# Patient Record
Sex: FEMALE | Race: BLACK OR AFRICAN AMERICAN | NOT HISPANIC OR LATINO | Employment: OTHER | URBAN - METROPOLITAN AREA
[De-identification: names, ages, dates, MRNs, and addresses within clinical notes are randomized per-mention and may not be internally consistent; named-entity substitution may affect disease eponyms.]

---

## 2024-02-06 NOTE — PROVIDER TRANSFER
(Physician in Lead of Transfers)  Outside Transfer Acceptance Note / Regional Referral Center      Upon patient arrival, please contact Hospital Medicine on call.    Referring facility: [facility]   Referring provider: MEL CARD  [place] [facility] [place]  Accepting provider: LASHAE KHAN  Admitting provider: CORBIN BOYCE  Reason for transfer: Higher Level of Care   Transfer diagnosis:GI bleed  Transfer specialty requested: Gastroenterology  Transfer specialty notified: Yes  Transfer level: NUMBER 1-5: 2  Bed type requested: med-tele  Isolation status: No active isolations   Admission class or status: IP- Inpatient      Narrative     73-year-old female with a history of anemia, epilepsy, ovarian cancer (treated in 2023), hyperlipidemia, hyperglycemia, and venous thromboembolism (on Xarelto) presented to [facility] Emergency Department on February 6 after an episode of hematemesis on the morning of presentation.  She noted epigastric tenderness, but she had no further vomiting in the emergency department (has been there approximately 5 hours).  She initially had sinus tachycardia in the 100s, but most recent heart rate was in the 80s.  She had no lightheadedness or dizziness, no chest pain or dyspnea, and no diarrhea noted.  She reported no prior EGDs, but she does have a diagnosis of gastritis.  She had a colonoscopy in 2023 that showed polyps.  She is on oral anticoagulation (chart listed DVT, ED mentioned PE). In the emergency department she received IV fluid and 80 mg IV Protonix.  ED provider spoke with Gastroenterology at [facility].  Referring team is requesting transfer to Hospital Medicine at [facility] for GI evaluation of episode of hematemesis.  She has 2 IVs in place.      Sodium 142, potassium 5.3, chloride 111, CO2 22, BUN 34, creatinine 0.8, glucose 127, calcium 8.8, albumin 3.5, AST 23, ALT 22,  total bilirubin 0.3, white blood cells 8.43, hemoglobin 11, hematocrit 33.7, platelets 291    VS:  Temperature 97.5°, pulse 84, blood pressure 122/77, O2 sats 99%    Instructions    Admit to Hospital Medicine  Consult Gastroenterology      KELLY Shi MD  Hospital Medicine Staff  Cell: [phone]

## 2024-02-07 ENCOUNTER — HOSPITAL ENCOUNTER (INPATIENT)
Facility: HOSPITAL | Age: 74
LOS: 2 days | Discharge: HOME | DRG: 375 | End: 2024-02-09
Attending: INTERNAL MEDICINE | Admitting: INTERNAL MEDICINE
Payer: MEDICARE

## 2024-02-07 DIAGNOSIS — K92.0 HEMATEMESIS WITH NAUSEA: Primary | ICD-10-CM

## 2024-02-07 DIAGNOSIS — K31.89 GASTRIC MASS: ICD-10-CM

## 2024-02-07 DIAGNOSIS — K92.2 GI BLEED: ICD-10-CM

## 2024-02-07 PROBLEM — I82.409 DVT (DEEP VENOUS THROMBOSIS): Chronic | Status: ACTIVE | Noted: 2024-02-07

## 2024-02-07 PROBLEM — G40.909 EPILEPTIC SEIZURES: Chronic | Status: ACTIVE | Noted: 2024-02-07

## 2024-02-07 PROBLEM — D53.9 MACROCYTIC ANEMIA: Status: ACTIVE | Noted: 2024-02-07

## 2024-02-07 LAB
ABO + RH BLD: NORMAL
ALBUMIN SERPL BCP-MCNC: 3 G/DL (ref 3.5–5.2)
ALP SERPL-CCNC: 76 U/L (ref 55–135)
ALT SERPL W/O P-5'-P-CCNC: 14 U/L (ref 10–44)
ANION GAP SERPL CALC-SCNC: 8 MMOL/L (ref 8–16)
APTT PPP: 25.6 SEC (ref 21–32)
AST SERPL-CCNC: 16 U/L (ref 10–40)
BASOPHILS # BLD AUTO: 0.04 K/UL (ref 0–0.2)
BASOPHILS # BLD AUTO: 0.04 K/UL (ref 0–0.2)
BASOPHILS NFR BLD: 0.5 % (ref 0–1.9)
BASOPHILS NFR BLD: 0.7 % (ref 0–1.9)
BILIRUB SERPL-MCNC: 0.3 MG/DL (ref 0.1–1)
BLD GP AB SCN CELLS X3 SERPL QL: NORMAL
BUN SERPL-MCNC: 12 MG/DL (ref 8–23)
CALCIUM SERPL-MCNC: 8.8 MG/DL (ref 8.7–10.5)
CHLORIDE SERPL-SCNC: 112 MMOL/L (ref 95–110)
CO2 SERPL-SCNC: 24 MMOL/L (ref 23–29)
CREAT SERPL-MCNC: 0.7 MG/DL (ref 0.5–1.4)
DIFFERENTIAL METHOD BLD: ABNORMAL
DIFFERENTIAL METHOD BLD: ABNORMAL
EOSINOPHIL # BLD AUTO: 0.1 K/UL (ref 0–0.5)
EOSINOPHIL # BLD AUTO: 0.2 K/UL (ref 0–0.5)
EOSINOPHIL NFR BLD: 1.9 % (ref 0–8)
EOSINOPHIL NFR BLD: 2.2 % (ref 0–8)
ERYTHROCYTE [DISTWIDTH] IN BLOOD BY AUTOMATED COUNT: 14 % (ref 11.5–14.5)
ERYTHROCYTE [DISTWIDTH] IN BLOOD BY AUTOMATED COUNT: 14.1 % (ref 11.5–14.5)
EST. GFR  (NO RACE VARIABLE): >60 ML/MIN/1.73 M^2
GLUCOSE SERPL-MCNC: 106 MG/DL (ref 70–110)
HCT VFR BLD AUTO: 26.1 % (ref 37–48.5)
HCT VFR BLD AUTO: 29.6 % (ref 37–48.5)
HGB BLD-MCNC: 8.5 G/DL (ref 12–16)
HGB BLD-MCNC: 9.3 G/DL (ref 12–16)
IMM GRANULOCYTES # BLD AUTO: 0.04 K/UL (ref 0–0.04)
IMM GRANULOCYTES # BLD AUTO: 0.07 K/UL (ref 0–0.04)
IMM GRANULOCYTES NFR BLD AUTO: 0.7 % (ref 0–0.5)
IMM GRANULOCYTES NFR BLD AUTO: 0.9 % (ref 0–0.5)
INR PPP: 1 (ref 0.8–1.2)
LYMPHOCYTES # BLD AUTO: 1.6 K/UL (ref 1–4.8)
LYMPHOCYTES # BLD AUTO: 1.6 K/UL (ref 1–4.8)
LYMPHOCYTES NFR BLD: 20.5 % (ref 18–48)
LYMPHOCYTES NFR BLD: 26.4 % (ref 18–48)
MCH RBC QN AUTO: 32.3 PG (ref 27–31)
MCH RBC QN AUTO: 33.2 PG (ref 27–31)
MCHC RBC AUTO-ENTMCNC: 31.4 G/DL (ref 32–36)
MCHC RBC AUTO-ENTMCNC: 32.6 G/DL (ref 32–36)
MCV RBC AUTO: 102 FL (ref 82–98)
MCV RBC AUTO: 103 FL (ref 82–98)
MONOCYTES # BLD AUTO: 0.5 K/UL (ref 0.3–1)
MONOCYTES # BLD AUTO: 0.6 K/UL (ref 0.3–1)
MONOCYTES NFR BLD: 7.3 % (ref 4–15)
MONOCYTES NFR BLD: 9.1 % (ref 4–15)
NEUTROPHILS # BLD AUTO: 3.6 K/UL (ref 1.8–7.7)
NEUTROPHILS # BLD AUTO: 5.3 K/UL (ref 1.8–7.7)
NEUTROPHILS NFR BLD: 60.9 % (ref 38–73)
NEUTROPHILS NFR BLD: 68.9 % (ref 38–73)
NRBC BLD-RTO: 0 /100 WBC
NRBC BLD-RTO: 0 /100 WBC
PLATELET # BLD AUTO: 212 K/UL (ref 150–450)
PLATELET # BLD AUTO: 257 K/UL (ref 150–450)
PMV BLD AUTO: 8.6 FL (ref 9.2–12.9)
PMV BLD AUTO: 8.7 FL (ref 9.2–12.9)
POCT GLUCOSE: 83 MG/DL (ref 70–110)
POTASSIUM SERPL-SCNC: 3.8 MMOL/L (ref 3.5–5.1)
PROCALCITONIN SERPL IA-MCNC: 0.13 NG/ML
PROT SERPL-MCNC: 6 G/DL (ref 6–8.4)
PROTHROMBIN TIME: 11.2 SEC (ref 9–12.5)
RBC # BLD AUTO: 2.56 M/UL (ref 4–5.4)
RBC # BLD AUTO: 2.88 M/UL (ref 4–5.4)
SODIUM SERPL-SCNC: 144 MMOL/L (ref 136–145)
SPECIMEN OUTDATE: NORMAL
WBC # BLD AUTO: 5.92 K/UL (ref 3.9–12.7)
WBC # BLD AUTO: 7.72 K/UL (ref 3.9–12.7)

## 2024-02-07 PROCEDURE — 84145 PROCALCITONIN (PCT): CPT | Performed by: STUDENT IN AN ORGANIZED HEALTH CARE EDUCATION/TRAINING PROGRAM

## 2024-02-07 PROCEDURE — 83540 ASSAY OF IRON: CPT | Performed by: STUDENT IN AN ORGANIZED HEALTH CARE EDUCATION/TRAINING PROGRAM

## 2024-02-07 PROCEDURE — 63600175 PHARM REV CODE 636 W HCPCS: Performed by: STUDENT IN AN ORGANIZED HEALTH CARE EDUCATION/TRAINING PROGRAM

## 2024-02-07 PROCEDURE — 82607 VITAMIN B-12: CPT | Performed by: STUDENT IN AN ORGANIZED HEALTH CARE EDUCATION/TRAINING PROGRAM

## 2024-02-07 PROCEDURE — 86850 RBC ANTIBODY SCREEN: CPT | Performed by: STUDENT IN AN ORGANIZED HEALTH CARE EDUCATION/TRAINING PROGRAM

## 2024-02-07 PROCEDURE — 21400001 HC TELEMETRY ROOM

## 2024-02-07 PROCEDURE — 80053 COMPREHEN METABOLIC PANEL: CPT | Performed by: STUDENT IN AN ORGANIZED HEALTH CARE EDUCATION/TRAINING PROGRAM

## 2024-02-07 PROCEDURE — C9113 INJ PANTOPRAZOLE SODIUM, VIA: HCPCS | Performed by: STUDENT IN AN ORGANIZED HEALTH CARE EDUCATION/TRAINING PROGRAM

## 2024-02-07 PROCEDURE — 25000003 PHARM REV CODE 250: Performed by: STUDENT IN AN ORGANIZED HEALTH CARE EDUCATION/TRAINING PROGRAM

## 2024-02-07 PROCEDURE — 36415 COLL VENOUS BLD VENIPUNCTURE: CPT | Mod: XB | Performed by: STUDENT IN AN ORGANIZED HEALTH CARE EDUCATION/TRAINING PROGRAM

## 2024-02-07 PROCEDURE — 85730 THROMBOPLASTIN TIME PARTIAL: CPT | Performed by: STUDENT IN AN ORGANIZED HEALTH CARE EDUCATION/TRAINING PROGRAM

## 2024-02-07 PROCEDURE — 85610 PROTHROMBIN TIME: CPT | Performed by: STUDENT IN AN ORGANIZED HEALTH CARE EDUCATION/TRAINING PROGRAM

## 2024-02-07 PROCEDURE — 85025 COMPLETE CBC W/AUTO DIFF WBC: CPT | Mod: 91 | Performed by: STUDENT IN AN ORGANIZED HEALTH CARE EDUCATION/TRAINING PROGRAM

## 2024-02-07 PROCEDURE — 82746 ASSAY OF FOLIC ACID SERUM: CPT | Performed by: STUDENT IN AN ORGANIZED HEALTH CARE EDUCATION/TRAINING PROGRAM

## 2024-02-07 RX ORDER — ONDANSETRON HYDROCHLORIDE 2 MG/ML
8 INJECTION, SOLUTION INTRAVENOUS EVERY 6 HOURS PRN
Status: DISCONTINUED | OUTPATIENT
Start: 2024-02-07 | End: 2024-02-09 | Stop reason: HOSPADM

## 2024-02-07 RX ORDER — PANTOPRAZOLE SODIUM 40 MG/10ML
40 INJECTION, POWDER, LYOPHILIZED, FOR SOLUTION INTRAVENOUS 2 TIMES DAILY
Status: DISCONTINUED | OUTPATIENT
Start: 2024-02-07 | End: 2024-02-09 | Stop reason: HOSPADM

## 2024-02-07 RX ORDER — ZONISAMIDE 100 MG/1
100 CAPSULE ORAL 2 TIMES DAILY
Status: DISCONTINUED | OUTPATIENT
Start: 2024-02-07 | End: 2024-02-09 | Stop reason: HOSPADM

## 2024-02-07 RX ORDER — SODIUM CHLORIDE, SODIUM LACTATE, POTASSIUM CHLORIDE, CALCIUM CHLORIDE 600; 310; 30; 20 MG/100ML; MG/100ML; MG/100ML; MG/100ML
INJECTION, SOLUTION INTRAVENOUS CONTINUOUS
Status: DISCONTINUED | OUTPATIENT
Start: 2024-02-07 | End: 2024-02-09 | Stop reason: HOSPADM

## 2024-02-07 RX ORDER — CARBAMAZEPINE 200 MG/1
200 TABLET ORAL 3 TIMES DAILY
Status: DISCONTINUED | OUTPATIENT
Start: 2024-02-07 | End: 2024-02-09 | Stop reason: HOSPADM

## 2024-02-07 RX ORDER — LEVETIRACETAM 500 MG/1
1 TABLET ORAL 2 TIMES DAILY
COMMUNITY
Start: 2023-08-17

## 2024-02-07 RX ORDER — PANTOPRAZOLE SODIUM 40 MG/10ML
80 INJECTION, POWDER, LYOPHILIZED, FOR SOLUTION INTRAVENOUS ONCE
Status: DISCONTINUED | OUTPATIENT
Start: 2024-02-07 | End: 2024-02-07

## 2024-02-07 RX ORDER — ALENDRONATE SODIUM 70 MG/1
70 TABLET ORAL
COMMUNITY
Start: 2023-11-03

## 2024-02-07 RX ORDER — LEVETIRACETAM 5 MG/ML
500 INJECTION INTRAVASCULAR EVERY 12 HOURS
Status: DISCONTINUED | OUTPATIENT
Start: 2024-02-07 | End: 2024-02-09 | Stop reason: HOSPADM

## 2024-02-07 RX ORDER — HYDRALAZINE HYDROCHLORIDE 20 MG/ML
10 INJECTION INTRAMUSCULAR; INTRAVENOUS EVERY 8 HOURS PRN
Status: DISCONTINUED | OUTPATIENT
Start: 2024-02-07 | End: 2024-02-09 | Stop reason: HOSPADM

## 2024-02-07 RX ORDER — GLUCAGON 1 MG
1 KIT INJECTION
Status: DISCONTINUED | OUTPATIENT
Start: 2024-02-07 | End: 2024-02-09 | Stop reason: HOSPADM

## 2024-02-07 RX ORDER — CARBAMAZEPINE 200 MG/1
TABLET ORAL
COMMUNITY
Start: 2023-08-17

## 2024-02-07 RX ORDER — ZONISAMIDE 100 MG/1
200 CAPSULE ORAL 2 TIMES DAILY
COMMUNITY
Start: 2023-12-29

## 2024-02-07 RX ORDER — MUPIROCIN 20 MG/G
OINTMENT TOPICAL 2 TIMES DAILY
Status: DISCONTINUED | OUTPATIENT
Start: 2024-02-07 | End: 2024-02-09 | Stop reason: HOSPADM

## 2024-02-07 RX ORDER — HYDROXYZINE HYDROCHLORIDE 25 MG/1
25 TABLET, FILM COATED ORAL 3 TIMES DAILY PRN
Status: DISCONTINUED | OUTPATIENT
Start: 2024-02-07 | End: 2024-02-09 | Stop reason: HOSPADM

## 2024-02-07 RX ADMIN — CARBAMAZEPINE 200 MG: 200 TABLET ORAL at 09:02

## 2024-02-07 RX ADMIN — ZONISAMIDE 100 MG: 100 CAPSULE ORAL at 04:02

## 2024-02-07 RX ADMIN — SODIUM CHLORIDE, POTASSIUM CHLORIDE, SODIUM LACTATE AND CALCIUM CHLORIDE: 600; 310; 30; 20 INJECTION, SOLUTION INTRAVENOUS at 02:02

## 2024-02-07 RX ADMIN — LEVETIRACETAM INJECTION 500 MG: 5 INJECTION INTRAVENOUS at 04:02

## 2024-02-07 RX ADMIN — CARBAMAZEPINE 200 MG: 200 TABLET ORAL at 04:02

## 2024-02-07 RX ADMIN — PANTOPRAZOLE SODIUM 40 MG: 40 INJECTION, POWDER, FOR SOLUTION INTRAVENOUS at 09:02

## 2024-02-07 RX ADMIN — MUPIROCIN: 20 OINTMENT TOPICAL at 09:02

## 2024-02-07 NOTE — SUBJECTIVE & OBJECTIVE
PAST MEDICAL HISTORY  -epilepsy   -ovarian cancer (treated, s/p chemo 7/2023)  -hyperlipidemia  -venous thromboembolism (on Xarelto)     No past surgical history on file.    Review of patient's allergies indicates:  No Known Allergies    No current facility-administered medications on file prior to encounter.     Current Outpatient Medications on File Prior to Encounter   Medication Sig    alendronate (FOSAMAX) 70 MG tablet Take 70 mg by mouth.    carBAMazepine (TEGRETOL) 200 mg tablet Take by mouth.    levETIRAcetam (KEPPRA) 500 MG Tab Take 1 tablet by mouth.    rivaroxaban (XARELTO) 20 mg Tab Take 20 mg by mouth.    zonisamide (ZONEGRAN) 100 MG Cap Take 100 mg by mouth.    calcium phosphate trib/vit D3 (CALCIUM PHOSPHATE-VITAMIN D3 ORAL) Take 2 tablets by mouth.     Family History    None       Tobacco Use    Smoking status: Not on file    Smokeless tobacco: Not on file   Substance and Sexual Activity    Alcohol use: Not on file    Drug use: Not on file    Sexual activity: Not on file     Review of Systems   Constitutional:  Negative for chills, fatigue and fever.   HENT:  Negative for congestion, postnasal drip, rhinorrhea and sore throat.    Eyes:  Negative for pain and visual disturbance.   Respiratory:  Negative for cough, chest tightness, shortness of breath and wheezing.    Cardiovascular:  Negative for chest pain, palpitations and leg swelling.   Gastrointestinal:  Negative for abdominal distention, abdominal pain, constipation, diarrhea, nausea and vomiting.   Genitourinary:  Negative for difficulty urinating, flank pain and hematuria.   Musculoskeletal:  Negative for arthralgias, back pain, gait problem and joint swelling.   Skin:  Negative for pallor and rash.   Neurological:  Negative for dizziness, syncope and weakness.   Psychiatric/Behavioral:  Negative for confusion, decreased concentration and suicidal ideas.      Objective:     Vital Signs (Most Recent):  Temp: 98.1 °F (36.7 °C) (02/07/24  1503)  Pulse: 94 (02/07/24 1503)  Resp: 18 (02/07/24 1503)  BP: 121/68 (02/07/24 1503)  SpO2: 100 % (02/07/24 1503) Vital Signs (24h Range):  Temp:  [98.1 °F (36.7 °C)] 98.1 °F (36.7 °C)  Pulse:  [94] 94  Resp:  [18] 18  SpO2:  [100 %] 100 %  BP: (121)/(68) 121/68     Weight: 58.9 kg (129 lb 13.6 oz)  Body mass index is 21.61 kg/m².     Physical Exam  Vitals reviewed.   Constitutional:       General: She is not in acute distress.     Appearance: Normal appearance. She is normal weight. She is not ill-appearing or toxic-appearing.   HENT:      Head: Normocephalic and atraumatic.      Nose: Nose normal. No congestion or rhinorrhea.   Eyes:      Extraocular Movements: Extraocular movements intact.      Conjunctiva/sclera: Conjunctivae normal.      Pupils: Pupils are equal, round, and reactive to light.   Cardiovascular:      Rate and Rhythm: Normal rate and regular rhythm.      Pulses: Normal pulses.      Heart sounds: No murmur heard.  Pulmonary:      Effort: Pulmonary effort is normal. No respiratory distress.      Breath sounds: Normal breath sounds. No wheezing.   Abdominal:      General: Abdomen is flat. Bowel sounds are normal. There is no distension.      Palpations: Abdomen is soft.      Tenderness: There is no abdominal tenderness. There is no guarding or rebound.   Musculoskeletal:         General: No swelling, tenderness or deformity. Normal range of motion.      Cervical back: Normal range of motion and neck supple. No rigidity.   Skin:     General: Skin is warm and dry.      Capillary Refill: Capillary refill takes less than 2 seconds.      Coloration: Skin is not pale.   Neurological:      General: No focal deficit present.      Mental Status: She is alert and oriented to person, place, and time. Mental status is at baseline.      Motor: No weakness.      Gait: Gait normal.   Psychiatric:         Mood and Affect: Mood is anxious.         Behavior: Behavior normal.         Thought Content: Thought content  normal.              CRANIAL NERVES     CN III, IV, VI   Pupils are equal, round, and reactive to light.       Significant Labs: All pertinent labs within the past 24 hours have been reviewed.    Recent Labs   Lab 02/07/24  1457   WBC 7.72   HGB 9.3*   HCT 29.6*        TSH:   Recent Labs   Lab 10/30/23  1429   TSH 1.00       Significant Imaging: I have reviewed all pertinent imaging results/findings within the past 24 hours.

## 2024-02-07 NOTE — H&P
[facility] Medicine  History & Physical    Patient Name: Cheryl Garcia  MRN: [id number]  Patient Class: IP- Inpatient  Admission Date: 2/7/2024  Attending Physician: Jayce Gray MD   Primary Care Provider: Gagandeep Iglesias MD         Patient information was obtained from patient, past medical records, and ER records.     Subjective:     Principal Problem:Hematemesis with nausea    Chief Complaint: No chief complaint on file.       HPI: A 73-year-old female with a medical history significant for epilepsy, treated ovarian cancer (s/p chemotherapy in July 2023), hyperlipidemia, hyperglycemia, and venous thromboembolism (currently on Xarelto), presented to the [facility] ER on February 6 following an episode of hematemesis. Upon presentation, she experienced epigastric tenderness without further instances of vomiting. The patient reported no symptoms of lightheadedness, dizziness, chest pain, dyspnea, or diarrhea. Despite no previous EGDs, she has a diagnosis of gastritis and underwent a colonoscopy in 2023, revealing polyps. Her treatment in the ED included IV fluids and 80 mg of IV Protonix, after which the ED provider coordinated with GI here at [facility] for further evaluation. Chart review indicated a decrease in hemoglobin from 11 g/dL on February 6 to 8.9 g/dL on February 7, measured 10 hours apart, while other laboratory values and vital signs remained within normal limits. Currently, the patient denies experiencing abdominal pain, nausea, vomiting, or diarrhea. But does note that her last bowel movement was on Monday and was black-colored.     PAST MEDICAL HISTORY  -epilepsy   -ovarian cancer (treated, s/p chemo 7/2023)  -hyperlipidemia  -venous thromboembolism (on Xarelto)     No past surgical history on file.    Review of patient's allergies indicates:  No Known Allergies    No current facility-administered medications on file prior  to encounter.     Current Outpatient Medications on File Prior to Encounter   Medication Sig    alendronate (FOSAMAX) 70 MG tablet Take 70 mg by mouth.    carBAMazepine (TEGRETOL) 200 mg tablet Take by mouth.    levETIRAcetam (KEPPRA) 500 MG Tab Take 1 tablet by mouth.    rivaroxaban (XARELTO) 20 mg Tab Take 20 mg by mouth.    zonisamide (ZONEGRAN) 100 MG Cap Take 100 mg by mouth.    calcium phosphate trib/vit D3 (CALCIUM PHOSPHATE-VITAMIN D3 ORAL) Take 2 tablets by mouth.     Family History    None       Tobacco Use    Smoking status: Not on file    Smokeless tobacco: Not on file   Substance and Sexual Activity    Alcohol use: Not on file    Drug use: Not on file    Sexual activity: Not on file     Review of Systems   Constitutional:  Negative for chills, fatigue and fever.   HENT:  Negative for congestion, postnasal drip, rhinorrhea and sore throat.    Eyes:  Negative for pain and visual disturbance.   Respiratory:  Negative for cough, chest tightness, shortness of breath and wheezing.    Cardiovascular:  Negative for chest pain, palpitations and leg swelling.   Gastrointestinal:  Negative for abdominal distention, abdominal pain, constipation, diarrhea, nausea and vomiting.   Genitourinary:  Negative for difficulty urinating, flank pain and hematuria.   Musculoskeletal:  Negative for arthralgias, back pain, gait problem and joint swelling.   Skin:  Negative for pallor and rash.   Neurological:  Negative for dizziness, syncope and weakness.   Psychiatric/Behavioral:  Negative for confusion, decreased concentration and suicidal ideas.      Objective:     Vital Signs (Most Recent):  Temp: 98.1 °F (36.7 °C) (02/07/24 1503)  Pulse: 94 (02/07/24 1503)  Resp: 18 (02/07/24 1503)  BP: 121/68 (02/07/24 1503)  SpO2: 100 % (02/07/24 1503) Vital Signs (24h Range):  Temp:  [98.1 °F (36.7 °C)] 98.1 °F (36.7 °C)  Pulse:  [94] 94  Resp:  [18] 18  SpO2:  [100 %] 100 %  BP: (121)/(68) 121/68     Weight: 58.9 kg (129 lb 13.6  oz)  Body mass index is 21.61 kg/m².     Physical Exam  Vitals reviewed.   Constitutional:       General: She is not in acute distress.     Appearance: Normal appearance. She is normal weight. She is not ill-appearing or toxic-appearing.   HENT:      Head: Normocephalic and atraumatic.      Nose: Nose normal. No congestion or rhinorrhea.   Eyes:      Extraocular Movements: Extraocular movements intact.      Conjunctiva/sclera: Conjunctivae normal.      Pupils: Pupils are equal, round, and reactive to light.   Cardiovascular:      Rate and Rhythm: Normal rate and regular rhythm.      Pulses: Normal pulses.      Heart sounds: No murmur heard.  Pulmonary:      Effort: Pulmonary effort is normal. No respiratory distress.      Breath sounds: Normal breath sounds. No wheezing.   Abdominal:      General: Abdomen is flat. Bowel sounds are normal. There is no distension.      Palpations: Abdomen is soft.      Tenderness: There is no abdominal tenderness. There is no guarding or rebound.   Musculoskeletal:         General: No swelling, tenderness or deformity. Normal range of motion.      Cervical back: Normal range of motion and neck supple. No rigidity.   Skin:     General: Skin is warm and dry.      Capillary Refill: Capillary refill takes less than 2 seconds.      Coloration: Skin is not pale.   Neurological:      General: No focal deficit present.      Mental Status: She is alert and oriented to person, place, and time. Mental status is at baseline.      Motor: No weakness.      Gait: Gait normal.   Psychiatric:         Mood and Affect: Mood is anxious.         Behavior: Behavior normal.         Thought Content: Thought content normal.              CRANIAL NERVES     CN III, IV, VI   Pupils are equal, round, and reactive to light.       Significant Labs: All pertinent labs within the past 24 hours have been reviewed.    Recent Labs   Lab 02/07/24  1457   WBC 7.72   HGB 9.3*   HCT 29.6*        TSH:   Recent Labs    Lab 10/30/23  1429   TSH 1.00       Significant Imaging: I have reviewed all pertinent imaging results/findings within the past 24 hours.  Assessment/Plan:     * Hematemesis with nausea  Upper GI bleed  --witnessed bloody emesis 2 days PTA  --H&H 11 > 8.9 > 9.3   --GI consulted, EGD in the AM- NPO at [facility]  --IV Pantoprazole 80 mg x 1 dose, BID 40 mg thereafter  --serial CBC to trend h&h Q8H  --iron studies, B12, folic acid ordered for completion  --low threshold to transfuse for hgb < 7    Hx of DVT  --on xarelto- held  --last dose on Monday  --monitor on telemetry for now  --may have to consider transitioning over to Eliquis which has shown to have a decrease risk of GI hemorrhage    Hx of Epileptic seizures  --has not taken medication since Monday (2 days prior to admission)  --on carbamazepine, zonisamide, and Keppra- restarted promptly upon admission  --no seizures since 2009 per patient  --seizure precautions, neuro checks Q8H    Macrocytic anemia  --etiology: UGIB- plan discussed above      VTE Risk Mitigation (From admission, onward)           Ordered     Place sequential compression device  Until discontinued         02/07/24 1440     IP VTE LOW RISK PATIENT  Once         02/07/24 1440                                    Jyace Gray MD  Department of Hospital Medicine  [facility] - Telemetry ([facility])

## 2024-02-07 NOTE — ASSESSMENT & PLAN NOTE
--witnessed bloody emesis 2 days PTA  --H&H 11 > 8.9 > 9.3   --GI consulted, EGD in the AM- NPO at [facility]  --IV Pantoprazole 80 mg x 1 dose, BID 40 mg thereafter  --serial CBC to trend h&h Q8H  --iron studies, B12, folic acid ordered for completion  --low threshold to transfuse for hgb < 7

## 2024-02-07 NOTE — PLAN OF CARE
Contacted by  about arrival of transfer patient from [facility] for evaluation of coffee ground emesis/hematemesis. Patient is hemodynamically stable. Presented 2 days prior to OSH and had no more episodes of hematemesis since arrival. Hb initially trended down from 11 to 8.9 but is stable at 9 on repeat here. Hemodynamically stable. Last dose of Xarelto Monday. Recommend to trend H&H and transfuse PRN to keep Hb > 7. NPO past MN for EGD in the morning. Continue to hold anticoagulation. Notify for any recurrent bleeding or hemodynamic instability. Full consult to follow in the am.         Jayla Arteaga MD  Gastroenterology

## 2024-02-07 NOTE — ASSESSMENT & PLAN NOTE
--on xarelto- held  --last dose on Monday  --monitor on telemetry for now  --may have to consider transitioning over to Eliquis which has shown to have a decrease risk of GI hemorrhage

## 2024-02-07 NOTE — ASSESSMENT & PLAN NOTE
--has not taken medication since Monday (2 days prior to admission)  --on carbamazepine, zonisamide, and Keppra- restarted promptly upon admission  --no seizures since 2009 per patient  --seizure precautions, neuro checks Q8H

## 2024-02-07 NOTE — HPI
A 73-year-old female with a medical history significant for epilepsy, treated ovarian cancer (s/p chemotherapy in July 2023), hyperlipidemia, hyperglycemia, and venous thromboembolism (currently on Xarelto), presented to the [facility] ER on February 6 following an episode of hematemesis. Upon presentation, she experienced epigastric tenderness without further instances of vomiting. The patient reported no symptoms of lightheadedness, dizziness, chest pain, dyspnea, or diarrhea. Despite no previous EGDs, she has a diagnosis of gastritis and underwent a colonoscopy in 2023, revealing polyps. Her treatment in the ED included IV fluids and 80 mg of IV Protonix, after which the ED provider coordinated with GI here at [facility] for further evaluation. Chart review indicated a decrease in hemoglobin from 11 g/dL on February 6 to 8.9 g/dL on February 7, measured 10 hours apart, while other laboratory values and vital signs remained within normal limits. Currently, the patient denies experiencing abdominal pain, nausea, vomiting, or diarrhea. But does note that her last bowel movement was on Monday and was black-colored.

## 2024-02-08 ENCOUNTER — ANESTHESIA (OUTPATIENT)
Dept: ENDOSCOPY | Facility: HOSPITAL | Age: 74
DRG: 375 | End: 2024-02-08
Payer: MEDICARE

## 2024-02-08 ENCOUNTER — ANESTHESIA EVENT (OUTPATIENT)
Dept: ENDOSCOPY | Facility: HOSPITAL | Age: 74
DRG: 375 | End: 2024-02-08
Payer: MEDICARE

## 2024-02-08 PROBLEM — D62 ACUTE BLOOD LOSS ANEMIA: Status: ACTIVE | Noted: 2024-02-07

## 2024-02-08 LAB
ALBUMIN SERPL BCP-MCNC: 2.6 G/DL (ref 3.5–5.2)
ALP SERPL-CCNC: 73 U/L (ref 55–135)
ALT SERPL W/O P-5'-P-CCNC: 25 U/L (ref 10–44)
ANION GAP SERPL CALC-SCNC: 7 MMOL/L (ref 8–16)
AST SERPL-CCNC: 22 U/L (ref 10–40)
BASOPHILS # BLD AUTO: 0.03 K/UL (ref 0–0.2)
BASOPHILS # BLD AUTO: 0.03 K/UL (ref 0–0.2)
BASOPHILS NFR BLD: 0.5 % (ref 0–1.9)
BASOPHILS NFR BLD: 0.6 % (ref 0–1.9)
BILIRUB SERPL-MCNC: 0.2 MG/DL (ref 0.1–1)
BUN SERPL-MCNC: 7 MG/DL (ref 8–23)
CALCIUM SERPL-MCNC: 8.1 MG/DL (ref 8.7–10.5)
CHLORIDE SERPL-SCNC: 114 MMOL/L (ref 95–110)
CO2 SERPL-SCNC: 22 MMOL/L (ref 23–29)
CREAT SERPL-MCNC: 0.7 MG/DL (ref 0.5–1.4)
DIFFERENTIAL METHOD BLD: ABNORMAL
DIFFERENTIAL METHOD BLD: ABNORMAL
EOSINOPHIL # BLD AUTO: 0.1 K/UL (ref 0–0.5)
EOSINOPHIL # BLD AUTO: 0.1 K/UL (ref 0–0.5)
EOSINOPHIL NFR BLD: 1.7 % (ref 0–8)
EOSINOPHIL NFR BLD: 2.4 % (ref 0–8)
ERYTHROCYTE [DISTWIDTH] IN BLOOD BY AUTOMATED COUNT: 13.8 % (ref 11.5–14.5)
ERYTHROCYTE [DISTWIDTH] IN BLOOD BY AUTOMATED COUNT: 13.8 % (ref 11.5–14.5)
EST. GFR  (NO RACE VARIABLE): >60 ML/MIN/1.73 M^2
FOLATE SERPL-MCNC: 35.7 NG/ML (ref 4–24)
GLUCOSE SERPL-MCNC: 104 MG/DL (ref 70–110)
HCT VFR BLD AUTO: 25.1 % (ref 37–48.5)
HCT VFR BLD AUTO: 26.8 % (ref 37–48.5)
HGB BLD-MCNC: 8.1 G/DL (ref 12–16)
HGB BLD-MCNC: 8.5 G/DL (ref 12–16)
IMM GRANULOCYTES # BLD AUTO: 0.04 K/UL (ref 0–0.04)
IMM GRANULOCYTES # BLD AUTO: 0.06 K/UL (ref 0–0.04)
IMM GRANULOCYTES NFR BLD AUTO: 0.8 % (ref 0–0.5)
IMM GRANULOCYTES NFR BLD AUTO: 0.9 % (ref 0–0.5)
IRON SERPL-MCNC: 34 UG/DL (ref 30–160)
LYMPHOCYTES # BLD AUTO: 1.2 K/UL (ref 1–4.8)
LYMPHOCYTES # BLD AUTO: 1.2 K/UL (ref 1–4.8)
LYMPHOCYTES NFR BLD: 18.2 % (ref 18–48)
LYMPHOCYTES NFR BLD: 24.8 % (ref 18–48)
MCH RBC QN AUTO: 32.3 PG (ref 27–31)
MCH RBC QN AUTO: 33.1 PG (ref 27–31)
MCHC RBC AUTO-ENTMCNC: 31.7 G/DL (ref 32–36)
MCHC RBC AUTO-ENTMCNC: 32.3 G/DL (ref 32–36)
MCV RBC AUTO: 100 FL (ref 82–98)
MCV RBC AUTO: 104 FL (ref 82–98)
MONOCYTES # BLD AUTO: 0.5 K/UL (ref 0.3–1)
MONOCYTES # BLD AUTO: 0.5 K/UL (ref 0.3–1)
MONOCYTES NFR BLD: 7.7 % (ref 4–15)
MONOCYTES NFR BLD: 9 % (ref 4–15)
NEUTROPHILS # BLD AUTO: 3.1 K/UL (ref 1.8–7.7)
NEUTROPHILS # BLD AUTO: 4.6 K/UL (ref 1.8–7.7)
NEUTROPHILS NFR BLD: 62.4 % (ref 38–73)
NEUTROPHILS NFR BLD: 71 % (ref 38–73)
NRBC BLD-RTO: 0 /100 WBC
NRBC BLD-RTO: 0 /100 WBC
PLATELET # BLD AUTO: 229 K/UL (ref 150–450)
PLATELET # BLD AUTO: 236 K/UL (ref 150–450)
PMV BLD AUTO: 8.6 FL (ref 9.2–12.9)
PMV BLD AUTO: 8.8 FL (ref 9.2–12.9)
POCT GLUCOSE: 103 MG/DL (ref 70–110)
POCT GLUCOSE: 103 MG/DL (ref 70–110)
POCT GLUCOSE: 108 MG/DL (ref 70–110)
POCT GLUCOSE: 109 MG/DL (ref 70–110)
POCT GLUCOSE: 87 MG/DL (ref 70–110)
POTASSIUM SERPL-SCNC: 3.6 MMOL/L (ref 3.5–5.1)
PROT SERPL-MCNC: 5 G/DL (ref 6–8.4)
RBC # BLD AUTO: 2.51 M/UL (ref 4–5.4)
RBC # BLD AUTO: 2.57 M/UL (ref 4–5.4)
SATURATED IRON: 13 % (ref 20–50)
SODIUM SERPL-SCNC: 143 MMOL/L (ref 136–145)
TOTAL IRON BINDING CAPACITY: 263 UG/DL (ref 250–450)
TRANSFERRIN SERPL-MCNC: 178 MG/DL (ref 200–375)
VIT B12 SERPL-MCNC: 1112 PG/ML (ref 210–950)
WBC # BLD AUTO: 5.01 K/UL (ref 3.9–12.7)
WBC # BLD AUTO: 6.47 K/UL (ref 3.9–12.7)

## 2024-02-08 PROCEDURE — 99223 1ST HOSP IP/OBS HIGH 75: CPT | Mod: 25,,, | Performed by: PHYSICIAN ASSISTANT

## 2024-02-08 PROCEDURE — 27201012 HC FORCEPS, HOT/COLD, DISP: Performed by: INTERNAL MEDICINE

## 2024-02-08 PROCEDURE — 85025 COMPLETE CBC W/AUTO DIFF WBC: CPT | Mod: 91 | Performed by: STUDENT IN AN ORGANIZED HEALTH CARE EDUCATION/TRAINING PROGRAM

## 2024-02-08 PROCEDURE — 25000003 PHARM REV CODE 250: Performed by: NURSE ANESTHETIST, CERTIFIED REGISTERED

## 2024-02-08 PROCEDURE — 36415 COLL VENOUS BLD VENIPUNCTURE: CPT | Performed by: STUDENT IN AN ORGANIZED HEALTH CARE EDUCATION/TRAINING PROGRAM

## 2024-02-08 PROCEDURE — A9698 NON-RAD CONTRAST MATERIALNOC: HCPCS | Performed by: HOSPITALIST

## 2024-02-08 PROCEDURE — 43239 EGD BIOPSY SINGLE/MULTIPLE: CPT | Performed by: INTERNAL MEDICINE

## 2024-02-08 PROCEDURE — 21400001 HC TELEMETRY ROOM

## 2024-02-08 PROCEDURE — 37000008 HC ANESTHESIA 1ST 15 MINUTES: Performed by: INTERNAL MEDICINE

## 2024-02-08 PROCEDURE — 43239 EGD BIOPSY SINGLE/MULTIPLE: CPT | Mod: ,,, | Performed by: INTERNAL MEDICINE

## 2024-02-08 PROCEDURE — 0DB68ZX EXCISION OF STOMACH, VIA NATURAL OR ARTIFICIAL OPENING ENDOSCOPIC, DIAGNOSTIC: ICD-10-PCS | Performed by: INTERNAL MEDICINE

## 2024-02-08 PROCEDURE — 25000003 PHARM REV CODE 250: Performed by: STUDENT IN AN ORGANIZED HEALTH CARE EDUCATION/TRAINING PROGRAM

## 2024-02-08 PROCEDURE — 63600175 PHARM REV CODE 636 W HCPCS: Performed by: STUDENT IN AN ORGANIZED HEALTH CARE EDUCATION/TRAINING PROGRAM

## 2024-02-08 PROCEDURE — 80053 COMPREHEN METABOLIC PANEL: CPT | Performed by: STUDENT IN AN ORGANIZED HEALTH CARE EDUCATION/TRAINING PROGRAM

## 2024-02-08 PROCEDURE — 63600175 PHARM REV CODE 636 W HCPCS: Performed by: NURSE ANESTHETIST, CERTIFIED REGISTERED

## 2024-02-08 PROCEDURE — 37000009 HC ANESTHESIA EA ADD 15 MINS: Performed by: INTERNAL MEDICINE

## 2024-02-08 PROCEDURE — C9113 INJ PANTOPRAZOLE SODIUM, VIA: HCPCS | Performed by: STUDENT IN AN ORGANIZED HEALTH CARE EDUCATION/TRAINING PROGRAM

## 2024-02-08 PROCEDURE — 25500020 PHARM REV CODE 255: Performed by: HOSPITALIST

## 2024-02-08 RX ORDER — LIDOCAINE HYDROCHLORIDE 20 MG/ML
INJECTION INTRAVENOUS
Status: DISCONTINUED | OUTPATIENT
Start: 2024-02-08 | End: 2024-02-08

## 2024-02-08 RX ORDER — PROPOFOL 10 MG/ML
VIAL (ML) INTRAVENOUS
Status: DISCONTINUED | OUTPATIENT
Start: 2024-02-08 | End: 2024-02-08

## 2024-02-08 RX ORDER — SODIUM CHLORIDE 9 MG/ML
INJECTION, SOLUTION INTRAVENOUS CONTINUOUS
Status: CANCELLED | OUTPATIENT
Start: 2024-02-08

## 2024-02-08 RX ADMIN — LEVETIRACETAM INJECTION 500 MG: 5 INJECTION INTRAVENOUS at 09:02

## 2024-02-08 RX ADMIN — CARBAMAZEPINE 200 MG: 200 TABLET ORAL at 08:02

## 2024-02-08 RX ADMIN — IOHEXOL 100 ML: 350 INJECTION, SOLUTION INTRAVENOUS at 12:02

## 2024-02-08 RX ADMIN — PANTOPRAZOLE SODIUM 40 MG: 40 INJECTION, POWDER, FOR SOLUTION INTRAVENOUS at 08:02

## 2024-02-08 RX ADMIN — ZONISAMIDE 100 MG: 100 CAPSULE ORAL at 11:02

## 2024-02-08 RX ADMIN — PROPOFOL 110 MG: 10 INJECTION, EMULSION INTRAVENOUS at 10:02

## 2024-02-08 RX ADMIN — ZONISAMIDE 100 MG: 100 CAPSULE ORAL at 08:02

## 2024-02-08 RX ADMIN — SODIUM CHLORIDE, POTASSIUM CHLORIDE, SODIUM LACTATE AND CALCIUM CHLORIDE: 600; 310; 30; 20 INJECTION, SOLUTION INTRAVENOUS at 06:02

## 2024-02-08 RX ADMIN — MUPIROCIN: 20 OINTMENT TOPICAL at 09:02

## 2024-02-08 RX ADMIN — IOHEXOL 1000 ML: 9 SOLUTION ORAL at 11:02

## 2024-02-08 RX ADMIN — LIDOCAINE HYDROCHLORIDE 100 MG: 20 INJECTION INTRAVENOUS at 10:02

## 2024-02-08 RX ADMIN — SODIUM CHLORIDE, SODIUM LACTATE, POTASSIUM CHLORIDE, AND CALCIUM CHLORIDE: .6; .31; .03; .02 INJECTION, SOLUTION INTRAVENOUS at 10:02

## 2024-02-08 RX ADMIN — MUPIROCIN: 20 OINTMENT TOPICAL at 08:02

## 2024-02-08 RX ADMIN — PROPOFOL 30 MG: 10 INJECTION, EMULSION INTRAVENOUS at 10:02

## 2024-02-08 RX ADMIN — PANTOPRAZOLE SODIUM 40 MG: 40 INJECTION, POWDER, FOR SOLUTION INTRAVENOUS at 09:02

## 2024-02-08 RX ADMIN — LEVETIRACETAM INJECTION 500 MG: 5 INJECTION INTRAVENOUS at 08:02

## 2024-02-08 RX ADMIN — CARBAMAZEPINE 200 MG: 200 TABLET ORAL at 03:02

## 2024-02-08 NOTE — PLAN OF CARE
Awake/alert. VSS. No distress noted. Tolerating apple juice. Report called to Rosa. Awaiting MD rounds.

## 2024-02-08 NOTE — [employer] PATIENT INSTRUCTIONS
Discharge Summary/Instructions after an Endoscopic Procedure  Patient Name: Cheryl Ramon  Patient MRN: [id number]  Patient   YOB: 1950  Thursday, February 8, 2024 Jayla Arteaga MD  Dear patient,  As a result of recent federal legislation (The Federal Cures Act), you may   receive lab or pathology results from your procedure in your [facility]   account before your physician is able to contact you. Your physician or   their representative will relay the results to you with their   recommendations at their soonest availability.  Thank you,  RESTRICTIONS:  During your procedure today, you received medications for sedation.  These   medications may affect your judgment, balance and coordination.  Therefore,   for 24 hours, you have the following restrictions:   - DO NOT drive a car, operate machinery, make legal/financial decisions,   sign important papers or drink alcohol.    ACTIVITY:  Today: no heavy lifting, straining or running due to procedural   sedation/anesthesia.  The following day: return to full activity including work.  DIET:  Eat and drink normally unless instructed otherwise.     TREATMENT FOR COMMON SIDE EFFECTS:  - Mild abdominal pain, nausea, belching, bloating or excessive gas:  rest,   eat lightly and use a heating pad.  - Sore Throat: treat with throat lozenges and/or gargle with warm salt   water.  - Because air was used during the procedure, expelling large amounts of air   from your rectum or belching is normal.  - If a bowel prep was taken, you may not have a bowel movement for 1-3 days.    This is normal.  SYMPTOMS TO WATCH FOR AND REPORT TO YOUR PHYSICIAN:  1. Abdominal pain or bloating, other than gas cramps.  2. Chest pain.  3. Back pain.  4. Signs of infection such as: chills or fever occurring within 24 hours   after the procedure.  5. Rectal bleeding, which would show as bright red, maroon, or black stools.   (A tablespoon of blood from the rectum is not serious,  especially if   hemorrhoids are present.)  6. Vomiting.  7. Weakness or dizziness.  GO DIRECTLY TO THE NEAREST EMERGENCY ROOM IF YOU HAVE ANY OF THE FOLLOWING:      Difficulty breathing              Chills and/or fever over 101 F   Persistent vomiting and/or vomiting blood   Severe abdominal pain   Severe chest pain   Black, tarry stools   Bleeding- more than one tablespoon   Any other symptom or condition that you feel may need urgent attention  Your doctor recommends these additional instructions:  If any biopsies were taken, your doctors clinic will contact you in 1 to 2   weeks with any results.  - [facility] patient to [facility] for [facility].   - Resume previous diet.   - Continue present medications.   - Await pathology results.   - Pending path results, surgery and oncology consults  - CT A/P  For questions, problems or results please call your physician Jayla Arteaga MD at Work:  [phone]  If you have any questions about the above instructions, call the GI   department at [phone] or call the endoscopy unit at [phone]   from 7am until 3 pm.  [facility], EMERGENCY ROOM PHONE NUMBER:   [phone]  IF A COMPLICATION OR EMERGENCY SITUATION ARISES AND YOU ARE UNABLE TO REACH   YOUR PHYSICIAN - GO DIRECTLY TO THE EMERGENCY ROOM.  I have read or have had read to me these discharge instructions for my   procedure and have received a written copy.  I understand these   instructions and will follow-up with my physician if I have any questions.     __________________________________       _____________________________________  Nurse Signature                                          Patient/Designated   Responsible Party Signature  MD Jayla Paiz MD  2/8/2024 10:42:40 AM  [employer]

## 2024-02-08 NOTE — HPI
The patient presented to [facility] on February 6 following an episode of hematemesis. At the time, she reported epigastric pain, and had had a black stool. Her initial Hgb was 11 and decreased to 8.9 overnight. BUN 34 and creatinine 0.8. She was transferred her for GI evaluation. She has never had an EGD. She had a colonoscopy in 2023 with polyps noted. She takes Xarelto for h/o venous thromboembolism. Her last dose was 02/05/24. In the ER, she was given 80 mg of Protonix and has been continued on 40 mg IV bid. She hasn't had anymore vomiting or melena. She denies abdominal pain. She is hemodynamically stable.

## 2024-02-08 NOTE — ANESTHESIA RELEASE NOTE
"Anesthesia Release from PACU Note    Patient: Cheryl Garcia    Procedure(s) Performed: Procedure(s) (LRB):  EGD (ESOPHAGOGASTRODUODENOSCOPY) (N/A)    Anesthesia type: MAC    Post pain: Adequate analgesia    Post assessment: no apparent anesthetic complications    Last Vitals: Visit Vitals  /66 (BP Location: Left arm, Patient Position: Lying)   Pulse 75   Temp 36.6 °C (97.9 °F) (Tympanic)   Resp 18   Ht 5' 5" (1.651 m)   Wt 59 kg (130 lb)   SpO2 99%   Breastfeeding No   BMI 21.63 kg/m²       Post vital signs: stable    Level of consciousness: awake    Nausea/Vomiting: no nausea/no vomiting    Complications: none    Airway Patency: patent    Respiratory: unassisted    Cardiovascular: stable and blood pressure at baseline    Hydration: euvolemic  "

## 2024-02-08 NOTE — TRANSFER OF CARE
"Anesthesia Transfer of Care Note    Patient: Cheryl Garcia    Procedure(s) Performed: Procedure(s) (LRB):  EGD (ESOPHAGOGASTRODUODENOSCOPY) (N/A)    Patient location: PACU    Anesthesia Type: MAC    Transport from OR: Transported from OR on room air with adequate spontaneous ventilation    Post pain: adequate analgesia    Post assessment: no apparent anesthetic complications    Post vital signs: stable    Level of consciousness: awake    Nausea/Vomiting: no nausea/vomiting    Complications: none    Transfer of care protocol was followed      Last vitals: Visit Vitals  /66 (BP Location: Left arm, Patient Position: Lying)   Pulse 75   Temp 36.6 °C (97.9 °F) (Tympanic)   Resp 18   Ht 5' 5" (1.651 m)   Wt 59 kg (130 lb)   SpO2 99%   Breastfeeding No   BMI 21.63 kg/m²     "

## 2024-02-08 NOTE — PLAN OF CARE
EGD completed with large ulcerated gastric mass concerning for malignancy. Several biopsies taken. CT A/P showing similar findings.     Recommendations:    - Will f/u pathology  - Recommend to discuss anticoagulation needs with patient and her prescribing physician with close follow-up if anticoagulation is continued  - If Hb remains stable, patient should be ok to discharge and will contact her with pathology results. Oncology and surgery referrals pending results  - No further GI recs at this time. Will sign off. Please call with any further questions/concerns

## 2024-02-08 NOTE — PROGRESS NOTES
"[facility] Medicine  Progress Note    Patient Name: Cheryl Garcia  MRN: [id number]  Patient Class: IP- Inpatient   Admission Date: 2/7/2024  Length of Stay: 1 days  Attending Physician: Zachery Boyd MD  Primary Care Provider: Gagandeep Iglesias MD        Subjective:     Principal Problem:Hematemesis with nausea        HPI:  A 73-year-old female with a medical history significant for epilepsy, treated ovarian cancer (s/p chemotherapy in July 2023), hyperlipidemia, hyperglycemia, and venous thromboembolism (currently on Xarelto), presented to the [facility] ER on February 6 following an episode of hematemesis. Upon presentation, she experienced epigastric tenderness without further instances of vomiting. The patient reported no symptoms of lightheadedness, dizziness, chest pain, dyspnea, or diarrhea. Despite no previous EGDs, she has a diagnosis of gastritis and underwent a colonoscopy in 2023, revealing polyps. Her treatment in the ED included IV fluids and 80 mg of IV Protonix, after which the ED provider coordinated with GI here at [facility] for further evaluation. Chart review indicated a decrease in hemoglobin from 11 g/dL on February 6 to 8.9 g/dL on February 7, measured 10 hours apart, while other laboratory values and vital signs remained within normal limits. Currently, the patient denies experiencing abdominal pain, nausea, vomiting, or diarrhea. But does note that her last bowel movement was on Monday and was black-colored.     Overview/Hospital Course:  2/8/24  Admitted due to hematemesis, no further episodes since admission  S/p EGD this AM, found to have large ulcerated gastric mass, biopsies taken.  CT A/P "eccentric irregular wall thickening of the mid gastric body concerning for malignancy."  Started on diet this afternoon, monitor overnight, AM labs      Review of Systems   All other systems reviewed and are negative.    Objective: "     Vital Signs (Most Recent):  Temp: 98.1 °F (36.7 °C) (02/08/24 1520)  Pulse: 78 (02/08/24 1526)  Resp: 18 (02/08/24 1520)  BP: (!) 135/59 (02/08/24 1520)  SpO2: 96 % (02/08/24 1520) Vital Signs (24h Range):  Temp:  [97.2 °F (36.2 °C)-98.6 °F (37 °C)] 98.1 °F (36.7 °C)  Pulse:  [67-81] 78  Resp:  [16-20] 18  SpO2:  [96 %-100 %] 96 %  BP: ()/(51-67) 135/59     Weight: 59 kg (130 lb)  Body mass index is 21.63 kg/m².    Intake/Output Summary (Last 24 hours) at 2/8/2024 1719  Last data filed at 2/8/2024 1554  Gross per 24 hour   Intake 118 ml   Output 9 ml   Net 109 ml         Physical Exam  Vitals and nursing note reviewed.   Constitutional:       General: She is not in acute distress.     Appearance: Normal appearance. She is normal weight.   Cardiovascular:      Rate and Rhythm: Normal rate and regular rhythm.      Heart sounds: No murmur heard.  Pulmonary:      Effort: Pulmonary effort is normal. No respiratory distress.      Breath sounds: No wheezing.   Neurological:      General: No focal deficit present.      Mental Status: She is alert and oriented to person, place, and time.   Psychiatric:         Mood and Affect: Mood normal.         Behavior: Behavior normal.             Significant Labs: All pertinent labs within the past 24 hours have been reviewed.  Recent Lab Results  (Last 5 results in the past 24 hours)        02/08/24  1621   02/08/24  1349   02/08/24  1142   02/08/24  0542   02/08/24  0538        Albumin         2.6       ALP         73       ALT         25       Anion Gap         7       AST         22       Baso #   0.03       0.03       Basophil %   0.5       0.6       BILIRUBIN TOTAL         0.2  Comment: For infants and newborns, interpretation of results should be based  on gestational age, weight and in agreement with clinical  observations.    Premature Infant recommended reference ranges:  Up to 24 hours.............<8.0 mg/dL  Up to 48 hours............<12.0 mg/dL  3-5  days..................<15.0 mg/dL  6-29 days.................<15.0 mg/dL         BUN         7       Calcium         8.1       Chloride         114       CO2         22       Creatinine         0.7       Differential Method   Automated       Automated       eGFR         >60       Eos #   0.1       0.1       Eos %   1.7       2.4       Glucose         104       Gran # (ANC)   4.6       3.1       Gran %   71.0       62.4       Hematocrit   26.8       25.1       Hemoglobin   8.5       8.1       Immature Grans (Abs)   0.06  Comment: Mild elevation in immature granulocytes is non specific and   can be seen in a variety of conditions including stress response,   acute inflammation, trauma and pregnancy. Correlation with other   laboratory and clinical findings is essential.         0.04  Comment: Mild elevation in immature granulocytes is non specific and   can be seen in a variety of conditions including stress response,   acute inflammation, trauma and pregnancy. Correlation with other   laboratory and clinical findings is essential.         Immature Granulocytes   0.9       0.8       Lymph #   1.2       1.2       Lymph %   18.2       24.8       MCH   33.1       32.3       MCHC   31.7       32.3       MCV   104       100       Mono #   0.5       0.5       Mono %   7.7       9.0       MPV   8.6       8.8       nRBC   0       0       Platelet Count   236       229       POCT Glucose 87     108   103         Potassium         3.6       PROTEIN TOTAL         5.0       RBC   2.57       2.51       RDW   13.8       13.8       Sodium         143       WBC   6.47       5.01                              Significant Imaging: I have reviewed all pertinent imaging results/findings within the past 24 hours.    CT Abdomen Pelvis With IV Contrast Routine Oral Contrast   Final Result      Eccentric irregular wall thickening of the mid gastric body concerning for malignancy.  There appears to be associated enlarged 1.6 cm gastrohepatic  lymph node.  Clinical correlation advised.  Correlation with endoscopy is recommended if not previously performed.      All CT scans at this facility use dose modulation, iterative reconstructions, and/or weight base dosing when appropriate to reduce radiation dose to as low as reasonably achievable         Electronically signed by: Je Peña MD   Date:    02/08/2024   Time:    13:04            Assessment/Plan:      * Hematemesis with nausea  --witnessed bloody emesis 2 days PTA  --H&H 11 > 8.9 > 9.3   --GI consulted, EGD in the AM- NPO at [facility]  --IV Pantoprazole 80 mg x 1 dose, BID 40 mg thereafter  --serial CBC to trend h&h Q8H  --iron studies, B12, folic acid ordered for completion  --low threshold to transfuse for hgb < 7    2/8/24  --s/p EGD this AM, found to have large ulcerated gastric mass  --Biopsies pending  --Monitor H/H  --Diet started    Acute blood loss anemia  --etiology: UGIB- plan discussed above    GI bleed  As above       DVT (deep venous thrombosis)  --on xarelto- held  --last dose on Monday  --monitor on telemetry for now  --may have to consider transitioning over to Eliquis which has shown to have a decrease risk of GI hemorrhage    Hx of Epileptic seizures  --has not taken medication since Monday (2 days prior to admission)  --on carbamazepine, zonisamide, and Keppra- restarted promptly upon admission  --no seizures since 2009 per patient  --seizure precautions, neuro checks Q8H      VTE Risk Mitigation (From admission, onward)           Ordered     Place sequential compression device  Until discontinued         02/07/24 1440     IP VTE LOW RISK PATIENT  Once         02/07/24 1440                    Discharge Planning   SOFIYA:      Code Status: Full Code   Is the patient medically ready for discharge?:     Reason for patient still in hospital (select all that apply): Patient trending condition, Laboratory test, Treatment, and Consult recommendations  Discharge Plan A: Home, Home with family                   Zachery Boyd MD  Department of Hospital Medicine   '[facility] - Telemetry ([facility])

## 2024-02-08 NOTE — SUBJECTIVE & OBJECTIVE
Review of Systems   All other systems reviewed and are negative.    Objective:     Vital Signs (Most Recent):  Temp: 98.1 °F (36.7 °C) (02/08/24 1520)  Pulse: 78 (02/08/24 1526)  Resp: 18 (02/08/24 1520)  BP: (!) 135/59 (02/08/24 1520)  SpO2: 96 % (02/08/24 1520) Vital Signs (24h Range):  Temp:  [97.2 °F (36.2 °C)-98.6 °F (37 °C)] 98.1 °F (36.7 °C)  Pulse:  [67-81] 78  Resp:  [16-20] 18  SpO2:  [96 %-100 %] 96 %  BP: ()/(51-67) 135/59     Weight: 59 kg (130 lb)  Body mass index is 21.63 kg/m².    Intake/Output Summary (Last 24 hours) at 2/8/2024 1719  Last data filed at 2/8/2024 1554  Gross per 24 hour   Intake 118 ml   Output 9 ml   Net 109 ml         Physical Exam  Vitals and nursing note reviewed.   Constitutional:       General: She is not in acute distress.     Appearance: Normal appearance. She is normal weight.   Cardiovascular:      Rate and Rhythm: Normal rate and regular rhythm.      Heart sounds: No murmur heard.  Pulmonary:      Effort: Pulmonary effort is normal. No respiratory distress.      Breath sounds: No wheezing.   Neurological:      General: No focal deficit present.      Mental Status: She is alert and oriented to person, place, and time.   Psychiatric:         Mood and Affect: Mood normal.         Behavior: Behavior normal.             Significant Labs: All pertinent labs within the past 24 hours have been reviewed.  Recent Lab Results  (Last 5 results in the past 24 hours)        02/08/24  1621   02/08/24  1349   02/08/24  1142   02/08/24  0542   02/08/24  0538        Albumin         2.6       ALP         73       ALT         25       Anion Gap         7       AST         22       Baso #   0.03       0.03       Basophil %   0.5       0.6       BILIRUBIN TOTAL         0.2  Comment: For infants and newborns, interpretation of results should be based  on gestational age, weight and in agreement with clinical  observations.    Premature Infant recommended reference ranges:  Up to 24  hours.............<8.0 mg/dL  Up to 48 hours............<12.0 mg/dL  3-5 days..................<15.0 mg/dL  6-29 days.................<15.0 mg/dL         BUN         7       Calcium         8.1       Chloride         114       CO2         22       Creatinine         0.7       Differential Method   Automated       Automated       eGFR         >60       Eos #   0.1       0.1       Eos %   1.7       2.4       Glucose         104       Gran # (ANC)   4.6       3.1       Gran %   71.0       62.4       Hematocrit   26.8       25.1       Hemoglobin   8.5       8.1       Immature Grans (Abs)   0.06  Comment: Mild elevation in immature granulocytes is non specific and   can be seen in a variety of conditions including stress response,   acute inflammation, trauma and pregnancy. Correlation with other   laboratory and clinical findings is essential.         0.04  Comment: Mild elevation in immature granulocytes is non specific and   can be seen in a variety of conditions including stress response,   acute inflammation, trauma and pregnancy. Correlation with other   laboratory and clinical findings is essential.         Immature Granulocytes   0.9       0.8       Lymph #   1.2       1.2       Lymph %   18.2       24.8       MCH   33.1       32.3       MCHC   31.7       32.3       MCV   104       100       Mono #   0.5       0.5       Mono %   7.7       9.0       MPV   8.6       8.8       nRBC   0       0       Platelet Count   236       229       POCT Glucose 87     108   103         Potassium         3.6       PROTEIN TOTAL         5.0       RBC   2.57       2.51       RDW   13.8       13.8       Sodium         143       WBC   6.47       5.01                              Significant Imaging: I have reviewed all pertinent imaging results/findings within the past 24 hours.    CT Abdomen Pelvis With IV Contrast Routine Oral Contrast   Final Result      Eccentric irregular wall thickening of the mid gastric body concerning for  malignancy.  There appears to be associated enlarged 1.6 cm gastrohepatic lymph node.  Clinical correlation advised.  Correlation with endoscopy is recommended if not previously performed.      All CT scans at this facility use dose modulation, iterative reconstructions, and/or weight base dosing when appropriate to reduce radiation dose to as low as reasonably achievable         Electronically signed by: Je Peña MD   Date:    02/08/2024   Time:    13:04

## 2024-02-08 NOTE — NURSING
Received in bed awake and alert, resp even and unlabored, assessment per flowsheet, denies any pain or discomfort at this time. Plan of care discussed. Pt verbalized understanding. Will continue to monitor.

## 2024-02-08 NOTE — PLAN OF CARE
Transferred to room via wheelchair. Tolerated well. Spouse at bedside. Both agree to call for assistance or any complaints. Bed in lowest position and locked with call light within reach. Bedside update given to Rosa.

## 2024-02-08 NOTE — ANESTHESIA PREPROCEDURE EVALUATION
02/08/2024  Cheryl Garcia is a 73 y.o., female.      Pre-op Assessment    I have reviewed the Patient Summary Reports.     I have reviewed the Nursing Notes. I have reviewed the NPO Status.   I have reviewed the Medications.     Review of Systems  Anesthesia Hx:  No problems with previous Anesthesia                Social:  Non-Smoker       Hematology/Oncology:  Hematology Normal                Ovarian            Current/Recent Cancer.                EENT/Dental:  EENT/Dental Normal           Cardiovascular:  Cardiovascular Normal                           Deep Venous Thrombosis (DVT), Hx of DVT                  Renal/:  Renal/ Normal                 Musculoskeletal:  Musculoskeletal Normal                Neurological:       Seizures, well controlled                                Endocrine:  Endocrine Normal            Dermatological:  Skin Normal    Psych:  Psychiatric Normal                    Physical Exam  General: Well nourished    Airway:  Mallampati: II   Mouth Opening: Normal  TM Distance: Normal  Neck ROM: Normal ROM    Dental:  Intact    Chest/Lungs:  Clear to auscultation    Heart:  Rate: Normal        Anesthesia Plan  Type of Anesthesia, risks & benefits discussed:    Anesthesia Type: MAC  Intra-op Monitoring Plan: Standard ASA Monitors  Induction:  IV  Informed Consent: Informed consent signed with the Patient and all parties understand the risks and agree with anesthesia plan.  All questions answered. Patient consented to blood products? Yes  ASA Score: 2    Ready For Surgery From Anesthesia Perspective.     .

## 2024-02-08 NOTE — H&P
Short Stay Endoscopy History and Physical    PCP - Gagandeep Iglesias MD    Procedure - EGD  ASA - per anesthesia  Mallampati - per anesthesia  History of Anesthesia problems - no  Family history Anesthesia problems -  no     HPI:  This is a 73 y.o. female here for evaluation of :   Active Hospital Problems    Diagnosis  POA    *Hematemesis with nausea [K92.0]  Yes    GI bleed [K92.2]  Yes    Acute blood loss anemia [D62]  Yes    Hx of Epileptic seizures [G40.909]  Yes     Chronic    DVT (deep venous thrombosis) [I82.409]  Yes     Chronic      Resolved Hospital Problems   No resolved problems to display.         ROS:  CONSTITUTIONAL: Denies weight change,  fatigue, fevers, chills, night sweats.  CARDIOVASCULAR: Denies chest pain, shortness of breath, orthopnea and edema.  RESPIRATORY: Denies cough, hemoptysis, dyspnea, and wheezing.  GI: See HPI.    Medical History:   Past Medical History:   Diagnosis Date    DVT (deep venous thrombosis)     Epilepsy     Ovarian cancer        Surgical History:   History reviewed. No pertinent surgical history.    Family History:  History reviewed. No pertinent family history.    Social History:   Social History     Tobacco Use    Smoking status: Former     Types: Cigarettes    Smokeless tobacco: Never       Allergy  Review of patient's allergies indicates:  No Known Allergies    Medications:   No current facility-administered medications on file prior to encounter.     Current Outpatient Medications on File Prior to Encounter   Medication Sig Dispense Refill    alendronate (FOSAMAX) 70 MG tablet Take 70 mg by mouth every 7 days. Pt stated she takes this every Sunday      calcium phosphate trib/vit D3 (CALCIUM PHOSPHATE-VITAMIN D3 ORAL) Take 2 tablets by mouth once daily.      carBAMazepine (TEGRETOL) 200 mg tablet Take by mouth. Pt states she take TID  1 tablet with breakfast  1 tablet with lunch   1.5 tablet at bedtime      levETIRAcetam (KEPPRA) 500 MG Tab Take 1 tablet by mouth  2 (two) times daily. Pt states she takes one pill at dinner and one at bedtime      rivaroxaban (XARELTO) 20 mg Tab Take 20 mg by mouth once daily.      zonisamide (ZONEGRAN) 100 MG Cap Take 100 mg by mouth 2 (two) times daily.         Physical Exam:  Vital Signs:   Vitals:    02/08/24 0958   BP: 129/66   Pulse: 75   Resp: 18   Temp: 97.9 °F (36.6 °C)     General Appearance: Well appearing in no acute distress  ENT: OP clear  Chest: CTA B  CV: RRR, no m/r/g  Abd: s/nt/nd/nabs  Ext: no edema    Labs:  Reviewed    IMP:  Active Hospital Problems    Diagnosis  POA    *Hematemesis with nausea [K92.0]  Yes    GI bleed [K92.2]  Yes    Acute blood loss anemia [D62]  Yes    Hx of Epileptic seizures [G40.909]  Yes     Chronic    DVT (deep venous thrombosis) [I82.409]  Yes     Chronic      Resolved Hospital Problems   No resolved problems to display.         Plan:  I have explained the risks and benefits of endoscopy procedures to the patient including but not limited to bleeding, perforation, infection, and death. The patient wishes to proceed.

## 2024-02-08 NOTE — ASSESSMENT & PLAN NOTE
--witnessed bloody emesis 2 days PTA  --H&H 11 > 8.9 > 9.3   --GI consulted, EGD in the AM- NPO at [facility]  --IV Pantoprazole 80 mg x 1 dose, BID 40 mg thereafter  --serial CBC to trend h&h Q8H  --iron studies, B12, folic acid ordered for completion  --low threshold to transfuse for hgb < 7    2/8/24  --s/p EGD this AM, found to have large ulcerated gastric mass  --Biopsies pending  --Monitor H/H  --Diet started

## 2024-02-08 NOTE — SUBJECTIVE & OBJECTIVE
No past medical history on file.    No past surgical history on file.    Review of patient's allergies indicates:  No Known Allergies  Family History    None       Tobacco Use    Smoking status: Not on file    Smokeless tobacco: Not on file   Substance and Sexual Activity    Alcohol use: Not on file    Drug use: Not on file    Sexual activity: Not on file     Review of Systems   Constitutional:  Negative for fever.   HENT:  Negative for hearing loss.    Eyes:  Negative for visual disturbance.   Respiratory:  Negative for cough and shortness of breath.    Cardiovascular:  Negative for chest pain.   Gastrointestinal:         As per HPI.   Genitourinary:  Negative for dysuria, frequency and hematuria.   Musculoskeletal:  Negative for arthralgias and back pain.   Skin:  Negative for rash.   Neurological:  Positive for weakness. Negative for seizures, syncope, numbness and headaches.   Hematological:  Does not bruise/bleed easily.   Psychiatric/Behavioral:  The patient is not nervous/anxious.      Objective:     Vital Signs (Most Recent):  Temp: 97.2 °F (36.2 °C) (02/08/24 0719)  Pulse: 72 (02/08/24 0719)  Resp: 16 (02/08/24 0719)  BP: 115/64 (02/08/24 0719)  SpO2: 98 % (02/08/24 0719) Vital Signs (24h Range):  Temp:  [97.2 °F (36.2 °C)-98.6 °F (37 °C)] 97.2 °F (36.2 °C)  Pulse:  [72-94] 72  Resp:  [16-20] 16  SpO2:  [96 %-100 %] 98 %  BP: (110-137)/(56-68) 115/64     Weight: 58.9 kg (129 lb 13.6 oz) (02/07/24 1503)  Body mass index is 21.61 kg/m².      Intake/Output Summary (Last 24 hours) at 2/8/2024 0811  Last data filed at 2/7/2024 1612  Gross per 24 hour   Intake 120 ml   Output --   Net 120 ml       Lines/Drains/Airways       Peripheral Intravenous Line  Duration                  Peripheral IV - Single Lumen 02/06/24 20 G Anterior;Proximal;Right Forearm 2 days                     Physical Exam  Constitutional:       General: She is not in acute distress.     Appearance: Normal appearance. She is well-developed.    HENT:      Head: Normocephalic and atraumatic.   Eyes:      Extraocular Movements: Extraocular movements intact.   Cardiovascular:      Rate and Rhythm: Normal rate and regular rhythm.      Heart sounds: No murmur heard.  Pulmonary:      Effort: Pulmonary effort is normal. No respiratory distress.      Breath sounds: Normal breath sounds. No wheezing.   Abdominal:      General: Bowel sounds are normal. There is no distension.      Palpations: Abdomen is soft. There is no mass.      Tenderness: There is no abdominal tenderness.   Musculoskeletal:      Cervical back: Normal range of motion and neck supple.      Right lower leg: No edema.      Left lower leg: No edema.   Skin:     General: Skin is warm and dry.      Findings: No rash.   Neurological:      Mental Status: She is alert and oriented to person, place, and time.      Cranial Nerves: No cranial nerve deficit.   Psychiatric:         Behavior: Behavior normal.          Significant Labs:  CBC:   Recent Labs   Lab 02/07/24  1457 02/07/24  2156 02/08/24  0538   WBC 7.72 5.92 5.01   HGB 9.3* 8.5* 8.1*   HCT 29.6* 26.1* 25.1*    212 229     CMP:   Recent Labs   Lab 02/08/24  0538      CALCIUM 8.1*   ALBUMIN 2.6*   PROT 5.0*      K 3.6   CO2 22*   *   BUN 7*   CREATININE 0.7   ALKPHOS 73   ALT 25   AST 22   BILITOT 0.2     Coagulation:   Recent Labs   Lab 02/07/24  1457   INR 1.0   APTT 25.6       Significant Imaging:  Imaging results within the past 24 hours have been reviewed.

## 2024-02-08 NOTE — ASSESSMENT & PLAN NOTE
Plan for EGD today.   Keep NPO.   Continue Protonix as ordered.   Further recommendations after EGD.

## 2024-02-08 NOTE — ANESTHESIA POSTPROCEDURE EVALUATION
Anesthesia Post Evaluation    Patient: Cheryl Garcia    Procedure(s) Performed: Procedure(s) (LRB):  EGD (ESOPHAGOGASTRODUODENOSCOPY) (N/A)    Final Anesthesia Type: MAC      Patient location during evaluation: PACU  Patient participation: Yes- Able to Participate  Level of consciousness: awake and alert  Post-procedure vital signs: reviewed and stable  Pain management: adequate  Airway patency: patent    PONV status at discharge: No PONV  Anesthetic complications: no      Cardiovascular status: blood pressure returned to baseline  Respiratory status: unassisted  Hydration status: euvolemic  Follow-up not needed.              Vitals Value Taken Time   /66 02/08/24 1045   Temp 98 02/08/24 1045   Pulse 66 02/08/24 1045   Resp 12 02/08/24 1045   SpO2 98 02/08/24 1045         No case tracking events are documented in the log.      Pain/Marli Score: No data recorded

## 2024-02-08 NOTE — CONSULTS
[facility] - Telemetry ([facility])  Gastroenterology  Consult Note    Patient Name: Cheryl Garcia  MRN: [id number]  Admission Date: 2/7/2024  Hospital Length of Stay: 1 days  Code Status: Full Code   Attending Provider: Zachery Boyd MD   Consulting Provider: Jean Stevenson PA-C  Primary Care Physician: Gagandeep Iglesias MD  Principal Problem:Hematemesis with nausea    Inpatient consult to Gastroenterology  Consult performed by: Jean Stevenson PA-C  Consult ordered by: Jayce Gray MD  Reason for consult: Hematemesis        Subjective:     HPI:  The patient presented to [facility] on February 6 following an episode of hematemesis. At the time, she reported epigastric pain, and had had a black stool. Her initial Hgb was 11 and decreased to 8.9 overnight. BUN 34 and creatinine 0.8. She was transferred her for GI evaluation. She has never had an EGD. She had a colonoscopy in 2023 with polyps noted. She takes Xarelto for h/o venous thromboembolism. Her last dose was 02/05/24. In the ER, she was given 80 mg of Protonix and has been continued on 40 mg IV bid. She hasn't had anymore vomiting or melena. She denies abdominal pain. She is hemodynamically stable. She denies NSAID use.    No past medical history on file.    No past surgical history on file.    Review of patient's allergies indicates:  No Known Allergies  Family History    None       Tobacco Use    Smoking status: Not on file    Smokeless tobacco: Not on file   Substance and Sexual Activity    Alcohol use: Not on file    Drug use: Not on file    Sexual activity: Not on file     Review of Systems   Constitutional:  Negative for fever.   HENT:  Negative for hearing loss.    Eyes:  Negative for visual disturbance.   Respiratory:  Negative for cough and shortness of breath.    Cardiovascular:  Negative for chest pain.   Gastrointestinal:         As per HPI.   Genitourinary:  Negative for dysuria, frequency and hematuria.   Musculoskeletal:   Negative for arthralgias and back pain.   Skin:  Negative for rash.   Neurological:  Positive for weakness. Negative for seizures, syncope, numbness and headaches.   Hematological:  Does not bruise/bleed easily.   Psychiatric/Behavioral:  The patient is not nervous/anxious.      Objective:     Vital Signs (Most Recent):  Temp: 97.2 °F (36.2 °C) (02/08/24 0719)  Pulse: 72 (02/08/24 0719)  Resp: 16 (02/08/24 0719)  BP: 115/64 (02/08/24 0719)  SpO2: 98 % (02/08/24 0719) Vital Signs (24h Range):  Temp:  [97.2 °F (36.2 °C)-98.6 °F (37 °C)] 97.2 °F (36.2 °C)  Pulse:  [72-94] 72  Resp:  [16-20] 16  SpO2:  [96 %-100 %] 98 %  BP: (110-137)/(56-68) 115/64     Weight: 58.9 kg (129 lb 13.6 oz) (02/07/24 1503)  Body mass index is 21.61 kg/m².      Intake/Output Summary (Last 24 hours) at 2/8/2024 0811  Last data filed at 2/7/2024 1612  Gross per 24 hour   Intake 120 ml   Output --   Net 120 ml       Lines/Drains/Airways       Peripheral Intravenous Line  Duration                  Peripheral IV - Single Lumen 02/06/24 20 G Anterior;Proximal;Right Forearm 2 days                     Physical Exam  Constitutional:       General: She is not in acute distress.     Appearance: Normal appearance. She is well-developed.   HENT:      Head: Normocephalic and atraumatic.   Eyes:      Extraocular Movements: Extraocular movements intact.   Cardiovascular:      Rate and Rhythm: Normal rate and regular rhythm.      Heart sounds: No murmur heard.  Pulmonary:      Effort: Pulmonary effort is normal. No respiratory distress.      Breath sounds: Normal breath sounds. No wheezing.   Abdominal:      General: Bowel sounds are normal. There is no distension.      Palpations: Abdomen is soft. There is no mass.      Tenderness: There is no abdominal tenderness.   Musculoskeletal:      Cervical back: Normal range of motion and neck supple.      Right lower leg: No edema.      Left lower leg: No edema.   Skin:     General: Skin is warm and dry.       Findings: No rash.   Neurological:      Mental Status: She is alert and oriented to person, place, and time.      Cranial Nerves: No cranial nerve deficit.   Psychiatric:         Behavior: Behavior normal.          Significant Labs:  CBC:   Recent Labs   Lab 02/07/24  1457 02/07/24  2156 02/08/24  0538   WBC 7.72 5.92 5.01   HGB 9.3* 8.5* 8.1*   HCT 29.6* 26.1* 25.1*    212 229     CMP:   Recent Labs   Lab 02/08/24  0538      CALCIUM 8.1*   ALBUMIN 2.6*   PROT 5.0*      K 3.6   CO2 22*   *   BUN 7*   CREATININE 0.7   ALKPHOS 73   ALT 25   AST 22   BILITOT 0.2     Coagulation:   Recent Labs   Lab 02/07/24  1457   INR 1.0   APTT 25.6       Significant Imaging:  Imaging results within the past 24 hours have been reviewed.  Assessment/Plan:     Hematology  DVT (deep venous thrombosis)  Xarelto on hold. Last dose 02/05/24.    Oncology  Acute blood loss anemia  Hgb stable after initial drop. Continue to monitor and transfuse as indicated.     GI  * Hematemesis with nausea  Plan for EGD today.   Keep NPO.   Continue Protonix as ordered.   Further recommendations after EGD.     Thank you for your consult. I will follow-up with patient. Please contact us if you have any additional questions.    Jean Stevenson PA-C  Gastroenterology  [facility] - Telemetry ([facility])

## 2024-02-08 NOTE — PLAN OF CARE
[facility] - Telemetry (Hospital)  Initial Discharge Assessment       Primary Care Provider: Gagandeep Iglesias MD    Admission Diagnosis: GI bleed [K92.2]    Admission Date: 2/7/2024  Expected Discharge Date:     Transition of Care Barriers: None    Payor: HU[employer] MANAGED MEDICARE / Plan: HU[employer] MEDICARE HMO / Product Type: Capitation /     Extended Emergency Contact Information  Primary Emergency Contact: Ra Kirkland  Mobile Phone: [phone]  Relation: Spouse    Discharge Plan A: Home, Home with family  Discharge Plan B: Home    No Pharmacies Listed    Initial Assessment (most recent)       Adult Discharge Assessment - 02/08/24 0907          Discharge Assessment    Assessment Type Discharge Planning Assessment     Confirmed/corrected address, phone number and insurance Yes     Confirmed Demographics Correct on Facesheet     Source of Information patient     Communicated SOFIYA with patient/caregiver Date not available/Unable to determine     Reason For Admission Hematemesis with nausea     People in Home spouse     Facility Arrived From: Home     Do you expect to return to your current living situation? Yes     Do you have help at home or someone to help you manage your care at home? Yes     Who are your caregiver(s) and their phone number(s)? Ra Lucas     Prior to hospitilization cognitive status: Alert/Oriented     Current cognitive status: Alert/Oriented     Walking or Climbing Stairs Difficulty no     Dressing/Bathing Difficulty no     Equipment Currently Used at Home none     Readmission within 30 days? No     Patient currently being followed by outpatient case management? No     Do you currently have service(s) that help you manage your care at home? No     Do you take prescription medications? Yes     Do you have prescription coverage? Yes     Do you have any problems affording any of your prescribed medications? No     Is the patient taking medications as prescribed? yes     Who is going to help you get  home at discharge? Pts spouse     How do you get to doctors appointments? car, drives self;family or friend will provide     Are you on dialysis? No     Do you take coumadin? No     Discharge Plan A Home;Home with family     Discharge Plan B Home     DME Needed Upon Discharge  none     Discharge Plan discussed with: Patient     Transition of Care Barriers None                   SW met with patient at bedside to complete discharge assessment. Pt reports living at home with spouse. Pt reports independence with ADLs; help at home and discharge transportation will be provided by spouse.   No CM needs for discharge.   SW to follow as needed.

## 2024-02-09 VITALS
HEIGHT: 65 IN | DIASTOLIC BLOOD PRESSURE: 65 MMHG | SYSTOLIC BLOOD PRESSURE: 128 MMHG | RESPIRATION RATE: 20 BRPM | HEART RATE: 73 BPM | BODY MASS INDEX: 21.66 KG/M2 | WEIGHT: 130 LBS | OXYGEN SATURATION: 99 % | TEMPERATURE: 99 F

## 2024-02-09 LAB
ALBUMIN SERPL BCP-MCNC: 2.6 G/DL (ref 3.5–5.2)
ALP SERPL-CCNC: 69 U/L (ref 55–135)
ALT SERPL W/O P-5'-P-CCNC: 21 U/L (ref 10–44)
ANION GAP SERPL CALC-SCNC: 8 MMOL/L (ref 8–16)
AST SERPL-CCNC: 16 U/L (ref 10–40)
BASOPHILS # BLD AUTO: 0.04 K/UL (ref 0–0.2)
BASOPHILS NFR BLD: 0.9 % (ref 0–1.9)
BILIRUB SERPL-MCNC: 0.2 MG/DL (ref 0.1–1)
BUN SERPL-MCNC: 4 MG/DL (ref 8–23)
CALCIUM SERPL-MCNC: 8.1 MG/DL (ref 8.7–10.5)
CHLORIDE SERPL-SCNC: 114 MMOL/L (ref 95–110)
CO2 SERPL-SCNC: 23 MMOL/L (ref 23–29)
CREAT SERPL-MCNC: 0.7 MG/DL (ref 0.5–1.4)
DIFFERENTIAL METHOD BLD: ABNORMAL
EOSINOPHIL # BLD AUTO: 0.1 K/UL (ref 0–0.5)
EOSINOPHIL NFR BLD: 2.6 % (ref 0–8)
ERYTHROCYTE [DISTWIDTH] IN BLOOD BY AUTOMATED COUNT: 13.5 % (ref 11.5–14.5)
EST. GFR  (NO RACE VARIABLE): >60 ML/MIN/1.73 M^2
GLUCOSE SERPL-MCNC: 105 MG/DL (ref 70–110)
HCT VFR BLD AUTO: 25.1 % (ref 37–48.5)
HGB BLD-MCNC: 8.3 G/DL (ref 12–16)
IMM GRANULOCYTES # BLD AUTO: 0.03 K/UL (ref 0–0.04)
IMM GRANULOCYTES NFR BLD AUTO: 0.7 % (ref 0–0.5)
LYMPHOCYTES # BLD AUTO: 1.1 K/UL (ref 1–4.8)
LYMPHOCYTES NFR BLD: 23.2 % (ref 18–48)
MCH RBC QN AUTO: 33.7 PG (ref 27–31)
MCHC RBC AUTO-ENTMCNC: 33.1 G/DL (ref 32–36)
MCV RBC AUTO: 102 FL (ref 82–98)
MONOCYTES # BLD AUTO: 0.4 K/UL (ref 0.3–1)
MONOCYTES NFR BLD: 9.3 % (ref 4–15)
NEUTROPHILS # BLD AUTO: 2.9 K/UL (ref 1.8–7.7)
NEUTROPHILS NFR BLD: 63.3 % (ref 38–73)
NRBC BLD-RTO: 0 /100 WBC
PLATELET # BLD AUTO: 217 K/UL (ref 150–450)
PMV BLD AUTO: 8.7 FL (ref 9.2–12.9)
POCT GLUCOSE: 112 MG/DL (ref 70–110)
POCT GLUCOSE: 95 MG/DL (ref 70–110)
POTASSIUM SERPL-SCNC: 3.6 MMOL/L (ref 3.5–5.1)
PROT SERPL-MCNC: 5.2 G/DL (ref 6–8.4)
RBC # BLD AUTO: 2.46 M/UL (ref 4–5.4)
SODIUM SERPL-SCNC: 145 MMOL/L (ref 136–145)
WBC # BLD AUTO: 4.53 K/UL (ref 3.9–12.7)

## 2024-02-09 PROCEDURE — 85025 COMPLETE CBC W/AUTO DIFF WBC: CPT | Performed by: STUDENT IN AN ORGANIZED HEALTH CARE EDUCATION/TRAINING PROGRAM

## 2024-02-09 PROCEDURE — C9113 INJ PANTOPRAZOLE SODIUM, VIA: HCPCS | Performed by: STUDENT IN AN ORGANIZED HEALTH CARE EDUCATION/TRAINING PROGRAM

## 2024-02-09 PROCEDURE — 36415 COLL VENOUS BLD VENIPUNCTURE: CPT | Performed by: STUDENT IN AN ORGANIZED HEALTH CARE EDUCATION/TRAINING PROGRAM

## 2024-02-09 PROCEDURE — 63600175 PHARM REV CODE 636 W HCPCS: Performed by: STUDENT IN AN ORGANIZED HEALTH CARE EDUCATION/TRAINING PROGRAM

## 2024-02-09 PROCEDURE — 80053 COMPREHEN METABOLIC PANEL: CPT | Performed by: STUDENT IN AN ORGANIZED HEALTH CARE EDUCATION/TRAINING PROGRAM

## 2024-02-09 PROCEDURE — 25000003 PHARM REV CODE 250: Performed by: STUDENT IN AN ORGANIZED HEALTH CARE EDUCATION/TRAINING PROGRAM

## 2024-02-09 RX ADMIN — CARBAMAZEPINE 200 MG: 200 TABLET ORAL at 09:02

## 2024-02-09 RX ADMIN — LEVETIRACETAM INJECTION 500 MG: 5 INJECTION INTRAVENOUS at 09:02

## 2024-02-09 RX ADMIN — ZONISAMIDE 100 MG: 100 CAPSULE ORAL at 09:02

## 2024-02-09 RX ADMIN — MUPIROCIN: 20 OINTMENT TOPICAL at 09:02

## 2024-02-09 RX ADMIN — PANTOPRAZOLE SODIUM 40 MG: 40 INJECTION, POWDER, FOR SOLUTION INTRAVENOUS at 09:02

## 2024-02-09 NOTE — PLAN OF CARE
A247/A247 GERARDO Garcia is a 73 y.o.female admitted on 2/7/2024 for Hematemesis with nausea   Code Status: Full Code MRN: [id number]   Review of patient's allergies indicates:  No Known Allergies  Past Medical History:   Diagnosis Date    DVT (deep venous thrombosis)     Epilepsy     Ovarian cancer       PRN meds    dextrose 10%, 12.5 g, PRN  dextrose 10%, 25 g, PRN  glucagon (human recombinant), 1 mg, PRN  hydrALAZINE, 10 mg, Q8H PRN  hydrOXYzine HCL, 25 mg, TID PRN  ondansetron, 8 mg, Q6H PRN      Chart check completed. Will continue plan of care.      Orientation: oriented x 4        Lead Monitored: Lead II Rhythm: normal sinus rhythm Frequency/Ectopy: PACs  Cardiac/Telemetry Box Number: [id number]    Last Bowel Movement: 02/08/24  Diet Adult Regular (IDDSI Level 7) Standard Tray  Voiding Characteristics: voids spontaneously without difficulty  Chetan Score: 23  Fall Risk Score: 5  Accucheck []   Freq?      Lines/Drains/Airways       Peripheral Intravenous Line  Duration                  Peripheral IV - Single Lumen 02/06/24 20 G Anterior;Proximal;Right Forearm 3 days                       Problem: Adjustment to Illness (Gastrointestinal Bleeding)  Goal: Optimal Coping with Acute Illness  Outcome: Met     Problem: Bleeding (Gastrointestinal Bleeding)  Goal: Hemostasis  Outcome: Met     Problem: Adult Inpatient Plan of Care  Goal: Plan of Care Review  Outcome: Met  Goal: Patient-Specific Goal (Individualized)  Outcome: Met  Goal: Absence of Hospital-Acquired Illness or Injury  Outcome: Met  Goal: Optimal Comfort and Wellbeing  Outcome: Met  Goal: Readiness for Transition of Care  Outcome: Met

## 2024-02-09 NOTE — PLAN OF CARE
[facility] - Telemetry (Hospital)  Discharge Final Note    Primary Care Provider: Gagandeep Iglesias MD    Expected Discharge Date: 2/9/2024    Final Discharge Note (most recent)       Final Note - 02/09/24 1133          Final Note    Assessment Type Final Discharge Note     Anticipated Discharge Disposition Home or Self Care        Post-Acute Status    Discharge Delays None known at this time                   Pt to discharge home today; pt will need to arrange non-[facility] PCP follow up.   Oncology referral placed by attending.     No CM needs for discharge.     Important Message from Medicare             Contact Info       Gagandeep Iglesias MD   Specialty: Internal Medicine   Relationship: PCP - General    [facility]  [address] [address]   Phone: [phone]       Next Steps: Schedule an appointment as soon as possible for a visit in 1 week(s)    Instructions: Hospital discharge follow up

## 2024-02-09 NOTE — ASSESSMENT & PLAN NOTE
2/9/24  --Advised to stop Xarelto concern for re-bleeding and anemia  --Hx of LLE DVT - likely provoked secondary to malignancy in Oct 2022

## 2024-02-09 NOTE — ASSESSMENT & PLAN NOTE
--has not taken medication since Monday (2 days prior to admission)  --on carbamazepine, zonisamide, and Keppra- restarted promptly upon admission  --no seizures since 2009 per patient  --seizure precautions, neuro checks Q8H    2/9/24  --Resume home AED regimen on discharge

## 2024-02-09 NOTE — DISCHARGE SUMMARY
"[facility] Medicine  Discharge Summary      Patient Name: Cheryl Garcia  MRN: [id number]  [facility]: [id number]  Patient Class: IP- Inpatient  Admission Date: 2/7/2024  Hospital Length of Stay: 2 days  Discharge Date and Time:  02/09/2024 11:31 AM  Attending Physician: Zachery Boyd MD   Discharging Provider: Zacehry Boyd MD  Primary Care Provider: Gagandeep Iglesias MD    Primary Care Team: Networked reference to record PCT     HPI:   A 73-year-old female with a medical history significant for epilepsy, treated ovarian cancer (s/p chemotherapy in July 2023), hyperlipidemia, hyperglycemia, and venous thromboembolism (currently on Xarelto), presented to the [facility] ER on February 6 following an episode of hematemesis. Upon presentation, she experienced epigastric tenderness without further instances of vomiting. The patient reported no symptoms of lightheadedness, dizziness, chest pain, dyspnea, or diarrhea. Despite no previous EGDs, she has a diagnosis of gastritis and underwent a colonoscopy in 2023, revealing polyps. Her treatment in the ED included IV fluids and 80 mg of IV Protonix, after which the ED provider coordinated with GI here at [facility] for further evaluation. Chart review indicated a decrease in hemoglobin from 11 g/dL on February 6 to 8.9 g/dL on February 7, measured 10 hours apart, while other laboratory values and vital signs remained within normal limits. Currently, the patient denies experiencing abdominal pain, nausea, vomiting, or diarrhea. But does note that her last bowel movement was on Monday and was black-colored.     Procedure(s) (LRB):  EGD (ESOPHAGOGASTRODUODENOSCOPY) (N/A)      Hospital Course:   2/8/24  Admitted due to hematemesis, no further episodes since admission  S/p EGD this AM, found to have large ulcerated gastric mass, biopsies taken.  CT A/P "eccentric irregular wall thickening of the mid gastric body " "concerning for malignancy."  Started on diet this afternoon, monitor overnight, AM labs    2/9/24  NAEON, H/H remains stable, tolerating diet w/o issues  Last BM yesterday, reports dark/tarry  Pathology results pending  Advised to stop Xarelto concern for re-bleeding and anemia  Hx of LLE DVT - likely provoked secondary to malignancy in Oct 2022  Ambulatory referral to Oncology  F/U with PCP in 1-2 weeks  Stable for discharge home       Goals of Care Treatment Preferences:  Code Status: Full Code      Consults:   Consults (From admission, onward)          Status Ordering Provider     Inpatient consult to Gastroenterology  Once        Provider:  Jayla Arteaga MD    Completed JODI GONZALES            Neuro  Hx of Epileptic seizures  --has not taken medication since Monday (2 days prior to admission)  --on carbamazepine, zonisamide, and Keppra- restarted promptly upon admission  --no seizures since 2009 per patient  --seizure precautions, neuro checks Q8H    2/9/24  --Resume home AED regimen on discharge    Oncology  Acute blood loss anemia  --etiology: UGIB- plan discussed above    GI  * Hematemesis with nausea  --witnessed bloody emesis 2 days PTA  --H&H 11 > 8.9 > 9.3   --GI consulted, EGD in the AM- NPO at MN  --IV Pantoprazole 80 mg x 1 dose, BID 40 mg thereafter  --serial CBC to trend h&h Q8H  --iron studies, B12, folic acid ordered for completion  --low threshold to transfuse for hgb < 7    2/8/24  --s/p EGD this AM, found to have large ulcerated gastric mass  --Biopsies pending  --Monitor H/H  --Diet started    GI bleed  As above       Other  DVT (deep venous thrombosis)    2/9/24  --Advised to stop Xarelto concern for re-bleeding and anemia  --Hx of LLE DVT - likely provoked secondary to malignancy in Oct 2022      Final Active Diagnoses:    Diagnosis Date Noted POA    PRINCIPAL PROBLEM:  Hematemesis with nausea [K92.0] 02/07/2024 Yes    GI bleed [K92.2] 02/07/2024 Yes    Acute blood loss anemia " [D62] 02/07/2024 Yes    DVT (deep venous thrombosis) [I82.409] 02/07/2024 Yes     Chronic    Hx of Epileptic seizures [G40.909] 02/07/2024 Yes     Chronic      Problems Resolved During this Admission:       Discharged Condition: stable    Disposition: Home or Self Care    Follow Up:   Follow-up Information       Gagandeep Iglesias MD. Schedule an appointment as soon as possible for a visit in 1 week(s).    Specialty: Internal Medicine  Why: Hospital discharge follow up  Contact information:  [address] [place] [address] [phone]                           Patient Instructions:      Ambulatory referral/consult to Oncology   Standing Status: Future   Referral Priority: Routine Referral Type: Consultation   Referral Reason: Specialty Services Required   Requested Specialty: Oncology   Number of Visits Requested: 1     Activity as tolerated       Significant Diagnostic Studies: Labs: All labs within the past 24 hours have been reviewed    Pending Diagnostic Studies:       Procedure Component Value Units Date/Time    Specimen to Pathology, Surgery Gastrointestinal tract [[id number]] Collected: 02/08/24 1040    Order Status: Sent Lab Status: In process Updated: 02/08/24 1059    Specimen: Tissue            Medications:  Reconciled Home Medications:      Medication List        CONTINUE taking these medications      alendronate 70 MG tablet  Commonly known as: FOSAMAX  Take 70 mg by mouth every 7 days. Pt stated she takes this every Sunday     CALCIUM PHOSPHATE-VITAMIN D3 ORAL  Take 2 tablets by mouth once daily.     carBAMazepine 200 mg tablet  Commonly known as: TEGRETOL  Take by mouth. Pt states she take TID  1 tablet with breakfast  1 tablet with lunch   1.5 tablet at bedtime     levETIRAcetam 500 MG Tab  Commonly known as: KEPPRA  Take 1 tablet by mouth 2 (two) times daily. Pt states she takes one pill at dinner and one at bedtime     zonisamide 100 MG Cap  Commonly known as: ZONEGRAN  Take 100 mg by  mouth 2 (two) times daily.            STOP taking these medications      rivaroxaban 20 mg Tab  Commonly known as: XARELTO              Indwelling Lines/Drains at time of discharge:   Lines/Drains/Airways       None                   Time spent on the discharge of patient: 45 minutes         Zachery Boyd MD  Department of Hospital Medicine  '[facility] - Telemetry ([facility])

## 2024-02-09 NOTE — PROGRESS NOTES
Discharge education and instructions reviewed with patient. Questions answered as of now, LDA's and Telemetry monitor removed per provider order. Patient remained free from falls during shift. Transport requested, patient discharged with personal belongings via wheel chair.

## 2024-02-16 LAB
FINAL PATHOLOGIC DIAGNOSIS: ABNORMAL
GROSS: ABNORMAL
Lab: ABNORMAL
MICROSCOPIC EXAM: ABNORMAL

## 2024-02-19 DIAGNOSIS — C16.9 ADENOCARCINOMA OF STOMACH: Primary | ICD-10-CM

## 2024-02-20 ENCOUNTER — PATIENT MESSAGE (OUTPATIENT)
Dept: GASTROENTEROLOGY | Facility: CLINIC | Age: 74
End: 2024-02-20
Payer: MEDICARE

## 2024-02-20 DIAGNOSIS — C16.2 MALIGNANT NEOPLASM OF BODY OF STOMACH: Primary | ICD-10-CM

## 2024-02-21 ENCOUNTER — PATIENT MESSAGE (OUTPATIENT)
Dept: GASTROENTEROLOGY | Facility: CLINIC | Age: 74
End: 2024-02-21
Payer: MEDICARE

## 2024-02-23 ENCOUNTER — OFFICE VISIT (OUTPATIENT)
Dept: SURGICAL ONCOLOGY | Facility: CLINIC | Age: 74
End: 2024-02-23
Payer: MEDICARE

## 2024-02-23 VITALS
BODY MASS INDEX: 21.52 KG/M2 | HEIGHT: 65 IN | WEIGHT: 129.19 LBS | TEMPERATURE: 98 F | DIASTOLIC BLOOD PRESSURE: 78 MMHG | SYSTOLIC BLOOD PRESSURE: 131 MMHG | HEART RATE: 61 BPM

## 2024-02-23 DIAGNOSIS — Z80.0 FAMILY HISTORY OF PANCREATIC CANCER: ICD-10-CM

## 2024-02-23 DIAGNOSIS — C56.2: ICD-10-CM

## 2024-02-23 DIAGNOSIS — C16.9 GASTRIC ADENOCARCINOMA: Primary | ICD-10-CM

## 2024-02-23 PROBLEM — C56.1: Status: ACTIVE | Noted: 2023-01-09

## 2024-02-23 PROCEDURE — 3078F DIAST BP <80 MM HG: CPT | Mod: CPTII,S$GLB,, | Performed by: SURGERY

## 2024-02-23 PROCEDURE — 1126F AMNT PAIN NOTED NONE PRSNT: CPT | Mod: CPTII,S$GLB,, | Performed by: SURGERY

## 2024-02-23 PROCEDURE — 1111F DSCHRG MED/CURRENT MED MERGE: CPT | Mod: CPTII,S$GLB,, | Performed by: SURGERY

## 2024-02-23 PROCEDURE — 99417 PROLNG OP E/M EACH 15 MIN: CPT | Mod: S$GLB,,, | Performed by: SURGERY

## 2024-02-23 PROCEDURE — 3075F SYST BP GE 130 - 139MM HG: CPT | Mod: CPTII,S$GLB,, | Performed by: SURGERY

## 2024-02-23 PROCEDURE — 99205 OFFICE O/P NEW HI 60 MIN: CPT | Mod: S$GLB,,, | Performed by: SURGERY

## 2024-02-23 PROCEDURE — 3008F BODY MASS INDEX DOCD: CPT | Mod: CPTII,S$GLB,, | Performed by: SURGERY

## 2024-02-23 PROCEDURE — 1160F RVW MEDS BY RX/DR IN RCRD: CPT | Mod: CPTII,S$GLB,, | Performed by: SURGERY

## 2024-02-23 PROCEDURE — 99999 PR PBB SHADOW E&M-EST. PATIENT-LVL V: CPT | Mod: PBBFAC,,, | Performed by: SURGERY

## 2024-02-23 PROCEDURE — 1159F MED LIST DOCD IN RCRD: CPT | Mod: CPTII,S$GLB,, | Performed by: SURGERY

## 2024-02-23 RX ORDER — PANTOPRAZOLE SODIUM 40 MG/1
40 TABLET, DELAYED RELEASE ORAL DAILY
COMMUNITY

## 2024-02-23 NOTE — LETTER
February 28, 2024        Gagandeep Iglesias MD  [address] [address]             [facility]  [address] [address]  Phone: [phone]  Fax: [phone]   Patient: Cheryl Garcia   MR Number: [id number]   YOB: 1950   Date of Visit: 2/23/2024       Dear Dr. Iglesias:    I had the privilege of seeing your patient, Cheryl Garcia in my clinic recently. Attached you will find relevant portions of my assessment and plan of care.    If you have questions, please do not hesitate to call me. I look forward to following Cheryl Garcia along with you.    Sincerely,    Chen Jerome MD MS  Surgical Oncology              CC  No Recipients    Enclosure

## 2024-02-23 NOTE — PROGRESS NOTES
Surgical Oncology Clinic Note      Referring Provider: Dr. Jayla Arteaga   PCP: Gagandeep Iglesias MD    Reason For Visit: Gastroesophageal: gastric cancer (distal)    Oncology History   Mixed epithelial carcinoma of right ovary   1/2/2023 Initial Diagnosis    Mixed epithelial carcinoma of left ovary     1/9/2023 Surgery    Laparotomy for radical resection of gynecologic cancer. Removal of pelvic mass (right salpingo-oophorectomy), left salpingo-oophorectomy, extensive enterolysis, extensive adhesiolysis, left pelvic lymph node biopsy, omental biopsy, small bowel resection with primary functional end-to-end anastomosis, placement of pelvic drain. Path: Right ovary, tumor site, 11 cm, mixed epithelial carcinoma (50% mucinous, 50% endometrioid), G2 moderately differentiated, ovarian surface involvement-indeterminate-specimen disrupted. 1 left pelvic lymph node negative for neoplasia. FIGO stage IC2       1/9/2023 Cancer Staged    Staging form: Ovary, Fallopian Tube, and Primary Peritoneal Carcinoma, AJCC 8th Edition  - Clinical stage from 1/9/2023: FIGO Stage IC2, calculated as Stage IC (cT1c2, cN0, cM0)     4/5/2023 - 7/19/2023 Chemotherapy    4/5/2023: Cycle 1- paclitaxel 135 mg/m2 and carboplatin AUC 5 as patient is frail and elderly.  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 female with history of epilepsy and treated ovarian cancer (s/p surgery and chemotherapy) who presents today for evaluation and management of newly diagnosed gastric adenocarcinoma.   She originally presented to [facility] on 02/06/2024 following an episode of hematemesis.  A upon presentation she was having epigastric tenderness but had not had any additional episodes of vomiting.  She was otherwise doing well.  She was given IV fluids and IV Protonix and transferred to [facility] for further evaluation.  She was found to have an acute decrease in her hemoglobin from 11-8.9 over 10 hours however her vitals all remained within normal limits.  She underwent an EGD during that admission which showed a large, ulcerated gastric mass.  Pathology returned consistent with poorly differentiated adenocarcinoma.   A CT abdomen and pelvis was done that day which showed an eccentric irregular wall thickening of the mid gastric body concerning for malignancy.  She was discharged the following day in stable condition with follow-up.  Her Xarelto was held at that time due to her bleeding.  She had been on Xarelto since October of 2022 due to a history of lower extremity DVT at the around the time she was diagnosed with ovarian cancer.  This was believed to be due to the malignancy and direct compression of the vessels in that region.  Since her discharge she has overall been doing well and has no new complaints at this time.    She states she did not start having any reflux until after she completed her chemotherapy this summer.  She was seen by her internist a few times as well as seen by GI over the [facility] with Dr. Tinajero for its Giovany.  She had been very diligent about reflux precautions and had reduced high acid foods.  She had been started on Protonix which completely resolved her symptoms.    As stated she does have a  history of ovarian cancer diagnosed back in January of 2023.  The workup for that was initiated back in October of 2022 when she presented with a lower extremity swelling in the DVT.  During that workup they did a CT scan which showed a large lesion on her ovary concerning for malignancy.  She subsequently underwent midline laparotomy for radical resection with right salpingo-oophorectomy, left salpingo-oophorectomy, extensive enterolysis, extensive adhesiolysis, left pelvic lymph node biopsy, omental biopsy, small bowel resection with primary functional end-to-end anastomosis, placement of pelvic drain. Path: Right ovary, tumor site, 11 cm, mixed epithelial carcinoma (50% mucinous, 50% endometrioid), G2 moderately differentiated, ovarian surface involvement-indeterminate-specimen disrupted. 1 left pelvic lymph node negative for neoplasia. FIGO stage IC2.  All this was done at [facility].  She was subsequently treated with 6 cycles of paclitaxel and carboplatin, her last cycle was in July of 2023.    Of note her last colonoscopy was in 2023 and reportedly revealed polyps.  According to the records from [facility] she does have a history of an EGD on 06/20/2012 done by Dr. Boyle, which reportedly showed mild gastritis and otherwise was normal.    Her history is otherwise significant for epilepsy and she remains on antiepileptic medications however she has not had a seizure since 2009.      Past Medical History:   Diagnosis Date    Chronic deep vein thrombosis (DVT) of left lower extremity 10/21/2022    Drug-induced polyneuropathy 02/28/2024    Noted by ROCK KAY NP  last documented on [id number]    DVT (deep venous thrombosis)     Epilepsy     Gastric adenocarcinoma 02/27/2024    Hyperlipidemia 01/10/2013    Formatting of this note might be different from the original.   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    OP (osteoporosis) 02/28/2024    Ovarian cancer      Primary hypertension 12/01/2022     Past Surgical History:   Procedure Laterality Date    APPENDECTOMY  2015    COLONOSCOPY  06/20/2012    Dr Boyle- Tubular adenoma polyps. Repeat in 3 years.    COLONOSCOPY  06/17/2015    -Nodular mucosa at appendiceal orfice, sigmoid and desc diverticulosis otherwise normal.    COLONOSCOPY  2020    >3 TAs    COLONOSCOPY  12/2023    EGD - EXTERNAL RESULT  06/20/2012    Dr Boyle- Mild gastritis otherwise normal.    ESOPHAGOGASTRODUODENOSCOPY N/A 02/08/2024    Procedure: EGD (ESOPHAGOGASTRODUODENOSCOPY);  Surgeon: Jayla Arteaga MD;  Location: [facility];  Service: Endoscopy;  Laterality: N/A;    TOTAL ABDOMINAL HYSTERECTOMY W/ BILATERAL SALPINGOOPHORECTOMY  01/09/2023    radical resection with right salpingo-oophorectomy, left salpingo-oophorectomy, extensive enterolysis, extensive adhesiolysis, left pelvic lymph node biopsy, omental biopsy, small bowel resection with primary functional end-to-end anastomosis, placement of pelvic drain     Family History   Problem Relation Age of Onset    Pancreatic cancer Brother 76    Breast cancer Half-sister 50 - 59    Colon cancer Half-sister     Lung cancer Half-sister     Breast cancer Niece        Social History     Socioeconomic History    Marital status:    Tobacco Use    Smoking status: Former     Types: Cigarettes    Smokeless tobacco: Never     Social Determinants of Health     Financial Resource Strain: Low Risk  (2/23/2024)    Overall Financial Resource Strain (CARDIA)     Difficulty of Paying Living Expenses: Not hard at all   Food Insecurity: No Food Insecurity (2/23/2024)    Hunger Vital Sign     Worried About Running Out of Food in the Last Year: Never true     Ran Out of Food in the Last Year: Never true   Transportation Needs: No Transportation Needs (2/23/2024)    PRAPARE - Transportation     Lack of Transportation (Medical): No     Lack of Transportation (Non-Medical): No   Physical  Activity: Inactive (2/23/2024)    Exercise Vital Sign     Days of Exercise per Week: 0 days     Minutes of Exercise per Session: 10 min   Stress: No Stress Concern Present (2/23/2024)    [place] of Occupational [employer] - Occupational Stress Questionnaire     Feeling of Stress : Not at all   Social Connections: Unknown (2/23/2024)    Social Connection and Isolation Panel [NHANES]     Frequency of Communication with Friends and Family: More than three times a week     Frequency of Social Gatherings with Friends and Family: Three times a week     Active Member of Clubs or Organizations: Yes     Attends Club or Organization Meetings: More than 4 times per year     Marital Status:    Housing Stability: Low Risk  (2/23/2024)    Housing Stability Vital Sign     Unable to Pay for Housing in the Last Year: No     Number of Places Lived in the Last Year: 1     Unstable Housing in the Last Year: No          Medication List            Accurate as of February 23, 2024 11:59 PM. If you have any questions, ask your nurse or doctor.                CONTINUE taking these medications      alendronate 70 MG tablet  Commonly known as: FOSAMAX     CALCIUM PHOSPHATE-VITAMIN D3 ORAL     carBAMazepine 200 mg tablet  Commonly known as: TEGRETOL     levETIRAcetam 500 MG Tab  Commonly known as: KEPPRA     pantoprazole 40 MG tablet  Commonly known as: PROTONIX     zonisamide 100 MG Cap  Commonly known as: ZONEGRAN              Review of patient's allergies indicates:  No Known Allergies    Review of Systems   Constitutional:  Negative for chills, fever, malaise/fatigue and weight loss.   HENT: Negative.     Respiratory:  Negative for cough, shortness of breath and wheezing.    Cardiovascular:  Negative for chest pain, palpitations and orthopnea.   Gastrointestinal:  Negative for abdominal pain, blood in stool, constipation, diarrhea, heartburn, nausea and vomiting.   Musculoskeletal:  Negative for back pain, falls and joint  pain.   Skin: Negative.  Negative for rash.   Neurological:  Positive for seizures (last 2009). Negative for dizziness, sensory change, speech change, focal weakness, loss of consciousness and weakness.   Psychiatric/Behavioral:  Negative for depression and hallucinations. The patient is not nervous/anxious.          Vitals:    02/23/24 1015   BP: 131/78   Pulse: 61   Temp: 98.4 °F (36.9 °C)     Body mass index is 21.5 kg/m².  ECOG SCORE             PHYSICAL EXAM:  Physical Exam  Vitals reviewed.   Constitutional:       General: She is not in acute distress.     Appearance: Normal appearance.   HENT:      Head: Normocephalic and atraumatic.      Mouth/Throat:      Mouth: Mucous membranes are moist.   Eyes:      General: No scleral icterus.     Extraocular Movements: Extraocular movements intact.      Conjunctiva/sclera: Conjunctivae normal.   Pulmonary:      Effort: Pulmonary effort is normal.   Abdominal:      General: There is no distension.      Palpations: Abdomen is soft. There is no mass.      Tenderness: There is no abdominal tenderness.      Comments: Well healed midline incision    Musculoskeletal:         General: No swelling. Normal range of motion.   Skin:     General: Skin is warm and dry.   Neurological:      General: No focal deficit present.      Mental Status: She is alert.   Psychiatric:         Mood and Affect: Mood normal.         Behavior: Behavior normal.         Thought Content: Thought content normal.         Judgment: Judgment normal.          DATA REVIEW:  I personally reviewed the following records for this visit: lab work from prior visit, notes from other physicians, surgical pathology results, endoscopy results, radiographic study evaluation, and laboratory results done by primary care physician    Labs:    Lab Results   Component Value Date    WBC 4.53 02/09/2024    HGB 8.3 (L) 02/09/2024    HCT 25.1 (L) 02/09/2024     02/09/2024     Lab Results   Component Value Date      "02/09/2024    CALCIUM 8.1 (L) 02/09/2024    ALBUMIN 2.6 (L) 02/09/2024    PROT 5.2 (L) 02/09/2024     02/09/2024    K 3.6 02/09/2024    CO2 23 02/09/2024     (H) 02/09/2024    BUN 4 (L) 02/09/2024    CREATININE 0.7 02/09/2024    ALKPHOS 69 02/09/2024    ALT 21 02/09/2024    AST 16 02/09/2024    BILITOT 0.2 02/09/2024       Nutritional:  No results found for: "PREALBUMIN"    Tumor Markers:  No results found for: "CEA", "AMYLASE", "ASPIRATE", "AKD001", "", "[id number]", "AFP", "AFPTM"  No results found for: ""    Pathology:  Final Pathologic Diagnosis  Abnormal   STOMACH, 'GASTRIC MASS', BIOPSY:  - 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 Scattered p53 positivity (wild-type pattern of expression) and p16 positivity are also noted. Additional studies, including CK7, PAX8, ER, HER2, TTF-1, GATA3, WT-1,  synaptophysin, and chromogranin, are either negative or negative in the cells of interest. This patient's prior history of ovarian carcinoma resected at [facility] in [place], [place] is noted and the outside pathology report (O-) is reviewed.   While both tumors do have similar immunohistochemical staining patterns, they are not identical, with notable reported differences being CK7 and PAX8 positivity in the ovarian tumor. The presence of a large, fungating, and ulcerated mass in the stomach  is suspicious for a gastric primary; however, it is unclear if the masses represent two separate primaries or one p rimary and one metastatic focus. Abnormal mismatch repair studies do suggest increased risk for development of multiple tumor types (see  below). Requesting outside slides for review to compare morphology may be helpful.    DNA mismatch repair IHC studies are ABNORMAL and demonstrate LOSS OF MLH1 and PMS2 expression within tumor cells. MSH2 and MSH6 exhibit " retained expression. 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    Tissue will be sent for molecular profiling by next-generation sequencing ([employer]). Results of this study will be issued directly to the electronic medical record.       Radiographic Findings:  02/23/2024  CT Abdomen/ Pelvis  Impression:     Eccentric irregular wall thickening of the mid gastric body concerning for malignancy.  There appears to be associated enlarged 1.6 cm gastrohepatic lymph node.  Clinical correlation advised.  Correlation with endoscopy is recommended if not previously performed.         ASSESSMENT & PLAN:  1. Gastric adenocarcinoma    2. Mixed epithelial carcinoma of left ovary    3. Family history of pancreatic cancer       Cheryl Garcia is a 73 y.o. female with newly diagnosed gastric adenocarcinoma as well as a history of ovarian cancer.    I discussed with Ms. Kirkland and her  the natural history, staging, and treatment of gastric cancer.  Although she does have a history of ovarian cancer would be extremely abnormal for this to be metastatic from that disease.  Especially with the significant intraluminal tumor that was seen.  The pathology does show loss of MLH1 and PMS2 consistent with possible MMR deficiency with microsatellite instability.  We reviewed the fact that most patients with gastric cancer the [place] show up when they are symptomatic and then unfortunately approximately 50% have disease that is stents beyond the local regional confines at the time of presentation.  We also discussed that only about half of patients who appeared to have local regional disease ultimately undergo potentially curative resection.  We reviewed the role for cross-sectional imaging as well as diagnostic laparoscopy with washings for appropriate staging of gastric cancers, and the role for endoscopic ultrasound.  I discussed with them the role of multimodal  therapy including chemotherapy plus or minus surgery depending on stage, in the treatment of gastric cancer.  We discussed that for those patients who have locoregional disease, the standard of care is for diagnostic laparoscopy with washings followed by perioperative chemotherapy and surgery.  For those patients who have evidence of metastatic or unresectable disease the standard of care involves systemic therapy alone.   We did discuss that the standard of care for treatment of MMR deficient gastric tumors is different than those that are MMR intact.  I discussed with her and her  the appropriate staging for gastric cancer which include CT chest abdomen and pelvis with some role for a PET-CT scan.  Her tumor is significant an intraluminal with enlarged gastrohepatic lymph nodes therefore I do not think she would really benefit from a endoscopic ultrasound at least from a staging standpoint however I will discuss the possibility of that at our multidisciplinary tumor board.  I have discussed that the recommendations are based upon the current guidelines espoused by the National Comprehensive Cancer Network (NCCN), and have referred them to the NCCN website with the patient resources.      Due to her significant family history of malignancy as well as her own personal history of both ovarian cancer as well as what appears to be new gastric adenocarcinoma with microsatellite instability, she would benefit from a referral to Genetics.     Plan:  -- complete staging with CT Chest and PET CT scan  -- review at multidisciplinary tumor board  -- genetics referral   -- likely will need diagnostic laparoscopy with washings followed by RILEY with IO and then surgery assuming no evidence of metastatic disease on her workup  -- Scheduled to see Dr. Amin with medical oncology on 3/6/24      MsGina Garcia expressed understanding in regards to our discussion today. Many good questions were asked on today's visit,  all of which were answered to their satisfaction.    Follow-up: No follow-ups on file.                  Chen Jerome MD MS              Surgical Oncology              [facility], [place]              Office: [phone]     Communications:  105  minutes were spent on today's visit in face-to-face and non face-to-face time with the patient. This patient was recently diagnosed with gastric adenocarcinoma and the time was required to provide counseling and guidance regarding their new diagnosis. 60 minutes of time was spent in face-to-face discussion with the patient and her , of that greater than 50% of that time was spent on counseling.  An additional 45 minutes of time was spent reviewing all outside records and information pertaining to their work-up and formulating a treatment plan in line with standardized guidelines. Additional time was spent communicating with referring physicians and facilities to facilitate the efficient exchange of previous healthcare records and radiographic imaging pertinent to the diagnosis and disease management.       Orders Placed This Encounter   Procedures    NM PET CT FDG Skull Base to Mid Thigh     Standing Status:   Future     Number of Occurrences:   1     Standing Expiration Date:   2/23/2025     Order Specific Question:   May the Radiologist modify the order per protocol to meet the clinical needs of the patient?     Answer:   Yes    CT Chest With Contrast     Standing Status:   Future     Number of Occurrences:   1     Standing Expiration Date:   2/23/2025     Order Specific Question:   Does this patient have impaired renal function?     Answer:   No     Order Specific Question:   May the Radiologist modify the order per protocol to meet the clinical needs of the patient?     Answer:   Yes    Ambulatory referral/consult to Genetics     Standing Status:   Future     Standing Expiration Date:   3/23/2025     Referral Priority:    Routine     Referral Type:   Consultation     Referral Reason:   Specialty Services Required     Referred to Provider:   Quita Elena CGC     Number of Visits Requested:   1

## 2024-02-23 NOTE — H&P (VIEW-ONLY)
Surgical Oncology Clinic Note      Referring Provider: Dr. Jayla Arteaga   PCP: Gagandeep Iglesias MD    Reason For Visit: Gastroesophageal: gastric cancer (distal)    Oncology History   Mixed epithelial carcinoma of right ovary   1/2/2023 Initial Diagnosis    Mixed epithelial carcinoma of left ovary     1/9/2023 Surgery    Laparotomy for radical resection of gynecologic cancer. Removal of pelvic mass (right salpingo-oophorectomy), left salpingo-oophorectomy, extensive enterolysis, extensive adhesiolysis, left pelvic lymph node biopsy, omental biopsy, small bowel resection with primary functional end-to-end anastomosis, placement of pelvic drain. Path: Right ovary, tumor site, 11 cm, mixed epithelial carcinoma (50% mucinous, 50% endometrioid), G2 moderately differentiated, ovarian surface involvement-indeterminate-specimen disrupted. 1 left pelvic lymph node negative for neoplasia. FIGO stage IC2       1/9/2023 Cancer Staged    Staging form: Ovary, Fallopian Tube, and Primary Peritoneal Carcinoma, AJCC 8th Edition  - Clinical stage from 1/9/2023: FIGO Stage IC2, calculated as Stage IC (cT1c2, cN0, cM0)     4/5/2023 - 7/19/2023 Chemotherapy    4/5/2023: Cycle 1- paclitaxel 135 mg/m2 and carboplatin AUC 5 as patient is frail and elderly.  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 female with history of epilepsy and treated ovarian cancer (s/p surgery and chemotherapy) who presents today for evaluation and management of newly diagnosed gastric adenocarcinoma.   She originally presented to [facility] on 02/06/2024 following an episode of hematemesis.  A upon presentation she was having epigastric tenderness but had not had any additional episodes of vomiting.  She was otherwise doing well.  She was given IV fluids and IV Protonix and transferred to [facility] for further evaluation.  She was found to have an acute decrease in her hemoglobin from 11-8.9 over 10 hours however her vitals all remained within normal limits.  She underwent an EGD during that admission which showed a large, ulcerated gastric mass.  Pathology returned consistent with poorly differentiated adenocarcinoma.   A CT abdomen and pelvis was done that day which showed an eccentric irregular wall thickening of the mid gastric body concerning for malignancy.  She was discharged the following day in stable condition with follow-up.  Her Xarelto was held at that time due to her bleeding.  She had been on Xarelto since October of 2022 due to a history of lower extremity DVT at the around the time she was diagnosed with ovarian cancer.  This was believed to be due to the malignancy and direct compression of the vessels in that region.  Since her discharge she has overall been doing well and has no new complaints at this time.    She states she did not start having any reflux until after she completed her chemotherapy this summer.  She was seen by her internist a few times as well as seen by GI over the [facility] with Dr. Tinajero for its Giovany.  She had been very diligent about reflux precautions and had reduced high acid foods.  She had been started on Protonix which completely resolved her symptoms.    As stated she does have a  history of ovarian cancer diagnosed back in January of 2023.  The workup for that was initiated back in October of 2022 when she presented with a lower extremity swelling in the DVT.  During that workup they did a CT scan which showed a large lesion on her ovary concerning for malignancy.  She subsequently underwent midline laparotomy for radical resection with right salpingo-oophorectomy, left salpingo-oophorectomy, extensive enterolysis, extensive adhesiolysis, left pelvic lymph node biopsy, omental biopsy, small bowel resection with primary functional end-to-end anastomosis, placement of pelvic drain. Path: Right ovary, tumor site, 11 cm, mixed epithelial carcinoma (50% mucinous, 50% endometrioid), G2 moderately differentiated, ovarian surface involvement-indeterminate-specimen disrupted. 1 left pelvic lymph node negative for neoplasia. FIGO stage IC2.  All this was done at [facility].  She was subsequently treated with 6 cycles of paclitaxel and carboplatin, her last cycle was in July of 2023.    Of note her last colonoscopy was in 2023 and reportedly revealed polyps.  According to the records from [facility] she does have a history of an EGD on 06/20/2012 done by Dr. Boyle, which reportedly showed mild gastritis and otherwise was normal.    Her history is otherwise significant for epilepsy and she remains on antiepileptic medications however she has not had a seizure since 2009.      Past Medical History:   Diagnosis Date    Chronic deep vein thrombosis (DVT) of left lower extremity 10/21/2022    Drug-induced polyneuropathy 02/28/2024    Noted by ROCK KAY NP  last documented on [id number]    DVT (deep venous thrombosis)     Epilepsy     Gastric adenocarcinoma 02/27/2024    Hyperlipidemia 01/10/2013    Formatting of this note might be different from the original.   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    OP (osteoporosis) 02/28/2024    Ovarian cancer      Primary hypertension 12/01/2022     Past Surgical History:   Procedure Laterality Date    APPENDECTOMY  2015    COLONOSCOPY  06/20/2012    Dr Boyle- Tubular adenoma polyps. Repeat in 3 years.    COLONOSCOPY  06/17/2015    -Nodular mucosa at appendiceal orfice, sigmoid and desc diverticulosis otherwise normal.    COLONOSCOPY  2020    >3 TAs    COLONOSCOPY  12/2023    EGD - EXTERNAL RESULT  06/20/2012    Dr Boyle- Mild gastritis otherwise normal.    ESOPHAGOGASTRODUODENOSCOPY N/A 02/08/2024    Procedure: EGD (ESOPHAGOGASTRODUODENOSCOPY);  Surgeon: Jayla Arteaga MD;  Location: [facility];  Service: Endoscopy;  Laterality: N/A;    TOTAL ABDOMINAL HYSTERECTOMY W/ BILATERAL SALPINGOOPHORECTOMY  01/09/2023    radical resection with right salpingo-oophorectomy, left salpingo-oophorectomy, extensive enterolysis, extensive adhesiolysis, left pelvic lymph node biopsy, omental biopsy, small bowel resection with primary functional end-to-end anastomosis, placement of pelvic drain     Family History   Problem Relation Age of Onset    Pancreatic cancer Brother 76    Breast cancer Half-sister 50 - 59    Colon cancer Half-sister     Lung cancer Half-sister     Breast cancer Niece        Social History     Socioeconomic History    Marital status:    Tobacco Use    Smoking status: Former     Types: Cigarettes    Smokeless tobacco: Never     Social Determinants of Health     Financial Resource Strain: Low Risk  (2/23/2024)    Overall Financial Resource Strain (CARDIA)     Difficulty of Paying Living Expenses: Not hard at all   Food Insecurity: No Food Insecurity (2/23/2024)    Hunger Vital Sign     Worried About Running Out of Food in the Last Year: Never true     Ran Out of Food in the Last Year: Never true   Transportation Needs: No Transportation Needs (2/23/2024)    PRAPARE - Transportation     Lack of Transportation (Medical): No     Lack of Transportation (Non-Medical): No   Physical  Activity: Inactive (2/23/2024)    Exercise Vital Sign     Days of Exercise per Week: 0 days     Minutes of Exercise per Session: 10 min   Stress: No Stress Concern Present (2/23/2024)    [employer] - Occupational Stress Questionnaire     Feeling of Stress : Not at all   Social Connections: Unknown (2/23/2024)    Social Connection and Isolation Panel [NHANES]     Frequency of Communication with Friends and Family: More than three times a week     Frequency of Social Gatherings with Friends and Family: Three times a week     Active Member of Clubs or Organizations: Yes     Attends Club or Organization Meetings: More than 4 times per year     Marital Status:    Housing Stability: Low Risk  (2/23/2024)    Housing Stability Vital Sign     Unable to Pay for Housing in the Last Year: No     Number of Places Lived in the Last Year: 1     Unstable Housing in the Last Year: No          Medication List            Accurate as of February 23, 2024 11:59 PM. If you have any questions, ask your nurse or doctor.                CONTINUE taking these medications      alendronate 70 MG tablet  Commonly known as: FOSAMAX     CALCIUM PHOSPHATE-VITAMIN D3 ORAL     carBAMazepine 200 mg tablet  Commonly known as: TEGRETOL     levETIRAcetam 500 MG Tab  Commonly known as: KEPPRA     pantoprazole 40 MG tablet  Commonly known as: PROTONIX     zonisamide 100 MG Cap  Commonly known as: ZONEGRAN              Review of patient's allergies indicates:  No Known Allergies    Review of Systems   Constitutional:  Negative for chills, fever, malaise/fatigue and weight loss.   HENT: Negative.     Respiratory:  Negative for cough, shortness of breath and wheezing.    Cardiovascular:  Negative for chest pain, palpitations and orthopnea.   Gastrointestinal:  Negative for abdominal pain, blood in stool, constipation, diarrhea, heartburn, nausea and vomiting.   Musculoskeletal:  Negative for back pain, falls and joint  pain.   Skin: Negative.  Negative for rash.   Neurological:  Positive for seizures (last 2009). Negative for dizziness, sensory change, speech change, focal weakness, loss of consciousness and weakness.   Psychiatric/Behavioral:  Negative for depression and hallucinations. The patient is not nervous/anxious.          Vitals:    02/23/24 1015   BP: 131/78   Pulse: 61   Temp: 98.4 °F (36.9 °C)     Body mass index is 21.5 kg/m².  ECOG SCORE             PHYSICAL EXAM:  Physical Exam  Vitals reviewed.   Constitutional:       General: She is not in acute distress.     Appearance: Normal appearance.   HENT:      Head: Normocephalic and atraumatic.      Mouth/Throat:      Mouth: Mucous membranes are moist.   Eyes:      General: No scleral icterus.     Extraocular Movements: Extraocular movements intact.      Conjunctiva/sclera: Conjunctivae normal.   Pulmonary:      Effort: Pulmonary effort is normal.   Abdominal:      General: There is no distension.      Palpations: Abdomen is soft. There is no mass.      Tenderness: There is no abdominal tenderness.      Comments: Well healed midline incision    Musculoskeletal:         General: No swelling. Normal range of motion.   Skin:     General: Skin is warm and dry.   Neurological:      General: No focal deficit present.      Mental Status: She is alert.   Psychiatric:         Mood and Affect: Mood normal.         Behavior: Behavior normal.         Thought Content: Thought content normal.         Judgment: Judgment normal.          DATA REVIEW:  I personally reviewed the following records for this visit: lab work from prior visit, notes from other physicians, surgical pathology results, endoscopy results, radiographic study evaluation, and laboratory results done by primary care physician    Labs:    Lab Results   Component Value Date    WBC 4.53 02/09/2024    HGB 8.3 (L) 02/09/2024    HCT 25.1 (L) 02/09/2024     02/09/2024     Lab Results   Component Value Date      "02/09/2024    CALCIUM 8.1 (L) 02/09/2024    ALBUMIN 2.6 (L) 02/09/2024    PROT 5.2 (L) 02/09/2024     02/09/2024    K 3.6 02/09/2024    CO2 23 02/09/2024     (H) 02/09/2024    BUN 4 (L) 02/09/2024    CREATININE 0.7 02/09/2024    ALKPHOS 69 02/09/2024    ALT 21 02/09/2024    AST 16 02/09/2024    BILITOT 0.2 02/09/2024       Nutritional:  No results found for: "PREALBUMIN"    Tumor Markers:  No results found for: "CEA", "AMYLASE", "ASPIRATE", "XKO977", "", "CL3721", "AFP", "AFPTM"  No results found for: ""    Pathology:  Final Pathologic Diagnosis  Abnormal   STOMACH, 'GASTRIC MASS', BIOPSY:  - 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 Scattered p53 positivity (wild-type pattern of expression) and p16 positivity are also noted. Additional studies, including CK7, PAX8, ER, HER2, TTF-1, GATA3, WT-1,  synaptophysin, and chromogranin, are either negative or negative in the cells of interest. This patient's prior history of ovarian carcinoma resected at [facility] in [place], [place] is noted and the outside pathology report (G-) is reviewed.   While both tumors do have similar immunohistochemical staining patterns, they are not identical, with notable reported differences being CK7 and PAX8 positivity in the ovarian tumor. The presence of a large, fungating, and ulcerated mass in the stomach  is suspicious for a gastric primary; however, it is unclear if the masses represent two separate primaries or one p rimary and one metastatic focus. Abnormal mismatch repair studies do suggest increased risk for development of multiple tumor types (see  below). Requesting outside slides for review to compare morphology may be helpful.    DNA mismatch repair IHC studies are ABNORMAL and demonstrate LOSS OF MLH1 and PMS2 expression within tumor cells. MSH2 and MSH6 exhibit " retained expression. 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    Tissue will be sent for molecular profiling by next-generation sequencing ([employer]). Results of this study will be issued directly to the electronic medical record.       Radiographic Findings:  02/23/2024  CT Abdomen/ Pelvis  Impression:     Eccentric irregular wall thickening of the mid gastric body concerning for malignancy.  There appears to be associated enlarged 1.6 cm gastrohepatic lymph node.  Clinical correlation advised.  Correlation with endoscopy is recommended if not previously performed.         ASSESSMENT & PLAN:  1. Gastric adenocarcinoma    2. Mixed epithelial carcinoma of left ovary    3. Family history of pancreatic cancer       Cheryl Garcia is a 73 y.o. female with newly diagnosed gastric adenocarcinoma as well as a history of ovarian cancer.    I discussed with Ms. Kirkland and her  the natural history, staging, and treatment of gastric cancer.  Although she does have a history of ovarian cancer would be extremely abnormal for this to be metastatic from that disease.  Especially with the significant intraluminal tumor that was seen.  The pathology does show loss of MLH1 and PMS2 consistent with possible MMR deficiency with microsatellite instability.  We reviewed the fact that most patients with gastric cancer the [place] show up when they are symptomatic and then unfortunately approximately 50% have disease that is stents beyond the local regional confines at the time of presentation.  We also discussed that only about half of patients who appeared to have local regional disease ultimately undergo potentially curative resection.  We reviewed the role for cross-sectional imaging as well as diagnostic laparoscopy with washings for appropriate staging of gastric cancers, and the role for endoscopic ultrasound.  I discussed with them the role of multimodal  therapy including chemotherapy plus or minus surgery depending on stage, in the treatment of gastric cancer.  We discussed that for those patients who have locoregional disease, the standard of care is for diagnostic laparoscopy with washings followed by perioperative chemotherapy and surgery.  For those patients who have evidence of metastatic or unresectable disease the standard of care involves systemic therapy alone.   We did discuss that the standard of care for treatment of MMR deficient gastric tumors is different than those that are MMR intact.  I discussed with her and her  the appropriate staging for gastric cancer which include CT chest abdomen and pelvis with some role for a PET-CT scan.  Her tumor is significant an intraluminal with enlarged gastrohepatic lymph nodes therefore I do not think she would really benefit from a endoscopic ultrasound at least from a staging standpoint however I will discuss the possibility of that at our multidisciplinary tumor board.  I have discussed that the recommendations are based upon the current guidelines espoused by the National Comprehensive Cancer Network (NCCN), and have referred them to the NCCN website with the patient resources.      Due to her significant family history of malignancy as well as her own personal history of both ovarian cancer as well as what appears to be new gastric adenocarcinoma with microsatellite instability, she would benefit from a referral to Genetics.     Plan:  -- complete staging with CT Chest and PET CT scan  -- review at multidisciplinary tumor board  -- genetics referral   -- likely will need diagnostic laparoscopy with washings followed by RILEY with IO and then surgery assuming no evidence of metastatic disease on her workup  -- Scheduled to see Dr. Amin with medical oncology on 3/6/24      MsGina Garcia expressed understanding in regards to our discussion today. Many good questions were asked on today's visit,  all of which were answered to their satisfaction.    Follow-up: No follow-ups on file.                  Chen Jerome MD MS              Surgical Oncology              [facility] [place], [place]              Office: [phone]     Communications:  105  minutes were spent on today's visit in face-to-face and non face-to-face time with the patient. This patient was recently diagnosed with gastric adenocarcinoma and the time was required to provide counseling and guidance regarding their new diagnosis. 60 minutes of time was spent in face-to-face discussion with the patient and her , of that greater than 50% of that time was spent on counseling.  An additional 45 minutes of time was spent reviewing all outside records and information pertaining to their work-up and formulating a treatment plan in line with standardized guidelines. Additional time was spent communicating with referring physicians and facilities to facilitate the efficient exchange of previous healthcare records and radiographic imaging pertinent to the diagnosis and disease management.       Orders Placed This Encounter   Procedures    NM PET CT FDG Skull Base to Mid Thigh     Standing Status:   Future     Number of Occurrences:   1     Standing Expiration Date:   2/23/2025     Order Specific Question:   May the Radiologist modify the order per protocol to meet the clinical needs of the patient?     Answer:   Yes    CT Chest With Contrast     Standing Status:   Future     Number of Occurrences:   1     Standing Expiration Date:   2/23/2025     Order Specific Question:   Does this patient have impaired renal function?     Answer:   No     Order Specific Question:   May the Radiologist modify the order per protocol to meet the clinical needs of the patient?     Answer:   Yes    Ambulatory referral/consult to Genetics     Standing Status:   Future     Standing Expiration Date:   3/23/2025     Referral Priority:    Routine     Referral Type:   Consultation     Referral Reason:   Specialty Services Required     Referred to Provider:   Quita Elena CGC     Number of Visits Requested:   1

## 2024-02-27 ENCOUNTER — HOSPITAL ENCOUNTER (OUTPATIENT)
Dept: RADIOLOGY | Facility: HOSPITAL | Age: 74
Discharge: HOME | End: 2024-02-27
Attending: SURGERY
Payer: MEDICARE

## 2024-02-27 ENCOUNTER — TELEPHONE (OUTPATIENT)
Dept: HEMATOLOGY/ONCOLOGY | Facility: CLINIC | Age: 74
End: 2024-02-27
Payer: MEDICARE

## 2024-02-27 DIAGNOSIS — C16.9 GASTRIC ADENOCARCINOMA: ICD-10-CM

## 2024-02-27 PROCEDURE — 25500020 PHARM REV CODE 255: Mod: PN | Performed by: SURGERY

## 2024-02-27 PROCEDURE — 71260 CT THORAX DX C+: CPT | Mod: 26,,, | Performed by: RADIOLOGY

## 2024-02-27 PROCEDURE — 71260 CT THORAX DX C+: CPT | Mod: TC,PN

## 2024-02-27 RX ADMIN — IOHEXOL 75 ML: 350 INJECTION, SOLUTION INTRAVENOUS at 10:02

## 2024-02-28 ENCOUNTER — HOSPITAL ENCOUNTER (OUTPATIENT)
Dept: RADIOLOGY | Facility: HOSPITAL | Age: 74
Discharge: HOME | End: 2024-02-28
Attending: SURGERY
Payer: MEDICARE

## 2024-02-28 ENCOUNTER — TELEPHONE (OUTPATIENT)
Dept: HEMATOLOGY/ONCOLOGY | Facility: CLINIC | Age: 74
End: 2024-02-28
Payer: MEDICARE

## 2024-02-28 DIAGNOSIS — C16.9 GASTRIC ADENOCARCINOMA: ICD-10-CM

## 2024-02-28 PROBLEM — I50.9 HEART FAILURE, UNSPECIFIED: Status: ACTIVE | Noted: 2024-02-28

## 2024-02-28 PROBLEM — I82.502 CHRONIC DEEP VEIN THROMBOSIS (DVT) OF LEFT LOWER EXTREMITY: Status: ACTIVE | Noted: 2022-10-21

## 2024-02-28 PROBLEM — N60.12 FIBROCYSTIC BREAST CHANGES, BILATERAL: Status: ACTIVE | Noted: 2018-03-27

## 2024-02-28 PROBLEM — N60.11 FIBROCYSTIC BREAST CHANGES, BILATERAL: Status: ACTIVE | Noted: 2018-03-27

## 2024-02-28 PROBLEM — M81.0 OP (OSTEOPOROSIS): Status: ACTIVE | Noted: 2024-02-28

## 2024-02-28 PROBLEM — G62.0 DRUG-INDUCED POLYNEUROPATHY: Status: ACTIVE | Noted: 2024-02-28

## 2024-02-28 PROBLEM — I10 PRIMARY HYPERTENSION: Status: ACTIVE | Noted: 2022-12-01

## 2024-02-28 PROCEDURE — A9552 F18 FDG: HCPCS | Performed by: SURGERY

## 2024-02-28 PROCEDURE — 78815 PET IMAGE W/CT SKULL-THIGH: CPT | Mod: TC

## 2024-02-28 PROCEDURE — 78815 PET IMAGE W/CT SKULL-THIGH: CPT | Mod: 26,PI,, | Performed by: RADIOLOGY

## 2024-02-28 RX ORDER — FLUDEOXYGLUCOSE F18 500 MCI/ML
11.45 INJECTION INTRAVENOUS
Status: COMPLETED | OUTPATIENT
Start: 2024-02-28 | End: 2024-02-28

## 2024-02-28 RX ADMIN — FLUDEOXYGLUCOSE F-18 11.45 MILLICURIE: 500 INJECTION INTRAVENOUS at 08:02

## 2024-02-28 NOTE — NURSING
Called and spoke with pt to offer consult with Dr. Ambrose 3/6 @ 2pm , [facility] location per Dr. Ambrose's approval. Pt agreeable with this appt, thanked me for calling her back. She has no other questions but has my call back number if she needs to reach me.   Oncology Navigation   Intake  Date of Diagnosis: 02/08/24  Cancer Type: GI  Type of Referral: Internal  Date of Referral: 02/23/24  Initial Nurse Navigator Contact: 02/27/24  Referral to Initial Contact Timeline (days): 4  Date Worked: 02/28/24  Reason if booked > 7 days after scheduling: Specific provider / access; Additional tests/procedures     Treatment                              Acuity      Follow Up  Follow up in 1 week (on 3/6/2024) for med onc consult.

## 2024-03-01 ENCOUNTER — TELEPHONE (OUTPATIENT)
Dept: HEMATOLOGY/ONCOLOGY | Facility: CLINIC | Age: 74
End: 2024-03-01
Payer: MEDICARE

## 2024-03-01 NOTE — TELEPHONE ENCOUNTER
"----- Message from Mere Floyd NP sent at 3/1/2024 10:39 AM CST -----  Yes ma'am  ----- Message -----  From: Jose Juan Elena RN  Sent: 3/1/2024  10:31 AM CST  To: Mere Floyd NP    Most likely for tempus correct?  ----- Message -----  From: Yue Chen  Sent: 3/1/2024   8:45 AM CST  To: Hedy Baltazar Staff    Consult/Advisory:      Name Of Caller: Nichelle     Contact Preference:  [phone] Ext [phone]      What is the nature of the call?:  Nelly called on behalf of the pt, provider offering a peer to peer regarding Genetic testing for the the pt that was rec'd on 2/29 . Please call       Dead Line - 3/5     G- [phone]       Additional Notes:    "Thank you for all that you do for our patients"              "

## 2024-03-04 ENCOUNTER — TUMOR BOARD CONFERENCE (OUTPATIENT)
Dept: SURGERY | Facility: CLINIC | Age: 74
End: 2024-03-04
Payer: MEDICARE

## 2024-03-04 RX ORDER — CEFAZOLIN SODIUM 2 G/50ML
2 SOLUTION INTRAVENOUS
Status: CANCELLED | OUTPATIENT
Start: 2024-03-04

## 2024-03-04 RX ORDER — SODIUM CHLORIDE 9 MG/ML
INJECTION, SOLUTION INTRAVENOUS CONTINUOUS
Status: CANCELLED | OUTPATIENT
Start: 2024-03-04

## 2024-03-04 NOTE — PROGRESS NOTES
[facility] UGI MULTIDISCIPLINARY TUMOR BOARD  PATIENT REVIEW FORM   ____________________________________________________________    CLINIC #: [id number]  DATE: 3/4/2024    DIAGNOSIS: gastric GARRY    PRESENTER: Perone    PATIENT SUMMARY:   This 74 y/o female was dx with ovarian CA 1/2023, underwent surgery and completed 6 cycles of adj chemotherapy 7/2023. She presented last month with hematemesis. EGD revealed large ulcerated gastric mass, which was biopsied. EGD pathology reviewed - poorly differentiated adenocarcinoma, with intestinal expression, MSI high, HER 2 negative. Staging imaging found irregular wall thickening in mid gastric body, enlarged gastrohepatic lymph node, no evidence of distant metastatic disease.   Reviewed PET - uptake in gastric wall and gastrohepatic lymph node with hypermetabolic activity.   Scheduled to see Dr. Amin in [place] later this week.   + family hx of cancer, pending genetics referral    BOARD RECOMMENDATIONS:   Proceed with systemic chemotherapy, consider neoadj immunotherapy  Proceed with diag lap  Await genetics testing, pathology will send to [facility]    CONSULT NEEDED:     [] Surgery    [] Hem/Onc    [] Rad/Onc    [] Dietary                 [] Social Service    [x] Genetics       [] AES  [] Radiology     Clinical Stage: Tumor   Node(s)  1  Metastasis      GROUP STAGE:  [] O    [] 1A    [] IB    [] IIA    [] IIB     [] IIIA     [] IIIB     [] IIIC    []IV  [] Local recurrence     [] Regional recurrence     [] Distant recurrence   Metastatic site(s): none         [x] Edan'l Treatment Guidelines reviewed and care planned is consistent with guidelines.         (i.e., NCCN, NCI, PD, ACO, AUA, etc.)    PRESENTATION AT CANCER CONFERENCE:         [x] Prospective    [] Retrospective     [] Follow-Up

## 2024-03-05 ENCOUNTER — ANESTHESIA EVENT (OUTPATIENT)
Dept: SURGERY | Facility: HOSPITAL | Age: 74
End: 2024-03-05
Payer: MEDICARE

## 2024-03-05 DIAGNOSIS — Z01.818 PRE-OP EXAM: Primary | ICD-10-CM

## 2024-03-06 ENCOUNTER — OFFICE VISIT (OUTPATIENT)
Dept: HEMATOLOGY/ONCOLOGY | Facility: CLINIC | Age: 74
End: 2024-03-06
Payer: MEDICARE

## 2024-03-06 VITALS
TEMPERATURE: 98 F | RESPIRATION RATE: 20 BRPM | SYSTOLIC BLOOD PRESSURE: 134 MMHG | WEIGHT: 129.63 LBS | BODY MASS INDEX: 21.6 KG/M2 | HEIGHT: 65 IN | OXYGEN SATURATION: 20 % | DIASTOLIC BLOOD PRESSURE: 74 MMHG | HEART RATE: 71 BPM

## 2024-03-06 DIAGNOSIS — K31.89 GASTRIC MASS: ICD-10-CM

## 2024-03-06 DIAGNOSIS — C16.9 GASTRIC ADENOCARCINOMA: ICD-10-CM

## 2024-03-06 DIAGNOSIS — C56.1: Primary | ICD-10-CM

## 2024-03-06 PROCEDURE — 99999 PR PBB SHADOW E&M-EST. PATIENT-LVL III: CPT | Mod: PBBFAC,,, | Performed by: INTERNAL MEDICINE

## 2024-03-06 PROCEDURE — 3008F BODY MASS INDEX DOCD: CPT | Mod: CPTII,S$GLB,, | Performed by: INTERNAL MEDICINE

## 2024-03-06 PROCEDURE — 1101F PT FALLS ASSESS-DOCD LE1/YR: CPT | Mod: CPTII,S$GLB,, | Performed by: INTERNAL MEDICINE

## 2024-03-06 PROCEDURE — 3288F FALL RISK ASSESSMENT DOCD: CPT | Mod: CPTII,S$GLB,, | Performed by: INTERNAL MEDICINE

## 2024-03-06 PROCEDURE — 3075F SYST BP GE 130 - 139MM HG: CPT | Mod: CPTII,S$GLB,, | Performed by: INTERNAL MEDICINE

## 2024-03-06 PROCEDURE — 99205 OFFICE O/P NEW HI 60 MIN: CPT | Mod: S$GLB,,, | Performed by: INTERNAL MEDICINE

## 2024-03-06 PROCEDURE — 3078F DIAST BP <80 MM HG: CPT | Mod: CPTII,S$GLB,, | Performed by: INTERNAL MEDICINE

## 2024-03-06 PROCEDURE — 1111F DSCHRG MED/CURRENT MED MERGE: CPT | Mod: CPTII,S$GLB,, | Performed by: INTERNAL MEDICINE

## 2024-03-06 PROCEDURE — 1125F AMNT PAIN NOTED PAIN PRSNT: CPT | Mod: CPTII,S$GLB,, | Performed by: INTERNAL MEDICINE

## 2024-03-06 NOTE — PROGRESS NOTES
Patient ID: Cheryl Garcia   Chief Complaint: Establish Care  MRN:  [id number]     Oncologic Diagnosis:  Gastric Adenocarcinoma  Previous Treatment:  N/A  Current Treatment:  Pending  Subjective   Cheryl Garcia is a 73 y.o. female with history of DVT not currently on AC, previously on Xarelto, FIGO Stage IC (cT1c2, cN0, cM0) Mixed epithelial carcinoma of right ovary s/p surgery 01/09/023 and 6C Carbo/Paclitaxel completed 07/19/2023, Osteoporosis on Fosamax, HTN and Hx of Epilepsy on Keppra, Tegretol, Zonegran (last seizure in 2009) who presents to clinic to establish care on referral for gastric cancer.    Patient reports that she Initially presented to [facility] 02/06/24 with report of hematemeiss; she was transferred to [facility] for higher level of care.  On admission to [facility], EGD  was performed and showed a large ulcerated gastric mass.  Pathology results it as poorly differentiated adenocarcinoma.  She states that her weight is currently stable.  The last time she lost any weight was 2 her ovarian cancer diagnosis and treatment at which time she lost 5 lb but gained it back.  On hospitalization here at [facility], during GI workup she lost those 5 lbs a cane because she was NPO.    The patient has already established care with surgical oncology, Dr. Jerome.  She was presented in the upper GI tumor board with consensus for neoadjuvant immunotherapy given MSI high status of her tumor.  She is here for medical oncology recommendations.    I reviewed the recommendations of the tumor board consensus for neoadjuvant immunotherapy and I reviewed her pathology and biomarkers in detail.  She is expressed understanding in his in agreement with the plan.  She also understands that she still needs to undergo diagnostic laparoscopy for completed staging.    Otherwise, she is a former light smoker; quit 30-40 years ago. No alcohol use in 30-40 years. No illicit drug use.  She  has an extensive family history of malignancy in his already been referred to our genetic counselor.    Review of Systems:  Review of Systems   Constitutional:  Negative for activity change, appetite change, chills, diaphoresis, fatigue, fever and unexpected weight change.   HENT:  Negative for nosebleeds.    Respiratory:  Negative for shortness of breath.    Cardiovascular:  Negative for chest pain.   Gastrointestinal:  Positive for abdominal pain (intermittent). Negative for abdominal distention, anal bleeding, blood in stool, constipation, diarrhea, nausea and vomiting.   Genitourinary:  Negative for difficulty urinating and hematuria.   Musculoskeletal:  Negative for arthralgias, back pain and myalgias.   Skin:  Negative for rash.   Neurological:  Negative for dizziness, weakness, light-headedness and headaches.   Hematological:  Does not bruise/bleed easily.   Psychiatric/Behavioral:  The patient is not nervous/anxious.      History     Oncology History   Mixed epithelial carcinoma of right ovary   1/2/2023 Initial Diagnosis    Mixed epithelial carcinoma of left ovary     1/9/2023 Surgery    Laparotomy for radical resection of gynecologic cancer. Removal of pelvic mass (right salpingo-oophorectomy), left salpingo-oophorectomy, extensive enterolysis, extensive adhesiolysis, left pelvic lymph node biopsy, omental biopsy, small bowel resection with primary functional end-to-end anastomosis, placement of pelvic drain. Path: Right ovary, tumor site, 11 cm, mixed epithelial carcinoma (50% mucinous, 50% endometrioid), G2 moderately differentiated, ovarian surface involvement-indeterminate-specimen disrupted. 1 left pelvic lymph node negative for neoplasia. FIGO stage IC2       1/9/2023 Cancer Staged    Staging form: Ovary, Fallopian Tube, and Primary Peritoneal Carcinoma, AJCC 8th Edition  - Clinical stage from 1/9/2023: FIGO Stage IC2, calculated as Stage IC (cT1c2, cN0, cM0)     4/5/2023 - 7/19/2023 Chemotherapy     4/5/2023: Cycle 1- paclitaxel 135 mg/m2 and carboplatin AUC 5 as patient is frail and elderly.  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    OP (osteoporosis) 02/28/2024    Ovarian cancer     Primary hypertension 12/01/2022       Past Surgical History:   Procedure Laterality Date    APPENDECTOMY  2015    COLONOSCOPY  06/20/2012    Dr Boyle- Tubular adenoma polyps. Repeat in 3 years.    COLONOSCOPY  06/17/2015    -Nodular mucosa at appendiceal orfice, sigmoid and desc diverticulosis otherwise normal.    COLONOSCOPY  2020    >3 TAs    COLONOSCOPY  12/2023    EGD - EXTERNAL RESULT  06/20/2012    Dr Boyle- Mild gastritis otherwise normal.    ESOPHAGOGASTRODUODENOSCOPY N/A 02/08/2024    Procedure: EGD (ESOPHAGOGASTRODUODENOSCOPY);  Surgeon: Jayla Arteaga MD;  Location: [facility];  Service: Endoscopy;  Laterality: N/A;    TOTAL ABDOMINAL HYSTERECTOMY W/ BILATERAL SALPINGOOPHORECTOMY  01/09/2023    radical resection with right salpingo-oophorectomy, left salpingo-oophorectomy, extensive enterolysis, extensive adhesiolysis, left pelvic lymph node biopsy, omental  "biopsy, small bowel resection with primary functional end-to-end anastomosis, placement of pelvic drain       Family History   Problem Relation Age of Onset    Pancreatic cancer Brother 76    Breast cancer Half-sister 50 - 59    Colon cancer Half-sister     Lung cancer Half-sister     Breast cancer Niece        Review of patient's allergies indicates:  No Known Allergies    Social History     Tobacco Use    Smoking status: Former     Types: Cigarettes    Smokeless tobacco: Never       Physical Exam   ECOG:   ECOG SCORE    0 - Fully active-able to carry on all pre-disease performance without restriction          Vitals:  /74   Pulse 71   Temp 98.1 °F (36.7 °C) (Temporal)   Resp 20   Ht 5' 5" (1.651 m)   Wt 58.8 kg (129 lb 10.1 oz)   SpO2 (!) 20%   BMI 21.57 kg/m²     Physical Exam:  Physical Exam  Constitutional:       General: She is not in acute distress.     Appearance: Normal appearance. She is not ill-appearing.   HENT:      Head: Normocephalic and atraumatic.   Eyes:      Extraocular Movements: Extraocular movements intact.      Conjunctiva/sclera: Conjunctivae normal.   Cardiovascular:      Rate and Rhythm: Normal rate.   Pulmonary:      Effort: Pulmonary effort is normal. No respiratory distress.   Abdominal:      Palpations: There is no hepatomegaly or splenomegaly.   Musculoskeletal:         General: Normal range of motion.      Right lower leg: No edema.      Left lower leg: No edema.   Skin:     Findings: No rash.   Neurological:      General: No focal deficit present.      Mental Status: She is alert and oriented to person, place, and time.   Psychiatric:         Mood and Affect: Mood normal.         Behavior: Behavior normal.         Thought Content: Thought content normal.        Labs   Labs:  No visits with results within 2 Day(s) from this visit.   Latest known visit with results is:   Orders Only on 02/19/2024   Component Date Value Ref Range Status    Reason for Study 02/29/2024 To " identify somatic and germline mutations relevant to patient's cancer.   Final    Genetic Diseases Assessed 02/29/2024 Cancer   Final    Description of Ranges of DNA Seque* 02/29/2024 648 gene panel   Final    Overall Interpretation 02/29/2024 positive   Final    MSI 02/29/2024 MSI-H   Final    TMB 02/29/2024 42.6  m/MB Final    [facility] Portal 02/29/2024 [email or web address]   Final    [facility] Portal link    Fusion Addendum Issue Type 02/29/2024    Final                    Value:NEGATIVE  Negative - This addendum is being issued to report that no gene fusions or altered splicing variants from RNA sequencing analysis were found.       Low Coverage Regions 02/29/2024 KDM5D, PMS2   Final    Therapy Count 02/29/2024 1   Final    [facility]: Potential Therapy 1 02/29/2024    Final                    Value:Gene: 795^CHRIS^HGNC  Variant: p.S214fs  Agent: Olaparib  Tissue: Prostate Cancer  Association: response  Evidence Status: Consensus  Evidence ID: NCCN  Evidence URL:   FDA Approved?: Yes  on label?: No      Trial Count 02/29/2024 3   Final    [facility]: Clinical Trial Match 1 02/29/2024    Final                    Value:Clinical Trial NCT ID: [id number]  Clinical Trial Title: A Study of RSF7787 for the Treatment of Advanced or Metastatic Solid Tumors  Clinical Trial URL: https://clinicaltrials.gov/ct2/show/SHA14983367  Clinical Phase: Phase 1/Phase 2  Matched criteria: CHRIS p.S214fs mutation      [facility]: Clinical Trial Match 2 02/29/2024    Final                    Value:Clinical Trial NCT ID: [id number]  Clinical Trial Title: TAPUR: Testing the Use of Food and Drug Administration (FDA) Approved Drugs That Target a Specific Abnormality in a Tumor Gene in People With Advanced Stage Cancer  Clinical Trial URL: https://clinicaltrials.gov/ct2/show/TSJ26767615  Clinical Phase: Phase 2  Matched criteria: CHRIS p.S214fs mutation,  ATR p.I774fs mutation, MRE11 p.F399fs mutation      [facility]: Clinical Trial Match 3 02/29/2024    Final                    Value:Clinical Trial NCT ID: [id number]  Clinical Trial Title: FOG-001 in Locally Advanced or Metastatic Solid Tumors  Clinical Trial URL: https://clinicaltrials.gov/ct2/show/DMX69199258  Clinical Phase: Phase 1/Phase 2  Matched criteria: RNF43 p.G659fs mutation          Pathology   Contains abnormal data Specimen to Pathology, Surgery Gastrointestinal tract - 02/28/2024      Component 3 wk ago   Final Pathologic Diagnosis  Abnormal   STOMACH, 'GASTRIC MASS', BIOPSY:  - 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 Scattered p53 positivity (wild-type pattern of expression) and p16 positivity are also noted. Additional studies, including CK7, PAX8, ER, HER2, TTF-1, GATA3, WT-1,  synaptophysin, and chromogranin, are either negative or negative in the cells of interest. This patient's prior history of ovarian carcinoma resected at [facility] in [place], [place] is noted and the outside pathology report (S-) is reviewed.   While both tumors do have similar immunohistochemical staining patterns, they are not identical, with notable reported differences being CK7 and PAX8 positivity in the ovarian tumor. The presence of a large, fungating, and ulcerated mass in the stomach  is suspicious for a gastric primary; however, it is unclear if the masses represent two separate primaries or one p rimary and one metastatic focus. Abnormal mismatch repair studies do suggest increased risk for development of multiple tumor types (see  below). Requesting outside slides for review to compare morphology may be helpful.    DNA mismatch repair IHC studies are ABNORMAL and demonstrate LOSS OF MLH1 and PMS2 expression within tumor cells. MSH2 and MSH6 exhibit retained expression. These  results are consistent with an MMR-deficient tumor and indicate microsatellite instability.    Tissue will be sent for molecular profiling by next-generation sequencing ([facility]). Results of this study will be issued directly to the electronic medical record.                        Tumor NGS Tissue - 02/29/2024  MSI: MSI-H         TMB: 42.6 m/MB         Low Coverage Regions: KDM5D, PMS2         Fusion Addendum Issue Type: NEGATIVE  Negative - This addendum is being issued to report that no gene fusions or altered splicing variants from RNA sequencing analysis were found.           Imaging   NM PET CT FDG SKULL BASE TO MID THIGH - 02/28/2024  CLINICAL HISTORY:  Gastric cancer.  Malignant neoplasm of stomach, unspecified.  Initial staging.  History of left ovarian epithelial carcinoma.     TECHNIQUE:  11.45 mCi of F18-FDG was administered intravenously in the left forearm.  After an approximately 60 min distribution time, PET/CT images were acquired from the skull base to the mid thigh. Transmission images were acquired to correct for attenuation using a whole body low-dose CT scan without contrast with the arms positioned above the head. Glycemia at the time of injection was 113 mg/dL.     COMPARISON:  CT abdomen pelvis, 02/08/2024 and chest CT, 02/27/2024     FINDINGS:  Quality of the study: Adequate.     SUV max of the liver parenchyma is 2.8     Neck: No abnormal FDG avidity.  No lymphadenopathy or mass.     Chest: No abnormal FDG avidity.  No pleural effusion or lymphadenopathy or pulmonary nodule.     Abdomen/pelvis: Marked thickening of the wall of the stomach involving the fundus and proximal body with marked FDG avidity with SUV max 18.  The gastrohepatic ligament lymph node measuring 19 x 13 mm shows moderate FDG avidity with SUV max 4.6.     No ascites.  No other focus of abnormal FDG avidity in the abdomen or pelvis.     Skeletal structures: No abnormal FDG avidity.  No focal lytic or sclerotic lesion.      Physiologic uptake of the tracer is present within the brain, salivary glands, myocardium, GI and  tracts.     Incidental CT findings: N/A     Impression:  1. The biopsy proving gastric cancer is markedly FDG avid with SUV max 18.  2. Moderately FDG avid gastrohepatic ligament lymph node consistent with local metastatic disease.  3. No evidence for distant metastatic disease.  All CT scans at this facility are performed  using dose modulation techniques as appropriate to performed exam including the following:  automated exposure control; adjustment of mA and/or kV according to the patients size (this includes techniques or standardized protocols for targeted exams where dose is matched to indication/reason for exam: i.e. extremities or head);  iterative reconstruction technique.    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 Jerome  Per NCCN guidelines, NA immunotherapy is useful in MSI-H/dMMR tumors) with options being Nivolumab and ipilimumab followed by nivolumab or Pembrolizumab.  Given patient's comorbid conditions and potentially increased toxicity with CTLA4 inhibitor and PD-1 inhibitor, weill pre-certify for Pembrolizumab        Chronic Medical Conditions  Hx DVT previously on Xareltao  Hx of FIGO Stage IC (cT1c2, cN0, cM0) Mixed epithelial carcinoma of right ovary s/p surgery 01/09/023 and 6C Carbo/Paclitaxel completed 07/19/2023  Osteoporosis on  Fosamax  HTN  Hx of Epilepsy on Keppra, Tegretol, Zonegran (last seizure in 2009)        Med Onc Chart Routing      Follow up with physician 3 weeks.   Follow up with JENNIE    Infusion scheduling note   C1 Immunotherapy   Injection scheduling note    Labs CBC, CMP, TSH, magnesium and phosphorus   Scheduling:  Preferred lab:  Lab interval:     Imaging    Pharmacy appointment    Other referrals                      The patient was seen, interviewed and examined. Pertinent lab and radiologic studies were reviewed. Pt instructed to call should they develop concerning signs/symptoms or have further questions.        Portions of the record may have been created with voice recognition software. Occasional wrong-word or sound-a-like substitutions may have occurred due to the inherent limitations of voice recognition software. Read the chart carefully and recognize, using context, where substitutions have occurred.      Claudia Amin MD    Hematology/Oncology

## 2024-03-07 ENCOUNTER — OFFICE VISIT (OUTPATIENT)
Dept: INTERNAL MEDICINE | Facility: CLINIC | Age: 74
End: 2024-03-07
Payer: MEDICARE

## 2024-03-07 ENCOUNTER — LAB VISIT (OUTPATIENT)
Dept: LAB | Facility: HOSPITAL | Age: 74
End: 2024-03-07
Attending: SURGERY
Payer: MEDICARE

## 2024-03-07 ENCOUNTER — PATIENT MESSAGE (OUTPATIENT)
Dept: PREADMISSION TESTING | Facility: HOSPITAL | Age: 74
End: 2024-03-07
Payer: MEDICARE

## 2024-03-07 VITALS
HEART RATE: 70 BPM | DIASTOLIC BLOOD PRESSURE: 73 MMHG | RESPIRATION RATE: 17 BRPM | OXYGEN SATURATION: 97 % | SYSTOLIC BLOOD PRESSURE: 123 MMHG | TEMPERATURE: 98 F

## 2024-03-07 DIAGNOSIS — I50.9 HEART FAILURE, UNSPECIFIED HF CHRONICITY, UNSPECIFIED HEART FAILURE TYPE: ICD-10-CM

## 2024-03-07 DIAGNOSIS — C56.1: ICD-10-CM

## 2024-03-07 DIAGNOSIS — K92.0 HEMATEMESIS WITH NAUSEA: ICD-10-CM

## 2024-03-07 DIAGNOSIS — E04.1 THYROID NODULE: ICD-10-CM

## 2024-03-07 DIAGNOSIS — C16.9 GASTRIC ADENOCARCINOMA: ICD-10-CM

## 2024-03-07 DIAGNOSIS — Z01.818 PRE-OP EXAM: Primary | ICD-10-CM

## 2024-03-07 DIAGNOSIS — I10 PRIMARY HYPERTENSION: ICD-10-CM

## 2024-03-07 DIAGNOSIS — E78.5 HYPERLIPIDEMIA, UNSPECIFIED HYPERLIPIDEMIA TYPE: ICD-10-CM

## 2024-03-07 DIAGNOSIS — D64.9 ANEMIA, UNSPECIFIED TYPE: ICD-10-CM

## 2024-03-07 DIAGNOSIS — D10.1 FIBROMA OF TONGUE: ICD-10-CM

## 2024-03-07 LAB
ALBUMIN SERPL BCP-MCNC: 3.3 G/DL (ref 3.5–5.2)
ALP SERPL-CCNC: 91 U/L (ref 55–135)
ALT SERPL W/O P-5'-P-CCNC: 11 U/L (ref 10–44)
ANION GAP SERPL CALC-SCNC: 8 MMOL/L (ref 8–16)
AST SERPL-CCNC: 13 U/L (ref 10–40)
BASOPHILS # BLD AUTO: 0.04 K/UL (ref 0–0.2)
BASOPHILS NFR BLD: 0.6 % (ref 0–1.9)
BILIRUB SERPL-MCNC: 0.1 MG/DL (ref 0.1–1)
BUN SERPL-MCNC: 11 MG/DL (ref 8–23)
CALCIUM SERPL-MCNC: 9.7 MG/DL (ref 8.7–10.5)
CHLORIDE SERPL-SCNC: 110 MMOL/L (ref 95–110)
CO2 SERPL-SCNC: 25 MMOL/L (ref 23–29)
CREAT SERPL-MCNC: 0.9 MG/DL (ref 0.5–1.4)
DIFFERENTIAL METHOD BLD: ABNORMAL
EOSINOPHIL # BLD AUTO: 0.2 K/UL (ref 0–0.5)
EOSINOPHIL NFR BLD: 2.3 % (ref 0–8)
ERYTHROCYTE [DISTWIDTH] IN BLOOD BY AUTOMATED COUNT: 13.7 % (ref 11.5–14.5)
EST. GFR  (NO RACE VARIABLE): >60 ML/MIN/1.73 M^2
GLUCOSE SERPL-MCNC: 109 MG/DL (ref 70–110)
HCT VFR BLD AUTO: 40.5 % (ref 37–48.5)
HGB BLD-MCNC: 12 G/DL (ref 12–16)
IMM GRANULOCYTES # BLD AUTO: 0.04 K/UL (ref 0–0.04)
IMM GRANULOCYTES NFR BLD AUTO: 0.6 % (ref 0–0.5)
LYMPHOCYTES # BLD AUTO: 0.9 K/UL (ref 1–4.8)
LYMPHOCYTES NFR BLD: 12.8 % (ref 18–48)
MCH RBC QN AUTO: 32.2 PG (ref 27–31)
MCHC RBC AUTO-ENTMCNC: 29.6 G/DL (ref 32–36)
MCV RBC AUTO: 109 FL (ref 82–98)
MONOCYTES # BLD AUTO: 0.5 K/UL (ref 0.3–1)
MONOCYTES NFR BLD: 7.1 % (ref 4–15)
NEUTROPHILS # BLD AUTO: 5.4 K/UL (ref 1.8–7.7)
NEUTROPHILS NFR BLD: 76.6 % (ref 38–73)
NRBC BLD-RTO: 0 /100 WBC
OHS QRS DURATION: 72 MS
OHS QTC CALCULATION: 405 MS
PLATELET # BLD AUTO: 355 K/UL (ref 150–450)
PMV BLD AUTO: 9.2 FL (ref 9.2–12.9)
POTASSIUM SERPL-SCNC: 4.9 MMOL/L (ref 3.5–5.1)
PROT SERPL-MCNC: 6.5 G/DL (ref 6–8.4)
RBC # BLD AUTO: 3.73 M/UL (ref 4–5.4)
SODIUM SERPL-SCNC: 143 MMOL/L (ref 136–145)
WBC # BLD AUTO: 7.01 K/UL (ref 3.9–12.7)

## 2024-03-07 PROCEDURE — 99999 PR PBB SHADOW E&M-EST. PATIENT-LVL III: CPT | Mod: PBBFAC,,,

## 2024-03-07 PROCEDURE — 80053 COMPREHEN METABOLIC PANEL: CPT | Performed by: SURGERY

## 2024-03-07 PROCEDURE — 85025 COMPLETE CBC W/AUTO DIFF WBC: CPT | Performed by: SURGERY

## 2024-03-07 PROCEDURE — 93010 ELECTROCARDIOGRAM REPORT: CPT | Mod: S$GLB,,, | Performed by: INTERNAL MEDICINE

## 2024-03-07 PROCEDURE — 36415 COLL VENOUS BLD VENIPUNCTURE: CPT | Performed by: SURGERY

## 2024-03-07 PROCEDURE — 93005 ELECTROCARDIOGRAM TRACING: CPT

## 2024-03-07 PROCEDURE — 99204 OFFICE O/P NEW MOD 45 MIN: CPT | Mod: S$GLB,,, | Performed by: ANESTHESIOLOGY

## 2024-03-07 NOTE — ASSESSMENT & PLAN NOTE
- continue home keppra,carbamazepine  , and zonisamide  take carbamazepine and zonisamide AM of surgery   - keep follow up with neuro   Last seizure 2009

## 2024-03-07 NOTE — ASSESSMENT & PLAN NOTE
No statin currently   Pt had low risk stress in 2022 with Cards at outside facility ( per cardiology note , unable to view result in care everywhere )

## 2024-03-07 NOTE — ASSESSMENT & PLAN NOTE
Imaging as above  No shortness of breath when supine no difficulty swallowing  Keep appropriate follow-ups  No prior airway history for review  Per 9/2023 neck CT ::  A 2 cm exophytic right thyroid nodule projects posteriorly and produces    mild mass effect upon the esophagus at the thoracic inlet.

## 2024-03-07 NOTE — ASSESSMENT & PLAN NOTE
Elevated while undergoing chemotherapy  Patient is normotensive today and not on any antihypertensive medications

## 2024-03-07 NOTE — ASSESSMENT & PLAN NOTE
H/o LLE 10/2022 likely secondary to malignancy   Xarelto on hold 2/2 recent GI bleed with hematemesis in February.

## 2024-03-07 NOTE — ASSESSMENT & PLAN NOTE
Patient presents at the request of Dr. Jerome who plans on performing a diagnostic lap on 3/14/24  Known risk factors for perioperative complications: Anemia  Htn, HLD, malignancy, hypoalbuminemia      Difficulty with intubation is anticipated.  Patient with thyroid nodules    Cardiac Risk Estimation:  Per Revised Cardiac Risk Index patient is a Class I  risk with a 3.9% risk of a major cardiac event.      1.) Preoperative workup as follows: ECG, hemoglobin, hematocrit, electrolytes, creatinine, glucose, liver function studies.  2.) Change in medication regimen before surgery:  Hold NSAIDs 7 days preop .  3.) Prophylaxis for cardiac events with perioperative beta-blockers: not indicated.  4.) Invasive hemodynamic monitoring perioperatively: not indicated.  5.) Deep vein thrombosis prophylaxis postoperatively: intermittent pneumatic compression boots and regimen to be chosen by surgical team.  6.) Surveillance for postoperative MI with ECG immediately postoperatively and on postoperati ve days 1 and 2 AND troponin levels 24 hours postoperatively and on day 4 or hospital discharge (whichever comes first): not indicated.  7.) Current medications which may produce withdrawal symptoms if withheld perioperatively:  Continue Tegretol and Keppra  8.) Other measures: repeat labs pending

## 2024-03-07 NOTE — ASSESSMENT & PLAN NOTE
Patient on oral iron  Patient no longer with hematemesis  Pt with dark stools since initiating oral iron therapy

## 2024-03-07 NOTE — ASSESSMENT & PLAN NOTE
No lower extremity edema no shortness of breath and supine  This was diagnosed prior to patient's diagnosis of ovarian cancer  No current issues, patient with good EF per 2022 echocardiogram

## 2024-03-07 NOTE — ASSESSMENT & PLAN NOTE
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

## 2024-03-07 NOTE — ASSESSMENT & PLAN NOTE
Dx 12/2023 pt treated with topical steroid per ENT  - lesion still present , advised pt to follow up with ENT post scheduled procedure.

## 2024-03-07 NOTE — PROGRESS NOTES
Preoperative History and Physical  [facility]                                                                   Chief Complaint: Preoperative evaluation     History of Present Illness:      Cheryl Garcia is a 73 y.o. female with Htn, HLD, h/o DVT, seizures, anemia, ovarian CA s/p radical resection and chemo 2023, gastric mass with poorly differentiated  adenocarcinoma per prior bx with presents to the office today for a preoperative consultation at the request of Dr. Jerome who plans on performing diagnostic lap  on March 14.     Functional Status:      The patient is able to climb a flight of stairs. The patient is able to ambulate  without difficulty. The patient's functional status is not affected by the surgical problem. The patient's functional status is not affected by shortness of breath, chest pain, dyspnea on exertion and fatigue.  Stairs daily at home , treadmill at home few x weekly 15-30 min no CP no Sob with activity .   MET score greater than 4    Patient Anesthesia History:      History of Malignant Hyperthermia: no  History of Pseudocholinesterase Deficiency: no  History PONV: no  History of difficult intubation: no  History of delayed emergence: no    Family Anesthesia History:      History of Malignant Hyperthermia: no  History of Pseudocholinesterase Deficiency: no     Past Medical History:      Past Medical History:   Diagnosis Date    Anemia     Chronic deep vein thrombosis (DVT) of left lower extremity 10/21/2022    Deep vein thrombosis     Drug-induced polyneuropathy 02/28/2024    Noted by ROCK KAY NP  last documented on [id number]    DVT (deep venous thrombosis)     Epilepsy     Gastric adenocarcinoma 02/27/2024    GERD (gastroesophageal reflux disease)     Hyperlipidemia 01/10/2013    Formatting of this note might be different from the original.   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     OP (osteoporosis) 02/28/2024    Ovarian cancer     Primary hypertension 12/01/2022        Past Surgical History:      Past Surgical History:   Procedure Laterality Date    APPENDECTOMY  2015    COLONOSCOPY  06/20/2012    Dr Boyle- Tubular adenoma polyps. Repeat in 3 years.    COLONOSCOPY  06/17/2015    -Nodular mucosa at appendiceal orfice, sigmoid and desc diverticulosis otherwise normal.    COLONOSCOPY  2020    >3 TAs    COLONOSCOPY  12/2023    EGD - EXTERNAL RESULT  06/20/2012    Dr Boyle- Mild gastritis otherwise normal.    ESOPHAGOGASTRODUODENOSCOPY N/A 02/08/2024    Procedure: EGD (ESOPHAGOGASTRODUODENOSCOPY);  Surgeon: Jayla Arteaga MD;  Location: [facility];  Service: Endoscopy;  Laterality: N/A;    HYSTERECTOMY      TOTAL ABDOMINAL HYSTERECTOMY W/ BILATERAL SALPINGOOPHORECTOMY  01/09/2023    radical resection with right salpingo-oophorectomy, left salpingo-oophorectomy, extensive enterolysis, extensive adhesiolysis, left pelvic lymph node biopsy, omental biopsy, small bowel resection with primary functional end-to-end anastomosis, placement of pelvic drain        Social History:      Social History     Socioeconomic History    Marital status:    Tobacco Use    Smoking status: Former     Types: Cigarettes    Smokeless tobacco: Never    Tobacco comments:     Quit 1989 smoked 10 years smoked 0.25 ppd    Substance and Sexual Activity    Alcohol use: Not Currently    Drug use: Never     Social Determinants of Health     Financial Resource Strain: Low Risk  (2/23/2024)    Overall Financial Resource Strain (CARDIA)     Difficulty of Paying Living Expenses: Not hard at all   Food Insecurity: No Food Insecurity (2/23/2024)    Hunger Vital Sign     Worried About Running Out of Food in the Last Year: Never true     Ran Out of Food in the Last Year: Never true   Transportation Needs: No Transportation Needs (2/23/2024)    PRAPARE - Transportation     Lack of Transportation  (Medical): No     Lack of Transportation (Non-Medical): No   Physical Activity: Inactive (2/23/2024)    Exercise Vital Sign     Days of Exercise per Week: 0 days     Minutes of Exercise per Session: 10 min   Stress: No Stress Concern Present (2/23/2024)    [employer] [place] [employer] - Occupational Stress Questionnaire     Feeling of Stress : Not at all   Social Connections: Unknown (2/23/2024)    Social Connection and Isolation Panel [NHANES]     Frequency of Communication with Friends and Family: More than three times a week     Frequency of Social Gatherings with Friends and Family: Three times a week     Active Member of Clubs or Organizations: Yes     Attends Club or Organization Meetings: More than 4 times per year     Marital Status:    Housing Stability: Low Risk  (2/23/2024)    Housing Stability Vital Sign     Unable to Pay for Housing in the Last Year: No     Number of Places Lived in the Last Year: 1     Unstable Housing in the Last Year: No        Family History:      Family History   Problem Relation Age of Onset    Pancreatic cancer Brother 76    Prostate cancer Brother     Pancreatic cancer Brother     Breast cancer Niece     Breast cancer Half-sister 50 - 59    Colon cancer Half-sister     Lung cancer Half-sister     Colon cancer Sister     Lung cancer Sister     Breast cancer Sister        Allergies:      Review of patient's allergies indicates:  No Known Allergies    Medications:      Current Outpatient Medications   Medication Sig    alendronate (FOSAMAX) 70 MG tablet Take 70 mg by mouth every 7 days. Pt stated she takes this every Sunday    calcium phosphate trib/vit D3 (CALCIUM PHOSPHATE-VITAMIN D3 ORAL) Take 1 tablet by mouth 2 (two) times daily.    carBAMazepine (TEGRETOL) 200 mg tablet Take by mouth. Pt states she take TID  1 tablet with breakfast  1 tablet with lunch   1.5 tablet at bedtime    levETIRAcetam (KEPPRA) 500 MG Tab Take 1 tablet by mouth 2 (two) times daily.  Pt states she takes one pill at dinner and one at bedtime    pantoprazole (PROTONIX) 40 MG tablet Take 40 mg by mouth once daily.    zonisamide (ZONEGRAN) 100 MG Cap Take 200 mg by mouth 2 (two) times daily.     No current facility-administered medications for this visit.       Vitals:      Vitals:    03/07/24 0912   BP: 123/73   Pulse: 70   Resp: 17   Temp: 97.5 °F (36.4 °C)       Review of Systems:        Constitutional: Negative for fever, chills,  malaise/fatigue and diaphoresis. Weight loss with chemo 2023   HENT: Negative for hearing loss, ear pain, nosebleeds, congestion, sore throat, neck pain, tinnitus and ear discharge.    Eyes: Negative for blurred vision, double vision, photophobia, pain, discharge and redness.   Respiratory: Negative for cough, hemoptysis, sputum production, shortness of breath, wheezing and stridor.    Cardiovascular: Negative for chest pain, palpitations, orthopnea, claudication, leg swelling and PND.   Gastrointestinal: Negative for , nausea, vomiting, abdominal pain, diarrhea, constipation, blood in stool . Heartburn- well controlled on meds   Genitourinary: Negative for dysuria, urgency, frequency, hematuria and flank pain.   Musculoskeletal: Negative for myalgias, back pain, joint pain and falls.   Skin: Negative for itching and rash.   Neurological: Negative for dizziness, tingling, tremors, sensory change, speech change, focal weakness, seizures, loss of consciousness, weakness and headaches.   Endo/Heme/Allergies: Negative for environmental allergies .   Psychiatric/Behavioral: Negative for depression,  Physical Exam:      Constitutional: Appears well-developed, well-nourished and in no acute distress.  Patient is oriented to person, place, and time.   Head: Normocephalic and atraumatic. Mucous membranes moist. Flesh colored macule left anterior  tongue   Neck: Neck supple no mass.   Cardiovascular: Normal rate and regular rhythm. Extra systole appreciated  S1 S2 appreciated by  ascultation.  Pulmonary/Chest: Effort normal and clear to auscultation bilaterally. No respiratory distress.   Abdomen: Soft. Non-tender and non-distended. Bowel sounds are normal.   Neurological: Patient is alert and oriented to person, place and time. Moves all extremities.  Skin: Warm and dry. No lesions.  Extremities: No clubbing, cyanosis or edema.    Laboratory data:      Repeat labs pending   Lab Results   Component Value Date    INR 1.0 02/07/2024       Lab Results   Component Value Date    WBC 4.53 02/09/2024    HGB 8.3 (L) 02/09/2024    HCT 25.1 (L) 02/09/2024     (H) 02/09/2024     02/09/2024           Predictors of intubation difficulty:       Morbid obesity? no   Anatomically abnormal facies? no   Prominent incisors? no   Receding mandible? no   Short, thick neck? no   Neck range of motion: normal   Dentition:  intact  Based on the Modified Mallampati, patient is a mallampati score: II (hard and soft palate, upper portion of tonsils anduvula visible)    Cardiographics:      ECG:  3/7/24  NSR with SA non specific T wave abn, pt with prior reading per 2022 EKG in care everywhere   Echocardiogram:  10/2022  ----------------------    CONCLUSIONS:   1. Normal left ventricular cavity size. Normal left ventricular systolic function. LVEF   55 - 65%. Mild asymmetric septal hypertrophy.   2. No significant functional valvular abnormalities.   Imaging:      Chest x-ray:  none    Chest CT   FINDINGS:  There is a 2 cm exophytic right thyroid nodule which was previously described on previous exam.  This appears stable.     No suspicious mediastinal or hilar or axillary lymphadenopathy.  No supraclavicular lymphadenopathy.     The heart is normal in size.  Mild coronary artery calcifications.  No pericardial effusion.     No suspicious lung nodules.  No infiltrate or consolidation.  No pleural effusions.     No significant osseous abnormality.     Impression:     No evidence of intrathoracic metastatic  disease.  Thyroid u/s 9/ 2023  IMPRESSION:   Heterogeneous thyroid gland with multiple bilateral nodules.     Dominant solid 3.1 cm TI-RADS 5 nodule arising from the right lower pole and    extending beyond the thyroid capsule warrants FNA to exclude malignancy    according to ACR TI-RADS criteria.     Dominant solid 1.5 cm TI-RADS 4 nodule within the left mid pole region also    warrants FNA to exclude malignancy according to ACR TI-RADS criteria.     Multiple additional bilateral and isthmic nodules can be followed with    ultrasound in1 year.   Assessment and Plan:      Pre-op exam  Patient presents at the request of Dr. Jerome who plans on performing a diagnostic lap on 3/14/24  Known risk factors for perioperative complications: Anemia  Htn, HLD, malignancy, hypoalbuminemia      Difficulty with intubation is anticipated.  Patient with thyroid nodules    Cardiac Risk Estimation:  Per Revised Cardiac Risk Index patient is a Class I  risk with a 3.9% risk of a major cardiac event.      1.) Preoperative workup as follows: ECG, hemoglobin, hematocrit, electrolytes, creatinine, glucose, liver function studies.  2.) Change in medication regimen before surgery:  Hold NSAIDs 7 days preop .  3.) Prophylaxis for cardiac events with perioperative beta-blockers: not indicated.  4.) Invasive hemodynamic monitoring perioperatively: not indicated.  5.) Deep vein thrombosis prophylaxis postoperatively: intermittent pneumatic compression boots and regimen to be chosen by surgical team.  6.) Surveillance for postoperative MI with ECG immediately postoperatively and on postoperati ve days 1 and 2 AND troponin levels 24 hours postoperatively and on day 4 or hospital discharge (whichever comes first): not indicated.  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 - [id number], MMR deficient- loss MLH1 and PMS2   - has planned diagnostic lap for further tissue sampling    Primary hypertension  Elevated while undergoing chemotherapy  Patient is normotensive today and not on any antihypertensive medications    HLD (hyperlipidemia)  No statin currently   Pt had low risk stress in 2022 with Cards at outside facility ( per cardiology note , unable to view result in care everywhere )     Hx of Epileptic seizures  - continue home keppra,carbamazepine  , and zonisamide  take carbamazepine and zonisamide AM of surgery   - keep follow up with neuro   Last seizure 2009    Hematemesis with nausea  Admitted early February , found to have gastric mass     DVT (deep venous thrombosis)  H/o LLE 10/2022 likely secondary to malignancy   Xarelto on hold 2/2 recent GI bleed with hematemesis in February.    Anemia  Patient on oral iron  Patient no longer with hematemesis  Pt with dark stools since initiating oral iron therapy     OP (osteoporosis)  Continue home Fosamax and vitamin D3  Keep follow up with primary care    Fibroma of tongue  Dx 12/2023 pt treated with topical steroid per ENT  - lesion still present , advised pt to follow up with ENT post scheduled procedure.     Heart failure, unspecified  No lower extremity edema no shortness of breath and supine  This was diagnosed prior to patient's diagnosis of ovarian cancer  No current issues, patient with good EF per 2022 echocardiogram    Thyroid nodule  Imaging as above  No shortness of breath when supine no  difficulty swallowing  Keep appropriate follow-ups  No prior airway history for review  Per 9/2023 neck CT ::  A 2 cm exophytic right thyroid nodule projects posteriorly and produces    mild mass effect upon the esophagus at the thoracic inlet.

## 2024-03-07 NOTE — ASSESSMENT & PLAN NOTE
- pt s/p Removal of pelvic mass (right salpingo-oophorectomy), left salpingo-oophorectomy, extensive enterolysis, extensive adhesiolysis, left pelvic lymph node biopsy, omental biopsy, small bowel resection with primary functional end-to-end anastomosis, placement of pelvic drain January 2023  - s/p chemo April 2023- July 2023

## 2024-03-07 NOTE — DISCHARGE INSTRUCTIONS
To confirm, your doctor has instructed you: Surgery is scheduled for 03/14/24.   Pre admit office will call the afternoon prior to surgery between 1PM and 3PM with arrival time.    Surgery will be at [facility],  The address is [address]. [place] [address].      IMPORTANT INSTRUCTIONS!    Do not eat or drink after 12 midnight, including water. Do not smoke or use chewing tobacco after 12 midnight!  OK to brush teeth, but no gum, candy, or mints!      *Take only these medicines with a small swallow of water-morning of surgery*     Pantoprazole, carbamazepine, zonisamide    ____ Stop taking all vitamins, herbal supplements, Aspirin, & NSAIDS (Ibuprofen, Advil, Aleve) 7 days prior to surgery, as these can thin your blood.    ____ Weight loss medication, such as Adipex and Phentermine, must be stopped 14 days prior to surgery, no exceptions!    *Diabetic Patients: If you take diabetic or weight loss medication, do NOT take morning of surgery unless instructed by Doctor. Metformin to be stopped 24 hrs prior to surgery. DO NOT take short-acting insulin the day of surgery. Only take HALF of your regular dose of long-acting insulin the night before surgery, unless instructed otherwise. Blood sugars will be checked in pre-op.   ~Ozempic/Mounjaro/Wegovy/Trulicity/Semaglutide injections must be stopped 7 days prior to surgery.     Please notify MD office if you develop an active infection, are prescribed antibiotics by someone other than the surgeon doing your surgery, or visit urgent care/ER.      Bathing Instructions:   The night before surgery and the morning prior to coming to the hospital:    - Shower & rinse your body as usual with anti-bacterial Soap (Dial or Lever 2000)   -Hibiclens (if indicated) use AFTER anti-bacterial soap; 1 packet PM/1 packet in AM on surgical site only   -Do not use hibiclens on your head, face, or genitals.    -Do not wash with anti-bacterial soap after you use the  hibiclens.    -Do not shave surgical site 5-7 days prior to surgery.    -Pubic hair 7 days prior to surgery (OBGYN/Urology only).       Additional Instructions:   __ No powder, lotions, creams, or body spray to skin   __ No deodorant if you are having: breast procedure, PORT, or upper shoulder surgery!   __ No nail polish or artificial nails       **SURGERY WILL BE CANCELLED IF ARTIFICIAL/NAIL POLISH IS PRESENT!!!**  __ Please remove all piercings and leave all jewelry at home.    **SURGERY WILL BE CANCELLED IF PIERCINGS ARE PRESENT!!!**    __ Dentures, Hearing Aids and Contact Lens need to be removed prior to the start of surgery.    __ Avoid Alcoholic beverages 3 days prior to surgery, as it can thin the blood, unless told otherwise by pre-admit department.  __ Females: may need to give a urine sample the morning of surgery;   **Please ask  for a specimen cup if you need to use the restroom prior to being called into pre-op.**  __ Males: Stop ED medications (Viagra, Cialis) 24 hrs prior to surgery.  __ Wear clean, loose-fitting clothing. Allow for dressings/bandages/surgical equipment   __ You must have transportation, and they MUST stay the entire time.   __  Bring photo ID and insurance information to \[facility]""[facility]ner Visitor/Ride Policy:   Only 1 adult allowed (over the age of 18) to accompany you and MUST STAY through the entire length of admission.     -Must have a ride home from a responsible adult that you know and trust.    -Medical Transport, Uber or Lyft can only be used if patient has a responsible adult to accompany them during ride home.    ~Your ride MUST STAY the entire time until you are discharged~        Post-Op Instructions: You will receive surgery post-op instructions by your Discharge Nurse prior to going home.   Surgical Site Infection:   Prevention of surgical site infections:   -Keep incisions clean and dry.   -Do not soak/submerge incisions in water until completely healed.    -Do not apply lotions, powders, creams, or deodorants to site.   -Always make sure hands are cleaned with antibacterial soap/ alcohol-based  prior to touching the surgical site.       Signs and symptoms:               -Redness and pain around the area where you had surgery               -Drainage of cloudy fluid from your surgical wound               -Fever over 100.4 or chills     >>>Call Surgeon office/on-call Surgeon if you experience any of these signs & symptoms post-surgery @ [phone].    *If you are running late or have questions the morning of surgery before 7AM, please call the Pre-OP Department @ [phone].    *Please Call [facility] Pre-Admit Department for surgery instruction questions:  [phone]    *Payment questions:  [phone]    *Billing questions:  [phone]

## 2024-03-12 NOTE — ANESTHESIA PREPROCEDURE EVALUATION
03/12/2024  Cheryl Garcia is a 73 y.o., female.    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 Repeat in 3 years.    COLONOSCOPY  06/17/2015    -Nodular mucosa at appendiceal orfice, sigmoid and desc diverticulosis otherwise normal.    COLONOSCOPY  2020    >3 TAs    COLONOSCOPY  12/2023    EGD - EXTERNAL RESULT  06/20/2012    Dr Boyle- Mild gastritis otherwise normal.    ESOPHAGOGASTRODUODENOSCOPY N/A 02/08/2024    Procedure: EGD (ESOPHAGOGASTRODUODENOSCOPY);  Surgeon: Jayla Arteaga MD;  Location: [facility];  Service: Endoscopy;  Laterality: N/A;    HYSTERECTOMY      TOTAL ABDOMINAL HYSTERECTOMY W/ BILATERAL SALPINGOOPHORECTOMY  01/09/2023    radical resection with right salpingo-oophorectomy, left salpingo-oophorectomy, extensive enterolysis, extensive adhesiolysis, left pelvic lymph node biopsy, omental biopsy, small bowel resection with primary functional end-to-end anastomosis, placement of pelvic drain       Pre-op Assessment    I have reviewed the Patient Summary Reports.    I have reviewed the NPO Status.   I have reviewed the Medications.      Review of Systems  Anesthesia Hx:   History of prior surgery of interest to airway management or planning:             Hematology/Oncology:       -- Anemia:               Hematology Comments: H/o DVT    Current/Recent Cancer.                Cardiovascular:     Hypertension           hyperlipidemia                       Hypertension         Pulmonary:  Pulmonary Normal                       Hepatic/GI:     GERD      Gerd          Neurological:       Seizures          Peripheral Neuropathy     Seizure Disorder                        Neuromuscular Disease   Endocrine:  Endocrine Normal                Physical Exam  General: Cooperative    Airway:  Mallampati: II   Mouth Opening: Normal  TM Distance: Normal  Tongue: Normal  Neck ROM: Normal ROM    Dental:  Intact        Anesthesia Plan  Type of Anesthesia, risks & benefits discussed:    Anesthesia Type: Gen ETT  Intra-op Monitoring Plan: Standard ASA Monitors  Post Op Pain Control Plan: multimodal analgesia and IV/PO Opioids PRN  Induction:  IV  Informed Consent: Informed consent signed with the Patient and all parties understand the risks and agree with anesthesia plan.  All questions answered. Patient consented to blood products? No  ASA Score: 3  Day of Surgery Review of History & Physical: H&P Update referred to the surgeon/provider.    Ready For Surgery From Anesthesia Perspective.     .

## 2024-03-13 ENCOUNTER — TELEPHONE (OUTPATIENT)
Dept: PREADMISSION TESTING | Facility: HOSPITAL | Age: 74
End: 2024-03-13
Payer: MEDICARE

## 2024-03-13 ENCOUNTER — DOCUMENTATION ONLY (OUTPATIENT)
Dept: HEMATOLOGY/ONCOLOGY | Facility: CLINIC | Age: 74
End: 2024-03-13
Payer: MEDICARE

## 2024-03-13 NOTE — PROGRESS NOTES
Called and spoke with pt about scheduling tx start per recs of Dr. Ambrose. Reviewed my role as NN, made sure she has my number. Pt has some questions, able to answer for her. Prefers all appts at [facility]. Scheduled chemo ed 3/21 @ 10am, labs 3/25 prvc; JENNIE and C1D1 3/26 starting @ 1030 at [facility]. Gave approximate lengths of chemo ed and infusions. Told her appts for subsequent tx will be scheduled by schedulers but asked if she has questions or concerns about any appts to please call me. All questions answered, she voiced understanding information and states she will call if she needs me.   Oncology Navigation   Intake  Date of Diagnosis: 02/08/24  Cancer Type: GI  Type of Referral: Internal  Date of Referral: 02/23/24  Initial Nurse Navigator Contact: 02/27/24  Referral to Initial Contact Timeline (days): 4  Date Worked: 03/13/24  Reason if booked > 7 days after scheduling: Specific provider / access; Additional tests/procedures     Treatment  Current Status: Active  Date Presented to Tumor Board: 03/04/24  Presentation Notes: proceed with systemic therapy    Surgical Oncologist: Dr. Chen Jerome    Medical Oncologist: Dr. Claudia Ambrose  Immunotherapy: Planned  Immunotherapy Name: Pembrolizumab  Start Date: 03/25/24       Procedures: PET scan; CT  CT Schedule Date: 02/27/24 (2/8/24)  PET Scan Schedule Date: 02/23/24    General Referrals: Chemo Education; Dietitian; Social Work  Dietitian Referral Date: 03/13/24  Social Work Referral Date: 03/13/24          Support Systems: Family members     Acuity      Follow Up  Follow up in 8 days (on 3/21/2024) for chemo education.

## 2024-03-14 ENCOUNTER — ANESTHESIA (OUTPATIENT)
Dept: SURGERY | Facility: HOSPITAL | Age: 74
End: 2024-03-14
Payer: MEDICARE

## 2024-03-14 ENCOUNTER — HOSPITAL ENCOUNTER (OUTPATIENT)
Facility: HOSPITAL | Age: 74
Discharge: HOME | End: 2024-03-14
Attending: SURGERY | Admitting: SURGERY
Payer: MEDICARE

## 2024-03-14 VITALS
TEMPERATURE: 98 F | DIASTOLIC BLOOD PRESSURE: 65 MMHG | HEART RATE: 73 BPM | BODY MASS INDEX: 21.23 KG/M2 | RESPIRATION RATE: 18 BRPM | OXYGEN SATURATION: 97 % | SYSTOLIC BLOOD PRESSURE: 122 MMHG | WEIGHT: 127.44 LBS | HEIGHT: 65 IN

## 2024-03-14 DIAGNOSIS — C16.9 GASTRIC ADENOCARCINOMA: Primary | ICD-10-CM

## 2024-03-14 PROCEDURE — 88342 IMHCHEM/IMCYTCHM 1ST ANTB: CPT | Mod: 26,,, | Performed by: STUDENT IN AN ORGANIZED HEALTH CARE EDUCATION/TRAINING PROGRAM

## 2024-03-14 PROCEDURE — 37000009 HC ANESTHESIA EA ADD 15 MINS: Performed by: SURGERY

## 2024-03-14 PROCEDURE — 88305 TISSUE EXAM BY PATHOLOGIST: CPT | Mod: 26,,, | Performed by: PATHOLOGY

## 2024-03-14 PROCEDURE — 88305 TISSUE EXAM BY PATHOLOGIST: CPT | Performed by: PATHOLOGY

## 2024-03-14 PROCEDURE — 88341 IMHCHEM/IMCYTCHM EA ADD ANTB: CPT | Mod: 59 | Performed by: STUDENT IN AN ORGANIZED HEALTH CARE EDUCATION/TRAINING PROGRAM

## 2024-03-14 PROCEDURE — 88341 IMHCHEM/IMCYTCHM EA ADD ANTB: CPT | Mod: 26,,, | Performed by: STUDENT IN AN ORGANIZED HEALTH CARE EDUCATION/TRAINING PROGRAM

## 2024-03-14 PROCEDURE — 88342 IMHCHEM/IMCYTCHM 1ST ANTB: CPT | Mod: 26,,, | Performed by: PATHOLOGY

## 2024-03-14 PROCEDURE — 27201423 OPTIME MED/SURG SUP & DEVICES STERILE SUPPLY: Performed by: SURGERY

## 2024-03-14 PROCEDURE — 88341 IMHCHEM/IMCYTCHM EA ADD ANTB: CPT | Mod: 26,,, | Performed by: PATHOLOGY

## 2024-03-14 PROCEDURE — 88112 CYTOPATH CELL ENHANCE TECH: CPT | Mod: 26,,, | Performed by: STUDENT IN AN ORGANIZED HEALTH CARE EDUCATION/TRAINING PROGRAM

## 2024-03-14 PROCEDURE — 88341 IMHCHEM/IMCYTCHM EA ADD ANTB: CPT | Mod: 59 | Performed by: PATHOLOGY

## 2024-03-14 PROCEDURE — 88305 TISSUE EXAM BY PATHOLOGIST: CPT | Mod: 26,,, | Performed by: STUDENT IN AN ORGANIZED HEALTH CARE EDUCATION/TRAINING PROGRAM

## 2024-03-14 PROCEDURE — 36000709 HC OR TIME LEV III EA ADD 15 MIN: Performed by: SURGERY

## 2024-03-14 PROCEDURE — 25000003 PHARM REV CODE 250: Performed by: NURSE ANESTHETIST, CERTIFIED REGISTERED

## 2024-03-14 PROCEDURE — 71000033 HC RECOVERY, INTIAL HOUR: Performed by: SURGERY

## 2024-03-14 PROCEDURE — 37000008 HC ANESTHESIA 1ST 15 MINUTES: Performed by: SURGERY

## 2024-03-14 PROCEDURE — D9220A PRA ANESTHESIA: Mod: ,,, | Performed by: NURSE ANESTHETIST, CERTIFIED REGISTERED

## 2024-03-14 PROCEDURE — 88112 CYTOPATH CELL ENHANCE TECH: CPT | Performed by: STUDENT IN AN ORGANIZED HEALTH CARE EDUCATION/TRAINING PROGRAM

## 2024-03-14 PROCEDURE — 63600175 PHARM REV CODE 636 W HCPCS: Performed by: NURSE ANESTHETIST, CERTIFIED REGISTERED

## 2024-03-14 PROCEDURE — 71000039 HC RECOVERY, EACH ADD'L HOUR: Performed by: SURGERY

## 2024-03-14 PROCEDURE — 36000708 HC OR TIME LEV III 1ST 15 MIN: Performed by: SURGERY

## 2024-03-14 PROCEDURE — 49321 LAPAROSCOPY BIOPSY: CPT | Mod: ,,, | Performed by: SURGERY

## 2024-03-14 PROCEDURE — 63600175 PHARM REV CODE 636 W HCPCS: Mod: JZ,JG | Performed by: SURGERY

## 2024-03-14 PROCEDURE — 88342 IMHCHEM/IMCYTCHM 1ST ANTB: CPT | Mod: 59 | Performed by: STUDENT IN AN ORGANIZED HEALTH CARE EDUCATION/TRAINING PROGRAM

## 2024-03-14 PROCEDURE — 88342 IMHCHEM/IMCYTCHM 1ST ANTB: CPT | Performed by: PATHOLOGY

## 2024-03-14 PROCEDURE — 71000016 HC POSTOP RECOV ADDL HR: Performed by: SURGERY

## 2024-03-14 PROCEDURE — 71000015 HC POSTOP RECOV 1ST HR: Performed by: SURGERY

## 2024-03-14 PROCEDURE — 88305 TISSUE EXAM BY PATHOLOGIST: CPT | Mod: 59 | Performed by: STUDENT IN AN ORGANIZED HEALTH CARE EDUCATION/TRAINING PROGRAM

## 2024-03-14 RX ORDER — ACETAMINOPHEN 500 MG
1000 TABLET ORAL EVERY 8 HOURS
Refills: 0 | COMMUNITY
Start: 2024-03-14

## 2024-03-14 RX ORDER — LIDOCAINE HYDROCHLORIDE 20 MG/ML
INJECTION, SOLUTION EPIDURAL; INFILTRATION; INTRACAUDAL; PERINEURAL
Status: DISCONTINUED | OUTPATIENT
Start: 2024-03-14 | End: 2024-03-14

## 2024-03-14 RX ORDER — FENTANYL CITRATE 50 UG/ML
INJECTION, SOLUTION INTRAMUSCULAR; INTRAVENOUS
Status: DISCONTINUED | OUTPATIENT
Start: 2024-03-14 | End: 2024-03-14

## 2024-03-14 RX ORDER — BUPIVACAINE HYDROCHLORIDE 2.5 MG/ML
INJECTION, SOLUTION EPIDURAL; INFILTRATION; INTRACAUDAL
Status: DISCONTINUED
Start: 2024-03-14 | End: 2024-03-14 | Stop reason: HOSPADM

## 2024-03-14 RX ORDER — IBUPROFEN 800 MG/1
800 TABLET ORAL EVERY 8 HOURS
COMMUNITY
Start: 2024-03-14

## 2024-03-14 RX ORDER — ONDANSETRON HYDROCHLORIDE 2 MG/ML
4 INJECTION, SOLUTION INTRAVENOUS DAILY PRN
Status: DISCONTINUED | OUTPATIENT
Start: 2024-03-14 | End: 2024-03-14 | Stop reason: HOSPADM

## 2024-03-14 RX ORDER — HYDROMORPHONE HYDROCHLORIDE 2 MG/ML
INJECTION, SOLUTION INTRAMUSCULAR; INTRAVENOUS; SUBCUTANEOUS
Status: DISCONTINUED | OUTPATIENT
Start: 2024-03-14 | End: 2024-03-14

## 2024-03-14 RX ORDER — OXYCODONE HYDROCHLORIDE 5 MG/1
5 TABLET ORAL EVERY 4 HOURS PRN
Qty: 15 TABLET | Refills: 0 | Status: SHIPPED | OUTPATIENT
Start: 2024-03-14

## 2024-03-14 RX ORDER — SODIUM CHLORIDE 9 MG/ML
INJECTION, SOLUTION INTRAVENOUS CONTINUOUS
Status: DISCONTINUED | OUTPATIENT
Start: 2024-03-14 | End: 2024-03-14 | Stop reason: HOSPADM

## 2024-03-14 RX ORDER — NEOSTIGMINE METHYLSULFATE 1 MG/ML
INJECTION, SOLUTION INTRAVENOUS
Status: DISCONTINUED | OUTPATIENT
Start: 2024-03-14 | End: 2024-03-14

## 2024-03-14 RX ORDER — DEXAMETHASONE SODIUM PHOSPHATE 4 MG/ML
INJECTION, SOLUTION INTRA-ARTICULAR; INTRALESIONAL; INTRAMUSCULAR; INTRAVENOUS; SOFT TISSUE
Status: DISCONTINUED | OUTPATIENT
Start: 2024-03-14 | End: 2024-03-14

## 2024-03-14 RX ORDER — MEPERIDINE HYDROCHLORIDE 25 MG/ML
12.5 INJECTION INTRAMUSCULAR; INTRAVENOUS; SUBCUTANEOUS ONCE AS NEEDED
Status: DISCONTINUED | OUTPATIENT
Start: 2024-03-14 | End: 2024-03-14 | Stop reason: HOSPADM

## 2024-03-14 RX ORDER — OXYCODONE AND ACETAMINOPHEN 5; 325 MG/1; MG/1
1 TABLET ORAL
Status: DISCONTINUED | OUTPATIENT
Start: 2024-03-14 | End: 2024-03-14 | Stop reason: HOSPADM

## 2024-03-14 RX ORDER — BUPIVACAINE HYDROCHLORIDE 2.5 MG/ML
INJECTION, SOLUTION EPIDURAL; INFILTRATION; INTRACAUDAL
Status: DISCONTINUED | OUTPATIENT
Start: 2024-03-14 | End: 2024-03-14 | Stop reason: HOSPADM

## 2024-03-14 RX ORDER — ONDANSETRON HYDROCHLORIDE 2 MG/ML
INJECTION, SOLUTION INTRAMUSCULAR; INTRAVENOUS
Status: DISCONTINUED | OUTPATIENT
Start: 2024-03-14 | End: 2024-03-14

## 2024-03-14 RX ORDER — PROPOFOL 10 MG/ML
VIAL (ML) INTRAVENOUS
Status: DISCONTINUED | OUTPATIENT
Start: 2024-03-14 | End: 2024-03-14

## 2024-03-14 RX ORDER — ROCURONIUM BROMIDE 10 MG/ML
INJECTION, SOLUTION INTRAVENOUS
Status: DISCONTINUED | OUTPATIENT
Start: 2024-03-14 | End: 2024-03-14

## 2024-03-14 RX ORDER — FENTANYL CITRATE 50 UG/ML
25 INJECTION, SOLUTION INTRAMUSCULAR; INTRAVENOUS EVERY 5 MIN PRN
Status: DISCONTINUED | OUTPATIENT
Start: 2024-03-14 | End: 2024-03-14 | Stop reason: HOSPADM

## 2024-03-14 RX ADMIN — HYDROMORPHONE HYDROCHLORIDE 0.4 MG: 2 INJECTION INTRAMUSCULAR; INTRAVENOUS; SUBCUTANEOUS at 11:03

## 2024-03-14 RX ADMIN — ONDANSETRON HYDROCHLORIDE 4 MG: 2 INJECTION, SOLUTION INTRAMUSCULAR; INTRAVENOUS at 09:03

## 2024-03-14 RX ADMIN — HYDROMORPHONE HYDROCHLORIDE 0.2 MG: 2 INJECTION INTRAMUSCULAR; INTRAVENOUS; SUBCUTANEOUS at 10:03

## 2024-03-14 RX ADMIN — PROPOFOL 30 MG: 10 INJECTION, EMULSION INTRAVENOUS at 09:03

## 2024-03-14 RX ADMIN — FENTANYL CITRATE 25 MCG: 0.05 INJECTION, SOLUTION INTRAMUSCULAR; INTRAVENOUS at 09:03

## 2024-03-14 RX ADMIN — FENTANYL CITRATE 50 MCG: 0.05 INJECTION, SOLUTION INTRAMUSCULAR; INTRAVENOUS at 09:03

## 2024-03-14 RX ADMIN — ROCURONIUM BROMIDE 30 MG: 10 SOLUTION INTRAVENOUS at 08:03

## 2024-03-14 RX ADMIN — GLYCOPYRROLATE 0.4 MG: 0.2 INJECTION, SOLUTION INTRAMUSCULAR; INTRAVITREAL at 11:03

## 2024-03-14 RX ADMIN — HYDROMORPHONE HYDROCHLORIDE 0.2 MG: 2 INJECTION INTRAMUSCULAR; INTRAVENOUS; SUBCUTANEOUS at 11:03

## 2024-03-14 RX ADMIN — PROPOFOL 120 MG: 10 INJECTION, EMULSION INTRAVENOUS at 08:03

## 2024-03-14 RX ADMIN — DEXTROSE 2 G: 50 INJECTION, SOLUTION INTRAVENOUS at 08:03

## 2024-03-14 RX ADMIN — ROCURONIUM BROMIDE 10 MG: 10 SOLUTION INTRAVENOUS at 10:03

## 2024-03-14 RX ADMIN — FENTANYL CITRATE 25 MCG: 0.05 INJECTION, SOLUTION INTRAMUSCULAR; INTRAVENOUS at 10:03

## 2024-03-14 RX ADMIN — LIDOCAINE HYDROCHLORIDE 70 MG: 20 INJECTION, SOLUTION EPIDURAL; INFILTRATION; INTRACAUDAL; PERINEURAL at 08:03

## 2024-03-14 RX ADMIN — NEOSTIGMINE METHYLSULFATE 3 MG: 1 INJECTION INTRAVENOUS at 11:03

## 2024-03-14 RX ADMIN — DEXAMETHASONE SODIUM PHOSPHATE 4 MG: 4 INJECTION, SOLUTION INTRA-ARTICULAR; INTRALESIONAL; INTRAMUSCULAR; INTRAVENOUS; SOFT TISSUE at 09:03

## 2024-03-14 RX ADMIN — FENTANYL CITRATE 50 MCG: 0.05 INJECTION, SOLUTION INTRAMUSCULAR; INTRAVENOUS at 08:03

## 2024-03-14 RX ADMIN — HYDROMORPHONE HYDROCHLORIDE 0.6 MG: 2 INJECTION INTRAMUSCULAR; INTRAVENOUS; SUBCUTANEOUS at 11:03

## 2024-03-14 NOTE — DISCHARGE SUMMARY
[facility] Services  Discharge Note  Short Stay    Procedure(s) (LRB):  LAPAROSCOPY, DIAGNOSTIC (N/A)  LYSIS, ADHESIONS, LAPAROSCOPIC (N/A)  LAVAGE, PERITONEAL, THERAPEUTIC (N/A)  BIOPSY, PERITONEUM (N/A)      OUTCOME: Patient tolerated treatment/procedure well without complication and is now ready for discharge.    DISPOSITION: Home or Self Care    FINAL DIAGNOSIS:  Gastric adenocarcinoma    FOLLOWUP: In clinic    DISCHARGE INSTRUCTIONS:    Discharge Procedure Orders   Diet general     Call MD for:  temperature >100.4     Call MD for:  persistent nausea and vomiting     Call MD for:  severe uncontrolled pain     Call MD for:  difficulty breathing, headache or visual disturbances     Call MD for:  redness, tenderness, or signs of infection (pain, swelling, redness, odor or green/yellow discharge around incision site)     No dressing needed     Wound care routine (specify)   Order Comments: Wound care routine: ok to shower in 48 hours     Activity as tolerated        TIME SPENT ON DISCHARGE: 15 minutes

## 2024-03-14 NOTE — OP NOTE
[facility]  Surgical Oncology  Operative Note           Date of Procedure: 3/14/2024   Time: 11:24 AM    Procedure: Procedure(s) (LRB):  LAPAROSCOPY, DIAGNOSTIC (N/A)  LYSIS, ADHESIONS, LAPAROSCOPIC (N/A)  LAVAGE, PERITONEAL, THERAPEUTIC (N/A)     Surgeon(s) and Role:     * Chen Jerome MD - Primary  Assisting Surgeon: None    Pre-Operative Diagnosis:   Gastric adenocarcinoma [C16.9]    Post-Operative Diagnosis:   Same    Pre-Operative Variables:  Stage:  III (T4a N1 M0)   Adjacent organ involvement: None  Chemotherapy within 90 Days: No  Radiation Therapy within 90 Days: No    Anesthesia: General    Procedure:  Diagnostic laparoscopy   Extensive lysis of adhesions  Peritoneal biopsy   Peritoneal washings for cytology    Operative Findings:   Small lesions near esophageal hiatus concerning for peritoneal disease (biopsied)  No other peritoneal disease visualized  Lesion on anterior liver consistent with fatty infiltration (not biopsied)  Significant small bowel adhesive disease, precluding visualization of the pelvis or peritoneal washings  Large, bulky distal gastric tumor          Indications:  Cheryl Garcia is a 73 y.o. year old female with a history of ovarian cancer status post TAHBSO and omentectomy who presents with MSI high gastric adenocarcinoma, here today for diagnostic laparoscopy..     Risks and benefits were reviewed including bleeding, infection, pain, scar, damage to surrounding structures, cardiovascular and pulmonary complications, damage to surrounding structures including small bowel, need for additional procedures, death, and imponderables.  She expressed understanding and gave informed consent to proceed.    Procedure in Detail:    Following written and informed consent the patient was taken to the operating room positioned in the supine position.  General endotracheal anesthesia was induced and she was positioned with her arms tucked, taking care to pad all bony  prominences.  Preoperative antibiotics were given and her abdomen was prepped and draped in the usual sterile fashion.  A time-out was performed verifying the correct patient, procedure, and other pertinent information.    Veress needle was inserted at [place] point and entrance into the abdomen was confirmed with saline meniscus test.  Pneumoperitoneum was achieved to 15mmHg.  A 5 mm incision was made along the left mid clavicular line, far away from her previous midline laparotomy site.  A 5 mm port was placed with laparoscopic visualization.  The laparoscopic was placed and the abdomen was examined.  There was extensive intra-abdominal adhesive disease with bowel loops all around the initial port site.  There was no evidence of damage or injury from the initial Veress placement of the Veress was removed.  An additional port was placed in the left upper quadrant to allow for better visualization of the original 5 mm port site.  Upon visualization there port was protruding from around loops of bowel but there did not appear to be any bowel injury or succus.  An additional port was placed in the right upper quadrant around the mid clavicular line.  Laparoscopic scissors were used to gently free up all of the small bowel loops were around the original 5 mm port.  There was no evidence of any injury to small bowel.  We then evaluated the abdomen and it was noted that the entire small bowel was draped across the anterior abdomen without any visualization of the pelvis.  We then set about trying to lyse adhesions to appropriately visualize the pelvis.  After approximately an hour and a half of laparoscopic lysis of adhesions there was still significant small-bowel adhesive disease to the anterior abdominal wall was thick dense adhesions raising concern for potential for bowel injury.  At this time we examined the rest of the abdomen, she was noted to have a very large, bulky distal gastric tumor visualized out to the  serosa.  Her liver was extremely large with the left lobe of the liver extending all the way over to spleen.  and there was 1 area of fatty lesion in the left lobe of the liver, just in the left of the gallbladder which did appear to be more fatty infiltrative in nature.  I reviewed her previous imaging including a PET-CT scan which did not show any disease in the liver and did not elect biopsy this lesion.  We did elevate the liver gently and is near the hiatus she also had several small firm white nodules just mm in size.  These were sharply dissected with the scissors and sent off for permanent.  There was no other visualized peritoneal disease.  Next, peritoneal washings were obtained by instilling 500 cc normal saline into the peritoneal cavity. After a brief lavage, the saline was removed with suction from the abdominal cavity and sent for cytology. At this time the case was complete. Hemostasis was confirmed, trocars removed and the gas was desufflated from the abdomen. Skin incisions were closed with 4-0 Monocryl and Dermabond.     All lap, needle, and sponge counts were correct x2. The patient was extubated and taken to the recovery room in stable condition.    Portions of the record were created with [employer] voice recognition software. This may lead to occasional typographical errors due to the inherent limitations of the software. Read the chart carefully and recognize, using context, where substitutions have occurred. Please do not hesitate to contact me directly if clarification is needed.    Implants: * No implants in log *    Drains: None    Estimated Blood Loss (EBL):   20 mL    Specimens:   Specimen (24h ago, onward)       Start     Ordered    03/14/24 1123  Cytology, Fluid/Wash/Brush  Once        Question Answer Comment   Source: Peritoneal/abdominal/pelvic wash    Clinical Information: history of ovarian cancer, gastric cancer    Specific Site: Left Upper quadrant peritoneal  washings    Other Requests: n/a    Release to patient Immediate        03/14/24 1125    03/14/24 1121  Cytology, Fluid/Wash/Brush  Once        Question Answer Comment   Source: Peritoneal/abdominal/pelvic wash    Clinical Information: history of ovarian cancer, gastric cancer    Specific Site: Right upper quadrant peritoneal washings    Other Requests: n/a        03/14/24 1125    03/14/24 1120  Specimen to Pathology, Surgery General Surgery (oncology)  Once        Comments: Pre-op Diagnosis: Gastric adenocarcinoma [C16.9]Procedure(s):LAPAROSCOPY, DIAGNOSTICLYSIS, ADHESIONS, LAPAROSCOPICLAVAGE, PERITONEAL, THERAPEUTIC Number of specimens: 1Name of specimens: 1.  Perigastric Nodule - PERM     References:    Click here for ordering Quick Tip   Question Answer Comment   Procedure Type: General Surgery oncology   Specimen Class: Known or suspected malignancy    Which provider would you like to cc? CHEN BROUSSARD    Release to patient Immediate        03/14/24 1120    03/14/24 1104  Specimen to Pathology, Surgery General Surgery (oncology)  Once        Comments: Pre-op Diagnosis: Gastric adenocarcinoma [C16.9]Procedure(s):LAPAROSCOPY, DIAGNOSTIC Number of specimens: 3Name of specimens: 1. Perigastric Nodule - PERM2. Right upper quadrant peritoneal washings - PERM3. Left upper quadrant peritoneal washings - PERM     References:    Click here for ordering Quick Tip   Question Answer Comment   Procedure Type: General Surgery oncology   Specimen Class: Known or suspected malignancy    Which provider would you like to cc? CHEN BROUSSARD    Release to patient Immediate        03/14/24 1104                                 Condition: Good    Disposition: PACU - hemodynamically stable.               Chen Broussard MD      Surgical Oncology      3/14/2024, 11:23 AM

## 2024-03-14 NOTE — DISCHARGE INSTRUCTIONS
-- No heavy lifting (nothing >10-15 lbs) for 4-6 weeks.   -- No driving while taking pain medications   -- Ok to shower with soap and water, no scrubbing incision.  No lotions or ointments to incisions.  Skin glue will begin to peel off in about a week, ok to remove it at that time.    -- No tub baths, swimming or submerging underwater for 4 weeks or until wound completely healed  -- Ok for Tylenol 1gram (2 extra strength Tylenol) every 8 hours and Ibuprofen 800mg every 8 hours alternating for pain.  Oxycodone for pain unrelieved by over the counter medications.   -- f/u in 2 weeks

## 2024-03-14 NOTE — NURSING TRANSFER
Assumed care of patient from CINDY Kate. Patient vitals stable, resting comfortably in bed with no complaints and call light within reach. Plan of care and discharge criteria reviewed with patient/family. Will continue to monitor

## 2024-03-14 NOTE — ANESTHESIA PROCEDURE NOTES
Intubation    Date/Time: 3/14/2024 8:56 AM    Performed by: Arelis Lee CRNA  Authorized by: Akiko Souza MD    Intubation:     Induction:  Intravenous    Intubated:  Postinduction    Mask Ventilation:  Easy with oral airway    Attempts:  1    Attempted By:  CRNA    Method of Intubation:  Video laryngoscopy    Blade:  Berger 3    Laryngeal View Grade: Grade IIA - cords partially seen      Difficult Airway Encountered?: No      Complications:  None    Airway Device:  Oral endotracheal tube    Airway Device Size:  7.0    Style/Cuff Inflation:  Cuffed    Inflation Amount (mL):  5    Tube secured:  21    Secured at:  The lips    Placement Verified By:  Capnometry    Complicating Factors:  Anterior larynx    Findings Post-Intubation:  BS equal bilateral

## 2024-03-14 NOTE — INTERVAL H&P NOTE
Cheryl Garcia is a 73 y.o. female who presents with gastric cancer, here for diagnostic laparoscopy.    The patient has been examined and the H&P has been reviewed:    I concur with the findings and no changes have occurred since H&P was written.    Surgery risks, benefits and alternative options discussed and understood by patient/family.      -- to OR for diagnostic laparoscopy with peritoneal washings  -- Laterality marked?  N/a  -- Abx ppx:  Ancef   -- DVT ppx:  SCDs   -- Consent signed and in the chart.  All questions have been answered        Chen Jerome MD      Surgical Oncology            There are no hospital problems to display for this patient.

## 2024-03-14 NOTE — PLAN OF CARE
Reviewed and completed all PACU orders. I encouraged questions, answered them thoroughly, and evaluated my instructions via teach-back method. I have disconnected patient from monitoring equipment and prepared them for the next phase of care. Patient has met all PACU discharge criteria at this point. Patient and family agree with the plan of care. Report given to NURSE Michelle

## 2024-03-14 NOTE — ANESTHESIA POSTPROCEDURE EVALUATION
Anesthesia Post Evaluation    Patient: Cheryl Garcia    Procedure(s) Performed: Procedure(s) (LRB):  LAPAROSCOPY, DIAGNOSTIC (N/A)  LYSIS, ADHESIONS, LAPAROSCOPIC (N/A)  LAVAGE, PERITONEAL, THERAPEUTIC (N/A)  BIOPSY, PERITONEUM (N/A)    Final Anesthesia Type: general      Patient location during evaluation: PACU  Patient participation: Yes- Able to Participate  Level of consciousness: awake and alert and oriented  Post-procedure vital signs: reviewed and stable  Pain management: adequate  Airway patency: patent    PONV status at discharge: No PONV  Anesthetic complications: no      Cardiovascular status: blood pressure returned to baseline, stable and hemodynamically stable  Respiratory status: unassisted  Hydration status: euvolemic  Follow-up not needed.              Vitals Value Taken Time   /69 03/14/24 1234   Temp 36.7 °C (98.1 °F) 03/14/24 1133   Pulse 75 03/14/24 1235   Resp 23 03/14/24 1235   SpO2 98 % 03/14/24 1235   Vitals shown include unvalidated device data.      No case tracking events are documented in the log.      Pain/Marli Score: Marli Score: 8 (3/14/2024 12:30 PM)

## 2024-03-14 NOTE — TRANSFER OF CARE
"Anesthesia Transfer of Care Note    Patient: Cheryl Garcia    Procedure(s) Performed: Procedure(s) (LRB):  LAPAROSCOPY, DIAGNOSTIC (N/A)  LYSIS, ADHESIONS, LAPAROSCOPIC (N/A)  LAVAGE, PERITONEAL, THERAPEUTIC (N/A)  BIOPSY, PERITONEUM (N/A)    Patient location: PACU    Anesthesia Type: general    Transport from OR: Transported from OR on room air with adequate spontaneous ventilation    Post pain: adequate analgesia    Post assessment: no apparent anesthetic complications    Post vital signs: stable    Level of consciousness: sedated and responds to stimulation    Nausea/Vomiting: no nausea/vomiting    Complications: none    Transfer of care protocol was followed      Last vitals: Visit Vitals  /73 (BP Location: Right arm, Patient Position: Sitting)   Pulse 69   Temp 36.4 °C (97.6 °F) (Temporal)   Ht 5' 5" (1.651 m)   Wt 57.8 kg (127 lb 6.8 oz)   SpO2 98%   Breastfeeding No   BMI 21.20 kg/m²     "

## 2024-03-15 DIAGNOSIS — C16.9 ADENOCARCINOMA OF STOMACH: ICD-10-CM

## 2024-03-15 DIAGNOSIS — C16.9 GASTRIC ADENOCARCINOMA: ICD-10-CM

## 2024-03-15 DIAGNOSIS — K31.89 GASTRIC MASS: ICD-10-CM

## 2024-03-15 DIAGNOSIS — C56.1: Primary | ICD-10-CM

## 2024-03-15 LAB
DNA RANGE(S) EXAMINED NAR: NORMAL
GENE DIS ANL INTERP-IMP: POSITIVE
GENE DIS ASSESSED: NORMAL
GENE MUT TESTED BLD/T: 42.6 M/MB
MSI CA SPEC-IMP: NORMAL
REASON FOR STUDY: NORMAL
TEMPUS AMENDMENTNOTE1: NORMAL
TEMPUS FUSIONADDENDUM: NORMAL
TEMPUS LCA: NORMAL
TEMPUS PORTAL: NORMAL
TEMPUS THERAPY1: NORMAL
TEMPUS THERAPYCOUNT: 1
TEMPUS TRIAL1: NORMAL
TEMPUS TRIAL2: NORMAL
TEMPUS TRIAL3: NORMAL
TEMPUS TRIALCOUNT: 3

## 2024-03-20 LAB
FINAL PATHOLOGIC DIAGNOSIS: NORMAL
GROSS: NORMAL
Lab: NORMAL
MICROSCOPIC EXAM: NORMAL

## 2024-03-21 ENCOUNTER — CLINICAL SUPPORT (OUTPATIENT)
Dept: HEMATOLOGY/ONCOLOGY | Facility: CLINIC | Age: 74
End: 2024-03-21
Payer: MEDICARE

## 2024-03-21 DIAGNOSIS — C16.9 GASTRIC ADENOCARCINOMA: Primary | ICD-10-CM

## 2024-03-21 PROCEDURE — 99999 PR PBB SHADOW E&M-EST. PATIENT-LVL II: CPT | Mod: PBBFAC,,,

## 2024-03-21 NOTE — PROGRESS NOTES
Met with patient and her   in person_ to discuss upcoming systemic therapy initiation. Discussed patient diagnosis including staging information. Also discussed that therapy regimen prescribed involves the following drugs: Keytruda ___, and timeline of therapy administration. Went over what to expect on first day of treatment, including what to bring with you, visitor policy, and infusion suite guidelines. Also discussed supportive and shared services available to patient, including social work, financial counseling, oncology nutrition, and oncology psychology.      Pt expressed interest in chemo care companion, will notify provider for orders     Covered with patient potential side effects and symptom management. Reviewed supportive medications that will be given before, during, and after treatment. Also stressed that other side effects are possible outside of those listed. Spent additional time on signs of infection, infection prevention, and proper nutrition/hydration.    Education provided to patient via (__chemotherapy education binder___). Also provided contact information for clinic and information related to [facility] communication. Discussed communication process for after-hours needs and some common scenarios in which patient should call provider for guidance vs. immediately report to the emergency room.     Finally, patient was given  contact information and role of oncology navigator was discussed. Encouraged patient to call with any questions, concerns, or needs. Patient verbalized understanding of all above information.

## 2024-03-22 ENCOUNTER — PATIENT MESSAGE (OUTPATIENT)
Dept: ADMINISTRATIVE | Facility: OTHER | Age: 74
End: 2024-03-22
Payer: MEDICARE

## 2024-03-22 LAB
COMMENT: ABNORMAL
FINAL PATHOLOGIC DIAGNOSIS: ABNORMAL
Lab: ABNORMAL

## 2024-03-23 ENCOUNTER — PATIENT MESSAGE (OUTPATIENT)
Dept: ADMINISTRATIVE | Facility: OTHER | Age: 74
End: 2024-03-23
Payer: MEDICARE

## 2024-03-25 ENCOUNTER — LAB VISIT (OUTPATIENT)
Dept: LAB | Facility: HOSPITAL | Age: 74
End: 2024-03-25
Payer: MEDICARE

## 2024-03-25 DIAGNOSIS — C56.1: ICD-10-CM

## 2024-03-25 DIAGNOSIS — C16.9 GASTRIC ADENOCARCINOMA: ICD-10-CM

## 2024-03-25 DIAGNOSIS — K31.89 GASTRIC MASS: ICD-10-CM

## 2024-03-25 DIAGNOSIS — C16.9 ADENOCARCINOMA OF STOMACH: ICD-10-CM

## 2024-03-25 LAB
ALBUMIN SERPL BCP-MCNC: 3.3 G/DL (ref 3.5–5.2)
ALP SERPL-CCNC: 95 U/L (ref 55–135)
ALT SERPL W/O P-5'-P-CCNC: 9 U/L (ref 10–44)
ANION GAP SERPL CALC-SCNC: 7 MMOL/L (ref 8–16)
AST SERPL-CCNC: 14 U/L (ref 10–40)
BASOPHILS # BLD AUTO: 0.04 K/UL (ref 0–0.2)
BASOPHILS NFR BLD: 0.6 % (ref 0–1.9)
BILIRUB SERPL-MCNC: 0.2 MG/DL (ref 0.1–1)
BUN SERPL-MCNC: 10 MG/DL (ref 8–23)
CALCIUM SERPL-MCNC: 9.6 MG/DL (ref 8.7–10.5)
CHLORIDE SERPL-SCNC: 109 MMOL/L (ref 95–110)
CO2 SERPL-SCNC: 25 MMOL/L (ref 23–29)
CREAT SERPL-MCNC: 0.8 MG/DL (ref 0.5–1.4)
DIFFERENTIAL METHOD BLD: ABNORMAL
EOSINOPHIL # BLD AUTO: 0.2 K/UL (ref 0–0.5)
EOSINOPHIL NFR BLD: 3.4 % (ref 0–8)
ERYTHROCYTE [DISTWIDTH] IN BLOOD BY AUTOMATED COUNT: 13.5 % (ref 11.5–14.5)
EST. GFR  (NO RACE VARIABLE): >60 ML/MIN/1.73 M^2
GLUCOSE SERPL-MCNC: 100 MG/DL (ref 70–110)
HCT VFR BLD AUTO: 39 % (ref 37–48.5)
HGB BLD-MCNC: 12 G/DL (ref 12–16)
IMM GRANULOCYTES # BLD AUTO: 0.02 K/UL (ref 0–0.04)
IMM GRANULOCYTES NFR BLD AUTO: 0.3 % (ref 0–0.5)
LYMPHOCYTES # BLD AUTO: 1.1 K/UL (ref 1–4.8)
LYMPHOCYTES NFR BLD: 18.1 % (ref 18–48)
MAGNESIUM SERPL-MCNC: 1.9 MG/DL (ref 1.6–2.6)
MCH RBC QN AUTO: 32.4 PG (ref 27–31)
MCHC RBC AUTO-ENTMCNC: 30.8 G/DL (ref 32–36)
MCV RBC AUTO: 105 FL (ref 82–98)
MONOCYTES # BLD AUTO: 0.5 K/UL (ref 0.3–1)
MONOCYTES NFR BLD: 7.6 % (ref 4–15)
NEUTROPHILS # BLD AUTO: 4.3 K/UL (ref 1.8–7.7)
NEUTROPHILS NFR BLD: 70 % (ref 38–73)
NRBC BLD-RTO: 0 /100 WBC
PHOSPHATE SERPL-MCNC: 2.9 MG/DL (ref 2.7–4.5)
PLATELET # BLD AUTO: 364 K/UL (ref 150–450)
PMV BLD AUTO: 8.9 FL (ref 9.2–12.9)
POTASSIUM SERPL-SCNC: 4.3 MMOL/L (ref 3.5–5.1)
PROT SERPL-MCNC: 6.4 G/DL (ref 6–8.4)
RBC # BLD AUTO: 3.7 M/UL (ref 4–5.4)
SODIUM SERPL-SCNC: 141 MMOL/L (ref 136–145)
TSH SERPL DL<=0.005 MIU/L-ACNC: 0.72 UIU/ML (ref 0.4–4)
WBC # BLD AUTO: 6.18 K/UL (ref 3.9–12.7)

## 2024-03-25 PROCEDURE — 83735 ASSAY OF MAGNESIUM: CPT

## 2024-03-25 PROCEDURE — 84443 ASSAY THYROID STIM HORMONE: CPT

## 2024-03-25 PROCEDURE — 84100 ASSAY OF PHOSPHORUS: CPT

## 2024-03-25 PROCEDURE — 85025 COMPLETE CBC W/AUTO DIFF WBC: CPT

## 2024-03-25 PROCEDURE — 36415 COLL VENOUS BLD VENIPUNCTURE: CPT | Mod: PO

## 2024-03-25 PROCEDURE — 80053 COMPREHEN METABOLIC PANEL: CPT

## 2024-03-26 ENCOUNTER — OFFICE VISIT (OUTPATIENT)
Dept: GENETICS | Facility: CLINIC | Age: 74
End: 2024-03-26
Payer: MEDICARE

## 2024-03-26 ENCOUNTER — DOCUMENTATION ONLY (OUTPATIENT)
Dept: HEMATOLOGY/ONCOLOGY | Facility: CLINIC | Age: 74
End: 2024-03-26
Payer: MEDICARE

## 2024-03-26 ENCOUNTER — INFUSION (OUTPATIENT)
Dept: INFUSION THERAPY | Facility: HOSPITAL | Age: 74
End: 2024-03-26
Attending: INTERNAL MEDICINE
Payer: MEDICARE

## 2024-03-26 ENCOUNTER — OFFICE VISIT (OUTPATIENT)
Dept: HEMATOLOGY/ONCOLOGY | Facility: CLINIC | Age: 74
End: 2024-03-26
Payer: MEDICARE

## 2024-03-26 VITALS
BODY MASS INDEX: 21.49 KG/M2 | SYSTOLIC BLOOD PRESSURE: 125 MMHG | OXYGEN SATURATION: 99 % | HEIGHT: 65 IN | TEMPERATURE: 98 F | DIASTOLIC BLOOD PRESSURE: 76 MMHG | WEIGHT: 129 LBS | HEART RATE: 65 BPM

## 2024-03-26 VITALS
OXYGEN SATURATION: 96 % | WEIGHT: 129 LBS | TEMPERATURE: 97 F | HEIGHT: 65 IN | BODY MASS INDEX: 21.49 KG/M2 | DIASTOLIC BLOOD PRESSURE: 77 MMHG | SYSTOLIC BLOOD PRESSURE: 135 MMHG | RESPIRATION RATE: 16 BRPM | HEART RATE: 61 BPM

## 2024-03-26 DIAGNOSIS — Z80.42 FAMILY HISTORY OF PROSTATE CANCER: ICD-10-CM

## 2024-03-26 DIAGNOSIS — Z80.41 FAMILY HISTORY OF OVARIAN CANCER: ICD-10-CM

## 2024-03-26 DIAGNOSIS — Z80.0 FAMILY HISTORY OF PANCREATIC CANCER: Primary | ICD-10-CM

## 2024-03-26 DIAGNOSIS — Z80.3 FAMILY HISTORY OF BREAST CANCER: ICD-10-CM

## 2024-03-26 DIAGNOSIS — C16.9 GASTRIC ADENOCARCINOMA: Primary | ICD-10-CM

## 2024-03-26 DIAGNOSIS — C16.9 GASTRIC ADENOCARCINOMA: ICD-10-CM

## 2024-03-26 DIAGNOSIS — C56.2: ICD-10-CM

## 2024-03-26 DIAGNOSIS — E53.8 DEFICIENCY OF OTHER SPECIFIED B GROUP VITAMINS: ICD-10-CM

## 2024-03-26 DIAGNOSIS — E06.4 DRUG-INDUCED THYROIDITIS: ICD-10-CM

## 2024-03-26 PROCEDURE — 3074F SYST BP LT 130 MM HG: CPT | Mod: CPTII,S$GLB,,

## 2024-03-26 PROCEDURE — 3078F DIAST BP <80 MM HG: CPT | Mod: CPTII,S$GLB,,

## 2024-03-26 PROCEDURE — 25000003 PHARM REV CODE 250: Performed by: INTERNAL MEDICINE

## 2024-03-26 PROCEDURE — 1101F PT FALLS ASSESS-DOCD LE1/YR: CPT | Mod: CPTII,S$GLB,,

## 2024-03-26 PROCEDURE — 99999 PR PBB SHADOW E&M-EST. PATIENT-LVL II: CPT | Mod: PBBFAC,,,

## 2024-03-26 PROCEDURE — 99999 PR PBB SHADOW E&M-EST. PATIENT-LVL III: CPT | Mod: PBBFAC,,,

## 2024-03-26 PROCEDURE — 1126F AMNT PAIN NOTED NONE PRSNT: CPT | Mod: CPTII,S$GLB,,

## 2024-03-26 PROCEDURE — 96040 PR GENETIC COUNSELING, EACH 30 MIN: CPT | Mod: S$GLB,,,

## 2024-03-26 PROCEDURE — 63600175 PHARM REV CODE 636 W HCPCS: Mod: JZ,JG | Performed by: INTERNAL MEDICINE

## 2024-03-26 PROCEDURE — 99215 OFFICE O/P EST HI 40 MIN: CPT | Mod: S$GLB,,,

## 2024-03-26 PROCEDURE — 3008F BODY MASS INDEX DOCD: CPT | Mod: CPTII,S$GLB,,

## 2024-03-26 PROCEDURE — 96413 CHEMO IV INFUSION 1 HR: CPT

## 2024-03-26 PROCEDURE — 3288F FALL RISK ASSESSMENT DOCD: CPT | Mod: CPTII,S$GLB,,

## 2024-03-26 RX ADMIN — SODIUM CHLORIDE 200 MG: 9 INJECTION, SOLUTION INTRAVENOUS at 11:03

## 2024-03-26 NOTE — PROGRESS NOTES
"  Cancer Genetics  Hereditary/High Risk Clinic  Department of Hematology and Oncology  [facility]        Date of Service:  2024  Provider:  Quita Elena MS, [facility]    Patient Information  Name:  Cheryl Garcia  :  1950  MRN:  [id number]        Referring Provider: Chen Jerome MD     The chief complaint leading to consultation is: as below.  Visit type: in-person.  Face-to-face time with patient: approximately 41 minutes.  Approximately 70 minutes in total were spent on this encounter, which includes face-to-face time and non-face-to-face time preparing to see the patient (e.g., review of tests), obtaining and/or reviewing separately obtained history, documenting clinical information in the electronic or other health record, independently interpreting results (not   reported) and communicating results to the patient/family/caregiver, or care coordination (not separately reported).    SUBJECTIVE:      Chief Complaint: Genetic Counseling     History of Present Illness (HPI):  Cheryl Garcia ("Cheryl"), 73 y.o., assigned female sex at birth is established with the [facility] of H[facility]ology [facility] Oncology but new to me.  She was referred by Surgical Oncology for genetic cancer risk assessment given her personal history of ovarian and gastric cancer cancer. 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 At age 73, she was diagnosed with gastric adenocarcinoma (loss of MLH1 and PMS2), began keytruda today.    Focused Medical History:   Germline cancer-genetic testing:  No  Cancer:  Yes  Ovarian cancer, right (2023)  Gastric adenocarcinoma (2024)  Colonoscopy: Yes  Most recent colonoscopy: 2023, reportedly told to repeat in 5 years  Colon polyp:  Yes - 5-10 polyps  EGD - 2024  Mammogram: Yes  Most recent mammo: " 11/30/2023, recommended to repeat in 6 months due to calcifications  Breast MRI:  No  Other benign tumor:  Yes  Bilateral thyroid nodules (10/2022)  Pancreatitis:  No  Pancreatic cyst:  No     Focused Surgical History  MORELIA BERNARDO (01/09/2023)    Ancestry:  Ashkenazi [place] ancestry:  No  Paternal:     Maternal:     Focused Family History:  Consanguinity in ancestors:  No  Germline cancer-genetic testing in blood relatives:  No  Cancer in family:  Yes; there are no known blood relatives affected with cancer other than those mentioned in the pedigree below    Family Cancer Pedigree:             Review of Systems:  See HPI.      SUBJECTIVE:   Records Reviewed  Medical History  Problem List  Any pertinent, available Procedures and Pathology reports in both [facility] and [facility]    COUNSELING   Causes of cancer  Germline cancer genetic testing is the testing of genes associated with cancer, known as cancer susceptibility genes.  Just as these genes are inherited from parents, mutations in these genes can be inherited, as well.  A mutation in a cancer susceptibility gene adversely affects the gene's ability to prevent cancer; therefore, carriers of cancer susceptibility gene mutations may be at increased risk for certain cancers.     Only 5-10% cancers are caused by a cancer susceptibility gene mutation, meaning the cancer is genetic/hereditary; rather, most cancers are sporadic.  Causes of sporadic cancers may include environmental risk factors, lifestyle risk factors, and non-modifiable risk factors.  It is important to note that members of a family often share not only their genetics but also risk factors including environmental and lifestyle risk factors, so cancers can be familial.    Mutations in BRCA1 and BRCA2 are associated with an increased risk for breast and ovarian cancer, in addition to male breast cancer, pancreatic, melanoma, and prostate cancer. Mutations  in other genes may increase the risk of breast and prostate cancer, in addition to other cancers.     Potential results of genetic testing, and their implications  Potential results of genetic testing include positive, negative, and variant of unknown significance (VUS).    A positive result indicates the presence of at least one clinically significant mutation, and the patient's associated cancer risks vary depending upon the cancer susceptibility gene(s) in which there is/are a mutation(s).  With a positive result, in some cases, depending upon the specific result and the patient's clinical history, modified risk management may be recommended, including measures for risk reduction and/or surveillance; however, modified management is not always an option.    A negative result indicates that no clinically significant mutations were identified in the gene(s) tested.    A VUS indicates that there is not presently enough data for the laboratory to make a determination as to whether the variant is clinically significant.  VUSs are not typically acted upon clinically.       Modified management may also be recommended, even with a result of no or unknown significance, based upon risk assessment that incorporates the family history.  Sometimes, depending upon the genetic testing result and the cancer diagnosis, additional/modified treatments may be an option, though this is not guaranteed.    Tyer Cuzik Score  Breast Cancer Risk Stratification  Current estimated lifetime risk of breast cancer, as calculated utilizing the Tyrer-Cuzick risk-assessment model v8.0b:  3.24%.  This places the patient in the average-risk range according to current clinical guidelines. As such, the NCCN guidelines recommend that she continue mammogram screening, which is in 6 months as recommended.    Genetic mutation inheritance  If  Cheryl tests positive for a mutation, her first-degree relatives would each have a 50% chance of having the same  mutation, and other, more distantly related blood-relatives would also be at risk of having the same mutation.       Genetic discrimination  The Genetic Information Nondiscrimination Act (JOE) is U[place]S. federal legislation that provides some protections against use of an individual's genetic information by their health insurer and by their employer.  Title I of JOE prohibits most health insurers from utilizing an individual's genetic information to make decisions regarding insurance eligibility or premium charges.  Title II of JOE prohibits covered entities, including employers, from requesting the genetic information of employees and applicants.  JOE does not protect individuals from genetic discrimination toward health insurance obtained through a job with the  or through the Federal Employees Health Benefits Plan; from genetic discrimination by employers with fewer than 15 employees or if employed by the ; or from genetic discrimination by any other type of policies/entities, including but not limited to life insurance, disability insurance, long-term care insurance,  benefits, and  Health Services benefits.     Genetic testing logistics  An outside laboratory would perform the testing after a blood sample is collected at [facility] or a saliva sample is collected by the patient at home.  With genetic testing, there is a potential for the patient to incur out-of-pocket costs.  Results can take 2-3 weeks.  Post-test genetic counseling can be conducted once the genetic testing results are available.     Assessment/Plan  Based on the information provided by  Cheryl, she meets current criteria for genetic testing of hereditary breast and ovarian cancer syndrome according to current clinical guidelines. Cheryl's clinical history, including personal history and/or family history, is strongly suggestive of a hereditary cancer syndrome in the family given the personal history of ovarian  cancer, as well as her strong family history of breast, ovarian and pancreatic cancer.     Offered germline cancer-genetic testing at this time versus deferring testing at this time or declining testing altogether.  Various test panel options were discussed. Cheryl decided to proceed with genetic testing through the [facility] BRCA1 and BRCA2 gene sequence and deletion/duplication and 77-gene CancerNext Expanded Cancer Panel.  Cheryl has provided her informed consent to proceed. A blood draw was collected today.     Questions were encouraged and answered to the patient's satisfaction, and she verbalized understanding of information and agreement with the plan.         Signed,    Quita Elena MS, [facility] [place]  Certified Genetic Counselor, Hereditary and High-Risk Clinic  Hematology/Oncology, [facility]    03/26/2024

## 2024-03-26 NOTE — PLAN OF CARE
Pt reports feeling well today, but states she is very fearful starting Keytruda.  Support given.    Problem: Adult Inpatient Plan of Care  Goal: Plan of Care Review  Outcome: Ongoing, Progressing  Flowsheets (Taken 3/26/2024 1125)  Plan of Care Reviewed With:   patient   spouse  Goal: Patient-Specific Goal (Individualized)  Outcome: Ongoing, Progressing  Flowsheets (Taken 3/26/2024 1125)  Anxieties, Fears or Concerns: fear starting new treatment today and IV  Individualized Care Needs: warm blanket, pillow, refreshment  Goal: Absence of Hospital-Acquired Illness or Injury  Outcome: Ongoing, Progressing  Intervention: Prevent Infection  Flowsheets (Taken 3/26/2024 1125)  Infection Prevention:   equipment surfaces disinfected   hand hygiene promoted   personal protective equipment utilized  Goal: Optimal Comfort and Wellbeing  Outcome: Ongoing, Progressing  Intervention: Provide Person-Centered Care  Flowsheets (Taken 3/26/2024 1125)  Trust Relationship/Rapport:   care explained   thoughts/feelings acknowledged   choices provided   emotional support provided   empathic listening provided   questions answered   questions encouraged   reassurance provided     Problem: Neutropenia (Chemotherapy Effects)  Goal: Absence of Infection  Outcome: Ongoing, Progressing     Problem: Fall Injury Risk  Goal: Absence of Fall and Fall-Related Injury  Outcome: Ongoing, Progressing  Intervention: Promote Injury-Free Environment  Flowsheets (Taken 3/26/2024 1125)  Safety Promotion/Fall Prevention:   assistive device/personal item within reach   in recliner, wheels locked   medications reviewed   Fall Risk reviewed with patient/family   room near unit station   high risk medications identified   nonskid shoes/socks when out of bed

## 2024-03-26 NOTE — PROGRESS NOTES
Met with pt and  during infusion. Reviewed my role as NN, having spoken with pt over the phone. Denies NN needs at this time but states she has my card and will call if she needs anything. She did ask if genetics appt is approved b/c she got a letter from [employer] informing her of a denial. ONN spoke with GC, states she will look into it and talk with pt at visit.   Oncology Navigation   Intake  Date of Diagnosis: 02/08/24  Cancer Type: GI  Type of Referral: Internal  Date of Referral: 02/23/24  Initial Nurse Navigator Contact: 02/27/24  Referral to Initial Contact Timeline (days): 4  Date Worked: 03/26/24  Reason if booked > 7 days after scheduling: Specific provider / access; Additional tests/procedures     Treatment  Current Status: Active  Date Presented to Tumor Board: 03/04/24  Presentation Notes: proceed with systemic therapy    Surgical Oncologist: Dr. Chen Jerome    Medical Oncologist: Dr. Claudia Ambrose  Immunotherapy: Planned  Immunotherapy Name: Pembrolizumab  Start Date: 03/25/24       Procedures: PET scan; CT  CT Schedule Date: 02/27/24 (2/8/24)  PET Scan Schedule Date: 02/23/24    General Referrals: Chemo Education; Dietitian; Social Work  Dietitian Referral Date: 03/13/24  Social Work Referral Date: 03/13/24          Support Systems: Family members     Acuity      Follow Up  No follow-ups on file.

## 2024-03-26 NOTE — ASSESSMENT & PLAN NOTE
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 Jerome  Per NCCN guidelines, NA immunotherapy is useful in MSI-H/dMMR tumors) with options being Nivolumab and ipilimumab followed by nivolumab or Pembrolizumab.  Given patient's comorbid conditions and potentially increased toxicity with CTLA4 inhibitor and PD-1 inhibitor, weill pre-certify for Pembrolizumab    Labs reviewed, no concerning findings  Consent signed   Proceed with C1D1 Keytruda today      Follow-up in 3 weeks with labs prior for Keytruda

## 2024-03-26 NOTE — PROGRESS NOTES
Subjective:     Patient ID:?Cheryl Garcia is a 73 y.o. female.?? MR#: [id number]   ?   PRIMARY ONCOLOGIST: Dr. Ambrose  ?   CHIEF COMPLAINT: lab review/assessment/immunotherapy    ?   ONCOLOGIC DIAGNOSIS:  Cancer Staging   Mixed epithelial carcinoma of right ovary  Staging form: Ovary, Fallopian Tube, and Primary Peritoneal Carcinoma, AJCC 8th Edition  - Clinical stage from 1/9/2023: FIGO Stage IC2, calculated as Stage IC (cT1c2, cN0, cM0) - Signed by Chen Jerome MD on 2/27/2024     ?   CURRENT TREATMENT: OP pembrolizumab 200mg Q3W       HPI  Ms. Cheryl Garcia is a 73 y.o. female with history of DVT not currently on AC, previously on Xarelto, FIGO Stage IC (cT1c2, cN0, cM0) Mixed epithelial carcinoma of right ovary s/p surgery 01/09/023 and 6C Carbo/Paclitaxel completed 07/19/2023, Osteoporosis on Fosamax, HTN and Hx of Epilepsy on Keppra, Tegretol, Zonegran (last seizure in 2009) who presents today for lab review and assessment prior to initiation on pembrolizumab.    Per review of the medical record, she initially presented to [facility] 02/06/24 with report of hematemeiss; she was transferred to [facility] for higher level of care.  On admission to [facility], EGD  was performed and showed a large ulcerated gastric mass.  Pathology results it as poorly differentiated adenocarcinoma.  She states that her weight is currently stable.  The last time she lost any weight was 2 her ovarian cancer diagnosis and treatment at which time she lost 5 lb but gained it back.  On hospitalization here at [facility], during GI workup she lost those 5 lbs a cane because she was NPO.     The patient has already established care with surgical oncology, Dr. Jerome.  She was presented in the upper GI tumor board with consensus for neoadjuvant immunotherapy given MSI high status of her tumor.      Interval History: Pt presents today with  for initiation on keytruda. Pt states physically  she is feeling well and voices no c/o, however, she notes emotionally-she is very fearful. She states she did not feel like this with her prior chemo txs--reassurance provided.       Oncology History   Mixed epithelial carcinoma of right ovary   1/2/2023 Initial Diagnosis    Mixed epithelial carcinoma of left ovary     1/9/2023 Surgery    Laparotomy for radical resection of gynecologic cancer. Removal of pelvic mass (right salpingo-oophorectomy), left salpingo-oophorectomy, extensive enterolysis, extensive adhesiolysis, left pelvic lymph node biopsy, omental biopsy, small bowel resection with primary functional end-to-end anastomosis, placement of pelvic drain. Path: Right ovary, tumor site, 11 cm, mixed epithelial carcinoma (50% mucinous, 50% endometrioid), G2 moderately differentiated, ovarian surface involvement-indeterminate-specimen disrupted. 1 left pelvic lymph node negative for neoplasia. FIGO stage IC2       1/9/2023 Cancer Staged    Staging form: Ovary, Fallopian Tube, and Primary Peritoneal Carcinoma, AJCC 8th Edition  - Clinical stage from 1/9/2023: FIGO Stage IC2, calculated as Stage IC (cT1c2, cN0, cM0)     4/5/2023 - 7/19/2023 Chemotherapy    4/5/2023: Cycle 1- paclitaxel 135 mg/m2 and carboplatin AUC 5 as patient is frail and elderly.  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 She presented last month with hematemesis. EGD revealed large ulcerated gastric mass, which was biopsied. EGD pathology reviewed - poorly differentiated adenocarcinoma, with intestinal expression, MSI high, HER 2 negative. Staging imaging found irregular wall thickening in mid gastric body, enlarged gastrohepatic lymph node, no evidence of distant metastatic disease.   Reviewed PET - uptake in gastric wall and gastrohepatic lymph node with hypermetabolic activity.   Scheduled to see Dr. Amin in [place] later this week.   + family hx of cancer, pending genetics referral    BOARD RECOMMENDATIONS:   Proceed with systemic chemotherapy, consider neoadj immunotherapy  Proceed with diag lap  Await genetics testing, pathology will send to [facility]    CONSULT NEEDED:     [] Surgery    [] Hem/Onc    [] Rad/Onc    [] Dietary                 [] Social Service    [x] Genetics       [] AES  [] Radiology     Clinical Stage: Tumor   Node(s)  1  Metastasis      GROUP STAGE:  [] O    [] 1A    [] IB    [] IIA    [] IIB     [] IIIA     [] IIIB     [] IIIC    []IV  [] Local recurrence     [] Regional recurrence     [] Distant recurrence   Metastatic site(s): none         [x] Edna'l Treatment Guidelines reviewed and care planned is consistent with guidelines.         (i.e., NCCN, NCI, PD, ACO, AUA, etc.)    PRESENTATION AT CANCER CONFERENCE:         [x] Prospective    [] Retrospective     [] Follow-Up         3/14/2024 Surgery    Procedure:  LAPAROSCOPY, DIAGNOSTIC (N/A)  LYSIS, ADHESIONS,  LAPAROSCOPIC (N/A)  LAVAGE, PERITONEAL, THERAPEUTIC (N/A)      Surgeon(s) and Role: Chen Jerome MD - Primary    Pre-Operative Diagnosis: Gastric adenocarcinoma [C16.9]     Post-Operative Diagnosis: Same     Pre-Operative Variables:  Stage:  III (T4a N1 M0)   Adjacent organ involvement: None  Chemotherapy within 90 Days: No  Radiation Therapy within 90 Days: No      Procedure:  Diagnostic laparoscopy   Extensive lysis of adhesions  Peritoneal biopsy   Peritoneal washings for cytology     3/26/2024 -  Chemotherapy    Treatment Summary   Plan Name: OP pembrolizumab 200mg Q3W  Treatment Goal: Curative  Status: Active  Start Date: 3/26/2024  End Date: 3/10/2026 (Planned)  Provider: Claudia Ambrose MD  Chemotherapy: [No matching medication found in this treatment plan]      Chemotherapy    Treatment Summary   Plan Name: OP pembrolizumab 200mg Q3W  Treatment Goal: Curative  Status: Active  Start Date: 3/15/2024 (Planned)  End Date: 2/27/2026 (Planned)  Provider: Claudia Ambrose MD  Chemotherapy: [No matching medication found in this treatment plan]        Social History     Socioeconomic History    Marital status:    Tobacco Use    Smoking status: Former     Types: Cigarettes    Smokeless tobacco: Never    Tobacco comments:     Quit 1989 smoked 10 years smoked 0.25 ppd    Substance and Sexual Activity    Alcohol use: Not Currently    Drug use: Never     Social Determinants of Health     Financial Resource Strain: Low Risk  (2/23/2024)    Overall Financial Resource Strain (CARDIA)     Difficulty of Paying Living Expenses: Not hard at all   Food Insecurity: No Food Insecurity (2/23/2024)    Hunger Vital Sign     Worried About Running Out of Food in the Last Year: Never true     Ran Out of Food in the Last Year: Never true   Transportation Needs: No Transportation Needs (2/23/2024)    PRAPARE - Transportation     Lack of Transportation (Medical): No     Lack of Transportation (Non-Medical): No   Physical  Activity: Inactive (2/23/2024)    Exercise Vital Sign     Days of Exercise per Week: 0 days     Minutes of Exercise per Session: 10 min   Stress: No Stress Concern Present (2/23/2024)    [employer] [place] [employer] - Occupational Stress Questionnaire     Feeling of Stress : Not at all   Social Connections: Unknown (2/23/2024)    Social Connection and Isolation Panel [NHANES]     Frequency of Communication with Friends and Family: More than three times a week     Frequency of Social Gatherings with Friends and Family: Three times a week     Active Member of Clubs or Organizations: Yes     Attends Club or Organization Meetings: More than 4 times per year     Marital Status:    Housing Stability: Low Risk  (2/23/2024)    Housing Stability Vital Sign     Unable to Pay for Housing in the Last Year: No     Number of Places Lived in the Last Year: 1     Unstable Housing in the Last Year: No      Family History   Problem Relation Age of Onset    Pancreatic cancer Brother 76    Prostate cancer Brother 67    Pancreatic cancer Half-brother 74    Colon cancer Half-sister 83    Lung cancer Half-sister         +smoking hx    Breast cancer Half-sister 58    Lymphoma Other         Corinne    Prostate cancer Other     Prostate cancer Other     Ovarian cancer Other     Breast cancer Other     Colon cancer Other       Past Surgical History:   Procedure Laterality Date    ABDOMINAL WASHOUT N/A 3/14/2024    Procedure: LAVAGE, PERITONEAL, THERAPEUTIC;  Surgeon: Chen Jerome MD;  Location: [facility] OR;  Service: General;  Laterality: N/A;    APPENDECTOMY  2015    BIOPSY OF PERITONEUM N/A 3/14/2024    Procedure: BIOPSY, PERITONEUM;  Surgeon: Chen Jerome MD;  Location: [facility] OR;  Service: General;  Laterality: N/A;    COLONOSCOPY  06/20/2012    Dr Boyle- Tubular adenoma polyps. Repeat in 3 years.    COLONOSCOPY  06/17/2015    -Nodular mucosa at appendiceal orfice, sigmoid and desc diverticulosis  otherwise normal.    COLONOSCOPY  2020    >3 TAs    COLONOSCOPY  12/2023    DIAGNOSTIC LAPAROSCOPY N/A 3/14/2024    Procedure: LAPAROSCOPY, DIAGNOSTIC;  Surgeon: Chen Jerome MD;  Location: [facility] OR;  Service: General;  Laterality: N/A;  1. Diagnostic laparoscopy   2. Extensive lysis of adhesions  3. Peritoneal biopsy   4. Peritoneal washings for cytology    EGD - EXTERNAL RESULT  06/20/2012    Dr Boyle- Mild gastritis otherwise normal.    ESOPHAGOGASTRODUODENOSCOPY N/A 02/08/2024    Procedure: EGD (ESOPHAGOGASTRODUODENOSCOPY);  Surgeon: Jayla Arteaga MD;  Location: [facility];  Service: Endoscopy;  Laterality: N/A;    HYSTERECTOMY      LAPAROSCOPIC LYSIS OF ADHESIONS N/A 3/14/2024    Procedure: LYSIS, ADHESIONS, LAPAROSCOPIC;  Surgeon: Chen Jerome MD;  Location: [facility];  Service: General;  Laterality: N/A;    TOTAL ABDOMINAL HYSTERECTOMY W/ BILATERAL SALPINGOOPHORECTOMY  01/09/2023    radical resection with right salpingo-oophorectomy, left salpingo-oophorectomy, extensive enterolysis, extensive adhesiolysis, left pelvic lymph node biopsy, omental biopsy, small bowel resection with primary functional end-to-end anastomosis, placement of pelvic drain        Review of Systems   Constitutional:  Negative for activity change, appetite change, chills, diaphoresis, fatigue, fever and unexpected weight change.   HENT:  Negative for nosebleeds.    Respiratory:  Negative for shortness of breath.    Cardiovascular:  Negative for chest pain.   Gastrointestinal:  Positive for abdominal pain (intermittent). Negative for abdominal distention, anal bleeding, blood in stool, constipation, diarrhea, nausea and vomiting.   Genitourinary:  Negative for difficulty urinating and hematuria.   Musculoskeletal:  Negative for arthralgias, back pain and myalgias.   Skin:  Negative for rash.   Neurological:  Negative for dizziness, weakness, light-headedness and headaches.   Hematological:  Does not bruise/bleed easily.    Psychiatric/Behavioral:  The patient is not nervous/anxious.        ?   A comprehensive 14-point review of systems was reviewed with patient and was negative other than as specified above.   ?     Objective:      Physical Exam  Constitutional:       General: She is not in acute distress.     Appearance: Normal appearance. She is not ill-appearing.   HENT:      Head: Normocephalic and atraumatic.   Eyes:      Extraocular Movements: Extraocular movements intact.      Conjunctiva/sclera: Conjunctivae normal.   Cardiovascular:      Rate and Rhythm: Normal rate.   Pulmonary:      Effort: Pulmonary effort is normal. No respiratory distress.   Abdominal:      Palpations: There is no hepatomegaly or splenomegaly.   Musculoskeletal:         General: Normal range of motion.      Right lower leg: No edema.      Left lower leg: No edema.   Skin:     Findings: No rash.   Neurological:      General: No focal deficit present.      Mental Status: She is alert and oriented to person, place, and time.   Psychiatric:         Mood and Affect: Mood normal.         Behavior: Behavior normal.         Thought Content: Thought content normal.           ?   Vitals:    03/26/24 1030   BP: 125/76   Pulse: 65   Temp: 98.1 °F (36.7 °C)      ?       ?   Laboratory:  ?   No visits with results within 1 Day(s) from this visit.   Latest known visit with results is:   Lab Visit on 03/25/2024   Component Date Value Ref Range Status    WBC 03/25/2024 6.18  3.90 - 12.70 K/uL Final    RBC 03/25/2024 3.70 (L)  4.00 - 5.40 M/uL Final    Hemoglobin 03/25/2024 12.0  12.0 - 16.0 g/dL Final    Hematocrit 03/25/2024 39.0  37.0 - 48.5 % Final    MCV 03/25/2024 105 (H)  82 - 98 fL Final    MCH 03/25/2024 32.4 (H)  27.0 - 31.0 pg Final    MCHC 03/25/2024 30.8 (L)  32.0 - 36.0 g/dL Final    RDW 03/25/2024 13.5  11.5 - 14.5 % Final    Platelets 03/25/2024 364  150 - 450 K/uL Final    MPV 03/25/2024 8.9 (L)  9.2 - 12.9 fL Final    Immature Granulocytes 03/25/2024  0.3  0.0 - 0.5 % Final    Gran # (ANC) 03/25/2024 4.3  1.8 - 7.7 K/uL Final    Immature Grans (Abs) 03/25/2024 0.02  0.00 - 0.04 K/uL Final    Lymph # 03/25/2024 1.1  1.0 - 4.8 K/uL Final    Mono # 03/25/2024 0.5  0.3 - 1.0 K/uL Final    Eos # 03/25/2024 0.2  0.0 - 0.5 K/uL Final    Baso # 03/25/2024 0.04  0.00 - 0.20 K/uL Final    nRBC 03/25/2024 0  0 /100 WBC Final    Gran % 03/25/2024 70.0  38.0 - 73.0 % Final    Lymph % 03/25/2024 18.1  18.0 - 48.0 % Final    Mono % 03/25/2024 7.6  4.0 - 15.0 % Final    Eosinophil % 03/25/2024 3.4  0.0 - 8.0 % Final    Basophil % 03/25/2024 0.6  0.0 - 1.9 % Final    Differential Method 03/25/2024 Automated   Final    Sodium 03/25/2024 141  136 - 145 mmol/L Final    Potassium 03/25/2024 4.3  3.5 - 5.1 mmol/L Final    Chloride 03/25/2024 109  95 - 110 mmol/L Final    CO2 03/25/2024 25  23 - 29 mmol/L Final    Glucose 03/25/2024 100  70 - 110 mg/dL Final    BUN 03/25/2024 10  8 - 23 mg/dL Final    Creatinine 03/25/2024 0.8  0.5 - 1.4 mg/dL Final    Calcium 03/25/2024 9.6  8.7 - 10.5 mg/dL Final    Total Protein 03/25/2024 6.4  6.0 - 8.4 g/dL Final    Albumin 03/25/2024 3.3 (L)  3.5 - 5.2 g/dL Final    Total Bilirubin 03/25/2024 0.2  0.1 - 1.0 mg/dL Final    Alkaline Phosphatase 03/25/2024 95  55 - 135 U/L Final    AST 03/25/2024 14  10 - 40 U/L Final    ALT 03/25/2024 9 (L)  10 - 44 U/L Final    eGFR 03/25/2024 >60.0  >60 mL/min/1.73 m^2 Final    Anion Gap 03/25/2024 7 (L)  8 - 16 mmol/L Final    Magnesium 03/25/2024 1.9  1.6 - 2.6 mg/dL Final    Phosphorus 03/25/2024 2.9  2.7 - 4.5 mg/dL Final    TSH 03/25/2024 0.719  0.400 - 4.000 uIU/mL Final      ?   Imaging:    Results for orders placed or performed during the hospital encounter of 02/28/24 (from the past 2160 hour(s))   NM PET CT FDG Skull Base to Mid Thigh    Impression    1. The biopsy proving gastric cancer is markedly FDG avid with SUV max 18.  2. Moderately FDG avid gastrohepatic ligament lymph node consistent with local  metastatic disease.  3. No evidence for distant metastatic disease.  All CT scans at this facility are performed  using dose modulation techniques as appropriate to performed exam including the following:  automated exposure control; adjustment of mA and/or kV according to the patients size (this includes techniques or standardized protocols for targeted exams where dose is matched to indication/reason for exam: i.e. extremities or head);  iterative reconstruction technique.      Electronically signed by: Sim Villanueva MD  Date:    02/28/2024  Time:    16:53        Results for orders placed or performed during the hospital encounter of 02/27/24 (from the past 2160 hour(s))   CT Chest With Contrast    Narrative    EXAMINATION:  CT CHEST WITH CONTRAST    CLINICAL HISTORY:  Malignant neoplasm of stomach, unspecifiedgastric cancer;    TECHNIQUE:  Standard CT chest with 75cc's Omnipaque 350.    COMPARISON:  Report from a previous CT neck chest abdomen pelvis of 09/19/2023    FINDINGS:  There is a 2 cm exophytic right thyroid nodule which was previously described on previous exam.  This appears stable.    No suspicious mediastinal or hilar or axillary lymphadenopathy.  No supraclavicular lymphadenopathy.    The heart is normal in size.  Mild coronary artery calcifications.  No pericardial effusion.    No suspicious lung nodules.  No infiltrate or consolidation.  No pleural effusions.    No significant osseous abnormality.      Impression    No evidence of intrathoracic metastatic disease.    All CT scans at this facility are performed  using dose modulation techniques as appropriate to performed exam including the following:  automated exposure control; adjustment of mA and/or kV according to the patients size (this includes techniques or standardized protocols for targeted exams where dose is matched to indication/reason for exam: i.e. extremities or head);  iterative reconstruction technique.      Electronically signed  by: Je Peña MD  Date:    02/27/2024  Time:    10:12   Results for orders placed or performed during the hospital encounter of 02/07/24 (from the past 2160 hour(s))   CT Abdomen Pelvis With IV Contrast Routine Oral Contrast    Narrative    EXAMINATION:  CT ABDOMEN PELVIS WITH IV CONTRAST    CLINICAL HISTORY:  Metastatic disease evaluation;    TECHNIQUE:  Postcontrast images were obtained    COMPARISON:  None    FINDINGS:  Abdomen pelvis:    The lung bases are clear.    The liver, the spleen, and the pancreas appear normal.    The gallbladder is unremarkable.  There is no bile duct dilatation.    The kidneys are normal.    The aorta and inferior vena cava are unremarkable.    There is eccentric wall thickening seen involving the mid body of the stomach measuring up to 1.8 cm along the lesser curvature.  This extends for a length of approximately 8 cm.  The there is a enlarged gastrohepatic ligament lymph node measuring 1.6 cm.  No evidence of bowel obstruction.  No evidence of appendicitis.  No evidence of diverticulitis.    Bladder is normal.Previous hysterectomy.  No pelvic masses abnormal pelvic fluid collections.  The    Skeletal structures are intact.  No acute skeletal findings.      Impression    Eccentric irregular wall thickening of the mid gastric body concerning for malignancy.  There appears to be associated enlarged 1.6 cm gastrohepatic lymph node.  Clinical correlation advised.  Correlation with endoscopy is recommended if not previously performed.    All CT scans at this facility use dose modulation, iterative reconstructions, and/or weight base dosing when appropriate to reduce radiation dose to as low as reasonably achievable      Electronically signed by: Je Peña MD  Date:    02/08/2024  Time:    13:04        ?   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 Jerome  Per NCCN guidelines, NA immunotherapy is useful in MSI-H/dMMR tumors) with options being Nivolumab and ipilimumab followed by nivolumab or Pembrolizumab.  Given patient's comorbid conditions and potentially increased toxicity with CTLA4 inhibitor and PD-1 inhibitor, weill pre-certify for Pembrolizumab    Labs reviewed, no concerning findings  Consent signed   Proceed with C1D1 Keytruda today      Follow-up in 3 weeks with labs prior for Keytruda         Relevant Orders    CBC Auto Differential    Comprehensive Metabolic Panel    TSH    Ferritin    Iron and TIBC    Folate    Vitamin B12     Other Visit Diagnoses       Drug-induced thyroiditis        Relevant Orders    TSH    Deficiency of other specified B group vitamins        Relevant Orders    Folate    Vitamin B12                 Med Onc Chart Routing      Follow up with physician 3 weeks. with Dr. Ambrose and  labs prior for Keytruda   Follow up with JENNIE    Infusion scheduling note   Keytruda   Injection scheduling note    Labs CBC, CMP, ferritin, vitamin B12, folate, iron and TIBC and TSH   Scheduling:  Preferred lab:  Lab interval:     Imaging    Pharmacy appointment    Other referrals                   SEAN Luis  FNP-C  Hematology/Oncology

## 2024-03-26 NOTE — NURSING
Pt tolerated Keytruda infusion well. No adverse reaction noted. Pt education reinforced on possible side effects, what to expect, and when to call . Pt verbalized understanding. I reviewed pt calendar w/ pt and understanding verbalized. IV flushed w/ NS and D/C per protocol.

## 2024-03-27 ENCOUNTER — PATIENT MESSAGE (OUTPATIENT)
Dept: ADMINISTRATIVE | Facility: OTHER | Age: 74
End: 2024-03-27
Payer: MEDICARE

## 2024-03-28 ENCOUNTER — PATIENT MESSAGE (OUTPATIENT)
Dept: ADMINISTRATIVE | Facility: OTHER | Age: 74
End: 2024-03-28
Payer: MEDICARE

## 2024-04-01 ENCOUNTER — TUMOR BOARD CONFERENCE (OUTPATIENT)
Dept: SURGERY | Facility: CLINIC | Age: 74
End: 2024-04-01
Payer: MEDICARE

## 2024-04-01 NOTE — PROGRESS NOTES
[facility] UGI MULTIDISCIPLINARY TUMOR BOARD  PATIENT REVIEW FORM   ____________________________________________________________    CLINIC #: [id number]  DATE: 4/8/2024    DIAGNOSIS: Gastric GARRY    PRESENTER: Perone    PATIENT SUMMARY:   This 74 y/o female was dx with ovarian CA 1/2023, underwent surgery and completed 6 cycles of adj chemotherapy 7/2023. She developed hematemesis in Feb 2024, then underwent EGD with bx of a large ulcerated gastric mass. Pathology revealed poorly differentiated adenocarcinoma, with intestinal expression, MSI high, HER 2 negative. She was presented to this [facility] last month. Since then, she underwent diag lap and today's presentation is for pathology review. Negative for malignancy, reactive atypia     BOARD RECOMMENDATIONS:   Proceed with immunotherapy, then restage with imaging  Potential candidate for subtotal distal gastrectomy    CONSULT NEEDED:     [] Surgery    [] Hem/Onc    [] Rad/Onc    [] Dietary                 [] Social Service    [] Psychology       [] AES  [] Radiology     Clinical Stage: Tumor   Node(s) 1 Metastasis 0      GROUP STAGE:  [] O    [] 1A    [] IB    [] IIA    [] IIB     [] IIIA     [] IIIB     [] IIIC    []IV  [] Local recurrence     [] Regional recurrence     [] Distant recurrence   Metastatic site(s): none         [x] Edna'l Treatment Guidelines reviewed and care planned is consistent with guidelines.         (i.e., NCCN, NCI, PD, ACO, AUA, etc.)    PRESENTATION AT CANCER CONFERENCE:         [x] Prospective    [] Retrospective     [] Follow-Up

## 2024-04-03 ENCOUNTER — PATIENT MESSAGE (OUTPATIENT)
Dept: ADMINISTRATIVE | Facility: OTHER | Age: 74
End: 2024-04-03
Payer: MEDICARE

## 2024-04-04 ENCOUNTER — PATIENT MESSAGE (OUTPATIENT)
Dept: ADMINISTRATIVE | Facility: OTHER | Age: 74
End: 2024-04-04
Payer: MEDICARE

## 2024-04-05 ENCOUNTER — PATIENT MESSAGE (OUTPATIENT)
Dept: ADMINISTRATIVE | Facility: OTHER | Age: 74
End: 2024-04-05
Payer: MEDICARE

## 2024-04-06 ENCOUNTER — PATIENT MESSAGE (OUTPATIENT)
Dept: ADMINISTRATIVE | Facility: OTHER | Age: 74
End: 2024-04-06
Payer: MEDICARE

## 2024-04-07 ENCOUNTER — PATIENT MESSAGE (OUTPATIENT)
Dept: ADMINISTRATIVE | Facility: OTHER | Age: 74
End: 2024-04-07
Payer: MEDICARE

## 2024-04-08 ENCOUNTER — PATIENT MESSAGE (OUTPATIENT)
Dept: ADMINISTRATIVE | Facility: OTHER | Age: 74
End: 2024-04-08
Payer: MEDICARE

## 2024-04-09 ENCOUNTER — PATIENT MESSAGE (OUTPATIENT)
Dept: ADMINISTRATIVE | Facility: OTHER | Age: 74
End: 2024-04-09
Payer: MEDICARE

## 2024-04-10 ENCOUNTER — PATIENT MESSAGE (OUTPATIENT)
Dept: ADMINISTRATIVE | Facility: OTHER | Age: 74
End: 2024-04-10
Payer: MEDICARE

## 2024-04-11 ENCOUNTER — PATIENT MESSAGE (OUTPATIENT)
Dept: ADMINISTRATIVE | Facility: OTHER | Age: 74
End: 2024-04-11
Payer: MEDICARE

## 2024-04-12 ENCOUNTER — PATIENT MESSAGE (OUTPATIENT)
Dept: ADMINISTRATIVE | Facility: OTHER | Age: 74
End: 2024-04-12
Payer: MEDICARE

## 2024-04-13 ENCOUNTER — PATIENT MESSAGE (OUTPATIENT)
Dept: ADMINISTRATIVE | Facility: OTHER | Age: 74
End: 2024-04-13
Payer: MEDICARE

## 2024-04-14 ENCOUNTER — PATIENT MESSAGE (OUTPATIENT)
Dept: ADMINISTRATIVE | Facility: OTHER | Age: 74
End: 2024-04-14
Payer: MEDICARE

## 2024-04-15 ENCOUNTER — LAB VISIT (OUTPATIENT)
Dept: LAB | Facility: HOSPITAL | Age: 74
End: 2024-04-15
Payer: MEDICARE

## 2024-04-15 ENCOUNTER — PATIENT MESSAGE (OUTPATIENT)
Dept: ADMINISTRATIVE | Facility: OTHER | Age: 74
End: 2024-04-15
Payer: MEDICARE

## 2024-04-15 DIAGNOSIS — C16.9 GASTRIC ADENOCARCINOMA: ICD-10-CM

## 2024-04-15 DIAGNOSIS — E53.8 DEFICIENCY OF OTHER SPECIFIED B GROUP VITAMINS: ICD-10-CM

## 2024-04-15 DIAGNOSIS — E06.4 DRUG-INDUCED THYROIDITIS: ICD-10-CM

## 2024-04-15 LAB
ALBUMIN SERPL BCP-MCNC: 3.3 G/DL (ref 3.5–5.2)
ALP SERPL-CCNC: 90 U/L (ref 55–135)
ALT SERPL W/O P-5'-P-CCNC: 16 U/L (ref 10–44)
ANION GAP SERPL CALC-SCNC: 10 MMOL/L (ref 8–16)
AST SERPL-CCNC: 17 U/L (ref 10–40)
BASOPHILS # BLD AUTO: 0.04 K/UL (ref 0–0.2)
BASOPHILS NFR BLD: 0.9 % (ref 0–1.9)
BILIRUB SERPL-MCNC: 0.2 MG/DL (ref 0.1–1)
BUN SERPL-MCNC: 14 MG/DL (ref 8–23)
CALCIUM SERPL-MCNC: 9.5 MG/DL (ref 8.7–10.5)
CHLORIDE SERPL-SCNC: 108 MMOL/L (ref 95–110)
CO2 SERPL-SCNC: 25 MMOL/L (ref 23–29)
CREAT SERPL-MCNC: 0.9 MG/DL (ref 0.5–1.4)
DIFFERENTIAL METHOD BLD: ABNORMAL
EOSINOPHIL # BLD AUTO: 0.1 K/UL (ref 0–0.5)
EOSINOPHIL NFR BLD: 3.2 % (ref 0–8)
ERYTHROCYTE [DISTWIDTH] IN BLOOD BY AUTOMATED COUNT: 13.9 % (ref 11.5–14.5)
EST. GFR  (NO RACE VARIABLE): >60 ML/MIN/1.73 M^2
FERRITIN SERPL-MCNC: 27 NG/ML (ref 20–300)
FOLATE SERPL-MCNC: 12.8 NG/ML (ref 4–24)
GLUCOSE SERPL-MCNC: 112 MG/DL (ref 70–110)
HCT VFR BLD AUTO: 39.7 % (ref 37–48.5)
HGB BLD-MCNC: 12.3 G/DL (ref 12–16)
IMM GRANULOCYTES # BLD AUTO: 0.03 K/UL (ref 0–0.04)
IMM GRANULOCYTES NFR BLD AUTO: 0.7 % (ref 0–0.5)
IRON SERPL-MCNC: 54 UG/DL (ref 30–160)
LYMPHOCYTES # BLD AUTO: 1.3 K/UL (ref 1–4.8)
LYMPHOCYTES NFR BLD: 30 % (ref 18–48)
MCH RBC QN AUTO: 31.6 PG (ref 27–31)
MCHC RBC AUTO-ENTMCNC: 31 G/DL (ref 32–36)
MCV RBC AUTO: 102 FL (ref 82–98)
MONOCYTES # BLD AUTO: 0.5 K/UL (ref 0.3–1)
MONOCYTES NFR BLD: 10.4 % (ref 4–15)
NEUTROPHILS # BLD AUTO: 2.4 K/UL (ref 1.8–7.7)
NEUTROPHILS NFR BLD: 54.8 % (ref 38–73)
NRBC BLD-RTO: 0 /100 WBC
PLATELET # BLD AUTO: 320 K/UL (ref 150–450)
PMV BLD AUTO: 9.1 FL (ref 9.2–12.9)
POTASSIUM SERPL-SCNC: 4.8 MMOL/L (ref 3.5–5.1)
PROT SERPL-MCNC: 6.9 G/DL (ref 6–8.4)
RBC # BLD AUTO: 3.89 M/UL (ref 4–5.4)
SATURATED IRON: 17 % (ref 20–50)
SODIUM SERPL-SCNC: 143 MMOL/L (ref 136–145)
TOTAL IRON BINDING CAPACITY: 312 UG/DL (ref 250–450)
TRANSFERRIN SERPL-MCNC: 211 MG/DL (ref 200–375)
TSH SERPL DL<=0.005 MIU/L-ACNC: 2.07 UIU/ML (ref 0.4–4)
VIT B12 SERPL-MCNC: 1074 PG/ML (ref 210–950)
WBC # BLD AUTO: 4.43 K/UL (ref 3.9–12.7)

## 2024-04-15 PROCEDURE — 82607 VITAMIN B-12: CPT

## 2024-04-15 PROCEDURE — 85025 COMPLETE CBC W/AUTO DIFF WBC: CPT

## 2024-04-15 PROCEDURE — 82746 ASSAY OF FOLIC ACID SERUM: CPT

## 2024-04-15 PROCEDURE — 80053 COMPREHEN METABOLIC PANEL: CPT

## 2024-04-15 PROCEDURE — 82728 ASSAY OF FERRITIN: CPT

## 2024-04-15 PROCEDURE — 84443 ASSAY THYROID STIM HORMONE: CPT

## 2024-04-15 PROCEDURE — 36415 COLL VENOUS BLD VENIPUNCTURE: CPT | Mod: PO

## 2024-04-15 PROCEDURE — 83540 ASSAY OF IRON: CPT

## 2024-04-15 NOTE — PROGRESS NOTES
Subjective:       Patient ID: Cheryl Garcia is a 73 y.o. female.    Chief Complaint: Gastric Cancer and Chemotherapy    Primary Oncologist/Hematologist: Dr. Amin    HPI: Ms. 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 She is here for cycle 2 day 1 of pembrolizumab (keytruda) q 3 weeks.   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 Presented at GI tumor board for hematemesis with large ulcerated gastric mass seen on EGD and recommendations for neoadjuvant immunotherapy. Diagnostic Laparoscopy completed  03/14/2024 with Dr. Jerome.   Pmhx:history of DVT not currently on AC, previously on Xarelto, osteoporosis on fosamax    Today: She states she has been well. She has some stomach pain, taking tylenol when she needs to. She does have oxycodone but states she hasn't needed it thus far. She denies any n/v/d/c, fevers, illnesses, bleeding. She states her appetite is mediocre, stable and she has been trying to stay hydrated. She continues tot take oral iron 2x/day without issues.     Social History     Socioeconomic History    Marital status:    Tobacco Use    Smoking status: Former     Types: Cigarettes    Smokeless tobacco: Never    Tobacco comments:     Quit 1989 smoked 10 years smoked 0.25 ppd    Substance and Sexual Activity    Alcohol use: Not Currently    Drug use: Never     Social Determinants of Health     Financial Resource Strain: Low Risk  (2/23/2024)    Overall Financial Resource Strain (CARDIA)     Difficulty of Paying Living Expenses: Not hard at all   Food Insecurity: No Food Insecurity (2/23/2024)    Hunger Vital Sign     Worried About Running Out of Food in the Last Year: Never true     Ran Out of Food in the Last Year: Never true   Transportation Needs: No Transportation Needs (2/23/2024)    PRAPARE - Transportation     Lack of Transportation  (Medical): No     Lack of Transportation (Non-Medical): No   Physical Activity: Inactive (2/23/2024)    Exercise Vital Sign     Days of Exercise per Week: 0 days     Minutes of Exercise per Session: 10 min   Stress: No Stress Concern Present (2/23/2024)    [employer] [place] [employer] - Occupational Stress Questionnaire     Feeling of Stress : Not at all   Social Connections: Unknown (2/23/2024)    Social Connection and Isolation Panel [NHANES]     Frequency of Communication with Friends and Family: More than three times a week     Frequency of Social Gatherings with Friends and Family: Three times a week     Active Member of Clubs or Organizations: Yes     Attends Club or Organization Meetings: More than 4 times per year     Marital Status:    Housing Stability: Low Risk  (2/23/2024)    Housing Stability Vital Sign     Unable to Pay for Housing in the Last Year: No     Number of Places Lived in the Last Year: 1     Unstable Housing in the Last Year: No       Past Medical History:   Diagnosis Date    Anemia     Chronic deep vein thrombosis (DVT) of left lower extremity 10/21/2022    Deep vein thrombosis     Drug-induced polyneuropathy 02/28/2024    Noted by ORCK KAY NP  last documented on [id number]    DVT (deep venous thrombosis)     Epilepsy     Gastric adenocarcinoma 02/27/2024    GERD (gastroesophageal reflux disease)     Hyperlipidemia 01/10/2013    Formatting of this note might be different from the original.   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    OP (osteoporosis) 02/28/2024    Ovarian cancer     Primary hypertension 12/01/2022       Family History   Problem Relation Name Age of Onset    Pancreatic cancer Brother Misael Mccray 76    Prostate cancer Brother Zachary 67    Pancreatic cancer Half-brother Gasper 74    Colon cancer Half-sister Elton 83    Lung cancer Half-sister Elton         +smoking hx    Breast cancer Half-sister Vidhi 58     Lymphoma Other Cara Sun    Prostate cancer Other Fernando     Prostate cancer Other Gasper Calderon     Ovarian cancer Other Adilene     Breast cancer Other Aleida     Colon cancer Other Aleida        Past Surgical History:   Procedure Laterality Date    ABDOMINAL WASHOUT N/A 3/14/2024    Procedure: LAVAGE, PERITONEAL, THERAPEUTIC;  Surgeon: Chen Jerome MD;  Location: [facility];  Service: General;  Laterality: N/A;    APPENDECTOMY  2015    BIOPSY OF PERITONEUM N/A 3/14/2024    Procedure: BIOPSY, PERITONEUM;  Surgeon: Chen Jerome MD;  Location: [facility] OR;  Service: General;  Laterality: N/A;    COLONOSCOPY  06/20/2012    Dr Boyle- Tubular adenoma polyps. Repeat in 3 years.    COLONOSCOPY  06/17/2015    -Nodular mucosa at appendiceal orfice, sigmoid and desc diverticulosis otherwise normal.    COLONOSCOPY  2020    >3 TAs    COLONOSCOPY  12/2023    DIAGNOSTIC LAPAROSCOPY N/A 3/14/2024    Procedure: LAPAROSCOPY, DIAGNOSTIC;  Surgeon: Chen Jerome MD;  Location: [facility];  Service: General;  Laterality: N/A;  1. Diagnostic laparoscopy   2. Extensive lysis of adhesions  3. Peritoneal biopsy   4. Peritoneal washings for cytology    EGD - EXTERNAL RESULT  06/20/2012    Dr Boyle- Mild gastritis otherwise normal.    ESOPHAGOGASTRODUODENOSCOPY N/A 02/08/2024    Procedure: EGD (ESOPHAGOGASTRODUODENOSCOPY);  Surgeon: Jayla Arteaga MD;  Location: [facility];  Service: Endoscopy;  Laterality: N/A;    HYSTERECTOMY      LAPAROSCOPIC LYSIS OF ADHESIONS N/A 3/14/2024    Procedure: LYSIS, ADHESIONS, LAPAROSCOPIC;  Surgeon: Chen Jerome MD;  Location: [facility];  Service: General;  Laterality: N/A;    TOTAL ABDOMINAL HYSTERECTOMY W/ BILATERAL SALPINGOOPHORECTOMY  01/09/2023    radical resection with right salpingo-oophorectomy, left salpingo-oophorectomy, extensive enterolysis, extensive adhesiolysis, left pelvic lymph node biopsy, omental biopsy, small bowel resection with primary  functional end-to-end anastomosis, placement of pelvic drain       Review of Systems   Constitutional:  Negative for activity change, appetite change, chills, diaphoresis, fatigue, fever and unexpected weight change.   HENT:  Negative for congestion, nosebleeds and rhinorrhea.    Respiratory:  Negative for cough and shortness of breath.    Cardiovascular:  Negative for chest pain and leg swelling.   Gastrointestinal:  Positive for abdominal pain. Negative for anal bleeding, blood in stool, constipation, diarrhea, nausea and vomiting.   Genitourinary:  Negative for hematuria.   Musculoskeletal:  Negative for arthralgias.   Skin:  Negative for color change and pallor.   Neurological:  Negative for dizziness, light-headedness and headaches.         Medication List with Changes/Refills   Current Medications    ACETAMINOPHEN (TYLENOL) 500 MG TABLET    Take 2 tablets (1,000 mg total) by mouth every 8 (eight) hours.    ALENDRONATE (FOSAMAX) 70 MG TABLET    Take 70 mg by mouth every 7 days. Pt stated she takes this every Sunday    CALCIUM PHOSPHATE TRIB/VIT D3 (CALCIUM PHOSPHATE-VITAMIN D3 ORAL)    Take 1 tablet by mouth 2 (two) times daily.    CARBAMAZEPINE (TEGRETOL) 200 MG TABLET    Take by mouth. Pt states she take TID  1 tablet with breakfast  1 tablet with lunch   1.5 tablet at bedtime    IBUPROFEN (ADVIL,MOTRIN) 800 MG TABLET    Take 1 tablet (800 mg total) by mouth every 8 (eight) hours.    LEVETIRACETAM (KEPPRA) 500 MG TAB    Take 1 tablet by mouth 2 (two) times daily. Pt states she takes one pill at dinner and one at bedtime    OXYCODONE (ROXICODONE) 5 MG IMMEDIATE RELEASE TABLET    Take 1 tablet (5 mg total) by mouth every 4 (four) hours as needed for Pain.    PANTOPRAZOLE (PROTONIX) 40 MG TABLET    Take 40 mg by mouth once daily.    ZONISAMIDE (ZONEGRAN) 100 MG CAP    Take 200 mg by mouth 2 (two) times daily.     Objective:     Vitals:    04/16/24 0817   BP: 137/79   Pulse: 71   Resp: 18   Temp: 98.2 °F (36.8  °C)       Physical Exam  Constitutional:       General: She is not in acute distress.     Appearance: She is not ill-appearing, toxic-appearing or diaphoretic.   Neurological:      Mental Status: She is alert.   Psychiatric:         Mood and Affect: Mood normal.         Thought Content: Thought content normal.          Physical exam limited due to video visit    Labs/Results:  Lab Results   Component Value Date    WBC 4.43 04/15/2024    RBC 3.89 (L) 04/15/2024    HGB 12.3 04/15/2024    HCT 39.7 04/15/2024     (H) 04/15/2024    MCH 31.6 (H) 04/15/2024    MCHC 31.0 (L) 04/15/2024    RDW 13.9 04/15/2024     04/15/2024    MPV 9.1 (L) 04/15/2024    GRAN 2.4 04/15/2024    GRAN 54.8 04/15/2024    LYMPH 1.3 04/15/2024    LYMPH 30.0 04/15/2024    MONO 0.5 04/15/2024    MONO 10.4 04/15/2024    EOS 0.1 04/15/2024    BASO 0.04 04/15/2024    EOSINOPHIL 3.2 04/15/2024    BASOPHIL 0.9 04/15/2024      Latest Reference Range & Units 04/15/24 09:10   Iron 30 - 160 ug/dL 54   TIBC 250 - 450 ug/dL 312   Saturated Iron 20 - 50 % 17 (L)   Transferrin 200 - 375 mg/dL 211   Ferritin 20.0 - 300.0 ng/mL 27   Folate 4.0 - 24.0 ng/mL 12.8   Vitamin B12 210 - 950 pg/mL 1074 (H)     CMP  Sodium   Date Value Ref Range Status   04/15/2024 143 136 - 145 mmol/L Final     Potassium   Date Value Ref Range Status   04/15/2024 4.8 3.5 - 5.1 mmol/L Final     Chloride   Date Value Ref Range Status   04/15/2024 108 95 - 110 mmol/L Final     CO2   Date Value Ref Range Status   04/15/2024 25 23 - 29 mmol/L Final     Glucose   Date Value Ref Range Status   04/15/2024 112 (H) 70 - 110 mg/dL Final     BUN   Date Value Ref Range Status   04/15/2024 14 8 - 23 mg/dL Final     Creatinine   Date Value Ref Range Status   04/15/2024 0.9 0.5 - 1.4 mg/dL Final     Calcium   Date Value Ref Range Status   04/15/2024 9.5 8.7 - 10.5 mg/dL Final     Total Protein   Date Value Ref Range Status   04/15/2024 6.9 6.0 - 8.4 g/dL Final     Albumin   Date Value Ref  Range Status   04/15/2024 3.3 (L) 3.5 - 5.2 g/dL Final     Total Bilirubin   Date Value Ref Range Status   04/15/2024 0.2 0.1 - 1.0 mg/dL Final     Comment:     For infants and newborns, interpretation of results should be based  on gestational age, weight and in agreement with clinical  observations.    Premature Infant recommended reference ranges:  Up to 24 hours.............<8.0 mg/dL  Up to 48 hours............<12.0 mg/dL  3-5 days..................<15.0 mg/dL  6-29 days.................<15.0 mg/dL       Alkaline Phosphatase   Date Value Ref Range Status   04/15/2024 90 55 - 135 U/L Final     AST   Date Value Ref Range Status   04/15/2024 17 10 - 40 U/L Final     ALT   Date Value Ref Range Status   04/15/2024 16 10 - 44 U/L Final     Anion Gap   Date Value Ref Range Status   04/15/2024 10 8 - 16 mmol/L Final     eGFR   Date Value Ref Range Status   04/15/2024 >60 >60 mL/min/1.73 m^2 Final      Latest Reference Range & Units 04/15/24 09:10   TSH 0.400 - 4.000 uIU/mL 2.074     Assessment:     Problem List Items Addressed This Visit          Oncology    Mixed epithelial carcinoma of right ovary - Primary    Gastric adenocarcinoma     Plan:     Gastric adenocarcinoma  --PET-CT 02/28/24 showed no evidence of distant metastatic disease.  --Diagnostic Laparoscopy completed  03/14/2024 with Dr. Jerome  --recommendations to start with neoadjuvamt immunotherapy  --continue with cycle 2 day 1 of pembrolizumab (keytruda) q 3 weeks  --crcl:50.1 ml/min, tbili:0.2 AST:17, ALT:16, glucose:112  --continue with oral iron 2x/day    Follow-Up: 3 weeks with cbc cmp tsh mag phos prior for next cycle with primary oncologist-standing orders in  NO VIRTUAL VISITS    Graciela Fry PA-C  Hematology Oncology    [place]  The chied complaint leading to consultation is: gastric adenocarcinoma  Visit type:  audio video   Face to Face time with patient: 15 minutes of total time spent on the encounter, which  includes face to face time and non-face to face time preparing to see the patient (eg, review of tests), Obtaining and/or reviewing separately obtained history, Documenting clinical information in the electronic or other health record, Independently interpreting results (not separately reported) and communicating results to the patient/family/caregiver, or Care coordination (not separately reported).   Each patient to whom he or she provides medical services by telemedicine is:  (1) informed of the relationship between the provider and patient and the respective role of any other health care provider with respect to management of the patient; and (2) notified that he or she may decline to receive medical services     Route Chart for Scheduling    Med Onc Chart Routing      Follow up with physician . 3 weeks with cbc cmp tsh mag phos prior   Follow up with JENNIE    Infusion scheduling note   3 weeks for next cycle   Injection scheduling note    Labs CMP, CBC, TSH, phosphorus and magnesium   Scheduling:  Preferred lab:  Lab interval:  standing orders in   Imaging    Pharmacy appointment    Other referrals                  Treatment Plan Information   OP pembrolizumab 200mg Q3W   Claudia Ambrose MD   Upcoming Treatment Dates - OP pembrolizumab 200mg Q3W    4/16/2024       Chemotherapy       pembrolizumab (KEYTRUDA) 200 mg in sodium chloride 0.9% SolP 108 mL infusion  5/7/2024       Chemotherapy       pembrolizumab (KEYTRUDA) 200 mg in sodium chloride 0.9% SolP 108 mL infusion  5/28/2024       Chemotherapy       pembrolizumab (KEYTRUDA) 200 mg in sodium chloride 0.9% SolP 108 mL infusion  6/18/2024       Chemotherapy       pembrolizumab (KEYTRUDA) 200 mg in sodium chloride 0.9% SolP 108 mL infusion

## 2024-04-16 ENCOUNTER — INFUSION (OUTPATIENT)
Dept: INFUSION THERAPY | Facility: HOSPITAL | Age: 74
End: 2024-04-16
Attending: INTERNAL MEDICINE
Payer: MEDICARE

## 2024-04-16 ENCOUNTER — PATIENT MESSAGE (OUTPATIENT)
Dept: ADMINISTRATIVE | Facility: OTHER | Age: 74
End: 2024-04-16
Payer: MEDICARE

## 2024-04-16 ENCOUNTER — OFFICE VISIT (OUTPATIENT)
Dept: HEMATOLOGY/ONCOLOGY | Facility: CLINIC | Age: 74
End: 2024-04-16
Payer: MEDICARE

## 2024-04-16 VITALS
TEMPERATURE: 98 F | WEIGHT: 127.44 LBS | DIASTOLIC BLOOD PRESSURE: 79 MMHG | BODY MASS INDEX: 21.2 KG/M2 | SYSTOLIC BLOOD PRESSURE: 137 MMHG | HEART RATE: 71 BPM | RESPIRATION RATE: 18 BRPM

## 2024-04-16 VITALS
DIASTOLIC BLOOD PRESSURE: 76 MMHG | OXYGEN SATURATION: 97 % | RESPIRATION RATE: 16 BRPM | BODY MASS INDEX: 21.23 KG/M2 | WEIGHT: 127.44 LBS | HEIGHT: 65 IN | SYSTOLIC BLOOD PRESSURE: 121 MMHG | HEART RATE: 78 BPM | TEMPERATURE: 97 F

## 2024-04-16 DIAGNOSIS — C16.9 GASTRIC ADENOCARCINOMA: ICD-10-CM

## 2024-04-16 DIAGNOSIS — C16.9 GASTRIC ADENOCARCINOMA: Primary | ICD-10-CM

## 2024-04-16 DIAGNOSIS — C56.1: Primary | ICD-10-CM

## 2024-04-16 PROCEDURE — 25000003 PHARM REV CODE 250

## 2024-04-16 PROCEDURE — 1101F PT FALLS ASSESS-DOCD LE1/YR: CPT | Mod: CPTII,95,,

## 2024-04-16 PROCEDURE — 3075F SYST BP GE 130 - 139MM HG: CPT | Mod: CPTII,95,,

## 2024-04-16 PROCEDURE — 3078F DIAST BP <80 MM HG: CPT | Mod: CPTII,95,,

## 2024-04-16 PROCEDURE — 3288F FALL RISK ASSESSMENT DOCD: CPT | Mod: CPTII,95,,

## 2024-04-16 PROCEDURE — 1126F AMNT PAIN NOTED NONE PRSNT: CPT | Mod: CPTII,95,,

## 2024-04-16 PROCEDURE — 96413 CHEMO IV INFUSION 1 HR: CPT

## 2024-04-16 PROCEDURE — 99215 OFFICE O/P EST HI 40 MIN: CPT | Mod: 95,,,

## 2024-04-16 PROCEDURE — 3008F BODY MASS INDEX DOCD: CPT | Mod: CPTII,95,,

## 2024-04-16 PROCEDURE — 63600175 PHARM REV CODE 636 W HCPCS: Mod: JZ,JG

## 2024-04-16 RX ORDER — SODIUM CHLORIDE 0.9 % (FLUSH) 0.9 %
10 SYRINGE (ML) INJECTION
Status: CANCELLED | OUTPATIENT
Start: 2024-04-16

## 2024-04-16 RX ORDER — EPINEPHRINE 0.3 MG/.3ML
0.3 INJECTION SUBCUTANEOUS ONCE AS NEEDED
Status: CANCELLED | OUTPATIENT
Start: 2024-04-16

## 2024-04-16 RX ORDER — PROCHLORPERAZINE EDISYLATE 5 MG/ML
5 INJECTION INTRAMUSCULAR; INTRAVENOUS ONCE AS NEEDED
Status: CANCELLED | OUTPATIENT
Start: 2024-04-16

## 2024-04-16 RX ORDER — HEPARIN 100 UNIT/ML
500 SYRINGE INTRAVENOUS
Status: CANCELLED | OUTPATIENT
Start: 2024-04-16

## 2024-04-16 RX ORDER — DIPHENHYDRAMINE HYDROCHLORIDE 50 MG/ML
50 INJECTION INTRAMUSCULAR; INTRAVENOUS ONCE AS NEEDED
Status: CANCELLED | OUTPATIENT
Start: 2024-04-16

## 2024-04-16 RX ADMIN — SODIUM CHLORIDE 200 MG: 9 INJECTION, SOLUTION INTRAVENOUS at 09:04

## 2024-04-16 NOTE — PLAN OF CARE
Patient tolerated Keytruda well today; no adverse reaction noted.  POC reviewed with pt.  NAD noted upon discharge.   Has f/u appt(s) scheduled per MD request.    Problem: Adult Inpatient Plan of Care  Goal: Plan of Care Review  4/16/2024 0944 by Aleksandra Lee RN  Outcome: Ongoing, Progressing  4/16/2024 0944 by Aleksandra Lee RN  Outcome: Ongoing, Progressing  Goal: Patient-Specific Goal (Individualized)  4/16/2024 0944 by Aleksandra Lee RN  Outcome: Ongoing, Progressing  4/16/2024 0944 by Aleksandra Lee RN  Outcome: Ongoing, Progressing  Goal: Absence of Hospital-Acquired Illness or Injury  4/16/2024 0944 by Aleksandra Lee RN  Outcome: Ongoing, Progressing  4/16/2024 0944 by Aleksandra Lee RN  Outcome: Ongoing, Progressing  Intervention: Identify and Manage Fall Risk  Flowsheets (Taken 4/16/2024 0944)  Safety Promotion/Fall Prevention: in recliner, wheels locked  Goal: Optimal Comfort and Wellbeing  4/16/2024 0944 by Aleksandra Lee RN  Outcome: Ongoing, Progressing  4/16/2024 0944 by Aleksandra Lee RN  Outcome: Ongoing, Progressing  Intervention: Provide Person-Centered Care  Flowsheets (Taken 4/16/2024 0944)  Trust Relationship/Rapport:   care explained   reassurance provided   choices provided   thoughts/feelings acknowledged   emotional support provided   empathic listening provided   questions answered   questions encouraged

## 2024-04-17 ENCOUNTER — PATIENT MESSAGE (OUTPATIENT)
Dept: ADMINISTRATIVE | Facility: OTHER | Age: 74
End: 2024-04-17
Payer: MEDICARE

## 2024-04-18 ENCOUNTER — PATIENT MESSAGE (OUTPATIENT)
Dept: ADMINISTRATIVE | Facility: OTHER | Age: 74
End: 2024-04-18
Payer: MEDICARE

## 2024-04-19 ENCOUNTER — PATIENT MESSAGE (OUTPATIENT)
Dept: HEMATOLOGY/ONCOLOGY | Facility: CLINIC | Age: 74
End: 2024-04-19
Payer: MEDICARE

## 2024-04-19 ENCOUNTER — PATIENT MESSAGE (OUTPATIENT)
Dept: ADMINISTRATIVE | Facility: OTHER | Age: 74
End: 2024-04-19
Payer: MEDICARE

## 2024-04-20 ENCOUNTER — PATIENT MESSAGE (OUTPATIENT)
Dept: ADMINISTRATIVE | Facility: OTHER | Age: 74
End: 2024-04-20
Payer: MEDICARE

## 2024-04-21 ENCOUNTER — PATIENT MESSAGE (OUTPATIENT)
Dept: ADMINISTRATIVE | Facility: OTHER | Age: 74
End: 2024-04-21
Payer: MEDICARE

## 2024-04-22 ENCOUNTER — PATIENT MESSAGE (OUTPATIENT)
Dept: ADMINISTRATIVE | Facility: OTHER | Age: 74
End: 2024-04-22
Payer: MEDICARE

## 2024-04-23 ENCOUNTER — PATIENT MESSAGE (OUTPATIENT)
Dept: ADMINISTRATIVE | Facility: OTHER | Age: 74
End: 2024-04-23
Payer: MEDICARE

## 2024-04-24 ENCOUNTER — PATIENT MESSAGE (OUTPATIENT)
Dept: ADMINISTRATIVE | Facility: OTHER | Age: 74
End: 2024-04-24
Payer: MEDICARE

## 2024-04-25 ENCOUNTER — PATIENT MESSAGE (OUTPATIENT)
Dept: ADMINISTRATIVE | Facility: OTHER | Age: 74
End: 2024-04-25
Payer: MEDICARE

## 2024-04-26 ENCOUNTER — PATIENT MESSAGE (OUTPATIENT)
Dept: ADMINISTRATIVE | Facility: OTHER | Age: 74
End: 2024-04-26
Payer: MEDICARE

## 2024-04-27 ENCOUNTER — PATIENT MESSAGE (OUTPATIENT)
Dept: ADMINISTRATIVE | Facility: OTHER | Age: 74
End: 2024-04-27
Payer: MEDICARE

## 2024-04-29 ENCOUNTER — PATIENT MESSAGE (OUTPATIENT)
Dept: ADMINISTRATIVE | Facility: OTHER | Age: 74
End: 2024-04-29
Payer: MEDICARE

## 2024-04-30 ENCOUNTER — PATIENT MESSAGE (OUTPATIENT)
Dept: ADMINISTRATIVE | Facility: OTHER | Age: 74
End: 2024-04-30
Payer: MEDICARE

## 2024-05-01 ENCOUNTER — PATIENT MESSAGE (OUTPATIENT)
Dept: ADMINISTRATIVE | Facility: OTHER | Age: 74
End: 2024-05-01
Payer: MEDICARE

## 2024-05-02 ENCOUNTER — PATIENT MESSAGE (OUTPATIENT)
Dept: ADMINISTRATIVE | Facility: OTHER | Age: 74
End: 2024-05-02
Payer: MEDICARE

## 2024-05-03 ENCOUNTER — LAB VISIT (OUTPATIENT)
Dept: LAB | Facility: HOSPITAL | Age: 74
End: 2024-05-03
Attending: INTERNAL MEDICINE
Payer: MEDICARE

## 2024-05-03 ENCOUNTER — PATIENT MESSAGE (OUTPATIENT)
Dept: ADMINISTRATIVE | Facility: OTHER | Age: 74
End: 2024-05-03
Payer: MEDICARE

## 2024-05-03 DIAGNOSIS — C56.1: ICD-10-CM

## 2024-05-03 DIAGNOSIS — K31.89 GASTRIC MASS: ICD-10-CM

## 2024-05-03 DIAGNOSIS — C16.9 ADENOCARCINOMA OF STOMACH: ICD-10-CM

## 2024-05-03 DIAGNOSIS — C16.9 GASTRIC ADENOCARCINOMA: ICD-10-CM

## 2024-05-03 LAB
ALBUMIN SERPL BCP-MCNC: 3.5 G/DL (ref 3.5–5.2)
ALP SERPL-CCNC: 84 U/L (ref 55–135)
ALT SERPL W/O P-5'-P-CCNC: 11 U/L (ref 10–44)
ANION GAP SERPL CALC-SCNC: 8 MMOL/L (ref 8–16)
AST SERPL-CCNC: 16 U/L (ref 10–40)
BASOPHILS # BLD AUTO: 0.04 K/UL (ref 0–0.2)
BASOPHILS NFR BLD: 0.8 % (ref 0–1.9)
BILIRUB SERPL-MCNC: 0.2 MG/DL (ref 0.1–1)
BUN SERPL-MCNC: 12 MG/DL (ref 8–23)
CALCIUM SERPL-MCNC: 9.6 MG/DL (ref 8.7–10.5)
CHLORIDE SERPL-SCNC: 109 MMOL/L (ref 95–110)
CO2 SERPL-SCNC: 23 MMOL/L (ref 23–29)
CREAT SERPL-MCNC: 0.9 MG/DL (ref 0.5–1.4)
DIFFERENTIAL METHOD BLD: ABNORMAL
EOSINOPHIL # BLD AUTO: 0.1 K/UL (ref 0–0.5)
EOSINOPHIL NFR BLD: 2.5 % (ref 0–8)
ERYTHROCYTE [DISTWIDTH] IN BLOOD BY AUTOMATED COUNT: 14.7 % (ref 11.5–14.5)
EST. GFR  (NO RACE VARIABLE): >60 ML/MIN/1.73 M^2
GLUCOSE SERPL-MCNC: 107 MG/DL (ref 70–110)
HCT VFR BLD AUTO: 41.1 % (ref 37–48.5)
HGB BLD-MCNC: 12.7 G/DL (ref 12–16)
IMM GRANULOCYTES # BLD AUTO: 0.01 K/UL (ref 0–0.04)
IMM GRANULOCYTES NFR BLD AUTO: 0.2 % (ref 0–0.5)
LYMPHOCYTES # BLD AUTO: 1.2 K/UL (ref 1–4.8)
LYMPHOCYTES NFR BLD: 23.8 % (ref 18–48)
MAGNESIUM SERPL-MCNC: 1.9 MG/DL (ref 1.6–2.6)
MCH RBC QN AUTO: 31.8 PG (ref 27–31)
MCHC RBC AUTO-ENTMCNC: 30.9 G/DL (ref 32–36)
MCV RBC AUTO: 103 FL (ref 82–98)
MONOCYTES # BLD AUTO: 0.5 K/UL (ref 0.3–1)
MONOCYTES NFR BLD: 9.8 % (ref 4–15)
NEUTROPHILS # BLD AUTO: 3.2 K/UL (ref 1.8–7.7)
NEUTROPHILS NFR BLD: 62.9 % (ref 38–73)
NRBC BLD-RTO: 0 /100 WBC
PHOSPHATE SERPL-MCNC: 2.9 MG/DL (ref 2.7–4.5)
PLATELET # BLD AUTO: 296 K/UL (ref 150–450)
PMV BLD AUTO: 9 FL (ref 9.2–12.9)
POTASSIUM SERPL-SCNC: 4.6 MMOL/L (ref 3.5–5.1)
PROT SERPL-MCNC: 6.7 G/DL (ref 6–8.4)
RBC # BLD AUTO: 4 M/UL (ref 4–5.4)
SODIUM SERPL-SCNC: 140 MMOL/L (ref 136–145)
TSH SERPL DL<=0.005 MIU/L-ACNC: 1.31 UIU/ML (ref 0.4–4)
WBC # BLD AUTO: 5.12 K/UL (ref 3.9–12.7)

## 2024-05-03 PROCEDURE — 84100 ASSAY OF PHOSPHORUS: CPT | Performed by: INTERNAL MEDICINE

## 2024-05-03 PROCEDURE — 83735 ASSAY OF MAGNESIUM: CPT | Performed by: INTERNAL MEDICINE

## 2024-05-03 PROCEDURE — 80053 COMPREHEN METABOLIC PANEL: CPT | Performed by: INTERNAL MEDICINE

## 2024-05-03 PROCEDURE — 85025 COMPLETE CBC W/AUTO DIFF WBC: CPT | Performed by: INTERNAL MEDICINE

## 2024-05-03 PROCEDURE — 36415 COLL VENOUS BLD VENIPUNCTURE: CPT | Mod: PO

## 2024-05-03 PROCEDURE — 84443 ASSAY THYROID STIM HORMONE: CPT

## 2024-05-04 ENCOUNTER — PATIENT MESSAGE (OUTPATIENT)
Dept: ADMINISTRATIVE | Facility: OTHER | Age: 74
End: 2024-05-04
Payer: MEDICARE

## 2024-05-05 ENCOUNTER — PATIENT MESSAGE (OUTPATIENT)
Dept: ADMINISTRATIVE | Facility: OTHER | Age: 74
End: 2024-05-05
Payer: MEDICARE

## 2024-05-06 ENCOUNTER — OFFICE VISIT (OUTPATIENT)
Dept: HEMATOLOGY/ONCOLOGY | Facility: CLINIC | Age: 74
End: 2024-05-06
Payer: MEDICARE

## 2024-05-06 DIAGNOSIS — C56.1: ICD-10-CM

## 2024-05-06 DIAGNOSIS — C16.9 GASTRIC ADENOCARCINOMA: Primary | ICD-10-CM

## 2024-05-06 PROCEDURE — 1160F RVW MEDS BY RX/DR IN RCRD: CPT | Mod: CPTII,95,, | Performed by: NURSE PRACTITIONER

## 2024-05-06 PROCEDURE — 99214 OFFICE O/P EST MOD 30 MIN: CPT | Mod: 25,95,, | Performed by: NURSE PRACTITIONER

## 2024-05-06 PROCEDURE — 1159F MED LIST DOCD IN RCRD: CPT | Mod: CPTII,95,, | Performed by: NURSE PRACTITIONER

## 2024-05-06 RX ORDER — PROCHLORPERAZINE EDISYLATE 5 MG/ML
5 INJECTION INTRAMUSCULAR; INTRAVENOUS ONCE AS NEEDED
Status: CANCELLED | OUTPATIENT
Start: 2024-05-07

## 2024-05-06 RX ORDER — DIPHENHYDRAMINE HYDROCHLORIDE 50 MG/ML
50 INJECTION INTRAMUSCULAR; INTRAVENOUS ONCE AS NEEDED
Status: CANCELLED | OUTPATIENT
Start: 2024-05-07

## 2024-05-06 RX ORDER — EPINEPHRINE 0.3 MG/.3ML
0.3 INJECTION SUBCUTANEOUS ONCE AS NEEDED
Status: CANCELLED | OUTPATIENT
Start: 2024-05-07

## 2024-05-06 RX ORDER — HEPARIN 100 UNIT/ML
500 SYRINGE INTRAVENOUS
Status: CANCELLED | OUTPATIENT
Start: 2024-05-07

## 2024-05-06 RX ORDER — SODIUM CHLORIDE 0.9 % (FLUSH) 0.9 %
10 SYRINGE (ML) INJECTION
Status: CANCELLED | OUTPATIENT
Start: 2024-05-07

## 2024-05-06 NOTE — ASSESSMENT & PLAN NOTE
Cancer Staging   Mixed epithelial carcinoma of right ovary  Staging form: Ovary, Fallopian Tube, and Primary Peritoneal Carcinoma, AJCC 8th Edition  - Clinical stage from 1/9/2023: FIGO Stage IC2, calculated as Stage IC (cT1c2, cN0, cM0) - Signed by Chen Jerome MD on 2/27/2024    Labs reviewed unremarkable. Okay to proceed with C1D3 Keytruda. F/u 3 weeks with cbc cmp tsh mag phos. C1D4 Keytruda

## 2024-05-06 NOTE — PROGRESS NOTES
Subjective:       Patient ID: Cheryl Garcia is a 73 y.o. female.    Chief Complaint: Review labs. Consideration of Keytruda    The patient location is: home  The chief complaint leading to consultation is: review labs. Keytruda     Visit type: audiovisual    Face to Face time with patient: 10 minutes  35 minutes of total time spent on the encounter, which includes face to face time and non-face to face time preparing to see the patient (eg, review of tests), Obtaining and/or reviewing separately obtained history, Documenting clinical information in the electronic or other health record, Independently interpreting results (not separately reported) and communicating results to the patient/family/caregiver, or Care coordination (not separately reported).         Each patient to whom he or she provides medical services by telemedicine is:  (1) informed of the relationship between the physician and patient and the respective role of any other health care provider with respect to management of the patient; and (2) notified that he or she may decline to receive medical services by telemedicine and may withdraw from such care at any time.    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     Per review of the medical record, she initially presented to [facility]/06/24 with report of hematemeiss; she was transferred to [facility] for higher level of care.  On admission to [facility], EGD  was performed and showed a large ulcerated gastric mass.  Pathology results it as poorly differentiated  adenocarcinoma.     She was presented in the upper GI tumor board with consensus for neoadjuvant immunotherapy given MSI high status of her tumor.       Today's visit:  Patient presenting today for lab review and consideration of Keytruda. She notes tolerating treatments well without any significant side effects. She denies any clinical concerns.      Social History     Socioeconomic History    Marital status:    Tobacco Use    Smoking status: Former     Types: Cigarettes    Smokeless tobacco: Never    Tobacco comments:     Quit 1989 smoked 10 years smoked 0.25 ppd    Substance and Sexual Activity    Alcohol use: Not Currently    Drug use: Never     Social Determinants of Health     Financial Resource Strain: Low Risk  (2/23/2024)    Overall Financial Resource Strain (CARDIA)     Difficulty of Paying Living Expenses: Not hard at all   Food Insecurity: No Food Insecurity (2/23/2024)    Hunger Vital Sign     Worried About Running Out of Food in the Last Year: Never true     Ran Out of Food in the Last Year: Never true   Transportation Needs: No Transportation Needs (2/23/2024)    PRAPARE - Transportation     Lack of Transportation (Medical): No     Lack of Transportation (Non-Medical): No   Physical Activity: Inactive (2/23/2024)    Exercise Vital Sign     Days of Exercise per Week: 0 days     Minutes of Exercise per Session: 10 min   Stress: No Stress Concern Present (2/23/2024)    [employer] [place] [employer] - Occupational Stress Questionnaire     Feeling of Stress : Not at all   Housing Stability: Low Risk  (2/23/2024)    Housing Stability Vital Sign     Unable to Pay for Housing in the Last Year: No     Number of Places Lived in the Last Year: 1     Unstable Housing in the Last Year: No       Past Medical History:   Diagnosis Date    Anemia     Chronic deep vein thrombosis (DVT) of left lower extremity 10/21/2022    Deep vein thrombosis     Drug-induced polyneuropathy  02/28/2024    Noted by ROCK KAY NP  last documented on [id number]    DVT (deep venous thrombosis)     Epilepsy     Gastric adenocarcinoma 02/27/2024    GERD (gastroesophageal reflux disease)     Hyperlipidemia 01/10/2013    Formatting of this note might be different from the original.   ICD-10 Transition    Mixed epithelial carcinoma of right ovary 01/09/2023    OP (osteoporosis) 02/28/2024    Ovarian cancer     Primary hypertension 12/01/2022       Family History   Problem Relation Name Age of Onset    Pancreatic cancer Brother Misael Mccray 76    Prostate cancer Brother Zachary 67    Pancreatic cancer Half-brother Gasper 74    Colon cancer Half-sister Elton 83    Lung cancer Half-sister Elton         +smoking hx    Breast cancer Half-sister Vidhi 58    Lymphoma Other Cara Sun    Prostate cancer Other Fernando     Prostate cancer Other Gasper Calderon     Ovarian cancer Other [place]     Breast cancer Other Aleida     Colon cancer Other Aleida        Past Surgical History:   Procedure Laterality Date    ABDOMINAL WASHOUT N/A 3/14/2024    Procedure: LAVAGE, PERITONEAL, THERAPEUTIC;  Surgeon: Chen Jerome MD;  Location: [facility] OR;  Service: General;  Laterality: N/A;    APPENDECTOMY  2015    BIOPSY OF PERITONEUM N/A 3/14/2024    Procedure: BIOPSY, PERITONEUM;  Surgeon: Chen Jerome MD;  Location: [facility] OR;  Service: General;  Laterality: N/A;    COLONOSCOPY  06/20/2012    Dr Boyle- Tubular adenoma polyps. Repeat in 3 years.    COLONOSCOPY  06/17/2015    -Nodular mucosa at appendiceal orfice, sigmoid and desc diverticulosis otherwise normal.    COLONOSCOPY  2020    >3 TAs    COLONOSCOPY  12/2023    DIAGNOSTIC LAPAROSCOPY N/A 3/14/2024    Procedure: LAPAROSCOPY, DIAGNOSTIC;  Surgeon: Chen Jerome MD;  Location: [facility] OR;  Service: General;  Laterality: N/A;  1. Diagnostic laparoscopy   2. Extensive lysis of adhesions  3. Peritoneal biopsy    4. Peritoneal washings for cytology    EGD - EXTERNAL RESULT  06/20/2012    Dr Boyle- Mild gastritis otherwise normal.    ESOPHAGOGASTRODUODENOSCOPY N/A 02/08/2024    Procedure: EGD (ESOPHAGOGASTRODUODENOSCOPY);  Surgeon: Jayla Arteaga MD;  Location: [facility];  Service: Endoscopy;  Laterality: N/A;    HYSTERECTOMY      LAPAROSCOPIC LYSIS OF ADHESIONS N/A 3/14/2024    Procedure: LYSIS, ADHESIONS, LAPAROSCOPIC;  Surgeon: Chen Jerome MD;  Location: [facility] OR;  Service: General;  Laterality: N/A;    TOTAL ABDOMINAL HYSTERECTOMY W/ BILATERAL SALPINGOOPHORECTOMY  01/09/2023    radical resection with right salpingo-oophorectomy, left salpingo-oophorectomy, extensive enterolysis, extensive adhesiolysis, left pelvic lymph node biopsy, omental biopsy, small bowel resection with primary functional end-to-end anastomosis, placement of pelvic drain       Review of Systems   Constitutional:  Negative for appetite change, chills, fatigue, fever and unexpected weight change.   HENT:  Negative for congestion, mouth sores, nosebleeds, sore throat, trouble swallowing and voice change.    Respiratory:  Negative for cough, chest tightness, shortness of breath and wheezing.    Cardiovascular:  Negative for chest pain and leg swelling.   Gastrointestinal:  Negative for abdominal distention, abdominal pain, blood in stool, constipation, diarrhea, nausea and vomiting.   Genitourinary:  Negative for difficulty urinating, dysuria and hematuria.   Musculoskeletal:  Negative for arthralgias, back pain and myalgias.   Skin:  Negative for pallor, rash and wound.   Neurological:  Negative for dizziness, syncope, weakness and headaches.   Hematological:  Negative for adenopathy. Does not bruise/bleed easily.   Psychiatric/Behavioral:  The patient is nervous/anxious.          Medication List with Changes/Refills   Current Medications    ACETAMINOPHEN (TYLENOL) 500 MG TABLET    Take 2 tablets (1,000 mg total) by mouth every 8  (eight) hours.    ALENDRONATE (FOSAMAX) 70 MG TABLET    Take 70 mg by mouth every 7 days. Pt stated she takes this every Sunday    CALCIUM PHOSPHATE TRIB/VIT D3 (CALCIUM PHOSPHATE-VITAMIN D3 ORAL)    Take 1 tablet by mouth 2 (two) times daily.    CARBAMAZEPINE (TEGRETOL) 200 MG TABLET    Take by mouth. Pt states she take TID  1 tablet with breakfast  1 tablet with lunch   1.5 tablet at bedtime    IBUPROFEN (ADVIL,MOTRIN) 800 MG TABLET    Take 1 tablet (800 mg total) by mouth every 8 (eight) hours.    LEVETIRACETAM (KEPPRA) 500 MG TAB    Take 1 tablet by mouth 2 (two) times daily. Pt states she takes one pill at dinner and one at bedtime    OXYCODONE (ROXICODONE) 5 MG IMMEDIATE RELEASE TABLET    Take 1 tablet (5 mg total) by mouth every 4 (four) hours as needed for Pain.    PANTOPRAZOLE (PROTONIX) 40 MG TABLET    Take 40 mg by mouth once daily.    ZONISAMIDE (ZONEGRAN) 100 MG CAP    Take 200 mg by mouth 2 (two) times daily.     Objective:   There were no vitals filed for this visit.  Lab Results   Component Value Date    WBC 5.12 05/03/2024    HGB 12.7 05/03/2024    HCT 41.1 05/03/2024     (H) 05/03/2024     05/03/2024       BMP  Lab Results   Component Value Date     05/03/2024    K 4.6 05/03/2024     05/03/2024    CO2 23 05/03/2024    BUN 12 05/03/2024    CREATININE 0.9 05/03/2024    CALCIUM 9.6 05/03/2024    ANIONGAP 8 05/03/2024    EGFRNORACEVR >60 05/03/2024     Lab Results   Component Value Date    ALT 11 05/03/2024    AST 16 05/03/2024    ALKPHOS 84 05/03/2024    BILITOT 0.2 05/03/2024     Lab Results   Component Value Date    TSH 1.313 05/03/2024       Physical Exam  Pulmonary:      Effort: Pulmonary effort is normal. No respiratory distress.   Neurological:      Mental Status: She is alert and oriented to person, place, and time.        Assessment:     Problem List Items Addressed This Visit          Oncology    Mixed epithelial carcinoma of right ovary      Cancer Staging   Mixed  epithelial carcinoma of right ovary  Staging form: Ovary, Fallopian Tube, and Primary Peritoneal Carcinoma, AJCC 8th Edition  - Clinical stage from 1/9/2023: FIGO Stage IC2, calculated as Stage IC (cT1c2, cN0, cM0) - Signed by Chen Jerome MD on 2/27/2024       S/p removal of pelvic mass (right salpingo-oophorectomy), left salpingo-oophorectomy, extensive enterolysis, extensive adhesiolysis, left pelvic lymph node biopsy, omental biopsy, small bowel resection with primary functional end-to-end anastomosis 1/9/2023. Completed 6 cycles Carboplatin/Taxol chemotherapy 7/19/2023         Gastric adenocarcinoma - Primary      Cancer Staging   Mixed epithelial carcinoma of right ovary  Staging form: Ovary, Fallopian Tube, and Primary Peritoneal Carcinoma, AJCC 8th Edition  - Clinical stage from 1/9/2023: FIGO Stage IC2, calculated as Stage IC (cT1c2, cN0, cM0) - Signed by Chen Jerome MD on 2/27/2024    Labs reviewed unremarkable. Okay to proceed with C1D3 Keytruda. F/u 3 weeks with cbc cmp tsh mag phos. C1D4 Keytruda              Plan:     Gastric adenocarcinoma    Mixed epithelial carcinoma of right ovary    Other orders  -     pembrolizumab (KEYTRUDA) 200 mg in sodium chloride 0.9% SolP 108 mL infusion  -     prochlorperazine injection Soln 5 mg  -     EPINEPHrine (EPIPEN) 0.3 mg/0.3 mL pen injection 0.3 mg  -     diphenhydrAMINE injection 50 mg  -     hydrocortisone sodium succinate injection 100 mg  -     sodium chloride 0.9% 250 mL flush bag  -     sodium chloride 0.9% flush 10 mL  -     heparin, porcine (PF) 100 unit/mL injection flush 500 Units  -     alteplase injection 2 mg            Med Onc Chart Routing      Follow up with physician 3 weeks. Dr. Ambrose (if not available on Monday can do labs only on Monday and visit Tuesday)   Follow up with JENNIE    Infusion scheduling note   keytruda   Injection scheduling note    Labs CBC, CMP, TSH, magnesium and phosphorus   Scheduling:  Preferred lab:  Lab  interval:     Imaging None      Pharmacy appointment No pharmacy appointment needed      Other referrals       No additional referrals needed            Ailyn Larsen, NISHANTP-C

## 2024-05-06 NOTE — ASSESSMENT & PLAN NOTE
Cancer Staging   Mixed epithelial carcinoma of right ovary  Staging form: Ovary, Fallopian Tube, and Primary Peritoneal Carcinoma, AJCC 8th Edition  - Clinical stage from 1/9/2023: FIGO Stage IC2, calculated as Stage IC (cT1c2, cN0, cM0) - Signed by Chen Jerome MD on 2/27/2024       S/p removal of pelvic mass (right salpingo-oophorectomy), left salpingo-oophorectomy, extensive enterolysis, extensive adhesiolysis, left pelvic lymph node biopsy, omental biopsy, small bowel resection with primary functional end-to-end anastomosis 1/9/2023. Completed 6 cycles Carboplatin/Taxol chemotherapy 7/19/2023

## 2024-05-07 ENCOUNTER — PATIENT MESSAGE (OUTPATIENT)
Dept: ADMINISTRATIVE | Facility: OTHER | Age: 74
End: 2024-05-07
Payer: MEDICARE

## 2024-05-07 ENCOUNTER — INFUSION (OUTPATIENT)
Dept: INFUSION THERAPY | Facility: HOSPITAL | Age: 74
End: 2024-05-07
Attending: INTERNAL MEDICINE
Payer: MEDICARE

## 2024-05-07 VITALS
SYSTOLIC BLOOD PRESSURE: 127 MMHG | OXYGEN SATURATION: 97 % | BODY MASS INDEX: 21.38 KG/M2 | DIASTOLIC BLOOD PRESSURE: 66 MMHG | WEIGHT: 128.31 LBS | HEIGHT: 65 IN | HEART RATE: 54 BPM | RESPIRATION RATE: 18 BRPM | TEMPERATURE: 97 F

## 2024-05-07 DIAGNOSIS — C16.9 GASTRIC ADENOCARCINOMA: Primary | ICD-10-CM

## 2024-05-07 PROCEDURE — 63600175 PHARM REV CODE 636 W HCPCS: Mod: JZ,JG | Performed by: NURSE PRACTITIONER

## 2024-05-07 PROCEDURE — 25000003 PHARM REV CODE 250: Performed by: NURSE PRACTITIONER

## 2024-05-07 PROCEDURE — 96413 CHEMO IV INFUSION 1 HR: CPT

## 2024-05-07 RX ORDER — HEPARIN 100 UNIT/ML
500 SYRINGE INTRAVENOUS
Status: DISCONTINUED | OUTPATIENT
Start: 2024-05-07 | End: 2024-05-07 | Stop reason: HOSPADM

## 2024-05-07 RX ORDER — SODIUM CHLORIDE 0.9 % (FLUSH) 0.9 %
10 SYRINGE (ML) INJECTION
Status: DISCONTINUED | OUTPATIENT
Start: 2024-05-07 | End: 2024-05-07 | Stop reason: HOSPADM

## 2024-05-07 RX ORDER — PROCHLORPERAZINE EDISYLATE 5 MG/ML
5 INJECTION INTRAMUSCULAR; INTRAVENOUS ONCE AS NEEDED
Status: DISCONTINUED | OUTPATIENT
Start: 2024-05-07 | End: 2024-05-07 | Stop reason: HOSPADM

## 2024-05-07 RX ADMIN — SODIUM CHLORIDE 200 MG: 9 INJECTION, SOLUTION INTRAVENOUS at 09:05

## 2024-05-07 NOTE — NURSING
Administered Keytruda per order.  Patient tolerated medication without complication.  Discharged with future appointments.

## 2024-05-08 ENCOUNTER — PATIENT MESSAGE (OUTPATIENT)
Dept: ADMINISTRATIVE | Facility: OTHER | Age: 74
End: 2024-05-08
Payer: MEDICARE

## 2024-05-09 ENCOUNTER — PATIENT MESSAGE (OUTPATIENT)
Dept: ADMINISTRATIVE | Facility: OTHER | Age: 74
End: 2024-05-09
Payer: MEDICARE

## 2024-05-10 ENCOUNTER — PATIENT MESSAGE (OUTPATIENT)
Dept: ADMINISTRATIVE | Facility: OTHER | Age: 74
End: 2024-05-10
Payer: MEDICARE

## 2024-05-11 ENCOUNTER — PATIENT MESSAGE (OUTPATIENT)
Dept: ADMINISTRATIVE | Facility: OTHER | Age: 74
End: 2024-05-11
Payer: MEDICARE

## 2024-05-12 ENCOUNTER — PATIENT MESSAGE (OUTPATIENT)
Dept: ADMINISTRATIVE | Facility: OTHER | Age: 74
End: 2024-05-12
Payer: MEDICARE

## 2024-05-13 ENCOUNTER — PATIENT MESSAGE (OUTPATIENT)
Dept: ADMINISTRATIVE | Facility: OTHER | Age: 74
End: 2024-05-13
Payer: MEDICARE

## 2024-05-13 PROBLEM — K92.2 GI BLEED: Status: RESOLVED | Noted: 2024-02-07 | Resolved: 2024-05-13

## 2024-05-13 PROBLEM — K92.0 HEMATEMESIS WITH NAUSEA: Status: RESOLVED | Noted: 2024-02-07 | Resolved: 2024-05-13

## 2024-05-14 ENCOUNTER — PATIENT MESSAGE (OUTPATIENT)
Dept: ADMINISTRATIVE | Facility: OTHER | Age: 74
End: 2024-05-14
Payer: MEDICARE

## 2024-05-15 ENCOUNTER — PATIENT MESSAGE (OUTPATIENT)
Dept: ADMINISTRATIVE | Facility: OTHER | Age: 74
End: 2024-05-15
Payer: MEDICARE

## 2024-05-16 ENCOUNTER — PATIENT MESSAGE (OUTPATIENT)
Dept: ADMINISTRATIVE | Facility: OTHER | Age: 74
End: 2024-05-16
Payer: MEDICARE

## 2024-05-17 ENCOUNTER — PATIENT MESSAGE (OUTPATIENT)
Dept: ADMINISTRATIVE | Facility: OTHER | Age: 74
End: 2024-05-17
Payer: MEDICARE

## 2024-05-17 ENCOUNTER — DOCUMENTATION ONLY (OUTPATIENT)
Dept: INFUSION THERAPY | Facility: HOSPITAL | Age: 74
End: 2024-05-17
Payer: MEDICARE

## 2024-05-18 ENCOUNTER — PATIENT MESSAGE (OUTPATIENT)
Dept: ADMINISTRATIVE | Facility: OTHER | Age: 74
End: 2024-05-18
Payer: MEDICARE

## 2024-05-19 ENCOUNTER — PATIENT MESSAGE (OUTPATIENT)
Dept: ADMINISTRATIVE | Facility: OTHER | Age: 74
End: 2024-05-19
Payer: MEDICARE

## 2024-05-20 ENCOUNTER — PATIENT MESSAGE (OUTPATIENT)
Dept: GENETICS | Facility: CLINIC | Age: 74
End: 2024-05-20
Payer: MEDICARE

## 2024-05-20 ENCOUNTER — PATIENT MESSAGE (OUTPATIENT)
Dept: ADMINISTRATIVE | Facility: OTHER | Age: 74
End: 2024-05-20
Payer: MEDICARE

## 2024-05-21 ENCOUNTER — PATIENT MESSAGE (OUTPATIENT)
Dept: ADMINISTRATIVE | Facility: OTHER | Age: 74
End: 2024-05-21
Payer: MEDICARE

## 2024-05-22 ENCOUNTER — PATIENT MESSAGE (OUTPATIENT)
Dept: ADMINISTRATIVE | Facility: OTHER | Age: 74
End: 2024-05-22
Payer: MEDICARE

## 2024-05-23 ENCOUNTER — PATIENT MESSAGE (OUTPATIENT)
Dept: ADMINISTRATIVE | Facility: OTHER | Age: 74
End: 2024-05-23
Payer: MEDICARE

## 2024-05-24 ENCOUNTER — PATIENT MESSAGE (OUTPATIENT)
Dept: ADMINISTRATIVE | Facility: OTHER | Age: 74
End: 2024-05-24
Payer: MEDICARE

## 2024-05-25 ENCOUNTER — PATIENT MESSAGE (OUTPATIENT)
Dept: ADMINISTRATIVE | Facility: OTHER | Age: 74
End: 2024-05-25
Payer: MEDICARE

## 2024-05-26 ENCOUNTER — PATIENT MESSAGE (OUTPATIENT)
Dept: ADMINISTRATIVE | Facility: OTHER | Age: 74
End: 2024-05-26
Payer: MEDICARE

## 2024-05-27 ENCOUNTER — LAB VISIT (OUTPATIENT)
Dept: LAB | Facility: HOSPITAL | Age: 74
End: 2024-05-27
Attending: INTERNAL MEDICINE
Payer: MEDICARE

## 2024-05-27 ENCOUNTER — PATIENT MESSAGE (OUTPATIENT)
Dept: ADMINISTRATIVE | Facility: OTHER | Age: 74
End: 2024-05-27
Payer: MEDICARE

## 2024-05-27 DIAGNOSIS — C56.1: ICD-10-CM

## 2024-05-27 LAB
ALBUMIN SERPL BCP-MCNC: 3.6 G/DL (ref 3.5–5.2)
ALP SERPL-CCNC: 86 U/L (ref 55–135)
ALT SERPL W/O P-5'-P-CCNC: 12 U/L (ref 10–44)
ANION GAP SERPL CALC-SCNC: 9 MMOL/L (ref 8–16)
AST SERPL-CCNC: 14 U/L (ref 10–40)
BASOPHILS # BLD AUTO: 0.03 K/UL (ref 0–0.2)
BASOPHILS NFR BLD: 0.5 % (ref 0–1.9)
BILIRUB SERPL-MCNC: 0.2 MG/DL (ref 0.1–1)
BUN SERPL-MCNC: 15 MG/DL (ref 8–23)
CALCIUM SERPL-MCNC: 9.3 MG/DL (ref 8.7–10.5)
CHLORIDE SERPL-SCNC: 109 MMOL/L (ref 95–110)
CO2 SERPL-SCNC: 23 MMOL/L (ref 23–29)
CREAT SERPL-MCNC: 0.9 MG/DL (ref 0.5–1.4)
DIFFERENTIAL METHOD BLD: ABNORMAL
EOSINOPHIL # BLD AUTO: 0.1 K/UL (ref 0–0.5)
EOSINOPHIL NFR BLD: 2 % (ref 0–8)
ERYTHROCYTE [DISTWIDTH] IN BLOOD BY AUTOMATED COUNT: 15.6 % (ref 11.5–14.5)
EST. GFR  (NO RACE VARIABLE): >60 ML/MIN/1.73 M^2
GLUCOSE SERPL-MCNC: 100 MG/DL (ref 70–110)
HCT VFR BLD AUTO: 42.9 % (ref 37–48.5)
HGB BLD-MCNC: 13.5 G/DL (ref 12–16)
IMM GRANULOCYTES # BLD AUTO: 0.02 K/UL (ref 0–0.04)
IMM GRANULOCYTES NFR BLD AUTO: 0.3 % (ref 0–0.5)
LYMPHOCYTES # BLD AUTO: 1.2 K/UL (ref 1–4.8)
LYMPHOCYTES NFR BLD: 20.1 % (ref 18–48)
MAGNESIUM SERPL-MCNC: 1.8 MG/DL (ref 1.6–2.6)
MCH RBC QN AUTO: 31.8 PG (ref 27–31)
MCHC RBC AUTO-ENTMCNC: 31.5 G/DL (ref 32–36)
MCV RBC AUTO: 101 FL (ref 82–98)
MONOCYTES # BLD AUTO: 0.5 K/UL (ref 0.3–1)
MONOCYTES NFR BLD: 8.4 % (ref 4–15)
NEUTROPHILS # BLD AUTO: 4 K/UL (ref 1.8–7.7)
NEUTROPHILS NFR BLD: 68.7 % (ref 38–73)
NRBC BLD-RTO: 0 /100 WBC
PHOSPHATE SERPL-MCNC: 3.3 MG/DL (ref 2.7–4.5)
PLATELET # BLD AUTO: 285 K/UL (ref 150–450)
PMV BLD AUTO: 8.8 FL (ref 9.2–12.9)
POTASSIUM SERPL-SCNC: 4.4 MMOL/L (ref 3.5–5.1)
PROT SERPL-MCNC: 6.9 G/DL (ref 6–8.4)
RBC # BLD AUTO: 4.25 M/UL (ref 4–5.4)
SODIUM SERPL-SCNC: 141 MMOL/L (ref 136–145)
WBC # BLD AUTO: 5.86 K/UL (ref 3.9–12.7)

## 2024-05-27 PROCEDURE — 84100 ASSAY OF PHOSPHORUS: CPT | Performed by: INTERNAL MEDICINE

## 2024-05-27 PROCEDURE — 83735 ASSAY OF MAGNESIUM: CPT | Performed by: INTERNAL MEDICINE

## 2024-05-27 PROCEDURE — 36415 COLL VENOUS BLD VENIPUNCTURE: CPT | Mod: PO | Performed by: INTERNAL MEDICINE

## 2024-05-27 PROCEDURE — 85025 COMPLETE CBC W/AUTO DIFF WBC: CPT | Performed by: INTERNAL MEDICINE

## 2024-05-27 PROCEDURE — 80053 COMPREHEN METABOLIC PANEL: CPT | Performed by: INTERNAL MEDICINE

## 2024-05-28 ENCOUNTER — INFUSION (OUTPATIENT)
Dept: INFUSION THERAPY | Facility: HOSPITAL | Age: 74
End: 2024-05-28
Attending: INTERNAL MEDICINE
Payer: MEDICARE

## 2024-05-28 ENCOUNTER — OFFICE VISIT (OUTPATIENT)
Dept: HEMATOLOGY/ONCOLOGY | Facility: CLINIC | Age: 74
End: 2024-05-28
Payer: MEDICARE

## 2024-05-28 ENCOUNTER — TELEPHONE (OUTPATIENT)
Dept: SURGICAL ONCOLOGY | Facility: CLINIC | Age: 74
End: 2024-05-28
Payer: MEDICARE

## 2024-05-28 ENCOUNTER — PATIENT MESSAGE (OUTPATIENT)
Dept: ADMINISTRATIVE | Facility: OTHER | Age: 74
End: 2024-05-28
Payer: MEDICARE

## 2024-05-28 VITALS
SYSTOLIC BLOOD PRESSURE: 138 MMHG | HEIGHT: 65 IN | OXYGEN SATURATION: 99 % | HEART RATE: 59 BPM | RESPIRATION RATE: 18 BRPM | WEIGHT: 131.63 LBS | DIASTOLIC BLOOD PRESSURE: 70 MMHG | BODY MASS INDEX: 21.93 KG/M2 | TEMPERATURE: 97 F

## 2024-05-28 VITALS
DIASTOLIC BLOOD PRESSURE: 71 MMHG | OXYGEN SATURATION: 100 % | HEART RATE: 57 BPM | HEIGHT: 65 IN | BODY MASS INDEX: 21.93 KG/M2 | TEMPERATURE: 98 F | RESPIRATION RATE: 18 BRPM | WEIGHT: 131.63 LBS | SYSTOLIC BLOOD PRESSURE: 138 MMHG

## 2024-05-28 DIAGNOSIS — C16.9 GASTRIC ADENOCARCINOMA: Primary | ICD-10-CM

## 2024-05-28 DIAGNOSIS — L27.0 DRUG-INDUCED SKIN RASH: Primary | ICD-10-CM

## 2024-05-28 DIAGNOSIS — L29.9 PRURITUS: ICD-10-CM

## 2024-05-28 DIAGNOSIS — C16.9 GASTRIC ADENOCARCINOMA: ICD-10-CM

## 2024-05-28 PROCEDURE — 63600175 PHARM REV CODE 636 W HCPCS: Mod: JZ,JG | Performed by: INTERNAL MEDICINE

## 2024-05-28 PROCEDURE — 3288F FALL RISK ASSESSMENT DOCD: CPT | Mod: CPTII,S$GLB,, | Performed by: INTERNAL MEDICINE

## 2024-05-28 PROCEDURE — 99215 OFFICE O/P EST HI 40 MIN: CPT | Mod: S$GLB,,, | Performed by: INTERNAL MEDICINE

## 2024-05-28 PROCEDURE — 96413 CHEMO IV INFUSION 1 HR: CPT

## 2024-05-28 PROCEDURE — 1126F AMNT PAIN NOTED NONE PRSNT: CPT | Mod: CPTII,S$GLB,, | Performed by: INTERNAL MEDICINE

## 2024-05-28 PROCEDURE — 3075F SYST BP GE 130 - 139MM HG: CPT | Mod: CPTII,S$GLB,, | Performed by: INTERNAL MEDICINE

## 2024-05-28 PROCEDURE — 25000003 PHARM REV CODE 250: Performed by: INTERNAL MEDICINE

## 2024-05-28 PROCEDURE — 3008F BODY MASS INDEX DOCD: CPT | Mod: CPTII,S$GLB,, | Performed by: INTERNAL MEDICINE

## 2024-05-28 PROCEDURE — 3078F DIAST BP <80 MM HG: CPT | Mod: CPTII,S$GLB,, | Performed by: INTERNAL MEDICINE

## 2024-05-28 PROCEDURE — 1101F PT FALLS ASSESS-DOCD LE1/YR: CPT | Mod: CPTII,S$GLB,, | Performed by: INTERNAL MEDICINE

## 2024-05-28 PROCEDURE — 99999 PR PBB SHADOW E&M-EST. PATIENT-LVL IV: CPT | Mod: PBBFAC,,, | Performed by: INTERNAL MEDICINE

## 2024-05-28 RX ORDER — METRONIDAZOLE 500 MG/100ML
500 INJECTION, SOLUTION INTRAVENOUS
OUTPATIENT
Start: 2024-05-28

## 2024-05-28 RX ORDER — DIPHENHYDRAMINE HYDROCHLORIDE 50 MG/ML
50 INJECTION INTRAMUSCULAR; INTRAVENOUS ONCE AS NEEDED
Status: CANCELLED | OUTPATIENT
Start: 2024-05-28

## 2024-05-28 RX ORDER — HYDROXYZINE HYDROCHLORIDE 25 MG/1
25 TABLET, FILM COATED ORAL 3 TIMES DAILY PRN
Qty: 90 TABLET | Refills: 1 | Status: SHIPPED | OUTPATIENT
Start: 2024-05-28 | End: 2024-05-28

## 2024-05-28 RX ORDER — PROCHLORPERAZINE EDISYLATE 5 MG/ML
5 INJECTION INTRAMUSCULAR; INTRAVENOUS ONCE AS NEEDED
Status: CANCELLED | OUTPATIENT
Start: 2024-05-28

## 2024-05-28 RX ORDER — HYDROCORTISONE 25 MG/G
CREAM TOPICAL 2 TIMES DAILY
Qty: 28 G | Refills: 1 | Status: SHIPPED | OUTPATIENT
Start: 2024-05-28 | End: 2024-05-28

## 2024-05-28 RX ORDER — SODIUM CHLORIDE 9 MG/ML
INJECTION, SOLUTION INTRAVENOUS CONTINUOUS
OUTPATIENT
Start: 2024-05-28

## 2024-05-28 RX ORDER — SODIUM CHLORIDE 0.9 % (FLUSH) 0.9 %
10 SYRINGE (ML) INJECTION
Status: CANCELLED | OUTPATIENT
Start: 2024-05-28

## 2024-05-28 RX ORDER — HYDROXYZINE HYDROCHLORIDE 25 MG/1
25 TABLET, FILM COATED ORAL 3 TIMES DAILY PRN
Qty: 90 TABLET | Refills: 1 | Status: SHIPPED | OUTPATIENT
Start: 2024-05-28

## 2024-05-28 RX ORDER — HEPARIN 100 UNIT/ML
500 SYRINGE INTRAVENOUS
Status: CANCELLED | OUTPATIENT
Start: 2024-05-28

## 2024-05-28 RX ORDER — EPINEPHRINE 0.3 MG/.3ML
0.3 INJECTION SUBCUTANEOUS ONCE AS NEEDED
Status: CANCELLED | OUTPATIENT
Start: 2024-05-28

## 2024-05-28 RX ORDER — HYDROCORTISONE 25 MG/G
CREAM TOPICAL 2 TIMES DAILY
Qty: 28 G | Refills: 1 | Status: SHIPPED | OUTPATIENT
Start: 2024-05-28

## 2024-05-28 RX ADMIN — SODIUM CHLORIDE 200 MG: 9 INJECTION, SOLUTION INTRAVENOUS at 09:05

## 2024-05-28 NOTE — PROGRESS NOTES
Patient ID: Cheryl Garcia   Chief Complaint: No chief complaint on file.  MRN:  [id number]     Oncologic Diagnosis:  Gastric Adenocarcinoma  Previous Treatment:  N/A  Current Treatment:  Pembrolizumab 3/26/2024  Subjective   The patient presents for follow up and is accompanied by her .  The only thing she has noted since beginning immunotherapy is itching on her back; her  reports sometimes there is a rash.  No rash on today's exam but a little erythematous.  Counseled to use hydrocortisone cream and Atarax prescribed. Otherwise, she is tolerating treatment very well.  She also had some discomfort in her right foot but this has resolved.    I reviewed her entire treatment plan including perioperative immunotherapy and re-evaluation for surgery/repeat imaging to assess treatment response.      Review of Systems   Constitutional:  Negative for activity change, appetite change, chills, diaphoresis, fatigue, fever and unexpected weight change.   HENT:  Negative for nosebleeds.    Respiratory:  Negative for shortness of breath.    Cardiovascular:  Negative for chest pain.   Gastrointestinal:  Negative for abdominal distention, abdominal pain, anal bleeding, blood in stool, constipation, diarrhea, nausea and vomiting.   Genitourinary:  Negative for difficulty urinating and hematuria.   Musculoskeletal:  Negative for arthralgias, back pain and myalgias.   Skin:  Negative for rash.   Neurological:  Negative for dizziness, weakness, light-headedness and headaches.   Hematological:  Does not bruise/bleed easily.   Psychiatric/Behavioral:  The patient is not nervous/anxious.      History     Oncology History   Mixed epithelial carcinoma of right ovary   1/2/2023 Initial Diagnosis    Mixed epithelial carcinoma of left ovary     1/9/2023 Surgery    Laparotomy for radical resection of gynecologic cancer. Removal of pelvic mass (right salpingo-oophorectomy), left salpingo-oophorectomy, extensive  enterolysis, extensive adhesiolysis, left pelvic lymph node biopsy, omental biopsy, small bowel resection with primary functional end-to-end anastomosis, placement of pelvic drain. Path: Right ovary, tumor site, 11 cm, mixed epithelial carcinoma (50% mucinous, 50% endometrioid), G2 moderately differentiated, ovarian surface involvement-indeterminate-specimen disrupted. 1 left pelvic lymph node negative for neoplasia. FIGO stage IC2       1/9/2023 Cancer Staged    Staging form: Ovary, Fallopian Tube, and Primary Peritoneal Carcinoma, AJCC 8th Edition  - Clinical stage from 1/9/2023: FIGO Stage IC2, calculated as Stage IC (cT1c2, cN0, cM0)     4/5/2023 - 7/19/2023 Chemotherapy    4/5/2023: Cycle 1- paclitaxel 135 mg/m2 and carboplatin AUC 5 as patient is frail and elderly.  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 She presented last month with  hematemesis. EGD revealed large ulcerated gastric mass, which was biopsied. EGD pathology reviewed - poorly differentiated adenocarcinoma, with intestinal expression, MSI high, HER 2 negative. Staging imaging found irregular wall thickening in mid gastric body, enlarged gastrohepatic lymph node, no evidence of distant metastatic disease.   Reviewed PET - uptake in gastric wall and gastrohepatic lymph node with hypermetabolic activity.   Scheduled to see Dr. Amin in  later this week.   + family hx of cancer, pending genetics referral    BOARD RECOMMENDATIONS:   Proceed with systemic chemotherapy, consider neoadj immunotherapy  Proceed with diag lap  Await genetics testing, pathology will send to [facility]    CONSULT NEEDED:     [] Surgery    [] Hem/Onc    [] Rad/Onc    [] Dietary                 [] Social Service    [x] Genetics       [] AES  [] Radiology     Clinical Stage: Tumor   Node(s)  1  Metastasis      GROUP STAGE:  [] O    [] 1A    [] IB    [] IIA    [] IIB     [] IIIA     [] IIIB     [] IIIC    []IV  [] Local recurrence     [] Regional recurrence     [] Distant recurrence   Metastatic site(s): none         [x] Edna'l Treatment Guidelines reviewed and care planned is consistent with guidelines.         (i.e., NCCN, NCI, PD, ACO, AUA, etc.)    PRESENTATION AT CANCER CONFERENCE:         [x] Prospective    [] Retrospective     [] Follow-Up         3/14/2024 Surgery    Procedure:  LAPAROSCOPY, DIAGNOSTIC (N/A)  LYSIS, ADHESIONS, LAPAROSCOPIC (N/A)  LAVAGE, PERITONEAL, THERAPEUTIC (N/A)      Surgeon(s) and Role: Chen Jerome MD - Primary    Pre-Operative Diagnosis: Gastric adenocarcinoma [C16.9]     Post-Operative Diagnosis: Same     Pre-Operative Variables:  Stage:  III (T4a N1 M0)   Adjacent organ involvement: None  Chemotherapy within 90 Days: No  Radiation Therapy within 90 Days: No      Procedure:  Diagnostic laparoscopy   Extensive lysis of adhesions  Peritoneal biopsy   Peritoneal washings for  cytology     3/26/2024 -  Chemotherapy    Treatment Summary   Plan Name: OP pembrolizumab 200mg Q3W  Treatment Goal: Curative  Status: Active  Start Date: 3/26/2024  End Date: 3/10/2026 (Planned)  Provider: Claudia Ambrose MD  Chemotherapy: [No matching medication found in this treatment plan]      Chemotherapy    Treatment Summary   Plan Name: OP pembrolizumab 200mg Q3W  Treatment Goal: Curative  Status: Active  Start Date: 3/15/2024 (Planned)  End Date: 2/27/2026 (Planned)  Provider: Claudia Ambrose MD  Chemotherapy: [No matching medication found in this treatment plan]     4/8/2024 Tumor Conference       [facility] UGI MULTIDISCIPLINARY TUMOR BOARD  PATIENT REVIEW FORM   ____________________________________________________________    CLINIC #: [id number]  DATE: 4/8/2024    DIAGNOSIS: Gastric GARRY    PRESENTER: Justus    PATIENT SUMMARY:   This 74 y/o female was dx with ovarian CA 1/2023, underwent surgery and completed 6 cycles of adj chemotherapy 7/2023. She developed hematemesis in Feb 2024, then underwent EGD with bx of a large ulcerated gastric mass. Pathology revealed poorly differentiated adenocarcinoma, with intestinal expression, MSI high, HER 2 negative. She was presented to this [facility] last month. Since then, she underwent diag lap and today's presentation is for pathology review. Negative for malignancy, reactive atypia     BOARD RECOMMENDATIONS:   Proceed with immunotherapy, then restage with imaging  Potential candidate for subtotal distal gastrectomy    CONSULT NEEDED:     [] Surgery    [] Hem/Onc    [] Rad/Onc    [] Dietary                 [] Social Service    [] Psychology       [] AES  [] Radiology     Clinical Stage: Tumor   Node(s) 1 Metastasis 0      GROUP STAGE:  [] O    [] 1A    [] IB    [] IIA    [] IIB     [] IIIA     [] IIIB     [] IIIC    []IV  [] Local recurrence     [] Regional recurrence     [] Distant recurrence   Metastatic site(s): none         [x] Edna'l Treatment  Guidelines reviewed and care planned is consistent with guidelines.         (i.e., NCCN, NCI, PD, ACO, AUA, etc.)    PRESENTATION AT CANCER CONFERENCE:         [x] Prospective    [] Retrospective     [] Follow-Up                 Previous HPI  Cheryl Garcia is a 73 y.o. female with history of DVT not currently on AC, previously on Xarelto, FIGO Stage IC (cT1c2, cN0, cM0) Mixed epithelial carcinoma of right ovary s/p surgery 01/09/023 and 6C Carbo/Paclitaxel completed 07/19/2023, Osteoporosis on Fosamax, HTN and Hx of Epilepsy on Keppra, Tegretol, Zonegran (last seizure in 2009) who presents to clinic to establish care on referral for gastric cancer.    Patient reports that she Initially presented to [facility] 02/06/24 with report of hematemeiss; she was transferred to [facility] for higher level of care.  On admission to [facility], EGD  was performed and showed a large ulcerated gastric mass.  Pathology results it as poorly differentiated adenocarcinoma.  She states that her weight is currently stable.  The last time she lost any weight was 2 her ovarian cancer diagnosis and treatment at which time she lost 5 lb but gained it back.  On hospitalization here at [facility], during GI workup she lost those 5 lbs a cane because she was NPO.    The patient has already established care with surgical oncology, Dr. Jerome.  She was presented in the upper GI tumor board with consensus for neoadjuvant immunotherapy given MSI high status of her tumor.  She is here for medical oncology recommendations.    I reviewed the recommendations of the tumor board consensus for neoadjuvant immunotherapy and I reviewed her pathology and biomarkers in detail.  She is expressed understanding in his in agreement with the plan.  She also understands that she still needs to undergo diagnostic laparoscopy for completed staging.    Otherwise, she is a former light smoker; quit 30-40 years ago. No alcohol use in 30-40  years. No illicit drug use.  She has an extensive family history of malignancy in his already been referred to our genetic counselor.      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    OP (osteoporosis) 02/28/2024    Ovarian cancer     Primary hypertension 12/01/2022       Past Surgical History:   Procedure Laterality Date    ABDOMINAL WASHOUT N/A 3/14/2024    Procedure: LAVAGE, PERITONEAL, THERAPEUTIC;  Surgeon: Chen Jerome MD;  Location: [facility] OR;  Service: General;  Laterality: N/A;    APPENDECTOMY  2015    BIOPSY OF PERITONEUM N/A 3/14/2024    Procedure: BIOPSY, PERITONEUM;  Surgeon: Chen Jerome MD;  Location: [facility] OR;  Service: General;  Laterality: N/A;    COLONOSCOPY  06/20/2012    Dr Boyle- Tubular adenoma polyps. Repeat in 3 years.    COLONOSCOPY  06/17/2015    -Nodular mucosa at appendiceal orfice, sigmoid and desc diverticulosis otherwise normal.    COLONOSCOPY  2020    >3 TAs    COLONOSCOPY  12/2023    DIAGNOSTIC LAPAROSCOPY N/A 3/14/2024    Procedure: LAPAROSCOPY, DIAGNOSTIC;  Surgeon: Chen Jerome MD;  Location: [facility] OR;  Service: General;  Laterality: N/A;  1. Diagnostic laparoscopy   2. Extensive lysis of adhesions  3. Peritoneal biopsy   4. Peritoneal washings for cytology    EGD - EXTERNAL RESULT  06/20/2012    Dr Boyle- Mild gastritis otherwise normal.    ESOPHAGOGASTRODUODENOSCOPY N/A 02/08/2024    Procedure: EGD (ESOPHAGOGASTRODUODENOSCOPY);  Surgeon: Chandler  "Jayla ALY MD;  Location: [facility] ENDO;  Service: Endoscopy;  Laterality: N/A;    HYSTERECTOMY      LAPAROSCOPIC LYSIS OF ADHESIONS N/A 3/14/2024    Procedure: LYSIS, ADHESIONS, LAPAROSCOPIC;  Surgeon: Chen Jerome MD;  Location: [facility] OR;  Service: General;  Laterality: N/A;    TOTAL ABDOMINAL HYSTERECTOMY W/ BILATERAL SALPINGOOPHORECTOMY  01/09/2023    radical resection with right salpingo-oophorectomy, left salpingo-oophorectomy, extensive enterolysis, extensive adhesiolysis, left pelvic lymph node biopsy, omental biopsy, small bowel resection with primary functional end-to-end anastomosis, placement of pelvic drain       Family History   Problem Relation Name Age of Onset    Pancreatic cancer Brother Misael Mccray 76    Prostate cancer Brother Zachary 67    Pancreatic cancer Half-brother Gasper 74    Colon cancer Half-sister Elton 83    Lung cancer Half-sister Elton         +smoking hx    Breast cancer Half-sister Vidhi 58    Lymphoma Other Cara Sun    Prostate cancer Other Fernando     Prostate cancer Other Gasper Calderon     Ovarian cancer Other Inerris     Breast cancer Other Aleida     Colon cancer Other Aleida        Review of patient's allergies indicates:  No Known Allergies    Social History     Tobacco Use    Smoking status: Former     Types: Cigarettes    Smokeless tobacco: Never    Tobacco comments:     Quit 1989 smoked 10 years smoked 0.25 ppd    Substance Use Topics    Alcohol use: Not Currently    Drug use: Never       Physical Exam   ECOG:   ECOG SCORE    0 - Fully active-able to carry on all pre-disease performance without restriction          Vitals:  /71   Pulse (!) 57   Temp 97.5 °F (36.4 °C)   Resp 18   Ht 5' 5" (1.651 m)   Wt 59.7 kg (131 lb 9.8 oz)   SpO2 100%   BMI 21.90 kg/m²     Physical Exam:  Physical Exam  Constitutional:       General: She is not in acute distress.     Appearance: Normal appearance. She is not ill-appearing.   HENT:      Head: Normocephalic " and atraumatic.   Eyes:      Extraocular Movements: Extraocular movements intact.      Conjunctiva/sclera: Conjunctivae normal.   Cardiovascular:      Rate and Rhythm: Normal rate and regular rhythm.      Heart sounds: No murmur heard.  Pulmonary:      Effort: Pulmonary effort is normal. No respiratory distress.      Breath sounds: No wheezing or rales.   Abdominal:      Palpations: There is no hepatomegaly or splenomegaly.   Musculoskeletal:         General: Normal range of motion.      Right lower leg: No edema.      Left lower leg: No edema.   Skin:     Findings: Erythema (mild on upper posterior thorax) present. No rash.   Neurological:      General: No focal deficit present.      Mental Status: She is alert and oriented to person, place, and time.   Psychiatric:         Mood and Affect: Mood normal.         Behavior: Behavior normal.         Thought Content: Thought content normal.        Labs   Labs:  Lab Visit on 05/27/2024   Component Date Value Ref Range Status    Phosphorus 05/27/2024 3.3  2.7 - 4.5 mg/dL Final    Magnesium 05/27/2024 1.8  1.6 - 2.6 mg/dL Final    Sodium 05/27/2024 141  136 - 145 mmol/L Final    Potassium 05/27/2024 4.4  3.5 - 5.1 mmol/L Final    Chloride 05/27/2024 109  95 - 110 mmol/L Final    CO2 05/27/2024 23  23 - 29 mmol/L Final    Glucose 05/27/2024 100  70 - 110 mg/dL Final    BUN 05/27/2024 15  8 - 23 mg/dL Final    Creatinine 05/27/2024 0.9  0.5 - 1.4 mg/dL Final    Calcium 05/27/2024 9.3  8.7 - 10.5 mg/dL Final    Total Protein 05/27/2024 6.9  6.0 - 8.4 g/dL Final    Albumin 05/27/2024 3.6  3.5 - 5.2 g/dL Final    Total Bilirubin 05/27/2024 0.2  0.1 - 1.0 mg/dL Final    Comment: For infants and newborns, interpretation of results should be based  on gestational age, weight and in agreement with clinical  observations.    Premature Infant recommended reference ranges:  Up to 24 hours.............<8.0 mg/dL  Up to 48 hours............<12.0 mg/dL  3-5 days..................<15.0  mg/dL  6-29 days.................<15.0 mg/dL      Alkaline Phosphatase 05/27/2024 86  55 - 135 U/L Final    AST 05/27/2024 14  10 - 40 U/L Final    ALT 05/27/2024 12  10 - 44 U/L Final    eGFR 05/27/2024 >60  >60 mL/min/1.73 m^2 Final    Anion Gap 05/27/2024 9  8 - 16 mmol/L Final    WBC 05/27/2024 5.86  3.90 - 12.70 K/uL Final    RBC 05/27/2024 4.25  4.00 - 5.40 M/uL Final    Hemoglobin 05/27/2024 13.5  12.0 - 16.0 g/dL Final    Hematocrit 05/27/2024 42.9  37.0 - 48.5 % Final    MCV 05/27/2024 101 (H)  82 - 98 fL Final    MCH 05/27/2024 31.8 (H)  27.0 - 31.0 pg Final    MCHC 05/27/2024 31.5 (L)  32.0 - 36.0 g/dL Final    RDW 05/27/2024 15.6 (H)  11.5 - 14.5 % Final    Platelets 05/27/2024 285  150 - 450 K/uL Final    MPV 05/27/2024 8.8 (L)  9.2 - 12.9 fL Final    Immature Granulocytes 05/27/2024 0.3  0.0 - 0.5 % Final    Gran # (ANC) 05/27/2024 4.0  1.8 - 7.7 K/uL Final    Immature Grans (Abs) 05/27/2024 0.02  0.00 - 0.04 K/uL Final    Comment: Mild elevation in immature granulocytes is non specific and   can be seen in a variety of conditions including stress response,   acute inflammation, trauma and pregnancy. Correlation with other   laboratory and clinical findings is essential.      Lymph # 05/27/2024 1.2  1.0 - 4.8 K/uL Final    Mono # 05/27/2024 0.5  0.3 - 1.0 K/uL Final    Eos # 05/27/2024 0.1  0.0 - 0.5 K/uL Final    Baso # 05/27/2024 0.03  0.00 - 0.20 K/uL Final    nRBC 05/27/2024 0  0 /100 WBC Final    Gran % 05/27/2024 68.7  38.0 - 73.0 % Final    Lymph % 05/27/2024 20.1  18.0 - 48.0 % Final    Mono % 05/27/2024 8.4  4.0 - 15.0 % Final    Eosinophil % 05/27/2024 2.0  0.0 - 8.0 % Final    Basophil % 05/27/2024 0.5  0.0 - 1.9 % Final    Differential Method 05/27/2024 Automated   Final        Pathology   Contains abnormal data Specimen to Pathology, Surgery Gastrointestinal tract - 02/28/2024      Component 3 wk ago   Final Pathologic Diagnosis  Abnormal   STOMACH, 'GASTRIC MASS', BIOPSY:  -  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 Scattered p53 positivity (wild-type pattern of expression) and p16 positivity are also noted. Additional studies, including CK7, PAX8, ER, HER2, TTF-1, GATA3, WT-1,  synaptophysin, and chromogranin, are either negative or negative in the cells of interest. This patient's prior history of ovarian carcinoma resected at [facility] in [place], [place] is noted and the outside pathology report (S-) is reviewed.   While both tumors do have similar immunohistochemical staining patterns, they are not identical, with notable reported differences being CK7 and PAX8 positivity in the ovarian tumor. The presence of a large, fungating, and ulcerated mass in the stomach  is suspicious for a gastric primary; however, it is unclear if the masses represent two separate primaries or one p rimary and one metastatic focus. Abnormal mismatch repair studies do suggest increased risk for development of multiple tumor types (see  below). Requesting outside slides for review to compare morphology may be helpful.    DNA mismatch repair IHC studies are ABNORMAL and demonstrate LOSS OF MLH1 and PMS2 expression within tumor cells. MSH2 and MSH6 exhibit retained expression. 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    Tissue will be sent for molecular profiling by next-generation sequencing ([facility]). Results of this study will be issued directly to the electronic medical record.                        Tumor NGS Tissue - 02/29/2024  MSI: MSI-H         TMB: 42.6 m/MB         Low Coverage Regions: KDM5D, PMS2         Fusion Addendum Issue Type: NEGATIVE  Negative - This addendum is being issued to report that no gene fusions or altered splicing variants from RNA sequencing analysis were found.           Imaging   NM  PET CT FDG SKULL BASE TO MID THIGH - 02/28/2024  CLINICAL HISTORY:  Gastric cancer.  Malignant neoplasm of stomach, unspecified.  Initial staging.  History of left ovarian epithelial carcinoma.     TECHNIQUE:  11.45 mCi of F18-FDG was administered intravenously in the left forearm.  After an approximately 60 min distribution time, PET/CT images were acquired from the skull base to the mid thigh. Transmission images were acquired to correct for attenuation using a whole body low-dose CT scan without contrast with the arms positioned above the head. Glycemia at the time of injection was 113 mg/dL.     COMPARISON:  CT abdomen pelvis, 02/08/2024 and chest CT, 02/27/2024     FINDINGS:  Quality of the study: Adequate.     SUV max of the liver parenchyma is 2.8     Neck: No abnormal FDG avidity.  No lymphadenopathy or mass.     Chest: No abnormal FDG avidity.  No pleural effusion or lymphadenopathy or pulmonary nodule.     Abdomen/pelvis: Marked thickening of the wall of the stomach involving the fundus and proximal body with marked FDG avidity with SUV max 18.  The gastrohepatic ligament lymph node measuring 19 x 13 mm shows moderate FDG avidity with SUV max 4.6.     No ascites.  No other focus of abnormal FDG avidity in the abdomen or pelvis.     Skeletal structures: No abnormal FDG avidity.  No focal lytic or sclerotic lesion.     Physiologic uptake of the tracer is present within the brain, salivary glands, myocardium, GI and  tracts.     Incidental CT findings: N/A     Impression:  1. The biopsy proving gastric cancer is markedly FDG avid with SUV max 18.  2. Moderately FDG avid gastrohepatic ligament lymph node consistent with local metastatic disease.  3. No evidence for distant metastatic disease.  All CT scans at this facility are performed  using dose modulation techniques as appropriate to performed exam including the following:  automated exposure control; adjustment of mA and/or kV according to the patients  size (this includes techniques or standardized protocols for targeted exams where dose is matched to indication/reason for exam: i.e. extremities or head);  iterative reconstruction technique.    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 Jerome negative for malignant cells  Per NCCN guidelines, NA immunotherapy is useful in MSI-H/dMMR tumors) with options being Nivolumab and ipilimumab followed by nivolumab or Pembrolizumab.  Given patient's comorbid conditions and potentially increased toxicity with CTLA4 inhibitor and PD-1 inhibitor, decision made to proceed with Pembrolizumab. Started 03/14/24.  Patient to complete 12 weeks of NA immunotherapy 06/18/2024 after which time will obtain repeat PET-CT      Immunotherapy induced pruritus  Immunotherapy induced rash < Grade 1  Topical hydrocortisone and Atarax prescribed        Chronic Medical Conditions  Hx DVT previously on Xareltao  Hx of FIGO Stage IC (cT1c2, cN0, cM0) Mixed epithelial carcinoma of right ovary s/p surgery 01/09/023 and 6C Carbo/Paclitaxel completed 07/19/2023  Osteoporosis on Fosamax  HTN  Hx of Epilepsy on Keppra, Tegretol, Zonegran (last seizure in 2009)        Med Onc Chart Routing      Follow up with physician 6 weeks.   Follow up with JENNIE 3 weeks. Luis   Infusion scheduling note   Keytruda today and in 3 weeks   Injection scheduling note    Labs CBC, CMP, phosphorus and magnesium   Scheduling:  Preferred lab:  Lab interval:     Imaging Other   imaging  to be scheduled week of MD appointment   Pharmacy appointment    Other referrals                      The patient was seen, interviewed and examined. Pertinent lab and radiologic studies were reviewed. Pt instructed to call should they develop concerning signs/symptoms or have further questions.        Portions of the record may have been created with voice recognition software. Occasional wrong-word or sound-a-like substitutions may have occurred due to the inherent limitations of voice recognition software. Read the chart carefully and recognize, using context, where substitutions have occurred.      Claudia Amin MD    Hematology/Oncology

## 2024-05-28 NOTE — TELEPHONE ENCOUNTER
Spoke with patient regarding post chemo imaging and scheduling appointment with Dr. Jerome to discuss surgery. CT scheduled for 6/24 and appointment for Friday with 6/28. Discussed with patient to fast 4 hours prior and to arrive an hour early. Patient verbalized understanding at this time.

## 2024-05-28 NOTE — NURSING
Infusion # Keytruda - 200 mg q 3 wks  Last dose- 5/7/24    Recent labs? 5/27/24  Last Hematology provider visit- Seen by Halie on 5/28/24     Premeds-N/A     Keytruda 200 mg administered IV at a 30 minute rate per orders; see MAR and vitals for more details. Tolerated well without adverse events, discharged and ambulatory out of clinic.

## 2024-05-29 ENCOUNTER — PATIENT MESSAGE (OUTPATIENT)
Dept: ADMINISTRATIVE | Facility: OTHER | Age: 74
End: 2024-05-29
Payer: MEDICARE

## 2024-05-30 ENCOUNTER — PATIENT MESSAGE (OUTPATIENT)
Dept: ADMINISTRATIVE | Facility: OTHER | Age: 74
End: 2024-05-30
Payer: MEDICARE

## 2024-05-31 ENCOUNTER — PATIENT MESSAGE (OUTPATIENT)
Dept: ADMINISTRATIVE | Facility: OTHER | Age: 74
End: 2024-05-31
Payer: MEDICARE

## 2024-06-01 ENCOUNTER — PATIENT MESSAGE (OUTPATIENT)
Dept: ADMINISTRATIVE | Facility: OTHER | Age: 74
End: 2024-06-01
Payer: MEDICARE

## 2024-06-02 ENCOUNTER — PATIENT MESSAGE (OUTPATIENT)
Dept: ADMINISTRATIVE | Facility: OTHER | Age: 74
End: 2024-06-02
Payer: MEDICARE

## 2024-06-03 ENCOUNTER — PATIENT MESSAGE (OUTPATIENT)
Dept: ADMINISTRATIVE | Facility: OTHER | Age: 74
End: 2024-06-03
Payer: MEDICARE

## 2024-06-04 ENCOUNTER — PATIENT MESSAGE (OUTPATIENT)
Dept: ADMINISTRATIVE | Facility: OTHER | Age: 74
End: 2024-06-04
Payer: MEDICARE

## 2024-06-05 ENCOUNTER — PATIENT MESSAGE (OUTPATIENT)
Dept: ADMINISTRATIVE | Facility: OTHER | Age: 74
End: 2024-06-05
Payer: MEDICARE

## 2024-06-06 ENCOUNTER — PATIENT MESSAGE (OUTPATIENT)
Dept: ADMINISTRATIVE | Facility: OTHER | Age: 74
End: 2024-06-06
Payer: MEDICARE

## 2024-06-07 ENCOUNTER — PATIENT MESSAGE (OUTPATIENT)
Dept: ADMINISTRATIVE | Facility: OTHER | Age: 74
End: 2024-06-07
Payer: MEDICARE

## 2024-06-08 ENCOUNTER — PATIENT MESSAGE (OUTPATIENT)
Dept: ADMINISTRATIVE | Facility: OTHER | Age: 74
End: 2024-06-08
Payer: MEDICARE

## 2024-06-10 ENCOUNTER — PATIENT MESSAGE (OUTPATIENT)
Dept: ADMINISTRATIVE | Facility: OTHER | Age: 74
End: 2024-06-10
Payer: MEDICARE

## 2024-06-11 ENCOUNTER — PATIENT MESSAGE (OUTPATIENT)
Dept: ADMINISTRATIVE | Facility: OTHER | Age: 74
End: 2024-06-11
Payer: MEDICARE

## 2024-06-12 ENCOUNTER — PATIENT MESSAGE (OUTPATIENT)
Dept: ADMINISTRATIVE | Facility: OTHER | Age: 74
End: 2024-06-12
Payer: MEDICARE

## 2024-06-13 ENCOUNTER — PATIENT MESSAGE (OUTPATIENT)
Dept: ADMINISTRATIVE | Facility: OTHER | Age: 74
End: 2024-06-13
Payer: MEDICARE

## 2024-06-14 ENCOUNTER — PATIENT MESSAGE (OUTPATIENT)
Dept: ADMINISTRATIVE | Facility: OTHER | Age: 74
End: 2024-06-14
Payer: MEDICARE

## 2024-06-17 ENCOUNTER — LAB VISIT (OUTPATIENT)
Dept: LAB | Facility: HOSPITAL | Age: 74
End: 2024-06-17
Attending: INTERNAL MEDICINE
Payer: MEDICARE

## 2024-06-17 DIAGNOSIS — C56.1: ICD-10-CM

## 2024-06-17 LAB
ALBUMIN SERPL BCP-MCNC: 3.5 G/DL (ref 3.5–5.2)
ALP SERPL-CCNC: 91 U/L (ref 55–135)
ALT SERPL W/O P-5'-P-CCNC: 14 U/L (ref 10–44)
ANION GAP SERPL CALC-SCNC: 9 MMOL/L (ref 8–16)
AST SERPL-CCNC: 16 U/L (ref 10–40)
BASOPHILS # BLD AUTO: 0.03 K/UL (ref 0–0.2)
BASOPHILS NFR BLD: 0.6 % (ref 0–1.9)
BILIRUB SERPL-MCNC: 0.2 MG/DL (ref 0.1–1)
BUN SERPL-MCNC: 13 MG/DL (ref 8–23)
CALCIUM SERPL-MCNC: 9.7 MG/DL (ref 8.7–10.5)
CHLORIDE SERPL-SCNC: 106 MMOL/L (ref 95–110)
CO2 SERPL-SCNC: 23 MMOL/L (ref 23–29)
CREAT SERPL-MCNC: 0.9 MG/DL (ref 0.5–1.4)
DIFFERENTIAL METHOD BLD: ABNORMAL
EOSINOPHIL # BLD AUTO: 0.1 K/UL (ref 0–0.5)
EOSINOPHIL NFR BLD: 1.7 % (ref 0–8)
ERYTHROCYTE [DISTWIDTH] IN BLOOD BY AUTOMATED COUNT: 15.2 % (ref 11.5–14.5)
EST. GFR  (NO RACE VARIABLE): >60 ML/MIN/1.73 M^2
GLUCOSE SERPL-MCNC: 98 MG/DL (ref 70–110)
HCT VFR BLD AUTO: 43.1 % (ref 37–48.5)
HGB BLD-MCNC: 13.7 G/DL (ref 12–16)
IMM GRANULOCYTES # BLD AUTO: 0.03 K/UL (ref 0–0.04)
IMM GRANULOCYTES NFR BLD AUTO: 0.6 % (ref 0–0.5)
LYMPHOCYTES # BLD AUTO: 1.6 K/UL (ref 1–4.8)
LYMPHOCYTES NFR BLD: 32.4 % (ref 18–48)
MAGNESIUM SERPL-MCNC: 1.8 MG/DL (ref 1.6–2.6)
MCH RBC QN AUTO: 32.3 PG (ref 27–31)
MCHC RBC AUTO-ENTMCNC: 31.8 G/DL (ref 32–36)
MCV RBC AUTO: 102 FL (ref 82–98)
MONOCYTES # BLD AUTO: 0.5 K/UL (ref 0.3–1)
MONOCYTES NFR BLD: 11.1 % (ref 4–15)
NEUTROPHILS # BLD AUTO: 2.6 K/UL (ref 1.8–7.7)
NEUTROPHILS NFR BLD: 53.6 % (ref 38–73)
NRBC BLD-RTO: 0 /100 WBC
PHOSPHATE SERPL-MCNC: 3 MG/DL (ref 2.7–4.5)
PLATELET # BLD AUTO: 312 K/UL (ref 150–450)
PMV BLD AUTO: 8.7 FL (ref 9.2–12.9)
POTASSIUM SERPL-SCNC: 4.9 MMOL/L (ref 3.5–5.1)
PROT SERPL-MCNC: 7.1 G/DL (ref 6–8.4)
RBC # BLD AUTO: 4.24 M/UL (ref 4–5.4)
SODIUM SERPL-SCNC: 138 MMOL/L (ref 136–145)
WBC # BLD AUTO: 4.79 K/UL (ref 3.9–12.7)

## 2024-06-17 PROCEDURE — 84100 ASSAY OF PHOSPHORUS: CPT | Performed by: INTERNAL MEDICINE

## 2024-06-17 PROCEDURE — 85025 COMPLETE CBC W/AUTO DIFF WBC: CPT | Performed by: INTERNAL MEDICINE

## 2024-06-17 PROCEDURE — 80053 COMPREHEN METABOLIC PANEL: CPT | Performed by: INTERNAL MEDICINE

## 2024-06-17 PROCEDURE — 36415 COLL VENOUS BLD VENIPUNCTURE: CPT | Mod: PO | Performed by: INTERNAL MEDICINE

## 2024-06-17 PROCEDURE — 83735 ASSAY OF MAGNESIUM: CPT | Performed by: INTERNAL MEDICINE

## 2024-06-18 ENCOUNTER — OFFICE VISIT (OUTPATIENT)
Dept: HEMATOLOGY/ONCOLOGY | Facility: CLINIC | Age: 74
End: 2024-06-18
Payer: MEDICARE

## 2024-06-18 ENCOUNTER — INFUSION (OUTPATIENT)
Dept: INFUSION THERAPY | Facility: HOSPITAL | Age: 74
End: 2024-06-18
Attending: INTERNAL MEDICINE
Payer: MEDICARE

## 2024-06-18 VITALS
WEIGHT: 129.88 LBS | TEMPERATURE: 98 F | OXYGEN SATURATION: 98 % | HEART RATE: 53 BPM | BODY MASS INDEX: 21.64 KG/M2 | DIASTOLIC BLOOD PRESSURE: 63 MMHG | RESPIRATION RATE: 16 BRPM | HEIGHT: 65 IN | SYSTOLIC BLOOD PRESSURE: 119 MMHG

## 2024-06-18 DIAGNOSIS — C16.9 ADENOCARCINOMA OF STOMACH: Primary | ICD-10-CM

## 2024-06-18 DIAGNOSIS — C16.9 GASTRIC ADENOCARCINOMA: Primary | ICD-10-CM

## 2024-06-18 DIAGNOSIS — C16.9 GASTRIC ADENOCARCINOMA: ICD-10-CM

## 2024-06-18 DIAGNOSIS — R68.89 OTHER GENERAL SYMPTOMS AND SIGNS: ICD-10-CM

## 2024-06-18 PROCEDURE — 25000003 PHARM REV CODE 250: Performed by: INTERNAL MEDICINE

## 2024-06-18 PROCEDURE — 96413 CHEMO IV INFUSION 1 HR: CPT

## 2024-06-18 PROCEDURE — 63600175 PHARM REV CODE 636 W HCPCS: Mod: JZ,JG | Performed by: INTERNAL MEDICINE

## 2024-06-18 PROCEDURE — 99215 OFFICE O/P EST HI 40 MIN: CPT | Mod: 95,,, | Performed by: INTERNAL MEDICINE

## 2024-06-18 RX ORDER — HEPARIN 100 UNIT/ML
500 SYRINGE INTRAVENOUS
Status: CANCELLED | OUTPATIENT
Start: 2024-06-18

## 2024-06-18 RX ORDER — SODIUM CHLORIDE 0.9 % (FLUSH) 0.9 %
10 SYRINGE (ML) INJECTION
Status: CANCELLED | OUTPATIENT
Start: 2024-06-18

## 2024-06-18 RX ORDER — EPINEPHRINE 0.3 MG/.3ML
0.3 INJECTION SUBCUTANEOUS ONCE AS NEEDED
Status: CANCELLED | OUTPATIENT
Start: 2024-06-18

## 2024-06-18 RX ORDER — DIPHENHYDRAMINE HYDROCHLORIDE 50 MG/ML
50 INJECTION INTRAMUSCULAR; INTRAVENOUS ONCE AS NEEDED
Status: CANCELLED | OUTPATIENT
Start: 2024-06-18

## 2024-06-18 RX ORDER — PROCHLORPERAZINE EDISYLATE 5 MG/ML
5 INJECTION INTRAMUSCULAR; INTRAVENOUS ONCE AS NEEDED
Status: CANCELLED | OUTPATIENT
Start: 2024-06-18

## 2024-06-18 RX ADMIN — SODIUM CHLORIDE: 9 INJECTION, SOLUTION INTRAVENOUS at 09:06

## 2024-06-18 RX ADMIN — SODIUM CHLORIDE 200 MG: 9 INJECTION, SOLUTION INTRAVENOUS at 09:06

## 2024-06-18 NOTE — DISCHARGE INSTRUCTIONS
Thank you for letting me take care of YOU!!    [facility]Y [facility]  [address] [facility]  [id number] [place] [address] Drive  [phone] phone     [phone] fax  Hours of Operation: Monday- Friday 8:00am- 5:00pm  After hours phone  [phone]  Hematology / Oncology Physicians on call:    Dr. Sheng Griffin        Nurse Practitioners:    IESHA Obrien, CESAR Ying NP Khelsea Conley, IESHA Francis, NP      Please don't hesitate to call if you have any concerns.

## 2024-06-18 NOTE — PLAN OF CARE
Patient tolerated Keytruda well today; no adverse reaction noted.  No significant complaints voiced.  IV discontinued with catheter intact and pressure dressing applied to the site.  Has f/u appt(s) scheduled per MD request.  No questions or concerns voiced.  NAD noted upon discharge.

## 2024-06-18 NOTE — PROGRESS NOTES
Patient ID: Cheryl Garcia   Chief Complaint: Follow-up  MRN:  [id number]     Oncologic Diagnosis:  Gastric Adenocarcinoma  Previous Treatment:  N/A  Current Treatment:  Pembrolizumab 3/26/2024    The patient location is: [place]  The chief complaint leading to consultation is: follow up and treatment    Visit type: audiovisual    Face to Face time with patient: 15  45 minutes of total time spent on the encounter, which includes face to face time and non-face to face time preparing to see the patient (eg, review of tests), Obtaining and/or reviewing separately obtained history, Documenting clinical information in the electronic or other health record, Independently interpreting results (not separately reported) and communicating results to the patient/family/caregiver, or Care coordination (not separately reported).       Each patient to whom he or she provides medical services by telemedicine is:  (1) informed of the relationship between the physician and patient and the respective role of any other health care provider with respect to management of the patient; and (2) notified that he or she may decline to receive medical services by telemedicine and may withdraw from such care at any time.    Subjective   The patient presents for follow up and is accompanied by her .    She reports that the itching has resolved with the current treatment; her appetite is improved and overall she feels well. Her restaging imaging has already been scheduled.  She has no acute complaints.  She inquires if it is safe for her to get the COVID vaccine however it may be less effective while on therapy.  She expressed understanding.    Review of Systems   Constitutional:  Negative for activity change, appetite change, chills, diaphoresis, fatigue, fever and unexpected weight change.   HENT:  Negative for nosebleeds.    Respiratory:  Negative for shortness of breath.    Cardiovascular:  Negative for chest pain.    Gastrointestinal:  Negative for abdominal distention, abdominal pain, anal bleeding, blood in stool, constipation, diarrhea, nausea and vomiting.   Genitourinary:  Negative for difficulty urinating and hematuria.   Musculoskeletal:  Negative for arthralgias, back pain and myalgias.   Skin:  Negative for rash.   Neurological:  Negative for dizziness, weakness, light-headedness and headaches.   Hematological:  Does not bruise/bleed easily.   Psychiatric/Behavioral:  The patient is not nervous/anxious.      History     Oncology History   Mixed epithelial carcinoma of right ovary   1/2/2023 Initial Diagnosis    Mixed epithelial carcinoma of left ovary     1/9/2023 Surgery    Laparotomy for radical resection of gynecologic cancer. Removal of pelvic mass (right salpingo-oophorectomy), left salpingo-oophorectomy, extensive enterolysis, extensive adhesiolysis, left pelvic lymph node biopsy, omental biopsy, small bowel resection with primary functional end-to-end anastomosis, placement of pelvic drain. Path: Right ovary, tumor site, 11 cm, mixed epithelial carcinoma (50% mucinous, 50% endometrioid), G2 moderately differentiated, ovarian surface involvement-indeterminate-specimen disrupted. 1 left pelvic lymph node negative for neoplasia. FIGO stage IC2       1/9/2023 Cancer Staged    Staging form: Ovary, Fallopian Tube, and Primary Peritoneal Carcinoma, AJCC 8th Edition  - Clinical stage from 1/9/2023: FIGO Stage IC2, calculated as Stage IC (cT1c2, cN0, cM0)     4/5/2023 - 7/19/2023 Chemotherapy    4/5/2023: Cycle 1- paclitaxel 135 mg/m2 and carboplatin AUC 5 as patient is frail and elderly.  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 She presented last month with hematemesis. EGD revealed large ulcerated gastric mass, which was biopsied. EGD pathology reviewed - poorly differentiated adenocarcinoma, with intestinal expression, MSI high, HER 2 negative. Staging imaging found irregular wall thickening in mid gastric body, enlarged gastrohepatic lymph node, no evidence of distant metastatic disease.   Reviewed PET - uptake in gastric wall and gastrohepatic lymph node with hypermetabolic activity.   Scheduled to see Dr. Ambrose in [place] later this week.   + family hx of cancer, pending genetics referral    BOARD RECOMMENDATIONS:   Proceed with systemic chemotherapy, consider neoadj immunotherapy  Proceed with diag lap  Await genetics testing, pathology will [facility]    CONSULT NEEDED:     [] Surgery    [] Hem/Onc    [] Rad/Onc    [] Dietary                 [] Social Service    [x] Genetics       [] AES  [] Radiology     Clinical Stage: Tumor   Node(s)  1  Metastasis      GROUP STAGE:  [] O    [] 1A    [] IB    [] IIA    [] IIB     [] IIIA     [] IIIB     [] IIIC    []IV  [] Local recurrence     [] Regional recurrence     [] Distant recurrence   Metastatic  site(s): none         [x] Edna'l Treatment Guidelines reviewed and care planned is consistent with guidelines.         (i.e., NCCN, NCI, PD, ACO, AUA, etc.)    PRESENTATION AT CANCER CONFERENCE:         [x] Prospective    [] Retrospective     [] Follow-Up         3/14/2024 Surgery    Procedure:  LAPAROSCOPY, DIAGNOSTIC (N/A)  LYSIS, ADHESIONS, LAPAROSCOPIC (N/A)  LAVAGE, PERITONEAL, THERAPEUTIC (N/A)      Surgeon(s) and Role: Chen Jerome MD - Primary    Pre-Operative Diagnosis: Gastric adenocarcinoma [C16.9]     Post-Operative Diagnosis: Same     Pre-Operative Variables:  Stage:  III (T4a N1 M0)   Adjacent organ involvement: None  Chemotherapy within 90 Days: No  Radiation Therapy within 90 Days: No      Procedure:  Diagnostic laparoscopy   Extensive lysis of adhesions  Peritoneal biopsy   Peritoneal washings for cytology     3/26/2024 -  Chemotherapy    Treatment Summary   Plan Name: OP pembrolizumab 200mg Q3W  Treatment Goal: Curative  Status: Active  Start Date: 3/26/2024  End Date: 3/10/2026 (Planned)  Provider: Claudia Ambrose MD  Chemotherapy: [No matching medication found in this treatment plan]      Chemotherapy    Treatment Summary   Plan Name: OP pembrolizumab 200mg Q3W  Treatment Goal: Curative  Status: Active  Start Date: 3/15/2024 (Planned)  End Date: 2/27/2026 (Planned)  Provider: Claudia Ambrose MD  Chemotherapy: [No matching medication found in this treatment plan]     4/8/2024 Tumor Conference       [facility] UGI MULTIDISCIPLINARY TUMOR BOARD  PATIENT REVIEW FORM   ____________________________________________________________    CLINIC #: [id number]  DATE: 4/8/2024    DIAGNOSIS: Gastric GARRY    PRESENTER: Justus    PATIENT SUMMARY:   This 74 y/o female was dx with ovarian CA 1/2023, underwent surgery and completed 6 cycles of adj chemotherapy 7/2023. She developed hematemesis in Feb 2024, then underwent EGD with bx of a large ulcerated gastric mass. Pathology revealed poorly  differentiated adenocarcinoma, with intestinal expression, MSI high, HER 2 negative. She was presented to this UGI [facility] last month. Since then, she underwent diag lap and today's presentation is for pathology review. Negative for malignancy, reactive atypia     BOARD RECOMMENDATIONS:   Proceed with immunotherapy, then restage with imaging  Potential candidate for subtotal distal gastrectomy    CONSULT NEEDED:     [] Surgery    [] Hem/Onc    [] Rad/Onc    [] Dietary                 [] Social Service    [] Psychology       [] AES  [] Radiology     Clinical Stage: Tumor   Node(s) 1 Metastasis 0      GROUP STAGE:  [] O    [] 1A    [] IB    [] IIA    [] IIB     [] IIIA     [] IIIB     [] IIIC    []IV  [] Local recurrence     [] Regional recurrence     [] Distant recurrence   Metastatic site(s): none         [x] Edna'l Treatment Guidelines reviewed and care planned is consistent with guidelines.         (i.e., NCCN, NCI, PD, ACO, AUA, etc.)    PRESENTATION AT CANCER CONFERENCE:         [x] Prospective    [] Retrospective     [] Follow-Up                 Previous HPI  Cheryl Garcia is a 73 y.o. female with history of DVT not currently on AC, previously on Xarelto, FIGO Stage IC (cT1c2, cN0, cM0) Mixed epithelial carcinoma of right ovary s/p surgery 01/09/023 and 6C Carbo/Paclitaxel completed 07/19/2023, Osteoporosis on Fosamax, HTN and Hx of Epilepsy on Keppra, Tegretol, Zonegran (last seizure in 2009) who presents to clinic to establish care on referral for gastric cancer.    Patient reports that she Initially presented to [facility] 02/06/24 with report of hematemeiss; she was transferred to [facility] for higher level of care.  On admission to [facility], EGD  was performed and showed a large ulcerated gastric mass.  Pathology results it as poorly differentiated adenocarcinoma.  She states that her weight is currently stable.  The last time she lost any weight was 2 her ovarian cancer  diagnosis and treatment at which time she lost 5 lb but gained it back.  On hospitalization here at [facility], during GI workup she lost those 5 lbs a cane because she was NPO.    The patient has already established care with surgical oncology, Dr. Jerome.  She was presented in the upper GI tumor board with consensus for neoadjuvant immunotherapy given MSI high status of her tumor.  She is here for medical oncology recommendations.    I reviewed the recommendations of the tumor board consensus for neoadjuvant immunotherapy and I reviewed her pathology and biomarkers in detail.  She is expressed understanding in his in agreement with the plan.  She also understands that she still needs to undergo diagnostic laparoscopy for completed staging.    Otherwise, she is a former light smoker; quit 30-40 years ago. No alcohol use in 30-40 years. No illicit drug use.  She has an extensive family history of malignancy in his already been referred to our genetic counselor.      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    OP (osteoporosis) 02/28/2024    Ovarian cancer     Primary hypertension 12/01/2022       Past Surgical History:   Procedure Laterality Date    ABDOMINAL WASHOUT N/A 3/14/2024    Procedure: LAVAGE, PERITONEAL, THERAPEUTIC;  Surgeon: Chen Jerome MD;  Location: [facility];  Service: General;  Laterality: N/A;    APPENDECTOMY  2015    BIOPSY OF PERITONEUM N/A 3/14/2024    Procedure: BIOPSY, PERITONEUM;  Surgeon:  Chen Jerome MD;  Location: [facility] OR;  Service: General;  Laterality: N/A;    COLONOSCOPY  06/20/2012    Dr Boyle- Tubular adenoma polyps. Repeat in 3 years.    COLONOSCOPY  06/17/2015    -Nodular mucosa at appendiceal orfice, sigmoid and desc diverticulosis otherwise normal.    COLONOSCOPY  2020    >3 TAs    COLONOSCOPY  12/2023    DIAGNOSTIC LAPAROSCOPY N/A 3/14/2024    Procedure: LAPAROSCOPY, DIAGNOSTIC;  Surgeon: Chen Jerome MD;  Location: [facility] OR;  Service: General;  Laterality: N/A;  1. Diagnostic laparoscopy   2. Extensive lysis of adhesions  3. Peritoneal biopsy   4. Peritoneal washings for cytology    EGD - EXTERNAL RESULT  06/20/2012    Dr Boyle- Mild gastritis otherwise normal.    ESOPHAGOGASTRODUODENOSCOPY N/A 02/08/2024    Procedure: EGD (ESOPHAGOGASTRODUODENOSCOPY);  Surgeon: Jayla Arteaga MD;  Location: [facility];  Service: Endoscopy;  Laterality: N/A;    HYSTERECTOMY      LAPAROSCOPIC LYSIS OF ADHESIONS N/A 3/14/2024    Procedure: LYSIS, ADHESIONS, LAPAROSCOPIC;  Surgeon: Chen Jerome MD;  Location: [facility] OR;  Service: General;  Laterality: N/A;    TOTAL ABDOMINAL HYSTERECTOMY W/ BILATERAL SALPINGOOPHORECTOMY  01/09/2023    radical resection with right salpingo-oophorectomy, left salpingo-oophorectomy, extensive enterolysis, extensive adhesiolysis, left pelvic lymph node biopsy, omental biopsy, small bowel resection with primary functional end-to-end anastomosis, placement of pelvic drain       Family History   Problem Relation Name Age of Onset    Pancreatic cancer Brother Misael Calderon. 76    Prostate cancer Brother Zachary 67    Pancreatic cancer Half-brother Gasper 74    Colon cancer Half-sister Elton 83    Lung cancer Half-sister Elton         +smoking hx    Breast cancer Half-sister Vidhi 58    Lymphoma Other Cara Sun    Prostate cancer Other Fernando     Prostate cancer Other Gasper Jr     Ovarian cancer Other Inerris     Breast cancer  Other Aleida     Colon cancer Other Aleida        Review of patient's allergies indicates:  No Known Allergies    Social History     Tobacco Use    Smoking status: Former     Types: Cigarettes    Smokeless tobacco: Never    Tobacco comments:     Quit 1989 smoked 10 years smoked 0.25 ppd    Substance Use Topics    Alcohol use: Not Currently    Drug use: Never      Labs   Labs:  Lab Visit on 06/17/2024   Component Date Value Ref Range Status    Phosphorus 06/17/2024 3.0  2.7 - 4.5 mg/dL Final    Magnesium 06/17/2024 1.8  1.6 - 2.6 mg/dL Final    Sodium 06/17/2024 138  136 - 145 mmol/L Final    Potassium 06/17/2024 4.9  3.5 - 5.1 mmol/L Final    Chloride 06/17/2024 106  95 - 110 mmol/L Final    CO2 06/17/2024 23  23 - 29 mmol/L Final    Glucose 06/17/2024 98  70 - 110 mg/dL Final    BUN 06/17/2024 13  8 - 23 mg/dL Final    Creatinine 06/17/2024 0.9  0.5 - 1.4 mg/dL Final    Calcium 06/17/2024 9.7  8.7 - 10.5 mg/dL Final    Total Protein 06/17/2024 7.1  6.0 - 8.4 g/dL Final    Albumin 06/17/2024 3.5  3.5 - 5.2 g/dL Final    Total Bilirubin 06/17/2024 0.2  0.1 - 1.0 mg/dL Final    Comment: For infants and newborns, interpretation of results should be based  on gestational age, weight and in agreement with clinical  observations.    Premature Infant recommended reference ranges:  Up to 24 hours.............<8.0 mg/dL  Up to 48 hours............<12.0 mg/dL  3-5 days..................<15.0 mg/dL  6-29 days.................<15.0 mg/dL      Alkaline Phosphatase 06/17/2024 91  55 - 135 U/L Final    AST 06/17/2024 16  10 - 40 U/L Final    ALT 06/17/2024 14  10 - 44 U/L Final    eGFR 06/17/2024 >60  >60 mL/min/1.73 m^2 Final    Anion Gap 06/17/2024 9  8 - 16 mmol/L Final    WBC 06/17/2024 4.79  3.90 - 12.70 K/uL Final    RBC 06/17/2024 4.24  4.00 - 5.40 M/uL Final    Hemoglobin 06/17/2024 13.7  12.0 - 16.0 g/dL Final    Hematocrit 06/17/2024 43.1  37.0 - 48.5 % Final    MCV 06/17/2024 102 (H)  82 - 98 fL Final    MCH  06/17/2024 32.3 (H)  27.0 - 31.0 pg Final    MCHC 06/17/2024 31.8 (L)  32.0 - 36.0 g/dL Final    RDW 06/17/2024 15.2 (H)  11.5 - 14.5 % Final    Platelets 06/17/2024 312  150 - 450 K/uL Final    MPV 06/17/2024 8.7 (L)  9.2 - 12.9 fL Final    Immature Granulocytes 06/17/2024 0.6 (H)  0.0 - 0.5 % Final    Gran # (ANC) 06/17/2024 2.6  1.8 - 7.7 K/uL Final    Immature Grans (Abs) 06/17/2024 0.03  0.00 - 0.04 K/uL Final    Comment: Mild elevation in immature granulocytes is non specific and   can be seen in a variety of conditions including stress response,   acute inflammation, trauma and pregnancy. Correlation with other   laboratory and clinical findings is essential.      Lymph # 06/17/2024 1.6  1.0 - 4.8 K/uL Final    Mono # 06/17/2024 0.5  0.3 - 1.0 K/uL Final    Eos # 06/17/2024 0.1  0.0 - 0.5 K/uL Final    Baso # 06/17/2024 0.03  0.00 - 0.20 K/uL Final    nRBC 06/17/2024 0  0 /100 WBC Final    Gran % 06/17/2024 53.6  38.0 - 73.0 % Final    Lymph % 06/17/2024 32.4  18.0 - 48.0 % Final    Mono % 06/17/2024 11.1  4.0 - 15.0 % Final    Eosinophil % 06/17/2024 1.7  0.0 - 8.0 % Final    Basophil % 06/17/2024 0.6  0.0 - 1.9 % Final    Differential Method 06/17/2024 Automated   Final        Pathology   Contains abnormal data Specimen to Pathology, Surgery Gastrointestinal tract - 02/28/2024      Component 3 wk ago   Final Pathologic Diagnosis  Abnormal   STOMACH, 'GASTRIC MASS', BIOPSY:  - 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 Scattered p53 positivity (wild-type pattern of expression) and p16 positivity are also noted. Additional studies, including CK7, PAX8, ER, HER2, TTF-1, GATA3, WT-1,  synaptophysin, and chromogranin, are either negative or negative in the cells of interest. This patient's prior history of ovarian carcinoma resected at [facility] [place] [place] is noted and the  outside pathology report (S-) is reviewed.   While both tumors do have similar immunohistochemical staining patterns, they are not identical, with notable reported differences being CK7 and PAX8 positivity in the ovarian tumor. The presence of a large, fungating, and ulcerated mass in the stomach  is suspicious for a gastric primary; however, it is unclear if the masses represent two separate primaries or one p rimary and one metastatic focus. Abnormal mismatch repair studies do suggest increased risk for development of multiple tumor types (see  below). Requesting outside slides for review to compare morphology may be helpful.    DNA mismatch repair IHC studies are ABNORMAL and demonstrate LOSS OF MLH1 and PMS2 expression within tumor cells. MSH2 and MSH6 exhibit retained expression. 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    Tissue will be sent for molecular profiling by next-generation sequencing ([facility]). Results of this study will be issued directly to the electronic medical record.                        Tumor NGS Tissue - 02/29/2024  MSI: MSI-H         TMB: 42.6 m/MB         Low Coverage Regions: KDM5D, PMS2         Fusion Addendum Issue Type: NEGATIVE  Negative - This addendum is being issued to report that no gene fusions or altered splicing variants from RNA sequencing analysis were found.           Imaging   NM PET CT FDG SKULL BASE TO MID THIGH - 02/28/2024  CLINICAL HISTORY:  Gastric cancer.  Malignant neoplasm of stomach, unspecified.  Initial staging.  History of left ovarian epithelial carcinoma.     TECHNIQUE:  11.45 mCi of F18-FDG was administered intravenously in the left forearm.  After an approximately 60 min distribution time, PET/CT images were acquired from the skull base to the mid thigh. Transmission images were acquired to correct for attenuation using a whole body low-dose CT scan without contrast with the arms positioned  above the head. Glycemia at the time of injection was 113 mg/dL.     COMPARISON:  CT abdomen pelvis, 02/08/2024 and chest CT, 02/27/2024     FINDINGS:  Quality of the study: Adequate.     SUV max of the liver parenchyma is 2.8     Neck: No abnormal FDG avidity.  No lymphadenopathy or mass.     Chest: No abnormal FDG avidity.  No pleural effusion or lymphadenopathy or pulmonary nodule.     Abdomen/pelvis: Marked thickening of the wall of the stomach involving the fundus and proximal body with marked FDG avidity with SUV max 18.  The gastrohepatic ligament lymph node measuring 19 x 13 mm shows moderate FDG avidity with SUV max 4.6.     No ascites.  No other focus of abnormal FDG avidity in the abdomen or pelvis.     Skeletal structures: No abnormal FDG avidity.  No focal lytic or sclerotic lesion.     Physiologic uptake of the tracer is present within the brain, salivary glands, myocardium, GI and  tracts.     Incidental CT findings: N/A     Impression:  1. The biopsy proving gastric cancer is markedly FDG avid with SUV max 18.  2. Moderately FDG avid gastrohepatic ligament lymph node consistent with local metastatic disease.  3. No evidence for distant metastatic disease.  All CT scans at this facility are performed  using dose modulation techniques as appropriate to performed exam including the following:  automated exposure control; adjustment of mA and/or kV according to the patients size (this includes techniques or standardized protocols for targeted exams where dose is matched to indication/reason for exam: i.e. extremities or head);  iterative reconstruction technique.    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 Jerome negative for malignant cells  Per NCCN guidelines, NA immunotherapy is useful in MSI-H/dMMR tumors) with options being Nivolumab and ipilimumab followed by nivolumab or Pembrolizumab.  Given patient's comorbid conditions and potentially increased toxicity with CTLA4 inhibitor and PD-1 inhibitor, decision made to proceed with Pembrolizumab. Started 03/14/24.  Patient to complete 12 weeks of NA immunotherapy 06/18/2024 after which time will obtain repeat imaging  CT CAP scheduled for 06/24/25      Immunotherapy induced pruritus  Immunotherapy induced rash < Grade 1  Topical hydrocortisone and Atarax prescribed with relief        Chronic Medical Conditions  Hx DVT previously on Xareltao  Hx of FIGO Stage IC (cT1c2, cN0, cM0) Mixed epithelial carcinoma of right ovary s/p surgery 01/09/023 and 6C Carbo/Paclitaxel completed 07/19/2023  Osteoporosis on Fosamax  HTN  Hx of Epilepsy on Keppra, Tegretol, Zonegran (last seizure in 2009)        Med Onc Chart Routing      Follow up with physician . July 9 - already scheduled   Follow up with JENNIE    Infusion scheduling note   Pembrolizumab today and in 3 weeks   Injection scheduling note    Labs CBC, CMP, magnesium, phosphorus and TSH   Scheduling:  Preferred lab:  Lab interval:     Imaging    Pharmacy appointment    Other referrals                      The patient was seen, interviewed and examined. Pertinent lab and radiologic studies were reviewed. Pt instructed to call should they develop concerning signs/symptoms or have further questions.        Portions of the record may have been created with voice recognition software. Occasional wrong-word or sound-a-like substitutions may have occurred due to the inherent limitations of voice recognition software. Read the  chart carefully and recognize, using context, where substitutions have occurred.      Claudia Amin MD    Hematology/Oncology

## 2024-06-19 ENCOUNTER — PATIENT MESSAGE (OUTPATIENT)
Dept: ADMINISTRATIVE | Facility: OTHER | Age: 74
End: 2024-06-19
Payer: MEDICARE

## 2024-06-20 ENCOUNTER — PATIENT MESSAGE (OUTPATIENT)
Dept: ADMINISTRATIVE | Facility: OTHER | Age: 74
End: 2024-06-20
Payer: MEDICARE

## 2024-06-21 ENCOUNTER — PATIENT MESSAGE (OUTPATIENT)
Dept: ADMINISTRATIVE | Facility: OTHER | Age: 74
End: 2024-06-21
Payer: MEDICARE

## 2024-06-22 ENCOUNTER — PATIENT MESSAGE (OUTPATIENT)
Dept: ADMINISTRATIVE | Facility: OTHER | Age: 74
End: 2024-06-22
Payer: MEDICARE

## 2024-06-23 ENCOUNTER — PATIENT MESSAGE (OUTPATIENT)
Dept: ADMINISTRATIVE | Facility: OTHER | Age: 74
End: 2024-06-23
Payer: MEDICARE

## 2024-06-24 ENCOUNTER — PATIENT MESSAGE (OUTPATIENT)
Dept: ADMINISTRATIVE | Facility: OTHER | Age: 74
End: 2024-06-24
Payer: MEDICARE

## 2024-06-24 ENCOUNTER — HOSPITAL ENCOUNTER (OUTPATIENT)
Dept: RADIOLOGY | Facility: HOSPITAL | Age: 74
Discharge: HOME | End: 2024-06-24
Attending: SURGERY
Payer: MEDICARE

## 2024-06-24 DIAGNOSIS — C16.9 GASTRIC ADENOCARCINOMA: ICD-10-CM

## 2024-06-24 PROCEDURE — 71260 CT THORAX DX C+: CPT | Mod: TC

## 2024-06-24 PROCEDURE — 71260 CT THORAX DX C+: CPT | Mod: 26,,, | Performed by: RADIOLOGY

## 2024-06-24 PROCEDURE — 74177 CT ABD & PELVIS W/CONTRAST: CPT | Mod: 26,,, | Performed by: RADIOLOGY

## 2024-06-24 PROCEDURE — A9698 NON-RAD CONTRAST MATERIALNOC: HCPCS | Performed by: SURGERY

## 2024-06-24 PROCEDURE — 25500020 PHARM REV CODE 255: Performed by: SURGERY

## 2024-06-24 PROCEDURE — 74177 CT ABD & PELVIS W/CONTRAST: CPT | Mod: TC

## 2024-06-24 RX ADMIN — IOHEXOL 1000 ML: 12 SOLUTION ORAL at 07:06

## 2024-06-24 RX ADMIN — IOHEXOL 75 ML: 350 INJECTION, SOLUTION INTRAVENOUS at 09:06

## 2024-06-25 ENCOUNTER — PATIENT MESSAGE (OUTPATIENT)
Dept: ADMINISTRATIVE | Facility: OTHER | Age: 74
End: 2024-06-25
Payer: MEDICARE

## 2024-06-25 ENCOUNTER — PATIENT MESSAGE (OUTPATIENT)
Dept: HEMATOLOGY/ONCOLOGY | Facility: CLINIC | Age: 74
End: 2024-06-25
Payer: MEDICARE

## 2024-06-26 ENCOUNTER — PATIENT MESSAGE (OUTPATIENT)
Dept: ADMINISTRATIVE | Facility: OTHER | Age: 74
End: 2024-06-26
Payer: MEDICARE

## 2024-06-27 ENCOUNTER — PATIENT MESSAGE (OUTPATIENT)
Dept: ADMINISTRATIVE | Facility: OTHER | Age: 74
End: 2024-06-27
Payer: MEDICARE

## 2024-06-28 ENCOUNTER — PATIENT MESSAGE (OUTPATIENT)
Dept: ADMINISTRATIVE | Facility: OTHER | Age: 74
End: 2024-06-28
Payer: MEDICARE

## 2024-06-28 ENCOUNTER — OFFICE VISIT (OUTPATIENT)
Dept: SURGICAL ONCOLOGY | Facility: CLINIC | Age: 74
End: 2024-06-28
Payer: MEDICARE

## 2024-06-28 VITALS
SYSTOLIC BLOOD PRESSURE: 126 MMHG | HEART RATE: 63 BPM | WEIGHT: 129.88 LBS | BODY MASS INDEX: 21.64 KG/M2 | TEMPERATURE: 98 F | HEIGHT: 65 IN | DIASTOLIC BLOOD PRESSURE: 67 MMHG

## 2024-06-28 DIAGNOSIS — C16.9 GASTRIC ADENOCARCINOMA: Primary | ICD-10-CM

## 2024-06-28 PROCEDURE — 99999 PR PBB SHADOW E&M-EST. PATIENT-LVL IV: CPT | Mod: PBBFAC,,, | Performed by: SURGERY

## 2024-06-28 PROCEDURE — 1159F MED LIST DOCD IN RCRD: CPT | Mod: CPTII,S$GLB,, | Performed by: SURGERY

## 2024-06-28 PROCEDURE — 3074F SYST BP LT 130 MM HG: CPT | Mod: CPTII,S$GLB,, | Performed by: SURGERY

## 2024-06-28 PROCEDURE — 1126F AMNT PAIN NOTED NONE PRSNT: CPT | Mod: CPTII,S$GLB,, | Performed by: SURGERY

## 2024-06-28 PROCEDURE — 3008F BODY MASS INDEX DOCD: CPT | Mod: CPTII,S$GLB,, | Performed by: SURGERY

## 2024-06-28 PROCEDURE — 99215 OFFICE O/P EST HI 40 MIN: CPT | Mod: S$GLB,,, | Performed by: SURGERY

## 2024-06-28 PROCEDURE — 3078F DIAST BP <80 MM HG: CPT | Mod: CPTII,S$GLB,, | Performed by: SURGERY

## 2024-06-28 NOTE — PROGRESS NOTES
Surgical Oncology Clinic Note      Referring Provider: Dr. Claudia Ambrose   PCP: Gagandeep Iglesias MD    Reason For Visit: Gastroesophageal: gastric cancer (proximal)      Oncology History   Mixed epithelial carcinoma of right ovary   1/2/2023 Initial Diagnosis    Mixed epithelial carcinoma of left ovary     1/9/2023 Surgery    Laparotomy for radical resection of gynecologic cancer. Removal of pelvic mass (right salpingo-oophorectomy), left salpingo-oophorectomy, extensive enterolysis, extensive adhesiolysis, left pelvic lymph node biopsy, omental biopsy, small bowel resection with primary functional end-to-end anastomosis, placement of pelvic drain. Path: Right ovary, tumor site, 11 cm, mixed epithelial carcinoma (50% mucinous, 50% endometrioid), G2 moderately differentiated, ovarian surface involvement-indeterminate-specimen disrupted. 1 left pelvic lymph node negative for neoplasia. FIGO stage IC2       1/9/2023 Cancer Staged    Staging form: Ovary, Fallopian Tube, and Primary Peritoneal Carcinoma, AJCC 8th Edition  - Clinical stage from 1/9/2023: FIGO Stage IC2, calculated as Stage IC (cT1c2, cN0, cM0)     4/5/2023 - 7/19/2023 Chemotherapy    4/5/2023: Cycle 1- paclitaxel 135 mg/m2 and carboplatin AUC 5 as patient is frail and elderly.  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 - [id number], MMR deficient- loss  MLH1 and PMS2     2/8/2024 Cancer Staged    Staging form: Stomach, AJCC 8th Edition  - Clinical stage from 2/8/2024: Stage IIA (cT2, cN1, cM0)     3/4/2024 Tumor Conference    Date Presented to Tumor Board: 03/04/24  [facility] UGI MULTIDISCIPLINARY TUMOR BOARD  PATIENT REVIEW FORM   ____________________________________________________________    CLINIC #: [id number]  DATE: 3/4/2024    DIAGNOSIS: gastric GARRY    PRESENTER: Perone    PATIENT SUMMARY:   This 72 y/o female was dx with ovarian CA 1/2023, underwent surgery and completed 6 cycles of adj chemotherapy 7/2023. She presented last month with hematemesis. EGD revealed large ulcerated gastric mass, which was biopsied. EGD pathology reviewed - poorly differentiated adenocarcinoma, with intestinal expression, MSI high, HER 2 negative. Staging imaging found irregular wall thickening in mid gastric body, enlarged gastrohepatic lymph node, no evidence of distant metastatic disease.   Reviewed PET - uptake in gastric wall and gastrohepatic lymph node with hypermetabolic activity.   Scheduled to see Dr. Amin in [place] later this week.   + family hx of cancer, pending genetics referral    BOARD RECOMMENDATIONS:   Proceed with systemic chemotherapy, consider neoadj immunotherapy  Proceed with diag lap  Await genetics testing, pathology will send to [facility]    CONSULT NEEDED:     [] Surgery    [] Hem/Onc    [] Rad/Onc    [] Dietary                 [] Social Service    [x] Genetics       [] AES  [] Radiology     Clinical Stage: Tumor   Node(s)  1  Metastasis      GROUP STAGE:  [] O    [] 1A    [] IB    [] IIA    [] IIB     [] IIIA     [] IIIB     [] IIIC    []IV  [] Local recurrence     [] Regional recurrence     [] Distant recurrence   Metastatic site(s): none         [x] Edna'l Treatment Guidelines reviewed and care planned is consistent with guidelines.         (i.e., NCCN, NCI, PD, ACO, AUA, etc.)    PRESENTATION AT CANCER CONFERENCE:         [x] Prospective     [] Retrospective     [] Follow-Up         3/14/2024 Surgery    Procedure:  LAPAROSCOPY, DIAGNOSTIC (N/A)  LYSIS, ADHESIONS, LAPAROSCOPIC (N/A)  LAVAGE, PERITONEAL, THERAPEUTIC (N/A)      Surgeon(s) and Role: Chen Jerome MD - Primary    Pre-Operative Diagnosis: Gastric adenocarcinoma [C16.9]     Post-Operative Diagnosis: Same     Pre-Operative Variables:  Stage:  III (T4a N1 M0)   Adjacent organ involvement: None  Chemotherapy within 90 Days: No  Radiation Therapy within 90 Days: No      Procedure:  Diagnostic laparoscopy   Extensive lysis of adhesions  Peritoneal biopsy   Peritoneal washings for cytology     3/26/2024 -  Chemotherapy    Treatment Summary   Plan Name: OP pembrolizumab 200mg Q3W  Treatment Goal: Curative  Status: Active  Start Date: 3/26/2024  End Date: 3/10/2026 (Planned)  Provider: Claudia Ambrose MD  Chemotherapy: [No matching medication found in this treatment plan]      Chemotherapy    Treatment Summary   Plan Name: OP pembrolizumab 200mg Q3W  Treatment Goal: Curative  Status: Active  Start Date: 3/15/2024 (Planned)  End Date: 2/27/2026 (Planned)  Provider: Claudia Ambrose MD  Chemotherapy: [No matching medication found in this treatment plan]     4/8/2024 Tumor Conference       [facility] UGI MULTIDISCIPLINARY TUMOR BOARD  PATIENT REVIEW FORM   ____________________________________________________________    CLINIC #: [id number]  DATE: 4/8/2024    DIAGNOSIS: Gastric GARRY    PRESENTER: Justus    PATIENT SUMMARY:   This 74 y/o female was dx with ovarian CA 1/2023, underwent surgery and completed 6 cycles of adj chemotherapy 7/2023. She developed hematemesis in Feb 2024, then underwent EGD with bx of a large ulcerated gastric mass. Pathology revealed poorly differentiated adenocarcinoma, with intestinal expression, MSI high, HER 2 negative. [place] [facility] [place] Since then, she underwent diag lap and today's presentation is for pathology review. Negative  for malignancy, reactive atypia     BOARD RECOMMENDATIONS:   Proceed with immunotherapy, then restage with imaging  Potential candidate for subtotal distal gastrectomy    CONSULT NEEDED:     [] Surgery    [] Hem/Onc    [] Rad/Onc    [] Dietary                 [] Social Service    [] Psychology       [] AES  [] Radiology     Clinical Stage: Tumor   Node(s) 1 Metastasis 0      GROUP STAGE:  [] O    [] 1A    [] IB    [] IIA    [] IIB     [] IIIA     [] IIIB     [] IIIC    []IV  [] Local recurrence     [] Regional recurrence     [] Distant recurrence   Metastatic site(s): none         [x] Edna'l Treatment Guidelines reviewed and care planned is consistent with guidelines.         (i.e., NCCN, NCI, PD, ACO, AUA, etc.)    PRESENTATION AT CANCER CONFERENCE:         [x] Prospective    [] Retrospective     [] Follow-Up               Staging:  Cancer Staging   Gastric adenocarcinoma  Staging form: Stomach, AJCC 8th Edition  - Clinical stage from 2/8/2024: Stage IIA (cT2, cN1, cM0) - Signed by Chen Jerome MD on 7/2/2024    Mixed epithelial carcinoma of right ovary  Staging form: Ovary, Fallopian Tube, and Primary Peritoneal Carcinoma, AJCC 8th Edition  - Clinical stage from 1/9/2023: FIGO Stage IC2, calculated as Stage IC (cT1c2, cN0, cM0) - Signed by Chen Jerome MD on 2/27/2024       HISTORY OF PRESENT ILLNESS   Cheryl Garcia is a 73 y.o. female with history of epilepsy and treated ovarian cancer (s/p surgery and chemotherapy) who presents today for evaluation and management of newly diagnosed gastric adenocarcinoma.   She [facility] following an episode of hematemesis.  A upon presentation she was having epigastric tenderness but had not had any additional episodes of vomiting.  She was otherwise doing well.  She was given IV fluids and IV Protonix and transferred to [facility] for further evaluation.  She was found to have an acute decrease in her  hemoglobin from 11-8.9 over 10 hours however her vitals all remained within normal limits.  She underwent an EGD during that admission which showed a large, ulcerated gastric mass.  Pathology returned consistent with poorly differentiated adenocarcinoma.   A CT abdomen and pelvis was done that day which showed an eccentric irregular wall thickening of the mid gastric body concerning for malignancy.  She was discharged the following day in stable condition with follow-up.  Her Xarelto was held at that time due to her bleeding.  She had been on Xarelto since October of 2022 due to a history of lower extremity DVT at the around the time she was diagnosed with ovarian cancer.  This was believed to be due to the malignancy and direct compression of the vessels in that region.  Since her discharge she has overall been doing well and has no new complaints at this time.     She states she did not start having any reflux until after she completed her chemotherapy this summer.  She was seen by her internist a few times as well as seen by GI over the [facility] with Dr. Tinajero for its Giovany.  She had been very diligent about reflux precautions and had reduced high acid foods.  She had been started on Protonix which completely resolved her symptoms.     As stated she does have a history of ovarian cancer diagnosed back in January of 2023.  The workup for that was initiated back in October of 2022 when she presented with a lower extremity swelling in the DVT.  During that workup they did a CT scan which showed a large lesion on her ovary concerning for malignancy.  She subsequently underwent midline laparotomy for radical resection with right salpingo-oophorectomy, left salpingo-oophorectomy, extensive enterolysis, extensive adhesiolysis, left pelvic lymph node biopsy, omental biopsy, small bowel resection with primary functional end-to-end anastomosis, placement of pelvic drain. Path: Right ovary, tumor site, 11 cm,  mixed epithelial carcinoma (50% mucinous, 50% endometrioid), G2 moderately differentiated, ovarian surface involvement-indeterminate-specimen disrupted. 1 left pelvic lymph node negative for neoplasia. FIGO stage IC2.  All this was done at [facility].  She was subsequently treated with 6 cycles of paclitaxel and carboplatin, her last cycle was in July of 2023.     Of note her last colonoscopy was in 2023 and reportedly revealed polyps.  According to the records from [facility] she does have a history of an EGD on 06/20/2012 done by Dr. Boyle, which reportedly showed mild gastritis and otherwise was normal.     Her history is otherwise significant for epilepsy and she remains on antiepileptic medications however she has not had a seizure since 2009.    INTERVAL HISTORY     06/38/2024:  She was presented in tumor board on 04/08/2024 for the atypical cells seen on her washings.  Group consensus was these were likely reactive atypia and to proceed with therapy and restaging as originally planned.  She presents for a post systemic therapy appointment.  She is completed 5 cycles of Keytruda, the last was Tuesday 06/18/2024.  She is overall tolerated it very well and has had no side effects in no hospitalizations.  She states she is feels well and that she is walking daily and has no complaints aside from very mild cough that she developed today    Past Medical History:   Diagnosis Date    Anemia     Chronic deep vein thrombosis (DVT) of left lower extremity 10/21/2022    Deep vein thrombosis     Drug-induced polyneuropathy 02/28/2024    Noted by ROCK KAY NP  last documented on [id number]    DVT (deep venous thrombosis)     Epilepsy     Gastric adenocarcinoma 02/27/2024    GERD (gastroesophageal reflux disease)     Hyperlipidemia 01/10/2013    Formatting of this note might be different from the original.   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    OP  (osteoporosis) 02/28/2024    Ovarian cancer     Primary hypertension 12/01/2022       Past Surgical History:   Procedure Laterality Date    ABDOMINAL WASHOUT N/A 3/14/2024    Procedure: LAVAGE, PERITONEAL, THERAPEUTIC;  Surgeon: Chen Jerome MD;  Location: [facility];  Service: General;  Laterality: N/A;    APPENDECTOMY  2015    BIOPSY OF PERITONEUM N/A 3/14/2024    Procedure: BIOPSY, PERITONEUM;  Surgeon: Cehn Jerome MD;  Location: [facility];  Service: General;  Laterality: N/A;    COLONOSCOPY  06/20/2012    Dr Boyle- Tubular adenoma polyps. Repeat in 3 years.    COLONOSCOPY  06/17/2015    -Nodular mucosa at appendiceal orfice, sigmoid and desc diverticulosis otherwise normal.    COLONOSCOPY  2020    >3 TAs    COLONOSCOPY  12/2023    DIAGNOSTIC LAPAROSCOPY N/A 3/14/2024    Procedure: LAPAROSCOPY, DIAGNOSTIC;  Surgeon: Chen Jerome MD;  Location: [facility];  Service: General;  Laterality: N/A;  1. Diagnostic laparoscopy   2. Extensive lysis of adhesions  3. Peritoneal biopsy   4. Peritoneal washings for cytology    EGD - EXTERNAL RESULT  06/20/2012    Dr Boyle- Mild gastritis otherwise normal.    ESOPHAGOGASTRODUODENOSCOPY N/A 02/08/2024    Procedure: EGD (ESOPHAGOGASTRODUODENOSCOPY);  Surgeon: Jayla Arteaga MD;  Location: [facility];  Service: Endoscopy;  Laterality: N/A;    HYSTERECTOMY      LAPAROSCOPIC LYSIS OF ADHESIONS N/A 3/14/2024    Procedure: LYSIS, ADHESIONS, LAPAROSCOPIC;  Surgeon: Chen Jerome MD;  Location: [facility];  Service: General;  Laterality: N/A;    TOTAL ABDOMINAL HYSTERECTOMY W/ BILATERAL SALPINGOOPHORECTOMY  01/09/2023    radical resection with right salpingo-oophorectomy, left salpingo-oophorectomy, extensive enterolysis, extensive adhesiolysis, left pelvic lymph node biopsy, omental biopsy, small bowel resection with primary functional end-to-end anastomosis, placement of pelvic drain       Family History   Problem Relation Name Age of Onset     Pancreatic cancer Brother Misael Mccray 76    Prostate cancer Brother Zachary 67    Pancreatic cancer Half-brother Gasper 74    Colon cancer Half-sister Elton 83    Lung cancer Half-sister Elton         +smoking hx    Breast cancer Half-sister Vidhi 58    Lymphoma Other Cara Sun    Prostate cancer Other Fernando     Prostate cancer Other Gasper Jr     Ovarian cancer Other Inerris     Breast cancer Other Aleida     Colon cancer Other Aleida        Social History     Socioeconomic History    Marital status:    Tobacco Use    Smoking status: Former     Types: Cigarettes    Smokeless tobacco: Never    Tobacco comments:     Quit 1989 smoked 10 years smoked 0.25 ppd    Substance and Sexual Activity    Alcohol use: Not Currently    Drug use: Never     Social Determinants of Health     Financial Resource Strain: Low Risk  (2/23/2024)    Overall Financial Resource Strain (CARDIA)     Difficulty of Paying Living Expenses: Not hard at all   Food Insecurity: No Food Insecurity (2/23/2024)    Hunger Vital Sign     Worried About Running Out of Food in the Last Year: Never true     Ran Out of Food in the Last Year: Never true   Transportation Needs: No Transportation Needs (2/23/2024)    PRAPARE - Transportation     Lack of Transportation (Medical): No     Lack of Transportation (Non-Medical): No   Physical Activity: Inactive (2/23/2024)    Exercise Vital Sign     Days of Exercise per Week: 0 days     Minutes of Exercise per Session: 10 min   Stress: No Stress Concern Present (2/23/2024)    [employer] - Occupational Stress Questionnaire     Feeling of Stress : Not at all   Housing Stability: Low Risk  (2/23/2024)    Housing Stability Vital Sign     Unable to Pay for Housing in the Last Year: No     Number of Places Lived in the Last Year: 1     Unstable Housing in the Last Year: No          Medication List            Accurate as of June 28, 2024 11:59 PM. If you have any  questions, ask your nurse or doctor.                CONTINUE taking these medications      acetaminophen 500 MG tablet  Commonly known as: TYLENOL  Take 2 tablets (1,000 mg total) by mouth every 8 (eight) hours.     alendronate 70 MG tablet  Commonly known as: FOSAMAX     CALCIUM PHOSPHATE-VITAMIN D3 ORAL     carBAMazepine 200 mg tablet  Commonly known as: TEGRETOL     hydrocortisone 2.5 % cream  Apply topically 2 (two) times daily.     hydrOXYzine HCL 25 MG tablet  Commonly known as: ATARAX  Take 1 tablet (25 mg total) by mouth 3 (three) times daily as needed for Itching.     ibuprofen 800 MG tablet  Commonly known as: ADVIL,MOTRIN  Take 1 tablet (800 mg total) by mouth every 8 (eight) hours.     levETIRAcetam 500 MG Tab  Commonly known as: KEPPRA     oxyCODONE 5 MG immediate release tablet  Commonly known as: ROXICODONE  Take 1 tablet (5 mg total) by mouth every 4 (four) hours as needed for Pain.     pantoprazole 40 MG tablet  Commonly known as: PROTONIX     zonisamide 100 MG Cap  Commonly known as: ZONEGRAN              Review of patient's allergies indicates:  No Known Allergies    Review of Systems   Constitutional:  Negative for chills, fever, malaise/fatigue and weight loss.   Respiratory:  Positive for cough. Negative for shortness of breath and wheezing.    Cardiovascular:  Negative for chest pain and palpitations.   Gastrointestinal:  Negative for abdominal pain, constipation, diarrhea, nausea and vomiting.   Musculoskeletal:  Negative for back pain, falls and joint pain.   Neurological:  Negative for dizziness, sensory change, speech change, focal weakness, seizures, loss of consciousness and weakness.   Psychiatric/Behavioral:  Negative for depression and hallucinations. The patient is not nervous/anxious.          Vitals:    06/28/24 0912   BP: 126/67   Pulse: 63   Temp: 97.8 °F (36.6 °C)     Body mass index is 21.61 kg/m².  ECOG SCORE    1 - Restricted in strenuous activity-ambulatory and able to carry  "out work of a light nature         PHYSICAL EXAM       Physical Exam  Vitals reviewed.   Constitutional:       General: She is not in acute distress.     Appearance: Normal appearance.   HENT:      Head: Normocephalic and atraumatic.      Mouth/Throat:      Mouth: Mucous membranes are moist.   Eyes:      General: No scleral icterus.     Extraocular Movements: Extraocular movements intact.      Conjunctiva/sclera: Conjunctivae normal.   Pulmonary:      Effort: Pulmonary effort is normal.   Abdominal:      General: There is no distension.      Palpations: Abdomen is soft. There is no mass.      Tenderness: There is no abdominal tenderness.      Comments: Well healed midline incision    Musculoskeletal:         General: No swelling. Normal range of motion.   Skin:     General: Skin is warm and dry.   Neurological:      General: No focal deficit present.      Mental Status: She is alert.   Psychiatric:         Mood and Affect: Mood normal.         Behavior: Behavior normal.         Thought Content: Thought content normal.         Judgment: Judgment normal.          DATA REVIEW:  I personally reviewed the following records for this visit: lab work from prior visit, notes from other physicians, computed tomography/CT imaging, surgical pathology results, endoscopy results, radiographic study evaluation, and laboratory results done by primary care physician    LABS       Lab Results   Component Value Date    WBC 4.79 06/17/2024    HGB 13.7 06/17/2024    HCT 43.1 06/17/2024     06/17/2024     Lab Results   Component Value Date    GLU 98 06/17/2024    CALCIUM 9.7 06/17/2024    ALBUMIN 3.5 06/17/2024    PROT 7.1 06/17/2024     06/17/2024    K 4.9 06/17/2024    CO2 23 06/17/2024     06/17/2024    BUN 13 06/17/2024    CREATININE 0.9 06/17/2024    ALKPHOS 91 06/17/2024    ALT 14 06/17/2024    AST 16 06/17/2024    BILITOT 0.2 06/17/2024       Nutritional:  No results found for: "PREALBUMIN"    Tumor Markers:  No " "results found for: "CEA", "AMYLASE", "ASPIRATE", "LWH559", "", "ZG5370", "AFP", "AFPTM"  No results found for: ""    PATHOLOGY     Final Pathologic Diagnosis  Abnormal   STOMACH, 'GASTRIC MASS', BIOPSY:  - 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 Scattered p53 positivity (wild-type pattern of expression) and p16 positivity are also noted. Additional studies, including CK7, PAX8, ER, HER2, TTF-1, GATA3, WT-1,  synaptophysin, and chromogranin, are either negative or negative in the cells of interest. This patient's prior history of ovarian carcinoma resected at [facility] in [place], [place] is noted and the outside pathology report (S-) is reviewed.   While both tumors do have similar immunohistochemical staining patterns, they are not identical, with notable reported differences being CK7 and PAX8 positivity in the ovarian tumor. The presence of a large, fungating, and ulcerated mass in the stomach  is suspicious for a gastric primary; however, it is unclear if the masses represent two separate primaries or one p rimary and one metastatic focus. Abnormal mismatch repair studies do suggest increased risk for development of multiple tumor types (see  below). Requesting outside slides for review to compare morphology may be helpful.    DNA mismatch repair IHC studies are ABNORMAL and demonstrate LOSS OF MLH1 and PMS2 expression within tumor cells. MSH2 and MSH6 exhibit retained expression. 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    Tissue will be sent for molecular profiling by next-generation sequencing ([facility]). Results of this study will be issued directly to the electronic medical record.     3/14/2024:  Final Pathologic Diagnosis 1. Perigastric nodule, biopsy:Fibroadipose tissue, lined by reactive mesothelial " cells.  Negative for carcinoma.         Component 3 mo ago   Final Pathologic Diagnosis  Abnormal   1. Atypical cells present.      2. Negative for malignant cells.    Comment: Interp By Jami Goode M.D., Signed on 03/22/2024 at 14:39   Comment  Abnormal   For part 1, rare atypical appearing cell groups with abundant benign and reactive mesothelial cell groups.  Atypical groups cannot be classified further as are not present in cell block material.    For part 2, there is predominantly blood. Negative for malignancy.    Broderick-EP4 and PAX8 were performed on both part 1 and part 2 cell blocks and are both negative with appropriate controls.    This case was seen in consultation by cytopathologist Dr. VALERIE Da Silva who concurs with the above diagnoses and assessment.          IMAGING     02/08/2024:  CT Abdomen/ Pelvis  Impression:   Eccentric irregular wall thickening of the mid gastric body concerning for malignancy.  There appears to be associated enlarged 1.6 cm gastrohepatic lymph node.  Clinical correlation advised.  Correlation with endoscopy is recommended if not previously performed.    02/27/2024:  CT Chest  Impression:   No evidence of intrathoracic metastatic disease.    02/28/2024: PET CT Scan  Impression:   1. The biopsy proving gastric cancer is markedly FDG avid with SUV max 18.  2. Moderately FDG avid gastrohepatic ligament lymph node consistent with local metastatic disease.  3. No evidence for distant metastatic disease.    06/24/2024:  CT Chest, Abdomen, and Pelvis with IV contrast   Stable left adrenal nodule.  The right adrenal gland, pancreas, gallbladder, and spleen are within normal limits.  Demonstration of the annular ulcerated gastric body mass with mucosal thickening and irregularity that has decreased since the prior examination.  However, there has been enlargement of several perigastric lymph nodes in the lesser sac measuring up to 3.2 x 2.9 x 4 cm on axial image 33 of series 3 and 1.8 x 1.7  x 1.8 cm on axial image 41.   Impression:   Progression of upper abdominal metastatic lymphadenopathy in the lesser sac.  No distant sites of metastatic disease identified in the chest, abdomen, or pelvis.    ASSESSMENT & PLAN     1. Gastric adenocarcinoma       Cheryl Garcia is a 73 y.o. 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    Overall she is tolerated her therapy really well.  She is maintained her weight, is having no upper GI symptoms.  She has had no adverse immune related events.  I reviewed with her and her  her imaging results today.  Unfortunately she does seem to have enlarging lymphadenopathy in the area of her stomach.  This is concerning for progression of disease versus pseudo progression with immunotherapy.  She has a little far out on her immunotherapy to have pseudo progression therefore my suspicion of true progression of disease is pretty high.  Although this is all within resectable field it certainly does concern me for the biology of her disease and whether or not an open total gastrectomy with an esophagojejunostomy in a feeding J-tube would really ultimately be in her best interest.  Based on the location of her tumor which is very proximal I discussed with her and her  that she would need a total gastrectomy and as such would also need at least a temporary feeding jejunostomy.  This would also need to be an open operation based on the significant adhesive disease and small bowel adhesions that she already has with in her abdomen from her previous TOVA BSO with omentectomy.  All that being said this will significantly impact her quality of life for some time postoperatively and I discussed with them that I want us to be very intentional and thoughtful about how we move forward from here with either surgery or  more systemic therapy.    I also talked with her about the importance of increasing her walking and her cardiovascular activity.  I encouraged her to walk for at least 30 minutes/ day at least 5 days a week.  I do not think she needs prehab at this time because her nutrition status appears to be good, she has a good diet, and overall seems to be tolerating all this well.  She is also able to ambulate without any issue.      Plan:  -- I will plan to review her case at the upper GI tumor board on Monday, 07/01/2024.  -- Tentative plan for surgery on Thursday, July 25th - plan for diagnostic laparoscopy to r/o metastatic disease, followed by open total gastrectomy, EGD, and jejunostomy tube placement    Ms. Garcia expressed understanding in regards to our discussion today. Many good questions were asked on today's visit, all of which were answered to their satisfaction.    Follow-up: Follow up in about 2 weeks (around 7/12/2024).     ADDENDUM:  Upper GI tumor board recommendations to obtain repeat PET-CT scan, if PET avid we will likely need biopsy to confirm metastatic adenocarcinoma and then potentially change of systemic therapy.  We will review in tumor board again once PET-CT scan is done.             Chen Jerome MD MS              Surgical Oncology              [facility] [place], [place]              Office: [phone]     Communications:  50  minutes were spent on today's visit in face-to-face and non face-to-face time with the patient. This patient has locally advanced gastric adenocarcinoma and the time was required to provide counseling and guidance regarding their new diagnosis. Time was spent reviewing all outside records and information pertaining to their work-up and formulating a treatment plan in line with standardized guidelines. Additional time was spent communicating with referring physicians and facilities to facilitate the efficient exchange of  previous healthcare records and radiographic imaging pertinent to the diagnosis and disease management.       No orders of the defined types were placed in this encounter.

## 2024-06-29 ENCOUNTER — PATIENT MESSAGE (OUTPATIENT)
Dept: ADMINISTRATIVE | Facility: OTHER | Age: 74
End: 2024-06-29
Payer: MEDICARE

## 2024-06-30 ENCOUNTER — PATIENT MESSAGE (OUTPATIENT)
Dept: ADMINISTRATIVE | Facility: OTHER | Age: 74
End: 2024-06-30
Payer: MEDICARE

## 2024-07-01 ENCOUNTER — PATIENT MESSAGE (OUTPATIENT)
Dept: ADMINISTRATIVE | Facility: OTHER | Age: 74
End: 2024-07-01
Payer: MEDICARE

## 2024-07-01 ENCOUNTER — TUMOR BOARD CONFERENCE (OUTPATIENT)
Dept: SURGERY | Facility: CLINIC | Age: 74
End: 2024-07-01
Payer: MEDICARE

## 2024-07-01 DIAGNOSIS — C16.9 ADENOCARCINOMA OF STOMACH: Primary | ICD-10-CM

## 2024-07-01 NOTE — PROGRESS NOTES
[facility] UGI MULTIDISCIPLINARY TUMOR BOARD  PATIENT REVIEW FORM   ____________________________________________________________    CLINIC #: [id number]   DATE: 7/1/2024    DIAGNOSIS: gastric GARRY    PRESENTER: Perone    PATIENT SUMMARY:   This 74 y/o female was dx with ovarian CA 1/2023, underwent surgery and completed 6 cycles of adj chemotherapy in 7/2023. She developed hematemesis in Feb 2024 and underwent EGD with bx of a large ulcerated gastric mass. Pathology revealed poorly differentiated adenocarcinoma, with intestinal expression, MSI high, HER 2 negative. She underwent diag lap and pathology was negative for malignancy, reactive atypia. She was presented to this [facility] in 3/2024 and again in 4/2024. Since then, she received immunotherapy Keytruda 200 mg every 3 weeks, total of 4 cycles thus far.   Reviewed re-staging CT scan - perigastric lymph nodes larger in size  She remains asymptomatic.     BOARD RECOMMENDATIONS:   Obtain repeat PET  Consider ct DNA    CONSULT NEEDED:     [] Surgery    [] Hem/Onc    [] Rad/Onc    [] Dietary                 [] Genetics    [] Psychology       [] AES  [] Interventional Radiology     Clinical Stage: Tumor 2 Node(s) 1 Metastasis 0      GROUP STAGE:  [] O    [] 1A    [] IB    [x] IIA    [] IIB     [] IIIA     [] IIIB     [] IIIC    []IV  [] Local recurrence     [] Regional recurrence     [] Distant recurrence   Metastatic site(s): none         [x] Edna'l Treatment Guidelines reviewed and care planned is consistent with guidelines.         (i.e., NCCN, NCI, PD, ACO, AUA, etc.)    PRESENTATION AT CANCER CONFERENCE:         [x] Prospective    [] Retrospective     [] Follow-Up

## 2024-07-02 ENCOUNTER — PATIENT MESSAGE (OUTPATIENT)
Dept: ADMINISTRATIVE | Facility: OTHER | Age: 74
End: 2024-07-02
Payer: MEDICARE

## 2024-07-03 ENCOUNTER — PATIENT MESSAGE (OUTPATIENT)
Dept: ADMINISTRATIVE | Facility: OTHER | Age: 74
End: 2024-07-03
Payer: MEDICARE

## 2024-07-04 ENCOUNTER — PATIENT MESSAGE (OUTPATIENT)
Dept: ADMINISTRATIVE | Facility: OTHER | Age: 74
End: 2024-07-04
Payer: MEDICARE

## 2024-07-05 ENCOUNTER — PATIENT MESSAGE (OUTPATIENT)
Dept: ADMINISTRATIVE | Facility: OTHER | Age: 74
End: 2024-07-05
Payer: MEDICARE

## 2024-07-06 ENCOUNTER — PATIENT MESSAGE (OUTPATIENT)
Dept: ADMINISTRATIVE | Facility: OTHER | Age: 74
End: 2024-07-06
Payer: MEDICARE

## 2024-07-07 ENCOUNTER — PATIENT MESSAGE (OUTPATIENT)
Dept: ADMINISTRATIVE | Facility: OTHER | Age: 74
End: 2024-07-07
Payer: MEDICARE

## 2024-07-08 ENCOUNTER — LAB VISIT (OUTPATIENT)
Dept: LAB | Facility: HOSPITAL | Age: 74
End: 2024-07-08
Attending: INTERNAL MEDICINE
Payer: MEDICARE

## 2024-07-08 ENCOUNTER — PATIENT MESSAGE (OUTPATIENT)
Dept: ADMINISTRATIVE | Facility: OTHER | Age: 74
End: 2024-07-08
Payer: MEDICARE

## 2024-07-08 DIAGNOSIS — C56.1: ICD-10-CM

## 2024-07-08 DIAGNOSIS — R68.89 OTHER GENERAL SYMPTOMS AND SIGNS: ICD-10-CM

## 2024-07-08 DIAGNOSIS — C16.9 ADENOCARCINOMA OF STOMACH: ICD-10-CM

## 2024-07-08 LAB
ALBUMIN SERPL BCP-MCNC: 3.7 G/DL (ref 3.5–5.2)
ALP SERPL-CCNC: 89 U/L (ref 55–135)
ALT SERPL W/O P-5'-P-CCNC: 14 U/L (ref 10–44)
ANION GAP SERPL CALC-SCNC: 10 MMOL/L (ref 8–16)
AST SERPL-CCNC: 17 U/L (ref 10–40)
BASOPHILS # BLD AUTO: 0.04 K/UL (ref 0–0.2)
BASOPHILS NFR BLD: 0.8 % (ref 0–1.9)
BILIRUB SERPL-MCNC: 0.2 MG/DL (ref 0.1–1)
BUN SERPL-MCNC: 9 MG/DL (ref 8–23)
CALCIUM SERPL-MCNC: 9.1 MG/DL (ref 8.7–10.5)
CHLORIDE SERPL-SCNC: 103 MMOL/L (ref 95–110)
CO2 SERPL-SCNC: 23 MMOL/L (ref 23–29)
CREAT SERPL-MCNC: 0.8 MG/DL (ref 0.5–1.4)
DIFFERENTIAL METHOD BLD: ABNORMAL
EOSINOPHIL # BLD AUTO: 0.1 K/UL (ref 0–0.5)
EOSINOPHIL NFR BLD: 2.2 % (ref 0–8)
ERYTHROCYTE [DISTWIDTH] IN BLOOD BY AUTOMATED COUNT: 14.7 % (ref 11.5–14.5)
EST. GFR  (NO RACE VARIABLE): >60 ML/MIN/1.73 M^2
GLUCOSE SERPL-MCNC: 96 MG/DL (ref 70–110)
HCT VFR BLD AUTO: 43.3 % (ref 37–48.5)
HGB BLD-MCNC: 13.9 G/DL (ref 12–16)
IMM GRANULOCYTES # BLD AUTO: 0.02 K/UL (ref 0–0.04)
IMM GRANULOCYTES NFR BLD AUTO: 0.4 % (ref 0–0.5)
LYMPHOCYTES # BLD AUTO: 1.2 K/UL (ref 1–4.8)
LYMPHOCYTES NFR BLD: 24.5 % (ref 18–48)
MAGNESIUM SERPL-MCNC: 1.9 MG/DL (ref 1.6–2.6)
MCH RBC QN AUTO: 32.7 PG (ref 27–31)
MCHC RBC AUTO-ENTMCNC: 32.1 G/DL (ref 32–36)
MCV RBC AUTO: 102 FL (ref 82–98)
MONOCYTES # BLD AUTO: 0.5 K/UL (ref 0.3–1)
MONOCYTES NFR BLD: 10.1 % (ref 4–15)
NEUTROPHILS # BLD AUTO: 3.2 K/UL (ref 1.8–7.7)
NEUTROPHILS NFR BLD: 62 % (ref 38–73)
NRBC BLD-RTO: 0 /100 WBC
PLATELET # BLD AUTO: 257 K/UL (ref 150–450)
PMV BLD AUTO: 9 FL (ref 9.2–12.9)
POTASSIUM SERPL-SCNC: 4.1 MMOL/L (ref 3.5–5.1)
PROT SERPL-MCNC: 7 G/DL (ref 6–8.4)
RBC # BLD AUTO: 4.25 M/UL (ref 4–5.4)
SODIUM SERPL-SCNC: 136 MMOL/L (ref 136–145)
TSH SERPL DL<=0.005 MIU/L-ACNC: 0.89 UIU/ML (ref 0.4–4)
WBC # BLD AUTO: 5.07 K/UL (ref 3.9–12.7)

## 2024-07-08 PROCEDURE — 36415 COLL VENOUS BLD VENIPUNCTURE: CPT | Mod: PO | Performed by: INTERNAL MEDICINE

## 2024-07-08 PROCEDURE — 80053 COMPREHEN METABOLIC PANEL: CPT | Performed by: INTERNAL MEDICINE

## 2024-07-08 PROCEDURE — 84443 ASSAY THYROID STIM HORMONE: CPT | Performed by: INTERNAL MEDICINE

## 2024-07-08 PROCEDURE — 83735 ASSAY OF MAGNESIUM: CPT | Performed by: INTERNAL MEDICINE

## 2024-07-08 PROCEDURE — 85025 COMPLETE CBC W/AUTO DIFF WBC: CPT | Performed by: INTERNAL MEDICINE

## 2024-07-08 PROCEDURE — 84100 ASSAY OF PHOSPHORUS: CPT | Performed by: INTERNAL MEDICINE

## 2024-07-09 ENCOUNTER — OFFICE VISIT (OUTPATIENT)
Dept: HEMATOLOGY/ONCOLOGY | Facility: CLINIC | Age: 74
End: 2024-07-09
Payer: MEDICARE

## 2024-07-09 ENCOUNTER — DOCUMENTATION ONLY (OUTPATIENT)
Dept: HEMATOLOGY/ONCOLOGY | Facility: CLINIC | Age: 74
End: 2024-07-09
Payer: MEDICARE

## 2024-07-09 ENCOUNTER — PATIENT MESSAGE (OUTPATIENT)
Dept: ADMINISTRATIVE | Facility: OTHER | Age: 74
End: 2024-07-09
Payer: MEDICARE

## 2024-07-09 ENCOUNTER — PATIENT MESSAGE (OUTPATIENT)
Dept: HEMATOLOGY/ONCOLOGY | Facility: CLINIC | Age: 74
End: 2024-07-09
Payer: MEDICARE

## 2024-07-09 VITALS
HEIGHT: 65 IN | TEMPERATURE: 98 F | BODY MASS INDEX: 21.64 KG/M2 | DIASTOLIC BLOOD PRESSURE: 77 MMHG | OXYGEN SATURATION: 97 % | HEART RATE: 52 BPM | WEIGHT: 129.88 LBS | SYSTOLIC BLOOD PRESSURE: 134 MMHG

## 2024-07-09 DIAGNOSIS — C16.9 GASTRIC ADENOCARCINOMA: Primary | ICD-10-CM

## 2024-07-09 DIAGNOSIS — I10 PRIMARY HYPERTENSION: ICD-10-CM

## 2024-07-09 DIAGNOSIS — M81.0 OSTEOPOROSIS, UNSPECIFIED OSTEOPOROSIS TYPE, UNSPECIFIED PATHOLOGICAL FRACTURE PRESENCE: ICD-10-CM

## 2024-07-09 LAB — PHOSPHATE SERPL-MCNC: 2.8 MG/DL (ref 2.7–4.5)

## 2024-07-09 PROCEDURE — 99214 OFFICE O/P EST MOD 30 MIN: CPT | Mod: S$GLB,,, | Performed by: INTERNAL MEDICINE

## 2024-07-09 PROCEDURE — 99999 PR PBB SHADOW E&M-EST. PATIENT-LVL III: CPT | Mod: PBBFAC,,, | Performed by: INTERNAL MEDICINE

## 2024-07-09 PROCEDURE — 3288F FALL RISK ASSESSMENT DOCD: CPT | Mod: CPTII,S$GLB,, | Performed by: INTERNAL MEDICINE

## 2024-07-09 PROCEDURE — 3075F SYST BP GE 130 - 139MM HG: CPT | Mod: CPTII,S$GLB,, | Performed by: INTERNAL MEDICINE

## 2024-07-09 PROCEDURE — 3078F DIAST BP <80 MM HG: CPT | Mod: CPTII,S$GLB,, | Performed by: INTERNAL MEDICINE

## 2024-07-09 PROCEDURE — 1126F AMNT PAIN NOTED NONE PRSNT: CPT | Mod: CPTII,S$GLB,, | Performed by: INTERNAL MEDICINE

## 2024-07-09 PROCEDURE — 3008F BODY MASS INDEX DOCD: CPT | Mod: CPTII,S$GLB,, | Performed by: INTERNAL MEDICINE

## 2024-07-09 PROCEDURE — 1101F PT FALLS ASSESS-DOCD LE1/YR: CPT | Mod: CPTII,S$GLB,, | Performed by: INTERNAL MEDICINE

## 2024-07-09 PROCEDURE — 1159F MED LIST DOCD IN RCRD: CPT | Mod: CPTII,S$GLB,, | Performed by: INTERNAL MEDICINE

## 2024-07-09 RX ORDER — FERROUS SULFATE 325(65) MG
65 TABLET, DELAYED RELEASE (ENTERIC COATED) ORAL 2 TIMES DAILY WITH MEALS
COMMUNITY

## 2024-07-09 NOTE — PROGRESS NOTES
Patient ID: Cheryl Garcia   Chief Complaint: Follow-up  MRN:  [id number]     Oncologic Diagnosis:  Gastric Adenocarcinoma  Previous Treatment:  N/A  Current Treatment:  Pembrolizumab 3/26/2024    Subjective   The patient presents for follow up and is accompanied by her .    She has tolerated NA immunotherapy without significant adverse effects.  She feels well.  She has met with Dr. Jerome and surgery is scheduled for 07/25/24.  I discussed obtaining ctDNA and she is in agreement.  We will plan to do this with preoperative labs.    Review of Systems   Constitutional:  Negative for activity change, appetite change, chills, diaphoresis, fatigue, fever and unexpected weight change.   HENT:  Negative for nosebleeds.    Respiratory:  Negative for shortness of breath.    Cardiovascular:  Negative for chest pain.   Gastrointestinal:  Negative for abdominal distention, abdominal pain, anal bleeding, blood in stool, constipation, diarrhea, nausea and vomiting.   Genitourinary:  Negative for difficulty urinating and hematuria.   Musculoskeletal:  Negative for arthralgias, back pain and myalgias.   Skin:  Negative for rash.   Neurological:  Negative for dizziness, weakness, light-headedness and headaches.   Hematological:  Does not bruise/bleed easily.   Psychiatric/Behavioral:  The patient is not nervous/anxious.      History     Oncology History   Mixed epithelial carcinoma of right ovary   1/2/2023 Initial Diagnosis    Mixed epithelial carcinoma of left ovary     1/9/2023 Surgery    Laparotomy for radical resection of gynecologic cancer. Removal of pelvic mass (right salpingo-oophorectomy), left salpingo-oophorectomy, extensive enterolysis, extensive adhesiolysis, left pelvic lymph node biopsy, omental biopsy, small bowel resection with primary functional end-to-end anastomosis, placement of pelvic drain. Path: Right ovary, tumor site, 11 cm, mixed epithelial carcinoma (50% mucinous, 50% endometrioid),  G2 moderately differentiated, ovarian surface involvement-indeterminate-specimen disrupted. 1 left pelvic lymph node negative for neoplasia. FIGO stage IC2       1/9/2023 Cancer Staged    Staging form: Ovary, Fallopian Tube, and Primary Peritoneal Carcinoma, AJCC 8th Edition  - Clinical stage from 1/9/2023: FIGO Stage IC2, calculated as Stage IC (cT1c2, cN0, cM0)     4/5/2023 - 7/19/2023 Chemotherapy    4/5/2023: Cycle 1- paclitaxel 135 mg/m2 and carboplatin AUC 5 as patient is frail and elderly.  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 - [id number], MMR deficient- loss MLH1 and PMS2     2/8/2024 Cancer Staged    Staging form: Stomach, AJCC 8th Edition  - Clinical stage from 2/8/2024: Stage IIA (cT2, cN1, cM0)     3/4/2024 Tumor Conference    Date Presented to Tumor Board: 03/04/24  [facility] UGI MULTIDISCIPLINARY TUMOR BOARD  PATIENT REVIEW FORM   ____________________________________________________________    CLINIC #: [id number]  DATE: 3/4/2024    DIAGNOSIS: gastric GARRY    PRESENTER: Justus    PATIENT SUMMARY:   This 72 y/o female was dx with ovarian CA 1/2023, underwent surgery and completed 6 cycles of adj chemotherapy 7/2023. She presented last month with hematemesis. EGD revealed large ulcerated gastric mass, which was biopsied. EGD pathology reviewed - poorly differentiated adenocarcinoma, with  intestinal expression, MSI high, HER 2 negative. Staging imaging found irregular wall thickening in mid gastric body, enlarged gastrohepatic lymph node, no evidence of distant metastatic disease.   Reviewed PET - uptake in gastric wall and gastrohepatic lymph node with hypermetabolic activity.   Scheduled to see Dr. Amin in [place] later this week.   + family hx of cancer, pending genetics referral    BOARD RECOMMENDATIONS:   Proceed with systemic chemotherapy, consider neoadj immunotherapy  Proceed with diag lap  Await genetics testing, pathology will send [facility]    CONSULT NEEDED:     [] Surgery    [] Hem/Onc    [] Rad/Onc    [] Dietary                 [] Social Service    [x] Genetics       [] AES  [] Radiology     Clinical Stage: Tumor   Node(s)  1  Metastasis      GROUP STAGE:  [] O    [] 1A    [] IB    [] IIA    [] IIB     [] IIIA     [] IIIB     [] IIIC    []IV  [] Local recurrence     [] Regional recurrence     [] Distant recurrence   Metastatic site(s): none         [x] Edna'l Treatment Guidelines reviewed and care planned is consistent with guidelines.         (i.e., NCCN, NCI, PD, ACO, AUA, etc.)    PRESENTATION AT CANCER CONFERENCE:         [x] Prospective    [] Retrospective     [] Follow-Up         3/14/2024 Surgery    Procedure:  LAPAROSCOPY, DIAGNOSTIC (N/A)  LYSIS, ADHESIONS, LAPAROSCOPIC (N/A)  LAVAGE, PERITONEAL, THERAPEUTIC (N/A)      Surgeon(s) and Role: Chen Jerome MD - Primary    Pre-Operative Diagnosis: Gastric adenocarcinoma [C16.9]     Post-Operative Diagnosis: Same     Pre-Operative Variables:  Stage:  III (T4a N1 M0)   Adjacent organ involvement: None  Chemotherapy within 90 Days: No  Radiation Therapy within 90 Days: No      Procedure:  Diagnostic laparoscopy   Extensive lysis of adhesions  Peritoneal biopsy   Peritoneal washings for cytology     3/26/2024 -  Chemotherapy    Treatment Summary   Plan Name: OP pembrolizumab 200mg Q3W  Treatment Goal: Curative  Status: Active  Start  Date: 3/26/2024  End Date: 3/10/2026 (Planned)  Provider: Claudia Ambrose MD  Chemotherapy: [No matching medication found in this treatment plan]      Chemotherapy    Treatment Summary   Plan Name: OP pembrolizumab 200mg Q3W  Treatment Goal: Curative  Status: Active  Start Date: 3/15/2024 (Planned)  End Date: 2/27/2026 (Planned)  Provider: Claudia Ambrose MD  Chemotherapy: [No matching medication found in this treatment plan]     4/8/2024 Tumor Conference       [facility] UGI MULTIDISCIPLINARY TUMOR BOARD  PATIENT REVIEW FORM   ____________________________________________________________    CLINIC #: [id number]  DATE: 4/8/2024    DIAGNOSIS: Gastric GARRY    PRESENTER: Perone    PATIENT SUMMARY:   This 72 y/o female was dx with ovarian CA 1/2023, underwent surgery and completed 6 cycles of adj chemotherapy 7/2023. She developed hematemesis in Feb 2024, then underwent EGD with bx of a large ulcerated gastric mass. Pathology revealed poorly differentiated adenocarcinoma, with intestinal expression, MSI high, HER 2 negative. She was presented to this [facility] last month. Since then, she underwent diag lap and today's presentation is for pathology review. Negative for malignancy, reactive atypia     BOARD RECOMMENDATIONS:   Proceed with immunotherapy, then restage with imaging  Potential candidate for subtotal distal gastrectomy    CONSULT NEEDED:     [] Surgery    [] Hem/Onc    [] Rad/Onc    [] Dietary                 [] Social Service    [] Psychology       [] AES  [] Radiology     Clinical Stage: Tumor   Node(s) 1 Metastasis 0      GROUP STAGE:  [] O    [] 1A    [] IB    [] IIA    [] IIB     [] IIIA     [] IIIB     [] IIIC    []IV  [] Local recurrence     [] Regional recurrence     [] Distant recurrence   Metastatic site(s): none         [x] Edna'l Treatment Guidelines reviewed and care planned is consistent with guidelines.         (i.e., NCCN, NCI, PD, ACO, AUA, etc.)    PRESENTATION AT CANCER CONFERENCE:          [x] Prospective    [] Retrospective     [] Follow-Up                 Previous HPI  Cheryl Garcia is a 73 y.o. female with history of DVT not currently on AC, previously on Xarelto, FIGO Stage IC (cT1c2, cN0, cM0) Mixed epithelial carcinoma of right ovary s/p surgery 01/09/023 and 6C Carbo/Paclitaxel completed 07/19/2023, Osteoporosis on Fosamax, HTN and Hx of Epilepsy on Keppra, Tegretol, Zonegran (last seizure in 2009) who presents to clinic to establish care on referral for gastric cancer.    Patient reports that she Initially presented to [facility] 02/06/24 with report of hematemeiss; she was transferred to [facility] for higher level of care.  On admission to [facility], EGD  was performed and showed a large ulcerated gastric mass.  Pathology results it as poorly differentiated adenocarcinoma.  She states that her weight is currently stable.  The last time she lost any weight was 2 her ovarian cancer diagnosis and treatment at which time she lost 5 lb but gained it back.  On hospitalization here at [facility], during GI workup she lost those 5 lbs a cane because she was NPO.    The patient has already established care with surgical oncology, Dr. Jerome.  She was presented in the upper GI tumor board with consensus for neoadjuvant immunotherapy given MSI high status of her tumor.  She is here for medical oncology recommendations.    I reviewed the recommendations of the tumor board consensus for neoadjuvant immunotherapy and I reviewed her pathology and biomarkers in detail.  She is expressed understanding in his in agreement with the plan.  She also understands that she still needs to undergo diagnostic laparoscopy for completed staging.    Otherwise, she is a former light smoker; quit 30-40 years ago. No alcohol use in 30-40 years. No illicit drug use.  She has an extensive family history of malignancy in his already been referred to our genetic counselor.      Past Medical  History:   Diagnosis Date    Anemia     Chronic deep vein thrombosis (DVT) of left lower extremity 10/21/2022    Deep vein thrombosis     Drug-induced polyneuropathy 02/28/2024    Noted by ROCK KAY NP  last documented on [id number]    DVT (deep venous thrombosis)     Epilepsy     Gastric adenocarcinoma 02/27/2024    GERD (gastroesophageal reflux disease)     Hyperlipidemia 01/10/2013    Formatting of this note might be different from the original.   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    OP (osteoporosis) 02/28/2024    Ovarian cancer     Primary hypertension 12/01/2022       Past Surgical History:   Procedure Laterality Date    ABDOMINAL WASHOUT N/A 3/14/2024    Procedure: LAVAGE, PERITONEAL, THERAPEUTIC;  Surgeon: Chen Jerome MD;  Location: [facility];  Service: General;  Laterality: N/A;    APPENDECTOMY  2015    BIOPSY OF PERITONEUM N/A 3/14/2024    Procedure: BIOPSY, PERITONEUM;  Surgeon: Chen Jerome MD;  Location: [facility] OR;  Service: General;  Laterality: N/A;    COLONOSCOPY  06/20/2012    Dr Boyle- Tubular adenoma polyps. Repeat in 3 years.    COLONOSCOPY  06/17/2015    -Nodular mucosa at appendiceal orfice, sigmoid and desc diverticulosis otherwise normal.    COLONOSCOPY  2020    >3 TAs    COLONOSCOPY  12/2023    DIAGNOSTIC LAPAROSCOPY N/A 3/14/2024    Procedure: LAPAROSCOPY, DIAGNOSTIC;  Surgeon: Chen Jerome MD;  Location: [facility] OR;  Service: General;  Laterality: N/A;  1. Diagnostic laparoscopy   2. Extensive lysis of adhesions  3. Peritoneal biopsy   4. Peritoneal washings for cytology    EGD - EXTERNAL RESULT  06/20/2012    Dr Boyle- Mild gastritis otherwise normal.    ESOPHAGOGASTRODUODENOSCOPY N/A 02/08/2024    Procedure: EGD (ESOPHAGOGASTRODUODENOSCOPY);  Surgeon: Jayla Arteaga MD;  Location: [facility];  Service: Endoscopy;  Laterality: N/A;    HYSTERECTOMY      LAPAROSCOPIC LYSIS OF ADHESIONS N/A 3/14/2024     Procedure: LYSIS, ADHESIONS, LAPAROSCOPIC;  Surgeon: Chen Jerome MD;  Location: [facility];  Service: General;  Laterality: N/A;    TOTAL ABDOMINAL HYSTERECTOMY W/ BILATERAL SALPINGOOPHORECTOMY  01/09/2023    radical resection with right salpingo-oophorectomy, left salpingo-oophorectomy, extensive enterolysis, extensive adhesiolysis, left pelvic lymph node biopsy, omental biopsy, small bowel resection with primary functional end-to-end anastomosis, placement of pelvic drain       Family History   Problem Relation Name Age of Onset    Pancreatic cancer Brother Misael Mccray 76    Prostate cancer Brother Zachary 67    Pancreatic cancer Half-brother Gasper 74    Colon cancer Half-sister Elton 83    Lung cancer Half-sister Elton         +smoking hx    Breast cancer Half-sister Vidhi 58    Lymphoma Other Cara Sun    Prostate cancer Other Fernando     Prostate cancer Other Gasper Calderon     Ovarian cancer Other Inerris     Breast cancer Other Aleida     Colon cancer Other Aleida        Review of patient's allergies indicates:  No Known Allergies    Social History     Tobacco Use    Smoking status: Former     Types: Cigarettes    Smokeless tobacco: Never    Tobacco comments:     Quit 1989 smoked 10 years smoked 0.25 ppd    Substance Use Topics    Alcohol use: Not Currently    Drug use: Never      Physical Exam     ECOG SCORE    0 - Fully active-able to carry on all pre-disease performance without restriction       Vitals:    07/09/24 0841   BP: 134/77   Pulse: (!) 52   Temp: 97.7 °F (36.5 °C)       Physical Exam  Constitutional:       General: She is not in acute distress.     Appearance: Normal appearance. She is normal weight. She is not ill-appearing or toxic-appearing.   HENT:      Head: Normocephalic and atraumatic.   Eyes:      Extraocular Movements: Extraocular movements intact.      Conjunctiva/sclera: Conjunctivae normal.   Cardiovascular:      Rate and Rhythm: Normal rate.   Pulmonary:       Effort: Pulmonary effort is normal.   Skin:     General: Skin is warm.   Neurological:      General: No focal deficit present.      Mental Status: She is alert and oriented to person, place, and time. Mental status is at baseline.   Psychiatric:         Mood and Affect: Mood normal.         Behavior: Behavior normal.         Thought Content: Thought content normal.       Labs   Labs:  Lab Visit on 07/08/2024   Component Date Value Ref Range Status    Phosphorus 07/08/2024 2.8  2.7 - 4.5 mg/dL Final    Magnesium 07/08/2024 1.9  1.6 - 2.6 mg/dL Final    Sodium 07/08/2024 136  136 - 145 mmol/L Final    Potassium 07/08/2024 4.1  3.5 - 5.1 mmol/L Final    Chloride 07/08/2024 103  95 - 110 mmol/L Final    CO2 07/08/2024 23  23 - 29 mmol/L Final    Glucose 07/08/2024 96  70 - 110 mg/dL Final    BUN 07/08/2024 9  8 - 23 mg/dL Final    Creatinine 07/08/2024 0.8  0.5 - 1.4 mg/dL Final    Calcium 07/08/2024 9.1  8.7 - 10.5 mg/dL Final    Total Protein 07/08/2024 7.0  6.0 - 8.4 g/dL Final    Albumin 07/08/2024 3.7  3.5 - 5.2 g/dL Final    Total Bilirubin 07/08/2024 0.2  0.1 - 1.0 mg/dL Final    Comment: For infants and newborns, interpretation of results should be based  on gestational age, weight and in agreement with clinical  observations.    Premature Infant recommended reference ranges:  Up to 24 hours.............<8.0 mg/dL  Up to 48 hours............<12.0 mg/dL  3-5 days..................<15.0 mg/dL  6-29 days.................<15.0 mg/dL      Alkaline Phosphatase 07/08/2024 89  55 - 135 U/L Final    AST 07/08/2024 17  10 - 40 U/L Final    ALT 07/08/2024 14  10 - 44 U/L Final    eGFR 07/08/2024 >60  >60 mL/min/1.73 m^2 Final    Anion Gap 07/08/2024 10  8 - 16 mmol/L Final    WBC 07/08/2024 5.07  3.90 - 12.70 K/uL Final    RBC 07/08/2024 4.25  4.00 - 5.40 M/uL Final    Hemoglobin 07/08/2024 13.9  12.0 - 16.0 g/dL Final    Hematocrit 07/08/2024 43.3  37.0 - 48.5 % Final    MCV 07/08/2024 102 (H)  82 - 98 fL Final    MCH  07/08/2024 32.7 (H)  27.0 - 31.0 pg Final    MCHC 07/08/2024 32.1  32.0 - 36.0 g/dL Final    RDW 07/08/2024 14.7 (H)  11.5 - 14.5 % Final    Platelets 07/08/2024 257  150 - 450 K/uL Final    MPV 07/08/2024 9.0 (L)  9.2 - 12.9 fL Final    Immature Granulocytes 07/08/2024 0.4  0.0 - 0.5 % Final    Gran # (ANC) 07/08/2024 3.2  1.8 - 7.7 K/uL Final    Immature Grans (Abs) 07/08/2024 0.02  0.00 - 0.04 K/uL Final    Comment: Mild elevation in immature granulocytes is non specific and   can be seen in a variety of conditions including stress response,   acute inflammation, trauma and pregnancy. Correlation with other   laboratory and clinical findings is essential.      Lymph # 07/08/2024 1.2  1.0 - 4.8 K/uL Final    Mono # 07/08/2024 0.5  0.3 - 1.0 K/uL Final    Eos # 07/08/2024 0.1  0.0 - 0.5 K/uL Final    Baso # 07/08/2024 0.04  0.00 - 0.20 K/uL Final    nRBC 07/08/2024 0  0 /100 WBC Final    Gran % 07/08/2024 62.0  38.0 - 73.0 % Final    Lymph % 07/08/2024 24.5  18.0 - 48.0 % Final    Mono % 07/08/2024 10.1  4.0 - 15.0 % Final    Eosinophil % 07/08/2024 2.2  0.0 - 8.0 % Final    Basophil % 07/08/2024 0.8  0.0 - 1.9 % Final    Differential Method 07/08/2024 Automated   Final    TSH 07/08/2024 0.891  0.400 - 4.000 uIU/mL Final        Pathology   Contains abnormal data Specimen to Pathology, Surgery Gastrointestinal tract - 02/28/2024      Component 3 wk ago   Final Pathologic Diagnosis  Abnormal   STOMACH, 'GASTRIC MASS', BIOPSY:  - 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 Scattered p53 positivity (wild-type pattern of expression) and p16 positivity are also noted. Additional studies, including CK7, PAX8, ER, HER2, TTF-1, GATA3, WT-1,  synaptophysin, and chromogranin, are either negative or negative in the cells of interest. This patient's prior history of ovarian carcinoma resected [facility] in [place], [place] is noted and the outside pathology report (S-) is reviewed.   While both tumors do have similar immunohistochemical staining patterns, they are not identical, with notable reported differences being CK7 and PAX8 positivity in the ovarian tumor. The presence of a large, fungating, and ulcerated mass in the stomach  is suspicious for a gastric primary; however, it is unclear if the masses represent two separate primaries or one p rimary and one metastatic focus. Abnormal mismatch repair studies do suggest increased risk for development of multiple tumor types (see  below). Requesting outside slides for review to compare morphology may be helpful.    DNA mismatch repair IHC studies are ABNORMAL and demonstrate LOSS OF MLH1 and PMS2 expression within tumor cells. MSH2 and MSH6 exhibit retained expression. 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    Tissue will be sent for molecular profiling by next-generation sequencing ([facility]). Results of this study will be issued directly to the electronic medical record.                        Tumor NGS Tissue - 02/29/2024  MSI: MSI-H         TMB: 42.6 m/MB         Low Coverage Regions: KDM5D, PMS2         Fusion Addendum Issue Type: NEGATIVE  Negative - This addendum is being issued to report that no gene fusions or altered splicing variants from RNA sequencing analysis were found.           Imaging   NM PET CT FDG SKULL BASE TO MID THIGH - 02/28/2024  CLINICAL HISTORY:  Gastric cancer.  Malignant neoplasm of stomach, unspecified.  Initial staging.  History of left ovarian epithelial carcinoma.     TECHNIQUE:  11.45 mCi of F18-FDG was administered intravenously in the left forearm.  After an approximately 60 min distribution time, PET/CT images were acquired from the skull base to the mid thigh. Transmission images were acquired to correct for attenuation using a whole body low-dose CT scan  without contrast with the arms positioned above the head. Glycemia at the time of injection was 113 mg/dL.     COMPARISON:  CT abdomen pelvis, 02/08/2024 and chest CT, 02/27/2024     FINDINGS:  Quality of the study: Adequate.     SUV max of the liver parenchyma is 2.8     Neck: No abnormal FDG avidity.  No lymphadenopathy or mass.     Chest: No abnormal FDG avidity.  No pleural effusion or lymphadenopathy or pulmonary nodule.     Abdomen/pelvis: Marked thickening of the wall of the stomach involving the fundus and proximal body with marked FDG avidity with SUV max 18.  The gastrohepatic ligament lymph node measuring 19 x 13 mm shows moderate FDG avidity with SUV max 4.6.     No ascites.  No other focus of abnormal FDG avidity in the abdomen or pelvis.     Skeletal structures: No abnormal FDG avidity.  No focal lytic or sclerotic lesion.     Physiologic uptake of the tracer is present within the brain, salivary glands, myocardium, GI and  tracts.     Incidental CT findings: N/A     Impression:  1. The biopsy proving gastric cancer is markedly FDG avid with SUV max 18.  2. Moderately FDG avid gastrohepatic ligament lymph node consistent with local metastatic disease.  3. No evidence for distant metastatic disease.  All CT scans at this facility are performed  using dose modulation techniques as appropriate to performed exam including the following:  automated exposure control; adjustment of mA and/or kV according to the patients size (this includes techniques or standardized protocols for targeted exams where dose is matched to indication/reason for exam: i.e. extremities or head);  iterative reconstruction technique.    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 Jerome negative for malignant cells  Per NCCN guidelines, NA immunotherapy is useful in MSI-H/dMMR tumors) with options being Nivolumab and ipilimumab followed by nivolumab or Pembrolizumab.  Given patient's comorbid conditions and potentially increased toxicity with CTLA4 inhibitor and PD-1 inhibitor, decision made to proceed with Pembrolizumab. Started 03/14/24.  Patient to completed 12 weeks of NA immunotherapy 06/18/2024  CT CAP 06/24/25 showed progression of LAD but no other signs of disease  PET-CT Pending  Surgery planned for 07/25/24 with Dr. Jerome  Will obtain ctDNA per TB recommendations         Immunotherapy induced pruritus  Immunotherapy induced rash < Grade 1  Topical hydrocortisone and Atarax prescribed with relief        Chronic Medical Conditions  Hx DVT previously on Xareltao  Hx of FIGO Stage IC (cT1c2, cN0, cM0) Mixed epithelial carcinoma of right ovary s/p surgery 01/09/023 and 6C Carbo/Paclitaxel completed 07/19/2023  Osteoporosis - continue on Fosamax  HTN - continue diet controlled  Hx of Epilepsy on Keppra, Tegretol, Zonegran (last seizure in 2009)        Med Onc Chart Routing      Follow up with physician 2 months.   Follow up with JENNIE    Infusion scheduling note    Injection scheduling note    Labs CBC, CMP, phosphorus, magnesium, other and TSH   Scheduling:  Preferred lab:  Lab interval:  Other: ctDNA to be linked to perioperative labs   Imaging    Pharmacy appointment    Other referrals                  The patient was seen, interviewed and examined. Pertinent lab and radiologic studies were reviewed. Pt instructed to call should they develop concerning signs/symptoms or have further questions.        Portions of the record may have been  created with voice recognition software. Occasional wrong-word or sound-a-like substitutions may have occurred due to the inherent limitations of voice recognition software. Read the chart carefully and recognize, using context, where substitutions have occurred.      Claudia Amin MD    Hematology/Oncology

## 2024-07-10 ENCOUNTER — PATIENT MESSAGE (OUTPATIENT)
Dept: ADMINISTRATIVE | Facility: OTHER | Age: 74
End: 2024-07-10
Payer: MEDICARE

## 2024-07-10 ENCOUNTER — HOSPITAL ENCOUNTER (OUTPATIENT)
Dept: RADIOLOGY | Facility: HOSPITAL | Age: 74
Discharge: HOME | End: 2024-07-10
Attending: INTERNAL MEDICINE
Payer: MEDICARE

## 2024-07-10 DIAGNOSIS — C16.9 ADENOCARCINOMA OF STOMACH: ICD-10-CM

## 2024-07-10 PROCEDURE — 78815 PET IMAGE W/CT SKULL-THIGH: CPT | Mod: 26,PS,, | Performed by: STUDENT IN AN ORGANIZED HEALTH CARE EDUCATION/TRAINING PROGRAM

## 2024-07-10 PROCEDURE — 78815 PET IMAGE W/CT SKULL-THIGH: CPT | Mod: TC

## 2024-07-10 PROCEDURE — A9552 F18 FDG: HCPCS | Performed by: INTERNAL MEDICINE

## 2024-07-10 RX ORDER — FLUDEOXYGLUCOSE F18 500 MCI/ML
10.34 INJECTION INTRAVENOUS
Status: COMPLETED | OUTPATIENT
Start: 2024-07-10 | End: 2024-07-10

## 2024-07-10 RX ADMIN — FLUDEOXYGLUCOSE F-18 10.34 MILLICURIE: 500 INJECTION INTRAVENOUS at 08:07

## 2024-07-11 ENCOUNTER — PATIENT MESSAGE (OUTPATIENT)
Dept: ADMINISTRATIVE | Facility: OTHER | Age: 74
End: 2024-07-11
Payer: MEDICARE

## 2024-07-12 ENCOUNTER — TELEPHONE (OUTPATIENT)
Dept: SURGICAL ONCOLOGY | Facility: CLINIC | Age: 74
End: 2024-07-12
Payer: MEDICARE

## 2024-07-12 ENCOUNTER — LAB VISIT (OUTPATIENT)
Dept: LAB | Facility: HOSPITAL | Age: 74
End: 2024-07-12
Attending: INTERNAL MEDICINE
Payer: MEDICARE

## 2024-07-12 ENCOUNTER — PATIENT MESSAGE (OUTPATIENT)
Dept: ADMINISTRATIVE | Facility: OTHER | Age: 74
End: 2024-07-12
Payer: MEDICARE

## 2024-07-12 DIAGNOSIS — C16.9 GASTRIC ADENOCARCINOMA: ICD-10-CM

## 2024-07-12 LAB — TEMPUS BLOOD ADD-ON: NORMAL

## 2024-07-12 PROCEDURE — 36415 COLL VENOUS BLD VENIPUNCTURE: CPT | Performed by: INTERNAL MEDICINE

## 2024-07-12 NOTE — TELEPHONE ENCOUNTER
Spoke with patient regarding appointment for today. Appointment re scheduled for 7/19 at 9 am @ [facility]. Patient verbalized understanding.

## 2024-07-13 ENCOUNTER — PATIENT MESSAGE (OUTPATIENT)
Dept: ADMINISTRATIVE | Facility: OTHER | Age: 74
End: 2024-07-13
Payer: MEDICARE

## 2024-07-14 ENCOUNTER — PATIENT MESSAGE (OUTPATIENT)
Dept: ADMINISTRATIVE | Facility: OTHER | Age: 74
End: 2024-07-14
Payer: MEDICARE

## 2024-07-15 ENCOUNTER — PATIENT MESSAGE (OUTPATIENT)
Dept: ADMINISTRATIVE | Facility: OTHER | Age: 74
End: 2024-07-15
Payer: MEDICARE

## 2024-07-15 NOTE — PROGRESS NOTES
Set pt up for ctDNA testing through [facility] per Dr Ambrose. Contacted pt to come in for blood draw and she agreed to come get labs drawn on the 7/12.  No questions or concerns at this time.       Oncology Navigation   Intake  Date of Diagnosis: 02/08/24  Cancer Type: GI  Type of Referral: Internal  Date of Referral: 02/23/24  Initial Nurse Navigator Contact: 02/27/24  Referral to Initial Contact Timeline (days): 4  Date Worked: 03/26/24  Reason if booked > 7 days after scheduling: Specific provider / access; Additional tests/procedures  Start of Treatment: 04/15/24     Treatment  Current Status: Active  Date Presented to Tumor Board: 03/04/24  Presentation Notes: proceed with systemic therapy    Surgical Oncologist: Dr. Chen Jerome    Medical Oncologist: Dr. Claudia Ambrose  Immunotherapy: Planned  Immunotherapy Name: Pembrolizumab  Start Date: 03/25/24       Procedures: PET scan; CT  CT Schedule Date: 02/27/24 (2/8/24)  PET Scan Schedule Date: 02/23/24    General Referrals: Chemo Education; Dietitian; Social Work  Dietitian Referral Date: 03/13/24  Social Work Referral Date: 03/13/24          Support Systems: Family members     Acuity      Follow Up  No follow-ups on file.

## 2024-07-16 ENCOUNTER — PATIENT MESSAGE (OUTPATIENT)
Dept: ADMINISTRATIVE | Facility: OTHER | Age: 74
End: 2024-07-16
Payer: MEDICARE

## 2024-07-17 ENCOUNTER — PATIENT MESSAGE (OUTPATIENT)
Dept: ADMINISTRATIVE | Facility: OTHER | Age: 74
End: 2024-07-17
Payer: MEDICARE

## 2024-07-18 ENCOUNTER — PATIENT MESSAGE (OUTPATIENT)
Dept: ADMINISTRATIVE | Facility: OTHER | Age: 74
End: 2024-07-18
Payer: MEDICARE

## 2024-07-19 ENCOUNTER — TELEPHONE (OUTPATIENT)
Dept: INFUSION THERAPY | Facility: HOSPITAL | Age: 74
End: 2024-07-19
Payer: MEDICARE

## 2024-07-19 ENCOUNTER — OFFICE VISIT (OUTPATIENT)
Dept: SURGICAL ONCOLOGY | Facility: CLINIC | Age: 74
End: 2024-07-19
Payer: MEDICARE

## 2024-07-19 ENCOUNTER — PATIENT MESSAGE (OUTPATIENT)
Dept: ADMINISTRATIVE | Facility: OTHER | Age: 74
End: 2024-07-19
Payer: MEDICARE

## 2024-07-19 VITALS
WEIGHT: 131.19 LBS | HEART RATE: 58 BPM | DIASTOLIC BLOOD PRESSURE: 76 MMHG | BODY MASS INDEX: 21.86 KG/M2 | SYSTOLIC BLOOD PRESSURE: 125 MMHG | HEIGHT: 65 IN | TEMPERATURE: 98 F

## 2024-07-19 DIAGNOSIS — C16.9 GASTRIC ADENOCARCINOMA: Primary | ICD-10-CM

## 2024-07-19 PROCEDURE — 3074F SYST BP LT 130 MM HG: CPT | Mod: CPTII,S$GLB,, | Performed by: SURGERY

## 2024-07-19 PROCEDURE — 3008F BODY MASS INDEX DOCD: CPT | Mod: CPTII,S$GLB,, | Performed by: SURGERY

## 2024-07-19 PROCEDURE — 1126F AMNT PAIN NOTED NONE PRSNT: CPT | Mod: CPTII,S$GLB,, | Performed by: SURGERY

## 2024-07-19 PROCEDURE — 99999 PR PBB SHADOW E&M-EST. PATIENT-LVL III: CPT | Mod: PBBFAC,,, | Performed by: SURGERY

## 2024-07-19 PROCEDURE — 99215 OFFICE O/P EST HI 40 MIN: CPT | Mod: S$GLB,,, | Performed by: SURGERY

## 2024-07-19 PROCEDURE — 3078F DIAST BP <80 MM HG: CPT | Mod: CPTII,S$GLB,, | Performed by: SURGERY

## 2024-07-19 PROCEDURE — 1159F MED LIST DOCD IN RCRD: CPT | Mod: CPTII,S$GLB,, | Performed by: SURGERY

## 2024-07-19 NOTE — PROGRESS NOTES
Surgical Oncology Clinic Note      Referring Provider: Dr. Claudia Ambrose   PCP: Gagandeep Iglesias MD    Reason For Visit: Gastroesophageal: gastric cancer (proximal)      Oncology History   Mixed epithelial carcinoma of right ovary   1/2/2023 Initial Diagnosis    Mixed epithelial carcinoma of left ovary     1/9/2023 Surgery    Laparotomy for radical resection of gynecologic cancer. Removal of pelvic mass (right salpingo-oophorectomy), left salpingo-oophorectomy, extensive enterolysis, extensive adhesiolysis, left pelvic lymph node biopsy, omental biopsy, small bowel resection with primary functional end-to-end anastomosis, placement of pelvic drain. Path: Right ovary, tumor site, 11 cm, mixed epithelial carcinoma (50% mucinous, 50% endometrioid), G2 moderately differentiated, ovarian surface involvement-indeterminate-specimen disrupted. 1 left pelvic lymph node negative for neoplasia. FIGO stage IC2       1/9/2023 Cancer Staged    Staging form: Ovary, Fallopian Tube, and Primary Peritoneal Carcinoma, AJCC 8th Edition  - Clinical stage from 1/9/2023: FIGO Stage IC2, calculated as Stage IC (cT1c2, cN0, cM0)     4/5/2023 - 7/19/2023 Chemotherapy    4/5/2023: Cycle 1- paclitaxel 135 mg/m2 and carboplatin AUC 5 as patient is frail and elderly.  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 - [id number], MMR deficient- loss  MLH1 and PMS2     2/8/2024 Cancer Staged    Staging form: Stomach, AJCC 8th Edition  - Clinical stage from 2/8/2024: Stage IIA (cT2, cN1, cM0)     3/4/2024 Tumor Conference    Date Presented to Tumor Board: 03/04/24  [facility] UGI MULTIDISCIPLINARY TUMOR BOARD  PATIENT REVIEW FORM   ____________________________________________________________    CLINIC #: [id number]  DATE: 3/4/2024    DIAGNOSIS: gastric GARRY    PRESENTER: Perone    PATIENT SUMMARY:   This 74 y/o female was dx with ovarian CA 1/2023, underwent surgery and completed 6 cycles of adj chemotherapy 7/2023. She presented last month with hematemesis. EGD revealed large ulcerated gastric mass, which was biopsied. EGD pathology reviewed - poorly differentiated adenocarcinoma, with intestinal expression, MSI high, HER 2 negative. Staging imaging found irregular wall thickening in mid gastric body, enlarged gastrohepatic lymph node, no evidence of distant metastatic disease.   Reviewed PET - uptake in gastric wall and gastrohepatic lymph node with hypermetabolic activity.   Scheduled to see Dr. Amin in [place] later this week.   + family hx of cancer, pending genetics referral    BOARD RECOMMENDATIONS:   Proceed with systemic chemotherapy, consider neoadj immunotherapy  Proceed with diag lap  Await genetics testing, pathology will send to [facility] [place]    CONSULT NEEDED:     [] Surgery    [] Hem/Onc    [] Rad/Onc    [] Dietary                 [] Social Service    [x] Genetics       [] AES  [] Radiology     Clinical Stage: Tumor   Node(s)  1  Metastasis      GROUP STAGE:  [] O    [] 1A    [] IB    [] IIA    [] IIB     [] IIIA     [] IIIB     [] IIIC    []IV  [] Local recurrence     [] Regional recurrence     [] Distant recurrence   Metastatic site(s): none         [x] Edna'l Treatment Guidelines reviewed and care planned is consistent with guidelines.         (i.e., NCCN, NCI, PD, ACO, AUA, etc.)    PRESENTATION AT CANCER CONFERENCE:         [x] Prospective     [] Retrospective     [] Follow-Up         3/14/2024 Surgery    Procedure:  LAPAROSCOPY, DIAGNOSTIC (N/A)  LYSIS, ADHESIONS, LAPAROSCOPIC (N/A)  LAVAGE, PERITONEAL, THERAPEUTIC (N/A)      Surgeon(s) and Role: Chen Jerome MD - Primary    Pre-Operative Diagnosis: Gastric adenocarcinoma [C16.9]     Post-Operative Diagnosis: Same     Pre-Operative Variables:  Stage:  III (T4a N1 M0)   Adjacent organ involvement: None  Chemotherapy within 90 Days: No  Radiation Therapy within 90 Days: No      Procedure:  Diagnostic laparoscopy   Extensive lysis of adhesions  Peritoneal biopsy   Peritoneal washings for cytology     3/26/2024 -  Chemotherapy    Treatment Summary   Plan Name: OP pembrolizumab 200mg Q3W  Treatment Goal: Curative  Status: Active  Start Date: 3/26/2024  End Date: 3/10/2026 (Planned)  Provider: Claudia Ambrose MD  Chemotherapy: [No matching medication found in this treatment plan]      Chemotherapy    Treatment Summary   Plan Name: OP pembrolizumab 200mg Q3W  Treatment Goal: Curative  Status: Active  Start Date: 3/15/2024 (Planned)  End Date: 2/27/2026 (Planned)  Provider: Claudia Ambrose MD  Chemotherapy: [No matching medication found in this treatment plan]     4/8/2024 Tumor Conference       [facility] UGI MULTIDISCIPLINARY TUMOR BOARD  PATIENT REVIEW FORM   ____________________________________________________________    CLINIC #: [id number]  DATE: 4/8/2024    DIAGNOSIS: Gastric GARRY    PRESENTER: Justus    PATIENT SUMMARY:   This 74 y/o female was dx with ovarian CA 1/2023, underwent surgery and completed 6 cycles of adj chemotherapy 7/2023. She developed hematemesis in Feb 2024, then underwent EGD with bx of a large ulcerated gastric mass. Pathology revealed poorly differentiated adenocarcinoma, with intestinal expression, MSI high, HER 2 negative. She was presented to this [facility] last month. Since then, she underwent diag lap and today's presentation is for pathology review. Negative  for malignancy, reactive atypia     BOARD RECOMMENDATIONS:   Proceed with immunotherapy, then restage with imaging  Potential candidate for subtotal distal gastrectomy    CONSULT NEEDED:     [] Surgery    [] Hem/Onc    [] Rad/Onc    [] Dietary                 [] Social Service    [] Psychology       [] AES  [] Radiology     Clinical Stage: Tumor   Node(s) 1 Metastasis 0      GROUP STAGE:  [] O    [] 1A    [] IB    [] IIA    [] IIB     [] IIIA     [] IIIB     [] IIIC    []IV  [] Local recurrence     [] Regional recurrence     [] Distant recurrence   Metastatic site(s): none         [x] Edna'l Treatment Guidelines reviewed and care planned is consistent with guidelines.         (i.e., NCCN, NCI, PD, ACO, AUA, etc.)    PRESENTATION AT CANCER CONFERENCE:         [x] Prospective    [] Retrospective     [] Follow-Up               Staging:  Cancer Staging   Gastric adenocarcinoma  Staging form: Stomach, AJCC 8th Edition  - Clinical stage from 2/8/2024: Stage IIA (cT2, cN1, cM0) - Signed by Chen Jerome MD on 7/2/2024    Mixed epithelial carcinoma of right ovary  Staging form: Ovary, Fallopian Tube, and Primary Peritoneal Carcinoma, AJCC 8th Edition  - Clinical stage from 1/9/2023: FIGO Stage IC2, calculated as Stage IC (cT1c2, cN0, cM0) - Signed by Chen Jerome MD on 2/27/2024       HISTORY OF PRESENT ILLNESS   Cheryl Garcia is a 73 y.o. female with history of epilepsy and treated ovarian cancer (s/p surgery and chemotherapy) who presents today for evaluation and management of newly diagnosed gastric adenocarcinoma.   She originally presented to [facility] on 02/06/2024 following an episode of hematemesis.  A upon presentation she was having epigastric tenderness but had not had any additional episodes of vomiting.  She was otherwise doing well.  She was given IV fluids and IV Protonix and transferred to [facility] for further evaluation.  She was found to have an acute decrease in her  hemoglobin from 11-8.9 over 10 hours however her vitals all remained within normal limits.  She underwent an EGD during that admission which showed a large, ulcerated gastric mass.  Pathology returned consistent with poorly differentiated adenocarcinoma.   A CT abdomen and pelvis was done that day which showed an eccentric irregular wall thickening of the mid gastric body concerning for malignancy.  She was discharged the following day in stable condition with follow-up.  Her Xarelto was held at that time due to her bleeding.  She had been on Xarelto since October of 2022 due to a history of lower extremity DVT at the around the time she was diagnosed with ovarian cancer.  This was believed to be due to the malignancy and direct compression of the vessels in that region.  Since her discharge she has overall been doing well and has no new complaints at this time.     She states she did not start having any reflux until after she completed her chemotherapy this summer.  She was seen by her internist a few times as well as seen by GI over [facility] with Dr. Tinajero for its Giovany.  She had been very diligent about reflux precautions and had reduced high acid foods.  She had been started on Protonix which completely resolved her symptoms.     As stated she does have a history of ovarian cancer diagnosed back in January of 2023.  The workup for that was initiated back in October of 2022 when she presented with a lower extremity swelling in the DVT.  During that workup they did a CT scan which showed a large lesion on her ovary concerning for malignancy.  She subsequently underwent midline laparotomy for radical resection with right salpingo-oophorectomy, left salpingo-oophorectomy, extensive enterolysis, extensive adhesiolysis, left pelvic lymph node biopsy, omental biopsy, small bowel resection with primary functional end-to-end anastomosis, placement of pelvic drain. Path: Right ovary, tumor site, 11 cm,  mixed epithelial carcinoma (50% mucinous, 50% endometrioid), G2 moderately differentiated, ovarian surface involvement-indeterminate-specimen disrupted. 1 left pelvic lymph node negative for neoplasia. FIGO stage IC2.  [facility]  She was subsequently treated with 6 cycles of paclitaxel and carboplatin, her last cycle was in July of 2023.     Of note her last colonoscopy was in 2023 and reportedly revealed polyps.  [facility] she does have a history of an EGD on 06/20/2012 done by Dr. Boyle, which reportedly showed mild gastritis and otherwise was normal.     Her history is otherwise significant for epilepsy and she remains on antiepileptic medications however she has not had a seizure since 2009.    INTERVAL HISTORY     06/38/2024:  She was presented in tumor board on 04/08/2024 for the atypical cells seen on her washings.  Group consensus was these were likely reactive atypia and to proceed with therapy and restaging as originally planned.  She presents for a post systemic therapy appointment.  She is completed 5 cycles of Keytruda, the last was Tuesday 06/18/2024.  She is overall tolerated it very well and has had no side effects in no hospitalizations.  She states she is feels well and that she is walking daily and has no complaints aside from very mild cough that she developed today    07/19/2024:  Discussed in [facility] last week and all in agreement that the LN are within the field of resection, however since she will likely require a total gastrectomy (which is a morbid operation), would be best to sample the LN to ensure they do not represent a different malignancy, and to continue on immunotherapy for now.      Past Medical History:   Diagnosis Date    Anemia     Chronic deep vein thrombosis (DVT) of left lower extremity 10/21/2022    Deep vein thrombosis     Drug-induced polyneuropathy 02/28/2024    Noted by ROCK KAY NP  last  documented on [id number]    DVT (deep venous thrombosis)     Epilepsy     Gastric adenocarcinoma 02/27/2024    GERD (gastroesophageal reflux disease)     Hyperlipidemia 01/10/2013    Formatting of this note might be different from the original.   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    OP (osteoporosis) 02/28/2024    Ovarian cancer     Primary hypertension 12/01/2022       Past Surgical History:   Procedure Laterality Date    ABDOMINAL WASHOUT N/A 3/14/2024    Procedure: LAVAGE, PERITONEAL, THERAPEUTIC;  Surgeon: Chen Jerome MD;  Location: [facility] OR;  Service: General;  Laterality: N/A;    APPENDECTOMY  2015    BIOPSY OF PERITONEUM N/A 3/14/2024    Procedure: BIOPSY, PERITONEUM;  Surgeon: Chen Jerome MD;  Location: [facility];  Service: General;  Laterality: N/A;    COLONOSCOPY  06/20/2012    Dr Boyle- Tubular adenoma polyps. Repeat in 3 years.    COLONOSCOPY  06/17/2015    -Nodular mucosa at appendiceal orfice, sigmoid and desc diverticulosis otherwise normal.    COLONOSCOPY  2020    >3 TAs    COLONOSCOPY  12/2023    DIAGNOSTIC LAPAROSCOPY N/A 3/14/2024    Procedure: LAPAROSCOPY, DIAGNOSTIC;  Surgeon: Chen Jeorme MD;  Location: [facility];  Service: General;  Laterality: N/A;  1. Diagnostic laparoscopy   2. Extensive lysis of adhesions  3. Peritoneal biopsy   4. Peritoneal washings for cytology    EGD - EXTERNAL RESULT  06/20/2012    Dr Boyle- Mild gastritis otherwise normal.    ESOPHAGOGASTRODUODENOSCOPY N/A 02/08/2024    Procedure: EGD (ESOPHAGOGASTRODUODENOSCOPY);  Surgeon: Jayla Arteaga MD;  Location: [facility];  Service: Endoscopy;  Laterality: N/A;    HYSTERECTOMY      LAPAROSCOPIC LYSIS OF ADHESIONS N/A 3/14/2024    Procedure: LYSIS, ADHESIONS, LAPAROSCOPIC;  Surgeon: Chen Jerome MD;  Location: [facility];  Service: General;  Laterality: N/A;    TOTAL ABDOMINAL HYSTERECTOMY W/ BILATERAL SALPINGOOPHORECTOMY  01/09/2023    radical  resection with right salpingo-oophorectomy, left salpingo-oophorectomy, extensive enterolysis, extensive adhesiolysis, left pelvic lymph node biopsy, omental biopsy, small bowel resection with primary functional end-to-end anastomosis, placement of pelvic drain       Family History   Problem Relation Name Age of Onset    Pancreatic cancer Brother Misael Mccray 76    Prostate cancer Brother Zachary 67    Pancreatic cancer Half-brother Gasper 74    Colon cancer Half-sister Elton 83    Lung cancer Half-sister Elton         +smoking hx    Breast cancer Half-sister Vidhi 58    Lymphoma Other Cara Sun    Prostate cancer Other Fernando     Prostate cancer Other Gasper Calderon     Ovarian cancer Other Inerris     Breast cancer Other Aleida     Colon cancer Other Aleida        Social History     Socioeconomic History    Marital status:    Tobacco Use    Smoking status: Former     Types: Cigarettes    Smokeless tobacco: Never    Tobacco comments:     Quit 1989 smoked 10 years smoked 0.25 ppd    Substance and Sexual Activity    Alcohol use: Not Currently    Drug use: Never     Social Determinants of Health     Financial Resource Strain: Low Risk  (2/23/2024)    Overall Financial Resource Strain (CARDIA)     Difficulty of Paying Living Expenses: Not hard at all   Food Insecurity: No Food Insecurity (2/23/2024)    Hunger Vital Sign     Worried About Running Out of Food in the Last Year: Never true     Ran Out of Food in the Last Year: Never true   Transportation Needs: No Transportation Needs (2/23/2024)    PRAPARE - Transportation     Lack of Transportation (Medical): No     Lack of Transportation (Non-Medical): No   Physical Activity: Inactive (2/23/2024)    Exercise Vital Sign     Days of Exercise per Week: 0 days     Minutes of Exercise per Session: 10 min   Stress: No Stress Concern Present (2/23/2024)    [employer] - Occupational Stress Questionnaire     Feeling of Stress :  Not at all   Housing Stability: Low Risk  (2/23/2024)    Housing Stability Vital Sign     Unable to Pay for Housing in the Last Year: No     Number of Places Lived in the Last Year: 1     Unstable Housing in the Last Year: No          Medication List            Accurate as of July 19, 2024 11:59 PM. If you have any questions, ask your nurse or doctor.                CHANGE how you take these medications      acetaminophen 500 MG tablet  Commonly known as: TYLENOL  Take 2 tablets (1,000 mg total) by mouth every 8 (eight) hours.  What changed:   when to take this  reasons to take this     hydrocortisone 2.5 % cream  Apply topically 2 (two) times daily.  What changed:   when to take this  reasons to take this     ibuprofen 800 MG tablet  Commonly known as: ADVIL,MOTRIN  Take 1 tablet (800 mg total) by mouth every 8 (eight) hours.  What changed:   when to take this  reasons to take this            CONTINUE taking these medications      alendronate 70 MG tablet  Commonly known as: FOSAMAX     CALCIUM PHOSPHATE-VITAMIN D3 ORAL     carBAMazepine 200 mg tablet  Commonly known as: TEGRETOL     ferrous sulfate 325 (65 FE) MG EC tablet     hydrOXYzine HCL 25 MG tablet  Commonly known as: ATARAX  Take 1 tablet (25 mg total) by mouth 3 (three) times daily as needed for Itching.     levETIRAcetam 500 MG Tab  Commonly known as: KEPPRA     oxyCODONE 5 MG immediate release tablet  Commonly known as: ROXICODONE  Take 1 tablet (5 mg total) by mouth every 4 (four) hours as needed for Pain.     zonisamide 100 MG Cap  Commonly known as: ZONEGRAN              Review of patient's allergies indicates:  No Known Allergies    Review of Systems   Constitutional:  Negative for chills, fever, malaise/fatigue and weight loss.   Respiratory:  Negative for cough, shortness of breath and wheezing.    Cardiovascular:  Negative for chest pain and palpitations.   Gastrointestinal:  Negative for abdominal pain, constipation, diarrhea, nausea and  vomiting.   Musculoskeletal:  Negative for back pain, falls and joint pain.   Neurological:  Negative for dizziness, sensory change, speech change, focal weakness, seizures, loss of consciousness and weakness.   Psychiatric/Behavioral:  Negative for depression and hallucinations. The patient is not nervous/anxious.          Vitals:    07/19/24 0858   BP: 125/76   Pulse: (!) 58   Temp: 97.6 °F (36.4 °C)     Body mass index is 21.83 kg/m².  ECOG SCORE    1 - Restricted in strenuous activity-ambulatory and able to carry out work of a light nature         PHYSICAL EXAM       Physical Exam  Vitals reviewed.   Constitutional:       General: She is not in acute distress.     Appearance: Normal appearance.   HENT:      Head: Normocephalic and atraumatic.      Mouth/Throat:      Mouth: Mucous membranes are moist.   Eyes:      General: No scleral icterus.     Extraocular Movements: Extraocular movements intact.      Conjunctiva/sclera: Conjunctivae normal.   Pulmonary:      Effort: Pulmonary effort is normal.   Abdominal:      General: There is no distension.      Palpations: Abdomen is soft. There is no mass.      Tenderness: There is no abdominal tenderness.      Comments: Well healed midline incision    Musculoskeletal:         General: No swelling. Normal range of motion.   Skin:     General: Skin is warm and dry.   Neurological:      General: No focal deficit present.      Mental Status: She is alert.   Psychiatric:         Mood and Affect: Mood normal.         Behavior: Behavior normal.         Thought Content: Thought content normal.         Judgment: Judgment normal.          DATA REVIEW:  I personally reviewed the following records for this visit: lab work from prior visit, notes from other physicians, computed tomography/CT imaging, surgical pathology results, endoscopy results, radiographic study evaluation, and laboratory results done by primary care physician    LABS       Lab Results   Component Value Date     "WBC 5.07 07/08/2024    HGB 13.9 07/08/2024    HCT 43.3 07/08/2024     07/08/2024     Lab Results   Component Value Date    GLU 96 07/08/2024    CALCIUM 9.1 07/08/2024    ALBUMIN 3.7 07/08/2024    PROT 7.0 07/08/2024     07/08/2024    K 4.1 07/08/2024    CO2 23 07/08/2024     07/08/2024    BUN 9 07/08/2024    CREATININE 0.8 07/08/2024    ALKPHOS 89 07/08/2024    ALT 14 07/08/2024    AST 17 07/08/2024    BILITOT 0.2 07/08/2024       Nutritional:  No results found for: "PREALBUMIN"    Tumor Markers:  No results found for: "CEA", "AMYLASE", "ASPIRATE", "KIF392", "", "EN5661", "AFP", "AFPTM"  No results found for: ""    PATHOLOGY     Final Pathologic Diagnosis  Abnormal   STOMACH, 'GASTRIC MASS', BIOPSY:  - 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 Scattered p53 positivity (wild-type pattern of expression) and p16 positivity are also noted. Additional studies, including CK7, PAX8, ER, HER2, TTF-1, GATA3, WT-1,  synaptophysin, and chromogranin, are either negative or negative in the cells of interest. This patient's prior history of ovarian carcinoma resected at [facility] [place], [place] is noted and the outside pathology report (S-) is reviewed.   While both tumors do have similar immunohistochemical staining patterns, they are not identical, with notable reported differences being CK7 and PAX8 positivity in the ovarian tumor. The presence of a large, fungating, and ulcerated mass in the stomach  is suspicious for a gastric primary; however, it is unclear if the masses represent two separate primaries or one p rimary and one metastatic focus. Abnormal mismatch repair studies do suggest increased risk for development of multiple tumor types (see  below). Requesting outside slides for review to compare morphology may be helpful.    DNA mismatch repair " IHC studies are ABNORMAL and demonstrate LOSS OF MLH1 and PMS2 expression within tumor cells. MSH2 and MSH6 exhibit retained expression. 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    Tissue will be sent for molecular profiling by next-generation sequencing ([facility]). Results of this study will be issued directly to the electronic medical record.     3/14/2024:  Final Pathologic Diagnosis 1. Perigastric nodule, biopsy:Fibroadipose tissue, lined by reactive mesothelial cells.  Negative for carcinoma.         Component 3 mo ago   Final Pathologic Diagnosis  Abnormal   1. Atypical cells present.      2. Negative for malignant cells.    Comment: Interp By Jami Goode M.D., Signed on 03/22/2024 at 14:39   Comment  Abnormal   For part 1, rare atypical appearing cell groups with abundant benign and reactive mesothelial cell groups.  Atypical groups cannot be classified further as are not present in cell block material.    For part 2, there is predominantly blood. Negative for malignancy.    Broderick-EP4 and PAX8 were performed on both part 1 and part 2 cell blocks and are both negative with appropriate controls.    This case was seen in consultation by cytopathologist Dr. VALERIE Da Silva who concurs with the above diagnoses and assessment.          IMAGING     02/08/2024:  CT Abdomen/ Pelvis  Impression:   Eccentric irregular wall thickening of the mid gastric body concerning for malignancy.  There appears to be associated enlarged 1.6 cm gastrohepatic lymph node.  Clinical correlation advised.  Correlation with endoscopy is recommended if not previously performed.    02/27/2024:  CT Chest  Impression:   No evidence of intrathoracic metastatic disease.    02/28/2024: PET CT Scan  Impression:   1. The biopsy proving gastric cancer is markedly FDG avid with SUV max 18.  2. Moderately FDG avid gastrohepatic ligament lymph node consistent with local metastatic disease.  3. No evidence  for distant metastatic disease.    06/24/2024:  CT Chest, Abdomen, and Pelvis with IV contrast   Stable left adrenal nodule.  The right adrenal gland, pancreas, gallbladder, and spleen are within normal limits.  Demonstration of the annular ulcerated gastric body mass with mucosal thickening and irregularity that has decreased since the prior examination.  However, there has been enlargement of several perigastric lymph nodes in the lesser sac measuring up to 3.2 x 2.9 x 4 cm on axial image 33 of series 3 and 1.8 x 1.7 x 1.8 cm on axial image 41.   Impression:   Progression of upper abdominal metastatic lymphadenopathy in the lesser sac.  No distant sites of metastatic disease identified in the chest, abdomen, or pelvis.    ASSESSMENT & PLAN     1. Gastric adenocarcinoma         Cheryl Kirkland is a 74 y.o. 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    I reviewed with her and her  her PET CT scan from February and the most recent one, showing a mixed response (with a response in the primary tumor and yet some increasing LN and avidity).  We discussed that based on her original PET CT scan she requires a total gastrectomy which will be a morbid operation- and will keep her from getting any additional therapy for 4-6 weeks, as such the recommendation of the tumor board was to have EUS with LN sampling to r/o metastatic ovarian cancer.  We discussed that there are 3 possibilities for these LN- gastric cancer that isn't responding, pseudoprogression, and ovarian cancer.  I did tell her that my suspicion is for pseudoprogression based on her imaging but that it is important to confirm this and she is in agreement.        Plan:  -- Referral to advanced endoscopy for EUS with biopsy of LN  -- continue on immune therapy in the interm  -- Plan for  diagnostic laparoscopy to r/o metastatic disease, followed by open total gastrectomy, EGD, and jejunostomy tube placement on Tuesday, August 20th     Ms. Garcia expressed understanding in regards to our discussion today. Many good questions were asked on today's visit, all of which were answered to their satisfaction.    Follow-up: Follow up in about 4 weeks (around 8/16/2024).                Chen Jerome MD MS              Surgical Oncology              [facility] [place], [place]              Office: [phone]     Communications: 60 minutes were spent on today's visit in face-to-face and non face-to-face time with the patient. This patient has locally advanced gastric adenocarcinoma and the time was required to provide counseling and guidance regarding their new diagnosis. Time was spent reviewing all outside records and information pertaining to their work-up and formulating a treatment plan in line with standardized guidelines. Additional time was spent communicating with referring physicians and facilities to facilitate the efficient exchange of previous healthcare records and radiographic imaging pertinent to the diagnosis and disease management.       No orders of the defined types were placed in this encounter.

## 2024-07-20 ENCOUNTER — PATIENT MESSAGE (OUTPATIENT)
Dept: ADMINISTRATIVE | Facility: OTHER | Age: 74
End: 2024-07-20
Payer: MEDICARE

## 2024-07-21 ENCOUNTER — PATIENT MESSAGE (OUTPATIENT)
Dept: ADMINISTRATIVE | Facility: OTHER | Age: 74
End: 2024-07-21
Payer: MEDICARE

## 2024-07-22 ENCOUNTER — TELEPHONE (OUTPATIENT)
Dept: ENDOSCOPY | Facility: HOSPITAL | Age: 74
End: 2024-07-22
Payer: MEDICARE

## 2024-07-22 ENCOUNTER — PATIENT MESSAGE (OUTPATIENT)
Dept: ADMINISTRATIVE | Facility: OTHER | Age: 74
End: 2024-07-22
Payer: MEDICARE

## 2024-07-22 ENCOUNTER — PATIENT MESSAGE (OUTPATIENT)
Dept: ENDOSCOPY | Facility: HOSPITAL | Age: 74
End: 2024-07-22
Payer: MEDICARE

## 2024-07-22 ENCOUNTER — LAB VISIT (OUTPATIENT)
Dept: LAB | Facility: HOSPITAL | Age: 74
End: 2024-07-22
Attending: INTERNAL MEDICINE
Payer: MEDICARE

## 2024-07-22 DIAGNOSIS — C16.9 GASTRIC ADENOCARCINOMA: Primary | ICD-10-CM

## 2024-07-22 DIAGNOSIS — C56.1: ICD-10-CM

## 2024-07-22 LAB
ALBUMIN SERPL BCP-MCNC: 3.9 G/DL (ref 3.5–5.2)
ALP SERPL-CCNC: 83 U/L (ref 55–135)
ALT SERPL W/O P-5'-P-CCNC: 17 U/L (ref 10–44)
ANION GAP SERPL CALC-SCNC: 11 MMOL/L (ref 8–16)
AST SERPL-CCNC: 19 U/L (ref 10–40)
BASOPHILS # BLD AUTO: 0.03 K/UL (ref 0–0.2)
BASOPHILS NFR BLD: 0.7 % (ref 0–1.9)
BILIRUB SERPL-MCNC: 0.2 MG/DL (ref 0.1–1)
BUN SERPL-MCNC: 10 MG/DL (ref 8–23)
CALCIUM SERPL-MCNC: 9.4 MG/DL (ref 8.7–10.5)
CHLORIDE SERPL-SCNC: 104 MMOL/L (ref 95–110)
CO2 SERPL-SCNC: 20 MMOL/L (ref 23–29)
CREAT SERPL-MCNC: 0.8 MG/DL (ref 0.5–1.4)
DIFFERENTIAL METHOD BLD: ABNORMAL
EOSINOPHIL # BLD AUTO: 0.1 K/UL (ref 0–0.5)
EOSINOPHIL NFR BLD: 2.2 % (ref 0–8)
ERYTHROCYTE [DISTWIDTH] IN BLOOD BY AUTOMATED COUNT: 14.4 % (ref 11.5–14.5)
EST. GFR  (NO RACE VARIABLE): >60 ML/MIN/1.73 M^2
GLUCOSE SERPL-MCNC: 91 MG/DL (ref 70–110)
HCT VFR BLD AUTO: 44 % (ref 37–48.5)
HGB BLD-MCNC: 14 G/DL (ref 12–16)
IMM GRANULOCYTES # BLD AUTO: 0.02 K/UL (ref 0–0.04)
IMM GRANULOCYTES NFR BLD AUTO: 0.5 % (ref 0–0.5)
LYMPHOCYTES # BLD AUTO: 1.3 K/UL (ref 1–4.8)
LYMPHOCYTES NFR BLD: 32.5 % (ref 18–48)
MAGNESIUM SERPL-MCNC: 1.8 MG/DL (ref 1.6–2.6)
MCH RBC QN AUTO: 32.9 PG (ref 27–31)
MCHC RBC AUTO-ENTMCNC: 31.8 G/DL (ref 32–36)
MCV RBC AUTO: 104 FL (ref 82–98)
MONOCYTES # BLD AUTO: 0.4 K/UL (ref 0.3–1)
MONOCYTES NFR BLD: 9.5 % (ref 4–15)
NEUTROPHILS # BLD AUTO: 2.3 K/UL (ref 1.8–7.7)
NEUTROPHILS NFR BLD: 54.6 % (ref 38–73)
NRBC BLD-RTO: 0 /100 WBC
PHOSPHATE SERPL-MCNC: 3.1 MG/DL (ref 2.7–4.5)
PLATELET # BLD AUTO: 274 K/UL (ref 150–450)
PMV BLD AUTO: 8.8 FL (ref 9.2–12.9)
POTASSIUM SERPL-SCNC: 3.9 MMOL/L (ref 3.5–5.1)
PROT SERPL-MCNC: 7.2 G/DL (ref 6–8.4)
RBC # BLD AUTO: 4.25 M/UL (ref 4–5.4)
SODIUM SERPL-SCNC: 135 MMOL/L (ref 136–145)
WBC # BLD AUTO: 4.12 K/UL (ref 3.9–12.7)

## 2024-07-22 PROCEDURE — 84100 ASSAY OF PHOSPHORUS: CPT | Performed by: INTERNAL MEDICINE

## 2024-07-22 PROCEDURE — 83735 ASSAY OF MAGNESIUM: CPT | Performed by: INTERNAL MEDICINE

## 2024-07-22 PROCEDURE — 80053 COMPREHEN METABOLIC PANEL: CPT | Performed by: INTERNAL MEDICINE

## 2024-07-22 PROCEDURE — 36415 COLL VENOUS BLD VENIPUNCTURE: CPT | Mod: PO | Performed by: INTERNAL MEDICINE

## 2024-07-22 PROCEDURE — 85025 COMPLETE CBC W/AUTO DIFF WBC: CPT | Performed by: INTERNAL MEDICINE

## 2024-07-22 NOTE — TELEPHONE ENCOUNTER
Valdo Aguilera MD Perone, Jennifer, MD; Bhavna Coyne, RN; Foreign Hoskins MD; Keshawn Viveros MD; HUANG Olivares [facility] Schedulers  Sure,    Team, EUS for needle sampling of gastrohepatic lymphadenopathy on recent PET scan in setting of known gastric cancer; within next 10 days or so; either site should be ok.          Previous Messages       ----- Message -----  From: Chen Jerome MD  Sent: 7/19/2024   9:37 AM CDT  To: Valdo Aguilera MD; Bhavna Coyne, RN; *    Horacio arce,    This yolanda lady has a history of ovarian cancer last year and now has gastric cancer (MSI-H) she's had progressive WHITNEY on immune therapy.  Tumor board recs were to get a EUS biopsy of the nodes to check them out before proceeding with total gastrectomy.  Think y'all can get her in soon?  Please and thank you    APOLINAR    Spoke to pt to schedule procedure(s) Upper Endoscopy Ultrasound (EUS)       Physician to perform procedure(s) Dr. VERO Aguilera  Date of Procedure (s) 7/26/24  Arrival Time 1:00 PM  Time of Procedure(s) 2:00 PM   Location of Procedure(s) [address]  Type of Rx Prep sent to patient: N/A  Instructions provided to patient via [facility]    Patient was informed on the following information and verbalized understanding. Screening questionnaire reviewed with patient and complete. If procedure requires anesthesia, a responsible adult needs to be present to accompany the patient home, patient cannot drive after receiving anesthesia. Appointment details are tentative, especially check-in time. Patient will receive a prep-op call 7 days prior to confirm check-in time for procedure. If applicable the patient should contact their pharmacy to verify Rx for procedure prep is ready for pick-up. Patient was advised to call the scheduling department at [phone] if pharmacy states no Rx is available. Patient was advised to call the endoscopy scheduling department if any questions or concerns arise.       Endoscopy Scheduling Department

## 2024-07-23 ENCOUNTER — OFFICE VISIT (OUTPATIENT)
Dept: HEMATOLOGY/ONCOLOGY | Facility: CLINIC | Age: 74
End: 2024-07-23
Payer: MEDICARE

## 2024-07-23 ENCOUNTER — INFUSION (OUTPATIENT)
Dept: INFUSION THERAPY | Facility: HOSPITAL | Age: 74
End: 2024-07-23
Attending: INTERNAL MEDICINE
Payer: MEDICARE

## 2024-07-23 VITALS
WEIGHT: 133.38 LBS | BODY MASS INDEX: 22.2 KG/M2 | OXYGEN SATURATION: 98 % | TEMPERATURE: 98 F | DIASTOLIC BLOOD PRESSURE: 70 MMHG | SYSTOLIC BLOOD PRESSURE: 138 MMHG | HEART RATE: 52 BPM | RESPIRATION RATE: 18 BRPM

## 2024-07-23 DIAGNOSIS — C16.9 GASTRIC ADENOCARCINOMA: Primary | ICD-10-CM

## 2024-07-23 DIAGNOSIS — E06.4 DRUG-INDUCED THYROIDITIS: ICD-10-CM

## 2024-07-23 PROCEDURE — 25000003 PHARM REV CODE 250

## 2024-07-23 PROCEDURE — 63600175 PHARM REV CODE 636 W HCPCS: Mod: JZ,JG

## 2024-07-23 PROCEDURE — 96413 CHEMO IV INFUSION 1 HR: CPT

## 2024-07-23 RX ORDER — HEPARIN 100 UNIT/ML
500 SYRINGE INTRAVENOUS
Status: CANCELLED | OUTPATIENT
Start: 2024-07-23

## 2024-07-23 RX ORDER — DIPHENHYDRAMINE HYDROCHLORIDE 50 MG/ML
50 INJECTION INTRAMUSCULAR; INTRAVENOUS ONCE AS NEEDED
Status: CANCELLED | OUTPATIENT
Start: 2024-07-23

## 2024-07-23 RX ORDER — SODIUM CHLORIDE 0.9 % (FLUSH) 0.9 %
10 SYRINGE (ML) INJECTION
Status: CANCELLED | OUTPATIENT
Start: 2024-07-23

## 2024-07-23 RX ORDER — PROCHLORPERAZINE EDISYLATE 5 MG/ML
5 INJECTION INTRAMUSCULAR; INTRAVENOUS ONCE AS NEEDED
Status: CANCELLED | OUTPATIENT
Start: 2024-07-23

## 2024-07-23 RX ORDER — EPINEPHRINE 0.3 MG/.3ML
0.3 INJECTION SUBCUTANEOUS ONCE AS NEEDED
Status: CANCELLED | OUTPATIENT
Start: 2024-07-23

## 2024-07-23 RX ADMIN — SODIUM CHLORIDE 200 MG: 9 INJECTION, SOLUTION INTRAVENOUS at 02:07

## 2024-07-23 NOTE — PLAN OF CARE
Discussed plan of care with pt. Addressed any and ongoing concerns. Pt denies    Problem: Adult Inpatient Plan of Care  Goal: Plan of Care Review  Outcome: Progressing  Flowsheets (Taken 7/23/2024 1401)  Plan of Care Reviewed With: patient  Goal: Absence of Hospital-Acquired Illness or Injury  Outcome: Progressing  Intervention: Identify and Manage Fall Risk  Flowsheets (Taken 7/23/2024 1401)  Safety Promotion/Fall Prevention:   in recliner, wheels locked   room near unit station   nonskid shoes/socks when out of bed  Intervention: Prevent Infection  Flowsheets (Taken 7/23/2024 1401)  Infection Prevention:   hand hygiene promoted   equipment surfaces disinfected  Goal: Optimal Comfort and Wellbeing  Outcome: Progressing  Intervention: Monitor Pain and Promote Comfort  Flowsheets (Taken 7/23/2024 1401)  Pain Management Interventions:   quiet environment facilitated   warm blanket provided   relaxation techniques promoted  Intervention: Provide Person-Centered Care  Flowsheets (Taken 7/23/2024 1401)  Trust Relationship/Rapport:   care explained   questions encouraged   emotional support provided   choices provided   reassurance provided   thoughts/feelings acknowledged   empathic listening provided   questions answered

## 2024-07-23 NOTE — PROGRESS NOTES
Subjective:     Patient ID:?Cheryl Kirkland is a 74 y.o. female.?? MR#: [id number]   ?   PRIMARY ONCOLOGIST: Dr. Ambrose  ?   CHIEF COMPLAINT: lab review/assessment/immunotherapy    ?   ONCOLOGIC DIAGNOSIS:  Cancer Staging   Gastric adenocarcinoma  Staging form: Stomach, AJCC 8th Edition  - Clinical stage from 2/8/2024: Stage IIA (cT2, cN1, cM0) - Signed by Chen Jerome MD on 7/2/2024    Mixed epithelial carcinoma of right ovary  Staging form: Ovary, Fallopian Tube, and Primary Peritoneal Carcinoma, AJCC 8th Edition  - Clinical stage from 1/9/2023: FIGO Stage IC2, calculated as Stage IC (cT1c2, cN0, cM0) - Signed by Chen Jerome MD on 2/27/2024     ?   CURRENT TREATMENT: OP pembrolizumab 200mg Q3W       HPI  Ms. Cheryl Garcia is a 73 y.o. female with history of DVT not currently on AC, previously on Xarelto, FIGO Stage IC (cT1c2, cN0, cM0) Mixed epithelial carcinoma of right ovary s/p surgery 01/09/023 and 6C Carbo/Paclitaxel completed 07/19/2023, Osteoporosis on Fosamax, HTN and Hx of Epilepsy on Keppra, Tegretol, Zonegran (last seizure in 2009) who presents today for lab review and assessment prior to initiation on pembrolizumab.    Per review of the medical record, she initially presented to [facility] 02/06/24 with report of hematemeiss; she was transferred to [facility] for higher level of care.  On admission to [facility], EGD  was performed and showed a large ulcerated gastric mass.  Pathology results it as poorly differentiated adenocarcinoma.  She states that her weight is currently stable.  The last time she lost any weight was 2 her ovarian cancer diagnosis and treatment at which time she lost 5 lb but gained it back.  On hospitalization here at [facility], during GI workup she lost those 5 lbs a cane because she was NPO.     The patient has already established care with surgical oncology, Dr. Jerome.  She was presented in the upper GI tumor board with  consensus for neoadjuvant immunotherapy given MSI high status of her tumor.      Interval History: Pt presents today with  for initiation on keytruda. Pt states physically she is feeling well and voices no c/o.     Oncology History   Mixed epithelial carcinoma of right ovary   1/2/2023 Initial Diagnosis    Mixed epithelial carcinoma of left ovary     1/9/2023 Surgery    Laparotomy for radical resection of gynecologic cancer. Removal of pelvic mass (right salpingo-oophorectomy), left salpingo-oophorectomy, extensive enterolysis, extensive adhesiolysis, left pelvic lymph node biopsy, omental biopsy, small bowel resection with primary functional end-to-end anastomosis, placement of pelvic drain. Path: Right ovary, tumor site, 11 cm, mixed epithelial carcinoma (50% mucinous, 50% endometrioid), G2 moderately differentiated, ovarian surface involvement-indeterminate-specimen disrupted. 1 left pelvic lymph node negative for neoplasia. FIGO stage IC2       1/9/2023 Cancer Staged    Staging form: Ovary, Fallopian Tube, and Primary Peritoneal Carcinoma, AJCC 8th Edition  - Clinical stage from 1/9/2023: FIGO Stage IC2, calculated as Stage IC (cT1c2, cN0, cM0)     4/5/2023 - 7/19/2023 Chemotherapy    4/5/2023: Cycle 1- paclitaxel 135 mg/m2 and carboplatin AUC 5 as patient is frail and elderly.  4/26/2023: Cycle 2 paclitaxel 140 mg per metered squared and carboplatin AUC 5  5/17/2023: Cycle 3 increased paclitaxel to 175 mg per metered squared as been tolerating well  6/7/2023: Cycle 4  6/28/2023: Cycle 5  7/19/2023: Cycle 6       Chemotherapy    Treatment Summary   Plan Name: OP pembrolizumab 200mg Q3W  Treatment Goal: Curative  Status: Active  Start Date: 3/15/2024 (Planned)  End Date: 2/27/2026 (Planned)  Provider: Claudia Ambrose MD  Chemotherapy: [No matching medication found in this treatment plan]     Gastric adenocarcinoma   2/8/2024 Initial Diagnosis    Poorly-differentiated adenocarcinoma with intestinal  phenotype - [id number], MMR deficient- loss MLH1 and PMS2     2/8/2024 Cancer Staged    Staging form: Stomach, AJCC 8th Edition  - Clinical stage from 2/8/2024: Stage IIA (cT2, cN1, cM0)     3/4/2024 Tumor Conference    Date Presented to Tumor Board: 03/04/24  [facility] UGI MULTIDISCIPLINARY TUMOR BOARD  PATIENT REVIEW FORM   ____________________________________________________________    CLINIC #: [id number]  DATE: 3/4/2024    DIAGNOSIS: gastric GARRY    PRESENTER: Justus    PATIENT SUMMARY:   This 72 y/o female was dx with ovarian CA 1/2023, underwent surgery and completed 6 cycles of adj chemotherapy 7/2023. She presented last month with hematemesis. EGD revealed large ulcerated gastric mass, which was biopsied. EGD pathology reviewed - poorly differentiated adenocarcinoma, with intestinal expression, MSI high, HER 2 negative. Staging imaging found irregular wall thickening in mid gastric body, enlarged gastrohepatic lymph node, no evidence of distant metastatic disease.   Reviewed PET - uptake in gastric wall and gastrohepatic lymph node with hypermetabolic activity.   Scheduled to see Dr. Amin in [place] later this week.   + family hx of cancer, pending genetics referral    BOARD RECOMMENDATIONS:   Proceed with systemic chemotherapy, consider neoadj immunotherapy  Proceed with diag lap  Await genetics testing, pathology will send to [facility]    CONSULT NEEDED:     [] Surgery    [] Hem/Onc    [] Rad/Onc    [] Dietary                 [] Social Service    [x] Genetics       [] AES  [] Radiology     Clinical Stage: Tumor   Node(s)  1  Metastasis      GROUP STAGE:  [] O    [] 1A    [] IB    [] IIA    [] IIB     [] IIIA     [] IIIB     [] IIIC    []IV  [] Local recurrence     [] Regional recurrence     [] Distant recurrence   Metastatic site(s): none         [x] Edna'l Treatment Guidelines reviewed and care planned is consistent with guidelines.         (i.e., NCCN, NCI, PD, ACO, AUA, etc.)    PRESENTATION AT  CANCER CONFERENCE:         [x] Prospective    [] Retrospective     [] Follow-Up         3/14/2024 Surgery    Procedure:  LAPAROSCOPY, DIAGNOSTIC (N/A)  LYSIS, ADHESIONS, LAPAROSCOPIC (N/A)  LAVAGE, PERITONEAL, THERAPEUTIC (N/A)      Surgeon(s) and Role: Chen Jerome MD - Primary    Pre-Operative Diagnosis: Gastric adenocarcinoma [C16.9]     Post-Operative Diagnosis: Same     Pre-Operative Variables:  Stage:  III (T4a N1 M0)   Adjacent organ involvement: None  Chemotherapy within 90 Days: No  Radiation Therapy within 90 Days: No      Procedure:  Diagnostic laparoscopy   Extensive lysis of adhesions  Peritoneal biopsy   Peritoneal washings for cytology     3/26/2024 -  Chemotherapy    Treatment Summary   Plan Name: OP pembrolizumab 200mg Q3W  Treatment Goal: Curative  Status: Active  Start Date: 3/26/2024  End Date: 3/24/2026 (Planned)  Provider: Claudia Ambrose MD  Chemotherapy: [No matching medication found in this treatment plan]      Chemotherapy    Treatment Summary   Plan Name: OP pembrolizumab 200mg Q3W  Treatment Goal: Curative  Status: Active  Start Date: 3/15/2024 (Planned)  End Date: 2/27/2026 (Planned)  Provider: Claudia Ambrose MD  Chemotherapy: [No matching medication found in this treatment plan]     4/8/2024 Tumor Conference       [facility] UGI MULTIDISCIPLINARY TUMOR BOARD  PATIENT REVIEW FORM   ____________________________________________________________    CLINIC #: [id number]  DATE: 4/8/2024    DIAGNOSIS: Gastric GARRY    PRESENTER: Justus    PATIENT SUMMARY:   This 72 y/o female was dx with ovarian CA 1/2023, underwent surgery and completed 6 cycles of adj chemotherapy 7/2023. She developed hematemesis in Feb 2024, then underwent EGD with bx of a large ulcerated gastric mass. Pathology revealed poorly differentiated adenocarcinoma, with intestinal expression, MSI high, HER 2 negative. She was presented to this [facility] [place] last month. Since then, she underwent diag lap and today's  presentation is for pathology review. Negative for malignancy, reactive atypia     BOARD RECOMMENDATIONS:   Proceed with immunotherapy, then restage with imaging  Potential candidate for subtotal distal gastrectomy    CONSULT NEEDED:     [] Surgery    [] Hem/Onc    [] Rad/Onc    [] Dietary                 [] Social Service    [] Psychology       [] AES  [] Radiology     Clinical Stage: Tumor   Node(s) 1 Metastasis 0      GROUP STAGE:  [] O    [] 1A    [] IB    [] IIA    [] IIB     [] IIIA     [] IIIB     [] IIIC    []IV  [] Local recurrence     [] Regional recurrence     [] Distant recurrence   Metastatic site(s): none         [x] Edna'l Treatment Guidelines reviewed and care planned is consistent with guidelines.         (i.e., NCCN, NCI, PD, ACO, AUA, etc.)    PRESENTATION AT CANCER CONFERENCE:         [x] Prospective    [] Retrospective     [] Follow-Up           7/1/2024 Tumor Conference     [facility] UGI MULTIDISCIPLINARY TUMOR BOARD  PATIENT REVIEW FORM   ____________________________________________________________    CLINIC #: [id number]   DATE: 7/1/2024    DIAGNOSIS: gastric GARRY    PRESENTER: [facility]    PATIENT SUMMARY:   This 72 y/o female was dx with ovarian CA 1/2023, underwent surgery and completed 6 cycles of adj chemotherapy in 7/2023. She developed hematemesis in Feb 2024 and underwent EGD with bx of a large ulcerated gastric mass. Pathology revealed poorly differentiated adenocarcinoma, with intestinal expression, MSI high, HER 2 negative. She underwent diag lap and pathology was negative for malignancy, reactive atypia. She was presented to this [facility] in 3/2024 and again in 4/2024. Since then, she received immunotherapy Keytruda 200 mg every 3 weeks, total of 4 cycles thus far.   Reviewed re-staging CT scan - perigastric lymph nodes larger in size  She remains asymptomatic.     BOARD RECOMMENDATIONS:   Obtain repeat PET  Consider ct DNA    CONSULT NEEDED:     [] Surgery    [] Hem/Onc     [] Rad/Onc    [] Dietary                 [] Genetics    [] Psychology       [] AES  [] Interventional Radiology     Clinical Stage: Tumor 2 Node(s) 1 Metastasis 0      GROUP STAGE:  [] O    [] 1A    [] IB    [x] IIA    [] IIB     [] IIIA     [] IIIB     [] IIIC    []IV  [] Local recurrence     [] Regional recurrence     [] Distant recurrence   Metastatic site(s): none         [x] Edna'l Treatment Guidelines reviewed and care planned is consistent with guidelines.         (i.e., NCCN, NCI, PD, ACO, AUA, etc.)    PRESENTATION AT CANCER CONFERENCE:         [x] Prospective    [] Retrospective     [] Follow-Up            Social History     Socioeconomic History    Marital status:    Tobacco Use    Smoking status: Former     Types: Cigarettes    Smokeless tobacco: Never    Tobacco comments:     Quit 1989 smoked 10 years smoked 0.25 ppd    Substance and Sexual Activity    Alcohol use: Not Currently    Drug use: Never     Social Determinants of Health     Financial Resource Strain: Low Risk  (2/23/2024)    Overall Financial Resource Strain (CARDIA)     Difficulty of Paying Living Expenses: Not hard at all   Food Insecurity: No Food Insecurity (2/23/2024)    Hunger Vital Sign     Worried About Running Out of Food in the Last Year: Never true     Ran Out of Food in the Last Year: Never true   Transportation Needs: No Transportation Needs (2/23/2024)    PRAPARE - Transportation     Lack of Transportation (Medical): No     Lack of Transportation (Non-Medical): No   Physical Activity: Inactive (2/23/2024)    Exercise Vital Sign     Days of Exercise per Week: 0 days     Minutes of Exercise per Session: 10 min   Stress: No Stress Concern Present (2/23/2024)    [employer] [place] [employer] - Occupational Stress Questionnaire     Feeling of Stress : Not at all   Housing Stability: Low Risk  (2/23/2024)    Housing Stability Vital Sign     Unable to Pay for Housing in the Last Year: No     Number of Places Lived in  the Last Year: 1     Unstable Housing in the Last Year: No      Family History   Problem Relation Name Age of Onset    Pancreatic cancer Brother Misael Mccray 76    Prostate cancer Brother Zachary 67    Pancreatic cancer Half-brother Gasper 74    Colon cancer Half-sister Elton 83    Lung cancer Half-sister Elton         +smoking hx    Breast cancer Half-sister Vidhi 58    Lymphoma Other Cara Sun    Prostate cancer Other Fernando     Prostate cancer Other Gasper Calderon     Ovarian cancer Other Inerris     Breast cancer Other Aleida     Colon cancer Other Aleida       Past Surgical History:   Procedure Laterality Date    ABDOMINAL WASHOUT N/A 3/14/2024    Procedure: LAVAGE, PERITONEAL, THERAPEUTIC;  Surgeon: Chen Jerome MD;  Location: [facility] OR;  Service: General;  Laterality: N/A;    APPENDECTOMY  2015    BIOPSY OF PERITONEUM N/A 3/14/2024    Procedure: BIOPSY, PERITONEUM;  Surgeon: Chen Jerome MD;  Location: [facility] OR;  Service: General;  Laterality: N/A;    COLONOSCOPY  06/20/2012    Dr Boyle- Tubular adenoma polyps. Repeat in 3 years.    COLONOSCOPY  06/17/2015    -Nodular mucosa at appendiceal orfice, sigmoid and desc diverticulosis otherwise normal.    COLONOSCOPY  2020    >3 TAs    COLONOSCOPY  12/2023    DIAGNOSTIC LAPAROSCOPY N/A 3/14/2024    Procedure: LAPAROSCOPY, DIAGNOSTIC;  Surgeon: Chen Jerome MD;  Location: [facility] OR;  Service: General;  Laterality: N/A;  1. Diagnostic laparoscopy   2. Extensive lysis of adhesions  3. Peritoneal biopsy   4. Peritoneal washings for cytology    EGD - EXTERNAL RESULT  06/20/2012    Dr Boyle- Mild gastritis otherwise normal.    ENDOSCOPIC ULTRASOUND OF UPPER GASTROINTESTINAL TRACT N/A 7/26/2024    Procedure: ULTRASOUND, UPPER GI TRACT, ENDOSCOPIC;  Surgeon: Valdo Aguilera MD;  Location: [facility] ENDO;  Service: Endoscopy;  Laterality: N/A;  7/22/24: instructions sent via portal-. Pt no longer takling eliquis-GD     ESOPHAGOGASTRODUODENOSCOPY N/A 02/08/2024    Procedure: EGD (ESOPHAGOGASTRODUODENOSCOPY);  Surgeon: Jayla Arteaga MD;  Location: [facility];  Service: Endoscopy;  Laterality: N/A;    HYSTERECTOMY      LAPAROSCOPIC LYSIS OF ADHESIONS N/A 3/14/2024    Procedure: LYSIS, ADHESIONS, LAPAROSCOPIC;  Surgeon: Chen Jerome MD;  Location: [facility] OR;  Service: General;  Laterality: N/A;    TOTAL ABDOMINAL HYSTERECTOMY W/ BILATERAL SALPINGOOPHORECTOMY  01/09/2023    radical resection with right salpingo-oophorectomy, left salpingo-oophorectomy, extensive enterolysis, extensive adhesiolysis, left pelvic lymph node biopsy, omental biopsy, small bowel resection with primary functional end-to-end anastomosis, placement of pelvic drain        Review of Systems   Constitutional:  Negative for activity change, appetite change, chills, diaphoresis, fatigue, fever and unexpected weight change.   HENT:  Negative for nosebleeds.    Respiratory:  Negative for shortness of breath.    Cardiovascular:  Negative for chest pain.   Gastrointestinal:  Positive for abdominal pain (intermittent). Negative for abdominal distention, anal bleeding, blood in stool, constipation, diarrhea, nausea and vomiting.   Genitourinary:  Negative for difficulty urinating and hematuria.   Musculoskeletal:  Negative for arthralgias, back pain and myalgias.   Skin:  Negative for rash.   Neurological:  Negative for dizziness, weakness, light-headedness and headaches.   Hematological:  Does not bruise/bleed easily.   Psychiatric/Behavioral:  The patient is not nervous/anxious.        ?   A comprehensive 14-point review of systems was reviewed with patient and was negative other than as specified above.   ?     Objective:      Physical Exam  Constitutional:       General: She is not in acute distress.     Appearance: Normal appearance. She is not ill-appearing.   HENT:      Head: Normocephalic and atraumatic.   Eyes:      Extraocular Movements: Extraocular  movements intact.      Conjunctiva/sclera: Conjunctivae normal.   Cardiovascular:      Rate and Rhythm: Normal rate.   Pulmonary:      Effort: Pulmonary effort is normal. No respiratory distress.   Abdominal:      Palpations: There is no hepatomegaly or splenomegaly.   Musculoskeletal:         General: Normal range of motion.      Right lower leg: No edema.      Left lower leg: No edema.   Skin:     Findings: No rash.   Neurological:      General: No focal deficit present.      Mental Status: She is alert and oriented to person, place, and time.   Psychiatric:         Mood and Affect: Mood normal.         Behavior: Behavior normal.         Thought Content: Thought content normal.           ?   There were no vitals filed for this visit.     ?       ?   Laboratory:  ?   No visits with results within 1 Day(s) from this visit.   Latest known visit with results is:   Lab Visit on 07/22/2024   Component Date Value Ref Range Status    Phosphorus 07/22/2024 3.1  2.7 - 4.5 mg/dL Final    Magnesium 07/22/2024 1.8  1.6 - 2.6 mg/dL Final    Sodium 07/22/2024 135 (L)  136 - 145 mmol/L Final    Potassium 07/22/2024 3.9  3.5 - 5.1 mmol/L Final    Chloride 07/22/2024 104  95 - 110 mmol/L Final    CO2 07/22/2024 20 (L)  23 - 29 mmol/L Final    Glucose 07/22/2024 91  70 - 110 mg/dL Final    BUN 07/22/2024 10  8 - 23 mg/dL Final    Creatinine 07/22/2024 0.8  0.5 - 1.4 mg/dL Final    Calcium 07/22/2024 9.4  8.7 - 10.5 mg/dL Final    Total Protein 07/22/2024 7.2  6.0 - 8.4 g/dL Final    Albumin 07/22/2024 3.9  3.5 - 5.2 g/dL Final    Total Bilirubin 07/22/2024 0.2  0.1 - 1.0 mg/dL Final    Alkaline Phosphatase 07/22/2024 83  55 - 135 U/L Final    AST 07/22/2024 19  10 - 40 U/L Final    ALT 07/22/2024 17  10 - 44 U/L Final    eGFR 07/22/2024 >60  >60 mL/min/1.73 m^2 Final    Anion Gap 07/22/2024 11  8 - 16 mmol/L Final    WBC 07/22/2024 4.12  3.90 - 12.70 K/uL Final    RBC 07/22/2024 4.25  4.00 - 5.40 M/uL Final    Hemoglobin 07/22/2024  14.0  12.0 - 16.0 g/dL Final    Hematocrit 07/22/2024 44.0  37.0 - 48.5 % Final    MCV 07/22/2024 104 (H)  82 - 98 fL Final    MCH 07/22/2024 32.9 (H)  27.0 - 31.0 pg Final    MCHC 07/22/2024 31.8 (L)  32.0 - 36.0 g/dL Final    RDW 07/22/2024 14.4  11.5 - 14.5 % Final    Platelets 07/22/2024 274  150 - 450 K/uL Final    MPV 07/22/2024 8.8 (L)  9.2 - 12.9 fL Final    Immature Granulocytes 07/22/2024 0.5  0.0 - 0.5 % Final    Gran # (ANC) 07/22/2024 2.3  1.8 - 7.7 K/uL Final    Immature Grans (Abs) 07/22/2024 0.02  0.00 - 0.04 K/uL Final    Lymph # 07/22/2024 1.3  1.0 - 4.8 K/uL Final    Mono # 07/22/2024 0.4  0.3 - 1.0 K/uL Final    Eos # 07/22/2024 0.1  0.0 - 0.5 K/uL Final    Baso # 07/22/2024 0.03  0.00 - 0.20 K/uL Final    nRBC 07/22/2024 0  0 /100 WBC Final    Gran % 07/22/2024 54.6  38.0 - 73.0 % Final    Lymph % 07/22/2024 32.5  18.0 - 48.0 % Final    Mono % 07/22/2024 9.5  4.0 - 15.0 % Final    Eosinophil % 07/22/2024 2.2  0.0 - 8.0 % Final    Basophil % 07/22/2024 0.7  0.0 - 1.9 % Final    Differential Method 07/22/2024 Automated   Final      ?   Imaging:    Results for orders placed or performed during the hospital encounter of 07/10/24 (from the past 2160 hour(s))   NM PET CT FDG Skull Base to Mid Thigh    Impression    Mixed interval changes.  Decreased hypermetabolic wall thickening of the stomach and stable hypermetabolic index gastrohepatic lymph node.  Additional new enlarged hypermetabolic gastrohepatic lymph node concerning for progression of local alis disease.    Increased uptake associated with several loops of small bowel in the pelvis and upper abdomen which is favored to be physiologic.  No CT correlate.  Attention on follow-up.      Electronically signed by: Morgan Mallory  Date:    07/10/2024  Time:    10:54        Results for orders placed or performed during the hospital encounter of 06/24/24 (from the past 2160 hour(s))   CT Chest Abdomen Pelvis With IV Contrast (XPD) Routine Oral  Contrast    Narrative    EXAM: CT CHEST ABDOMEN PELVIS WITH IV CONTRAST (XPD)    CLINICAL HISTORY:  Gastric cancer.  Evaluate treatment response for subsequent treatment planning.    COMPARISON: PET/CT on 02/20/2024.  CT chest abdomen and pelvis on 02/08/2024    TECHNIQUE: Axial CT imaging through the chest, abdomen and pelvis performed with intravenous contrast are submitted for interpretation. Multiplanar reformats were performed and interpreted.    FINDINGS:    Chest:    The lungs are clear.  No infiltrates or effusions.  The heart and great vessels are within normal size limits.  No mediastinal, hilar, or axillary lymphadenopathy.    Stable substernal thyroid nodule with mass effect upon the right lateral wall of the esophagus.    Abdomen:    The liver is normal in size and contour without focal mass.  Fatty liver.  No hydronephrosis.  Stable bilateral nonobstructive renal calculi.  No solid renal lesions or perinephric stranding.  Stable left adrenal nodule.  The right adrenal gland, pancreas, gallbladder, and spleen are within normal limits.  Demonstration of the annular ulcerated gastric body mass with mucosal thickening and irregularity that has decreased since the prior examination.  However, there has been enlargement of several perigastric lymph nodes in the lesser sac measuring up to 3.2 x 2.9 x 4 cm on axial image 33 of series 3 and 1.8 x 1.7 x 1.8 cm on axial image 41.    No free intraperitoneal fluid or air.  The small bowel is within normal limits.  Colonic diverticulosis.  No abdominal lymphadenopathy.      Aorta caliber is normal.    Pelvis:  Uterus has been removed.  No free pelvic fluid or adenopathy. The urinary bladder is normal.    No significant osseous abnormality identified.          Impression    Progression of upper abdominal metastatic lymphadenopathy in the lesser sac.  No distant sites of metastatic disease identified in the chest, abdomen, or pelvis.      All CT scans at [this  location] are performed using dose modulation techniques as appropriate to a performed exam including the following: automated exposure control; adjustment of the mA and/or kV according to patient size (this includes techniques or standardized protocols for targeted exams where dose is matched to indication / reason for exam; i.e. extremities or head); use of iterative reconstruction technique.    RADIOLOGIST:  ELDER Guevara M.D.    Finalized on: 6/24/2024 2:35 PM By:  ELDER Guevara MD, MD  [facility]# [id number]      2024-06-24 14:37:[id number]    [facility]        ?   Assessment/Plan:     Problem List Items Addressed This Visit          Oncology    Gastric adenocarcinoma - Primary      Cancer Staging   Gastric adenocarcinoma  Staging form: Stomach, AJCC 8th Edition  - Clinical stage from 2/8/2024: Stage IIA (cT2, cN1, cM0) - Signed by Chen Jerome MD on 7/2/2024    Mixed epithelial carcinoma of right ovary  Staging form: Ovary, Fallopian Tube, and Primary Peritoneal Carcinoma, AJCC 8th Edition  - Clinical stage from 1/9/2023: FIGO Stage IC2, calculated as Stage IC (cT1c2, cN0, cM0) - Signed by Chen Jerome MD on 2/27/2024     EGD 02/08/2024: Gastric Mass  Path: Poorly-differentiated adenocarcinoma with intestinal phenotype -  HER2-, MMR - Deficient (MLH1 and PMS2 loss)  [facility] NGS:   MSI-H, TMB 42.6  Potentially actionable mutation in CHRIS  Multiple clinical trials available  PET-CT 02/28/24 showed no evidence of distant metastatic disease  Presented in [place] 03/04/24 with consensus for Proceed with systemic chemotherapy, consider neoadj immunotherapy, Proceed with diag lap  Diagnostic Laparoscopy completed  03/14/2024 with Dr. Jerome  Per NCCN guidelines, NA immunotherapy is useful in MSI-H/dMMR tumors) with options being Nivolumab and ipilimumab followed by nivolumab or Pembrolizumab.  Given patient's comorbid conditions and potentially increased toxicity with CTLA4 inhibitor and PD-1 inhibitor, weill pre-certify for  Pembrolizumab    Plan for diagnostic laparoscopy to r/o metastatic disease 07/26/24  Followed by open total gastrectomy, EGD, and jejunostomy tube placement on Tuesday, August 20th     Labs reviewed, no concerning findings  Proceed with Keytruda today     Follow-up in 3 weeks with labs prior for Keytruda         Relevant Orders    CBC Auto Differential    Comprehensive Metabolic Panel    TSH     Other Visit Diagnoses       Drug-induced thyroiditis        Relevant Orders    TSH                 Med Onc Chart Routing      Follow up with physician 3 weeks. with labs prior for Keytruda   Follow up with JENNIE    Infusion scheduling note   Keytruda   Injection scheduling note    Labs CBC, CMP and TSH   Scheduling:  Preferred lab:  Lab interval:     Imaging    Pharmacy appointment    Other referrals                   [facility]  Hematology/Oncology

## 2024-07-24 ENCOUNTER — PATIENT MESSAGE (OUTPATIENT)
Dept: ADMINISTRATIVE | Facility: OTHER | Age: 74
End: 2024-07-24
Payer: MEDICARE

## 2024-07-25 ENCOUNTER — PATIENT MESSAGE (OUTPATIENT)
Dept: ADMINISTRATIVE | Facility: OTHER | Age: 74
End: 2024-07-25
Payer: MEDICARE

## 2024-07-26 ENCOUNTER — ANESTHESIA (OUTPATIENT)
Dept: ENDOSCOPY | Facility: HOSPITAL | Age: 74
End: 2024-07-26
Payer: MEDICARE

## 2024-07-26 ENCOUNTER — ANESTHESIA EVENT (OUTPATIENT)
Dept: ENDOSCOPY | Facility: HOSPITAL | Age: 74
End: 2024-07-26
Payer: MEDICARE

## 2024-07-26 ENCOUNTER — HOSPITAL ENCOUNTER (OUTPATIENT)
Facility: HOSPITAL | Age: 74
Discharge: HOME | End: 2024-07-26
Attending: INTERNAL MEDICINE | Admitting: INTERNAL MEDICINE
Payer: MEDICARE

## 2024-07-26 VITALS
HEART RATE: 58 BPM | HEIGHT: 65 IN | BODY MASS INDEX: 21.66 KG/M2 | RESPIRATION RATE: 13 BRPM | DIASTOLIC BLOOD PRESSURE: 76 MMHG | SYSTOLIC BLOOD PRESSURE: 159 MMHG | OXYGEN SATURATION: 100 % | WEIGHT: 130 LBS | TEMPERATURE: 98 F

## 2024-07-26 DIAGNOSIS — Z85.028 HISTORY OF GASTRIC CANCER: ICD-10-CM

## 2024-07-26 PROCEDURE — 37000008 HC ANESTHESIA 1ST 15 MINUTES: Performed by: INTERNAL MEDICINE

## 2024-07-26 PROCEDURE — 37000009 HC ANESTHESIA EA ADD 15 MINS: Performed by: INTERNAL MEDICINE

## 2024-07-26 PROCEDURE — 25000003 PHARM REV CODE 250: Performed by: INTERNAL MEDICINE

## 2024-07-26 PROCEDURE — 27202131 HC NEEDLE, FNB SINGLE (ANY): Performed by: INTERNAL MEDICINE

## 2024-07-26 PROCEDURE — 25000003 PHARM REV CODE 250: Performed by: NURSE ANESTHETIST, CERTIFIED REGISTERED

## 2024-07-26 PROCEDURE — 88342 IMHCHEM/IMCYTCHM 1ST ANTB: CPT | Performed by: PATHOLOGY

## 2024-07-26 PROCEDURE — 88305 TISSUE EXAM BY PATHOLOGIST: CPT | Performed by: PATHOLOGY

## 2024-07-26 PROCEDURE — 43242 EGD US FINE NEEDLE BX/ASPIR: CPT | Performed by: INTERNAL MEDICINE

## 2024-07-26 PROCEDURE — 88360 TUMOR IMMUNOHISTOCHEM/MANUAL: CPT | Performed by: PATHOLOGY

## 2024-07-26 PROCEDURE — 88341 IMHCHEM/IMCYTCHM EA ADD ANTB: CPT | Mod: 59 | Performed by: PATHOLOGY

## 2024-07-26 RX ORDER — PROPOFOL 10 MG/ML
VIAL (ML) INTRAVENOUS
Status: DISCONTINUED | OUTPATIENT
Start: 2024-07-26 | End: 2024-07-26

## 2024-07-26 RX ORDER — SODIUM CHLORIDE 9 MG/ML
INJECTION, SOLUTION INTRAVENOUS CONTINUOUS
Status: DISCONTINUED | OUTPATIENT
Start: 2024-07-26 | End: 2024-07-26 | Stop reason: HOSPADM

## 2024-07-26 RX ORDER — SODIUM CHLORIDE 0.9 % (FLUSH) 0.9 %
10 SYRINGE (ML) INJECTION
Status: DISCONTINUED | OUTPATIENT
Start: 2024-07-26 | End: 2024-07-26 | Stop reason: HOSPADM

## 2024-07-26 RX ORDER — LIDOCAINE HYDROCHLORIDE 20 MG/ML
INJECTION, SOLUTION EPIDURAL; INFILTRATION; INTRACAUDAL; PERINEURAL
Status: DISCONTINUED | OUTPATIENT
Start: 2024-07-26 | End: 2024-07-26

## 2024-07-26 RX ADMIN — LIDOCAINE HYDROCHLORIDE 60 MG: 20 INJECTION, SOLUTION EPIDURAL; INFILTRATION; INTRACAUDAL; PERINEURAL at 11:07

## 2024-07-26 RX ADMIN — Medication 10 MG: at 11:07

## 2024-07-26 RX ADMIN — Medication 60 MG: at 11:07

## 2024-07-26 RX ADMIN — SODIUM CHLORIDE: 9 INJECTION, SOLUTION INTRAVENOUS at 11:07

## 2024-07-26 NOTE — TRANSFER OF CARE
"Anesthesia Transfer of Care Note    Patient: Cheryl Kirkland    Procedure(s) Performed: Procedure(s) (LRB):  ULTRASOUND, UPPER GI TRACT, ENDOSCOPIC (N/A)    Patient location: GI    Anesthesia Type: general    Transport from OR: Transported from OR on room air with adequate spontaneous ventilation    Post pain: adequate analgesia    Post assessment: no apparent anesthetic complications    Post vital signs: stable    Level of consciousness: awake    Nausea/Vomiting: no nausea/vomiting    Complications: none    Transfer of care protocol was followed      Last vitals: Visit Vitals  /63 (BP Location: Left arm, Patient Position: Lying)   Pulse (!) 53   Temp 36.9 °C (98.4 °F) (Temporal)   Resp 16   Ht 5' 5" (1.651 m)   Wt 59 kg (130 lb)   SpO2 98%   Breastfeeding No   BMI 21.63 kg/m²     "

## 2024-07-26 NOTE — ANESTHESIA PREPROCEDURE EVALUATION
07/26/2024  [facility]  Anesthesia Pre-Operative Evaluation         Patient Name: Cheryl Kirkland  YOB: 1950  MRN: [id number]    SUBJECTIVE:     Pre-operative evaluation for Procedure(s) (LRB):  ULTRASOUND, UPPER GI TRACT, ENDOSCOPIC (N/A)     07/26/2024    Cheryl Kirkland is a 74 y.o. female w/ a significant PMHx of epilepsy DVT, HF, HTN.  Patient now presents for the above procedure(s).    TTE:   1. Normal left ventricular cavity size. Normal left ventricular systolic function. LVEF   55 - 65%. Mild asymmetric septal hypertrophy.   2. No significant functional valvular abnormalities.     Patient Active Problem List   Diagnosis    Acute blood loss anemia    Hx of Epileptic seizures    DVT (deep venous thrombosis)    Mixed epithelial carcinoma of right ovary    Gastric adenocarcinoma    Chronic deep vein thrombosis (DVT) of left lower extremity    Drug-induced polyneuropathy    Family history of breast cancer    Fibrocystic breast changes, bilateral    Heart failure, unspecified    HLD (hyperlipidemia)    OP (osteoporosis)    Primary hypertension    Pre-op exam    Anemia    Fibroma of tongue    Thyroid nodule    Drug-induced skin rash    Pruritus       Review of patient's allergies indicates:  No Known Allergies    Current Inpatient Medications:      No current facility-administered medications on file prior to encounter.     Current Outpatient Medications on File Prior to Encounter   Medication Sig Dispense Refill    acetaminophen (TYLENOL) 500 MG tablet Take 2 tablets (1,000 mg total) by mouth every 8 (eight) hours. (Patient taking differently: Take 1,000 mg by mouth every 8 (eight) hours as needed for Pain.)  0    alendronate (FOSAMAX) 70 MG tablet Take 70 mg by mouth every 7 days. Pt stated she takes this every Sunday      calcium phosphate trib/vit D3  (CALCIUM PHOSPHATE-VITAMIN D3 ORAL) Take 1 tablet by mouth 2 (two) times daily.      carBAMazepine (TEGRETOL) 200 mg tablet Take by mouth. Pt states she take TID  1 tablet with breakfast  1 tablet with lunch   1.5 tablet at bedtime      ferrous sulfate 325 (65 FE) MG EC tablet Take 65 mg by mouth 2 (two) times daily with meals.      hydrocortisone 2.5 % cream Apply topically 2 (two) times daily. (Patient taking differently: Apply topically 2 (two) times daily as needed.) 28 g 1    hydrOXYzine HCL (ATARAX) 25 MG tablet Take 1 tablet (25 mg total) by mouth 3 (three) times daily as needed for Itching. 90 tablet 1    ibuprofen (ADVIL,MOTRIN) 800 MG tablet Take 1 tablet (800 mg total) by mouth every 8 (eight) hours. (Patient taking differently: Take 800 mg by mouth every 8 (eight) hours as needed.)      levETIRAcetam (KEPPRA) 500 MG Tab Take 1 tablet by mouth 2 (two) times daily. Pt states she takes one pill at dinner and one at bedtime      oxyCODONE (ROXICODONE) 5 MG immediate release tablet Take 1 tablet (5 mg total) by mouth every 4 (four) hours as needed for Pain. 15 tablet 0    zonisamide (ZONEGRAN) 100 MG Cap Take 200 mg by mouth 2 (two) times daily. 2cap w/ brkfst 2cap w/dinner         Past Surgical History:   Procedure Laterality Date    ABDOMINAL WASHOUT N/A 3/14/2024    Procedure: LAVAGE, PERITONEAL, THERAPEUTIC;  Surgeon: Chen Jerome MD;  Location: [facility] OR;  Service: General;  Laterality: N/A;    APPENDECTOMY  2015    BIOPSY OF PERITONEUM N/A 3/14/2024    Procedure: BIOPSY, PERITONEUM;  Surgeon: Chen Jerome MD;  Location: [facility] OR;  Service: General;  Laterality: N/A;    COLONOSCOPY  06/20/2012    Dr Boyle- Tubular adenoma polyps. Repeat in 3 years.    COLONOSCOPY  06/17/2015    -Nodular mucosa at appendiceal orfice, sigmoid and desc diverticulosis otherwise normal.    COLONOSCOPY  2020    >3 TAs    COLONOSCOPY  12/2023    DIAGNOSTIC LAPAROSCOPY N/A 3/14/2024    Procedure:  LAPAROSCOPY, DIAGNOSTIC;  Surgeon: Chen Jerome MD;  Location: [facility];  Service: General;  Laterality: N/A;  1. Diagnostic laparoscopy   2. Extensive lysis of adhesions  3. Peritoneal biopsy   4. Peritoneal washings for cytology    EGD - EXTERNAL RESULT  06/20/2012    Dr Boyle- Mild gastritis otherwise normal.    ESOPHAGOGASTRODUODENOSCOPY N/A 02/08/2024    Procedure: EGD (ESOPHAGOGASTRODUODENOSCOPY);  Surgeon: Jayla Arteaga MD;  Location: [facility];  Service: Endoscopy;  Laterality: N/A;    HYSTERECTOMY      LAPAROSCOPIC LYSIS OF ADHESIONS N/A 3/14/2024    Procedure: LYSIS, ADHESIONS, LAPAROSCOPIC;  Surgeon: Chen Jerome MD;  Location: [facility] OR;  Service: General;  Laterality: N/A;    TOTAL ABDOMINAL HYSTERECTOMY W/ BILATERAL SALPINGOOPHORECTOMY  01/09/2023    radical resection with right salpingo-oophorectomy, left salpingo-oophorectomy, extensive enterolysis, extensive adhesiolysis, left pelvic lymph node biopsy, omental biopsy, small bowel resection with primary functional end-to-end anastomosis, placement of pelvic drain       OBJECTIVE:     Vital Signs Range (Last 24H):  BP: ()/()   Arterial Line BP: ()/()       Significant Labs:  Lab Results   Component Value Date    WBC 4.12 07/22/2024    HGB 14.0 07/22/2024    HCT 44.0 07/22/2024     07/22/2024    ALT 17 07/22/2024    AST 19 07/22/2024     (L) 07/22/2024    K 3.9 07/22/2024     07/22/2024    CREATININE 0.8 07/22/2024    BUN 10 07/22/2024    CO2 20 (L) 07/22/2024    TSH 0.891 07/08/2024    INR 1.0 02/07/2024       Diagnostic Studies: No relevant studies.    EKG:   Results for orders placed or performed in visit on 03/07/24   IN OFFICE EKG 12-LEAD (to Pace)    Collection Time: 03/07/24  9:46 AM   Result Value Ref Range    QRS Duration 72 ms    [facility] QTC Calculation 405 ms    Narrative    Test Reason : Z01.818,    Vent. Rate : 063 BPM     Atrial Rate : 063 BPM     P-R Int : 132 ms          QRS Dur : 072 ms      QT Int :  396 ms       P-R-T Axes : 077 075 076 degrees     QTc Int : 405 ms    Normal sinus rhythm with sinus arrhythmia  Nonspecific T wave abnormality  Abnormal ECG  No previous ECGs available  Confirmed by TITI SOTO, CATHI ([id number]) on 3/7/2024 11:19:58 PM    Referred By: KINGSLEY BROUSSARD           Confirmed By:CATHI WALLS MD       ASSESSMENT/PLAN:           Pre-op Assessment    I have reviewed the Patient Summary Reports.     I have reviewed the Nursing Notes. I have reviewed the NPO Status.   I have reviewed the Medications.     Review of Systems  Anesthesia Hx:   History of prior surgery of interest to airway management or planning:            Denies Personal Hx of Anesthesia complications.                    Hematology/Oncology:       -- Anemia:               Hematology Comments: H/o DVT    Current/Recent Cancer.                Cardiovascular:  Exercise tolerance: good   Hypertension           hyperlipidemia                       Hypertension         Pulmonary:  Pulmonary Normal                       Hepatic/GI:     GERD             Neurological:       Seizures          Peripheral Neuropathy     Seizure Disorder                        Neuromuscular Disease   Endocrine:  Endocrine Normal                Physical Exam  General: Cooperative    Airway:  Mallampati: II   Mouth Opening: Normal  TM Distance: Normal  Tongue: Normal  Neck ROM: Normal ROM    Dental:  Intact        Anesthesia Plan  Type of Anesthesia, risks & benefits discussed:    Anesthesia Type: Gen Natural Airway  Intra-op Monitoring Plan: Standard ASA Monitors  Post Op Pain Control Plan: multimodal analgesia  Induction:  IV  Informed Consent: Informed consent signed with the Patient and all parties understand the risks and agree with anesthesia plan.  All questions answered.   ASA Score: 2  Day of Surgery Review of History & Physical: H&P Update referred to the surgeon/provider.    Ready For Surgery From Anesthesia Perspective.     .

## 2024-07-26 NOTE — ANESTHESIA POSTPROCEDURE EVALUATION
Anesthesia Post Evaluation    Patient: Cheryl Kirkland    Procedure(s) Performed: Procedure(s) (LRB):  ULTRASOUND, UPPER GI TRACT, ENDOSCOPIC (N/A)    Final Anesthesia Type: general      Patient location during evaluation: GI PACU  Patient participation: Yes- Able to Participate  Level of consciousness: awake and alert, oriented and awake  Post-procedure vital signs: reviewed and stable  Pain management: adequate  Airway patency: patent  DWIGHT mitigation strategies: Multimodal analgesia  PONV status at discharge: No PONV  Anesthetic complications: no      Cardiovascular status: blood pressure returned to baseline and hemodynamically stable  Respiratory status: unassisted and spontaneous ventilation  Hydration status: euvolemic  Follow-up not needed.              Vitals Value Taken Time   /76 07/26/24 1250   Temp  07/26/24 1403   Pulse 58 07/26/24 1250   Resp 13 07/26/24 1250   SpO2 100 % 07/26/24 1250         Event Time   Out of Recovery 12:50:00         Pain/Marli Score: Marli Score: 10 (7/26/2024 12:50 PM)

## 2024-07-27 ENCOUNTER — PATIENT MESSAGE (OUTPATIENT)
Dept: ADMINISTRATIVE | Facility: OTHER | Age: 74
End: 2024-07-27
Payer: MEDICARE

## 2024-07-29 ENCOUNTER — PATIENT MESSAGE (OUTPATIENT)
Dept: ADMINISTRATIVE | Facility: OTHER | Age: 74
End: 2024-07-29
Payer: MEDICARE

## 2024-07-30 ENCOUNTER — PATIENT MESSAGE (OUTPATIENT)
Dept: ADMINISTRATIVE | Facility: OTHER | Age: 74
End: 2024-07-30
Payer: MEDICARE

## 2024-07-30 ENCOUNTER — PATIENT MESSAGE (OUTPATIENT)
Dept: HEMATOLOGY/ONCOLOGY | Facility: CLINIC | Age: 74
End: 2024-07-30
Payer: MEDICARE

## 2024-07-31 ENCOUNTER — PATIENT MESSAGE (OUTPATIENT)
Dept: ADMINISTRATIVE | Facility: OTHER | Age: 74
End: 2024-07-31
Payer: MEDICARE

## 2024-08-01 ENCOUNTER — PATIENT MESSAGE (OUTPATIENT)
Dept: ADMINISTRATIVE | Facility: OTHER | Age: 74
End: 2024-08-01
Payer: MEDICARE

## 2024-08-02 ENCOUNTER — PATIENT MESSAGE (OUTPATIENT)
Dept: SURGICAL ONCOLOGY | Facility: CLINIC | Age: 74
End: 2024-08-02
Payer: MEDICARE

## 2024-08-02 ENCOUNTER — PATIENT MESSAGE (OUTPATIENT)
Dept: ADMINISTRATIVE | Facility: OTHER | Age: 74
End: 2024-08-02
Payer: MEDICARE

## 2024-08-03 ENCOUNTER — PATIENT MESSAGE (OUTPATIENT)
Dept: ADMINISTRATIVE | Facility: OTHER | Age: 74
End: 2024-08-03
Payer: MEDICARE

## 2024-08-04 ENCOUNTER — PATIENT MESSAGE (OUTPATIENT)
Dept: ADMINISTRATIVE | Facility: OTHER | Age: 74
End: 2024-08-04
Payer: MEDICARE

## 2024-08-05 ENCOUNTER — PATIENT MESSAGE (OUTPATIENT)
Dept: ADMINISTRATIVE | Facility: OTHER | Age: 74
End: 2024-08-05
Payer: MEDICARE

## 2024-08-06 ENCOUNTER — PATIENT MESSAGE (OUTPATIENT)
Dept: ADMINISTRATIVE | Facility: OTHER | Age: 74
End: 2024-08-06
Payer: MEDICARE

## 2024-08-06 LAB
FINAL PATHOLOGIC DIAGNOSIS: ABNORMAL
Lab: ABNORMAL
SUPPLEMENTAL DIAGNOSIS: ABNORMAL

## 2024-08-07 ENCOUNTER — PATIENT MESSAGE (OUTPATIENT)
Dept: ADMINISTRATIVE | Facility: OTHER | Age: 74
End: 2024-08-07
Payer: MEDICARE

## 2024-08-08 ENCOUNTER — PATIENT MESSAGE (OUTPATIENT)
Dept: ADMINISTRATIVE | Facility: OTHER | Age: 74
End: 2024-08-08
Payer: MEDICARE

## 2024-08-10 ENCOUNTER — PATIENT MESSAGE (OUTPATIENT)
Dept: ADMINISTRATIVE | Facility: OTHER | Age: 74
End: 2024-08-10
Payer: MEDICARE

## 2024-08-11 ENCOUNTER — PATIENT MESSAGE (OUTPATIENT)
Dept: ADMINISTRATIVE | Facility: OTHER | Age: 74
End: 2024-08-11
Payer: MEDICARE

## 2024-08-12 ENCOUNTER — LAB VISIT (OUTPATIENT)
Dept: LAB | Facility: HOSPITAL | Age: 74
End: 2024-08-12
Payer: MEDICARE

## 2024-08-12 DIAGNOSIS — C16.9 GASTRIC ADENOCARCINOMA: ICD-10-CM

## 2024-08-12 DIAGNOSIS — E06.4 DRUG-INDUCED THYROIDITIS: ICD-10-CM

## 2024-08-12 LAB
ALBUMIN SERPL BCP-MCNC: 3.6 G/DL (ref 3.5–5.2)
ALP SERPL-CCNC: 74 U/L (ref 55–135)
ALT SERPL W/O P-5'-P-CCNC: 13 U/L (ref 10–44)
ANION GAP SERPL CALC-SCNC: 7 MMOL/L (ref 8–16)
AST SERPL-CCNC: 19 U/L (ref 10–40)
BASOPHILS # BLD AUTO: 0.03 K/UL (ref 0–0.2)
BASOPHILS NFR BLD: 0.7 % (ref 0–1.9)
BILIRUB SERPL-MCNC: 0.2 MG/DL (ref 0.1–1)
BUN SERPL-MCNC: 13 MG/DL (ref 8–23)
CALCIUM SERPL-MCNC: 9.2 MG/DL (ref 8.7–10.5)
CHLORIDE SERPL-SCNC: 103 MMOL/L (ref 95–110)
CO2 SERPL-SCNC: 23 MMOL/L (ref 23–29)
CREAT SERPL-MCNC: 0.8 MG/DL (ref 0.5–1.4)
DIFFERENTIAL METHOD BLD: ABNORMAL
EOSINOPHIL # BLD AUTO: 0.1 K/UL (ref 0–0.5)
EOSINOPHIL NFR BLD: 2.2 % (ref 0–8)
ERYTHROCYTE [DISTWIDTH] IN BLOOD BY AUTOMATED COUNT: 14.1 % (ref 11.5–14.5)
EST. GFR  (NO RACE VARIABLE): >60 ML/MIN/1.73 M^2
GLUCOSE SERPL-MCNC: 85 MG/DL (ref 70–110)
HCT VFR BLD AUTO: 40.1 % (ref 37–48.5)
HGB BLD-MCNC: 13 G/DL (ref 12–16)
IMM GRANULOCYTES # BLD AUTO: 0.02 K/UL (ref 0–0.04)
IMM GRANULOCYTES NFR BLD AUTO: 0.5 % (ref 0–0.5)
LYMPHOCYTES # BLD AUTO: 1.4 K/UL (ref 1–4.8)
LYMPHOCYTES NFR BLD: 35.2 % (ref 18–48)
MCH RBC QN AUTO: 33.5 PG (ref 27–31)
MCHC RBC AUTO-ENTMCNC: 32.4 G/DL (ref 32–36)
MCV RBC AUTO: 103 FL (ref 82–98)
MONOCYTES # BLD AUTO: 0.5 K/UL (ref 0.3–1)
MONOCYTES NFR BLD: 12 % (ref 4–15)
NEUTROPHILS # BLD AUTO: 2 K/UL (ref 1.8–7.7)
NEUTROPHILS NFR BLD: 49.4 % (ref 38–73)
NRBC BLD-RTO: 0 /100 WBC
PLATELET # BLD AUTO: 252 K/UL (ref 150–450)
PMV BLD AUTO: 8.5 FL (ref 9.2–12.9)
POTASSIUM SERPL-SCNC: 5.1 MMOL/L (ref 3.5–5.1)
PROT SERPL-MCNC: 6.5 G/DL (ref 6–8.4)
RBC # BLD AUTO: 3.88 M/UL (ref 4–5.4)
SODIUM SERPL-SCNC: 133 MMOL/L (ref 136–145)
TSH SERPL DL<=0.005 MIU/L-ACNC: 1.04 UIU/ML (ref 0.4–4)
WBC # BLD AUTO: 4.01 K/UL (ref 3.9–12.7)

## 2024-08-12 PROCEDURE — 85025 COMPLETE CBC W/AUTO DIFF WBC: CPT

## 2024-08-12 PROCEDURE — 80053 COMPREHEN METABOLIC PANEL: CPT

## 2024-08-12 PROCEDURE — 36415 COLL VENOUS BLD VENIPUNCTURE: CPT | Mod: PO

## 2024-08-12 PROCEDURE — 84443 ASSAY THYROID STIM HORMONE: CPT

## 2024-08-12 NOTE — ASSESSMENT & PLAN NOTE
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 Jerome  Per NCCN guidelines, NA immunotherapy is useful in MSI-H/dMMR tumors) with options being Nivolumab and ipilimumab followed by nivolumab or Pembrolizumab.  Given patient's comorbid conditions and potentially increased toxicity with CTLA4 inhibitor and PD-1 inhibitor, weill pre-certify for Pembrolizumab    Plan for diagnostic laparoscopy to r/o metastatic disease 07/26/24  Followed by open total gastrectomy, EGD, and jejunostomy tube placement on Tuesday, August 20th     Labs reviewed, no concerning findings  Proceed with Keytruda today     Follow-up in 3 weeks with labs prior for Keytruda

## 2024-08-13 ENCOUNTER — OFFICE VISIT (OUTPATIENT)
Dept: HEMATOLOGY/ONCOLOGY | Facility: CLINIC | Age: 74
End: 2024-08-13
Payer: MEDICARE

## 2024-08-13 ENCOUNTER — PATIENT MESSAGE (OUTPATIENT)
Dept: ADMINISTRATIVE | Facility: OTHER | Age: 74
End: 2024-08-13
Payer: MEDICARE

## 2024-08-13 DIAGNOSIS — C16.9 GASTRIC ADENOCARCINOMA: Primary | ICD-10-CM

## 2024-08-13 DIAGNOSIS — C56.1: ICD-10-CM

## 2024-08-13 PROCEDURE — 99213 OFFICE O/P EST LOW 20 MIN: CPT | Mod: 95,,, | Performed by: INTERNAL MEDICINE

## 2024-08-13 RX ORDER — SODIUM CHLORIDE 0.9 % (FLUSH) 0.9 %
10 SYRINGE (ML) INJECTION
OUTPATIENT
Start: 2024-08-13

## 2024-08-13 RX ORDER — EPINEPHRINE 0.3 MG/.3ML
0.3 INJECTION SUBCUTANEOUS ONCE AS NEEDED
OUTPATIENT
Start: 2024-08-13

## 2024-08-13 RX ORDER — PROCHLORPERAZINE EDISYLATE 5 MG/ML
5 INJECTION INTRAMUSCULAR; INTRAVENOUS ONCE AS NEEDED
OUTPATIENT
Start: 2024-08-13

## 2024-08-13 RX ORDER — DIPHENHYDRAMINE HYDROCHLORIDE 50 MG/ML
50 INJECTION INTRAMUSCULAR; INTRAVENOUS ONCE AS NEEDED
OUTPATIENT
Start: 2024-08-13

## 2024-08-13 RX ORDER — HEPARIN 100 UNIT/ML
500 SYRINGE INTRAVENOUS
OUTPATIENT
Start: 2024-08-13

## 2024-08-13 NOTE — PROGRESS NOTES
Patient ID: Cheryl Kirkland   Chief Complaint: Follow-up  MRN:  [id number]     Oncologic Diagnosis:  Gastric Adenocarcinoma  Previous Treatment:  N/A  Current Treatment:  Pembrolizumab 3/26/2024    The patient location is: [place], [place]  The chief complaint leading to consultation is: follow up    Visit type: audiovisual    Face to Face time with patient: 10  20 minutes of total time spent on the encounter, which includes face to face time and non-face to face time preparing to see the patient (eg, review of tests), Obtaining and/or reviewing separately obtained history, Documenting clinical information in the electronic or other health record, Independently interpreting results (not separately reported) and communicating results to the patient/family/caregiver, or Care coordination (not separately reported).     Each patient to whom he or she provides medical services by telemedicine is:  (1) informed of the relationship between the physician and patient and the respective role of any other health care provider with respect to management of the patient; and (2) notified that he or she may decline to receive medical services by telemedicine and may withdraw from such care at any time.    Subjective   The patient presents for follow up and is accompanied by her .    She has tolerated NA immunotherapy without significant adverse effects.  She feels well.  She is to have gastrectomy 08/20/24.  ctDNA pending.    Review of Systems   Constitutional:  Negative for activity change, appetite change, chills, diaphoresis, fatigue, fever and unexpected weight change.   HENT:  Negative for nosebleeds.    Respiratory:  Negative for shortness of breath.    Cardiovascular:  Negative for chest pain.   Gastrointestinal:  Negative for abdominal distention, abdominal pain, anal bleeding, blood in stool, constipation, diarrhea, nausea and vomiting.   Genitourinary:  Negative for difficulty urinating and hematuria.    Musculoskeletal:  Negative for arthralgias, back pain and myalgias.   Skin:  Negative for rash.   Neurological:  Negative for dizziness, weakness, light-headedness and headaches.   Hematological:  Does not bruise/bleed easily.   Psychiatric/Behavioral:  The patient is not nervous/anxious.      History     Oncology History   Mixed epithelial carcinoma of right ovary   1/2/2023 Initial Diagnosis    Mixed epithelial carcinoma of left ovary     1/9/2023 Surgery    Laparotomy for radical resection of gynecologic cancer. Removal of pelvic mass (right salpingo-oophorectomy), left salpingo-oophorectomy, extensive enterolysis, extensive adhesiolysis, left pelvic lymph node biopsy, omental biopsy, small bowel resection with primary functional end-to-end anastomosis, placement of pelvic drain. Path: Right ovary, tumor site, 11 cm, mixed epithelial carcinoma (50% mucinous, 50% endometrioid), G2 moderately differentiated, ovarian surface involvement-indeterminate-specimen disrupted. 1 left pelvic lymph node negative for neoplasia. FIGO stage IC2       1/9/2023 Cancer Staged    Staging form: Ovary, Fallopian Tube, and Primary Peritoneal Carcinoma, AJCC 8th Edition  - Clinical stage from 1/9/2023: FIGO Stage IC2, calculated as Stage IC (cT1c2, cN0, cM0)     4/5/2023 - 7/19/2023 Chemotherapy    4/5/2023: Cycle 1- paclitaxel 135 mg/m2 and carboplatin AUC 5 as patient is frail and elderly.  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 - [id number], MMR deficient- loss MLH1 and PMS2     2/8/2024 Cancer Staged    Staging form: Stomach, AJCC 8th Edition  - Clinical stage from 2/8/2024: Stage IIA (cT2, cN1, cM0)     3/4/2024 Tumor Conference    Date Presented to Tumor Board: 03/04/24  [facility] UGI MULTIDISCIPLINARY TUMOR BOARD  PATIENT REVIEW FORM   ____________________________________________________________    CLINIC #: [id number]  DATE: 3/4/2024    DIAGNOSIS: gastric GARRY    PRESENTER: Justus    PATIENT SUMMARY:   This 74 y/o female was dx with ovarian CA 1/2023, underwent surgery and completed 6 cycles of adj chemotherapy 7/2023. She presented last month with hematemesis. EGD revealed large ulcerated gastric mass, which was biopsied. EGD pathology reviewed - poorly differentiated adenocarcinoma, with intestinal expression, MSI high, HER 2 negative. Staging imaging found irregular wall thickening in mid gastric body, enlarged gastrohepatic lymph node, no evidence of distant metastatic disease.   Reviewed PET - uptake in gastric wall and gastrohepatic lymph node with hypermetabolic activity.   Scheduled to see Dr. Amin in [place] later this week.   + family hx of cancer, pending genetics referral    BOARD RECOMMENDATIONS:   Proceed with systemic chemotherapy, consider neoadj immunotherapy  Proceed with diag lap  Await genetics testing, pathology will send to [facility]    CONSULT NEEDED:     [] Surgery    [] Hem/Onc    [] Rad/Onc    [] Dietary                 [] Social Service    [x] Genetics       [] AES  [] Radiology     Clinical Stage: Tumor   Node(s)  1  Metastasis      GROUP STAGE:  [] O    [] 1A    [] IB    [] IIA    [] IIB     [] IIIA     [] IIIB     [] IIIC    []IV  [] Local recurrence     [] Regional recurrence     [] Distant recurrence   Metastatic site(s): none         [x] Edna'l Treatment Guidelines reviewed and care planned is  consistent with guidelines.         (i.e., NCCN, NCI, PD, ACO, AUA, etc.)    PRESENTATION AT CANCER CONFERENCE:         [x] Prospective    [] Retrospective     [] Follow-Up         3/14/2024 Surgery    Procedure:  LAPAROSCOPY, DIAGNOSTIC (N/A)  LYSIS, ADHESIONS, LAPAROSCOPIC (N/A)  LAVAGE, PERITONEAL, THERAPEUTIC (N/A)      Surgeon(s) and Role: Chen Jerome MD - Primary    Pre-Operative Diagnosis: Gastric adenocarcinoma [C16.9]     Post-Operative Diagnosis: Same     Pre-Operative Variables:  Stage:  III (T4a N1 M0)   Adjacent organ involvement: None  Chemotherapy within 90 Days: No  Radiation Therapy within 90 Days: No      Procedure:  Diagnostic laparoscopy   Extensive lysis of adhesions  Peritoneal biopsy   Peritoneal washings for cytology     3/26/2024 -  Chemotherapy    Treatment Summary   Plan Name: OP pembrolizumab 200mg Q3W  Treatment Goal: Curative  Status: Active  Start Date: 3/26/2024  End Date: 3/24/2026 (Planned)  Provider: Claudia Ambrose MD  Chemotherapy: [No matching medication found in this treatment plan]      Chemotherapy    Treatment Summary   Plan Name: OP pembrolizumab 200mg Q3W  Treatment Goal: Curative  Status: Active  Start Date: 3/15/2024 (Planned)  End Date: 2/27/2026 (Planned)  Provider: Claudia Ambrose MD  Chemotherapy: [No matching medication found in this treatment plan]     4/8/2024 Tumor Conference       [facility] UGI MULTIDISCIPLINARY TUMOR BOARD  PATIENT REVIEW FORM   ____________________________________________________________    CLINIC #: [id number]  DATE: 4/8/2024    DIAGNOSIS: Gastric GARRY    PRESENTER: Justus    PATIENT SUMMARY:   This 72 y/o female was dx with ovarian CA 1/2023, underwent surgery and completed 6 cycles of adj chemotherapy 7/2023. She developed hematemesis in Feb 2024, then underwent EGD with bx of a large ulcerated gastric mass. Pathology revealed poorly differentiated adenocarcinoma, with intestinal expression, MSI high, HER 2 negative.  She was presented to this [facility] last month. Since then, she underwent diag lap and today's presentation is for pathology review. Negative for malignancy, reactive atypia     BOARD RECOMMENDATIONS:   Proceed with immunotherapy, then restage with imaging  Potential candidate for subtotal distal gastrectomy    CONSULT NEEDED:     [] Surgery    [] Hem/Onc    [] Rad/Onc    [] Dietary                 [] Social Service    [] Psychology       [] AES  [] Radiology     Clinical Stage: Tumor   Node(s) 1 Metastasis 0      GROUP STAGE:  [] O    [] 1A    [] IB    [] IIA    [] IIB     [] IIIA     [] IIIB     [] IIIC    []IV  [] Local recurrence     [] Regional recurrence     [] Distant recurrence   Metastatic site(s): none         [x] Edna'l Treatment Guidelines reviewed and care planned is consistent with guidelines.         (i.e., NCCN, NCI, PD, ACO, AUA, etc.)    PRESENTATION AT CANCER CONFERENCE:         [x] Prospective    [] Retrospective     [] Follow-Up           7/1/2024 Tumor Conference     [facility] UGI MULTIDISCIPLINARY TUMOR BOARD  PATIENT REVIEW FORM   ____________________________________________________________    CLINIC #: [id number]   DATE: 7/1/2024    DIAGNOSIS: gastric GARRY    PRESENTER: Justus    PATIENT SUMMARY:   This 74 y/o female was dx with ovarian CA 1/2023, underwent surgery and completed 6 cycles of adj chemotherapy in 7/2023. She developed hematemesis in Feb 2024 and underwent EGD with bx of a large ulcerated gastric mass. Pathology revealed poorly differentiated adenocarcinoma, with intestinal expression, MSI high, HER 2 negative. She underwent diag lap and pathology was negative for malignancy, reactive atypia. She was presented to this I [facility] in 3/2024 and again in 4/2024. Since then, she received immunotherapy Keytruda 200 mg every 3 weeks, total of 4 cycles thus far.   Reviewed re-staging CT scan - perigastric lymph nodes larger in size  She remains asymptomatic.     BOARD  RECOMMENDATIONS:   Obtain repeat PET  Consider ct DNA    CONSULT NEEDED:     [] Surgery    [] Hem/Onc    [] Rad/Onc    [] Dietary                 [] Genetics    [] Psychology       [] AES  [] Interventional Radiology     Clinical Stage: Tumor 2 Node(s) 1 Metastasis 0      GROUP STAGE:  [] O    [] 1A    [] IB    [x] IIA    [] IIB     [] IIIA     [] IIIB     [] IIIC    []IV  [] Local recurrence     [] Regional recurrence     [] Distant recurrence   Metastatic site(s): none         [x] Edna'l Treatment Guidelines reviewed and care planned is consistent with guidelines.         (i.e., NCCN, NCI, PD, ACO, AUA, etc.)    PRESENTATION AT CANCER CONFERENCE:         [x] Prospective    [] Retrospective     [] Follow-Up               Previous HPI  Cheryl Garcia is a 73 y.o. female with history of DVT not currently on AC, previously on Xarelto, FIGO Stage IC (cT1c2, cN0, cM0) Mixed epithelial carcinoma of right ovary s/p surgery 01/09/023 and 6C Carbo/Paclitaxel completed 07/19/2023, Osteoporosis on Fosamax, HTN and Hx of Epilepsy on Keppra, Tegretol, Zonegran (last seizure in 2009) who presents to clinic to establish care on referral for gastric cancer.    Patient reports that she Initially presented to [facility] 02/06/24 with report of hematemeiss; she was transferred to [facility] for higher level of care.  On admission to [facility], EGD  was performed and showed a large ulcerated gastric mass.  Pathology results it as poorly differentiated adenocarcinoma.  She states that her weight is currently stable.  The last time she lost any weight was 2 her ovarian cancer diagnosis and treatment at which time she lost 5 lb but gained it back.  On hospitalization here at [facility], during GI workup she lost those 5 lbs a cane because she was NPO.    The patient has already established care with surgical oncology, Dr. Jerome.  She was presented in the upper GI tumor board with consensus for neoadjuvant  immunotherapy given MSI high status of her tumor.  She is here for medical oncology recommendations.    I reviewed the recommendations of the tumor board consensus for neoadjuvant immunotherapy and I reviewed her pathology and biomarkers in detail.  She is expressed understanding in his in agreement with the plan.  She also understands that she still needs to undergo diagnostic laparoscopy for completed staging.    Otherwise, she is a former light smoker; quit 30-40 years ago. No alcohol use in 30-40 years. No illicit drug use.  She has an extensive family history of malignancy in his already been referred to our genetic counselor.      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    OP (osteoporosis) 02/28/2024    Ovarian cancer     Primary hypertension 12/01/2022       Past Surgical History:   Procedure Laterality Date    ABDOMINAL WASHOUT N/A 3/14/2024    Procedure: LAVAGE, PERITONEAL, THERAPEUTIC;  Surgeon: Chen Jerome MD;  Location: [facility] OR;  Service: General;  Laterality: N/A;    APPENDECTOMY  2015    BIOPSY OF PERITONEUM N/A 3/14/2024    Procedure: BIOPSY, PERITONEUM;  Surgeon: Chen Jerome MD;  Location: [facility] OR;  Service: General;  Laterality: N/A;    COLONOSCOPY  06/20/2012    Dr Boyle- Tubular adenoma polyps. Repeat in 3 years.    COLONOSCOPY  06/17/2015    -Nodular mucosa at appendiceal orfice, sigmoid and desc diverticulosis otherwise normal.    COLONOSCOPY  2020    >3 TAs     COLONOSCOPY  12/2023    DIAGNOSTIC LAPAROSCOPY N/A 3/14/2024    Procedure: LAPAROSCOPY, DIAGNOSTIC;  Surgeon: Chen Jerome MD;  Location: [facility] OR;  Service: General;  Laterality: N/A;  1. Diagnostic laparoscopy   2. Extensive lysis of adhesions  3. Peritoneal biopsy   4. Peritoneal washings for cytology    EGD - EXTERNAL RESULT  06/20/2012    Dr Boyle- Mild gastritis otherwise normal.    ENDOSCOPIC ULTRASOUND OF UPPER GASTROINTESTINAL TRACT N/A 7/26/2024    Procedure: ULTRASOUND, UPPER GI TRACT, ENDOSCOPIC;  Surgeon: Valdo Aguilera MD;  Location: [facility] ENDO;  Service: Endoscopy;  Laterality: N/A;  7/22/24: instructions sent via portal-. Pt no longer takling eliquis-GD    ESOPHAGOGASTRODUODENOSCOPY N/A 02/08/2024    Procedure: EGD (ESOPHAGOGASTRODUODENOSCOPY);  Surgeon: Jayla Arteaga MD;  Location: [facility] ENDO;  Service: Endoscopy;  Laterality: N/A;    HYSTERECTOMY      LAPAROSCOPIC LYSIS OF ADHESIONS N/A 3/14/2024    Procedure: LYSIS, ADHESIONS, LAPAROSCOPIC;  Surgeon: Chen Jerome MD;  Location: [facility] OR;  Service: General;  Laterality: N/A;    TOTAL ABDOMINAL HYSTERECTOMY W/ BILATERAL SALPINGOOPHORECTOMY  01/09/2023    radical resection with right salpingo-oophorectomy, left salpingo-oophorectomy, extensive enterolysis, extensive adhesiolysis, left pelvic lymph node biopsy, omental biopsy, small bowel resection with primary functional end-to-end anastomosis, placement of pelvic drain       Family History   Problem Relation Name Age of Onset    Pancreatic cancer Brother Misael Mccray 76    Prostate cancer Brother Zachary 67    Pancreatic cancer Half-brother Gasper 74    Colon cancer Half-sister Elton 83    Lung cancer Half-sister Elton         +smoking hx    Breast cancer Half-sister Vidhi 58    Lymphoma Other Cara Sun    Prostate cancer Other Fernando     Prostate cancer Other Gasper Jr     Ovarian cancer Other Inerris     Breast cancer Other Aleida     Colon cancer Other Aleida         Review of patient's allergies indicates:  No Known Allergies    Social History     Tobacco Use    Smoking status: Former     Types: Cigarettes    Smokeless tobacco: Never    Tobacco comments:     Quit 1989 smoked 10 years smoked 0.25 ppd    Substance Use Topics    Alcohol use: Not Currently    Drug use: Never      Labs   Labs:  Lab Visit on 08/12/2024   Component Date Value Ref Range Status    WBC 08/12/2024 4.01  3.90 - 12.70 K/uL Final    RBC 08/12/2024 3.88 (L)  4.00 - 5.40 M/uL Final    Hemoglobin 08/12/2024 13.0  12.0 - 16.0 g/dL Final    Hematocrit 08/12/2024 40.1  37.0 - 48.5 % Final    MCV 08/12/2024 103 (H)  82 - 98 fL Final    MCH 08/12/2024 33.5 (H)  27.0 - 31.0 pg Final    MCHC 08/12/2024 32.4  32.0 - 36.0 g/dL Final    RDW 08/12/2024 14.1  11.5 - 14.5 % Final    Platelets 08/12/2024 252  150 - 450 K/uL Final    MPV 08/12/2024 8.5 (L)  9.2 - 12.9 fL Final    Immature Granulocytes 08/12/2024 0.5  0.0 - 0.5 % Final    Gran # (ANC) 08/12/2024 2.0  1.8 - 7.7 K/uL Final    Immature Grans (Abs) 08/12/2024 0.02  0.00 - 0.04 K/uL Final    Comment: Mild elevation in immature granulocytes is non specific and   can be seen in a variety of conditions including stress response,   acute inflammation, trauma and pregnancy. Correlation with other   laboratory and clinical findings is essential.      Lymph # 08/12/2024 1.4  1.0 - 4.8 K/uL Final    Mono # 08/12/2024 0.5  0.3 - 1.0 K/uL Final    Eos # 08/12/2024 0.1  0.0 - 0.5 K/uL Final    Baso # 08/12/2024 0.03  0.00 - 0.20 K/uL Final    nRBC 08/12/2024 0  0 /100 WBC Final    Gran % 08/12/2024 49.4  38.0 - 73.0 % Final    Lymph % 08/12/2024 35.2  18.0 - 48.0 % Final    Mono % 08/12/2024 12.0  4.0 - 15.0 % Final    Eosinophil % 08/12/2024 2.2  0.0 - 8.0 % Final    Basophil % 08/12/2024 0.7  0.0 - 1.9 % Final    Differential Method 08/12/2024 Automated   Final    Sodium 08/12/2024 133 (L)  136 - 145 mmol/L Final    Potassium 08/12/2024 5.1  3.5 - 5.1 mmol/L Final     Chloride 08/12/2024 103  95 - 110 mmol/L Final    CO2 08/12/2024 23  23 - 29 mmol/L Final    Glucose 08/12/2024 85  70 - 110 mg/dL Final    BUN 08/12/2024 13  8 - 23 mg/dL Final    Creatinine 08/12/2024 0.8  0.5 - 1.4 mg/dL Final    Calcium 08/12/2024 9.2  8.7 - 10.5 mg/dL Final    Total Protein 08/12/2024 6.5  6.0 - 8.4 g/dL Final    Albumin 08/12/2024 3.6  3.5 - 5.2 g/dL Final    Total Bilirubin 08/12/2024 0.2  0.1 - 1.0 mg/dL Final    Comment: For infants and newborns, interpretation of results should be based  on gestational age, weight and in agreement with clinical  observations.    Premature Infant recommended reference ranges:  Up to 24 hours.............<8.0 mg/dL  Up to 48 hours............<12.0 mg/dL  3-5 days..................<15.0 mg/dL  6-29 days.................<15.0 mg/dL      Alkaline Phosphatase 08/12/2024 74  55 - 135 U/L Final    AST 08/12/2024 19  10 - 40 U/L Final    ALT 08/12/2024 13  10 - 44 U/L Final    eGFR 08/12/2024 >60  >60 mL/min/1.73 m^2 Final    Anion Gap 08/12/2024 7 (L)  8 - 16 mmol/L Final    TSH 08/12/2024 1.044  0.400 - 4.000 uIU/mL Final        Pathology   Contains abnormal data Specimen to Pathology, Surgery Gastrointestinal tract - 02/28/2024      Component 3 wk ago   Final Pathologic Diagnosis  Abnormal   STOMACH, 'GASTRIC MASS', BIOPSY:  - 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 Scattered p53 positivity (wild-type pattern of expression) and p16 positivity are also noted. Additional studies, including CK7, PAX8, ER, HER2, TTF-1, GATA3, WT-1,  synaptophysin, and chromogranin, are either negative or negative in the cells of interest. This patient's prior history of ovarian carcinoma resected at [facility] in [place], [place] is noted and the outside pathology report (S-) is reviewed.   While both tumors do have similar immunohistochemical  staining patterns, they are not identical, with notable reported differences being CK7 and PAX8 positivity in the ovarian tumor. The presence of a large, fungating, and ulcerated mass in the stomach  is suspicious for a gastric primary; however, it is unclear if the masses represent two separate primaries or one p rimary and one metastatic focus. Abnormal mismatch repair studies do suggest increased risk for development of multiple tumor types (see  below). Requesting outside slides for review to compare morphology may be helpful.    DNA mismatch repair IHC studies are ABNORMAL and demonstrate LOSS OF MLH1 and PMS2 expression within tumor cells. MSH2 and MSH6 exhibit retained expression. 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    Tissue will be sent for molecular profiling by next-generation sequencing ([facility]). Results of this study will be issued directly to the electronic medical record.                        Tumor NGS Tissue - 02/29/2024  MSI: MSI-H         TMB: 42.6 m/MB         Low Coverage Regions: KDM5D, PMS2         Fusion Addendum Issue Type: NEGATIVE  Negative - This addendum is being issued to report that no gene fusions or altered splicing variants from RNA sequencing analysis were found.           Imaging   NM PET CT FDG SKULL BASE TO MID THIGH - 02/28/2024  CLINICAL HISTORY:  Gastric cancer.  Malignant neoplasm of stomach, unspecified.  Initial staging.  History of left ovarian epithelial carcinoma.     TECHNIQUE:  11.45 mCi of F18-FDG was administered intravenously in the left forearm.  After an approximately 60 min distribution time, PET/CT images were acquired from the skull base to the mid thigh. Transmission images were acquired to correct for attenuation using a whole body low-dose CT scan without contrast with the arms positioned above the head. Glycemia at the time of injection was 113 mg/dL.     COMPARISON:  CT abdomen pelvis,  02/08/2024 and chest CT, 02/27/2024     FINDINGS:  Quality of the study: Adequate.     SUV max of the liver parenchyma is 2.8     Neck: No abnormal FDG avidity.  No lymphadenopathy or mass.     Chest: No abnormal FDG avidity.  No pleural effusion or lymphadenopathy or pulmonary nodule.     Abdomen/pelvis: Marked thickening of the wall of the stomach involving the fundus and proximal body with marked FDG avidity with SUV max 18.  The gastrohepatic ligament lymph node measuring 19 x 13 mm shows moderate FDG avidity with SUV max 4.6.     No ascites.  No other focus of abnormal FDG avidity in the abdomen or pelvis.     Skeletal structures: No abnormal FDG avidity.  No focal lytic or sclerotic lesion.     Physiologic uptake of the tracer is present within the brain, salivary glands, myocardium, GI and  tracts.     Incidental CT findings: N/A     Impression:  1. The biopsy proving gastric cancer is markedly FDG avid with SUV max 18.  2. Moderately FDG avid gastrohepatic ligament lymph node consistent with local metastatic disease.  3. No evidence for distant metastatic disease.  All CT scans at this facility are performed  using dose modulation techniques as appropriate to performed exam including the following:  automated exposure control; adjustment of mA and/or kV according to the patients size (this includes techniques or standardized protocols for targeted exams where dose is matched to indication/reason for exam: i.e. extremities or head);  iterative reconstruction technique.    Assessment and Plan   Gastric Adenocarcinoma  EGD 02/08/2024: Gastric Mass  Path: Poorly-differentiated adenocarcinoma with intestinal phenotype -  HER2-, MMR - Deficient (MLH1 and PMS2 loss)  [facility] NGS:   MSI-H, TMB 42.6  Potentially actionable mutation in CHRIS  Multiple clinical trials available  PET-CT 02/28/24 showed no evidence of distant metastatic disease  Presented in [facility] 03/04/24 with consensus for Proceed with systemic  chemotherapy, consider neoadj immunotherapy, Proceed with diag lap  Diagnostic Laparoscopy 03/14/2024 with Dr. Jerome negative for malignant cells  Per NCCN guidelines, NA immunotherapy is useful in MSI-H/dMMR tumors) with options being Nivolumab and ipilimumab followed by nivolumab or Pembrolizumab.  Given patient's comorbid conditions and potentially increased toxicity with CTLA4 inhibitor and PD-1 inhibitor, decision made to proceed with Pembrolizumab. Started 03/14/24.  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   Follow up with JENNIE    Infusion scheduling note   No treatment today; Treatment in 6 weeks   Injection scheduling note    Labs CBC, CMP, magnesium, phosphorus and TSH   Scheduling:  Preferred lab:  Lab interval:     Imaging    Pharmacy appointment    Other referrals                  The patient was seen, interviewed and examined. Pertinent lab and radiologic studies were reviewed. Pt instructed to call should they develop concerning signs/symptoms or have further questions.        Portions of the record may have been created with voice recognition software. Occasional wrong-word or sound-a-like substitutions may have occurred due to the inherent limitations of voice recognition software. Read the chart carefully and recognize, using context, where  substitutions have occurred.      Claudia Amin MD    Hematology/Oncology

## 2024-08-14 ENCOUNTER — OFFICE VISIT (OUTPATIENT)
Dept: SURGICAL ONCOLOGY | Facility: CLINIC | Age: 74
End: 2024-08-14
Payer: MEDICARE

## 2024-08-14 ENCOUNTER — PATIENT MESSAGE (OUTPATIENT)
Dept: ADMINISTRATIVE | Facility: OTHER | Age: 74
End: 2024-08-14
Payer: MEDICARE

## 2024-08-14 VITALS
DIASTOLIC BLOOD PRESSURE: 77 MMHG | SYSTOLIC BLOOD PRESSURE: 148 MMHG | WEIGHT: 129.31 LBS | TEMPERATURE: 99 F | HEART RATE: 56 BPM | BODY MASS INDEX: 21.54 KG/M2 | HEIGHT: 65 IN | OXYGEN SATURATION: 96 %

## 2024-08-14 DIAGNOSIS — C16.9 GASTRIC ADENOCARCINOMA: Primary | ICD-10-CM

## 2024-08-14 DIAGNOSIS — C56.1: ICD-10-CM

## 2024-08-14 DIAGNOSIS — G40.909 NONINTRACTABLE EPILEPSY WITHOUT STATUS EPILEPTICUS, UNSPECIFIED EPILEPSY TYPE: Chronic | ICD-10-CM

## 2024-08-14 DIAGNOSIS — I10 PRIMARY HYPERTENSION: ICD-10-CM

## 2024-08-14 PROCEDURE — 3078F DIAST BP <80 MM HG: CPT | Mod: CPTII,S$GLB,, | Performed by: SURGERY

## 2024-08-14 PROCEDURE — 99215 OFFICE O/P EST HI 40 MIN: CPT | Mod: S$GLB,,, | Performed by: SURGERY

## 2024-08-14 PROCEDURE — 3008F BODY MASS INDEX DOCD: CPT | Mod: CPTII,S$GLB,, | Performed by: SURGERY

## 2024-08-14 PROCEDURE — 99999 PR PBB SHADOW E&M-EST. PATIENT-LVL IV: CPT | Mod: PBBFAC,,, | Performed by: SURGERY

## 2024-08-14 PROCEDURE — 3077F SYST BP >= 140 MM HG: CPT | Mod: CPTII,S$GLB,, | Performed by: SURGERY

## 2024-08-14 PROCEDURE — 1126F AMNT PAIN NOTED NONE PRSNT: CPT | Mod: CPTII,S$GLB,, | Performed by: SURGERY

## 2024-08-14 NOTE — PROGRESS NOTES
Surgical Oncology Clinic Note      Referring Provider: Dr. Claudia Ambrose   PCP: Gagandeep Iglesias MD    Reason For Visit: Gastroesophageal: gastric cancer (proximal)      Oncology History   Mixed epithelial carcinoma of right ovary   1/2/2023 Initial Diagnosis    Mixed epithelial carcinoma of left ovary     1/9/2023 Surgery    Laparotomy for radical resection of gynecologic cancer. Removal of pelvic mass (right salpingo-oophorectomy), left salpingo-oophorectomy, extensive enterolysis, extensive adhesiolysis, left pelvic lymph node biopsy, omental biopsy, small bowel resection with primary functional end-to-end anastomosis, placement of pelvic drain. Path: Right ovary, tumor site, 11 cm, mixed epithelial carcinoma (50% mucinous, 50% endometrioid), G2 moderately differentiated, ovarian surface involvement-indeterminate-specimen disrupted. 1 left pelvic lymph node negative for neoplasia. FIGO stage IC2       1/9/2023 Cancer Staged    Staging form: Ovary, Fallopian Tube, and Primary Peritoneal Carcinoma, AJCC 8th Edition  - Clinical stage from 1/9/2023: FIGO Stage IC2, calculated as Stage IC (cT1c2, cN0, cM0)     4/5/2023 - 7/19/2023 Chemotherapy    4/5/2023: Cycle 1- paclitaxel 135 mg/m2 and carboplatin AUC 5 as patient is frail and elderly.  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 - Qpw1Hfugcmcs, MMR deficient- loss  MLH1 and PMS2     2/8/2024 Cancer Staged    Staging form: Stomach, AJCC 8th Edition  - Clinical stage from 2/8/2024: Stage IIA (cT2, cN1, cM0)     3/4/2024 Tumor Conference    Date Presented to Tumor Board: 03/04/24  [facility] UGI MULTIDISCIPLINARY TUMOR BOARD  PATIENT REVIEW FORM   ____________________________________________________________    CLINIC #: [id number]  DATE: 3/4/2024    DIAGNOSIS: gastric GARRY    PRESENTER: Perone    PATIENT SUMMARY:   This 74 y/o female was dx with ovarian CA 1/2023, underwent surgery and completed 6 cycles of adj chemotherapy 7/2023. She presented last month with hematemesis. EGD revealed large ulcerated gastric mass, which was biopsied. EGD pathology reviewed - poorly differentiated adenocarcinoma, with intestinal expression, MSI high, HER 2 negative. Staging imaging found irregular wall thickening in mid gastric body, enlarged gastrohepatic lymph node, no evidence of distant metastatic disease.   Reviewed PET - uptake in gastric wall and gastrohepatic lymph node with hypermetabolic activity.   Scheduled to see Dr. Amin in [place] later this week.   + family hx of cancer, pending genetics referral    BOARD RECOMMENDATIONS:   Proceed with systemic chemotherapy, consider neoadj immunotherapy  Proceed with diag lap  Await genetics testing, pathology will send to [facility]    CONSULT NEEDED:     [] Surgery    [] Hem/Onc    [] Rad/Onc    [] Dietary                 [] Social Service    [x] Genetics       [] AES  [] Radiology     Clinical Stage: Tumor   Node(s)  1  Metastasis      GROUP STAGE:  [] O    [] 1A    [] IB    [] IIA    [] IIB     [] IIIA     [] IIIB     [] IIIC    []IV  [] Local recurrence     [] Regional recurrence     [] Distant recurrence   Metastatic site(s): none         [x] Edna'l Treatment Guidelines reviewed and care planned is consistent with guidelines.         (i.e., NCCN, NCI, PD, ACO, AUA, etc.)    PRESENTATION AT CANCER CONFERENCE:         [x] Prospective     [] Retrospective     [] Follow-Up         3/14/2024 Surgery    Procedure:  LAPAROSCOPY, DIAGNOSTIC (N/A)  LYSIS, ADHESIONS, LAPAROSCOPIC (N/A)  LAVAGE, PERITONEAL, THERAPEUTIC (N/A)      Surgeon(s) and Role: Chen Jerome MD - Primary    Pre-Operative Diagnosis: Gastric adenocarcinoma [C16.9]     Post-Operative Diagnosis: Same     Pre-Operative Variables:  Stage:  III (T4a N1 M0)   Adjacent organ involvement: None  Chemotherapy within 90 Days: No  Radiation Therapy within 90 Days: No      Procedure:  Diagnostic laparoscopy   Extensive lysis of adhesions  Peritoneal biopsy   Peritoneal washings for cytology     3/26/2024 -  Chemotherapy    Treatment Summary   Plan Name: OP pembrolizumab 200mg Q3W  Treatment Goal: Curative  Status: Active  Start Date: 3/26/2024  End Date: 3/24/2026 (Planned)  Provider: Claudia Ambrose MD  Chemotherapy: [No matching medication found in this treatment plan]      Chemotherapy    Treatment Summary   Plan Name: OP pembrolizumab 200mg Q3W  Treatment Goal: Curative  Status: Active  Start Date: 3/15/2024 (Planned)  End Date: 2/27/2026 (Planned)  Provider: Claudia Ambrose MD  Chemotherapy: [No matching medication found in this treatment plan]     4/8/2024 Tumor Conference       [facility] UGI MULTIDISCIPLINARY TUMOR BOARD  PATIENT REVIEW FORM   ____________________________________________________________    CLINIC #: [id number]  DATE: 4/8/2024    DIAGNOSIS: Gastric GARRY    PRESENTER: Justus    PATIENT SUMMARY:   This 72 y/o female was dx with ovarian CA 1/2023, underwent surgery and completed 6 cycles of adj chemotherapy 7/2023. She developed hematemesis in Feb 2024, then underwent EGD with bx of a large ulcerated gastric mass. Pathology revealed poorly differentiated adenocarcinoma, with intestinal expression, MSI high, HER 2 negative. [place] [facility] [place] Since then, she underwent diag lap and today's presentation is for pathology review. Negative  for malignancy, reactive atypia     BOARD RECOMMENDATIONS:   Proceed with immunotherapy, then restage with imaging  Potential candidate for subtotal distal gastrectomy    CONSULT NEEDED:     [] Surgery    [] Hem/Onc    [] Rad/Onc    [] Dietary                 [] Social Service    [] Psychology       [] AES  [] Radiology     Clinical Stage: Tumor   Node(s) 1 Metastasis 0      GROUP STAGE:  [] O    [] 1A    [] IB    [] IIA    [] IIB     [] IIIA     [] IIIB     [] IIIC    []IV  [] Local recurrence     [] Regional recurrence     [] Distant recurrence   Metastatic site(s): none         [x] Edna'l Treatment Guidelines reviewed and care planned is consistent with guidelines.         (i.e., NCCN, NCI, PD, ACO, AUA, etc.)    PRESENTATION AT CANCER CONFERENCE:         [x] Prospective    [] Retrospective     [] Follow-Up           7/1/2024 Tumor Conference     [facility] UGI MULTIDISCIPLINARY TUMOR BOARD  PATIENT REVIEW FORM   ____________________________________________________________    CLINIC #: [id number]   DATE: 7/1/2024    DIAGNOSIS: gastric GARRY    PRESENTER: Justus    PATIENT SUMMARY:   This 72 y/o female was dx with ovarian CA 1/2023, underwent surgery and completed 6 cycles of adj chemotherapy in 7/2023. She developed hematemesis in Feb 2024 and underwent EGD with bx of a large ulcerated gastric mass. Pathology revealed poorly differentiated adenocarcinoma, with intestinal expression, MSI high, HER 2 negative. She underwent diag lap and pathology was negative for malignancy, reactive atypia. She was presented to this [facility] in 3/2024 and again in 4/2024. Since then, she received immunotherapy Keytruda 200 mg every 3 weeks, total of 4 cycles thus far.   Reviewed re-staging CT scan - perigastric lymph nodes larger in size  She remains asymptomatic.     BOARD RECOMMENDATIONS:   Obtain repeat PET  Consider ct DNA    CONSULT NEEDED:     [] Surgery    [] Hem/Onc    [] Rad/Onc    [] Dietary                 []  Genetics    [] Psychology       [] AES  [] Interventional Radiology     Clinical Stage: Tumor 2 Node(s) 1 Metastasis 0      GROUP STAGE:  [] O    [] 1A    [] IB    [x] IIA    [] IIB     [] IIIA     [] IIIB     [] IIIC    []IV  [] Local recurrence     [] Regional recurrence     [] Distant recurrence   Metastatic site(s): none         [x] Edna'l Treatment Guidelines reviewed and care planned is consistent with guidelines.         (i.e., NCCN, NCI, PD, ACO, AUA, etc.)    PRESENTATION AT CANCER CONFERENCE:         [x] Prospective    [] Retrospective     [] Follow-Up             Staging:  Cancer Staging   Gastric adenocarcinoma  Staging form: Stomach, AJCC 8th Edition  - Clinical stage from 2/8/2024: Stage IIA (cT2, cN1, cM0) - Signed by Chen Jerome MD on 7/2/2024    Mixed epithelial carcinoma of right ovary  Staging form: Ovary, Fallopian Tube, and Primary Peritoneal Carcinoma, AJCC 8th Edition  - Clinical stage from 1/9/2023: FIGO Stage IC2, calculated as Stage IC (cT1c2, cN0, cM0) - Signed by Chen Jerome MD on 2/27/2024       HISTORY OF PRESENT ILLNESS   Cheryl Garcia is a 73 y.o. female with history of epilepsy and treated ovarian cancer (s/p surgery and chemotherapy) who presents today for evaluation and management of newly diagnosed gastric adenocarcinoma.   She originally presented to [facility] on 02/06/2024 following an episode of hematemesis.  A upon presentation she was having epigastric tenderness but had not had any additional episodes of vomiting.  She was otherwise doing well.  She was given IV fluids and IV Protonix and transferred to [facility] for further evaluation.  She was found to have an acute decrease in her hemoglobin from 11-8.9 over 10 hours however her vitals all remained within normal limits.  She underwent an EGD during that admission which showed a large, ulcerated gastric mass.  Pathology returned consistent with poorly differentiated adenocarcinoma.    A CT abdomen and pelvis was done that day which showed an eccentric irregular wall thickening of the mid gastric body concerning for malignancy.  She was discharged the following day in stable condition with follow-up.  Her Xarelto was held at that time due to her bleeding.  She had been on Xarelto since October of 2022 due to a history of lower extremity DVT at the around the time she was diagnosed with ovarian cancer.  This was believed to be due to the malignancy and direct compression of the vessels in that region.  Since her discharge she has overall been doing well and has no new complaints at this time.     She states she did not start having any reflux until after she completed her chemotherapy this summer.  She was seen by her internist a few times as well as seen by GI over the [facility] with Dr. Tinajero for its Giovany.  She had been very diligent about reflux precautions and had reduced high acid foods.  She had been started on Protonix which completely resolved her symptoms.     As stated she does have a history of ovarian cancer diagnosed back in January of 2023.  The workup for that was initiated back in October of 2022 when she presented with a lower extremity swelling in the DVT.  During that workup they did a CT scan which showed a large lesion on her ovary concerning for malignancy.  She subsequently underwent midline laparotomy for radical resection with right salpingo-oophorectomy, left salpingo-oophorectomy, extensive enterolysis, extensive adhesiolysis, left pelvic lymph node biopsy, omental biopsy, small bowel resection with primary functional end-to-end anastomosis, placement of pelvic drain. Path: Right ovary, tumor site, 11 cm, mixed epithelial carcinoma (50% mucinous, 50% endometrioid), G2 moderately differentiated, ovarian surface involvement-indeterminate-specimen disrupted. 1 left pelvic lymph node negative for neoplasia. FIGO stage IC2.  All this was done at [facility].   She was subsequently treated with 6 cycles of paclitaxel and carboplatin, her last cycle was in July of 2023.     Of note her last colonoscopy was in 2023 and reportedly revealed polyps.  According to the records from [facility] she does have a history of an EGD on 06/20/2012 done by Dr. Boyle, which reportedly showed mild gastritis and otherwise was normal.     Her history is otherwise significant for epilepsy and she remains on antiepileptic medications however she has not had a seizure since 2009.    INTERVAL HISTORY     06/38/2024:  She was presented in tumor board on 04/08/2024 for the atypical cells seen on her washings.  Group consensus was these were likely reactive atypia and to proceed with therapy and restaging as originally planned.  She presents for a post systemic therapy appointment.  She is completed 5 cycles of Keytruda, the last was Tuesday 06/18/2024.  She is overall tolerated it very well and has had no side effects in no hospitalizations.  She states she is feels well and that she is walking daily and has no complaints aside from very mild cough that she developed today    07/19/2024:  Discussed in [facility] last week and all in agreement that the LN are within the field of resection, however since she will likely require a total gastrectomy (which is a morbid operation), would be best to sample the LN to ensure they do not represent a different malignancy, and to continue on immunotherapy for now.      8/14/2024:  EUS biopsy done- showed adenocarcinoma.  She is otherwise doing well.       Past Medical History:   Diagnosis Date    Anemia     Chronic deep vein thrombosis (DVT) of left lower extremity 10/21/2022    Deep vein thrombosis     Drug-induced polyneuropathy 02/28/2024    Noted by ROCK KAY NP  last documented on [id number]    DVT (deep venous thrombosis)     Epilepsy     Gastric adenocarcinoma 02/27/2024    GERD (gastroesophageal reflux disease)     Hyperlipidemia 01/10/2013     Formatting of this note might be different from the original.   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    OP (osteoporosis) 02/28/2024    Ovarian cancer     Primary hypertension 12/01/2022       Past Surgical History:   Procedure Laterality Date    ABDOMINAL WASHOUT N/A 3/14/2024    Procedure: LAVAGE, PERITONEAL, THERAPEUTIC;  Surgeon: Chen Jerome MD;  Location: [facility] OR;  Service: General;  Laterality: N/A;    APPENDECTOMY  2015    BIOPSY OF PERITONEUM N/A 3/14/2024    Procedure: BIOPSY, PERITONEUM;  Surgeon: Chen Jerome MD;  Location: [facility] OR;  Service: General;  Laterality: N/A;    COLONOSCOPY  06/20/2012    Dr Boyle- Tubular adenoma polyps. Repeat in 3 years.    COLONOSCOPY  06/17/2015    -Nodular mucosa at appendiceal orfice, sigmoid and desc diverticulosis otherwise normal.    COLONOSCOPY  2020    >3 TAs    COLONOSCOPY  12/2023    DIAGNOSTIC LAPAROSCOPY N/A 3/14/2024    Procedure: LAPAROSCOPY, DIAGNOSTIC;  Surgeon: Chen Jerome MD;  Location: [facility] OR;  Service: General;  Laterality: N/A;  1. Diagnostic laparoscopy   2. Extensive lysis of adhesions  3. Peritoneal biopsy   4. Peritoneal washings for cytology    EGD - EXTERNAL RESULT  06/20/2012    Dr Boyle- Mild gastritis otherwise normal.    ENDOSCOPIC ULTRASOUND OF UPPER GASTROINTESTINAL TRACT N/A 7/26/2024    Procedure: ULTRASOUND, UPPER GI TRACT, ENDOSCOPIC;  Surgeon: Valdo Aguilera MD;  Location: [facility];  Service: Endoscopy;  Laterality: N/A;  7/22/24: instructions sent via portal-. Pt no longer takling eliquis-GD    ESOPHAGOGASTRODUODENOSCOPY N/A 02/08/2024    Procedure: EGD (ESOPHAGOGASTRODUODENOSCOPY);  Surgeon: Jayla Arteaga MD;  Location: [facility] ENDO;  Service: Endoscopy;  Laterality: N/A;    HYSTERECTOMY      LAPAROSCOPIC LYSIS OF ADHESIONS N/A 3/14/2024    Procedure: LYSIS, ADHESIONS, LAPAROSCOPIC;  Surgeon: Chen Jerome MD;  Location: [facility] OR;  Service:  General;  Laterality: N/A;    TOTAL ABDOMINAL HYSTERECTOMY W/ BILATERAL SALPINGOOPHORECTOMY  01/09/2023    radical resection with right salpingo-oophorectomy, left salpingo-oophorectomy, extensive enterolysis, extensive adhesiolysis, left pelvic lymph node biopsy, omental biopsy, small bowel resection with primary functional end-to-end anastomosis, placement of pelvic drain       Family History   Problem Relation Name Age of Onset    Pancreatic cancer Brother Misael Mccray 76    Prostate cancer Brother Zachary 67    Pancreatic cancer Half-brother Gasper 74    Colon cancer Half-sister Elton 83    Lung cancer Half-sister Elton         +smoking hx    Breast cancer Half-sister Vidhi 58    Lymphoma Other Cara Sun    Prostate cancer Other Fernando     Prostate cancer Other Gasper Calderon     Ovarian cancer Other Inerris     Breast cancer Other Aleida     Colon cancer Other Aleida        Social History     Socioeconomic History    Marital status:    Tobacco Use    Smoking status: Former     Types: Cigarettes    Smokeless tobacco: Never    Tobacco comments:     Quit 1989 smoked 10 years smoked 0.25 ppd    Substance and Sexual Activity    Alcohol use: Not Currently    Drug use: Never     Social Determinants of Health     Financial Resource Strain: Low Risk  (2/23/2024)    Overall Financial Resource Strain (CARDIA)     Difficulty of Paying Living Expenses: Not hard at all   Food Insecurity: No Food Insecurity (2/23/2024)    Hunger Vital Sign     Worried About Running Out of Food in the Last Year: Never true     Ran Out of Food in the Last Year: Never true   Transportation Needs: No Transportation Needs (2/23/2024)    PRAPARE - Transportation     Lack of Transportation (Medical): No     Lack of Transportation (Non-Medical): No   Physical Activity: Inactive (2/23/2024)    Exercise Vital Sign     Days of Exercise per Week: 0 days     Minutes of Exercise per Session: 10 min   Stress: No Stress Concern Present  (2/23/2024)    [employer] - Occupational Stress Questionnaire     Feeling of Stress : Not at all   Housing Stability: Low Risk  (2/23/2024)    Housing Stability Vital Sign     Unable to Pay for Housing in the Last Year: No     Number of Places Lived in the Last Year: 1     Unstable Housing in the Last Year: No          Medication List            Accurate as of August 14, 2024  4:49 PM. If you have any questions, ask your nurse or doctor.                CHANGE how you take these medications      acetaminophen 500 MG tablet  Commonly known as: TYLENOL  Take 2 tablets (1,000 mg total) by mouth every 8 (eight) hours.  What changed:   when to take this  reasons to take this     hydrocortisone 2.5 % cream  Apply topically 2 (two) times daily.  What changed:   when to take this  reasons to take this     ibuprofen 800 MG tablet  Commonly known as: ADVIL,MOTRIN  Take 1 tablet (800 mg total) by mouth every 8 (eight) hours.  What changed:   when to take this  reasons to take this            CONTINUE taking these medications      alendronate 70 MG tablet  Commonly known as: FOSAMAX     CALCIUM PHOSPHATE-VITAMIN D3 ORAL     carBAMazepine 200 mg tablet  Commonly known as: TEGRETOL     ferrous sulfate 325 (65 FE) MG EC tablet     hydrOXYzine HCL 25 MG tablet  Commonly known as: ATARAX  Take 1 tablet (25 mg total) by mouth 3 (three) times daily as needed for Itching.     levETIRAcetam 500 MG Tab  Commonly known as: KEPPRA     oxyCODONE 5 MG immediate release tablet  Commonly known as: ROXICODONE  Take 1 tablet (5 mg total) by mouth every 4 (four) hours as needed for Pain.     zonisamide 100 MG Cap  Commonly known as: ZONEGRAN              Review of patient's allergies indicates:  No Known Allergies    Review of Systems   Constitutional:  Negative for chills, fever, malaise/fatigue and weight loss.   Respiratory:  Negative for cough, shortness of breath and wheezing.    Cardiovascular:  Negative for  chest pain and palpitations.   Gastrointestinal:  Negative for abdominal pain, constipation, diarrhea, nausea and vomiting.   Musculoskeletal:  Negative for back pain, falls and joint pain.   Neurological:  Negative for dizziness, sensory change, speech change, focal weakness, seizures, loss of consciousness and weakness.   Psychiatric/Behavioral:  Negative for depression and hallucinations. The patient is not nervous/anxious.      PHYSICAL EXAM     Vitals:    08/14/24 1620   BP: (!) 148/77   Pulse: (!) 56   Temp: 98.5 °F (36.9 °C)     Body mass index is 21.52 kg/m².  ECOG SCORE    1 - Restricted in strenuous activity-ambulatory and able to carry out work of a light nature       Physical Exam  Vitals reviewed.   Constitutional:       General: She is not in acute distress.     Appearance: Normal appearance.   HENT:      Head: Normocephalic and atraumatic.      Mouth/Throat:      Mouth: Mucous membranes are moist.   Eyes:      General: No scleral icterus.     Extraocular Movements: Extraocular movements intact.      Conjunctiva/sclera: Conjunctivae normal.   Pulmonary:      Effort: Pulmonary effort is normal.   Abdominal:      General: There is no distension.      Palpations: Abdomen is soft. There is no mass.      Tenderness: There is no abdominal tenderness.      Comments: Well healed midline incision    Musculoskeletal:         General: No swelling. Normal range of motion.   Skin:     General: Skin is warm and dry.   Neurological:      General: No focal deficit present.      Mental Status: She is alert.   Psychiatric:         Mood and Affect: Mood normal.         Behavior: Behavior normal.         Thought Content: Thought content normal.         Judgment: Judgment normal.          DATA REVIEW:  I personally reviewed the following records for this visit: lab work from prior visit, notes from other physicians, computed tomography/CT imaging, surgical pathology results, endoscopy results, radiographic study  "evaluation, and laboratory results done by primary care physician    LABS       Lab Results   Component Value Date    WBC 4.01 08/12/2024    HGB 13.0 08/12/2024    HCT 40.1 08/12/2024     08/12/2024     Lab Results   Component Value Date    GLU 85 08/12/2024    CALCIUM 9.2 08/12/2024    ALBUMIN 3.6 08/12/2024    PROT 6.5 08/12/2024     (L) 08/12/2024    K 5.1 08/12/2024    CO2 23 08/12/2024     08/12/2024    BUN 13 08/12/2024    CREATININE 0.8 08/12/2024    ALKPHOS 74 08/12/2024    ALT 13 08/12/2024    AST 19 08/12/2024    BILITOT 0.2 08/12/2024       Nutritional:  No results found for: "PREALBUMIN"    Tumor Markers:  No results found for: "CEA", "AMYLASE", "ASPIRATE", "MDU373", "", "JD3002", "AFP", "AFPTM"  No results found for: ""    PATHOLOGY     Final Pathologic Diagnosis  Abnormal   STOMACH, 'GASTRIC MASS', BIOPSY:  - 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 Scattered p53 positivity (wild-type pattern of expression) and p16 positivity are also noted. Additional studies, including CK7, PAX8, ER, HER2, TTF-1, GATA3, WT-1,  synaptophysin, and chromogranin, are either negative or negative in the cells of interest. This patient's prior history of ovarian carcinoma resected at [facility] in [place], [place] is noted and the outside pathology report (S-) is reviewed.   While both tumors do have similar immunohistochemical staining patterns, they are not identical, with notable reported differences being CK7 and PAX8 positivity in the ovarian tumor. The presence of a large, fungating, and ulcerated mass in the stomach  is suspicious for a gastric primary; however, it is unclear if the masses represent two separate primaries or one p rimary and one metastatic focus. Abnormal mismatch repair studies do suggest increased risk for development of multiple " tumor types (see  below). Requesting outside slides for review to compare morphology may be helpful.    DNA mismatch repair IHC studies are ABNORMAL and demonstrate LOSS OF MLH1 and PMS2 expression within tumor cells. MSH2 and MSH6 exhibit retained expression. 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    Tissue will be sent for molecular profiling by next-generation sequencing ([employer]). Results of this study will be issued directly to the electronic medical record.     3/14/2024:  Final Pathologic Diagnosis 1. Perigastric nodule, biopsy:Fibroadipose tissue, lined by reactive mesothelial cells.  Negative for carcinoma.         Component 3 mo ago   Final Pathologic Diagnosis  Abnormal   1. Atypical cells present.      2. Negative for malignant cells.    Comment: Interp By Jami Goode M.D., Signed on 03/22/2024 at 14:39   Comment  Abnormal   For part 1, rare atypical appearing cell groups with abundant benign and reactive mesothelial cell groups.  Atypical groups cannot be classified further as are not present in cell block material.    For part 2, there is predominantly blood. Negative for malignancy.    Broderick-EP4 and PAX8 were performed on both part 1 and part 2 cell blocks and are both negative with appropriate controls.    This case was seen in consultation by cytopathologist Dr. VALERIE Da Silva who concurs with the above diagnoses and assessment.        07/26/2024:  EUS biopsy lesser curve lymph nodes       Component 3 wk ago   Final Pathologic Diagnosis      Abnormal   Lymph node, perigastric, EUS guided fine-needle aspiration/biopsy:  - Adenocarcinoma involving lymphoid tissue consistent with patient's previous gastric mass biopsy, see comment.    COMMENT:  The biopsy shows a malignant epithelial proliferation involving lymphoid tissue consistent with tumor involving lymph node.  Immunostains show tumor cells to be positive for cytokeratin, CDX2, and patchy CK20.   Tumor cells are negative for PAX8   and CK7.  Patient had a previous stomach mass biopsy (ZIE-) that is pulled and reviewed.  The current biopsy shows similar morphology and immunoprofile as the stomach mass biopsy.  MMR and NGS performed on previous biopsy.  Claudin 18.2 pending,  results will be issued in an addendum.      HER2 by immunohistochemistry:  Negative (1+)         IMAGING     02/08/2024:  CT Abdomen/ Pelvis  Impression:   Eccentric irregular wall thickening of the mid gastric body concerning for malignancy.  There appears to be associated enlarged 1.6 cm gastrohepatic lymph node.  Clinical correlation advised.  Correlation with endoscopy is recommended if not previously performed.    02/27/2024:  CT Chest  Impression:   No evidence of intrathoracic metastatic disease.    02/28/2024: PET CT Scan  Impression:   1. The biopsy proving gastric cancer is markedly FDG avid with SUV max 18.  2. Moderately FDG avid gastrohepatic ligament lymph node consistent with local metastatic disease.  3. No evidence for distant metastatic disease.    06/24/2024:  CT Chest, Abdomen, and Pelvis with IV contrast   Stable left adrenal nodule.  The right adrenal gland, pancreas, gallbladder, and spleen are within normal limits.  Demonstration of the annular ulcerated gastric body mass with mucosal thickening and irregularity that has decreased since the prior examination.  However, there has been enlargement of several perigastric lymph nodes in the lesser sac measuring up to 3.2 x 2.9 x 4 cm on axial image 33 of series 3 and 1.8 x 1.7 x 1.8 cm on axial image 41.   Impression:   Progression of upper abdominal metastatic lymphadenopathy in the lesser sac.  No distant sites of metastatic disease identified in the chest, abdomen, or pelvis.    07/10/2024:  PET CT Scan  Impression:   Mixed interval changes.  Decreased hypermetabolic wall thickening of the stomach and stable hypermetabolic index gastrohepatic lymph node.   Additional new enlarged hypermetabolic gastrohepatic lymph node concerning for progression of local alis disease.    ASSESSMENT & PLAN     1. Gastric adenocarcinoma    2. Nonintractable epilepsy without status epilepticus, unspecified epilepsy type    3. Primary hypertension    4. Mixed epithelial carcinoma of right ovary           Cheryl Kirkland is a 74 y.o. 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    We reviewed that her pathology showed adenocarcinoma, consistent with her gastric primary. Based on the tumor board recommendations therefore the plan is to proceed with surgery.      We reviewed the basics of the surgery including that she will require a total gastrectomy due to the location of the tumor as seen on the original PET scan and EGD.  In addition she will require a feeding jejunostomy tube.  I discussed with them that we will plan to check for gross metastatic disease first which if found, will result in stopping the surgery and not completing the resection.  They understand that she will be in the hospital for 7-10 days and that I will plan to place her in the ICU for the 1st 24-48 hours postoperatively just for close monitoring.  I discussed with her that she would remain NPO at this time until upper GI was done to rule out a leak from an esophagojejunostomy and then we will start trickle tube feeds on postop day 1 with plans to advance slowly to goal once she has return of bowel function.  She understands that it will likely take weeks for her to be able to get adequate nutrition in PO after this procedure and that she will require Jtube feeds during that time.  We also reviewed the risks associated with total gastrectomy including anastomotic leak - which typically is managed by keeping her NPO and using her Jtube, but may require  re-operation, stenting, or other procedures.  We also reviewed that she will require vitamin B12 injections for the rest of her life following this.  She was given the preoperative packet with information about what to expect and the home care guide was reviewed with her as well.  We also discussed that any additional therapy after this will depend on her pathology.       Risks and benefits of  total gastrectomy and feeding jejunostomy tube including death, bleeding, infection, scar, pain/numbness, margin positivity, discovery of additional disease, anastomotic leakage, obstruction, reflux, early satiety, weight loss, dumping syndrome, damage to local structures, need for conversion to open procedure, need for additional procedures based on operative findings and imponderables were all reviewed.  She was given the opportunity to ask questions, which were all addressed.  She voiced understanding and wishes to proceed.         Plan:  -- Plan for diagnostic laparoscopy to r/o metastatic disease, followed by open total gastrectomy, EGD, and jejunostomy tube placement on Tuesday, August 20th   -- [facility] referral for preoperative evaluation    Ms. Garcia expressed understanding in regards to our discussion today. Many good questions were asked on today's visit, all of which were answered to their satisfaction.    Follow-up: Follow up in about 27 days (around 9/10/2024).                Chen Jerome MD MS              Surgical Oncology              [facility] [place], [place]              Office: [phone]     Communications: 45 minutes were spent on today's visit in face-to-face and non face-to-face time with the patient. This patient has locally advanced gastric adenocarcinoma and the time was required to provide counseling and guidance regarding their new diagnosis. Time was spent reviewing all outside records and information pertaining to their work-up and formulating a  treatment plan in line with standardized guidelines. Additional time was spent communicating with referring physicians and facilities to facilitate the efficient exchange of previous healthcare records and radiographic imaging pertinent to the diagnosis and disease management.       Orders Placed This Encounter   Procedures    Ambulatory referral/consult to Pre-Admit     Standing Status:   Future     Standing Expiration Date:   9/14/2025     Referral Priority:   Routine     Referral Type:   Consultation     Referral Reason:   Specialty Services Required     Requested Specialty:   Internal Medicine     Number of Visits Requested:   1

## 2024-08-14 NOTE — H&P (VIEW-ONLY)
Surgical Oncology Clinic Note      Referring Provider: Dr. Claudia Ambrose   PCP: Gagandeep Iglesias MD    Reason For Visit: Gastroesophageal: gastric cancer (proximal)      Oncology History   Mixed epithelial carcinoma of right ovary   1/2/2023 Initial Diagnosis    Mixed epithelial carcinoma of left ovary     1/9/2023 Surgery    Laparotomy for radical resection of gynecologic cancer. Removal of pelvic mass (right salpingo-oophorectomy), left salpingo-oophorectomy, extensive enterolysis, extensive adhesiolysis, left pelvic lymph node biopsy, omental biopsy, small bowel resection with primary functional end-to-end anastomosis, placement of pelvic drain. Path: Right ovary, tumor site, 11 cm, mixed epithelial carcinoma (50% mucinous, 50% endometrioid), G2 moderately differentiated, ovarian surface involvement-indeterminate-specimen disrupted. 1 left pelvic lymph node negative for neoplasia. FIGO stage IC2       1/9/2023 Cancer Staged    Staging form: Ovary, Fallopian Tube, and Primary Peritoneal Carcinoma, AJCC 8th Edition  - Clinical stage from 1/9/2023: FIGO Stage IC2, calculated as Stage IC (cT1c2, cN0, cM0)     4/5/2023 - 7/19/2023 Chemotherapy    4/5/2023: Cycle 1- paclitaxel 135 mg/m2 and carboplatin AUC 5 as patient is frail and elderly.  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 - [id number], MMR deficient- loss  MLH1 and PMS2     2/8/2024 Cancer Staged    Staging form: Stomach, AJCC 8th Edition  - Clinical stage from 2/8/2024: Stage IIA (cT2, cN1, cM0)     3/4/2024 Tumor Conference    Date Presented to Tumor Board: 03/04/24  [facility] UGI MULTIDISCIPLINARY TUMOR BOARD  PATIENT REVIEW FORM   ____________________________________________________________    CLINIC #: [id number]  DATE: 3/4/2024    DIAGNOSIS: gastric GARRY    PRESENTER: Perone    PATIENT SUMMARY:   This 74 y/o female was dx with ovarian CA 1/2023, underwent surgery and completed 6 cycles of adj chemotherapy 7/2023. She presented last month with hematemesis. EGD revealed large ulcerated gastric mass, which was biopsied. EGD pathology reviewed - poorly differentiated adenocarcinoma, with intestinal expression, MSI high, HER 2 negative. Staging imaging found irregular wall thickening in mid gastric body, enlarged gastrohepatic lymph node, no evidence of distant metastatic disease.   Reviewed PET - uptake in gastric wall and gastrohepatic lymph node with hypermetabolic activity.   Scheduled to see Dr. Amin in [place] later this week.   + family hx of cancer, pending genetics referral    BOARD RECOMMENDATIONS:   Proceed with systemic chemotherapy, consider neoadj immunotherapy  Proceed with diag lap  Await genetics testing, pathology will send to [facility]    CONSULT NEEDED:     [] Surgery    [] Hem/Onc    [] Rad/Onc    [] Dietary                 [] Social Service    [x] Genetics       [] AES  [] Radiology     Clinical Stage: Tumor   Node(s)  1  Metastasis      GROUP STAGE:  [] O    [] 1A    [] IB    [] IIA    [] IIB     [] IIIA     [] IIIB     [] IIIC    []IV  [] Local recurrence     [] Regional recurrence     [] Distant recurrence   Metastatic site(s): none         [x] Edna'l Treatment Guidelines reviewed and care planned is consistent with guidelines.         (i.e., NCCN, NCI, PD, ACO, AUA, etc.)    PRESENTATION AT CANCER CONFERENCE:         [x] Prospective     [] Retrospective     [] Follow-Up         3/14/2024 Surgery    Procedure:  LAPAROSCOPY, DIAGNOSTIC (N/A)  LYSIS, ADHESIONS, LAPAROSCOPIC (N/A)  LAVAGE, PERITONEAL, THERAPEUTIC (N/A)      Surgeon(s) and Role: Chen Jerome MD - Primary    Pre-Operative Diagnosis: Gastric adenocarcinoma [C16.9]     Post-Operative Diagnosis: Same     Pre-Operative Variables:  Stage:  III (T4a N1 M0)   Adjacent organ involvement: None  Chemotherapy within 90 Days: No  Radiation Therapy within 90 Days: No      Procedure:  Diagnostic laparoscopy   Extensive lysis of adhesions  Peritoneal biopsy   Peritoneal washings for cytology     3/26/2024 -  Chemotherapy    Treatment Summary   Plan Name: OP pembrolizumab 200mg Q3W  Treatment Goal: Curative  Status: Active  Start Date: 3/26/2024  End Date: 3/24/2026 (Planned)  Provider: Claudia Ambrose MD  Chemotherapy: [No matching medication found in this treatment plan]      Chemotherapy    Treatment Summary   Plan Name: OP pembrolizumab 200mg Q3W  Treatment Goal: Curative  Status: Active  Start Date: 3/15/2024 (Planned)  End Date: 2/27/2026 (Planned)  Provider: Claudia Ambrose MD  Chemotherapy: [No matching medication found in this treatment plan]     4/8/2024 Tumor Conference       [facility] UGI MULTIDISCIPLINARY TUMOR BOARD  PATIENT REVIEW FORM   ____________________________________________________________    CLINIC #: [id number]  DATE: 4/8/2024    DIAGNOSIS: Gastric GARRY    PRESENTER: Justus    PATIENT SUMMARY:   This 72 y/o female was dx with ovarian CA 1/2023, underwent surgery and completed 6 cycles of adj chemotherapy 7/2023. She developed hematemesis in Feb 2024, then underwent EGD with bx of a large ulcerated gastric mass. Pathology revealed poorly differentiated adenocarcinoma, with intestinal expression, MSI high, HER 2 negative. She was presented to this [facility] last month. Since then, she underwent diag lap and today's presentation is for pathology review. Negative  for malignancy, reactive atypia     BOARD RECOMMENDATIONS:   Proceed with immunotherapy, then restage with imaging  Potential candidate for subtotal distal gastrectomy    CONSULT NEEDED:     [] Surgery    [] Hem/Onc    [] Rad/Onc    [] Dietary                 [] Social Service    [] Psychology       [] AES  [] Radiology     Clinical Stage: Tumor   Node(s) 1 Metastasis 0      GROUP STAGE:  [] O    [] 1A    [] IB    [] IIA    [] IIB     [] IIIA     [] IIIB     [] IIIC    []IV  [] Local recurrence     [] Regional recurrence     [] Distant recurrence   Metastatic site(s): none         [x] Edna'l Treatment Guidelines reviewed and care planned is consistent with guidelines.         (i.e., NCCN, NCI, PD, ACO, AUA, etc.)    PRESENTATION AT CANCER CONFERENCE:         [x] Prospective    [] Retrospective     [] Follow-Up           7/1/2024 Tumor Conference     [facility] UGI MULTIDISCIPLINARY TUMOR BOARD  PATIENT REVIEW FORM   ____________________________________________________________    CLINIC #: [id number]   DATE: 7/1/2024    DIAGNOSIS: gastric GARRY    PRESENTER: Justus    PATIENT SUMMARY:   This 74 y/o female was dx with ovarian CA 1/2023, underwent surgery and completed 6 cycles of adj chemotherapy in 7/2023. She developed hematemesis in Feb 2024 and underwent EGD with bx of a large ulcerated gastric mass. Pathology revealed poorly differentiated adenocarcinoma, with intestinal expression, MSI high, HER 2 negative. She underwent diag lap and pathology was negative for malignancy, reactive atypia. She was presented to this [facility] in 3/2024 and again in 4/2024. Since then, she received immunotherapy Keytruda 200 mg every 3 weeks, total of 4 cycles thus far.   Reviewed re-staging CT scan - perigastric lymph nodes larger in size  She remains asymptomatic.     BOARD RECOMMENDATIONS:   Obtain repeat PET  Consider ct DNA    CONSULT NEEDED:     [] Surgery    [] Hem/Onc    [] Rad/Onc    [] Dietary                 []  Genetics    [] Psychology       [] AES  [] Interventional Radiology     Clinical Stage: Tumor 2 Node(s) 1 Metastasis 0      GROUP STAGE:  [] O    [] 1A    [] IB    [x] IIA    [] IIB     [] IIIA     [] IIIB     [] IIIC    []IV  [] Local recurrence     [] Regional recurrence     [] Distant recurrence   Metastatic site(s): none         [x] Edna'l Treatment Guidelines reviewed and care planned is consistent with guidelines.         (i.e., NCCN, NCI, PD, ACO, AUA, etc.)    PRESENTATION AT CANCER CONFERENCE:         [x] Prospective    [] Retrospective     [] Follow-Up             Staging:  Cancer Staging   Gastric adenocarcinoma  Staging form: Stomach, AJCC 8th Edition  - Clinical stage from 2/8/2024: Stage IIA (cT2, cN1, cM0) - Signed by Chen Jerome MD on 7/2/2024    Mixed epithelial carcinoma of right ovary  Staging form: Ovary, Fallopian Tube, and Primary Peritoneal Carcinoma, AJCC 8th Edition  - Clinical stage from 1/9/2023: FIGO Stage IC2, calculated as Stage IC (cT1c2, cN0, cM0) - Signed by Chen Jerome MD on 2/27/2024       HISTORY OF PRESENT ILLNESS   Cheryl Garcia is a 73 y.o. female with history of epilepsy and treated ovarian cancer (s/p surgery and chemotherapy) who presents today for evaluation and management of newly diagnosed gastric adenocarcinoma.   She originally presented to [facility] on 02/06/2024 following an episode of hematemesis.  A upon presentation she was having epigastric tenderness but had not had any additional episodes of vomiting.  She was otherwise doing well.  She was given IV fluids and IV Protonix and transferred to [facility] for further evaluation.  She was found to have an acute decrease in her hemoglobin from 11-8.9 over 10 hours however her vitals all remained within normal limits.  She underwent an EGD during that admission which showed a large, ulcerated gastric mass.  Pathology returned consistent with poorly differentiated adenocarcinoma.    A CT abdomen and pelvis was done that day which showed an eccentric irregular wall thickening of the mid gastric body concerning for malignancy.  She was discharged the following day in stable condition with follow-up.  Her Xarelto was held at that time due to her bleeding.  She had been on Xarelto since October of 2022 due to a history of lower extremity DVT at the around the time she was diagnosed with ovarian cancer.  This was believed to be due to the malignancy and direct compression of the vessels in that region.  Since her discharge she has overall been doing well and has no new complaints at this time.     She states she did not start having any reflux until after she completed her chemotherapy this summer.  She was seen by her internist a few times as well as seen by GI over the [facility] with Dr. Tinajero for its Giovany.  She had been very diligent about reflux precautions and had reduced high acid foods.  She had been started on Protonix which completely resolved her symptoms.     As stated she does have a history of ovarian cancer diagnosed back in January of 2023.  The workup for that was initiated back in October of 2022 when she presented with a lower extremity swelling in the DVT.  During that workup they did a CT scan which showed a large lesion on her ovary concerning for malignancy.  She subsequently underwent midline laparotomy for radical resection with right salpingo-oophorectomy, left salpingo-oophorectomy, extensive enterolysis, extensive adhesiolysis, left pelvic lymph node biopsy, omental biopsy, small bowel resection with primary functional end-to-end anastomosis, placement of pelvic drain. Path: Right ovary, tumor site, 11 cm, mixed epithelial carcinoma (50% mucinous, 50% endometrioid), G2 moderately differentiated, ovarian surface involvement-indeterminate-specimen disrupted. 1 left pelvic lymph node negative for neoplasia. FIGO stage IC2.  All this was done at [facility].   She was subsequently treated with 6 cycles of paclitaxel and carboplatin, her last cycle was in July of 2023.     Of note her last colonoscopy was in 2023 and reportedly revealed polyps.  According to the records from [facility] she does have a history of an EGD on 06/20/2012 done by Dr. Boyle, which reportedly showed mild gastritis and otherwise was normal.     Her history is otherwise significant for epilepsy and she remains on antiepileptic medications however she has not had a seizure since 2009.    INTERVAL HISTORY     06/38/2024:  She was presented in tumor board on 04/08/2024 for the atypical cells seen on her washings.  Group consensus was these were likely reactive atypia and to proceed with therapy and restaging as originally planned.  She presents for a post systemic therapy appointment.  She is completed 5 cycles of Keytruda, the last was Tuesday 06/18/2024.  She is overall tolerated it very well and has had no side effects in no hospitalizations.  She states she is feels well and that she is walking daily and has no complaints aside from very mild cough that she developed today    07/19/2024:  Discussed in [facility] last week and all in agreement that the LN are within the field of resection, however since she will likely require a total gastrectomy (which is a morbid operation), would be best to sample the LN to ensure they do not represent a different malignancy, and to continue on immunotherapy for now.      8/14/2024:  EUS biopsy done- showed adenocarcinoma.  She is otherwise doing well.       Past Medical History:   Diagnosis Date    Anemia     Chronic deep vein thrombosis (DVT) of left lower extremity 10/21/2022    Deep vein thrombosis     Drug-induced polyneuropathy 02/28/2024    Noted by ROCK KAY NP  last documented on [id number]    DVT (deep venous thrombosis)     Epilepsy     Gastric adenocarcinoma 02/27/2024    GERD (gastroesophageal reflux disease)     Hyperlipidemia 01/10/2013     Formatting of this note might be different from the original.   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 Repeat in 3 years.    COLONOSCOPY  06/17/2015    -Nodular mucosa at appendiceal orfice, sigmoid and desc diverticulosis otherwise normal.    COLONOSCOPY  2020    >3 TAs    COLONOSCOPY  12/2023    DIAGNOSTIC LAPAROSCOPY N/A 3/14/2024    Procedure: LAPAROSCOPY, DIAGNOSTIC;  Surgeon: Chen Jerome MD;  Location: [facility] OR;  Service: General;  Laterality: N/A;  1. Diagnostic laparoscopy   2. Extensive lysis of adhesions  3. Peritoneal biopsy   4. Peritoneal washings for cytology    EGD - EXTERNAL RESULT  06/20/2012    Dr Boyle- Mild gastritis otherwise normal.    ENDOSCOPIC ULTRASOUND OF UPPER GASTROINTESTINAL TRACT N/A 7/26/2024    Procedure: ULTRASOUND, UPPER GI TRACT, ENDOSCOPIC;  Surgeon: Valdo Aguilera MD;  Location: [facility];  Service: Endoscopy;  Laterality: N/A;  7/22/24: instructions sent via portal-. Pt no longer takling eliquis-GD    ESOPHAGOGASTRODUODENOSCOPY N/A 02/08/2024    Procedure: EGD (ESOPHAGOGASTRODUODENOSCOPY);  Surgeon: Jayla Arteaga MD;  Location: [facility] ENDO;  Service: Endoscopy;  Laterality: N/A;    HYSTERECTOMY      LAPAROSCOPIC LYSIS OF ADHESIONS N/A 3/14/2024    Procedure: LYSIS, ADHESIONS, LAPAROSCOPIC;  Surgeon: Chen Jerome MD;  Location: [facility] OR;  Service:  General;  Laterality: N/A;    TOTAL ABDOMINAL HYSTERECTOMY W/ BILATERAL SALPINGOOPHORECTOMY  01/09/2023    radical resection with right salpingo-oophorectomy, left salpingo-oophorectomy, extensive enterolysis, extensive adhesiolysis, left pelvic lymph node biopsy, omental biopsy, small bowel resection with primary functional end-to-end anastomosis, placement of pelvic drain       Family History   Problem Relation Name Age of Onset    Pancreatic cancer Brother Misael Mccray 76    Prostate cancer Brother Zachary 67    Pancreatic cancer Half-brother Gasper 74    Colon cancer Half-sister Elton 83    Lung cancer Half-sister Elton         +smoking hx    Breast cancer Half-sister Vidhi 58    Lymphoma Other Cara Sun    Prostate cancer Other Fernando     Prostate cancer Other Gasper Calderon     Ovarian cancer Other Inerris     Breast cancer Other Aleida     Colon cancer Other Aleida        Social History     Socioeconomic History    Marital status:    Tobacco Use    Smoking status: Former     Types: Cigarettes    Smokeless tobacco: Never    Tobacco comments:     Quit 1989 smoked 10 years smoked 0.25 ppd    Substance and Sexual Activity    Alcohol use: Not Currently    Drug use: Never     Social Determinants of Health     Financial Resource Strain: Low Risk  (2/23/2024)    Overall Financial Resource Strain (CARDIA)     Difficulty of Paying Living Expenses: Not hard at all   Food Insecurity: No Food Insecurity (2/23/2024)    Hunger Vital Sign     Worried About Running Out of Food in the Last Year: Never true     Ran Out of Food in the Last Year: Never true   Transportation Needs: No Transportation Needs (2/23/2024)    PRAPARE - Transportation     Lack of Transportation (Medical): No     Lack of Transportation (Non-Medical): No   Physical Activity: Inactive (2/23/2024)    Exercise Vital Sign     Days of Exercise per Week: 0 days     Minutes of Exercise per Session: 10 min   Stress: No Stress Concern Present  (2/23/2024)    [employer] - Occupational Stress Questionnaire     Feeling of Stress : Not at all   Housing Stability: Low Risk  (2/23/2024)    Housing Stability Vital Sign     Unable to Pay for Housing in the Last Year: No     Number of Places Lived in the Last Year: 1     Unstable Housing in the Last Year: No          Medication List            Accurate as of August 14, 2024  4:49 PM. If you have any questions, ask your nurse or doctor.                CHANGE how you take these medications      acetaminophen 500 MG tablet  Commonly known as: TYLENOL  Take 2 tablets (1,000 mg total) by mouth every 8 (eight) hours.  What changed:   when to take this  reasons to take this     hydrocortisone 2.5 % cream  Apply topically 2 (two) times daily.  What changed:   when to take this  reasons to take this     ibuprofen 800 MG tablet  Commonly known as: ADVIL,MOTRIN  Take 1 tablet (800 mg total) by mouth every 8 (eight) hours.  What changed:   when to take this  reasons to take this            CONTINUE taking these medications      alendronate 70 MG tablet  Commonly known as: FOSAMAX     CALCIUM PHOSPHATE-VITAMIN D3 ORAL     carBAMazepine 200 mg tablet  Commonly known as: TEGRETOL     ferrous sulfate 325 (65 FE) MG EC tablet     hydrOXYzine HCL 25 MG tablet  Commonly known as: ATARAX  Take 1 tablet (25 mg total) by mouth 3 (three) times daily as needed for Itching.     levETIRAcetam 500 MG Tab  Commonly known as: KEPPRA     oxyCODONE 5 MG immediate release tablet  Commonly known as: ROXICODONE  Take 1 tablet (5 mg total) by mouth every 4 (four) hours as needed for Pain.     zonisamide 100 MG Cap  Commonly known as: ZONEGRAN              Review of patient's allergies indicates:  No Known Allergies    Review of Systems   Constitutional:  Negative for chills, fever, malaise/fatigue and weight loss.   Respiratory:  Negative for cough, shortness of breath and wheezing.    Cardiovascular:  Negative for  chest pain and palpitations.   Gastrointestinal:  Negative for abdominal pain, constipation, diarrhea, nausea and vomiting.   Musculoskeletal:  Negative for back pain, falls and joint pain.   Neurological:  Negative for dizziness, sensory change, speech change, focal weakness, seizures, loss of consciousness and weakness.   Psychiatric/Behavioral:  Negative for depression and hallucinations. The patient is not nervous/anxious.      PHYSICAL EXAM     Vitals:    08/14/24 1620   BP: (!) 148/77   Pulse: (!) 56   Temp: 98.5 °F (36.9 °C)     Body mass index is 21.52 kg/m².  ECOG SCORE    1 - Restricted in strenuous activity-ambulatory and able to carry out work of a light nature       Physical Exam  Vitals reviewed.   Constitutional:       General: She is not in acute distress.     Appearance: Normal appearance.   HENT:      Head: Normocephalic and atraumatic.      Mouth/Throat:      Mouth: Mucous membranes are moist.   Eyes:      General: No scleral icterus.     Extraocular Movements: Extraocular movements intact.      Conjunctiva/sclera: Conjunctivae normal.   Pulmonary:      Effort: Pulmonary effort is normal.   Abdominal:      General: There is no distension.      Palpations: Abdomen is soft. There is no mass.      Tenderness: There is no abdominal tenderness.      Comments: Well healed midline incision    Musculoskeletal:         General: No swelling. Normal range of motion.   Skin:     General: Skin is warm and dry.   Neurological:      General: No focal deficit present.      Mental Status: She is alert.   Psychiatric:         Mood and Affect: Mood normal.         Behavior: Behavior normal.         Thought Content: Thought content normal.         Judgment: Judgment normal.          DATA REVIEW:  I personally reviewed the following records for this visit: lab work from prior visit, notes from other physicians, computed tomography/CT imaging, surgical pathology results, endoscopy results, radiographic study  "evaluation, and laboratory results done by primary care physician    LABS       Lab Results   Component Value Date    WBC 4.01 08/12/2024    HGB 13.0 08/12/2024    HCT 40.1 08/12/2024     08/12/2024     Lab Results   Component Value Date    GLU 85 08/12/2024    CALCIUM 9.2 08/12/2024    ALBUMIN 3.6 08/12/2024    PROT 6.5 08/12/2024     (L) 08/12/2024    K 5.1 08/12/2024    CO2 23 08/12/2024     08/12/2024    BUN 13 08/12/2024    CREATININE 0.8 08/12/2024    ALKPHOS 74 08/12/2024    ALT 13 08/12/2024    AST 19 08/12/2024    BILITOT 0.2 08/12/2024       Nutritional:  No results found for: "PREALBUMIN"    Tumor Markers:  No results found for: "CEA", "AMYLASE", "ASPIRATE", "SHP405", "", "PW5183", "AFP", "AFPTM"  No results found for: ""    PATHOLOGY     Final Pathologic Diagnosis  Abnormal   STOMACH, 'GASTRIC MASS', BIOPSY:  - 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 Scattered p53 positivity (wild-type pattern of expression) and p16 positivity are also noted. Additional studies, including CK7, PAX8, ER, HER2, TTF-1, GATA3, WT-1,  synaptophysin, and chromogranin, are either negative or negative in the cells of interest. This patient's prior history of ovarian carcinoma resected at [facility] in [place], [place] is noted and the outside pathology report (S-) is reviewed.   While both tumors do have similar immunohistochemical staining patterns, they are not identical, with notable reported differences being CK7 and PAX8 positivity in the ovarian tumor. The presence of a large, fungating, and ulcerated mass in the stomach  is suspicious for a gastric primary; however, it is unclear if the masses represent two separate primaries or one p rimary and one metastatic focus. Abnormal mismatch repair studies do suggest increased risk for development of multiple " tumor types (see  below). Requesting outside slides for review to compare morphology may be helpful.    DNA mismatch repair IHC studies are ABNORMAL and demonstrate LOSS OF MLH1 and PMS2 expression within tumor cells. MSH2 and MSH6 exhibit retained expression. 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    Tissue will be sent for molecular profiling by next-generation sequencing ([employer]). Results of this study will be issued directly to the electronic medical record.     3/14/2024:  Final Pathologic Diagnosis 1. Perigastric nodule, biopsy:Fibroadipose tissue, lined by reactive mesothelial cells.  Negative for carcinoma.         Component 3 mo ago   Final Pathologic Diagnosis  Abnormal   1. Atypical cells present.      2. Negative for malignant cells.    Comment: Interp By Jami Goode M.D., Signed on 03/22/2024 at 14:39   Comment  Abnormal   For part 1, rare atypical appearing cell groups with abundant benign and reactive mesothelial cell groups.  Atypical groups cannot be classified further as are not present in cell block material.    For part 2, there is predominantly blood. Negative for malignancy.    Broderick-EP4 and PAX8 were performed on both part 1 and part 2 cell blocks and are both negative with appropriate controls.    This case was seen in consultation by cytopathologist Dr. VALERIE Da Silva who concurs with the above diagnoses and assessment.        07/26/2024:  EUS biopsy lesser curve lymph nodes       Component 3 wk ago   Final Pathologic Diagnosis      Abnormal   Lymph node, perigastric, EUS guided fine-needle aspiration/biopsy:  - Adenocarcinoma involving lymphoid tissue consistent with patient's previous gastric mass biopsy, see comment.    COMMENT:  The biopsy shows a malignant epithelial proliferation involving lymphoid tissue consistent with tumor involving lymph node.  Immunostains show tumor cells to be positive for cytokeratin, CDX2, and patchy CK20.   Tumor cells are negative for PAX8   and CK7.  Patient had a previous stomach mass biopsy (PCV-) that is pulled and reviewed.  The current biopsy shows similar morphology and immunoprofile as the stomach mass biopsy.  MMR and NGS performed on previous biopsy.  Claudin 18.2 pending,  results will be issued in an addendum.      HER2 by immunohistochemistry:  Negative (1+)         IMAGING     02/08/2024:  CT Abdomen/ Pelvis  Impression:   Eccentric irregular wall thickening of the mid gastric body concerning for malignancy.  There appears to be associated enlarged 1.6 cm gastrohepatic lymph node.  Clinical correlation advised.  Correlation with endoscopy is recommended if not previously performed.    02/27/2024:  CT Chest  Impression:   No evidence of intrathoracic metastatic disease.    02/28/2024: PET CT Scan  Impression:   1. The biopsy proving gastric cancer is markedly FDG avid with SUV max 18.  2. Moderately FDG avid gastrohepatic ligament lymph node consistent with local metastatic disease.  3. No evidence for distant metastatic disease.    06/24/2024:  CT Chest, Abdomen, and Pelvis with IV contrast   Stable left adrenal nodule.  The right adrenal gland, pancreas, gallbladder, and spleen are within normal limits.  Demonstration of the annular ulcerated gastric body mass with mucosal thickening and irregularity that has decreased since the prior examination.  However, there has been enlargement of several perigastric lymph nodes in the lesser sac measuring up to 3.2 x 2.9 x 4 cm on axial image 33 of series 3 and 1.8 x 1.7 x 1.8 cm on axial image 41.   Impression:   Progression of upper abdominal metastatic lymphadenopathy in the lesser sac.  No distant sites of metastatic disease identified in the chest, abdomen, or pelvis.    07/10/2024:  PET CT Scan  Impression:   Mixed interval changes.  Decreased hypermetabolic wall thickening of the stomach and stable hypermetabolic index gastrohepatic lymph node.   Additional new enlarged hypermetabolic gastrohepatic lymph node concerning for progression of local alis disease.    ASSESSMENT & PLAN     1. Gastric adenocarcinoma    2. Nonintractable epilepsy without status epilepticus, unspecified epilepsy type    3. Primary hypertension    4. Mixed epithelial carcinoma of right ovary           Cheryl Kirkland is a 74 y.o. 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    We reviewed that her pathology showed adenocarcinoma, consistent with her gastric primary. Based on the tumor board recommendations therefore the plan is to proceed with surgery.      We reviewed the basics of the surgery including that she will require a total gastrectomy due to the location of the tumor as seen on the original PET scan and EGD.  In addition she will require a feeding jejunostomy tube.  I discussed with them that we will plan to check for gross metastatic disease first which if found, will result in stopping the surgery and not completing the resection.  They understand that she will be in the hospital for 7-10 days and that I will plan to place her in the ICU for the 1st 24-48 hours postoperatively just for close monitoring.  I discussed with her that she would remain NPO at this time until upper GI was done to rule out a leak from an esophagojejunostomy and then we will start trickle tube feeds on postop day 1 with plans to advance slowly to goal once she has return of bowel function.  She understands that it will likely take weeks for her to be able to get adequate nutrition in PO after this procedure and that she will require Jtube feeds during that time.  We also reviewed the risks associated with total gastrectomy including anastomotic leak - which typically is managed by keeping her NPO and using her Jtube, but may require  re-operation, stenting, or other procedures.  We also reviewed that she will require vitamin B12 injections for the rest of her life following this.  She was given the preoperative packet with information about what to expect and the home care guide was reviewed with her as well.  We also discussed that any additional therapy after this will depend on her pathology.       Risks and benefits of  total gastrectomy and feeding jejunostomy tube including death, bleeding, infection, scar, pain/numbness, margin positivity, discovery of additional disease, anastomotic leakage, obstruction, reflux, early satiety, weight loss, dumping syndrome, damage to local structures, need for conversion to open procedure, need for additional procedures based on operative findings and imponderables were all reviewed.  She was given the opportunity to ask questions, which were all addressed.  She voiced understanding and wishes to proceed.         Plan:  -- Plan for diagnostic laparoscopy to r/o metastatic disease, followed by open total gastrectomy, EGD, and jejunostomy tube placement on Tuesday, August 20th   -- [facility] referral for preoperative evaluation    Ms. Garcia expressed understanding in regards to our discussion today. Many good questions were asked on today's visit, all of which were answered to their satisfaction.    Follow-up: Follow up in about 27 days (around 9/10/2024).                Chen Jerome MD MS              Surgical Oncology              [facility] [place], [place]              Office: [phone]     Communications: 45 minutes were spent on today's visit in face-to-face and non face-to-face time with the patient. This patient has locally advanced gastric adenocarcinoma and the time was required to provide counseling and guidance regarding their new diagnosis. Time was spent reviewing all outside records and information pertaining to their work-up and formulating a  treatment plan in line with standardized guidelines. Additional time was spent communicating with referring physicians and facilities to facilitate the efficient exchange of previous healthcare records and radiographic imaging pertinent to the diagnosis and disease management.       Orders Placed This Encounter   Procedures    Ambulatory referral/consult to Pre-Admit     Standing Status:   Future     Standing Expiration Date:   9/14/2025     Referral Priority:   Routine     Referral Type:   Consultation     Referral Reason:   Specialty Services Required     Requested Specialty:   Internal Medicine     Number of Visits Requested:   1

## 2024-08-15 ENCOUNTER — PATIENT MESSAGE (OUTPATIENT)
Dept: ADMINISTRATIVE | Facility: OTHER | Age: 74
End: 2024-08-15
Payer: MEDICARE

## 2024-08-16 ENCOUNTER — PATIENT MESSAGE (OUTPATIENT)
Dept: ADMINISTRATIVE | Facility: OTHER | Age: 74
End: 2024-08-16
Payer: MEDICARE

## 2024-08-16 ENCOUNTER — OFFICE VISIT (OUTPATIENT)
Dept: INTERNAL MEDICINE | Facility: CLINIC | Age: 74
End: 2024-08-16
Payer: MEDICARE

## 2024-08-16 VITALS
HEART RATE: 60 BPM | SYSTOLIC BLOOD PRESSURE: 144 MMHG | TEMPERATURE: 98 F | OXYGEN SATURATION: 98 % | DIASTOLIC BLOOD PRESSURE: 81 MMHG

## 2024-08-16 DIAGNOSIS — D62 ACUTE BLOOD LOSS ANEMIA: ICD-10-CM

## 2024-08-16 DIAGNOSIS — Z01.818 PRE-OP TESTING: Primary | ICD-10-CM

## 2024-08-16 DIAGNOSIS — I10 PRIMARY HYPERTENSION: ICD-10-CM

## 2024-08-16 DIAGNOSIS — I82.502 CHRONIC DEEP VEIN THROMBOSIS (DVT) OF LEFT LOWER EXTREMITY, UNSPECIFIED VEIN: ICD-10-CM

## 2024-08-16 DIAGNOSIS — G40.909 NONINTRACTABLE EPILEPSY WITHOUT STATUS EPILEPTICUS, UNSPECIFIED EPILEPSY TYPE: Primary | Chronic | ICD-10-CM

## 2024-08-16 DIAGNOSIS — C16.9 GASTRIC ADENOCARCINOMA: ICD-10-CM

## 2024-08-16 DIAGNOSIS — M81.0 OSTEOPOROSIS, UNSPECIFIED OSTEOPOROSIS TYPE, UNSPECIFIED PATHOLOGICAL FRACTURE PRESENCE: ICD-10-CM

## 2024-08-16 PROCEDURE — 99999 PR PBB SHADOW E&M-EST. PATIENT-LVL III: CPT | Mod: PBBFAC,,,

## 2024-08-16 NOTE — ASSESSMENT & PLAN NOTE
- takes supplemental iron supplementation, hold for now  - monitor labs post op and give blood as needed

## 2024-08-16 NOTE — PRE-PROCEDURE INSTRUCTIONS
To confirm, Surgery is scheduled on 8/20/24. We will call you after 2pm the day before Surgery with your arrival time.    *Please report to the [facility] (1st Floor) located off of [facility] (2nd Entrance/Building on the left, in front of the flag pole).  Address: [address] [place] [address] [place]. [id number]    Your Type & Screen appointment is scheduled on 8/19/24 @ [facility] (THE [place] Location). Please DO NOT remove the red arm band applied.      !!!INSTRUCTIONS IMPORTANT!!!  DO NOT Eat, Drink, or Smoke after 12 midnight unless instructed otherwise by your Surgeon. OK to brush teeth, no gum, candy or mints!    MORNING OF SURGERY, drink small sip of water with the following medications instructed by Surgery Pre-Admit Provider:  KEPPRA  CARBAMAZEPINE  ZONEGRAN    *Additional Medication Instructions: HOLD FOSAMAX PRIOR TO SURGERY!    Diabetic/Prediabetic Patients: If you take diabetic or weight loss medication, Do NOT take morning of surgery unless instructed by Doctor. Metformin to be stopped 24 hrs prior to surgery. Ozempic/ Mounjaro/ Wegovy/ Trulicity/ Semaglutide or any weight loss injections to be stopped 7 days prior to surgery. DO NOT take long-acting insulin the evening before surgery. Blood sugars will be checked in pre-op by Nurse.    !!!STOP ALL Aspirins, NSAIDS, WEIGHT LOSS INJECTIONS/PILLS, Herbal supplements, & Vitamins 7 DAYS BEFORE SURGERY!!!    ____  Avoid Alcoholic beverages 3 days prior to surgery, as it can thin the blood.  ____  NO Acrylic/fake nails or nail polish worn day of surgery (specifically hand/arm & foot surgeries).  ____  NO powder, lotions, deodorants, oils or cream on body.  ____  Remove all jewelry, piercings, & foreign objects prior to arrival and leave at home.  ____  Remove Dentures, Hearing Aids & Contact Lens prior to surgery.  ____  Bring photo ID and insurance information to hospital (Leave Valuables at Home).  ____  If going home the same day, arrange  for a ride home. You will not be able to drive for 24 hrs if Anesthesia was used.   ____  Females (ages 11-60): may need to give a urine sample the morning of surgery; please see Pre op Nurse prior to using the restroom.  ____  Males: Stop ED medications (Viagra, Cialis) 24 hrs prior to surgery.  ____  Wear clean, loose fitting clothing to allow for dressings/ bandages.      Bathing Instructions:    -Shower with anti-bacterial Soap (Hibiclens or Dial) the night before surgery & the morning of surgery!   -Do not use Hibiclens soap on your face or genitals.   -Apply clean clothes after shower.  -Do not shave your face or body 2 days prior to surgery unless instructed otherwise by your Surgeon.  -Do not shave pubic hair 7 days prior to surgery (gyn pt's).    [facility]/Ride Policy:  Only 2 adults allowed in pre op/recovery area during your procedure. You MUST HAVE A RIDE HOME from a responsible adult that you know and trust. Medical Transport, Uber or Lyft can ONLY be used if patient has a responsible adult to accompany them during ride home.       *Signs and symptoms of Infection Before or After Surgery:               !!!If you experience any fever, chills, nausea/ vomiting, foul odor/ excessive drainage from surgical site, flu-like symptoms, new wounds or cuts, PLEASE CALL THE SURGEON OFFICE at [phone] or SEND MESSAGE THROUGH [employer] PORTAL!!!       *If you are running late day of surgery, please call the Surgery Dept @ [phone].    *Billing question, please call:  [phone] or [phone]       Thank you,  -[facility] Pre [facility].  [phone] or [phone]  M-F 7:30 am-4:00 pm (Closed Major Holidays)

## 2024-08-16 NOTE — ASSESSMENT & PLAN NOTE
Known risk factors for perioperative complications: h/o seizures  Difficulty with intubation is not anticipated.    Cardiac Risk Estimation: Based on the Revised Cardiac Risk index, patient is a Class risk I with a 3.9% risk of a major cardiac event in a low risk procedure.    1.) Preoperative workup as follows: chest x-ray, ECG, hemoglobin, hematocrit, electrolytes, creatinine, glucose.  2.) Change in medication regimen before surgery: discontinue ASA 6 days before surgery, discontinue NSAIDs 5 days before surgery, hold Metformin 24 hours prior to surgery. Hold all vitamins/supplements for 7 days prior to surgery, with the exception of Potassium and Iron supplementation. Hold semaglutide/tirzepatide for 7 days prior to surgery.   3.) Prophylaxis for cardiac events with perioperative beta-blockers: not indicated.  4.) Invasive hemodynamic monitoring perioperatively: at the discretion of anesthesiologist.  5.) Deep vein thrombosis prophylaxis postoperatively: regimen to be chosen by surgical team.  6.) Surveillance for postoperative MI with ECG immediately postoperatively and on postoperati ve days 1 and 2 AND troponin levels 24 hours postoperatively and on day 4 or hospital discharge (whichever comes first): at the discretion of anesthesiologist.  7.) Current medications which may produce withdrawal symptoms if withheld perioperatively: none  8.) Other measures: Postoperative hypertension management with IV hydralazine until able to take oral medications.  Monitor for seizure activity post operatively

## 2024-08-16 NOTE — PROGRESS NOTES
Preoperative History and Physical                                                                     Chief Complaint: Preoperative evaluation     History of Present Illness:      Cheryl Kirkland is a 74 y.o. female with PMH of HTN, HLD, ovarian cancer, DVT (2022), epilepsy, and gastric adenocarcinoma who presents to the office today for a preoperative consultation at the request of Dr. Jerome who plans on performing gastrectomy on August 20.     Functional Status:      The patient is able to climb a flight of stairs. The patient is able to ambulate independently without difficulty. The patient's functional status is not affected by the surgical problem. The patient's functional status is not affected by shortness of breath, chest pain, dyspnea on exertion and fatigue.    MET score greater than 4    Past Medical History:      Past Medical History:   Diagnosis Date    Anemia     Chronic deep vein thrombosis (DVT) of left lower extremity 10/21/2022    Deep vein thrombosis     Drug-induced polyneuropathy 02/28/2024    Noted by ROCK KAY NP  last documented on [id number]    DVT (deep venous thrombosis)     Epilepsy     Gastric adenocarcinoma 02/27/2024    GERD (gastroesophageal reflux disease)     Hyperlipidemia 01/10/2013    Formatting of this note might be different from the original.   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    OP (osteoporosis) 02/28/2024    Ovarian cancer     Primary hypertension 12/01/2022        Past Surgical History:      Past Surgical History:   Procedure Laterality Date    ABDOMINAL WASHOUT N/A 3/14/2024    Procedure: LAVAGE, PERITONEAL, THERAPEUTIC;  Surgeon: Chen Jerome MD;  Location: [facility] OR;  Service: General;  Laterality: N/A;    APPENDECTOMY  2015    BIOPSY OF PERITONEUM N/A 3/14/2024    Procedure: BIOPSY, PERITONEUM;  Surgeon: Chen Jerome MD;  Location: [facility] OR;  Service:  General;  Laterality: N/A;    COLONOSCOPY  06/20/2012    Dr Boyle- Tubular adenoma polyps. Repeat in 3 years.    COLONOSCOPY  06/17/2015    -Nodular mucosa at appendiceal orfice, sigmoid and desc diverticulosis otherwise normal.    COLONOSCOPY  2020    >3 TAs    COLONOSCOPY  12/2023    DIAGNOSTIC LAPAROSCOPY N/A 3/14/2024    Procedure: LAPAROSCOPY, DIAGNOSTIC;  Surgeon: Chen Jerome MD;  Location: [facility] OR;  Service: General;  Laterality: N/A;  1. Diagnostic laparoscopy   2. Extensive lysis of adhesions  3. Peritoneal biopsy   4. Peritoneal washings for cytology    EGD - EXTERNAL RESULT  06/20/2012    Dr Boyle- Mild gastritis otherwise normal.    ENDOSCOPIC ULTRASOUND OF UPPER GASTROINTESTINAL TRACT N/A 7/26/2024    Procedure: ULTRASOUND, UPPER GI TRACT, ENDOSCOPIC;  Surgeon: Valdo Aguilera MD;  Location: [facility] ENDO;  Service: Endoscopy;  Laterality: N/A;  7/22/24: instructions sent via portal-. Pt no longer takling eliquis-GD    ESOPHAGOGASTRODUODENOSCOPY N/A 02/08/2024    Procedure: EGD (ESOPHAGOGASTRODUODENOSCOPY);  Surgeon: Jayla Arteaga MD;  Location: [facility];  Service: Endoscopy;  Laterality: N/A;    HYSTERECTOMY      LAPAROSCOPIC LYSIS OF ADHESIONS N/A 3/14/2024    Procedure: LYSIS, ADHESIONS, LAPAROSCOPIC;  Surgeon: Chen Jerome MD;  Location: [facility];  Service: General;  Laterality: N/A;    TOTAL ABDOMINAL HYSTERECTOMY W/ BILATERAL SALPINGOOPHORECTOMY  01/09/2023    radical resection with right salpingo-oophorectomy, left salpingo-oophorectomy, extensive enterolysis, extensive adhesiolysis, left pelvic lymph node biopsy, omental biopsy, small bowel resection with primary functional end-to-end anastomosis, placement of pelvic drain        Social History:      Social History     Socioeconomic History    Marital status:    Tobacco Use    Smoking status: Former     Types: Cigarettes    Smokeless tobacco: Never    Tobacco comments:     Quit 1989 smoked 10 years  smoked 0.25 ppd    Substance and Sexual Activity    Alcohol use: Not Currently    Drug use: Never     Social Determinants of Health     Financial Resource Strain: Low Risk  (2/23/2024)    Overall Financial Resource Strain (CARDIA)     Difficulty of Paying Living Expenses: Not hard at all   Food Insecurity: No Food Insecurity (2/23/2024)    Hunger Vital Sign     Worried About Running Out of Food in the Last Year: Never true     Ran Out of Food in the Last Year: Never true   Transportation Needs: No Transportation Needs (2/23/2024)    PRAPARE - Transportation     Lack of Transportation (Medical): No     Lack of Transportation (Non-Medical): No   Physical Activity: Inactive (2/23/2024)    Exercise Vital Sign     Days of Exercise per Week: 0 days     Minutes of Exercise per Session: 10 min   Stress: No Stress Concern Present (2/23/2024)    [employer] - Occupational Stress Questionnaire     Feeling of Stress : Not at all   Housing Stability: Low Risk  (2/23/2024)    Housing Stability Vital Sign     Unable to Pay for Housing in the Last Year: No     Number of Places Lived in the Last Year: 1     Unstable Housing in the Last Year: No        Family History:      Family History   Problem Relation Name Age of Onset    Pancreatic cancer Brother Misael Mccray 76    Prostate cancer Brother Zachary 67    Pancreatic cancer Half-brother Gasper 74    Colon cancer Half-sister Elton 83    Lung cancer Half-sister Elton         +smoking hx    Breast cancer Half-sister Vidhi 58    Lymphoma Other Cara Sun    Prostate cancer Other Fernando     Prostate cancer Other Gasper Calderon     Ovarian cancer Other Inerris     Breast cancer Other Aleida     Colon cancer Other Aleida        Allergies:      Review of patient's allergies indicates:  No Known Allergies    Medications:      Current Outpatient Medications   Medication Sig    acetaminophen (TYLENOL) 500 MG tablet Take 2 tablets (1,000 mg total) by  mouth every 8 (eight) hours. (Patient taking differently: Take 1,000 mg by mouth every 8 (eight) hours as needed for Pain.)    alendronate (FOSAMAX) 70 MG tablet Take 70 mg by mouth every 7 days. Pt stated she takes this every Sunday    calcium phosphate trib/vit D3 (CALCIUM PHOSPHATE-VITAMIN D3 ORAL) Take 1 tablet by mouth 2 (two) times daily.    carBAMazepine (TEGRETOL) 200 mg tablet Take by mouth. Pt states she take TID  1 tablet with breakfast  1 tablet with lunch   1.5 tablet at bedtime    ferrous sulfate 325 (65 FE) MG EC tablet Take 65 mg by mouth 2 (two) times daily with meals.    hydrocortisone 2.5 % cream Apply topically 2 (two) times daily. (Patient taking differently: Apply topically 2 (two) times daily as needed.)    hydrOXYzine HCL (ATARAX) 25 MG tablet Take 1 tablet (25 mg total) by mouth 3 (three) times daily as needed for Itching.    ibuprofen (ADVIL,MOTRIN) 800 MG tablet Take 1 tablet (800 mg total) by mouth every 8 (eight) hours. (Patient taking differently: Take 800 mg by mouth every 8 (eight) hours as needed.)    levETIRAcetam (KEPPRA) 500 MG Tab Take 1 tablet by mouth 2 (two) times daily. Pt states she takes one pill at dinner and one at bedtime    oxyCODONE (ROXICODONE) 5 MG immediate release tablet Take 1 tablet (5 mg total) by mouth every 4 (four) hours as needed for Pain.    zonisamide (ZONEGRAN) 100 MG Cap Take 200 mg by mouth 2 (two) times daily. 2cap w/ brkfst 2cap w/dinner     No current facility-administered medications for this visit.       Vitals:      Vitals:    08/16/24 0907   BP: (!) 144/81   Pulse: 60   Temp: 97.7 °F (36.5 °C)       Review of Systems:        Constitutional: Negative for fever, chills, weight loss, malaise/fatigue and diaphoresis.   HENT: Negative for hearing loss, ear pain, nosebleeds, congestion, sore throat, neck pain, tinnitus and ear discharge.    Eyes: Negative for blurred vision, double vision, photophobia, pain, discharge and redness.   Respiratory:  Negative for cough, hemoptysis, sputum production, shortness of breath, wheezing and stridor.    Cardiovascular: Negative for chest pain, palpitations, orthopnea, claudication, leg swelling and PND.   Gastrointestinal: Negative for heartburn, nausea, vomiting, abdominal pain, diarrhea, constipation, blood in stool and melena.   Genitourinary: Negative for dysuria, urgency, frequency, hematuria and flank pain.   Musculoskeletal: Negative for myalgias, back pain, joint pain and falls.   Skin: Negative for itching and rash.   Neurological: Negative for dizziness, tingling, tremors, sensory change, speech change, focal weakness, seizures, loss of consciousness, weakness and headaches.   Endo/Heme/Allergies: Negative for environmental allergies and polydipsia. Does not bruise/bleed easily.   Psychiatric/Behavioral: Negative for depression, suicidal ideas, hallucinations, memory loss and substance abuse. The patient is not nervous/anxious and does not have insomnia.    All 14 systems reviewed and negative except as noted above.    Physical Exam:      Constitutional: Appears well-developed, well-nourished and in no acute distress.  Patient is oriented to person, place, and time.   Head: Normocephalic and atraumatic. Mucous membranes moist.  Neck: Neck supple no mass.   Cardiovascular: Normal rate and regular rhythm.  S1 S2 appreciated by ascultation.  Pulmonary/Chest: Effort normal and clear to auscultation bilaterally. No respiratory distress.   Abdomen: Soft. Non-tender and non-distended. Bowel sounds are normal.   Neurological: Patient is alert and oriented to person, place and time. Moves all extremities.  Skin: Warm and dry. No lesions.  Extremities: No clubbing, cyanosis or edema.    Laboratory data:      Reviewed and noted in plan where applicable. Please see chart for full laboratory data.    @IRFNSUPZR17(cpk,cpkmb,troponini,mb)@ @WIAUDPIQL57(poctglucose)@     Lab Results   Component Value Date    INR 1.0  02/07/2024       Lab Results   Component Value Date    WBC 4.01 08/12/2024    HGB 13.0 08/12/2024    HCT 40.1 08/12/2024     (H) 08/12/2024     08/12/2024       @SBPHCJHPC33(GLU,NA,K,Cl,CO2,BUN,Creatinine,Calcium,MG)@        Sodium   Date Value Ref Range Status   08/12/2024 133 (L) 136 - 145 mmol/L Final     Chloride   Date Value Ref Range Status   08/12/2024 103 95 - 110 mmol/L Final     CO2   Date Value Ref Range Status   08/12/2024 23 23 - 29 mmol/L Final     Glucose   Date Value Ref Range Status   08/12/2024 85 70 - 110 mg/dL Final     BUN   Date Value Ref Range Status   08/12/2024 13 8 - 23 mg/dL Final     Creatinine   Date Value Ref Range Status   08/12/2024 0.8 0.5 - 1.4 mg/dL Final     Calcium   Date Value Ref Range Status   08/12/2024 9.2 8.7 - 10.5 mg/dL Final     Total Protein   Date Value Ref Range Status   08/12/2024 6.5 6.0 - 8.4 g/dL Final     Albumin   Date Value Ref Range Status   08/12/2024 3.6 3.5 - 5.2 g/dL Final     Total Bilirubin   Date Value Ref Range Status   08/12/2024 0.2 0.1 - 1.0 mg/dL Final     Comment:     For infants and newborns, interpretation of results should be based  on gestational age, weight and in agreement with clinical  observations.    Premature Infant recommended reference ranges:  Up to 24 hours.............<8.0 mg/dL  Up to 48 hours............<12.0 mg/dL  3-5 days..................<15.0 mg/dL  6-29 days.................<15.0 mg/dL       Alkaline Phosphatase   Date Value Ref Range Status   08/12/2024 74 55 - 135 U/L Final     AST   Date Value Ref Range Status   08/12/2024 19 10 - 40 U/L Final     ALT   Date Value Ref Range Status   08/12/2024 13 10 - 44 U/L Final     Anion Gap   Date Value Ref Range Status   08/12/2024 7 (L) 8 - 16 mmol/L Final     eGFR   Date Value Ref Range Status   08/12/2024 >60 >60 mL/min/1.73 m^2 Final           Predictors of intubation difficulty:       Morbid obesity? no   Anatomically abnormal facies? no   Prominent incisors? no    Receding mandible? no   Short, thick neck? no   Neck range of motion: normal   Dentition: No chipped, loose, or missing teeth.  Based on the Modified Mallampati, patient is a mallampati score: II (hard and soft palate, upper portion of tonsils anduvula visible)    Cardiographics:      ECG:  NSR with sinus arrhythmias  Echocardiogram: not indicated    Imaging:      Chest x-ray: not indicated    Assessment and Plan:      Gastric adenocarcinoma  Known risk factors for perioperative complications: h/o seizures  Difficulty with intubation is not anticipated.    Cardiac Risk Estimation: Based on the Revised Cardiac Risk index, patient is a Class risk I with a 3.9% risk of a major cardiac event in a low risk procedure.    1.) Preoperative workup as follows: chest x-ray, ECG, hemoglobin, hematocrit, electrolytes, creatinine, glucose.  2.) Change in medication regimen before surgery: discontinue ASA 6 days before surgery, discontinue NSAIDs 5 days before surgery, hold Metformin 24 hours prior to surgery. Hold all vitamins/supplements for 7 days prior to surgery, with the exception of Potassium and Iron supplementation. Hold semaglutide/tirzepatide for 7 days prior to surgery.   3.) Prophylaxis for cardiac events with perioperative beta-blockers: not indicated.  4.) Invasive hemodynamic monitoring perioperatively: at the discretion of anesthesiologist.  5.) Deep vein thrombosis prophylaxis postoperatively: regimen to be chosen by surgical team.  6.) Surveillance for postoperative MI with ECG immediately postoperatively and on postoperati ve days 1 and 2 AND troponin levels 24 hours postoperatively and on day 4 or hospital discharge (whichever comes first): at the discretion of anesthesiologist.  7.) Current medications which may produce withdrawal symptoms if withheld perioperatively: none  8.) Other measures: Postoperative hypertension management with IV hydralazine until able to take oral medications.  Monitor for seizure  activity post operatively    Hx of Epileptic seizures  - last seizure 2009  - ok to take zonisamide, carbamazepine, and keppra the morning of surgery  - monitor for post op seizure activity    Primary hypertension  - not currently on any anti-hypertensives   - monitor blood pressure post op and treat as needed    Chronic deep vein thrombosis (DVT) of left lower extremity  - had DVT in 2022 and was treated with anti-coagulation    Acute blood loss anemia  - takes supplemental iron supplementation, hold for now  - monitor labs post op and give blood as needed    OP (osteoporosis)  - hold Calcium/vitamin D3 for now, hold dose of Fosamax on Sunday prior to surgery      Electronically signed by Chen Sullivan MSN, FNP-C on 8/16/2024 at 9:30 AM.

## 2024-08-16 NOTE — DISCHARGE INSTRUCTIONS
Pre op instructions reviewed with patient face to face during Clinic Visit with Provider, verbalized understanding.    To confirm, Surgery is scheduled on 8/20/24. We will call you after 2pm the day before Surgery with your arrival time.    *Please report to the [facility]obby (1st Floor) located off of [facility] (2nd Entrance/Building on the left, in front of the flag pole).  Address: [address] [place] [address] [place]. [address]    Your Type & Screen appointment is scheduled on 8/19/24 @ [facility] (THE [place] Location). Please DO NOT remove the red arm band applied.      !!!INSTRUCTIONS IMPORTANT!!!  DO NOT Eat, Drink, or Smoke after 12 midnight unless instructed otherwise by your Surgeon. OK to brush teeth, no gum, candy or mints!    MORNING OF SURGERY, drink small sip of water with the following medications instructed by Surgery Pre-Admit Provider:  KEPPRA  CARBAMAZEPINE  ZONEGRAN    *Additional Medication Instructions: HOLD FOSAMAX PRIOR TO SURGERY!    Diabetic/Prediabetic Patients: If you take diabetic or weight loss medication, Do NOT take morning of surgery unless instructed by Doctor. Metformin to be stopped 24 hrs prior to surgery. Ozempic/ Mounjaro/ Wegovy/ Trulicity/ Semaglutide or any weight loss injections to be stopped 7 days prior to surgery. DO NOT take long-acting insulin the evening before surgery. Blood sugars will be checked in pre-op by Nurse.    !!!STOP ALL Aspirins, NSAIDS, WEIGHT LOSS INJECTIONS/PILLS, Herbal supplements, & Vitamins 7 DAYS BEFORE SURGERY!!!    ____  Avoid Alcoholic beverages 3 days prior to surgery, as it can thin the blood.  ____  NO Acrylic/fake nails or nail polish worn day of surgery (specifically hand/arm & foot surgeries).  ____  NO powder, lotions, deodorants, oils or cream on body.  ____  Remove all jewelry, piercings, & foreign objects prior to arrival and leave at home.  ____  Remove Dentures, Hearing Aids & Contact Lens prior to surgery.  ____   Bring photo ID and insurance information to hospital (Leave Valuables at Home).  ____  If going home the same day, arrange for a ride home. You will not be able to drive for 24 hrs if Anesthesia was used.   ____  Females (ages 11-60): may need to give a urine sample the morning of surgery; please see Pre op Nurse prior to using the restroom.  ____  Males: Stop ED medications (Viagra, Cialis) 24 hrs prior to surgery.  ____  Wear clean, loose fitting clothing to allow for dressings/ bandages.      Bathing Instructions:    -Shower with anti-bacterial Soap (Hibiclens or Dial) the night before surgery & the morning of surgery!   -Do not use Hibiclens soap on your face or genitals.   -Apply clean clothes after shower.  -Do not shave your face or body 2 days prior to surgery unless instructed otherwise by your Surgeon.  -Do not shave pubic hair 7 days prior to surgery (gyn pt's).    [facility]/Ride Policy:  Only 2 adults allowed in pre op/recovery area during your procedure. You MUST HAVE A RIDE HOME from a responsible adult that you know and trust. Medical Transport, Uber or Lyft can ONLY be used if patient has a responsible adult to accompany them during ride home.       *Signs and symptoms of Infection Before or After Surgery:               !!!If you experience any fever, chills, nausea/ vomiting, foul odor/ excessive drainage from surgical site, flu-like symptoms, new wounds or cuts, PLEASE CALL THE SURGEON OFFICE at [phone] or SEND MESSAGE THROUGH [employer] PORTAL!!!       *If you are running late day of surgery, please call the Surgery Dept @ [phone].    *Billing question, please call:  [phone] or [phone]       Thank you,  -[facility] Pre Admit Dept.  [phone] or [phone]  M-F 7:30 am-4:00 pm (Closed Major Holidays)

## 2024-08-16 NOTE — ASSESSMENT & PLAN NOTE
- last seizure 2009  - ok to take zonisamide, carbamazepine, and keppra the morning of surgery  - monitor for post op seizure activity

## 2024-08-18 ENCOUNTER — PATIENT MESSAGE (OUTPATIENT)
Dept: ADMINISTRATIVE | Facility: OTHER | Age: 74
End: 2024-08-18
Payer: MEDICARE

## 2024-08-19 ENCOUNTER — LAB VISIT (OUTPATIENT)
Dept: LAB | Facility: HOSPITAL | Age: 74
End: 2024-08-19
Attending: SURGERY
Payer: MEDICARE

## 2024-08-19 ENCOUNTER — PATIENT MESSAGE (OUTPATIENT)
Dept: ADMINISTRATIVE | Facility: OTHER | Age: 74
End: 2024-08-19
Payer: MEDICARE

## 2024-08-19 ENCOUNTER — TELEPHONE (OUTPATIENT)
Dept: PREADMISSION TESTING | Facility: HOSPITAL | Age: 74
End: 2024-08-19
Payer: MEDICARE

## 2024-08-19 ENCOUNTER — ANESTHESIA EVENT (OUTPATIENT)
Dept: SURGERY | Facility: HOSPITAL | Age: 74
End: 2024-08-19
Payer: MEDICARE

## 2024-08-19 DIAGNOSIS — Z01.818 PRE-OP TESTING: ICD-10-CM

## 2024-08-19 LAB
ABO + RH BLD: NORMAL
BLD GP AB SCN CELLS X3 SERPL QL: NORMAL

## 2024-08-19 PROCEDURE — 86901 BLOOD TYPING SEROLOGIC RH(D): CPT | Performed by: SURGERY

## 2024-08-19 PROCEDURE — 86850 RBC ANTIBODY SCREEN: CPT | Performed by: SURGERY

## 2024-08-19 PROCEDURE — 36415 COLL VENOUS BLD VENIPUNCTURE: CPT | Performed by: SURGERY

## 2024-08-19 PROCEDURE — 86900 BLOOD TYPING SEROLOGIC ABO: CPT | Performed by: SURGERY

## 2024-08-19 NOTE — ANESTHESIA PREPROCEDURE EVALUATION
08/19/2024  Cheryl Kirkland is a 74 y.o., female.    Patient Active Problem List   Diagnosis    Acute blood loss anemia    Hx of Epileptic seizures    DVT (deep venous thrombosis)    Mixed epithelial carcinoma of right ovary    Gastric adenocarcinoma    Chronic deep vein thrombosis (DVT) of left lower extremity    Drug-induced polyneuropathy    Family history of breast cancer    Fibrocystic breast changes, bilateral    Heart failure, unspecified    HLD (hyperlipidemia)    OP (osteoporosis)    Primary hypertension    Pre-op exam    Anemia    Fibroma of tongue    Thyroid nodule    Drug-induced skin rash    Pruritus     Past Medical History:   Diagnosis Date    Anemia     Chronic deep vein thrombosis (DVT) of left lower extremity 10/21/2022    Deep vein thrombosis     Drug-induced polyneuropathy 02/28/2024    Noted by ROCK KAY NP  last documented on [id number]    DVT (deep venous thrombosis)     Epilepsy     Gastric adenocarcinoma 02/27/2024    GERD (gastroesophageal reflux disease)     Hyperlipidemia 01/10/2013    Formatting of this note might be different from the original.   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    OP (osteoporosis) 02/28/2024    Ovarian cancer     Primary hypertension 12/01/2022     Past Surgical History:   Procedure Laterality Date    ABDOMINAL WASHOUT N/A 3/14/2024    Procedure: LAVAGE, PERITONEAL, THERAPEUTIC;  Surgeon: Chen Jerome MD;  Location: [facility] OR;  Service: General;  Laterality: N/A;    APPENDECTOMY  2015    BIOPSY OF PERITONEUM N/A 3/14/2024    Procedure: BIOPSY, PERITONEUM;  Surgeon: Chen Jerome MD;  Location: [facility] OR;  Service: General;  Laterality: N/A;    COLONOSCOPY  06/20/2012    Dr Boyle- Tubular adenoma polyps. Repeat in 3 years.    COLONOSCOPY  06/17/2015    -Nodular mucosa at appendiceal  orfice, sigmoid and desc diverticulosis otherwise normal.    COLONOSCOPY  2020    >3 TAs    COLONOSCOPY  12/2023    DIAGNOSTIC LAPAROSCOPY N/A 3/14/2024    Procedure: LAPAROSCOPY, DIAGNOSTIC;  Surgeon: Chen Jerome MD;  Location: [facility] OR;  Service: General;  Laterality: N/A;  1. Diagnostic laparoscopy   2. Extensive lysis of adhesions  3. Peritoneal biopsy   4. Peritoneal washings for cytology    EGD - EXTERNAL RESULT  06/20/2012    Dr Boyle- Mild gastritis otherwise normal.    ENDOSCOPIC ULTRASOUND OF UPPER GASTROINTESTINAL TRACT N/A 7/26/2024    Procedure: ULTRASOUND, UPPER GI TRACT, ENDOSCOPIC;  Surgeon: Valdo Aguilera MD;  Location: [facility] ENDO;  Service: Endoscopy;  Laterality: N/A;  7/22/24: instructions sent via portal-. Pt no longer aichaling eliquis-GD    ESOPHAGOGASTRODUODENOSCOPY N/A 02/08/2024    Procedure: EGD (ESOPHAGOGASTRODUODENOSCOPY);  Surgeon: Jayla Arteaga MD;  Location: [facility];  Service: Endoscopy;  Laterality: N/A;    HYSTERECTOMY      LAPAROSCOPIC LYSIS OF ADHESIONS N/A 3/14/2024    Procedure: LYSIS, ADHESIONS, LAPAROSCOPIC;  Surgeon: Chen Jerome MD;  Location: [facility] OR;  Service: General;  Laterality: N/A;    TOTAL ABDOMINAL HYSTERECTOMY W/ BILATERAL SALPINGOOPHORECTOMY  01/09/2023    radical resection with right salpingo-oophorectomy, left salpingo-oophorectomy, extensive enterolysis, extensive adhesiolysis, left pelvic lymph node biopsy, omental biopsy, small bowel resection with primary functional end-to-end anastomosis, placement of pelvic drain       Pre-op Assessment    I have reviewed the Patient Summary Reports.     I have reviewed the Nursing Notes. I have reviewed the NPO Status.      Review of Systems  Anesthesia Hx:  No problems with previous Anesthesia   History of prior surgery of interest to airway management or planning:  Previous anesthesia: General          Denies Personal Hx of Anesthesia complications.                    Social:  No Alcohol Use,  Non-Smoker       Hematology/Oncology:  Hematology Normal   Oncology Normal                                   Cardiovascular:     Hypertension              ECG has been reviewed. Normal sinus rhythm with sinus arrhythmia   Nonspecific T wave abnormality   Abnormal ECG   No previous ECGs available   Confirmed by TITI SOTO, CATHI ([id number]) on 3/7/2024 11:19:58 PM                            Renal/:  Renal/ Normal                 Hepatic/GI:     GERD             Neurological:    Neuromuscular Disease,   Seizures, well controlled    No seizure in greater than 10 yrs                            Endocrine:  Endocrine Normal    BMI 22      Denies Obesity / BMI > 30  Dermatological:  Skin Normal    Psych:  Psychiatric Normal                   CONCLUSIONS:   1. Normal left ventricular cavity size. Normal left ventricular systolic function. LVEF   55 - 65%. Mild asymmetric septal hypertrophy.   2. No significant functional valvular abnormalities.       Chemistry        Component Value Date/Time     (L) 08/12/2024 1353    K 5.1 08/12/2024 1353     08/12/2024 1353    CO2 23 08/12/2024 1353    BUN 13 08/12/2024 1353    CREATININE 0.8 08/12/2024 1353    GLU 85 08/12/2024 1353        Component Value Date/Time    CALCIUM 9.2 08/12/2024 1353    ALKPHOS 74 08/12/2024 1353    AST 19 08/12/2024 1353    ALT 13 08/12/2024 1353    BILITOT 0.2 08/12/2024 1353        Lab Results   Component Value Date    WBC 4.01 08/12/2024    HGB 13.0 08/12/2024    HCT 40.1 08/12/2024     (H) 08/12/2024     08/12/2024         Physical Exam  General: Well nourished, Cooperative, Alert and Oriented    Airway:  Mallampati: II / II  Mouth Opening: Normal  TM Distance: Normal  Tongue: Normal  Neck ROM: Normal ROM    Dental:  Intact  Patient denies any currently loose or chipped teeth; Patient denies any removable dental appliances'      Anesthesia Plan  Type of Anesthesia, risks & benefits discussed:    Anesthesia Type: Gen ETT,  Regional  Intra-op Monitoring Plan: Standard ASA Monitors, Art Line and Central Line  Post Op Pain Control Plan: multimodal analgesia and IV/PO Opioids PRN  Induction:  IV  Airway Plan: Video, Post-Induction  Informed Consent: Informed consent signed with the Patient and all parties understand the risks and agree with anesthesia plan.  All questions answered.   ASA Score: 3  Day of Surgery Review of History & Physical: H&P Update referred to the surgeon/provider.  Anesthesia Plan Notes: *Possible DEE block discussed with patient    Intubation     Date/Time: 3/14/2024 8:56 AM     Performed by: Arelis Lee CRNA  Authorized by: Akiko Souza MD    Intubation:     Induction:  Intravenous    Intubated:  Postinduction    Mask Ventilation:  Easy with oral airway    Attempts:  1    Attempted By:  CRNA    Method of Intubation:  Video laryngoscopy    Blade:  Berger 3    Laryngeal View Grade: Grade IIA - cords partially seen      Difficult Airway Encountered?: No      Complications:  None    Airway Device:  Oral endotracheal tube    Airway Device Size:  7.0    Style/Cuff Inflation:  Cuffed    Inflation Amount (mL):  5    Tube secured:  21    Secured at:  The lips    Placement Verified By:  Capnometry    Complicating Factors:  Anterior larynx    Findings Post-Intubation:  BS equal bilateral      Ready For Surgery From Anesthesia Perspective.     .

## 2024-08-19 NOTE — TELEPHONE ENCOUNTER
Called and spoke with patient about the following:     Please arrive to [facility] ([facility] at 5:30am on 8/20/2024 for your scheduled procedure.  Address: [address] [place] [address] [place] [address] ([address] Building on left, 1st Floor Lobby)  >>>NO eating or drinking after midnight unless instructed otherwise by your Surgeon<<<    Thank you,  -[facility] Pre [facility]mit Testing Dept.  Mon-Fri 7:30 am - 4 pm [phone]

## 2024-08-20 ENCOUNTER — HOSPITAL ENCOUNTER (INPATIENT)
Facility: HOSPITAL | Age: 74
LOS: 21 days | Discharge: HOME HEALTH | DRG: 327 | End: 2024-09-10
Attending: SURGERY | Admitting: SURGERY
Payer: MEDICARE

## 2024-08-20 ENCOUNTER — ANESTHESIA (OUTPATIENT)
Dept: SURGERY | Facility: HOSPITAL | Age: 74
End: 2024-08-20
Payer: MEDICARE

## 2024-08-20 DIAGNOSIS — C16.9 GASTRIC ADENOCARCINOMA: Primary | ICD-10-CM

## 2024-08-20 DIAGNOSIS — L27.0 DRUG-INDUCED SKIN RASH: ICD-10-CM

## 2024-08-20 DIAGNOSIS — R10.9 ABDOMINAL PAIN, UNSPECIFIED ABDOMINAL LOCATION: ICD-10-CM

## 2024-08-20 LAB
ANION GAP SERPL CALC-SCNC: 8 MMOL/L (ref 8–16)
BUN SERPL-MCNC: 12 MG/DL (ref 8–23)
CALCIUM SERPL-MCNC: 8.2 MG/DL (ref 8.7–10.5)
CHLORIDE SERPL-SCNC: 113 MMOL/L (ref 95–110)
CO2 SERPL-SCNC: 17 MMOL/L (ref 23–29)
CREAT SERPL-MCNC: 0.7 MG/DL (ref 0.5–1.4)
ERYTHROCYTE [DISTWIDTH] IN BLOOD BY AUTOMATED COUNT: 13.5 % (ref 11.5–14.5)
EST. GFR  (NO RACE VARIABLE): >60 ML/MIN/1.73 M^2
GLUCOSE SERPL-MCNC: 178 MG/DL (ref 70–110)
HCT VFR BLD AUTO: 42.7 % (ref 37–48.5)
HGB BLD-MCNC: 14.7 G/DL (ref 12–16)
MCH RBC QN AUTO: 33.5 PG (ref 27–31)
MCHC RBC AUTO-ENTMCNC: 34.4 G/DL (ref 32–36)
MCV RBC AUTO: 97 FL (ref 82–98)
PLATELET # BLD AUTO: 242 K/UL (ref 150–450)
PMV BLD AUTO: 8.3 FL (ref 9.2–12.9)
POTASSIUM SERPL-SCNC: 4.4 MMOL/L (ref 3.5–5.1)
RBC # BLD AUTO: 4.39 M/UL (ref 4–5.4)
SODIUM SERPL-SCNC: 138 MMOL/L (ref 136–145)
WBC # BLD AUTO: 8.56 K/UL (ref 3.9–12.7)

## 2024-08-20 PROCEDURE — 25000003 PHARM REV CODE 250: Performed by: SURGERY

## 2024-08-20 PROCEDURE — 63600175 PHARM REV CODE 636 W HCPCS: Performed by: SURGERY

## 2024-08-20 PROCEDURE — 37000009 HC ANESTHESIA EA ADD 15 MINS: Performed by: SURGERY

## 2024-08-20 PROCEDURE — 25000003 PHARM REV CODE 250: Performed by: NURSE ANESTHETIST, CERTIFIED REGISTERED

## 2024-08-20 PROCEDURE — 07BD0ZX EXCISION OF AORTIC LYMPHATIC, OPEN APPROACH, DIAGNOSTIC: ICD-10-PCS | Performed by: SURGERY

## 2024-08-20 PROCEDURE — 63600175 PHARM REV CODE 636 W HCPCS: Performed by: STUDENT IN AN ORGANIZED HEALTH CARE EDUCATION/TRAINING PROGRAM

## 2024-08-20 PROCEDURE — 0DHA0UZ INSERTION OF FEEDING DEVICE INTO JEJUNUM, OPEN APPROACH: ICD-10-PCS | Performed by: SURGERY

## 2024-08-20 PROCEDURE — 85027 COMPLETE CBC AUTOMATED: CPT | Performed by: SURGERY

## 2024-08-20 PROCEDURE — C1729 CATH, DRAINAGE: HCPCS | Performed by: SURGERY

## 2024-08-20 PROCEDURE — 63600175 PHARM REV CODE 636 W HCPCS: Performed by: NURSE PRACTITIONER

## 2024-08-20 PROCEDURE — 88307 TISSUE EXAM BY PATHOLOGIST: CPT | Mod: 59 | Performed by: STUDENT IN AN ORGANIZED HEALTH CARE EDUCATION/TRAINING PROGRAM

## 2024-08-20 PROCEDURE — 88305 TISSUE EXAM BY PATHOLOGIST: CPT | Mod: 26,,, | Performed by: STUDENT IN AN ORGANIZED HEALTH CARE EDUCATION/TRAINING PROGRAM

## 2024-08-20 PROCEDURE — 07BP0ZX EXCISION OF SPLEEN, OPEN APPROACH, DIAGNOSTIC: ICD-10-PCS | Performed by: SURGERY

## 2024-08-20 PROCEDURE — 0DJ08ZZ INSPECTION OF UPPER INTESTINAL TRACT, VIA NATURAL OR ARTIFICIAL OPENING ENDOSCOPIC: ICD-10-PCS | Performed by: SURGERY

## 2024-08-20 PROCEDURE — 80048 BASIC METABOLIC PNL TOTAL CA: CPT | Performed by: SURGERY

## 2024-08-20 PROCEDURE — 88341 IMHCHEM/IMCYTCHM EA ADD ANTB: CPT | Performed by: STUDENT IN AN ORGANIZED HEALTH CARE EDUCATION/TRAINING PROGRAM

## 2024-08-20 PROCEDURE — 71000033 HC RECOVERY, INTIAL HOUR: Performed by: SURGERY

## 2024-08-20 PROCEDURE — 88331 PATH CONSLTJ SURG 1 BLK 1SPC: CPT | Mod: 26,,, | Performed by: STUDENT IN AN ORGANIZED HEALTH CARE EDUCATION/TRAINING PROGRAM

## 2024-08-20 PROCEDURE — 27201423 OPTIME MED/SURG SUP & DEVICES STERILE SUPPLY: Performed by: SURGERY

## 2024-08-20 PROCEDURE — 88309 TISSUE EXAM BY PATHOLOGIST: CPT | Mod: 59 | Performed by: STUDENT IN AN ORGANIZED HEALTH CARE EDUCATION/TRAINING PROGRAM

## 2024-08-20 PROCEDURE — 25000003 PHARM REV CODE 250

## 2024-08-20 PROCEDURE — 0DT60ZZ RESECTION OF STOMACH, OPEN APPROACH: ICD-10-PCS | Performed by: SURGERY

## 2024-08-20 PROCEDURE — 99900035 HC TECH TIME PER 15 MIN (STAT)

## 2024-08-20 PROCEDURE — 43620 REMOVAL OF STOMACH: CPT | Mod: ,,, | Performed by: SURGERY

## 2024-08-20 PROCEDURE — 88341 IMHCHEM/IMCYTCHM EA ADD ANTB: CPT | Mod: 26,,, | Performed by: STUDENT IN AN ORGANIZED HEALTH CARE EDUCATION/TRAINING PROGRAM

## 2024-08-20 PROCEDURE — 88332 PATH CONSLTJ SURG EA ADD BLK: CPT | Mod: 26,,, | Performed by: STUDENT IN AN ORGANIZED HEALTH CARE EDUCATION/TRAINING PROGRAM

## 2024-08-20 PROCEDURE — 38747 REMOVE ABDOMINAL LYMPH NODES: CPT | Mod: 59,,, | Performed by: SURGERY

## 2024-08-20 PROCEDURE — 36000708 HC OR TIME LEV III 1ST 15 MIN: Performed by: SURGERY

## 2024-08-20 PROCEDURE — 88305 TISSUE EXAM BY PATHOLOGIST: CPT | Mod: 59 | Performed by: STUDENT IN AN ORGANIZED HEALTH CARE EDUCATION/TRAINING PROGRAM

## 2024-08-20 PROCEDURE — 0DB80ZZ EXCISION OF SMALL INTESTINE, OPEN APPROACH: ICD-10-PCS | Performed by: SURGERY

## 2024-08-20 PROCEDURE — 63600175 PHARM REV CODE 636 W HCPCS

## 2024-08-20 PROCEDURE — 76942 ECHO GUIDE FOR BIOPSY: CPT | Performed by: STUDENT IN AN ORGANIZED HEALTH CARE EDUCATION/TRAINING PROGRAM

## 2024-08-20 PROCEDURE — 88342 IMHCHEM/IMCYTCHM 1ST ANTB: CPT | Mod: 26,,, | Performed by: STUDENT IN AN ORGANIZED HEALTH CARE EDUCATION/TRAINING PROGRAM

## 2024-08-20 PROCEDURE — 25000003 PHARM REV CODE 250: Performed by: STUDENT IN AN ORGANIZED HEALTH CARE EDUCATION/TRAINING PROGRAM

## 2024-08-20 PROCEDURE — 88309 TISSUE EXAM BY PATHOLOGIST: CPT | Mod: 26,,, | Performed by: STUDENT IN AN ORGANIZED HEALTH CARE EDUCATION/TRAINING PROGRAM

## 2024-08-20 PROCEDURE — 20000000 HC ICU ROOM

## 2024-08-20 PROCEDURE — 36000709 HC OR TIME LEV III EA ADD 15 MIN: Performed by: SURGERY

## 2024-08-20 PROCEDURE — 88342 IMHCHEM/IMCYTCHM 1ST ANTB: CPT | Performed by: STUDENT IN AN ORGANIZED HEALTH CARE EDUCATION/TRAINING PROGRAM

## 2024-08-20 PROCEDURE — 88365 INSITU HYBRIDIZATION (FISH): CPT | Performed by: STUDENT IN AN ORGANIZED HEALTH CARE EDUCATION/TRAINING PROGRAM

## 2024-08-20 PROCEDURE — 88332 PATH CONSLTJ SURG EA ADD BLK: CPT | Performed by: STUDENT IN AN ORGANIZED HEALTH CARE EDUCATION/TRAINING PROGRAM

## 2024-08-20 PROCEDURE — 88331 PATH CONSLTJ SURG 1 BLK 1SPC: CPT | Performed by: STUDENT IN AN ORGANIZED HEALTH CARE EDUCATION/TRAINING PROGRAM

## 2024-08-20 PROCEDURE — 88307 TISSUE EXAM BY PATHOLOGIST: CPT | Mod: 26,,, | Performed by: STUDENT IN AN ORGANIZED HEALTH CARE EDUCATION/TRAINING PROGRAM

## 2024-08-20 PROCEDURE — 0DN80ZZ RELEASE SMALL INTESTINE, OPEN APPROACH: ICD-10-PCS | Performed by: SURGERY

## 2024-08-20 PROCEDURE — 44015 INSERT NEEDLE CATH BOWEL: CPT | Mod: ,,, | Performed by: SURGERY

## 2024-08-20 PROCEDURE — 88365 INSITU HYBRIDIZATION (FISH): CPT | Mod: 26,,, | Performed by: STUDENT IN AN ORGANIZED HEALTH CARE EDUCATION/TRAINING PROGRAM

## 2024-08-20 PROCEDURE — 37000008 HC ANESTHESIA 1ST 15 MINUTES: Performed by: SURGERY

## 2024-08-20 RX ORDER — HEPARIN SODIUM 5000 [USP'U]/ML
5000 INJECTION, SOLUTION INTRAVENOUS; SUBCUTANEOUS ONCE
Status: COMPLETED | OUTPATIENT
Start: 2024-08-20 | End: 2024-08-20

## 2024-08-20 RX ORDER — HYDROMORPHONE HCL IN 0.9% NACL 6 MG/30 ML
PATIENT CONTROLLED ANALGESIA SYRINGE INTRAVENOUS CONTINUOUS
Status: DISCONTINUED | OUTPATIENT
Start: 2024-08-20 | End: 2024-08-25

## 2024-08-20 RX ORDER — ACETAMINOPHEN 10 MG/ML
1000 INJECTION, SOLUTION INTRAVENOUS EVERY 8 HOURS
Status: COMPLETED | OUTPATIENT
Start: 2024-08-20 | End: 2024-08-21

## 2024-08-20 RX ORDER — KETAMINE HCL IN 0.9 % NACL 50 MG/5 ML
SYRINGE (ML) INTRAVENOUS
Status: DISCONTINUED | OUTPATIENT
Start: 2024-08-20 | End: 2024-08-20

## 2024-08-20 RX ORDER — INDOCYANINE GREEN AND WATER 25 MG
KIT INJECTION
Status: DISCONTINUED | OUTPATIENT
Start: 2024-08-20 | End: 2024-08-20

## 2024-08-20 RX ORDER — ONDANSETRON HYDROCHLORIDE 2 MG/ML
4 INJECTION, SOLUTION INTRAVENOUS EVERY 6 HOURS PRN
Status: DISCONTINUED | OUTPATIENT
Start: 2024-08-20 | End: 2024-09-10 | Stop reason: HOSPADM

## 2024-08-20 RX ORDER — METRONIDAZOLE 500 MG/100ML
500 INJECTION, SOLUTION INTRAVENOUS
Status: DISCONTINUED | OUTPATIENT
Start: 2024-08-20 | End: 2024-08-20 | Stop reason: HOSPADM

## 2024-08-20 RX ORDER — SODIUM CHLORIDE, SODIUM LACTATE, POTASSIUM CHLORIDE, CALCIUM CHLORIDE 600; 310; 30; 20 MG/100ML; MG/100ML; MG/100ML; MG/100ML
INJECTION, SOLUTION INTRAVENOUS CONTINUOUS PRN
Status: DISCONTINUED | OUTPATIENT
Start: 2024-08-20 | End: 2024-08-20

## 2024-08-20 RX ORDER — PROMETHAZINE HYDROCHLORIDE 25 MG/1
25 SUPPOSITORY RECTAL EVERY 6 HOURS PRN
Status: DISCONTINUED | OUTPATIENT
Start: 2024-08-20 | End: 2024-09-10 | Stop reason: HOSPADM

## 2024-08-20 RX ORDER — PHENYLEPHRINE HYDROCHLORIDE 10 MG/ML
INJECTION INTRAVENOUS
Status: DISCONTINUED | OUTPATIENT
Start: 2024-08-20 | End: 2024-08-20

## 2024-08-20 RX ORDER — ROCURONIUM BROMIDE 10 MG/ML
INJECTION, SOLUTION INTRAVENOUS
Status: DISCONTINUED | OUTPATIENT
Start: 2024-08-20 | End: 2024-08-20

## 2024-08-20 RX ORDER — ENOXAPARIN SODIUM 100 MG/ML
40 INJECTION SUBCUTANEOUS EVERY 24 HOURS
Status: DISCONTINUED | OUTPATIENT
Start: 2024-08-21 | End: 2024-09-10 | Stop reason: HOSPADM

## 2024-08-20 RX ORDER — OXYCODONE AND ACETAMINOPHEN 5; 325 MG/1; MG/1
1 TABLET ORAL
Status: DISCONTINUED | OUTPATIENT
Start: 2024-08-20 | End: 2024-08-20 | Stop reason: HOSPADM

## 2024-08-20 RX ORDER — ACETAMINOPHEN 10 MG/ML
INJECTION, SOLUTION INTRAVENOUS
Status: DISCONTINUED | OUTPATIENT
Start: 2024-08-20 | End: 2024-08-20

## 2024-08-20 RX ORDER — CHLORHEXIDINE GLUCONATE ORAL RINSE 1.2 MG/ML
10 SOLUTION DENTAL 2 TIMES DAILY
Status: DISPENSED | OUTPATIENT
Start: 2024-08-20 | End: 2024-08-25

## 2024-08-20 RX ORDER — SUCCINYLCHOLINE CHLORIDE 20 MG/ML
INJECTION INTRAMUSCULAR; INTRAVENOUS
Status: DISCONTINUED | OUTPATIENT
Start: 2024-08-20 | End: 2024-08-20

## 2024-08-20 RX ORDER — LIDOCAINE HYDROCHLORIDE 10 MG/ML
INJECTION, SOLUTION EPIDURAL; INFILTRATION; INTRACAUDAL; PERINEURAL
Status: DISCONTINUED | OUTPATIENT
Start: 2024-08-20 | End: 2024-08-20

## 2024-08-20 RX ORDER — PROPOFOL 10 MG/ML
VIAL (ML) INTRAVENOUS
Status: DISCONTINUED | OUTPATIENT
Start: 2024-08-20 | End: 2024-08-20

## 2024-08-20 RX ORDER — HYDROMORPHONE HYDROCHLORIDE 2 MG/ML
0.2 INJECTION, SOLUTION INTRAMUSCULAR; INTRAVENOUS; SUBCUTANEOUS EVERY 5 MIN PRN
Status: DISCONTINUED | OUTPATIENT
Start: 2024-08-20 | End: 2024-08-20 | Stop reason: HOSPADM

## 2024-08-20 RX ORDER — DEXAMETHASONE SODIUM PHOSPHATE 4 MG/ML
INJECTION, SOLUTION INTRA-ARTICULAR; INTRALESIONAL; INTRAMUSCULAR; INTRAVENOUS; SOFT TISSUE
Status: DISCONTINUED | OUTPATIENT
Start: 2024-08-20 | End: 2024-08-20

## 2024-08-20 RX ORDER — HYDRALAZINE HYDROCHLORIDE 20 MG/ML
10 INJECTION INTRAMUSCULAR; INTRAVENOUS EVERY 6 HOURS PRN
Status: DISCONTINUED | OUTPATIENT
Start: 2024-08-20 | End: 2024-08-23

## 2024-08-20 RX ORDER — ONDANSETRON HYDROCHLORIDE 2 MG/ML
INJECTION, SOLUTION INTRAVENOUS
Status: DISCONTINUED | OUTPATIENT
Start: 2024-08-20 | End: 2024-08-20

## 2024-08-20 RX ORDER — OXYMETAZOLINE HCL 0.05 %
SPRAY, NON-AEROSOL (ML) NASAL
Status: DISCONTINUED | OUTPATIENT
Start: 2024-08-20 | End: 2024-08-20

## 2024-08-20 RX ORDER — FENTANYL CITRATE 50 UG/ML
INJECTION, SOLUTION INTRAMUSCULAR; INTRAVENOUS
Status: DISCONTINUED | OUTPATIENT
Start: 2024-08-20 | End: 2024-08-20

## 2024-08-20 RX ORDER — LEVETIRACETAM 5 MG/ML
500 INJECTION INTRAVASCULAR EVERY 12 HOURS
Status: DISCONTINUED | OUTPATIENT
Start: 2024-08-20 | End: 2024-08-22

## 2024-08-20 RX ORDER — SODIUM CHLORIDE 9 MG/ML
INJECTION, SOLUTION INTRAVENOUS CONTINUOUS
Status: DISCONTINUED | OUTPATIENT
Start: 2024-08-20 | End: 2024-08-20

## 2024-08-20 RX ORDER — ONDANSETRON HYDROCHLORIDE 2 MG/ML
4 INJECTION, SOLUTION INTRAVENOUS DAILY PRN
Status: DISCONTINUED | OUTPATIENT
Start: 2024-08-20 | End: 2024-08-20 | Stop reason: HOSPADM

## 2024-08-20 RX ORDER — SODIUM CHLORIDE 9 MG/ML
INJECTION, SOLUTION INTRAVENOUS CONTINUOUS
Status: DISCONTINUED | OUTPATIENT
Start: 2024-08-20 | End: 2024-08-22

## 2024-08-20 RX ORDER — NALOXONE HCL 0.4 MG/ML
0.02 VIAL (ML) INJECTION
Status: DISCONTINUED | OUTPATIENT
Start: 2024-08-20 | End: 2024-09-10 | Stop reason: HOSPADM

## 2024-08-20 RX ORDER — GLUCAGON 1 MG
1 KIT INJECTION
Status: DISCONTINUED | OUTPATIENT
Start: 2024-08-20 | End: 2024-08-20 | Stop reason: HOSPADM

## 2024-08-20 RX ADMIN — SUGAMMADEX 200 MG: 100 INJECTION, SOLUTION INTRAVENOUS at 08:08

## 2024-08-20 RX ADMIN — PHENYLEPHRINE HYDROCHLORIDE 100 MCG: 10 INJECTION INTRAVENOUS at 12:08

## 2024-08-20 RX ADMIN — FENTANYL CITRATE 50 MCG: 50 INJECTION, SOLUTION INTRAMUSCULAR; INTRAVENOUS at 08:08

## 2024-08-20 RX ADMIN — ROCURONIUM BROMIDE 10 MG: 10 SOLUTION INTRAVENOUS at 04:08

## 2024-08-20 RX ADMIN — FENTANYL CITRATE 25 MCG: 50 INJECTION, SOLUTION INTRAMUSCULAR; INTRAVENOUS at 09:08

## 2024-08-20 RX ADMIN — PHENYLEPHRINE HYDROCHLORIDE 50 MCG: 10 INJECTION INTRAVENOUS at 05:08

## 2024-08-20 RX ADMIN — PHENYLEPHRINE HYDROCHLORIDE 100 MCG: 10 INJECTION INTRAVENOUS at 02:08

## 2024-08-20 RX ADMIN — ACETAMINOPHEN 870 MG: 10 INJECTION, SOLUTION INTRAVENOUS at 10:08

## 2024-08-20 RX ADMIN — SODIUM CHLORIDE, SODIUM LACTATE, POTASSIUM CHLORIDE, AND CALCIUM CHLORIDE: .6; .31; .03; .02 INJECTION, SOLUTION INTRAVENOUS at 08:08

## 2024-08-20 RX ADMIN — NASAL DECONGESTANT 2 SPRAY: 0.05 SPRAY NASAL at 07:08

## 2024-08-20 RX ADMIN — SUCCINYLCHOLINE CHLORIDE 120 MG: 20 INJECTION, SOLUTION INTRAMUSCULAR; INTRAVENOUS; PARENTERAL at 08:08

## 2024-08-20 RX ADMIN — HYDROMORPHONE HYDROCHLORIDE 0.2 MG: 2 INJECTION INTRAMUSCULAR; INTRAVENOUS; SUBCUTANEOUS at 09:08

## 2024-08-20 RX ADMIN — ONDANSETRON 4 MG: 2 INJECTION INTRAMUSCULAR; INTRAVENOUS at 06:08

## 2024-08-20 RX ADMIN — SODIUM CHLORIDE, SODIUM LACTATE, POTASSIUM CHLORIDE, AND CALCIUM CHLORIDE: .6; .31; .03; .02 INJECTION, SOLUTION INTRAVENOUS at 12:08

## 2024-08-20 RX ADMIN — HYDRALAZINE HYDROCHLORIDE 10 MG: 20 INJECTION, SOLUTION INTRAMUSCULAR; INTRAVENOUS at 11:08

## 2024-08-20 RX ADMIN — ROCURONIUM BROMIDE 30 MG: 10 SOLUTION INTRAVENOUS at 12:08

## 2024-08-20 RX ADMIN — PHENYLEPHRINE HYDROCHLORIDE 50 MCG: 10 INJECTION INTRAVENOUS at 11:08

## 2024-08-20 RX ADMIN — ROCURONIUM BROMIDE 10 MG: 10 SOLUTION INTRAVENOUS at 06:08

## 2024-08-20 RX ADMIN — PHENYLEPHRINE HYDROCHLORIDE 50 MCG: 10 INJECTION INTRAVENOUS at 04:08

## 2024-08-20 RX ADMIN — PHENYLEPHRINE HYDROCHLORIDE 100 MCG: 10 INJECTION INTRAVENOUS at 09:08

## 2024-08-20 RX ADMIN — PHENYLEPHRINE HYDROCHLORIDE 100 MCG: 10 INJECTION INTRAVENOUS at 01:08

## 2024-08-20 RX ADMIN — CHLORHEXIDINE GLUCONATE 0.12% ORAL RINSE 10 ML: 1.2 LIQUID ORAL at 10:08

## 2024-08-20 RX ADMIN — ROCURONIUM BROMIDE 10 MG: 10 SOLUTION INTRAVENOUS at 09:08

## 2024-08-20 RX ADMIN — INDOCYANINE GREEN AND WATER 1.25 MG: KIT at 06:08

## 2024-08-20 RX ADMIN — LEVETIRACETAM 500 MG: 500 INJECTION, SOLUTION INTRAVENOUS at 10:08

## 2024-08-20 RX ADMIN — PHENYLEPHRINE HYDROCHLORIDE 50 MCG: 10 INJECTION INTRAVENOUS at 01:08

## 2024-08-20 RX ADMIN — ROCURONIUM BROMIDE 20 MG: 10 SOLUTION INTRAVENOUS at 08:08

## 2024-08-20 RX ADMIN — ROCURONIUM BROMIDE 10 MG: 10 SOLUTION INTRAVENOUS at 02:08

## 2024-08-20 RX ADMIN — SODIUM CHLORIDE: 9 INJECTION, SOLUTION INTRAVENOUS at 10:08

## 2024-08-20 RX ADMIN — FENTANYL CITRATE 25 MCG: 50 INJECTION, SOLUTION INTRAMUSCULAR; INTRAVENOUS at 11:08

## 2024-08-20 RX ADMIN — ERTAPENEM 1 G: 1 INJECTION INTRAMUSCULAR; INTRAVENOUS at 08:08

## 2024-08-20 RX ADMIN — LIDOCAINE HYDROCHLORIDE 30 MG: 10 SOLUTION INTRAVENOUS at 08:08

## 2024-08-20 RX ADMIN — PHENYLEPHRINE HYDROCHLORIDE 100 MCG: 10 INJECTION INTRAVENOUS at 04:08

## 2024-08-20 RX ADMIN — ROCURONIUM BROMIDE 5 MG: 10 SOLUTION INTRAVENOUS at 08:08

## 2024-08-20 RX ADMIN — ROCURONIUM BROMIDE 20 MG: 10 SOLUTION INTRAVENOUS at 10:08

## 2024-08-20 RX ADMIN — SODIUM CHLORIDE, SODIUM LACTATE, POTASSIUM CHLORIDE, AND CALCIUM CHLORIDE: 600; 310; 30; 20 INJECTION, SOLUTION INTRAVENOUS at 08:08

## 2024-08-20 RX ADMIN — GLYCOPYRROLATE 0.2 MG: 0.2 INJECTION, SOLUTION INTRAMUSCULAR; INTRAVITREAL at 09:08

## 2024-08-20 RX ADMIN — Medication 20 MG: at 10:08

## 2024-08-20 RX ADMIN — Medication 10 MG: at 11:08

## 2024-08-20 RX ADMIN — PROPOFOL 80 MG: 10 INJECTION, EMULSION INTRAVENOUS at 09:08

## 2024-08-20 RX ADMIN — ROCURONIUM BROMIDE 10 MG: 10 SOLUTION INTRAVENOUS at 07:08

## 2024-08-20 RX ADMIN — ROCURONIUM BROMIDE 40 MG: 10 SOLUTION INTRAVENOUS at 10:08

## 2024-08-20 RX ADMIN — PROPOFOL 30 MG: 10 INJECTION, EMULSION INTRAVENOUS at 09:08

## 2024-08-20 RX ADMIN — PROPOFOL 120 MG: 10 INJECTION, EMULSION INTRAVENOUS at 08:08

## 2024-08-20 RX ADMIN — PHENYLEPHRINE HYDROCHLORIDE 50 MCG: 10 INJECTION INTRAVENOUS at 12:08

## 2024-08-20 RX ADMIN — DEXAMETHASONE SODIUM PHOSPHATE 8 MG: 4 INJECTION, SOLUTION INTRA-ARTICULAR; INTRALESIONAL; INTRAMUSCULAR; INTRAVENOUS; SOFT TISSUE at 09:08

## 2024-08-20 RX ADMIN — SODIUM CHLORIDE, SODIUM LACTATE, POTASSIUM CHLORIDE, AND CALCIUM CHLORIDE: 600; 310; 30; 20 INJECTION, SOLUTION INTRAVENOUS at 07:08

## 2024-08-20 RX ADMIN — HEPARIN SODIUM 5000 UNITS: 5000 INJECTION, SOLUTION INTRAVENOUS; SUBCUTANEOUS at 09:08

## 2024-08-20 RX ADMIN — Medication 10 MG: at 02:08

## 2024-08-20 RX ADMIN — ROCURONIUM BROMIDE 25 MG: 10 SOLUTION INTRAVENOUS at 09:08

## 2024-08-20 RX ADMIN — Medication 10 MG: at 06:08

## 2024-08-20 RX ADMIN — ACETAMINOPHEN 1000 MG: 10 INJECTION, SOLUTION INTRAVENOUS at 10:08

## 2024-08-20 NOTE — INTERVAL H&P NOTE
Cheryl Kirkland is a 74 y.o. female who presents with gastric adenocarcinoma, s/p neoadjuvant immunotherapy.    The patient has been examined and the H&P has been reviewed:    I concur with the findings and no changes have occurred since H&P was written.    Surgery risks, benefits and alternative options discussed and understood by patient/family.    -- to OR for diagnostic laparoscopy, possible total gastrectomy and Jtube placement, EGD  -- Laterality marked?  No- n/a  -- Abx ppx:  Ertapenem  -- DVT ppx:  SCDs + Heparin  -- Consent signed and in the chart.  All questions have been answered        Chen Jerome MD      Surgical Oncology              There are no hospital problems to display for this patient.

## 2024-08-20 NOTE — ANESTHESIA PROCEDURE NOTES
Peripheral Block    Patient location during procedure: pre-op   Block not for primary anesthetic.  Reason for block: at surgeon's request and post-op pain management   Post-op Pain Location: Abdominal pain   Start time: 8/20/2024 8:20 AM  Timeout: 8/20/2024 8:17 AM   End time: 8/20/2024 8:32 AM    Staffing  Authorizing Provider: Aldair Ferreira MD  Performing Provider: Aldair Ferreira MD    Staffing  Performed by: Aldair Ferreira MD  Authorized by: Aldair Ferreira MD    Preanesthetic Checklist  Completed: patient identified, IV checked, site marked, risks and benefits discussed, surgical consent, monitors and equipment checked, pre-op evaluation and timeout performed  Peripheral Block  Patient position: sitting  Prep: ChloraPrep  Patient monitoring: heart rate, cardiac monitor, continuous pulse ox, continuous capnometry and frequent blood pressure checks  Block type: erector spinae plane  Laterality: bilateral  Injection technique: single shot  Interspace: T5-6    Needle  Needle type: Tuohy   Needle gauge: 17 G  Needle length: 3.5 in  Needle localization: anatomical landmarks and ultrasound guidance   -ultrasound image captured on disc.  Assessment  Injection assessment: negative aspiration, negative parasthesia and local visualized surrounding nerve  Paresthesia pain: none  Heart rate change: no  Slow fractionated injection: yes  Pain Tolerance: comfortable throughout block and no complaints      Additional Notes  Patient tolerated well.  See [facility] RN record for vitals.    5cc 1% Lido injected sub-q for skin anesthesia    20cc Exparel (13.3% Bupiv) + 20cc NS injected total (20cc total per side)

## 2024-08-20 NOTE — PLAN OF CARE
DEE block, time out 0817, start 0820, end 0832, done per anesthesia. Vitals in flowsheet. Patient tolerated well.

## 2024-08-20 NOTE — HPI
74 year old female with a known past medical history of HTN, HLD, osteoporosis, ovarian cancer, DVT (2022), epilepsy, and gastric adenocarcinoma that was admitted 8/20 for planned diagnostic laparoscopy, possible total gastrectomy and Jtube placement, and EGD with Dr. Jerome.   Post-op pt was transferred to the ICU and CM consulted for ICU mgmt.

## 2024-08-20 NOTE — ANESTHESIA PROCEDURE NOTES
Intubation    Date/Time: 8/20/2024 8:47 AM    Performed by: Aj Gamboa  Authorized by: Aldair Ferreira MD    Intubation:     Induction:  Intravenous    Intubated:  Postinduction    Mask Ventilation:  Not attempted    Attempts:  1    Attempted By:  Student    Method of Intubation:  Video laryngoscopy    Blade:  Berger 3    Laryngeal View Grade: Grade I - full view of cords      Difficult Airway Encountered?: No      Complications:  None    Airway Device:  Oral endotracheal tube    Airway Device Size:  7.5    Style/Cuff Inflation:  Cuffed    Inflation Amount (mL):  5    Tube secured:  21    Secured at:  The lips    Placement Verified By:  Capnometry    Findings Post-Intubation:  BS equal bilateral and atraumatic/condition of teeth unchanged  Notes:      Head maintained midline and neutral

## 2024-08-20 NOTE — ANESTHESIA PROCEDURE NOTES
Arterial    Diagnosis: OR    Patient location during procedure: done in OR  Timeout: 8/20/2024 8:40 AM  Procedure end time: 8/20/2024 8:58 AM    Staffing  Authorizing Provider: Aldair Ferreira MD  Performing Provider: Aj Gamboa    Staffing  Performed by: Aj Gamboa  Authorized by: Aldair Ferreira MD    Anesthesiologist was present at the time of the procedure.  Arterial  Skin Prep: chlorhexidine gluconate  Orientation: left  Location: radial    Catheter Size: 20 G  Catheter placement by Ultrasound guidance. Heme positive aspiration all ports.   Vessel Caliber: patent  Needle advanced into vessel with real time Ultrasound guidance.Insertion Attempts: 1  Assessment  Dressing: secured with tape and tegaderm  Patient: Tolerated well

## 2024-08-21 LAB
ALBUMIN SERPL BCP-MCNC: 2.8 G/DL (ref 3.5–5.2)
ALP SERPL-CCNC: 60 U/L (ref 55–135)
ALT SERPL W/O P-5'-P-CCNC: 206 U/L (ref 10–44)
AMYLASE SERPL-CCNC: 78 U/L (ref 20–110)
AMYLASE, BODY FLUID: 396 U/L
AMYLASE, BODY FLUID: 638 U/L
ANION GAP SERPL CALC-SCNC: 6 MMOL/L (ref 8–16)
AST SERPL-CCNC: 183 U/L (ref 10–40)
BILIRUB DIRECT SERPL-MCNC: 0.2 MG/DL (ref 0.1–0.3)
BILIRUB SERPL-MCNC: 0.3 MG/DL (ref 0.1–1)
BODY FLUID SOURCE AMYLASE: NORMAL
BODY FLUID SOURCE AMYLASE: NORMAL
BUN SERPL-MCNC: 14 MG/DL (ref 8–23)
CALCIUM SERPL-MCNC: 7.6 MG/DL (ref 8.7–10.5)
CHLORIDE SERPL-SCNC: 113 MMOL/L (ref 95–110)
CO2 SERPL-SCNC: 18 MMOL/L (ref 23–29)
CREAT SERPL-MCNC: 0.8 MG/DL (ref 0.5–1.4)
ERYTHROCYTE [DISTWIDTH] IN BLOOD BY AUTOMATED COUNT: 13.8 % (ref 11.5–14.5)
EST. GFR  (NO RACE VARIABLE): >60 ML/MIN/1.73 M^2
GLUCOSE SERPL-MCNC: 141 MG/DL (ref 70–110)
HCT VFR BLD AUTO: 43.4 % (ref 37–48.5)
HGB BLD-MCNC: 14.7 G/DL (ref 12–16)
MAGNESIUM SERPL-MCNC: 1.5 MG/DL (ref 1.6–2.6)
MCH RBC QN AUTO: 33.6 PG (ref 27–31)
MCHC RBC AUTO-ENTMCNC: 33.9 G/DL (ref 32–36)
MCV RBC AUTO: 99 FL (ref 82–98)
PHOSPHATE SERPL-MCNC: 3.8 MG/DL (ref 2.7–4.5)
PLATELET # BLD AUTO: 259 K/UL (ref 150–450)
PMV BLD AUTO: 9 FL (ref 9.2–12.9)
POCT GLUCOSE: 103 MG/DL (ref 70–110)
POCT GLUCOSE: 117 MG/DL (ref 70–110)
POCT GLUCOSE: 122 MG/DL (ref 70–110)
POTASSIUM SERPL-SCNC: 4.8 MMOL/L (ref 3.5–5.1)
PROT SERPL-MCNC: 4.9 G/DL (ref 6–8.4)
RBC # BLD AUTO: 4.38 M/UL (ref 4–5.4)
SODIUM SERPL-SCNC: 137 MMOL/L (ref 136–145)
WBC # BLD AUTO: 10.38 K/UL (ref 3.9–12.7)

## 2024-08-21 PROCEDURE — 83735 ASSAY OF MAGNESIUM: CPT | Performed by: SURGERY

## 2024-08-21 PROCEDURE — 25000003 PHARM REV CODE 250: Performed by: SURGERY

## 2024-08-21 PROCEDURE — 84100 ASSAY OF PHOSPHORUS: CPT | Performed by: SURGERY

## 2024-08-21 PROCEDURE — 82150 ASSAY OF AMYLASE: CPT | Mod: 91 | Performed by: SURGERY

## 2024-08-21 PROCEDURE — 27000221 HC OXYGEN, UP TO 24 HOURS

## 2024-08-21 PROCEDURE — 63600175 PHARM REV CODE 636 W HCPCS: Performed by: SURGERY

## 2024-08-21 PROCEDURE — 94761 N-INVAS EAR/PLS OXIMETRY MLT: CPT

## 2024-08-21 PROCEDURE — 97530 THERAPEUTIC ACTIVITIES: CPT

## 2024-08-21 PROCEDURE — 82150 ASSAY OF AMYLASE: CPT | Performed by: SURGERY

## 2024-08-21 PROCEDURE — 36415 COLL VENOUS BLD VENIPUNCTURE: CPT | Performed by: SURGERY

## 2024-08-21 PROCEDURE — 97162 PT EVAL MOD COMPLEX 30 MIN: CPT

## 2024-08-21 PROCEDURE — 20000000 HC ICU ROOM

## 2024-08-21 PROCEDURE — 97166 OT EVAL MOD COMPLEX 45 MIN: CPT

## 2024-08-21 PROCEDURE — 80048 BASIC METABOLIC PNL TOTAL CA: CPT | Performed by: SURGERY

## 2024-08-21 PROCEDURE — 25000003 PHARM REV CODE 250: Performed by: SPECIALIST

## 2024-08-21 PROCEDURE — 80076 HEPATIC FUNCTION PANEL: CPT | Performed by: SURGERY

## 2024-08-21 PROCEDURE — 85027 COMPLETE CBC AUTOMATED: CPT | Performed by: SURGERY

## 2024-08-21 PROCEDURE — 99900035 HC TECH TIME PER 15 MIN (STAT)

## 2024-08-21 PROCEDURE — 63600175 PHARM REV CODE 636 W HCPCS: Performed by: NURSE PRACTITIONER

## 2024-08-21 PROCEDURE — 94799 UNLISTED PULMONARY SVC/PX: CPT

## 2024-08-21 RX ORDER — MAGNESIUM SULFATE HEPTAHYDRATE 40 MG/ML
2 INJECTION, SOLUTION INTRAVENOUS ONCE
Status: COMPLETED | OUTPATIENT
Start: 2024-08-21 | End: 2024-08-21

## 2024-08-21 RX ORDER — CARBAMAZEPINE 100 MG/5ML
300 SUSPENSION ORAL NIGHTLY
Status: DISCONTINUED | OUTPATIENT
Start: 2024-08-21 | End: 2024-09-03

## 2024-08-21 RX ORDER — ACETAMINOPHEN 650 MG/20.3ML
650 LIQUID ORAL EVERY 8 HOURS
Status: DISCONTINUED | OUTPATIENT
Start: 2024-08-21 | End: 2024-08-22

## 2024-08-21 RX ORDER — CARBAMAZEPINE 100 MG/5ML
200 SUSPENSION ORAL 2 TIMES DAILY
Status: DISCONTINUED | OUTPATIENT
Start: 2024-08-21 | End: 2024-09-03

## 2024-08-21 RX ORDER — GLUCAGON 1 MG
1 KIT INJECTION
Status: DISCONTINUED | OUTPATIENT
Start: 2024-08-21 | End: 2024-08-29

## 2024-08-21 RX ORDER — SENNOSIDES 8.6 MG/1
8.6 TABLET ORAL 2 TIMES DAILY
Status: DISCONTINUED | OUTPATIENT
Start: 2024-08-21 | End: 2024-08-22

## 2024-08-21 RX ORDER — ACETAMINOPHEN 650 MG/20.3ML
650 LIQUID ORAL EVERY 8 HOURS
Status: DISCONTINUED | OUTPATIENT
Start: 2024-08-21 | End: 2024-08-21

## 2024-08-21 RX ORDER — INSULIN ASPART 100 [IU]/ML
0-5 INJECTION, SOLUTION INTRAVENOUS; SUBCUTANEOUS EVERY 6 HOURS PRN
Status: DISCONTINUED | OUTPATIENT
Start: 2024-08-21 | End: 2024-08-29

## 2024-08-21 RX ORDER — FAMOTIDINE 10 MG/ML
20 INJECTION INTRAVENOUS 2 TIMES DAILY
Status: DISCONTINUED | OUTPATIENT
Start: 2024-08-21 | End: 2024-08-22

## 2024-08-21 RX ADMIN — SODIUM CHLORIDE: 9 INJECTION, SOLUTION INTRAVENOUS at 07:08

## 2024-08-21 RX ADMIN — ENOXAPARIN SODIUM 40 MG: 40 INJECTION SUBCUTANEOUS at 05:08

## 2024-08-21 RX ADMIN — ACETAMINOPHEN 1000 MG: 10 INJECTION, SOLUTION INTRAVENOUS at 03:08

## 2024-08-21 RX ADMIN — Medication: at 12:08

## 2024-08-21 RX ADMIN — SENNOSIDES 8.6 MG: 8.6 TABLET, FILM COATED ORAL at 01:08

## 2024-08-21 RX ADMIN — FAMOTIDINE 20 MG: 10 INJECTION, SOLUTION INTRAVENOUS at 11:08

## 2024-08-21 RX ADMIN — SODIUM CHLORIDE: 9 INJECTION, SOLUTION INTRAVENOUS at 05:08

## 2024-08-21 RX ADMIN — CHLORHEXIDINE GLUCONATE 0.12% ORAL RINSE 10 ML: 1.2 LIQUID ORAL at 08:08

## 2024-08-21 RX ADMIN — ACETAMINOPHEN 1000 MG: 10 INJECTION, SOLUTION INTRAVENOUS at 05:08

## 2024-08-21 RX ADMIN — LEVETIRACETAM 500 MG: 500 INJECTION, SOLUTION INTRAVENOUS at 09:08

## 2024-08-21 RX ADMIN — CARBAMAZEPINE 300 MG: 100 SUSPENSION ORAL at 09:08

## 2024-08-21 RX ADMIN — MAGNESIUM SULFATE HEPTAHYDRATE 2 G: 40 INJECTION, SOLUTION INTRAVENOUS at 11:08

## 2024-08-21 RX ADMIN — SENNOSIDES 8.6 MG: 8.6 TABLET, FILM COATED ORAL at 09:08

## 2024-08-21 RX ADMIN — FAMOTIDINE 20 MG: 10 INJECTION, SOLUTION INTRAVENOUS at 09:08

## 2024-08-21 RX ADMIN — CHLORHEXIDINE GLUCONATE 0.12% ORAL RINSE 10 ML: 1.2 LIQUID ORAL at 09:08

## 2024-08-21 RX ADMIN — CARBAMAZEPINE 200 MG: 100 SUSPENSION ORAL at 01:08

## 2024-08-21 RX ADMIN — LEVETIRACETAM 500 MG: 500 INJECTION, SOLUTION INTRAVENOUS at 08:08

## 2024-08-21 NOTE — PLAN OF CARE
Pt AAOX4, VSS, Afebile, tolerating 2L NC  OOB to chair since this AM w/ PT/OT  Roca in place until tomorrow per Dr. Jerome.  TF started @ 10 mL/hr at 1330  PCA pump in use  POC discussed with patient and  at bedside  Safety precautions maintained, chair alarm in use, call light with in reach

## 2024-08-21 NOTE — CONSULTS
[facility] - Intensive [facility] ([facility])  Critical Care Medicine  Consult Note    Patient Name: Cheryl Kirkland  MRN: [id number]  Admission Date: 8/20/2024  Hospital Length of Stay: 0 days  Code Status: Full Code  Attending Physician: Chen Jerome MD   Primary Care Provider: Gagandeep Iglesias MD   Principal Problem: Gastric adenocarcinoma    Inpatient consult to Critical Care Medicine  Consult performed by: Ericka Beyer ACN-BC  Consult ordered by: Chen Jerome MD        Subjective:     HPI:  74 year old female with a known past medical history of HTN, HLD, osteoporosis, ovarian cancer, DVT (2022), epilepsy, and gastric adenocarcinoma that was admitted 8/20 for planned diagnostic laparoscopy, possible total gastrectomy and Jtube placement, and EGD with Dr. Jerome.   Post-op pt was transferred to the ICU and CM consulted for ICU mgmt.      Hospital/ICU Course:  Arrived to ICU ~ 2220 sedated post anesthesia on 2L NC oxygen     Past Medical History:   Diagnosis Date    Anemia     Chronic deep vein thrombosis (DVT) of left lower extremity 10/21/2022    Deep vein thrombosis     Drug-induced polyneuropathy 02/28/2024    Noted by ROCK KAY NP  last documented on [id number]    DVT (deep venous thrombosis)     Epilepsy     Gastric adenocarcinoma 02/27/2024    GERD (gastroesophageal reflux disease)     Hyperlipidemia 01/10/2013    Formatting of this note might be different from the original.   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    OP (osteoporosis) 02/28/2024    Ovarian cancer     Primary hypertension 12/01/2022       Past Surgical History:   Procedure Laterality Date    ABDOMINAL WASHOUT N/A 3/14/2024    Procedure: LAVAGE, PERITONEAL, THERAPEUTIC;  Surgeon: Chen Jerome MD;  Location: [facility];  Service: General;  Laterality: N/A;    APPENDECTOMY  2015    BIOPSY OF PERITONEUM N/A 3/14/2024    Procedure: BIOPSY, PERITONEUM;  Surgeon: Chen Jerome MD;   Location: [facility] OR;  Service: General;  Laterality: N/A;    COLONOSCOPY  06/20/2012    Dr Boyle- Tubular adenoma polyps. Repeat in 3 years.    COLONOSCOPY  06/17/2015    -Nodular mucosa at appendiceal orfice, sigmoid and desc diverticulosis otherwise normal.    COLONOSCOPY  2020    >3 TAs    COLONOSCOPY  12/2023    DIAGNOSTIC LAPAROSCOPY N/A 3/14/2024    Procedure: LAPAROSCOPY, DIAGNOSTIC;  Surgeon: Chen Jerome MD;  Location: [facility] OR;  Service: General;  Laterality: N/A;  1. Diagnostic laparoscopy   2. Extensive lysis of adhesions  3. Peritoneal biopsy   4. Peritoneal washings for cytology    EGD - EXTERNAL RESULT  06/20/2012    Dr Boyle- Mild gastritis otherwise normal.    ENDOSCOPIC ULTRASOUND OF UPPER GASTROINTESTINAL TRACT N/A 7/26/2024    Procedure: ULTRASOUND, UPPER GI TRACT, ENDOSCOPIC;  Surgeon: Valdo Aguilera MD;  Location: [facility] ENDO;  Service: Endoscopy;  Laterality: N/A;  7/22/24: instructions sent via portal-. Pt no longer takling eliquis-GD    ESOPHAGOGASTRODUODENOSCOPY N/A 02/08/2024    Procedure: EGD (ESOPHAGOGASTRODUODENOSCOPY);  Surgeon: Jayla Arteaga MD;  Location: [facility];  Service: Endoscopy;  Laterality: N/A;    HYSTERECTOMY      LAPAROSCOPIC LYSIS OF ADHESIONS N/A 3/14/2024    Procedure: LYSIS, ADHESIONS, LAPAROSCOPIC;  Surgeon: Chen Jerome MD;  Location: [facility] OR;  Service: General;  Laterality: N/A;    TOTAL ABDOMINAL HYSTERECTOMY W/ BILATERAL SALPINGOOPHORECTOMY  01/09/2023    radical resection with right salpingo-oophorectomy, left salpingo-oophorectomy, extensive enterolysis, extensive adhesiolysis, left pelvic lymph node biopsy, omental biopsy, small bowel resection with primary functional end-to-end anastomosis, placement of pelvic drain       Review of patient's allergies indicates:  No Known Allergies    Family History       Problem Relation (Age of Onset)    Breast cancer Half-sister (58), Other    Colon cancer Half-sister (83), Other    Lung  cancer Half-sister    Lymphoma Other    Ovarian cancer Other    Pancreatic cancer Brother (76), Half-brother (74)    Prostate cancer Brother (67), Other, Other          Tobacco Use    Smoking status: Former     Types: Cigarettes    Smokeless tobacco: Never    Tobacco comments:     Quit 1989 smoked 10 years smoked 0.25 ppd    Substance and Sexual Activity    Alcohol use: Not Currently    Drug use: Never    Sexual activity: Not on file         Review of Systems   Reason unable to perform ROS: sedated.     Objective:     Vital Signs (Most Recent):  Temp: 97.3 °F (36.3 °C) (08/20/24 2201)  Pulse: 79 (08/20/24 2201)  Resp: 19 (08/20/24 2201)  BP: (!) 141/85 (08/20/24 2201)  SpO2: 100 % (08/20/24 2201) Vital Signs (24h Range):  Temp:  [97.3 °F (36.3 °C)-98.3 °F (36.8 °C)] 97.3 °F (36.3 °C)  Pulse:  [58-88] 79  Resp:  [13-26] 19  SpO2:  [97 %-100 %] 100 %  BP: (125-154)/(69-91) 141/85  Arterial Line BP: (0-161)/(0-75) 161/72     Weight: 58.8 kg (129 lb 11.9 oz)  Body mass index is 21.59 kg/m².      Intake/Output Summary (Last 24 hours) at 8/20/2024 2221  Last data filed at 8/20/2024 2051  Gross per 24 hour   Intake 3587 ml   Output 855 ml   Net 2732 ml        Physical Exam  Vitals and nursing note reviewed.   Constitutional:       General: She is not in acute distress.     Appearance: She is normal weight.      Interventions: She is sedated. Nasal cannula in place.   HENT:      Head: Atraumatic.   Eyes:      Conjunctiva/sclera: Conjunctivae normal.   Neck:      Vascular: No JVD.   Cardiovascular:      Rate and Rhythm: Normal rate and regular rhythm.      Pulses:           Radial pulses are 2+ on the right side and 2+ on the left side.        Dorsalis pedis pulses are 2+ on the right side and 2+ on the left side.   Pulmonary:      Effort: Pulmonary effort is normal.      Breath sounds: Transmitted upper airway sounds present.   Abdominal:      General: Bowel sounds are absent. There is no distension.      Palpations:  "Abdomen is soft.      Comments: Midline surgical dressing cdi  Left J tube secured  Right APOLINAR drain x 2 with small amount serosanguinous drainage  Surgical NG secured with scant serosanguinous drainage   Musculoskeletal:      Right lower leg: No edema.      Left lower leg: No edema.   Skin:     General: Skin is warm and dry.      Capillary Refill: Capillary refill takes less than 2 seconds.   Neurological:      Mental Status: She is lethargic.          Vents:  Oxygen Concentration (%): 98 (08/20/24 2145)    Lines/Drains/Airways       Drain  Duration                  Closed/Suction Drain 08/20/24 2046 Tube - 1 Right;Lateral Abdomen Bulb 15 Fr. <1 day         Closed/Suction Drain 08/20/24 2046 Tube - 2 Right;Lateral Abdomen Bulb 15 Fr. <1 day         Gastrostomy/Enterostomy 08/20/24 2009 Jejunostomy tube LUQ feeding <1 day         NG/OG Tube 08/20/24 1919 Platinum sump 18 Fr. Right nostril <1 day         Urethral Catheter 08/20/24 0916 Non-latex;Straight-tip;Silicone 16 Fr. <1 day              Arterial Line  Duration             Arterial Line 08/20/24 0842 Left Radial <1 day              Peripheral Intravenous Line  Duration                  Peripheral IV - Single Lumen 08/20/24 0643 20 G Right Antecubital <1 day         Peripheral IV - Single Lumen 08/20/24 0700 18 G Left;Posterior Hand <1 day                    Significant Labs:    CBC/Anemia Profile:  Recent Labs   Lab 08/20/24  2132   WBC 8.56   HGB 14.7   HCT 42.7      MCV 97   RDW 13.5        Chemistries:  Recent Labs   Lab 08/20/24  2132      K 4.4   *   CO2 17*   BUN 12   CREATININE 0.7   CALCIUM 8.2*       All pertinent labs within the past 24 hours have been reviewed.    Significant Imaging:   I have reviewed all pertinent imaging results/findings within the past 24 hours.    ABG  No results for input(s): "PH", "PO2", "PCO2", "HCO3", "BE" in the last 168 hours.  Assessment/Plan:     Neuro  Hx of Epileptic seizures  On keppra, zonisamide, " carbamazepine at home  NPO immediately post gastrectomy  Will give IV keppra  Resume liquid AEDs via J tube tomorrow  Seizure monitoring and precautions    Cardiac/Vascular  Primary hypertension  NPO post gastrectomy  ICU hemodynamic monitoring  Prn hydralazine for SBP > 180 with adequate pain control    Oncology  * Gastric adenocarcinoma  Now post op gastrectomy with J tube in place  PCA analgesia ordered  Plan for liquid meds only via j tube starting tomorrow  Maintain NJ tube to LIWS with plan for removal in few days after leak test         Critical Care Daily Checklist:    A: Awake: RASS Goal/Actual Goal:    Actual:     B: Spontaneous Breathing Trial Performed?     C: SAT & SBT Coordinated?  N/a                      D: Delirium: CAM-ICU     E: Early Mobility Performed? Yes   F: Feeding Goal:    Status:     Current Diet Order   Procedures    Diet NPO     STRICT NPO - NO PILLS OR WATER      AS: Analgesia/Sedation Pca ordered   T: Thromboembolic Prophylaxis lovenox   H: HOB > 300 Yes   U: Stress Ulcer Prophylaxis (if needed) Defer to surgery   G: Glucose Control monitor   B: Bowel Function     I: Indwelling Catheter (Lines & Roca) Necessity reviewed   D: De-escalation of Antimicrobials/Pharmacotherapies reviewed    Plan for the day/ETD reviewed    Code Status:  Family/Goals of Care: Full Code  Goal home on discharge       Thank you for your consult. Critical Care team will assist with medical management while requiring ICU level care.     ROSELYN Rose-BC  Critical Care Medicine  [facility] - Intensive [facility] ([facility])

## 2024-08-21 NOTE — PLAN OF CARE
Pt starting to be more awake/oriented. Has dilaudid PCA pump for pain. NGT to LIS w/ no output. Dressing to abdominal midline incision CDI. 2 APOLINAR drains in place, APOLINAR 1 draining more than APOLINAR 2. 15-45ml/hr UOP per marie. Turned q2h. Bed low and locked. Call light within reach. Bed alarm on. Will continue to monitor.

## 2024-08-21 NOTE — HOSPITAL COURSE
Arrived to ICU ~ 2220 sedated post anesthesia on 2L NC oxygen     8/21 Awake and oriented.   Up in a recliner,  at the bedside.   8/22 Awake, more awake, oriented. Up in a recliner.

## 2024-08-21 NOTE — SUBJECTIVE & OBJECTIVE
Objective:     Vital Signs (Most Recent):  Temp: 98.5 °F (36.9 °C) (08/21/24 1145)  Pulse: 80 (08/21/24 1300)  Resp: 11 (08/21/24 1300)  BP: (!) 111/59 (08/21/24 1300)  SpO2: 98 % (08/21/24 1300) Vital Signs (24h Range):  Temp:  [97.3 °F (36.3 °C)-98.5 °F (36.9 °C)] 98.5 °F (36.9 °C)  Pulse:  [78-97] 80  Resp:  [9-28] 11  SpO2:  [96 %-100 %] 98 %  BP: (100-181)/(55-91) 111/59  Arterial Line BP: (0-177)/(0-86) 120/60     Weight: 58.9 kg (129 lb 13.6 oz)  Body mass index is 21.61 kg/m².      Intake/Output Summary (Last 24 hours) at 8/21/2024 1526  Last data filed at 8/21/2024 1300  Gross per 24 hour   Intake 3458.12 ml   Output 1530 ml   Net 1928.12 ml        Physical Exam  Vitals and nursing note reviewed.   HENT:      Head: Normocephalic.      Mouth/Throat:      Mouth: Mucous membranes are moist.   Eyes:      Pupils: Pupils are equal, round, and reactive to light.   Cardiovascular:      Rate and Rhythm: Normal rate.      Pulses: Normal pulses.   Pulmonary:      Effort: Pulmonary effort is normal.   Abdominal:      Palpations: Abdomen is soft.   Skin:     Capillary Refill: Capillary refill takes less than 2 seconds.   Neurological:      General: No focal deficit present.      Mental Status: She is alert.           Review of Systems   Unable to perform ROS: Acuity of condition   All other systems reviewed and are negative.      Vents:  Oxygen Concentration (%): 98 (08/20/24 2145)    Lines/Drains/Airways       Drain  Duration                  Urethral Catheter 08/20/24 0916 Non-latex;Straight-tip;Silicone 16 Fr. 1 day         Closed/Suction Drain 08/20/24 2046 Tube - 1 Right;Lateral Abdomen Bulb 15 Fr. <1 day         Closed/Suction Drain 08/20/24 2046 Tube - 2 Right;Lateral Abdomen Bulb 15 Fr. <1 day         Gastrostomy/Enterostomy 08/20/24 2009 Jejunostomy tube LUQ feeding <1 day         NG/OG Tube 08/20/24 1919 Get monzon 18 Fr. Right nostril <1 day              Peripheral Intravenous Line  Duration                   Peripheral IV - Single Lumen 08/20/24 0643 20 G Right Antecubital 1 day         Peripheral IV - Single Lumen 08/20/24 0700 18 G Left;Posterior Hand 1 day                    Significant Labs:    CBC/Anemia Profile:  Recent Labs   Lab 08/20/24 2132 08/21/24  0442   WBC 8.56 10.38   HGB 14.7 14.7   HCT 42.7 43.4    259   MCV 97 99*   RDW 13.5 13.8        Chemistries:  Recent Labs   Lab 08/20/24 2132 08/21/24  0533    137   K 4.4 4.8   * 113*   CO2 17* 18*   BUN 12 14   CREATININE 0.7 0.8   CALCIUM 8.2* 7.6*   ALBUMIN  --  2.8*   PROT  --  4.9*   BILITOT  --  0.3   ALKPHOS  --  60   ALT  --  206*   AST  --  183*   MG  --  1.5*   PHOS  --  3.8       All pertinent labs within the past 24 hours have been reviewed.    Significant Imaging:  I have reviewed all pertinent imaging results/findings within the past 24 hours.

## 2024-08-21 NOTE — CONSULTS
[facility] - Intensive Care ([facility])  Adult Nutrition  Consult Note    SUMMARY     Recommendations    Recommendation/Intervention:   1. Initate pt onto continuous Enteral nutrition, recommend [place] Peptide 1.5 (vanilla)  via J tube, goal rate 50 mL/hr, starting at 10 mL/hr then progress to goal within 24 hrs if pt is tolerating or per MD/NP   -Formula at goal rate provides: 1800 kcals/day (102% EEN), 58 g protein/day (83% EPN), 162 g CHO/day (88% CHO needs), 821 mL free formula water/day (46% fluid needs)   -160 mL q4h free water flushes (960 mL/day) = total from formula + FWF = 1787 mL water/day (101% fluid needs), per MD/NP   -Check Mg, K+, Na, Phos and Glu before and during initiation, correct as indicated   2. Weigh twice weekly    Goals:   1. Pt will be initated onto continuous EN within 24 hrs   2. Pt will tolerate and intake > 75% EEN and EPN prior to RD follow up  Nutrition Goal Status: new  Communication of RD Recs: other (comment) (POC, sticky note, secure chat)    Assessment and Plan    Nutrition Problem  Inadequate oral intake     Related to (etiology):   Decreased ability to consume sufficient protein/energy     Signs and Symptoms (as evidenced by):   NPO, Gastrectomy with J tube    Interventions/Recommendations (treatment strategy):  1. Enteral Nutrition Management   2. Collaboration by nutrition professional with other providers    Nutrition Diagnosis Status:   New       Malnutrition Assessment     Skin (Micronutrient): wounds unhealed (Chetan score = 12 (high risk)           Orbital Region (Subcutaneous Fat Loss): moderate depletion (Slightly darker circles; Somewhat hallow look)                     Reason for Assessment    Reason For Assessment: consult, new tube feeding (JTUBE FEEDS)  Diagnosis: cancer diagnosis/related complications (Gastric adenocarcinoma)  Relevant Medical History: HTN, Hx: Epileptic seizures, DVT, Carcinoma of R ovary, HLD, Heart failure, GERD  Interdisciplinary Rounds:  "attended  General Information Comments:   8/21/24: 74 y.o. Female admitted for Gastric adenocarcinoma. Pt currently in the ICU, NPO x 1 day. RD consulted for J tube feed recommendations. H&P noted that the pt presented to the hospital for planned diagnostic laparoscopy, possible total gastrectomy and J tube placement, and EGD d/t gastric adenocarcinoma, s/p neoadjuvant immunotherapy. EMR noted procedure performed on 8/20/24, post-op pt was transferred to the ICU, gastrectomy with J tube in place now, plan is for liquid meds only via J tube starting today (8/21) and to maintain NJ tube to LIWS w/ plan to remove after leak test in a few days. Discussed pt in rounds, RN stated pt to start in Impact Peptide 1.5 @ 10 mL/hr at 10 am, informed RN of J tube recs via secure chat and awaiting MD to sign orders. Unable to perform full NFPE d/t pt working with other providers, visual NFPE perform, moderate orbital malnutrition noted. Reviewed chart: Propofol: 0; Total VE: 0; LBM: DAVE; Skin: incision abdomen WDL, closed/sunction drain R abdomen x 2; Chetan score: 12 (high risk); Edema: None.Labs, meds, weight reviewed. Weight charted 2/8/24 130 lbs, 8/20/24 129 lbs (BMI 21.61, Underweight for age), -1 lb wt loss x 6 months, insignificant wt loss, wt stable. RD will continue to follow and monitor pt's nutritional status during admit.    Nutrition Discharge Planning: Enteral nutrition via J tube    Nutrition Risk Screen    Nutrition Risk Screen: tube feeding or parenteral nutrition    Nutrition Related Social Determinants of Health: SDOH: Unable to assess at this time.     Nutrition/Diet History    Spiritual, Cultural Beliefs, Anabaptism Practices, Values that Affect Care: no  Food Allergies: NKFA  Factors Affecting Nutritional Intake: NPO, altered gastrointestinal function    Anthropometrics    Temp: 98.2 °F (36.8 °C)  Height Method: Stated  Height: 5' 5" (165.1 cm)  Height (inches): 65 in  Weight Method: Standard Scale  Weight: " "58.9 kg (129 lb 13.6 oz)  Weight (lb): 129.85 lb  Ideal Body Weight (IBW), Female: 125 lb  % Ideal Body Weight, Female (lb): 103.88 %  BMI (Calculated): 21.6  BMI Grade: 18.5-24.9 - normal (Underweight for age)     Wt Readings from Last 30 Encounters:   08/21/24 58.9 kg (129 lb 13.6 oz)   08/14/24 58.7 kg (129 lb 4.8 oz)   07/26/24 59 kg (130 lb)   07/23/24 60.5 kg (133 lb 6.1 oz)   07/19/24 59.5 kg (131 lb 2.8 oz)   07/09/24 58.9 kg (129 lb 13.6 oz)   06/28/24 58.9 kg (129 lb 13.6 oz)   06/18/24 58.9 kg (129 lb 13.6 oz)   05/28/24 59.7 kg (131 lb 9.8 oz)   05/28/24 59.7 kg (131 lb 9.8 oz)   05/07/24 58.2 kg (128 lb 4.9 oz)   04/16/24 57.8 kg (127 lb 6.8 oz)   04/16/24 57.8 kg (127 lb 6.8 oz)   03/26/24 58.5 kg (128 lb 15.5 oz)   03/26/24 58.5 kg (128 lb 15.5 oz)   03/14/24 57.8 kg (127 lb 6.8 oz)   03/06/24 58.8 kg (129 lb 10.1 oz)   02/23/24 58.6 kg (129 lb 3 oz)   02/08/24 59 kg (130 lb)     Lab/Procedures/Meds    Pertinent Labs Reviewed: reviewed  Pertinent Labs Comments: Calcium (L), Anion gap (L), Cl (H), Glu (H)  Pertinent Medications Reviewed: reviewed  Pertinent Medications Comments: levetiracetam in NaCl, acetaminophen    BMP  Lab Results   Component Value Date     08/21/2024    K 4.8 08/21/2024     (H) 08/21/2024    CO2 18 (L) 08/21/2024    BUN 14 08/21/2024    CREATININE 0.8 08/21/2024    CALCIUM 7.6 (L) 08/21/2024    ANIONGAP 6 (L) 08/21/2024    EGFRNORACEVR >60 08/21/2024     Lab Results   Component Value Date    CALCIUM 7.6 (L) 08/21/2024    PHOS 3.8 08/21/2024     Lab Results   Component Value Date    ALBUMIN 3.6 08/12/2024     Lab Results   Component Value Date    ALT 13 08/12/2024    AST 19 08/12/2024    ALKPHOS 74 08/12/2024    BILITOT 0.2 08/12/2024     No results for input(s): "POCTGLUCOSE" in the last 24 hours.    No results found for: "LABA1C", "HGBA1C"    Lab Results   Component Value Date    WBC 10.38 08/21/2024    HGB 14.7 08/21/2024    HCT 43.4 08/21/2024    MCV 99 (H) " 08/21/2024     08/21/2024       Scheduled Meds:   acetaminophen  1,000 mg Intravenous Q8H    chlorhexidine  10 mL Mouth/Throat BID    enoxparin  40 mg Subcutaneous Daily    famotidine (PF)  20 mg Intravenous BID    levETIRAcetam (Keppra) IV (PEDS and ADULTS)  500 mg Intravenous Q12H     Continuous Infusions:   0.9% NaCl   Intravenous Continuous 125 mL/hr at 08/21/24 0900 Rate Verify at 08/21/24 0900    hydromorphone in 0.9 % NaCl 6 mg/30 ml   Intravenous Continuous   New Syringe/Bag at 08/21/24 0017     PRN Meds:.  Current Facility-Administered Medications:     dextrose 10%, 12.5 g, Intravenous, PRN    dextrose 10%, 25 g, Intravenous, PRN    glucagon (human recombinant), 1 mg, Intramuscular, PRN    hydrALAZINE, 10 mg, Intravenous, Q6H PRN    insulin aspart U-100, 0-5 Units, Subcutaneous, Q6H PRN    naloxone, 0.02 mg, Intravenous, PRN    ondansetron, 4 mg, Intravenous, Q6H PRN    pneumoc 20-faina conj-dip cr(PF), 0.5 mL, Intramuscular, vaccine x 1 dose    promethazine, 25 mg, Rectal, Q6H PRN      Physical Findings/Assessment         Estimated/Assessed Needs    Weight Used For Calorie Calculations: 58.9 kg (129 lb 13.6 oz)  Energy Calorie Requirements (kcal): 7686-1478 kcals (25-30 kcals/kg ABW (Cancer)  Energy Need Method: Kcal/kg  Protein Requirements:  g (1.2-2.0 g/kg ABW (Critical care-non vent, BMI < 30 vs Cancer)  Weight Used For Protein Calculations: 58.9 kg (129 lb 13.6 oz)  Fluid Requirements (mL): 2626-2522 mL (1 mL/kcal)  Estimated Fluid Requirement Method: RDA Method  RDA Method (mL): 1472  CHO Requirement: 184-221 g (6178-4058 kcals/8)      Nutrition Prescription Ordered    Current Diet Order: NPO    Evaluation of Received Nutrient/Fluid Intake  I/O: (Net since admit)  8/21/24: +3541.1 mL    % Kcal Needs: 0%  % Protein Needs: 0%    Energy Calories Required: not meeting needs  Protein Required: not meeting needs  Fluid Required: exceeds needs  Total Fluid Intake (mL): 4811.1  Tolerance: other  (see comments) (Currently no intake)  % Intake of Estimated Energy Needs: 0 - 25 %  % Meal Intake: NPO    Nutrition Risk    Level of Risk/Frequency of Follow-up: high (F/u x 2 weekly)       Monitor and Evaluation    Food and Nutrient Intake: energy intake, enteral nutrition intake  Food and Nutrient Adminstration: enteral and parenteral nutrition administration  Knowledge/Beliefs/Attitudes: food and nutrition knowledge/skill, beliefs and attitudes  Anthropometric Measurements: weight, weight change, body mass index  Biochemical Data, Medical Tests and Procedures: electrolyte and renal panel, gastrointestinal profile, glucose/endocrine profile  Nutrition-Focused Physical Findings: overall appearance       Nutrition Follow-Up    RD Follow-up?: Yes  Patty Gupta, BS, RDN, LDN

## 2024-08-21 NOTE — PROGRESS NOTES
[facility] ([facility])  Critical Care Medicine  Progress Note    Patient Name: Cheryl Kirkland  MRN: [id number]  Admission Date: 8/20/2024  Hospital Length of Stay: 1 days  Code Status: Full Code  Attending Provider: Chen Jerome MD  Primary Care Provider: Gagandeep Iglesias MD   Principal Problem: Gastric adenocarcinoma    Subjective:     HPI:  74 year old female with a known past medical history of HTN, HLD, osteoporosis, ovarian cancer, DVT (2022), epilepsy, and gastric adenocarcinoma that was admitted 8/20 for planned diagnostic laparoscopy, possible total gastrectomy and Jtube placement, and EGD with Dr. Jerome.   Post-op pt was transferred to the ICU and CM consulted for ICU mgmt.      Hospital/ICU Course:  Arrived to ICU ~ 2220 sedated post anesthesia on 2L NC oxygen     8/21 Awake and oriented.   Up in a recliner,  at the bedside.         Objective:     Vital Signs (Most Recent):  Temp: 98.5 °F (36.9 °C) (08/21/24 1145)  Pulse: 80 (08/21/24 1300)  Resp: 11 (08/21/24 1300)  BP: (!) 111/59 (08/21/24 1300)  SpO2: 98 % (08/21/24 1300) Vital Signs (24h Range):  Temp:  [97.3 °F (36.3 °C)-98.5 °F (36.9 °C)] 98.5 °F (36.9 °C)  Pulse:  [78-97] 80  Resp:  [9-28] 11  SpO2:  [96 %-100 %] 98 %  BP: (100-181)/(55-91) 111/59  Arterial Line BP: (0-177)/(0-86) 120/60     Weight: 58.9 kg (129 lb 13.6 oz)  Body mass index is 21.61 kg/m².      Intake/Output Summary (Last 24 hours) at 8/21/2024 1526  Last data filed at 8/21/2024 1300  Gross per 24 hour   Intake 3458.12 ml   Output 1530 ml   Net 1928.12 ml        Physical Exam  Vitals and nursing note reviewed.   HENT:      Head: Normocephalic.      Mouth/Throat:      Mouth: Mucous membranes are moist.   Eyes:      Pupils: Pupils are equal, round, and reactive to light.   Cardiovascular:      Rate and Rhythm: Normal rate.      Pulses: Normal pulses.   Pulmonary:      Effort: Pulmonary effort is normal.   Abdominal:      Palpations: Abdomen is soft.  "  Skin:     Capillary Refill: Capillary refill takes less than 2 seconds.   Neurological:      General: No focal deficit present.      Mental Status: She is alert.           Review of Systems   Unable to perform ROS: Acuity of condition   All other systems reviewed and are negative.      Vents:  Oxygen Concentration (%): 98 (08/20/24 2145)    Lines/Drains/Airways       Drain  Duration                  Urethral Catheter 08/20/24 0916 Non-latex;Straight-tip;Silicone 16 Fr. 1 day         Closed/Suction Drain 08/20/24 2046 Tube - 1 Right;Lateral Abdomen Bulb 15 Fr. <1 day         Closed/Suction Drain 08/20/24 2046 Tube - 2 Right;Lateral Abdomen Bulb 15 Fr. <1 day         Gastrostomy/Enterostomy 08/20/24 2009 Jejunostomy tube LUQ feeding <1 day         NG/OG Tube 08/20/24 1919 [place] sump 18 Fr. Right nostril <1 day              Peripheral Intravenous Line  Duration                  Peripheral IV - Single Lumen 08/20/24 0643 20 G Right Antecubital 1 day         Peripheral IV - Single Lumen 08/20/24 0700 18 G Left;Posterior Hand 1 day                    Significant Labs:    CBC/Anemia Profile:  Recent Labs   Lab 08/20/24 2132 08/21/24  0442   WBC 8.56 10.38   HGB 14.7 14.7   HCT 42.7 43.4    259   MCV 97 99*   RDW 13.5 13.8        Chemistries:  Recent Labs   Lab 08/20/24 2132 08/21/24  0533    137   K 4.4 4.8   * 113*   CO2 17* 18*   BUN 12 14   CREATININE 0.7 0.8   CALCIUM 8.2* 7.6*   ALBUMIN  --  2.8*   PROT  --  4.9*   BILITOT  --  0.3   ALKPHOS  --  60   ALT  --  206*   AST  --  183*   MG  --  1.5*   PHOS  --  3.8       All pertinent labs within the past 24 hours have been reviewed.    Significant Imaging:  I have reviewed all pertinent imaging results/findings within the past 24 hours.    ABG  No results for input(s): "PH", "PO2", "PCO2", "HCO3", "BE" in the last 168 hours.  Assessment/Plan:     Neuro  Hx of Epileptic seizures  On keppra, zonisamide, carbamazepine at home  NPO immediately post " gastrectomy  Will give IV keppra  Resume liquid AEDs via J tube tomorrow  Seizure monitoring and precautions    8/21 Change all antiepileptic medications to per J tube    Cardiac/Vascular  Primary hypertension  NPO post gastrectomy  ICU hemodynamic monitoring  Prn hydralazine for SBP > 180 with adequate pain control    Oncology  * Gastric adenocarcinoma  Now post op gastrectomy with J tube in place  PCA analgesia ordered  Plan for liquid meds only via j tube starting tomorrow  Maintain NJ tube to LIWS with plan for removal in few days after leak test     8/21 Up in a chair  PT/OT  IS  May DC NG tomorrow  Start J tube medications.     Critical Care Time: 38 minutes  Critical secondary to Patient is currently receiving parenteral controlled substances: Sz meds and IV pain meds      Critical care was time spent personally by me on the following activities: development of treatment plan with patient or surrogate and bedside caregivers, discussions with consultants, evaluation of patient's response to treatment, examination of patient, ordering and performing treatments and interventions, ordering and review of laboratory studies, ordering and review of radiographic studies, pulse oximetry, re-evaluation of patient's condition. This critical care time did not overlap with that of any other provider or involve time for any procedures.     Zara Pro MD  Critical Care Medicine  '[facility] - Intensive Care ([facility])

## 2024-08-21 NOTE — HOSPITAL COURSE
8/21/2024 POD #1-- AFVSS.  Doing fine.  Trickle tube feeds started.  Pain control an issue. Serum and drain amylases checked and elevated    8/22/2024:  POD #2- AFVSS.  Doing well.  Tolerating trickle tube feeds.  Pain control improving.  APOLINAR drains still serosang. Transfer to floor orders placed.     8/23/2024:  POD #3- AFVSS.  UGI negative for leak and NGT removed.  APOLINAR drain amylases decreased to normal.  Walked 4x    8/24/2024:  POD #4- aFVSS.  Feeling more tired and pain today.  More distended.  Left NPO.  KUB obtained and subsequent CT scan obtained which did not show any extraluminal contrast.  Pt w/ BM that evening    8/25/2024 : POD #5- doing well.  PCA stopped and hycet via J tube ordered.  +bowel movement x 2. Started on sips of CLD and began advancing J tube feeds slowly.      08/26/2024:  POD #6- feeling weak with some shortness of breath.  She states she feels some nausea and just no energy.  She was able to participate therapy and walk in brown with walker    08/27/2024:  POD #7- continues to feel weak. Repeat CT scan performed- showed no evidence leak or fluid collections.  TF continued to be held.      08/28/2024:  POD #8- feeling better.  No nausea/ vomiting.  Walked 3x.  TF restarted.      08/29/2024:  POD #9- abdominal fullness again.  Started on CLD, TF held again and started on Reglan.      08/30/2024:  POD #10- reports being able to tolerate CLD. Started on TPN. Tube feeds resumed. Reports pain around J tube site, Additional CT scan performed-did not show any inflammation or fluid collections.     08/31/2024.  POD #11- Patient is having bowel movements.  Her only complaint is pain around her jejunostomy tube site.  We will start antibiotics due to the erythema around it.  Advanced to full liquids    09/01/2024. POD #12-  Feeling better.  Less pain around the J-tube.  Tolerating feeds    09/02/2024 POD #13-  pain continues to improve.  Tolerating feeds.  Bowel function returning to  normal    09/03/2024.- POD #14-   Pain is improving.  She has been tolerating her diet but not eating much.  She was noted to have some thick drainage from her wound the tube feeds were held.  The wound was opened and there was some thick purulent looking material but no essie evidence of feeds.  CT scan with contrast via J tube showed now evidence of leak from J tube.      09/04/2024- POD #15-  Feeling better.  CT showed no evidence of leak.  Restart clear liquids as she was not taking much a full liquids.  Restart tube feeds and advance over the next 24 hours.  Superior aspect of the wound opened with gauze placement.        09/05/2024 - POD #16-   Feels better.  Erythema around the jejunostomy tube site has essentially resolved.  Tolerated clear liquids but not drinking much.  Will increase to full liquids and see how she does.  Increase tube feeds to goal of 50 mL an hour.  Have requested case management to discuss home health versus skilled nursing with the patient and her family as there might be issues with what the family is expecting from home health versus what can be provided.  This was discussed with Hospital Medicine    09/06/2024- POD #17-  Patient and family or trying to decide if they want home health or skilled nursing.  The only skilled nursing unit that is said they would take her as an hour away.  Insurance has not approved this.  The patient was able to ambulate significantly however they are not skilled with caring for the jejunostomy tube and there is some resistance in learning.  Drains and staples were removed.    09/07/2024- POD #18- pt elected for SNF placement- does not want to learn Jtube feeds     09/08/2024:  POD #19- appropriate for d/c, awaiting placement.     09/09/2024:  POD #20-  Pt and family now decided she is going to go home with sister Erica instead of SNF.  Teaching pt and family how to do tube feeds and dressing changes.  Tentative plan for d/c home tomorrow      9/10/2024:   POD #21- doing well.  Tolerating [place] 1.5 tube feeds at goal.  Dressing changed at bedside.  Home health arranged.  Pt appropriate for d/c home.

## 2024-08-21 NOTE — EICU
"eICU Note : New Admit :notified by the [facility]:    Brief HPI:  74-year-old female with past medical history significant for hypertension, hyperlipidemia, osteoporosis, ovarian cancer, DVT diagnosed in 2022, history of epilepsy, gastric adenocarcinoma.    Patient was admitted on 8/20 for planned diagnostic laparoscopy possible total gastrectomy and J-tube placement and EGD with Dr. Jerome.    Vital Signs :  Blood pressure (!) 164/79, pulse 80, temperature 98.1 °F (36.7 °C), temperature source Oral, resp. rate 18, height 5' 5" (1.651 m), weight 58.8 kg (129 lb 11.9 oz), SpO2 100%, not currently breastfeeding.       Camera Assessment : Pt lying in bed in no apparent distress     Data:  WBC 8.56, hemoglobin 14.7, hematocrit 42.7, platelets 242  Sodium 138, potassium 4.4, chloride 113, CO2 17, anion gap 8, BUN 12, creatinine 0.7, glucose 178, calcium 8.2, albumin 3.6    Impression and recommendations:  Gastric Adenocarcinoma : patient now postop gastrectomy with J-tube placement.  On PCA analgesia ordered.  Plan for liquid medications only via G-tube starting tomorrow.  Maintain NG tube to low wall suction with plans for removal in a few days after leak test., On IVFluids @ 125cc/hr   H/o Epileptic Seizures : ON Keppra , Zonisamide , Carbamezapine,  IV Keppra given  3.    Primary Hypertension : NPO post Gastrectomy, ICU Hemodynamic Monitoring  4.    Gastric Adenocarcinoma : Post op gastrectomy in place with J tube , PCA Analgesia   5.   PUD, DVT prophylaxis : SCD and PPI, Enoxaparin     Brigida Ramirez M.D  eICU Physician   "

## 2024-08-21 NOTE — PLAN OF CARE
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]    Admission Date: 8/20/2024  Expected Discharge Date:     Transition of Care Barriers: None    Payor: HU[employer] MANAGED MEDICARE / Plan: HU[employer] MEDICARE HMO / Product Type: Capitation /     Extended Emergency Contact Information  Primary Emergency Contact: Ra Kirkland  Address: [address] [place], [place] [id number] [place] [address]  Home Phone: [phone]  Mobile Phone: [phone]  Relation: Spouse  Secondary Emergency Contact: Elton Leary  Address: [address] [place], [place] [id number] [place] [address]  Home Phone: [phone]  Relation: Sister    Discharge Plan A: Home Health         [id number] [place], [place] - [address] [facility]  [address] [facility] [address]  Phone: [phone] Fax: [phone]    [facility] Mail [facility] - [facility] [address] [facility]  [address] [facility] [address]  Phone: [phone] Fax: [phone]      Initial Assessment (most recent)       Adult Discharge Assessment - 08/21/24 1008          Discharge Assessment    Assessment Type Discharge Planning Assessment     Confirmed/corrected address, phone number and insurance Yes     Confirmed Demographics Correct on Facesheet     Source of Information patient;family     When was your last doctors appointment? 08/16/24     Communicated SOFIYA with patient/caregiver Date not available/Unable to determine     Reason For Admission Gastric adenocarcinoma     People in Home spouse     Facility Arrived From: home     Do you expect to return to your current living situation? Yes     Do you have help at home or someone to help you manage your care at home? Yes     Who are your caregiver(s) and their phone number(s)? spouseRa     Prior to hospitilization cognitive status:  Alert/Oriented     Current cognitive status: Alert/Oriented     Walking or Climbing Stairs Difficulty no     Dressing/Bathing Difficulty no     Home Accessibility wheelchair accessible     Home Layout Able to live on 1st floor     Equipment Currently Used at Home walker, rolling     Readmission within 30 days? No     Patient currently being followed by outpatient case management? No     Do you currently have service(s) that help you manage your care at home? No     Do you take prescription medications? Yes     Do you have prescription coverage? Yes     Coverage MCR     Do you have any problems affording any of your prescribed medications? No     Is the patient taking medications as prescribed? yes     Who is going to help you get home at discharge? spouse     How do you get to doctors appointments? car, drives self     Are you on dialysis? No     Do you take coumadin? No     Discharge Plan A Home Health     DME Needed Upon Discharge  none     Discharge Plan discussed with: Patient     Transition of Care Barriers None                   Anticipated DC dispo: home health   Prior Level of Function: independent with ADLs   People in home: spouse Wilian     Comments:  CM met with patient and spouse at bedside to introduce role and discuss discharge planning. Spouse will be help at home and can provide transport at time of discharge. Confirmed demographics, insurance, and emergency contacts. CM discharge needs depends on hospital progress. CM will continue following to assist with other needs. Discharge plan has been determined by review of patient's clinical status, future medical and therapeutic needs, and coverage/benefits for post-acute care in coordination with multidisciplinary team members.

## 2024-08-21 NOTE — OP NOTE
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]    Post-Operative Diagnosis:   Same    Pre-Operative Variables:  Stage: IIA  Adjacent organ involvement: No  Chemotherapy within 90 Days:  Immunotherapy   Radiation Therapy within 90 Days: No    Anesthesia: General    Procedure:  Exploratory laparotomy   Extensive lysis of adhesions - Modifier 22 due to the extent of adhesive disease and 4 hours spent taking down small bowel adhesions  Small bowel resection   Total gastrectomy with D2 lymphadenectomy   EGD  Jtube placement    Operative Findings:   No evidence of distant intraperitoneal metastases   Significant adhesive disease of the small bowel and colon due to her previous TOVA/BSO  4 frozen sections sent - splenic nodule, small bowel mesentery nodule, and proximal and distal margins- all negative  Resection status:   R0 pending final pathology.  No gross disease remaining post dissection.  Inflammatory response consistent with her previous immunotherapy seen however bulky tumor remained  Leak test negative and anastomosis patent.  All bowel viable with Eyespy          Indications:  Cheryl Kirkland is a 74 y.o. year old female with a history of ovarian cancer status post surgery and chemotherapy as well as epilepsy and DVT (provoked during her treatment for ovarian cancer), and MSI high gastric adenocarcinoma.  She was originally diagnosed back in February of this past year.  She was noted to have a very proximal enlarged tumor.  EGD was done as was complete metastatic workup which was negative.  Her diagnostic laparoscopy  was extremely challenging with significant small bowel adhesive disease to the anterior abdominal wall likely secondary to her previous TAHBSO for her ovarian cancer the year prior.  Due to her significant adhesive disease she understood that she would require an open operation.  She was discussed in multidisciplinary tumor board and the consensus was to proceed with immunotherapy due to her MSI high status.  She completed 5 cycles of neoadjuvant immunotherapy with Keytruda and on restaging imaging was found to have enlarged Danelle portal lymph nodes.  Consensus was that she would need a total gastrectomy due to the proximity of her tumor in relationship to the GE junction.  Due to the extent of the operation required and the her previous history of ovarian cancer the tumor board consensus was to proceed with the EUS guided biopsy to rule out any ovarian cancer and to rule out pseudo progression as well.  This was performed and showed adenocarcinoma consistent with her gastric primary.  Therefore the decision was to proceed with surgical intervention due to the mixed response she had from immunotherapy on PET-CT scan.    Risks and benefits were reviewed including bleeding, infection, pain, scar, damage to surrounding structures, cardiovascular and pulmonary complications, anastomotic leak, positive margins,, need for additional procedures, death, and imponderables.  She expressed understanding and gave informed consent to proceed.    Procedure in Detail:    Following written informed consent the patient was taken to the operating room and positioned in the supine position.  A block and heparin were given in preop.  General endotracheal anesthesia was induced and a Roca catheter was placed sterilely.  A time-out was performed in keeping with the [facility] guidelines and an EGD was performed.  The EGD showed a normal Z-line.  She did have tumor along the proximal stomach consistent with her known history.  Her abdomen  was then prepped and draped in the usual sterile fashion.  A time-out was subsequently performed verifying the correct patient, procedure, and other pertinent information.  Appropriate antibiotics were administered.    An incision was then made in the left upper quadrant at davidson's point at the site of from her diagnostic laparoscopy.  Unfortunately pneumoperitoneum was unable to be achieved safely and was alarming.  Therefore we decided to proceed with a midline upper midline laparotomy incision to evaluate for metastatic disease.  Midline incision was made along her previous incision site and carried a little bit more superiorly.  This was done with a scalpel and then carried down through soft tissues with the Bovie electrocautery.  She did have a very thickened fibrous scar consistent with her previous history of wound infection and wound VAC placement following her laparotomy for her ovarian cancer.  We are able to find the midline and then entire abdomen along the superior aspect.  Upon entering the abdomen the peritoneum was opened for some distance in order to gain visualization.  Kocher's were then used to elevate the fascia that was there (this was limited due to her significant scarring and fibrosis from her previous midline laparotomy).  Once we were able to do this we are able to see bowel plastered all over the anterior abdominal wall from above the umbilicus and down.  Using a combination of sharp and blunt dissection we meticulously took all of the small bowel bowel down from the anterior abdominal wall.  There were both dense and flimsy adhesions.  We were methodical and taking down the little bit we could and then opening the incision further.  After approximately 3-1/2 hours of lysis of adhesions we are finally able to make an entire incision up and down her midline.  The falciform was then taken down and the liver released and Silva retractor was placed.  She had multiple interloop adhesions  of the small bowel she also had a very dense adhesions from the peritoneum to the small bowel and the colon there was 1 small area in 1 of the interloop adhesions that felt very firm.  There was some concern that this may represent metastatic disease therefore Metzenbaum scissors were used to excise this and send it off for frozen.  It returned negative for any evidence of malignancy.  During this lysis of adhesions right near the ascending colon there were several enterotomies enterotomies made into the small bowel.  These 3 areas were closed however ultimately we decided to resect this area because they were all in such close proximity.  Windows were made in the mesentery using Bovie and then blue 75 TESSA staplers were used to resect and then reanastomosed the small bowel.  The anastomosis was oversewn with Lembert stitches and was widely patent upon completion.  This was just distal to her previous small-bowel anastomosis presumably from her previous surgery.  Once we had completed this extensive lysis of adhesions we were able to visualize the pelvis to assure that there was no metastatic disease deeper.  There was 1 or 2 loops of small bowel that were left in the pelvis just because they were outside the area necessary for our anastomosis and our surgery.     At this time we turned our attention to proceeding with her gastrectomy.  First we mobilized the left lobe of the liver which is extremely large.  This was completely mobilized and then rotated around to the on top of the right lobe to completely expose the the adam and the entire stomach.  The colon was elevated as well as the stomach and we are able to enter into the lesser sac.  The gastrocolic ligament was dissected and the short gastric vessels along the greater curve were completely dissected and taken.  During this process we are able to better visualize the spleen which had multiple little red tiny cystic like nodules along it.  This did not look  really consistent with metastatic disease however did look abnormal.  Therefore a biopsy of this was taken sharply with Metzenbaum scissors and sent for frozen.  This returned just fibroadipose tissue.  The biopsy site was cauterized with electrocautery and was hemostatic.      We then turned our focus to dissecting the distal stomach and performing lymphadenectomy at the level of the gastroepiploic vessels.  We also began to take down all of the pancreatic attachments to the stomach.  The mass was very adherent along the posterior there was visibly bulky lymphadenopathy both in the periportal region as well as in the splenic region.  Then we dissected pars flaccida in the right gastric vessel we are and also dissected out the right crura.  We encircled the duodenal just distal to the pylorus.  At this time it seemed clear that there was no contraindication to proceeding with resection.  Therefore we divided the right gastroepiploic vessels with 2-0 silk ties and clips and completed the lymphadenectomy at the level.  The left gastric was also identified and taken with white vascular load stapler.  The right gastric artery was then taken with clips and ties.  Similarly the left coronary vein was also taken with the clips and ties.  All this is done taking care to preserve the GDA as well as the common hepatic artery.     The duodenal was taken with a blue 75 load.  We then focused on the proximal stomach and the GE junction.  The proximal stomach was challenging to dissect due to the dense fibrotic reaction along the lesser curve and in the periportal region.  Through meticulous dissection we are able to free all this up until only the GE junction remained.  This was encircled and the esophagus was cleared off and then transected with a blue load stapler.  The specimen was then passed off and sent off to pathology for frozen sections for both the proximal and distal margins.  While that was occurring we completed our  D2 lymphadenectomy.  There were multiple large nodes along the periportal region which were all removed.  There were also enlarged nodes in the splenic region.  Around the time of completion of our D2 lymphadenectomy we also were able to finally able see along the posterior aspect where there did appear to be a aortocaval node that looked slightly enlarged.  This was also taken off and sent separately as aortocaval no.    We received confirmation that there was no evidence of malignancy at our margins.  At this time we identified the ligament of Treitz and measured approximately 20 cm and evaluated the vascular arcades.  We divided the bowel with a blue load stapler and then took the dissection down the mesentery to divide the 1st vascular arcade keeping good blood flow of the bowel.  A window was made in the bare spot of the colonic mesentery and a retrocolic anastomosis was performed.  The bowel reached easily up to the hiatus.  We divided the proximal end of the alimentary limb and placed a 25 EEA in it and advanced it approximately 6 cm.  We then placed the anvil portion into the distal esophagus which had been opened using scissors and the pursestring suture was performed.  These were coupled and the anastomosis was created.  Both donuts were intact.  Then we divided the candy-cane leaving approximately 3 cm from the anastomosis.  We then performed an endoscopy revealing a pink mucosa in the distal esophagus as well as the anastomosed bowel with no evidence of ischemia.  The leak test was negative without any bubbling or any evidence of air leakage.  A nasogastric tube was then placed going into the alimentary limb for about 5 cm.    We then measured approximately 50 cm from the esophagus jejunal anastomosis and performed our enteroenterostomy with a blue load staplers.  The, common channel was also closed with a stapler.  The mesentery was closed to avoid any internal hernias.  We then performed perfusion  evaluation with ice by/ICG green which showed robust flow to all of the bowel as well as the anastomosis.  The anastomosis was somewhat high right at the hiatus.  A few stitches were placed through the esophagus and the hiatus in order to hold it in place and attempt to avoid any hiatal hernias.  The window along the retrocolic anastomosis was also tacked to the small bowel to try to avoid any small bowel herniation.  Ultimate and finally we closed the mesentery from our enteroenterostomy to avoid any other internal hernias.  The abdomen was then irrigated copiously.  Drains were then placed in the right upper quadrant.  The superior most drain was placed going posterior to the anastomosis and then along the anterior pancreatic bed.  The more inferior drain goes right near the duodenal stump and then right up towards the hiatus and right near the is soft ago jejunal anastomosis.  This was then sutured into place with 2-0 nylon stitches.    We then turned our attention to placement of a J-tube.  The site was selected and incision on the skin made.  The tube was pulled into the abdomen.  We selected a site about 15 cm distal from our enteroenterostomy.  A pursestring was made followed by an enterotomy and the tube was passed distally.  Prior to placement of the tube the balloon was deflated and broken to prevent any over inflation.  The tube was then weitzeled and tacked to the anterior abdominal wall.  It was gently retracted until the cuff of the balloon was right at the edge of the bowel and the flange was secured to the skin where it was easily movable and not causing any pressure.  The tube flushed easily.  We then turned our attention to closure.  He fascia was closed with 2 running looped 1. PDS as.  Her fascia was extremely poor and mostly just fibrous scar tissue.  The skin and subcutaneous tissues were then irrigated and reapproximated with 3-0 Vicryl followed by staples.  Sterile dressings were then  applied.  She was then awoken and extubated.  She was taken to PACU in good condition.  She tolerated the procedure well.  All counts were correct x2 at the end of the case.    Portions of the record were created with [employer] Direct voice recognition software. This may lead to occasional typographical errors due to the inherent limitations of the software. Read the chart carefully and recognize, using context, where substitutions have occurred. Please do not hesitate to contact me directly if clarification is needed.    Implants: * No implants in log *    Drains:  15 Fr APOLINAR x2 - inferior drain in across the duodenal stump and up to the anastomosis, superior drain across pancreatic bed     Estimated Blood Loss (EBL):   100 mL    IVF:  3600cc    Specimens:   Specimen (24h ago, onward)       Start     Ordered    08/20/24 2006  Specimen to Pathology, Surgery General Surgery  Once        Comments: Pre-op Diagnosis: Gastric adenocarcinoma [C16.9]Procedure(s):GASTRECTOMYEGD (ESOPHAGOGASTRODUODENOSCOPY)LAPAROSCOPY, DIAGNOSTICLYSIS, ADHESIONS Number of specimens: 8Name of specimens: 1. Small bowel peritoneal scaring (perm) 2. Splenic lesion (frozen)3. Small bowel (perm)4. Hepatic artery lymph node (perm)5. Total gastrectomy distal and proximal margin (perm)6. D2 lymphadenotomy with periportal lymph node (perm)7. Aortic cable (perm)8. 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 Small bowel peritoneal scaring- PERM/FROZEN2. Splenic lesion- PERM/ FROZEN3. Small bowel- PERM4.  Hepatic artery lymph node- PERM5. Total gastrectomy distal and proximal margin- PERM/ FROZEN6. D 2 lymphadenotomy- PERM7. Aortic     References:    Click here for ordering Quick Tip   Question Answer Comment   Procedure Type: General Surgery    Specimen Class: Known or suspected malignancy    Release to patient Immediate        Pending                            Condition: Good    Disposition: PACU - hemodynamically stable.               Chen Jerome MD      Surgical Oncology      8/20/2024, 9:27 PM

## 2024-08-21 NOTE — PT/OT/SLP EVAL
Physical Therapy Evaluation    Patient Name:  Cheyrl Kirkland   MRN:  [id number]    Recommendations:     Discharge Recommendations: Low Intensity Therapy (WITH 24 HOUR CARE FOR SAFETY)   Discharge Equipment Recommendations: bedside commode, bath bench (RECOMMENDING RW USE AT HOME)   Barriers to discharge: None    Assessment:     Cheryl Kirkland is a 74 y.o. female admitted with a medical diagnosis of Gastric adenocarcinoma.  She presents with the following impairments/functional limitations: weakness, impaired endurance, impaired functional mobility, gait instability, impaired balance, decreased safety awareness, decreased lower extremity function, decreased upper extremity function, decreased coordination.    Rehab Prognosis: Good; patient would benefit from acute skilled PT services to address these deficits and reach maximum level of function.    Recent Surgery: Procedure(s) (LRB):  GASTRECTOMY (N/A)  EGD (ESOPHAGOGASTRODUODENOSCOPY) (N/A)  LAPAROSCOPY, DIAGNOSTIC (N/A)  LYSIS, ADHESIONS (N/A)  INSERTION, JEJUNOSTOMY TUBE (N/A) 1 Day Post-Op    Plan:     During this hospitalization, patient to be seen 3 x/week to address the identified rehab impairments via gait training, therapeutic activities, therapeutic exercises and progress toward the following goals:    Plan of Care Expires:  09/04/24    Subjective     Chief Complaint: NONE, EAGER TO GET OOB  Patient/Family Comments/goals: TO GET STRONGER  Pain/Comfort:  Pain Rating 1:  (NO PAIN AT REST, MILD PAIN WITH MOVEMENT, PT DID NOT RATE)  Location 1: abdomen  Pain Addressed 1: Reposition (PCA PUMP)    Patients cultural, spiritual, Rastafarian conflicts given the current situation:      Living Environment:  PT LIVES WITH  2 STORY HOUSE NO STEPS TO ENTER, BR/BA ON 1ST FLOOR, PT AMB INDEP COMMUNITY DISTANCES, DRIVES, INDEP WITH ADL'S  Prior to admission, patients level of function was INDEP.  Equipment used at home: none.  DME owned (not  "currently used): rolling walker.  Upon discharge, patient will have assistance from .    Objective:     Communicated with NURSE NADINE prior to session.  Patient found left sidelying with telemetry, pulse ox (continuous), blood pressure cuff, PCA, SCD, G/J tube, marie catheter, peripheral IV, oxygen, APOLINAR drain X 2, NG TUBE  upon PT entry to room.    General Precautions: Standard, fall  Orthopedic Precautions:N/A   Braces: N/A  Respiratory Status: Nasal cannula, flow 2 L/min    Exams:  Cognitive Exam:  Patient is oriented to Person, Place, Time, and Situation  Postural Exam:  Patient presented with the following abnormalities:    -       Rounded shoulders  -       Forward head  -       Kyphosis  Sensation:    -       Intact  RLE ROM: WFL  RLE Strength: WFL  LLE ROM: WFL  LLE Strength: WFL    Functional Mobility:  Bed Mobility:     Rolling Right: minimum assistance  Scooting: minimum assistance  Supine to Sit: minimum assistance-LOG ROLLING FOR BED MOBILITY  Transfers:     Sit to Stand:  minimum assistance with rolling walker  Bed to Chair: minimum assistance with  rolling walker  using  Step Transfer  Stand to sit: keily  Gait: PT AMB 20' X 2 TRIALS WITH RW AND MIN/CGA, SLOW PACE, DISTRACTIBLE, CUES FOR UPRIGHT POSTURE AND RW SAFETY, QUICK TO FATIGUE BUT GOOD EFFORT  Balance: FAIR SITTING BALANCE, POOR+ DYNAMIC BALANCE DURING GAIT  PT EDUCATED IN AND PERFORMED SEATED BLE THEREX X 10 REP AROM: HIP FLEX/EXT, LAQ, AP'S    AM-PAC 6 CLICK MOBILITY  Total Score:16     Treatment & Education:  PT EDUCATION:  - ROLE OF P.T. AND POC IN ACUTE CARE HOSPITAL SETTING  - RW USE AND SAFETY DURING TF'S AND GAIT  - ENCOURAGED TO INCREASE TIME OOB IN CHAIR TO TOLERANCE   - TO CONTINUE THERAPUETIC EXERCISES THROUGHOUT THE DAY TO INCREASE ACTIVITY TOLERANCE AND DECREASE RISK FOR PNEUMONIA AND BLOOD CLOTS: HIP FLEX/EXT, HIP ABD/ADD, QUAD SET, HEEL SLIDE, AP  - RISK FOR FALLS DUE TO GENERALIZED WEAKNESS, EDUCATED ON "CALL DON'T FALL", " ENCOURAGED TO CALL FOR ASSISTANCE WITH ALL NEEDS SUCH AS BED<>CHAIR TRANSFERS OR TRIPS TO BATHROOM, PT AGREEABLE TO SAFETY PRECAUTIONS    Patient left up in chair with all lines intact, call button in reach, chair alarm on, NURSE notified, and  present.    GOALS:   Multidisciplinary Problems       Physical Therapy Goals          Problem: Physical Therapy    Goal Priority Disciplines Outcome Goal Variances Interventions   Physical Therapy Goal     PT, PT/OT      Description: LTG'S TO BE MET IN 14 DAYS (9-4-24)  PT WILL REQUIRE SPV FOR BED MOBILITY  PT WILL REQUIRE SPV FOR BED<>CHAIR TF'S  PT WILL  FEET WITH RW AND SPV  PT WILL INC AMPAC SCORE BY 2 POINTS TO PROGRESS GROSS FUNC MOBILITY                         History:     Past Medical History:   Diagnosis Date    Anemia     Chronic deep vein thrombosis (DVT) of left lower extremity 10/21/2022    Deep vein thrombosis     Drug-induced polyneuropathy 02/28/2024    Noted by ROCK KAY NP  last documented on [id number]    DVT (deep venous thrombosis)     Epilepsy     Gastric adenocarcinoma 02/27/2024    GERD (gastroesophageal reflux disease)     Hyperlipidemia 01/10/2013    Formatting of this note might be different from the original.   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 Repeat in 3 years.    COLONOSCOPY  06/17/2015    -Nodular mucosa at  appendiceal orfice, sigmoid and desc diverticulosis otherwise normal.    COLONOSCOPY  2020    >3 TAs    COLONOSCOPY  12/2023    DIAGNOSTIC LAPAROSCOPY N/A 3/14/2024    Procedure: LAPAROSCOPY, DIAGNOSTIC;  Surgeon: Chen Jerome MD;  Location: [facility];  Service: General;  Laterality: N/A;  1. Diagnostic laparoscopy   2. Extensive lysis of adhesions  3. Peritoneal biopsy   4. Peritoneal washings for cytology    DIAGNOSTIC LAPAROSCOPY N/A 8/20/2024    Procedure: LAPAROSCOPY, DIAGNOSTIC;  Surgeon: Chen Jerome MD;  Location: [facility] OR;  Service: General;  Laterality: N/A;    EGD - EXTERNAL RESULT  06/20/2012    Dr Boyle- Mild gastritis otherwise normal.    ENDOSCOPIC ULTRASOUND OF UPPER GASTROINTESTINAL TRACT N/A 7/26/2024    Procedure: ULTRASOUND, UPPER GI TRACT, ENDOSCOPIC;  Surgeon: Valdo Aguilera MD;  Location: [facility] ENDO;  Service: Endoscopy;  Laterality: N/A;  7/22/24: instructions sent via portal-. Pt no longer takling eliquis-GD    ESOPHAGOGASTRODUODENOSCOPY N/A 02/08/2024    Procedure: EGD (ESOPHAGOGASTRODUODENOSCOPY);  Surgeon: Jayla Arteaga MD;  Location: [facility];  Service: Endoscopy;  Laterality: N/A;    ESOPHAGOGASTRODUODENOSCOPY N/A 8/20/2024    Procedure: EGD (ESOPHAGOGASTRODUODENOSCOPY);  Surgeon: Chen Jerome MD;  Location: [facility] OR;  Service: General;  Laterality: N/A;    GASTRECTOMY N/A 8/20/2024    Procedure: GASTRECTOMY;  Surgeon: Chen Jerome MD;  Location: [facility] OR;  Service: General;  Laterality: N/A;    HYSTERECTOMY      LAPAROSCOPIC LYSIS OF ADHESIONS N/A 3/14/2024    Procedure: LYSIS, ADHESIONS, LAPAROSCOPIC;  Surgeon: Chen Jerome MD;  Location: [facility] OR;  Service: General;  Laterality: N/A;    LYSIS OF ADHESIONS N/A 8/20/2024    Procedure: LYSIS, ADHESIONS;  Surgeon: Chen Jerome MD;  Location: [facility] OR;  Service: General;  Laterality: N/A;    PLACEMENT OF JEJUNOSTOMY TUBE N/A 8/20/2024    Procedure: INSERTION, JEJUNOSTOMY TUBE;  Surgeon: Justus  MD Chen;  Location: [facility];  Service: General;  Laterality: N/A;    TOTAL ABDOMINAL HYSTERECTOMY W/ BILATERAL SALPINGOOPHORECTOMY  01/09/2023    radical resection with right salpingo-oophorectomy, left salpingo-oophorectomy, extensive enterolysis, extensive adhesiolysis, left pelvic lymph node biopsy, omental biopsy, small bowel resection with primary functional end-to-end anastomosis, placement of pelvic drain       Time Tracking:     PT Received On: 08/21/24  PT Start Time: 0920     PT Stop Time: 1000  PT Total Time (min): 40 min     Billable Minutes: Evaluation 15 and Therapeutic Activity 25 08/21/2024

## 2024-08-21 NOTE — SUBJECTIVE & OBJECTIVE
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 Repeat in 3 years.    COLONOSCOPY  06/17/2015    -Nodular mucosa at appendiceal orfice, sigmoid and desc diverticulosis otherwise normal.    COLONOSCOPY  2020    >3 TAs    COLONOSCOPY  12/2023    DIAGNOSTIC LAPAROSCOPY N/A 3/14/2024    Procedure: LAPAROSCOPY, DIAGNOSTIC;  Surgeon: Chen Jerome MD;  Location: [facility] OR;  Service: General;  Laterality: N/A;  1. Diagnostic laparoscopy   2. Extensive lysis of adhesions  3. Peritoneal biopsy   4. Peritoneal washings for cytology    EGD - EXTERNAL RESULT  06/20/2012    Dr Boyle- Mild gastritis otherwise normal.    ENDOSCOPIC ULTRASOUND OF UPPER GASTROINTESTINAL TRACT N/A 7/26/2024    Procedure: ULTRASOUND, UPPER GI TRACT, ENDOSCOPIC;  Surgeon: Vadlo Aguilera MD;  Location: [facility];  Service: Endoscopy;  Laterality: N/A;  7/22/24: instructions sent via  portal-. Pt no longer takling eliquis-GD    ESOPHAGOGASTRODUODENOSCOPY N/A 02/08/2024    Procedure: EGD (ESOPHAGOGASTRODUODENOSCOPY);  Surgeon: Jayla Arteaga MD;  Location: [facility];  Service: Endoscopy;  Laterality: N/A;    HYSTERECTOMY      LAPAROSCOPIC LYSIS OF ADHESIONS N/A 3/14/2024    Procedure: LYSIS, ADHESIONS, LAPAROSCOPIC;  Surgeon: Chen Jerome MD;  Location: [facility] OR;  Service: General;  Laterality: N/A;    TOTAL ABDOMINAL HYSTERECTOMY W/ BILATERAL SALPINGOOPHORECTOMY  01/09/2023    radical resection with right salpingo-oophorectomy, left salpingo-oophorectomy, extensive enterolysis, extensive adhesiolysis, left pelvic lymph node biopsy, omental biopsy, small bowel resection with primary functional end-to-end anastomosis, placement of pelvic drain       Review of patient's allergies indicates:  No Known Allergies    Family History       Problem Relation (Age of Onset)    Breast cancer Half-sister (58), Other    Colon cancer Half-sister (83), Other    Lung cancer Half-sister    Lymphoma Other    Ovarian cancer Other    Pancreatic cancer Brother (76), Half-brother (74)    Prostate cancer Brother (67), Other, Other          Tobacco Use    Smoking status: Former     Types: Cigarettes    Smokeless tobacco: Never    Tobacco comments:     Quit 1989 smoked 10 years smoked 0.25 ppd    Substance and Sexual Activity    Alcohol use: Not Currently    Drug use: Never    Sexual activity: Not on file         Review of Systems   Reason unable to perform ROS: sedated.     Objective:     Vital Signs (Most Recent):  Temp: 97.3 °F (36.3 °C) (08/20/24 2201)  Pulse: 79 (08/20/24 2201)  Resp: 19 (08/20/24 2201)  BP: (!) 141/85 (08/20/24 2201)  SpO2: 100 % (08/20/24 2201) Vital Signs (24h Range):  Temp:  [97.3 °F (36.3 °C)-98.3 °F (36.8 °C)] 97.3 °F (36.3 °C)  Pulse:  [58-88] 79  Resp:  [13-26] 19  SpO2:  [97 %-100 %] 100 %  BP: (125-154)/(69-91) 141/85  Arterial Line BP: (0-161)/(0-75) 161/72     Weight: 58.8 kg  (129 lb 11.9 oz)  Body mass index is 21.59 kg/m².      Intake/Output Summary (Last 24 hours) at 8/20/2024 2221  Last data filed at 8/20/2024 2051  Gross per 24 hour   Intake 3587 ml   Output 855 ml   Net 2732 ml        Physical Exam  Vitals and nursing note reviewed.   Constitutional:       General: She is not in acute distress.     Appearance: She is normal weight.      Interventions: She is sedated. Nasal cannula in place.   HENT:      Head: Atraumatic.   Eyes:      Conjunctiva/sclera: Conjunctivae normal.   Neck:      Vascular: No JVD.   Cardiovascular:      Rate and Rhythm: Normal rate and regular rhythm.      Pulses:           Radial pulses are 2+ on the right side and 2+ on the left side.        Dorsalis pedis pulses are 2+ on the right side and 2+ on the left side.   Pulmonary:      Effort: Pulmonary effort is normal.      Breath sounds: Transmitted upper airway sounds present.   Abdominal:      General: Bowel sounds are absent. There is no distension.      Palpations: Abdomen is soft.      Comments: Midline surgical dressing cdi  Left J tube secured  Right APOLINAR drain x 2 with small amount serosanguinous drainage  Surgical NG secured with scant serosanguinous drainage   Musculoskeletal:      Right lower leg: No edema.      Left lower leg: No edema.   Skin:     General: Skin is warm and dry.      Capillary Refill: Capillary refill takes less than 2 seconds.   Neurological:      Mental Status: She is lethargic.          Vents:  Oxygen Concentration (%): 98 (08/20/24 2145)    Lines/Drains/Airways       Drain  Duration                  Closed/Suction Drain 08/20/24 2046 Tube - 1 Right;Lateral Abdomen Bulb 15 Fr. <1 day         Closed/Suction Drain 08/20/24 2046 Tube - 2 Right;Lateral Abdomen Bulb 15 Fr. <1 day         Gastrostomy/Enterostomy 08/20/24 2009 Jejunostomy tube LUQ feeding <1 day         NG/OG Tube 08/20/24 1919 Get sump 18 Fr. Right nostril <1 day         Urethral Catheter 08/20/24 0916  Non-latex;Straight-tip;Silicone 16 Fr. <1 day              Arterial Line  Duration             Arterial Line 08/20/24 0842 Left Radial <1 day              Peripheral Intravenous Line  Duration                  Peripheral IV - Single Lumen 08/20/24 0643 20 G Right Antecubital <1 day         Peripheral IV - Single Lumen 08/20/24 0700 18 G Left;Posterior Hand <1 day                    Significant Labs:    CBC/Anemia Profile:  Recent Labs   Lab 08/20/24  2132   WBC 8.56   HGB 14.7   HCT 42.7      MCV 97   RDW 13.5        Chemistries:  Recent Labs   Lab 08/20/24  2132      K 4.4   *   CO2 17*   BUN 12   CREATININE 0.7   CALCIUM 8.2*       All pertinent labs within the past 24 hours have been reviewed.    Significant Imaging:   I have reviewed all pertinent imaging results/findings within the past 24 hours.

## 2024-08-21 NOTE — PLAN OF CARE
Nutrition Recommendations 8/21/24:  1. Initate pt onto continuous Enteral nutrition, recommend Lisa Awad Peptide 1.5 (vanilla)  via J tube, goal rate 50 mL/hr, starting at 10 mL/hr then progress to goal within 24 hrs if pt is tolerating or per MD/NP   -160 mL q4h free water flushes (960 mL/day) = total from formula + FWF = 1787 mL water/day (101% fluid needs), per MD/NP   -Check Mg, K+, Na, Phos and Glu before and during initiation, correct as indicated   2. Weigh twice weekly  3. Collaboration by nutrition professional with other providers     Goals:   1. Pt will be initated onto continuous EN within 24 hrs   2. Pt will tolerate and intake > 75% EEN and EPN prior to RD follow up    TAWANA Quiñonez, RDN, LDN

## 2024-08-21 NOTE — PROGRESS NOTES
[facility] - Intensive Care [facility])  General Surgery  Progress Note    Subjective:     History of Present Illness:  Cheryl Kirkland is a 74 y.o. year old female with a history of ovarian cancer status post surgery and chemotherapy as well as epilepsy and DVT (provoked during her treatment for ovarian cancer), and MSI high gastric adenocarcinoma.  She was originally diagnosed back in February of this past year.  She was noted to have a very proximal enlarged tumor.  EGD was done as was complete metastatic workup which was negative.  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  Due to her significant adhesive disease she understood that she would require an open operation.  She was discussed in multidisciplinary tumor board and the consensus was to proceed with immunotherapy due to her MSI high status.  She completed 5 cycles of neoadjuvant immunotherapy with Keytruda and on restaging imaging was found to have enlarged Danelle portal lymph nodes.  Consensus was that she would need a total gastrectomy due to the proximity of her tumor in relationship to the GE junction.  Due to the extent of the operation required and the her previous history of ovarian cancer the tumor board consensus was to proceed with the EUS guided biopsy to rule out any ovarian cancer and to rule out pseudo progression as well.  This was performed and showed adenocarcinoma consistent with her gastric primary.  Therefore the decision was to proceed with surgical intervention due to the mixed response she had from immunotherapy on PET-CT scan.     Risks and benefits were reviewed including bleeding, infection, pain, scar, damage to surrounding structures, cardiovascular and pulmonary complications, anastomotic leak, positive margins, need for additional procedures, death, and  imponderables.  She expressed understanding and gave informed consent to proceed.    Post-Op Info:  Procedure(s) (LRB):  GASTRECTOMY (N/A)  EGD (ESOPHAGOGASTRODUODENOSCOPY) (N/A)  LAPAROSCOPY, DIAGNOSTIC (N/A)  LYSIS, ADHESIONS (N/A)  INSERTION, JEJUNOSTOMY TUBE (N/A)   1 Day Post-Op     Interval History: AFVSS.  FRANCY.  Pain control an issue.      Medications:  Continuous Infusions:   0.9% NaCl   Intravenous Continuous 125 mL/hr at 08/21/24 0900 Rate Verify at 08/21/24 0900    hydromorphone in 0.9 % NaCl 6 mg/30 ml   Intravenous Continuous   New Syringe/Bag at 08/21/24 0017     Scheduled Meds:   acetaminophen  1,000 mg Intravenous Q8H    chlorhexidine  10 mL Mouth/Throat BID    enoxparin  40 mg Subcutaneous Daily    famotidine (PF)  20 mg Intravenous BID    levETIRAcetam (Keppra) IV (PEDS and ADULTS)  500 mg Intravenous Q12H    magnesium sulfate IVPB  2 g Intravenous Once     PRN Meds:  Current Facility-Administered Medications:     dextrose 10%, 12.5 g, Intravenous, PRN    dextrose 10%, 25 g, Intravenous, PRN    glucagon (human recombinant), 1 mg, Intramuscular, PRN    hydrALAZINE, 10 mg, Intravenous, Q6H PRN    insulin aspart U-100, 0-5 Units, Subcutaneous, Q6H PRN    naloxone, 0.02 mg, Intravenous, PRN    ondansetron, 4 mg, Intravenous, Q6H PRN    pneumoc 20-faina conj-dip cr(PF), 0.5 mL, Intramuscular, vaccine x 1 dose    promethazine, 25 mg, Rectal, Q6H PRN     Review of patient's allergies indicates:  No Known Allergies  Objective:     Vital Signs (Most Recent):  Temp: 98.2 °F (36.8 °C) (08/21/24 0730)  Pulse: 94 (08/21/24 0930)  Resp: 20 (08/21/24 0930)  BP: 126/70 (08/21/24 0900)  SpO2: 98 % (08/21/24 0930) Vital Signs (24h Range):  Temp:  [97.3 °F (36.3 °C)-98.3 °F (36.8 °C)] 98.2 °F (36.8 °C)  Pulse:  [78-97] 94  Resp:  [13-28] 20  SpO2:  [97 %-100 %] 98 %  BP: (100-181)/(55-91) 126/70  Arterial Line BP: (0-177)/(0-86) 120/60     Weight: 58.9 kg (129 lb 13.6 oz)  Body mass index is 21.61  kg/m².    Intake/Output - Last 3 Shifts         08/19 0700  08/20 0659 08/20 0700 08/21 0659 08/21 0700 08/22 0659    I.V. (mL/kg)  1815 (30.8) 311.3 (5.3)    IV Piggyback  2871.7 116.7    Total Intake(mL/kg)  4686.7 (79.6) 428 (7.3)    Urine (mL/kg/hr)  890 (0.6)     Drains  180 60    Blood  200     Total Output  1270 60    Net  +3416.7 +368                    Physical Exam  Constitutional:       General: She is not in acute distress.     Appearance: Normal appearance.      Comments: Appears uncomfortable   HENT:      Head: Normocephalic and atraumatic.   Eyes:      Extraocular Movements: Extraocular movements intact.      Conjunctiva/sclera: Conjunctivae normal.   Cardiovascular:      Rate and Rhythm: Normal rate and regular rhythm.   Pulmonary:      Effort: Pulmonary effort is normal.   Abdominal:      General: Abdomen is flat. There is no distension.      Palpations: Abdomen is soft. There is no mass.      Hernia: No hernia is present.      Comments: Appropriately TTP, APOLINAR drains serosanguinous, J tube in place, dressings intact    Musculoskeletal:         General: No swelling, tenderness, deformity or signs of injury. Normal range of motion.   Skin:     General: Skin is warm and dry.      Coloration: Skin is not jaundiced.   Neurological:      General: No focal deficit present.      Mental Status: She is alert and oriented to person, place, and time.   Psychiatric:         Mood and Affect: Mood normal.         Behavior: Behavior normal.         Thought Content: Thought content normal.          Significant Labs:  I have reviewed all pertinent lab results within the past 24 hours.  CBC:   Recent Labs   Lab 08/21/24  0442   WBC 10.38   RBC 4.38   HGB 14.7   HCT 43.4      MCV 99*   MCH 33.6*   MCHC 33.9     BMP:   Recent Labs   Lab 08/21/24  0533   *      K 4.8   *   CO2 18*   BUN 14   CREATININE 0.8   CALCIUM 7.6*   MG 1.5*       Significant Diagnostics:  I have reviewed all pertinent  imaging results/findings within the past 24 hours.  Assessment/Plan:     * Gastric adenocarcinoma  POD #1- s/p exlap, extensive SHAYNA, small bowel resection, total gastrectomy with stapled esophagojejunal anastomosis and D2 lymphadenectomy, and Jtube placement    Overall doing as well as expected at the moment.  Pain control an issue currently. Awaiting return of bowel function.      -- strict NPO , NJT to LIWS  -- ok for liquid medications via J tube  -- continue Ofirmev and dilaudid PCA.  Will start liquid tylenol via Jtube when ofirmev stops  -- starting trickle TF via J tube today @10cc/hr without advancing.  Nutrition consulted  -- CM consult for discharge / home health planning   -- marie to stay today, plan to d/c it tomorrow.  Strict I/Os today  -- OOB To chair and walk today, OT/PT consulted  -- encourage IS  Dispo:  continued ICU management - monitoring for leak    Lovenox and SCDs for DVT ppx, no indication for GI ppx         Primary hypertension  -- appreciate medicine assistance with management     Hx of Epileptic seizures  -- appreciate medicine assistance with management         Chen Jerome MD  General Surgery  [facility] - Intensive Care ([facility])

## 2024-08-21 NOTE — SUBJECTIVE & OBJECTIVE
Interval History: AFVSS.  FRANCY.  Pain control an issue.      Medications:  Continuous Infusions:   0.9% NaCl   Intravenous Continuous 125 mL/hr at 08/21/24 0900 Rate Verify at 08/21/24 0900    hydromorphone in 0.9 % NaCl 6 mg/30 ml   Intravenous Continuous   New Syringe/Bag at 08/21/24 0017     Scheduled Meds:   acetaminophen  1,000 mg Intravenous Q8H    chlorhexidine  10 mL Mouth/Throat BID    enoxparin  40 mg Subcutaneous Daily    famotidine (PF)  20 mg Intravenous BID    levETIRAcetam (Keppra) IV (PEDS and ADULTS)  500 mg Intravenous Q12H    magnesium sulfate IVPB  2 g Intravenous Once     PRN Meds:  Current Facility-Administered Medications:     dextrose 10%, 12.5 g, Intravenous, PRN    dextrose 10%, 25 g, Intravenous, PRN    glucagon (human recombinant), 1 mg, Intramuscular, PRN    hydrALAZINE, 10 mg, Intravenous, Q6H PRN    insulin aspart U-100, 0-5 Units, Subcutaneous, Q6H PRN    naloxone, 0.02 mg, Intravenous, PRN    ondansetron, 4 mg, Intravenous, Q6H PRN    pneumoc 20-faina conj-dip cr(PF), 0.5 mL, Intramuscular, vaccine x 1 dose    promethazine, 25 mg, Rectal, Q6H PRN     Review of patient's allergies indicates:  No Known Allergies  Objective:     Vital Signs (Most Recent):  Temp: 98.2 °F (36.8 °C) (08/21/24 0730)  Pulse: 94 (08/21/24 0930)  Resp: 20 (08/21/24 0930)  BP: 126/70 (08/21/24 0900)  SpO2: 98 % (08/21/24 0930) Vital Signs (24h Range):  Temp:  [97.3 °F (36.3 °C)-98.3 °F (36.8 °C)] 98.2 °F (36.8 °C)  Pulse:  [78-97] 94  Resp:  [13-28] 20  SpO2:  [97 %-100 %] 98 %  BP: (100-181)/(55-91) 126/70  Arterial Line BP: (0-177)/(0-86) 120/60     Weight: 58.9 kg (129 lb 13.6 oz)  Body mass index is 21.61 kg/m².    Intake/Output - Last 3 Shifts         08/19 0700 08/20 0659 08/20 0700 08/21 0659 08/21 0700 08/22 0659    I.V. (mL/kg)  1815 (30.8) 311.3 (5.3)    IV Piggyback  2871.7 116.7    Total Intake(mL/kg)  4686.7 (79.6) 428 (7.3)    Urine (mL/kg/hr)  890 (0.6)     Drains  180 60    Blood  200     Total  Output  1270 60    Net  +3416.7 +368                    Physical Exam  Constitutional:       General: She is not in acute distress.     Appearance: Normal appearance.      Comments: Appears uncomfortable   HENT:      Head: Normocephalic and atraumatic.   Eyes:      Extraocular Movements: Extraocular movements intact.      Conjunctiva/sclera: Conjunctivae normal.   Cardiovascular:      Rate and Rhythm: Normal rate and regular rhythm.   Pulmonary:      Effort: Pulmonary effort is normal.   Abdominal:      General: Abdomen is flat. There is no distension.      Palpations: Abdomen is soft. There is no mass.      Hernia: No hernia is present.      Comments: Appropriately TTP, APOLINAR drains serosanguinous, J tube in place, dressings intact    Musculoskeletal:         General: No swelling, tenderness, deformity or signs of injury. Normal range of motion.   Skin:     General: Skin is warm and dry.      Coloration: Skin is not jaundiced.   Neurological:      General: No focal deficit present.      Mental Status: She is alert and oriented to person, place, and time.   Psychiatric:         Mood and Affect: Mood normal.         Behavior: Behavior normal.         Thought Content: Thought content normal.          Significant Labs:  I have reviewed all pertinent lab results within the past 24 hours.  CBC:   Recent Labs   Lab 08/21/24  0442   WBC 10.38   RBC 4.38   HGB 14.7   HCT 43.4      MCV 99*   MCH 33.6*   MCHC 33.9     BMP:   Recent Labs   Lab 08/21/24  0533   *      K 4.8   *   CO2 18*   BUN 14   CREATININE 0.8   CALCIUM 7.6*   MG 1.5*       Significant Diagnostics:  I have reviewed all pertinent imaging results/findings within the past 24 hours.

## 2024-08-21 NOTE — TRANSFER OF CARE
"Anesthesia Transfer of Care Note    Patient: Cheryl Kirkland    Procedure(s) Performed: Procedure(s) (LRB):  GASTRECTOMY (N/A)  EGD (ESOPHAGOGASTRODUODENOSCOPY) (N/A)  LAPAROSCOPY, DIAGNOSTIC (N/A)  LYSIS, ADHESIONS (N/A)  INSERTION, JEJUNOSTOMY TUBE (N/A)    Patient location: PACU    Anesthesia Type: general    Transport from OR: Transported from OR on room air with adequate spontaneous ventilation    Post pain: adequate analgesia    Post assessment: no apparent anesthetic complications    Post vital signs: stable    Level of consciousness: sedated    Nausea/Vomiting: no nausea/vomiting    Complications: none    Transfer of care protocol was followed      Last vitals: Visit Vitals  BP (!) 154/69   Pulse (!) 59   Temp 36.5 °C (97.7 °F) (Temporal)   Resp 18   Ht 5' 5" (1.651 m)   Wt 58.8 kg (129 lb 11.9 oz)   SpO2 99%   Breastfeeding No   BMI 21.59 kg/m²     "

## 2024-08-21 NOTE — PLAN OF CARE
P.T. EVAL COMPLETE.  PT CURRENTLY REQUIRES [facility] FOR BED MOBILITY, [place] FOR TF'S AND GAIT WITH RW.  P.T. RECOMMENDS LOW INTENSITY THERAPY UPON DISCHARGE WITH 24 HOUR CARE FOR SAFETY

## 2024-08-21 NOTE — PLAN OF CARE
OT mara completed. Sup>sit min A via log rolling, sit>stand min A, functional mobility x20ft x2 trials with RW and min A, step>pivot to bedside chair with RW and min A. Recommending low intensity intervention with 24/7 SPV and A at d/c.

## 2024-08-21 NOTE — HPI
Cheryl Kirkland is a 74 y.o. year old female with a history of ovarian cancer status post surgery and chemotherapy as well as epilepsy and DVT (provoked during her treatment for ovarian cancer), and MSI high gastric adenocarcinoma.  She was originally diagnosed back in February of this past year.  She was noted to have a very proximal enlarged tumor.  EGD was done as was complete metastatic workup which was negative.  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  Due to her significant adhesive disease she understood that she would require an open operation.  She was discussed in multidisciplinary tumor board and the consensus was to proceed with immunotherapy due to her MSI high status.  She completed 5 cycles of neoadjuvant immunotherapy with Keytruda and on restaging imaging was found to have enlarged Danelle portal lymph nodes.  Consensus was that she would need a total gastrectomy due to the proximity of her tumor in relationship to the GE junction.  Due to the extent of the operation required and the her previous history of ovarian cancer the tumor board consensus was to proceed with the EUS guided biopsy to rule out any ovarian cancer and to rule out pseudo progression as well.  This was performed and showed adenocarcinoma consistent with her gastric primary.  Therefore the decision was to proceed with surgical intervention due to the mixed response she had from immunotherapy on PET-CT scan.     Risks and benefits were reviewed including bleeding, infection, pain, scar, damage to surrounding structures, cardiovascular and pulmonary complications, anastomotic leak, positive margins, need for additional procedures, death, and imponderables.  She expressed understanding and gave informed consent to proceed.

## 2024-08-21 NOTE — PT/OT/SLP EVAL
"Occupational Therapy Evaluation and Treatment    Name: Cheryl Kirkland  MRN: [id number]  Admitting Diagnosis: Gastric adenocarcinoma  Recent Surgery: Procedure(s) (LRB):  GASTRECTOMY (N/A)  EGD (ESOPHAGOGASTRODUODENOSCOPY) (N/A)  LAPAROSCOPY, DIAGNOSTIC (N/A)  LYSIS, ADHESIONS (N/A)  INSERTION, JEJUNOSTOMY TUBE (N/A) 1 Day Post-Op    Recommendations:     Discharge Recommendations: Low Intensity Therapy (24/7 SPV and A)  Level of Assistance Recommended: 24 hours light assistance  Discharge Equipment Recommendations: bedside commode, bath bench (recommending use of RW at d/c)  Barriers to discharge: None    Assessment:     Cheryl Kirkland is a 74 y.o. female with a medical diagnosis of Gastric adenocarcinoma. She presents with performance deficits affecting function including weakness, impaired endurance, impaired self care skills, impaired functional mobility, impaired balance, impaired cardiopulmonary response to activity, decreased safety awareness.    Rehab Prognosis: Good; patient would benefit from acute OT services to address these deficits and reach maximum level of function.    Plan:     Patient to be seen 2 x/week to address the above listed problems via self-care/home management, therapeutic activities, therapeutic exercises  Plan of Care Expires: 09/04/24  Plan of Care Reviewed with: patient, spouse    Subjective     Chief Complaint: Reported "I like yesterday's Cheryl better than today."  Patient Comments/Goals: increase independence  Pain/Comfort:  Pain Rating 1:  (declines pain at rest. PCA available for use throughout session as patient needed. activity pacing throughout)    Social History:  Living Environment: Patient lives with their spouse in a 2 story home with 1st floor BR/BA. No steps/stairs to enter home.  Prior Level of Function: Prior to admission, patient was independent with ADLs and community distance ambulation.  Roles and Routines: Patient was driving and retired prior to " admission.  Equipment Used at Home: none  DME owned (not currently used): rolling walker  Assistance Upon Discharge: family    Objective:     Communicated with nurse, Briseida, prior to session. Patient found supine with blood pressure cuff, APOLINAR drain, G/J tube, telemetry, pulse ox (continuous), oxygen, PCA, SCD, peripheral IV, marie catheter upon OT entry to room.    General Precautions: Standard, fall   Orthopedic Precautions: N/A   Braces: N/A    Respiratory Status: Nasal cannula, flow 2 L/min    Occupational Performance    Bed Mobility:   Supine to sit from right side of bed with minimum assistance  Provided with increased time for completion.  Completed via log rolling with v/c for technique    Functional Mobility/Transfers:  Sit <> Stand Transfer with minimum assistance with rolling walker  Bed <> Chair Transfer using Step Transfer technique with minimum assistance with rolling walker  Functional Mobility: Patient completed x20ft x2 trials functional mobility with RW and min A to increase dynamic standing balance and activity tolerance needed for ADL completion.  Provided with v/c for technique with transfers to increase safety and independence with completion  Highly distractible- redirection throughout session to task  Slow pacing    Activities of Daily Living:  Upper Body Dressing: moderate assistance  Lower Body Dressing: maximal assistance    Cognitive/Visual Perceptual:  Cognitive/Psychosocial Skills:    -     Oriented to: Person, Place, and Situation  -     Follows Commands/attention: Easily distracted and Follows one-step commands  cognitive deficits questionably correlate to pain medication, will continue to monitor.    Physical Exam:  Balance:    -     Sitting: stand by assistance  -     Standing: minimum assistance  Upper Extremity Range of Motion:     -       Right Upper Extremity: WFL  -       Left Upper Extremity: WFL  Upper Extremity Strength:    -       Right Upper Extremity: Deficits: grossly  4/5  -       Left Upper Extremity: Deficits: grossly 4/5   Strength:    -       Right Upper Extremity: Deficits: fair  -       Left Upper Extremity: Deficits: fair    AMPAC 6 Click ADL:  AMPAC Total Score: 14    Treatment & Education:  Therapist provided facilitation and instruction of proper body mechanics, energy conservation, and fall prevention strategies during tasks listed above  Patient educated on role of OT, POC, and goals for therapy  Patient educated on importance of OOB activities with staff member assistance and sitting OOB majority of the day  Encouraged completion of B UE AROM therex throughout the day to increase functional strength and activity tolerance needed for ADL completion.    Patient left up in chair with all lines intact, call button in reach, RN notified, chair alarm on, and spouse present.    GOALS:   Multidisciplinary Problems       Occupational Therapy Goals          Problem: Occupational Therapy    Goal Priority Disciplines Outcome Interventions   Occupational Therapy Goal     OT, PT/OT     Description: Goals to be met by: 9/4/24     Patient will increase functional independence with ADLs by performing:    Toileting from toilet with Supervision for hygiene and clothing management.   Toilet transfer to toilet with Supervision.  Increased functional strength in B UE grossly by 1/2 MM grade.                         History:     Past Medical History:   Diagnosis Date    Anemia     Chronic deep vein thrombosis (DVT) of left lower extremity 10/21/2022    Deep vein thrombosis     Drug-induced polyneuropathy 02/28/2024    Noted by ROCK KAY NP  last documented on [id number]    DVT (deep venous thrombosis)     Epilepsy     Gastric adenocarcinoma 02/27/2024    GERD (gastroesophageal reflux disease)     Hyperlipidemia 01/10/2013    Formatting of this note might be different from the original.   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    OP  (osteoporosis) 02/28/2024    Ovarian cancer     Primary hypertension 12/01/2022         Past Surgical History:   Procedure Laterality Date    ABDOMINAL WASHOUT N/A 3/14/2024    Procedure: LAVAGE, PERITONEAL, THERAPEUTIC;  Surgeon: Chen Jerome MD;  Location: [facility] OR;  Service: General;  Laterality: N/A;    APPENDECTOMY  2015    BIOPSY OF PERITONEUM N/A 3/14/2024    Procedure: BIOPSY, PERITONEUM;  Surgeon: Chen Jerome MD;  Location: [facility] OR;  Service: General;  Laterality: N/A;    COLONOSCOPY  06/20/2012    Dr Boyle- Tubular adenoma polyps. Repeat in 3 years.    COLONOSCOPY  06/17/2015    -Nodular mucosa at appendiceal orfice, sigmoid and desc diverticulosis otherwise normal.    COLONOSCOPY  2020    >3 TAs    COLONOSCOPY  12/2023    DIAGNOSTIC LAPAROSCOPY N/A 3/14/2024    Procedure: LAPAROSCOPY, DIAGNOSTIC;  Surgeon: Chen Jerome MD;  Location: [facility] OR;  Service: General;  Laterality: N/A;  1. Diagnostic laparoscopy   2. Extensive lysis of adhesions  3. Peritoneal biopsy   4. Peritoneal washings for cytology    DIAGNOSTIC LAPAROSCOPY N/A 8/20/2024    Procedure: LAPAROSCOPY, DIAGNOSTIC;  Surgeon: Chen Jerome MD;  Location: [facility];  Service: General;  Laterality: N/A;    EGD - EXTERNAL RESULT  06/20/2012    Dr Boyle- Mild gastritis otherwise normal.    ENDOSCOPIC ULTRASOUND OF UPPER GASTROINTESTINAL TRACT N/A 7/26/2024    Procedure: ULTRASOUND, UPPER GI TRACT, ENDOSCOPIC;  Surgeon: Valdo Aguilera MD;  Location: [facility];  Service: Endoscopy;  Laterality: N/A;  7/22/24: instructions sent via portal-. Pt no longer takling eliquis-GD    ESOPHAGOGASTRODUODENOSCOPY N/A 02/08/2024    Procedure: EGD (ESOPHAGOGASTRODUODENOSCOPY);  Surgeon: Jayla Arteaga MD;  Location: [facility];  Service: Endoscopy;  Laterality: N/A;    ESOPHAGOGASTRODUODENOSCOPY N/A 8/20/2024    Procedure: EGD (ESOPHAGOGASTRODUODENOSCOPY);  Surgeon: Chen Jerome MD;  Location: [facility];  Service:  General;  Laterality: N/A;    GASTRECTOMY N/A 8/20/2024    Procedure: GASTRECTOMY;  Surgeon: Chen Jerome MD;  Location: [facility] OR;  Service: General;  Laterality: N/A;    HYSTERECTOMY      LAPAROSCOPIC LYSIS OF ADHESIONS N/A 3/14/2024    Procedure: LYSIS, ADHESIONS, LAPAROSCOPIC;  Surgeon: Chen Jerome MD;  Location: [facility] OR;  Service: General;  Laterality: N/A;    LYSIS OF ADHESIONS N/A 8/20/2024    Procedure: LYSIS, ADHESIONS;  Surgeon: Chen Jerome MD;  Location: [facility] OR;  Service: General;  Laterality: N/A;    PLACEMENT OF JEJUNOSTOMY TUBE N/A 8/20/2024    Procedure: INSERTION, JEJUNOSTOMY TUBE;  Surgeon: Chen Jerome MD;  Location: [facility] OR;  Service: General;  Laterality: N/A;    TOTAL ABDOMINAL HYSTERECTOMY W/ BILATERAL SALPINGOOPHORECTOMY  01/09/2023    radical resection with right salpingo-oophorectomy, left salpingo-oophorectomy, extensive enterolysis, extensive adhesiolysis, left pelvic lymph node biopsy, omental biopsy, small bowel resection with primary functional end-to-end anastomosis, placement of pelvic drain       Time Tracking:     OT Date of Treatment: 08/21/24  OT Start Time: 0910  OT Stop Time: 0940  OT Total Time (min): 30 min    Billable Minutes: Evaluation 15 and Therapeutic Activity 15    Marilu Garcia, OT  8/21/2024

## 2024-08-22 LAB
ANION GAP SERPL CALC-SCNC: 9 MMOL/L (ref 8–16)
BUN SERPL-MCNC: 18 MG/DL (ref 8–23)
CALCIUM SERPL-MCNC: 7.9 MG/DL (ref 8.7–10.5)
CHLORIDE SERPL-SCNC: 114 MMOL/L (ref 95–110)
CO2 SERPL-SCNC: 20 MMOL/L (ref 23–29)
CREAT SERPL-MCNC: 0.8 MG/DL (ref 0.5–1.4)
ERYTHROCYTE [DISTWIDTH] IN BLOOD BY AUTOMATED COUNT: 14.5 % (ref 11.5–14.5)
EST. GFR  (NO RACE VARIABLE): >60 ML/MIN/1.73 M^2
GLUCOSE SERPL-MCNC: 114 MG/DL (ref 70–110)
HCT VFR BLD AUTO: 43.9 % (ref 37–48.5)
HGB BLD-MCNC: 14.2 G/DL (ref 12–16)
MCH RBC QN AUTO: 33.2 PG (ref 27–31)
MCHC RBC AUTO-ENTMCNC: 32.3 G/DL (ref 32–36)
MCV RBC AUTO: 103 FL (ref 82–98)
PLATELET # BLD AUTO: 215 K/UL (ref 150–450)
PMV BLD AUTO: 8.8 FL (ref 9.2–12.9)
POCT GLUCOSE: 107 MG/DL (ref 70–110)
POCT GLUCOSE: 119 MG/DL (ref 70–110)
POCT GLUCOSE: 97 MG/DL (ref 70–110)
POTASSIUM SERPL-SCNC: 5 MMOL/L (ref 3.5–5.1)
RBC # BLD AUTO: 4.28 M/UL (ref 4–5.4)
SODIUM SERPL-SCNC: 143 MMOL/L (ref 136–145)
WBC # BLD AUTO: 9.71 K/UL (ref 3.9–12.7)

## 2024-08-22 PROCEDURE — 11000001 HC ACUTE MED/SURG PRIVATE ROOM

## 2024-08-22 PROCEDURE — 80048 BASIC METABOLIC PNL TOTAL CA: CPT | Performed by: SURGERY

## 2024-08-22 PROCEDURE — 99900035 HC TECH TIME PER 15 MIN (STAT)

## 2024-08-22 PROCEDURE — 36415 COLL VENOUS BLD VENIPUNCTURE: CPT | Performed by: SURGERY

## 2024-08-22 PROCEDURE — 94761 N-INVAS EAR/PLS OXIMETRY MLT: CPT

## 2024-08-22 PROCEDURE — 97530 THERAPEUTIC ACTIVITIES: CPT

## 2024-08-22 PROCEDURE — 97110 THERAPEUTIC EXERCISES: CPT

## 2024-08-22 PROCEDURE — 94799 UNLISTED PULMONARY SVC/PX: CPT

## 2024-08-22 PROCEDURE — 25000003 PHARM REV CODE 250: Performed by: SURGERY

## 2024-08-22 PROCEDURE — 25000003 PHARM REV CODE 250: Performed by: NURSE PRACTITIONER

## 2024-08-22 PROCEDURE — 97116 GAIT TRAINING THERAPY: CPT

## 2024-08-22 PROCEDURE — 25000003 PHARM REV CODE 250

## 2024-08-22 PROCEDURE — 85027 COMPLETE CBC AUTOMATED: CPT | Performed by: SURGERY

## 2024-08-22 PROCEDURE — 25000003 PHARM REV CODE 250: Performed by: SPECIALIST

## 2024-08-22 PROCEDURE — S5010 5% DEXTROSE AND 0.45% SALINE: HCPCS | Performed by: SURGERY

## 2024-08-22 PROCEDURE — 63600175 PHARM REV CODE 636 W HCPCS: Performed by: NURSE PRACTITIONER

## 2024-08-22 PROCEDURE — 63600175 PHARM REV CODE 636 W HCPCS: Performed by: SURGERY

## 2024-08-22 RX ORDER — ACETAMINOPHEN 650 MG/20.3ML
1000 LIQUID ORAL EVERY 8 HOURS
Status: DISCONTINUED | OUTPATIENT
Start: 2024-08-22 | End: 2024-08-25

## 2024-08-22 RX ORDER — LEVETIRACETAM 100 MG/ML
500 SOLUTION ORAL 2 TIMES DAILY
Status: DISCONTINUED | OUTPATIENT
Start: 2024-08-22 | End: 2024-09-03

## 2024-08-22 RX ORDER — DEXTROSE MONOHYDRATE, SODIUM CHLORIDE, AND POTASSIUM CHLORIDE 50; 1.49; 4.5 G/1000ML; G/1000ML; G/1000ML
INJECTION, SOLUTION INTRAVENOUS CONTINUOUS
Status: DISCONTINUED | OUTPATIENT
Start: 2024-08-22 | End: 2024-08-22

## 2024-08-22 RX ORDER — DEXTROSE MONOHYDRATE AND SODIUM CHLORIDE 5; .45 G/100ML; G/100ML
INJECTION, SOLUTION INTRAVENOUS CONTINUOUS
Status: DISCONTINUED | OUTPATIENT
Start: 2024-08-22 | End: 2024-08-27

## 2024-08-22 RX ORDER — SENNOSIDES 8.8 MG/5ML
5 LIQUID ORAL 2 TIMES DAILY
Status: DISCONTINUED | OUTPATIENT
Start: 2024-08-22 | End: 2024-08-31

## 2024-08-22 RX ADMIN — DEXTROSE AND SODIUM CHLORIDE: 5; 450 INJECTION, SOLUTION INTRAVENOUS at 01:08

## 2024-08-22 RX ADMIN — CARBAMAZEPINE 200 MG: 100 SUSPENSION ORAL at 05:08

## 2024-08-22 RX ADMIN — ACETAMINOPHEN 650 MG: 650 SOLUTION ORAL at 05:08

## 2024-08-22 RX ADMIN — ACETAMINOPHEN 999.01 MG: 650 SOLUTION ORAL at 09:08

## 2024-08-22 RX ADMIN — LEVETIRACETAM 500 MG: 100 SOLUTION ORAL at 09:08

## 2024-08-22 RX ADMIN — CARBAMAZEPINE 200 MG: 100 SUSPENSION ORAL at 01:08

## 2024-08-22 RX ADMIN — SENNOSIDES 5 ML: 8.8 LIQUID ORAL at 09:08

## 2024-08-22 RX ADMIN — FAMOTIDINE 20 MG: 10 INJECTION, SOLUTION INTRAVENOUS at 08:08

## 2024-08-22 RX ADMIN — CHLORHEXIDINE GLUCONATE 0.12% ORAL RINSE 10 ML: 1.2 LIQUID ORAL at 08:08

## 2024-08-22 RX ADMIN — CARBAMAZEPINE 300 MG: 100 SUSPENSION ORAL at 09:08

## 2024-08-22 RX ADMIN — ENOXAPARIN SODIUM 40 MG: 40 INJECTION SUBCUTANEOUS at 05:08

## 2024-08-22 RX ADMIN — LEVETIRACETAM 500 MG: 500 INJECTION, SOLUTION INTRAVENOUS at 08:08

## 2024-08-22 RX ADMIN — SODIUM CHLORIDE: 9 INJECTION, SOLUTION INTRAVENOUS at 10:08

## 2024-08-22 RX ADMIN — SENNOSIDES 5 ML: 8.8 LIQUID ORAL at 12:08

## 2024-08-22 RX ADMIN — ZONISAMIDE 200 MG: 100 SUSPENSION ORAL at 09:08

## 2024-08-22 RX ADMIN — ACETAMINOPHEN 999.01 MG: 650 SOLUTION ORAL at 01:08

## 2024-08-22 NOTE — PT/OT/SLP PROGRESS
Occupational Therapy      Patient Name:  Cheryl Kirkland   MRN:  [id number]  S: Pt agreeable with therapy session  O: Pt seen in room sitting in bed side chair.the patient performed 1 set x 15 reps B UE ROM exercise seated in bed side chair( chest press; elbow flex/ext, shoulder flex and hand /digits flex/ext)  A: pt working toward  increasing B ue strength and endurance  P: Continue with POC  Lisandra Deutsch, OT  [id number]  1 seamus  8/22/2024

## 2024-08-22 NOTE — SUBJECTIVE & OBJECTIVE
Objective:     Vital Signs (Most Recent):  Temp: 98.5 °F (36.9 °C) (08/22/24 1101)  Pulse: 98 (08/22/24 1200)  Resp: 20 (08/22/24 1200)  BP: 128/66 (08/22/24 1200)  SpO2: 100 % (08/22/24 1200) Vital Signs (24h Range):  Temp:  [97.6 °F (36.4 °C)-98.5 °F (36.9 °C)] 98.5 °F (36.9 °C)  Pulse:  [77-98] 98  Resp:  [13-34] 20  SpO2:  [96 %-100 %] 100 %  BP: (116-160)/(59-80) 128/66     Weight: 61.3 kg (135 lb 2.3 oz)  Body mass index is 22.49 kg/m².      Intake/Output Summary (Last 24 hours) at 8/22/2024 1351  Last data filed at 8/22/2024 1228  Gross per 24 hour   Intake 3319.24 ml   Output 900 ml   Net 2419.24 ml        Physical Exam  Vitals and nursing note reviewed.   Constitutional:       General: She is not in acute distress.     Appearance: She is not ill-appearing, toxic-appearing or diaphoretic.   HENT:      Head: Normocephalic.   Eyes:      Pupils: Pupils are equal, round, and reactive to light.   Cardiovascular:      Rate and Rhythm: Tachycardia present.      Pulses: Normal pulses.   Pulmonary:      Effort: Pulmonary effort is normal.   Abdominal:      Palpations: Abdomen is soft.   Neurological:      General: No focal deficit present.      Mental Status: She is alert.           Review of Systems   Unable to perform ROS: Acuity of condition       Vents:  Oxygen Concentration (%): 98 (08/20/24 2145)    Lines/Drains/Airways       Drain  Duration                  Closed/Suction Drain 08/20/24 2046 Tube - 1 Right;Lateral Abdomen Bulb 15 Fr. 1 day         Closed/Suction Drain 08/20/24 2046 Tube - 2 Right;Lateral Abdomen Bulb 15 Fr. 1 day         Gastrostomy/Enterostomy 08/20/24 2009 Jejunostomy tube LUQ feeding 1 day         NG/OG Tube 08/20/24 1919 Ingalls sump 18 Fr. Right nostril 1 day              Peripheral Intravenous Line  Duration                  Peripheral IV - Single Lumen 08/20/24 0643 20 G Right Antecubital 2 days         Peripheral IV - Single Lumen 08/20/24 0700 18 G Left;Posterior Hand 2 days                     Significant Labs:    CBC/Anemia Profile:  Recent Labs   Lab 08/20/24 2132 08/21/24  0442 08/22/24  0305   WBC 8.56 10.38 9.71   HGB 14.7 14.7 14.2   HCT 42.7 43.4 43.9    259 215   MCV 97 99* 103*   RDW 13.5 13.8 14.5        Chemistries:  Recent Labs   Lab 08/20/24 2132 08/21/24  0533 08/22/24  0305    137 143   K 4.4 4.8 5.0   * 113* 114*   CO2 17* 18* 20*   BUN 12 14 18   CREATININE 0.7 0.8 0.8   CALCIUM 8.2* 7.6* 7.9*   ALBUMIN  --  2.8*  --    PROT  --  4.9*  --    BILITOT  --  0.3  --    ALKPHOS  --  60  --    ALT  --  206*  --    AST  --  183*  --    MG  --  1.5*  --    PHOS  --  3.8  --        All pertinent labs within the past 24 hours have been reviewed.    Significant Imaging:  I have reviewed all pertinent imaging results/findings within the past 24 hours.

## 2024-08-22 NOTE — PT/OT/SLP PROGRESS
Physical Therapy Treatment    Patient Name:  Cheryl Kirkland   MRN:  [id number]    Recommendations:     Discharge Recommendations: Low Intensity Therapy (WITH 24 HOUR CARE FOR SAFETY)  Discharge Equipment Recommendations: bath bench (RECOMMENDING RW USE AT HOME)  Barriers to discharge: None    Assessment:     Cheryl Kirkland is a 74 y.o. female admitted with a medical diagnosis of Gastric adenocarcinoma.  She presents with the following impairments/functional limitations: weakness, impaired endurance, gait instability, pain, decreased safety awareness, decreased coordination, impaired functional mobility, impaired balance.    Rehab Prognosis: Good; patient would benefit from acute skilled PT services to address these deficits and reach maximum level of function.    Recent Surgery: Procedure(s) (LRB):  GASTRECTOMY (N/A)  EGD (ESOPHAGOGASTRODUODENOSCOPY) (N/A)  LAPAROSCOPY, DIAGNOSTIC (N/A)  LYSIS, ADHESIONS (N/A)  INSERTION, JEJUNOSTOMY TUBE (N/A) 2 Days Post-Op    Plan:     During this hospitalization, patient to be seen 3 x/week to address the identified rehab impairments via gait training, therapeutic activities, therapeutic exercises and progress toward the following goals:    Plan of Care Expires:  09/04/24    Subjective     Chief Complaint: NONE, EAGER TO PARTICIPATE  Patient/Family Comments/goals: TO GET STRONGER AND GO HOME WITH FAMILY  Pain/Comfort:  Pain Rating 1: 3/10  Location 1: abdomen  Pain Addressed 1: Reposition (PCA PUMP)  Pain Rating 2: 5/10  Location 2: throat      Objective:     Communicated with NURSE LEON prior to session.  Patient found supine with G/J tube, peripheral IV, pulse ox (continuous), bed alarm, blood pressure cuff, APOLINAR drain, NG tube, marie catheter, PCA, telemetry upon PT entry to room.     General Precautions: Standard, fall  Orthopedic Precautions: N/A  Braces: N/A  Respiratory Status: Room air     Functional Mobility:  Bed Mobility:   **PT STILL ON PCA PUMP,  "SLOW DELAYED WITH MOBILITY BUT GOOD EFFORT, EXTRA TIME ALLOWED FOR ACTIVE PARTICIPATION  Rolling Left:  minimum assistance  Scooting: minimum assistance  Supine to Sit: minimum assistance-LOG ROLLING FOR BED MOBILITY  Transfers:     Sit to Stand:  minimum assistance with rolling walker  Bed to Chair: minimum assistance with  rolling walker  using  Step Transfer  Stand to sit: ekily  Gait: PT ' WITH RW AND CGA, SLOW STEADY PACE, CUES FOR UPRIGHT POSTURE AND RW SAFETY  Balance: GOOD SITTING BALANCE, FAIR DYNAMIC BALANCE DURING GAIT  PT EDUCATED IN AND PERFORMED SEATED BLE THEREX X 15 REPS: HIP FLEX/EXT, LAQ, AP'S    AM-PAC 6 CLICK MOBILITY  Turning over in bed (including adjusting bedclothes, sheets and blankets)?: 3  Sitting down on and standing up from a chair with arms (e.g., wheelchair, bedside commode, etc.): 3  Moving from lying on back to sitting on the side of the bed?: 3  Moving to and from a bed to a chair (including a wheelchair)?: 3  Need to walk in hospital room?: 3  Climbing 3-5 steps with a railing?: 1  Basic Mobility Total Score: 16     Treatment & Education:  PT EDUCATION:  - ROLE OF P.T. AND POC IN ACUTE CARE HOSPITAL SETTING  - RW USE AND SAFETY DURING TF'S AND GAIT  - ENCOURAGED TO INCREASE TIME OOB IN CHAIR TO TOLERANCE   - TO CONTINUE THERAPUETIC EXERCISES THROUGHOUT THE DAY TO INCREASE ACTIVITY TOLERANCE AND DECREASE RISK FOR PNEUMONIA AND BLOOD CLOTS: HIP FLEX/EXT, HIP ABD/ADD, QUAD SET, HEEL SLIDE, AP  - RISK FOR FALLS DUE TO GENERALIZED WEAKNESS, EDUCATED ON "CALL DON'T FALL", ENCOURAGED TO CALL FOR ASSISTANCE WITH ALL NEEDS SUCH AS BED<>CHAIR TRANSFERS OR TRIPS TO BATHROOM, PT AGREEABLE TO SAFETY PRECAUTIONS    Patient left up in chair with all lines intact, call button in reach, chair alarm on, NURSE notified, and NURSE present..    GOALS:   Multidisciplinary Problems       Physical Therapy Goals          Problem: Physical Therapy    Goal Priority Disciplines Outcome Goal Variances " Interventions   Physical Therapy Goal     PT, PT/OT Progressing     Description: LTG'S TO BE MET IN 14 DAYS (9-4-24)  PT WILL REQUIRE SPV FOR BED MOBILITY  PT WILL REQUIRE SPV FOR BED<>CHAIR TF'S  PT WILL  FEET WITH RW AND SPV  PT WILL INC AMPAC SCORE BY 2 POINTS TO PROGRESS GROSS FUNC MOBILITY                         Time Tracking:     PT Received On: 08/22/24  PT Start Time: 0700     PT Stop Time: 0740  PT Total Time (min): 40 min     Billable Minutes: Gait Training 15 and Therapeutic Activity 25    Treatment Type: Treatment  PT/PTA: PT     Number of PTA visits since last PT visit: 0     08/22/2024

## 2024-08-22 NOTE — CONSULTS
[facility] - Intensive Care ([facility])  [facility] Medicine  Consult Note    Patient Name: Cheryl Kirkland  MRN: [id number]  Admission Date: 8/20/2024  Hospital Length of Stay: 2 days  Attending Physician: Chen Jerome MD   Primary Care Provider: Gagandeep Iglesias MD           Patient information was obtained from patient and primary team.     Consults  Subjective:     Principal Problem: Gastric adenocarcinoma    Chief Complaint: No chief complaint on file.       HPI: 74F  has a past medical history of Anemia, Chronic deep vein thrombosis (DVT) of left lower extremity, Deep vein thrombosis, Drug-induced polyneuropathy, DVT (deep venous thrombosis), Epilepsy, Gastric adenocarcinoma, GERD (gastroesophageal reflux disease), Hyperlipidemia, Mixed epithelial carcinoma of right ovary, OP (osteoporosis), Ovarian cancer, and Primary hypertension.  Presents for definitive treatment of gastric adenocaricoma per primary. Status post total gastrectomy with j tube placement. Transferred to icu postoperatively for close monitoring. Tolerated procedure well. Expected pain and weakness. Physical/occupational therapy recommending low intensity therapy.    Hospital medicine consulted for medical management.    Initial review of chart reveals vital signs stable, on room air. Cbc unremarkable. Cmp with hyperchloremic acidosis improving. Normal renal function. Elevated liver enzymes. Amylase within normal limits. Hypomag. Hypoalbuminemia. Xray abdomen on 8/20 with operative changes.    Past Medical History:   Diagnosis Date    Anemia     Chronic deep vein thrombosis (DVT) of left lower extremity 10/21/2022    Deep vein thrombosis     Drug-induced polyneuropathy 02/28/2024    Noted by ROCK KAY NP  last documented on [id number]    DVT (deep venous thrombosis)     Epilepsy     Gastric adenocarcinoma 02/27/2024    GERD (gastroesophageal reflux disease)     Hyperlipidemia 01/10/2013    Formatting of this note might be  different from the original.   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    OP (osteoporosis) 02/28/2024    Ovarian cancer     Primary hypertension 12/01/2022       Past Surgical History:   Procedure Laterality Date    ABDOMINAL WASHOUT N/A 3/14/2024    Procedure: LAVAGE, PERITONEAL, THERAPEUTIC;  Surgeon: Chen Jerome MD;  Location: [facility] OR;  Service: General;  Laterality: N/A;    APPENDECTOMY  2015    BIOPSY OF PERITONEUM N/A 3/14/2024    Procedure: BIOPSY, PERITONEUM;  Surgeon: Chen Jerome MD;  Location: [facility] OR;  Service: General;  Laterality: N/A;    COLONOSCOPY  06/20/2012    Dr Boyle- Tubular adenoma polyps. Repeat in 3 years.    COLONOSCOPY  06/17/2015    -Nodular mucosa at appendiceal orfice, sigmoid and desc diverticulosis otherwise normal.    COLONOSCOPY  2020    >3 TAs    COLONOSCOPY  12/2023    DIAGNOSTIC LAPAROSCOPY N/A 3/14/2024    Procedure: LAPAROSCOPY, DIAGNOSTIC;  Surgeon: Chen Jerome MD;  Location: [facility];  Service: General;  Laterality: N/A;  1. Diagnostic laparoscopy   2. Extensive lysis of adhesions  3. Peritoneal biopsy   4. Peritoneal washings for cytology    DIAGNOSTIC LAPAROSCOPY N/A 8/20/2024    Procedure: LAPAROSCOPY, DIAGNOSTIC;  Surgeon: Chen Jerome MD;  Location: [facility];  Service: General;  Laterality: N/A;    EGD - EXTERNAL RESULT  06/20/2012    Dr Boyle- Mild gastritis otherwise normal.    ENDOSCOPIC ULTRASOUND OF UPPER GASTROINTESTINAL TRACT N/A 7/26/2024    Procedure: ULTRASOUND, UPPER GI TRACT, ENDOSCOPIC;  Surgeon: Valdo Aguilera MD;  Location: [facility];  Service: Endoscopy;  Laterality: N/A;  7/22/24: instructions sent via portal-. Pt no longer takling eliquis-GD    ESOPHAGOGASTRODUODENOSCOPY N/A 02/08/2024    Procedure: EGD (ESOPHAGOGASTRODUODENOSCOPY);  Surgeon: Jayla Arteaga MD;  Location: [facility];  Service: Endoscopy;  Laterality: N/A;    ESOPHAGOGASTRODUODENOSCOPY N/A 8/20/2024     Procedure: EGD (ESOPHAGOGASTRODUODENOSCOPY);  Surgeon: Chen Jerome MD;  Location: [facility] OR;  Service: General;  Laterality: N/A;    GASTRECTOMY N/A 8/20/2024    Procedure: GASTRECTOMY;  Surgeon: Chen Jerome MD;  Location: [facility] OR;  Service: General;  Laterality: N/A;    HYSTERECTOMY      LAPAROSCOPIC LYSIS OF ADHESIONS N/A 3/14/2024    Procedure: LYSIS, ADHESIONS, LAPAROSCOPIC;  Surgeon: Chen Jerome MD;  Location: [facility] OR;  Service: General;  Laterality: N/A;    LYSIS OF ADHESIONS N/A 8/20/2024    Procedure: LYSIS, ADHESIONS;  Surgeon: Chen Jerome MD;  Location: [facility] OR;  Service: General;  Laterality: N/A;    PLACEMENT OF JEJUNOSTOMY TUBE N/A 8/20/2024    Procedure: INSERTION, JEJUNOSTOMY TUBE;  Surgeon: Chen Jerome MD;  Location: [facility] OR;  Service: General;  Laterality: N/A;    TOTAL ABDOMINAL HYSTERECTOMY W/ BILATERAL SALPINGOOPHORECTOMY  01/09/2023    radical resection with right salpingo-oophorectomy, left salpingo-oophorectomy, extensive enterolysis, extensive adhesiolysis, left pelvic lymph node biopsy, omental biopsy, small bowel resection with primary functional end-to-end anastomosis, placement of pelvic drain       Review of patient's allergies indicates:  No Known Allergies    No current facility-administered medications on file prior to encounter.     Current Outpatient Medications on File Prior to Encounter   Medication Sig    carBAMazepine (TEGRETOL) 200 mg tablet Take by mouth. Pt states she take TID  1 tablet with breakfast  1 tablet with lunch   1.5 tablet at bedtime    levETIRAcetam (KEPPRA) 500 MG Tab Take 1 tablet by mouth 2 (two) times daily. Pt states she takes one pill at dinner and one at bedtime    zonisamide (ZONEGRAN) 100 MG Cap Take 200 mg by mouth 2 (two) times daily. 2cap w/ brkfst 2cap w/dinner    acetaminophen (TYLENOL) 500 MG tablet Take 2 tablets (1,000 mg total) by mouth every 8 (eight) hours. (Patient taking differently: Take 1,000 mg by mouth every 8  (eight) hours as needed for Pain.)    alendronate (FOSAMAX) 70 MG tablet Take 70 mg by mouth every 7 days. Pt stated she takes this every Sunday    calcium phosphate trib/vit D3 (CALCIUM PHOSPHATE-VITAMIN D3 ORAL) Take 1 tablet by mouth 2 (two) times daily.    hydrocortisone 2.5 % cream Apply topically 2 (two) times daily. (Patient taking differently: Apply topically 2 (two) times daily as needed.)    hydrOXYzine HCL (ATARAX) 25 MG tablet Take 1 tablet (25 mg total) by mouth 3 (three) times daily as needed for Itching.    ibuprofen (ADVIL,MOTRIN) 800 MG tablet Take 1 tablet (800 mg total) by mouth every 8 (eight) hours. (Patient taking differently: Take 800 mg by mouth every 8 (eight) hours as needed.)    oxyCODONE (ROXICODONE) 5 MG immediate release tablet Take 1 tablet (5 mg total) by mouth every 4 (four) hours as needed for Pain.     Family History       Problem Relation (Age of Onset)    Breast cancer Half-sister (58), Other    Colon cancer Half-sister (83), Other    Lung cancer Half-sister    Lymphoma Other    Ovarian cancer Other    Pancreatic cancer Brother (76), Half-brother (74)    Prostate cancer Brother (67), Other, Other          Tobacco Use    Smoking status: Former     Types: Cigarettes    Smokeless tobacco: Never    Tobacco comments:     Quit 1989 smoked 10 years smoked 0.25 ppd    Substance and Sexual Activity    Alcohol use: Not Currently    Drug use: Never    Sexual activity: Not on file     Review of Systems   Constitutional:  Positive for activity change, appetite change and fatigue. Negative for fever.   Respiratory:  Negative for shortness of breath.    Cardiovascular:  Positive for leg swelling.   Gastrointestinal:  Positive for abdominal pain. Negative for nausea and vomiting.   Skin:  Positive for wound.   Neurological:  Positive for weakness.   Psychiatric/Behavioral:  Negative for agitation, behavioral problems, confusion, decreased concentration and dysphoric mood. The patient is not  nervous/anxious.      Objective:     Vital Signs (Most Recent):  Temp: 98.5 °F (36.9 °C) (08/22/24 1101)  Pulse: 98 (08/22/24 1200)  Resp: 20 (08/22/24 1200)  BP: 128/66 (08/22/24 1200)  SpO2: 100 % (08/22/24 1200) Vital Signs (24h Range):  Temp:  [97.6 °F (36.4 °C)-98.5 °F (36.9 °C)] 98.5 °F (36.9 °C)  Pulse:  [77-98] 98  Resp:  [9-34] 20  SpO2:  [96 %-100 %] 100 %  BP: (111-160)/(59-80) 128/66     Weight: 61.3 kg (135 lb 2.3 oz)  Body mass index is 22.49 kg/m².     Physical Exam  Vitals and nursing note reviewed.   Constitutional:       General: She is not in acute distress.     Appearance: She is underweight. She is ill-appearing. She is not toxic-appearing.      Interventions: Nasal cannula in place.   HENT:      Head: Normocephalic and atraumatic.     Cardiovascular:      Rate and Rhythm: Tachycardia present.      Heart sounds: No murmur heard.  Pulmonary:      Effort: Pulmonary effort is normal. No respiratory distress.   Abdominal:      Tenderness: There is abdominal tenderness.      Comments: Abljit drain with serosanguinous fluid in collection bulbs   Appropriately tender  Surgical incision with surgical staples intact     Musculoskeletal:      Right lower leg: Edema present.      Left lower leg: No edema.   Skin:     General: Skin is warm.      Findings: Lesion present.   Neurological:      Mental Status: She is alert and oriented to person, place, and time.      Motor: Weakness present.   Psychiatric:         Mood and Affect: Mood and affect normal.         Behavior: Behavior normal. Behavior is cooperative.        Significant Labs: All pertinent labs within the past 24 hours have been reviewed.  CBC:   Recent Labs   Lab 08/20/24 2132 08/21/24  0442 08/22/24  0305   WBC 8.56 10.38 9.71   HGB 14.7 14.7 14.2   HCT 42.7 43.4 43.9    259 215     CMP:   Recent Labs   Lab 08/20/24  2132 08/21/24  0533 08/22/24  0305    137 143   K 4.4 4.8 5.0   * 113* 114*   CO2 17* 18* 20*   * 141* 114*    BUN 12 14 18   CREATININE 0.7 0.8 0.8   CALCIUM 8.2* 7.6* 7.9*   PROT  --  4.9*  --    ALBUMIN  --  2.8*  --    BILITOT  --  0.3  --    ALKPHOS  --  60  --    AST  --  183*  --    ALT  --  206*  --    ANIONGAP 8 6* 9       Significant Imaging: I have reviewed all pertinent imaging results/findings within the past 24 hours.  Assessment/Plan:     * Gastric adenocarcinoma  Status post total gastrectomy with j tube placement  Per primary      Primary hypertension  Chronic, controlled. Latest blood pressure and vitals reviewed-     Temp:  [97.6 °F (36.4 °C)-98.5 °F (36.9 °C)]   Pulse:  [77-98]   Resp:  [9-34]   BP: (111-160)/(59-80)   SpO2:  [96 %-100 %] .   Home meds for hypertension were reviewed and noted below.       While in the hospital, will manage blood pressure as follows; Adjust home antihypertensive regimen as follows- monitor, no home medication(s) for hypertension     Will utilize p.r.n. blood pressure medication only if patient's blood pressure greater than 180/110 and she develops symptoms such as worsening chest pain or shortness of breath.    Hx of Epileptic seizures  Resume home meds per j tube        VTE Risk Mitigation (From admission, onward)           Ordered     enoxaparin injection 40 mg  Daily         08/20/24 2219     IP VTE HIGH RISK PATIENT  Once         08/20/24 2219     Place sequential compression device  Until discontinued         08/20/24 2219                    Critical care time spent on the evaluation and treatment of severe organ dysfunction, review of pertinent labs and imaging studies, discussions with consulting providers and discussions with patient/family: 35 minutes.    Thank you for your consult. I will follow-up with patient. Please contact us if you have any additional questions.    Nilton Shaw MD  Department of Hospital Medicine   [facility] - Intensive Care ([facility])

## 2024-08-22 NOTE — HPI
74F  has a past medical history of Anemia, Chronic deep vein thrombosis (DVT) of left lower extremity, Deep vein thrombosis, Drug-induced polyneuropathy, DVT (deep venous thrombosis), Epilepsy, Gastric adenocarcinoma, GERD (gastroesophageal reflux disease), Hyperlipidemia, Mixed epithelial carcinoma of right ovary, OP (osteoporosis), Ovarian cancer, and Primary hypertension.  Presents for definitive treatment of gastric adenocaricoma per primary. Status post total gastrectomy with j tube placement. Transferred to icu postoperatively for close monitoring. Tolerated procedure well. Expected pain and weakness. Physical/occupational therapy recommending low intensity therapy.    Hospital medicine consulted for medical management.    Initial review of chart reveals vital signs stable, on room air. Cbc unremarkable. Cmp with hyperchloremic acidosis improving. Normal renal function. Elevated liver enzymes. Amylase within normal limits. Hypomag. Hypoalbuminemia. Xray abdomen on 8/20 with operative changes.

## 2024-08-22 NOTE — SUBJECTIVE & OBJECTIVE
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    OP (osteoporosis) 02/28/2024    Ovarian cancer     Primary hypertension 12/01/2022       Past Surgical History:   Procedure Laterality Date    ABDOMINAL WASHOUT N/A 3/14/2024    Procedure: LAVAGE, PERITONEAL, THERAPEUTIC;  Surgeon: Chen Jerome MD;  Location: [facility] OR;  Service: General;  Laterality: N/A;    APPENDECTOMY  2015    BIOPSY OF PERITONEUM N/A 3/14/2024    Procedure: BIOPSY, PERITONEUM;  Surgeon: Chen Jerome MD;  Location: [facility] OR;  Service: General;  Laterality: N/A;    COLONOSCOPY  06/20/2012    Dr Boyle- Tubular adenoma polyps. Repeat in 3 years.    COLONOSCOPY  06/17/2015    -Nodular mucosa at appendiceal orfice, sigmoid and desc diverticulosis otherwise normal.    COLONOSCOPY  2020    >3 TAs    COLONOSCOPY  12/2023    DIAGNOSTIC LAPAROSCOPY N/A 3/14/2024    Procedure: LAPAROSCOPY, DIAGNOSTIC;  Surgeon: Chen Jerome MD;  Location: [facility] OR;  Service: General;  Laterality: N/A;  1. Diagnostic laparoscopy   2. Extensive lysis of adhesions  3. Peritoneal biopsy   4. Peritoneal washings for cytology    DIAGNOSTIC LAPAROSCOPY N/A 8/20/2024    Procedure: LAPAROSCOPY, DIAGNOSTIC;  Surgeon: Chen Jerome MD;  Location: [facility] OR;  Service: General;  Laterality: N/A;    EGD - EXTERNAL RESULT  06/20/2012    Dr Boyle- Mild gastritis otherwise normal.    ENDOSCOPIC ULTRASOUND OF UPPER GASTROINTESTINAL TRACT N/A 7/26/2024     Procedure: ULTRASOUND, UPPER GI TRACT, ENDOSCOPIC;  Surgeon: Valdo Aguilera MD;  Location: [facility] ENDO;  Service: Endoscopy;  Laterality: N/A;  7/22/24: instructions sent via portal-. Pt no longer takling eliquis-GD    ESOPHAGOGASTRODUODENOSCOPY N/A 02/08/2024    Procedure: EGD (ESOPHAGOGASTRODUODENOSCOPY);  Surgeon: Jayla Arteaga MD;  Location: [facility] ENDO;  Service: Endoscopy;  Laterality: N/A;    ESOPHAGOGASTRODUODENOSCOPY N/A 8/20/2024    Procedure: EGD (ESOPHAGOGASTRODUODENOSCOPY);  Surgeon: Chen Jerome MD;  Location: [facility] OR;  Service: General;  Laterality: N/A;    GASTRECTOMY N/A 8/20/2024    Procedure: GASTRECTOMY;  Surgeon: Chen Jerome MD;  Location: [facility] OR;  Service: General;  Laterality: N/A;    HYSTERECTOMY      LAPAROSCOPIC LYSIS OF ADHESIONS N/A 3/14/2024    Procedure: LYSIS, ADHESIONS, LAPAROSCOPIC;  Surgeon: Chen Jerome MD;  Location: [facility] OR;  Service: General;  Laterality: N/A;    LYSIS OF ADHESIONS N/A 8/20/2024    Procedure: LYSIS, ADHESIONS;  Surgeon: Chen Jerome MD;  Location: [facility] OR;  Service: General;  Laterality: N/A;    PLACEMENT OF JEJUNOSTOMY TUBE N/A 8/20/2024    Procedure: INSERTION, JEJUNOSTOMY TUBE;  Surgeon: Chen Jerome MD;  Location: [facility] OR;  Service: General;  Laterality: N/A;    TOTAL ABDOMINAL HYSTERECTOMY W/ BILATERAL SALPINGOOPHORECTOMY  01/09/2023    radical resection with right salpingo-oophorectomy, left salpingo-oophorectomy, extensive enterolysis, extensive adhesiolysis, left pelvic lymph node biopsy, omental biopsy, small bowel resection with primary functional end-to-end anastomosis, placement of pelvic drain       Review of patient's allergies indicates:  No Known Allergies    No current facility-administered medications on file prior to encounter.     Current Outpatient Medications on File Prior to Encounter   Medication Sig    carBAMazepine (TEGRETOL) 200 mg tablet Take by mouth. Pt states she take TID  1 tablet with breakfast  1  tablet with lunch   1.5 tablet at bedtime    levETIRAcetam (KEPPRA) 500 MG Tab Take 1 tablet by mouth 2 (two) times daily. Pt states she takes one pill at dinner and one at bedtime    zonisamide (ZONEGRAN) 100 MG Cap Take 200 mg by mouth 2 (two) times daily. 2cap w/ brkfst 2cap w/dinner    acetaminophen (TYLENOL) 500 MG tablet Take 2 tablets (1,000 mg total) by mouth every 8 (eight) hours. (Patient taking differently: Take 1,000 mg by mouth every 8 (eight) hours as needed for Pain.)    alendronate (FOSAMAX) 70 MG tablet Take 70 mg by mouth every 7 days. Pt stated she takes this every Sunday    calcium phosphate trib/vit D3 (CALCIUM PHOSPHATE-VITAMIN D3 ORAL) Take 1 tablet by mouth 2 (two) times daily.    hydrocortisone 2.5 % cream Apply topically 2 (two) times daily. (Patient taking differently: Apply topically 2 (two) times daily as needed.)    hydrOXYzine HCL (ATARAX) 25 MG tablet Take 1 tablet (25 mg total) by mouth 3 (three) times daily as needed for Itching.    ibuprofen (ADVIL,MOTRIN) 800 MG tablet Take 1 tablet (800 mg total) by mouth every 8 (eight) hours. (Patient taking differently: Take 800 mg by mouth every 8 (eight) hours as needed.)    oxyCODONE (ROXICODONE) 5 MG immediate release tablet Take 1 tablet (5 mg total) by mouth every 4 (four) hours as needed for Pain.     Family History       Problem Relation (Age of Onset)    Breast cancer Half-sister (58), Other    Colon cancer Half-sister (83), Other    Lung cancer Half-sister    Lymphoma Other    Ovarian cancer Other    Pancreatic cancer Brother (76), Half-brother (74)    Prostate cancer Brother (67), Other, Other          Tobacco Use    Smoking status: Former     Types: Cigarettes    Smokeless tobacco: Never    Tobacco comments:     Quit 1989 smoked 10 years smoked 0.25 ppd    Substance and Sexual Activity    Alcohol use: Not Currently    Drug use: Never    Sexual activity: Not on file     Review of Systems   Constitutional:  Positive for activity  change, appetite change and fatigue. Negative for fever.   Respiratory:  Negative for shortness of breath.    Cardiovascular:  Positive for leg swelling.   Gastrointestinal:  Positive for abdominal pain. Negative for nausea and vomiting.   Skin:  Positive for wound.   Neurological:  Positive for weakness.   Psychiatric/Behavioral:  Negative for agitation, behavioral problems, confusion, decreased concentration and dysphoric mood. The patient is not nervous/anxious.      Objective:     Vital Signs (Most Recent):  Temp: 98.5 °F (36.9 °C) (08/22/24 1101)  Pulse: 98 (08/22/24 1200)  Resp: 20 (08/22/24 1200)  BP: 128/66 (08/22/24 1200)  SpO2: 100 % (08/22/24 1200) Vital Signs (24h Range):  Temp:  [97.6 °F (36.4 °C)-98.5 °F (36.9 °C)] 98.5 °F (36.9 °C)  Pulse:  [77-98] 98  Resp:  [9-34] 20  SpO2:  [96 %-100 %] 100 %  BP: (111-160)/(59-80) 128/66     Weight: 61.3 kg (135 lb 2.3 oz)  Body mass index is 22.49 kg/m².     Physical Exam  Vitals and nursing note reviewed.   Constitutional:       General: She is not in acute distress.     Appearance: She is underweight. She is ill-appearing. She is not toxic-appearing.      Interventions: Nasal cannula in place.   HENT:      Head: Normocephalic and atraumatic.     Cardiovascular:      Rate and Rhythm: Tachycardia present.      Heart sounds: No murmur heard.  Pulmonary:      Effort: Pulmonary effort is normal. No respiratory distress.   Abdominal:      Tenderness: There is abdominal tenderness.      Comments: Baljit drain with serosanguinous fluid in collection bulbs   Appropriately tender  Surgical incision with surgical staples intact     Musculoskeletal:      Right lower leg: Edema present.      Left lower leg: No edema.   Skin:     General: Skin is warm.      Findings: Lesion present.   Neurological:      Mental Status: She is alert and oriented to person, place, and time.      Motor: Weakness present.   Psychiatric:         Mood and Affect: Mood and affect normal.          Behavior: Behavior normal. Behavior is cooperative.        Significant Labs: All pertinent labs within the past 24 hours have been reviewed.  CBC:   Recent Labs   Lab 08/20/24  2132 08/21/24  0442 08/22/24  0305   WBC 8.56 10.38 9.71   HGB 14.7 14.7 14.2   HCT 42.7 43.4 43.9    259 215     CMP:   Recent Labs   Lab 08/20/24 2132 08/21/24  0533 08/22/24  0305    137 143   K 4.4 4.8 5.0   * 113* 114*   CO2 17* 18* 20*   * 141* 114*   BUN 12 14 18   CREATININE 0.7 0.8 0.8   CALCIUM 8.2* 7.6* 7.9*   PROT  --  4.9*  --    ALBUMIN  --  2.8*  --    BILITOT  --  0.3  --    ALKPHOS  --  60  --    AST  --  183*  --    ALT  --  206*  --    ANIONGAP 8 6* 9       Significant Imaging: I have reviewed all pertinent imaging results/findings within the past 24 hours.

## 2024-08-22 NOTE — SUBJECTIVE & OBJECTIVE
Interval History: AFVSS.  FRANCY.  Passed some flatus yesterday.  Pain controlled.  Walked 3x yesterday.     Medications:  Continuous Infusions:   0.9% NaCl   Intravenous Continuous 125 mL/hr at 08/22/24 1200 Rate Verify at 08/22/24 1200    hydromorphone in 0.9 % NaCl 6 mg/30 ml   Intravenous Continuous   New Syringe/Bag at 08/21/24 1258     Scheduled Meds:   acetaminophen  650 mg Per J Tube Q8H    carBAMazepine  200 mg Per J Tube BID    carBAMazepine  300 mg Per J Tube QHS    chlorhexidine  10 mL Mouth/Throat BID    enoxparin  40 mg Subcutaneous Daily    famotidine (PF)  20 mg Intravenous BID    levETIRAcetam (Keppra) IV (PEDS and ADULTS)  500 mg Intravenous Q12H    sennosides 8.8 mg/5 ml  5 mL Per J Tube BID    zonisamide  200 mg Per J Tube BID     PRN Meds:  Current Facility-Administered Medications:     dextrose 10%, 12.5 g, Intravenous, PRN    dextrose 10%, 25 g, Intravenous, PRN    glucagon (human recombinant), 1 mg, Intramuscular, PRN    hydrALAZINE, 10 mg, Intravenous, Q6H PRN    insulin aspart U-100, 0-5 Units, Subcutaneous, Q6H PRN    naloxone, 0.02 mg, Intravenous, PRN    ondansetron, 4 mg, Intravenous, Q6H PRN    pneumoc 20-faina conj-dip cr(PF), 0.5 mL, Intramuscular, vaccine x 1 dose    promethazine, 25 mg, Rectal, Q6H PRN     Review of patient's allergies indicates:  No Known Allergies  Objective:     Vital Signs (Most Recent):  Temp: 98.5 °F (36.9 °C) (08/22/24 1101)  Pulse: 98 (08/22/24 1200)  Resp: 20 (08/22/24 1200)  BP: 128/66 (08/22/24 1200)  SpO2: 100 % (08/22/24 1200) Vital Signs (24h Range):  Temp:  [97.6 °F (36.4 °C)-98.5 °F (36.9 °C)] 98.5 °F (36.9 °C)  Pulse:  [77-98] 98  Resp:  [13-34] 20  SpO2:  [96 %-100 %] 100 %  BP: (116-160)/(59-80) 128/66     Weight: 61.3 kg (135 lb 2.3 oz)  Body mass index is 22.49 kg/m².    Intake/Output - Last 3 Shifts         08/20 0700 08/21 0659 08/21 0700 08/22 0659 08/22 0700 08/23 0659    I.V. (mL/kg) 1815 (30.8) 2648.9 (43.2) 682.9 (11.1)    NG/GT  325 110     IV Piggyback 2871.7 311.1 99.9    Total Intake(mL/kg) 4686.7 (79.6) 3284.9 (53.6) 892.8 (14.6)    Urine (mL/kg/hr) 890 (0.6) 580 (0.4) 225 (0.6)    Drains 180 330 25    Blood 200      Total Output 1270 910 250    Net +3416.7 +2374.9 +642.8                    Physical Exam  Constitutional:       General: She is not in acute distress.     Appearance: Normal appearance.      Comments: Appears uncomfortable   HENT:      Head: Normocephalic and atraumatic.   Eyes:      Extraocular Movements: Extraocular movements intact.      Conjunctiva/sclera: Conjunctivae normal.   Cardiovascular:      Rate and Rhythm: Normal rate and regular rhythm.   Pulmonary:      Effort: Pulmonary effort is normal.   Abdominal:      General: Abdomen is flat. There is no distension.      Palpations: Abdomen is soft. There is no mass.      Hernia: No hernia is present.      Comments: Appropriately TTP, APOLINAR drains serosanguinous, J tube in place, incision c/d/i   Musculoskeletal:         General: No swelling, tenderness, deformity or signs of injury. Normal range of motion.   Skin:     General: Skin is warm and dry.      Coloration: Skin is not jaundiced.   Neurological:      General: No focal deficit present.      Mental Status: She is alert and oriented to person, place, and time.   Psychiatric:         Mood and Affect: Mood normal.         Behavior: Behavior normal.         Thought Content: Thought content normal.          Significant Labs:  I have reviewed all pertinent lab results within the past 24 hours.  CBC:   Recent Labs   Lab 08/22/24  0305   WBC 9.71   RBC 4.28   HGB 14.2   HCT 43.9      *   MCH 33.2*   MCHC 32.3     BMP:   Recent Labs   Lab 08/21/24  0533 08/22/24  0305   * 114*    143   K 4.8 5.0   * 114*   CO2 18* 20*   BUN 14 18   CREATININE 0.8 0.8   CALCIUM 7.6* 7.9*   MG 1.5*  --      Microbiology Results (last 7 days)       ** No results found for the last 168 hours. **          Specimen (24h  "ago, onward)      None          No results for input(s): "COLORU", "CLARITYU", "SPECGRAV", "PHUR", "PROTEINUA", "GLUCOSEU", "BILIRUBINCON", "BLOODU", "WBCU", "RBCU", "BACTERIA", "MUCUS", "NITRITE", "LEUKOCYTESUR", "UROBILINOGEN", "HYALINECASTS" in the last 168 hours.    Significant Diagnostics:  I have reviewed all pertinent imaging results/findings within the past 24 hours.  "

## 2024-08-22 NOTE — PROGRESS NOTES
[facility] \[facility] [place] [facility]\"")  General Surgery  Progress Note    Subjective:     History of Present Illness:  Cheryl Kirkland is a 74 y.o. year old female with a history of ovarian cancer status post surgery and chemotherapy as well as epilepsy and DVT (provoked during her treatment for ovarian cancer), and MSI high gastric adenocarcinoma.  She was originally diagnosed back in February of this past year.  She was noted to have a very proximal enlarged tumor.  EGD was done as was complete metastatic workup which was negative.  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  Due to her significant adhesive disease she understood that she would require an open operation.  She was discussed in multidisciplinary tumor board and the consensus was to proceed with immunotherapy due to her MSI high status.  She completed 5 cycles of neoadjuvant immunotherapy with Keytruda and on restaging imaging was found to have enlarged Danelle portal lymph nodes.  Consensus was that she would need a total gastrectomy due to the proximity of her tumor in relationship to the GE junction.  Due to the extent of the operation required and the her previous history of ovarian cancer the tumor board consensus was to proceed with the EUS guided biopsy to rule out any ovarian cancer and to rule out pseudo progression as well.  This was performed and showed adenocarcinoma consistent with her gastric primary.  Therefore the decision was to proceed with surgical intervention due to the mixed response she had from immunotherapy on PET-CT scan.     Risks and benefits were reviewed including bleeding, infection, pain, scar, damage to surrounding structures, cardiovascular and pulmonary complications, anastomotic leak, positive margins, need for additional procedures, death, and  imponderables.  She expressed understanding and gave informed consent to proceed.    Post-Op Info:  Procedure(s) (LRB):  GASTRECTOMY (N/A)  EGD (ESOPHAGOGASTRODUODENOSCOPY) (N/A)  LAPAROSCOPY, DIAGNOSTIC (N/A)  LYSIS, ADHESIONS (N/A)  INSERTION, JEJUNOSTOMY TUBE (N/A)   2 Days Post-Op     Interval History: AFVSS.  FRANCY.  Passed some flatus yesterday.  Pain controlled.  Walked 3x yesterday.     Medications:  Continuous Infusions:   0.9% NaCl   Intravenous Continuous 125 mL/hr at 08/22/24 1200 Rate Verify at 08/22/24 1200    hydromorphone in 0.9 % NaCl 6 mg/30 ml   Intravenous Continuous   New Syringe/Bag at 08/21/24 1258     Scheduled Meds:   acetaminophen  650 mg Per J Tube Q8H    carBAMazepine  200 mg Per J Tube BID    carBAMazepine  300 mg Per J Tube QHS    chlorhexidine  10 mL Mouth/Throat BID    enoxparin  40 mg Subcutaneous Daily    famotidine (PF)  20 mg Intravenous BID    levETIRAcetam (Keppra) IV (PEDS and ADULTS)  500 mg Intravenous Q12H    sennosides 8.8 mg/5 ml  5 mL Per J Tube BID    zonisamide  200 mg Per J Tube BID     PRN Meds:  Current Facility-Administered Medications:     dextrose 10%, 12.5 g, Intravenous, PRN    dextrose 10%, 25 g, Intravenous, PRN    glucagon (human recombinant), 1 mg, Intramuscular, PRN    hydrALAZINE, 10 mg, Intravenous, Q6H PRN    insulin aspart U-100, 0-5 Units, Subcutaneous, Q6H PRN    naloxone, 0.02 mg, Intravenous, PRN    ondansetron, 4 mg, Intravenous, Q6H PRN    pneumoc 20-faina conj-dip cr(PF), 0.5 mL, Intramuscular, vaccine x 1 dose    promethazine, 25 mg, Rectal, Q6H PRN     Review of patient's allergies indicates:  No Known Allergies  Objective:     Vital Signs (Most Recent):  Temp: 98.5 °F (36.9 °C) (08/22/24 1101)  Pulse: 98 (08/22/24 1200)  Resp: 20 (08/22/24 1200)  BP: 128/66 (08/22/24 1200)  SpO2: 100 % (08/22/24 1200) Vital Signs (24h Range):  Temp:  [97.6 °F (36.4 °C)-98.5 °F (36.9 °C)] 98.5 °F (36.9 °C)  Pulse:  [77-98] 98  Resp:  [13-34] 20  SpO2:  [96 %-100 %]  100 %  BP: (116-160)/(59-80) 128/66     Weight: 61.3 kg (135 lb 2.3 oz)  Body mass index is 22.49 kg/m².    Intake/Output - Last 3 Shifts         08/20 0700 08/21 0659 08/21 0700 08/22 0659 08/22 0700 08/23 0659    I.V. (mL/kg) 1815 (30.8) 2648.9 (43.2) 682.9 (11.1)    NG/GT  325 110    IV Piggyback 2871.7 311.1 99.9    Total Intake(mL/kg) 4686.7 (79.6) 3284.9 (53.6) 892.8 (14.6)    Urine (mL/kg/hr) 890 (0.6) 580 (0.4) 225 (0.6)    Drains 180 330 25    Blood 200      Total Output 1270 910 250    Net +3416.7 +2374.9 +642.8                    Physical Exam  Constitutional:       General: She is not in acute distress.     Appearance: Normal appearance.      Comments: Appears uncomfortable   HENT:      Head: Normocephalic and atraumatic.   Eyes:      Extraocular Movements: Extraocular movements intact.      Conjunctiva/sclera: Conjunctivae normal.   Cardiovascular:      Rate and Rhythm: Normal rate and regular rhythm.   Pulmonary:      Effort: Pulmonary effort is normal.   Abdominal:      General: Abdomen is flat. There is no distension.      Palpations: Abdomen is soft. There is no mass.      Hernia: No hernia is present.      Comments: Appropriately TTP, APOLINAR drains serosanguinous, J tube in place, incision c/d/i   Musculoskeletal:         General: No swelling, tenderness, deformity or signs of injury. Normal range of motion.   Skin:     General: Skin is warm and dry.      Coloration: Skin is not jaundiced.   Neurological:      General: No focal deficit present.      Mental Status: She is alert and oriented to person, place, and time.   Psychiatric:         Mood and Affect: Mood normal.         Behavior: Behavior normal.         Thought Content: Thought content normal.          Significant Labs:  I have reviewed all pertinent lab results within the past 24 hours.  CBC:   Recent Labs   Lab 08/22/24  0305   WBC 9.71   RBC 4.28   HGB 14.2   HCT 43.9      *   MCH 33.2*   MCHC 32.3     BMP:   Recent Labs  "  Lab 08/21/24  0533 08/22/24  0305   * 114*    143   K 4.8 5.0   * 114*   CO2 18* 20*   BUN 14 18   CREATININE 0.8 0.8   CALCIUM 7.6* 7.9*   MG 1.5*  --      Microbiology Results (last 7 days)       ** No results found for the last 168 hours. **          Specimen (24h ago, onward)      None          No results for input(s): "COLORU", "CLARITYU", "SPECGRAV", "PHUR", "PROTEINUA", "GLUCOSEU", "BILIRUBINCON", "BLOODU", "WBCU", "RBCU", "BACTERIA", "MUCUS", "NITRITE", "LEUKOCYTESUR", "UROBILINOGEN", "HYALINECASTS" in the last 168 hours.    Significant Diagnostics:  I have reviewed all pertinent imaging results/findings within the past 24 hours.  Assessment/Plan:     * Gastric adenocarcinoma  POD #2- s/p exlap, extensive SHAYNA, small bowel resection, total gastrectomy with stapled esophagojejunal anastomosis and D2 lymphadenectomy, and Jtube placement    Doing well.  Pain better controlled.  Tolerating trickle tube feeds.  Thinks she passed a little flatus yesterday.   Only pulling 1000 on IS.  Drain amylases mildly elevated yesterday- will repeat tomorrow.  Plan for UGI tomorrow for leak test before pulling NJ tube.       -- strict NPO , NJT to LIWS  -- ok for liquid medications via J tube  -- continue tylenol via J tube and dilaudid PCA.    -- continue trickle TF via J tube today @10cc/hr without advancing.  Nutrition consulted  -- d/c marie today.  Strict I/Os  -- Senna liquid via Jtube BID   -- repeat drain amylases and serum amylase tomorrow  -- CM consult for discharge / home health planning   -- OOB To chair and walk today x5, OT/PT consulted  -- encourage IS  Dispo: transfer to floor with tele (5th floor only)    Lovenox and SCDs for DVT ppx, no indication for GI ppx         Primary hypertension  -- appreciate medicine assistance with management     Hx of Epileptic seizures  -- appreciate medicine assistance with management         Chen Jerome MD  General Surgery  [facility] " ([facility])

## 2024-08-22 NOTE — PROGRESS NOTES
[facility] - Intensive Care [facility])  Critical Care Medicine  Progress Note    Patient Name: Cheryl Kirkland  MRN: [id number]  Admission Date: 8/20/2024  Hospital Length of Stay: 2 days  Code Status: Full Code  Attending Provider: Chen Jerome MD  Primary Care Provider: Gagandeep Iglesias MD   Principal Problem: Gastric adenocarcinoma    Subjective:     HPI:  74 year old female with a known past medical history of HTN, HLD, osteoporosis, ovarian cancer, DVT (2022), epilepsy, and gastric adenocarcinoma that was admitted 8/20 for planned diagnostic laparoscopy, possible total gastrectomy and Jtube placement, and EGD with Dr. Jerome.   Post-op pt was transferred to the ICU and CM consulted for ICU mgmt.      Hospital/ICU Course:  Arrived to ICU ~ 2220 sedated post anesthesia on 2L NC oxygen     8/21 Awake and oriented.   Up in a recliner,  at the bedside.   8/22 Awake, more awake, oriented. Up in a recliner.         Objective:     Vital Signs (Most Recent):  Temp: 98.5 °F (36.9 °C) (08/22/24 1101)  Pulse: 98 (08/22/24 1200)  Resp: 20 (08/22/24 1200)  BP: 128/66 (08/22/24 1200)  SpO2: 100 % (08/22/24 1200) Vital Signs (24h Range):  Temp:  [97.6 °F (36.4 °C)-98.5 °F (36.9 °C)] 98.5 °F (36.9 °C)  Pulse:  [77-98] 98  Resp:  [13-34] 20  SpO2:  [96 %-100 %] 100 %  BP: (116-160)/(59-80) 128/66     Weight: 61.3 kg (135 lb 2.3 oz)  Body mass index is 22.49 kg/m².      Intake/Output Summary (Last 24 hours) at 8/22/2024 1351  Last data filed at 8/22/2024 1228  Gross per 24 hour   Intake 3319.24 ml   Output 900 ml   Net 2419.24 ml        Physical Exam  Vitals and nursing note reviewed.   Constitutional:       General: She is not in acute distress.     Appearance: She is not ill-appearing, toxic-appearing or diaphoretic.   HENT:      Head: Normocephalic.   Eyes:      Pupils: Pupils are equal, round, and reactive to light.   Cardiovascular:      Rate and Rhythm: Tachycardia present.      Pulses: Normal pulses.  "  Pulmonary:      Effort: Pulmonary effort is normal.   Abdominal:      Palpations: Abdomen is soft.   Neurological:      General: No focal deficit present.      Mental Status: She is alert.           Review of Systems   Unable to perform ROS: Acuity of condition       Vents:  Oxygen Concentration (%): 98 (08/20/24 2145)    Lines/Drains/Airways       Drain  Duration                  Closed/Suction Drain 08/20/24 2046 Tube - 1 Right;Lateral Abdomen Bulb 15 Fr. 1 day         Closed/Suction Drain 08/20/24 2046 Tube - 2 Right;Lateral Abdomen Bulb 15 Fr. 1 day         Gastrostomy/Enterostomy 08/20/24 2009 Jejunostomy tube LUQ feeding 1 day         NG/OG Tube 08/20/24 1919 [place] sump 18 Fr. Right nostril 1 day              Peripheral Intravenous Line  Duration                  Peripheral IV - Single Lumen 08/20/24 0643 20 G Right Antecubital 2 days         Peripheral IV - Single Lumen 08/20/24 0700 18 G Left;Posterior Hand 2 days                    Significant Labs:    CBC/Anemia Profile:  Recent Labs   Lab 08/20/24 2132 08/21/24  0442 08/22/24  0305   WBC 8.56 10.38 9.71   HGB 14.7 14.7 14.2   HCT 42.7 43.4 43.9    259 215   MCV 97 99* 103*   RDW 13.5 13.8 14.5        Chemistries:  Recent Labs   Lab 08/20/24 2132 08/21/24  0533 08/22/24  0305    137 143   K 4.4 4.8 5.0   * 113* 114*   CO2 17* 18* 20*   BUN 12 14 18   CREATININE 0.7 0.8 0.8   CALCIUM 8.2* 7.6* 7.9*   ALBUMIN  --  2.8*  --    PROT  --  4.9*  --    BILITOT  --  0.3  --    ALKPHOS  --  60  --    ALT  --  206*  --    AST  --  183*  --    MG  --  1.5*  --    PHOS  --  3.8  --        All pertinent labs within the past 24 hours have been reviewed.    Significant Imaging:  I have reviewed all pertinent imaging results/findings within the past 24 hours.    ABG  No results for input(s): "PH", "PO2", "PCO2", "HCO3", "BE" in the last 168 hours.  Assessment/Plan:     Neuro  Hx of Epileptic seizures  On keppra, zonisamide, carbamazepine at " home  NPO immediately post gastrectomy  Will give IV keppra  Resume liquid AEDs via J tube tomorrow  Seizure monitoring and precautions    8/21 Change all antiepileptic medications to per J tube  8/22 Tolerating medication and tube feedings.    Cardiac/Vascular  Primary hypertension  NPO post gastrectomy  ICU hemodynamic monitoring  Prn hydralazine for SBP > 180 with adequate pain control    Oncology  * Gastric adenocarcinoma  Now post op gastrectomy with J tube in place  PCA analgesia ordered  Plan for liquid meds only via j tube starting tomorrow  Maintain NJ tube to LIWS with plan for removal in few days after leak test     8/21 Up in a chair  PT/OT  IS  May DC NG tomorrow  Start J tube medications.     8/22 More awake and oriented. No SOB. Tolerating TF         Zara Pro MD  Critical Care Medicine  [facility] - Intensive Care ([facility])

## 2024-08-22 NOTE — ANESTHESIA POSTPROCEDURE EVALUATION
Anesthesia Post Evaluation    Patient: Cheryl Kirkland    Procedure(s) Performed: Procedure(s) (LRB):  GASTRECTOMY (N/A)  EGD (ESOPHAGOGASTRODUODENOSCOPY) (N/A)  LAPAROSCOPY, DIAGNOSTIC (N/A)  LYSIS, ADHESIONS (N/A)  INSERTION, JEJUNOSTOMY TUBE (N/A)    Final Anesthesia Type: general      Patient location during evaluation: PACU  Patient participation: Yes- Able to Participate  Level of consciousness: awake and alert and oriented  Post-procedure vital signs: reviewed and stable  Pain management: adequate  Airway patency: patent  DWIGHT mitigation strategies: Verification of full reversal of neuromuscular block  PONV status at discharge: No PONV  Anesthetic complications: no      Cardiovascular status: blood pressure returned to baseline and hemodynamically stable  Respiratory status: unassisted  Hydration status: euvolemic  Follow-up not needed.              Vitals Value Taken Time   /67 08/20/24 2200   Temp 36.9 °C (98.4 °F) 08/20/24 2200   Pulse 91 08/20/24 2200   Resp 22 08/20/24 2200   SpO2 97 % 08/20/24 2200   Vitals shown include unfiled device data.      Event Time   Out of Recovery 22:08:00         Pain/Marli Score: Pain Rating Prior to Med Admin: 3 (8/22/2024  1:57 PM)  Pain Rating Post Med Admin: 2 (8/22/2024  2:44 PM)

## 2024-08-22 NOTE — PLAN OF CARE
Pt's VS stable throughout shift. Afebrile. SR. Normotensive. Roca in place upon initial assessment; removed, pt has voided since removal. AAOx4. Bridled NGT remains in place to LIWS; scant, bloody drainage noted in suction-line, surgeon aware. ABD incision C/D/I; dressing removed per surgeon, staples intact. APOLINAR drains x2 remain in place. Pt ambulates with 1 person assist; ambulated around ICU multiple times with walker. IVF's and PCA pump infusing, see MAR. Downgrade orders in place. Pt and pt's  updated on status and plan of care.

## 2024-08-22 NOTE — PLAN OF CARE
Pt AAOx4. On room air. VSS. NSR on heart monitor. Pain controlled by PCA dilaudid. 10-55ml/hr UOP per marie. APOLINAR drains in place. Tolerating TF at 10ml/hr. No other significant events this shift. Turned q2h.Bed low and locked. Call light within reach. Bed alarm on. Will continue to monitor.

## 2024-08-23 LAB
AMYLASE SERPL-CCNC: 67 U/L (ref 20–110)
AMYLASE, BODY FLUID: 145 U/L
AMYLASE, BODY FLUID: 76 U/L
ANION GAP SERPL CALC-SCNC: 7 MMOL/L (ref 8–16)
BODY FLUID SOURCE AMYLASE: NORMAL
BODY FLUID SOURCE AMYLASE: NORMAL
BUN SERPL-MCNC: 13 MG/DL (ref 8–23)
CALCIUM SERPL-MCNC: 7.5 MG/DL (ref 8.7–10.5)
CARBAMAZEPINE SERPL-MCNC: 10.6 UG/ML (ref 4–12)
CHLORIDE SERPL-SCNC: 117 MMOL/L (ref 95–110)
CO2 SERPL-SCNC: 18 MMOL/L (ref 23–29)
CREAT SERPL-MCNC: 0.6 MG/DL (ref 0.5–1.4)
ERYTHROCYTE [DISTWIDTH] IN BLOOD BY AUTOMATED COUNT: 14.5 % (ref 11.5–14.5)
ERYTHROCYTE [DISTWIDTH] IN BLOOD BY AUTOMATED COUNT: 14.5 % (ref 11.5–14.5)
EST. GFR  (NO RACE VARIABLE): >60 ML/MIN/1.73 M^2
FINAL PATHOLOGIC DIAGNOSIS: NORMAL
FROZEN SECTION DIAGNOSIS: NORMAL
FROZEN SECTION FOOTNOTE: NORMAL
GLUCOSE SERPL-MCNC: 117 MG/DL (ref 70–110)
GROSS: NORMAL
HCT VFR BLD AUTO: 33.4 % (ref 37–48.5)
HCT VFR BLD AUTO: 38.5 % (ref 37–48.5)
HGB BLD-MCNC: 11 G/DL (ref 12–16)
HGB BLD-MCNC: 12.6 G/DL (ref 12–16)
Lab: NORMAL
MAGNESIUM SERPL-MCNC: 2.3 MG/DL (ref 1.6–2.6)
MCH RBC QN AUTO: 33.3 PG (ref 27–31)
MCH RBC QN AUTO: 33.7 PG (ref 27–31)
MCHC RBC AUTO-ENTMCNC: 32.7 G/DL (ref 32–36)
MCHC RBC AUTO-ENTMCNC: 32.9 G/DL (ref 32–36)
MCV RBC AUTO: 101 FL (ref 82–98)
MCV RBC AUTO: 103 FL (ref 82–98)
MICROSCOPIC EXAM: NORMAL
PLATELET # BLD AUTO: 183 K/UL (ref 150–450)
PLATELET # BLD AUTO: 185 K/UL (ref 150–450)
PMV BLD AUTO: 8.6 FL (ref 9.2–12.9)
PMV BLD AUTO: 8.8 FL (ref 9.2–12.9)
POCT GLUCOSE: 102 MG/DL (ref 70–110)
POCT GLUCOSE: 127 MG/DL (ref 70–110)
POCT GLUCOSE: 85 MG/DL (ref 70–110)
POCT GLUCOSE: 98 MG/DL (ref 70–110)
POTASSIUM SERPL-SCNC: 4.2 MMOL/L (ref 3.5–5.1)
RBC # BLD AUTO: 3.3 M/UL (ref 4–5.4)
RBC # BLD AUTO: 3.74 M/UL (ref 4–5.4)
SODIUM SERPL-SCNC: 142 MMOL/L (ref 136–145)
WBC # BLD AUTO: 7.05 K/UL (ref 3.9–12.7)
WBC # BLD AUTO: 8.82 K/UL (ref 3.9–12.7)

## 2024-08-23 PROCEDURE — 25000003 PHARM REV CODE 250

## 2024-08-23 PROCEDURE — 36415 COLL VENOUS BLD VENIPUNCTURE: CPT | Mod: XB | Performed by: SURGERY

## 2024-08-23 PROCEDURE — 63600175 PHARM REV CODE 636 W HCPCS: Performed by: NURSE PRACTITIONER

## 2024-08-23 PROCEDURE — 25000003 PHARM REV CODE 250: Performed by: NURSE PRACTITIONER

## 2024-08-23 PROCEDURE — 82150 ASSAY OF AMYLASE: CPT | Mod: 91 | Performed by: SURGERY

## 2024-08-23 PROCEDURE — 82150 ASSAY OF AMYLASE: CPT | Performed by: SURGERY

## 2024-08-23 PROCEDURE — 80156 ASSAY CARBAMAZEPINE TOTAL: CPT | Performed by: FAMILY MEDICINE

## 2024-08-23 PROCEDURE — 85027 COMPLETE CBC AUTOMATED: CPT | Performed by: NURSE PRACTITIONER

## 2024-08-23 PROCEDURE — 25000003 PHARM REV CODE 250: Performed by: SURGERY

## 2024-08-23 PROCEDURE — A9698 NON-RAD CONTRAST MATERIALNOC: HCPCS | Performed by: SURGERY

## 2024-08-23 PROCEDURE — S5010 5% DEXTROSE AND 0.45% SALINE: HCPCS | Performed by: SURGERY

## 2024-08-23 PROCEDURE — 80048 BASIC METABOLIC PNL TOTAL CA: CPT | Performed by: NURSE PRACTITIONER

## 2024-08-23 PROCEDURE — 97116 GAIT TRAINING THERAPY: CPT

## 2024-08-23 PROCEDURE — 85027 COMPLETE CBC AUTOMATED: CPT | Mod: 91 | Performed by: SURGERY

## 2024-08-23 PROCEDURE — 97110 THERAPEUTIC EXERCISES: CPT

## 2024-08-23 PROCEDURE — 99900035 HC TECH TIME PER 15 MIN (STAT)

## 2024-08-23 PROCEDURE — 36415 COLL VENOUS BLD VENIPUNCTURE: CPT | Mod: XB | Performed by: FAMILY MEDICINE

## 2024-08-23 PROCEDURE — 25500020 PHARM REV CODE 255: Performed by: SURGERY

## 2024-08-23 PROCEDURE — 94761 N-INVAS EAR/PLS OXIMETRY MLT: CPT

## 2024-08-23 PROCEDURE — 94799 UNLISTED PULMONARY SVC/PX: CPT

## 2024-08-23 PROCEDURE — 83735 ASSAY OF MAGNESIUM: CPT | Performed by: SURGERY

## 2024-08-23 PROCEDURE — 11000001 HC ACUTE MED/SURG PRIVATE ROOM

## 2024-08-23 PROCEDURE — 36415 COLL VENOUS BLD VENIPUNCTURE: CPT | Performed by: NURSE PRACTITIONER

## 2024-08-23 PROCEDURE — 97530 THERAPEUTIC ACTIVITIES: CPT

## 2024-08-23 RX ORDER — DIATRIZOATE MEGLUMINE AND DIATRIZOATE SODIUM 660; 100 MG/ML; MG/ML
100 SOLUTION ORAL; RECTAL
Status: COMPLETED | OUTPATIENT
Start: 2024-08-23 | End: 2024-08-23

## 2024-08-23 RX ORDER — LEVETIRACETAM 500 MG/1
500 TABLET ORAL 2 TIMES DAILY
Status: DISCONTINUED | OUTPATIENT
Start: 2024-08-23 | End: 2024-08-24 | Stop reason: SDUPTHER

## 2024-08-23 RX ORDER — HYDRALAZINE HYDROCHLORIDE 20 MG/ML
10 INJECTION INTRAMUSCULAR; INTRAVENOUS EVERY 6 HOURS PRN
Status: DISCONTINUED | OUTPATIENT
Start: 2024-08-23 | End: 2024-09-10 | Stop reason: HOSPADM

## 2024-08-23 RX ADMIN — ACETAMINOPHEN 999.01 MG: 650 SOLUTION ORAL at 06:08

## 2024-08-23 RX ADMIN — CHLORHEXIDINE GLUCONATE 0.12% ORAL RINSE 10 ML: 1.2 LIQUID ORAL at 09:08

## 2024-08-23 RX ADMIN — CARBAMAZEPINE 200 MG: 100 SUSPENSION ORAL at 06:08

## 2024-08-23 RX ADMIN — SENNOSIDES 5 ML: 8.8 LIQUID ORAL at 09:08

## 2024-08-23 RX ADMIN — LEVETIRACETAM 500 MG: 100 SOLUTION ORAL at 10:08

## 2024-08-23 RX ADMIN — ENOXAPARIN SODIUM 40 MG: 40 INJECTION SUBCUTANEOUS at 07:08

## 2024-08-23 RX ADMIN — CARBAMAZEPINE 300 MG: 100 SUSPENSION ORAL at 10:08

## 2024-08-23 RX ADMIN — ZONISAMIDE 200 MG: 100 SUSPENSION ORAL at 09:08

## 2024-08-23 RX ADMIN — HYDRALAZINE HYDROCHLORIDE 10 MG: 20 INJECTION, SOLUTION INTRAMUSCULAR; INTRAVENOUS at 03:08

## 2024-08-23 RX ADMIN — SENNOSIDES 5 ML: 8.8 LIQUID ORAL at 10:08

## 2024-08-23 RX ADMIN — Medication: at 02:08

## 2024-08-23 RX ADMIN — Medication 70 G: at 12:08

## 2024-08-23 RX ADMIN — CARBAMAZEPINE 200 MG: 100 SUSPENSION ORAL at 02:08

## 2024-08-23 RX ADMIN — ACETAMINOPHEN 999.01 MG: 650 SOLUTION ORAL at 02:08

## 2024-08-23 RX ADMIN — ACETAMINOPHEN 999.01 MG: 650 SOLUTION ORAL at 10:08

## 2024-08-23 RX ADMIN — ZONISAMIDE 200 MG: 100 SUSPENSION ORAL at 10:08

## 2024-08-23 RX ADMIN — DEXTROSE AND SODIUM CHLORIDE: 5; 450 INJECTION, SOLUTION INTRAVENOUS at 09:08

## 2024-08-23 RX ADMIN — CHLORHEXIDINE GLUCONATE 0.12% ORAL RINSE 10 ML: 1.2 LIQUID ORAL at 10:08

## 2024-08-23 RX ADMIN — LEVETIRACETAM 500 MG: 100 SOLUTION ORAL at 09:08

## 2024-08-23 RX ADMIN — DIATRIZOATE MEGLUMINE AND DIATRIZOATE SODIUM 100 ML: 600; 100 SOLUTION ORAL; RECTAL at 12:08

## 2024-08-23 NOTE — PLAN OF CARE
No acute events overnight. VSS. PCA pump in use. Standby assist to bedside commode overnight. Pt requested a purewick this AM. Trickle Tube Feeds per MD order. POC reviewed with patient. Safety and fall precautions maintained.

## 2024-08-23 NOTE — PROGRESS NOTES
[facility] - Intensive Care [facility])  General Surgery  Progress Note    Subjective:     History of Present Illness:  Cheryl Kirkland is a 74 y.o. year old female with a history of ovarian cancer status post surgery and chemotherapy as well as epilepsy and DVT (provoked during her treatment for ovarian cancer), and MSI high gastric adenocarcinoma.  She was originally diagnosed back in February of this past year.  She was noted to have a very proximal enlarged tumor.  EGD was done as was complete metastatic workup which was negative.  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  Due to her significant adhesive disease she understood that she would require an open operation.  She was discussed in multidisciplinary tumor board and the consensus was to proceed with immunotherapy due to her MSI high status.  She completed 5 cycles of neoadjuvant immunotherapy with Keytruda and on restaging imaging was found to have enlarged Danelle portal lymph nodes.  Consensus was that she would need a total gastrectomy due to the proximity of her tumor in relationship to the GE junction.  Due to the extent of the operation required and the her previous history of ovarian cancer the tumor board consensus was to proceed with the EUS guided biopsy to rule out any ovarian cancer and to rule out pseudo progression as well.  This was performed and showed adenocarcinoma consistent with her gastric primary.  Therefore the decision was to proceed with surgical intervention due to the mixed response she had from immunotherapy on PET-CT scan.     Risks and benefits were reviewed including bleeding, infection, pain, scar, damage to surrounding structures, cardiovascular and pulmonary complications, anastomotic leak, positive margins, need for additional procedures, death, and  imponderables.  She expressed understanding and gave informed consent to proceed.    Post-Op Info:  Procedure(s) (LRB):  GASTRECTOMY (N/A)  EGD (ESOPHAGOGASTRODUODENOSCOPY) (N/A)  LAPAROSCOPY, DIAGNOSTIC (N/A)  LYSIS, ADHESIONS (N/A)  INSERTION, JEJUNOSTOMY TUBE (N/A)   3 Days Post-Op     Interval History: AFVSS.  FRANCY.  Doing well.  No flatus.  Has voided after marie removal.      Medications:  Continuous Infusions:   D5 and 0.45% NaCl   Intravenous Continuous 50 mL/hr at 08/23/24 1000 Rate Verify at 08/23/24 1000    hydromorphone in 0.9 % NaCl 6 mg/30 ml   Intravenous Continuous   New Syringe/Bag at 08/23/24 0211     Scheduled Meds:   acetaminophen  999.0148 mg Per J Tube Q8H    carBAMazepine  200 mg Per J Tube BID    carBAMazepine  300 mg Per J Tube QHS    chlorhexidine  10 mL Mouth/Throat BID    enoxparin  40 mg Subcutaneous Daily    levETIRAcetam  500 mg Per J Tube BID    sennosides 8.8 mg/5 ml  5 mL Per J Tube BID    zonisamide  200 mg Per J Tube BID     PRN Meds:  Current Facility-Administered Medications:     dextrose 10%, 12.5 g, Intravenous, PRN    dextrose 10%, 25 g, Intravenous, PRN    glucagon (human recombinant), 1 mg, Intramuscular, PRN    hydrALAZINE, 10 mg, Intravenous, Q6H PRN    insulin aspart U-100, 0-5 Units, Subcutaneous, Q6H PRN    naloxone, 0.02 mg, Intravenous, PRN    ondansetron, 4 mg, Intravenous, Q6H PRN    pneumoc 20-faina conj-dip cr(PF), 0.5 mL, Intramuscular, vaccine x 1 dose    promethazine, 25 mg, Rectal, Q6H PRN     Review of patient's allergies indicates:  No Known Allergies  Objective:     Vital Signs (Most Recent):  Temp: 98.1 °F (36.7 °C) (08/23/24 0715)  Pulse: 83 (08/23/24 1000)  Resp: (!) 31 (08/23/24 1000)  BP: (!) 147/68 (08/23/24 1000)  SpO2: 100 % (08/23/24 1000) Vital Signs (24h Range):  Temp:  [97.9 °F (36.6 °C)-98.5 °F (36.9 °C)] 98.1 °F (36.7 °C)  Pulse:  [83-99] 83  Resp:  [15-43] 31  SpO2:  [96 %-100 %] 100 %  BP: (126-172)/() 147/68     Weight: 61.3 kg (135 lb  2.3 oz)  Body mass index is 22.49 kg/m².    Intake/Output - Last 3 Shifts         08/21 0700 08/22 0659 08/22 0700 08/23 0659 08/23 0700 08/24 0659    I.V. (mL/kg) 2648.9 (43.2) 1651.3 (26.9) 181.4 (3)    NG/ 460 130    IV Piggyback 311.1 99.9     Total Intake(mL/kg) 3284.9 (53.6) 2211.2 (36.1) 311.4 (5.1)    Urine (mL/kg/hr) 580 (0.4) 525 (0.4)     Emesis/NG output  0     Drains 330 385     Stool  0     Blood       Total Output 910 910     Net +2374.9 +1301.2 +311.4           Urine Occurrence  0 x     Stool Occurrence  0 x     Emesis Occurrence  0 x              Physical Exam  Constitutional:       General: She is not in acute distress.     Appearance: Normal appearance.   HENT:      Head: Normocephalic and atraumatic.   Eyes:      Extraocular Movements: Extraocular movements intact.      Conjunctiva/sclera: Conjunctivae normal.   Cardiovascular:      Rate and Rhythm: Normal rate and regular rhythm.   Pulmonary:      Effort: Pulmonary effort is normal.   Abdominal:      General: Abdomen is flat. There is no distension.      Palpations: Abdomen is soft. There is no mass.      Hernia: No hernia is present.      Comments: Appropriately TTP, APOLINAR drains serosanguinous, J tube in place, incision c/d/i   Musculoskeletal:         General: No swelling, tenderness, deformity or signs of injury. Normal range of motion.   Skin:     General: Skin is warm and dry.      Coloration: Skin is not jaundiced.   Neurological:      General: No focal deficit present.      Mental Status: She is alert and oriented to person, place, and time.   Psychiatric:         Mood and Affect: Mood normal.         Behavior: Behavior normal.         Thought Content: Thought content normal.          Significant Labs:  I have reviewed all pertinent lab results within the past 24 hours.  CBC:   Recent Labs   Lab 08/23/24  0555   WBC 8.82   RBC 3.74*   HGB 12.6   HCT 38.5      *   MCH 33.7*   MCHC 32.7     BMP:   Recent Labs   Lab  "08/23/24  0317   *      K 4.2   *   CO2 18*   BUN 13   CREATININE 0.6   CALCIUM 7.5*   MG 2.3     CMP:   Recent Labs   Lab 08/21/24  0533 08/22/24  0305 08/23/24  0317   *   < > 117*   CALCIUM 7.6*   < > 7.5*   ALBUMIN 2.8*  --   --    PROT 4.9*  --   --       < > 142   K 4.8   < > 4.2   CO2 18*   < > 18*   *   < > 117*   BUN 14   < > 13   CREATININE 0.8   < > 0.6   ALKPHOS 60  --   --    *  --   --    *  --   --    BILITOT 0.3  --   --     < > = values in this interval not displayed.     LFTs:   Recent Labs   Lab 08/21/24  0533   *   *   ALKPHOS 60   BILITOT 0.3   PROT 4.9*   ALBUMIN 2.8*     Coagulation: No results for input(s): "LABPROT", "INR", "APTT" in the last 168 hours.  Cardiac markers: No results for input(s): "CKMB", "CPKMB", "TROPONINT", "TROPONINI", "MYOGLOBIN" in the last 168 hours.  ABGs: No results for input(s): "PH", "PCO2", "PO2", "HCO3", "POCSATURATED", "BE" in the last 168 hours.  Microbiology Results (last 7 days)       ** No results found for the last 168 hours. **          Specimen (24h ago, onward)      None          No results for input(s): "COLORU", "CLARITYU", "SPECGRAV", "PHUR", "PROTEINUA", "GLUCOSEU", "BILIRUBINCON", "BLOODU", "WBCU", "RBCU", "BACTERIA", "MUCUS", "NITRITE", "LEUKOCYTESUR", "UROBILINOGEN", "HYALINECASTS" in the last 168 hours.    Significant Diagnostics:  I have reviewed all pertinent imaging results/findings within the past 24 hours.  Assessment/Plan:     * Gastric adenocarcinoma  POD #3- s/p exlap, extensive SHAYNA, small bowel resection, total gastrectomy with stapled esophagojejunal anastomosis and D2 lymphadenectomy, and Jtube placement    Doing well.  Tolerating trickle tube feeds, doesn't feel bloated.  Awaiting return of bowel function - has not passed any additional flatus or had any bowel movements.  Pulling 1500 on IS today.  Hgb drifted down today- has had some bleeding from NJ tube- suspect some " component of fluid mobilization vs some oozing from irritation from the tube.  Will continue to monitor.     -- strict NPO , NJT to LIWS  -- UGI swallow today to r/o leak- if negative will remove NJ tube   -- ok for liquid medications via J tube  -- continue tylenol via J tube and dilaudid PCA.    -- continue trickle TF via J tube today @10cc/hr without advancing.  Nutrition consulted  -- Strict I/Os  -- Senna liquid via Jtube BID   -- repeat drain amylases and serum amylase today  -- CM consult for discharge / home health planning   -- OOB To chair and walk today x5, OT/PT consulted  -- encourage IS  Dispo: transfer to floor with tele (5th floor only)    Lovenox and SCDs for DVT ppx, no indication for GI ppx         Primary hypertension  -- appreciate medicine assistance with management     Hx of Epileptic seizures  -- appreciate medicine assistance with management         Chen Jerome MD  General Surgery  [facility] [place] - Int[facility] Care ([facility])

## 2024-08-23 NOTE — SUBJECTIVE & OBJECTIVE
Interval History: See hospital course for today      Review of Systems   Constitutional:  Positive for activity change (improving) and fatigue.   Respiratory:  Negative for shortness of breath.    Gastrointestinal:  Positive for abdominal pain. Negative for nausea and vomiting.   Skin:  Positive for wound.   Neurological:  Positive for weakness.   Psychiatric/Behavioral:  Negative for agitation, behavioral problems, confusion, decreased concentration, dysphoric mood and sleep disturbance.      Objective:     Vital Signs (Most Recent):  Temp: 98.2 °F (36.8 °C) (08/23/24 1145)  Pulse: 88 (08/23/24 1407)  Resp: (!) 39 (08/23/24 1407)  BP: (!) 174/68 (08/23/24 1511)  SpO2: 100 % (08/23/24 1407) Vital Signs (24h Range):  Temp:  [97.9 °F (36.6 °C)-98.5 °F (36.9 °C)] 98.2 °F (36.8 °C)  Pulse:  [83-99] 88  Resp:  [16-43] 39  SpO2:  [99 %-100 %] 100 %  BP: (126-174)/() 174/68     Weight: 61.3 kg (135 lb 2.3 oz)  Body mass index is 22.49 kg/m².    Intake/Output Summary (Last 24 hours) at 8/23/2024 1528  Last data filed at 8/23/2024 1430  Gross per 24 hour   Intake 1601.26 ml   Output 747 ml   Net 854.26 ml         Physical Exam  Vitals and nursing note reviewed.   Constitutional:       General: She is sleeping. She is not in acute distress.     Appearance: She is underweight. She is ill-appearing. She is not toxic-appearing.      Interventions: Nasal cannula in place.   HENT:      Head: Normocephalic and atraumatic.     Cardiovascular:      Rate and Rhythm: Normal rate.   Pulmonary:      Effort: Pulmonary effort is normal. No respiratory distress.   Abdominal:      Palpations: Abdomen is soft.      Tenderness: There is abdominal tenderness.   Musculoskeletal:      Right lower leg: No edema.      Left lower leg: No edema.   Skin:     General: Skin is warm.   Neurological:      Mental Status: She is oriented to person, place, and time and easily aroused.      Motor: Weakness present.   Psychiatric:         Mood and  Affect: Mood normal.         Behavior: Behavior normal. Behavior is cooperative.             Significant Labs: All pertinent labs within the past 24 hours have been reviewed.  CBC:   Recent Labs   Lab 08/22/24  0305 08/23/24  0317 08/23/24  0555   WBC 9.71 7.05 8.82   HGB 14.2 11.0* 12.6   HCT 43.9 33.4* 38.5    183 185     CMP:   Recent Labs   Lab 08/22/24  0305 08/23/24  0317    142   K 5.0 4.2   * 117*   CO2 20* 18*   * 117*   BUN 18 13   CREATININE 0.8 0.6   CALCIUM 7.9* 7.5*   ANIONGAP 9 7*     Keppra and carbamazepine levels pending    Significant Imaging: I have reviewed all pertinent imaging results/findings within the past 24 hours.  Fluoroscopy upper gastroenterology procedure with no anastomotic leak

## 2024-08-23 NOTE — PROGRESS NOTES
[facility] - Intensive Care ([facility] Medicine  Progress Note    Patient Name: Cheryl Kirkland  MRN: [id number]  Patient Class: IP- Surgery Admit   Admission Date: 8/20/2024  Length of Stay: 3 days  Attending Physician: Chen Jerome MD  Primary Care Provider: Gagandeep Iglesias MD        Subjective:     Principal Problem:Gastric adenocarcinoma        HPI:  74F  has a past medical history of Anemia, Chronic deep vein thrombosis (DVT) of left lower extremity, Deep vein thrombosis, Drug-induced polyneuropathy, DVT (deep venous thrombosis), Epilepsy, Gastric adenocarcinoma, GERD (gastroesophageal reflux disease), Hyperlipidemia, Mixed epithelial carcinoma of right ovary, OP (osteoporosis), Ovarian cancer, and Primary hypertension.  Presents for definitive treatment of gastric adenocaricoma per primary. Status post total gastrectomy with j tube placement. Transferred to icu postoperatively for close monitoring. Tolerated procedure well. Expected pain and weakness. Physical/occupational therapy recommending low intensity therapy.    Hospital medicine consulted for medical management.    Initial review of chart reveals vital signs stable, on room air. Cbc unremarkable. Cmp with hyperchloremic acidosis improving. Normal renal function. Elevated liver enzymes. Amylase within normal limits. Hypomag. Hypoalbuminemia. Xray abdomen on 8/20 with operative changes.    Overview/Hospital Course:  8/23 admitted for definitive management of gastric adenocarcinoma with total gastrectomy and j tube placement by primary. Upper gastroenterology procedure with no anastomotic leak identified. Possible ngt removal per primary. Physical/occupational therapy recommending low intensity therapy. Antiepileptic levels pending    Interval History: See hospital course for today      Review of Systems   Constitutional:  Positive for activity change (improving) and fatigue.   Respiratory:  Negative for shortness of breath.     Gastrointestinal:  Positive for abdominal pain. Negative for nausea and vomiting.   Skin:  Positive for wound.   Neurological:  Positive for weakness.   Psychiatric/Behavioral:  Negative for agitation, behavioral problems, confusion, decreased concentration, dysphoric mood and sleep disturbance.      Objective:     Vital Signs (Most Recent):  Temp: 98.2 °F (36.8 °C) (08/23/24 1145)  Pulse: 88 (08/23/24 1407)  Resp: (!) 39 (08/23/24 1407)  BP: (!) 174/68 (08/23/24 1511)  SpO2: 100 % (08/23/24 1407) Vital Signs (24h Range):  Temp:  [97.9 °F (36.6 °C)-98.5 °F (36.9 °C)] 98.2 °F (36.8 °C)  Pulse:  [83-99] 88  Resp:  [16-43] 39  SpO2:  [99 %-100 %] 100 %  BP: (126-174)/() 174/68     Weight: 61.3 kg (135 lb 2.3 oz)  Body mass index is 22.49 kg/m².    Intake/Output Summary (Last 24 hours) at 8/23/2024 1528  Last data filed at 8/23/2024 1430  Gross per 24 hour   Intake 1601.26 ml   Output 747 ml   Net 854.26 ml         Physical Exam  Vitals and nursing note reviewed.   Constitutional:       General: She is sleeping. She is not in acute distress.     Appearance: She is underweight. She is ill-appearing. She is not toxic-appearing.      Interventions: Nasal cannula in place.   HENT:      Head: Normocephalic and atraumatic.     Cardiovascular:      Rate and Rhythm: Normal rate.   Pulmonary:      Effort: Pulmonary effort is normal. No respiratory distress.   Abdominal:      Palpations: Abdomen is soft.      Tenderness: There is abdominal tenderness.   Musculoskeletal:      Right lower leg: No edema.      Left lower leg: No edema.   Skin:     General: Skin is warm.   Neurological:      Mental Status: She is oriented to person, place, and time and easily aroused.      Motor: Weakness present.   Psychiatric:         Mood and Affect: Mood normal.         Behavior: Behavior normal. Behavior is cooperative.             Significant Labs: All pertinent labs within the past 24 hours have been reviewed.  CBC:   Recent Labs   Lab  08/22/24  0305 08/23/24  0317 08/23/24  0555   WBC 9.71 7.05 8.82   HGB 14.2 11.0* 12.6   HCT 43.9 33.4* 38.5    183 185     CMP:   Recent Labs   Lab 08/22/24  0305 08/23/24  0317    142   K 5.0 4.2   * 117*   CO2 20* 18*   * 117*   BUN 18 13   CREATININE 0.8 0.6   CALCIUM 7.9* 7.5*   ANIONGAP 9 7*     Keppra and carbamazepine levels pending    Significant Imaging: I have reviewed all pertinent imaging results/findings within the past 24 hours.  Fluoroscopy upper gastroenterology procedure with no anastomotic leak    Assessment/Plan:      * Gastric adenocarcinoma  Status post total gastrectomy with j tube placement  Per primary      Primary hypertension  Chronic, controlled. Latest blood pressure and vitals reviewed-     Temp:  [97.9 °F (36.6 °C)-98.5 °F (36.9 °C)]   Pulse:  [83-99]   Resp:  [16-43]   BP: (126-174)/()   SpO2:  [99 %-100 %] .   Home meds for hypertension were reviewed and noted below.       While in the hospital, will manage blood pressure as follows; Adjust home antihypertensive regimen as follows- monitor, no home medication(s) for hypertension     Will utilize p.r.n. blood pressure medication only if patient's blood pressure greater than 160/100 and she develops symptoms such as worsening chest pain or shortness of breath.    DVT (deep venous thrombosis)  Initially presented to primary care provider with lower extremity edema   Workup revealed dvt 2/2 cancer  On loveonx prophy      Hx of Epileptic seizures  Resume home meds per j tube  Antiepileptic levels pending  Adjust accordingly  Consider neuro consult if levels are suboptimal      VTE Risk Mitigation (From admission, onward)           Ordered     enoxaparin injection 40 mg  Daily         08/20/24 2219     IP VTE HIGH RISK PATIENT  Once         08/20/24 2219     Place sequential compression device  Until discontinued         08/20/24 2219                    Discharge Planning   SOFIYA:      Code Status: Full Code    Is the patient medically ready for discharge?:     Reason for patient still in hospital (select all that apply): Patient trending condition, Laboratory test, Imaging, Consult recommendations, and PT / OT recommendations  Discharge Plan A: Home Health            Critical care time spent on the evaluation and treatment of severe organ dysfunction, review of pertinent labs and imaging studies, discussions with consulting providers and discussions with patient/family: 35 minutes.      Nilton Shaw MD  Department of Hospital Medicine   [facility] - Intensive [facility])

## 2024-08-23 NOTE — SUBJECTIVE & OBJECTIVE
Interval History: AFVSS.  FRANCY.  Doing well.  No flatus.  Has voided after marie removal.      Medications:  Continuous Infusions:   D5 and 0.45% NaCl   Intravenous Continuous 50 mL/hr at 08/23/24 1000 Rate Verify at 08/23/24 1000    hydromorphone in 0.9 % NaCl 6 mg/30 ml   Intravenous Continuous   New Syringe/Bag at 08/23/24 0211     Scheduled Meds:   acetaminophen  999.0148 mg Per J Tube Q8H    carBAMazepine  200 mg Per J Tube BID    carBAMazepine  300 mg Per J Tube QHS    chlorhexidine  10 mL Mouth/Throat BID    enoxparin  40 mg Subcutaneous Daily    levETIRAcetam  500 mg Per J Tube BID    sennosides 8.8 mg/5 ml  5 mL Per J Tube BID    zonisamide  200 mg Per J Tube BID     PRN Meds:  Current Facility-Administered Medications:     dextrose 10%, 12.5 g, Intravenous, PRN    dextrose 10%, 25 g, Intravenous, PRN    glucagon (human recombinant), 1 mg, Intramuscular, PRN    hydrALAZINE, 10 mg, Intravenous, Q6H PRN    insulin aspart U-100, 0-5 Units, Subcutaneous, Q6H PRN    naloxone, 0.02 mg, Intravenous, PRN    ondansetron, 4 mg, Intravenous, Q6H PRN    pneumoc 20-faina conj-dip cr(PF), 0.5 mL, Intramuscular, vaccine x 1 dose    promethazine, 25 mg, Rectal, Q6H PRN     Review of patient's allergies indicates:  No Known Allergies  Objective:     Vital Signs (Most Recent):  Temp: 98.1 °F (36.7 °C) (08/23/24 0715)  Pulse: 83 (08/23/24 1000)  Resp: (!) 31 (08/23/24 1000)  BP: (!) 147/68 (08/23/24 1000)  SpO2: 100 % (08/23/24 1000) Vital Signs (24h Range):  Temp:  [97.9 °F (36.6 °C)-98.5 °F (36.9 °C)] 98.1 °F (36.7 °C)  Pulse:  [83-99] 83  Resp:  [15-43] 31  SpO2:  [96 %-100 %] 100 %  BP: (126-172)/() 147/68     Weight: 61.3 kg (135 lb 2.3 oz)  Body mass index is 22.49 kg/m².    Intake/Output - Last 3 Shifts         08/21 0700 08/22 0659 08/22 0700 08/23 0659 08/23 0700 08/24 0659    I.V. (mL/kg) 2648.9 (43.2) 1651.3 (26.9) 181.4 (3)    NG/ 460 130    IV Piggyback 311.1 99.9     Total Intake(mL/kg) 3284.9 (53.6)  2211.2 (36.1) 311.4 (5.1)    Urine (mL/kg/hr) 580 (0.4) 525 (0.4)     Emesis/NG output  0     Drains 330 385     Stool  0     Blood       Total Output 910 910     Net +2374.9 +1301.2 +311.4           Urine Occurrence  0 x     Stool Occurrence  0 x     Emesis Occurrence  0 x              Physical Exam  Constitutional:       General: She is not in acute distress.     Appearance: Normal appearance.   HENT:      Head: Normocephalic and atraumatic.   Eyes:      Extraocular Movements: Extraocular movements intact.      Conjunctiva/sclera: Conjunctivae normal.   Cardiovascular:      Rate and Rhythm: Normal rate and regular rhythm.   Pulmonary:      Effort: Pulmonary effort is normal.   Abdominal:      General: Abdomen is flat. There is no distension.      Palpations: Abdomen is soft. There is no mass.      Hernia: No hernia is present.      Comments: Appropriately TTP, APOLINAR drains serosanguinous, J tube in place, incision c/d/i   Musculoskeletal:         General: No swelling, tenderness, deformity or signs of injury. Normal range of motion.   Skin:     General: Skin is warm and dry.      Coloration: Skin is not jaundiced.   Neurological:      General: No focal deficit present.      Mental Status: She is alert and oriented to person, place, and time.   Psychiatric:         Mood and Affect: Mood normal.         Behavior: Behavior normal.         Thought Content: Thought content normal.          Significant Labs:  I have reviewed all pertinent lab results within the past 24 hours.  CBC:   Recent Labs   Lab 08/23/24  0555   WBC 8.82   RBC 3.74*   HGB 12.6   HCT 38.5      *   MCH 33.7*   MCHC 32.7     BMP:   Recent Labs   Lab 08/23/24  0317   *      K 4.2   *   CO2 18*   BUN 13   CREATININE 0.6   CALCIUM 7.5*   MG 2.3     CMP:   Recent Labs   Lab 08/21/24  0533 08/22/24  0305 08/23/24  0317   *   < > 117*   CALCIUM 7.6*   < > 7.5*   ALBUMIN 2.8*  --   --    PROT 4.9*  --   --       <  "> 142   K 4.8   < > 4.2   CO2 18*   < > 18*   *   < > 117*   BUN 14   < > 13   CREATININE 0.8   < > 0.6   ALKPHOS 60  --   --    *  --   --    *  --   --    BILITOT 0.3  --   --     < > = values in this interval not displayed.     LFTs:   Recent Labs   Lab 08/21/24  0533   *   *   ALKPHOS 60   BILITOT 0.3   PROT 4.9*   ALBUMIN 2.8*     Coagulation: No results for input(s): "LABPROT", "INR", "APTT" in the last 168 hours.  Cardiac markers: No results for input(s): "CKMB", "CPKMB", "TROPONINT", "TROPONINI", "MYOGLOBIN" in the last 168 hours.  ABGs: No results for input(s): "PH", "PCO2", "PO2", "HCO3", "POCSATURATED", "BE" in the last 168 hours.  Microbiology Results (last 7 days)       ** No results found for the last 168 hours. **          Specimen (24h ago, onward)      None          No results for input(s): "COLORU", "CLARITYU", "SPECGRAV", "PHUR", "PROTEINUA", "GLUCOSEU", "BILIRUBINCON", "BLOODU", "WBCU", "RBCU", "BACTERIA", "MUCUS", "NITRITE", "LEUKOCYTESUR", "UROBILINOGEN", "HYALINECASTS" in the last 168 hours.    Significant Diagnostics:  I have reviewed all pertinent imaging results/findings within the past 24 hours.  "

## 2024-08-23 NOTE — PT/OT/SLP PROGRESS
"Occupational Therapy   Treatment    Name: Cheryl Kirkland  MRN: [id number]  Admitting Diagnosis:  Gastric adenocarcinoma  3 Days Post-Op    Recommendations:     Discharge Recommendations: Low Intensity Therapy  Discharge Equipment Recommendations:  bath bench (RECOMMENDING RW USE AT HOME)  Barriers to discharge:  None    Assessment:     Cheryl Kirkland is a 74 y.o. female with a medical diagnosis of Gastric adenocarcinoma.  She presents with the following performance deficits affecting function are weakness, impaired endurance, impaired self care skills, impaired functional mobility, impaired balance, decreased safety awareness, pain, impaired cardiopulmonary response to activity.     Rehab Prognosis:  Good; patient would benefit from acute skilled OT services to address these deficits and reach maximum level of function.       Plan:     Patient to be seen 2 x/week to address the above listed problems via self-care/home management, therapeutic activities, therapeutic exercises  Plan of Care Expires: 09/04/24  Plan of Care Reviewed with: patient    Subjective     Chief Complaint: Reported "I am doing ok if I don't think about pain."  Patient/Family Comments/goals: increase independence  Pain/Comfort:  Pain Rating 1:  (declines pain at rest. using PCA as needed with mobility to minimize/eliminate pain.)  Pain Addressed 1:  (activity pacing)    Objective:     Communicated with: NurseCristina, prior to session.  Patient found supine with blood pressure cuff, telemetry, pulse ox (continuous), NG tube, oxygen, PCA, G/J tube, APOLINAR drain, bed alarm upon OT entry to room.    General Precautions: Standard, fall    Orthopedic Precautions:N/A  Braces: N/A  Respiratory Status: NC in place for PCA pump. No active supplemental O2 in use.     Occupational Performance:     Bed Mobility:    Patient completed Supine to Sit with contact guard assistance   Completed via log rolling  Increased time and prolonged rest breaks " with completion.  Transferred to L side of bed with elevated HOB and use of bed rail    Functional Mobility/Transfers:  Patient completed Sit <> Stand Transfer with contact guard assistance  with  rolling walker   Patient completed Bed <> Chair Transfer using Step Transfer technique with contact guard assistance with rolling walker  Functional Mobility: Patient completed x270ft functional mobility with RW and CGA to increase dynamic standing balance and activity tolerance needed for ADL completion.  Provided with v/c for technique with transfers to increase safety and independence with completion  Requiring intermittent standing rest breaks and significant increase in time for mobility    [facility] 6 Click ADL: 14    Treatment & Education:  Encouraged completion of B UE AROM therex throughout the day to increase functional strength and activity tolerance needed for ADL completion. Visual demonstration x3 planes of motion provided with education on appropriate technique and pacing. Educated on benefits of OOB activity and importance of calling for A to transfer back to bed. Patient stated understanding and in agreement with POC.    Patient left up in chair with all lines intact, call button in reach, and chair alarm on    GOALS:   Multidisciplinary Problems       Occupational Therapy Goals          Problem: Occupational Therapy    Goal Priority Disciplines Outcome Interventions   Occupational Therapy Goal     OT, PT/OT Progressing    Description: Goals to be met by: 9/4/24     Patient will increase functional independence with ADLs by performing:    Toileting from toilet with Supervision for hygiene and clothing management.   Toilet transfer to toilet with Supervision.  Increased functional strength in B UE grossly by 1/2 MM grade.                         Time Tracking:     OT Date of Treatment: 08/23/24  OT Start Time: 0800  OT Stop Time: 0840  OT Total Time (min): 40 min    Billable Minutes:Therapeutic Activity  40    Marilu Garcia, OT  8/23/2024

## 2024-08-23 NOTE — PT/OT/SLP PROGRESS
Physical Therapy Treatment    Patient Name:  Cheryl Kirkland   MRN:  [id number]    Recommendations:     Discharge Recommendations: Low Intensity Therapy  Discharge Equipment Recommendations: bath bench (RECOMMENDING RW USE AT HOME)  Barriers to discharge: None    Assessment:     Cheryl Kirkland is a 74 y.o. female admitted with a medical diagnosis of Gastric adenocarcinoma.  She presents with the following impairments/functional limitations: weakness, impaired endurance, impaired functional mobility, gait instability, impaired balance, decreased safety awareness, decreased lower extremity function, decreased coordination.    Rehab Prognosis: Good; patient would benefit from acute skilled PT services to address these deficits and reach maximum level of function.    Recent Surgery: Procedure(s) (LRB):  GASTRECTOMY (N/A)  EGD (ESOPHAGOGASTRODUODENOSCOPY) (N/A)  LAPAROSCOPY, DIAGNOSTIC (N/A)  LYSIS, ADHESIONS (N/A)  INSERTION, JEJUNOSTOMY TUBE (N/A) 3 Days Post-Op    Plan:     During this hospitalization, patient to be seen 3 x/week to address the identified rehab impairments via gait training, therapeutic activities, therapeutic exercises and progress toward the following goals:    Plan of Care Expires:  09/04/24    Subjective     Chief Complaint: NONE, EAGER TO WALK  Patient/Family Comments/goals: TO GET STRONGER  Pain/Comfort:  Pain Rating 1: 0/10      Objective:     Communicated with NURSE EDUARDO prior to session.  Patient found supine with telemetry, pulse ox (continuous), blood pressure cuff, APOLINAR drain, NG tube, marie catheter, G/J tube, bed alarm, PCA, peripheral IV upon PT entry to room.     General Precautions: Standard, fall  Orthopedic Precautions: N/A  Braces: N/A  Respiratory Status: Room air     Functional Mobility:  Bed Mobility:   PT SLOW MOVING WITH ALL MOBILITY BUT GOOD EFFORT, EXTRA TIME ALLOWED FOR ACTIVE PARTICIPATION, REST BREAKS IN SITTING AND STANDING REQUIRED  Rolling Left:  stand  "by assistance  Scooting: stand by assistance  Supine to Sit: contact guard assistance- LOG ROLLING FOR BED MOBILITY  Transfers:     Sit to Stand:  contact guard assistance with rolling walker  Bed to Chair: contact guard assistance with  rolling walker  using  Step Transfer  Gait: PT ' WITH RW AND CGA, SLOW PACE, CUES FOR UPRIGHT POSTURE AND RW SAFETY, NO LOB OR SOB ON ROOM AIR  Balance: FAIR SITTING BALANCE, FAIR DYNAMIC BALANCE DURING GAIT  PT EDUCATED IN AND PERFORMED SEATED BLE THEREX X 15 REPS: HIP FLEX/EXT, LAQ, AP'S, HEEL SLIDES, QUAD SET    AM-PAC 6 CLICK MOBILITY  Turning over in bed (including adjusting bedclothes, sheets and blankets)?: 3  Sitting down on and standing up from a chair with arms (e.g., wheelchair, bedside commode, etc.): 3  Moving from lying on back to sitting on the side of the bed?: 3  Moving to and from a bed to a chair (including a wheelchair)?: 3  Need to walk in hospital room?: 3  Climbing 3-5 steps with a railing?: 1  Basic Mobility Total Score: 16     Treatment & Education:  PT EDUCATION:  - ROLE OF P.T. AND POC IN ACUTE CARE HOSPITAL SETTING  - RW USE AND SAFETY DURING TF'S AND GAIT  - ENCOURAGED TO INCREASE TIME OOB IN CHAIR TO TOLERANCE   - TO CONTINUE THERAPUETIC EXERCISES THROUGHOUT THE DAY TO INCREASE ACTIVITY TOLERANCE AND DECREASE RISK FOR PNEUMONIA AND BLOOD CLOTS: HIP FLEX/EXT, HIP ABD/ADD, QUAD SET, HEEL SLIDE, AP  - RISK FOR FALLS DUE TO GENERALIZED WEAKNESS, EDUCATED ON "CALL DON'T FALL", ENCOURAGED TO CALL FOR ASSISTANCE WITH ALL NEEDS SUCH AS BED<>CHAIR TRANSFERS OR TRIPS TO BATHROOM, PT AGREEABLE TO SAFETY PRECAUTIONS    Patient left up in chair with all lines intact, call button in reach, chair alarm on, and NURSE notified..    GOALS:   Multidisciplinary Problems       Physical Therapy Goals          Problem: Physical Therapy    Goal Priority Disciplines Outcome Goal Variances Interventions   Physical Therapy Goal     PT, PT/OT Progressing     Description: " LTG'S TO BE MET IN 14 DAYS (9-4-24)  PT WILL REQUIRE SPV FOR BED MOBILITY  PT WILL REQUIRE SPV FOR BED<>CHAIR TF'S  PT WILL  FEET WITH RW AND SPV  PT WILL INC AMPAC SCORE BY 2 POINTS TO PROGRESS GROSS FUNC MOBILITY                         Time Tracking:     PT Received On: 08/23/24  PT Start Time: 0755     PT Stop Time: 0835  PT Total Time (min): 40 min     Billable Minutes: Gait Training 15 and Therapeutic Activity 25    Treatment Type: Treatment  PT/PTA: PT     Number of PTA visits since last PT visit: 0     08/23/2024

## 2024-08-23 NOTE — CONSULTS
Tube feeds will be covered at 100% [facility]. Bedside teaching done today. Patient and spouse will need reinforcement closer to discharge. Infusion nurse will follow up for additional teaching Monday.

## 2024-08-23 NOTE — HOSPITAL COURSE
8/23 admitted for definitive management of gastric adenocarcinoma with total gastrectomy and j tube placement by primary. Upper gastroenterology procedure with no anastomotic leak identified. Possible ngt removal per primary. Physical/occupational therapy recommending low intensity therapy. Antiepileptic levels pending  8/24 antiepileptic levels pending. Ng tube removed. Reports visual disturbance but improving. Has not worked with physical/occupational therapy today.  8/25 doing well. +bowel movement x 2. pca discontinued per primary. Trickle feeds to be increased per primary.  8/26 patient feels weak today with some shortness of breath.  She states she feels some nausea and just no energy.  She was able to participate therapy and walk in brown with walker  8/27 patient continues to feel weak.  8/28 patient feels much better today.  No nausea no vomiting.  Has walked 3 times with the assistance today.  8/29- POD 9- developed some Abd fullness- hence Dr. Jerome switched to CLD while hold TF and cont Reglan. Getting PT/OT. Cont present care as per Dr. Jerome. Labs stable. PO4 2.5.    08/30 POD 10- reports being able to tolerate CLD. Started on TPN. Tube feeds resumed. Reports pain around J tube site, evaluation by CT imaging and General Surgery did not note any indication for intervention.   08/31 POD11- still reporting pain around J tube insertion site, started on antibiotics per General surgery.  Reports diarrhea, stool studies pending  09/01- POD 12- reports improvement in pain around J tube site.  General surgery evaluation today recommended discontinuation of peripheral nutrition.  Stool studies negative so far, diarrhea alleviating with loperamide  09/02: POD 13: improvement of erythema surrounding J tube site. Slightly tender. Tolerating TF and medications through tube. PT/OT recommends home health. She remains on full liquid diet.  09/03: POD 14:  Overnight concerns for leak/drainage through abdominal  incisions. Tube feedings held.  Patient continues to report bowel movements. Increasing abdominal pain-  Erythema around J tube site continues to improve.   9/4/24: Patient underwent a CT scan abdomen with oral contrast. No essie evidence leak, fistula or abscess. +ileus. Tolerating diet and having flatus/BMs  9/5/24: Initially, pt wanted SNF due to fear of learning tube feeding and j-tube care. However, pt wants to learn and be discharged home with HH. TF infusion rep to meet with pt tomorrow.   9/6/24: No change in medical status. pt/spouse now request SNF. CM notified.      Medically cleared for discharge. Awaiting SNF approval. Discussed with pt to allow nursing to teach J tube care. She verbalized understanding

## 2024-08-24 LAB
ANION GAP SERPL CALC-SCNC: 9 MMOL/L (ref 8–16)
BUN SERPL-MCNC: 11 MG/DL (ref 8–23)
CALCIUM SERPL-MCNC: 8.2 MG/DL (ref 8.7–10.5)
CHLORIDE SERPL-SCNC: 115 MMOL/L (ref 95–110)
CO2 SERPL-SCNC: 20 MMOL/L (ref 23–29)
CREAT SERPL-MCNC: 0.7 MG/DL (ref 0.5–1.4)
ERYTHROCYTE [DISTWIDTH] IN BLOOD BY AUTOMATED COUNT: 14.6 % (ref 11.5–14.5)
EST. GFR  (NO RACE VARIABLE): >60 ML/MIN/1.73 M^2
GLUCOSE SERPL-MCNC: 115 MG/DL (ref 70–110)
HCT VFR BLD AUTO: 35.4 % (ref 37–48.5)
HGB BLD-MCNC: 11.3 G/DL (ref 12–16)
MAGNESIUM SERPL-MCNC: 2 MG/DL (ref 1.6–2.6)
MCH RBC QN AUTO: 33 PG (ref 27–31)
MCHC RBC AUTO-ENTMCNC: 31.9 G/DL (ref 32–36)
MCV RBC AUTO: 104 FL (ref 82–98)
PLATELET # BLD AUTO: 231 K/UL (ref 150–450)
PMV BLD AUTO: 8.8 FL (ref 9.2–12.9)
POCT GLUCOSE: 104 MG/DL (ref 70–110)
POCT GLUCOSE: 106 MG/DL (ref 70–110)
POCT GLUCOSE: 119 MG/DL (ref 70–110)
POCT GLUCOSE: 128 MG/DL (ref 70–110)
POTASSIUM SERPL-SCNC: 4.1 MMOL/L (ref 3.5–5.1)
PROLACTIN SERPL IA-MCNC: 20.8 NG/ML (ref 5.2–26.5)
RBC # BLD AUTO: 3.42 M/UL (ref 4–5.4)
SODIUM SERPL-SCNC: 144 MMOL/L (ref 136–145)
WBC # BLD AUTO: 8.2 K/UL (ref 3.9–12.7)

## 2024-08-24 PROCEDURE — 25000003 PHARM REV CODE 250

## 2024-08-24 PROCEDURE — 83735 ASSAY OF MAGNESIUM: CPT | Performed by: SURGERY

## 2024-08-24 PROCEDURE — 25500020 PHARM REV CODE 255: Performed by: SURGERY

## 2024-08-24 PROCEDURE — 80177 DRUG SCRN QUAN LEVETIRACETAM: CPT | Performed by: FAMILY MEDICINE

## 2024-08-24 PROCEDURE — 80203 DRUG SCREEN QUANT ZONISAMIDE: CPT | Performed by: SURGERY

## 2024-08-24 PROCEDURE — 63600175 PHARM REV CODE 636 W HCPCS: Performed by: NURSE PRACTITIONER

## 2024-08-24 PROCEDURE — S5010 5% DEXTROSE AND 0.45% SALINE: HCPCS | Performed by: SURGERY

## 2024-08-24 PROCEDURE — 85027 COMPLETE CBC AUTOMATED: CPT | Performed by: NURSE PRACTITIONER

## 2024-08-24 PROCEDURE — 84146 ASSAY OF PROLACTIN: CPT | Performed by: FAMILY MEDICINE

## 2024-08-24 PROCEDURE — 99900035 HC TECH TIME PER 15 MIN (STAT)

## 2024-08-24 PROCEDURE — 94761 N-INVAS EAR/PLS OXIMETRY MLT: CPT

## 2024-08-24 PROCEDURE — 36415 COLL VENOUS BLD VENIPUNCTURE: CPT | Performed by: FAMILY MEDICINE

## 2024-08-24 PROCEDURE — 25000003 PHARM REV CODE 250: Performed by: NURSE PRACTITIONER

## 2024-08-24 PROCEDURE — 11000001 HC ACUTE MED/SURG PRIVATE ROOM

## 2024-08-24 PROCEDURE — 80048 BASIC METABOLIC PNL TOTAL CA: CPT | Performed by: NURSE PRACTITIONER

## 2024-08-24 PROCEDURE — 94799 UNLISTED PULMONARY SVC/PX: CPT

## 2024-08-24 PROCEDURE — 25000003 PHARM REV CODE 250: Performed by: SURGERY

## 2024-08-24 PROCEDURE — 76937 US GUIDE VASCULAR ACCESS: CPT

## 2024-08-24 RX ORDER — BISACODYL 10 MG/1
10 SUPPOSITORY RECTAL DAILY
Status: DISCONTINUED | OUTPATIENT
Start: 2024-08-24 | End: 2024-09-01

## 2024-08-24 RX ADMIN — ZONISAMIDE 200 MG: 100 SUSPENSION ORAL at 09:08

## 2024-08-24 RX ADMIN — IOHEXOL 100 ML: 350 INJECTION, SOLUTION INTRAVENOUS at 05:08

## 2024-08-24 RX ADMIN — CHLORHEXIDINE GLUCONATE 0.12% ORAL RINSE 10 ML: 1.2 LIQUID ORAL at 08:08

## 2024-08-24 RX ADMIN — ENOXAPARIN SODIUM 40 MG: 40 INJECTION SUBCUTANEOUS at 05:08

## 2024-08-24 RX ADMIN — CARBAMAZEPINE 200 MG: 100 SUSPENSION ORAL at 02:08

## 2024-08-24 RX ADMIN — LEVETIRACETAM 500 MG: 100 SOLUTION ORAL at 08:08

## 2024-08-24 RX ADMIN — SENNOSIDES 5 ML: 8.8 LIQUID ORAL at 09:08

## 2024-08-24 RX ADMIN — LEVETIRACETAM 500 MG: 100 SOLUTION ORAL at 09:08

## 2024-08-24 RX ADMIN — BISACODYL 10 MG: 10 SUPPOSITORY RECTAL at 02:08

## 2024-08-24 RX ADMIN — SENNOSIDES 5 ML: 8.8 LIQUID ORAL at 08:08

## 2024-08-24 RX ADMIN — ACETAMINOPHEN 999.01 MG: 650 SOLUTION ORAL at 08:08

## 2024-08-24 RX ADMIN — Medication: at 07:08

## 2024-08-24 RX ADMIN — CHLORHEXIDINE GLUCONATE 0.12% ORAL RINSE 10 ML: 1.2 LIQUID ORAL at 09:08

## 2024-08-24 RX ADMIN — CARBAMAZEPINE 300 MG: 100 SUSPENSION ORAL at 09:08

## 2024-08-24 RX ADMIN — CARBAMAZEPINE 200 MG: 100 SUSPENSION ORAL at 05:08

## 2024-08-24 RX ADMIN — DEXTROSE AND SODIUM CHLORIDE: 5; 450 INJECTION, SOLUTION INTRAVENOUS at 05:08

## 2024-08-24 RX ADMIN — ZONISAMIDE 200 MG: 100 SUSPENSION ORAL at 08:08

## 2024-08-24 RX ADMIN — ACETAMINOPHEN 999.01 MG: 650 SOLUTION ORAL at 02:08

## 2024-08-24 NOTE — PLAN OF CARE
AAOx3.  VSS.  Pt ambulated unit this morning with PT.  She sat up in chair until 1430.  Pt ambulated with stand-by assistance to the toilet. No BM today.  TF remains at 10 cc/hr.  NT removed by Dr. Jerome this afternoon.  IVFs and PCA pump infusing.  Pt and  received education regarding TF pump; will need additional training.

## 2024-08-24 NOTE — NURSING
Pt. AAOx4. In bed resting, bed alarm in place. Abdominal incision intact, no drainage. PCA pump infusing. Handoff performed with Day shift nurse. Fall precautions and skin measures in place.

## 2024-08-24 NOTE — PLAN OF CARE
AAOX4. Not requiring oxygen. APOLINAR x2 remain in place. Remains in PCA pump with dilaudid. Attempted BM on toilet- passed gas. Trickle feeds continued via J tube. Walked with assistance. CT abd completed. POC reviewed with pt and  at bedside.

## 2024-08-24 NOTE — PROGRESS NOTES
[facility] - [facility] ([facility] Medicine  Progress Note    Patient Name: Cheryl Kirkland  MRN: [id number]  Patient Class: IP- Surgery Admit   Admission Date: 8/20/2024  Length of Stay: 4 days  Attending Physician: Chen Jerome MD  Primary Care Provider: Gagandeep Iglesias MD        Subjective:     Principal Problem:Gastric adenocarcinoma        HPI:  74F  has a past medical history of Anemia, Chronic deep vein thrombosis (DVT) of left lower extremity, Deep vein thrombosis, Drug-induced polyneuropathy, DVT (deep venous thrombosis), Epilepsy, Gastric adenocarcinoma, GERD (gastroesophageal reflux disease), Hyperlipidemia, Mixed epithelial carcinoma of right ovary, OP (osteoporosis), Ovarian cancer, and Primary hypertension.  Presents for definitive treatment of gastric adenocaricoma per primary. Status post total gastrectomy with j tube placement. Transferred to icu postoperatively for close monitoring. Tolerated procedure well. Expected pain and weakness. Physical/occupational therapy recommending low intensity therapy.    Hospital medicine consulted for medical management.    Initial review of chart reveals vital signs stable, on room air. Cbc unremarkable. Cmp with hyperchloremic acidosis improving. Normal renal function. Elevated liver enzymes. Amylase within normal limits. Hypomag. Hypoalbuminemia. Xray abdomen on 8/20 with operative changes.    Overview/Hospital Course:  8/23 admitted for definitive management of gastric adenocarcinoma with total gastrectomy and j tube placement by primary. Upper gastroenterology procedure with no anastomotic leak identified. Possible ngt removal per primary. Physical/occupational therapy recommending low intensity therapy. Antiepileptic levels pending  8/24 antiepileptic levels pending. Ng tube removed. Reports visual disturbance but improving. Has not worked with physical/occupational therapy today.    Interval History: See hospital course for  today      Review of Systems   Constitutional:  Positive for activity change and fatigue.   Eyes:  Positive for visual disturbance.   Neurological:  Positive for weakness.     Objective:     Vital Signs (Most Recent):  Temp: 98.3 °F (36.8 °C) (08/24/24 1138)  Pulse: 82 (08/24/24 1101)  Resp: 12 (08/24/24 1101)  BP: 124/61 (08/24/24 1101)  SpO2: 99 % (08/24/24 1101) Vital Signs (24h Range):  Temp:  [98.1 °F (36.7 °C)-98.9 °F (37.2 °C)] 98.3 °F (36.8 °C)  Pulse:  [81-96] 82  Resp:  [12-39] 12  SpO2:  [97 %-100 %] 99 %  BP: (109-190)/(53-88) 124/61     Weight: 61.3 kg (135 lb 2.3 oz)  Body mass index is 22.49 kg/m².    Intake/Output Summary (Last 24 hours) at 8/24/2024 1246  Last data filed at 8/24/2024 1100  Gross per 24 hour   Intake 1476.26 ml   Output 833 ml   Net 643.26 ml         Physical Exam  Vitals and nursing note reviewed.   Constitutional:       General: She is not in acute distress.     Appearance: She is ill-appearing. She is not toxic-appearing.      Interventions: Nasal cannula in place.   HENT:      Head: Normocephalic and atraumatic.   Cardiovascular:      Rate and Rhythm: Normal rate.   Pulmonary:      Effort: Pulmonary effort is normal. No respiratory distress.   Abdominal:      General: There is distension.      Palpations: Abdomen is soft.      Tenderness: There is abdominal tenderness.      Comments: Midline surgical incision, surgical staples intact   Genitourinary:     Comments: External marie  Musculoskeletal:      Right lower leg: No edema.      Left lower leg: No edema.   Skin:     General: Skin is warm.   Neurological:      Mental Status: She is lethargic.      Motor: Weakness present.   Psychiatric:         Behavior: Behavior is slowed.             Significant Labs: All pertinent labs within the past 24 hours have been reviewed.  CBC:   Recent Labs   Lab 08/23/24  0317 08/23/24  0555 08/24/24  0430   WBC 7.05 8.82 8.20   HGB 11.0* 12.6 11.3*   HCT 33.4* 38.5 35.4*    185 231      CMP:   Recent Labs   Lab 08/23/24  0317 08/24/24  0430    144   K 4.2 4.1   * 115*   CO2 18* 20*   * 115*   BUN 13 11   CREATININE 0.6 0.7   CALCIUM 7.5* 8.2*   ANIONGAP 7* 9     POCT Glucose:   Recent Labs   Lab 08/23/24  2338 08/24/24  0511 08/24/24  1138   POCTGLUCOSE 127* 104 128*     Carbamazepine level in therapeutic range  Other antiepileptic levels pending    Significant Imaging: I have reviewed all pertinent imaging results/findings within the past 24 hours.  Upper gastroenterology procedure    Assessment/Plan:      * Gastric adenocarcinoma  Status post total gastrectomy with j tube placement  Per primary      Primary hypertension  Chronic, controlled. Latest blood pressure and vitals reviewed-     Temp:  [98.1 °F (36.7 °C)-98.9 °F (37.2 °C)]   Pulse:  [81-96]   Resp:  [12-39]   BP: (109-190)/(53-88)   SpO2:  [97 %-100 %] .   Home meds for hypertension were reviewed and noted below.       While in the hospital, will manage blood pressure as follows; Adjust home antihypertensive regimen as follows- monitor, no home medication(s) for hypertension     Will utilize p.r.n. blood pressure medication only if patient's blood pressure greater than 160/100 and she develops symptoms such as worsening chest pain or shortness of breath.    Chronic deep vein thrombosis (DVT) of left lower extremity  Scds and lovenox for prophy      DVT (deep venous thrombosis)  Initially presented to primary care provider with lower extremity edema   Workup revealed dvt 2/2 cancer  On loveonx prophy and scds  Ambulate     Hx of Epileptic seizures  Resume home meds per j tube  Antiepileptic levels pending  Clinically more lethargic than postop   Sleep disturbance?  Prolactin pending      VTE Risk Mitigation (From admission, onward)           Ordered     enoxaparin injection 40 mg  Daily         08/20/24 2219     IP VTE HIGH RISK PATIENT  Once         08/20/24 2219     Place sequential compression device  Until  discontinued         08/20/24 2219                    Discharge Planning   SOFIYA:      Code Status: Full Code   Is the patient medically ready for discharge?:     Reason for patient still in hospital (select all that apply): Patient trending condition, Laboratory test, Treatment, Consult recommendations, and PT / OT recommendations  Discharge Plan A: Home Health            Critical care time spent on the evaluation and treatment of severe organ dysfunction, review of pertinent labs and imaging studies, discussions with consulting providers and discussions with patient/family: 31 minutes.      Nilton Shaw MD  Department of Hospital Medicine   '[facility] - Intensive [facility] \[facility] [place] [facility]\"")

## 2024-08-24 NOTE — SUBJECTIVE & OBJECTIVE
Interval History: AFVSS.  FRANCY.  Didn't sleep well last night- lots of discomfort after UGI yesterday.  UGI negative for leak and NGT removed.      Medications:  Continuous Infusions:   D5 and 0.45% NaCl   Intravenous Continuous 50 mL/hr at 08/24/24 1100 Rate Verify at 08/24/24 1100    hydromorphone in 0.9 % NaCl 6 mg/30 ml   Intravenous Continuous   New Syringe/Bag at 08/24/24 0710     Scheduled Meds:   acetaminophen  999.0148 mg Per J Tube Q8H    carBAMazepine  200 mg Per J Tube BID    carBAMazepine  300 mg Per J Tube QHS    chlorhexidine  10 mL Mouth/Throat BID    enoxparin  40 mg Subcutaneous Daily    levETIRAcetam  500 mg Per J Tube BID    levETIRAcetam  500 mg Oral BID    sennosides 8.8 mg/5 ml  5 mL Per J Tube BID    zonisamide  200 mg Per J Tube BID     PRN Meds:  Current Facility-Administered Medications:     dextrose 10%, 12.5 g, Intravenous, PRN    dextrose 10%, 25 g, Intravenous, PRN    glucagon (human recombinant), 1 mg, Intramuscular, PRN    hydrALAZINE, 10 mg, Intravenous, Q6H PRN    insulin aspart U-100, 0-5 Units, Subcutaneous, Q6H PRN    naloxone, 0.02 mg, Intravenous, PRN    ondansetron, 4 mg, Intravenous, Q6H PRN    pneumoc 20-faina conj-dip cr(PF), 0.5 mL, Intramuscular, vaccine x 1 dose    promethazine, 25 mg, Rectal, Q6H PRN     Review of patient's allergies indicates:  No Known Allergies  Objective:     Vital Signs (Most Recent):  Temp: 98.3 °F (36.8 °C) (08/24/24 1138)  Pulse: 82 (08/24/24 1101)  Resp: 12 (08/24/24 1101)  BP: 124/61 (08/24/24 1101)  SpO2: 99 % (08/24/24 1101) Vital Signs (24h Range):  Temp:  [98.1 °F (36.7 °C)-98.9 °F (37.2 °C)] 98.3 °F (36.8 °C)  Pulse:  [81-96] 82  Resp:  [12-39] 12  SpO2:  [97 %-100 %] 99 %  BP: (109-190)/(53-88) 124/61     Weight: 61.3 kg (135 lb 2.3 oz)  Body mass index is 22.49 kg/m².    Intake/Output - Last 3 Shifts         08/22 0700 08/23 0659 08/23 0700 08/24 0659 08/24 0700 08/25 0659    I.V. (mL/kg) 1651.3 (26.9) 516.6 (8.4) 744.3 (12.1)    NG/GT  460 500 110    IV Piggyback 99.9      Total Intake(mL/kg) 2211.2 (36.1) 1016.6 (16.6) 854.3 (13.9)    Urine (mL/kg/hr) 525 (0.4) 300 (0.2) 400 (1.1)    Emesis/NG output 0      Drains 385 220     Stool 0  0    Total Output 910 520 400    Net +1301.2 +496.6 +454.3           Urine Occurrence 0 x      Stool Occurrence 0 x  0 x    Emesis Occurrence 0 x               Physical Exam  Constitutional:       General: She is not in acute distress.     Appearance: Normal appearance.   HENT:      Head: Normocephalic and atraumatic.   Eyes:      Extraocular Movements: Extraocular movements intact.      Conjunctiva/sclera: Conjunctivae normal.   Cardiovascular:      Rate and Rhythm: Normal rate and regular rhythm.   Pulmonary:      Effort: Pulmonary effort is normal.   Abdominal:      General: Abdomen is flat. There is distension.      Palpations: Abdomen is soft. There is no mass.      Hernia: No hernia is present.      Comments: Appropriately TTP, APOLINAR drains serosanguinous, J tube in place, incision c/d/i   Musculoskeletal:         General: No swelling, tenderness, deformity or signs of injury. Normal range of motion.   Skin:     General: Skin is warm and dry.      Coloration: Skin is not jaundiced.   Neurological:      General: No focal deficit present.      Mental Status: She is alert and oriented to person, place, and time.   Psychiatric:         Mood and Affect: Mood normal.         Behavior: Behavior normal.         Thought Content: Thought content normal.          Significant Labs:  I have reviewed all pertinent lab results within the past 24 hours.  CBC:   Recent Labs   Lab 08/24/24  0430   WBC 8.20   RBC 3.42*   HGB 11.3*   HCT 35.4*      *   MCH 33.0*   MCHC 31.9*     BMP:   Recent Labs   Lab 08/24/24  0430   *      K 4.1   *   CO2 20*   BUN 11   CREATININE 0.7   CALCIUM 8.2*   MG 2.0       Significant Diagnostics:  I have reviewed all pertinent imaging results/findings within the past 24  hours.

## 2024-08-24 NOTE — PLAN OF CARE
AAOx3.  Sinus vy throughout shift.  Weaned off vapotherm and placed pt on HFNC @ 6L.  Pt ambulated in room to utilize bathroom; multiple BM today.  Dialysis tomorrow.  Pt denies any SOB or CP today.

## 2024-08-24 NOTE — PROGRESS NOTES
[facility] - [facility]ensive [facility])  General Surgery  Progress Note    Subjective:     History of Present Illness:  Cheryl Kirkland is a 74 y.o. year old female with a history of ovarian cancer status post surgery and chemotherapy as well as epilepsy and DVT (provoked during her treatment for ovarian cancer), and MSI high gastric adenocarcinoma.  She was originally diagnosed back in February of this past year.  She was noted to have a very proximal enlarged tumor.  EGD was done as was complete metastatic workup which was negative.  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  Due to her significant adhesive disease she understood that she would require an open operation.  She was discussed in multidisciplinary tumor board and the consensus was to proceed with immunotherapy due to her MSI high status.  She completed 5 cycles of neoadjuvant immunotherapy with Keytruda and on restaging imaging was found to have enlarged Danelle portal lymph nodes.  Consensus was that she would need a total gastrectomy due to the proximity of her tumor in relationship to the GE junction.  Due to the extent of the operation required and the her previous history of ovarian cancer the tumor board consensus was to proceed with the EUS guided biopsy to rule out any ovarian cancer and to rule out pseudo progression as well.  This was performed and showed adenocarcinoma consistent with her gastric primary.  Therefore the decision was to proceed with surgical intervention due to the mixed response she had from immunotherapy on PET-CT scan.     Risks and benefits were reviewed including bleeding, infection, pain, scar, damage to surrounding structures, cardiovascular and pulmonary complications, anastomotic leak, positive margins, need for additional procedures, death, and  imponderables.  She expressed understanding and gave informed consent to proceed.    Post-Op Info:  Procedure(s) (LRB):  GASTRECTOMY (N/A)  EGD (ESOPHAGOGASTRODUODENOSCOPY) (N/A)  LAPAROSCOPY, DIAGNOSTIC (N/A)  LYSIS, ADHESIONS (N/A)  INSERTION, JEJUNOSTOMY TUBE (N/A)   4 Days Post-Op     Interval History: AFVSS.  FRANCY.  Didn't sleep well last night- lots of discomfort after UGI yesterday.  UGI negative for leak and NGT removed.      Medications:  Continuous Infusions:   D5 and 0.45% NaCl   Intravenous Continuous 50 mL/hr at 08/24/24 1100 Rate Verify at 08/24/24 1100    hydromorphone in 0.9 % NaCl 6 mg/30 ml   Intravenous Continuous   New Syringe/Bag at 08/24/24 0710     Scheduled Meds:   acetaminophen  999.0148 mg Per J Tube Q8H    carBAMazepine  200 mg Per J Tube BID    carBAMazepine  300 mg Per J Tube QHS    chlorhexidine  10 mL Mouth/Throat BID    enoxparin  40 mg Subcutaneous Daily    levETIRAcetam  500 mg Per J Tube BID    levETIRAcetam  500 mg Oral BID    sennosides 8.8 mg/5 ml  5 mL Per J Tube BID    zonisamide  200 mg Per J Tube BID     PRN Meds:  Current Facility-Administered Medications:     dextrose 10%, 12.5 g, Intravenous, PRN    dextrose 10%, 25 g, Intravenous, PRN    glucagon (human recombinant), 1 mg, Intramuscular, PRN    hydrALAZINE, 10 mg, Intravenous, Q6H PRN    insulin aspart U-100, 0-5 Units, Subcutaneous, Q6H PRN    naloxone, 0.02 mg, Intravenous, PRN    ondansetron, 4 mg, Intravenous, Q6H PRN    pneumoc 20-faina conj-dip cr(PF), 0.5 mL, Intramuscular, vaccine x 1 dose    promethazine, 25 mg, Rectal, Q6H PRN     Review of patient's allergies indicates:  No Known Allergies  Objective:     Vital Signs (Most Recent):  Temp: 98.3 °F (36.8 °C) (08/24/24 1138)  Pulse: 82 (08/24/24 1101)  Resp: 12 (08/24/24 1101)  BP: 124/61 (08/24/24 1101)  SpO2: 99 % (08/24/24 1101) Vital Signs (24h Range):  Temp:  [98.1 °F (36.7 °C)-98.9 °F (37.2 °C)] 98.3 °F (36.8 °C)  Pulse:  [81-96] 82  Resp:  [12-39] 12  SpO2:   [97 %-100 %] 99 %  BP: (109-190)/(53-88) 124/61     Weight: 61.3 kg (135 lb 2.3 oz)  Body mass index is 22.49 kg/m².    Intake/Output - Last 3 Shifts         08/22 0700  08/23 0659 08/23 0700 08/24 0659 08/24 0700 08/25 0659    I.V. (mL/kg) 1651.3 (26.9) 516.6 (8.4) 744.3 (12.1)    NG/ 500 110    IV Piggyback 99.9      Total Intake(mL/kg) 2211.2 (36.1) 1016.6 (16.6) 854.3 (13.9)    Urine (mL/kg/hr) 525 (0.4) 300 (0.2) 400 (1.1)    Emesis/NG output 0      Drains 385 220     Stool 0  0    Total Output 910 520 400    Net +1301.2 +496.6 +454.3           Urine Occurrence 0 x      Stool Occurrence 0 x  0 x    Emesis Occurrence 0 x               Physical Exam  Constitutional:       General: She is not in acute distress.     Appearance: Normal appearance.   HENT:      Head: Normocephalic and atraumatic.   Eyes:      Extraocular Movements: Extraocular movements intact.      Conjunctiva/sclera: Conjunctivae normal.   Cardiovascular:      Rate and Rhythm: Normal rate and regular rhythm.   Pulmonary:      Effort: Pulmonary effort is normal.   Abdominal:      General: Abdomen is flat. There is distension.      Palpations: Abdomen is soft. There is no mass.      Hernia: No hernia is present.      Comments: Appropriately TTP, APOLINAR drains serosanguinous, J tube in place, incision c/d/i   Musculoskeletal:         General: No swelling, tenderness, deformity or signs of injury. Normal range of motion.   Skin:     General: Skin is warm and dry.      Coloration: Skin is not jaundiced.   Neurological:      General: No focal deficit present.      Mental Status: She is alert and oriented to person, place, and time.   Psychiatric:         Mood and Affect: Mood normal.         Behavior: Behavior normal.         Thought Content: Thought content normal.          Significant Labs:  I have reviewed all pertinent lab results within the past 24 hours.  CBC:   Recent Labs   Lab 08/24/24  0430   WBC 8.20   RBC 3.42*   HGB 11.3*   HCT 35.4*       *   MCH 33.0*   MCHC 31.9*     BMP:   Recent Labs   Lab 08/24/24  0430   *      K 4.1   *   CO2 20*   BUN 11   CREATININE 0.7   CALCIUM 8.2*   MG 2.0       Significant Diagnostics:  I have reviewed all pertinent imaging results/findings within the past 24 hours.  Assessment/Plan:     * Gastric adenocarcinoma  POD #4- s/p exlap, extensive SHAYNA, small bowel resection, total gastrectomy with stapled esophagojejunal anastomosis and D2 lymphadenectomy, and Jtube placement    Doing ok.  Some distention on exam today and feeling less well today - likely from all the activity yesterday.  Hgb stable and APOLINAR drains serosanguinous.  Drain amylases yesterday normal, will not repeat again.      -- continue NPO for now, awaiting return of bowel function   -- KUB   -- suppository today   -- ok for liquid medications via J tube  -- continue tylenol via J tube and dilaudid PCA.    -- continue trickle TF via J tube today @10cc/hr without advancing.  Nutrition consulted  -- Strict I/Os  -- Senna liquid via Jtube BID   -- CM consult for discharge / home health planning   -- OOB To chair and walk today x5, OT/PT consulted  -- encourage IS  Dispo: transfer to floor with tele (5th floor only)    Lovenox and SCDs for DVT ppx, no indication for GI ppx         Primary hypertension  -- appreciate medicine assistance with management     Hx of Epileptic seizures  -- appreciate medicine assistance with management         Chen Jerome MD  General Surgery  [facility] ([facility])

## 2024-08-24 NOTE — SUBJECTIVE & OBJECTIVE
Interval History: See hospital course for today      Review of Systems   Constitutional:  Positive for activity change and fatigue.   Eyes:  Positive for visual disturbance.   Neurological:  Positive for weakness.     Objective:     Vital Signs (Most Recent):  Temp: 98.3 °F (36.8 °C) (08/24/24 1138)  Pulse: 82 (08/24/24 1101)  Resp: 12 (08/24/24 1101)  BP: 124/61 (08/24/24 1101)  SpO2: 99 % (08/24/24 1101) Vital Signs (24h Range):  Temp:  [98.1 °F (36.7 °C)-98.9 °F (37.2 °C)] 98.3 °F (36.8 °C)  Pulse:  [81-96] 82  Resp:  [12-39] 12  SpO2:  [97 %-100 %] 99 %  BP: (109-190)/(53-88) 124/61     Weight: 61.3 kg (135 lb 2.3 oz)  Body mass index is 22.49 kg/m².    Intake/Output Summary (Last 24 hours) at 8/24/2024 1246  Last data filed at 8/24/2024 1100  Gross per 24 hour   Intake 1476.26 ml   Output 833 ml   Net 643.26 ml         Physical Exam  Vitals and nursing note reviewed.   Constitutional:       General: She is not in acute distress.     Appearance: She is ill-appearing. She is not toxic-appearing.      Interventions: Nasal cannula in place.   HENT:      Head: Normocephalic and atraumatic.   Cardiovascular:      Rate and Rhythm: Normal rate.   Pulmonary:      Effort: Pulmonary effort is normal. No respiratory distress.   Abdominal:      General: There is distension.      Palpations: Abdomen is soft.      Tenderness: There is abdominal tenderness.      Comments: Midline surgical incision, surgical staples intact   Genitourinary:     Comments: External marie  Musculoskeletal:      Right lower leg: No edema.      Left lower leg: No edema.   Skin:     General: Skin is warm.   Neurological:      Mental Status: She is lethargic.      Motor: Weakness present.   Psychiatric:         Behavior: Behavior is slowed.             Significant Labs: All pertinent labs within the past 24 hours have been reviewed.  CBC:   Recent Labs   Lab 08/23/24  0317 08/23/24  0555 08/24/24  0430   WBC 7.05 8.82 8.20   HGB 11.0* 12.6 11.3*   HCT  33.4* 38.5 35.4*    185 231     CMP:   Recent Labs   Lab 08/23/24  0317 08/24/24  0430    144   K 4.2 4.1   * 115*   CO2 18* 20*   * 115*   BUN 13 11   CREATININE 0.6 0.7   CALCIUM 7.5* 8.2*   ANIONGAP 7* 9     POCT Glucose:   Recent Labs   Lab 08/23/24  2338 08/24/24  0511 08/24/24  1138   POCTGLUCOSE 127* 104 128*     Carbamazepine level in therapeutic range  Other antiepileptic levels pending    Significant Imaging: I have reviewed all pertinent imaging results/findings within the past 24 hours.  Upper gastroenterology procedure

## 2024-08-25 LAB
ANION GAP SERPL CALC-SCNC: 7 MMOL/L (ref 8–16)
BUN SERPL-MCNC: 11 MG/DL (ref 8–23)
CALCIUM SERPL-MCNC: 8.5 MG/DL (ref 8.7–10.5)
CHLORIDE SERPL-SCNC: 115 MMOL/L (ref 95–110)
CO2 SERPL-SCNC: 21 MMOL/L (ref 23–29)
CREAT SERPL-MCNC: 0.7 MG/DL (ref 0.5–1.4)
ERYTHROCYTE [DISTWIDTH] IN BLOOD BY AUTOMATED COUNT: 14.6 % (ref 11.5–14.5)
EST. GFR  (NO RACE VARIABLE): >60 ML/MIN/1.73 M^2
GLUCOSE SERPL-MCNC: 102 MG/DL (ref 70–110)
HCT VFR BLD AUTO: 34.4 % (ref 37–48.5)
HGB BLD-MCNC: 11 G/DL (ref 12–16)
MAGNESIUM SERPL-MCNC: 1.9 MG/DL (ref 1.6–2.6)
MCH RBC QN AUTO: 33.3 PG (ref 27–31)
MCHC RBC AUTO-ENTMCNC: 32 G/DL (ref 32–36)
MCV RBC AUTO: 104 FL (ref 82–98)
PLATELET # BLD AUTO: 238 K/UL (ref 150–450)
PMV BLD AUTO: 9.2 FL (ref 9.2–12.9)
POCT GLUCOSE: 109 MG/DL (ref 70–110)
POCT GLUCOSE: 112 MG/DL (ref 70–110)
POCT GLUCOSE: 114 MG/DL (ref 70–110)
POCT GLUCOSE: 119 MG/DL (ref 70–110)
POTASSIUM SERPL-SCNC: 4.6 MMOL/L (ref 3.5–5.1)
RBC # BLD AUTO: 3.3 M/UL (ref 4–5.4)
SODIUM SERPL-SCNC: 143 MMOL/L (ref 136–145)
WBC # BLD AUTO: 7.02 K/UL (ref 3.9–12.7)

## 2024-08-25 PROCEDURE — 25000003 PHARM REV CODE 250: Performed by: NURSE PRACTITIONER

## 2024-08-25 PROCEDURE — 63600175 PHARM REV CODE 636 W HCPCS: Performed by: NURSE PRACTITIONER

## 2024-08-25 PROCEDURE — S5010 5% DEXTROSE AND 0.45% SALINE: HCPCS | Performed by: SURGERY

## 2024-08-25 PROCEDURE — 25000003 PHARM REV CODE 250: Performed by: SURGERY

## 2024-08-25 PROCEDURE — 36415 COLL VENOUS BLD VENIPUNCTURE: CPT | Performed by: NURSE PRACTITIONER

## 2024-08-25 PROCEDURE — 80048 BASIC METABOLIC PNL TOTAL CA: CPT | Performed by: NURSE PRACTITIONER

## 2024-08-25 PROCEDURE — 99900035 HC TECH TIME PER 15 MIN (STAT)

## 2024-08-25 PROCEDURE — 97110 THERAPEUTIC EXERCISES: CPT | Mod: CQ

## 2024-08-25 PROCEDURE — 11000001 HC ACUTE MED/SURG PRIVATE ROOM

## 2024-08-25 PROCEDURE — 97116 GAIT TRAINING THERAPY: CPT | Mod: CQ

## 2024-08-25 PROCEDURE — 85027 COMPLETE CBC AUTOMATED: CPT | Performed by: NURSE PRACTITIONER

## 2024-08-25 PROCEDURE — 83735 ASSAY OF MAGNESIUM: CPT | Performed by: SURGERY

## 2024-08-25 PROCEDURE — 25000003 PHARM REV CODE 250

## 2024-08-25 PROCEDURE — 94761 N-INVAS EAR/PLS OXIMETRY MLT: CPT

## 2024-08-25 RX ORDER — HYDROCODONE BITARTRATE AND ACETAMINOPHEN 7.5; 325 MG/15ML; MG/15ML
15 SOLUTION ORAL EVERY 4 HOURS PRN
Status: DISCONTINUED | OUTPATIENT
Start: 2024-08-25 | End: 2024-08-30

## 2024-08-25 RX ORDER — HYDROMORPHONE HYDROCHLORIDE 1 MG/ML
1 INJECTION, SOLUTION INTRAMUSCULAR; INTRAVENOUS; SUBCUTANEOUS EVERY 6 HOURS PRN
Status: DISCONTINUED | OUTPATIENT
Start: 2024-08-25 | End: 2024-08-26

## 2024-08-25 RX ORDER — HYDROCODONE BITARTRATE AND ACETAMINOPHEN 7.5; 325 MG/15ML; MG/15ML
30 SOLUTION ORAL EVERY 6 HOURS PRN
Status: DISCONTINUED | OUTPATIENT
Start: 2024-08-25 | End: 2024-08-30

## 2024-08-25 RX ADMIN — ACETAMINOPHEN 999.01 MG: 650 SOLUTION ORAL at 05:08

## 2024-08-25 RX ADMIN — Medication: at 04:08

## 2024-08-25 RX ADMIN — DEXTROSE AND SODIUM CHLORIDE: 5; 450 INJECTION, SOLUTION INTRAVENOUS at 08:08

## 2024-08-25 RX ADMIN — CHLORHEXIDINE GLUCONATE 0.12% ORAL RINSE 10 ML: 1.2 LIQUID ORAL at 08:08

## 2024-08-25 RX ADMIN — BISACODYL 10 MG: 10 SUPPOSITORY RECTAL at 08:08

## 2024-08-25 RX ADMIN — CARBAMAZEPINE 200 MG: 100 SUSPENSION ORAL at 05:08

## 2024-08-25 RX ADMIN — ENOXAPARIN SODIUM 40 MG: 40 INJECTION SUBCUTANEOUS at 04:08

## 2024-08-25 RX ADMIN — LEVETIRACETAM 500 MG: 100 SOLUTION ORAL at 08:08

## 2024-08-25 RX ADMIN — SENNOSIDES 5 ML: 8.8 LIQUID ORAL at 10:08

## 2024-08-25 RX ADMIN — SENNOSIDES 5 ML: 8.8 LIQUID ORAL at 08:08

## 2024-08-25 RX ADMIN — HYDROCODONE BITARTRATE AND ACETAMINOPHEN 15 ML: 7.5; 325 SOLUTION ORAL at 01:08

## 2024-08-25 RX ADMIN — HYDROCODONE BITARTRATE AND ACETAMINOPHEN 15 ML: 7.5; 325 SOLUTION ORAL at 08:08

## 2024-08-25 RX ADMIN — CARBAMAZEPINE 300 MG: 100 SUSPENSION ORAL at 10:08

## 2024-08-25 RX ADMIN — ZONISAMIDE 200 MG: 100 SUSPENSION ORAL at 08:08

## 2024-08-25 RX ADMIN — ZONISAMIDE 200 MG: 100 SUSPENSION ORAL at 10:08

## 2024-08-25 RX ADMIN — CARBAMAZEPINE 200 MG: 100 SUSPENSION ORAL at 01:08

## 2024-08-25 RX ADMIN — LEVETIRACETAM 500 MG: 100 SOLUTION ORAL at 10:08

## 2024-08-25 NOTE — PROGRESS NOTES
[facility])  General Surgery  Progress Note    Subjective:     History of Present Illness:  Cheryl Kirkland is a 74 y.o. year old female with a history of ovarian cancer status post surgery and chemotherapy as well as epilepsy and DVT (provoked during her treatment for ovarian cancer), and MSI high gastric adenocarcinoma.  She was originally diagnosed back in February of this past year.  She was noted to have a very proximal enlarged tumor.  EGD was done as was complete metastatic workup which was negative.  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  Due to her significant adhesive disease she understood that she would require an open operation.  She was discussed in multidisciplinary tumor board and the consensus was to proceed with immunotherapy due to her MSI high status.  She completed 5 cycles of neoadjuvant immunotherapy with Keytruda and on restaging imaging was found to have enlarged Danelle portal lymph nodes.  Consensus was that she would need a total gastrectomy due to the proximity of her tumor in relationship to the GE junction.  Due to the extent of the operation required and the her previous history of ovarian cancer the tumor board consensus was to proceed with the EUS guided biopsy to rule out any ovarian cancer and to rule out pseudo progression as well.  This was performed and showed adenocarcinoma consistent with her gastric primary.  Therefore the decision was to proceed with surgical intervention due to the mixed response she had from immunotherapy on PET-CT scan.     Risks and benefits were reviewed including bleeding, infection, pain, scar, damage to surrounding structures, cardiovascular and pulmonary complications, anastomotic leak, positive margins, need for additional procedures, death, and  imponderables.  She expressed understanding and gave informed consent to proceed.    Post-Op Info:  Procedure(s) (LRB):  GASTRECTOMY (N/A)  EGD (ESOPHAGOGASTRODUODENOSCOPY) (N/A)  LAPAROSCOPY, DIAGNOSTIC (N/A)  LYSIS, ADHESIONS (N/A)  INSERTION, JEJUNOSTOMY TUBE (N/A)   5 Days Post-Op     Interval History: AFVSS.  FRANCY.  BM last night.  Feeling better today, less distended.     Medications:  Continuous Infusions:   D5 and 0.45% NaCl   Intravenous Continuous 50 mL/hr at 08/25/24 0700 Rate Verify at 08/25/24 0700     Scheduled Meds:   bisacodyL  10 mg Rectal Daily    carBAMazepine  200 mg Per J Tube BID    carBAMazepine  300 mg Per J Tube QHS    chlorhexidine  10 mL Mouth/Throat BID    enoxparin  40 mg Subcutaneous Daily    levETIRAcetam  500 mg Per J Tube BID    sennosides 8.8 mg/5 ml  5 mL Per J Tube BID    zonisamide  200 mg Per J Tube BID     PRN Meds:  Current Facility-Administered Medications:     dextrose 10%, 12.5 g, Intravenous, PRN    dextrose 10%, 25 g, Intravenous, PRN    glucagon (human recombinant), 1 mg, Intramuscular, PRN    hydrALAZINE, 10 mg, Intravenous, Q6H PRN    hydrocodone-apap 7.5-325 MG/15 ML, 15 mL, Oral, Q4H PRN    hydrocodone-apap 7.5-325 MG/15 ML, 30 mL, Oral, Q6H PRN    HYDROmorphone, 1 mg, Intravenous, Q6H PRN    insulin aspart U-100, 0-5 Units, Subcutaneous, Q6H PRN    naloxone, 0.02 mg, Intravenous, PRN    ondansetron, 4 mg, Intravenous, Q6H PRN    pneumoc 20-faina conj-dip cr(PF), 0.5 mL, Intramuscular, vaccine x 1 dose    promethazine, 25 mg, Rectal, Q6H PRN     Review of patient's allergies indicates:  No Known Allergies  Objective:     Vital Signs (Most Recent):  Temp: 97.9 °F (36.6 °C) (08/25/24 0715)  Pulse: 77 (08/25/24 0849)  Resp: 18 (08/25/24 0849)  BP: 131/68 (08/25/24 0715)  SpO2: 100 % (08/25/24 0849) Vital Signs (24h Range):  Temp:  [97.7 °F (36.5 °C)-98.6 °F (37 °C)] 97.9 °F (36.6 °C)  Pulse:  [71-99] 77  Resp:  [12-29] 18  SpO2:  [97 %-100 %] 100 %  BP: (108-163)/(56-83)  131/68     Weight: 61.3 kg (135 lb 2.3 oz)  Body mass index is 22.49 kg/m².    Intake/Output - Last 3 Shifts         08/23 0700 08/24 0659 08/24 0700 08/25 0659 08/25 0700 08/26 0659    I.V. (mL/kg) 516.6 (8.4) 1212.6 (19.8) 431.2 (7)    NG/ 405     IV Piggyback       Total Intake(mL/kg) 1016.6 (16.6) 1617.6 (26.4) 431.2 (7)    Urine (mL/kg/hr) 300 (0.2) 1000 (0.7)     Emesis/NG output       Drains 220 295     Stool  0     Total Output 520 1295     Net +496.6 +322.6 +431.2           Urine Occurrence  1 x     Stool Occurrence  1 x 0 x             Physical Exam  Constitutional:       General: She is not in acute distress.     Appearance: Normal appearance.   HENT:      Head: Normocephalic and atraumatic.   Eyes:      Extraocular Movements: Extraocular movements intact.      Conjunctiva/sclera: Conjunctivae normal.   Cardiovascular:      Rate and Rhythm: Normal rate and regular rhythm.   Pulmonary:      Effort: Pulmonary effort is normal.   Abdominal:      General: Abdomen is flat. There is no distension.      Palpations: Abdomen is soft. There is no mass.      Hernia: No hernia is present.      Comments: Appropriately TTP, APOLINAR drains serosanguinous, J tube in place, incision c/d/i   Musculoskeletal:         General: No swelling, tenderness, deformity or signs of injury. Normal range of motion.   Skin:     General: Skin is warm and dry.      Coloration: Skin is not jaundiced.   Neurological:      General: No focal deficit present.      Mental Status: She is alert and oriented to person, place, and time.   Psychiatric:         Mood and Affect: Mood normal.         Behavior: Behavior normal.         Thought Content: Thought content normal.          Significant Labs:  I have reviewed all pertinent lab results within the past 24 hours.  CBC:   Recent Labs   Lab 08/25/24  0432   WBC 7.02   RBC 3.30*   HGB 11.0*   HCT 34.4*      *   MCH 33.3*   MCHC 32.0     BMP:   Recent Labs   Lab 08/25/24  0432   GLU  102      K 4.6   *   CO2 21*   BUN 11   CREATININE 0.7   CALCIUM 8.5*   MG 1.9       Significant Diagnostics:  I have reviewed all pertinent imaging results/findings within the past 24 hours.  CT: I have reviewed all pertinent results/findings within the past 24 hours and my personal findings are:  contrast within small bowel and colon. No extraluminal contrast.  Depending pulmonary edema  KUB: contrast in LUQ      Assessment/Plan:     * Gastric adenocarcinoma  POD #5- s/p exlap, extensive SHAYNA, small bowel resection, total gastrectomy with stapled esophagojejunal anastomosis and D2 lymphadenectomy, and Jtube placement    Doing well.  Distention improved.  Having bowel function.  Feeling better today.  APOLINAR drains still serosang.  CT scan yesterday without concerning features    -- start CLD today  -- slowly advance Jtube feeds by 10cc/q 8hrs to goal of 50cc/hr.  If distention or nausea tube feeds to be stopped  -- stopping PCA, starting Hycet via J tube today   -- ok for liquid medications via J tube  -- Strict I/Os  -- Senna liquid via Jtube BID   -- CM consult for discharge / home health planning   -- OOB To chair and walk today x5, OT/PT consulted  -- encourage IS  Dispo: transfer to floor with tele (5th floor only)    Lovenox and SCDs for DVT ppx, no indication for GI ppx         Primary hypertension  -- appreciate medicine assistance with management     Hx of Epileptic seizures  -- appreciate medicine assistance with management         Chen Jerome MD  General Surgery  [facility] ([facility])

## 2024-08-25 NOTE — PROGRESS NOTES
[facility] ([facility] Medicine  Progress Note    Patient Name: Cheryl Kirkland  MRN: [id number]  Patient Class: IP- Surgery Admit   Admission Date: 8/20/2024  Length of Stay: 5 days  Attending Physician: Chen Jerome MD  Primary Care Provider: Gagandeep Iglesias MD        Subjective:     Principal Problem:Gastric adenocarcinoma        HPI:  74F  has a past medical history of Anemia, Chronic deep vein thrombosis (DVT) of left lower extremity, Deep vein thrombosis, Drug-induced polyneuropathy, DVT (deep venous thrombosis), Epilepsy, Gastric adenocarcinoma, GERD (gastroesophageal reflux disease), Hyperlipidemia, Mixed epithelial carcinoma of right ovary, OP (osteoporosis), Ovarian cancer, and Primary hypertension.  Presents for definitive treatment of gastric adenocaricoma per primary. Status post total gastrectomy with j tube placement. Transferred to icu postoperatively for close monitoring. Tolerated procedure well. Expected pain and weakness. Physical/occupational therapy recommending low intensity therapy.    Hospital medicine consulted for medical management.    Initial review of chart reveals vital signs stable, on room air. Cbc unremarkable. Cmp with hyperchloremic acidosis improving. Normal renal function. Elevated liver enzymes. Amylase within normal limits. Hypomag. Hypoalbuminemia. Xray abdomen on 8/20 with operative changes.    Overview/Hospital Course:  8/23 admitted for definitive management of gastric adenocarcinoma with total gastrectomy and j tube placement by primary. Upper gastroenterology procedure with no anastomotic leak identified. Possible ngt removal per primary. Physical/occupational therapy recommending low intensity therapy. Antiepileptic levels pending  8/24 antiepileptic levels pending. Ng tube removed. Reports visual disturbance but improving. Has not worked with physical/occupational therapy today.  8/25 doing well. +bowel movement x 2. pca  discontinued per primary. Trickle feeds to be increased per primary.    Interval History: See hospital course for today      Review of Systems   Constitutional:  Positive for activity change (improving) and fatigue (improving).   Respiratory:  Negative for shortness of breath.    Gastrointestinal:  Positive for abdominal distention and abdominal pain. Negative for constipation, nausea and vomiting.   Musculoskeletal:  Positive for myalgias.   Skin:  Positive for wound.   Neurological:  Positive for weakness (improving).   Psychiatric/Behavioral:  Negative for agitation, behavioral problems, confusion, decreased concentration and dysphoric mood. The patient is not nervous/anxious.      Objective:     Vital Signs (Most Recent):  Temp: 98.2 °F (36.8 °C) (08/25/24 1155)  Pulse: 89 (08/25/24 1155)  Resp: 20 (08/25/24 1313)  BP: (!) 142/70 (08/25/24 1155)  SpO2: 100 % (08/25/24 1155) Vital Signs (24h Range):  Temp:  [97.7 °F (36.5 °C)-98.6 °F (37 °C)] 98.2 °F (36.8 °C)  Pulse:  [71-99] 89  Resp:  [12-29] 20  SpO2:  [97 %-100 %] 100 %  BP: (108-160)/(56-83) 142/70     Weight: 61.3 kg (135 lb 2.3 oz)  Body mass index is 22.49 kg/m².    Intake/Output Summary (Last 24 hours) at 8/25/2024 1442  Last data filed at 8/25/2024 1230  Gross per 24 hour   Intake 1265.03 ml   Output 1065 ml   Net 200.03 ml         Physical Exam  Vitals and nursing note reviewed. Exam conducted with a chaperone present (family).   Constitutional:       General: She is not in acute distress.     Appearance: She is underweight. She is ill-appearing. She is not toxic-appearing.   HENT:      Head: Normocephalic and atraumatic.   Cardiovascular:      Rate and Rhythm: Normal rate.   Pulmonary:      Effort: Pulmonary effort is normal. No respiratory distress.   Abdominal:      Palpations: Abdomen is soft.   Musculoskeletal:      Right lower leg: No edema.      Left lower leg: No edema.   Skin:     General: Skin is warm.      Findings: Lesion present.    Neurological:      Mental Status: She is alert and oriented to person, place, and time.      Motor: Weakness present.   Psychiatric:         Mood and Affect: Mood normal.         Behavior: Behavior normal. Behavior is cooperative.             Significant Labs: All pertinent labs within the past 24 hours have been reviewed.  CBC:   Recent Labs   Lab 08/24/24 0430 08/25/24 0432   WBC 8.20 7.02   HGB 11.3* 11.0*   HCT 35.4* 34.4*    238     CMP:   Recent Labs   Lab 08/24/24 0430 08/25/24 0432    143   K 4.1 4.6   * 115*   CO2 20* 21*   * 102   BUN 11 11   CREATININE 0.7 0.7   CALCIUM 8.2* 8.5*   ANIONGAP 9 7*       Significant Imaging: I have reviewed all pertinent imaging results/findings within the past 24 hours.  CT: I have reviewed all pertinent results/findings within the past 24 hours and my personal findings are:  scan pneumoperitoneum     Assessment/Plan:      * Gastric adenocarcinoma  Status post total gastrectomy with j tube placement  Per primary      Primary hypertension  Chronic, controlled. Latest blood pressure and vitals reviewed-     Temp:  [97.7 °F (36.5 °C)-98.6 °F (37 °C)]   Pulse:  [71-99]   Resp:  [12-29]   BP: (108-160)/(56-83)   SpO2:  [97 %-100 %] .   Home meds for hypertension were reviewed and noted below.       While in the hospital, will manage blood pressure as follows; Adjust home antihypertensive regimen as follows- monitor, avoid aggressive management of blood pressure in setting of recent surgery     Will utilize p.r.n. blood pressure medication only if patient's blood pressure greater than 160/100 and she develops symptoms such as worsening chest pain or shortness of breath.    Chronic deep vein thrombosis (DVT) of left lower extremity  Scds and lovenox for prophy      DVT (deep venous thrombosis)  Initially presented to primary care provider with lower extremity edema   Workup revealed dvt 2/2 cancer  On loveonx prophy and scds  Ambulate     Hx of  Epileptic seizures  Resume home meds per j tube  Carbamezapine in therapeutic range  Prolactin within normal limits   Other antiepileptic levels pending      VTE Risk Mitigation (From admission, onward)           Ordered     enoxaparin injection 40 mg  Daily         08/20/24 2219     IP VTE HIGH RISK PATIENT  Once         08/20/24 2219     Place sequential compression device  Until discontinued         08/20/24 2219                    Discharge Planning   SOFIYA:      Code Status: Full Code   Is the patient medically ready for discharge?:     Reason for patient still in hospital (select all that apply): Patient trending condition, Laboratory test, Treatment, Consult recommendations, PT / OT recommendations, and Pending disposition  Discharge Plan A: Home Health            Critical care time spent on the evaluation and treatment of severe organ dysfunction, review of pertinent labs and imaging studies, discussions with consulting providers and discussions with patient/family: 31 minutes.      Nilton Shaw MD  Department of Hospital Medicine   '[place] - Intensive Care ([facility])

## 2024-08-25 NOTE — SUBJECTIVE & OBJECTIVE
Interval History: AFVSS.  FRANCY.  BM last night.  Feeling better today, less distended.     Medications:  Continuous Infusions:   D5 and 0.45% NaCl   Intravenous Continuous 50 mL/hr at 08/25/24 0700 Rate Verify at 08/25/24 0700     Scheduled Meds:   bisacodyL  10 mg Rectal Daily    carBAMazepine  200 mg Per J Tube BID    carBAMazepine  300 mg Per J Tube QHS    chlorhexidine  10 mL Mouth/Throat BID    enoxparin  40 mg Subcutaneous Daily    levETIRAcetam  500 mg Per J Tube BID    sennosides 8.8 mg/5 ml  5 mL Per J Tube BID    zonisamide  200 mg Per J Tube BID     PRN Meds:  Current Facility-Administered Medications:     dextrose 10%, 12.5 g, Intravenous, PRN    dextrose 10%, 25 g, Intravenous, PRN    glucagon (human recombinant), 1 mg, Intramuscular, PRN    hydrALAZINE, 10 mg, Intravenous, Q6H PRN    hydrocodone-apap 7.5-325 MG/15 ML, 15 mL, Oral, Q4H PRN    hydrocodone-apap 7.5-325 MG/15 ML, 30 mL, Oral, Q6H PRN    HYDROmorphone, 1 mg, Intravenous, Q6H PRN    insulin aspart U-100, 0-5 Units, Subcutaneous, Q6H PRN    naloxone, 0.02 mg, Intravenous, PRN    ondansetron, 4 mg, Intravenous, Q6H PRN    pneumoc 20-faina conj-dip cr(PF), 0.5 mL, Intramuscular, vaccine x 1 dose    promethazine, 25 mg, Rectal, Q6H PRN     Review of patient's allergies indicates:  No Known Allergies  Objective:     Vital Signs (Most Recent):  Temp: 97.9 °F (36.6 °C) (08/25/24 0715)  Pulse: 77 (08/25/24 0849)  Resp: 18 (08/25/24 0849)  BP: 131/68 (08/25/24 0715)  SpO2: 100 % (08/25/24 0849) Vital Signs (24h Range):  Temp:  [97.7 °F (36.5 °C)-98.6 °F (37 °C)] 97.9 °F (36.6 °C)  Pulse:  [71-99] 77  Resp:  [12-29] 18  SpO2:  [97 %-100 %] 100 %  BP: (108-163)/(56-83) 131/68     Weight: 61.3 kg (135 lb 2.3 oz)  Body mass index is 22.49 kg/m².    Intake/Output - Last 3 Shifts         08/23 0700 08/24 0659 08/24 0700 08/25 0659 08/25 0700 08/26 0659    I.V. (mL/kg) 516.6 (8.4) 1212.6 (19.8) 431.2 (7)    NG/ 405     IV Piggyback       Total  Intake(mL/kg) 1016.6 (16.6) 1617.6 (26.4) 431.2 (7)    Urine (mL/kg/hr) 300 (0.2) 1000 (0.7)     Emesis/NG output       Drains 220 295     Stool  0     Total Output 520 1295     Net +496.6 +322.6 +431.2           Urine Occurrence  1 x     Stool Occurrence  1 x 0 x             Physical Exam  Constitutional:       General: She is not in acute distress.     Appearance: Normal appearance.   HENT:      Head: Normocephalic and atraumatic.   Eyes:      Extraocular Movements: Extraocular movements intact.      Conjunctiva/sclera: Conjunctivae normal.   Cardiovascular:      Rate and Rhythm: Normal rate and regular rhythm.   Pulmonary:      Effort: Pulmonary effort is normal.   Abdominal:      General: Abdomen is flat. There is no distension.      Palpations: Abdomen is soft. There is no mass.      Hernia: No hernia is present.      Comments: Appropriately TTP, APOLINAR drains serosanguinous, J tube in place, incision c/d/i   Musculoskeletal:         General: No swelling, tenderness, deformity or signs of injury. Normal range of motion.   Skin:     General: Skin is warm and dry.      Coloration: Skin is not jaundiced.   Neurological:      General: No focal deficit present.      Mental Status: She is alert and oriented to person, place, and time.   Psychiatric:         Mood and Affect: Mood normal.         Behavior: Behavior normal.         Thought Content: Thought content normal.          Significant Labs:  I have reviewed all pertinent lab results within the past 24 hours.  CBC:   Recent Labs   Lab 08/25/24  0432   WBC 7.02   RBC 3.30*   HGB 11.0*   HCT 34.4*      *   MCH 33.3*   MCHC 32.0     BMP:   Recent Labs   Lab 08/25/24  0432         K 4.6   *   CO2 21*   BUN 11   CREATININE 0.7   CALCIUM 8.5*   MG 1.9       Significant Diagnostics:  I have reviewed all pertinent imaging results/findings within the past 24 hours.  CT: I have reviewed all pertinent results/findings within the past 24 hours  and my personal findings are:  contrast within small bowel and colon. No extraluminal contrast.  Depending pulmonary edema  KUB: contrast in LUQ

## 2024-08-25 NOTE — NURSING
[facility] with all personal belonging. Attempted to notify  but no answer.

## 2024-08-25 NOTE — PT/OT/SLP PROGRESS
Physical Therapy  Treatment    Cheryl Kirkland   MRN: [id number]   Admitting Diagnosis: Gastric adenocarcinoma    PT Received On: 08/25/24  PT Start Time: 0926     PT Stop Time: 0951    PT Total Time (min): 25 min       Billable Minutes:  Gait Training 15 and Therapeutic Exercise 10    Treatment Type: Treatment  PT/PTA: PTA     Number of PTA visits since last PT visit: 1       General Precautions: Standard, fall  Orthopedic Precautions: N/A  Braces: N/A  Respiratory Status: Room air         Subjective:  Communicated with nurse Gordon and completed Epic chart review prior to session. Pt eager to participate.     Pain/Comfort  Pain Rating 1: 0/10    Objective:   Patient found with: blood pressure cuff, G/J tube, APOLINAR drain, marie catheter, NG tube, peripheral IV, PCA, pulse ox (continuous), telemetry    Pt found sitting in BSC upon entry into room.    STS from BSC: CGA with RW  Gait: Pt ambulated 300' with RW with CGA  Stand Pivot T/F to BSC: CGA with RW    Performed seated BLE AROM TE x10 ea: LAQ, AP, HIP ABD/ADD, HIP FLEX    Treatment and Education:  Educated pt on RW use and management for safety. Educated pt on benefits of performing HEP throughout the day to regain/maintain strength and to prevent blood clots. Encouraged pt to sit up in BSC for all meals. Reviewed call don't fall policy and to call for assistance with all OOB needs.      AM-PAC 6 CLICK MOBILITY  How much help from another person does this patient currently need?   1 = Unable, Total/Dependent Assistance  2 = A lot, Maximum/Moderate Assistance  3 = A little, Minimum/Contact Guard/Supervision  4 = None, Modified [place]/Independent    Turning over in bed (including adjusting bedclothes, sheets and blankets)?: 3  Sitting down on and standing up from a chair with arms (e.g., wheelchair, bedside commode, etc.): 3  Moving from lying on back to sitting on the side of the bed?: 3  Moving to and from a bed to a chair (including a wheelchair)?:  3  Need to walk in hospital room?: 3  Climbing 3-5 steps with a railing?: 1  Basic Mobility Total Score: 16    AM-PAC Raw Score CMS G-Code Modifier Level of Impairment Assistance   6 % Total / Unable   7 - 9 CM 80 - 100% Maximal Assist   10 - 14 CL 60 - 80% Moderate Assist   15 - 19 CK 40 - 60% Moderate Assist   20 - 22 CJ 20 - 40% Minimal Assist   23 CI 1-20% SBA / CGA   24 CH 0% Independent/ Mod I     Patient left up in chair with all lines intact, call button in reach, chair alarm on, and nurse present.    Assessment:  Cheryl Kirkland is a 74 y.o. female with a medical diagnosis of Gastric adenocarcinoma and presents with the following functional limitations. Pt with increased gait distance with CGA with RW. Pt will continue to benefit from PT services in order to address below deficits to reach maximum level of function.    Rehab identified problem list/impairments: weakness, impaired endurance, impaired functional mobility, gait instability, impaired balance, decreased coordination, decreased lower extremity function, decreased safety awareness    Rehab potential is good.    Activity tolerance: Good    Discharge recommendations: Low Intensity Therapy      Barriers to discharge:      Equipment recommendations: bath bench (recommending RW use at home)     GOALS:   Multidisciplinary Problems       Physical Therapy Goals          Problem: Physical Therapy    Goal Priority Disciplines Outcome Goal Variances Interventions   Physical Therapy Goal     PT, PT/OT Progressing     Description: LTG'S TO BE MET IN 14 DAYS (9-4-24)  PT WILL REQUIRE SPV FOR BED MOBILITY  PT WILL REQUIRE SPV FOR BED<>CHAIR TF'S  PT WILL  FEET WITH RW AND SPV  PT WILL INC AMPAC SCORE BY 2 POINTS TO PROGRESS GROSS FUNC MOBILITY                         PLAN:    Patient to be seen 3 x/week to address the above listed problems via gait training, therapeutic activities, therapeutic exercises  Plan of Care expires:  09/04/24  Plan of Care reviewed with: patient         08/25/2024

## 2024-08-25 NOTE — SUBJECTIVE & OBJECTIVE
Interval History: See hospital course for today      Review of Systems   Constitutional:  Positive for activity change (improving) and fatigue (improving).   Respiratory:  Negative for shortness of breath.    Gastrointestinal:  Positive for abdominal distention and abdominal pain. Negative for constipation, nausea and vomiting.   Musculoskeletal:  Positive for myalgias.   Skin:  Positive for wound.   Neurological:  Positive for weakness (improving).   Psychiatric/Behavioral:  Negative for agitation, behavioral problems, confusion, decreased concentration and dysphoric mood. The patient is not nervous/anxious.      Objective:     Vital Signs (Most Recent):  Temp: 98.2 °F (36.8 °C) (08/25/24 1155)  Pulse: 89 (08/25/24 1155)  Resp: 20 (08/25/24 1313)  BP: (!) 142/70 (08/25/24 1155)  SpO2: 100 % (08/25/24 1155) Vital Signs (24h Range):  Temp:  [97.7 °F (36.5 °C)-98.6 °F (37 °C)] 98.2 °F (36.8 °C)  Pulse:  [71-99] 89  Resp:  [12-29] 20  SpO2:  [97 %-100 %] 100 %  BP: (108-160)/(56-83) 142/70     Weight: 61.3 kg (135 lb 2.3 oz)  Body mass index is 22.49 kg/m².    Intake/Output Summary (Last 24 hours) at 8/25/2024 1442  Last data filed at 8/25/2024 1230  Gross per 24 hour   Intake 1265.03 ml   Output 1065 ml   Net 200.03 ml         Physical Exam  Vitals and nursing note reviewed. Exam conducted with a chaperone present (family).   Constitutional:       General: She is not in acute distress.     Appearance: She is underweight. She is ill-appearing. She is not toxic-appearing.   HENT:      Head: Normocephalic and atraumatic.   Cardiovascular:      Rate and Rhythm: Normal rate.   Pulmonary:      Effort: Pulmonary effort is normal. No respiratory distress.   Abdominal:      Palpations: Abdomen is soft.   Musculoskeletal:      Right lower leg: No edema.      Left lower leg: No edema.   Skin:     General: Skin is warm.      Findings: Lesion present.   Neurological:      Mental Status: She is alert and oriented to person, place,  and time.      Motor: Weakness present.   Psychiatric:         Mood and Affect: Mood normal.         Behavior: Behavior normal. Behavior is cooperative.             Significant Labs: All pertinent labs within the past 24 hours have been reviewed.  CBC:   Recent Labs   Lab 08/24/24 0430 08/25/24 0432   WBC 8.20 7.02   HGB 11.3* 11.0*   HCT 35.4* 34.4*    238     CMP:   Recent Labs   Lab 08/24/24 0430 08/25/24 0432    143   K 4.1 4.6   * 115*   CO2 20* 21*   * 102   BUN 11 11   CREATININE 0.7 0.7   CALCIUM 8.2* 8.5*   ANIONGAP 9 7*       Significant Imaging: I have reviewed all pertinent imaging results/findings within the past 24 hours.  CT: I have reviewed all pertinent results/findings within the past 24 hours and my personal findings are:  scan pneumoperitoneum

## 2024-08-25 NOTE — PLAN OF CARE
AAOx4. PCA stopped per Dr. Jerome; PRN pain medications added. Advancing tube feedings per MD order. Tolerating clear liquids. APOLINAR drains remain in place- see flowsheet for output. POC reviewed with pt and  at bedside.

## 2024-08-26 LAB
ANION GAP SERPL CALC-SCNC: 9 MMOL/L (ref 8–16)
BUN SERPL-MCNC: 11 MG/DL (ref 8–23)
CALCIUM SERPL-MCNC: 8.3 MG/DL (ref 8.7–10.5)
CHLORIDE SERPL-SCNC: 116 MMOL/L (ref 95–110)
CO2 SERPL-SCNC: 17 MMOL/L (ref 23–29)
CREAT SERPL-MCNC: 0.7 MG/DL (ref 0.5–1.4)
ERYTHROCYTE [DISTWIDTH] IN BLOOD BY AUTOMATED COUNT: 14.7 % (ref 11.5–14.5)
EST. GFR  (NO RACE VARIABLE): >60 ML/MIN/1.73 M^2
GLUCOSE SERPL-MCNC: 111 MG/DL (ref 70–110)
HCT VFR BLD AUTO: 35 % (ref 37–48.5)
HGB BLD-MCNC: 11.2 G/DL (ref 12–16)
MAGNESIUM SERPL-MCNC: 1.8 MG/DL (ref 1.6–2.6)
MCH RBC QN AUTO: 32.8 PG (ref 27–31)
MCHC RBC AUTO-ENTMCNC: 32 G/DL (ref 32–36)
MCV RBC AUTO: 103 FL (ref 82–98)
PLATELET # BLD AUTO: 257 K/UL (ref 150–450)
PMV BLD AUTO: 8.9 FL (ref 9.2–12.9)
POCT GLUCOSE: 115 MG/DL (ref 70–110)
POCT GLUCOSE: 119 MG/DL (ref 70–110)
POTASSIUM SERPL-SCNC: 3.7 MMOL/L (ref 3.5–5.1)
RBC # BLD AUTO: 3.41 M/UL (ref 4–5.4)
SODIUM SERPL-SCNC: 142 MMOL/L (ref 136–145)
WBC # BLD AUTO: 7.88 K/UL (ref 3.9–12.7)

## 2024-08-26 PROCEDURE — 11000001 HC ACUTE MED/SURG PRIVATE ROOM

## 2024-08-26 PROCEDURE — 97530 THERAPEUTIC ACTIVITIES: CPT

## 2024-08-26 PROCEDURE — 80048 BASIC METABOLIC PNL TOTAL CA: CPT | Performed by: NURSE PRACTITIONER

## 2024-08-26 PROCEDURE — 25000003 PHARM REV CODE 250: Performed by: NURSE PRACTITIONER

## 2024-08-26 PROCEDURE — 25000003 PHARM REV CODE 250

## 2024-08-26 PROCEDURE — 94761 N-INVAS EAR/PLS OXIMETRY MLT: CPT

## 2024-08-26 PROCEDURE — 63600175 PHARM REV CODE 636 W HCPCS: Performed by: NURSE PRACTITIONER

## 2024-08-26 PROCEDURE — S5010 5% DEXTROSE AND 0.45% SALINE: HCPCS | Performed by: SURGERY

## 2024-08-26 PROCEDURE — 85027 COMPLETE CBC AUTOMATED: CPT | Performed by: NURSE PRACTITIONER

## 2024-08-26 PROCEDURE — 97110 THERAPEUTIC EXERCISES: CPT

## 2024-08-26 PROCEDURE — 97116 GAIT TRAINING THERAPY: CPT

## 2024-08-26 PROCEDURE — 83735 ASSAY OF MAGNESIUM: CPT | Performed by: SURGERY

## 2024-08-26 PROCEDURE — 36415 COLL VENOUS BLD VENIPUNCTURE: CPT | Performed by: NURSE PRACTITIONER

## 2024-08-26 PROCEDURE — 25000003 PHARM REV CODE 250: Performed by: SURGERY

## 2024-08-26 PROCEDURE — 63600175 PHARM REV CODE 636 W HCPCS: Performed by: SURGERY

## 2024-08-26 RX ORDER — HYDROMORPHONE HYDROCHLORIDE 2 MG/ML
1 INJECTION, SOLUTION INTRAMUSCULAR; INTRAVENOUS; SUBCUTANEOUS EVERY 6 HOURS PRN
Status: DISCONTINUED | OUTPATIENT
Start: 2024-08-26 | End: 2024-09-10 | Stop reason: HOSPADM

## 2024-08-26 RX ADMIN — CARBAMAZEPINE 200 MG: 100 SUSPENSION ORAL at 03:08

## 2024-08-26 RX ADMIN — ONDANSETRON 4 MG: 2 INJECTION INTRAMUSCULAR; INTRAVENOUS at 06:08

## 2024-08-26 RX ADMIN — HYDROMORPHONE HYDROCHLORIDE 1 MG: 2 INJECTION, SOLUTION INTRAMUSCULAR; INTRAVENOUS; SUBCUTANEOUS at 07:08

## 2024-08-26 RX ADMIN — HYDROCODONE BITARTRATE AND ACETAMINOPHEN 15 ML: 7.5; 325 SOLUTION ORAL at 10:08

## 2024-08-26 RX ADMIN — LEVETIRACETAM 500 MG: 100 SOLUTION ORAL at 09:08

## 2024-08-26 RX ADMIN — CARBAMAZEPINE 300 MG: 100 SUSPENSION ORAL at 09:08

## 2024-08-26 RX ADMIN — HYDROCODONE BITARTRATE AND ACETAMINOPHEN 15 ML: 7.5; 325 SOLUTION ORAL at 04:08

## 2024-08-26 RX ADMIN — ZONISAMIDE 200 MG: 100 SUSPENSION ORAL at 09:08

## 2024-08-26 RX ADMIN — DEXTROSE AND SODIUM CHLORIDE: 5; 450 INJECTION, SOLUTION INTRAVENOUS at 04:08

## 2024-08-26 RX ADMIN — CARBAMAZEPINE 200 MG: 100 SUSPENSION ORAL at 06:08

## 2024-08-26 RX ADMIN — SENNOSIDES 5 ML: 8.8 LIQUID ORAL at 09:08

## 2024-08-26 RX ADMIN — HYDROCODONE BITARTRATE AND ACETAMINOPHEN 15 ML: 7.5; 325 SOLUTION ORAL at 09:08

## 2024-08-26 RX ADMIN — HYDROCODONE BITARTRATE AND ACETAMINOPHEN 15 ML: 7.5; 325 SOLUTION ORAL at 02:08

## 2024-08-26 RX ADMIN — ENOXAPARIN SODIUM 40 MG: 40 INJECTION SUBCUTANEOUS at 04:08

## 2024-08-26 RX ADMIN — BISACODYL 10 MG: 10 SUPPOSITORY RECTAL at 09:08

## 2024-08-26 RX ADMIN — ZONISAMIDE 200 MG: 100 SUSPENSION ORAL at 10:08

## 2024-08-26 RX ADMIN — HYDROMORPHONE HYDROCHLORIDE 1 MG: 2 INJECTION, SOLUTION INTRAMUSCULAR; INTRAVENOUS; SUBCUTANEOUS at 04:08

## 2024-08-26 NOTE — SUBJECTIVE & OBJECTIVE
Interval History:  Patient seen and examined with  at bedside.  Discussed with surgery.  Today she is feeling nauseated and bloated.  Tube feedings were held by surgery and when reinstituted we will institute a trickle rate.  She was able to tolerate clear liquids.    Review of Systems   Constitutional:  Positive for activity change and fatigue. Negative for fever.   Respiratory:  Positive for shortness of breath. Negative for cough.    Cardiovascular:  Negative for chest pain and leg swelling.   Gastrointestinal:  Positive for abdominal distention and nausea. Negative for vomiting.   Skin:  Positive for pallor.   Neurological:  Positive for weakness.   All other systems reviewed and are negative.    Objective:     Vital Signs (Most Recent):  Temp: 99.5 °F (37.5 °C) (08/26/24 1555)  Pulse: 94 (08/26/24 1555)  Resp: 18 (08/26/24 1606)  BP: (!) 151/84 (08/26/24 1555)  SpO2: 98 % (08/26/24 1555) Vital Signs (24h Range):  Temp:  [97.6 °F (36.4 °C)-99.5 °F (37.5 °C)] 99.5 °F (37.5 °C)  Pulse:  [83-97] 94  Resp:  [16-26] 18  SpO2:  [95 %-100 %] 98 %  BP: (148-172)/(74-93) 151/84     Weight: 61.3 kg (135 lb 2.3 oz)  Body mass index is 22.49 kg/m².    Intake/Output Summary (Last 24 hours) at 8/26/2024 1827  Last data filed at 8/26/2024 1501  Gross per 24 hour   Intake 198 ml   Output 490 ml   Net -292 ml         Physical Exam  Vitals reviewed.   Constitutional:       Appearance: Normal appearance. She is ill-appearing.   HENT:      Head: Normocephalic and atraumatic.      Mouth/Throat:      Mouth: Mucous membranes are moist.      Pharynx: Oropharynx is clear.   Eyes:      Extraocular Movements: Extraocular movements intact.      Conjunctiva/sclera: Conjunctivae normal.   Cardiovascular:      Rate and Rhythm: Normal rate and regular rhythm.      Pulses: Normal pulses.      Heart sounds: Normal heart sounds.   Pulmonary:      Effort: Pulmonary effort is normal.      Breath sounds: Normal breath sounds.   Abdominal:       General: Bowel sounds are normal. There is distension.      Palpations: Abdomen is soft.      Tenderness: There is abdominal tenderness.      Comments: Tender to palpation incision site incisions are clean and dry, APOLINAR drains in place with significant drainage.   Musculoskeletal:         General: Normal range of motion.      Cervical back: Normal range of motion and neck supple.   Skin:     General: Skin is warm and dry.   Neurological:      General: No focal deficit present.      Mental Status: She is alert and oriented to person, place, and time. Mental status is at baseline.   Psychiatric:         Mood and Affect: Mood normal.         Behavior: Behavior normal.         Thought Content: Thought content normal.             Significant Labs: All pertinent labs within the past 24 hours have been reviewed.  CBC:   Recent Labs   Lab 08/25/24  0432 08/26/24  0622   WBC 7.02 7.88   HGB 11.0* 11.2*   HCT 34.4* 35.0*    257     CMP:   Recent Labs   Lab 08/25/24  0432 08/26/24  0622    142   K 4.6 3.7   * 116*   CO2 21* 17*    111*   BUN 11 11   CREATININE 0.7 0.7   CALCIUM 8.5* 8.3*   ANIONGAP 7* 9       Significant Imaging: I have reviewed all pertinent imaging results/findings within the past 24 hours.

## 2024-08-26 NOTE — PROGRESS NOTES
[facility] S[facility]  General Surgery  Progress Note    Subjective:     History of Present Illness:  Cheryl Kirkland is a 74 y.o. year old female with a history of ovarian cancer status post surgery and chemotherapy as well as epilepsy and DVT (provoked during her treatment for ovarian cancer), and MSI high gastric adenocarcinoma.  She was originally diagnosed back in February of this past year.  She was noted to have a very proximal enlarged tumor.  EGD was done as was complete metastatic workup which was negative.  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  Due to her significant adhesive disease she understood that she would require an open operation.  She was discussed in multidisciplinary tumor board and the consensus was to proceed with immunotherapy due to her MSI high status.  She completed 5 cycles of neoadjuvant immunotherapy with Keytruda and on restaging imaging was found to have enlarged Danelle portal lymph nodes.  Consensus was that she would need a total gastrectomy due to the proximity of her tumor in relationship to the GE junction.  Due to the extent of the operation required and the her previous history of ovarian cancer the tumor board consensus was to proceed with the EUS guided biopsy to rule out any ovarian cancer and to rule out pseudo progression as well.  This was performed and showed adenocarcinoma consistent with her gastric primary.  Therefore the decision was to proceed with surgical intervention due to the mixed response she had from immunotherapy on PET-CT scan.     Risks and benefits were reviewed including bleeding, infection, pain, scar, damage to surrounding structures, cardiovascular and pulmonary complications, anastomotic leak, positive margins, need for additional procedures, death, and imponderables.  She  expressed understanding and gave informed consent to proceed.    Post-Op Info:  Procedure(s) (LRB):  GASTRECTOMY (N/A)  EGD (ESOPHAGOGASTRODUODENOSCOPY) (N/A)  LAPAROSCOPY, DIAGNOSTIC (N/A)  LYSIS, ADHESIONS (N/A)  INSERTION, JEJUNOSTOMY TUBE (N/A)   6 Days Post-Op     Interval History: AFVSS.  Some nausea this morning with TF @30cc/hr.  Tolerated CLD yesterday without issue.      Medications:  Continuous Infusions:   D5 and 0.45% NaCl   Intravenous Continuous 50 mL/hr at 08/25/24 2003 New Bag at 08/25/24 2003     Scheduled Meds:   bisacodyL  10 mg Rectal Daily    carBAMazepine  200 mg Per J Tube BID    carBAMazepine  300 mg Per J Tube QHS    enoxparin  40 mg Subcutaneous Daily    levETIRAcetam  500 mg Per J Tube BID    sennosides 8.8 mg/5 ml  5 mL Per J Tube BID    zonisamide  200 mg Per J Tube BID     PRN Meds:  Current Facility-Administered Medications:     dextrose 10%, 12.5 g, Intravenous, PRN    dextrose 10%, 25 g, Intravenous, PRN    glucagon (human recombinant), 1 mg, Intramuscular, PRN    hydrALAZINE, 10 mg, Intravenous, Q6H PRN    hydrocodone-apap 7.5-325 MG/15 ML, 15 mL, Oral, Q4H PRN    hydrocodone-apap 7.5-325 MG/15 ML, 30 mL, Oral, Q6H PRN    HYDROmorphone, 1 mg, Intravenous, Q6H PRN    insulin aspart U-100, 0-5 Units, Subcutaneous, Q6H PRN    naloxone, 0.02 mg, Intravenous, PRN    ondansetron, 4 mg, Intravenous, Q6H PRN    pneumoc 20-faina conj-dip cr(PF), 0.5 mL, Intramuscular, vaccine x 1 dose    promethazine, 25 mg, Rectal, Q6H PRN     Review of patient's allergies indicates:  No Known Allergies  Objective:     Vital Signs (Most Recent):  Temp: 98.9 °F (37.2 °C) (08/26/24 0738)  Pulse: 88 (08/26/24 0738)  Resp: 18 (08/26/24 0738)  BP: (!) 165/84 (08/26/24 0738)  SpO2: 97 % (08/26/24 0738) Vital Signs (24h Range):  Temp:  [98.2 °F (36.8 °C)-98.9 °F (37.2 °C)] 98.9 °F (37.2 °C)  Pulse:  [77-97] 88  Resp:  [16-26] 18  SpO2:  [95 %-100 %] 97 %  BP: (142-173)/(70-92) 165/84     Weight: 61.3 kg (135 lb 2.3  oz)  Body mass index is 22.49 kg/m².    Intake/Output - Last 3 Shifts         08/24 0700 08/25 0659 08/25 0700 08/26 0659 08/26 0700 08/27 0659    P.O.  120     I.V. (mL/kg) 1212.6 (19.8) 959 (15.6)     NG/ 298     Total Intake(mL/kg) 1617.6 (26.4) 1377 (22.5)     Urine (mL/kg/hr) 1000 (0.7) 800 (0.5)     Drains 295 240     Stool 0 0     Total Output 1295 1040     Net +322.6 +337            Urine Occurrence 1 x      Stool Occurrence 1 x 1 x              Physical Exam  Constitutional:       General: She is not in acute distress.     Appearance: Normal appearance.   HENT:      Head: Normocephalic and atraumatic.   Eyes:      Extraocular Movements: Extraocular movements intact.      Conjunctiva/sclera: Conjunctivae normal.   Cardiovascular:      Rate and Rhythm: Normal rate and regular rhythm.   Pulmonary:      Effort: Pulmonary effort is normal.   Abdominal:      General: Abdomen is flat. There is distension.      Palpations: Abdomen is soft. There is no mass.      Hernia: No hernia is present.      Comments: Appropriately TTP, APOLINAR drains serosanguinous, J tube in place, incision c/d/i   Musculoskeletal:         General: No swelling, tenderness, deformity or signs of injury. Normal range of motion.   Skin:     General: Skin is warm and dry.      Coloration: Skin is not jaundiced.   Neurological:      General: No focal deficit present.      Mental Status: She is alert and oriented to person, place, and time.   Psychiatric:         Mood and Affect: Mood normal.         Behavior: Behavior normal.         Thought Content: Thought content normal.          Significant Labs:  I have reviewed all pertinent lab results within the past 24 hours.  CBC:   Recent Labs   Lab 08/26/24  0622   WBC 7.88   RBC 3.41*   HGB 11.2*   HCT 35.0*      *   MCH 32.8*   MCHC 32.0     BMP:   Recent Labs   Lab 08/25/24  0432         K 4.6   *   CO2 21*   BUN 11   CREATININE 0.7   CALCIUM 8.5*   MG 1.9        Significant Diagnostics:  I have reviewed all pertinent imaging results/findings within the past 24 hours.  Assessment/Plan:     * Gastric adenocarcinoma  POD #6- s/p exlap, extensive SHAYNA, small bowel resection, total gastrectomy with stapled esophagojejunal anastomosis and D2 lymphadenectomy, and Jtube placement    Doing ok- feeling a little more distended today and had some nausea this AM.  TF stopped until she starts feeling better.      -- CLD as tolerated  -- resume TF when less distended,  will resume at 10cc/hr  -- continue hycet via J tube for pain   -- ok for liquid medications via J tube  -- Strict I/Os  -- Senna liquid via Jtube BID   -- CM consult for discharge / home health planning   -- OOB To chair and walk today x5, OT/PT consulted  -- encourage IS  Dispo: continued med surg with tele    Lovenox and SCDs for DVT ppx, no indication for GI ppx         Primary hypertension  -- appreciate medicine assistance with management     Hx of Epileptic seizures  -- appreciate medicine assistance with management         Chen Jerome MD  General Surgery  [facility] Med S[facility]

## 2024-08-26 NOTE — PLAN OF CARE
Discussed poc with pt, pt verbalized understanding    Purposeful rounding every 2hours    VS wnl  Cardiac monitoring in use, pt is NSR, tele monitor # [id number]  Blood glucose monitoring   Fall precautions in place, remains injury free  Pain and nausea under control with PRN meds  Tube feeding currently tolerated at 20 mL/hr    IVFs  Accurate I&Os  Bed locked at lowest position  Call light within reach    Chart check complete  Will cont with POC

## 2024-08-26 NOTE — PT/OT/SLP PROGRESS
"Physical Therapy  Treatment    Cheryl Kirkland   MRN: [id number]   Admitting Diagnosis: Gastric adenocarcinoma       PT Start Time: 1131     PT Stop Time: 1155    PT Total Time (min): 24 min       Billable Minutes:  Gait Training 12 and Therapeutic Exercise 12    Treatment Type: Evaluation  PT/PTA: PT     Number of PTA visits since last PT visit: 0       General Precautions: Standard, fall  Orthopedic Precautions: N/A  Braces: N/A  Respiratory Status: Room air    Spiritual, Cultural Beliefs, Jain Practices, Values that Affect Care: no    Subjective:  Communicated with nursing (Jaciel) and performed chart review via epic system prior to session.  Pt agreeable to PT services in "I want to go walking" and "I've been trying to burp" pt able to burp during mobility    Pain/Comfort  Pain Rating 1: 7/10  Pain Addressed 1: Distraction, Reposition  Pain Rating Post-Intervention 1: 7/10    Objective:   Patient found with: peripheral IV, telemetry, CALLY drain (cally x2)    Functional Mobility:  Bed Mobility:    Sitting in chair upon PT arrival    Transfers:   Facilitated sit<>stand CGA and stand pivot with RW    Gait:    Facilitated gait activity 100 ft x 2 CGA with RW, demonstrated slow pace, flexed posture, wide IDALIA, decreased stride length      Balance:   Dynamic Sit: FAIR+: Maintains balance through MINIMAL excursions of active trunk motion  Dynamic stand: FAIR: Needs CONTACT GUARD during gait       Treatment and Education:  Educated pt on benefits of consistent participation in PT services to meet functional goals. Educated pt on seated therex to promote strength, circulation and joint mobility. Exercises included AP, LAQ, and GS. Educated to perform exercises intermittently throughout day to tolerance. Educated pt on importance of sitting OOB to promote endurance and overall activity tolerance. Educated pt on call don't fall policy and use of call button to alert nursing staff of needs (including to assist with " returning back to bed). Pt expressed understanding.      AM-PAC 6 CLICK MOBILITY  How much help from another person does this patient currently need?   1 = Unable, Total/Dependent Assistance  2 = A lot, Maximum/Moderate Assistance  3 = A little, Minimum/Contact Guard/Supervision  4 = None, Modified [place]/Independent    Turning over in bed (including adjusting bedclothes, sheets and blankets)?: 4 (sitting in chair)  Sitting down on and standing up from a chair with arms (e.g., wheelchair, bedside commode, etc.): 3  Moving from lying on back to sitting on the side of the bed?: 4 (sitting in chair)  Moving to and from a bed to a chair (including a wheelchair)?: 3  Need to walk in hospital room?: 3  Climbing 3-5 steps with a railing?: 1  Basic Mobility Total Score: 18    AM-PAC Raw Score CMS G-Code Modifier Level of Impairment Assistance   6 % Total / Unable   7 - 9 CM 80 - 100% Maximal Assist   10 - 14 CL 60 - 80% Moderate Assist   15 - 19 CK 40 - 60% Moderate Assist   20 - 22 CJ 20 - 40% Minimal Assist   23 CI 1-20% SBA / CGA   24 CH 0% Independent/ Mod I     Patient left up in chair with all lines intact, call button in reach, chair alarm on, nursing notified, and PCT present.    Assessment:  Cheryl Kirkland is a 74 y.o. female with a medical diagnosis of Gastric adenocarcinoma and presents with the deficits listed below following surgical intervention. Pt able to tolerate gait activity despite pain to abdomen. Pt will benefit from continued PT services in order to progress toward baseline.    Rehab identified problem list/impairments: weakness, impaired endurance, impaired functional mobility, gait instability, impaired balance, decreased coordination, decreased safety awareness, decreased lower extremity function, pain    Rehab potential is good.    Activity tolerance: Fair    Discharge recommendations: Low Intensity Therapy      Barriers to discharge:      Equipment recommendations: bath   (recommending RW use at home)     GOALS:   Multidisciplinary Problems       Physical Therapy Goals          Problem: Physical Therapy    Goal Priority Disciplines Outcome Goal Variances Interventions   Physical Therapy Goal     PT, PT/OT Progressing     Description: LTG'S TO BE MET IN 14 DAYS (9-4-24)  PT WILL REQUIRE SPV FOR BED MOBILITY  PT WILL REQUIRE SPV FOR BED<>CHAIR TF'S  PT WILL  FEET WITH RW AND SPV  PT WILL INC AMPAC SCORE BY 2 POINTS TO PROGRESS GROSS FUNC MOBILITY                         PLAN:    Patient to be seen 3 x/week to address the above listed problems via gait training, therapeutic activities, therapeutic exercises, neuromuscular re-education  Plan of Care expires: 09/09/24  Plan of Care reviewed with: patient         08/26/2024

## 2024-08-26 NOTE — PROGRESS NOTES
[facility]  Progress Note    Patient Name: Cheryl Kirkland  MRN: [id number]  Patient Class: IP- Surgery Admit   Admission Date: 8/20/2024  Length of Stay: 6 days  Attending Physician: Chen Jerome MD  Primary Care Provider: Gagandeep Iglesias MD        Subjective:     Principal Problem:Gastric adenocarcinoma        HPI:  74F  has a past medical history of Anemia, Chronic deep vein thrombosis (DVT) of left lower extremity, Deep vein thrombosis, Drug-induced polyneuropathy, DVT (deep venous thrombosis), Epilepsy, Gastric adenocarcinoma, GERD (gastroesophageal reflux disease), Hyperlipidemia, Mixed epithelial carcinoma of right ovary, OP (osteoporosis), Ovarian cancer, and Primary hypertension.  Presents for definitive treatment of gastric adenocaricoma per primary. Status post total gastrectomy with j tube placement. Transferred to icu postoperatively for close monitoring. Tolerated procedure well. Expected pain and weakness. Physical/occupational therapy recommending low intensity therapy.    Hospital medicine consulted for medical management.    Initial review of chart reveals vital signs stable, on room air. Cbc unremarkable. Cmp with hyperchloremic acidosis improving. Normal renal function. Elevated liver enzymes. Amylase within normal limits. Hypomag. Hypoalbuminemia. Xray abdomen on 8/20 with operative changes.    Overview/Hospital Course:  8/23 admitted for definitive management of gastric adenocarcinoma with total gastrectomy and j tube placement by primary. Upper gastroenterology procedure with no anastomotic leak identified. Possible ngt removal per primary. Physical/occupational therapy recommending low intensity therapy. Antiepileptic levels pending  8/24 antiepileptic levels pending. Ng tube removed. Reports visual disturbance but improving. Has not worked with physical/occupational therapy today.  8/25 doing well. +bowel movement x 2. pca discontinued per primary.  Trickle feeds to be increased per primary.  8/26 patient feels weak today with some shortness of breath.  She states she feels some nausea and just no energy.  She was able to participate therapy and walk in brown with walker    Interval History:  Patient seen and examined with  at bedside.  Discussed with surgery.  Today she is feeling nauseated and bloated.  Tube feedings were held by surgery and when reinstituted we will institute a trickle rate.  She was able to tolerate clear liquids.    Review of Systems   Constitutional:  Positive for activity change and fatigue. Negative for fever.   Respiratory:  Positive for shortness of breath. Negative for cough.    Cardiovascular:  Negative for chest pain and leg swelling.   Gastrointestinal:  Positive for abdominal distention and nausea. Negative for vomiting.   Skin:  Positive for pallor.   Neurological:  Positive for weakness.   All other systems reviewed and are negative.    Objective:     Vital Signs (Most Recent):  Temp: 99.5 °F (37.5 °C) (08/26/24 1555)  Pulse: 94 (08/26/24 1555)  Resp: 18 (08/26/24 1606)  BP: (!) 151/84 (08/26/24 1555)  SpO2: 98 % (08/26/24 1555) Vital Signs (24h Range):  Temp:  [97.6 °F (36.4 °C)-99.5 °F (37.5 °C)] 99.5 °F (37.5 °C)  Pulse:  [83-97] 94  Resp:  [16-26] 18  SpO2:  [95 %-100 %] 98 %  BP: (148-172)/(74-93) 151/84     Weight: 61.3 kg (135 lb 2.3 oz)  Body mass index is 22.49 kg/m².    Intake/Output Summary (Last 24 hours) at 8/26/2024 1827  Last data filed at 8/26/2024 1501  Gross per 24 hour   Intake 198 ml   Output 490 ml   Net -292 ml         Physical Exam  Vitals reviewed.   Constitutional:       Appearance: Normal appearance. She is ill-appearing.   HENT:      Head: Normocephalic and atraumatic.      Mouth/Throat:      Mouth: Mucous membranes are moist.      Pharynx: Oropharynx is clear.   Eyes:      Extraocular Movements: Extraocular movements intact.      Conjunctiva/sclera: Conjunctivae normal.   Cardiovascular:       Rate and Rhythm: Normal rate and regular rhythm.      Pulses: Normal pulses.      Heart sounds: Normal heart sounds.   Pulmonary:      Effort: Pulmonary effort is normal.      Breath sounds: Normal breath sounds.   Abdominal:      General: Bowel sounds are normal. There is distension.      Palpations: Abdomen is soft.      Tenderness: There is abdominal tenderness.      Comments: Tender to palpation incision site incisions are clean and dry, APOLINAR drains in place with significant drainage.   Musculoskeletal:         General: Normal range of motion.      Cervical back: Normal range of motion and neck supple.   Skin:     General: Skin is warm and dry.   Neurological:      General: No focal deficit present.      Mental Status: She is alert and oriented to person, place, and time. Mental status is at baseline.   Psychiatric:         Mood and Affect: Mood normal.         Behavior: Behavior normal.         Thought Content: Thought content normal.             Significant Labs: All pertinent labs within the past 24 hours have been reviewed.  CBC:   Recent Labs   Lab 08/25/24  0432 08/26/24  0622   WBC 7.02 7.88   HGB 11.0* 11.2*   HCT 34.4* 35.0*    257     CMP:   Recent Labs   Lab 08/25/24  0432 08/26/24  0622    142   K 4.6 3.7   * 116*   CO2 21* 17*    111*   BUN 11 11   CREATININE 0.7 0.7   CALCIUM 8.5* 8.3*   ANIONGAP 7* 9       Significant Imaging: I have reviewed all pertinent imaging results/findings within the past 24 hours.    Assessment/Plan:      * Gastric adenocarcinoma  Status post total gastrectomy with j tube placement  Per primary      Primary hypertension  Chronic, controlled. Latest blood pressure and vitals reviewed-     Temp:  [97.6 °F (36.4 °C)-99.5 °F (37.5 °C)]   Pulse:  [83-97]   Resp:  [16-26]   BP: (148-172)/(74-93)   SpO2:  [95 %-100 %] .   Home meds for hypertension were reviewed and noted below.       While in the hospital, will manage blood pressure as follows; Adjust  home antihypertensive regimen as follows- monitor, avoid aggressive management of blood pressure in setting of recent surgery     Will utilize p.r.n. blood pressure medication only if patient's blood pressure greater than 160/100 and she develops symptoms such as worsening chest pain or shortness of breath.    Chronic deep vein thrombosis (DVT) of left lower extremity  Scds and lovenox for prophy      DVT (deep venous thrombosis)  Initially presented to primary care provider with lower extremity edema   Workup revealed dvt 2/2 cancer  On loveonx prophy and scds  Ambulate     Hx of Epileptic seizures  Resume home meds per j tube  Carbamezapine in therapeutic range  Prolactin within normal limits   Other antiepileptic levels pending      VTE Risk Mitigation (From admission, onward)           Ordered     enoxaparin injection 40 mg  Daily         08/20/24 2219     IP VTE HIGH RISK PATIENT  Once         08/20/24 2219     Place sequential compression device  Until discontinued         08/20/24 2219                    Discharge Planning   SOFIYA:      Code Status: Full Code   Is the patient medically ready for discharge?:     Reason for patient still in hospital (select all that apply): Patient trending condition, Laboratory test, and Treatment  Discharge Plan A: Home Health                  Janeen Momin MD  Department of Hospital Medicine   [facility] - Med Surg

## 2024-08-26 NOTE — SUBJECTIVE & OBJECTIVE
Interval History: AFVSS.  Some nausea this morning with TF @30cc/hr.  Tolerated CLD yesterday without issue.      Medications:  Continuous Infusions:   D5 and 0.45% NaCl   Intravenous Continuous 50 mL/hr at 08/25/24 2003 New Bag at 08/25/24 2003     Scheduled Meds:   bisacodyL  10 mg Rectal Daily    carBAMazepine  200 mg Per J Tube BID    carBAMazepine  300 mg Per J Tube QHS    enoxparin  40 mg Subcutaneous Daily    levETIRAcetam  500 mg Per J Tube BID    sennosides 8.8 mg/5 ml  5 mL Per J Tube BID    zonisamide  200 mg Per J Tube BID     PRN Meds:  Current Facility-Administered Medications:     dextrose 10%, 12.5 g, Intravenous, PRN    dextrose 10%, 25 g, Intravenous, PRN    glucagon (human recombinant), 1 mg, Intramuscular, PRN    hydrALAZINE, 10 mg, Intravenous, Q6H PRN    hydrocodone-apap 7.5-325 MG/15 ML, 15 mL, Oral, Q4H PRN    hydrocodone-apap 7.5-325 MG/15 ML, 30 mL, Oral, Q6H PRN    HYDROmorphone, 1 mg, Intravenous, Q6H PRN    insulin aspart U-100, 0-5 Units, Subcutaneous, Q6H PRN    naloxone, 0.02 mg, Intravenous, PRN    ondansetron, 4 mg, Intravenous, Q6H PRN    pneumoc 20-faina conj-dip cr(PF), 0.5 mL, Intramuscular, vaccine x 1 dose    promethazine, 25 mg, Rectal, Q6H PRN     Review of patient's allergies indicates:  No Known Allergies  Objective:     Vital Signs (Most Recent):  Temp: 98.9 °F (37.2 °C) (08/26/24 0738)  Pulse: 88 (08/26/24 0738)  Resp: 18 (08/26/24 0738)  BP: (!) 165/84 (08/26/24 0738)  SpO2: 97 % (08/26/24 0738) Vital Signs (24h Range):  Temp:  [98.2 °F (36.8 °C)-98.9 °F (37.2 °C)] 98.9 °F (37.2 °C)  Pulse:  [77-97] 88  Resp:  [16-26] 18  SpO2:  [95 %-100 %] 97 %  BP: (142-173)/(70-92) 165/84     Weight: 61.3 kg (135 lb 2.3 oz)  Body mass index is 22.49 kg/m².    Intake/Output - Last 3 Shifts         08/24 0700 08/25 0659 08/25 0700 08/26 0659 08/26 0700 08/27 0659    P.O.  120     I.V. (mL/kg) 1212.6 (19.8) 959 (15.6)     NG/ 298     Total Intake(mL/kg) 1617.6 (26.4) 1377 (22.5)      Urine (mL/kg/hr) 1000 (0.7) 800 (0.5)     Drains 295 240     Stool 0 0     Total Output 1295 1040     Net +322.6 +337            Urine Occurrence 1 x      Stool Occurrence 1 x 1 x              Physical Exam  Constitutional:       General: She is not in acute distress.     Appearance: Normal appearance.   HENT:      Head: Normocephalic and atraumatic.   Eyes:      Extraocular Movements: Extraocular movements intact.      Conjunctiva/sclera: Conjunctivae normal.   Cardiovascular:      Rate and Rhythm: Normal rate and regular rhythm.   Pulmonary:      Effort: Pulmonary effort is normal.   Abdominal:      General: Abdomen is flat. There is distension.      Palpations: Abdomen is soft. There is no mass.      Hernia: No hernia is present.      Comments: Appropriately TTP, APOLINAR drains serosanguinous, J tube in place, incision c/d/i   Musculoskeletal:         General: No swelling, tenderness, deformity or signs of injury. Normal range of motion.   Skin:     General: Skin is warm and dry.      Coloration: Skin is not jaundiced.   Neurological:      General: No focal deficit present.      Mental Status: She is alert and oriented to person, place, and time.   Psychiatric:         Mood and Affect: Mood normal.         Behavior: Behavior normal.         Thought Content: Thought content normal.          Significant Labs:  I have reviewed all pertinent lab results within the past 24 hours.  CBC:   Recent Labs   Lab 08/26/24  0622   WBC 7.88   RBC 3.41*   HGB 11.2*   HCT 35.0*      *   MCH 32.8*   MCHC 32.0     BMP:   Recent Labs   Lab 08/25/24  0432         K 4.6   *   CO2 21*   BUN 11   CREATININE 0.7   CALCIUM 8.5*   MG 1.9       Significant Diagnostics:  I have reviewed all pertinent imaging results/findings within the past 24 hours.

## 2024-08-26 NOTE — PLAN OF CARE
Nutrition Plan of Care:    Recommendations     Recommendation/Intervention:   1. Initate pt onto continuous Enteral nutrition, recommend Lisa Awad Peptide 1.5 (vanilla)  via J tube, goal rate 50 mL/hr, starting at 10 mL/hr then progress to goal within 24 hrs if pt is tolerating or per MD/NP   -Formula at goal rate provides: 1800 kcals/day (102% EEN), 58 g protein/day (83% EPN), 162 g CHO/day (88% CHO needs), 821 mL free formula water/day (46% fluid needs)   -160 mL q4h free water flushes (960 mL/day) = total from formula + FWF = 1787 mL water/day (101% fluid needs), per MD/NP   -Check Mg, K+, Na, Phos and Glu before and during initiation, correct as indicated   2. Weigh twice weekly     Goals:   1. Pt will be initated onto continuous EN within 24 hrs (met)  2. Pt will tolerate and intake > 75% EEN and EPN prior to RD follow up  Nutrition Goal Status: new  Communication of RD Recs: other (comment) (POC, sticky note, secure chat)     Assessment and Plan     Nutrition Problem  Inadequate oral intake      Related to (etiology):   Decreased ability to consume sufficient protein/energy      Signs and Symptoms (as evidenced by):   NPO, Gastrectomy with J tube     Interventions/Recommendations (treatment strategy):  1. Enteral Nutrition Management   2. Collaboration by nutrition professional with other providers     Nutrition Diagnosis Status:   Continues          Graciela Rios, MS, RDN, LDN

## 2024-08-26 NOTE — PLAN OF CARE
08/26/24 0839   Rounds   Attendance Provider;Physical therapist;;Nurse ;Charge nurse   Discharge Plan A Home Health   Why the patient remains in the hospital Requires continued medical care   Transition of Care Barriers None

## 2024-08-26 NOTE — PLAN OF CARE
TX COMPLETED: facilitated transfers with CGA, ambulated 100 ft x 2 CGA with RW. Recommend low intensity. Cont POC

## 2024-08-26 NOTE — PROGRESS NOTES
[facility] - Intensive Care (Hospital)  Adult Nutrition  Progress Note    SUMMARY     Recommendations    Recommendation/Intervention:   1. Initate pt onto continuous Enteral nutrition, recommend Lisa Awad Peptide 1.5 (vanilla)  via J tube, goal rate 50 mL/hr, starting at 10 mL/hr then progress to goal within 24 hrs if pt is tolerating or per MD/NP   -Formula at goal rate provides: 1800 kcals/day (102% EEN), 58 g protein/day (83% EPN), 162 g CHO/day (88% CHO needs), 821 mL free formula water/day (46% fluid needs)   -160 mL q4h free water flushes (960 mL/day) = total from formula + FWF = 1787 mL water/day (101% fluid needs), per MD/NP   -Check Mg, K+, Na, Phos and Glu before and during initiation, correct as indicated   2. Weigh twice weekly    Goals:   1. Pt will be initated onto continuous EN within 24 hrs (met)  2. Pt will tolerate and intake > 75% EEN and EPN prior to RD follow up  Nutrition Goal Status: new  Communication of RD Recs: other (comment) (POC, sticky note, secure chat)    Assessment and Plan    Nutrition Problem  Inadequate oral intake     Related to (etiology):   Decreased ability to consume sufficient protein/energy     Signs and Symptoms (as evidenced by):   NPO, Gastrectomy with J tube    Interventions/Recommendations (treatment strategy):  1. Enteral Nutrition Management   2. 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 Female admitted for Gastric adenocarcinoma. Pt currently in the ICU, NPO x 1 day. RD consulted for J tube feed recommendations. H&P noted that the pt presented to the hospital for planned diagnostic laparoscopy, possible total gastrectomy and J tube placement, and EGD d/t gastric adenocarcinoma, s/p neoadjuvant immunotherapy. EMR noted procedure performed on 8/20/24, post-op pt was transferred to the ICU, gastrectomy with J tube in place now, plan is for liquid meds only via J tube starting today (8/21) and to maintain NJ tube to LIWS w/ plan to remove after leak test in a few days. Discussed pt in rounds, RN stated pt to start in Impact Peptide 1.5 @ 10 mL/hr at 10 am, informed RN of J tube recs via secure chat and awaiting MD to sign orders. Unable to perform full NFPE d/t pt working with other providers, visual NFPE perform, moderate orbital malnutrition noted. Reviewed chart: Propofol: 0; Total VE: 0; LBM: DAVE; Skin: incision abdomen WDL, closed/sunction drain R abdomen x 2; Chetan score: 12 (high risk); Edema: None.Labs, meds, weight reviewed. Weight charted 2/8/24 130 lbs, 8/20/24 129 lbs (BMI 21.61, Underweight for age), -1 lb wt loss x 6 months, insignificant wt loss, wt stable. RD will continue to follow and monitor pt's nutritional status during admit.    8/26/2024: Patient is post op day 6.  Diet advanced to clear liquids and tolerated CLD yesterday.  Patient is experiencing some nausea with TF running at rate of 30ml/hr.  Labs reviewed.  NKFA.  LBM: 8/25/2024.  NFPE to be performed at next patient visit.  RD to continue to monitor nutrition therapies and tolerance.      Nutrition Discharge Planning: Enteral nutrition via J tube    Nutrition Risk Screen    Nutrition Risk Screen: tube feeding or parenteral nutrition    Nutrition Related Social Determinants of Health: SDOH: Unable to assess at this time.  "    Nutrition/Diet History    Spiritual, Cultural Beliefs, Mormonism Practices, Values that Affect Care: no  Food Allergies: NKFA  Factors Affecting Nutritional Intake: altered gastrointestinal function, clear liquid diet    Anthropometrics    Temp: 97.6 °F (36.4 °C)  Height Method: Stated  Height: 5' 5" (165.1 cm)  Height (inches): 65 in  Weight Method: Standard Scale  Weight: 61.3 kg (135 lb 2.3 oz)  Weight (lb): 135.14 lb  Ideal Body Weight (IBW), Female: 125 lb  % Ideal Body Weight, Female (lb): 103.88 %  BMI (Calculated): 22.5  BMI Grade: 18.5-24.9 - normal (Underweight for age)     Wt Readings from Last 30 Encounters:   08/22/24 61.3 kg (135 lb 2.3 oz)   08/14/24 58.7 kg (129 lb 4.8 oz)   07/26/24 59 kg (130 lb)   07/23/24 60.5 kg (133 lb 6.1 oz)   07/19/24 59.5 kg (131 lb 2.8 oz)   07/09/24 58.9 kg (129 lb 13.6 oz)   06/28/24 58.9 kg (129 lb 13.6 oz)   06/18/24 58.9 kg (129 lb 13.6 oz)   05/28/24 59.7 kg (131 lb 9.8 oz)   05/28/24 59.7 kg (131 lb 9.8 oz)   05/07/24 58.2 kg (128 lb 4.9 oz)   04/16/24 57.8 kg (127 lb 6.8 oz)   04/16/24 57.8 kg (127 lb 6.8 oz)   03/26/24 58.5 kg (128 lb 15.5 oz)   03/26/24 58.5 kg (128 lb 15.5 oz)   03/14/24 57.8 kg (127 lb 6.8 oz)   03/06/24 58.8 kg (129 lb 10.1 oz)   02/23/24 58.6 kg (129 lb 3 oz)   02/08/24 59 kg (130 lb)     Lab/Procedures/Meds    Pertinent Labs Reviewed: reviewed    Pertinent Medications Reviewed: reviewed      BMP  Lab Results   Component Value Date     08/26/2024    K 3.7 08/26/2024     (H) 08/26/2024    CO2 17 (L) 08/26/2024    BUN 11 08/26/2024    CREATININE 0.7 08/26/2024    CALCIUM 8.3 (L) 08/26/2024    ANIONGAP 9 08/26/2024    EGFRNORACEVR >60 08/26/2024     Lab Results   Component Value Date    CALCIUM 8.3 (L) 08/26/2024    PHOS 3.8 08/21/2024     Lab Results   Component Value Date    ALBUMIN 2.8 (L) 08/21/2024     Lab Results   Component Value Date     (H) 08/21/2024     (H) 08/21/2024    ALKPHOS 60 08/21/2024    BILITOT " "0.3 08/21/2024     Recent Labs   Lab 08/26/24  1202   POCTGLUCOSE 119*       No results found for: "LABA1C", "HGBA1C"    Lab Results   Component Value Date    WBC 7.88 08/26/2024    HGB 11.2 (L) 08/26/2024    HCT 35.0 (L) 08/26/2024     (H) 08/26/2024     08/26/2024       Scheduled Meds:   bisacodyL  10 mg Rectal Daily    carBAMazepine  200 mg Per J Tube BID    carBAMazepine  300 mg Per J Tube QHS    enoxparin  40 mg Subcutaneous Daily    levETIRAcetam  500 mg Per J Tube BID    sennosides 8.8 mg/5 ml  5 mL Per J Tube BID    zonisamide  200 mg Per J Tube BID     Continuous Infusions:   D5 and 0.45% NaCl   Intravenous Continuous 50 mL/hr at 08/25/24 2003 New Bag at 08/25/24 2003     PRN Meds:.  Current Facility-Administered Medications:     dextrose 10%, 12.5 g, Intravenous, PRN    dextrose 10%, 25 g, Intravenous, PRN    glucagon (human recombinant), 1 mg, Intramuscular, PRN    hydrALAZINE, 10 mg, Intravenous, Q6H PRN    hydrocodone-apap 7.5-325 MG/15 ML, 15 mL, Oral, Q4H PRN    hydrocodone-apap 7.5-325 MG/15 ML, 30 mL, Oral, Q6H PRN    HYDROmorphone, 1 mg, Intravenous, Q6H PRN    insulin aspart U-100, 0-5 Units, Subcutaneous, Q6H PRN    naloxone, 0.02 mg, Intravenous, PRN    ondansetron, 4 mg, Intravenous, Q6H PRN    pneumoc 20-faina conj-dip cr(PF), 0.5 mL, Intramuscular, vaccine x 1 dose    promethazine, 25 mg, Rectal, Q6H PRN      Physical Findings/Assessment         Estimated/Assessed Needs    Weight Used For Calorie Calculations: 58.9 kg (129 lb 13.6 oz)  Energy Calorie Requirements (kcal): 3940-4657 kcals (25-30 kcals/kg ABW (Cancer)  Energy Need Method: Kcal/kg  Protein Requirements:  g (1.2-2.0 g/kg ABW (Critical care-non vent, BMI < 30 vs Cancer)  Weight Used For Protein Calculations: 58.9 kg (129 lb 13.6 oz)  Fluid Requirements (mL): 0076-7397 mL (1 mL/kcal)  Estimated Fluid Requirement Method: RDA Method  RDA Method (mL): 1472  CHO Requirement: 184-221 g (7617-8519 kcals/8)      Nutrition " Prescription Ordered    Current Diet Order: Clear liquids  Current Nutrition Support Formula Ordered: Saiguo Standard 1.4, Saiguo Peptide 1.5  Current Nutrition Support Rate Ordered: 30 (ml) (recommended goal rate of 50ml/hr)  Current Nutrition Support Frequency Ordered: continuous    Evaluation of Received Nutrient/Fluid Intake  I/O: (Net since admit)  8/21/24: +3541.1 mL    % Kcal Needs: 0%  % Protein Needs: 0%    Energy Calories Required: not meeting needs  Protein Required: not meeting needs  Fluid Required: exceeds needs  Total Fluid Intake (mL): 4811.1  Tolerance: other (see comments) (Currently no intake)  % Intake of Estimated Energy Needs: 25 - 50 %  % Meal Intake: clear liquids    Nutrition Risk    Level of Risk/Frequency of Follow-up: high (F/u x 2 weekly)       Monitor and Evaluation    Food and Nutrient Intake: energy intake, enteral nutrition intake  Food and Nutrient Adminstration: enteral and parenteral nutrition administration  Knowledge/Beliefs/Attitudes: food and nutrition knowledge/skill, beliefs and attitudes  Anthropometric Measurements: weight, weight change, body mass index  Biochemical Data, Medical Tests and Procedures: electrolyte and renal panel, gastrointestinal profile, glucose/endocrine profile  Nutrition-Focused Physical Findings: overall appearance       Nutrition Follow-Up    RD Follow-up?: Yes  Graciela Rios, MS, RDN, LDN

## 2024-08-26 NOTE — PLAN OF CARE
Discussed poc with pt, pt verbalized understanding    Purposeful rounding every 2hours    VS monitored as ordered  Cardiac monitoring in use, pt is NSR, tele monitor # [id number]  Blood glucose monitoring   Fall precautions in place, remains injury free  Pain and nausea under control with PRN meds    IVFs  Accurate I&Os  Patient tube feed advanced to 30mL/hr @ 2317  Bed locked at lowest position  Call light within reach    Chart check complete  Will cont with POC

## 2024-08-26 NOTE — NURSING
Patient complained of nausea, mild distention noted.   On-call MD notified. Tube feed held, awaiting further instructions.

## 2024-08-26 NOTE — PT/OT/SLP PROGRESS
Occupational Therapy  Treatment    Cheryl Kirkland   MRN: [id number]   Admitting Diagnosis: Gastric adenocarcinoma    OT Date of Treatment: 08/26/24   OT Start Time: 1100  OT Stop Time: 1125  OT Total Time (min): 25 min    Billable Minutes:  Therapeutic Activity 25 minutes    OT/PARAMJIT: OT          General Precautions: Standard, fall  Orthopedic Precautions: N/A  Braces: N/A  Respiratory Status: Room air         Subjective:  Communicated with nurse and epic chart review prior to session.    Pain/Comfort  Pain Rating 1: 10/10  Location 1: abdomen  Pain Rating Post-Intervention 1: 10/10  Pain Rating 2: 10/10  Location - Orientation 2: generalized  Location 2: back  Pain Rating Post-Intervention 2: 10/10    Objective:  Patient found with: peripheral IV, telemetry, APOLINAR drain     Functional Mobility:  Bed Mobility:    Sba with supine<sit  Transfers:      CGA with sit<stand   CGA with step<pivot transfer  Functional Ambulation: min a with hand/assist with x 20 feet    Activities of Daily Living:      UE dressing min a with [facility] gow         Balance:   Static Sit: GOOD-: Takes MODERATE challenges from all directions but inconsistently  Dynamic Sit: GOOD-: Incosistently Maintains balance through MODERATE excursions of active trunk movement,     Static Stand: FAIR: Maintains without assist but unable to take challenges  Dynamic stand: POOR+: Needs MIN (minimal ) assist during gait    Therapeutic Activities and Exercises:  Educated patient on importance of increased tolerance to upright position and direct impact on CV endurance and strength. Patient encouraged to sit up in chair/ EOB, for a minimum of 2 consecutive hours including for all meals.. Encouraged patient to perform AROM TE to BUE throughout the day within all available planes of motion. Pt verbalized understanding of HEP. Re enforced importance of utilizing call light to meet needs in room and not attempt to get up without staff assistance. Patient  "verbalized understanding and agreed to comply.           AM-PAC 6 CLICK ADL   How much help from another person does this patient currently need?   1 = Unable, Total/Dependent Assistance  2 = A lot, Maximum/Moderate Assistance  3 = A little, Minimum/Contact Guard/Supervision  4 = None, Modified [place]/Independent    Putting on and taking off regular lower body clothing? : 2  Bathing (including washing, rinsing, drying)?: 2  Toileting, which includes using toilet, bedpan, or urinal? : 2  Putting on and taking off regular upper body clothing?: 3  Taking care of personal grooming such as brushing teeth?: 4  Eating meals?: 4  Daily Activity Total Score: 17     AM-PAC Raw Score CMS "G-Code Modifier Level of Impairment Assistance   6 % Total / Unable   7 - 8 CM 80 - 100% Maximal Assist   9-13 CL 60 - 80% Moderate Assist   14 - 19 CK 40 - 60% Moderate Assist   20 - 22 CJ 20 - 40% Minimal Assist   23 CI 1-20% SBA / CGA   24 CH 0% Independent/ Mod I       Patient left up in chair with all lines intact, call button in reach, chair alarm on, nurse notified, and spouse present    ASSESSMENT:  Cheryl Kirkland is a 74 y.o. female with a medical diagnosis of Gastric adenocarcinoma and presents with debility and generalized weakness.    Rehab identified problem list/impairments:  weakness, impaired self care skills, impaired balance, impaired endurance, impaired functional mobility, gait instability, decreased safety awareness, decreased upper extremity function, decreased lower extremity function    Rehab potential is good.    Activity tolerance: Good    Discharge recommendations: Low Intensity Therapy   Barriers to discharge:      Equipment recommendations: bath bench, to be determined by next level of care    GOALS:   Multidisciplinary Problems       Occupational Therapy Goals          Problem: Occupational Therapy    Goal Priority Disciplines Outcome Interventions   Occupational Therapy Goal     OT, PT/OT " Progressing    Description: Goals to be met by: 9/4/24     Patient will increase functional independence with ADLs by performing:    Toileting from toilet with Supervision for hygiene and clothing management.   Toilet transfer to toilet with Supervision.  Increased functional strength in B UE grossly by 1/2 MM grade.                         Plan:  Patient to be seen 2 x/week to address the above listed problems via self-care/home management, therapeutic activities, therapeutic exercises  Plan of Care expires: 09/04/24  Plan of Care reviewed with: patient         08/26/2024

## 2024-08-27 LAB
ANION GAP SERPL CALC-SCNC: 8 MMOL/L (ref 8–16)
BUN SERPL-MCNC: 8 MG/DL (ref 8–23)
CALCIUM SERPL-MCNC: 8.1 MG/DL (ref 8.7–10.5)
CHLORIDE SERPL-SCNC: 112 MMOL/L (ref 95–110)
CO2 SERPL-SCNC: 20 MMOL/L (ref 23–29)
CREAT SERPL-MCNC: 0.7 MG/DL (ref 0.5–1.4)
ERYTHROCYTE [DISTWIDTH] IN BLOOD BY AUTOMATED COUNT: 14.4 % (ref 11.5–14.5)
EST. GFR  (NO RACE VARIABLE): >60 ML/MIN/1.73 M^2
GLUCOSE SERPL-MCNC: 145 MG/DL (ref 70–110)
HCT VFR BLD AUTO: 32.9 % (ref 37–48.5)
HGB BLD-MCNC: 10.7 G/DL (ref 12–16)
MAGNESIUM SERPL-MCNC: 1.6 MG/DL (ref 1.6–2.6)
MCH RBC QN AUTO: 32.7 PG (ref 27–31)
MCHC RBC AUTO-ENTMCNC: 32.5 G/DL (ref 32–36)
MCV RBC AUTO: 101 FL (ref 82–98)
PLATELET # BLD AUTO: 312 K/UL (ref 150–450)
PMV BLD AUTO: 9.2 FL (ref 9.2–12.9)
POCT GLUCOSE: 116 MG/DL (ref 70–110)
POCT GLUCOSE: 123 MG/DL (ref 70–110)
POCT GLUCOSE: 137 MG/DL (ref 70–110)
POCT GLUCOSE: 158 MG/DL (ref 70–110)
POTASSIUM SERPL-SCNC: 3.1 MMOL/L (ref 3.5–5.1)
RBC # BLD AUTO: 3.27 M/UL (ref 4–5.4)
SODIUM SERPL-SCNC: 140 MMOL/L (ref 136–145)
WBC # BLD AUTO: 6.96 K/UL (ref 3.9–12.7)
ZONISAMIDE SERPL-MCNC: 10 MCG/ML (ref 10–40)

## 2024-08-27 PROCEDURE — 63600175 PHARM REV CODE 636 W HCPCS: Performed by: NURSE PRACTITIONER

## 2024-08-27 PROCEDURE — 97530 THERAPEUTIC ACTIVITIES: CPT

## 2024-08-27 PROCEDURE — 25500020 PHARM REV CODE 255: Performed by: SURGERY

## 2024-08-27 PROCEDURE — 97116 GAIT TRAINING THERAPY: CPT | Mod: CQ

## 2024-08-27 PROCEDURE — 80048 BASIC METABOLIC PNL TOTAL CA: CPT | Performed by: NURSE PRACTITIONER

## 2024-08-27 PROCEDURE — 25000003 PHARM REV CODE 250: Performed by: NURSE PRACTITIONER

## 2024-08-27 PROCEDURE — 85027 COMPLETE CBC AUTOMATED: CPT | Performed by: NURSE PRACTITIONER

## 2024-08-27 PROCEDURE — 63600175 PHARM REV CODE 636 W HCPCS: Performed by: SURGERY

## 2024-08-27 PROCEDURE — 36415 COLL VENOUS BLD VENIPUNCTURE: CPT | Performed by: NURSE PRACTITIONER

## 2024-08-27 PROCEDURE — 25000003 PHARM REV CODE 250: Performed by: SURGERY

## 2024-08-27 PROCEDURE — 11000001 HC ACUTE MED/SURG PRIVATE ROOM

## 2024-08-27 PROCEDURE — 63600175 PHARM REV CODE 636 W HCPCS: Performed by: INTERNAL MEDICINE

## 2024-08-27 PROCEDURE — 97110 THERAPEUTIC EXERCISES: CPT

## 2024-08-27 PROCEDURE — A9698 NON-RAD CONTRAST MATERIALNOC: HCPCS | Performed by: SURGERY

## 2024-08-27 PROCEDURE — 25000003 PHARM REV CODE 250

## 2024-08-27 PROCEDURE — 63600175 PHARM REV CODE 636 W HCPCS: Performed by: FAMILY MEDICINE

## 2024-08-27 PROCEDURE — 83735 ASSAY OF MAGNESIUM: CPT | Performed by: SURGERY

## 2024-08-27 RX ORDER — DEXTROSE MONOHYDRATE, SODIUM CHLORIDE, AND POTASSIUM CHLORIDE 50; 1.49; 4.5 G/1000ML; G/1000ML; G/1000ML
INJECTION, SOLUTION INTRAVENOUS CONTINUOUS
Status: DISPENSED | OUTPATIENT
Start: 2024-08-27 | End: 2024-08-29

## 2024-08-27 RX ORDER — POTASSIUM CHLORIDE 14.9 MG/ML
20 INJECTION INTRAVENOUS ONCE
Status: DISCONTINUED | OUTPATIENT
Start: 2024-08-27 | End: 2024-08-27

## 2024-08-27 RX ORDER — METOCLOPRAMIDE HYDROCHLORIDE 5 MG/5ML
10 SOLUTION ORAL EVERY 8 HOURS
Status: DISCONTINUED | OUTPATIENT
Start: 2024-08-27 | End: 2024-09-10 | Stop reason: HOSPADM

## 2024-08-27 RX ORDER — POTASSIUM CHLORIDE 7.45 MG/ML
10 INJECTION INTRAVENOUS
Status: COMPLETED | OUTPATIENT
Start: 2024-08-27 | End: 2024-08-27

## 2024-08-27 RX ADMIN — LEVETIRACETAM 500 MG: 100 SOLUTION ORAL at 09:08

## 2024-08-27 RX ADMIN — HYDROCODONE BITARTRATE AND ACETAMINOPHEN 15 ML: 7.5; 325 SOLUTION ORAL at 09:08

## 2024-08-27 RX ADMIN — CARBAMAZEPINE 300 MG: 100 SUSPENSION ORAL at 08:08

## 2024-08-27 RX ADMIN — SENNOSIDES 5 ML: 8.8 LIQUID ORAL at 09:08

## 2024-08-27 RX ADMIN — LEVETIRACETAM 500 MG: 100 SOLUTION ORAL at 08:08

## 2024-08-27 RX ADMIN — HYDROCODONE BITARTRATE AND ACETAMINOPHEN 30 ML: 7.5; 325 SOLUTION ORAL at 04:08

## 2024-08-27 RX ADMIN — DEXTROSE, SODIUM CHLORIDE, AND POTASSIUM CHLORIDE: 5; .45; .15 INJECTION INTRAVENOUS at 09:08

## 2024-08-27 RX ADMIN — BISACODYL 10 MG: 10 SUPPOSITORY RECTAL at 09:08

## 2024-08-27 RX ADMIN — ENOXAPARIN SODIUM 40 MG: 40 INJECTION SUBCUTANEOUS at 04:08

## 2024-08-27 RX ADMIN — ZONISAMIDE 200 MG: 100 SUSPENSION ORAL at 09:08

## 2024-08-27 RX ADMIN — CARBAMAZEPINE 200 MG: 100 SUSPENSION ORAL at 05:08

## 2024-08-27 RX ADMIN — IOHEXOL 500 ML: 12 SOLUTION ORAL at 03:08

## 2024-08-27 RX ADMIN — IOHEXOL 100 ML: 350 INJECTION, SOLUTION INTRAVENOUS at 03:08

## 2024-08-27 RX ADMIN — HYDRALAZINE HYDROCHLORIDE 10 MG: 20 INJECTION, SOLUTION INTRAMUSCULAR; INTRAVENOUS at 05:08

## 2024-08-27 RX ADMIN — HYDROMORPHONE HYDROCHLORIDE 1 MG: 2 INJECTION, SOLUTION INTRAMUSCULAR; INTRAVENOUS; SUBCUTANEOUS at 12:08

## 2024-08-27 RX ADMIN — CARBAMAZEPINE 200 MG: 100 SUSPENSION ORAL at 01:08

## 2024-08-27 RX ADMIN — POTASSIUM CHLORIDE 10 MEQ: 7.46 INJECTION, SOLUTION INTRAVENOUS at 10:08

## 2024-08-27 RX ADMIN — METOCLOPRAMIDE HYDROCHLORIDE 10 MG: 5 SOLUTION ORAL at 09:08

## 2024-08-27 RX ADMIN — POTASSIUM CHLORIDE 10 MEQ: 7.46 INJECTION, SOLUTION INTRAVENOUS at 09:08

## 2024-08-27 NOTE — PLAN OF CARE
Problem: Adult Inpatient Plan of Care  Goal: Plan of Care Review  Outcome: Progressing  Goal: Patient-Specific Goal (Individualized)  Outcome: Progressing  Goal: Absence of Hospital-Acquired Illness or Injury  Outcome: Progressing  Goal: Optimal Comfort and Wellbeing  Outcome: Progressing  Goal: Readiness for Transition of Care  Outcome: Progressing     Problem: Infection  Goal: Absence of Infection Signs and Symptoms  Outcome: Progressing     Problem: Wound  Goal: Optimal Coping  Outcome: Progressing  Goal: Optimal Functional Ability  Outcome: Progressing  Goal: Absence of Infection Signs and Symptoms  Outcome: Progressing  Goal: Improved Oral Intake  Outcome: Progressing  Goal: Optimal Pain Control and Function  Outcome: Progressing  Goal: Skin Health and Integrity  Outcome: Progressing  Goal: Optimal Wound Healing  Outcome: Progressing     Problem: Fall Injury Risk  Goal: Absence of Fall and Fall-Related Injury  Outcome: Progressing     Problem: Skin Injury Risk Increased  Goal: Skin Health and Integrity  Outcome: Progressing

## 2024-08-27 NOTE — PT/OT/SLP PROGRESS
Physical Therapy  Treatment    Cheryl Kirkland   MRN: [id number]   Admitting Diagnosis: Gastric adenocarcinoma    PT Received On: 08/27/24  PT Start Time: 1055     PT Stop Time: 1120    PT Total Time (min): 25 min       Billable Minutes:  Gait Training 10 and Therapeutic Activity 15    Treatment Type: Treatment  PT/PTA: PTA     Number of PTA visits since last PT visit: 1       General Precautions: Standard, fall  Orthopedic Precautions: N/A  Braces: N/A  Respiratory Status: Room air    Spiritual, Cultural Beliefs, [employer] Practices, Values that Affect Care: no    Subjective:  Communicated with patient's nurse, Greta, and completed Epic chart review prior to session.  Patient agreed to PT session.     Patient noted to be very lethargic initially and reports not sleeping well last night due to increased pain.     Pain/Comfort  Pain Rating 1: 0/10  Pain Rating Post-Intervention 1: 0/10    Objective:   Patient found with: peripheral IV, telemetry, APOLINAR drain    Supine > sit EOB: SBA    Forward scoot towards EOB: SBA    Seated EOB x 10 min total focusing on increased tolerance to upright position, core stability, trunk control and CV endurance.   Maintained self supported sitting balance with SPV  Maintained unsupported sitting balance with  SPV    Reviewed AROM TE to BLE including: hip flex/ext, knee flex/ext, ankle PF/DF  To be completed a minimum of 10 reps for each LE in order to promote return of function, strength and ROM.     STS from EOB > RW: CGA    150ft w/ RW CGA    Transfer to chair not initiated this session due to patient's increased level of fatigue. Encouraged patient to request to get up to the chair later with nursing staff once she feels rested.     Stand pivot T/F to EOB w/ RW: CGA    Sit > supine: SBA    Educated patient on importance of increased tolerance to upright position and direct impact on CV endurance and strength. Patient encouraged to sit up in chair/ EOB, for a minimum of 2  consecutive hours, 3x per day. Encouraged patient to perform AROM TE to BLE throughout the day within all available planes of motion. Re enforced importance of utilizing call light to meet needs in room and not attempt to get up without staff assistance. Patient verbalized understanding and agreed to comply.        AM-PAC 6 CLICK MOBILITY  How much help from another person does this patient currently need?   1 = Unable, Total/Dependent Assistance  2 = A lot, Maximum/Moderate Assistance  3 = A little, Minimum/Contact Guard/Supervision  4 = None, Modified [place]/Independent    Turning over in bed (including adjusting bedclothes, sheets and blankets)?: 3  Sitting down on and standing up from a chair with arms (e.g., wheelchair, bedside commode, etc.): 3  Moving from lying on back to sitting on the side of the bed?: 3  Moving to and from a bed to a chair (including a wheelchair)?: 3  Need to walk in hospital room?: 3  Climbing 3-5 steps with a railing?: 1 (NT)  Basic Mobility Total Score: 16    AM-PAC Raw Score CMS G-Code Modifier Level of Impairment Assistance   6 % Total / Unable   7 - 9 CM 80 - 100% Maximal Assist   10 - 14 CL 60 - 80% Moderate Assist   15 - 19 CK 40 - 60% Moderate Assist   20 - 22 CJ 20 - 40% Minimal Assist   23 CI 1-20% SBA / CGA   24 CH 0% Independent/ Mod I     Patient left with bed in chair position with call button in reach.    Assessment:  Cheryl Kirkland is a 74 y.o. female with a medical diagnosis of Gastric adenocarcinoma and presents with overall decline in functional mobility. Patient would continue to benefit from skilled PT to address functional limitations listed below in order to return to PLOF/decrease caregiver burden.     Rehab identified problem list/impairments: weakness, impaired endurance, impaired self care skills, impaired functional mobility, gait instability, impaired balance, decreased safety awareness, decreased lower extremity function, decreased  upper extremity function, decreased ROM, impaired cardiopulmonary response to activity    Rehab potential is fair.    Activity tolerance: Fair    Discharge recommendations: Low Intensity Therapy (WITH 24 HOUR SPV & A)      Barriers to discharge:      Equipment recommendations: walker, rolling     GOALS:   Multidisciplinary Problems       Physical Therapy Goals          Problem: Physical Therapy    Goal Priority Disciplines Outcome Goal Variances Interventions   Physical Therapy Goal     PT, PT/OT Progressing     Description: LTG'S TO BE MET IN 14 DAYS (9-4-24)  PT WILL REQUIRE SPV FOR BED MOBILITY  PT WILL REQUIRE SPV FOR BED<>CHAIR TF'S  PT WILL  FEET WITH RW AND SPV  PT WILL INC AMPAC SCORE BY 2 POINTS TO PROGRESS GROSS FUNC MOBILITY                         PLAN:    Patient to be seen 3 x/week to address the above listed problems via gait training, therapeutic activities, therapeutic exercises  Plan of Care expires: 09/09/24  Plan of Care reviewed with: patient         08/27/2024

## 2024-08-27 NOTE — PT/OT/SLP PROGRESS
Physical Therapy      Patient Name:  Cheryl Kirkland   MRN:  [id number]    09:40 a.m.  Patient not seen at this time secondary to Nursing care.     10:05 a.m.  Floors in patient's room freshly mopped and too wet to safely move patient OOB.     Will follow up at next available opportunity.

## 2024-08-27 NOTE — PLAN OF CARE
Discussed poc with pt, pt verbalized understanding    Purposeful rounding every 2hours    VS wnl  Cardiac monitoring DC'd  Blood glucose monitoring   Fall precautions in place, remains injury free  Pain and nausea under control with PRN meds    IVFs  Tube feeding held.   Accurate I&Os  Abx given as prescribed  Bed locked at lowest position  Call light within reach    Chart check complete  Will cont with POC

## 2024-08-27 NOTE — PROGRESS NOTES
[facility]  General Surgery  Progress Note    Subjective:     History of Present Illness:  Cheryl Kirkland is a 74 y.o. year old female with a history of ovarian cancer status post surgery and chemotherapy as well as epilepsy and DVT (provoked during her treatment for ovarian cancer), and MSI high gastric adenocarcinoma.  She was originally diagnosed back in February of this past year.  She was noted to have a very proximal enlarged tumor.  EGD was done as was complete metastatic workup which was negative.  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  Due to her significant adhesive disease she understood that she would require an open operation.  She was discussed in multidisciplinary tumor board and the consensus was to proceed with immunotherapy due to her MSI high status.  She completed 5 cycles of neoadjuvant immunotherapy with Keytruda and on restaging imaging was found to have enlarged Danelle portal lymph nodes.  Consensus was that she would need a total gastrectomy due to the proximity of her tumor in relationship to the GE junction.  Due to the extent of the operation required and the her previous history of ovarian cancer the tumor board consensus was to proceed with the EUS guided biopsy to rule out any ovarian cancer and to rule out pseudo progression as well.  This was performed and showed adenocarcinoma consistent with her gastric primary.  Therefore the decision was to proceed with surgical intervention due to the mixed response she had from immunotherapy on PET-CT scan.     Risks and benefits were reviewed including bleeding, infection, pain, scar, damage to surrounding structures, cardiovascular and pulmonary complications, anastomotic leak, positive margins, need for additional procedures, death, and imponderables.  She  "expressed understanding and gave informed consent to proceed.    Post-Op Info:  Procedure(s) (LRB):  GASTRECTOMY (N/A)  EGD (ESOPHAGOGASTRODUODENOSCOPY) (N/A)  LAPAROSCOPY, DIAGNOSTIC (N/A)  LYSIS, ADHESIONS (N/A)  INSERTION, JEJUNOSTOMY TUBE (N/A)   7 Days Post-Op     Interval History: AFVSS.  FRANCY.  Tube feeds stopped overnight due to distention and nausea again.  Still stating she just "doesn't feel well" today.  Passing flatus and had BM    Medications:  Continuous Infusions:   dextrose 5 % and 0.45 % NaCl with KCl 20 mEq   Intravenous Continuous 50 mL/hr at 08/27/24 0936 New Bag at 08/27/24 0936     Scheduled Meds:   bisacodyL  10 mg Rectal Daily    carBAMazepine  200 mg Per J Tube BID    carBAMazepine  300 mg Per J Tube QHS    enoxparin  40 mg Subcutaneous Daily    levETIRAcetam  500 mg Per J Tube BID    sennosides 8.8 mg/5 ml  5 mL Per J Tube BID    zonisamide  200 mg Per J Tube BID     PRN Meds:  Current Facility-Administered Medications:     dextrose 10%, 12.5 g, Intravenous, PRN    dextrose 10%, 25 g, Intravenous, PRN    glucagon (human recombinant), 1 mg, Intramuscular, PRN    hydrALAZINE, 10 mg, Intravenous, Q6H PRN    hydrocodone-apap 7.5-325 MG/15 ML, 15 mL, Oral, Q4H PRN    hydrocodone-apap 7.5-325 MG/15 ML, 30 mL, Oral, Q6H PRN    HYDROmorphone, 1 mg, Intravenous, Q6H PRN    insulin aspart U-100, 0-5 Units, Subcutaneous, Q6H PRN    naloxone, 0.02 mg, Intravenous, PRN    ondansetron, 4 mg, Intravenous, Q6H PRN    pneumoc 20-faina conj-dip cr(PF), 0.5 mL, Intramuscular, vaccine x 1 dose    promethazine, 25 mg, Rectal, Q6H PRN     Review of patient's allergies indicates:  No Known Allergies  Objective:     Vital Signs (Most Recent):  Temp: 98.5 °F (36.9 °C) (08/27/24 1202)  Pulse: 94 (08/27/24 1202)  Resp: 16 (08/27/24 1236)  BP: (!) 142/81 (08/27/24 1202)  SpO2: 97 % (08/27/24 1202) Vital Signs (24h Range):  Temp:  [98.4 °F (36.9 °C)-99.5 °F (37.5 °C)] 98.5 °F (36.9 °C)  Pulse:  [86-94] 94  Resp:  [16-18] " 16  SpO2:  [96 %-98 %] 97 %  BP: (127-175)/(70-92) 142/81     Weight: 61.3 kg (135 lb 2.3 oz)  Body mass index is 22.49 kg/m².    Intake/Output - Last 3 Shifts         08/25 0700 08/26 0659 08/26 0700 08/27 0659 08/27 0700 08/28 0659    P.O. 120      I.V. (mL/kg) 959 (15.6)      NG/ 110 50    Total Intake(mL/kg) 1594 (26) 110 (1.8) 50 (0.8)    Urine (mL/kg/hr) 800 (0.5) 700 (0.5) 200 (0.5)    Drains 240 335 65    Stool 0 0 0    Total Output 1040 1035 265    Net +554 -925 -215           Urine Occurrence  3 x     Stool Occurrence 1 x 1 x 1 x             Physical Exam  Constitutional:       General: She is not in acute distress.     Appearance: Normal appearance. She is ill-appearing.   HENT:      Head: Normocephalic and atraumatic.   Eyes:      Extraocular Movements: Extraocular movements intact.      Conjunctiva/sclera: Conjunctivae normal.   Cardiovascular:      Rate and Rhythm: Normal rate and regular rhythm.   Pulmonary:      Effort: Pulmonary effort is normal.   Abdominal:      General: Abdomen is flat. There is distension.      Palpations: Abdomen is soft. There is no mass.      Hernia: No hernia is present.      Comments: TTP, APOLINAR drains serous but some air in bulbs, J tube in place, incision c/d/i   Musculoskeletal:         General: No swelling, tenderness, deformity or signs of injury. Normal range of motion.   Skin:     General: Skin is warm and dry.      Coloration: Skin is not jaundiced.   Neurological:      General: No focal deficit present.      Mental Status: She is alert and oriented to person, place, and time.   Psychiatric:         Mood and Affect: Mood normal.         Behavior: Behavior normal.         Thought Content: Thought content normal.          Significant Labs:  I have reviewed all pertinent lab results within the past 24 hours.  CBC:   Recent Labs   Lab 08/27/24  0545   WBC 6.96   RBC 3.27*   HGB 10.7*   HCT 32.9*      *   MCH 32.7*   MCHC 32.5     BMP:   Recent Labs    Lab 08/27/24  0544   *      K 3.1*   *   CO2 20*   BUN 8   CREATININE 0.7   CALCIUM 8.1*   MG 1.6       Significant Diagnostics:  I have reviewed all pertinent imaging results/findings within the past 24 hours.  Assessment/Plan:     * Gastric adenocarcinoma  POD #7- s/p exlap, extensive SHAYNA, small bowel resection, total gastrectomy with stapled esophagojejunal anastomosis and D2 lymphadenectomy, and Jtube placement    Still looking unwell and puny.  No change in vitals or leukocytosis to suggest leak and APOLINAR drains serous appearing.  Some air in tubing however and pt clinically is not improving.      -- NPO   -- CT Abdomen/ pelvis to r/o leak  -- holding TF for now.  If CT negative then will start some reglan to help with gastroparesis  -- continue hycet via J tube for pain   -- ok for liquid medications via J tube  -- Strict I/Os  -- Senna liquid via Jtube BID   -- CM consult for discharge / home health planning   -- OOB To chair and walk today x5, OT/PT consulted  -- encourage IS  Dispo: continued med surg with tele    Lovenox and SCDs for DVT ppx, no indication for GI ppx         Primary hypertension  -- appreciate medicine assistance with management     Hx of Epileptic seizures  -- appreciate medicine assistance with management         Chen Jerome MD  General Surgery  [facility] Med S[facility]

## 2024-08-27 NOTE — PLAN OF CARE
Somnolent upon arrival but agreeable to therapy. Pt ambulated 150 ft with RW and CGA. Exercises seated EOB x 10 minutes.

## 2024-08-27 NOTE — PT/OT/SLP PROGRESS
Occupational Therapy   Treatment    Name: Cheryl Kirkland  MRN: [id number]  Admitting Diagnosis:  Gastric adenocarcinoma  7 Days Post-Op    Recommendations:     Discharge Recommendations: Low Intensity Therapy  Discharge Equipment Recommendations:  bath bench, to be determined by next level of care  Barriers to discharge:  None    Assessment:     Cheryl Kirkland is a 74 y.o. female with a medical diagnosis of Gastric adenocarcinoma.  She presents with the following performance deficits affecting function are weakness, impaired endurance, impaired functional mobility, impaired self care skills, gait instability, pain, impaired cardiopulmonary response to activity.     Rehab Prognosis:  Good; patient would benefit from acute skilled OT services to address these deficits and reach maximum level of function.       Plan:     Patient to be seen 2 x/week to address the above listed problems via self-care/home management, therapeutic activities, therapeutic exercises  Plan of Care Expires: 09/04/24  Plan of Care Reviewed with: patient    Subjective     Chief Complaint: Fatigue  Patient/Family Comments/goals: Healing  Pain/Comfort:  Pain Rating 1: 0/10    Objective:     Communicated with: RN prior to session.  Patient found HOB elevated with peripheral IV, PEG Tube, APOLINAR drain, G/J tube, telemetry upon OT entry to room.    General Precautions: Standard, fall    Orthopedic Precautions:N/A  Braces: N/A  Respiratory Status: Room air     Occupational Performance:     Bed Mobility:    Patient completed Scooting/Bridging with stand by assistance  Patient completed Supine to Sit with stand by assistance  Patient completed Sit to Supine with stand by assistance     Functional Mobility/Transfers:  Patient completed Sit <> Stand Transfer with contact guard assistance  with  rolling walker   Functional Mobility: Pt ambulated 150ft CGA with RW in room and hallway to increase endurance necessary to completed ADL routine.      AMPAC 6 Click ADL: 17    Treatment & Education:  Pt completed 10 repetitions of the following exercises seated EOB with supervision for safety: shoulder flexion and shoulder press. Increased time required due to fatigue and difficulty maintaining balance.     Patient left HOB elevated with all lines intact and call button in reach    GOALS:   Multidisciplinary Problems       Occupational Therapy Goals          Problem: Occupational Therapy    Goal Priority Disciplines Outcome Interventions   Occupational Therapy Goal     OT, PT/OT Progressing    Description: Goals to be met by: 9/4/24     Patient will increase functional independence with ADLs by performing:    Toileting from toilet with Supervision for hygiene and clothing management.   Toilet transfer to toilet with Supervision.  Increased functional strength in B UE grossly by 1/2 MM grade.                         Time Tracking:     OT Date of Treatment: 08/27/24  OT Start Time: 1056  OT Stop Time: 1120  OT Total Time (min): 24 min    Billable Minutes:Therapeutic Activity 15  Therapeutic Exercise 9    OT/PARAMJIT: OT          8/27/2024

## 2024-08-27 NOTE — SUBJECTIVE & OBJECTIVE
"Interval History: AFVSS.  FRANCY.  Tube feeds stopped overnight due to distention and nausea again.  Still stating she just "doesn't feel well" today.  Passing flatus and had BM    Medications:  Continuous Infusions:   dextrose 5 % and 0.45 % NaCl with KCl 20 mEq   Intravenous Continuous 50 mL/hr at 08/27/24 0936 New Bag at 08/27/24 0936     Scheduled Meds:   bisacodyL  10 mg Rectal Daily    carBAMazepine  200 mg Per J Tube BID    carBAMazepine  300 mg Per J Tube QHS    enoxparin  40 mg Subcutaneous Daily    levETIRAcetam  500 mg Per J Tube BID    sennosides 8.8 mg/5 ml  5 mL Per J Tube BID    zonisamide  200 mg Per J Tube BID     PRN Meds:  Current Facility-Administered Medications:     dextrose 10%, 12.5 g, Intravenous, PRN    dextrose 10%, 25 g, Intravenous, PRN    glucagon (human recombinant), 1 mg, Intramuscular, PRN    hydrALAZINE, 10 mg, Intravenous, Q6H PRN    hydrocodone-apap 7.5-325 MG/15 ML, 15 mL, Oral, Q4H PRN    hydrocodone-apap 7.5-325 MG/15 ML, 30 mL, Oral, Q6H PRN    HYDROmorphone, 1 mg, Intravenous, Q6H PRN    insulin aspart U-100, 0-5 Units, Subcutaneous, Q6H PRN    naloxone, 0.02 mg, Intravenous, PRN    ondansetron, 4 mg, Intravenous, Q6H PRN    pneumoc 20-faina conj-dip cr(PF), 0.5 mL, Intramuscular, vaccine x 1 dose    promethazine, 25 mg, Rectal, Q6H PRN     Review of patient's allergies indicates:  No Known Allergies  Objective:     Vital Signs (Most Recent):  Temp: 98.5 °F (36.9 °C) (08/27/24 1202)  Pulse: 94 (08/27/24 1202)  Resp: 16 (08/27/24 1236)  BP: (!) 142/81 (08/27/24 1202)  SpO2: 97 % (08/27/24 1202) Vital Signs (24h Range):  Temp:  [98.4 °F (36.9 °C)-99.5 °F (37.5 °C)] 98.5 °F (36.9 °C)  Pulse:  [86-94] 94  Resp:  [16-18] 16  SpO2:  [96 %-98 %] 97 %  BP: (127-175)/(70-92) 142/81     Weight: 61.3 kg (135 lb 2.3 oz)  Body mass index is 22.49 kg/m².    Intake/Output - Last 3 Shifts         08/25 0700 08/26 0659 08/26 0700 08/27 0659 08/27 0700 08/28 0659    P.O. 120      I.V. (mL/kg) " 959 (15.6)      NG/ 110 50    Total Intake(mL/kg) 1594 (26) 110 (1.8) 50 (0.8)    Urine (mL/kg/hr) 800 (0.5) 700 (0.5) 200 (0.5)    Drains 240 335 65    Stool 0 0 0    Total Output 1040 1035 265    Net +554 -925 -215           Urine Occurrence  3 x     Stool Occurrence 1 x 1 x 1 x             Physical Exam  Constitutional:       General: She is not in acute distress.     Appearance: Normal appearance. She is ill-appearing.   HENT:      Head: Normocephalic and atraumatic.   Eyes:      Extraocular Movements: Extraocular movements intact.      Conjunctiva/sclera: Conjunctivae normal.   Cardiovascular:      Rate and Rhythm: Normal rate and regular rhythm.   Pulmonary:      Effort: Pulmonary effort is normal.   Abdominal:      General: Abdomen is flat. There is distension.      Palpations: Abdomen is soft. There is no mass.      Hernia: No hernia is present.      Comments: TTP, APOLINAR drains serous but some air in bulbs, J tube in place, incision c/d/i   Musculoskeletal:         General: No swelling, tenderness, deformity or signs of injury. Normal range of motion.   Skin:     General: Skin is warm and dry.      Coloration: Skin is not jaundiced.   Neurological:      General: No focal deficit present.      Mental Status: She is alert and oriented to person, place, and time.   Psychiatric:         Mood and Affect: Mood normal.         Behavior: Behavior normal.         Thought Content: Thought content normal.          Significant Labs:  I have reviewed all pertinent lab results within the past 24 hours.  CBC:   Recent Labs   Lab 08/27/24  0545   WBC 6.96   RBC 3.27*   HGB 10.7*   HCT 32.9*      *   MCH 32.7*   MCHC 32.5     BMP:   Recent Labs   Lab 08/27/24  0544   *      K 3.1*   *   CO2 20*   BUN 8   CREATININE 0.7   CALCIUM 8.1*   MG 1.6       Significant Diagnostics:  I have reviewed all pertinent imaging results/findings within the past 24 hours.

## 2024-08-27 NOTE — NURSING
Patient is complains of constant burping and feelings of indigestion that started on 08/25.     Complaints of nausea earlier, tube feedings paused. Current tube feeding at 20 ml/hr. No residue when checked.     MD notified and recommends to hold told tube feeding for now.

## 2024-08-28 LAB
ANION GAP SERPL CALC-SCNC: 11 MMOL/L (ref 8–16)
BUN SERPL-MCNC: 7 MG/DL (ref 8–23)
CALCIUM SERPL-MCNC: 8.3 MG/DL (ref 8.7–10.5)
CHLORIDE SERPL-SCNC: 113 MMOL/L (ref 95–110)
CO2 SERPL-SCNC: 17 MMOL/L (ref 23–29)
CREAT SERPL-MCNC: 0.6 MG/DL (ref 0.5–1.4)
ERYTHROCYTE [DISTWIDTH] IN BLOOD BY AUTOMATED COUNT: 14.7 % (ref 11.5–14.5)
EST. GFR  (NO RACE VARIABLE): >60 ML/MIN/1.73 M^2
GLUCOSE SERPL-MCNC: 107 MG/DL (ref 70–110)
HCT VFR BLD AUTO: 34.6 % (ref 37–48.5)
HGB BLD-MCNC: 11.5 G/DL (ref 12–16)
LEVETIRACETAM SERPL-MCNC: 16.6 UG/ML (ref 3–60)
MAGNESIUM SERPL-MCNC: 1.8 MG/DL (ref 1.6–2.6)
MCH RBC QN AUTO: 33.4 PG (ref 27–31)
MCHC RBC AUTO-ENTMCNC: 33.2 G/DL (ref 32–36)
MCV RBC AUTO: 101 FL (ref 82–98)
PLATELET # BLD AUTO: 367 K/UL (ref 150–450)
PMV BLD AUTO: 9.1 FL (ref 9.2–12.9)
POCT GLUCOSE: 113 MG/DL (ref 70–110)
POCT GLUCOSE: 115 MG/DL (ref 70–110)
POCT GLUCOSE: 118 MG/DL (ref 70–110)
POCT GLUCOSE: 123 MG/DL (ref 70–110)
POTASSIUM SERPL-SCNC: 3.9 MMOL/L (ref 3.5–5.1)
RBC # BLD AUTO: 3.44 M/UL (ref 4–5.4)
SODIUM SERPL-SCNC: 141 MMOL/L (ref 136–145)
WBC # BLD AUTO: 7.06 K/UL (ref 3.9–12.7)

## 2024-08-28 PROCEDURE — 83735 ASSAY OF MAGNESIUM: CPT | Performed by: SURGERY

## 2024-08-28 PROCEDURE — 97116 GAIT TRAINING THERAPY: CPT | Mod: CQ

## 2024-08-28 PROCEDURE — 97530 THERAPEUTIC ACTIVITIES: CPT | Mod: CQ

## 2024-08-28 PROCEDURE — 76937 US GUIDE VASCULAR ACCESS: CPT

## 2024-08-28 PROCEDURE — 63600175 PHARM REV CODE 636 W HCPCS: Performed by: INTERNAL MEDICINE

## 2024-08-28 PROCEDURE — 85027 COMPLETE CBC AUTOMATED: CPT | Performed by: NURSE PRACTITIONER

## 2024-08-28 PROCEDURE — 80048 BASIC METABOLIC PNL TOTAL CA: CPT | Performed by: NURSE PRACTITIONER

## 2024-08-28 PROCEDURE — 11000001 HC ACUTE MED/SURG PRIVATE ROOM

## 2024-08-28 PROCEDURE — 63600175 PHARM REV CODE 636 W HCPCS: Performed by: NURSE PRACTITIONER

## 2024-08-28 PROCEDURE — 25000003 PHARM REV CODE 250: Performed by: SURGERY

## 2024-08-28 PROCEDURE — 25000003 PHARM REV CODE 250: Performed by: NURSE PRACTITIONER

## 2024-08-28 PROCEDURE — 36415 COLL VENOUS BLD VENIPUNCTURE: CPT | Performed by: NURSE PRACTITIONER

## 2024-08-28 RX ADMIN — HYDROCODONE BITARTRATE AND ACETAMINOPHEN 30 ML: 7.5; 325 SOLUTION ORAL at 11:08

## 2024-08-28 RX ADMIN — METOCLOPRAMIDE HYDROCHLORIDE 10 MG: 5 SOLUTION ORAL at 05:08

## 2024-08-28 RX ADMIN — LEVETIRACETAM 500 MG: 100 SOLUTION ORAL at 08:08

## 2024-08-28 RX ADMIN — CARBAMAZEPINE 200 MG: 100 SUSPENSION ORAL at 02:08

## 2024-08-28 RX ADMIN — ZONISAMIDE 200 MG: 100 SUSPENSION ORAL at 08:08

## 2024-08-28 RX ADMIN — BISACODYL 10 MG: 10 SUPPOSITORY RECTAL at 09:08

## 2024-08-28 RX ADMIN — METOCLOPRAMIDE HYDROCHLORIDE 10 MG: 5 SOLUTION ORAL at 02:08

## 2024-08-28 RX ADMIN — LEVETIRACETAM 500 MG: 100 SOLUTION ORAL at 09:08

## 2024-08-28 RX ADMIN — HYDROCODONE BITARTRATE AND ACETAMINOPHEN 30 ML: 7.5; 325 SOLUTION ORAL at 06:08

## 2024-08-28 RX ADMIN — SENNOSIDES 5 ML: 8.8 LIQUID ORAL at 09:08

## 2024-08-28 RX ADMIN — ENOXAPARIN SODIUM 40 MG: 40 INJECTION SUBCUTANEOUS at 04:08

## 2024-08-28 RX ADMIN — SENNOSIDES 5 ML: 8.8 LIQUID ORAL at 08:08

## 2024-08-28 RX ADMIN — CARBAMAZEPINE 200 MG: 100 SUSPENSION ORAL at 05:08

## 2024-08-28 RX ADMIN — DEXTROSE, SODIUM CHLORIDE, AND POTASSIUM CHLORIDE: 5; .45; .15 INJECTION INTRAVENOUS at 11:08

## 2024-08-28 RX ADMIN — ZONISAMIDE 200 MG: 100 SUSPENSION ORAL at 09:08

## 2024-08-28 RX ADMIN — HYDROCODONE BITARTRATE AND ACETAMINOPHEN 15 ML: 7.5; 325 SOLUTION ORAL at 04:08

## 2024-08-28 RX ADMIN — CARBAMAZEPINE 300 MG: 100 SUSPENSION ORAL at 08:08

## 2024-08-28 NOTE — PROGRESS NOTES
[facility]  Progress Note    Patient Name: Cheryl Kirkland  MRN: [id number]  Patient Class: IP- Inpatient   Admission Date: 8/20/2024  Length of Stay: 7 days  Attending Physician: Chen Jerome MD  Primary Care Provider: Gagandeep Iglesias MD        Subjective:     Principal Problem:Gastric adenocarcinoma        HPI:  74F  has a past medical history of Anemia, Chronic deep vein thrombosis (DVT) of left lower extremity, Deep vein thrombosis, Drug-induced polyneuropathy, DVT (deep venous thrombosis), Epilepsy, Gastric adenocarcinoma, GERD (gastroesophageal reflux disease), Hyperlipidemia, Mixed epithelial carcinoma of right ovary, OP (osteoporosis), Ovarian cancer, and Primary hypertension.  Presents for definitive treatment of gastric adenocaricoma per primary. Status post total gastrectomy with j tube placement. Transferred to icu postoperatively for close monitoring. Tolerated procedure well. Expected pain and weakness. Physical/occupational therapy recommending low intensity therapy.    Hospital medicine consulted for medical management.    Initial review of chart reveals vital signs stable, on room air. Cbc unremarkable. Cmp with hyperchloremic acidosis improving. Normal renal function. Elevated liver enzymes. Amylase within normal limits. Hypomag. Hypoalbuminemia. Xray abdomen on 8/20 with operative changes.    Overview/Hospital Course:  8/23 admitted for definitive management of gastric adenocarcinoma with total gastrectomy and j tube placement by primary. Upper gastroenterology procedure with no anastomotic leak identified. Possible ngt removal per primary. Physical/occupational therapy recommending low intensity therapy. Antiepileptic levels pending  8/24 antiepileptic levels pending. Ng tube removed. Reports visual disturbance but improving. Has not worked with physical/occupational therapy today.  8/25 doing well. +bowel movement x 2. pca discontinued per primary.  Trickle feeds to be increased per primary.  8/26 patient feels weak today with some shortness of breath.  She states she feels some nausea and just no energy.  She was able to participate therapy and walk in [place] with walker  8/27 patient continues to feel weak.    Interval History:  Patient continues to feel weak nausea and debility.    Review of Systems   Constitutional:  Positive for activity change and fatigue. Negative for fever.   Respiratory:  Positive for shortness of breath. Negative for cough.    Cardiovascular:  Negative for chest pain and leg swelling.   Gastrointestinal:  Positive for abdominal distention and nausea. Negative for vomiting.   Skin:  Positive for pallor.   Neurological:  Positive for weakness.   All other systems reviewed and are negative.    Objective:     Vital Signs (Most Recent):  Temp: 98 °F (36.7 °C) (08/27/24 1558)  Pulse: 91 (08/27/24 1634)  Resp: 18 (08/27/24 1558)  BP: (!) 146/77 (08/27/24 1634)  SpO2: 97 % (08/27/24 1558) Vital Signs (24h Range):  Temp:  [98 °F (36.7 °C)-99.4 °F (37.4 °C)] 98 °F (36.7 °C)  Pulse:  [86-96] 91  Resp:  [16-18] 18  SpO2:  [96 %-97 %] 97 %  BP: (127-175)/(70-92) 146/77     Weight: 61.3 kg (135 lb 2.3 oz)  Body mass index is 22.49 kg/m².    Intake/Output Summary (Last 24 hours) at 8/27/2024 1910  Last data filed at 8/27/2024 1633  Gross per 24 hour   Intake 70 ml   Output 795 ml   Net -725 ml         Physical Exam  Vitals reviewed.   Constitutional:       Appearance: Normal appearance. She is ill-appearing.   HENT:      Head: Normocephalic and atraumatic.      Mouth/Throat:      Mouth: Mucous membranes are moist.      Pharynx: Oropharynx is clear.   Eyes:      Extraocular Movements: Extraocular movements intact.      Conjunctiva/sclera: Conjunctivae normal.   Cardiovascular:      Rate and Rhythm: Normal rate and regular rhythm.      Pulses: Normal pulses.      Heart sounds: Normal heart sounds.   Pulmonary:      Effort: Pulmonary effort is normal.       Breath sounds: Normal breath sounds.   Abdominal:      General: Bowel sounds are normal. There is distension.      Palpations: Abdomen is soft.      Tenderness: There is abdominal tenderness.      Comments: Tender to palpation incision site incisions are clean and dry, APOLINAR drains in place with significant drainage.   Musculoskeletal:         General: Normal range of motion.      Cervical back: Normal range of motion and neck supple.   Skin:     General: Skin is warm and dry.   Neurological:      General: No focal deficit present.      Mental Status: She is alert and oriented to person, place, and time. Mental status is at baseline.   Psychiatric:         Mood and Affect: Mood normal.         Behavior: Behavior normal.         Thought Content: Thought content normal.             Significant Labs: All pertinent labs within the past 24 hours have been reviewed.  CBC:   Recent Labs   Lab 08/26/24  0622 08/27/24  0545   WBC 7.88 6.96   HGB 11.2* 10.7*   HCT 35.0* 32.9*    312     CMP:   Recent Labs   Lab 08/26/24 0622 08/27/24  0544    140   K 3.7 3.1*   * 112*   CO2 17* 20*   * 145*   BUN 11 8   CREATININE 0.7 0.7   CALCIUM 8.3* 8.1*   ANIONGAP 9 8       Significant Imaging: I have reviewed all pertinent imaging results/findings within the past 24 hours.    Assessment/Plan:      * Gastric adenocarcinoma  Status post total gastrectomy with j tube placement  Per primary      Primary hypertension  Chronic, controlled. Latest blood pressure and vitals reviewed-     Temp:  [98 °F (36.7 °C)-99.4 °F (37.4 °C)]   Pulse:  [86-96]   Resp:  [16-18]   BP: (127-175)/(70-92)   SpO2:  [96 %-97 %] .   Home meds for hypertension were reviewed and noted below.       While in the hospital, will manage blood pressure as follows; Adjust home antihypertensive regimen as follows- monitor, avoid aggressive management of blood pressure in setting of recent surgery     Will utilize p.r.n. blood pressure medication  only if patient's blood pressure greater than 160/100 and she develops symptoms such as worsening chest pain or shortness of breath.    Chronic deep vein thrombosis (DVT) of left lower extremity  Scds and lovenox for prophy      DVT (deep venous thrombosis)  Initially presented to primary care provider with lower extremity edema   Workup revealed dvt 2/2 cancer  On loveonx prophy and scds  Ambulate     Hx of Epileptic seizures  Resume home meds per j tube  Carbamezapine in therapeutic range  Prolactin within normal limits   Other antiepileptic levels pending      VTE Risk Mitigation (From admission, onward)           Ordered     enoxaparin injection 40 mg  Daily         08/20/24 2219     IP VTE HIGH RISK PATIENT  Once         08/20/24 2219     Place sequential compression device  Until discontinued         08/20/24 2219                    Discharge Planning   SOFIYA:      Code Status: Full Code   Is the patient medically ready for discharge?:     Reason for patient still in hospital (select all that apply): Patient trending condition, Laboratory test, Treatment, Imaging, and Consult recommendations  Discharge Plan A: Home Health                  Janeen Momin MD  Department of Hospital Medicine   [facility] - Med S[facility]

## 2024-08-28 NOTE — PLAN OF CARE
Patient ambulated halls with , no harm or injuries  PRN meds given for pain  Denies nausea    Tolerated meds well through J tube  Chart check complete  Accurate I&Os      Problem: Adult Inpatient Plan of Care  Goal: Absence of Hospital-Acquired Illness or Injury  Outcome: Progressing     Problem: Infection  Goal: Absence of Infection Signs and Symptoms  Outcome: Progressing     Problem: Wound  Goal: Absence of Infection Signs and Symptoms  Outcome: Progressing

## 2024-08-28 NOTE — SUBJECTIVE & OBJECTIVE
Interval History:  Patient continues to feel weak nausea and debility.    Review of Systems   Constitutional:  Positive for activity change and fatigue. Negative for fever.   Respiratory:  Positive for shortness of breath. Negative for cough.    Cardiovascular:  Negative for chest pain and leg swelling.   Gastrointestinal:  Positive for abdominal distention and nausea. Negative for vomiting.   Skin:  Positive for pallor.   Neurological:  Positive for weakness.   All other systems reviewed and are negative.    Objective:     Vital Signs (Most Recent):  Temp: 98 °F (36.7 °C) (08/27/24 1558)  Pulse: 91 (08/27/24 1634)  Resp: 18 (08/27/24 1558)  BP: (!) 146/77 (08/27/24 1634)  SpO2: 97 % (08/27/24 1558) Vital Signs (24h Range):  Temp:  [98 °F (36.7 °C)-99.4 °F (37.4 °C)] 98 °F (36.7 °C)  Pulse:  [86-96] 91  Resp:  [16-18] 18  SpO2:  [96 %-97 %] 97 %  BP: (127-175)/(70-92) 146/77     Weight: 61.3 kg (135 lb 2.3 oz)  Body mass index is 22.49 kg/m².    Intake/Output Summary (Last 24 hours) at 8/27/2024 1910  Last data filed at 8/27/2024 1633  Gross per 24 hour   Intake 70 ml   Output 795 ml   Net -725 ml         Physical Exam  Vitals reviewed.   Constitutional:       Appearance: Normal appearance. She is ill-appearing.   HENT:      Head: Normocephalic and atraumatic.      Mouth/Throat:      Mouth: Mucous membranes are moist.      Pharynx: Oropharynx is clear.   Eyes:      Extraocular Movements: Extraocular movements intact.      Conjunctiva/sclera: Conjunctivae normal.   Cardiovascular:      Rate and Rhythm: Normal rate and regular rhythm.      Pulses: Normal pulses.      Heart sounds: Normal heart sounds.   Pulmonary:      Effort: Pulmonary effort is normal.      Breath sounds: Normal breath sounds.   Abdominal:      General: Bowel sounds are normal. There is distension.      Palpations: Abdomen is soft.      Tenderness: There is abdominal tenderness.      Comments: Tender to palpation incision site incisions are clean  and dry, APOLINAR drains in place with significant drainage.   Musculoskeletal:         General: Normal range of motion.      Cervical back: Normal range of motion and neck supple.   Skin:     General: Skin is warm and dry.   Neurological:      General: No focal deficit present.      Mental Status: She is alert and oriented to person, place, and time. Mental status is at baseline.   Psychiatric:         Mood and Affect: Mood normal.         Behavior: Behavior normal.         Thought Content: Thought content normal.             Significant Labs: All pertinent labs within the past 24 hours have been reviewed.  CBC:   Recent Labs   Lab 08/26/24  0622 08/27/24  0545   WBC 7.88 6.96   HGB 11.2* 10.7*   HCT 35.0* 32.9*    312     CMP:   Recent Labs   Lab 08/26/24  0622 08/27/24  0544    140   K 3.7 3.1*   * 112*   CO2 17* 20*   * 145*   BUN 11 8   CREATININE 0.7 0.7   CALCIUM 8.3* 8.1*   ANIONGAP 9 8       Significant Imaging: I have reviewed all pertinent imaging results/findings within the past 24 hours.

## 2024-08-28 NOTE — PLAN OF CARE
Discussed poc with pt, pt verbalized understanding    Purposeful rounding every 2hours    VS wnl  Blood glucose monitoring   Fall precautions in place, remains injury free  Pt denies c/o pain and nausea at this time  Pain and nausea under control with PRN meds    IVFs  Accurate I&Os  Abx given as prescribed  Bed locked at lowest position  Call light within reach    Chart check complete  Will cont with POC

## 2024-08-28 NOTE — SUBJECTIVE & OBJECTIVE
Interval History: AFVSS.  FRANCY.  CT scan yesterday without leak.  Feeling better with reglan started.     Medications:  Continuous Infusions:   dextrose 5 % and 0.45 % NaCl with KCl 20 mEq   Intravenous Continuous 50 mL/hr at 08/28/24 1117 New Bag at 08/28/24 1117     Scheduled Meds:   bisacodyL  10 mg Rectal Daily    carBAMazepine  200 mg Per J Tube BID    carBAMazepine  300 mg Per J Tube QHS    enoxparin  40 mg Subcutaneous Daily    levETIRAcetam  500 mg Per J Tube BID    metoclopramide HCl  10 mg Oral Q8H    sennosides 8.8 mg/5 ml  5 mL Per J Tube BID    zonisamide  200 mg Per J Tube BID     PRN Meds:  Current Facility-Administered Medications:     dextrose 10%, 12.5 g, Intravenous, PRN    dextrose 10%, 25 g, Intravenous, PRN    glucagon (human recombinant), 1 mg, Intramuscular, PRN    hydrALAZINE, 10 mg, Intravenous, Q6H PRN    hydrocodone-apap 7.5-325 MG/15 ML, 15 mL, Oral, Q4H PRN    hydrocodone-apap 7.5-325 MG/15 ML, 30 mL, Oral, Q6H PRN    HYDROmorphone, 1 mg, Intravenous, Q6H PRN    insulin aspart U-100, 0-5 Units, Subcutaneous, Q6H PRN    naloxone, 0.02 mg, Intravenous, PRN    ondansetron, 4 mg, Intravenous, Q6H PRN    pneumoc 20-faina conj-dip cr(PF), 0.5 mL, Intramuscular, vaccine x 1 dose    promethazine, 25 mg, Rectal, Q6H PRN     Review of patient's allergies indicates:  No Known Allergies  Objective:     Vital Signs (Most Recent):  Temp: 98.6 °F (37 °C) (08/28/24 1153)  Pulse: 86 (08/28/24 1153)  Resp: 18 (08/28/24 1153)  BP: 136/70 (08/28/24 1153)  SpO2: 98 % (08/28/24 1153) Vital Signs (24h Range):  Temp:  [97.3 °F (36.3 °C)-99.6 °F (37.6 °C)] 98.6 °F (37 °C)  Pulse:  [86-96] 86  Resp:  [14-18] 18  SpO2:  [95 %-100 %] 98 %  BP: (128-172)/(70-85) 136/70     Weight: 61.3 kg (135 lb 2.3 oz)  Body mass index is 22.49 kg/m².    Intake/Output - Last 3 Shifts         08/26 0700 08/27 0659 08/27 0700 08/28 0659 08/28 0700 08/29 0659    P.O.       I.V. (mL/kg)       NG/ 50 50    Total Intake(mL/kg)  110 (1.8) 50 (0.8) 50 (0.8)    Urine (mL/kg/hr) 700 (0.5) 200 (0.1) 100 (0.3)    Drains 335 215 70    Stool 0 0     Total Output 1035 415 170    Net -925 -365 -120           Urine Occurrence 3 x 2 x     Stool Occurrence 1 x 1 x              Physical Exam     Significant Labs:  I have reviewed all pertinent lab results within the past 24 hours.  CBC:   Recent Labs   Lab 08/28/24  0535   WBC 7.06   RBC 3.44*   HGB 11.5*   HCT 34.6*      *   MCH 33.4*   MCHC 33.2     BMP:   Recent Labs   Lab 08/28/24  0535         K 3.9   *   CO2 17*   BUN 7*   CREATININE 0.6   CALCIUM 8.3*   MG 1.8       Significant Diagnostics:  I have reviewed all pertinent imaging results/findings within the past 24 hours.  CT: I have reviewed all pertinent results/findings within the past 24 hours and my personal findings are:  some dilation of esophagus, oral contrast passing anastomosis.  Barium retained in colon     TECHNIQUE:  Axial CT images were obtained of the abdomen and pelvis following administration of 100 mL Omnipaque 350 intravenously.  Iterative reconstruction technique was used. The CT exam was performed using one or more of the following dose reduction techniques- Automated exposure control, adjustment of the mA and/or kV according to patient size, and/or use of iterative reconstructed technique.  IV contrast was administered.     FINDINGS:  Heart: Normal in size. No pericardial effusion.     Lung Bases: Small bilateral pleural effusions and adjacent atelectasis, similar to previous.     Liver: Normal in size and attenuation, with no focal hepatic lesions.     Gallbladder: Stable appearance.     Bile Ducts: No evidence of dilated ducts.     Pancreas: No mass or peripancreatic fat stranding.     Spleen: Unremarkable.     Adrenals: Unremarkable.     Kidneys/ Ureters: 7 mm nonobstructing left renal calculus.     Bladder: No evidence of wall thickening.     Reproductive organs: Hysterectomy.     GI  Tract/Mesentery: Postsurgical changes to the bowel.  Percutaneous G Rebeca ostomy tube is present and in apparent good position.  Similar contrast in the colon.  Gastrectomy.  Contrast filled distal esophagus.  Similar trace free fluid in the left abdomen.     No free air.     Two right-sided drainage catheters are redemonstrated without significant associated fluid collections.     Retroperitoneum: No adenopathy.     Abdominal wall: Gas within the left abdominal wall soft tissues is similar to previous.     Vasculature: No significant atherosclerosis or aneurysm.     Bones: No acute fracture.     Impression:     1. Significant postsurgical findings with dilated loops of small bowel probably representing ileus.  The esophagus is also dilated however contrast is seen beyond the esophagus.  2. Small bilateral pleural effusions and adjacent atelectasis.  3. Please see above for further details

## 2024-08-28 NOTE — PT/OT/SLP PROGRESS
"Physical Therapy  Treatment    Cheryl Kirkland   MRN: [id number]   Admitting Diagnosis: Gastric adenocarcinoma    PT Received On: 08/28/24  PT Start Time: 1120     PT Stop Time: 1145    PT Total Time (min): 25 min       Billable Minutes:  Gait Training 15 and Therapeutic Activity 10    Treatment Type: Treatment  PT/PTA: PTA     Number of PTA visits since last PT visit: 2       General Precautions: Standard, fall  Orthopedic Precautions: N/A  Braces: N/A  Respiratory Status: Room air    Spiritual, Cultural Beliefs, Lutheran Practices, Values that Affect Care: no    Subjective:  Communicated with patient's nurse, Jcarlos, and completed Epic chart review prior to session.  Patient agreed to PT session. Reports feeling "much better today".     Pain/Comfort  Pain Rating 1: 0/10  Pain Rating Post-Intervention 1: 0/10    Objective:   Patient found with: peripheral IV, PEG Tube, APOLINAR drain, telemetry    Supine > sit EOB: SBA    Forward scoot towards EOB: SBA    STS from EOB > RW x2 trials: SBA     200ft w/ RW SBA    Stand pivot T/F to chair w/ RW: SBA    Completed x10 reps AROM TE to BLE: LAQ, Hip Flex, AP   Intermittent cues given as needed to maintain correct form during repetitions    Sit > supine: SBA    Educated patient on importance of increased tolerance to upright position and direct impact on CV endurance and strength. Patient encouraged to sit up in chair/ EOB, for a minimum of 2 consecutive hours, 3x per day. Encouraged patient to perform AROM TE to BLE throughout the day within all available planes of motion. Re enforced importance of utilizing call light to meet needs in room and not attempt to get up without staff assistance. Patient verbalized understanding and agreed to comply.       AM-PAC 6 CLICK MOBILITY  How much help from another person does this patient currently need?   1 = Unable, Total/Dependent Assistance  2 = A lot, Maximum/Moderate Assistance  3 = A little, Minimum/Contact " Guard/Supervision  4 = None, Modified [place]/Independent    Turning over in bed (including adjusting bedclothes, sheets and blankets)?: 3  Sitting down on and standing up from a chair with arms (e.g., wheelchair, bedside commode, etc.): 3  Moving from lying on back to sitting on the side of the bed?: 3  Moving to and from a bed to a chair (including a wheelchair)?: 3  Need to walk in hospital room?: 3  Climbing 3-5 steps with a railing?: 1 (NT)  Basic Mobility Total Score: 16    AM-PAC Raw Score CMS G-Code Modifier Level of Impairment Assistance   6 % Total / Unable   7 - 9 CM 80 - 100% Maximal Assist   10 - 14 CL 60 - 80% Moderate Assist   15 - 19 CK 40 - 60% Moderate Assist   20 - 22 CJ 20 - 40% Minimal Assist   23 CI 1-20% SBA / CGA   24 CH 0% Independent/ Mod I     Patient left with bed in chair position with call button in reach.    Assessment:  Cheryl Kirkland is a 74 y.o. female with a medical diagnosis of Gastric adenocarcinoma and presents with overall decline in functional mobility. Patient would continue to benefit from skilled PT to address functional limitations listed below in order to return to PLOF/decrease caregiver burden.     Rehab identified problem list/impairments: weakness, impaired endurance, impaired functional mobility, gait instability, impaired balance, impaired cardiopulmonary response to activity    Rehab potential is good.    Activity tolerance: Fair    Discharge recommendations: Low Intensity Therapy (WITH 24 HOUR SPV & A)      Barriers to discharge:      Equipment recommendations: walker, rolling     GOALS:   Multidisciplinary Problems       Physical Therapy Goals          Problem: Physical Therapy    Goal Priority Disciplines Outcome Goal Variances Interventions   Physical Therapy Goal     PT, PT/OT Progressing     Description: LTG'S TO BE MET IN 14 DAYS (9-4-24)  PT WILL REQUIRE SPV FOR BED MOBILITY  PT WILL REQUIRE SPV FOR BED<>CHAIR TF'S  PT WILL   FEET WITH RW AND SPV  PT WILL INC AMPAC SCORE BY 2 POINTS TO PROGRESS GROSS FUNC MOBILITY                         PLAN:    Patient to be seen 3 x/week to address the above listed problems via gait training, therapeutic activities, therapeutic exercises  Plan of Care expires: 09/09/24  Plan of Care reviewed with: patient         08/28/2024

## 2024-08-28 NOTE — PROGRESS NOTES
[facility]  General Surgery  Progress Note    Subjective:     History of Present Illness:  Cheryl Kirkland is a 74 y.o. year old female with a history of ovarian cancer status post surgery and chemotherapy as well as epilepsy and DVT (provoked during her treatment for ovarian cancer), and MSI high gastric adenocarcinoma.  She was originally diagnosed back in February of this past year.  She was noted to have a very proximal enlarged tumor.  EGD was done as was complete metastatic workup which was negative.  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  Due to her significant adhesive disease she understood that she would require an open operation.  She was discussed in multidisciplinary tumor board and the consensus was to proceed with immunotherapy due to her MSI high status.  She completed 5 cycles of neoadjuvant immunotherapy with Keytruda and on restaging imaging was found to have enlarged Danelle portal lymph nodes.  Consensus was that she would need a total gastrectomy due to the proximity of her tumor in relationship to the GE junction.  Due to the extent of the operation required and the her previous history of ovarian cancer the tumor board consensus was to proceed with the EUS guided biopsy to rule out any ovarian cancer and to rule out pseudo progression as well.  This was performed and showed adenocarcinoma consistent with her gastric primary.  Therefore the decision was to proceed with surgical intervention due to the mixed response she had from immunotherapy on PET-CT scan.     Risks and benefits were reviewed including bleeding, infection, pain, scar, damage to surrounding structures, cardiovascular and pulmonary complications, anastomotic leak, positive margins, need for additional procedures, death, and imponderables.  She  expressed understanding and gave informed consent to proceed.    Post-Op Info:  Procedure(s) (LRB):  GASTRECTOMY (N/A)  EGD (ESOPHAGOGASTRODUODENOSCOPY) (N/A)  LAPAROSCOPY, DIAGNOSTIC (N/A)  LYSIS, ADHESIONS (N/A)  INSERTION, JEJUNOSTOMY TUBE (N/A)   8 Days Post-Op     Interval History: AFVSS.  FRANCY.  CT scan yesterday without leak.  Feeling better with reglan started.     Medications:  Continuous Infusions:   dextrose 5 % and 0.45 % NaCl with KCl 20 mEq   Intravenous Continuous 50 mL/hr at 08/28/24 1117 New Bag at 08/28/24 1117     Scheduled Meds:   bisacodyL  10 mg Rectal Daily    carBAMazepine  200 mg Per J Tube BID    carBAMazepine  300 mg Per J Tube QHS    enoxparin  40 mg Subcutaneous Daily    levETIRAcetam  500 mg Per J Tube BID    metoclopramide HCl  10 mg Oral Q8H    sennosides 8.8 mg/5 ml  5 mL Per J Tube BID    zonisamide  200 mg Per J Tube BID     PRN Meds:  Current Facility-Administered Medications:     dextrose 10%, 12.5 g, Intravenous, PRN    dextrose 10%, 25 g, Intravenous, PRN    glucagon (human recombinant), 1 mg, Intramuscular, PRN    hydrALAZINE, 10 mg, Intravenous, Q6H PRN    hydrocodone-apap 7.5-325 MG/15 ML, 15 mL, Oral, Q4H PRN    hydrocodone-apap 7.5-325 MG/15 ML, 30 mL, Oral, Q6H PRN    HYDROmorphone, 1 mg, Intravenous, Q6H PRN    insulin aspart U-100, 0-5 Units, Subcutaneous, Q6H PRN    naloxone, 0.02 mg, Intravenous, PRN    ondansetron, 4 mg, Intravenous, Q6H PRN    pneumoc 20-faina conj-dip cr(PF), 0.5 mL, Intramuscular, vaccine x 1 dose    promethazine, 25 mg, Rectal, Q6H PRN     Review of patient's allergies indicates:  No Known Allergies  Objective:     Vital Signs (Most Recent):  Temp: 98.6 °F (37 °C) (08/28/24 1153)  Pulse: 86 (08/28/24 1153)  Resp: 18 (08/28/24 1153)  BP: 136/70 (08/28/24 1153)  SpO2: 98 % (08/28/24 1153) Vital Signs (24h Range):  Temp:  [97.3 °F (36.3 °C)-99.6 °F (37.6 °C)] 98.6 °F (37 °C)  Pulse:  [86-96] 86  Resp:  [14-18] 18  SpO2:  [95 %-100 %] 98 %  BP:  (128-172)/(70-85) 136/70     Weight: 61.3 kg (135 lb 2.3 oz)  Body mass index is 22.49 kg/m².    Intake/Output - Last 3 Shifts         08/26 0700 08/27 0659 08/27 0700 08/28 0659 08/28 0700 08/29 0659    P.O.       I.V. (mL/kg)       NG/ 50 50    Total Intake(mL/kg) 110 (1.8) 50 (0.8) 50 (0.8)    Urine (mL/kg/hr) 700 (0.5) 200 (0.1) 100 (0.3)    Drains 335 215 70    Stool 0 0     Total Output 1035 415 170    Net -925 -365 -120           Urine Occurrence 3 x 2 x     Stool Occurrence 1 x 1 x              Physical Exam     Significant Labs:  I have reviewed all pertinent lab results within the past 24 hours.  CBC:   Recent Labs   Lab 08/28/24  0535   WBC 7.06   RBC 3.44*   HGB 11.5*   HCT 34.6*      *   MCH 33.4*   MCHC 33.2     BMP:   Recent Labs   Lab 08/28/24  0535         K 3.9   *   CO2 17*   BUN 7*   CREATININE 0.6   CALCIUM 8.3*   MG 1.8       Significant Diagnostics:  I have reviewed all pertinent imaging results/findings within the past 24 hours.  CT: I have reviewed all pertinent results/findings within the past 24 hours and my personal findings are:  some dilation of esophagus, oral contrast passing anastomosis.  Barium retained in colon     TECHNIQUE:  Axial CT images were obtained of the abdomen and pelvis following administration of 100 mL Omnipaque 350 intravenously.  Iterative reconstruction technique was used. The CT exam was performed using one or more of the following dose reduction techniques- Automated exposure control, adjustment of the mA and/or kV according to patient size, and/or use of iterative reconstructed technique.  IV contrast was administered.     FINDINGS:  Heart: Normal in size. No pericardial effusion.     Lung Bases: Small bilateral pleural effusions and adjacent atelectasis, similar to previous.     Liver: Normal in size and attenuation, with no focal hepatic lesions.     Gallbladder: Stable appearance.     Bile Ducts: No evidence of  dilated ducts.     Pancreas: No mass or peripancreatic fat stranding.     Spleen: Unremarkable.     Adrenals: Unremarkable.     Kidneys/ Ureters: 7 mm nonobstructing left renal calculus.     Bladder: No evidence of wall thickening.     Reproductive organs: Hysterectomy.     GI Tract/Mesentery: Postsurgical changes to the bowel.  Percutaneous G Rebeca ostomy tube is present and in apparent good position.  Similar contrast in the colon.  Gastrectomy.  Contrast filled distal esophagus.  Similar trace free fluid in the left abdomen.     No free air.     Two right-sided drainage catheters are redemonstrated without significant associated fluid collections.     Retroperitoneum: No adenopathy.     Abdominal wall: Gas within the left abdominal wall soft tissues is similar to previous.     Vasculature: No significant atherosclerosis or aneurysm.     Bones: No acute fracture.     Impression:     1. Significant postsurgical findings with dilated loops of small bowel probably representing ileus.  The esophagus is also dilated however contrast is seen beyond the esophagus.  2. Small bilateral pleural effusions and adjacent atelectasis.  3. Please see above for further details  Assessment/Plan:     * Gastric adenocarcinoma  POD #8- s/p exlap, extensive SHAYNA, small bowel resection, total gastrectomy with stapled esophagojejunal anastomosis and D2 lymphadenectomy, and Jtube placement    Doing much better clinically.  Suspect some delayed/ slowed intestinal function.     -- continue Reglan  -- continue CLD  -- Restarting Jtube feeds at 10cc/hr and advance as tolerated 10ml/hr q8hrs to goal of 50cc/hr  -- continue hycet via J tube for pain   -- ok for liquid medications via J tube  -- Strict I/Os  -- Senna liquid via Jtube BID   -- CM consult for discharge / home health planning   -- OOB To chair and walk today x5, OT/PT consulted  -- encourage IS  Dispo: continued med surg with tele- tentative plan for d/c home Friday     Lovenox and  SCDs for DVT ppx, no indication for GI ppx         Primary hypertension  -- appreciate medicine assistance with management     Hx of Epileptic seizures  -- appreciate medicine assistance with management         Chen Jerome MD  General Surgery  [facility]

## 2024-08-28 NOTE — PT/OT/SLP PROGRESS
Physical Therapy      Patient Name:  Cheryl Kirkland   MRN:  [id number]    Patient not seen at this time secondary to Toileting with nurse. Will follow-up at next available opportunity.

## 2024-08-29 LAB
ALBUMIN SERPL BCP-MCNC: 2.3 G/DL (ref 3.5–5.2)
ALP SERPL-CCNC: 230 U/L (ref 55–135)
ALT SERPL W/O P-5'-P-CCNC: 55 U/L (ref 10–44)
ANION GAP SERPL CALC-SCNC: 8 MMOL/L (ref 8–16)
ANION GAP SERPL CALC-SCNC: 8 MMOL/L (ref 8–16)
AST SERPL-CCNC: 21 U/L (ref 10–40)
BILIRUB SERPL-MCNC: 0.4 MG/DL (ref 0.1–1)
BUN SERPL-MCNC: 7 MG/DL (ref 8–23)
BUN SERPL-MCNC: 8 MG/DL (ref 8–23)
CALCIUM SERPL-MCNC: 8 MG/DL (ref 8.7–10.5)
CALCIUM SERPL-MCNC: 8.4 MG/DL (ref 8.7–10.5)
CHLORIDE SERPL-SCNC: 111 MMOL/L (ref 95–110)
CHLORIDE SERPL-SCNC: 111 MMOL/L (ref 95–110)
CO2 SERPL-SCNC: 20 MMOL/L (ref 23–29)
CO2 SERPL-SCNC: 22 MMOL/L (ref 23–29)
CREAT SERPL-MCNC: 0.7 MG/DL (ref 0.5–1.4)
CREAT SERPL-MCNC: 0.7 MG/DL (ref 0.5–1.4)
ERYTHROCYTE [DISTWIDTH] IN BLOOD BY AUTOMATED COUNT: 15.1 % (ref 11.5–14.5)
EST. GFR  (NO RACE VARIABLE): >60 ML/MIN/1.73 M^2
EST. GFR  (NO RACE VARIABLE): >60 ML/MIN/1.73 M^2
GLUCOSE SERPL-MCNC: 102 MG/DL (ref 70–110)
GLUCOSE SERPL-MCNC: 129 MG/DL (ref 70–110)
HCT VFR BLD AUTO: 32.9 % (ref 37–48.5)
HGB BLD-MCNC: 10.8 G/DL (ref 12–16)
MAGNESIUM SERPL-MCNC: 1.7 MG/DL (ref 1.6–2.6)
MCH RBC QN AUTO: 33.4 PG (ref 27–31)
MCHC RBC AUTO-ENTMCNC: 32.8 G/DL (ref 32–36)
MCV RBC AUTO: 102 FL (ref 82–98)
PHOSPHATE SERPL-MCNC: 2.5 MG/DL (ref 2.7–4.5)
PLATELET # BLD AUTO: 399 K/UL (ref 150–450)
PMV BLD AUTO: 9.2 FL (ref 9.2–12.9)
POCT GLUCOSE: 103 MG/DL (ref 70–110)
POCT GLUCOSE: 109 MG/DL (ref 70–110)
POCT GLUCOSE: 110 MG/DL (ref 70–110)
POTASSIUM SERPL-SCNC: 3.8 MMOL/L (ref 3.5–5.1)
POTASSIUM SERPL-SCNC: 4.5 MMOL/L (ref 3.5–5.1)
PROT SERPL-MCNC: 5.6 G/DL (ref 6–8.4)
RBC # BLD AUTO: 3.23 M/UL (ref 4–5.4)
SODIUM SERPL-SCNC: 139 MMOL/L (ref 136–145)
SODIUM SERPL-SCNC: 141 MMOL/L (ref 136–145)
WBC # BLD AUTO: 9.58 K/UL (ref 3.9–12.7)

## 2024-08-29 PROCEDURE — 97530 THERAPEUTIC ACTIVITIES: CPT | Mod: CQ

## 2024-08-29 PROCEDURE — 63600175 PHARM REV CODE 636 W HCPCS: Performed by: SURGERY

## 2024-08-29 PROCEDURE — 11000001 HC ACUTE MED/SURG PRIVATE ROOM

## 2024-08-29 PROCEDURE — 25000003 PHARM REV CODE 250: Performed by: NURSE PRACTITIONER

## 2024-08-29 PROCEDURE — 63600175 PHARM REV CODE 636 W HCPCS: Performed by: NURSE PRACTITIONER

## 2024-08-29 PROCEDURE — 85027 COMPLETE CBC AUTOMATED: CPT | Performed by: NURSE PRACTITIONER

## 2024-08-29 PROCEDURE — 97116 GAIT TRAINING THERAPY: CPT | Mod: CQ

## 2024-08-29 PROCEDURE — 25000003 PHARM REV CODE 250: Performed by: SURGERY

## 2024-08-29 PROCEDURE — 83735 ASSAY OF MAGNESIUM: CPT | Performed by: SURGERY

## 2024-08-29 PROCEDURE — 36415 COLL VENOUS BLD VENIPUNCTURE: CPT | Performed by: SURGERY

## 2024-08-29 PROCEDURE — 80053 COMPREHEN METABOLIC PANEL: CPT | Performed by: SURGERY

## 2024-08-29 PROCEDURE — 84100 ASSAY OF PHOSPHORUS: CPT | Performed by: SURGERY

## 2024-08-29 PROCEDURE — 63600175 PHARM REV CODE 636 W HCPCS: Performed by: INTERNAL MEDICINE

## 2024-08-29 PROCEDURE — 80048 BASIC METABOLIC PNL TOTAL CA: CPT | Mod: XB | Performed by: NURSE PRACTITIONER

## 2024-08-29 PROCEDURE — 36415 COLL VENOUS BLD VENIPUNCTURE: CPT | Performed by: NURSE PRACTITIONER

## 2024-08-29 RX ORDER — MAGNESIUM SULFATE HEPTAHYDRATE 40 MG/ML
2 INJECTION, SOLUTION INTRAVENOUS ONCE
Status: COMPLETED | OUTPATIENT
Start: 2024-08-29 | End: 2024-08-29

## 2024-08-29 RX ORDER — INSULIN ASPART 100 [IU]/ML
0-5 INJECTION, SOLUTION INTRAVENOUS; SUBCUTANEOUS EVERY 4 HOURS PRN
Status: DISCONTINUED | OUTPATIENT
Start: 2024-08-29 | End: 2024-09-05

## 2024-08-29 RX ORDER — GLUCAGON 1 MG
1 KIT INJECTION
Status: DISCONTINUED | OUTPATIENT
Start: 2024-08-29 | End: 2024-09-10 | Stop reason: HOSPADM

## 2024-08-29 RX ORDER — DEXTROSE MONOHYDRATE 100 MG/ML
INJECTION, SOLUTION INTRAVENOUS CONTINUOUS PRN
Status: ACTIVE | OUTPATIENT
Start: 2024-08-29 | End: 2024-08-30

## 2024-08-29 RX ORDER — ZONISAMIDE 50 MG/1
200 CAPSULE ORAL 2 TIMES DAILY
Status: DISCONTINUED | OUTPATIENT
Start: 2024-08-29 | End: 2024-08-30

## 2024-08-29 RX ADMIN — SENNOSIDES 5 ML: 8.8 LIQUID ORAL at 10:08

## 2024-08-29 RX ADMIN — CARBAMAZEPINE 200 MG: 100 SUSPENSION ORAL at 05:08

## 2024-08-29 RX ADMIN — CARBAMAZEPINE 300 MG: 100 SUSPENSION ORAL at 09:08

## 2024-08-29 RX ADMIN — HYDROCODONE BITARTRATE AND ACETAMINOPHEN 30 ML: 7.5; 325 SOLUTION ORAL at 05:08

## 2024-08-29 RX ADMIN — HYDROCODONE BITARTRATE AND ACETAMINOPHEN 15 ML: 7.5; 325 SOLUTION ORAL at 10:08

## 2024-08-29 RX ADMIN — ZONISAMIDE 200 MG: 100 SUSPENSION ORAL at 10:08

## 2024-08-29 RX ADMIN — SENNOSIDES 5 ML: 8.8 LIQUID ORAL at 09:08

## 2024-08-29 RX ADMIN — ZONISAMIDE 200 MG: 50 CAPSULE ORAL at 09:08

## 2024-08-29 RX ADMIN — METOCLOPRAMIDE HYDROCHLORIDE 10 MG: 5 SOLUTION ORAL at 05:08

## 2024-08-29 RX ADMIN — CARBAMAZEPINE 200 MG: 100 SUSPENSION ORAL at 02:08

## 2024-08-29 RX ADMIN — LEVETIRACETAM 500 MG: 100 SOLUTION ORAL at 09:08

## 2024-08-29 RX ADMIN — METOCLOPRAMIDE HYDROCHLORIDE 10 MG: 5 SOLUTION ORAL at 09:08

## 2024-08-29 RX ADMIN — METOCLOPRAMIDE HYDROCHLORIDE 10 MG: 5 SOLUTION ORAL at 02:08

## 2024-08-29 RX ADMIN — ENOXAPARIN SODIUM 40 MG: 40 INJECTION SUBCUTANEOUS at 04:08

## 2024-08-29 RX ADMIN — DEXTROSE, SODIUM CHLORIDE, AND POTASSIUM CHLORIDE: 5; .45; .15 INJECTION INTRAVENOUS at 07:08

## 2024-08-29 RX ADMIN — ASCORBIC ACID, VITAMIN A PALMITATE, CHOLECALCIFEROL, THIAMINE HYDROCHLORIDE, RIBOFLAVIN-5 PHOSPHATE SODIUM, PYRIDOXINE HYDROCHLORIDE, NIACINAMIDE, DEXPANTHENOL, ALPHA-TOCOPHEROL ACETATE, VITAMIN K1, FOLIC ACID, BIOTIN, CYANOCOBALAMIN: 200; 3300; 200; 6; 3.6; 6; 40; 15; 10; 150; 600; 60; 5 INJECTION, SOLUTION INTRAVENOUS at 09:08

## 2024-08-29 RX ADMIN — LEVETIRACETAM 500 MG: 100 SOLUTION ORAL at 10:08

## 2024-08-29 RX ADMIN — MAGNESIUM SULFATE HEPTAHYDRATE 2 G: 40 INJECTION, SOLUTION INTRAVENOUS at 02:08

## 2024-08-29 NOTE — SUBJECTIVE & OBJECTIVE
Interval History: AFVSS.  Feeling fullness with tube feeds at 30cc/hr.  APOLINAR drains serous. Having Bms     Medications:  Continuous Infusions:   dextrose 5 % and 0.45 % NaCl with KCl 20 mEq   Intravenous Continuous 50 mL/hr at 08/29/24 0725 New Bag at 08/29/24 0725     Scheduled Meds:   bisacodyL  10 mg Rectal Daily    carBAMazepine  200 mg Per J Tube BID    carBAMazepine  300 mg Per J Tube QHS    enoxparin  40 mg Subcutaneous Daily    levETIRAcetam  500 mg Per J Tube BID    metoclopramide HCl  10 mg Oral Q8H    sennosides 8.8 mg/5 ml  5 mL Per J Tube BID    zonisamide  200 mg Per J Tube BID     PRN Meds:  Current Facility-Administered Medications:     dextrose 10%, 12.5 g, Intravenous, PRN    dextrose 10%, 25 g, Intravenous, PRN    glucagon (human recombinant), 1 mg, Intramuscular, PRN    hydrALAZINE, 10 mg, Intravenous, Q6H PRN    hydrocodone-apap 7.5-325 MG/15 ML, 15 mL, Oral, Q4H PRN    hydrocodone-apap 7.5-325 MG/15 ML, 30 mL, Oral, Q6H PRN    HYDROmorphone, 1 mg, Intravenous, Q6H PRN    insulin aspart U-100, 0-5 Units, Subcutaneous, Q6H PRN    naloxone, 0.02 mg, Intravenous, PRN    ondansetron, 4 mg, Intravenous, Q6H PRN    pneumoc 20-faina conj-dip cr(PF), 0.5 mL, Intramuscular, vaccine x 1 dose    promethazine, 25 mg, Rectal, Q6H PRN     Review of patient's allergies indicates:  No Known Allergies  Objective:     Vital Signs (Most Recent):  Temp: 98.5 °F (36.9 °C) (08/29/24 0725)  Pulse: 85 (08/29/24 0725)  Resp: 18 (08/29/24 0725)  BP: (!) 151/62 (08/29/24 0725)  SpO2: 95 % (08/29/24 0725) Vital Signs (24h Range):  Temp:  [98.1 °F (36.7 °C)-99.7 °F (37.6 °C)] 98.5 °F (36.9 °C)  Pulse:  [85-95] 85  Resp:  [16-20] 18  SpO2:  [95 %-98 %] 95 %  BP: (128-151)/(60-81) 151/62     Weight: 61.3 kg (135 lb 2.3 oz)  Body mass index is 22.49 kg/m².    Intake/Output - Last 3 Shifts         08/27 0700 08/28 0659 08/28 0700 08/29 0659 08/29 0700 08/30 0659    P.O.  180     NG/GT 50 386 50    Total Intake(mL/kg) 50 (0.8)  566 (9.2) 50 (0.8)    Urine (mL/kg/hr) 200 (0.1) 1100 (0.7)     Drains 215 275 50    Stool 0 0     Total Output 415 1375 50    Net -365 -809 0           Urine Occurrence 2 x      Stool Occurrence 1 x 3 x              Physical Exam  Constitutional:       General: She is not in acute distress.     Appearance: Normal appearance. She is not ill-appearing.   HENT:      Head: Normocephalic and atraumatic.   Eyes:      Extraocular Movements: Extraocular movements intact.      Conjunctiva/sclera: Conjunctivae normal.   Cardiovascular:      Rate and Rhythm: Normal rate and regular rhythm.   Pulmonary:      Effort: Pulmonary effort is normal.   Abdominal:      General: Abdomen is flat. There is distension.      Palpations: Abdomen is soft. There is no mass.      Hernia: No hernia is present.      Comments: TTP, APOLINAR drains serous, J tube in place- some swelling/ erythema around but no purulence, incision c/d/i   Musculoskeletal:         General: No swelling, tenderness, deformity or signs of injury. Normal range of motion.   Skin:     General: Skin is warm and dry.      Coloration: Skin is not jaundiced.   Neurological:      General: No focal deficit present.      Mental Status: She is alert and oriented to person, place, and time.   Psychiatric:         Mood and Affect: Mood normal.         Behavior: Behavior normal.         Thought Content: Thought content normal.          Significant Labs:  I have reviewed all pertinent lab results within the past 24 hours.  CBC:   Recent Labs   Lab 08/29/24  0601   WBC 9.58   RBC 3.23*   HGB 10.8*   HCT 32.9*      *   MCH 33.4*   MCHC 32.8     BMP:   Recent Labs   Lab 08/29/24  0601   *      K 3.8   *   CO2 20*   BUN 7*   CREATININE 0.7   CALCIUM 8.0*   MG 1.7       Significant Diagnostics:  I have reviewed all pertinent imaging results/findings within the past 24 hours.

## 2024-08-29 NOTE — PLAN OF CARE
08/29/24 0857   Rounds   Attendance Provider;Physical therapist;;Charge nurse   Discharge Plan A Home Health   Why the patient remains in the hospital Requires continued medical care   Transition of Care Barriers None     Tube feeding set up and taught. Pending medical clearance.

## 2024-08-29 NOTE — PLAN OF CARE
Nutrition Plan of Care:    Recommendations     Recommendation/Intervention:   1. Initate pt onto continuous Enteral nutrition, recommend Lisa Awad Peptide 1.5 (vanilla)  via J tube, goal rate 50 mL/hr, starting at 10 mL/hr then progress to goal within 24 hrs if pt is tolerating or per MD/NP   -Formula at goal rate provides: 1800 kcals/day (102% EEN), 58 g protein/day (83% EPN), 162 g CHO/day (88% CHO needs), 821 mL free formula water/day (46% fluid needs)   -160 mL q4h free water flushes (960 mL/day) = total from formula + FWF = 1787 mL water/day (101% fluid needs), per MD/NP   -Check Mg, K+, Na, Phos and Glu before and during initiation, correct as indicated   2. Weigh twice weekly     Goals:   1. Pt will be initated onto continuous EN within 24 hrs (met)  2. Pt will tolerate and intake > 75% EEN and EPN prior to RD follow up  Nutrition Goal Status: new  Communication of RD Recs: other (comment) (POC, sticky note, secure chat)     Assessment and Plan     Nutrition Problem  Inadequate oral intake      Related to (etiology):   Decreased ability to consume sufficient protein/energy      Signs and Symptoms (as evidenced by):   NPO, Gastrectomy with J tube     Interventions/Recommendations (treatment strategy):  1. Enteral Nutrition Management   2. Collaboration by nutrition professional with other providers     Nutrition Diagnosis Status:   Continues        Malnutrition Assessment  Skin (Micronutrient): wounds unhealed (Chetan score = 12 (high risk)           Orbital Region (Subcutaneous Fat Loss): moderate depletion (Slightly darker circles; Somewhat hallow look)         Graciela Rios, MS, RDN, LDN

## 2024-08-29 NOTE — PLAN OF CARE
Discussed poc with pt, pt verbalized understanding  Purposeful rounding Q2H   VSS    Blood glucose monitoring   Fall precautions in place, remains injury free. Encouraged to ambulate as much as possible per MD orders  Pain  under control with PRN meds     D5 with 0.45% NaCl with Kcl at 50ml/hr  Accurate I&Os  Tube feedings rate increased Q8 (0400)  Bed locked at lowest position  Call light within reach     Chart check complete  Will cont with POC

## 2024-08-29 NOTE — PROGRESS NOTES
[facility] - Intensive Care (Hospital)  Adult Nutrition  Progress Note    SUMMARY     Recommendations    Recommendation/Intervention:   1. Initate pt onto continuous Enteral nutrition, recommend Lisa Awad Peptide 1.5 (vanilla)  via J tube, goal rate 50 mL/hr, starting at 10 mL/hr then progress to goal within 24 hrs if pt is tolerating or per MD/NP   -Formula at goal rate provides: 1800 kcals/day (102% EEN), 58 g protein/day (83% EPN), 162 g CHO/day (88% CHO needs), 821 mL free formula water/day (46% fluid needs)   -160 mL q4h free water flushes (960 mL/day) = total from formula + FWF = 1787 mL water/day (101% fluid needs), per MD/NP   -Check Mg, K+, Na, Phos and Glu before and during initiation, correct as indicated   2. Weigh twice weekly    Goals:   1. Pt will be initated onto continuous EN within 24 hrs (met)  2. Pt will tolerate and intake > 75% EEN and EPN prior to RD follow up  Nutrition Goal Status: new  Communication of RD Recs: other (comment) (POC, sticky note, secure chat)    Assessment and Plan    Nutrition Problem  Inadequate oral intake     Related to (etiology):   Decreased ability to consume sufficient protein/energy     Signs and Symptoms (as evidenced by):   NPO, Gastrectomy with J tube    Interventions/Recommendations (treatment strategy):  1. Enteral Nutrition Management   2. 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 Female admitted for Gastric adenocarcinoma. Pt currently in the ICU, NPO x 1 day. RD consulted for J tube feed recommendations. H&P noted that the pt presented to the hospital for planned diagnostic laparoscopy, possible total gastrectomy and J tube placement, and EGD d/t gastric adenocarcinoma, s/p neoadjuvant immunotherapy. EMR noted procedure performed on 8/20/24, post-op pt was transferred to the ICU, gastrectomy with J tube in place now, plan is for liquid meds only via J tube starting today (8/21) and to maintain NJ tube to LIWS w/ plan to remove after leak test in a few days. Discussed pt in rounds, RN stated pt to start in Impact Peptide 1.5 @ 10 mL/hr at 10 am, informed RN of J tube recs via secure chat and awaiting MD to sign orders. Unable to perform full NFPE d/t pt working with other providers, visual NFPE perform, moderate orbital malnutrition noted. Reviewed chart: Propofol: 0; Total VE: 0; LBM: DAVE; Skin: incision abdomen WDL, closed/sunction drain R abdomen x 2; Chetan score: 12 (high risk); Edema: None.Labs, meds, weight reviewed. Weight charted 2/8/24 130 lbs, 8/20/24 129 lbs (BMI 21.61, Underweight for age), -1 lb wt loss x 6 months, insignificant wt loss, wt stable. RD will continue to follow and monitor pt's nutritional status during admit.    8/26/2024: Patient is post op day 6.  Diet advanced to clear liquids and tolerated CLD yesterday.  Patient is experiencing some nausea with TF running at rate of 30ml/hr.  Labs reviewed.  NKFA.  LBM: 8/25/2024.  NFPE to be performed at next patient visit.  RD to continue to monitor nutrition therapies and tolerance.      8/29/24:  Patient continue son clear liquids and EN support.  Patient with some prior nausea but has improved within the past 24 hours.  RD to continue to monitor EN and po intake tolerance.  Labs reviewed.  NKFA.  LBM: 8/28/2024.  RD to continue to  "monitor diet advancement and EN support       Nutrition Discharge Planning: Enteral nutrition via J tube    Nutrition Risk Screen    Nutrition Risk Screen: no indicators present    Nutrition Related Social Determinants of Health: SDOH: Unable to assess at this time.     Nutrition/Diet History    Spiritual, Cultural Beliefs, Moravian Practices, Values that Affect Care: no  Food Allergies: NKFA  Factors Affecting Nutritional Intake: altered gastrointestinal function, clear liquid diet    Anthropometrics    Temp: 98.3 °F (36.8 °C)  Height Method: Stated  Height: 5' 5" (165.1 cm)  Height (inches): 65 in  Weight Method: Standard Scale  Weight: 61.3 kg (135 lb 2.3 oz)  Weight (lb): 135.14 lb  Ideal Body Weight (IBW), Female: 125 lb  % Ideal Body Weight, Female (lb): 103.88 %  BMI (Calculated): 22.5  BMI Grade: 18.5-24.9 - normal (Underweight for age)     Wt Readings from Last 30 Encounters:   08/22/24 61.3 kg (135 lb 2.3 oz)   08/14/24 58.7 kg (129 lb 4.8 oz)   07/26/24 59 kg (130 lb)   07/23/24 60.5 kg (133 lb 6.1 oz)   07/19/24 59.5 kg (131 lb 2.8 oz)   07/09/24 58.9 kg (129 lb 13.6 oz)   06/28/24 58.9 kg (129 lb 13.6 oz)   06/18/24 58.9 kg (129 lb 13.6 oz)   05/28/24 59.7 kg (131 lb 9.8 oz)   05/28/24 59.7 kg (131 lb 9.8 oz)   05/07/24 58.2 kg (128 lb 4.9 oz)   04/16/24 57.8 kg (127 lb 6.8 oz)   04/16/24 57.8 kg (127 lb 6.8 oz)   03/26/24 58.5 kg (128 lb 15.5 oz)   03/26/24 58.5 kg (128 lb 15.5 oz)   03/14/24 57.8 kg (127 lb 6.8 oz)   03/06/24 58.8 kg (129 lb 10.1 oz)   02/23/24 58.6 kg (129 lb 3 oz)   02/08/24 59 kg (130 lb)     Lab/Procedures/Meds    Pertinent Labs Reviewed: reviewed    Pertinent Medications Reviewed: reviewed      BMP  Lab Results   Component Value Date     08/28/2024    K 3.9 08/28/2024     (H) 08/28/2024    CO2 17 (L) 08/28/2024    BUN 7 (L) 08/28/2024    CREATININE 0.6 08/28/2024    CALCIUM 8.3 (L) 08/28/2024    ANIONGAP 11 08/28/2024    EGFRNORACEVR >60 08/28/2024     Lab Results " "  Component Value Date    CALCIUM 8.3 (L) 08/28/2024    PHOS 3.8 08/21/2024     Lab Results   Component Value Date    ALBUMIN 2.8 (L) 08/21/2024     Lab Results   Component Value Date     (H) 08/21/2024     (H) 08/21/2024    ALKPHOS 60 08/21/2024    BILITOT 0.3 08/21/2024     Recent Labs   Lab 08/29/24  0515   POCTGLUCOSE 110       No results found for: "LABA1C", "HGBA1C"    Lab Results   Component Value Date    WBC 9.58 08/29/2024    HGB 10.8 (L) 08/29/2024    HCT 32.9 (L) 08/29/2024     (H) 08/29/2024     08/29/2024       Scheduled Meds:   bisacodyL  10 mg Rectal Daily    carBAMazepine  200 mg Per J Tube BID    carBAMazepine  300 mg Per J Tube QHS    enoxparin  40 mg Subcutaneous Daily    levETIRAcetam  500 mg Per J Tube BID    metoclopramide HCl  10 mg Oral Q8H    sennosides 8.8 mg/5 ml  5 mL Per J Tube BID    zonisamide  200 mg Per J Tube BID     Continuous Infusions:   dextrose 5 % and 0.45 % NaCl with KCl 20 mEq   Intravenous Continuous 50 mL/hr at 08/28/24 1117 New Bag at 08/28/24 1117     PRN Meds:.  Current Facility-Administered Medications:     dextrose 10%, 12.5 g, Intravenous, PRN    dextrose 10%, 25 g, Intravenous, PRN    glucagon (human recombinant), 1 mg, Intramuscular, PRN    hydrALAZINE, 10 mg, Intravenous, Q6H PRN    hydrocodone-apap 7.5-325 MG/15 ML, 15 mL, Oral, Q4H PRN    hydrocodone-apap 7.5-325 MG/15 ML, 30 mL, Oral, Q6H PRN    HYDROmorphone, 1 mg, Intravenous, Q6H PRN    insulin aspart U-100, 0-5 Units, Subcutaneous, Q6H PRN    naloxone, 0.02 mg, Intravenous, PRN    ondansetron, 4 mg, Intravenous, Q6H PRN    pneumoc 20-faina conj-dip cr(PF), 0.5 mL, Intramuscular, vaccine x 1 dose    promethazine, 25 mg, Rectal, Q6H PRN      Physical Findings/Assessment         Estimated/Assessed Needs    Weight Used For Calorie Calculations: 58.9 kg (129 lb 13.6 oz)  Energy Calorie Requirements (kcal): 1854-2748 kcals (25-30 kcals/kg ABW (Cancer)  Energy Need Method: " Kcal/kg  Protein Requirements:  g (1.2-2.0 g/kg ABW (Critical care-non vent, BMI < 30 vs Cancer)  Weight Used For Protein Calculations: 58.9 kg (129 lb 13.6 oz)  Fluid Requirements (mL): 5342-5316 mL (1 mL/kcal)  Estimated Fluid Requirement Method: RDA Method  RDA Method (mL): 1472  CHO Requirement: 184-221 g (9593-7259 kcals/8)      Nutrition Prescription Ordered    Current Diet Order: Clear liquids  Current Nutrition Support Formula Ordered: [employer] Standard 1.4, [employer] Peptide 1.5  Current Nutrition Support Rate Ordered: 30 (ml) (recommended goal rate of 50ml/hr)  Current Nutrition Support Frequency Ordered: continuous    Evaluation of Received Nutrient/Fluid Intake  I/O: (Net since admit)  8/21/24: +3541.1 mL    % Kcal Needs: 0%  % Protein Needs: 0%    Energy Calories Required: not meeting needs  Protein Required: not meeting needs  Fluid Required: exceeds needs  Total Fluid Intake (mL): 4811.1  Tolerance: other (see comments) (Currently no intake)  % Intake of Estimated Energy Needs: 25 - 50 %  % Meal Intake: clear liquids    Nutrition Risk    Level of Risk/Frequency of Follow-up: high (F/u x 2 weekly)       Monitor and Evaluation    Food and Nutrient Intake: energy intake, enteral nutrition intake  Food and Nutrient Adminstration: enteral and parenteral nutrition administration  Knowledge/Beliefs/Attitudes: food and nutrition knowledge/skill, beliefs and attitudes  Anthropometric Measurements: weight, weight change, body mass index  Biochemical Data, Medical Tests and Procedures: electrolyte and renal panel, gastrointestinal profile, glucose/endocrine profile  Nutrition-Focused Physical Findings: overall appearance       Nutrition Follow-Up    RD Follow-up?: Yes  Graciela Rios, MS, RDN, LDN

## 2024-08-29 NOTE — PT/OT/SLP PROGRESS
Physical Therapy  Treatment    Cheryl Kirkland   MRN: [id number]   Admitting Diagnosis: Gastric adenocarcinoma    PT Received On: 08/29/24  PT Start Time: 0755     PT Stop Time: 0825    PT Total Time (min): 30 min       Billable Minutes:  Gait Training 15 and Therapeutic Activity 15    Treatment Type: Treatment  PT/PTA: PTA     Number of PTA visits since last PT visit: 3       General Precautions: Standard, fall  Orthopedic Precautions: N/A  Braces: N/A  Respiratory Status: Room air    Spiritual, Cultural Beliefs, Islam Practices, Values that Affect Care: no    Subjective:  Communicated with patient's nurse, Jcarlos, and completed Epic chart review prior to session.  Patient agreed to PT session. Initially reports feeling unwell but stated multiple times during session that she was feeling better the more she moved.     Pain/Comfort  Pain Rating 1: 0/10  Pain Rating Post-Intervention 1: 0/10    Objective:   Patient found with: PEG Tube, peripheral IV, APOLINAR drain    Supine > sit EOB: CGA    Forward scoot towards EOB: SBA    STS from EOB > RW: SBA    STS from chair No AD: SBA    250ft RW SBA    Stand pivot T/F to chair No AD x2 trials: SBA    Stand pivot T/F to toilet No AD: SBA    STS from toilet No AD: SBA    Stand at sink x5 min total for ADL self care tasks and hand hygiene. Maintained standing balance with SPV.     Reviewed AROM TE to BLE including: hip flex/ext, knee flex/ext, ankle PF/DF  To be completed a minimum of 10 reps for each LE in order to promote return of function, strength and ROM.     Educated patient on importance of increased tolerance to upright position and direct impact on CV endurance and strength. Patient encouraged to sit up in chair/ EOB, for a minimum of 2 consecutive hours, 3x per day. Encouraged patient to perform AROM TE to BLE throughout the day within all available planes of motion. Re enforced importance of utilizing call light to meet needs in room and not attempt to get  up without staff assistance. Patient verbalized understanding and agreed to comply.       AM-PAC 6 CLICK MOBILITY  How much help from another person does this patient currently need?   1 = Unable, Total/Dependent Assistance  2 = A lot, Maximum/Moderate Assistance  3 = A little, Minimum/Contact Guard/Supervision  4 = None, Modified [place]/Independent    Turning over in bed (including adjusting bedclothes, sheets and blankets)?: 3  Sitting down on and standing up from a chair with arms (e.g., wheelchair, bedside commode, etc.): 3  Moving from lying on back to sitting on the side of the bed?: 3  Moving to and from a bed to a chair (including a wheelchair)?: 3  Need to walk in hospital room?: 3  Climbing 3-5 steps with a railing?: 1 (NT)  Basic Mobility Total Score: 16    AM-PAC Raw Score CMS G-Code Modifier Level of Impairment Assistance   6 % Total / Unable   7 - 9 CM 80 - 100% Maximal Assist   10 - 14 CL 60 - 80% Moderate Assist   15 - 19 CK 40 - 60% Moderate Assist   20 - 22 CJ 20 - 40% Minimal Assist   23 CI 1-20% SBA / CGA   24 CH 0% Independent/ Mod I     Patient left up in chair with call button in reach.    Assessment:  Cheryl Kirkland is a 74 y.o. female with a medical diagnosis of Gastric adenocarcinoma and presents with overall decline in functional mobility. Patient would continue to benefit from skilled PT to address functional limitations listed below in order to return to PLOF/decrease caregiver burden.     Rehab identified problem list/impairments: weakness, impaired endurance, gait instability, impaired functional mobility, impaired balance, impaired cardiopulmonary response to activity    Rehab potential is good.    Activity tolerance: Fair    Discharge recommendations: Low Intensity Therapy (WITH 24 HOUR SPV & A)      Barriers to discharge:      Equipment recommendations: walker, rolling     GOALS:   Multidisciplinary Problems       Physical Therapy Goals          Problem:  Physical Therapy    Goal Priority Disciplines Outcome Goal Variances Interventions   Physical Therapy Goal     PT, PT/OT Progressing     Description: LTG'S TO BE MET IN 14 DAYS (9-4-24)  PT WILL REQUIRE SPV FOR BED MOBILITY  PT WILL REQUIRE SPV FOR BED<>CHAIR TF'S  PT WILL  FEET WITH RW AND SPV  PT WILL INC AMPAC SCORE BY 2 POINTS TO PROGRESS GROSS FUNC MOBILITY                         PLAN:    Patient to be seen 3 x/week to address the above listed problems via gait training, therapeutic activities, therapeutic exercises  Plan of Care expires: 09/09/24  Plan of Care reviewed with: patient         08/29/2024

## 2024-08-29 NOTE — SUBJECTIVE & OBJECTIVE
Interval History:  Patient seen and examined.  Discussed with surgery.  Much better today.  No nausea or vomiting.    Review of Systems   Constitutional:  Positive for activity change and fatigue. Negative for fever.   Respiratory:  Negative for cough and shortness of breath.    Cardiovascular:  Negative for chest pain and leg swelling.   Gastrointestinal:  Positive for abdominal distention. Negative for nausea and vomiting.   Skin:  Positive for pallor.   Neurological:  Positive for weakness.   All other systems reviewed and are negative.    Objective:     Vital Signs (Most Recent):  Temp: 99.3 °F (37.4 °C) (08/28/24 1647)  Pulse: 89 (08/28/24 1647)  Resp: 16 (08/28/24 1647)  BP: (!) 143/81 (08/28/24 1647)  SpO2: 96 % (08/28/24 1647) Vital Signs (24h Range):  Temp:  [97.3 °F (36.3 °C)-99.6 °F (37.6 °C)] 99.3 °F (37.4 °C)  Pulse:  [86-96] 89  Resp:  [14-18] 16  SpO2:  [95 %-100 %] 96 %  BP: (128-144)/(70-85) 143/81     Weight: 61.3 kg (135 lb 2.3 oz)  Body mass index is 22.49 kg/m².    Intake/Output Summary (Last 24 hours) at 8/28/2024 1901  Last data filed at 8/28/2024 1448  Gross per 24 hour   Intake 280 ml   Output 615 ml   Net -335 ml         Physical Exam  Vitals reviewed.   Constitutional:       Appearance: Normal appearance. She is ill-appearing.   HENT:      Head: Normocephalic and atraumatic.      Mouth/Throat:      Mouth: Mucous membranes are moist.      Pharynx: Oropharynx is clear.   Eyes:      Extraocular Movements: Extraocular movements intact.      Conjunctiva/sclera: Conjunctivae normal.   Cardiovascular:      Rate and Rhythm: Normal rate and regular rhythm.      Pulses: Normal pulses.      Heart sounds: Normal heart sounds.   Pulmonary:      Effort: Pulmonary effort is normal.      Breath sounds: Normal breath sounds.   Abdominal:      General: Bowel sounds are normal. There is distension.      Palpations: Abdomen is soft.      Tenderness: There is abdominal tenderness.      Comments: Tender to  palpation incision site incisions are clean and dry, APOLINAR drains in place with significant drainage.   Musculoskeletal:         General: Normal range of motion.      Cervical back: Normal range of motion and neck supple.   Skin:     General: Skin is warm and dry.   Neurological:      General: No focal deficit present.      Mental Status: She is alert and oriented to person, place, and time. Mental status is at baseline.   Psychiatric:         Mood and Affect: Mood normal.         Behavior: Behavior normal.         Thought Content: Thought content normal.             Significant Labs: All pertinent labs within the past 24 hours have been reviewed.  CBC:   Recent Labs   Lab 08/27/24  0545 08/28/24  0535   WBC 6.96 7.06   HGB 10.7* 11.5*   HCT 32.9* 34.6*    367     CMP:   Recent Labs   Lab 08/27/24  0544 08/28/24  0535    141   K 3.1* 3.9   * 113*   CO2 20* 17*   * 107   BUN 8 7*   CREATININE 0.7 0.6   CALCIUM 8.1* 8.3*   ANIONGAP 8 11       Significant Imaging: I have reviewed all pertinent imaging results/findings within the past 24 hours.

## 2024-08-29 NOTE — PROGRESS NOTES
[facility]  General Surgery  Progress Note    Subjective:     History of Present Illness:  Cheryl Kirkland is a 74 y.o. year old female with a history of ovarian cancer status post surgery and chemotherapy as well as epilepsy and DVT (provoked during her treatment for ovarian cancer), and MSI high gastric adenocarcinoma.  She was originally diagnosed back in February of this past year.  She was noted to have a very proximal enlarged tumor.  EGD was done as was complete metastatic workup which was negative.  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  Due to her significant adhesive disease she understood that she would require an open operation.  She was discussed in multidisciplinary tumor board and the consensus was to proceed with immunotherapy due to her MSI high status.  She completed 5 cycles of neoadjuvant immunotherapy with Keytruda and on restaging imaging was found to have enlarged Danelle portal lymph nodes.  Consensus was that she would need a total gastrectomy due to the proximity of her tumor in relationship to the GE junction.  Due to the extent of the operation required and the her previous history of ovarian cancer the tumor board consensus was to proceed with the EUS guided biopsy to rule out any ovarian cancer and to rule out pseudo progression as well.  This was performed and showed adenocarcinoma consistent with her gastric primary.  Therefore the decision was to proceed with surgical intervention due to the mixed response she had from immunotherapy on PET-CT scan.     Risks and benefits were reviewed including bleeding, infection, pain, scar, damage to surrounding structures, cardiovascular and pulmonary complications, anastomotic leak, positive margins, need for additional procedures, death, and imponderables.  She  expressed understanding and gave informed consent to proceed.    Post-Op Info:  Procedure(s) (LRB):  GASTRECTOMY (N/A)  EGD (ESOPHAGOGASTRODUODENOSCOPY) (N/A)  LAPAROSCOPY, DIAGNOSTIC (N/A)  LYSIS, ADHESIONS (N/A)  INSERTION, JEJUNOSTOMY TUBE (N/A)   9 Days Post-Op     Interval History: AFVSS.  Feeling fullness with tube feeds at 30cc/hr.  APOLINAR drains serous. Having Bms     Medications:  Continuous Infusions:   dextrose 5 % and 0.45 % NaCl with KCl 20 mEq   Intravenous Continuous 50 mL/hr at 08/29/24 0725 New Bag at 08/29/24 0725     Scheduled Meds:   bisacodyL  10 mg Rectal Daily    carBAMazepine  200 mg Per J Tube BID    carBAMazepine  300 mg Per J Tube QHS    enoxparin  40 mg Subcutaneous Daily    levETIRAcetam  500 mg Per J Tube BID    metoclopramide HCl  10 mg Oral Q8H    sennosides 8.8 mg/5 ml  5 mL Per J Tube BID    zonisamide  200 mg Per J Tube BID     PRN Meds:  Current Facility-Administered Medications:     dextrose 10%, 12.5 g, Intravenous, PRN    dextrose 10%, 25 g, Intravenous, PRN    glucagon (human recombinant), 1 mg, Intramuscular, PRN    hydrALAZINE, 10 mg, Intravenous, Q6H PRN    hydrocodone-apap 7.5-325 MG/15 ML, 15 mL, Oral, Q4H PRN    hydrocodone-apap 7.5-325 MG/15 ML, 30 mL, Oral, Q6H PRN    HYDROmorphone, 1 mg, Intravenous, Q6H PRN    insulin aspart U-100, 0-5 Units, Subcutaneous, Q6H PRN    naloxone, 0.02 mg, Intravenous, PRN    ondansetron, 4 mg, Intravenous, Q6H PRN    pneumoc 20-faina conj-dip cr(PF), 0.5 mL, Intramuscular, vaccine x 1 dose    promethazine, 25 mg, Rectal, Q6H PRN     Review of patient's allergies indicates:  No Known Allergies  Objective:     Vital Signs (Most Recent):  Temp: 98.5 °F (36.9 °C) (08/29/24 0725)  Pulse: 85 (08/29/24 0725)  Resp: 18 (08/29/24 0725)  BP: (!) 151/62 (08/29/24 0725)  SpO2: 95 % (08/29/24 0725) Vital Signs (24h Range):  Temp:  [98.1 °F (36.7 °C)-99.7 °F (37.6 °C)] 98.5 °F (36.9 °C)  Pulse:  [85-95] 85  Resp:  [16-20] 18  SpO2:  [95 %-98 %] 95 %  BP:  (128-151)/(60-81) 151/62     Weight: 61.3 kg (135 lb 2.3 oz)  Body mass index is 22.49 kg/m².    Intake/Output - Last 3 Shifts         08/27 0700 08/28 0659 08/28 0700 08/29 0659 08/29 0700 08/30 0659    P.O.  180     NG/GT 50 386 50    Total Intake(mL/kg) 50 (0.8) 566 (9.2) 50 (0.8)    Urine (mL/kg/hr) 200 (0.1) 1100 (0.7)     Drains 215 275 50    Stool 0 0     Total Output 415 1375 50    Net -365 -809 0           Urine Occurrence 2 x      Stool Occurrence 1 x 3 x              Physical Exam  Constitutional:       General: She is not in acute distress.     Appearance: Normal appearance. She is not ill-appearing.   HENT:      Head: Normocephalic and atraumatic.   Eyes:      Extraocular Movements: Extraocular movements intact.      Conjunctiva/sclera: Conjunctivae normal.   Cardiovascular:      Rate and Rhythm: Normal rate and regular rhythm.   Pulmonary:      Effort: Pulmonary effort is normal.   Abdominal:      General: Abdomen is flat. There is distension.      Palpations: Abdomen is soft. There is no mass.      Hernia: No hernia is present.      Comments: TTP, APOLINAR drains serous, J tube in place- some swelling/ erythema around but no purulence, incision c/d/i   Musculoskeletal:         General: No swelling, tenderness, deformity or signs of injury. Normal range of motion.   Skin:     General: Skin is warm and dry.      Coloration: Skin is not jaundiced.   Neurological:      General: No focal deficit present.      Mental Status: She is alert and oriented to person, place, and time.   Psychiatric:         Mood and Affect: Mood normal.         Behavior: Behavior normal.         Thought Content: Thought content normal.          Significant Labs:  I have reviewed all pertinent lab results within the past 24 hours.  CBC:   Recent Labs   Lab 08/29/24  0601   WBC 9.58   RBC 3.23*   HGB 10.8*   HCT 32.9*      *   MCH 33.4*   MCHC 32.8     BMP:   Recent Labs   Lab 08/29/24  0601   *      K 3.8    *   CO2 20*   BUN 7*   CREATININE 0.7   CALCIUM 8.0*   MG 1.7       Significant Diagnostics:  I have reviewed all pertinent imaging results/findings within the past 24 hours.  Assessment/Plan:     * Gastric adenocarcinoma  POD #9- s/p exlap, extensive SHAYNA, small bowel resection, total gastrectomy with stapled esophagojejunal anastomosis and D2 lymphadenectomy, and Jtube placement    Distention with tube feeds at 30cc/hr.  Scan and labs all looking ok with no evidence of leak.  I suspect she is not tolerating this formula well.      -- continue Reglan  -- continue CLD  -- holding tube feeds for now.  Will discuss with dietician about changing tube feed formula, and starting TPN since she has been without nutrition for 9 days now.    -- continue hycet via J tube for pain   -- ok for liquid medications via J tube  -- Strict I/Os  -- Senna liquid via Jtube BID   -- CM consult for discharge / home health planning   -- OOB To chair and walk today x5, OT/PT consulted  -- encourage IS  Dispo: continued med surg with tele- tentative plan for d/c home Friday     Lovenox and SCDs for DVT ppx, no indication for GI ppx         Primary hypertension  -- appreciate medicine assistance with management     Hx of Epileptic seizures  -- appreciate medicine assistance with management         Chen Jerome MD  General Surgery  [facility]

## 2024-08-29 NOTE — SUBJECTIVE & OBJECTIVE
Interval History: POD 9- developed some Abd fullness- hence Dr. Jerome switched to CLD while hold TF and cont Reglan. Getting PT/OT. Cont present care as per Dr. Jerome. Labs stable. PO4 2.5.        Review of Systems   Constitutional:  Positive for activity change and fatigue. Negative for fever.   Respiratory:  Negative for cough and shortness of breath.    Cardiovascular:  Negative for chest pain and leg swelling.   Gastrointestinal:  Positive for abdominal distention. Negative for nausea and vomiting.   Skin:  Positive for pallor.   Neurological:  Positive for weakness.   All other systems reviewed and are negative.    Objective:     Vital Signs (Most Recent):  Temp: 97.4 °F (36.3 °C) (08/29/24 1626)  Pulse: 93 (08/29/24 1626)  Resp: 18 (08/29/24 1626)  BP: 136/72 (08/29/24 1626)  SpO2: 98 % (08/29/24 1626) Vital Signs (24h Range):  Temp:  [97.4 °F (36.3 °C)-99.7 °F (37.6 °C)] 97.4 °F (36.3 °C)  Pulse:  [85-93] 93  Resp:  [18-20] 18  SpO2:  [95 %-98 %] 98 %  BP: (128-151)/(60-79) 136/72     Weight: 61.3 kg (135 lb 2.3 oz)  Body mass index is 22.49 kg/m².    Intake/Output Summary (Last 24 hours) at 8/29/2024 1851  Last data filed at 8/29/2024 1454  Gross per 24 hour   Intake 386 ml   Output 905 ml   Net -519 ml         Physical Exam  Vitals and nursing note reviewed.   Constitutional:       General: She is not in acute distress.     Appearance: Normal appearance. She is not ill-appearing.   HENT:      Head: Normocephalic and atraumatic.   Eyes:      Extraocular Movements: Extraocular movements intact.      Conjunctiva/sclera: Conjunctivae normal.   Cardiovascular:      Rate and Rhythm: Normal rate and regular rhythm.   Pulmonary:      Effort: Pulmonary effort is normal.   Abdominal:      General: Abdomen is flat. There is distension.      Palpations: Abdomen is soft. There is no mass.      Hernia: No hernia is present.      Comments: TTP, APOLINAR drains serous, J tube in place- some swelling/ erythema around but no  purulence, incision c/d/i   Musculoskeletal:         General: No swelling, tenderness, deformity or signs of injury. Normal range of motion.   Skin:     General: Skin is warm and dry.      Coloration: Skin is not jaundiced.   Neurological:      General: No focal deficit present.      Mental Status: She is alert and oriented to person, place, and time.   Psychiatric:         Mood and Affect: Mood normal.         Behavior: Behavior normal.         Thought Content: Thought content normal.             Significant Labs: All pertinent labs within the past 24 hours have been reviewed.  BMP:   Recent Labs   Lab 08/29/24  0601 08/29/24  1237   * 102    141   K 3.8 4.5   * 111*   CO2 20* 22*   BUN 7* 8   CREATININE 0.7 0.7   CALCIUM 8.0* 8.4*   MG 1.7  --      CBC:   Recent Labs   Lab 08/28/24  0535 08/29/24  0601   WBC 7.06 9.58   HGB 11.5* 10.8*   HCT 34.6* 32.9*    399     CMP:   Recent Labs   Lab 08/28/24  0535 08/29/24  0601 08/29/24  1237    139 141   K 3.9 3.8 4.5   * 111* 111*   CO2 17* 20* 22*    129* 102   BUN 7* 7* 8   CREATININE 0.6 0.7 0.7   CALCIUM 8.3* 8.0* 8.4*   PROT  --   --  5.6*   ALBUMIN  --   --  2.3*   BILITOT  --   --  0.4   ALKPHOS  --   --  230*   AST  --   --  21   ALT  --   --  55*   ANIONGAP 11 8 8     Magnesium:   Recent Labs   Lab 08/28/24  0535 08/29/24  0601   MG 1.8 1.7       Significant Imaging: I have reviewed all pertinent imaging results/findings within the past 24 hours.

## 2024-08-29 NOTE — PROGRESS NOTES
[facility] Medicine  Progress Note    Patient Name: Chreyl Kirkland  MRN: [id number]  Patient Class: IP- Inpatient   Admission Date: 8/20/2024  Length of Stay: 9 days  Attending Physician: Chen Jerome MD  Primary Care Provider: Gagandeep Iglesias MD        Subjective:     Principal Problem:Gastric adenocarcinoma        HPI:  74F  has a past medical history of Anemia, Chronic deep vein thrombosis (DVT) of left lower extremity, Deep vein thrombosis, Drug-induced polyneuropathy, DVT (deep venous thrombosis), Epilepsy, Gastric adenocarcinoma, GERD (gastroesophageal reflux disease), Hyperlipidemia, Mixed epithelial carcinoma of right ovary, OP (osteoporosis), Ovarian cancer, and Primary hypertension.  Presents for definitive treatment of gastric adenocaricoma per primary. Status post total gastrectomy with j tube placement. Transferred to icu postoperatively for close monitoring. Tolerated procedure well. Expected pain and weakness. Physical/occupational therapy recommending low intensity therapy.    Hospital medicine consulted for medical management.    Initial review of chart reveals vital signs stable, on room air. Cbc unremarkable. Cmp with hyperchloremic acidosis improving. Normal renal function. Elevated liver enzymes. Amylase within normal limits. Hypomag. Hypoalbuminemia. Xray abdomen on 8/20 with operative changes.    Overview/Hospital Course:  8/23 admitted for definitive management of gastric adenocarcinoma with total gastrectomy and j tube placement by primary. Upper gastroenterology procedure with no anastomotic leak identified. Possible ngt removal per primary. Physical/occupational therapy recommending low intensity therapy. Antiepileptic levels pending  8/24 antiepileptic levels pending. Ng tube removed. Reports visual disturbance but improving. Has not worked with physical/occupational therapy today.  8/25 doing well. +bowel movement x 2. pca discontinued per primary.  Trickle feeds to be increased per primary.  8/26 patient feels weak today with some shortness of breath.  She states she feels some nausea and just no energy.  She was able to participate therapy and walk in [place] with walker  8/27 patient continues to feel weak.  8/28 patient feels much better today.  No nausea no vomiting.  Has walked 3 times with the assistance today.  8/29- POD 9- developed some Abd fullness- hence Dr. Jerome switched to CLD while hold TF and cont Reglan. Getting PT/OT. Cont present care as per Dr. Jerome. Labs stable. PO4 2.5.        Interval History: POD 9- developed some Abd fullness- hence Dr. Jerome switched to CLD while hold TF and cont Reglan. Getting PT/OT. Cont present care as per Dr. Jerome. Labs stable. PO4 2.5.        Review of Systems   Constitutional:  Positive for activity change and fatigue. Negative for fever.   Respiratory:  Negative for cough and shortness of breath.    Cardiovascular:  Negative for chest pain and leg swelling.   Gastrointestinal:  Positive for abdominal distention. Negative for nausea and vomiting.   Skin:  Positive for pallor.   Neurological:  Positive for weakness.   All other systems reviewed and are negative.    Objective:     Vital Signs (Most Recent):  Temp: 97.4 °F (36.3 °C) (08/29/24 1626)  Pulse: 93 (08/29/24 1626)  Resp: 18 (08/29/24 1626)  BP: 136/72 (08/29/24 1626)  SpO2: 98 % (08/29/24 1626) Vital Signs (24h Range):  Temp:  [97.4 °F (36.3 °C)-99.7 °F (37.6 °C)] 97.4 °F (36.3 °C)  Pulse:  [85-93] 93  Resp:  [18-20] 18  SpO2:  [95 %-98 %] 98 %  BP: (128-151)/(60-79) 136/72     Weight: 61.3 kg (135 lb 2.3 oz)  Body mass index is 22.49 kg/m².    Intake/Output Summary (Last 24 hours) at 8/29/2024 1851  Last data filed at 8/29/2024 1454  Gross per 24 hour   Intake 386 ml   Output 905 ml   Net -519 ml         Physical Exam  Vitals and nursing note reviewed.   Constitutional:       General: She is not in acute distress.     Appearance: Normal appearance.  She is not ill-appearing.   HENT:      Head: Normocephalic and atraumatic.   Eyes:      Extraocular Movements: Extraocular movements intact.      Conjunctiva/sclera: Conjunctivae normal.   Cardiovascular:      Rate and Rhythm: Normal rate and regular rhythm.   Pulmonary:      Effort: Pulmonary effort is normal.   Abdominal:      General: Abdomen is flat. There is distension.      Palpations: Abdomen is soft. There is no mass.      Hernia: No hernia is present.      Comments: TTP, APOLINAR drains serous, J tube in place- some swelling/ erythema around but no purulence, incision c/d/i   Musculoskeletal:         General: No swelling, tenderness, deformity or signs of injury. Normal range of motion.   Skin:     General: Skin is warm and dry.      Coloration: Skin is not jaundiced.   Neurological:      General: No focal deficit present.      Mental Status: She is alert and oriented to person, place, and time.   Psychiatric:         Mood and Affect: Mood normal.         Behavior: Behavior normal.         Thought Content: Thought content normal.             Significant Labs: All pertinent labs within the past 24 hours have been reviewed.  BMP:   Recent Labs   Lab 08/29/24  0601 08/29/24  1237   * 102    141   K 3.8 4.5   * 111*   CO2 20* 22*   BUN 7* 8   CREATININE 0.7 0.7   CALCIUM 8.0* 8.4*   MG 1.7  --      CBC:   Recent Labs   Lab 08/28/24  0535 08/29/24  0601   WBC 7.06 9.58   HGB 11.5* 10.8*   HCT 34.6* 32.9*    399     CMP:   Recent Labs   Lab 08/28/24  0535 08/29/24  0601 08/29/24  1237    139 141   K 3.9 3.8 4.5   * 111* 111*   CO2 17* 20* 22*    129* 102   BUN 7* 7* 8   CREATININE 0.6 0.7 0.7   CALCIUM 8.3* 8.0* 8.4*   PROT  --   --  5.6*   ALBUMIN  --   --  2.3*   BILITOT  --   --  0.4   ALKPHOS  --   --  230*   AST  --   --  21   ALT  --   --  55*   ANIONGAP 11 8 8     Magnesium:   Recent Labs   Lab 08/28/24  0535 08/29/24  0601   MG 1.8 1.7       Significant Imaging: I  have reviewed all pertinent imaging results/findings within the past 24 hours.    Assessment/Plan:      * Gastric adenocarcinoma  Status post total gastrectomy with j tube placement  Per primary    POD 9- hopefully d/c soon    Primary hypertension  Chronic, controlled. Latest blood pressure and vitals reviewed-     Temp:  [97.3 °F (36.3 °C)-99.6 °F (37.6 °C)]   Pulse:  [86-96]   Resp:  [14-18]   BP: (128-144)/(70-85)   SpO2:  [95 %-100 %] .   Home meds for hypertension were reviewed and noted below.       While in the hospital, will manage blood pressure as follows; Adjust home antihypertensive regimen as follows- monitor, avoid aggressive management of blood pressure in setting of recent surgery     Will utilize p.r.n. blood pressure medication only if patient's blood pressure greater than 160/100 and she develops symptoms such as worsening chest pain or shortness of breath.    Chronic deep vein thrombosis (DVT) of left lower extremity  Scds and lovenox for prophy      DVT (deep venous thrombosis)  Initially presented to primary care provider with lower extremity edema   Workup revealed dvt 2/2 cancer  On loveonx prophy and scds  Ambulate     Cont same    Hx of Epileptic seizures  Resume home meds per j tube  Carbamezapine in therapeutic range  Prolactin within normal limits   Other antiepileptic levels pending    stable      VTE Risk Mitigation (From admission, onward)           Ordered     enoxaparin injection 40 mg  Daily         08/20/24 2219     IP VTE HIGH RISK PATIENT  Once         08/20/24 2219     Place sequential compression device  Until discontinued         08/20/24 2219                    Discharge Planning   SOFIYA:      Code Status: Full Code   Is the patient medically ready for discharge?:     Reason for patient still in hospital (select all that apply): Patient trending condition, Laboratory test, Treatment, Imaging, Consult recommendations, PT / OT recommendations, and Pending disposition  Discharge  Plan A: Home Health          Ap Pearce MD  Department of Hospital Medicine   [facility]

## 2024-08-29 NOTE — PLAN OF CARE
Nutrition Recs: 8/29/24  1. Initiate pt onto Clinimix 4.25/5 when medically appropriate  - Goal rate 70 mL/hr  - Custom Macronutrient: AA: 80 g/day, Dextrose: 200 g/day,   - Total Kcal: 1000 (65% EEN)  -  Check triglycerides within 24-48 hrs, if WDL, add standard daily lipids  - Electrolytes per pharmacy  - Check Mg, Na, K+, Phos, and Glu before and during initiation, correct as indicated.   2. Modify Enteral nutrition formula to Peptamen 1.5 w/ Prebio via J tube, goal rate 50 mL/hr, starting at 10 mL/hr then progress to goal within 24 hrs if pt is tolerating or per MD/NP   -Formula at goal rate provides: 1800 kcals/day (100% EEN), 81.6 g protein/day (100% EPN), 230.4 g CHO/day (100% CHO needs), 926 mL free formula water/day (60% fluid needs)   - 145 mL q4h free water flushes (870 mL/day) or per MD/NP  - total from formula + FWF = 1796.4 mL water/day (100% fluid needs)   -Check Mg, K+, Na, Phos and Glu before and during initiation, correct as indicated   2. Weigh twice weekly     Goals:   1. Pt will be initiated onto continuous EN within 24 hrs (met)  2. Pt will be initiated onto PPN within 24 hrs.  3. Pt EN formula will be modified within 24 hrs.  4. Pt will tolerate and intake > 50% EEN and EPN prior to RD follow up  Nutrition Goal Status: new, Met  Communication of RD Recs: other (comment) (POC, sticky note, secure chat)  Shen Mcdermott, MS, RD, LDN

## 2024-08-29 NOTE — PROGRESS NOTES
[facility] - Intensive Care ([facility])  Adult Nutrition  Progress Note    SUMMARY     Recommendations  1. Initate pt onto Clinimix 4.25/5 when medically appropriate  - Goal rate 70 mL/hr  - Custom Macronutrient: AA: 80 g/day, Dextrose: 200 g/day,   - Total Kcal: 1000 (65% EEN)  -  Check triglycerides within 24-48 hrs, if WDL, add standard daily lipids  - Electrolytes per pharmacy  - Check Mg, Na, K+, Phos, and Glu before and during initiation, correct as indicated.   2. Modify Enteral nutrition formula to Peptamen 1.5 w/ Prebio via J tube, goal rate 50 mL/hr, starting at 10 mL/hr then progress to goal within 24 hrs if pt is tolerating or per MD/NP   -Formula at goal rate provides: 1800 kcals/day (100% EEN), 81.6 g protein/day (100% EPN), 230.4 g CHO/day (100% CHO needs), 926 mL free formula water/day (60% fluid needs)   - 145 mL q4h free water flushes (870 mL/day) or per MD/NP  - total from formula + FWF = 1796.4 mL water/day (100% fluid needs)   -Check Mg, K+, Na, Phos and Glu before and during initiation, correct as indicated   2. Weigh twice weekly    Goals:   1. Pt will be initated onto continuous EN within 24 hrs (met)  2. Pt will be initiated onto PPN within 24 hrs.  3. Pt EN formula will be modified within 24 hrs.  4. Pt will tolerate and intake > 50% EEN and EPN prior to RD follow up  Nutrition Goal Status: new, Met  Communication of RD Recs: other (comment) (POC, sticky note, secure chat)    Assessment and Plan    Nutrition Problem  Inadequate oral intake   Inadequate EN infusion    Related to (etiology):   Decreased ability to consume sufficient protein/energy   Infusion volume not reached    Signs and Symptoms (as evidenced by):   NPO, Gastrectomy with J tube  Abdominal discomfort     Interventions/Recommendations (treatment strategy):  1. Enteral Nutrition Management  2. Nutrition prescription for parenteral   3. Collaboration by nutrition professional with other providers    Nutrition Diagnosis Status:    Continues       Malnutrition Assessment     Skin (Micronutrient):  (Chetan=18)           Orbital Region (Subcutaneous Fat Loss): moderate depletion (Slightly darker circles; Somewhat hallow look)                     Reason for Assessment    Reason For Assessment: Not tolerating EN formula  Diagnosis: 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 Female admitted for Gastric adenocarcinoma. Pt currently in the ICU, NPO x 1 day. RD consulted for J tube feed recommendations. H&P noted that the pt presented to the hospital for planned diagnostic laparoscopy, possible total gastrectomy and J tube placement, and EGD d/t gastric adenocarcinoma, s/p neoadjuvant immunotherapy. EMR noted procedure performed on 8/20/24, post-op pt was transferred to the ICU, gastrectomy with J tube in place now, plan is for liquid meds only via J tube starting today (8/21) and to maintain NJ tube to LIWS w/ plan to remove after leak test in a few days. Discussed pt in rounds, RN stated pt to start in Impact Peptide 1.5 @ 10 mL/hr at 10 am, informed RN of J tube recs via secure chat and awaiting MD to sign orders. Unable to perform full NFPE d/t pt working with other providers, visual NFPE perform, moderate orbital malnutrition noted. Reviewed chart: Propofol: 0; Total VE: 0; LBM: DAVE; Skin: incision abdomen WDL, closed/sunction drain R abdomen x 2; Chetan score: 12 (high risk); Edema: None.Labs, meds, weight reviewed. Weight charted 2/8/24 130 lbs, 8/20/24 129 lbs (BMI 21.61, Underweight for age), -1 lb wt loss x 6 months, insignificant wt loss, wt stable. RD will continue to follow and monitor pt's nutritional status during admit.    8/26/2024: Patient is post op day 6.  Diet advanced to clear liquids and tolerated CLD yesterday.   "Patient is experiencing some nausea with TF running at rate of 30ml/hr.  Labs reviewed.  NKFA.  LBM: 8/25/2024.  NFPE to be performed at next patient visit.  RD to continue to monitor nutrition therapies and tolerance.      8/29/24:  Patient continue son clear liquids and EN support.  Patient with some prior nausea but has improved within the past 24 hours.  RD to continue to monitor EN and po intake tolerance.  Labs reviewed.  NKFA.  LBM: 8/28/2024.  RD to continue to monitor diet advancement and EN support    8/29/24: Spoke with current attending MD whom states the pt is not tolerating TF >10 mL/hr. She states that when rate is increased to 30 mL/hr pt begins to feel bloated and uncomfortable. She then hold feeds for a day and pt feels better. This has been a repeated cycle during multiple attempts to increase to goal rate. Pt currently on [employer] Peptide 1.5. MD will order PPN to begin immediately d/t pt >= 9 days without sufficient nutrition. She will modify EN formula and observe for tolerance. RD updated pt wt's and EEN/EPN. Provided PPN recs and recommendations for Peptamen 1.5 w/ pre bio. RD to continue to follow and monitor the pt nutrition status      Nutrition Discharge Planning: Enteral nutrition via J tube    Nutrition Risk Screen    Nutrition Risk Screen: no indicators present    Nutrition Related Social Determinants of Health: SDOH: Unable to assess at this time.     Nutrition/Diet History    Spiritual, Cultural Beliefs, Bahai Practices, Values that Affect Care: no  Food Allergies: NKFA  Factors Affecting Nutritional Intake: altered gastrointestinal function, clear liquid diet    Anthropometrics    Temp: 98.5 °F (36.9 °C)  Height Method: Stated  Height: 5' 5" (165.1 cm)  Height (inches): 65 in  Weight Method: Standard Scale  Weight: 61.3 kg (135 lb 2.3 oz)  Weight (lb): 135.14 lb  Ideal Body Weight (IBW), Female: 125 lb  % Ideal Body Weight, Female (lb): 103.88 %  BMI (Calculated): 22.5  BMI " Grade: 18.5-24.9 - normal (Underweight for age)     Wt Readings from Last 30 Encounters:   08/22/24 61.3 kg (135 lb 2.3 oz)   08/14/24 58.7 kg (129 lb 4.8 oz)   07/26/24 59 kg (130 lb)   07/23/24 60.5 kg (133 lb 6.1 oz)   07/19/24 59.5 kg (131 lb 2.8 oz)   07/09/24 58.9 kg (129 lb 13.6 oz)   06/28/24 58.9 kg (129 lb 13.6 oz)   06/18/24 58.9 kg (129 lb 13.6 oz)   05/28/24 59.7 kg (131 lb 9.8 oz)   05/28/24 59.7 kg (131 lb 9.8 oz)   05/07/24 58.2 kg (128 lb 4.9 oz)   04/16/24 57.8 kg (127 lb 6.8 oz)   04/16/24 57.8 kg (127 lb 6.8 oz)   03/26/24 58.5 kg (128 lb 15.5 oz)   03/26/24 58.5 kg (128 lb 15.5 oz)   03/14/24 57.8 kg (127 lb 6.8 oz)   03/06/24 58.8 kg (129 lb 10.1 oz)   02/23/24 58.6 kg (129 lb 3 oz)   02/08/24 59 kg (130 lb)     Lab/Procedures/Meds  BMP  Lab Results   Component Value Date     08/29/2024    K 3.8 08/29/2024     (H) 08/29/2024    CO2 20 (L) 08/29/2024    BUN 7 (L) 08/29/2024    CREATININE 0.7 08/29/2024    CALCIUM 8.0 (L) 08/29/2024    ANIONGAP 8 08/29/2024    EGFRNORACEVR >60 08/29/2024     Lab Results   Component Value Date    CALCIUM 8.0 (L) 08/29/2024    PHOS 3.8 08/21/2024     Recent Labs   Lab 08/29/24  1237   POCTGLUCOSE 103     Lab Results   Component Value Date    LABPROT 11.2 02/07/2024    ALBUMIN 2.8 (L) 08/21/2024       Pertinent Labs Reviewed: reviewed    Pertinent Medications Reviewed: reviewed  Scheduled Meds:   bisacodyL  10 mg Rectal Daily    carBAMazepine  200 mg Per J Tube BID    carBAMazepine  300 mg Per J Tube QHS    enoxparin  40 mg Subcutaneous Daily    levETIRAcetam  500 mg Per J Tube BID    metoclopramide HCl  10 mg Oral Q8H    sennosides 8.8 mg/5 ml  5 mL Per J Tube BID    zonisamide  200 mg Per J Tube BID     Continuous Infusions:   dextrose 5 % and 0.45 % NaCl with KCl 20 mEq   Intravenous Continuous 50 mL/hr at 08/29/24 0725 New Bag at 08/29/24 0725     PRN Meds:.  Current Facility-Administered Medications:     dextrose 10%, 12.5 g, Intravenous, PRN     dextrose 10%, 25 g, Intravenous, PRN    glucagon (human recombinant), 1 mg, Intramuscular, PRN    hydrALAZINE, 10 mg, Intravenous, Q6H PRN    hydrocodone-apap 7.5-325 MG/15 ML, 15 mL, Oral, Q4H PRN    hydrocodone-apap 7.5-325 MG/15 ML, 30 mL, Oral, Q6H PRN    HYDROmorphone, 1 mg, Intravenous, Q6H PRN    insulin aspart U-100, 0-5 Units, Subcutaneous, Q6H PRN    naloxone, 0.02 mg, Intravenous, PRN    ondansetron, 4 mg, Intravenous, Q6H PRN    pneumoc 20-faina conj-dip cr(PF), 0.5 mL, Intramuscular, vaccine x 1 dose    promethazine, 25 mg, Rectal, Q6H PRN      Estimated/Assessed Needs    Weight Used For Calorie Calculations: 61.3 kg (135 lb 2.3 oz)  Energy Calorie Requirements (kcal): 1423-5708 (25-30 kcal/kg ABW per Cancer)  Energy Need Method: Kcal/kg  Protein Requirements: 74-92 (1.2-1.5 g/kg per underweight for age)  Weight Used For Protein Calculations: 61.3 kg (135 lb 2.3 oz)  Fluid Requirements (mL): 5188-3626 (1 mL/kcal)  Estimated Fluid Requirement Method: RDA Method  RDA Method (mL): 1532  CHO Requirement: 192-230      Nutrition Prescription Ordered    Current Diet Order: Clear liquids  Current Nutrition Support Formula Ordered: [employer] Standard 1.4, [employer] Peptide 1.5  Current Nutrition Support Rate Ordered: 30 (ml) (recommended goal rate of 50ml/hr)  Current Nutrition Support Frequency Ordered: continuous    Evaluation of Received Nutrient/Fluid Intake  I/O: (Net since admit)  8/21/24: +3541.1 mL  8/29/24: +6366.9 mL    % Kcal Needs: 16%  % Protein Needs: 0%    Energy Calories Required: not meeting needs  Protein Required: not meeting needs  Fluid Required: not meeting needs  Total Fluid Intake (mL): 616  Tolerance: other (see comments) (Currently no intake)  % Intake of Estimated Energy Needs: 25 - 50 % TF on hold/ D5%W  % Meal Intake: clear liquids    Nutrition Risk    Level of Risk/Frequency of Follow-up: high (F/u x 2 weekly)       Monitor and Evaluation    Food and Nutrient Intake: energy intake,  enteral nutrition intake  Food and Nutrient Adminstration: enteral and parenteral nutrition administration  Knowledge/Beliefs/Attitudes: food and nutrition knowledge/skill, beliefs and attitudes  Anthropometric Measurements: weight, weight change, body mass index  Biochemical Data, Medical Tests and Procedures: electrolyte and renal panel, gastrointestinal profile, glucose/endocrine profile  Nutrition-Focused Physical Findings: overall appearance       Nutrition Follow-Up    RD Follow-up?: Yes  Shen Mcdermott, MS, RD, LDN

## 2024-08-29 NOTE — PROGRESS NOTES
[facility]  Progress Note    Patient Name: Cheryl Kirkland  MRN: [id number]  Patient Class: IP- Inpatient   Admission Date: 8/20/2024  Length of Stay: 8 days  Attending Physician: Chen Jerome MD  Primary Care Provider: Gagandeep Iglesias MD        Subjective:     Principal Problem:Gastric adenocarcinoma        HPI:  74F  has a past medical history of Anemia, Chronic deep vein thrombosis (DVT) of left lower extremity, Deep vein thrombosis, Drug-induced polyneuropathy, DVT (deep venous thrombosis), Epilepsy, Gastric adenocarcinoma, GERD (gastroesophageal reflux disease), Hyperlipidemia, Mixed epithelial carcinoma of right ovary, OP (osteoporosis), Ovarian cancer, and Primary hypertension.  Presents for definitive treatment of gastric adenocaricoma per primary. Status post total gastrectomy with j tube placement. Transferred to icu postoperatively for close monitoring. Tolerated procedure well. Expected pain and weakness. Physical/occupational therapy recommending low intensity therapy.    Hospital medicine consulted for medical management.    Initial review of chart reveals vital signs stable, on room air. Cbc unremarkable. Cmp with hyperchloremic acidosis improving. Normal renal function. Elevated liver enzymes. Amylase within normal limits. Hypomag. Hypoalbuminemia. Xray abdomen on 8/20 with operative changes.    Overview/Hospital Course:  8/23 admitted for definitive management of gastric adenocarcinoma with total gastrectomy and j tube placement by primary. Upper gastroenterology procedure with no anastomotic leak identified. Possible ngt removal per primary. Physical/occupational therapy recommending low intensity therapy. Antiepileptic levels pending  8/24 antiepileptic levels pending. Ng tube removed. Reports visual disturbance but improving. Has not worked with physical/occupational therapy today.  8/25 doing well. +bowel movement x 2. pca discontinued per primary.  Trickle feeds to be increased per primary.  8/26 patient feels weak today with some shortness of breath.  She states she feels some nausea and just no energy.  She was able to participate therapy and walk in [place] with walker  8/27 patient continues to feel weak.  8/28 patient feels much better today.  No nausea no vomiting.  Has walked 3 times with the assistance today.    Interval History:  Patient seen and examined.  Discussed with surgery.  Much better today.  No nausea or vomiting.    Review of Systems   Constitutional:  Positive for activity change and fatigue. Negative for fever.   Respiratory:  Negative for cough and shortness of breath.    Cardiovascular:  Negative for chest pain and leg swelling.   Gastrointestinal:  Positive for abdominal distention. Negative for nausea and vomiting.   Skin:  Positive for pallor.   Neurological:  Positive for weakness.   All other systems reviewed and are negative.    Objective:     Vital Signs (Most Recent):  Temp: 99.3 °F (37.4 °C) (08/28/24 1647)  Pulse: 89 (08/28/24 1647)  Resp: 16 (08/28/24 1647)  BP: (!) 143/81 (08/28/24 1647)  SpO2: 96 % (08/28/24 1647) Vital Signs (24h Range):  Temp:  [97.3 °F (36.3 °C)-99.6 °F (37.6 °C)] 99.3 °F (37.4 °C)  Pulse:  [86-96] 89  Resp:  [14-18] 16  SpO2:  [95 %-100 %] 96 %  BP: (128-144)/(70-85) 143/81     Weight: 61.3 kg (135 lb 2.3 oz)  Body mass index is 22.49 kg/m².    Intake/Output Summary (Last 24 hours) at 8/28/2024 1901  Last data filed at 8/28/2024 1448  Gross per 24 hour   Intake 280 ml   Output 615 ml   Net -335 ml         Physical Exam  Vitals reviewed.   Constitutional:       Appearance: Normal appearance. She is ill-appearing.   HENT:      Head: Normocephalic and atraumatic.      Mouth/Throat:      Mouth: Mucous membranes are moist.      Pharynx: Oropharynx is clear.   Eyes:      Extraocular Movements: Extraocular movements intact.      Conjunctiva/sclera: Conjunctivae normal.   Cardiovascular:      Rate and Rhythm:  Normal rate and regular rhythm.      Pulses: Normal pulses.      Heart sounds: Normal heart sounds.   Pulmonary:      Effort: Pulmonary effort is normal.      Breath sounds: Normal breath sounds.   Abdominal:      General: Bowel sounds are normal. There is distension.      Palpations: Abdomen is soft.      Tenderness: There is abdominal tenderness.      Comments: Tender to palpation incision site incisions are clean and dry, APOLINAR drains in place with significant drainage.   Musculoskeletal:         General: Normal range of motion.      Cervical back: Normal range of motion and neck supple.   Skin:     General: Skin is warm and dry.   Neurological:      General: No focal deficit present.      Mental Status: She is alert and oriented to person, place, and time. Mental status is at baseline.   Psychiatric:         Mood and Affect: Mood normal.         Behavior: Behavior normal.         Thought Content: Thought content normal.             Significant Labs: All pertinent labs within the past 24 hours have been reviewed.  CBC:   Recent Labs   Lab 08/27/24  0545 08/28/24  0535   WBC 6.96 7.06   HGB 10.7* 11.5*   HCT 32.9* 34.6*    367     CMP:   Recent Labs   Lab 08/27/24  0544 08/28/24  0535    141   K 3.1* 3.9   * 113*   CO2 20* 17*   * 107   BUN 8 7*   CREATININE 0.7 0.6   CALCIUM 8.1* 8.3*   ANIONGAP 8 11       Significant Imaging: I have reviewed all pertinent imaging results/findings within the past 24 hours.    Assessment/Plan:      * Gastric adenocarcinoma  Status post total gastrectomy with j tube placement  Per primary      Primary hypertension  Chronic, controlled. Latest blood pressure and vitals reviewed-     Temp:  [97.3 °F (36.3 °C)-99.6 °F (37.6 °C)]   Pulse:  [86-96]   Resp:  [14-18]   BP: (128-144)/(70-85)   SpO2:  [95 %-100 %] .   Home meds for hypertension were reviewed and noted below.       While in the hospital, will manage blood pressure as follows; Adjust home  antihypertensive regimen as follows- monitor, avoid aggressive management of blood pressure in setting of recent surgery     Will utilize p.r.n. blood pressure medication only if patient's blood pressure greater than 160/100 and she develops symptoms such as worsening chest pain or shortness of breath.    Chronic deep vein thrombosis (DVT) of left lower extremity  Scds and lovenox for prophy      DVT (deep venous thrombosis)  Initially presented to primary care provider with lower extremity edema   Workup revealed dvt 2/2 cancer  On loveonx prophy and scds  Ambulate     Hx of Epileptic seizures  Resume home meds per j tube  Carbamezapine in therapeutic range  Prolactin within normal limits   Other antiepileptic levels pending      VTE Risk Mitigation (From admission, onward)           Ordered     enoxaparin injection 40 mg  Daily         08/20/24 2219     IP VTE HIGH RISK PATIENT  Once         08/20/24 2219     Place sequential compression device  Until discontinued         08/20/24 2219                    Discharge Planning   SOFIYA:      Code Status: Full Code   Is the patient medically ready for discharge?:     Reason for patient still in hospital (select all that apply): Patient trending condition, Laboratory test, and Treatment  Discharge Plan A: Home Health                  Janeen Momin MD  Department of Hospital Medicine   [facility] - Med Surg

## 2024-08-30 LAB
ANION GAP SERPL CALC-SCNC: 11 MMOL/L (ref 8–16)
BUN SERPL-MCNC: 8 MG/DL (ref 8–23)
CALCIUM SERPL-MCNC: 8.2 MG/DL (ref 8.7–10.5)
CHLORIDE SERPL-SCNC: 110 MMOL/L (ref 95–110)
CO2 SERPL-SCNC: 22 MMOL/L (ref 23–29)
CREAT SERPL-MCNC: 0.7 MG/DL (ref 0.5–1.4)
ERYTHROCYTE [DISTWIDTH] IN BLOOD BY AUTOMATED COUNT: 15.8 % (ref 11.5–14.5)
EST. GFR  (NO RACE VARIABLE): >60 ML/MIN/1.73 M^2
GLUCOSE SERPL-MCNC: 96 MG/DL (ref 70–110)
HCT VFR BLD AUTO: 33.6 % (ref 37–48.5)
HGB BLD-MCNC: 10.8 G/DL (ref 12–16)
MAGNESIUM SERPL-MCNC: 2.2 MG/DL (ref 1.6–2.6)
MCH RBC QN AUTO: 33.3 PG (ref 27–31)
MCHC RBC AUTO-ENTMCNC: 32.1 G/DL (ref 32–36)
MCV RBC AUTO: 104 FL (ref 82–98)
PHOSPHATE SERPL-MCNC: 3.3 MG/DL (ref 2.7–4.5)
PLATELET # BLD AUTO: 425 K/UL (ref 150–450)
PMV BLD AUTO: 9 FL (ref 9.2–12.9)
POCT GLUCOSE: 102 MG/DL (ref 70–110)
POCT GLUCOSE: 115 MG/DL (ref 70–110)
POCT GLUCOSE: 129 MG/DL (ref 70–110)
POCT GLUCOSE: 147 MG/DL (ref 70–110)
POTASSIUM SERPL-SCNC: 3.6 MMOL/L (ref 3.5–5.1)
RBC # BLD AUTO: 3.24 M/UL (ref 4–5.4)
SODIUM SERPL-SCNC: 143 MMOL/L (ref 136–145)
TRIGL SERPL-MCNC: 99 MG/DL (ref 30–150)
WBC # BLD AUTO: 8.69 K/UL (ref 3.9–12.7)

## 2024-08-30 PROCEDURE — 25000003 PHARM REV CODE 250: Performed by: NURSE PRACTITIONER

## 2024-08-30 PROCEDURE — A9698 NON-RAD CONTRAST MATERIALNOC: HCPCS | Performed by: SURGERY

## 2024-08-30 PROCEDURE — 36410 VNPNXR 3YR/> PHY/QHP DX/THER: CPT

## 2024-08-30 PROCEDURE — 63600175 PHARM REV CODE 636 W HCPCS: Performed by: NURSE PRACTITIONER

## 2024-08-30 PROCEDURE — 63600175 PHARM REV CODE 636 W HCPCS: Performed by: FAMILY MEDICINE

## 2024-08-30 PROCEDURE — 80048 BASIC METABOLIC PNL TOTAL CA: CPT | Performed by: NURSE PRACTITIONER

## 2024-08-30 PROCEDURE — 84100 ASSAY OF PHOSPHORUS: CPT | Performed by: SURGERY

## 2024-08-30 PROCEDURE — 11000001 HC ACUTE MED/SURG PRIVATE ROOM

## 2024-08-30 PROCEDURE — A4217 STERILE WATER/SALINE, 500 ML: HCPCS | Performed by: SURGERY

## 2024-08-30 PROCEDURE — B4185 PARENTERAL SOL 10 GM LIPIDS: HCPCS | Performed by: SURGERY

## 2024-08-30 PROCEDURE — 85027 COMPLETE CBC AUTOMATED: CPT | Performed by: NURSE PRACTITIONER

## 2024-08-30 PROCEDURE — C1751 CATH, INF, PER/CENT/MIDLINE: HCPCS

## 2024-08-30 PROCEDURE — 25000003 PHARM REV CODE 250: Performed by: SURGERY

## 2024-08-30 PROCEDURE — 25500020 PHARM REV CODE 255: Performed by: SURGERY

## 2024-08-30 PROCEDURE — 83735 ASSAY OF MAGNESIUM: CPT | Performed by: SURGERY

## 2024-08-30 PROCEDURE — 84478 ASSAY OF TRIGLYCERIDES: CPT | Performed by: SURGERY

## 2024-08-30 PROCEDURE — 63600175 PHARM REV CODE 636 W HCPCS: Performed by: SURGERY

## 2024-08-30 RX ORDER — HYDROCODONE BITARTRATE AND ACETAMINOPHEN 7.5; 325 MG/15ML; MG/15ML
30 SOLUTION ORAL EVERY 6 HOURS PRN
Status: DISCONTINUED | OUTPATIENT
Start: 2024-08-30 | End: 2024-08-30

## 2024-08-30 RX ORDER — HYDROCODONE BITARTRATE AND ACETAMINOPHEN 7.5; 325 MG/15ML; MG/15ML
20 SOLUTION ORAL EVERY 6 HOURS PRN
Status: DISCONTINUED | OUTPATIENT
Start: 2024-08-30 | End: 2024-08-30

## 2024-08-30 RX ORDER — HYDROCODONE BITARTRATE AND ACETAMINOPHEN 7.5; 325 MG/15ML; MG/15ML
20 SOLUTION ORAL EVERY 6 HOURS PRN
Status: DISCONTINUED | OUTPATIENT
Start: 2024-08-30 | End: 2024-09-10 | Stop reason: HOSPADM

## 2024-08-30 RX ORDER — HYDROCODONE BITARTRATE AND ACETAMINOPHEN 7.5; 325 MG/15ML; MG/15ML
15 SOLUTION ORAL EVERY 4 HOURS PRN
Status: DISCONTINUED | OUTPATIENT
Start: 2024-08-30 | End: 2024-09-10 | Stop reason: HOSPADM

## 2024-08-30 RX ORDER — ZONISAMIDE 50 MG/1
200 CAPSULE ORAL 2 TIMES DAILY
Status: DISCONTINUED | OUTPATIENT
Start: 2024-08-30 | End: 2024-09-01

## 2024-08-30 RX ORDER — DEXTROSE MONOHYDRATE 100 MG/ML
INJECTION, SOLUTION INTRAVENOUS CONTINUOUS PRN
Status: ACTIVE | OUTPATIENT
Start: 2024-08-30 | End: 2024-08-31

## 2024-08-30 RX ADMIN — METOCLOPRAMIDE HYDROCHLORIDE 10 MG: 5 SOLUTION ORAL at 09:08

## 2024-08-30 RX ADMIN — LEVETIRACETAM 500 MG: 100 SOLUTION ORAL at 10:08

## 2024-08-30 RX ADMIN — CARBAMAZEPINE 300 MG: 100 SUSPENSION ORAL at 09:08

## 2024-08-30 RX ADMIN — ZONISAMIDE 200 MG: 50 CAPSULE ORAL at 10:08

## 2024-08-30 RX ADMIN — HYDRALAZINE HYDROCHLORIDE 10 MG: 20 INJECTION, SOLUTION INTRAMUSCULAR; INTRAVENOUS at 01:08

## 2024-08-30 RX ADMIN — LEVETIRACETAM 500 MG: 100 SOLUTION ORAL at 09:08

## 2024-08-30 RX ADMIN — HYDROMORPHONE HYDROCHLORIDE 1 MG: 2 INJECTION, SOLUTION INTRAMUSCULAR; INTRAVENOUS; SUBCUTANEOUS at 01:08

## 2024-08-30 RX ADMIN — METOCLOPRAMIDE HYDROCHLORIDE 10 MG: 5 SOLUTION ORAL at 01:08

## 2024-08-30 RX ADMIN — SENNOSIDES 5 ML: 8.8 LIQUID ORAL at 10:08

## 2024-08-30 RX ADMIN — HYDROCODONE BITARTRATE AND ACETAMINOPHEN 30 ML: 7.5; 325 SOLUTION ORAL at 10:08

## 2024-08-30 RX ADMIN — METOCLOPRAMIDE HYDROCHLORIDE 10 MG: 5 SOLUTION ORAL at 06:08

## 2024-08-30 RX ADMIN — HYDROCODONE BITARTRATE AND ACETAMINOPHEN 30 ML: 7.5; 325 SOLUTION ORAL at 02:08

## 2024-08-30 RX ADMIN — ENOXAPARIN SODIUM 40 MG: 40 INJECTION SUBCUTANEOUS at 04:08

## 2024-08-30 RX ADMIN — IOHEXOL 1000 ML: 12 SOLUTION ORAL at 11:08

## 2024-08-30 RX ADMIN — HYDROCODONE BITARTRATE AND ACETAMINOPHEN 20 ML: 7.5; 325 SOLUTION ORAL at 06:08

## 2024-08-30 RX ADMIN — IOHEXOL 100 ML: 350 INJECTION, SOLUTION INTRAVENOUS at 12:08

## 2024-08-30 RX ADMIN — DEXTROSE MONOHYDRATE: 100 INJECTION, SOLUTION INTRAVENOUS at 02:08

## 2024-08-30 RX ADMIN — CARBAMAZEPINE 200 MG: 100 SUSPENSION ORAL at 06:08

## 2024-08-30 RX ADMIN — HYDROMORPHONE HYDROCHLORIDE 1 MG: 2 INJECTION, SOLUTION INTRAMUSCULAR; INTRAVENOUS; SUBCUTANEOUS at 10:08

## 2024-08-30 RX ADMIN — ZONISAMIDE 200 MG: 100 CAPSULE ORAL at 09:08

## 2024-08-30 RX ADMIN — CARBAMAZEPINE 200 MG: 100 SUSPENSION ORAL at 01:08

## 2024-08-30 RX ADMIN — MAGNESIUM SULFATE HEPTAHYDRATE: 500 INJECTION, SOLUTION INTRAMUSCULAR; INTRAVENOUS at 10:08

## 2024-08-30 RX ADMIN — I.V. FAT EMULSION 250 ML: 20 EMULSION INTRAVENOUS at 10:08

## 2024-08-30 NOTE — SUBJECTIVE & OBJECTIVE
Interval History: No acute events overnight, afebrile, hemodynamically stable.  Reports being able to tolerate clear liquid diet.  Started on TPN and resumed tube feeds. Reports pain around J tube site; evaluation by CT imaging and General Surgery did not note any indication for intervention.       Objective:     Vital Signs (Most Recent):  Temp: 98.2 °F (36.8 °C) (08/30/24 1615)  Pulse: 97 (08/30/24 1615)  Resp: 18 (08/30/24 1826)  BP: 139/70 (08/30/24 1615)  SpO2: 97 % (08/30/24 1615) Vital Signs (24h Range):  Temp:  [98 °F (36.7 °C)-99.2 °F (37.3 °C)] 98.2 °F (36.8 °C)  Pulse:  [80-97] 97  Resp:  [18-20] 18  SpO2:  [94 %-97 %] 97 %  BP: (124-193)/(60-98) 139/70     Weight: 61.3 kg (135 lb 2.3 oz)  Body mass index is 22.49 kg/m².    Intake/Output Summary (Last 24 hours) at 8/30/2024 1841  Last data filed at 8/30/2024 1508  Gross per 24 hour   Intake 758 ml   Output 240 ml   Net 518 ml         Physical Exam  Vitals and nursing note reviewed.   Constitutional:       General: She is not in acute distress.     Appearance: Normal appearance. She is not ill-appearing.   HENT:      Head: Normocephalic and atraumatic.      Mouth/Throat:      Mouth: Mucous membranes are moist.   Eyes:      General: No scleral icterus.        Right eye: No discharge.         Left eye: No discharge.   Cardiovascular:      Rate and Rhythm: Normal rate and regular rhythm.   Pulmonary:      Effort: Pulmonary effort is normal. No respiratory distress.      Breath sounds: No wheezing, rhonchi or rales.   Abdominal:      General: Abdomen is flat. A surgical scar is present. There is distension.      Palpations: Abdomen is soft. There is no mass.      Tenderness: There is abdominal tenderness (around J tube insertion site). There is no guarding or rebound.      Hernia: No hernia is present.      Comments: TTP, J tube in place- some swelling/ erythema around but no purulence, incision c/d/i   Musculoskeletal:         General: No swelling,  tenderness, deformity or signs of injury. Normal range of motion.   Skin:     General: Skin is warm and dry.      Coloration: Skin is not jaundiced.   Neurological:      General: No focal deficit present.      Mental Status: She is alert and oriented to person, place, and time.   Psychiatric:         Mood and Affect: Mood normal.         Behavior: Behavior normal.         Thought Content: Thought content normal.             Significant Labs: All pertinent labs within the past 24 hours have been reviewed.  LABS:  Recent Labs   Lab 08/29/24  0601 08/29/24  1237 08/30/24  0601    141 143   K 3.8 4.5 3.6   * 111* 110   CO2 20* 22* 22*   BUN 7* 8 8   CREATININE 0.7 0.7 0.7   * 102 96   ANIONGAP 8 8 11     Recent Labs   Lab 08/28/24  0535 08/29/24  0601 08/29/24  1237 08/30/24  0601   MG 1.8 1.7  --  2.2   PHOS  --   --  2.5* 3.3     Recent Labs   Lab 08/29/24  1237   AST 21   ALT 55*   ALKPHOS 230*   BILITOT 0.4   ALBUMIN 2.3*     POCT Glucose:   Recent Labs   Lab 08/30/24  0612 08/30/24  1131 08/30/24  1634   POCTGLUCOSE 102 147* 129*    Recent Labs   Lab 08/28/24  0535 08/29/24  0601 08/30/24  0601   WBC 7.06 9.58 8.69   HGB 11.5* 10.8* 10.8*   HCT 34.6* 32.9* 33.6*    399 425        Significant Imaging: I have reviewed all pertinent imaging results/findings within the past 24 hours.  CT Abdomen Pelvis With IV Contrast Routine Oral Contrast   Final Result      Postoperative changes as above.  Slightly increased amount of loculated fluid in the left upper quadrant surrounding multiple loops of dilated jejunum.  Transition point in the left abdomen at the site of 2 surgical clips raising concern for a partial bowel obstruction.  No free air.      All CT scans at this facility are performed  using dose modulation techniques as appropriate to performed exam including the following:  automated exposure control; adjustment of mA and/or kV according to the patients size (this includes techniques or  standardized protocols for targeted exams where dose is matched to indication/reason for exam: i.e. extremities or head);  iterative reconstruction technique.         Electronically signed by: Bobo Guevara MD   Date:    08/30/2024   Time:    13:00      CT Abdomen Pelvis With IV Contrast Routine Oral Contrast (1 cup contrast only)   Final Result      1. Significant postsurgical findings with dilated loops of small bowel probably representing ileus.  The esophagus is also dilated however contrast is seen beyond the esophagus.   2. Small bilateral pleural effusions and adjacent atelectasis.   3. Please see above for further details.         Electronically signed by: Carlito Worthy   Date:    08/27/2024   Time:    16:01      CT Abdomen Pelvis With IV Contrast NO Oral Contrast   Final Result      There are recent operative changes with drainage catheters.  Small volume ascites with subtle peritoneal enhancement sterility uncertain.  No overt extraluminal contrast or high-grade bowel obstruction.      Liver spleen and pancreas stable.  Gallbladder distended      Kidneys without hydronephrosis or adverse finding.      Urinary bladder partially distended with scant intraluminal air non dependently      Dependent pleural effusions bilateral, abdominal wall emphysema and scant pneumoperitoneum         Electronically signed by: Rama Paris   Date:    08/24/2024   Time:    17:47      X-Ray KUB   Final Result      As above.         Electronically signed by: Caden Valdez   Date:    08/24/2024   Time:    13:23      [place] Upper GI   Final Result      Esophagojejunal anastomosis without leak.         Electronically signed by: Harley Aggarwal MD   Date:    08/23/2024   Time:    13:51      X-Ray Abdomen AP 1 View   Final Result      Two supine images provided      There are recent operative changes with multiple catheters and postsurgical clips/sutures.  No unexpected finding identified and no obstructive bowel findings.          Electronically signed by: Rama Paris   Date:    08/20/2024   Time:    20:06          Inpatient Medications:  Continuous Infusions:   Amino acid 4.25% - dextrose 5% (CLINIMIX-E) solution with additives (1L provides 42.5 gm AA, 50 gm CHO (170 kcal/L dextrose), Na 35, K 30, Mg 5, Ca 4.5, Acetate 70, Cl 39, Phos 15)   Intravenous Continuous   Held at 08/30/24 0220    D10W   Intravenous Continuous PRN 35 mL/hr at 08/30/24 0242 New Bag at 08/30/24 0242    D10W   Intravenous Continuous PRN        TPN ADULT PERIPHERAL CUSTOM   Intravenous Continuous         Scheduled Meds:   bisacodyL  10 mg Rectal Daily    carBAMazepine  200 mg Per J Tube BID    carBAMazepine  300 mg Per J Tube QHS    enoxparin  40 mg Subcutaneous Daily    fat emulsion 20%  250 mL Intravenous Daily    levETIRAcetam  500 mg Per J Tube BID    metoclopramide HCl  10 mg Oral Q8H    sennosides 8.8 mg/5 ml  5 mL Per J Tube BID    zonisamide  200 mg Per J Tube BID     PRN Meds:  Current Facility-Administered Medications:     D10W, , Intravenous, Continuous PRN    D10W, , Intravenous, Continuous PRN    dextrose 10%, 12.5 g, Intravenous, PRN    dextrose 10%, 25 g, Intravenous, PRN    glucagon (human recombinant), 1 mg, Intramuscular, PRN    hydrALAZINE, 10 mg, Intravenous, Q6H PRN    hydrocodone-apap 7.5-325 MG/15 ML, 15 mL, Per J Tube, Q4H PRN    hydrocodone-apap 7.5-325 MG/15 ML, 20 mL, Per J Tube, Q6H PRN    HYDROmorphone, 1 mg, Intravenous, Q6H PRN    insulin aspart U-100, 0-5 Units, Subcutaneous, Q4H PRN    naloxone, 0.02 mg, Intravenous, PRN    ondansetron, 4 mg, Intravenous, Q6H PRN    pneumoc 20-faina conj-dip cr(PF), 0.5 mL, Intramuscular, vaccine x 1 dose    promethazine, 25 mg, Rectal, Q6H PRN

## 2024-08-30 NOTE — PLAN OF CARE
Patient is stable. No signs or symptoms of acute distress. Incision, CDI with 2 APOLINAR drains. J tube in place with feedings at 40 ml/hour. Pain management with pain meds. Patient ambulated x 4 today. Clinmix in place. Meds given as ordered. Glucose monitoring. Bed in lowest position, call light in reach. Chart orders reviewed.

## 2024-08-30 NOTE — PLAN OF CARE
Pt is switching to tpn. [facility] has to start the referral process over again. Pending tpn order, insurance auth approval, bedside set up. MD notified.

## 2024-08-31 PROBLEM — R19.7 DIARRHEA: Status: ACTIVE | Noted: 2024-08-31

## 2024-08-31 LAB
ALBUMIN SERPL BCP-MCNC: 2.2 G/DL (ref 3.5–5.2)
ALP SERPL-CCNC: 204 U/L (ref 55–135)
ALT SERPL W/O P-5'-P-CCNC: 36 U/L (ref 10–44)
ANION GAP SERPL CALC-SCNC: 9 MMOL/L (ref 8–16)
AST SERPL-CCNC: 23 U/L (ref 10–40)
BILIRUB SERPL-MCNC: 0.3 MG/DL (ref 0.1–1)
BUN SERPL-MCNC: 8 MG/DL (ref 8–23)
C DIFF GDH STL QL: NEGATIVE
C DIFF TOX A+B STL QL IA: NEGATIVE
CALCIUM SERPL-MCNC: 8.2 MG/DL (ref 8.7–10.5)
CHLORIDE SERPL-SCNC: 110 MMOL/L (ref 95–110)
CO2 SERPL-SCNC: 21 MMOL/L (ref 23–29)
CREAT SERPL-MCNC: 0.6 MG/DL (ref 0.5–1.4)
ERYTHROCYTE [DISTWIDTH] IN BLOOD BY AUTOMATED COUNT: 15.9 % (ref 11.5–14.5)
EST. GFR  (NO RACE VARIABLE): >60 ML/MIN/1.73 M^2
GLUCOSE SERPL-MCNC: 131 MG/DL (ref 70–110)
HCT VFR BLD AUTO: 32.9 % (ref 37–48.5)
HGB BLD-MCNC: 10.7 G/DL (ref 12–16)
MAGNESIUM SERPL-MCNC: 2.1 MG/DL (ref 1.6–2.6)
MCH RBC QN AUTO: 33.8 PG (ref 27–31)
MCHC RBC AUTO-ENTMCNC: 32.5 G/DL (ref 32–36)
MCV RBC AUTO: 104 FL (ref 82–98)
PHOSPHATE SERPL-MCNC: 2.5 MG/DL (ref 2.7–4.5)
PLATELET # BLD AUTO: 463 K/UL (ref 150–450)
PMV BLD AUTO: 9.1 FL (ref 9.2–12.9)
POCT GLUCOSE: 114 MG/DL (ref 70–110)
POCT GLUCOSE: 120 MG/DL (ref 70–110)
POCT GLUCOSE: 121 MG/DL (ref 70–110)
POCT GLUCOSE: 127 MG/DL (ref 70–110)
POTASSIUM SERPL-SCNC: 3.6 MMOL/L (ref 3.5–5.1)
PROT SERPL-MCNC: 5.4 G/DL (ref 6–8.4)
RBC # BLD AUTO: 3.17 M/UL (ref 4–5.4)
SODIUM SERPL-SCNC: 140 MMOL/L (ref 136–145)
WBC # BLD AUTO: 10.7 K/UL (ref 3.9–12.7)

## 2024-08-31 PROCEDURE — 89055 LEUKOCYTE ASSESSMENT FECAL: CPT | Performed by: STUDENT IN AN ORGANIZED HEALTH CARE EDUCATION/TRAINING PROGRAM

## 2024-08-31 PROCEDURE — 63600175 PHARM REV CODE 636 W HCPCS: Performed by: NURSE PRACTITIONER

## 2024-08-31 PROCEDURE — 84100 ASSAY OF PHOSPHORUS: CPT | Performed by: SURGERY

## 2024-08-31 PROCEDURE — 25000003 PHARM REV CODE 250: Performed by: NURSE PRACTITIONER

## 2024-08-31 PROCEDURE — 25000003 PHARM REV CODE 250: Performed by: SURGERY

## 2024-08-31 PROCEDURE — 97116 GAIT TRAINING THERAPY: CPT | Mod: CQ

## 2024-08-31 PROCEDURE — 87427 SHIGA-LIKE TOXIN AG IA: CPT | Mod: 59 | Performed by: STUDENT IN AN ORGANIZED HEALTH CARE EDUCATION/TRAINING PROGRAM

## 2024-08-31 PROCEDURE — 36415 COLL VENOUS BLD VENIPUNCTURE: CPT | Performed by: SURGERY

## 2024-08-31 PROCEDURE — 87046 STOOL CULTR AEROBIC BACT EA: CPT | Performed by: STUDENT IN AN ORGANIZED HEALTH CARE EDUCATION/TRAINING PROGRAM

## 2024-08-31 PROCEDURE — B4185 PARENTERAL SOL 10 GM LIPIDS: HCPCS | Performed by: SURGERY

## 2024-08-31 PROCEDURE — 63600175 PHARM REV CODE 636 W HCPCS: Performed by: SURGERY

## 2024-08-31 PROCEDURE — 87449 NOS EACH ORGANISM AG IA: CPT | Mod: 91 | Performed by: STUDENT IN AN ORGANIZED HEALTH CARE EDUCATION/TRAINING PROGRAM

## 2024-08-31 PROCEDURE — 83735 ASSAY OF MAGNESIUM: CPT | Performed by: SURGERY

## 2024-08-31 PROCEDURE — 11000001 HC ACUTE MED/SURG PRIVATE ROOM

## 2024-08-31 PROCEDURE — 87045 FECES CULTURE AEROBIC BACT: CPT | Performed by: STUDENT IN AN ORGANIZED HEALTH CARE EDUCATION/TRAINING PROGRAM

## 2024-08-31 PROCEDURE — 80053 COMPREHEN METABOLIC PANEL: CPT | Performed by: SURGERY

## 2024-08-31 PROCEDURE — 85027 COMPLETE CBC AUTOMATED: CPT | Performed by: NURSE PRACTITIONER

## 2024-08-31 PROCEDURE — 97530 THERAPEUTIC ACTIVITIES: CPT | Mod: CQ

## 2024-08-31 PROCEDURE — A4217 STERILE WATER/SALINE, 500 ML: HCPCS | Performed by: SURGERY

## 2024-08-31 PROCEDURE — 87324 CLOSTRIDIUM AG IA: CPT | Performed by: STUDENT IN AN ORGANIZED HEALTH CARE EDUCATION/TRAINING PROGRAM

## 2024-08-31 RX ORDER — LOPERAMIDE HYDROCHLORIDE 2 MG/1
2 CAPSULE ORAL 4 TIMES DAILY PRN
Status: DISCONTINUED | OUTPATIENT
Start: 2024-08-31 | End: 2024-09-10 | Stop reason: HOSPADM

## 2024-08-31 RX ORDER — MAGNESIUM SULFATE HEPTAHYDRATE 40 MG/ML
2 INJECTION, SOLUTION INTRAVENOUS ONCE
Status: COMPLETED | OUTPATIENT
Start: 2024-08-31 | End: 2024-08-31

## 2024-08-31 RX ADMIN — I.V. FAT EMULSION 250 ML: 20 EMULSION INTRAVENOUS at 10:08

## 2024-08-31 RX ADMIN — HYDROCODONE BITARTRATE AND ACETAMINOPHEN 15 ML: 7.5; 325 SOLUTION ORAL at 10:08

## 2024-08-31 RX ADMIN — CARBAMAZEPINE 200 MG: 100 SUSPENSION ORAL at 02:08

## 2024-08-31 RX ADMIN — LEVETIRACETAM 500 MG: 100 SOLUTION ORAL at 10:08

## 2024-08-31 RX ADMIN — ENOXAPARIN SODIUM 40 MG: 40 INJECTION SUBCUTANEOUS at 05:08

## 2024-08-31 RX ADMIN — CARBAMAZEPINE 300 MG: 100 SUSPENSION ORAL at 10:08

## 2024-08-31 RX ADMIN — METOCLOPRAMIDE HYDROCHLORIDE 10 MG: 5 SOLUTION ORAL at 10:08

## 2024-08-31 RX ADMIN — PIPERACILLIN SODIUM AND TAZOBACTAM SODIUM 4.5 G: 4; .5 INJECTION, POWDER, FOR SOLUTION INTRAVENOUS at 10:08

## 2024-08-31 RX ADMIN — MAGNESIUM SULFATE HEPTAHYDRATE 2 G: 40 INJECTION, SOLUTION INTRAVENOUS at 12:08

## 2024-08-31 RX ADMIN — ZONISAMIDE 200 MG: 100 CAPSULE ORAL at 09:08

## 2024-08-31 RX ADMIN — POTASSIUM PHOSPHATE, MONOBASIC AND POTASSIUM PHOSPHATE, DIBASIC 30 MMOL: 224; 236 INJECTION, SOLUTION, CONCENTRATE INTRAVENOUS at 12:08

## 2024-08-31 RX ADMIN — CARBAMAZEPINE 200 MG: 100 SUSPENSION ORAL at 05:08

## 2024-08-31 RX ADMIN — HYDROCODONE BITARTRATE AND ACETAMINOPHEN 20 ML: 7.5; 325 SOLUTION ORAL at 08:08

## 2024-08-31 RX ADMIN — HYDROMORPHONE HYDROCHLORIDE 1 MG: 2 INJECTION, SOLUTION INTRAMUSCULAR; INTRAVENOUS; SUBCUTANEOUS at 05:08

## 2024-08-31 RX ADMIN — HYDROCODONE BITARTRATE AND ACETAMINOPHEN 20 ML: 7.5; 325 SOLUTION ORAL at 01:08

## 2024-08-31 RX ADMIN — LEVETIRACETAM 500 MG: 100 SOLUTION ORAL at 08:08

## 2024-08-31 RX ADMIN — METOCLOPRAMIDE HYDROCHLORIDE 10 MG: 5 SOLUTION ORAL at 02:08

## 2024-08-31 RX ADMIN — ZONISAMIDE 200 MG: 100 CAPSULE ORAL at 10:08

## 2024-08-31 RX ADMIN — METOCLOPRAMIDE HYDROCHLORIDE 10 MG: 5 SOLUTION ORAL at 05:08

## 2024-08-31 RX ADMIN — VASOPRESSIN: 20 INJECTION, SOLUTION INTRAVENOUS at 10:08

## 2024-08-31 RX ADMIN — PIPERACILLIN SODIUM AND TAZOBACTAM SODIUM 4.5 G: 4; .5 INJECTION, POWDER, FOR SOLUTION INTRAVENOUS at 05:08

## 2024-08-31 NOTE — PROGRESS NOTES
[facility] Medicine  Progress Note    Patient Name: Cheryl Kirkland  MRN: [id number]  Patient Class: IP- Inpatient   Admission Date: 8/20/2024  Length of Stay: 10 days  Attending Physician: Chen Jerome MD  Primary Care Provider: Gagandeep Iglesias MD        Subjective:     Principal Problem:Gastric adenocarcinoma        HPI:  74F  has a past medical history of Anemia, Chronic deep vein thrombosis (DVT) of left lower extremity, Deep vein thrombosis, Drug-induced polyneuropathy, DVT (deep venous thrombosis), Epilepsy, Gastric adenocarcinoma, GERD (gastroesophageal reflux disease), Hyperlipidemia, Mixed epithelial carcinoma of right ovary, OP (osteoporosis), Ovarian cancer, and Primary hypertension.  Presents for definitive treatment of gastric adenocaricoma per primary. Status post total gastrectomy with j tube placement. Transferred to icu postoperatively for close monitoring. Tolerated procedure well. Expected pain and weakness. Physical/occupational therapy recommending low intensity therapy.    Hospital medicine consulted for medical management.    Initial review of chart reveals vital signs stable, on room air. Cbc unremarkable. Cmp with hyperchloremic acidosis improving. Normal renal function. Elevated liver enzymes. Amylase within normal limits. Hypomag. Hypoalbuminemia. Xray abdomen on 8/20 with operative changes.    Overview/Hospital Course:  8/23 admitted for definitive management of gastric adenocarcinoma with total gastrectomy and j tube placement by primary. Upper gastroenterology procedure with no anastomotic leak identified. Possible ngt removal per primary. Physical/occupational therapy recommending low intensity therapy. Antiepileptic levels pending  8/24 antiepileptic levels pending. Ng tube removed. Reports visual disturbance but improving. Has not worked with physical/occupational therapy today.  8/25 doing well. +bowel movement x 2. pca discontinued per primary.  Trickle feeds to be increased per primary.  8/26 patient feels weak today with some shortness of breath.  She states she feels some nausea and just no energy.  She was able to participate therapy and walk in [place] with walker  8/27 patient continues to feel weak.  8/28 patient feels much better today.  No nausea no vomiting.  Has walked 3 times with the assistance today.  8/29- POD 9- developed some Abd fullness- hence Dr. Jerome switched to CLD while hold TF and cont Reglan. Getting PT/OT. Cont present care as per Dr. Jerome. Labs stable. PO4 2.5.    08/30 POD 10- reports being able to tolerate CLD. Started on TPN. Tube feeds resumed. Reports pain around J tube site, evaluation by CT imaging and General Surgery did not note any indication for intervention.     Interval History: No acute events overnight, afebrile, hemodynamically stable.  Reports being able to tolerate clear liquid diet.  Started on TPN and resumed tube feeds. Reports pain around J tube site; evaluation by CT imaging and General Surgery did not note any indication for intervention.       Objective:     Vital Signs (Most Recent):  Temp: 98.2 °F (36.8 °C) (08/30/24 1615)  Pulse: 97 (08/30/24 1615)  Resp: 18 (08/30/24 1826)  BP: 139/70 (08/30/24 1615)  SpO2: 97 % (08/30/24 1615) Vital Signs (24h Range):  Temp:  [98 °F (36.7 °C)-99.2 °F (37.3 °C)] 98.2 °F (36.8 °C)  Pulse:  [80-97] 97  Resp:  [18-20] 18  SpO2:  [94 %-97 %] 97 %  BP: (124-193)/(60-98) 139/70     Weight: 61.3 kg (135 lb 2.3 oz)  Body mass index is 22.49 kg/m².    Intake/Output Summary (Last 24 hours) at 8/30/2024 1841  Last data filed at 8/30/2024 1508  Gross per 24 hour   Intake 758 ml   Output 240 ml   Net 518 ml         Physical Exam  Vitals and nursing note reviewed.   Constitutional:       General: She is not in acute distress.     Appearance: Normal appearance. She is not ill-appearing.   HENT:      Head: Normocephalic and atraumatic.      Mouth/Throat:      Mouth: Mucous membranes  are moist.   Eyes:      General: No scleral icterus.        Right eye: No discharge.         Left eye: No discharge.   Cardiovascular:      Rate and Rhythm: Normal rate and regular rhythm.   Pulmonary:      Effort: Pulmonary effort is normal. No respiratory distress.      Breath sounds: No wheezing, rhonchi or rales.   Abdominal:      General: Abdomen is flat. A surgical scar is present. There is distension.      Palpations: Abdomen is soft. There is no mass.      Tenderness: There is abdominal tenderness (around J tube insertion site). There is no guarding or rebound.      Hernia: No hernia is present.      Comments: TTP, J tube in place- some swelling/ erythema around but no purulence, incision c/d/i   Musculoskeletal:         General: No swelling, tenderness, deformity or signs of injury. Normal range of motion.   Skin:     General: Skin is warm and dry.      Coloration: Skin is not jaundiced.   Neurological:      General: No focal deficit present.      Mental Status: She is alert and oriented to person, place, and time.   Psychiatric:         Mood and Affect: Mood normal.         Behavior: Behavior normal.         Thought Content: Thought content normal.             Significant Labs: All pertinent labs within the past 24 hours have been reviewed.  LABS:  Recent Labs   Lab 08/29/24  0601 08/29/24  1237 08/30/24  0601    141 143   K 3.8 4.5 3.6   * 111* 110   CO2 20* 22* 22*   BUN 7* 8 8   CREATININE 0.7 0.7 0.7   * 102 96   ANIONGAP 8 8 11     Recent Labs   Lab 08/28/24  0535 08/29/24  0601 08/29/24  1237 08/30/24  0601   MG 1.8 1.7  --  2.2   PHOS  --   --  2.5* 3.3     Recent Labs   Lab 08/29/24  1237   AST 21   ALT 55*   ALKPHOS 230*   BILITOT 0.4   ALBUMIN 2.3*     POCT Glucose:   Recent Labs   Lab 08/30/24  0612 08/30/24  1131 08/30/24  1634   POCTGLUCOSE 102 147* 129*    Recent Labs   Lab 08/28/24  0535 08/29/24  0601 08/30/24  0601   WBC 7.06 9.58 8.69   HGB 11.5* 10.8* 10.8*   HCT 34.6*  32.9* 33.6*    399 425        Significant Imaging: I have reviewed all pertinent imaging results/findings within the past 24 hours.  CT Abdomen Pelvis With IV Contrast Routine Oral Contrast   Final Result      Postoperative changes as above.  Slightly increased amount of loculated fluid in the left upper quadrant surrounding multiple loops of dilated jejunum.  Transition point in the left abdomen at the site of 2 surgical clips raising concern for a partial bowel obstruction.  No free air.      All CT scans at this facility are performed  using dose modulation techniques as appropriate to performed exam including the following:  automated exposure control; adjustment of mA and/or kV according to the patients size (this includes techniques or standardized protocols for targeted exams where dose is matched to indication/reason for exam: i.e. extremities or head);  iterative reconstruction technique.         Electronically signed by: Bobo Guevara MD   Date:    08/30/2024   Time:    13:00      CT Abdomen Pelvis With IV Contrast Routine Oral Contrast (1 cup contrast only)   Final Result      1. Significant postsurgical findings with dilated loops of small bowel probably representing ileus.  The esophagus is also dilated however contrast is seen beyond the esophagus.   2. Small bilateral pleural effusions and adjacent atelectasis.   3. Please see above for further details.         Electronically signed by: Carlito Worthy   Date:    08/27/2024   Time:    16:01      CT Abdomen Pelvis With IV Contrast NO Oral Contrast   Final Result      There are recent operative changes with drainage catheters.  Small volume ascites with subtle peritoneal enhancement sterility uncertain.  No overt extraluminal contrast or high-grade bowel obstruction.      Liver spleen and pancreas stable.  Gallbladder distended      Kidneys without hydronephrosis or adverse finding.      Urinary bladder partially distended with scant intraluminal air  non dependently      Dependent pleural effusions bilateral, abdominal wall emphysema and scant pneumoperitoneum         Electronically signed by: Rama Paris   Date:    08/24/2024   Time:    17:47      X-Ray KUB   Final Result      As above.         Electronically signed by: Caden Valdez   Date:    08/24/2024   Time:    13:23      [place] Upper GI   Final Result      Esophagojejunal anastomosis without leak.         Electronically signed by: Harley Aggarwal MD   Date:    08/23/2024   Time:    13:51      X-Ray Abdomen AP 1 View   Final Result      Two supine images provided      There are recent operative changes with multiple catheters and postsurgical clips/sutures.  No unexpected finding identified and no obstructive bowel findings.         Electronically signed by: Rama Paris   Date:    08/20/2024   Time:    20:06          Inpatient Medications:  Continuous Infusions:   Amino acid 4.25% - dextrose 5% (CLINIMIX-E) solution with additives (1L provides 42.5 gm AA, 50 gm CHO (170 kcal/L dextrose), Na 35, K 30, Mg 5, Ca 4.5, Acetate 70, Cl 39, Phos 15)   Intravenous Continuous   Held at 08/30/24 0220    D10W   Intravenous Continuous PRN 35 mL/hr at 08/30/24 0242 New Bag at 08/30/24 0242    D10W   Intravenous Continuous PRN        TPN ADULT PERIPHERAL CUSTOM   Intravenous Continuous         Scheduled Meds:   bisacodyL  10 mg Rectal Daily    carBAMazepine  200 mg Per J Tube BID    carBAMazepine  300 mg Per J Tube QHS    enoxparin  40 mg Subcutaneous Daily    fat emulsion 20%  250 mL Intravenous Daily    levETIRAcetam  500 mg Per J Tube BID    metoclopramide HCl  10 mg Oral Q8H    sennosides 8.8 mg/5 ml  5 mL Per J Tube BID    zonisamide  200 mg Per J Tube BID     PRN Meds:  Current Facility-Administered Medications:     D10W, , Intravenous, Continuous PRN    D10W, , Intravenous, Continuous PRN    dextrose 10%, 12.5 g, Intravenous, PRN    dextrose 10%, 25 g, Intravenous, PRN    glucagon (human recombinant), 1  mg, Intramuscular, PRN    hydrALAZINE, 10 mg, Intravenous, Q6H PRN    hydrocodone-apap 7.5-325 MG/15 ML, 15 mL, Per J Tube, Q4H PRN    hydrocodone-apap 7.5-325 MG/15 ML, 20 mL, Per J Tube, Q6H PRN    HYDROmorphone, 1 mg, Intravenous, Q6H PRN    insulin aspart U-100, 0-5 Units, Subcutaneous, Q4H PRN    naloxone, 0.02 mg, Intravenous, PRN    ondansetron, 4 mg, Intravenous, Q6H PRN    pneumoc 20-faina conj-dip cr(PF), 0.5 mL, Intramuscular, vaccine x 1 dose    promethazine, 25 mg, Rectal, Q6H PRN      Assessment/Plan:      * Gastric adenocarcinoma  Status post total gastrectomy with j tube placement  Per primary    General surgery following    Primary hypertension  Chronic, controlled. Latest blood pressure and vitals reviewed-     Temp:  [97.3 °F (36.3 °C)-99.6 °F (37.6 °C)]   Pulse:  [86-96]   Resp:  [14-18]   BP: (128-144)/(70-85)   SpO2:  [95 %-100 %] .   Home meds for hypertension were reviewed and noted below.       While in the hospital, will manage blood pressure as follows; Adjust home antihypertensive regimen as follows- monitor, avoid aggressive management of blood pressure in setting of recent surgery     Will utilize p.r.n. blood pressure medication only if patient's blood pressure greater than 160/100 and she develops symptoms such as worsening chest pain or shortness of breath.    Chronic deep vein thrombosis (DVT) of left lower extremity   -Previously on Xarelto, and evaluation by Oncology noted concerns DVT likely secondary to being provoked  from ovarian cancer. Held after concerns for GI bleed early 2024      Scds and lovenox for prophy.    DVT (deep venous thrombosis)  -Initially presented to primary care provider with lower extremity edema   -Workup revealed dvt 2/2 cancer  -Previously on Xarelto, and evaluation by Oncology noted concerns DVT likely secondary to being provoked  from ovarian cancer. Held after concerns for GI bleed early 2024    PLAN:  On loveonx prophy and scds  Ambulate     Hx of  Epileptic seizures  Resume home meds per j tube  Carbamezapine in therapeutic range  Zonisamide within normal limits   Levetiracetam within normal limits    stable      VTE Risk Mitigation (From admission, onward)           Ordered     enoxaparin injection 40 mg  Daily         08/20/24 2219     IP VTE HIGH RISK PATIENT  Once         08/20/24 2219     Place sequential compression device  Until discontinued         08/20/24 2219                    Discharge Planning   SOFIYA:      Code Status: Full Code   Is the patient medically ready for discharge?:     Reason for patient still in hospital (select all that apply): Patient trending condition, Treatment, and Consult recommendations  Discharge Plan A: Home Health          Yuri Arteaga DO  Department of Hospital Medicine   [facility]    Voice recognition software was used in the creation of this note/communication and any sound-alike/typographical errors which may have occurred despite initial review prior to signing should be taken in context when interpreting.  If such errors prevent a clear understanding of the note/communication, please contact the provider/office for clarification.

## 2024-08-31 NOTE — PLAN OF CARE
Patient is stable. No signs or symptoms of acute distress. Midline incision, CDI with 2 APOLINAR drains in place. J tube feedings in place, patient tolerating well. Patient has only ambulated at least twice today due to recurrent episodes of diarrhea. TPN in place and IV antibiotics given as ordered. Bed in lowest position, call light in reach. Chart orders reviewed.

## 2024-08-31 NOTE — SUBJECTIVE & OBJECTIVE
Interval History:  Patient complains about pain at the jejunostomy tube site.  She was having bowel movements.  Will advance to a liquid diet    Will start antibiotics due to erythema and slight drainage around the jejunostomy site    Medications:  Continuous Infusions:   D10W   Intravenous Continuous PRN        TPN ADULT PERIPHERAL CUSTOM   Intravenous Continuous 75 mL/hr at 08/30/24 2233 New Bag at 08/30/24 2233     Scheduled Meds:   bisacodyL  10 mg Rectal Daily    carBAMazepine  200 mg Per J Tube BID    carBAMazepine  300 mg Per J Tube QHS    enoxparin  40 mg Subcutaneous Daily    fat emulsion 20%  250 mL Intravenous Daily    levETIRAcetam  500 mg Per J Tube BID    metoclopramide HCl  10 mg Oral Q8H    piperacillin-tazobactam (Zosyn) IV (PEDS and ADULTS) (extended infusion is not appropriate)  4.5 g Intravenous Q8H    sennosides 8.8 mg/5 ml  5 mL Per J Tube BID    zonisamide  200 mg Per J Tube BID     PRN Meds:  Current Facility-Administered Medications:     D10W, , Intravenous, Continuous PRN    dextrose 10%, 12.5 g, Intravenous, PRN    dextrose 10%, 25 g, Intravenous, PRN    glucagon (human recombinant), 1 mg, Intramuscular, PRN    hydrALAZINE, 10 mg, Intravenous, Q6H PRN    hydrocodone-apap 7.5-325 MG/15 ML, 15 mL, Per J Tube, Q4H PRN    hydrocodone-apap 7.5-325 MG/15 ML, 20 mL, Per J Tube, Q6H PRN    HYDROmorphone, 1 mg, Intravenous, Q6H PRN    insulin aspart U-100, 0-5 Units, Subcutaneous, Q4H PRN    naloxone, 0.02 mg, Intravenous, PRN    ondansetron, 4 mg, Intravenous, Q6H PRN    pneumoc 20-faina conj-dip cr(PF), 0.5 mL, Intramuscular, vaccine x 1 dose    promethazine, 25 mg, Rectal, Q6H PRN     Review of patient's allergies indicates:  No Known Allergies  Objective:     Vital Signs (Most Recent):  Temp: 97.9 °F (36.6 °C) (08/31/24 0751)  Pulse: 89 (08/31/24 0751)  Resp: 18 (08/31/24 0824)  BP: (!) 144/80 (08/31/24 0751)  SpO2: 96 % (08/31/24 0751) Vital Signs (24h Range):  Temp:  [97.7 °F (36.5 °C)-98.4 °F  (36.9 °C)] 97.9 °F (36.6 °C)  Pulse:  [84-97] 89  Resp:  [17-18] 18  SpO2:  [95 %-97 %] 96 %  BP: (132-193)/(60-98) 144/80     Weight: 61.3 kg (135 lb 2.3 oz)  Body mass index is 22.49 kg/m².    Intake/Output - Last 3 Shifts         08/29 0700 08/30 0659 08/30 0700 08/31 0659 08/31 0700 09/01 0659    P.O.       NG/ 1962     Total Intake(mL/kg) 100 (1.6) 1962 (32)     Urine (mL/kg/hr)  0 (0)     Emesis/NG output  0     Drains 200 150     Stool  0     Total Output 200 150     Net -100 +1812            Urine Occurrence  5 x     Stool Occurrence  3 x     Emesis Occurrence  1 x              Physical Exam  Vitals and nursing note reviewed.   Constitutional:       Appearance: Normal appearance. She is well-developed. Ill appearance: mild acute.   HENT:      Head: Normocephalic.   Eyes:      Pupils: Pupils are equal, round, and reactive to light.   Neck:      Thyroid: No thyromegaly.      Vascular: No JVD.      Trachea: No tracheal deviation.   Cardiovascular:      Rate and Rhythm: Normal rate and regular rhythm.      Heart sounds: Normal heart sounds.   Pulmonary:      Breath sounds: Normal breath sounds. No wheezing.   Abdominal:      General: Abdomen is flat. Bowel sounds are normal. There is no distension.      Palpations: Abdomen is soft. Abdomen is not rigid. There is no mass.      Tenderness: There is no abdominal tenderness. There is no guarding or rebound.      Comments: Midline incision is clean.  Drain sites are clean with serous output.  There is erythema and some tube feed/purulent drainage around the jejunostomy tube   Musculoskeletal:         General: Normal range of motion.   Lymphadenopathy:      Cervical: No cervical adenopathy.   Skin:     General: Skin is warm and dry.      Findings: No erythema or rash.   Neurological:      Mental Status: She is oriented to person, place, and time.   Psychiatric:         Mood and Affect: Mood normal.         Behavior: Behavior normal.         Thought Content:  Thought content normal.         Judgment: Judgment normal.          Significant Labs:  I have reviewed all pertinent lab results within the past 24 hours.  CBC:   Recent Labs   Lab 08/31/24  0737   WBC 10.70   RBC 3.17*   HGB 10.7*   HCT 32.9*   *   *   MCH 33.8*   MCHC 32.5     BMP:   Recent Labs   Lab 08/30/24  0601   GLU 96      K 3.6      CO2 22*   BUN 8   CREATININE 0.7   CALCIUM 8.2*   MG 2.2       Significant Diagnostics:  TECHNIQUE:  Flat and erect AP views of the abdomen were performed.     COMPARISON:  August 24, 2024     FINDINGS:  Multiple drains remain in place.  Skin staples overlie.  Interval decrease in the amount of contrast throughout the colon.  Nonspecific bowel gas pattern with moderate air throughout nondilated loops of large and small bowel.  Negative for free air.  Osseous structures are unchanged.  Stable basilar atelectasis.     Impression:     As above        Electronically signed by:Jose Angel Ross MD  Date:                                            08/31/2024

## 2024-08-31 NOTE — SUBJECTIVE & OBJECTIVE
Interval History: No acute events overnight, afebrile, hemodynamically stable.  Reports being able to tolerate clear liquid diet.  Continuing on TPN and  tube feeds. Reports pain around J tube site; after evaluation by general surgery today, was started on antibiotics. Reports diarrhea, stool studies pending      Objective:     Vital Signs (Most Recent):  Temp: 98.3 °F (36.8 °C) (08/31/24 1603)  Pulse: 90 (08/31/24 1603)  Resp: 18 (08/31/24 1603)  BP: 136/74 (08/31/24 1603)  SpO2: 98 % (08/31/24 1603) Vital Signs (24h Range):  Temp:  [97.7 °F (36.5 °C)-98.4 °F (36.9 °C)] 98.3 °F (36.8 °C)  Pulse:  [86-98] 90  Resp:  [17-18] 18  SpO2:  [96 %-99 %] 98 %  BP: (116-168)/(74-86) 136/74     Weight: 61.3 kg (135 lb 2.3 oz)  Body mass index is 22.49 kg/m².    Intake/Output Summary (Last 24 hours) at 8/31/2024 1842  Last data filed at 8/31/2024 1724  Gross per 24 hour   Intake 1670 ml   Output 100 ml   Net 1570 ml         Physical Exam  Vitals and nursing note reviewed.   Constitutional:       General: She is not in acute distress.     Appearance: Normal appearance. She is not ill-appearing.   HENT:      Head: Normocephalic and atraumatic.      Mouth/Throat:      Mouth: Mucous membranes are moist.   Eyes:      General: No scleral icterus.        Right eye: No discharge.         Left eye: No discharge.   Cardiovascular:      Rate and Rhythm: Normal rate and regular rhythm.   Pulmonary:      Effort: Pulmonary effort is normal. No respiratory distress.      Breath sounds: No wheezing, rhonchi or rales.   Abdominal:      General: Abdomen is flat. A surgical scar is present. There is distension.      Palpations: Abdomen is soft. There is no mass.      Tenderness: There is abdominal tenderness (around J tube insertion site). There is no guarding or rebound.      Hernia: No hernia is present.      Comments: TTP, J tube in place- some swelling/ erythema around but no purulence, incision c/d/i   Musculoskeletal:         General: No  swelling, tenderness, deformity or signs of injury. Normal range of motion.   Skin:     General: Skin is warm and dry.      Coloration: Skin is not jaundiced.   Neurological:      General: No focal deficit present.      Mental Status: She is alert and oriented to person, place, and time.   Psychiatric:         Mood and Affect: Mood normal.         Behavior: Behavior normal.         Thought Content: Thought content normal.             Significant Labs: All pertinent labs within the past 24 hours have been reviewed.  LABS:  Recent Labs   Lab 08/29/24  1237 08/30/24  0601 08/31/24  0737    143 140   K 4.5 3.6 3.6   * 110 110   CO2 22* 22* 21*   BUN 8 8 8   CREATININE 0.7 0.7 0.6    96 131*   ANIONGAP 8 11 9     Recent Labs   Lab 08/29/24  0601 08/29/24  1237 08/30/24  0601 08/31/24  0737   MG 1.7  --  2.2 2.1   PHOS  --  2.5* 3.3 2.5*     Recent Labs   Lab 08/29/24  1237 08/31/24  0737   AST 21 23   ALT 55* 36   ALKPHOS 230* 204*   BILITOT 0.4 0.3   ALBUMIN 2.3* 2.2*     POCT Glucose:   Recent Labs   Lab 08/31/24  0543 08/31/24  1111 08/31/24  1648   POCTGLUCOSE 127* 121* 114*    Recent Labs   Lab 08/29/24  0601 08/30/24  0601 08/31/24  0737   WBC 9.58 8.69 10.70   HGB 10.8* 10.8* 10.7*   HCT 32.9* 33.6* 32.9*    425 463*        Significant Imaging: I have reviewed all pertinent imaging results/findings within the past 24 hours.  X-Ray Abdomen Flat And Erect   Final Result      As above         Electronically signed by: Jose Angel Ross MD   Date:    08/31/2024   Time:    08:24      CT Abdomen Pelvis With IV Contrast Routine Oral Contrast   Final Result      Postoperative changes as above.  Slightly increased amount of loculated fluid in the left upper quadrant surrounding multiple loops of dilated jejunum.  Transition point in the left abdomen at the site of 2 surgical clips raising concern for a partial bowel obstruction.  No free air.      All CT scans at this facility are performed  using  dose modulation techniques as appropriate to performed exam including the following:  automated exposure control; adjustment of mA and/or kV according to the patients size (this includes techniques or standardized protocols for targeted exams where dose is matched to indication/reason for exam: i.e. extremities or head);  iterative reconstruction technique.         Electronically signed by: Bobo Guevara MD   Date:    08/30/2024   Time:    13:00      CT Abdomen Pelvis With IV Contrast Routine Oral Contrast (1 cup contrast only)   Final Result      1. Significant postsurgical findings with dilated loops of small bowel probably representing ileus.  The esophagus is also dilated however contrast is seen beyond the esophagus.   2. Small bilateral pleural effusions and adjacent atelectasis.   3. Please see above for further details.         Electronically signed by: Carlito Worthy   Date:    08/27/2024   Time:    16:01      CT Abdomen Pelvis With IV Contrast NO Oral Contrast   Final Result      There are recent operative changes with drainage catheters.  Small volume ascites with subtle peritoneal enhancement sterility uncertain.  No overt extraluminal contrast or high-grade bowel obstruction.      Liver spleen and pancreas stable.  Gallbladder distended      Kidneys without hydronephrosis or adverse finding.      Urinary bladder partially distended with scant intraluminal air non dependently      Dependent pleural effusions bilateral, abdominal wall emphysema and scant pneumoperitoneum         Electronically signed by: Rama Paris   Date:    08/24/2024   Time:    17:47      X-Ray KUB   Final Result      As above.         Electronically signed by: Caden Valdez   Date:    08/24/2024   Time:    13:23      FL Upper GI   Final Result      Esophagojejunal anastomosis without leak.         Electronically signed by: Harley Aggarwal MD   Date:    08/23/2024   Time:    13:51      X-Ray Abdomen AP 1 View   Final Result       Two supine images provided      There are recent operative changes with multiple catheters and postsurgical clips/sutures.  No unexpected finding identified and no obstructive bowel findings.         Electronically signed by: Rama Paris   Date:    08/20/2024   Time:    20:06          Inpatient Medications:  Continuous Infusions:   D10W   Intravenous Continuous PRN        TPN ADULT PERIPHERAL CUSTOM   Intravenous Continuous 75 mL/hr at 08/30/24 2233 New Bag at 08/30/24 2233    TPN ADULT PERIPHERAL CUSTOM   Intravenous Continuous         Scheduled Meds:   bisacodyL  10 mg Rectal Daily    carBAMazepine  200 mg Per J Tube BID    carBAMazepine  300 mg Per J Tube QHS    enoxparin  40 mg Subcutaneous Daily    fat emulsion 20%  250 mL Intravenous Daily    levETIRAcetam  500 mg Per J Tube BID    metoclopramide HCl  10 mg Oral Q8H    piperacillin-tazobactam (Zosyn) IV (PEDS and ADULTS) (extended infusion is not appropriate)  4.5 g Intravenous Q8H    zonisamide  200 mg Per J Tube BID     PRN Meds:  Current Facility-Administered Medications:     D10W, , Intravenous, Continuous PRN    dextrose 10%, 12.5 g, Intravenous, PRN    dextrose 10%, 25 g, Intravenous, PRN    glucagon (human recombinant), 1 mg, Intramuscular, PRN    hydrALAZINE, 10 mg, Intravenous, Q6H PRN    hydrocodone-apap 7.5-325 MG/15 ML, 15 mL, Per J Tube, Q4H PRN    hydrocodone-apap 7.5-325 MG/15 ML, 20 mL, Per J Tube, Q6H PRN    HYDROmorphone, 1 mg, Intravenous, Q6H PRN    insulin aspart U-100, 0-5 Units, Subcutaneous, Q4H PRN    naloxone, 0.02 mg, Intravenous, PRN    ondansetron, 4 mg, Intravenous, Q6H PRN    pneumoc 20-faina conj-dip cr(PF), 0.5 mL, Intramuscular, vaccine x 1 dose    promethazine, 25 mg, Rectal, Q6H PRN

## 2024-08-31 NOTE — PT/OT/SLP PROGRESS
Physical Therapy  Treatment    Cheryl Kirkland   MRN: [id number]   Admitting Diagnosis: Gastric adenocarcinoma    PT Received On: 08/31/24  PT Start Time: 0850     PT Stop Time: 0915    PT Total Time (min): 25 min       Billable Minutes:  Gait Training 10 and Therapeutic Activity 15    Treatment Type: Treatment  PT/PTA: PTA     Number of PTA visits since last PT visit: 4       General Precautions: Standard, fall  Orthopedic Precautions: N/A  Braces: N/A  Respiratory Status: Room air    Spiritual, Cultural Beliefs, Hindu Practices, Values that Affect Care: no    Subjective:  Communicated with nurse Ty and completed Epic chart review prior to session. Pt agreed to session.    Pain/Comfort  Pain Rating 1:  (not rated)  Location - Side 1: Bilateral  Location - Orientation 1: generalized  Location 1: abdomen  Pain Addressed 1: Reposition, Distraction    Objective:   Patient found with: APOLINAR drain, PEG Tube, peripheral IV    Pt found standing washing hands upon entry into room. Demonstrating SOB.    Stand Pivot T/F to EOB: CGA  Pt sat EOB ~7 mins focusing on breathing due to SOB.  STS from EOB: SBA with RW    Gait: Pt ambulated 300' with RW with SBA. One standing rest break.     Stand Pivot T/F to BSC: SBA with RW    Treatment and Education:  Educated pt on activity pacing due to SOB. Educated pt on benefits of increasing time spent OOB and direct impact on CV endurance. Encouraged pt to perform HEP throughout the day to maintain/regain strength. Reviewed call don't fall policy and to call for assistance with all OOB needs. Pt agreed to safety request.      AM-PAC 6 CLICK MOBILITY  How much help from another person does this patient currently need?   1 = Unable, Total/Dependent Assistance  2 = A lot, Maximum/Moderate Assistance  3 = A little, Minimum/Contact Guard/Supervision  4 = None, Modified [place]/Independent    Turning over in bed (including adjusting bedclothes, sheets and blankets)?: 3  Sitting down  on and standing up from a chair with arms (e.g., wheelchair, bedside commode, etc.): 3  Moving from lying on back to sitting on the side of the bed?: 3  Moving to and from a bed to a chair (including a wheelchair)?: 3  Need to walk in hospital room?: 3  Climbing 3-5 steps with a railing?: 1 (NT)  Basic Mobility Total Score: 16    AM-PAC Raw Score CMS G-Code Modifier Level of Impairment Assistance   6 % Total / Unable   7 - 9 CM 80 - 100% Maximal Assist   10 - 14 CL 60 - 80% Moderate Assist   15 - 19 CK 40 - 60% Moderate Assist   20 - 22 CJ 20 - 40% Minimal Assist   23 CI 1-20% SBA / CGA   24 CH 0% Independent/ Mod I     Patient left up in chair with all lines intact, call button in reach, chair alarm on, and nurse notified.    Assessment:  Cheryl Kirkland is a 74 y.o. female with a medical diagnosis of Gastric adenocarcinoma and presents with the following functional limitations. Pt continues to do well with gait with SBA with RW. Demonstrates some SOB today throughout session. Pt will continue to benefit from skilled PT in order to address the below deficits to reach maximum level of function.    Rehab identified problem list/impairments: weakness, impaired endurance, impaired functional mobility, gait instability, impaired balance, impaired cardiopulmonary response to activity    Rehab potential is good.    Activity tolerance: Good    Discharge recommendations: Low Intensity Therapy (with 24 hr spv and A)      Barriers to discharge:      Equipment recommendations: walker, rolling     GOALS:   Multidisciplinary Problems       Physical Therapy Goals          Problem: Physical Therapy    Goal Priority Disciplines Outcome Goal Variances Interventions   Physical Therapy Goal     PT, PT/OT Progressing     Description: LTG'S TO BE MET IN 14 DAYS (9-4-24)  PT WILL REQUIRE [facility] FOR BED MOBILITY  PT WILL REQUIRE [facility] FOR BED<>CHAIR TF'S  PT WILL  FEET WITH RW AND SPV  PT WILL INC AMPAC SCORE BY 2  POINTS TO PROGRESS GROSS FUNC MOBILITY                         PLAN:    Patient to be seen 3 x/week to address the above listed problems via gait training, therapeutic activities, therapeutic exercises  Plan of Care expires: 09/09/24  Plan of Care reviewed with: patient         08/31/2024

## 2024-08-31 NOTE — PROGRESS NOTES
[facility]  General Surgery  Progress Note    Subjective:     History of Present Illness:  Cheryl Kirkland is a 74 y.o. year old female with a history of ovarian cancer status post surgery and chemotherapy as well as epilepsy and DVT (provoked during her treatment for ovarian cancer), and MSI high gastric adenocarcinoma.  She was originally diagnosed back in February of this past year.  She was noted to have a very proximal enlarged tumor.  EGD was done as was complete metastatic workup which was negative.  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  Due to her significant adhesive disease she understood that she would require an open operation.  She was discussed in multidisciplinary tumor board and the consensus was to proceed with immunotherapy due to her MSI high status.  She completed 5 cycles of neoadjuvant immunotherapy with Keytruda and on restaging imaging was found to have enlarged Danelle portal lymph nodes.  Consensus was that she would need a total gastrectomy due to the proximity of her tumor in relationship to the GE junction.  Due to the extent of the operation required and the her previous history of ovarian cancer the tumor board consensus was to proceed with the EUS guided biopsy to rule out any ovarian cancer and to rule out pseudo progression as well.  This was performed and showed adenocarcinoma consistent with her gastric primary.  Therefore the decision was to proceed with surgical intervention due to the mixed response she had from immunotherapy on PET-CT scan.     Risks and benefits were reviewed including bleeding, infection, pain, scar, damage to surrounding structures, cardiovascular and pulmonary complications, anastomotic leak, positive margins, need for additional procedures, death, and imponderables.  She  expressed understanding and gave informed consent to proceed.    Post-Op Info:  Procedure(s) (LRB):  GASTRECTOMY (N/A)  EGD (ESOPHAGOGASTRODUODENOSCOPY) (N/A)  LAPAROSCOPY, DIAGNOSTIC (N/A)  LYSIS, ADHESIONS (N/A)  INSERTION, JEJUNOSTOMY TUBE (N/A)   11 Days Post-Op     Interval History:  Patient complains about pain at the jejunostomy tube site.  She was having bowel movements.  Will advance to a liquid diet    Will start antibiotics due to erythema and slight drainage around the jejunostomy site    Medications:  Continuous Infusions:   D10W   Intravenous Continuous PRN        TPN ADULT PERIPHERAL CUSTOM   Intravenous Continuous 75 mL/hr at 08/30/24 2233 New Bag at 08/30/24 2233     Scheduled Meds:   bisacodyL  10 mg Rectal Daily    carBAMazepine  200 mg Per J Tube BID    carBAMazepine  300 mg Per J Tube QHS    enoxparin  40 mg Subcutaneous Daily    fat emulsion 20%  250 mL Intravenous Daily    levETIRAcetam  500 mg Per J Tube BID    metoclopramide HCl  10 mg Oral Q8H    piperacillin-tazobactam (Zosyn) IV (PEDS and ADULTS) (extended infusion is not appropriate)  4.5 g Intravenous Q8H    sennosides 8.8 mg/5 ml  5 mL Per J Tube BID    zonisamide  200 mg Per J Tube BID     PRN Meds:  Current Facility-Administered Medications:     D10W, , Intravenous, Continuous PRN    dextrose 10%, 12.5 g, Intravenous, PRN    dextrose 10%, 25 g, Intravenous, PRN    glucagon (human recombinant), 1 mg, Intramuscular, PRN    hydrALAZINE, 10 mg, Intravenous, Q6H PRN    hydrocodone-apap 7.5-325 MG/15 ML, 15 mL, Per J Tube, Q4H PRN    hydrocodone-apap 7.5-325 MG/15 ML, 20 mL, Per J Tube, Q6H PRN    HYDROmorphone, 1 mg, Intravenous, Q6H PRN    insulin aspart U-100, 0-5 Units, Subcutaneous, Q4H PRN    naloxone, 0.02 mg, Intravenous, PRN    ondansetron, 4 mg, Intravenous, Q6H PRN    pneumoc 20-faina conj-dip cr(PF), 0.5 mL, Intramuscular, vaccine x 1 dose    promethazine, 25 mg, Rectal, Q6H PRN     Review of patient's allergies indicates:  No  Known Allergies  Objective:     Vital Signs (Most Recent):  Temp: 97.9 °F (36.6 °C) (08/31/24 0751)  Pulse: 89 (08/31/24 0751)  Resp: 18 (08/31/24 0824)  BP: (!) 144/80 (08/31/24 0751)  SpO2: 96 % (08/31/24 0751) Vital Signs (24h Range):  Temp:  [97.7 °F (36.5 °C)-98.4 °F (36.9 °C)] 97.9 °F (36.6 °C)  Pulse:  [84-97] 89  Resp:  [17-18] 18  SpO2:  [95 %-97 %] 96 %  BP: (132-193)/(60-98) 144/80     Weight: 61.3 kg (135 lb 2.3 oz)  Body mass index is 22.49 kg/m².    Intake/Output - Last 3 Shifts         08/29 0700 08/30 0659 08/30 0700 08/31 0659 08/31 0700 09/01 0659    P.O.       NG/ 1962     Total Intake(mL/kg) 100 (1.6) 1962 (32)     Urine (mL/kg/hr)  0 (0)     Emesis/NG output  0     Drains 200 150     Stool  0     Total Output 200 150     Net -100 +1812            Urine Occurrence  5 x     Stool Occurrence  3 x     Emesis Occurrence  1 x              Physical Exam  Vitals and nursing note reviewed.   Constitutional:       Appearance: Normal appearance. She is well-developed. Ill appearance: mild acute.   HENT:      Head: Normocephalic.   Eyes:      Pupils: Pupils are equal, round, and reactive to light.   Neck:      Thyroid: No thyromegaly.      Vascular: No JVD.      Trachea: No tracheal deviation.   Cardiovascular:      Rate and Rhythm: Normal rate and regular rhythm.      Heart sounds: Normal heart sounds.   Pulmonary:      Breath sounds: Normal breath sounds. No wheezing.   Abdominal:      General: Abdomen is flat. Bowel sounds are normal. There is no distension.      Palpations: Abdomen is soft. Abdomen is not rigid. There is no mass.      Tenderness: There is no abdominal tenderness. There is no guarding or rebound.      Comments: Midline incision is clean.  Drain sites are clean with serous output.  There is erythema and some tube feed/purulent drainage around the jejunostomy tube   Musculoskeletal:         General: Normal range of motion.   Lymphadenopathy:      Cervical: No cervical adenopathy.    Skin:     General: Skin is warm and dry.      Findings: No erythema or rash.   Neurological:      Mental Status: She is oriented to person, place, and time.   Psychiatric:         Mood and Affect: Mood normal.         Behavior: Behavior normal.         Thought Content: Thought content normal.         Judgment: Judgment normal.          Significant Labs:  I have reviewed all pertinent lab results within the past 24 hours.  CBC:   Recent Labs   Lab 08/31/24  0737   WBC 10.70   RBC 3.17*   HGB 10.7*   HCT 32.9*   *   *   MCH 33.8*   MCHC 32.5     BMP:   Recent Labs   Lab 08/30/24  0601   GLU 96      K 3.6      CO2 22*   BUN 8   CREATININE 0.7   CALCIUM 8.2*   MG 2.2       Significant Diagnostics:  TECHNIQUE:  Flat and erect AP views of the abdomen were performed.     COMPARISON:  August 24, 2024     FINDINGS:  Multiple drains remain in place.  Skin staples overlie.  Interval decrease in the amount of contrast throughout the colon.  Nonspecific bowel gas pattern with moderate air throughout nondilated loops of large and small bowel.  Negative for free air.  Osseous structures are unchanged.  Stable basilar atelectasis.     Impression:     As above        Electronically signed by:Jose Angel Ross MD  Date:                                            08/31/2024  Assessment/Plan:     * Gastric adenocarcinoma  POD #910- s/p exlap, extensive SHAYNA, small bowel resection, total gastrectomy with stapled esophagojejunal anastomosis and D2 lymphadenectomy, and Jtube placement    Distention with tube feeds at 30cc/hr.  Scan and labs all looking ok with no evidence of leak.  I suspect she is not tolerating this formula well.      -- continue Reglan  -- advanced to full liquid diet  -- tube feeds restarted.  Will discuss with dietician about changing tube feed formula, and starting TPN since she has been without nutrition for 9 days now.    -- continue hycet via J tube for pain   -- ok for liquid medications  via J tube  -- Strict I/Os  -- Senna liquid via Jtube BID   -- CM consult for discharge / home health planning   -- OOB To chair and walk today x5, OT/PT consulted  -- encourage IS  Dispo: continued med surg with tele- tentative plan once a full liquid diet is tolerated and the erythema around the jejunostomy tube has improved    Lovenox and SCDs for DVT ppx, no indication for GI ppx     With the erythema around the jejunostomy tube we will start antibiotics.  If the patient does not show improvement with the erythema and tenderness she may require drainage of the area        Primary hypertension  -- appreciate medicine assistance with management     Hx of Epileptic seizures  -- appreciate medicine assistance with management         Rafiq White MD  General Surgery  [facility]

## 2024-08-31 NOTE — PROGRESS NOTES
[facility] Medicine  Progress Note    Patient Name: Cheryl Kirkland  MRN: [id number]  Patient Class: IP- Inpatient   Admission Date: 8/20/2024  Length of Stay: 11 days  Attending Physician: Chen Jerome MD  Primary Care Provider: Gagandeep Iglesias MD        Subjective:     Principal Problem:Gastric adenocarcinoma        HPI:  74F  has a past medical history of Anemia, Chronic deep vein thrombosis (DVT) of left lower extremity, Deep vein thrombosis, Drug-induced polyneuropathy, DVT (deep venous thrombosis), Epilepsy, Gastric adenocarcinoma, GERD (gastroesophageal reflux disease), Hyperlipidemia, Mixed epithelial carcinoma of right ovary, OP (osteoporosis), Ovarian cancer, and Primary hypertension.  Presents for definitive treatment of gastric adenocaricoma per primary. Status post total gastrectomy with j tube placement. Transferred to icu postoperatively for close monitoring. Tolerated procedure well. Expected pain and weakness. Physical/occupational therapy recommending low intensity therapy.    Hospital medicine consulted for medical management.    Initial review of chart reveals vital signs stable, on room air. Cbc unremarkable. Cmp with hyperchloremic acidosis improving. Normal renal function. Elevated liver enzymes. Amylase within normal limits. Hypomag. Hypoalbuminemia. Xray abdomen on 8/20 with operative changes.    Overview/Hospital Course:  8/23 admitted for definitive management of gastric adenocarcinoma with total gastrectomy and j tube placement by primary. Upper gastroenterology procedure with no anastomotic leak identified. Possible ngt removal per primary. Physical/occupational therapy recommending low intensity therapy. Antiepileptic levels pending  8/24 antiepileptic levels pending. Ng tube removed. Reports visual disturbance but improving. Has not worked with physical/occupational therapy today.  8/25 doing well. +bowel movement x 2. pca discontinued per primary.  Trickle feeds to be increased per primary.  8/26 patient feels weak today with some shortness of breath.  She states she feels some nausea and just no energy.  She was able to participate therapy and walk in [place] with walker  8/27 patient continues to feel weak.  8/28 patient feels much better today.  No nausea no vomiting.  Has walked 3 times with the assistance today.  8/29- POD 9- developed some Abd fullness- hence Dr. Jerome switched to CLD while hold TF and cont Reglan. Getting PT/OT. Cont present care as per Dr. Jerome. Labs stable. PO4 2.5.    08/30 POD 10- reports being able to tolerate CLD. Started on TPN. Tube feeds resumed. Reports pain around J tube site, evaluation by CT imaging and General Surgery did not note any indication for intervention.   08/31 POD11- still reporting pain around PEG tube insertion site, started on antibiotics per General surgery.  Reports diarrhea, stool studies pending    Interval History: No acute events overnight, afebrile, hemodynamically stable.  Reports being able to tolerate clear liquid diet.  Continuing on TPN and  tube feeds. Reports pain around J tube site; after evaluation by general surgery today, was started on antibiotics. Reports diarrhea, stool studies pending      Objective:     Vital Signs (Most Recent):  Temp: 98.3 °F (36.8 °C) (08/31/24 1603)  Pulse: 90 (08/31/24 1603)  Resp: 18 (08/31/24 1603)  BP: 136/74 (08/31/24 1603)  SpO2: 98 % (08/31/24 1603) Vital Signs (24h Range):  Temp:  [97.7 °F (36.5 °C)-98.4 °F (36.9 °C)] 98.3 °F (36.8 °C)  Pulse:  [86-98] 90  Resp:  [17-18] 18  SpO2:  [96 %-99 %] 98 %  BP: (116-168)/(74-86) 136/74     Weight: 61.3 kg (135 lb 2.3 oz)  Body mass index is 22.49 kg/m².    Intake/Output Summary (Last 24 hours) at 8/31/2024 1842  Last data filed at 8/31/2024 1724  Gross per 24 hour   Intake 1670 ml   Output 100 ml   Net 1570 ml         Physical Exam  Vitals and nursing note reviewed.   Constitutional:       General: She is not in  acute distress.     Appearance: Normal appearance. She is not ill-appearing.   HENT:      Head: Normocephalic and atraumatic.      Mouth/Throat:      Mouth: Mucous membranes are moist.   Eyes:      General: No scleral icterus.        Right eye: No discharge.         Left eye: No discharge.   Cardiovascular:      Rate and Rhythm: Normal rate and regular rhythm.   Pulmonary:      Effort: Pulmonary effort is normal. No respiratory distress.      Breath sounds: No wheezing, rhonchi or rales.   Abdominal:      General: Abdomen is flat. A surgical scar is present. There is distension.      Palpations: Abdomen is soft. There is no mass.      Tenderness: There is abdominal tenderness (around J tube insertion site). There is no guarding or rebound.      Hernia: No hernia is present.      Comments: TTP, J tube in place- some swelling/ erythema around but no purulence, incision c/d/i   Musculoskeletal:         General: No swelling, tenderness, deformity or signs of injury. Normal range of motion.   Skin:     General: Skin is warm and dry.      Coloration: Skin is not jaundiced.   Neurological:      General: No focal deficit present.      Mental Status: She is alert and oriented to person, place, and time.   Psychiatric:         Mood and Affect: Mood normal.         Behavior: Behavior normal.         Thought Content: Thought content normal.             Significant Labs: All pertinent labs within the past 24 hours have been reviewed.  LABS:  Recent Labs   Lab 08/29/24  1237 08/30/24  0601 08/31/24  0737    143 140   K 4.5 3.6 3.6   * 110 110   CO2 22* 22* 21*   BUN 8 8 8   CREATININE 0.7 0.7 0.6    96 131*   ANIONGAP 8 11 9     Recent Labs   Lab 08/29/24  0601 08/29/24  1237 08/30/24  0601 08/31/24  0737   MG 1.7  --  2.2 2.1   PHOS  --  2.5* 3.3 2.5*     Recent Labs   Lab 08/29/24  1237 08/31/24  0737   AST 21 23   ALT 55* 36   ALKPHOS 230* 204*   BILITOT 0.4 0.3   ALBUMIN 2.3* 2.2*     POCT Glucose:   Recent  Labs   Lab 08/31/24  0543 08/31/24  1111 08/31/24  1648   POCTGLUCOSE 127* 121* 114*    Recent Labs   Lab 08/29/24  0601 08/30/24  0601 08/31/24  0737   WBC 9.58 8.69 10.70   HGB 10.8* 10.8* 10.7*   HCT 32.9* 33.6* 32.9*    425 463*        Significant Imaging: I have reviewed all pertinent imaging results/findings within the past 24 hours.  X-Ray Abdomen Flat And Erect   Final Result      As above         Electronically signed by: Jose Angel Ross MD   Date:    08/31/2024   Time:    08:24      CT Abdomen Pelvis With IV Contrast Routine Oral Contrast   Final Result      Postoperative changes as above.  Slightly increased amount of loculated fluid in the left upper quadrant surrounding multiple loops of dilated jejunum.  Transition point in the left abdomen at the site of 2 surgical clips raising concern for a partial bowel obstruction.  No free air.      All CT scans at this facility are performed  using dose modulation techniques as appropriate to performed exam including the following:  automated exposure control; adjustment of mA and/or kV according to the patients size (this includes techniques or standardized protocols for targeted exams where dose is matched to indication/reason for exam: i.e. extremities or head);  iterative reconstruction technique.         Electronically signed by: Bobo Guevara MD   Date:    08/30/2024   Time:    13:00      CT Abdomen Pelvis With IV Contrast Routine Oral Contrast (1 cup contrast only)   Final Result      1. Significant postsurgical findings with dilated loops of small bowel probably representing ileus.  The esophagus is also dilated however contrast is seen beyond the esophagus.   2. Small bilateral pleural effusions and adjacent atelectasis.   3. Please see above for further details.         Electronically signed by: Carlito Worthy   Date:    08/27/2024   Time:    16:01      CT Abdomen Pelvis With IV Contrast NO Oral Contrast   Final Result      There are recent operative  changes with drainage catheters.  Small volume ascites with subtle peritoneal enhancement sterility uncertain.  No overt extraluminal contrast or high-grade bowel obstruction.      Liver spleen and pancreas stable.  Gallbladder distended      Kidneys without hydronephrosis or adverse finding.      Urinary bladder partially distended with scant intraluminal air non dependently      Dependent pleural effusions bilateral, abdominal wall emphysema and scant pneumoperitoneum         Electronically signed by: Rama Paris   Date:    08/24/2024   Time:    17:47      X-Ray KUB   Final Result      As above.         Electronically signed by: Caden Valdez   Date:    08/24/2024   Time:    13:23      FL Upper GI   Final Result      Esophagojejunal anastomosis without leak.         Electronically signed by: Harley Aggarwal MD   Date:    08/23/2024   Time:    13:51      X-Ray Abdomen AP 1 View   Final Result      Two supine images provided      There are recent operative changes with multiple catheters and postsurgical clips/sutures.  No unexpected finding identified and no obstructive bowel findings.         Electronically signed by: Rama Paris   Date:    08/20/2024   Time:    20:06          Inpatient Medications:  Continuous Infusions:   D10W   Intravenous Continuous PRN        TPN ADULT PERIPHERAL CUSTOM   Intravenous Continuous 75 mL/hr at 08/30/24 2233 New Bag at 08/30/24 2233    TPN ADULT PERIPHERAL CUSTOM   Intravenous Continuous         Scheduled Meds:   bisacodyL  10 mg Rectal Daily    carBAMazepine  200 mg Per J Tube BID    carBAMazepine  300 mg Per J Tube QHS    enoxparin  40 mg Subcutaneous Daily    fat emulsion 20%  250 mL Intravenous Daily    levETIRAcetam  500 mg Per J Tube BID    metoclopramide HCl  10 mg Oral Q8H    piperacillin-tazobactam (Zosyn) IV (PEDS and ADULTS) (extended infusion is not appropriate)  4.5 g Intravenous Q8H    zonisamide  200 mg Per J Tube BID     PRN Meds:  Current  Facility-Administered Medications:     D10W, , Intravenous, Continuous PRN    dextrose 10%, 12.5 g, Intravenous, PRN    dextrose 10%, 25 g, Intravenous, PRN    glucagon (human recombinant), 1 mg, Intramuscular, PRN    hydrALAZINE, 10 mg, Intravenous, Q6H PRN    hydrocodone-apap 7.5-325 MG/15 ML, 15 mL, Per J Tube, Q4H PRN    hydrocodone-apap 7.5-325 MG/15 ML, 20 mL, Per J Tube, Q6H PRN    HYDROmorphone, 1 mg, Intravenous, Q6H PRN    insulin aspart U-100, 0-5 Units, Subcutaneous, Q4H PRN    naloxone, 0.02 mg, Intravenous, PRN    ondansetron, 4 mg, Intravenous, Q6H PRN    pneumoc 20-faina conj-dip cr(PF), 0.5 mL, Intramuscular, vaccine x 1 dose    promethazine, 25 mg, Rectal, Q6H PRN      Assessment/Plan:      * Gastric adenocarcinoma  Status post total gastrectomy with j tube placement  Per primary    General surgery following    Diarrhea  Unclear etiology of the diarrhea that was initially noted 08/31/24.     Differential diagnoses include, but not limited to:  Infection versus intolerance to tube feeds    -Follow up stool studies    Primary hypertension  Chronic, controlled. Latest blood pressure and vitals reviewed-     Temp:  [97.7 °F (36.5 °C)-98.4 °F (36.9 °C)]   Pulse:  [86-98]   Resp:  [17-18]   BP: (116-168)/(74-86)   SpO2:  [96 %-99 %] .   Home meds for hypertension were reviewed and noted below.       While in the hospital, will manage blood pressure as follows; Adjust home antihypertensive regimen as follows- monitor, avoid aggressive management of blood pressure in setting of recent surgery     Will utilize p.r.n. blood pressure medication only if patient's blood pressure greater than 160/100 and she develops symptoms such as worsening chest pain or shortness of breath.    Chronic deep vein thrombosis (DVT) of left lower extremity   -Previously on Xarelto, and evaluation by Oncology noted concerns DVT likely secondary to being provoked  from ovarian cancer. Held after concerns for GI bleed early  2024      Scds and lovenox for prophy.    DVT (deep venous thrombosis)  -Initially presented to primary care provider with lower extremity edema   -Workup revealed dvt 2/2 cancer  -Previously on Xarelto, and evaluation by Oncology noted concerns DVT likely secondary to being provoked  from ovarian cancer. Held after concerns for GI bleed early 2024    PLAN:  On loveonx prophy and scds  Ambulate     Hx of Epileptic seizures  Resume home meds per j tube  Carbamezapine in therapeutic range  Zonisamide within normal limits   Levetiracetam within normal limits    stable      VTE Risk Mitigation (From admission, onward)           Ordered     enoxaparin injection 40 mg  Daily         08/20/24 2219     IP VTE HIGH RISK PATIENT  Once         08/20/24 2219     Place sequential compression device  Until discontinued         08/20/24 2219                    Discharge Planning   SOFIYA:      Code Status: Full Code   Is the patient medically ready for discharge?:     Reason for patient still in hospital (select all that apply): Patient trending condition, Laboratory test, and Consult recommendations  Discharge Plan A: Home Health            Yuri Arteaga DO  Department of Hospital Medicine   [facility]    Voice recognition software was used in the creation of this note/communication and any sound-alike/typographical errors which may have occurred despite initial review prior to signing should be taken in context when interpreting.  If such errors prevent a clear understanding of the note/communication, please contact the provider/office for clarification.

## 2024-09-01 LAB
ALBUMIN SERPL BCP-MCNC: 2.1 G/DL (ref 3.5–5.2)
ALP SERPL-CCNC: 162 U/L (ref 55–135)
ALT SERPL W/O P-5'-P-CCNC: 27 U/L (ref 10–44)
ANION GAP SERPL CALC-SCNC: 10 MMOL/L (ref 8–16)
AST SERPL-CCNC: 14 U/L (ref 10–40)
BILIRUB SERPL-MCNC: 0.2 MG/DL (ref 0.1–1)
BUN SERPL-MCNC: 9 MG/DL (ref 8–23)
CALCIUM SERPL-MCNC: 7.9 MG/DL (ref 8.7–10.5)
CHLORIDE SERPL-SCNC: 112 MMOL/L (ref 95–110)
CO2 SERPL-SCNC: 20 MMOL/L (ref 23–29)
CREAT SERPL-MCNC: 0.7 MG/DL (ref 0.5–1.4)
ERYTHROCYTE [DISTWIDTH] IN BLOOD BY AUTOMATED COUNT: 16.7 % (ref 11.5–14.5)
EST. GFR  (NO RACE VARIABLE): >60 ML/MIN/1.73 M^2
GLUCOSE SERPL-MCNC: 118 MG/DL (ref 70–110)
HCT VFR BLD AUTO: 33.2 % (ref 37–48.5)
HGB BLD-MCNC: 10.2 G/DL (ref 12–16)
MAGNESIUM SERPL-MCNC: 2.1 MG/DL (ref 1.6–2.6)
MCH RBC QN AUTO: 33.3 PG (ref 27–31)
MCHC RBC AUTO-ENTMCNC: 30.7 G/DL (ref 32–36)
MCV RBC AUTO: 109 FL (ref 82–98)
PHOSPHATE SERPL-MCNC: 2.3 MG/DL (ref 2.7–4.5)
PLATELET # BLD AUTO: 515 K/UL (ref 150–450)
PMV BLD AUTO: 9 FL (ref 9.2–12.9)
POCT GLUCOSE: 119 MG/DL (ref 70–110)
POCT GLUCOSE: 123 MG/DL (ref 70–110)
POCT GLUCOSE: 132 MG/DL (ref 70–110)
POCT GLUCOSE: 165 MG/DL (ref 70–110)
POTASSIUM SERPL-SCNC: 3.3 MMOL/L (ref 3.5–5.1)
PROT SERPL-MCNC: 5.2 G/DL (ref 6–8.4)
RBC # BLD AUTO: 3.06 M/UL (ref 4–5.4)
SODIUM SERPL-SCNC: 142 MMOL/L (ref 136–145)
WBC # BLD AUTO: 8.95 K/UL (ref 3.9–12.7)
WBC #/AREA STL HPF: NORMAL /[HPF]

## 2024-09-01 PROCEDURE — 36415 COLL VENOUS BLD VENIPUNCTURE: CPT | Performed by: SURGERY

## 2024-09-01 PROCEDURE — 25000003 PHARM REV CODE 250: Performed by: SURGERY

## 2024-09-01 PROCEDURE — 83735 ASSAY OF MAGNESIUM: CPT | Performed by: SURGERY

## 2024-09-01 PROCEDURE — 63600175 PHARM REV CODE 636 W HCPCS: Performed by: SURGERY

## 2024-09-01 PROCEDURE — 25000003 PHARM REV CODE 250: Performed by: NURSE PRACTITIONER

## 2024-09-01 PROCEDURE — 80053 COMPREHEN METABOLIC PANEL: CPT | Performed by: SURGERY

## 2024-09-01 PROCEDURE — 11000001 HC ACUTE MED/SURG PRIVATE ROOM

## 2024-09-01 PROCEDURE — 84100 ASSAY OF PHOSPHORUS: CPT | Performed by: SURGERY

## 2024-09-01 PROCEDURE — 85027 COMPLETE CBC AUTOMATED: CPT | Performed by: NURSE PRACTITIONER

## 2024-09-01 PROCEDURE — 63600175 PHARM REV CODE 636 W HCPCS: Performed by: NURSE PRACTITIONER

## 2024-09-01 RX ORDER — ZONISAMIDE 50 MG/1
200 CAPSULE ORAL 2 TIMES DAILY
Status: COMPLETED | OUTPATIENT
Start: 2024-09-02 | End: 2024-09-02

## 2024-09-01 RX ADMIN — LEVETIRACETAM 500 MG: 100 SOLUTION ORAL at 08:09

## 2024-09-01 RX ADMIN — CARBAMAZEPINE 300 MG: 100 SUSPENSION ORAL at 08:09

## 2024-09-01 RX ADMIN — PIPERACILLIN SODIUM AND TAZOBACTAM SODIUM 4.5 G: 4; .5 INJECTION, POWDER, FOR SOLUTION INTRAVENOUS at 06:09

## 2024-09-01 RX ADMIN — HYDROCODONE BITARTRATE AND ACETAMINOPHEN 20 ML: 7.5; 325 SOLUTION ORAL at 08:09

## 2024-09-01 RX ADMIN — METOCLOPRAMIDE HYDROCHLORIDE 10 MG: 5 SOLUTION ORAL at 03:09

## 2024-09-01 RX ADMIN — CARBAMAZEPINE 200 MG: 100 SUSPENSION ORAL at 06:09

## 2024-09-01 RX ADMIN — HYDROCODONE BITARTRATE AND ACETAMINOPHEN 15 ML: 7.5; 325 SOLUTION ORAL at 04:09

## 2024-09-01 RX ADMIN — CARBAMAZEPINE 200 MG: 100 SUSPENSION ORAL at 03:09

## 2024-09-01 RX ADMIN — ZONISAMIDE 200 MG: 100 CAPSULE ORAL at 10:09

## 2024-09-01 RX ADMIN — ZONISAMIDE 200 MG: 100 CAPSULE ORAL at 09:09

## 2024-09-01 RX ADMIN — HYDROCODONE BITARTRATE AND ACETAMINOPHEN 20 ML: 7.5; 325 SOLUTION ORAL at 11:09

## 2024-09-01 RX ADMIN — METOCLOPRAMIDE HYDROCHLORIDE 10 MG: 5 SOLUTION ORAL at 04:09

## 2024-09-01 RX ADMIN — CARBAMAZEPINE 300 MG: 100 SUSPENSION ORAL at 03:09

## 2024-09-01 RX ADMIN — LEVETIRACETAM 500 MG: 100 SOLUTION ORAL at 09:09

## 2024-09-01 RX ADMIN — METOCLOPRAMIDE HYDROCHLORIDE 10 MG: 5 SOLUTION ORAL at 10:09

## 2024-09-01 RX ADMIN — PIPERACILLIN SODIUM AND TAZOBACTAM SODIUM 4.5 G: 4; .5 INJECTION, POWDER, FOR SOLUTION INTRAVENOUS at 04:09

## 2024-09-01 RX ADMIN — PIPERACILLIN SODIUM AND TAZOBACTAM SODIUM 4.5 G: 4; .5 INJECTION, POWDER, FOR SOLUTION INTRAVENOUS at 11:09

## 2024-09-01 RX ADMIN — ENOXAPARIN SODIUM 40 MG: 40 INJECTION SUBCUTANEOUS at 06:09

## 2024-09-01 NOTE — SUBJECTIVE & OBJECTIVE
Interval History: No acute events overnight, afebrile, hemodynamically stable. Reports improvement in pain around PEG tube site.  General surgery evaluation today recommended discontinuation of peripheral nutrition.  Stool studies negative so far, diarrhea alleviating with loperamide       Objective:     Vital Signs (Most Recent):  Temp: 98.6 °F (37 °C) (09/01/24 1140)  Pulse: 96 (09/01/24 1140)  Resp: 18 (09/01/24 1140)  BP: 133/75 (09/01/24 1140)  SpO2: (!) 94 % (09/01/24 1140) Vital Signs (24h Range):  Temp:  [97.8 °F (36.6 °C)-98.9 °F (37.2 °C)] 98.6 °F (37 °C)  Pulse:  [90-98] 96  Resp:  [16-18] 18  SpO2:  [94 %-99 %] 94 %  BP: (116-143)/(62-84) 133/75     Weight: 61.3 kg (135 lb 2.3 oz)  Body mass index is 22.49 kg/m².    Intake/Output Summary (Last 24 hours) at 9/1/2024 1206  Last data filed at 9/1/2024 0636  Gross per 24 hour   Intake 466 ml   Output 120 ml   Net 346 ml         Physical Exam  Vitals and nursing note reviewed.   Constitutional:       General: She is not in acute distress.     Appearance: Normal appearance. She is not ill-appearing.   HENT:      Head: Normocephalic and atraumatic.      Mouth/Throat:      Mouth: Mucous membranes are moist.   Eyes:      General: No scleral icterus.        Right eye: No discharge.         Left eye: No discharge.   Cardiovascular:      Rate and Rhythm: Normal rate and regular rhythm.   Pulmonary:      Effort: Pulmonary effort is normal. No respiratory distress.      Breath sounds: No wheezing, rhonchi or rales.   Abdominal:      General: Abdomen is flat. A surgical scar is present.      Palpations: Abdomen is soft. There is no mass.      Tenderness: There is abdominal tenderness (around J tube insertion site, improving). There is no guarding or rebound.      Hernia: No hernia is present.      Comments: TTP, J tube in place- some swelling/ erythema around but no purulence, incision c/d/i   Musculoskeletal:         General: No swelling, tenderness, deformity or  signs of injury. Normal range of motion.   Skin:     General: Skin is warm and dry.      Coloration: Skin is not jaundiced.   Neurological:      General: No focal deficit present.      Mental Status: She is alert and oriented to person, place, and time.   Psychiatric:         Mood and Affect: Mood normal.         Behavior: Behavior normal.         Thought Content: Thought content normal.             Significant Labs: All pertinent labs within the past 24 hours have been reviewed.  LABS:  Recent Labs   Lab 08/30/24  0601 08/31/24  0737 09/01/24  0602    140 142   K 3.6 3.6 3.3*    110 112*   CO2 22* 21* 20*   BUN 8 8 9   CREATININE 0.7 0.6 0.7   GLU 96 131* 118*   ANIONGAP 11 9 10     Recent Labs   Lab 08/30/24  0601 08/31/24  0737 09/01/24  0602   MG 2.2 2.1 2.1   PHOS 3.3 2.5* 2.3*     Recent Labs   Lab 08/29/24  1237 08/31/24  0737 09/01/24  0602   AST 21 23 14   ALT 55* 36 27   ALKPHOS 230* 204* 162*   BILITOT 0.4 0.3 0.2   ALBUMIN 2.3* 2.2* 2.1*     POCT Glucose:   Recent Labs   Lab 08/31/24  1648 09/01/24  0457 09/01/24  0925   POCTGLUCOSE 114* 165* 132*    Recent Labs   Lab 08/30/24  0601 08/31/24  0737 09/01/24  0602   WBC 8.69 10.70 8.95   HGB 10.8* 10.7* 10.2*   HCT 33.6* 32.9* 33.2*    463* 515*        Significant Imaging: I have reviewed all pertinent imaging results/findings within the past 24 hours.    Inpatient Medications:  Continuous Infusions:   TPN ADULT PERIPHERAL CUSTOM   Intravenous Continuous 40 mL/hr at 09/01/24 1030 Rate Change at 09/01/24 1030     Scheduled Meds:   carBAMazepine  200 mg Per J Tube BID    carBAMazepine  300 mg Per J Tube QHS    enoxparin  40 mg Subcutaneous Daily    levETIRAcetam  500 mg Per J Tube BID    metoclopramide HCl  10 mg Oral Q8H    piperacillin-tazobactam (Zosyn) IV (PEDS and ADULTS) (extended infusion is not appropriate)  4.5 g Intravenous Q8H    zonisamide  200 mg Per J Tube BID     PRN Meds:  Current Facility-Administered Medications:      dextrose 10%, 12.5 g, Intravenous, PRN    dextrose 10%, 25 g, Intravenous, PRN    glucagon (human recombinant), 1 mg, Intramuscular, PRN    hydrALAZINE, 10 mg, Intravenous, Q6H PRN    hydrocodone-apap 7.5-325 MG/15 ML, 15 mL, Per J Tube, Q4H PRN    hydrocodone-apap 7.5-325 MG/15 ML, 20 mL, Per J Tube, Q6H PRN    HYDROmorphone, 1 mg, Intravenous, Q6H PRN    insulin aspart U-100, 0-5 Units, Subcutaneous, Q4H PRN    loperamide, 2 mg, Oral, QID PRN    naloxone, 0.02 mg, Intravenous, PRN    ondansetron, 4 mg, Intravenous, Q6H PRN    pneumoc 20-faina conj-dip cr(PF), 0.5 mL, Intramuscular, vaccine x 1 dose    promethazine, 25 mg, Rectal, Q6H PRN

## 2024-09-01 NOTE — SUBJECTIVE & OBJECTIVE
Interval History:  Patient reports less pain.  Multiple bowel movements with tube feeds, will continue.  Discontinue peripheral nutrition.      Monitor jejunostomy tube site    Medications:  Continuous Infusions:   TPN ADULT PERIPHERAL CUSTOM   Intravenous Continuous         Scheduled Meds:   carBAMazepine  200 mg Per J Tube BID    carBAMazepine  300 mg Per J Tube QHS    enoxparin  40 mg Subcutaneous Daily    fat emulsion 20%  250 mL Intravenous Daily    levETIRAcetam  500 mg Per J Tube BID    metoclopramide HCl  10 mg Oral Q8H    piperacillin-tazobactam (Zosyn) IV (PEDS and ADULTS) (extended infusion is not appropriate)  4.5 g Intravenous Q8H    zonisamide  200 mg Per J Tube BID     PRN Meds:  Current Facility-Administered Medications:     dextrose 10%, 12.5 g, Intravenous, PRN    dextrose 10%, 25 g, Intravenous, PRN    glucagon (human recombinant), 1 mg, Intramuscular, PRN    hydrALAZINE, 10 mg, Intravenous, Q6H PRN    hydrocodone-apap 7.5-325 MG/15 ML, 15 mL, Per J Tube, Q4H PRN    hydrocodone-apap 7.5-325 MG/15 ML, 20 mL, Per J Tube, Q6H PRN    HYDROmorphone, 1 mg, Intravenous, Q6H PRN    insulin aspart U-100, 0-5 Units, Subcutaneous, Q4H PRN    loperamide, 2 mg, Oral, QID PRN    naloxone, 0.02 mg, Intravenous, PRN    ondansetron, 4 mg, Intravenous, Q6H PRN    pneumoc 20-faina conj-dip cr(PF), 0.5 mL, Intramuscular, vaccine x 1 dose    promethazine, 25 mg, Rectal, Q6H PRN     Review of patient's allergies indicates:  No Known Allergies  Objective:     Vital Signs (Most Recent):  Temp: 97.8 °F (36.6 °C) (09/01/24 0742)  Pulse: 94 (09/01/24 0742)  Resp: 18 (09/01/24 0742)  BP: 129/69 (09/01/24 0742)  SpO2: 95 % (09/01/24 0742) Vital Signs (24h Range):  Temp:  [97.8 °F (36.6 °C)-98.9 °F (37.2 °C)] 97.8 °F (36.6 °C)  Pulse:  [90-98] 94  Resp:  [17-18] 18  SpO2:  [95 %-99 %] 95 %  BP: (116-143)/(62-84) 129/69     Weight: 61.3 kg (135 lb 2.3 oz)  Body mass index is 22.49 kg/m².    Intake/Output - Last 3 Shifts          08/30 0700  08/31 0659 08/31 0700  09/01 0659 09/01 0700  09/02 0659    NG/GT 1962 516     Total Intake(mL/kg) 1962 (32) 516 (8.4)     Urine (mL/kg/hr) 0 (0) 0 (0)     Emesis/NG output 0      Drains 150 120     Stool 0 0     Total Output 150 120     Net +1812 +396            Urine Occurrence 5 x 2 x     Stool Occurrence 3 x 13 x     Emesis Occurrence 1 x               Physical Exam  Vitals reviewed.   Constitutional:       Appearance: She is well-developed. Ill appearance: mildly acute and chronically.   HENT:      Head: Normocephalic.   Eyes:      Pupils: Pupils are equal, round, and reactive to light.   Neck:      Thyroid: No thyromegaly.      Vascular: No JVD.      Trachea: No tracheal deviation.   Cardiovascular:      Rate and Rhythm: Normal rate and regular rhythm.      Heart sounds: Normal heart sounds.   Pulmonary:      Breath sounds: Normal breath sounds. No wheezing.   Abdominal:      General: Abdomen is flat. Bowel sounds are normal. There is no distension.      Palpations: Abdomen is soft. Abdomen is not rigid. There is no mass.      Tenderness: There is no abdominal tenderness. There is no guarding or rebound.      Comments: Incision is clean.  Drains are serous.  There is some erythema and tenderness around the jejunostomy tube   Musculoskeletal:         General: Normal range of motion.   Lymphadenopathy:      Cervical: No cervical adenopathy.   Skin:     General: Skin is warm and dry.      Findings: No erythema or rash.   Neurological:      Mental Status: She is alert and oriented to person, place, and time.          Significant Labs:  I have reviewed all pertinent lab results within the past 24 hours.  CBC:   Recent Labs   Lab 09/01/24  0602   WBC 8.95   RBC 3.06*   HGB 10.2*   HCT 33.2*   *   *   MCH 33.3*   MCHC 30.7*     BMP:   Recent Labs   Lab 09/01/24  0602   *      K 3.3*   *   CO2 20*   BUN 9   CREATININE 0.7   CALCIUM 7.9*   MG 2.1       Significant  Diagnostics:  I have reviewed all pertinent imaging results/findings within the past 24 hours.  No new

## 2024-09-01 NOTE — PROGRESS NOTES
[facility]  General Surgery  Progress Note    Subjective:     History of Present Illness:  Cheryl Kirkland is a 74 y.o. year old female with a history of ovarian cancer status post surgery and chemotherapy as well as epilepsy and DVT (provoked during her treatment for ovarian cancer), and MSI high gastric adenocarcinoma.  She was originally diagnosed back in February of this past year.  She was noted to have a very proximal enlarged tumor.  EGD was done as was complete metastatic workup which was negative.  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  Due to her significant adhesive disease she understood that she would require an open operation.  She was discussed in multidisciplinary tumor board and the consensus was to proceed with immunotherapy due to her MSI high status.  She completed 5 cycles of neoadjuvant immunotherapy with Keytruda and on restaging imaging was found to have enlarged Danelle portal lymph nodes.  Consensus was that she would need a total gastrectomy due to the proximity of her tumor in relationship to the GE junction.  Due to the extent of the operation required and the her previous history of ovarian cancer the tumor board consensus was to proceed with the EUS guided biopsy to rule out any ovarian cancer and to rule out pseudo progression as well.  This was performed and showed adenocarcinoma consistent with her gastric primary.  Therefore the decision was to proceed with surgical intervention due to the mixed response she had from immunotherapy on PET-CT scan.     Risks and benefits were reviewed including bleeding, infection, pain, scar, damage to surrounding structures, cardiovascular and pulmonary complications, anastomotic leak, positive margins, need for additional procedures, death, and imponderables.  She  expressed understanding and gave informed consent to proceed.    Post-Op Info:  Procedure(s) (LRB):  GASTRECTOMY (N/A)  EGD (ESOPHAGOGASTRODUODENOSCOPY) (N/A)  LAPAROSCOPY, DIAGNOSTIC (N/A)  LYSIS, ADHESIONS (N/A)  INSERTION, JEJUNOSTOMY TUBE (N/A)   12 Days Post-Op     Interval History:  Patient reports less pain.  Multiple bowel movements with tube feeds, will continue.  Discontinue peripheral nutrition.      Monitor jejunostomy tube site    Medications:  Continuous Infusions:   TPN ADULT PERIPHERAL CUSTOM   Intravenous Continuous         Scheduled Meds:   carBAMazepine  200 mg Per J Tube BID    carBAMazepine  300 mg Per J Tube QHS    enoxparin  40 mg Subcutaneous Daily    fat emulsion 20%  250 mL Intravenous Daily    levETIRAcetam  500 mg Per J Tube BID    metoclopramide HCl  10 mg Oral Q8H    piperacillin-tazobactam (Zosyn) IV (PEDS and ADULTS) (extended infusion is not appropriate)  4.5 g Intravenous Q8H    zonisamide  200 mg Per J Tube BID     PRN Meds:  Current Facility-Administered Medications:     dextrose 10%, 12.5 g, Intravenous, PRN    dextrose 10%, 25 g, Intravenous, PRN    glucagon (human recombinant), 1 mg, Intramuscular, PRN    hydrALAZINE, 10 mg, Intravenous, Q6H PRN    hydrocodone-apap 7.5-325 MG/15 ML, 15 mL, Per J Tube, Q4H PRN    hydrocodone-apap 7.5-325 MG/15 ML, 20 mL, Per J Tube, Q6H PRN    HYDROmorphone, 1 mg, Intravenous, Q6H PRN    insulin aspart U-100, 0-5 Units, Subcutaneous, Q4H PRN    loperamide, 2 mg, Oral, QID PRN    naloxone, 0.02 mg, Intravenous, PRN    ondansetron, 4 mg, Intravenous, Q6H PRN    pneumoc 20-faina conj-dip cr(PF), 0.5 mL, Intramuscular, vaccine x 1 dose    promethazine, 25 mg, Rectal, Q6H PRN     Review of patient's allergies indicates:  No Known Allergies  Objective:     Vital Signs (Most Recent):  Temp: 97.8 °F (36.6 °C) (09/01/24 0742)  Pulse: 94 (09/01/24 0742)  Resp: 18 (09/01/24 0742)  BP: 129/69 (09/01/24 0742)  SpO2: 95 % (09/01/24 0742) Vital Signs (24h  Range):  Temp:  [97.8 °F (36.6 °C)-98.9 °F (37.2 °C)] 97.8 °F (36.6 °C)  Pulse:  [90-98] 94  Resp:  [17-18] 18  SpO2:  [95 %-99 %] 95 %  BP: (116-143)/(62-84) 129/69     Weight: 61.3 kg (135 lb 2.3 oz)  Body mass index is 22.49 kg/m².    Intake/Output - Last 3 Shifts         08/30 0700  08/31 0659 08/31 0700 09/01 0659 09/01 0700 09/02 0659    NG/GT 1962 516     Total Intake(mL/kg) 1962 (32) 516 (8.4)     Urine (mL/kg/hr) 0 (0) 0 (0)     Emesis/NG output 0      Drains 150 120     Stool 0 0     Total Output 150 120     Net +1812 +396            Urine Occurrence 5 x 2 x     Stool Occurrence 3 x 13 x     Emesis Occurrence 1 x               Physical Exam  Vitals reviewed.   Constitutional:       Appearance: She is well-developed. Ill appearance: mildly acute and chronically.   HENT:      Head: Normocephalic.   Eyes:      Pupils: Pupils are equal, round, and reactive to light.   Neck:      Thyroid: No thyromegaly.      Vascular: No JVD.      Trachea: No tracheal deviation.   Cardiovascular:      Rate and Rhythm: Normal rate and regular rhythm.      Heart sounds: Normal heart sounds.   Pulmonary:      Breath sounds: Normal breath sounds. No wheezing.   Abdominal:      General: Abdomen is flat. Bowel sounds are normal. There is no distension.      Palpations: Abdomen is soft. Abdomen is not rigid. There is no mass.      Tenderness: There is no abdominal tenderness. There is no guarding or rebound.      Comments: Incision is clean.  Drains are serous.  There is some erythema and tenderness around the jejunostomy tube   Musculoskeletal:         General: Normal range of motion.   Lymphadenopathy:      Cervical: No cervical adenopathy.   Skin:     General: Skin is warm and dry.      Findings: No erythema or rash.   Neurological:      Mental Status: She is alert and oriented to person, place, and time.          Significant Labs:  I have reviewed all pertinent lab results within the past 24 hours.  CBC:   Recent Labs   Lab  09/01/24  0602   WBC 8.95   RBC 3.06*   HGB 10.2*   HCT 33.2*   *   *   MCH 33.3*   MCHC 30.7*     BMP:   Recent Labs   Lab 09/01/24  0602   *      K 3.3*   *   CO2 20*   BUN 9   CREATININE 0.7   CALCIUM 7.9*   MG 2.1       Significant Diagnostics:  I have reviewed all pertinent imaging results/findings within the past 24 hours.  No new  Assessment/Plan:     * Gastric adenocarcinoma  POD #11- s/p exlap, extensive SHAYNA, small bowel resection, total gastrectomy with stapled esophagojejunal anastomosis and D2 lymphadenectomy, and Jtube placement    Tolerating tube feeds at 50cc/hr.  Scan and labs all looking ok with no evidence of leak.  I suspect she is not tolerating this formula well.      -- continue Reglan  -- advanced to full liquid diet which will be continued as she was not eating much  -- tube feeds will be continued  Will discuss with dietician about changing tube feed formula, and starting TPN since she has been without nutrition for 9 days now.    -- continue hycet via J tube for pain   -- ok for liquid medications via J tube  -- Strict I/Os  -- Senna liquid via Jtube BID   -- CM consult for discharge / home health planning   -- OOB To chair and walk today x5, OT/PT consulted  -- encourage IS  Dispo: continued med surg with tele- tentative plan once a full liquid diet is tolerated and the erythema around the jejunostomy tube has improved    Lovenox and SCDs for DVT ppx, no indication for GI ppx     Slightly improved erythema around the jejunostomy tube continue antibiotics.  Monitor.  If not significantly improved may need exploration around the site.      Going back into abdomen at this time would be extremely risky      Primary hypertension  -- appreciate medicine assistance with management     Hx of Epileptic seizures  -- appreciate medicine assistance with management         Rafiq White MD  General Surgery  [facility]

## 2024-09-01 NOTE — PROGRESS NOTES
[facility] [place] Medicine  Progress Note    Patient Name: Cheryl Kirkland  MRN: [id number]  Patient Class: IP- Inpatient   Admission Date: 8/20/2024  Length of Stay: 12 days  Attending Physician: Chen Jerome MD  Primary Care Provider: Gagandeep Iglesias MD        Subjective:     Principal Problem:Gastric adenocarcinoma        HPI:  74F  has a past medical history of Anemia, Chronic deep vein thrombosis (DVT) of left lower extremity, Deep vein thrombosis, Drug-induced polyneuropathy, DVT (deep venous thrombosis), Epilepsy, Gastric adenocarcinoma, GERD (gastroesophageal reflux disease), Hyperlipidemia, Mixed epithelial carcinoma of right ovary, OP (osteoporosis), Ovarian cancer, and Primary hypertension.  Presents for definitive treatment of gastric adenocaricoma per primary. Status post total gastrectomy with j tube placement. Transferred to icu postoperatively for close monitoring. Tolerated procedure well. Expected pain and weakness. Physical/occupational therapy recommending low intensity therapy.    Hospital medicine consulted for medical management.    Initial review of chart reveals vital signs stable, on room air. Cbc unremarkable. Cmp with hyperchloremic acidosis improving. Normal renal function. Elevated liver enzymes. Amylase within normal limits. Hypomag. Hypoalbuminemia. Xray abdomen on 8/20 with operative changes.    Overview/Hospital Course:  8/23 admitted for definitive management of gastric adenocarcinoma with total gastrectomy and j tube placement by primary. Upper gastroenterology procedure with no anastomotic leak identified. Possible ngt removal per primary. Physical/occupational therapy recommending low intensity therapy. Antiepileptic levels pending  8/24 antiepileptic levels pending. Ng tube removed. Reports visual disturbance but improving. Has not worked with physical/occupational therapy today.  8/25 doing well. +bowel movement x 2. pca discontinued per primary.  Trickle feeds to be increased per primary.  8/26 patient feels weak today with some shortness of breath.  She states she feels some nausea and just no energy.  She was able to participate therapy and walk in [place] with walker  8/27 patient continues to feel weak.  8/28 patient feels much better today.  No nausea no vomiting.  Has walked 3 times with the assistance today.  8/29- POD 9- developed some Abd fullness- hence Dr. Jerome switched to CLD while hold TF and cont Reglan. Getting PT/OT. Cont present care as per Dr. Jerome. Labs stable. PO4 2.5.    08/30 POD 10- reports being able to tolerate CLD. Started on TPN. Tube feeds resumed. Reports pain around J tube site, evaluation by CT imaging and General Surgery did not note any indication for intervention.   08/31 POD11- still reporting pain around PEG tube insertion site, started on antibiotics per General surgery.  Reports diarrhea, stool studies pending  09/01- POD 12- reports improvement in pain around PEG tube site.  General surgery evaluation today recommended discontinuation of peripheral nutrition.  Stool studies negative so far, diarrhea alleviating with loperamide    Interval History: No acute events overnight, afebrile, hemodynamically stable. Reports improvement in pain around PEG tube site.  General surgery evaluation today recommended discontinuation of peripheral nutrition.  Stool studies negative so far, diarrhea alleviating with loperamide       Objective:     Vital Signs (Most Recent):  Temp: 98.6 °F (37 °C) (09/01/24 1140)  Pulse: 96 (09/01/24 1140)  Resp: 18 (09/01/24 1140)  BP: 133/75 (09/01/24 1140)  SpO2: (!) 94 % (09/01/24 1140) Vital Signs (24h Range):  Temp:  [97.8 °F (36.6 °C)-98.9 °F (37.2 °C)] 98.6 °F (37 °C)  Pulse:  [90-98] 96  Resp:  [16-18] 18  SpO2:  [94 %-99 %] 94 %  BP: (116-143)/(62-84) 133/75     Weight: 61.3 kg (135 lb 2.3 oz)  Body mass index is 22.49 kg/m².    Intake/Output Summary (Last 24 hours) at 9/1/2024 1206  Last data  filed at 9/1/2024 0636  Gross per 24 hour   Intake 466 ml   Output 120 ml   Net 346 ml         Physical Exam  Vitals and nursing note reviewed.   Constitutional:       General: She is not in acute distress.     Appearance: Normal appearance. She is not ill-appearing.   HENT:      Head: Normocephalic and atraumatic.      Mouth/Throat:      Mouth: Mucous membranes are moist.   Eyes:      General: No scleral icterus.        Right eye: No discharge.         Left eye: No discharge.   Cardiovascular:      Rate and Rhythm: Normal rate and regular rhythm.   Pulmonary:      Effort: Pulmonary effort is normal. No respiratory distress.      Breath sounds: No wheezing, rhonchi or rales.   Abdominal:      General: Abdomen is flat. A surgical scar is present.      Palpations: Abdomen is soft. There is no mass.      Tenderness: There is abdominal tenderness (around J tube insertion site, improving). There is no guarding or rebound.      Hernia: No hernia is present.      Comments: TTP, J tube in place- some swelling/ erythema around but no purulence, incision c/d/i   Musculoskeletal:         General: No swelling, tenderness, deformity or signs of injury. Normal range of motion.   Skin:     General: Skin is warm and dry.      Coloration: Skin is not jaundiced.   Neurological:      General: No focal deficit present.      Mental Status: She is alert and oriented to person, place, and time.   Psychiatric:         Mood and Affect: Mood normal.         Behavior: Behavior normal.         Thought Content: Thought content normal.             Significant Labs: All pertinent labs within the past 24 hours have been reviewed.  LABS:  Recent Labs   Lab 08/30/24  0601 08/31/24  0737 09/01/24  0602    140 142   K 3.6 3.6 3.3*    110 112*   CO2 22* 21* 20*   BUN 8 8 9   CREATININE 0.7 0.6 0.7   GLU 96 131* 118*   ANIONGAP 11 9 10     Recent Labs   Lab 08/30/24  0601 08/31/24  0737 09/01/24  0602   MG 2.2 2.1 2.1   PHOS 3.3 2.5* 2.3*      Recent Labs   Lab 08/29/24  1237 08/31/24  0737 09/01/24  0602   AST 21 23 14   ALT 55* 36 27   ALKPHOS 230* 204* 162*   BILITOT 0.4 0.3 0.2   ALBUMIN 2.3* 2.2* 2.1*     POCT Glucose:   Recent Labs   Lab 08/31/24  1648 09/01/24  0457 09/01/24  0925   POCTGLUCOSE 114* 165* 132*    Recent Labs   Lab 08/30/24  0601 08/31/24  0737 09/01/24  0602   WBC 8.69 10.70 8.95   HGB 10.8* 10.7* 10.2*   HCT 33.6* 32.9* 33.2*    463* 515*        Significant Imaging: I have reviewed all pertinent imaging results/findings within the past 24 hours.    Inpatient Medications:  Continuous Infusions:   TPN ADULT PERIPHERAL CUSTOM   Intravenous Continuous 40 mL/hr at 09/01/24 1030 Rate Change at 09/01/24 1030     Scheduled Meds:   carBAMazepine  200 mg Per J Tube BID    carBAMazepine  300 mg Per J Tube QHS    enoxparin  40 mg Subcutaneous Daily    levETIRAcetam  500 mg Per J Tube BID    metoclopramide HCl  10 mg Oral Q8H    piperacillin-tazobactam (Zosyn) IV (PEDS and ADULTS) (extended infusion is not appropriate)  4.5 g Intravenous Q8H    zonisamide  200 mg Per J Tube BID     PRN Meds:  Current Facility-Administered Medications:     dextrose 10%, 12.5 g, Intravenous, PRN    dextrose 10%, 25 g, Intravenous, PRN    glucagon (human recombinant), 1 mg, Intramuscular, PRN    hydrALAZINE, 10 mg, Intravenous, Q6H PRN    hydrocodone-apap 7.5-325 MG/15 ML, 15 mL, Per J Tube, Q4H PRN    hydrocodone-apap 7.5-325 MG/15 ML, 20 mL, Per J Tube, Q6H PRN    HYDROmorphone, 1 mg, Intravenous, Q6H PRN    insulin aspart U-100, 0-5 Units, Subcutaneous, Q4H PRN    loperamide, 2 mg, Oral, QID PRN    naloxone, 0.02 mg, Intravenous, PRN    ondansetron, 4 mg, Intravenous, Q6H PRN    pneumoc 20-faina conj-dip cr(PF), 0.5 mL, Intramuscular, vaccine x 1 dose    promethazine, 25 mg, Rectal, Q6H PRN      Assessment/Plan:      * Gastric adenocarcinoma  Status post total gastrectomy with j tube placement  Per primary    General surgery  following    Diarrhea  -Unclear etiology of the diarrhea that was initially noted 08/31/24.     -Differential diagnoses include, but not limited to:  Infection versus intolerance to tube feeds  -C diff negative, stool WBCs negative      -Follow up stool studies    Primary hypertension  Chronic, controlled. Latest blood pressure and vitals reviewed-     Temp:  [97.7 °F (36.5 °C)-98.4 °F (36.9 °C)]   Pulse:  [86-98]   Resp:  [17-18]   BP: (116-168)/(74-86)   SpO2:  [96 %-99 %] .   Home meds for hypertension were reviewed and noted below.       While in the hospital, will manage blood pressure as follows; Adjust home antihypertensive regimen as follows- monitor, avoid aggressive management of blood pressure in setting of recent surgery     Will utilize p.r.n. blood pressure medication only if patient's blood pressure greater than 160/100 and she develops symptoms such as worsening chest pain or shortness of breath.    Chronic deep vein thrombosis (DVT) of left lower extremity   -Previously on Xarelto, and evaluation by Oncology noted concerns DVT likely secondary to being provoked  from ovarian cancer. Held after concerns for GI bleed early 2024      Scds and lovenox for prophy.    DVT (deep venous thrombosis)  -Initially presented to primary care provider with lower extremity edema   -Workup revealed dvt 2/2 cancer  -Previously on Xarelto, and evaluation by Oncology noted concerns DVT likely secondary to being provoked  from ovarian cancer. Held after concerns for GI bleed early 2024    PLAN:  On loveonx prophy and scds  Ambulate     Hx of Epileptic seizures  Resume home meds per j tube  Carbamezapine in therapeutic range  Zonisamide within normal limits   Levetiracetam within normal limits    stable      VTE Risk Mitigation (From admission, onward)           Ordered     enoxaparin injection 40 mg  Daily         08/20/24 2219     IP VTE HIGH RISK PATIENT  Once         08/20/24 2219     Place sequential compression  device  Until discontinued         08/20/24 7485                    Discharge Planning   SOFIYA:      Code Status: Full Code   Is the patient medically ready for discharge?:     Reason for patient still in hospital (select all that apply): Treatment and Consult recommendations  Discharge Plan A: Home Health          Yuri Arteaga DO  Department of Hospital Medicine   [facility]    Voice recognition software was used in the creation of this note/communication and any sound-alike/typographical errors which may have occurred despite initial review prior to signing should be taken in context when interpreting.  If such errors prevent a clear understanding of the note/communication, please contact the provider/office for clarification.

## 2024-09-02 LAB
ALBUMIN SERPL BCP-MCNC: 2.2 G/DL (ref 3.5–5.2)
ALP SERPL-CCNC: 154 U/L (ref 55–135)
ALT SERPL W/O P-5'-P-CCNC: 24 U/L (ref 10–44)
ANION GAP SERPL CALC-SCNC: 9 MMOL/L (ref 8–16)
AST SERPL-CCNC: 14 U/L (ref 10–40)
BILIRUB SERPL-MCNC: 0.3 MG/DL (ref 0.1–1)
BUN SERPL-MCNC: 7 MG/DL (ref 8–23)
CALCIUM SERPL-MCNC: 8.3 MG/DL (ref 8.7–10.5)
CHLORIDE SERPL-SCNC: 112 MMOL/L (ref 95–110)
CO2 SERPL-SCNC: 19 MMOL/L (ref 23–29)
CREAT SERPL-MCNC: 0.7 MG/DL (ref 0.5–1.4)
ERYTHROCYTE [DISTWIDTH] IN BLOOD BY AUTOMATED COUNT: 16.8 % (ref 11.5–14.5)
EST. GFR  (NO RACE VARIABLE): >60 ML/MIN/1.73 M^2
GLUCOSE SERPL-MCNC: 131 MG/DL (ref 70–110)
HCT VFR BLD AUTO: 31.2 % (ref 37–48.5)
HGB BLD-MCNC: 9.4 G/DL (ref 12–16)
MAGNESIUM SERPL-MCNC: 1.9 MG/DL (ref 1.6–2.6)
MCH RBC QN AUTO: 32.6 PG (ref 27–31)
MCHC RBC AUTO-ENTMCNC: 30.1 G/DL (ref 32–36)
MCV RBC AUTO: 108 FL (ref 82–98)
PHOSPHATE SERPL-MCNC: 2.1 MG/DL (ref 2.7–4.5)
PLATELET # BLD AUTO: 592 K/UL (ref 150–450)
PMV BLD AUTO: 8.9 FL (ref 9.2–12.9)
POCT GLUCOSE: 104 MG/DL (ref 70–110)
POCT GLUCOSE: 112 MG/DL (ref 70–110)
POCT GLUCOSE: 113 MG/DL (ref 70–110)
POCT GLUCOSE: 116 MG/DL (ref 70–110)
POCT GLUCOSE: 119 MG/DL (ref 70–110)
POCT GLUCOSE: 140 MG/DL (ref 70–110)
POTASSIUM SERPL-SCNC: 3.5 MMOL/L (ref 3.5–5.1)
PROT SERPL-MCNC: 5.6 G/DL (ref 6–8.4)
RBC # BLD AUTO: 2.88 M/UL (ref 4–5.4)
SODIUM SERPL-SCNC: 140 MMOL/L (ref 136–145)
WBC # BLD AUTO: 8.45 K/UL (ref 3.9–12.7)

## 2024-09-02 PROCEDURE — 80053 COMPREHEN METABOLIC PANEL: CPT | Performed by: SURGERY

## 2024-09-02 PROCEDURE — 25000003 PHARM REV CODE 250: Performed by: SURGERY

## 2024-09-02 PROCEDURE — 25000003 PHARM REV CODE 250: Performed by: NURSE PRACTITIONER

## 2024-09-02 PROCEDURE — 85027 COMPLETE CBC AUTOMATED: CPT | Performed by: NURSE PRACTITIONER

## 2024-09-02 PROCEDURE — 63600175 PHARM REV CODE 636 W HCPCS: Performed by: SURGERY

## 2024-09-02 PROCEDURE — 11000001 HC ACUTE MED/SURG PRIVATE ROOM

## 2024-09-02 PROCEDURE — 97535 SELF CARE MNGMENT TRAINING: CPT

## 2024-09-02 PROCEDURE — 36415 COLL VENOUS BLD VENIPUNCTURE: CPT | Performed by: SURGERY

## 2024-09-02 PROCEDURE — 97530 THERAPEUTIC ACTIVITIES: CPT

## 2024-09-02 PROCEDURE — 84100 ASSAY OF PHOSPHORUS: CPT | Performed by: SURGERY

## 2024-09-02 PROCEDURE — 97116 GAIT TRAINING THERAPY: CPT

## 2024-09-02 PROCEDURE — 83735 ASSAY OF MAGNESIUM: CPT | Performed by: SURGERY

## 2024-09-02 PROCEDURE — 63600175 PHARM REV CODE 636 W HCPCS: Performed by: NURSE PRACTITIONER

## 2024-09-02 RX ADMIN — CARBAMAZEPINE 200 MG: 100 SUSPENSION ORAL at 02:09

## 2024-09-02 RX ADMIN — ZONISAMIDE 200 MG: 100 CAPSULE ORAL at 09:09

## 2024-09-02 RX ADMIN — ENOXAPARIN SODIUM 40 MG: 40 INJECTION SUBCUTANEOUS at 06:09

## 2024-09-02 RX ADMIN — HYDROMORPHONE HYDROCHLORIDE 1 MG: 2 INJECTION, SOLUTION INTRAMUSCULAR; INTRAVENOUS; SUBCUTANEOUS at 12:09

## 2024-09-02 RX ADMIN — LEVETIRACETAM 500 MG: 100 SOLUTION ORAL at 09:09

## 2024-09-02 RX ADMIN — METOCLOPRAMIDE HYDROCHLORIDE 10 MG: 5 SOLUTION ORAL at 09:09

## 2024-09-02 RX ADMIN — HYDROCODONE BITARTRATE AND ACETAMINOPHEN 15 ML: 7.5; 325 SOLUTION ORAL at 06:09

## 2024-09-02 RX ADMIN — METOCLOPRAMIDE HYDROCHLORIDE 10 MG: 5 SOLUTION ORAL at 02:09

## 2024-09-02 RX ADMIN — METOCLOPRAMIDE HYDROCHLORIDE 10 MG: 5 SOLUTION ORAL at 06:09

## 2024-09-02 RX ADMIN — PIPERACILLIN SODIUM AND TAZOBACTAM SODIUM 4.5 G: 4; .5 INJECTION, POWDER, FOR SOLUTION INTRAVENOUS at 05:09

## 2024-09-02 RX ADMIN — PIPERACILLIN SODIUM AND TAZOBACTAM SODIUM 4.5 G: 4; .5 INJECTION, POWDER, FOR SOLUTION INTRAVENOUS at 12:09

## 2024-09-02 RX ADMIN — LEVETIRACETAM 500 MG: 100 SOLUTION ORAL at 08:09

## 2024-09-02 RX ADMIN — CARBAMAZEPINE 300 MG: 100 SUSPENSION ORAL at 08:09

## 2024-09-02 RX ADMIN — ZONISAMIDE 200 MG: 100 CAPSULE ORAL at 08:09

## 2024-09-02 RX ADMIN — HYDROMORPHONE HYDROCHLORIDE 1 MG: 2 INJECTION, SOLUTION INTRAMUSCULAR; INTRAVENOUS; SUBCUTANEOUS at 06:09

## 2024-09-02 RX ADMIN — CARBAMAZEPINE 200 MG: 100 SUSPENSION ORAL at 06:09

## 2024-09-02 RX ADMIN — PIPERACILLIN SODIUM AND TAZOBACTAM SODIUM 4.5 G: 4; .5 INJECTION, POWDER, FOR SOLUTION INTRAVENOUS at 02:09

## 2024-09-02 NOTE — PROGRESS NOTES
[facility]  General Surgery  Progress Note    Subjective:     History of Present Illness:  Cheryl Kirkland is a 74 y.o. year old female with a history of ovarian cancer status post surgery and chemotherapy as well as epilepsy and DVT (provoked during her treatment for ovarian cancer), and MSI high gastric adenocarcinoma.  She was originally diagnosed back in February of this past year.  She was noted to have a very proximal enlarged tumor.  EGD was done as was complete metastatic workup which was negative.  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  Due to her significant adhesive disease she understood that she would require an open operation.  She was discussed in multidisciplinary tumor board and the consensus was to proceed with immunotherapy due to her MSI high status.  She completed 5 cycles of neoadjuvant immunotherapy with Keytruda and on restaging imaging was found to have enlarged Danelle portal lymph nodes.  Consensus was that she would need a total gastrectomy due to the proximity of her tumor in relationship to the GE junction.  Due to the extent of the operation required and the her previous history of ovarian cancer the tumor board consensus was to proceed with the EUS guided biopsy to rule out any ovarian cancer and to rule out pseudo progression as well.  This was performed and showed adenocarcinoma consistent with her gastric primary.  Therefore the decision was to proceed with surgical intervention due to the mixed response she had from immunotherapy on PET-CT scan.     Risks and benefits were reviewed including bleeding, infection, pain, scar, damage to surrounding structures, cardiovascular and pulmonary complications, anastomotic leak, positive margins, need for additional procedures, death, and imponderables.  She  expressed understanding and gave informed consent to proceed.    Post-Op Info:  Procedure(s) (LRB):  GASTRECTOMY (N/A)  EGD (ESOPHAGOGASTRODUODENOSCOPY) (N/A)  LAPAROSCOPY, DIAGNOSTIC (N/A)  LYSIS, ADHESIONS (N/A)  INSERTION, JEJUNOSTOMY TUBE (N/A)   13 Days Post-Op     Interval History:  Tolerating tube feeds, not eating much.  Jejunostomy tube site it was less painful in the erythema is improving.  Minimal drainage    Medications:  Continuous Infusions:  Scheduled Meds:   carBAMazepine  200 mg Per J Tube BID    carBAMazepine  300 mg Per J Tube QHS    enoxparin  40 mg Subcutaneous Daily    levETIRAcetam  500 mg Per J Tube BID    metoclopramide HCl  10 mg Oral Q8H    piperacillin-tazobactam (Zosyn) IV (PEDS and ADULTS) (extended infusion is not appropriate)  4.5 g Intravenous Q8H    zonisamide  200 mg Per J Tube BID    [START ON 9/3/2024] zonisamide  200 mg Per J Tube BID     PRN Meds:  Current Facility-Administered Medications:     dextrose 10%, 12.5 g, Intravenous, PRN    dextrose 10%, 25 g, Intravenous, PRN    glucagon (human recombinant), 1 mg, Intramuscular, PRN    hydrALAZINE, 10 mg, Intravenous, Q6H PRN    hydrocodone-apap 7.5-325 MG/15 ML, 15 mL, Per J Tube, Q4H PRN    hydrocodone-apap 7.5-325 MG/15 ML, 20 mL, Per J Tube, Q6H PRN    HYDROmorphone, 1 mg, Intravenous, Q6H PRN    insulin aspart U-100, 0-5 Units, Subcutaneous, Q4H PRN    loperamide, 2 mg, Oral, QID PRN    naloxone, 0.02 mg, Intravenous, PRN    ondansetron, 4 mg, Intravenous, Q6H PRN    pneumoc 20-faina conj-dip cr(PF), 0.5 mL, Intramuscular, vaccine x 1 dose    promethazine, 25 mg, Rectal, Q6H PRN     Review of patient's allergies indicates:  No Known Allergies  Objective:     Vital Signs (Most Recent):  Temp: 98.6 °F (37 °C) (09/02/24 0757)  Pulse: 89 (09/02/24 0757)  Resp: 18 (09/02/24 0757)  BP: 111/65 (09/02/24 0757)  SpO2: 96 % (09/02/24 0757) Vital Signs (24h Range):  Temp:  [98.2 °F (36.8 °C)-98.8 °F (37.1 °C)] 98.6 °F (37 °C)  Pulse:   [] 89  Resp:  [15-18] 18  SpO2:  [94 %-98 %] 96 %  BP: (111-143)/(61-76) 111/65     Weight: 61.3 kg (135 lb 2.3 oz)  Body mass index is 22.49 kg/m².    Intake/Output - Last 3 Shifts         08/31 0700  09/01 0659 09/01 0700  09/02 0659 09/02 0700  09/03 0659    NG/      Total Intake(mL/kg) 516 (8.4)      Urine (mL/kg/hr) 0 (0)      Emesis/NG output       Drains 120 30     Stool 0      Total Output 120 30     Net +396 -30            Urine Occurrence 2 x      Stool Occurrence 13 x               Physical Exam  Vitals and nursing note reviewed.   Constitutional:       Appearance: She is well-developed. She is ill-appearing (mild acute).   HENT:      Head: Normocephalic.   Eyes:      Pupils: Pupils are equal, round, and reactive to light.   Neck:      Thyroid: No thyromegaly.      Vascular: No JVD.      Trachea: No tracheal deviation.   Cardiovascular:      Rate and Rhythm: Normal rate and regular rhythm.      Heart sounds: Normal heart sounds.   Pulmonary:      Breath sounds: Normal breath sounds. No wheezing.   Abdominal:      General: Abdomen is flat. Bowel sounds are normal. There is no distension.      Palpations: Abdomen is soft. Abdomen is not rigid. There is no mass.      Tenderness: There is abdominal tenderness (incisional and around J-tube site). There is no guarding or rebound.      Comments: Decreasing erythema around jejunostomy tube.  Minimal drainage.  Midline incision is clean   Musculoskeletal:         General: Normal range of motion.   Lymphadenopathy:      Cervical: No cervical adenopathy.   Skin:     General: Skin is warm and dry.      Findings: No erythema or rash.   Neurological:      Mental Status: She is oriented to person, place, and time.   Psychiatric:         Mood and Affect: Mood normal.         Behavior: Behavior normal.         Thought Content: Thought content normal.         Judgment: Judgment normal.          Significant Labs:  I have reviewed all pertinent lab results within  the past 24 hours.  No new    Significant Diagnostics:  I have reviewed all pertinent imaging results/findings within the past 24 hours.  No new  Assessment/Plan:     * Gastric adenocarcinoma  POD #11- s/p exlap, extensive SHAYNA, small bowel resection, total gastrectomy with stapled esophagojejunal anastomosis and D2 lymphadenectomy, and Jtube placement    Tolerating tube feeds at 50cc/hr.  Scan and labs all looking ok with no evidence of leak.  I suspect she is not tolerating this formula well.      -- continue Reglan  -- advanced to full liquid diet which will be continued as she was not eating much  -- tube feeds will be continued  Will discuss with dietician about changing tube feed formula, and starting TPN since she has been without nutrition for 9 days now.    -- continue hycet via J tube for pain   -- ok for liquid medications via J tube  -- Strict I/Os  -- Senna liquid via Jtube BID   -- CM consult for discharge / home health planning   -- OOB To chair and walk today x5, OT/PT consulted  -- encourage IS  Dispo: continued med surg with tele- tentative plan once a full liquid diet is tolerated and the erythema around the jejunostomy tube has improved    Lovenox and SCDs for DVT ppx, no indication for GI ppx     Slightly improved erythema around the jejunostomy tube continue antibiotics.  Monitor.  If not significantly improved may need exploration around the site.      Going back into abdomen at this time would be extremely risky      Primary hypertension  -- appreciate medicine assistance with management     Hx of Epileptic seizures  -- appreciate medicine assistance with management         Rafiq White MD  General Surgery  [facility]

## 2024-09-02 NOTE — PT/OT/SLP PROGRESS
"Physical Therapy  Treatment    Cheryl Kirkland   MRN: [id number]   Admitting Diagnosis: Gastric adenocarcinoma       PT Start Time: 1030     PT Stop Time: 1048    PT Total Time (min): 18 min       Billable Minutes:  Gait Training 18    Treatment Type: Treatment  PT/PTA: PT     Number of PTA visits since last PT visit: 0       General Precautions: Standard, fall  Orthopedic Precautions: N/A  Braces: N/A  Respiratory Status: Room air    Spiritual, Cultural Beliefs, Islam Practices, Values that Affect Care: no    Subjective:  Communicated with nursing (Christy) and performed chart review via epic system prior to session.  Pt agreeable to PT services "I wanted to get up"    Pain/Comfort  Pain Rating 1: 8/10 ("I don't really like taking all those pain meds")  Pain Addressed 1: Reposition, Distraction  Pain Rating Post-Intervention 1: 8/10    Objective:   Patient found with: peripheral IV, telemetry, PEG Tube    Functional Mobility:  Bed Mobility:    Supine to sit SPV   Seated scooting SPV    Transfers:   Facilitated sit<>stand SPV with no AD   Stand pivot SPV with RW   Pt able to tolerate standing 2-3 mins with no LOB    Gait:    Gait training 750 ft SPV with RW, demonstrated slow pace, flexed posture, narrow IDALIA, shuffled steps. Responding positively to cues for upright posture and pursed lip breathing      Balance:   Dynamic Sit: FAIR+: Maintains balance through MINIMAL excursions of active trunk motion  Dynamic stand: FAIR+: Needs CLOSE SUPERVISION during gait and is able to right self with minor LOB       Treatment and Education:  Educated pt on benefits of consistent participation in PT services to meet functional goals. Educated pt on importance of sitting OOB to promote endurance and overall activity tolerance. Educated pt on call don't fall policy and use of call button to alert nursing staff of needs (including to assist with returning back to bed). Pt expressed understanding.      AM-PAC 6 CLICK " MOBILITY  How much help from another person does this patient currently need?   1 = Unable, Total/Dependent Assistance  2 = A lot, Maximum/Moderate Assistance  3 = A little, Minimum/Contact Guard/Supervision  4 = None, Modified [place]/Independent    Turning over in bed (including adjusting bedclothes, sheets and blankets)?: 3  Sitting down on and standing up from a chair with arms (e.g., wheelchair, bedside commode, etc.): 3  Moving from lying on back to sitting on the side of the bed?: 3  Moving to and from a bed to a chair (including a wheelchair)?: 3  Need to walk in hospital room?: 3  Climbing 3-5 steps with a railing?: 1  Basic Mobility Total Score: 16    AM-PAC Raw Score CMS G-Code Modifier Level of Impairment Assistance   6 % Total / Unable   7 - 9 CM 80 - 100% Maximal Assist   10 - 14 CL 60 - 80% Moderate Assist   15 - 19 CK 40 - 60% Moderate Assist   20 - 22 CJ 20 - 40% Minimal Assist   23 CI 1-20% SBA / CGA   24 CH 0% Independent/ Mod I     Patient left up in chair with all lines intact, call button in reach, chair alarm on, and nursing notified.    Assessment:  Cheryl Kirkland is a 74 y.o. female with a medical diagnosis of Gastric adenocarcinoma and presents with deficits in overall mobility. Pt able to tolerate increased gait activity today, cues for pursed lip breathing and upright posture.    Rehab identified problem list/impairments: weakness, impaired endurance, impaired functional mobility, gait instability, impaired balance, decreased coordination, decreased safety awareness, pain    Rehab potential is good.    Activity tolerance: Good    Discharge recommendations: Low Intensity Therapy      Barriers to discharge:      Equipment recommendations: walker, rolling     GOALS:   Multidisciplinary Problems       Physical Therapy Goals          Problem: Physical Therapy    Goal Priority Disciplines Outcome Goal Variances Interventions   Physical Therapy Goal     PT, PT/OT  Progressing     Description: LTG'S TO BE MET IN 14 DAYS (9-4-24)  PT WILL REQUIRE SPV FOR BED MOBILITY  PT WILL REQUIRE SPV FOR BED<>CHAIR TF'S  PT WILL  FEET WITH RW AND SPV  PT WILL INC AMPAC SCORE BY 2 POINTS TO PROGRESS GROSS FUNC MOBILITY                         PLAN:    Patient to be seen 3 x/week to address the above listed problems via gait training, therapeutic activities, therapeutic exercises, neuromuscular re-education  Plan of Care expires: 09/04/24  Plan of Care reviewed with: patient         09/02/2024

## 2024-09-02 NOTE — PT/OT/SLP PROGRESS
Occupational Therapy   Treatment    Name: Cheryl Kirkland  MRN: [id number]  Admitting Diagnosis:  Gastric adenocarcinoma  13 Days Post-Op    Recommendations:     Discharge Recommendations: Low Intensity Therapy  Discharge Equipment Recommendations:  walker, rolling, bath bench  Barriers to discharge:       Assessment:     Cheryl Kirkland is a 74 y.o. female with a medical diagnosis of Gastric adenocarcinoma.Performance deficits affecting function are weakness, gait instability, decreased upper extremity function, decreased lower extremity function, impaired balance, impaired endurance, impaired functional mobility, impaired self care skills, decreased safety awareness, pain.     Rehab Prognosis:  Good; patient would benefit from acute skilled OT services to address these deficits and reach maximum level of function.       Plan:     Patient to be seen 2 x/week to address the above listed problems via self-care/home management, therapeutic activities, therapeutic exercises  Plan of Care Expires: 10/04/24  Plan of Care Reviewed with: patient    Subjective     Chief Complaint: None stated  Patient/Family Comments/goals: increase independence  Pain/Comfort:  Pain Rating 1: 8/10  Location - Side 1: Bilateral  Location - Orientation 1: generalized  Location 1: abdomen  Pain Addressed 1: Reposition, Distraction  Pain Rating Post-Intervention 1: 8/10    Objective:     Communicated with: Nurse prior to session.  Patient found supine with telemetry, peripheral IV, PEG Tube upon OT entry to room.    General Precautions: Standard, fall    Orthopedic Precautions:N/A  Braces: N/A  Respiratory Status: Room air     Occupational Performance:     Bed Mobility:    Patient completed Rolling/Turning to Right with supervision  Patient completed Scooting/Bridging with supervision  Patient completed Supine to Sit with supervision     Functional Mobility/Transfers:  Patient completed Sit <> Stand Transfer with supervision   with  rolling walker   Patient completed Bed <> Chair Transfer using Stand Pivot technique with supervision with rolling walker  Patient completed Toilet Transfer Stand Pivot technique with supervision with  rolling walker  Functional Mobility: Pt ambulated with RW with SPV x750 feet     Activities of Daily Living:  Toileting: supervision with RW standard commode      [facility] 6 Click ADL: 19    Treatment & Education:  Pt transferred to bedside chair with RW SPV and educated on BUE AROM exercises to perform throughout the day to increase functional strength needed for daily activities. Pt verbalized understanding. Pt encouraged to continue with OOB activity, as well as with nursing staff throughout the day to further improve functional strength and endurance. Pt verbalized understanding.    Patient left up in chair with all lines intact, call button in reach, and chair alarm on    GOALS:   Multidisciplinary Problems       Occupational Therapy Goals          Problem: Occupational Therapy    Goal Priority Disciplines Outcome Interventions   Occupational Therapy Goal     OT, PT/OT Progressing    Description: Goals to be met by: 9/4/24     Patient will increase functional independence with ADLs by performing:    Toileting from toilet with Supervision for hygiene and clothing management.   Toilet transfer to toilet with Supervision.  Increased functional strength in B UE grossly by 1/2 MM grade.                         Time Tracking:     OT Date of Treatment: 09/02/24  OT Start Time: 1025  OT Stop Time: 1050  OT Total Time (min): 25 min    Billable Minutes:Self Care/Home Management 10  Therapeutic Activity 15    [employer]  OT/PARAMJIT: OT          9/2/2024

## 2024-09-02 NOTE — PROGRESS NOTES
[facility] Medicine  Progress Note    Patient Name: Cheryl Kirkland  MRN: [id number]  Patient Class: IP- Inpatient   Admission Date: 8/20/2024  Length of Stay: 13 days  Attending Physician: Chen Jerome MD  Primary Care Provider: Gagandeep Iglesias MD    Subjective:     Principal Problem:Gastric adenocarcinoma        HPI:  74F  has a past medical history of Anemia, Chronic deep vein thrombosis (DVT) of left lower extremity, Deep vein thrombosis, Drug-induced polyneuropathy, DVT (deep venous thrombosis), Epilepsy, Gastric adenocarcinoma, GERD (gastroesophageal reflux disease), Hyperlipidemia, Mixed epithelial carcinoma of right ovary, OP (osteoporosis), Ovarian cancer, and Primary hypertension.  Presents for definitive treatment of gastric adenocaricoma per primary. Status post total gastrectomy with j tube placement. Transferred to icu postoperatively for close monitoring. Tolerated procedure well. Expected pain and weakness. Physical/occupational therapy recommending low intensity therapy.    Hospital medicine consulted for medical management.    Initial review of chart reveals vital signs stable, on room air. Cbc unremarkable. Cmp with hyperchloremic acidosis improving. Normal renal function. Elevated liver enzymes. Amylase within normal limits. Hypomag. Hypoalbuminemia. Xray abdomen on 8/20 with operative changes.    Overview/Hospital Course:  8/23 admitted for definitive management of gastric adenocarcinoma with total gastrectomy and j tube placement by primary. Upper gastroenterology procedure with no anastomotic leak identified. Possible ngt removal per primary. Physical/occupational therapy recommending low intensity therapy. Antiepileptic levels pending  8/24 antiepileptic levels pending. Ng tube removed. Reports visual disturbance but improving. Has not worked with physical/occupational therapy today.  8/25 doing well. +bowel movement x 2. pca discontinued per primary. Trickle  feeds to be increased per primary.  8/26 patient feels weak today with some shortness of breath.  She states she feels some nausea and just no energy.  She was able to participate therapy and walk in brown with walker  8/27 patient continues to feel weak.  8/28 patient feels much better today.  No nausea no vomiting.  Has walked 3 times with the assistance today.  8/29- POD 9- developed some Abd fullness- hence Dr. Jerome switched to CLD while hold TF and cont Reglan. Getting PT/OT. Cont present care as per Dr. Jerome. Labs stable. PO4 2.5.    08/30 POD 10- reports being able to tolerate CLD. Started on TPN. Tube feeds resumed. Reports pain around J tube site, evaluation by CT imaging and General Surgery did not note any indication for intervention.   08/31 POD11- still reporting pain around PEG tube insertion site, started on antibiotics per General surgery.  Reports diarrhea, stool studies pending  09/01- POD 12- reports improvement in pain around PEG tube site.  General surgery evaluation today recommended discontinuation of peripheral nutrition.  Stool studies negative so far, diarrhea alleviating with loperamide  09/02: POD 13: improvement of erythema surrounding PEG site. Slightly tender. Tolerating TF and medications through tube. PT/TO recommends home health. She remains on full liquid diet.    Interval History: doing well. Reports PEG site not as taunt or tender. Tolerating TF. No diarrhea.    Review of Systems   Constitutional:  Positive for activity change and fatigue. Negative for fever.   Respiratory:  Negative for cough and shortness of breath.    Cardiovascular:  Negative for chest pain and leg swelling.   Gastrointestinal:  Positive for abdominal distention (improved). Negative for nausea and vomiting.   Skin:  Negative for pallor.   Neurological:  Positive for weakness.   All other systems reviewed and are negative.      Objective:     Vital Signs (Most Recent):  Temp: 99 °F (37.2 °C) (09/02/24  1128)  Pulse: 90 (09/02/24 1128)  Resp: 18 (09/02/24 1244)  BP: 123/67 (09/02/24 1128)  SpO2: 98 % (09/02/24 1128) Vital Signs (24h Range):  Temp:  [98.2 °F (36.8 °C)-99 °F (37.2 °C)] 99 °F (37.2 °C)  Pulse:  [] 90  Resp:  [15-18] 18  SpO2:  [96 %-98 %] 98 %  BP: (111-143)/(61-76) 123/67     Weight: 61.3 kg (135 lb 2.3 oz)  Body mass index is 22.49 kg/m².    Intake/Output Summary (Last 24 hours) at 9/2/2024 1435  Last data filed at 9/2/2024 0740  Gross per 24 hour   Intake 50 ml   Output 30 ml   Net 20 ml         Physical Exam  Constitutional:       Appearance: She is well-developed. Ill appearance: mild acute.   HENT:      Head: Normocephalic.   Eyes:      Pupils: Pupils are equal, round, and reactive to light.   Neck:      Thyroid: No thyromegaly.      Vascular: No JVD.      Trachea: No tracheal deviation.   Cardiovascular:      Rate and Rhythm: Normal rate and regular rhythm.      Heart sounds: Normal heart sounds.   Pulmonary:      Breath sounds: Normal breath sounds. No wheezing.   Abdominal:      General: Bowel sounds are normal. There is no distension.      Palpations: Abdomen is soft. Abdomen is not rigid. There is no mass.      Tenderness: There is abdominal tenderness (incisional and jejunostomy tube site). There is no guarding or rebound.      Comments: Incision is clean.  Decreasing erythema and mild drainage at the jejunostomy tube site   Musculoskeletal:         General: Normal range of motion.   Lymphadenopathy:      Cervical: No cervical adenopathy.   Skin:     General: Skin is warm and dry.      Findings: No erythema or rash.   Neurological:      Mental Status: She is oriented to person, place, and time.   Psychiatric:         Mood and Affect: Mood normal.         Behavior: Behavior normal.         Thought Content: Thought content normal.         Judgment: Judgment normal.           Significant Labs: All pertinent labs within the past 24 hours have been reviewed.  CBC:   Recent Labs   Lab  09/01/24  0602 09/02/24  0717   WBC 8.95 8.45   HGB 10.2* 9.4*   HCT 33.2* 31.2*   * 592*     CMP:   Recent Labs   Lab 09/01/24  0602 09/02/24  0717    140   K 3.3* 3.5   * 112*   CO2 20* 19*   * 131*   BUN 9 7*   CREATININE 0.7 0.7   CALCIUM 7.9* 8.3*   PROT 5.2* 5.6*   ALBUMIN 2.1* 2.2*   BILITOT 0.2 0.3   ALKPHOS 162* 154*   AST 14 14   ALT 27 24   ANIONGAP 10 9       Significant Imaging: I have reviewed all pertinent imaging results/findings within the past 24 hours.    Assessment/Plan:      * Gastric adenocarcinoma  Status post total gastrectomy with j tube placement  Per primary    General surgery following    Diarrhea  -Unclear etiology of the diarrhea that was initially noted 08/31/24.     -Differential diagnoses include, but not limited to:  Infection versus intolerance to tube feeds  -C diff negative, stool WBCs negative      -Follow up stool studies  No further episodes of diarrhea    Primary hypertension  Chronic, controlled. Latest blood pressure and vitals reviewed-     Temp:  [97.7 °F (36.5 °C)-98.4 °F (36.9 °C)]   Pulse:  [86-98]   Resp:  [17-18]   BP: (116-168)/(74-86)   SpO2:  [96 %-99 %] .   Home meds for hypertension were reviewed and noted below.       While in the hospital, will manage blood pressure as follows; Adjust home antihypertensive regimen as follows- monitor, avoid aggressive management of blood pressure in setting of recent surgery     Will utilize p.r.n. blood pressure medication only if patient's blood pressure greater than 160/100 and she develops symptoms such as worsening chest pain or shortness of breath.    Chronic deep vein thrombosis (DVT) of left lower extremity   -Previously on Xarelto, and evaluation by Oncology noted concerns DVT likely secondary to being provoked  from ovarian cancer. Held after concerns for GI bleed early 2024      Scds and lovenox for prophy.    DVT (deep venous thrombosis)  -Initially presented to primary care provider with  lower extremity edema   -Workup revealed dvt 2/2 cancer  -Previously on Xarelto, and evaluation by Oncology noted concerns DVT likely secondary to being provoked  from ovarian cancer. Held after concerns for GI bleed early 2024    PLAN:  On loveonx prophy and scds  Ambulate     Hx of Epileptic seizures  Resume home meds per j tube  Carbamezapine in therapeutic range  Zonisamide within normal limits   Levetiracetam within normal limits    stable      VTE Risk Mitigation (From admission, onward)           Ordered     enoxaparin injection 40 mg  Daily         08/20/24 2219     IP VTE HIGH RISK PATIENT  Once         08/20/24 2219     Place sequential compression device  Until discontinued         08/20/24 2219                    Discharge Planning   SOFIYA:      Code Status: Full Code   Is the patient medically ready for discharge?:     Reason for patient still in hospital (select all that apply): Treatment  Discharge Plan A: Home Health          Tricia Gallagher NP  Department of Hospital Medicine   [facility] - Med Surg

## 2024-09-02 NOTE — SUBJECTIVE & OBJECTIVE
Interval History: doing well. Reports PEG site not as taunt or tender. Tolerating TF. No diarrhea.    Review of Systems   Constitutional:  Positive for activity change and fatigue. Negative for fever.   Respiratory:  Negative for cough and shortness of breath.    Cardiovascular:  Negative for chest pain and leg swelling.   Gastrointestinal:  Positive for abdominal distention (improved). Negative for nausea and vomiting.   Skin:  Negative for pallor.   Neurological:  Positive for weakness.   All other systems reviewed and are negative.      Objective:     Vital Signs (Most Recent):  Temp: 99 °F (37.2 °C) (09/02/24 1128)  Pulse: 90 (09/02/24 1128)  Resp: 18 (09/02/24 1244)  BP: 123/67 (09/02/24 1128)  SpO2: 98 % (09/02/24 1128) Vital Signs (24h Range):  Temp:  [98.2 °F (36.8 °C)-99 °F (37.2 °C)] 99 °F (37.2 °C)  Pulse:  [] 90  Resp:  [15-18] 18  SpO2:  [96 %-98 %] 98 %  BP: (111-143)/(61-76) 123/67     Weight: 61.3 kg (135 lb 2.3 oz)  Body mass index is 22.49 kg/m².    Intake/Output Summary (Last 24 hours) at 9/2/2024 1435  Last data filed at 9/2/2024 0740  Gross per 24 hour   Intake 50 ml   Output 30 ml   Net 20 ml         Physical Exam  Constitutional:       Appearance: She is well-developed. Ill appearance: mild acute.   HENT:      Head: Normocephalic.   Eyes:      Pupils: Pupils are equal, round, and reactive to light.   Neck:      Thyroid: No thyromegaly.      Vascular: No JVD.      Trachea: No tracheal deviation.   Cardiovascular:      Rate and Rhythm: Normal rate and regular rhythm.      Heart sounds: Normal heart sounds.   Pulmonary:      Breath sounds: Normal breath sounds. No wheezing.   Abdominal:      General: Bowel sounds are normal. There is no distension.      Palpations: Abdomen is soft. Abdomen is not rigid. There is no mass.      Tenderness: There is abdominal tenderness (incisional and jejunostomy tube site). There is no guarding or rebound.      Comments: Incision is clean.  Decreasing  erythema and mild drainage at the jejunostomy tube site   Musculoskeletal:         General: Normal range of motion.   Lymphadenopathy:      Cervical: No cervical adenopathy.   Skin:     General: Skin is warm and dry.      Findings: No erythema or rash.   Neurological:      Mental Status: She is oriented to person, place, and time.   Psychiatric:         Mood and Affect: Mood normal.         Behavior: Behavior normal.         Thought Content: Thought content normal.         Judgment: Judgment normal.           Significant Labs: All pertinent labs within the past 24 hours have been reviewed.  CBC:   Recent Labs   Lab 09/01/24  0602 09/02/24  0717   WBC 8.95 8.45   HGB 10.2* 9.4*   HCT 33.2* 31.2*   * 592*     CMP:   Recent Labs   Lab 09/01/24  0602 09/02/24  0717    140   K 3.3* 3.5   * 112*   CO2 20* 19*   * 131*   BUN 9 7*   CREATININE 0.7 0.7   CALCIUM 7.9* 8.3*   PROT 5.2* 5.6*   ALBUMIN 2.1* 2.2*   BILITOT 0.2 0.3   ALKPHOS 162* 154*   AST 14 14   ALT 27 24   ANIONGAP 10 9       Significant Imaging: I have reviewed all pertinent imaging results/findings within the past 24 hours.

## 2024-09-02 NOTE — SUBJECTIVE & OBJECTIVE
Interval History:  Less overall pain.  Tolerating tube feeds.  Bowel function returning to normal    Medications:  Continuous Infusions:  Scheduled Meds:   carBAMazepine  200 mg Per J Tube BID    carBAMazepine  300 mg Per J Tube QHS    enoxparin  40 mg Subcutaneous Daily    levETIRAcetam  500 mg Per J Tube BID    metoclopramide HCl  10 mg Oral Q8H    piperacillin-tazobactam (Zosyn) IV (PEDS and ADULTS) (extended infusion is not appropriate)  4.5 g Intravenous Q8H    zonisamide  200 mg Per J Tube BID    [START ON 9/3/2024] zonisamide  200 mg Per J Tube BID     PRN Meds:  Current Facility-Administered Medications:     dextrose 10%, 12.5 g, Intravenous, PRN    dextrose 10%, 25 g, Intravenous, PRN    glucagon (human recombinant), 1 mg, Intramuscular, PRN    hydrALAZINE, 10 mg, Intravenous, Q6H PRN    hydrocodone-apap 7.5-325 MG/15 ML, 15 mL, Per J Tube, Q4H PRN    hydrocodone-apap 7.5-325 MG/15 ML, 20 mL, Per J Tube, Q6H PRN    HYDROmorphone, 1 mg, Intravenous, Q6H PRN    insulin aspart U-100, 0-5 Units, Subcutaneous, Q4H PRN    loperamide, 2 mg, Oral, QID PRN    naloxone, 0.02 mg, Intravenous, PRN    ondansetron, 4 mg, Intravenous, Q6H PRN    pneumoc 20-faina conj-dip cr(PF), 0.5 mL, Intramuscular, vaccine x 1 dose    promethazine, 25 mg, Rectal, Q6H PRN     Review of patient's allergies indicates:  No Known Allergies  Objective:     Vital Signs (Most Recent):  Temp: 98.6 °F (37 °C) (09/02/24 0757)  Pulse: 89 (09/02/24 0757)  Resp: 18 (09/02/24 0757)  BP: 111/65 (09/02/24 0757)  SpO2: 96 % (09/02/24 0757) Vital Signs (24h Range):  Temp:  [98.2 °F (36.8 °C)-98.8 °F (37.1 °C)] 98.6 °F (37 °C)  Pulse:  [] 89  Resp:  [15-18] 18  SpO2:  [94 %-98 %] 96 %  BP: (111-143)/(61-76) 111/65     Weight: 61.3 kg (135 lb 2.3 oz)  Body mass index is 22.49 kg/m².    Intake/Output - Last 3 Shifts         08/31 0700  09/01 0659 09/01 0700  09/02 0659 09/02 0700  09/03 0659    NG/      Total Intake(mL/kg) 516 (8.4)      Urine  (mL/kg/hr) 0 (0)      Emesis/NG output       Drains 120 30     Stool 0      Total Output 120 30     Net +396 -30            Urine Occurrence 2 x      Stool Occurrence 13 x               Physical Exam  Constitutional:       Appearance: She is well-developed. Ill appearance: mild acute.   HENT:      Head: Normocephalic.   Eyes:      Pupils: Pupils are equal, round, and reactive to light.   Neck:      Thyroid: No thyromegaly.      Vascular: No JVD.      Trachea: No tracheal deviation.   Cardiovascular:      Rate and Rhythm: Normal rate and regular rhythm.      Heart sounds: Normal heart sounds.   Pulmonary:      Breath sounds: Normal breath sounds. No wheezing.   Abdominal:      General: Bowel sounds are normal. There is no distension.      Palpations: Abdomen is soft. Abdomen is not rigid. There is no mass.      Tenderness: There is abdominal tenderness (incisional and jejunostomy tube site). There is no guarding or rebound.      Comments: Incision is clean.  Decreasing erythema and mild drainage at the jejunostomy tube site   Musculoskeletal:         General: Normal range of motion.   Lymphadenopathy:      Cervical: No cervical adenopathy.   Skin:     General: Skin is warm and dry.      Findings: No erythema or rash.   Neurological:      Mental Status: She is oriented to person, place, and time.   Psychiatric:         Mood and Affect: Mood normal.         Behavior: Behavior normal.         Thought Content: Thought content normal.         Judgment: Judgment normal.          Significant Labs:  I have reviewed all pertinent lab results within the past 24 hours.  No new    Significant Diagnostics:  I have reviewed all pertinent imaging results/findings within the past 24 hours.  No new

## 2024-09-02 NOTE — PLAN OF CARE
Discussed poc with pt, pt verbalized understanding    Purposeful rounding every 2hours    VS monitored as ordered  Blood glucose monitoring q4h  Fall precautions in place, remains injury free  Pain and nausea under control with PRN meds    IVFs  Accurate I&Os  Abx given as prescribed  Bed locked at lowest position  Call light within reach    Chart check complete  Will cont with POC

## 2024-09-02 NOTE — PROGRESS NOTES
[facility] Surg  General Surgery  Progress Note    Subjective:     History of Present Illness:  Cheryl Kirkland is a 74 y.o. year old female with a history of ovarian cancer status post surgery and chemotherapy as well as epilepsy and DVT (provoked during her treatment for ovarian cancer), and MSI high gastric adenocarcinoma.  She was originally diagnosed back in February of this past year.  She was noted to have a very proximal enlarged tumor.  EGD was done as was complete metastatic workup which was negative.  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  Due to her significant adhesive disease she understood that she would require an open operation.  She was discussed in multidisciplinary tumor board and the consensus was to proceed with immunotherapy due to her MSI high status.  She completed 5 cycles of neoadjuvant immunotherapy with Keytruda and on restaging imaging was found to have enlarged Danelle portal lymph nodes.  Consensus was that she would need a total gastrectomy due to the proximity of her tumor in relationship to the GE junction.  Due to the extent of the operation required and the her previous history of ovarian cancer the tumor board consensus was to proceed with the EUS guided biopsy to rule out any ovarian cancer and to rule out pseudo progression as well.  This was performed and showed adenocarcinoma consistent with her gastric primary.  Therefore the decision was to proceed with surgical intervention due to the mixed response she had from immunotherapy on PET-CT scan.     Risks and benefits were reviewed including bleeding, infection, pain, scar, damage to surrounding structures, cardiovascular and pulmonary complications, anastomotic leak, positive margins, need for additional procedures, death, and imponderables.  She  expressed understanding and gave informed consent to proceed.    Post-Op Info:  Procedure(s) (LRB):  GASTRECTOMY (N/A)  EGD (ESOPHAGOGASTRODUODENOSCOPY) (N/A)  LAPAROSCOPY, DIAGNOSTIC (N/A)  LYSIS, ADHESIONS (N/A)  INSERTION, JEJUNOSTOMY TUBE (N/A)   13 Days Post-Op     Interval History:  Less overall pain.  Tolerating tube feeds.  Bowel function returning to normal    Medications:  Continuous Infusions:  Scheduled Meds:   carBAMazepine  200 mg Per J Tube BID    carBAMazepine  300 mg Per J Tube QHS    enoxparin  40 mg Subcutaneous Daily    levETIRAcetam  500 mg Per J Tube BID    metoclopramide HCl  10 mg Oral Q8H    piperacillin-tazobactam (Zosyn) IV (PEDS and ADULTS) (extended infusion is not appropriate)  4.5 g Intravenous Q8H    zonisamide  200 mg Per J Tube BID    [START ON 9/3/2024] zonisamide  200 mg Per J Tube BID     PRN Meds:  Current Facility-Administered Medications:     dextrose 10%, 12.5 g, Intravenous, PRN    dextrose 10%, 25 g, Intravenous, PRN    glucagon (human recombinant), 1 mg, Intramuscular, PRN    hydrALAZINE, 10 mg, Intravenous, Q6H PRN    hydrocodone-apap 7.5-325 MG/15 ML, 15 mL, Per J Tube, Q4H PRN    hydrocodone-apap 7.5-325 MG/15 ML, 20 mL, Per J Tube, Q6H PRN    HYDROmorphone, 1 mg, Intravenous, Q6H PRN    insulin aspart U-100, 0-5 Units, Subcutaneous, Q4H PRN    loperamide, 2 mg, Oral, QID PRN    naloxone, 0.02 mg, Intravenous, PRN    ondansetron, 4 mg, Intravenous, Q6H PRN    pneumoc 20-faina conj-dip cr(PF), 0.5 mL, Intramuscular, vaccine x 1 dose    promethazine, 25 mg, Rectal, Q6H PRN     Review of patient's allergies indicates:  No Known Allergies  Objective:     Vital Signs (Most Recent):  Temp: 98.6 °F (37 °C) (09/02/24 0757)  Pulse: 89 (09/02/24 0757)  Resp: 18 (09/02/24 0757)  BP: 111/65 (09/02/24 0757)  SpO2: 96 % (09/02/24 0757) Vital Signs (24h Range):  Temp:  [98.2 °F (36.8 °C)-98.8 °F (37.1 °C)] 98.6 °F (37 °C)  Pulse:  [] 89  Resp:  [15-18] 18  SpO2:  [94 %-98 %] 96  %  BP: (111-143)/(61-76) 111/65     Weight: 61.3 kg (135 lb 2.3 oz)  Body mass index is 22.49 kg/m².    Intake/Output - Last 3 Shifts         08/31 0700  09/01 0659 09/01 0700  09/02 0659 09/02 0700  09/03 0659    NG/      Total Intake(mL/kg) 516 (8.4)      Urine (mL/kg/hr) 0 (0)      Emesis/NG output       Drains 120 30     Stool 0      Total Output 120 30     Net +396 -30            Urine Occurrence 2 x      Stool Occurrence 13 x               Physical Exam  Constitutional:       Appearance: She is well-developed. Ill appearance: mild acute.   HENT:      Head: Normocephalic.   Eyes:      Pupils: Pupils are equal, round, and reactive to light.   Neck:      Thyroid: No thyromegaly.      Vascular: No JVD.      Trachea: No tracheal deviation.   Cardiovascular:      Rate and Rhythm: Normal rate and regular rhythm.      Heart sounds: Normal heart sounds.   Pulmonary:      Breath sounds: Normal breath sounds. No wheezing.   Abdominal:      General: Bowel sounds are normal. There is no distension.      Palpations: Abdomen is soft. Abdomen is not rigid. There is no mass.      Tenderness: There is abdominal tenderness (incisional and jejunostomy tube site). There is no guarding or rebound.      Comments: Incision is clean.  Decreasing erythema and mild drainage at the jejunostomy tube site   Musculoskeletal:         General: Normal range of motion.   Lymphadenopathy:      Cervical: No cervical adenopathy.   Skin:     General: Skin is warm and dry.      Findings: No erythema or rash.   Neurological:      Mental Status: She is oriented to person, place, and time.   Psychiatric:         Mood and Affect: Mood normal.         Behavior: Behavior normal.         Thought Content: Thought content normal.         Judgment: Judgment normal.          Significant Labs:  I have reviewed all pertinent lab results within the past 24 hours.  No new    Significant Diagnostics:  I have reviewed all pertinent imaging results/findings  within the past 24 hours.  No new  Assessment/Plan:     * Gastric adenocarcinoma  POD #12- s/p exlap, extensive SHAYNA, small bowel resection, total gastrectomy with stapled esophagojejunal anastomosis and D2 lymphadenectomy, and Jtube placement    Tolerating tube feeds at 50cc/hr.  Scan and labs all looking ok with no evidence of leak.  I suspect she is not tolerating this formula well.      -- continue Reglan  -- advanced to full liquid diet which will be continued as she was not eating much  -- tube feeds will be continued  reports that she was now having normal bowel movements, tolerating tube feeds   -- continue hycet via J tube for pain   -- ok for liquid medications via J tube  -- Strict I/Os  -- Senna liquid via Jtube BID   -- CM consult for discharge / home health planning   -- OOB To chair and walk today x5, OT/PT consulted  -- encourage IS  Dispo: continued med surg with tele- tentative plan once a full liquid diet is tolerated and the erythema around the jejunostomy tube has improved    Lovenox and SCDs for DVT ppx, no indication for GI ppx     Slightly improved erythema around the jejunostomy tube has improved.  Will continue.  Monitor.  If improvement continues tomorrow convert to oral or liquid antibiotics     If the area worsens she may require drainage at the site    Going back into abdomen at this time would be extremely risky      Primary hypertension  -- appreciate medicine assistance with management     Hx of Epileptic seizures  -- appreciate medicine assistance with management         Rafiq White MD  General Surgery  [facility]

## 2024-09-02 NOTE — SUBJECTIVE & OBJECTIVE
Interval History:  Tolerating tube feeds, not eating much.  Jejunostomy tube site it was less painful in the erythema is improving.  Minimal drainage    Medications:  Continuous Infusions:  Scheduled Meds:   carBAMazepine  200 mg Per J Tube BID    carBAMazepine  300 mg Per J Tube QHS    enoxparin  40 mg Subcutaneous Daily    levETIRAcetam  500 mg Per J Tube BID    metoclopramide HCl  10 mg Oral Q8H    piperacillin-tazobactam (Zosyn) IV (PEDS and ADULTS) (extended infusion is not appropriate)  4.5 g Intravenous Q8H    zonisamide  200 mg Per J Tube BID    [START ON 9/3/2024] zonisamide  200 mg Per J Tube BID     PRN Meds:  Current Facility-Administered Medications:     dextrose 10%, 12.5 g, Intravenous, PRN    dextrose 10%, 25 g, Intravenous, PRN    glucagon (human recombinant), 1 mg, Intramuscular, PRN    hydrALAZINE, 10 mg, Intravenous, Q6H PRN    hydrocodone-apap 7.5-325 MG/15 ML, 15 mL, Per J Tube, Q4H PRN    hydrocodone-apap 7.5-325 MG/15 ML, 20 mL, Per J Tube, Q6H PRN    HYDROmorphone, 1 mg, Intravenous, Q6H PRN    insulin aspart U-100, 0-5 Units, Subcutaneous, Q4H PRN    loperamide, 2 mg, Oral, QID PRN    naloxone, 0.02 mg, Intravenous, PRN    ondansetron, 4 mg, Intravenous, Q6H PRN    pneumoc 20-faina conj-dip cr(PF), 0.5 mL, Intramuscular, vaccine x 1 dose    promethazine, 25 mg, Rectal, Q6H PRN     Review of patient's allergies indicates:  No Known Allergies  Objective:     Vital Signs (Most Recent):  Temp: 98.6 °F (37 °C) (09/02/24 0757)  Pulse: 89 (09/02/24 0757)  Resp: 18 (09/02/24 0757)  BP: 111/65 (09/02/24 0757)  SpO2: 96 % (09/02/24 0757) Vital Signs (24h Range):  Temp:  [98.2 °F (36.8 °C)-98.8 °F (37.1 °C)] 98.6 °F (37 °C)  Pulse:  [] 89  Resp:  [15-18] 18  SpO2:  [94 %-98 %] 96 %  BP: (111-143)/(61-76) 111/65     Weight: 61.3 kg (135 lb 2.3 oz)  Body mass index is 22.49 kg/m².    Intake/Output - Last 3 Shifts         08/31 0700 09/01 0659 09/01 0700 09/02 0659 09/02 0700 09/03 0659    NG/GT  516      Total Intake(mL/kg) 516 (8.4)      Urine (mL/kg/hr) 0 (0)      Emesis/NG output       Drains 120 30     Stool 0      Total Output 120 30     Net +396 -30            Urine Occurrence 2 x      Stool Occurrence 13 x               Physical Exam  Vitals and nursing note reviewed.   Constitutional:       Appearance: She is well-developed. She is ill-appearing (mild acute).   HENT:      Head: Normocephalic.   Eyes:      Pupils: Pupils are equal, round, and reactive to light.   Neck:      Thyroid: No thyromegaly.      Vascular: No JVD.      Trachea: No tracheal deviation.   Cardiovascular:      Rate and Rhythm: Normal rate and regular rhythm.      Heart sounds: Normal heart sounds.   Pulmonary:      Breath sounds: Normal breath sounds. No wheezing.   Abdominal:      General: Abdomen is flat. Bowel sounds are normal. There is no distension.      Palpations: Abdomen is soft. Abdomen is not rigid. There is no mass.      Tenderness: There is abdominal tenderness (incisional and around J-tube site). There is no guarding or rebound.      Comments: Decreasing erythema around jejunostomy tube.  Minimal drainage.  Midline incision is clean   Musculoskeletal:         General: Normal range of motion.   Lymphadenopathy:      Cervical: No cervical adenopathy.   Skin:     General: Skin is warm and dry.      Findings: No erythema or rash.   Neurological:      Mental Status: She is oriented to person, place, and time.   Psychiatric:         Mood and Affect: Mood normal.         Behavior: Behavior normal.         Thought Content: Thought content normal.         Judgment: Judgment normal.          Significant Labs:  I have reviewed all pertinent lab results within the past 24 hours.  No new    Significant Diagnostics:  I have reviewed all pertinent imaging results/findings within the past 24 hours.  No new

## 2024-09-02 NOTE — PLAN OF CARE
TX COMPLETED: facilitated bed mobility with SPV, transfers SPV, ambulated 750 ft SPV with RW. Recommend low intensity PT services

## 2024-09-02 NOTE — PLAN OF CARE
Bed mobility SPV  Toileted with SPV  Ambulated with RW x750 feet SPV  Transfer to bedside chair with RW SPV    [facility]

## 2024-09-02 NOTE — PLAN OF CARE
Discussed poc with pt, pt verbalized understanding    Purposeful rounding every 2hours    VS wnl  Blood glucose monitoring   Fall precautions in place, remains injury free  Pain and nausea under control with PRN meds    IVFs  Accurate I&Os  Abx given as prescribed  Bed locked at lowest position  Call light within reach    Chart check complete  Will cont with POC

## 2024-09-03 PROBLEM — R19.7 DIARRHEA: Status: RESOLVED | Noted: 2024-08-31 | Resolved: 2024-09-03

## 2024-09-03 PROBLEM — Z93.4 JEJUNOSTOMY TUBE PRESENT: Status: ACTIVE | Noted: 2024-09-03

## 2024-09-03 LAB
ALBUMIN SERPL BCP-MCNC: 2.2 G/DL (ref 3.5–5.2)
ALP SERPL-CCNC: 184 U/L (ref 55–135)
ALT SERPL W/O P-5'-P-CCNC: 26 U/L (ref 10–44)
ANION GAP SERPL CALC-SCNC: 9 MMOL/L (ref 8–16)
ANISOCYTOSIS BLD QL SMEAR: SLIGHT
AST SERPL-CCNC: 20 U/L (ref 10–40)
BACTERIA STL CULT: NORMAL
BASOPHILS NFR BLD: 0 % (ref 0–1.9)
BILIRUB SERPL-MCNC: 0.4 MG/DL (ref 0.1–1)
BUN SERPL-MCNC: 6 MG/DL (ref 8–23)
CALCIUM SERPL-MCNC: 8.5 MG/DL (ref 8.7–10.5)
CHLORIDE SERPL-SCNC: 109 MMOL/L (ref 95–110)
CO2 SERPL-SCNC: 21 MMOL/L (ref 23–29)
CREAT SERPL-MCNC: 0.7 MG/DL (ref 0.5–1.4)
DIFFERENTIAL METHOD BLD: ABNORMAL
E COLI SXT1 STL QL IA: NEGATIVE
E COLI SXT2 STL QL IA: NEGATIVE
EOSINOPHIL NFR BLD: 8 % (ref 0–8)
ERYTHROCYTE [DISTWIDTH] IN BLOOD BY AUTOMATED COUNT: 16.6 % (ref 11.5–14.5)
ERYTHROCYTE [DISTWIDTH] IN BLOOD BY AUTOMATED COUNT: 16.7 % (ref 11.5–14.5)
EST. GFR  (NO RACE VARIABLE): >60 ML/MIN/1.73 M^2
GLUCOSE SERPL-MCNC: 103 MG/DL (ref 70–110)
HCT VFR BLD AUTO: 29.7 % (ref 37–48.5)
HCT VFR BLD AUTO: 30.1 % (ref 37–48.5)
HGB BLD-MCNC: 9.3 G/DL (ref 12–16)
HGB BLD-MCNC: 9.4 G/DL (ref 12–16)
IMM GRANULOCYTES # BLD AUTO: ABNORMAL K/UL (ref 0–0.04)
IMM GRANULOCYTES NFR BLD AUTO: ABNORMAL % (ref 0–0.5)
LYMPHOCYTES NFR BLD: 12 % (ref 18–48)
MAGNESIUM SERPL-MCNC: 2 MG/DL (ref 1.6–2.6)
MCH RBC QN AUTO: 33 PG (ref 27–31)
MCH RBC QN AUTO: 33.2 PG (ref 27–31)
MCHC RBC AUTO-ENTMCNC: 31.2 G/DL (ref 32–36)
MCHC RBC AUTO-ENTMCNC: 31.3 G/DL (ref 32–36)
MCV RBC AUTO: 105 FL (ref 82–98)
MCV RBC AUTO: 106 FL (ref 82–98)
METAMYELOCYTES NFR BLD MANUAL: 3 %
MONOCYTES NFR BLD: 3 % (ref 4–15)
MYELOCYTES NFR BLD MANUAL: 2 %
NEUTROPHILS NFR BLD: 69 % (ref 38–73)
NEUTS BAND NFR BLD MANUAL: 3 %
NRBC BLD-RTO: 1 /100 WBC
OVALOCYTES BLD QL SMEAR: ABNORMAL
PHOSPHATE SERPL-MCNC: 3.1 MG/DL (ref 2.7–4.5)
PLATELET # BLD AUTO: 595 K/UL (ref 150–450)
PLATELET # BLD AUTO: 604 K/UL (ref 150–450)
PLATELET BLD QL SMEAR: ABNORMAL
PMV BLD AUTO: 8.6 FL (ref 9.2–12.9)
PMV BLD AUTO: 8.6 FL (ref 9.2–12.9)
POCT GLUCOSE: 100 MG/DL (ref 70–110)
POCT GLUCOSE: 108 MG/DL (ref 70–110)
POCT GLUCOSE: 137 MG/DL (ref 70–110)
POCT GLUCOSE: 87 MG/DL (ref 70–110)
POIKILOCYTOSIS BLD QL SMEAR: SLIGHT
POLYCHROMASIA BLD QL SMEAR: ABNORMAL
POTASSIUM SERPL-SCNC: 3.8 MMOL/L (ref 3.5–5.1)
PROT SERPL-MCNC: 5.8 G/DL (ref 6–8.4)
RBC # BLD AUTO: 2.82 M/UL (ref 4–5.4)
RBC # BLD AUTO: 2.83 M/UL (ref 4–5.4)
SODIUM SERPL-SCNC: 139 MMOL/L (ref 136–145)
SPHEROCYTES BLD QL SMEAR: ABNORMAL
STOMATOCYTES BLD QL SMEAR: PRESENT
TRIGL SERPL-MCNC: 149 MG/DL (ref 30–150)
WBC # BLD AUTO: 7.23 K/UL (ref 3.9–12.7)
WBC # BLD AUTO: 7.37 K/UL (ref 3.9–12.7)

## 2024-09-03 PROCEDURE — 63600175 PHARM REV CODE 636 W HCPCS: Performed by: SURGERY

## 2024-09-03 PROCEDURE — 85007 BL SMEAR W/DIFF WBC COUNT: CPT | Performed by: SURGERY

## 2024-09-03 PROCEDURE — 83735 ASSAY OF MAGNESIUM: CPT | Performed by: SURGERY

## 2024-09-03 PROCEDURE — 25000003 PHARM REV CODE 250: Performed by: SURGERY

## 2024-09-03 PROCEDURE — 84100 ASSAY OF PHOSPHORUS: CPT | Performed by: SURGERY

## 2024-09-03 PROCEDURE — 85027 COMPLETE CBC AUTOMATED: CPT | Performed by: NURSE PRACTITIONER

## 2024-09-03 PROCEDURE — 63600175 PHARM REV CODE 636 W HCPCS: Performed by: NURSE PRACTITIONER

## 2024-09-03 PROCEDURE — 97116 GAIT TRAINING THERAPY: CPT | Mod: CQ

## 2024-09-03 PROCEDURE — 11000001 HC ACUTE MED/SURG PRIVATE ROOM

## 2024-09-03 PROCEDURE — 84478 ASSAY OF TRIGLYCERIDES: CPT | Performed by: SURGERY

## 2024-09-03 PROCEDURE — 36415 COLL VENOUS BLD VENIPUNCTURE: CPT | Performed by: NURSE PRACTITIONER

## 2024-09-03 PROCEDURE — 25000003 PHARM REV CODE 250: Performed by: STUDENT IN AN ORGANIZED HEALTH CARE EDUCATION/TRAINING PROGRAM

## 2024-09-03 PROCEDURE — 80053 COMPREHEN METABOLIC PANEL: CPT | Performed by: SURGERY

## 2024-09-03 PROCEDURE — 97530 THERAPEUTIC ACTIVITIES: CPT

## 2024-09-03 PROCEDURE — 85027 COMPLETE CBC AUTOMATED: CPT | Mod: 91 | Performed by: SURGERY

## 2024-09-03 PROCEDURE — 63600175 PHARM REV CODE 636 W HCPCS: Performed by: STUDENT IN AN ORGANIZED HEALTH CARE EDUCATION/TRAINING PROGRAM

## 2024-09-03 RX ORDER — LEVETIRACETAM 5 MG/ML
500 INJECTION INTRAVASCULAR ONCE
Status: COMPLETED | OUTPATIENT
Start: 2024-09-03 | End: 2024-09-03

## 2024-09-03 RX ORDER — CARBAMAZEPINE 100 MG/5ML
200 SUSPENSION ORAL 2 TIMES DAILY
Status: DISCONTINUED | OUTPATIENT
Start: 2024-09-03 | End: 2024-09-10 | Stop reason: HOSPADM

## 2024-09-03 RX ORDER — LEVETIRACETAM 100 MG/ML
500 SOLUTION ORAL 2 TIMES DAILY
Status: DISCONTINUED | OUTPATIENT
Start: 2024-09-03 | End: 2024-09-10 | Stop reason: HOSPADM

## 2024-09-03 RX ORDER — CARBAMAZEPINE 200 MG/1
200 TABLET ORAL ONCE
Status: DISCONTINUED | OUTPATIENT
Start: 2024-09-03 | End: 2024-09-03

## 2024-09-03 RX ORDER — DEXTROSE MONOHYDRATE, SODIUM CHLORIDE, AND POTASSIUM CHLORIDE 50; 1.49; 4.5 G/1000ML; G/1000ML; G/1000ML
INJECTION, SOLUTION INTRAVENOUS CONTINUOUS
Status: DISCONTINUED | OUTPATIENT
Start: 2024-09-03 | End: 2024-09-06

## 2024-09-03 RX ORDER — MORPHINE SULFATE 2 MG/ML
2 INJECTION, SOLUTION INTRAMUSCULAR; INTRAVENOUS ONCE AS NEEDED
Status: DISCONTINUED | OUTPATIENT
Start: 2024-09-03 | End: 2024-09-10 | Stop reason: HOSPADM

## 2024-09-03 RX ORDER — CARBAMAZEPINE 100 MG/5ML
300 SUSPENSION ORAL NIGHTLY
Status: DISCONTINUED | OUTPATIENT
Start: 2024-09-03 | End: 2024-09-10 | Stop reason: HOSPADM

## 2024-09-03 RX ADMIN — ENOXAPARIN SODIUM 40 MG: 40 INJECTION SUBCUTANEOUS at 05:09

## 2024-09-03 RX ADMIN — ZONISAMIDE 200 MG: 100 SUSPENSION ORAL at 10:09

## 2024-09-03 RX ADMIN — LEVETIRACETAM 500 MG: 100 SOLUTION ORAL at 10:09

## 2024-09-03 RX ADMIN — HYDROMORPHONE HYDROCHLORIDE 1 MG: 2 INJECTION, SOLUTION INTRAMUSCULAR; INTRAVENOUS; SUBCUTANEOUS at 10:09

## 2024-09-03 RX ADMIN — DEXTROSE, SODIUM CHLORIDE, AND POTASSIUM CHLORIDE: 5; .45; .15 INJECTION INTRAVENOUS at 10:09

## 2024-09-03 RX ADMIN — HYDROCODONE BITARTRATE AND ACETAMINOPHEN 15 ML: 7.5; 325 SOLUTION ORAL at 10:09

## 2024-09-03 RX ADMIN — CARBAMAZEPINE 300 MG: 100 SUSPENSION ORAL at 10:09

## 2024-09-03 RX ADMIN — METOCLOPRAMIDE HYDROCHLORIDE 10 MG: 5 SOLUTION ORAL at 06:09

## 2024-09-03 RX ADMIN — METOCLOPRAMIDE HYDROCHLORIDE 10 MG: 5 SOLUTION ORAL at 03:09

## 2024-09-03 RX ADMIN — PIPERACILLIN SODIUM AND TAZOBACTAM SODIUM 4.5 G: 4; .5 INJECTION, POWDER, FOR SOLUTION INTRAVENOUS at 02:09

## 2024-09-03 RX ADMIN — CARBAMAZEPINE 200 MG: 100 SUSPENSION ORAL at 03:09

## 2024-09-03 RX ADMIN — METOCLOPRAMIDE HYDROCHLORIDE 10 MG: 5 SOLUTION ORAL at 10:09

## 2024-09-03 RX ADMIN — HYDROMORPHONE HYDROCHLORIDE 1 MG: 2 INJECTION, SOLUTION INTRAMUSCULAR; INTRAVENOUS; SUBCUTANEOUS at 12:09

## 2024-09-03 RX ADMIN — HYDROMORPHONE HYDROCHLORIDE 1 MG: 2 INJECTION, SOLUTION INTRAMUSCULAR; INTRAVENOUS; SUBCUTANEOUS at 05:09

## 2024-09-03 RX ADMIN — PIPERACILLIN SODIUM AND TAZOBACTAM SODIUM 4.5 G: 4; .5 INJECTION, POWDER, FOR SOLUTION INTRAVENOUS at 05:09

## 2024-09-03 RX ADMIN — LEVETIRACETAM INJECTION 500 MG: 5 INJECTION INTRAVENOUS at 09:09

## 2024-09-03 RX ADMIN — PIPERACILLIN SODIUM AND TAZOBACTAM SODIUM 4.5 G: 4; .5 INJECTION, POWDER, FOR SOLUTION INTRAVENOUS at 11:09

## 2024-09-03 NOTE — PT/OT/SLP PROGRESS
Physical Therapy  Treatment    Cheryl Kirkland   MRN: [id number]   Admitting Diagnosis: Gastric adenocarcinoma    PT Received On: 09/03/24  PT Start Time: 1005     PT Stop Time: 1020    PT Total Time (min): 15 min       Billable Minutes:  Gait Training 15    Treatment Type: Treatment  PT/PTA: PTA     Number of PTA visits since last PT visit: 1       General Precautions: Standard, fall  Orthopedic Precautions: N/A  Braces: N/A  Respiratory Status: Room air    Spiritual, Cultural Beliefs, Jainism Practices, Values that Affect Care: no    Subjective:  Communicated with patient's nurse, Marina, and completed Epic chart review prior to session.  Patient agreed to PT session.     Pain/Comfort  Pain Rating 1: 0/10  Pain Rating Post-Intervention 1: 0/10    Objective:   Patient found with: peripheral IV, telemetry, PEG Tube, APOLINAR drain    Patient found exiting bathroom.     Ambulated greater than 400ft No AD SPV    Stand pivot T/F to chair No AD: SPV    Reviewed AROM TE to BLE including: hip flex/ext, knee flex/ext, ankle PF/DF  To be completed a minimum of 10 reps for each LE in order to promote return of function, strength and ROM.     Educated patient on importance of increased tolerance to upright position and direct impact on CV endurance and strength. Patient encouraged to sit up in chair/ EOB, for a minimum of 2 consecutive hours, 3x per day. Encouraged patient to perform AROM TE to BLE throughout the day within all available planes of motion. Re enforced importance of utilizing call light to meet needs in room and not attempt to get up without staff assistance. Patient verbalized understanding and agreed to comply.       AM-PAC 6 CLICK MOBILITY  How much help from another person does this patient currently need?   1 = Unable, Total/Dependent Assistance  2 = A lot, Maximum/Moderate Assistance  3 = A little, Minimum/Contact Guard/Supervision  4 = None, Modified Swisher/Independent    Turning over in bed  (including adjusting bedclothes, sheets and blankets)?: 1 (NT)  Sitting down on and standing up from a chair with arms (e.g., wheelchair, bedside commode, etc.): 1 (NT)  Moving from lying on back to sitting on the side of the bed?: 1 (NT)  Moving to and from a bed to a chair (including a wheelchair)?: 4  Need to walk in hospital room?: 4  Climbing 3-5 steps with a railing?: 1 (NT)  Basic Mobility Total Score: 12    AM-PAC Raw Score CMS G-Code Modifier Level of Impairment Assistance   6 % Total / Unable   7 - 9 CM 80 - 100% Maximal Assist   10 - 14 CL 60 - 80% Moderate Assist   15 - 19 CK 40 - 60% Moderate Assist   20 - 22 CJ 20 - 40% Minimal Assist   23 CI 1-20% SBA / CGA   24 CH 0% Independent/ Mod I     Patient left up in chair with call button in reach.    Assessment:  Cheryl Kirkland is a 74 y.o. female with a medical diagnosis of Gastric adenocarcinoma and presents with overall decline in functional mobility. Patient would continue to benefit from skilled PT to address functional limitations listed below in order to return to PLOF/decrease caregiver burden.     Rehab identified problem list/impairments: weakness, impaired endurance, impaired functional mobility, gait instability, impaired balance, decreased coordination, decreased safety awareness, pain    Rehab potential is good.    Activity tolerance: Good    Discharge recommendations: Low Intensity Therapy      Barriers to discharge:      Equipment recommendations: walker, rolling, bath bench     GOALS:   Multidisciplinary Problems       Physical Therapy Goals          Problem: Physical Therapy    Goal Priority Disciplines Outcome Goal Variances Interventions   Physical Therapy Goal     PT, PT/OT Progressing     Description: LTG'S TO BE MET IN 14 DAYS (9-4-24)  PT WILL REQUIRE SPV FOR BED MOBILITY  PT WILL REQUIRE SPV FOR BED<>CHAIR TF'S  PT WILL  FEET WITH RW AND SPV  PT WILL INC AMPAC SCORE BY 2 POINTS TO PROGRESS GROSS FUNC  MOBILITY                         PLAN:    Patient to be seen 3 x/week to address the above listed problems via gait training, therapeutic activities, therapeutic exercises  Plan of Care expires: 09/04/24  Plan of Care reviewed with: patient         09/03/2024

## 2024-09-03 NOTE — PLAN OF CARE
Problem: Adult Inpatient Plan of Care  Goal: Plan of Care Review  Outcome: Progressing  Goal: Patient-Specific Goal (Individualized)  Outcome: Progressing  Goal: Absence of Hospital-Acquired Illness or Injury  Outcome: Progressing  Goal: Optimal Comfort and Wellbeing  Outcome: Progressing  Goal: Readiness for Transition of Care  Outcome: Progressing     Problem: Infection  Goal: Absence of Infection Signs and Symptoms  Outcome: Progressing     Problem: Wound  Goal: Optimal Coping  Outcome: Progressing  Goal: Optimal Functional Ability  Outcome: Progressing  Goal: Absence of Infection Signs and Symptoms  Outcome: Progressing  Goal: Improved Oral Intake  Outcome: Progressing  Goal: Optimal Pain Control and Function  Outcome: Progressing  Goal: Skin Health and Integrity  Outcome: Progressing  Goal: Optimal Wound Healing  Outcome: Progressing     Problem: Fall Injury Risk  Goal: Absence of Fall and Fall-Related Injury  Outcome: Progressing     Problem: Skin Injury Risk Increased  Goal: Skin Health and Integrity  Outcome: Progressing   Discussed poc with pt, pt verbalized understanding    Purposeful rounding every 2hours    VS wnl  Cardiac monitoring in use  Blood glucose monitoring   Fall precautions in place, remains injury free  Pt c/o pain   Pain under control with PRN meds  Abx given as prescribed  J tube still not in use, awaiting for doctors approval to start use again   Bed locked at lowest position  Call light within reach    Chart check complete  Will cont with POC

## 2024-09-03 NOTE — PLAN OF CARE
Discussed poc with pt, pt verbalized understanding    Purposeful rounding every 2hours    VS monitored as ordered  Blood glucose monitoring   Fall precautions in place, remains injury free  Pain and nausea under control with PRN meds    IVFs  Tube feedings held. Patient had moderate amount of drainage to midline surgical incision. Gen Surgery and Hospital med notified. KUB ordered. Gen Surgery advised Dr Jerome will follow-up this morning.     Accurate I&Os  Abx given as prescribed  Bed locked at lowest position  Call light within reach    Chart check complete  Will cont with POC

## 2024-09-03 NOTE — NURSING
Med pass to be delayed due to meds needing to be given per j tube     MDs have been notified that patient has time critical meds that need to be given per j tube     Another alternative for med administration has been asked about by night shift nurse Mily, CINDY via secure chat, MD to talk to surgery for decision     If decision is not made before 1000, I, CINDY Gray will chart against meds, with note to be in place for reasoning, MD has been notified

## 2024-09-03 NOTE — PROGRESS NOTES
[facility]  General Surgery  Progress Note    Subjective:     History of Present Illness:  Cheryl Kirkland is a 74 y.o. year old female with a history of ovarian cancer status post surgery and chemotherapy as well as epilepsy and DVT (provoked during her treatment for ovarian cancer), and MSI high gastric adenocarcinoma.  She was originally diagnosed back in February of this past year.  She was noted to have a very proximal enlarged tumor.  EGD was done as was complete metastatic workup which was negative.  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  Due to her significant adhesive disease she understood that she would require an open operation.  She was discussed in multidisciplinary tumor board and the consensus was to proceed with immunotherapy due to her MSI high status.  She completed 5 cycles of neoadjuvant immunotherapy with Keytruda and on restaging imaging was found to have enlarged Danelle portal lymph nodes.  Consensus was that she would need a total gastrectomy due to the proximity of her tumor in relationship to the GE junction.  Due to the extent of the operation required and the her previous history of ovarian cancer the tumor board consensus was to proceed with the EUS guided biopsy to rule out any ovarian cancer and to rule out pseudo progression as well.  This was performed and showed adenocarcinoma consistent with her gastric primary.  Therefore the decision was to proceed with surgical intervention due to the mixed response she had from immunotherapy on PET-CT scan.     Risks and benefits were reviewed including bleeding, infection, pain, scar, damage to surrounding structures, cardiovascular and pulmonary complications, anastomotic leak, positive margins, need for additional procedures, death, and imponderables.  She  expressed understanding and gave informed consent to proceed.    Post-Op Info:  Procedure(s) (LRB):  GASTRECTOMY (N/A)  EGD (ESOPHAGOGASTRODUODENOSCOPY) (N/A)  LAPAROSCOPY, DIAGNOSTIC (N/A)  LYSIS, ADHESIONS (N/A)  INSERTION, JEJUNOSTOMY TUBE (N/A)   14 Days Post-Op     Interval History:  Patient was noted to have thick drainage from the wound.  The tube feeds were held.  Staples were removed the wound was explored.  No essie evidence of a leak.  CT scan with oral contrast.  Hold tube feeds.  IV fluids.      Await CT scan results    Medications:  Continuous Infusions:  Scheduled Meds:   carBAMazepine  200 mg Oral BID    carBAMazepine  300 mg Oral QHS    enoxparin  40 mg Subcutaneous Daily    levETIRAcetam  500 mg Per J Tube BID    metoclopramide HCl  10 mg Oral Q8H    piperacillin-tazobactam (Zosyn) IV (PEDS and ADULTS) (extended infusion is not appropriate)  4.5 g Intravenous Q8H    zonisamide  200 mg Per J Tube BID     PRN Meds:  Current Facility-Administered Medications:     dextrose 10%, 12.5 g, Intravenous, PRN    dextrose 10%, 25 g, Intravenous, PRN    glucagon (human recombinant), 1 mg, Intramuscular, PRN    hydrALAZINE, 10 mg, Intravenous, Q6H PRN    hydrocodone-apap 7.5-325 MG/15 ML, 15 mL, Per J Tube, Q4H PRN    hydrocodone-apap 7.5-325 MG/15 ML, 20 mL, Per J Tube, Q6H PRN    HYDROmorphone, 1 mg, Intravenous, Q6H PRN    insulin aspart U-100, 0-5 Units, Subcutaneous, Q4H PRN    loperamide, 2 mg, Oral, QID PRN    morphine, 2 mg, Intravenous, Once PRN    naloxone, 0.02 mg, Intravenous, PRN    ondansetron, 4 mg, Intravenous, Q6H PRN    pneumoc 20-faina conj-dip cr(PF), 0.5 mL, Intramuscular, vaccine x 1 dose    promethazine, 25 mg, Rectal, Q6H PRN     Review of patient's allergies indicates:  No Known Allergies  Objective:     Vital Signs (Most Recent):  Temp: 99 °F (37.2 °C) (09/03/24 1219)  Pulse: 76 (09/03/24 1219)  Resp: 20 (09/03/24 1219)  BP: (!) 131/59 (09/03/24 1219)  SpO2: 97 % (09/03/24 1219) Vital  Signs (24h Range):  Temp:  [98.4 °F (36.9 °C)-99.1 °F (37.3 °C)] 99 °F (37.2 °C)  Pulse:  [76-87] 76  Resp:  [16-20] 20  SpO2:  [94 %-98 %] 97 %  BP: (112-131)/(59-63) 131/59     Weight: 61.3 kg (135 lb 2.3 oz)  Body mass index is 22.49 kg/m².    Intake/Output - Last 3 Shifts         09/01 0700  09/02 0659 09/02 0700 09/03 0659 09/03 0700 09/04 0659    NG/GT  620     Total Intake(mL/kg)  620 (10.1)     Urine (mL/kg/hr)  0 (0)     Drains 30 70 20    Stool       Total Output 30 70 20    Net -30 +550 -20           Urine Occurrence  2 x              Physical Exam  Vitals and nursing note reviewed.   Constitutional:       Appearance: She is well-developed. Ill appearance: less acute.   HENT:      Head: Normocephalic.   Eyes:      Pupils: Pupils are equal, round, and reactive to light.   Neck:      Thyroid: No thyromegaly.      Vascular: No JVD.      Trachea: No tracheal deviation.   Cardiovascular:      Rate and Rhythm: Normal rate and regular rhythm.      Heart sounds: Normal heart sounds.   Pulmonary:      Breath sounds: Normal breath sounds. No wheezing.   Abdominal:      General: Abdomen is flat. Bowel sounds are normal. There is no distension.      Palpations: Abdomen is soft. Abdomen is not rigid. There is no mass.      Tenderness: There is no abdominal tenderness. There is no guarding or rebound.      Comments: Jejunostomy site has minimal drainage around it.  The staples were removed from the upper portion of the wound.  There was some thick purulent looking material but no essie evidence of feeds.   Musculoskeletal:         General: Normal range of motion.   Lymphadenopathy:      Cervical: No cervical adenopathy.   Skin:     General: Skin is warm and dry.      Findings: No erythema or rash.   Neurological:      Mental Status: She is oriented to person, place, and time.          Significant Labs:  I have reviewed all pertinent lab results within the past 24 hours.  CBC:   Recent Labs   Lab 09/03/24  0636   WBC  7.37  7.23   RBC 2.82*  2.83*   HGB 9.3*  9.4*   HCT 29.7*  30.1*   *  604*   *  106*   MCH 33.0*  33.2*   MCHC 31.3*  31.2*     BMP:   Recent Labs   Lab 09/03/24  0636         K 3.8      CO2 21*   BUN 6*   CREATININE 0.7   CALCIUM 8.5*   MG 2.0     CMP:   Recent Labs   Lab 09/03/24  0636      CALCIUM 8.5*   ALBUMIN 2.2*   PROT 5.8*      K 3.8   CO2 21*      BUN 6*   CREATININE 0.7   ALKPHOS 184*   ALT 26   AST 20   BILITOT 0.4       Significant Diagnostics:  I have reviewed all pertinent imaging results/findings within the past 24 hours.  CT scan with oral contrast through the jejunostomy tube and mouth were ordered  Assessment/Plan:     * Gastric adenocarcinoma  POD #13- s/p exlap, extensive SHAYNA, small bowel resection, total gastrectomy with stapled esophagojejunal anastomosis and D2 lymphadenectomy, and Jtube placement    Overall she was feeling better.  There was some thick drainage from her wound in her tube feeds were held.  A CT scan is being obtained to further evaluate this.    -- continue Reglan  -- a NPO except for mesh  -- tube feeds are currently being held  -- continue hycet via J tube for pain   -- ok for liquid medications via J tube if needed  -- Strict I/Os  -- Senna liquid via Jtube BID   -- CM consult for discharge / home health planning   -- OOB To chair and walk today x5, OT/PT consulted  -- encourage IS  Dispo: continued med surg with tele- tentative plan once a full liquid diet is tolerated and the erythema around the jejunostomy tube has improved    Lovenox and SCDs for DVT ppx, no indication for GI ppx     Slightly improved erythema around the jejunostomy tube has improved.  Will continue.  Monitor.  If improvement continues tomorrow convert to oral or liquid antibiotics     Further recommendations pending CT scan    Going back into abdomen at this time would be extremely risky      Primary hypertension  -- appreciate medicine  assistance with management     Hx of Epileptic seizures  -- appreciate medicine assistance with management         Rafiq White MD  General Surgery  [facility]

## 2024-09-03 NOTE — PROGRESS NOTES
[facility] Medicine  Progress Note    Patient Name: Cheryl Kirkland  MRN: [id number]  Patient Class: IP- Inpatient   Admission Date: 8/20/2024  Length of Stay: 14 days  Attending Physician: Chen Jerome MD  Primary Care Provider: Gagandeep Iglesias MD        Subjective:     Principal Problem:Gastric adenocarcinoma        HPI:  74F  has a past medical history of Anemia, Chronic deep vein thrombosis (DVT) of left lower extremity, Deep vein thrombosis, Drug-induced polyneuropathy, DVT (deep venous thrombosis), Epilepsy, Gastric adenocarcinoma, GERD (gastroesophageal reflux disease), Hyperlipidemia, Mixed epithelial carcinoma of right ovary, OP (osteoporosis), Ovarian cancer, and Primary hypertension.  Presents for definitive treatment of gastric adenocaricoma per primary. Status post total gastrectomy with j tube placement. Transferred to icu postoperatively for close monitoring. Tolerated procedure well. Expected pain and weakness. Physical/occupational therapy recommending low intensity therapy.    Hospital medicine consulted for medical management.    Initial review of chart reveals vital signs stable, on room air. Cbc unremarkable. Cmp with hyperchloremic acidosis improving. Normal renal function. Elevated liver enzymes. Amylase within normal limits. Hypomag. Hypoalbuminemia. Xray abdomen on 8/20 with operative changes.    Overview/Hospital Course:  8/23 admitted for definitive management of gastric adenocarcinoma with total gastrectomy and j tube placement by primary. Upper gastroenterology procedure with no anastomotic leak identified. Possible ngt removal per primary. Physical/occupational therapy recommending low intensity therapy. Antiepileptic levels pending  8/24 antiepileptic levels pending. Ng tube removed. Reports visual disturbance but improving. Has not worked with physical/occupational therapy today.  8/25 doing well. +bowel movement x 2. pca discontinued per primary.  Trickle feeds to be increased per primary.  8/26 patient feels weak today with some shortness of breath.  She states she feels some nausea and just no energy.  She was able to participate therapy and walk in brown with walker  8/27 patient continues to feel weak.  8/28 patient feels much better today.  No nausea no vomiting.  Has walked 3 times with the assistance today.  8/29- POD 9- developed some Abd fullness- hence Dr. Jerome switched to CLD while hold TF and cont Reglan. Getting PT/OT. Cont present care as per Dr. Jerome. Labs stable. PO4 2.5.    08/30 POD 10- reports being able to tolerate CLD. Started on TPN. Tube feeds resumed. Reports pain around J tube site, evaluation by CT imaging and General Surgery did not note any indication for intervention.   08/31 POD11- still reporting pain around J tube insertion site, started on antibiotics per General surgery.  Reports diarrhea, stool studies pending  09/01- POD 12- reports improvement in pain around J tube site.  General surgery evaluation today recommended discontinuation of peripheral nutrition.  Stool studies negative so far, diarrhea alleviating with loperamide  09/02: POD 13: improvement of erythema surrounding J tube site. Slightly tender. Tolerating TF and medications through tube. PT/TO recommends home health. She remains on full liquid diet.  09/03: POD 14:  Overnight concerns for leak/drainage through abdominal incisions. Tube feedings held.  Patient continues to report bowel movements. Acute abdomen signs absent on physical exam. General surgery evaluated patient and planning for further imaging to evaluate. Erythema around J tube site continues to improve.     Interval History: Overnight concerns for leak/drainage through abdominal incisions. Tube feedings held.  Patient continues to report bowel movements. Rebound or guarding absent on physical exam. General surgery evaluated patient and planning for further imaging to evaluate. Erythema around J  tube site continues to improve.    Picture from overnight concerns for leak/drainage through abdominal incisions.          Objective:     Vital Signs (Most Recent):  Temp: 99 °F (37.2 °C) (09/03/24 1219)  Pulse: 76 (09/03/24 1219)  Resp: 20 (09/03/24 1219)  BP: (!) 131/59 (09/03/24 1219)  SpO2: 97 % (09/03/24 1219) Vital Signs (24h Range):  Temp:  [98.4 °F (36.9 °C)-99.4 °F (37.4 °C)] 99 °F (37.2 °C)  Pulse:  [76-92] 76  Resp:  [16-20] 20  SpO2:  [94 %-98 %] 97 %  BP: (112-131)/(59-69) 131/59     Weight: 61.3 kg (135 lb 2.3 oz)  Body mass index is 22.49 kg/m².    Intake/Output Summary (Last 24 hours) at 9/3/2024 1539  Last data filed at 9/3/2024 1130  Gross per 24 hour   Intake 570 ml   Output 90 ml   Net 480 ml         Physical Exam  Vitals and nursing note reviewed. Exam conducted with a chaperone present (Dr. Christopher MD).   Constitutional:       General: She is not in acute distress.     Appearance: Normal appearance. She is not ill-appearing.   HENT:      Head: Normocephalic and atraumatic.      Mouth/Throat:      Mouth: Mucous membranes are moist.   Eyes:      General: No scleral icterus.        Right eye: No discharge.         Left eye: No discharge.   Cardiovascular:      Rate and Rhythm: Normal rate and regular rhythm.   Pulmonary:      Effort: Pulmonary effort is normal. No respiratory distress.      Breath sounds: No wheezing, rhonchi or rales.   Abdominal:      General: Abdomen is flat. A surgical scar is present. There is no distension.      Palpations: Abdomen is soft. There is no mass.      Tenderness: There is abdominal tenderness (around J tube insertion site, improving). There is no guarding or rebound.      Hernia: No hernia is present.      Comments: TTP, J tube in place- some swelling/ erythema around but no purulence, incision c/d/i   Musculoskeletal:         General: No swelling, tenderness, deformity or signs of injury. Normal range of motion.   Skin:     General: Skin is warm and dry.       "Coloration: Skin is not jaundiced.   Neurological:      General: No focal deficit present.      Mental Status: She is alert and oriented to person, place, and time.   Psychiatric:         Mood and Affect: Mood normal.         Behavior: Behavior normal.         Thought Content: Thought content normal.             Significant Labs: All pertinent labs within the past 24 hours have been reviewed.  CBC:   Recent Labs   Lab 09/02/24  0717 09/03/24  0636   WBC 8.45 7.37  7.23   HGB 9.4* 9.3*  9.4*   HCT 31.2* 29.7*  30.1*   * 595*  604*     CMP:   Recent Labs   Lab 09/02/24  0717 09/03/24  0636    139   K 3.5 3.8   * 109   CO2 19* 21*   * 103   BUN 7* 6*   CREATININE 0.7 0.7   CALCIUM 8.3* 8.5*   PROT 5.6* 5.8*   ALBUMIN 2.2* 2.2*   BILITOT 0.3 0.4   ALKPHOS 154* 184*   AST 14 20   ALT 24 26   ANIONGAP 9 9       Significant Imaging: I have reviewed all pertinent imaging results/findings within the past 24 hours.    Assessment/Plan:      * Gastric adenocarcinoma  Status post total gastrectomy with j tube placement  Per primary    General surgery following    Jejunostomy tube present  See "Gastric adenocarcinoma " A&P        Primary hypertension  Chronic, controlled. Latest blood pressure and vitals reviewed-     Temp:  [98.4 °F (36.9 °C)-99.4 °F (37.4 °C)]   Pulse:  [76-92]   Resp:  [16-20]   BP: (112-131)/(59-69)   SpO2:  [94 %-98 %] .   Home meds for hypertension were reviewed and noted below.       While in the hospital, will manage blood pressure as follows; Adjust home antihypertensive regimen as follows- monitor, avoid aggressive management of blood pressure in setting of recent surgery     Will utilize p.r.n. blood pressure medication only if patient's blood pressure greater than 160/100 and she develops symptoms such as worsening chest pain or shortness of breath.    Chronic deep vein thrombosis (DVT) of left lower extremity   -Previously on Xarelto, and evaluation by Oncology noted " concerns DVT likely secondary to being provoked  from ovarian cancer. Held after concerns for GI bleed early 2024      Scds and lovenox for prophy.    DVT (deep venous thrombosis)  -Initially presented to primary care provider with lower extremity edema   -Workup revealed dvt 2/2 cancer  -Previously on Xarelto, and evaluation by Oncology noted concerns DVT likely secondary to being provoked  from ovarian cancer. Held after concerns for GI bleed early 2024    PLAN:  On loveonx prophy and scds  Ambulate     Hx of Epileptic seizures  Resume home meds per j tube  Carbamezapine in therapeutic range  Zonisamide within normal limits   Levetiracetam within normal limits    stable      VTE Risk Mitigation (From admission, onward)           Ordered     enoxaparin injection 40 mg  Daily         08/20/24 2219     IP VTE HIGH RISK PATIENT  Once         08/20/24 2219     Place sequential compression device  Until discontinued         08/20/24 2219                    Discharge Planning   SOFIYA:      Code Status: Full Code   Is the patient medically ready for discharge?:     Reason for patient still in hospital (select all that apply): Patient trending condition, Laboratory test, Imaging, and Consult recommendations  Discharge Plan A: Home Health            Yuri Arteaga DO  Department of Hospital Medicine   [facility]    Voice recognition software was used in the creation of this note/communication and any sound-alike/typographical errors which may have occurred despite initial review prior to signing should be taken in context when interpreting.  If such errors prevent a clear understanding of the note/communication, please contact the provider/office for clarification.

## 2024-09-03 NOTE — SUBJECTIVE & OBJECTIVE
Interval History: Overnight concerns for leak/drainage through abdominal incisions. Tube feedings held.  Patient continues to report bowel movements. Rebound or guarding absent on physical exam. General surgery evaluated patient and planning for further imaging to evaluate. Erythema around J tube site continues to improve.    Picture from overnight concerns for leak/drainage through abdominal incisions.          Objective:     Vital Signs (Most Recent):  Temp: 99 °F (37.2 °C) (09/03/24 1219)  Pulse: 76 (09/03/24 1219)  Resp: 20 (09/03/24 1219)  BP: (!) 131/59 (09/03/24 1219)  SpO2: 97 % (09/03/24 1219) Vital Signs (24h Range):  Temp:  [98.4 °F (36.9 °C)-99.4 °F (37.4 °C)] 99 °F (37.2 °C)  Pulse:  [76-92] 76  Resp:  [16-20] 20  SpO2:  [94 %-98 %] 97 %  BP: (112-131)/(59-69) 131/59     Weight: 61.3 kg (135 lb 2.3 oz)  Body mass index is 22.49 kg/m².    Intake/Output Summary (Last 24 hours) at 9/3/2024 1539  Last data filed at 9/3/2024 1130  Gross per 24 hour   Intake 570 ml   Output 90 ml   Net 480 ml         Physical Exam  Vitals and nursing note reviewed. Exam conducted with a chaperone present (Dr. Christopher MD).   Constitutional:       General: She is not in acute distress.     Appearance: Normal appearance. She is not ill-appearing.   HENT:      Head: Normocephalic and atraumatic.      Mouth/Throat:      Mouth: Mucous membranes are moist.   Eyes:      General: No scleral icterus.        Right eye: No discharge.         Left eye: No discharge.   Cardiovascular:      Rate and Rhythm: Normal rate and regular rhythm.   Pulmonary:      Effort: Pulmonary effort is normal. No respiratory distress.      Breath sounds: No wheezing, rhonchi or rales.   Abdominal:      General: Abdomen is flat. A surgical scar is present. There is no distension.      Palpations: Abdomen is soft. There is no mass.      Tenderness: There is abdominal tenderness (around J tube insertion site, improving). There is no guarding or rebound.       Hernia: No hernia is present.      Comments: TTP, J tube in place- some swelling/ erythema around but no purulence, incision c/d/i   Musculoskeletal:         General: No swelling, tenderness, deformity or signs of injury. Normal range of motion.   Skin:     General: Skin is warm and dry.      Coloration: Skin is not jaundiced.   Neurological:      General: No focal deficit present.      Mental Status: She is alert and oriented to person, place, and time.   Psychiatric:         Mood and Affect: Mood normal.         Behavior: Behavior normal.         Thought Content: Thought content normal.             Significant Labs: All pertinent labs within the past 24 hours have been reviewed.  CBC:   Recent Labs   Lab 09/02/24  0717 09/03/24  0636   WBC 8.45 7.37  7.23   HGB 9.4* 9.3*  9.4*   HCT 31.2* 29.7*  30.1*   * 595*  604*     CMP:   Recent Labs   Lab 09/02/24  0717 09/03/24  0636    139   K 3.5 3.8   * 109   CO2 19* 21*   * 103   BUN 7* 6*   CREATININE 0.7 0.7   CALCIUM 8.3* 8.5*   PROT 5.6* 5.8*   ALBUMIN 2.2* 2.2*   BILITOT 0.3 0.4   ALKPHOS 154* 184*   AST 14 20   ALT 24 26   ANIONGAP 9 9       Significant Imaging: I have reviewed all pertinent imaging results/findings within the past 24 hours.

## 2024-09-03 NOTE — PT/OT/SLP PROGRESS
"Occupational Therapy   Treatment and Discharge    Name: Cheryl Kirkland  MRN: [id number]  Admitting Diagnosis:  Gastric adenocarcinoma  14 Days Post-Op    Recommendations:     Discharge Recommendations: Low Intensity Therapy  Discharge Equipment Recommendations:  walker, rolling, bath bench  Barriers to discharge:  None    Assessment:     Cheryl Kirkland is a 74 y.o. female with a medical diagnosis of Gastric adenocarcinoma.  She was greeted exiting restroom and reports toileting and dressing independently in room. Pt tolerating 400+ft functional mobility with no AD, LOB, or SOB. Pt has met all goals for acute OT services and will discharge as such.     Rehab Prognosis:  Good; patient would benefit from acute skilled OT services to address these deficits and reach maximum level of function.       Plan:     Patient to be seen 2 x/week to address the above listed problems via self-care/home management, therapeutic activities, therapeutic exercises  Plan of Care Expires: 10/04/24  Plan of Care Reviewed with: patient    Subjective     Chief Complaint: "I've been doing things on my own."  Patient/Family Comments/goals: To return home.   Pain/Comfort:  Pain Rating 1: 0/10    Objective:     Communicated with: RN prior to session.  Patient found ambulatory in room/brown with peripheral IV, telemetry, PEG Tube upon OT entry to room.    General Precautions: Standard, fall    Orthopedic Precautions:N/A  Braces: N/A  Respiratory Status: Room air     Occupational Performance:     Functional Mobility/Transfers:  Patient completed Sit <> Stand Transfer with independence  with  no assistive device   Patient completed Bed <> Chair Transfer using Step Transfer technique with independence with no assistive device  Patient completed Toilet Transfer Step Transfer technique with independence with  no AD  Functional Mobility: Pt ambulating in hallway 400+ ft with no AD or LOB to increase endurance needed for return home. "     Activities of Daily Living:  Grooming: independence to wash hands standing at sink  Lower Body Dressing: independence to don socks  Toileting: independence for clothing mgmt and hygiene      [facility] 6 Click ADL: 24    Treatment & Education:  Pt completed toileting and ambulation with increased independence, indicating improved activity tolerance and safety awareness. Discharging from acute care services.     Patient left up in chair with all lines intact and call button in reach    GOALS:   Multidisciplinary Problems       Occupational Therapy Goals       Not on file              Multidisciplinary Problems (Resolved)          Problem: Occupational Therapy    Goal Priority Disciplines Outcome Interventions   Occupational Therapy Goal   (Resolved)     OT, PT/OT Met    Description: Goals to be met by: 9/4/24     Patient will increase functional independence with ADLs by performing:    Toileting from toilet with Supervision for hygiene and clothing management.   Toilet transfer to toilet with Supervision.  Increased functional strength in B UE grossly by 1/2 MM grade.                         Time Tracking:     OT Date of Treatment: 09/03/24  OT Start Time: 1008  OT Stop Time: 1022  OT Total Time (min): 14 min    Billable Minutes:Therapeutic Activity 14    OT/PARAMJIT: OT          9/3/2024

## 2024-09-03 NOTE — NURSING
Patient's midline incision began oozing a moderate amount of drainage with scant amount of blood after having a 3rd BM since shift change(photo uploaded). Patient denies pain, abdomen rigid at the top of midline incision and around peg tube. One staple displaced to top of incision. Midline cleansed with saline and covered with abd pad and tape.     On-call general surgery notified and advised to reinforce with bandage and have Dr Jerome take a look in the AM.     Dr Shell advised to hold tube feedings and placed stat KUB.     Tube feeding held pending KUB results.

## 2024-09-03 NOTE — SUBJECTIVE & OBJECTIVE
Interval History:  Patient was noted to have thick drainage from the wound.  The tube feeds were held.  Staples were removed the wound was explored.  No essie evidence of a leak.  CT scan with oral contrast.  Hold tube feeds.  IV fluids.      Await CT scan results    Medications:  Continuous Infusions:  Scheduled Meds:   carBAMazepine  200 mg Oral BID    carBAMazepine  300 mg Oral QHS    enoxparin  40 mg Subcutaneous Daily    levETIRAcetam  500 mg Per J Tube BID    metoclopramide HCl  10 mg Oral Q8H    piperacillin-tazobactam (Zosyn) IV (PEDS and ADULTS) (extended infusion is not appropriate)  4.5 g Intravenous Q8H    zonisamide  200 mg Per J Tube BID     PRN Meds:  Current Facility-Administered Medications:     dextrose 10%, 12.5 g, Intravenous, PRN    dextrose 10%, 25 g, Intravenous, PRN    glucagon (human recombinant), 1 mg, Intramuscular, PRN    hydrALAZINE, 10 mg, Intravenous, Q6H PRN    hydrocodone-apap 7.5-325 MG/15 ML, 15 mL, Per J Tube, Q4H PRN    hydrocodone-apap 7.5-325 MG/15 ML, 20 mL, Per J Tube, Q6H PRN    HYDROmorphone, 1 mg, Intravenous, Q6H PRN    insulin aspart U-100, 0-5 Units, Subcutaneous, Q4H PRN    loperamide, 2 mg, Oral, QID PRN    morphine, 2 mg, Intravenous, Once PRN    naloxone, 0.02 mg, Intravenous, PRN    ondansetron, 4 mg, Intravenous, Q6H PRN    pneumoc 20-faina conj-dip cr(PF), 0.5 mL, Intramuscular, vaccine x 1 dose    promethazine, 25 mg, Rectal, Q6H PRN     Review of patient's allergies indicates:  No Known Allergies  Objective:     Vital Signs (Most Recent):  Temp: 99 °F (37.2 °C) (09/03/24 1219)  Pulse: 76 (09/03/24 1219)  Resp: 20 (09/03/24 1219)  BP: (!) 131/59 (09/03/24 1219)  SpO2: 97 % (09/03/24 1219) Vital Signs (24h Range):  Temp:  [98.4 °F (36.9 °C)-99.1 °F (37.3 °C)] 99 °F (37.2 °C)  Pulse:  [76-87] 76  Resp:  [16-20] 20  SpO2:  [94 %-98 %] 97 %  BP: (112-131)/(59-63) 131/59     Weight: 61.3 kg (135 lb 2.3 oz)  Body mass index is 22.49 kg/m².    Intake/Output - Last 3  Shifts         09/01 0700 09/02 0659 09/02 0700 09/03 0659 09/03 0700 09/04 0659    NG/GT  620     Total Intake(mL/kg)  620 (10.1)     Urine (mL/kg/hr)  0 (0)     Drains 30 70 20    Stool       Total Output 30 70 20    Net -30 +550 -20           Urine Occurrence  2 x              Physical Exam  Vitals and nursing note reviewed.   Constitutional:       Appearance: She is well-developed. Ill appearance: less acute.   HENT:      Head: Normocephalic.   Eyes:      Pupils: Pupils are equal, round, and reactive to light.   Neck:      Thyroid: No thyromegaly.      Vascular: No JVD.      Trachea: No tracheal deviation.   Cardiovascular:      Rate and Rhythm: Normal rate and regular rhythm.      Heart sounds: Normal heart sounds.   Pulmonary:      Breath sounds: Normal breath sounds. No wheezing.   Abdominal:      General: Abdomen is flat. Bowel sounds are normal. There is no distension.      Palpations: Abdomen is soft. Abdomen is not rigid. There is no mass.      Tenderness: There is no abdominal tenderness. There is no guarding or rebound.      Comments: Jejunostomy site has minimal drainage around it.  The staples were removed from the upper portion of the wound.  There was some thick purulent looking material but no essie evidence of feeds.   Musculoskeletal:         General: Normal range of motion.   Lymphadenopathy:      Cervical: No cervical adenopathy.   Skin:     General: Skin is warm and dry.      Findings: No erythema or rash.   Neurological:      Mental Status: She is oriented to person, place, and time.          Significant Labs:  I have reviewed all pertinent lab results within the past 24 hours.  CBC:   Recent Labs   Lab 09/03/24  0636   WBC 7.37  7.23   RBC 2.82*  2.83*   HGB 9.3*  9.4*   HCT 29.7*  30.1*   *  604*   *  106*   MCH 33.0*  33.2*   MCHC 31.3*  31.2*     BMP:   Recent Labs   Lab 09/03/24  0636         K 3.8      CO2 21*   BUN 6*   CREATININE 0.7    CALCIUM 8.5*   MG 2.0     CMP:   Recent Labs   Lab 09/03/24  0636      CALCIUM 8.5*   ALBUMIN 2.2*   PROT 5.8*      K 3.8   CO2 21*      BUN 6*   CREATININE 0.7   ALKPHOS 184*   ALT 26   AST 20   BILITOT 0.4       Significant Diagnostics:  I have reviewed all pertinent imaging results/findings within the past 24 hours.  CT scan with oral contrast through the jejunostomy tube and mouth were ordered

## 2024-09-03 NOTE — TREATMENT PLAN
Notified by nurse that patient had increased drainage along entire surgical incision site of abdomen which was described as tube feed in appearance.  Patient not currently endorsing change in abdominal discomfort, distention, or rigidity.  Advised to hold tube feeds, apply dressing over incision site, and we will place order for KUB.  Nursing advised to notify surgery on-call and will have day team follow-up regarding further recommendations.  Will monitor closely and reach out to surgical team if clinical course deteriorates.

## 2024-09-03 NOTE — PLAN OF CARE
Pt has met all goals. Toileting independently in room and ambulating in hallway 400+ft. Discharging acute OT services.

## 2024-09-03 NOTE — PLAN OF CARE
09/03/24 1131   Rounds   Attendance Provider;;Physical therapist;Charge nurse   Discharge Plan A Home Health   Why the patient remains in the hospital Requires continued medical care   Transition of Care Barriers None

## 2024-09-04 PROBLEM — E44.0 MODERATE PROTEIN-CALORIE MALNUTRITION: Status: ACTIVE | Noted: 2024-09-04

## 2024-09-04 LAB
ALBUMIN SERPL BCP-MCNC: 2.2 G/DL (ref 3.5–5.2)
ALP SERPL-CCNC: 177 U/L (ref 55–135)
ALT SERPL W/O P-5'-P-CCNC: 24 U/L (ref 10–44)
ANION GAP SERPL CALC-SCNC: 7 MMOL/L (ref 8–16)
AST SERPL-CCNC: 20 U/L (ref 10–40)
BILIRUB SERPL-MCNC: 0.2 MG/DL (ref 0.1–1)
BUN SERPL-MCNC: 5 MG/DL (ref 8–23)
CALCIUM SERPL-MCNC: 8.7 MG/DL (ref 8.7–10.5)
CHLORIDE SERPL-SCNC: 112 MMOL/L (ref 95–110)
CO2 SERPL-SCNC: 22 MMOL/L (ref 23–29)
CREAT SERPL-MCNC: 0.7 MG/DL (ref 0.5–1.4)
ERYTHROCYTE [DISTWIDTH] IN BLOOD BY AUTOMATED COUNT: 16.5 % (ref 11.5–14.5)
EST. GFR  (NO RACE VARIABLE): >60 ML/MIN/1.73 M^2
GLUCOSE SERPL-MCNC: 100 MG/DL (ref 70–110)
HCT VFR BLD AUTO: 30.1 % (ref 37–48.5)
HGB BLD-MCNC: 9.4 G/DL (ref 12–16)
MAGNESIUM SERPL-MCNC: 2 MG/DL (ref 1.6–2.6)
MCH RBC QN AUTO: 33.5 PG (ref 27–31)
MCHC RBC AUTO-ENTMCNC: 31.2 G/DL (ref 32–36)
MCV RBC AUTO: 107 FL (ref 82–98)
PHOSPHATE SERPL-MCNC: 3.2 MG/DL (ref 2.7–4.5)
PLATELET # BLD AUTO: 621 K/UL (ref 150–450)
PMV BLD AUTO: 8.5 FL (ref 9.2–12.9)
POCT GLUCOSE: 113 MG/DL (ref 70–110)
POCT GLUCOSE: 118 MG/DL (ref 70–110)
POCT GLUCOSE: 89 MG/DL (ref 70–110)
POTASSIUM SERPL-SCNC: 3.7 MMOL/L (ref 3.5–5.1)
PROT SERPL-MCNC: 5.7 G/DL (ref 6–8.4)
RBC # BLD AUTO: 2.81 M/UL (ref 4–5.4)
SODIUM SERPL-SCNC: 141 MMOL/L (ref 136–145)
WBC # BLD AUTO: 4.63 K/UL (ref 3.9–12.7)

## 2024-09-04 PROCEDURE — 97116 GAIT TRAINING THERAPY: CPT

## 2024-09-04 PROCEDURE — 25000003 PHARM REV CODE 250: Performed by: STUDENT IN AN ORGANIZED HEALTH CARE EDUCATION/TRAINING PROGRAM

## 2024-09-04 PROCEDURE — 85027 COMPLETE CBC AUTOMATED: CPT | Performed by: NURSE PRACTITIONER

## 2024-09-04 PROCEDURE — 63600175 PHARM REV CODE 636 W HCPCS: Performed by: SURGERY

## 2024-09-04 PROCEDURE — 84100 ASSAY OF PHOSPHORUS: CPT | Performed by: SURGERY

## 2024-09-04 PROCEDURE — 83735 ASSAY OF MAGNESIUM: CPT | Performed by: SURGERY

## 2024-09-04 PROCEDURE — 80053 COMPREHEN METABOLIC PANEL: CPT | Performed by: SURGERY

## 2024-09-04 PROCEDURE — 63600175 PHARM REV CODE 636 W HCPCS: Performed by: NURSE PRACTITIONER

## 2024-09-04 PROCEDURE — 11000001 HC ACUTE MED/SURG PRIVATE ROOM

## 2024-09-04 PROCEDURE — 25000003 PHARM REV CODE 250: Performed by: SURGERY

## 2024-09-04 PROCEDURE — 97530 THERAPEUTIC ACTIVITIES: CPT

## 2024-09-04 RX ORDER — AMOXICILLIN AND CLAVULANATE POTASSIUM 400; 57 MG/5ML; MG/5ML
875 POWDER, FOR SUSPENSION ORAL EVERY 12 HOURS
Status: DISCONTINUED | OUTPATIENT
Start: 2024-09-04 | End: 2024-09-10 | Stop reason: HOSPADM

## 2024-09-04 RX ORDER — AMOXICILLIN AND CLAVULANATE POTASSIUM 400; 57 MG/5ML; MG/5ML
400 POWDER, FOR SUSPENSION ORAL EVERY 12 HOURS
Status: DISCONTINUED | OUTPATIENT
Start: 2024-09-04 | End: 2024-09-04

## 2024-09-04 RX ADMIN — METOCLOPRAMIDE HYDROCHLORIDE 10 MG: 5 SOLUTION ORAL at 06:09

## 2024-09-04 RX ADMIN — DEXTROSE, SODIUM CHLORIDE, AND POTASSIUM CHLORIDE: 5; .45; .15 INJECTION INTRAVENOUS at 03:09

## 2024-09-04 RX ADMIN — AMOXICILLIN AND CLAVULANATE POTASSIUM 400 MG: 400; 57 POWDER, FOR SUSPENSION ORAL at 11:09

## 2024-09-04 RX ADMIN — METOCLOPRAMIDE HYDROCHLORIDE 10 MG: 5 SOLUTION ORAL at 02:09

## 2024-09-04 RX ADMIN — LEVETIRACETAM 500 MG: 100 SOLUTION ORAL at 09:09

## 2024-09-04 RX ADMIN — PIPERACILLIN SODIUM AND TAZOBACTAM SODIUM 4.5 G: 4; .5 INJECTION, POWDER, FOR SOLUTION INTRAVENOUS at 03:09

## 2024-09-04 RX ADMIN — HYDROCODONE BITARTRATE AND ACETAMINOPHEN 20 ML: 7.5; 325 SOLUTION ORAL at 11:09

## 2024-09-04 RX ADMIN — CARBAMAZEPINE 300 MG: 100 SUSPENSION ORAL at 10:09

## 2024-09-04 RX ADMIN — ZONISAMIDE 200 MG: 100 SUSPENSION ORAL at 09:09

## 2024-09-04 RX ADMIN — AMOXICILLIN AND CLAVULANATE POTASSIUM 875.2 MG: 400; 57 POWDER, FOR SUSPENSION ORAL at 10:09

## 2024-09-04 RX ADMIN — HYDROCODONE BITARTRATE AND ACETAMINOPHEN 15 ML: 7.5; 325 SOLUTION ORAL at 06:09

## 2024-09-04 RX ADMIN — METOCLOPRAMIDE HYDROCHLORIDE 10 MG: 5 SOLUTION ORAL at 10:09

## 2024-09-04 RX ADMIN — ZONISAMIDE 200 MG: 100 SUSPENSION ORAL at 10:09

## 2024-09-04 RX ADMIN — HYDROMORPHONE HYDROCHLORIDE 1 MG: 2 INJECTION, SOLUTION INTRAMUSCULAR; INTRAVENOUS; SUBCUTANEOUS at 10:09

## 2024-09-04 RX ADMIN — LEVETIRACETAM 500 MG: 100 SOLUTION ORAL at 10:09

## 2024-09-04 RX ADMIN — CARBAMAZEPINE 200 MG: 100 SUSPENSION ORAL at 06:09

## 2024-09-04 RX ADMIN — ENOXAPARIN SODIUM 40 MG: 40 INJECTION SUBCUTANEOUS at 05:09

## 2024-09-04 RX ADMIN — CARBAMAZEPINE 200 MG: 100 SUSPENSION ORAL at 02:09

## 2024-09-04 NOTE — SUBJECTIVE & OBJECTIVE
Interval History:  Patient underwent a CT scan with oral contrast.  No essie evidence fistula on amount purulent additional staples.    Medications:  Continuous Infusions:   dextrose 5 % and 0.45 % NaCl with KCl 20 mEq   Intravenous Continuous 80 mL/hr at 09/03/24 2201 New Bag at 09/03/24 2201     Scheduled Meds:   amoxicillin-clavulanate  400 mg Oral Q12H    carBAMazepine  200 mg Oral BID    carBAMazepine  300 mg Oral QHS    enoxparin  40 mg Subcutaneous Daily    levETIRAcetam  500 mg Per J Tube BID    metoclopramide HCl  10 mg Oral Q8H    zonisamide  200 mg Per J Tube BID     PRN Meds:  Current Facility-Administered Medications:     dextrose 10%, 12.5 g, Intravenous, PRN    dextrose 10%, 25 g, Intravenous, PRN    glucagon (human recombinant), 1 mg, Intramuscular, PRN    hydrALAZINE, 10 mg, Intravenous, Q6H PRN    hydrocodone-apap 7.5-325 MG/15 ML, 15 mL, Per J Tube, Q4H PRN    hydrocodone-apap 7.5-325 MG/15 ML, 20 mL, Per J Tube, Q6H PRN    HYDROmorphone, 1 mg, Intravenous, Q6H PRN    insulin aspart U-100, 0-5 Units, Subcutaneous, Q4H PRN    loperamide, 2 mg, Oral, QID PRN    morphine, 2 mg, Intravenous, Once PRN    naloxone, 0.02 mg, Intravenous, PRN    ondansetron, 4 mg, Intravenous, Q6H PRN    pneumoc 20-faina conj-dip cr(PF), 0.5 mL, Intramuscular, vaccine x 1 dose    promethazine, 25 mg, Rectal, Q6H PRN     Review of patient's allergies indicates:  No Known Allergies  Objective:     Vital Signs (Most Recent):  Temp: 98.3 °F (36.8 °C) (09/04/24 1131)  Pulse: 75 (09/04/24 1131)  Resp: 19 (09/04/24 1131)  BP: 135/60 (09/04/24 1131)  SpO2: 98 % (09/04/24 1131) Vital Signs (24h Range):  Temp:  [97.6 °F (36.4 °C)-99 °F (37.2 °C)] 98.3 °F (36.8 °C)  Pulse:  [71-84] 75  Resp:  [16-20] 19  SpO2:  [97 %-100 %] 98 %  BP: (119-137)/(59-73) 135/60     Weight: 61.3 kg (135 lb 2.3 oz)  Body mass index is 22.49 kg/m².    Intake/Output - Last 3 Shifts         09/02 0700 09/03 0659 09/03 0700 09/04 0659 09/04 0700 09/05 0659     NG/      Total Intake(mL/kg) 620 (10.1)      Urine (mL/kg/hr) 0 (0)      Drains 70 20     Total Output 70 20     Net +550 -20            Urine Occurrence 2 x               Physical Exam  Constitutional:       Appearance: She is well-developed.   HENT:      Head: Normocephalic.   Eyes:      Pupils: Pupils are equal, round, and reactive to light.   Neck:      Thyroid: No thyromegaly.      Vascular: No JVD.      Trachea: No tracheal deviation.   Cardiovascular:      Rate and Rhythm: Normal rate and regular rhythm.      Heart sounds: Normal heart sounds.   Pulmonary:      Breath sounds: Normal breath sounds. No wheezing.   Abdominal:      General: Bowel sounds are normal. There is no distension.      Palpations: Abdomen is soft. Abdomen is not rigid. There is no mass.      Tenderness: There is no abdominal tenderness. There is no guarding or rebound.      Comments: Drains in place with serous output   Minimal erythema gastrostomy   Upper portion of the wound several more staples.  Open there was purulent material fascia appears to be intact   Musculoskeletal:         General: Normal range of motion.   Lymphadenopathy:      Cervical: No cervical adenopathy.   Skin:     General: Skin is warm and dry.      Findings: No erythema or rash.   Neurological:      Mental Status: She is oriented to person, place, and time.   Psychiatric:         Mood and Affect: Mood normal.         Behavior: Behavior normal.         Thought Content: Thought content normal.         Judgment: Judgment normal.          Significant Labs:  I have reviewed all pertinent lab results within the past 24 hours.  CBC:   Recent Labs   Lab 09/04/24  0419   WBC 4.63   RBC 2.81*   HGB 9.4*   HCT 30.1*   *   *   MCH 33.5*   MCHC 31.2*     BMP:   Recent Labs   Lab 09/04/24  0419         K 3.7   *   CO2 22*   BUN 5*   CREATININE 0.7   CALCIUM 8.7   MG 2.0       Significant Diagnostics:  I have reviewed all pertinent  imaging results/findings within the past 24 hours.  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  Contrast was administered through the J-tube..     COMPARISON:  None     FINDINGS:  Heart: Normal size. No effusion.     Lung Bases: Small left and trace right pleural effusions with atelectasis left lower lobe.     Liver: Normal size and attenuation. No focal lesions.     Gallbladder: No calcified gallstones.  Mild gallbladder distension and wall thickening.  Vicarious excretion or sludge seen within the gallbladder.     Bile Ducts: No dilatation.     Pancreas: No obvious mass. No peripancreatic fat stranding.     Spleen: Normal.     Adrenals: Normal.     Kidneys/Ureters: Bilateral renal cortical thinning.  Several nonobstructing stones are suspected within the left kidney.  No mass, hydroureteronephrosis, or ureterolithiasis.     Bladder: No wall thickening.     Reproductive organs: Normal.     GI Tract/Mesentery: Prominence of the esophagus with debris and contrast within the distal esophagus could reflect dysmotility or reflux.  Postoperative changes are seen within the stomach.  Postoperative surgical drains are seen within the epigastric region.  Small bowel anastomotic sutures are seen within the left upper quadrant as well as within the mid abdomen.     J-tube projects within the left hemiabdomen and appears appropriately positioned.  No evidence of extravasation or leak.  No extraluminal  contrast is seen.  Prominent small bowel loops within the left upper quadrant measuring up to 3.7 cm could reflect ileus.  No evidence of bowel obstruction or inflammation.  Scattered minimal ascites throughout the abdomen and pelvis.  Shotty mesenteric nodes, likely reactive.     Peritoneal Space: Small amount of scattered ascites.  No definite free air.  Air within the subcutaneous tissues of the lower abdomen thought to be postoperative.     Retroperitoneum: Shotty adenopathy.     Abdominal wall: Midline skin staples are noted.  Areas of open wound are seen within the midportion of the surgical abdominal wall     Vasculature: No aneurysm.  Minimal atherosclerotic disease.     Bones: No acute fracture.  Mild degenerative changes lower lumbar spine no suspicious lytic or sclerotic lesions.     Impression:     Evaluation of solid organ and vascular pathology is limited due to lack of IV contrast.     No evidence of leak.  No intra-abdominal abscess.  Minimal scattered ascites.  Suspect ileus within the upper abdomen.     See additional findings above.     All CT scans at this facility use dose modulation, iterative reconstruction, and/or weight based dosing when appropriate to reduce radiation dose to as low as reasonable achievable.        Electronically signed by:Harley Aggarwal MD  Date:                                            09/04/2024  Time:                                           07:40

## 2024-09-04 NOTE — PROGRESS NOTES
[facility]  General Surgery  Progress Note    Subjective:     History of Present Illness:  Cheryl Kirkland is a 74 y.o. year old female with a history of ovarian cancer status post surgery and chemotherapy as well as epilepsy and DVT (provoked during her treatment for ovarian cancer), and MSI high gastric adenocarcinoma.  She was originally diagnosed back in February of this past year.  She was noted to have a very proximal enlarged tumor.  EGD was done as was complete metastatic workup which was negative.  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  Due to her significant adhesive disease she understood that she would require an open operation.  She was discussed in multidisciplinary tumor board and the consensus was to proceed with immunotherapy due to her MSI high status.  She completed 5 cycles of neoadjuvant immunotherapy with Keytruda and on restaging imaging was found to have enlarged Danelle portal lymph nodes.  Consensus was that she would need a total gastrectomy due to the proximity of her tumor in relationship to the GE junction.  Due to the extent of the operation required and the her previous history of ovarian cancer the tumor board consensus was to proceed with the EUS guided biopsy to rule out any ovarian cancer and to rule out pseudo progression as well.  This was performed and showed adenocarcinoma consistent with her gastric primary.  Therefore the decision was to proceed with surgical intervention due to the mixed response she had from immunotherapy on PET-CT scan.     Risks and benefits were reviewed including bleeding, infection, pain, scar, damage to surrounding structures, cardiovascular and pulmonary complications, anastomotic leak, positive margins, need for additional procedures, death, and imponderables.  She  expressed understanding and gave informed consent to proceed.    Post-Op Info:  Procedure(s) (LRB):  GASTRECTOMY (N/A)  EGD (ESOPHAGOGASTRODUODENOSCOPY) (N/A)  LAPAROSCOPY, DIAGNOSTIC (N/A)  LYSIS, ADHESIONS (N/A)  INSERTION, JEJUNOSTOMY TUBE (N/A)   15 Days Post-Op     Interval History:  Patient underwent a CT scan with oral contrast.  No essie evidence fistula on amount purulent additional staples.    Medications:  Continuous Infusions:   dextrose 5 % and 0.45 % NaCl with KCl 20 mEq   Intravenous Continuous 80 mL/hr at 09/03/24 2201 New Bag at 09/03/24 2201     Scheduled Meds:   amoxicillin-clavulanate  400 mg Oral Q12H    carBAMazepine  200 mg Oral BID    carBAMazepine  300 mg Oral QHS    enoxparin  40 mg Subcutaneous Daily    levETIRAcetam  500 mg Per J Tube BID    metoclopramide HCl  10 mg Oral Q8H    zonisamide  200 mg Per J Tube BID     PRN Meds:  Current Facility-Administered Medications:     dextrose 10%, 12.5 g, Intravenous, PRN    dextrose 10%, 25 g, Intravenous, PRN    glucagon (human recombinant), 1 mg, Intramuscular, PRN    hydrALAZINE, 10 mg, Intravenous, Q6H PRN    hydrocodone-apap 7.5-325 MG/15 ML, 15 mL, Per J Tube, Q4H PRN    hydrocodone-apap 7.5-325 MG/15 ML, 20 mL, Per J Tube, Q6H PRN    HYDROmorphone, 1 mg, Intravenous, Q6H PRN    insulin aspart U-100, 0-5 Units, Subcutaneous, Q4H PRN    loperamide, 2 mg, Oral, QID PRN    morphine, 2 mg, Intravenous, Once PRN    naloxone, 0.02 mg, Intravenous, PRN    ondansetron, 4 mg, Intravenous, Q6H PRN    pneumoc 20-faina conj-dip cr(PF), 0.5 mL, Intramuscular, vaccine x 1 dose    promethazine, 25 mg, Rectal, Q6H PRN     Review of patient's allergies indicates:  No Known Allergies  Objective:     Vital Signs (Most Recent):  Temp: 98.3 °F (36.8 °C) (09/04/24 1131)  Pulse: 75 (09/04/24 1131)  Resp: 19 (09/04/24 1131)  BP: 135/60 (09/04/24 1131)  SpO2: 98 % (09/04/24 1131) Vital Signs (24h Range):  Temp:  [97.6 °F (36.4 °C)-99 °F (37.2 °C)] 98.3 °F (36.8  °C)  Pulse:  [71-84] 75  Resp:  [16-20] 19  SpO2:  [97 %-100 %] 98 %  BP: (119-137)/(59-73) 135/60     Weight: 61.3 kg (135 lb 2.3 oz)  Body mass index is 22.49 kg/m².    Intake/Output - Last 3 Shifts         09/02 0700  09/03 0659 09/03 0700  09/04 0659 09/04 0700  09/05 0659    NG/      Total Intake(mL/kg) 620 (10.1)      Urine (mL/kg/hr) 0 (0)      Drains 70 20     Total Output 70 20     Net +550 -20            Urine Occurrence 2 x               Physical Exam  Constitutional:       Appearance: She is well-developed.   HENT:      Head: Normocephalic.   Eyes:      Pupils: Pupils are equal, round, and reactive to light.   Neck:      Thyroid: No thyromegaly.      Vascular: No JVD.      Trachea: No tracheal deviation.   Cardiovascular:      Rate and Rhythm: Normal rate and regular rhythm.      Heart sounds: Normal heart sounds.   Pulmonary:      Breath sounds: Normal breath sounds. No wheezing.   Abdominal:      General: Bowel sounds are normal. There is no distension.      Palpations: Abdomen is soft. Abdomen is not rigid. There is no mass.      Tenderness: There is no abdominal tenderness. There is no guarding or rebound.      Comments: Drains in place with serous output   Minimal erythema gastrostomy   Upper portion of the wound several more staples.  Open there was purulent material fascia appears to be intact   Musculoskeletal:         General: Normal range of motion.   Lymphadenopathy:      Cervical: No cervical adenopathy.   Skin:     General: Skin is warm and dry.      Findings: No erythema or rash.   Neurological:      Mental Status: She is oriented to person, place, and time.   Psychiatric:         Mood and Affect: Mood normal.         Behavior: Behavior normal.         Thought Content: Thought content normal.         Judgment: Judgment normal.          Significant Labs:  I have reviewed all pertinent lab results within the past 24 hours.  CBC:   Recent Labs   Lab 09/04/24  0419   WBC 4.63   RBC 2.81*    HGB 9.4*   HCT 30.1*   *   *   MCH 33.5*   MCHC 31.2*     BMP:   Recent Labs   Lab 09/04/24  0419         K 3.7   *   CO2 22*   BUN 5*   CREATININE 0.7   CALCIUM 8.7   MG 2.0       Significant Diagnostics:  I have reviewed all pertinent imaging results/findings within the past 24 hours.  CT: I have reviewed all pertinent results/findings within the past 24 hours and my personal findings are:  Abdomen pelvis    Details    Reading Physician Reading Date Result Priority   Harley Aggarwal MD  [phone]  [phone] 9/4/2024 Routine     Narrative & Impression  EXAMINATION:  CT ABDOMEN PELVIS WITHOUT CONTRAST     CLINICAL HISTORY:  Evaluate for small-bowel leak after total gastrectomy and feeding jejunostomy 13 days ago;     TECHNIQUE:  Low dose axial images, sagittal and coronal reformations were obtained from the lung bases to the pubic symphysis.  Contrast was administered through the J-tube..     COMPARISON:  None     FINDINGS:  Heart: Normal size. No effusion.     Lung Bases: Small left and trace right pleural effusions with atelectasis left lower lobe.     Liver: Normal size and attenuation. No focal lesions.     Gallbladder: No calcified gallstones.  Mild gallbladder distension and wall thickening.  Vicarious excretion or sludge seen within the gallbladder.     Bile Ducts: No dilatation.     Pancreas: No obvious mass. No peripancreatic fat stranding.     Spleen: Normal.     Adrenals: Normal.     Kidneys/Ureters: Bilateral renal cortical thinning.  Several nonobstructing stones are suspected within the left kidney.  No mass, hydroureteronephrosis, or ureterolithiasis.     Bladder: No wall thickening.     Reproductive organs: Normal.     GI Tract/Mesentery: Prominence of the esophagus with debris and contrast within the distal esophagus could reflect dysmotility or reflux.  Postoperative changes are seen within the stomach.  Postoperative surgical drains are seen within the  epigastric region.  Small bowel anastomotic sutures are seen within the left upper quadrant as well as within the mid abdomen.     J-tube projects within the left hemiabdomen and appears appropriately positioned.  No evidence of extravasation or leak.  No extraluminal contrast is seen.  Prominent small bowel loops within the left upper quadrant measuring up to 3.7 cm could reflect ileus.  No evidence of bowel obstruction or inflammation.  Scattered minimal ascites throughout the abdomen and pelvis.  Shotty mesenteric nodes, likely reactive.     Peritoneal Space: Small amount of scattered ascites.  No definite free air.  Air within the subcutaneous tissues of the lower abdomen thought to be postoperative.     Retroperitoneum: Shotty adenopathy.     Abdominal wall: Midline skin staples are noted.  Areas of open wound are seen within the midportion of the surgical abdominal wall     Vasculature: No aneurysm.  Minimal atherosclerotic disease.     Bones: No acute fracture.  Mild degenerative changes lower lumbar spine no suspicious lytic or sclerotic lesions.     Impression:     Evaluation of solid organ and vascular pathology is limited due to lack of IV contrast.     No evidence of leak.  No intra-abdominal abscess.  Minimal scattered ascites.  Suspect ileus within the upper abdomen.     See additional findings above.     All CT scans at this facility use dose modulation, iterative reconstruction, and/or weight based dosing when appropriate to reduce radiation dose to as low as reasonable achievable.        Electronically signed by:Harley Aggarwal MD  Date:                                            09/04/2024  Time:                                           07:40                Assessment/Plan:     * Gastric adenocarcinoma  POD #14- s/p exlap, extensive SHAYNA, small bowel resection, total gastrectomy with stapled esophagojejunal anastomosis and D2 lymphadenectomy, and Jtube placement    Overall she was feeling better.   There was some thick drainage from her wound in her tube feeds were held.  CT scan showed no evidence of a leak  -- continue Reglan  -- a clear liquid diet  -- tube feeds were restarted at 30 cc an hour and we advanced tomorrow-- continue hycet via J tube for pain   -- ok for liquid medications via J tube if needed  -- Strict I/Os  -- Senna liquid via Jtube BID   -- CM consult for discharge / home health planning   -- OOB To chair and walk today x5, OT/PT consulted  -- encourage IS  Dispo: continued med surg with tele- tentative plan once a full liquid diet is tolerated and the erythema around the jejunostomy tube has improved    Lovenox and SCDs for DVT ppx, no indication for GI ppx     Erythema around jejunostomy.  Will continue.  Monitor. convert to oral antibiotics Further recommendations   Increase tube feeds over the next 24 hours.      If the patient improves over the next 48 hours and tolerates feeds she can be discharged home Friday    Will need local wound care.      Primary hypertension  -- appreciate medicine assistance with management     Hx of Epileptic seizures  -- appreciate medicine assistance with management         Rafiq Wihte MD  General Surgery  [facility]

## 2024-09-04 NOTE — PLAN OF CARE
Recommendation/Intervention for malnutrition 9/4/24:  1. Recommend Clear liquid diet, Boost breeze BID + continuous Enteral nutrition, Peptamen 1.5 w/ Prebio via J tube at gaol rate 35 mL/hr  -200 mL q6h free water flushes (800 mL/day), per MD/NP  -Monitor Mg, K+, Na, Phos and Glu, correct as indicated   2. If pt leach not tolerate/desire Boost breeze, recommend Clear liquid diet + continuous Enteral nutrition, Peptamen 1.5 w/ Prebio via J tube at goal rate 50 mL/hr  -220 mL q6h free water flushes (880 mL/day), per MD/NP   -Monitor Mg, K+, Na, Phos and Glu, correct as indicated   3. Updated weight, twice weekly   4. Collaboration by nutrition professional with other providers     Goals:   1. Pt will be initiated onto PPN within 24 hrs (Resolved)   2. Pt EN formula will be modified within 24 hrs (Resolved)   3. Pt will tolerate and intake > 50% EEN and EPN prior to RD follow up (Meeting)  4. Pt's TF rate will be modified dependent on ONS tolerance within 24 hrs     Patty Gupta, BS, RDN, LDN

## 2024-09-04 NOTE — PROGRESS NOTES
[facility]  General Surgery  Progress Note    Subjective:     History of Present Illness:  Cheryl Kirkland is a 74 y.o. year old female with a history of ovarian cancer status post surgery and chemotherapy as well as epilepsy and DVT (provoked during her treatment for ovarian cancer), and MSI high gastric adenocarcinoma.  She was originally diagnosed back in February of this past year.  She was noted to have a very proximal enlarged tumor.  EGD was done as was complete metastatic workup which was negative.  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  Due to her significant adhesive disease she understood that she would require an open operation.  She was discussed in multidisciplinary tumor board and the consensus was to proceed with immunotherapy due to her MSI high status.  She completed 5 cycles of neoadjuvant immunotherapy with Keytruda and on restaging imaging was found to have enlarged Danelle portal lymph nodes.  Consensus was that she would need a total gastrectomy due to the proximity of her tumor in relationship to the GE junction.  Due to the extent of the operation required and the her previous history of ovarian cancer the tumor board consensus was to proceed with the EUS guided biopsy to rule out any ovarian cancer and to rule out pseudo progression as well.  This was performed and showed adenocarcinoma consistent with her gastric primary.  Therefore the decision was to proceed with surgical intervention due to the mixed response she had from immunotherapy on PET-CT scan.     Risks and benefits were reviewed including bleeding, infection, pain, scar, damage to surrounding structures, cardiovascular and pulmonary complications, anastomotic leak, positive margins, need for additional procedures, death, and imponderables.  She  expressed understanding and gave informed consent to proceed.    Post-Op Info:  Procedure(s) (LRB):  GASTRECTOMY (N/A)  EGD (ESOPHAGOGASTRODUODENOSCOPY) (N/A)  LAPAROSCOPY, DIAGNOSTIC (N/A)  LYSIS, ADHESIONS (N/A)  INSERTION, JEJUNOSTOMY TUBE (N/A)   15 Days Post-Op     Interval History:  Patient was feeling better.  CT scan showed no essie evidence of a leak.  We will restart tube feeds and restart clear liquids.      Upper portion of the wound open and packing placed.  Will amend home health orders.      Plan on increasing tube feeds over the next 24 hours    Medications:  Continuous Infusions:   dextrose 5 % and 0.45 % NaCl with KCl 20 mEq   Intravenous Continuous 80 mL/hr at 09/03/24 2201 New Bag at 09/03/24 2201     Scheduled Meds:   carBAMazepine  200 mg Oral BID    carBAMazepine  300 mg Oral QHS    enoxparin  40 mg Subcutaneous Daily    levETIRAcetam  500 mg Per J Tube BID    metoclopramide HCl  10 mg Oral Q8H    piperacillin-tazobactam (Zosyn) IV (PEDS and ADULTS) (extended infusion is not appropriate)  4.5 g Intravenous Q8H    zonisamide  200 mg Per J Tube BID     PRN Meds:  Current Facility-Administered Medications:     dextrose 10%, 12.5 g, Intravenous, PRN    dextrose 10%, 25 g, Intravenous, PRN    glucagon (human recombinant), 1 mg, Intramuscular, PRN    hydrALAZINE, 10 mg, Intravenous, Q6H PRN    hydrocodone-apap 7.5-325 MG/15 ML, 15 mL, Per J Tube, Q4H PRN    hydrocodone-apap 7.5-325 MG/15 ML, 20 mL, Per J Tube, Q6H PRN    HYDROmorphone, 1 mg, Intravenous, Q6H PRN    insulin aspart U-100, 0-5 Units, Subcutaneous, Q4H PRN    loperamide, 2 mg, Oral, QID PRN    morphine, 2 mg, Intravenous, Once PRN    naloxone, 0.02 mg, Intravenous, PRN    ondansetron, 4 mg, Intravenous, Q6H PRN    pneumoc 20-faina conj-dip cr(PF), 0.5 mL, Intramuscular, vaccine x 1 dose    promethazine, 25 mg, Rectal, Q6H PRN     Review of patient's allergies indicates:  No Known Allergies  Objective:     Vital Signs (Most Recent):  Temp:  98.9 °F (37.2 °C) (09/04/24 0745)  Pulse: 71 (09/04/24 0745)  Resp: 16 (09/04/24 0745)  BP: 137/63 (09/04/24 0745)  SpO2: 98 % (09/04/24 0745) Vital Signs (24h Range):  Temp:  [97.6 °F (36.4 °C)-99 °F (37.2 °C)] 98.9 °F (37.2 °C)  Pulse:  [71-84] 71  Resp:  [16-20] 16  SpO2:  [97 %-100 %] 98 %  BP: (119-137)/(59-73) 137/63     Weight: 61.3 kg (135 lb 2.3 oz)  Body mass index is 22.49 kg/m².    Intake/Output - Last 3 Shifts         09/02 0700  09/03 0659 09/03 0700  09/04 0659 09/04 0700  09/05 0659    NG/      Total Intake(mL/kg) 620 (10.1)      Urine (mL/kg/hr) 0 (0)      Drains 70 20     Total Output 70 20     Net +550 -20            Urine Occurrence 2 x               Physical Exam  Vitals reviewed.   Constitutional:       Appearance: She is well-developed. Ill appearance: Mild acute.   HENT:      Head: Normocephalic.   Eyes:      Pupils: Pupils are equal, round, and reactive to light.   Neck:      Thyroid: No thyromegaly.      Vascular: No JVD.      Trachea: No tracheal deviation.   Cardiovascular:      Rate and Rhythm: Normal rate and regular rhythm.      Heart sounds: Normal heart sounds.   Pulmonary:      Breath sounds: Normal breath sounds. No wheezing.   Abdominal:      General: Abdomen is flat. Bowel sounds are normal. There is no distension.      Palpations: Abdomen is soft. Abdomen is not rigid. There is no mass.      Tenderness: There is no abdominal tenderness. There is no guarding or rebound.      Comments: Jejunostomy site is less erythematous.  Drains in place with serous output.  Upper portion of the incision is open with gauze placed   Musculoskeletal:         General: Normal range of motion.   Lymphadenopathy:      Cervical: No cervical adenopathy.   Skin:     General: Skin is warm and dry.      Findings: No erythema or rash.   Neurological:      Mental Status: She is alert and oriented to person, place, and time.          Significant Labs:  I have reviewed all pertinent lab results within  the past 24 hours.  CBC:   Recent Labs   Lab 09/04/24 0419   WBC 4.63   RBC 2.81*   HGB 9.4*   HCT 30.1*   *   *   MCH 33.5*   MCHC 31.2*     BMP:   Recent Labs   Lab 09/04/24 0419         K 3.7   *   CO2 22*   BUN 5*   CREATININE 0.7   CALCIUM 8.7   MG 2.0       Significant Diagnostics:  I have reviewed all pertinent imaging results/findings within the past 24 hours.  CT: I have reviewed all pertinent results/findings within the past 24 hours and my personal findings are:  CT reviewed    Reading Physician Reading Date Result Priority   Harley Aggarwal MD  [phone] 9/4/2024 Routine     Narrative & Impression  EXAMINATION:  CT ABDOMEN PELVIS WITHOUT CONTRAST     CLINICAL HISTORY:  Evaluate for small-bowel leak after total gastrectomy and feeding jejunostomy 13 days ago;     TECHNIQUE:  Low dose axial images, sagittal and coronal reformations were obtained from the lung bases to the pubic symphysis.  Contrast was administered through the J-tube..     COMPARISON:  None     FINDINGS:  Heart: Normal size. No effusion.     Lung Bases: Small left and trace right pleural effusions with atelectasis left lower lobe.     Liver: Normal size and attenuation. No focal lesions.     Gallbladder: No calcified gallstones.  Mild gallbladder distension and wall thickening.  Vicarious excretion or sludge seen within the gallbladder.     Bile Ducts: No dilatation.     Pancreas: No obvious mass. No peripancreatic fat stranding.     Spleen: Normal.     Adrenals: Normal.     Kidneys/Ureters: Bilateral renal cortical thinning.  Several nonobstructing stones are suspected within the left kidney.  No mass, hydroureteronephrosis, or ureterolithiasis.     Bladder: No wall thickening.     Reproductive organs: Normal.     GI Tract/Mesentery: Prominence of the esophagus with debris and contrast within the distal esophagus could reflect dysmotility or reflux.  Postoperative changes are seen  within the stomach.  Postoperative surgical drains are seen within the epigastric region.  Small bowel anastomotic sutures are seen within the left upper quadrant as well as within the mid abdomen.     J-tube projects within the left hemiabdomen and appears appropriately positioned.  No evidence of extravasation or leak.  No extraluminal contrast is seen.  Prominent small bowel loops within the left upper quadrant measuring up to 3.7 cm could reflect ileus.  No evidence of bowel obstruction or inflammation.  Scattered minimal ascites throughout the abdomen and pelvis.  Shotty mesenteric nodes, likely reactive.     Peritoneal Space: Small amount of scattered ascites.  No definite free air.  Air within the subcutaneous tissues of the lower abdomen thought to be postoperative.     Retroperitoneum: Shotty adenopathy.     Abdominal wall: Midline skin staples are noted.  Areas of open wound are seen within the midportion of the surgical abdominal wall     Vasculature: No aneurysm.  Minimal atherosclerotic disease.     Bones: No acute fracture.  Mild degenerative changes lower lumbar spine no suspicious lytic or sclerotic lesions.     Impression:     Evaluation of solid organ and vascular pathology is limited due to lack of IV contrast.     No evidence of leak.  No intra-abdominal abscess.  Minimal scattered ascites.  Suspect ileus within the upper abdomen.     See additional findings above.     All CT scans at this facility use dose modulation, iterative reconstruction, and/or weight based dosing when appropriate to reduce radiation dose to as low as reasonable achievable.        Electronically signed by:Harley Aggarwal MD  Date:                                            09/04/2024  Time:                                           07:40  Assessment/Plan:     * Gastric adenocarcinoma  POD #14- s/p exlap, extensive SHAYNA, small bowel resection, total gastrectomy with stapled esophagojejunal anastomosis and D2 lymphadenectomy,  and Jtube placement    Overall she was feeling better.  There was some thick drainage from her wound in her tube feeds were held.  A CT scan is being obtained to further evaluate this.    -- continue Reglan  -- a clear liquid diet  -- tube feeds were restarted at 30 cc an hour and we advanced tomorrow-- continue hycet via J tube for pain   -- ok for liquid medications via J tube if needed  -- Strict I/Os  -- Senna liquid via Jtube BID   -- CM consult for discharge / home health planning   -- OOB To chair and walk today x5, OT/PT consulted  -- encourage IS  Dispo: continued med surg with tele- tentative plan once a full liquid diet is tolerated and the erythema around the jejunostomy tube has improved    Lovenox and SCDs for DVT ppx, no indication for GI ppx     Slightly improved erythema around the jejunostomy tube has improved.  Will continue.  Monitor. convert to oral antibiotics Further recommendations pending CT scan    Increase tube feeds over the next 24 hours.      If the patient improves over the next 48 hours and tolerates feeds she can be discharged home Friday      Primary hypertension  -- appreciate medicine assistance with management     Hx of Epileptic seizures  -- appreciate medicine assistance with management         Rafiq White MD  General Surgery  [facility]

## 2024-09-04 NOTE — PLAN OF CARE
Pt remains free from falls/injuries this shift. Safety precautions maintained. Pain managed with pain medication. Pt ambulated in brown x3. Multiple Bms today, no diarrhea. TF to J-tube at 30ml/hour. No s/s of acute distress noted. Will continue to monitor. Chart check completed.

## 2024-09-04 NOTE — PROGRESS NOTES
[facility] - [facility]ensive [facility] ([facility])  Adult Nutrition  Progress Note    SUMMARY     Recommendations    1. Recommend Clear liquid diet, Boost breeze BID + continuous Enteral nutrition, Peptamen 1.5 w/ Prebio via J tube at gaol rate 35 mL/hr:   -TF at goal rate will provide: 1260 kcals/day (82% EEN), 57 g protein/day (77% EPN), 648 mL free formula water/day (42% fluid needs)  -Boost breeze BID + TF at goal rate provides: total 1760 kcals/day (100% EEN), total 75 g protein/day (100% EPN), total 1038 mL free formula/ONS water per day (80% fluid needs)  -200 mL q6h free water flushes (800 mL/day) = total from formula + FWF = 1448 mL water/day (95% fluid needs) ot total from formula + ONS + FWF = 1838 mL water/day (100% fluid needs), per MD/NP  -Monitor Mg, K+, Na, Phos and Glu, correct as indicated   2. If pt does not tolerate/desire Boost breeze, recommend continuous Enteral nutrition, Peptamen 1.5 w/ Prebio via J tube at goal rate 50 mL/hr  -TF at goal rate provides: 1800 kcals/day (100% EEN), 82 g protein/day (100% EPN), 926 mL free formula water/day (51% fluid needs)  -220 mL q6h free water flushes (880 mL/day) = total from formula + FWF = 1806 mL water/day (100% fluid needs), per MD/NP   -Monitor Mg, K+, Na, Phos and Glu, correct as indicated   3. Updated weight, twice weekly     Goals:   1. Pt will be initiated onto PPN within 24 hrs (Resolved)   2. Pt EN formula will be modified within 24 hrs (Resolved)   3. Pt will tolerate and intake > 50% EEN and EPN prior to RD follow up (Meeting)  4. Pt's TF rate will be modified dependent on ONS tolerance within 24 hrs   Nutrition Goal Status: resolved, meeting, new   Communication of RD Recs: other (comment) (POC, sticky note, secure chat)    Assessment and Plan    Malnutrition Type:  Context: chronic illness  Level: moderate     Related to (etiology):   Physiological causes increasing nutrient needs d/t illness  Alteration in GI tract     Signs and Symptoms (as evidenced  by):   BMI < 23 (older adult)  Underweight with loss of fat and muscle  Unable or unwilling to eat sufficient energy/protein to maintain a healthy weight  Food avoidance and/or lack of interest in food     Malnutrition Characteristic Summary:  Subcutaneous Fat (Malnutrition): moderate depletion  Muscle Mass (Malnutrition): mild depletion     Interventions/Recommendations (treatment strategy):  1. Enteral Nutrition Management  2. Clear liquid diet  3. Commercial beverage medical food supplement   4. Feeding Assistance Management  5. Collaboration by nutrition professional with other providers     Nutrition Diagnosis Status:   New       Malnutrition Assessment (94/24):  Malnutrition Context: chronic illness  Malnutrition Level: moderate  Skin (Micronutrient): bruised, wounds unhealed       Subcutaneous Fat (Malnutrition): moderate depletion  Muscle Mass (Malnutrition): mild depletion   Orbital Region (Subcutaneous Fat Loss): mild depletion (Somewhat hallow look)  Upper Arm Region (Subcutaneous Fat Loss): moderate depletion (Some depth pinch but not ample; Fingers almost touch)   Clavicle and Acromion Bone Region (Muscle Loss): mild depletion (Acrominon process may slightly protrude)  Dorsal Hand (Muscle Loss): moderate depletion (Slightly depressed)  Patellar Region (Muscle Loss): mild depletion (Knee cap less prominent, more rounded)                 Reason for Assessment    Reason For Assessment: Not tolerating EN formula  Diagnosis: 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 Female admitted for Gastric adenocarcinoma. Pt currently in the ICU, NPO x 1 day. RD consulted for J tube feed recommendations. H&P noted that the pt presented to the hospital for planned diagnostic laparoscopy, possible total  gastrectomy and J tube placement, and EGD d/t gastric adenocarcinoma, s/p neoadjuvant immunotherapy. EMR noted procedure performed on 8/20/24, post-op pt was transferred to the ICU, gastrectomy with J tube in place now, plan is for liquid meds only via J tube starting today (8/21) and to maintain NJ tube to LIWS w/ plan to remove after leak test in a few days. Discussed pt in rounds, RN stated pt to start in Impact Peptide 1.5 @ 10 mL/hr at 10 am, informed RN of J tube recs via secure chat and awaiting MD to sign orders. Unable to perform full NFPE d/t pt working with other providers, visual NFPE perform, moderate orbital malnutrition noted. Reviewed chart: Propofol: 0; Total VE: 0; LBM: DAVE; Skin: incision abdomen WDL, closed/sunction drain R abdomen x 2; Chetan score: 12 (high risk); Edema: None.Labs, meds, weight reviewed. Weight charted 2/8/24 130 lbs, 8/20/24 129 lbs (BMI 21.61, Underweight for age), -1 lb wt loss x 6 months, insignificant wt loss, wt stable. RD will continue to follow and monitor pt's nutritional status during admit.    8/26/2024: Patient is post op day 6.  Diet advanced to clear liquids and tolerated CLD yesterday.  Patient is experiencing some nausea with TF running at rate of 30ml/hr.  Labs reviewed.  NKFA.  LBM: 8/25/2024.  NFPE to be performed at next patient visit.  RD to continue to monitor nutrition therapies and tolerance.      8/29/24:  Patient continue son clear liquids and EN support.  Patient with some prior nausea but has improved within the past 24 hours.  RD to continue to monitor EN and po intake tolerance.  Labs reviewed.  NKFA.  LBM: 8/28/2024.  RD to continue to monitor diet advancement and EN support    8/29/24: Spoke with current attending MD whom states the pt is not tolerating TF >10 mL/hr. She states that when rate is increased to 30 mL/hr pt begins to feel bloated and uncomfortable. She then hold feeds for a day and pt feels better. This has been a repeated cycle  during multiple attempts to increase to goal rate. Pt currently on Dimdim Peptide 1.5. MD will order PPN to begin immediately d/t pt >= 9 days without sufficient nutrition. She will modify EN formula and observe for tolerance. RD updated pt wt's and EEN/EPN. Provided PPN recs and recommendations for Peptamen 1.5 w/ pre bio. RD to continue to follow and monitor the pt nutrition status    9/4/24: RD follow up. Pt currently on a Clear liquid diet and receiving Peptamen 1.5 w/ Prebio @ 30 mL/hr. EMR noted pt had drainage along surgical incision site of abdomen which was believed to be tube feed, diet and TF's were held x 2 days, CT w/ contrast showed no evidence of a leak, TF's were re-started today (9/4/24) at 30 mL/hr w/ plan to advance to current goal of 50 mL/hr tomorrow (9/5/24), if pt improves and tolerates TF's plan is to d/c home Friday. Visited pt at bedside, RN present, RN providing FWF. Pt confirmed that she is not experiencing any N/V/D or abdominal pain, reported that she ate 100% of her broth this AM, stated that she has no desire for food at all, only ate broth because she was not able to for 2 days, discussed protein/calorie benefits of Boost breeze, pt receptive to try both flavors, RD added to pt's orders and trays, visually confirmed Peptamen 1.5 w/ Prebio running at 30 mL/hr. Pt stated that she refused and that her  is unable to perform actions needed to provide TF's and FWF, inquired if pt and/or pt  has spoken to  / about home health assistance, pt denied. Pt reported UBW was 146 lbs x > 1 yr ago prior to cancer, stated her current UBW is now 130 lbs. NFPE performed, mild/moderate malnutrition noted. Spoke to RN confirmed 30-50 mL FWF and that there was a consult in for HH, informed RN about concern d/t pt's refusal to perform TF's and needing assistance. Spoke to home infusion RN via secure chat, she informed that she has spoken to  but unable to provide everyday  "assistance, she attempted to provide pt's  w/ initial TF teaching which he became overwhelmed and expressed not understanding, and that the pt is stating "that she's not going home on TPN or Tfs" which she has requested assistance from the MD to explain the plan to the pt for dc; RD provided updated TF recs via secure chat. Reviewed chart: LBM 9/2; Skin: bruised, incision abdomen WDL, drains x 2; Chetan score: 20 9no risk); Edema: None. Labs, meds, weight reviewed. Last weight charted 8/22/24 135 lbs (BMI 22.49, Underweight for age), updated weight for accuracy warranted. RD will continue to follow and monitor pt's nutrition status during admit.       Nutrition Discharge Planning: Enteral nutrition via J tube    Nutrition Risk Screen    Nutrition Risk Screen: no indicators present    Nutrition Related Social Determinants of Health: SDOH: Adequate food in home environment and None Identified    Nutrition/Diet History    Spiritual, Cultural Beliefs, Mormon Practices, Values that Affect Care: no  Food Allergies: NKFA  Factors Affecting Nutritional Intake: altered gastrointestinal function, clear liquid diet    Anthropometrics    Temp: 98.3 °F (36.8 °C)  Height Method: Stated  Height: 5' 5" (165.1 cm)  Height (inches): 65 in  Weight Method: Standard Scale  Weight: 61.3 kg (135 lb 2.3 oz)  Weight (lb): 135.14 lb  Ideal Body Weight (IBW), Female: 125 lb  % Ideal Body Weight, Female (lb): 103.88 %  BMI (Calculated): 22.5  BMI Grade: 18.5-24.9 - normal (Underweight for age)     Wt Readings from Last 30 Encounters:   08/22/24 61.3 kg (135 lb 2.3 oz)   08/14/24 58.7 kg (129 lb 4.8 oz)   07/26/24 59 kg (130 lb)   07/23/24 60.5 kg (133 lb 6.1 oz)   07/19/24 59.5 kg (131 lb 2.8 oz)   07/09/24 58.9 kg (129 lb 13.6 oz)   06/28/24 58.9 kg (129 lb 13.6 oz)   06/18/24 58.9 kg (129 lb 13.6 oz)   05/28/24 59.7 kg (131 lb 9.8 oz)   05/28/24 59.7 kg (131 lb 9.8 oz)   05/07/24 58.2 kg (128 lb 4.9 oz)   04/16/24 57.8 kg (127 lb " "6.8 oz)   04/16/24 57.8 kg (127 lb 6.8 oz)   03/26/24 58.5 kg (128 lb 15.5 oz)   03/26/24 58.5 kg (128 lb 15.5 oz)   03/14/24 57.8 kg (127 lb 6.8 oz)   03/06/24 58.8 kg (129 lb 10.1 oz)   02/23/24 58.6 kg (129 lb 3 oz)   02/08/24 59 kg (130 lb)     Lab/Procedures/Meds  BMP  Lab Results   Component Value Date     09/04/2024    K 3.7 09/04/2024     (H) 09/04/2024    CO2 22 (L) 09/04/2024    BUN 5 (L) 09/04/2024    CREATININE 0.7 09/04/2024    CALCIUM 8.7 09/04/2024    ANIONGAP 7 (L) 09/04/2024    EGFRNORACEVR >60 09/04/2024     Lab Results   Component Value Date    CALCIUM 8.7 09/04/2024    PHOS 3.2 09/04/2024     Lab Results   Component Value Date    ALBUMIN 2.2 (L) 09/04/2024     Lab Results   Component Value Date    ALT 24 09/04/2024    AST 20 09/04/2024    ALKPHOS 177 (H) 09/04/2024    BILITOT 0.2 09/04/2024     Recent Labs   Lab 09/04/24  1117   POCTGLUCOSE 89     No results found for: "LABA1C", "HGBA1C"    Lab Results   Component Value Date    WBC 4.63 09/04/2024    HGB 9.4 (L) 09/04/2024    HCT 30.1 (L) 09/04/2024     (H) 09/04/2024     (H) 09/04/2024     Pertinent Labs Reviewed: reviewed    Pertinent Medications Reviewed: reviewed  Scheduled Meds:   amoxicillin-clavulanate  400 mg Oral Q12H    carBAMazepine  200 mg Oral BID    carBAMazepine  300 mg Oral QHS    enoxparin  40 mg Subcutaneous Daily    levETIRAcetam  500 mg Per J Tube BID    metoclopramide HCl  10 mg Oral Q8H    zonisamide  200 mg Per J Tube BID     Continuous Infusions:   dextrose 5 % and 0.45 % NaCl with KCl 20 mEq   Intravenous Continuous 80 mL/hr at 09/03/24 2201 New Bag at 09/03/24 2201     PRN Meds:.  Current Facility-Administered Medications:     dextrose 10%, 12.5 g, Intravenous, PRN    dextrose 10%, 25 g, Intravenous, PRN    glucagon (human recombinant), 1 mg, Intramuscular, PRN    hydrALAZINE, 10 mg, Intravenous, Q6H PRN    hydrocodone-apap 7.5-325 MG/15 ML, 15 mL, Per J Tube, Q4H PRN    hydrocodone-apap 7.5-325 " MG/15 ML, 20 mL, Per J Tube, Q6H PRN    HYDROmorphone, 1 mg, Intravenous, Q6H PRN    insulin aspart U-100, 0-5 Units, Subcutaneous, Q4H PRN    loperamide, 2 mg, Oral, QID PRN    morphine, 2 mg, Intravenous, Once PRN    naloxone, 0.02 mg, Intravenous, PRN    ondansetron, 4 mg, Intravenous, Q6H PRN    pneumoc 20-faina conj-dip cr(PF), 0.5 mL, Intramuscular, vaccine x 1 dose    promethazine, 25 mg, Rectal, Q6H PRN      Estimated/Assessed Needs    Weight Used For Calorie Calculations: 61.3 kg (135 lb 2.3 oz)  Energy Calorie Requirements (kcal): 2748-0414 (25-30 kcal/kg ABW per Cancer)  Energy Need Method: Kcal/kg  Protein Requirements: 74-92 (1.2-1.5 g/kg per underweight for age)  Weight Used For Protein Calculations: 61.3 kg (135 lb 2.3 oz)  Fluid Requirements (mL): 3328-8893 (1 mL/kcal)  Estimated Fluid Requirement Method: RDA Method  RDA Method (mL): 1532  CHO Requirement: 192-230      Nutrition Prescription Ordered    Current Diet Order: Clear liquids  Current Nutrition Support Formula Ordered: Peptamen 1.5 w/Prebio  Current Nutrition Support Rate Ordered: 30 (ml) (recommended goal rate of 50ml/hr)  Current Nutrition Support Frequency Ordered: continuous    Evaluation of Received Nutrient/Fluid Intake  I/O: (Net since admit)  8/21/24: +3541.1 mL  8/29/24: +6366.9 mL  9/4/24: +8974.9 mL    Enteral Calories (kcal): 1080  Enteral Protein (gm): 49  Enteral (Free Water) Fluid (mL): 556  Free Water Flush Fluid (mL): 30 (30-50 mL)  % Kcal Needs: 70%  % Protein Needs: 66%    Energy Calories Required: not meeting needs  Protein Required: not meeting needs  Fluid Required: not meeting needs  Total Fluid Intake (mL): 620  Tolerance: tolerating  % Intake of Estimated Energy Needs: 75 - 100 %   % Meal Intake: clear liquids    Nutrition Risk    Level of Risk/Frequency of Follow-up: high (F/u x 2 weekly)       Monitor and Evaluation    Food and Nutrient Intake: energy intake, enteral nutrition intake  Food and Nutrient  Adminstration: enteral and parenteral nutrition administration  Knowledge/Beliefs/Attitudes: food and nutrition knowledge/skill, beliefs and attitudes  Anthropometric Measurements: weight, weight change, body mass index  Biochemical Data, Medical Tests and Procedures: electrolyte and renal panel, gastrointestinal profile, glucose/endocrine profile  Nutrition-Focused Physical Findings: overall appearance       Nutrition Follow-Up    RD Follow-up?: Yes  Patty Gupta, BS, RDN, LDN

## 2024-09-04 NOTE — SUBJECTIVE & OBJECTIVE
Interval History:  Patient was feeling better.  CT scan showed no essie evidence of a leak.  We will restart tube feeds and restart clear liquids.      Upper portion of the wound open and packing placed.  Will amend home health orders.      Plan on increasing tube feeds over the next 24 hours    Medications:  Continuous Infusions:   dextrose 5 % and 0.45 % NaCl with KCl 20 mEq   Intravenous Continuous 80 mL/hr at 09/03/24 2201 New Bag at 09/03/24 2201     Scheduled Meds:   carBAMazepine  200 mg Oral BID    carBAMazepine  300 mg Oral QHS    enoxparin  40 mg Subcutaneous Daily    levETIRAcetam  500 mg Per J Tube BID    metoclopramide HCl  10 mg Oral Q8H    piperacillin-tazobactam (Zosyn) IV (PEDS and ADULTS) (extended infusion is not appropriate)  4.5 g Intravenous Q8H    zonisamide  200 mg Per J Tube BID     PRN Meds:  Current Facility-Administered Medications:     dextrose 10%, 12.5 g, Intravenous, PRN    dextrose 10%, 25 g, Intravenous, PRN    glucagon (human recombinant), 1 mg, Intramuscular, PRN    hydrALAZINE, 10 mg, Intravenous, Q6H PRN    hydrocodone-apap 7.5-325 MG/15 ML, 15 mL, Per J Tube, Q4H PRN    hydrocodone-apap 7.5-325 MG/15 ML, 20 mL, Per J Tube, Q6H PRN    HYDROmorphone, 1 mg, Intravenous, Q6H PRN    insulin aspart U-100, 0-5 Units, Subcutaneous, Q4H PRN    loperamide, 2 mg, Oral, QID PRN    morphine, 2 mg, Intravenous, Once PRN    naloxone, 0.02 mg, Intravenous, PRN    ondansetron, 4 mg, Intravenous, Q6H PRN    pneumoc 20-faina conj-dip cr(PF), 0.5 mL, Intramuscular, vaccine x 1 dose    promethazine, 25 mg, Rectal, Q6H PRN     Review of patient's allergies indicates:  No Known Allergies  Objective:     Vital Signs (Most Recent):  Temp: 98.9 °F (37.2 °C) (09/04/24 0745)  Pulse: 71 (09/04/24 0745)  Resp: 16 (09/04/24 0745)  BP: 137/63 (09/04/24 0745)  SpO2: 98 % (09/04/24 0745) Vital Signs (24h Range):  Temp:  [97.6 °F (36.4 °C)-99 °F (37.2 °C)] 98.9 °F (37.2 °C)  Pulse:  [71-84] 71  Resp:  [16-20]  16  SpO2:  [97 %-100 %] 98 %  BP: (119-137)/(59-73) 137/63     Weight: 61.3 kg (135 lb 2.3 oz)  Body mass index is 22.49 kg/m².    Intake/Output - Last 3 Shifts         09/02 0700  09/03 0659 09/03 0700  09/04 0659 09/04 0700  09/05 0659    NG/      Total Intake(mL/kg) 620 (10.1)      Urine (mL/kg/hr) 0 (0)      Drains 70 20     Total Output 70 20     Net +550 -20            Urine Occurrence 2 x               Physical Exam  Vitals reviewed.   Constitutional:       Appearance: She is well-developed. Ill appearance: Mild acute.   HENT:      Head: Normocephalic.   Eyes:      Pupils: Pupils are equal, round, and reactive to light.   Neck:      Thyroid: No thyromegaly.      Vascular: No JVD.      Trachea: No tracheal deviation.   Cardiovascular:      Rate and Rhythm: Normal rate and regular rhythm.      Heart sounds: Normal heart sounds.   Pulmonary:      Breath sounds: Normal breath sounds. No wheezing.   Abdominal:      General: Abdomen is flat. Bowel sounds are normal. There is no distension.      Palpations: Abdomen is soft. Abdomen is not rigid. There is no mass.      Tenderness: There is no abdominal tenderness. There is no guarding or rebound.      Comments: Jejunostomy site is less erythematous.  Drains in place with serous output.  Upper portion of the incision is open with gauze placed   Musculoskeletal:         General: Normal range of motion.   Lymphadenopathy:      Cervical: No cervical adenopathy.   Skin:     General: Skin is warm and dry.      Findings: No erythema or rash.   Neurological:      Mental Status: She is alert and oriented to person, place, and time.          Significant Labs:  I have reviewed all pertinent lab results within the past 24 hours.  CBC:   Recent Labs   Lab 09/04/24 0419   WBC 4.63   RBC 2.81*   HGB 9.4*   HCT 30.1*   *   *   MCH 33.5*   MCHC 31.2*     BMP:   Recent Labs   Lab 09/04/24 0419         K 3.7   *   CO2 22*   BUN 5*   CREATININE  0.7   CALCIUM 8.7   MG 2.0       Significant Diagnostics:  I have reviewed all pertinent imaging results/findings within the past 24 hours.  CT: I have reviewed all pertinent results/findings within the past 24 hours and my personal findings are:  CT reviewed    Reading Physician Reading Date Result Priority   Harley Aggarwal MD  [phone] 9/4/2024 Routine     Narrative & Impression  EXAMINATION:  CT ABDOMEN PELVIS WITHOUT CONTRAST     CLINICAL HISTORY:  Evaluate for small-bowel leak after total gastrectomy and feeding jejunostomy 13 days ago;     TECHNIQUE:  Low dose axial images, sagittal and coronal reformations were obtained from the lung bases to the pubic symphysis.  Contrast was administered through the J-tube..     COMPARISON:  None     FINDINGS:  Heart: Normal size. No effusion.     Lung Bases: Small left and trace right pleural effusions with atelectasis left lower lobe.     Liver: Normal size and attenuation. No focal lesions.     Gallbladder: No calcified gallstones.  Mild gallbladder distension and wall thickening.  Vicarious excretion or sludge seen within the gallbladder.     Bile Ducts: No dilatation.     Pancreas: No obvious mass. No peripancreatic fat stranding.     Spleen: Normal.     Adrenals: Normal.     Kidneys/Ureters: Bilateral renal cortical thinning.  Several nonobstructing stones are suspected within the left kidney.  No mass, hydroureteronephrosis, or ureterolithiasis.     Bladder: No wall thickening.     Reproductive organs: Normal.     GI Tract/Mesentery: Prominence of the esophagus with debris and contrast within the distal esophagus could reflect dysmotility or reflux.  Postoperative changes are seen within the stomach.  Postoperative surgical drains are seen within the epigastric region.  Small bowel anastomotic sutures are seen within the left upper quadrant as well as within the mid abdomen.     J-tube projects within the left hemiabdomen and appears  appropriately positioned.  No evidence of extravasation or leak.  No extraluminal contrast is seen.  Prominent small bowel loops within the left upper quadrant measuring up to 3.7 cm could reflect ileus.  No evidence of bowel obstruction or inflammation.  Scattered minimal ascites throughout the abdomen and pelvis.  Shotty mesenteric nodes, likely reactive.     Peritoneal Space: Small amount of scattered ascites.  No definite free air.  Air within the subcutaneous tissues of the lower abdomen thought to be postoperative.     Retroperitoneum: Shotty adenopathy.     Abdominal wall: Midline skin staples are noted.  Areas of open wound are seen within the midportion of the surgical abdominal wall     Vasculature: No aneurysm.  Minimal atherosclerotic disease.     Bones: No acute fracture.  Mild degenerative changes lower lumbar spine no suspicious lytic or sclerotic lesions.     Impression:     Evaluation of solid organ and vascular pathology is limited due to lack of IV contrast.     No evidence of leak.  No intra-abdominal abscess.  Minimal scattered ascites.  Suspect ileus within the upper abdomen.     See additional findings above.     All CT scans at this facility use dose modulation, iterative reconstruction, and/or weight based dosing when appropriate to reduce radiation dose to as low as reasonable achievable.        Electronically signed by:Harley Aggarwal MD  Date:                                            09/04/2024  Time:                                           07:40

## 2024-09-04 NOTE — SUBJECTIVE & OBJECTIVE
Interval History: concerns for leak/drainage through abdominal incisions. Tube feedings held-CT abd showed ileus. .  Patient continues to report bowel movements.  General surgery evaluated patient and planning for further imaging to evaluate. Erythema around J tube site continues to improve.    Picture from overnight concerns for leak/drainage through abdominal incisions.          Objective:     Vital Signs (Most Recent):  Temp: 98.3 °F (36.8 °C) (09/04/24 1131)  Pulse: 75 (09/04/24 1131)  Resp: 19 (09/04/24 1145)  BP: 135/60 (09/04/24 1131)  SpO2: 98 % (09/04/24 1131) Vital Signs (24h Range):  Temp:  [97.6 °F (36.4 °C)-98.9 °F (37.2 °C)] 98.3 °F (36.8 °C)  Pulse:  [71-84] 75  Resp:  [16-19] 19  SpO2:  [97 %-100 %] 98 %  BP: (119-137)/(60-73) 135/60     Weight: 61.3 kg (135 lb 2.3 oz)  Body mass index is 22.49 kg/m².  No intake or output data in the 24 hours ending 09/04/24 1451        Physical Exam  Vitals and nursing note reviewed. Exam conducted with a chaperone present (Dr. Christopher MD).   Constitutional:       General: She is not in acute distress.     Appearance: Normal appearance. She is not ill-appearing.   HENT:      Head: Normocephalic and atraumatic.      Mouth/Throat:      Mouth: Mucous membranes are moist.   Eyes:      General: No scleral icterus.        Right eye: No discharge.         Left eye: No discharge.   Cardiovascular:      Rate and Rhythm: Normal rate and regular rhythm.   Pulmonary:      Effort: Pulmonary effort is normal. No respiratory distress.      Breath sounds: No wheezing, rhonchi or rales.   Abdominal:      General: Abdomen is flat. A surgical scar is present. There is no distension.      Palpations: Abdomen is soft. There is no mass.      Tenderness: There is abdominal tenderness (around J tube insertion site, improving). There is no guarding or rebound.      Hernia: No hernia is present.      Comments: TTP, J tube in place- some swelling/ erythema around but no purulence, incision  c/d/i   Musculoskeletal:         General: No swelling, tenderness, deformity or signs of injury. Normal range of motion.   Skin:     General: Skin is warm and dry.      Coloration: Skin is not jaundiced.   Neurological:      General: No focal deficit present.      Mental Status: She is alert and oriented to person, place, and time.   Psychiatric:         Mood and Affect: Mood normal.         Behavior: Behavior normal.         Thought Content: Thought content normal.             Significant Labs: All pertinent labs within the past 24 hours have been reviewed.  CBC:   Recent Labs   Lab 09/03/24  0636 09/04/24 0419   WBC 7.37  7.23 4.63   HGB 9.3*  9.4* 9.4*   HCT 29.7*  30.1* 30.1*   *  604* 621*     CMP:   Recent Labs   Lab 09/03/24  0636 09/04/24 0419    141   K 3.8 3.7    112*   CO2 21* 22*    100   BUN 6* 5*   CREATININE 0.7 0.7   CALCIUM 8.5* 8.7   PROT 5.8* 5.7*   ALBUMIN 2.2* 2.2*   BILITOT 0.4 0.2   ALKPHOS 184* 177*   AST 20 20   ALT 26 24   ANIONGAP 9 7*       Significant Imaging: I have reviewed all pertinent imaging results/findings within the past 24 hours.  Review of Systems   Constitutional:  Positive for activity change and appetite change.

## 2024-09-04 NOTE — PT/OT/SLP PROGRESS
Physical Therapy  Treatment    Cheryl Kirkland   MRN: [id number]   Admitting Diagnosis: Gastric adenocarcinoma    PT Received On: 09/04/24  PT Start Time: 0810     PT Stop Time: 0835    PT Total Time (min): 25 min       Billable Minutes:  Gait Training 15 and Therapeutic Activity 10    Treatment Type: Treatment  PT/PTA: PT     Number of PTA visits since last PT visit: 0       General Precautions: Standard, fall  Orthopedic Precautions: N/A  Braces: N/A  Respiratory Status: Room air    Spiritual, Cultural Beliefs, Temple Practices, Values that Affect Care: no    Subjective:  Communicated with NURSE AND EPIC CHART REVIEW  prior to session.   PT AGREED TO TX    Pain/Comfort  Pain Rating 1: 0/10  Pain Rating Post-Intervention 1: 0/10    Objective:   Patient found with: peripheral IV, G/J tube, APOLINAR drain, telemetry    Functional Mobility:  PT MET IN RM SUP>SIT EOB IND. PT STOOD AND AMBULATED TO BATHROOM FOR TOILETING IND. PT RETURNED TO BEDSIDE AND T/F TO EOB IND. PT STOOD X 2 FOR GT TRIAL X > 700' WITH NO AD IND AND NO LOB. PT RETURNED TO  AFTER T/F TO CHAIR FOR OOB TOLERANCE.     Treatment and Education:  PT EDUCATED TO CONT AMBULATING SEVERAL TIMES PER DAY. PT REPORTED SHE WAS WALKING SEVERAL TIMES A DAY AND WOULD CONTINUE. PT LEFT WITH ALL NEEDS MET.      AM-PAC 6 CLICK MOBILITY  How much help from another person does this patient currently need?   1 = Unable, Total/Dependent Assistance  2 = A lot, Maximum/Moderate Assistance  3 = A little, Minimum/Contact Guard/Supervision  4 = None, Modified Clarence/Independent    Turning over in bed (including adjusting bedclothes, sheets and blankets)?: 4  Sitting down on and standing up from a chair with arms (e.g., wheelchair, bedside commode, etc.): 4  Moving from lying on back to sitting on the side of the bed?: 4  Moving to and from a bed to a chair (including a wheelchair)?: 4  Need to walk in hospital room?: 4  Climbing 3-5 steps with a railing?: 1  (NT)  Basic Mobility Total Score: 21    AM-PAC Raw Score CMS G-Code Modifier Level of Impairment Assistance   6 % Total / Unable   7 - 9 CM 80 - 100% Maximal Assist   10 - 14 CL 60 - 80% Moderate Assist   15 - 19 CK 40 - 60% Moderate Assist   20 - 22 CJ 20 - 40% Minimal Assist   23 CI 1-20% SBA / CGA   24 CH 0% Independent/ Mod I     Patient left up in chair with all lines intact and call button in reach.    Assessment:  PT GOALS MET AND IND WITH GROSS FUNC MOBILITY. PT WILL BE D/C FROM ACUTE CARE P.T.     Discharge recommendations: No Therapy Indicated      Barriers to discharge:      Equipment recommendations: none     GOALS:   Multidisciplinary Problems       Physical Therapy Goals       Not on file              Multidisciplinary Problems (Resolved)          Problem: Physical Therapy    Goal Priority Disciplines Outcome Goal Variances Interventions   Physical Therapy Goal   (Resolved)     PT, PT/OT Met     Description: LTG'S TO BE MET IN 14 DAYS (9-4-24)  PT WILL REQUIRE SPV FOR BED MOBILITY  PT WILL REQUIRE SPV FOR BED<>CHAIR TF'S  PT WILL  FEET WITH RW AND SPV  PT WILL INC AMPAC SCORE BY 2 POINTS TO PROGRESS GROSS FUNC MOBILITY                         PLAN:    PT WILL BE D/C FROM ACUTE CARE P.T.   Plan of Care reviewed with: patient         09/04/2024

## 2024-09-04 NOTE — PROGRESS NOTES
[facility] Medicine  Progress Note    Patient Name: Cheryl Kirkland  MRN: [id number]  Patient Class: IP- Inpatient   Admission Date: 8/20/2024  Length of Stay: 15 days  Attending Physician: Chen Jerome MD  Primary Care Provider: Gagandeep Iglesias MD        Subjective:     Principal Problem:Gastric adenocarcinoma        HPI:  74F  has a past medical history of Anemia, Chronic deep vein thrombosis (DVT) of left lower extremity, Deep vein thrombosis, Drug-induced polyneuropathy, DVT (deep venous thrombosis), Epilepsy, Gastric adenocarcinoma, GERD (gastroesophageal reflux disease), Hyperlipidemia, Mixed epithelial carcinoma of right ovary, OP (osteoporosis), Ovarian cancer, and Primary hypertension.  Presents for definitive treatment of gastric adenocaricoma per primary. Status post total gastrectomy with j tube placement. Transferred to icu postoperatively for close monitoring. Tolerated procedure well. Expected pain and weakness. Physical/occupational therapy recommending low intensity therapy.    Hospital medicine consulted for medical management.    Initial review of chart reveals vital signs stable, on room air. Cbc unremarkable. Cmp with hyperchloremic acidosis improving. Normal renal function. Elevated liver enzymes. Amylase within normal limits. Hypomag. Hypoalbuminemia. Xray abdomen on 8/20 with operative changes.    Overview/Hospital Course:  8/23 admitted for definitive management of gastric adenocarcinoma with total gastrectomy and j tube placement by primary. Upper gastroenterology procedure with no anastomotic leak identified. Possible ngt removal per primary. Physical/occupational therapy recommending low intensity therapy. Antiepileptic levels pending  8/24 antiepileptic levels pending. Ng tube removed. Reports visual disturbance but improving. Has not worked with physical/occupational therapy today.  8/25 doing well. +bowel movement x 2. pca discontinued per primary.  Trickle feeds to be increased per primary.  8/26 patient feels weak today with some shortness of breath.  She states she feels some nausea and just no energy.  She was able to participate therapy and walk in brown with walker  8/27 patient continues to feel weak.  8/28 patient feels much better today.  No nausea no vomiting.  Has walked 3 times with the assistance today.  8/29- POD 9- developed some Abd fullness- hence Dr. Jerome switched to CLD while hold TF and cont Reglan. Getting PT/OT. Cont present care as per Dr. Jerome. Labs stable. PO4 2.5.    08/30 POD 10- reports being able to tolerate CLD. Started on TPN. Tube feeds resumed. Reports pain around J tube site, evaluation by CT imaging and General Surgery did not note any indication for intervention.   08/31 POD11- still reporting pain around J tube insertion site, started on antibiotics per General surgery.  Reports diarrhea, stool studies pending  09/01- POD 12- reports improvement in pain around J tube site.  General surgery evaluation today recommended discontinuation of peripheral nutrition.  Stool studies negative so far, diarrhea alleviating with loperamide  09/02: POD 13: improvement of erythema surrounding J tube site. Slightly tender. Tolerating TF and medications through tube. PT/OT recommends home health. She remains on full liquid diet.  09/03: POD 14:  Overnight concerns for leak/drainage through abdominal incisions. Tube feedings held.  Patient continues to report bowel movements. Increasing abdominal pain-  Erythema around J tube site continues to improve.   9/4/24: Patient underwent a CT scan abdomen with oral contrast. No essie evidence leak, fistula or abscess. +possible ileus. Pt passing flatus. Gen Pa restarted tube feeds and restart clear liquids.         Interval History: concerns for leak/drainage through abdominal incisions. Tube feedings held-CT abd showed no abscess/leak. Possible ileus. Patient continues to report bowel movements.   Erythema around J tube site continues to improve. Diet advanced.     Picture from overnight concerns for leak/drainage through abdominal incisions.          Objective:     Vital Signs (Most Recent):  Temp: 98.3 °F (36.8 °C) (09/04/24 1131)  Pulse: 75 (09/04/24 1131)  Resp: 19 (09/04/24 1145)  BP: 135/60 (09/04/24 1131)  SpO2: 98 % (09/04/24 1131) Vital Signs (24h Range):  Temp:  [97.6 °F (36.4 °C)-98.9 °F (37.2 °C)] 98.3 °F (36.8 °C)  Pulse:  [71-84] 75  Resp:  [16-19] 19  SpO2:  [97 %-100 %] 98 %  BP: (119-137)/(60-73) 135/60     Weight: 61.3 kg (135 lb 2.3 oz)  Body mass index is 22.49 kg/m².  No intake or output data in the 24 hours ending 09/04/24 1451        Physical Exam  Vitals and nursing note reviewed. Constitutional:       General: She is not in acute distress.     Appearance: Normal appearance. She is not ill-appearing.   HENT:      Head: Normocephalic and atraumatic.      Mouth/Throat:      Mouth: Mucous membranes are moist.   Eyes:      General: No scleral icterus.        Right eye: No discharge.         Left eye: No discharge.   Cardiovascular:      Rate and Rhythm: Normal rate and regular rhythm.   Pulmonary:      Effort: Pulmonary effort is normal. No respiratory distress.      Breath sounds: No wheezing, rhonchi or rales.   Abdominal:      General: Abdomen is flat. A surgical scar is present. There is no distension.      Palpations: Abdomen is soft. There is no mass.      Tenderness: There is abdominal tenderness (around J tube insertion site, improving). There is no guarding or rebound.      Hernia: No hernia is present.      Comments: TTP, J tube in place- some swelling/ erythema around but no purulence, incision c/d/i -Upper portion of the wound open and packing placed     Musculoskeletal:         General: No swelling, tenderness, deformity or signs of injury. Normal range of motion.   Skin:     General: Skin is warm and dry.      Coloration: Skin is not jaundiced.   Neurological:      General:  "No focal deficit present.      Mental Status: She is alert and oriented to person, place, and time.   Psychiatric:         Mood and Affect: Mood normal.         Behavior: Behavior normal.         Thought Content: Thought content normal.             Significant Labs: All pertinent labs within the past 24 hours have been reviewed.  CBC:   Recent Labs   Lab 09/03/24  0636 09/04/24  0419   WBC 7.37  7.23 4.63   HGB 9.3*  9.4* 9.4*   HCT 29.7*  30.1* 30.1*   *  604* 621*     CMP:   Recent Labs   Lab 09/03/24  0636 09/04/24 0419    141   K 3.8 3.7    112*   CO2 21* 22*    100   BUN 6* 5*   CREATININE 0.7 0.7   CALCIUM 8.5* 8.7   PROT 5.8* 5.7*   ALBUMIN 2.2* 2.2*   BILITOT 0.4 0.2   ALKPHOS 184* 177*   AST 20 20   ALT 26 24   ANIONGAP 9 7*       Significant Imaging: I have reviewed all pertinent imaging results/findings within the past 24 hours.  Review of Systems   Constitutional:  Positive for activity change and appetite change.       Assessment/Plan:      * Gastric adenocarcinoma  Status post total gastrectomy with j tube placement  Per primary  Repeat CT abd showed no leak,abscess-possible ileus. Pt passing flatus  General surgery following-diet advanced and TF restarted  Transition to po abx    Jejunostomy tube present  See "Gastric adenocarcinoma " A&P        Primary hypertension  Chronic, controlled. Latest blood pressure and vitals reviewed-     Temp:  [98.4 °F (36.9 °C)-99.4 °F (37.4 °C)]   Pulse:  [76-92]   Resp:  [16-20]   BP: (112-131)/(59-69)   SpO2:  [94 %-98 %] .   Home meds for hypertension were reviewed and noted below.       While in the hospital, will manage blood pressure as follows; Adjust home antihypertensive regimen as follows- monitor, avoid aggressive management of blood pressure in setting of recent surgery     Will utilize p.r.n. blood pressure medication only if patient's blood pressure greater than 160/100 and she develops symptoms such as worsening chest pain " or shortness of breath.    Chronic deep vein thrombosis (DVT) of left lower extremity   -Previously on Xarelto, and evaluation by Oncology noted concerns DVT likely secondary to being provoked  from ovarian cancer. Held after concerns for GI bleed early 2024      Scds and lovenox for prophy.    DVT (deep venous thrombosis)  -Initially presented to primary care provider with lower extremity edema   -Workup revealed dvt 2/2 cancer  -Previously on Xarelto, and evaluation by Oncology noted concerns DVT likely secondary to being provoked  from ovarian cancer. Held after concerns for GI bleed early 2024    PLAN:  On loveonx prophy and scds  Ambulate     Hx of Epileptic seizures  Resume home meds per j tube  Carbamezapine in therapeutic range  Zonisamide within normal limits   Levetiracetam within normal limits    stable      VTE Risk Mitigation (From admission, onward)           Ordered     enoxaparin injection 40 mg  Daily         08/20/24 2219     IP VTE HIGH RISK PATIENT  Once         08/20/24 2219     Place sequential compression device  Until discontinued         08/20/24 2219                    Discharge Planning   SOFIYA:      Code Status: Full Code   Is the patient medically ready for discharge?:     Reason for patient still in hospital (select all that apply): Patient trending condition  Discharge Plan A: Home Health                  Vicky Phoenix NP  Department of Hospital Medicine   [facility] - Med Surg

## 2024-09-05 LAB
ALBUMIN SERPL BCP-MCNC: 2.2 G/DL (ref 3.5–5.2)
ALP SERPL-CCNC: 169 U/L (ref 55–135)
ALT SERPL W/O P-5'-P-CCNC: 19 U/L (ref 10–44)
ANION GAP SERPL CALC-SCNC: 8 MMOL/L (ref 8–16)
AST SERPL-CCNC: 13 U/L (ref 10–40)
BILIRUB SERPL-MCNC: 0.2 MG/DL (ref 0.1–1)
BUN SERPL-MCNC: 3 MG/DL (ref 8–23)
CALCIUM SERPL-MCNC: 8.2 MG/DL (ref 8.7–10.5)
CHLORIDE SERPL-SCNC: 113 MMOL/L (ref 95–110)
CO2 SERPL-SCNC: 19 MMOL/L (ref 23–29)
CREAT SERPL-MCNC: 0.7 MG/DL (ref 0.5–1.4)
ERYTHROCYTE [DISTWIDTH] IN BLOOD BY AUTOMATED COUNT: 16.3 % (ref 11.5–14.5)
EST. GFR  (NO RACE VARIABLE): >60 ML/MIN/1.73 M^2
GLUCOSE SERPL-MCNC: 112 MG/DL (ref 70–110)
HCT VFR BLD AUTO: 31.6 % (ref 37–48.5)
HGB BLD-MCNC: 10 G/DL (ref 12–16)
MAGNESIUM SERPL-MCNC: 1.9 MG/DL (ref 1.6–2.6)
MCH RBC QN AUTO: 33.9 PG (ref 27–31)
MCHC RBC AUTO-ENTMCNC: 31.6 G/DL (ref 32–36)
MCV RBC AUTO: 107 FL (ref 82–98)
PHOSPHATE SERPL-MCNC: 2.1 MG/DL (ref 2.7–4.5)
PLATELET # BLD AUTO: 671 K/UL (ref 150–450)
PMV BLD AUTO: 8.3 FL (ref 9.2–12.9)
POCT GLUCOSE: 115 MG/DL (ref 70–110)
POCT GLUCOSE: 118 MG/DL (ref 70–110)
POTASSIUM SERPL-SCNC: 4 MMOL/L (ref 3.5–5.1)
PROT SERPL-MCNC: 5.9 G/DL (ref 6–8.4)
RBC # BLD AUTO: 2.95 M/UL (ref 4–5.4)
SODIUM SERPL-SCNC: 140 MMOL/L (ref 136–145)
WBC # BLD AUTO: 4.94 K/UL (ref 3.9–12.7)

## 2024-09-05 PROCEDURE — 25000003 PHARM REV CODE 250: Performed by: STUDENT IN AN ORGANIZED HEALTH CARE EDUCATION/TRAINING PROGRAM

## 2024-09-05 PROCEDURE — 84100 ASSAY OF PHOSPHORUS: CPT | Performed by: SURGERY

## 2024-09-05 PROCEDURE — 63600175 PHARM REV CODE 636 W HCPCS: Performed by: NURSE PRACTITIONER

## 2024-09-05 PROCEDURE — 25000003 PHARM REV CODE 250: Performed by: SURGERY

## 2024-09-05 PROCEDURE — 80053 COMPREHEN METABOLIC PANEL: CPT | Performed by: SURGERY

## 2024-09-05 PROCEDURE — 85027 COMPLETE CBC AUTOMATED: CPT | Performed by: NURSE PRACTITIONER

## 2024-09-05 PROCEDURE — 83735 ASSAY OF MAGNESIUM: CPT | Performed by: SURGERY

## 2024-09-05 PROCEDURE — 63600175 PHARM REV CODE 636 W HCPCS: Performed by: SURGERY

## 2024-09-05 PROCEDURE — 11000001 HC ACUTE MED/SURG PRIVATE ROOM

## 2024-09-05 RX ADMIN — LEVETIRACETAM 500 MG: 100 SOLUTION ORAL at 10:09

## 2024-09-05 RX ADMIN — LEVETIRACETAM 500 MG: 100 SOLUTION ORAL at 11:09

## 2024-09-05 RX ADMIN — CARBAMAZEPINE 200 MG: 100 SUSPENSION ORAL at 02:09

## 2024-09-05 RX ADMIN — HYDROCODONE BITARTRATE AND ACETAMINOPHEN 15 ML: 7.5; 325 SOLUTION ORAL at 02:09

## 2024-09-05 RX ADMIN — AMOXICILLIN AND CLAVULANATE POTASSIUM 875.2 MG: 400; 57 POWDER, FOR SUSPENSION ORAL at 08:09

## 2024-09-05 RX ADMIN — ENOXAPARIN SODIUM 40 MG: 40 INJECTION SUBCUTANEOUS at 04:09

## 2024-09-05 RX ADMIN — AMOXICILLIN AND CLAVULANATE POTASSIUM 875.2 MG: 400; 57 POWDER, FOR SUSPENSION ORAL at 11:09

## 2024-09-05 RX ADMIN — METOCLOPRAMIDE HYDROCHLORIDE 10 MG: 5 SOLUTION ORAL at 02:09

## 2024-09-05 RX ADMIN — CARBAMAZEPINE 200 MG: 100 SUSPENSION ORAL at 05:09

## 2024-09-05 RX ADMIN — CARBAMAZEPINE 300 MG: 100 SUSPENSION ORAL at 10:09

## 2024-09-05 RX ADMIN — METOCLOPRAMIDE HYDROCHLORIDE 10 MG: 5 SOLUTION ORAL at 08:09

## 2024-09-05 RX ADMIN — ZONISAMIDE 200 MG: 100 SUSPENSION ORAL at 08:09

## 2024-09-05 RX ADMIN — DEXTROSE, SODIUM CHLORIDE, AND POTASSIUM CHLORIDE: 5; .45; .15 INJECTION INTRAVENOUS at 02:09

## 2024-09-05 RX ADMIN — HYDROCODONE BITARTRATE AND ACETAMINOPHEN 15 ML: 7.5; 325 SOLUTION ORAL at 03:09

## 2024-09-05 RX ADMIN — ZONISAMIDE 200 MG: 100 SUSPENSION ORAL at 11:09

## 2024-09-05 RX ADMIN — DEXTROSE, SODIUM CHLORIDE, AND POTASSIUM CHLORIDE: 5; .45; .15 INJECTION INTRAVENOUS at 03:09

## 2024-09-05 RX ADMIN — HYDROCODONE BITARTRATE AND ACETAMINOPHEN 15 ML: 7.5; 325 SOLUTION ORAL at 08:09

## 2024-09-05 RX ADMIN — METOCLOPRAMIDE HYDROCHLORIDE 10 MG: 5 SOLUTION ORAL at 05:09

## 2024-09-05 NOTE — PROGRESS NOTES
[facility] - Intensive Care ([facility])  Adult Nutrition  Progress Note    SUMMARY     Recommendations    1. Recommend pt continues on continuous Enteral nutrition, Peptamen 1.5 w/ Prebio via J tube at goal rate 50 mL/hr  -TF at goal rate provides: 1800 kcals/day (100% EEN), 82 g protein/day (100% EPN), 926 mL free formula water/day (51% fluid needs)  -100 mL q4h or 150 mL q6h free water flushes (600 mL/day) = total from formula + FWF = 1526 mL water/day (100% fluid needs), per MD/NP   -Monitor Mg, K+, Na, Phos and Glu, correct as indicated   2. Recommend pt's diet advanced to Full liquid diet per pt tolerance for pleasure feeds   3. Recommend pt receives feeding assistance with tube feeding teachings   4. Updated weight, twice weekly     Goals:   1. Pt will continue to tolerate and intake > 75% EEN and EPN prior to RD follow up (Meeting)  2. Pt will tolerated Full liquid diet prior to RD follow up  3. Pt will receive TF teachings within 24 hrs   Nutrition Goal Status: resolved, meeting, new   Communication of RD Recs: other (comment) (POC, sticky note, secure chat)    Assessment and Plan    Malnutrition Type:  Context: chronic illness  Level: moderate     Related to (etiology):   Physiological causes increasing nutrient needs d/t illness  Alteration in GI tract     Signs and Symptoms (as evidenced by):   BMI < 23 (older adult)  Underweight with loss of fat and muscle  Unable or unwilling to eat sufficient energy/protein to maintain a healthy weight  Food avoidance and/or lack of interest in food     Malnutrition Characteristic Summary:  Subcutaneous Fat (Malnutrition): moderate depletion  Muscle Mass (Malnutrition): mild depletion     Interventions/Recommendations (treatment strategy):  1. Enteral Nutrition Management  2. Full liquid diet (pleasure feeds)   3. Feeding Assistance Management  4. Collaboration by nutrition professional with other providers     Nutrition Diagnosis Status:   Continues        Malnutrition  Assessment (9/4/24):  Malnutrition Context: chronic illness  Malnutrition Level: moderate  Skin (Micronutrient): bruised, wounds unhealed (Chetan score = 20 (no risk)       Subcutaneous Fat (Malnutrition): moderate depletion  Muscle Mass (Malnutrition): mild depletion   Orbital Region (Subcutaneous Fat Loss): mild depletion (Somewhat hallow look)  Upper Arm Region (Subcutaneous Fat Loss): moderate depletion (Some depth pinch but not ample; Fingers almost touch)   Clavicle and Acromion Bone Region (Muscle Loss): mild depletion (Acrominon process may slightly protrude)  Dorsal Hand (Muscle Loss): moderate depletion (Slightly depressed)  Patellar Region (Muscle Loss): mild depletion (Knee cap less prominent, more rounded)                 Reason for Assessment    Reason For Assessment: Not tolerating EN formula  Diagnosis: 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 Female admitted for Gastric adenocarcinoma. Pt currently in the ICU, NPO x 1 day. RD consulted for J tube feed recommendations. H&P noted that the pt presented to the hospital for planned diagnostic laparoscopy, possible total gastrectomy and J tube placement, and EGD d/t gastric adenocarcinoma, s/p neoadjuvant immunotherapy. EMR noted procedure performed on 8/20/24, post-op pt was transferred to the ICU, gastrectomy with J tube in place now, plan is for liquid meds only via J tube starting today (8/21) and to maintain NJ tube to LIWS w/ plan to remove after leak test in a few days. Discussed pt in rounds, RN stated pt to start in Impact Peptide 1.5 @ 10 mL/hr at 10 am, informed RN of J tube recs via secure chat and awaiting MD to sign orders. Unable to perform full NFPE d/t pt working with other providers, visual NFPE perform, moderate orbital  malnutrition noted. Reviewed chart: Propofol: 0; Total VE: 0; LBM: DAVE; Skin: incision abdomen WDL, closed/sunction drain R abdomen x 2; Chetan score: 12 (high risk); Edema: None.Labs, meds, weight reviewed. Weight charted 2/8/24 130 lbs, 8/20/24 129 lbs (BMI 21.61, Underweight for age), -1 lb wt loss x 6 months, insignificant wt loss, wt stable. RD will continue to follow and monitor pt's nutritional status during admit.    8/26/2024: Patient is post op day 6.  Diet advanced to clear liquids and tolerated CLD yesterday.  Patient is experiencing some nausea with TF running at rate of 30ml/hr.  Labs reviewed.  NKFA.  LBM: 8/25/2024.  NFPE to be performed at next patient visit.  RD to continue to monitor nutrition therapies and tolerance.      8/29/24:  Patient continue son clear liquids and EN support.  Patient with some prior nausea but has improved within the past 24 hours.  RD to continue to monitor EN and po intake tolerance.  Labs reviewed.  NKFA.  LBM: 8/28/2024.  RD to continue to monitor diet advancement and EN support    8/29/24: Spoke with current attending MD whom states the pt is not tolerating TF >10 mL/hr. She states that when rate is increased to 30 mL/hr pt begins to feel bloated and uncomfortable. She then hold feeds for a day and pt feels better. This has been a repeated cycle during multiple attempts to increase to goal rate. Pt currently on NanoNord Peptide 1.5. MD will order PPN to begin immediately d/t pt >= 9 days without sufficient nutrition. She will modify EN formula and observe for tolerance. RD updated pt wt's and EEN/EPN. Provided PPN recs and recommendations for Peptamen 1.5 w/ pre bio. RD to continue to follow and monitor the pt nutrition status    9/4/24: RD follow up. Pt currently on a Clear liquid diet and receiving Peptamen 1.5 w/ Prebio @ 30 mL/hr. EMR noted pt had drainage along surgical incision site of abdomen which was believed to be tube feed, diet and TF's were held x 2  "days, CT w/ contrast showed no evidence of a leak, TF's were re-started today (9/4/24) at 30 mL/hr w/ plan to advance to current goal of 50 mL/hr tomorrow (9/5/24), if pt improves and tolerates TF's plan is to d/c home Friday. Visited pt at bedside, RN present, RN providing FWF. Pt confirmed that she is not experiencing any N/V/D or abdominal pain, reported that she ate 100% of her broth this AM, stated that she has no desire for food at all, only ate broth because she was not able to for 2 days, discussed protein/calorie benefits of Boost breeze, pt receptive to try both flavors, RD added to pt's orders and trays, visually confirmed Peptamen 1.5 w/ Prebio running at 30 mL/hr. Pt stated that she refused and that her  is unable to perform actions needed to provide TF's and FWF, inquired if pt and/or pt  has spoken to  / about home health assistance, pt denied. Pt reported UBW was 146 lbs x > 1 yr ago prior to cancer, stated her current UBW is now 130 lbs. NFPE performed, mild/moderate malnutrition noted. Spoke to RN confirmed 30-50 mL FWF and that there was a consult in for HH, informed RN about concern d/t pt's refusal to perform TF's and needing assistance. Spoke to home infusion RN via secure chat, she informed that she has spoken to HH but unable to provide everyday assistance, she attempted to provide pt's  w/ initial TF teaching which he became overwhelmed and expressed not understanding, and that the pt is stating "that she's not going home on TPN or Tfs" which she has requested assistance from the MD to explain the plan to the pt for dc; RD provided updated TF recs via secure chat. Reviewed chart: LBM 9/2; Skin: bruised, incision abdomen WDL, drains x 2; Chetan score: 20 (no risk); Edema: None. Labs, meds, weight reviewed. Last weight charted 8/22/24 135 lbs (BMI 22.49, Underweight for age), updated weight for accuracy warranted. RD will continue to follow and monitor pt's " "nutrition status during admit.     9/5/24: RD follow up. Pt continues on Clear liquid diet, currently receiving Peptamen 1.5 w/ Prebio @ goal rate 50 mL/hr. EMR noted pt considered SNF but changed mind and wants to go home and learn tube feedings, full liquid diet now recommended. Visited pt at bedside, confirmed that she liked the flavor of the Boost breeze but was unable to tolerate, stated "it made me sick", informed pt to disregard and that I will d/c, confirmed that Rebecca with [facility] will provide TF education. RD d/c'd Boost breeze on orders and pt's trays. Spoke to the outpatient and home infusion RN via secure chat, confirmed that she plans to meet with pt and provide TF teachings, inquired about final order recs in order to confirm pt's insurance, if in stock and to have correct info to educate pt, provided updated recs via secure chat. Reviewed chart: LBM 9/5;  Skin, Chetan score and edema remains the same as yesterday (9/4). Labs, meds, weight reviewed. RD will continue to follow and monitor pt's nutritional status during admit.     Nutrition Discharge Planning: Continuous enteral nutrition Peptamen 1.5 w/ Prebio via J tube @ 50 mL/hr   -100 mL q4h or 150 mL q6h free water flushes (600 mL/day), per MD/NP + Full liquid diet per pt tolerance for pleasure feeds     Nutrition Risk Screen    Nutrition Risk Screen: no indicators present    Nutrition Related Social Determinants of Health: SDOH: Adequate food in home environment and None Identified    Nutrition/Diet History    Spiritual, Cultural Beliefs, Mandaen Practices, Values that Affect Care: no  Food Allergies: NKFA  Factors Affecting Nutritional Intake: altered gastrointestinal function, clear liquid diet    Anthropometrics    Temp: 99 °F (37.2 °C)  Height Method: Stated  Height: 5' 5" (165.1 cm)  Height (inches): 65 in  Weight Method: Standard Scale  Weight: 61.3 kg (135 lb 2.3 oz)  Weight (lb): 135.14 lb  Ideal Body Weight (IBW), Female: " "125 lb  % Ideal Body Weight, Female (lb): 103.88 %  BMI (Calculated): 22.5  BMI Grade: 18.5-24.9 - normal (Underweight for age)     Wt Readings from Last 30 Encounters:   08/22/24 61.3 kg (135 lb 2.3 oz)   08/14/24 58.7 kg (129 lb 4.8 oz)   07/26/24 59 kg (130 lb)   07/23/24 60.5 kg (133 lb 6.1 oz)   07/19/24 59.5 kg (131 lb 2.8 oz)   07/09/24 58.9 kg (129 lb 13.6 oz)   06/28/24 58.9 kg (129 lb 13.6 oz)   06/18/24 58.9 kg (129 lb 13.6 oz)   05/28/24 59.7 kg (131 lb 9.8 oz)   05/28/24 59.7 kg (131 lb 9.8 oz)   05/07/24 58.2 kg (128 lb 4.9 oz)   04/16/24 57.8 kg (127 lb 6.8 oz)   04/16/24 57.8 kg (127 lb 6.8 oz)   03/26/24 58.5 kg (128 lb 15.5 oz)   03/26/24 58.5 kg (128 lb 15.5 oz)   03/14/24 57.8 kg (127 lb 6.8 oz)   03/06/24 58.8 kg (129 lb 10.1 oz)   02/23/24 58.6 kg (129 lb 3 oz)   02/08/24 59 kg (130 lb)     Lab/Procedures/Meds  BMP  Lab Results   Component Value Date     09/05/2024    K 4.0 09/05/2024     (H) 09/05/2024    CO2 19 (L) 09/05/2024    BUN 3 (L) 09/05/2024    CREATININE 0.7 09/05/2024    CALCIUM 8.2 (L) 09/05/2024    ANIONGAP 8 09/05/2024    EGFRNORACEVR >60 09/05/2024     Lab Results   Component Value Date    CALCIUM 8.2 (L) 09/05/2024    PHOS 2.1 (L) 09/05/2024     Lab Results   Component Value Date    ALBUMIN 2.2 (L) 09/05/2024     Lab Results   Component Value Date    ALT 19 09/05/2024    AST 13 09/05/2024    ALKPHOS 169 (H) 09/05/2024    BILITOT 0.2 09/05/2024     Recent Labs   Lab 09/05/24  1148   POCTGLUCOSE 115*     No results found for: "LABA1C", "HGBA1C"    Lab Results   Component Value Date    WBC 4.94 09/05/2024    HGB 10.0 (L) 09/05/2024    HCT 31.6 (L) 09/05/2024     (H) 09/05/2024     (H) 09/05/2024     Pertinent Labs Reviewed: reviewed    Pertinent Medications Reviewed: reviewed  Scheduled Meds:   amoxicillin-clavulanate  875.2 mg Oral Q12H    carBAMazepine  200 mg Oral BID    carBAMazepine  300 mg Oral QHS    enoxparin  40 mg Subcutaneous Daily    " levETIRAcetam  500 mg Per J Tube BID    metoclopramide HCl  10 mg Oral Q8H    zonisamide  200 mg Per J Tube BID     Continuous Infusions:   dextrose 5 % and 0.45 % NaCl with KCl 20 mEq   Intravenous Continuous 80 mL/hr at 09/05/24 1436 New Bag at 09/05/24 1436     PRN Meds:.  Current Facility-Administered Medications:     dextrose 10%, 12.5 g, Intravenous, PRN    dextrose 10%, 25 g, Intravenous, PRN    glucagon (human recombinant), 1 mg, Intramuscular, PRN    hydrALAZINE, 10 mg, Intravenous, Q6H PRN    hydrocodone-apap 7.5-325 MG/15 ML, 15 mL, Per J Tube, Q4H PRN    hydrocodone-apap 7.5-325 MG/15 ML, 20 mL, Per J Tube, Q6H PRN    HYDROmorphone, 1 mg, Intravenous, Q6H PRN    insulin aspart U-100, 0-5 Units, Subcutaneous, Q4H PRN    loperamide, 2 mg, Oral, QID PRN    morphine, 2 mg, Intravenous, Once PRN    naloxone, 0.02 mg, Intravenous, PRN    ondansetron, 4 mg, Intravenous, Q6H PRN    pneumoc 20-faina conj-dip cr(PF), 0.5 mL, Intramuscular, vaccine x 1 dose    promethazine, 25 mg, Rectal, Q6H PRN      Estimated/Assessed Needs    Weight Used For Calorie Calculations: 61.3 kg (135 lb 2.3 oz)  Energy Calorie Requirements (kcal): 8943-9097 (25-30 kcal/kg ABW per Cancer)  Energy Need Method: Kcal/kg  Protein Requirements: 74-92 (1.2-1.5 g/kg per underweight for age)  Weight Used For Protein Calculations: 61.3 kg (135 lb 2.3 oz)  Fluid Requirements (mL): 2528-4048 (1 mL/kcal)  Estimated Fluid Requirement Method: RDA Method  RDA Method (mL): 1532  CHO Requirement: 192-230      Nutrition Prescription Ordered    Current Diet Order: Clear liquids  Current Nutrition Support Formula Ordered: Peptamen 1.5 w/Prebio  Current Nutrition Support Rate Ordered: 50 mL  Current Nutrition Support Frequency Ordered: continuous    Evaluation of Received Nutrient/Fluid Intake  I/O: (Net since admit)  8/21/24: +3541.1 mL  8/29/24: +6366.9 mL  9/4/24: +8974.9 mL  9/5/24: +9573.9 mL    Enteral Calories (kcal): 1800  Enteral Protein (gm):  82  Enteral (Free Water) Fluid (mL): 926  Free Water Flush Fluid (mL): 20  % Kcal Needs: 100%  % Protein Needs: 100%    Energy Calories Required: meeting needs  Protein Required: meeting needs  Fluid Required: not meeting needs  Total Fluid Intake (mL): 864  Tolerance: tolerating  % Intake of Estimated Energy Needs: 75 - 100 %   % Meal Intake: clear liquids    Nutrition Risk    Level of Risk/Frequency of Follow-up: low (F/u x 1 weekly)       Monitor and Evaluation    Food and Nutrient Intake: energy intake, enteral nutrition intake  Food and Nutrient Adminstration: enteral and parenteral nutrition administration  Knowledge/Beliefs/Attitudes: food and nutrition knowledge/skill, beliefs and attitudes  Anthropometric Measurements: weight, weight change, body mass index  Biochemical Data, Medical Tests and Procedures: electrolyte and renal panel, gastrointestinal profile, glucose/endocrine profile  Nutrition-Focused Physical Findings: overall appearance       Nutrition Follow-Up    RD Follow-up?: Yes  Patty Gupta, BS, RDN, LDN

## 2024-09-05 NOTE — PROGRESS NOTES
[facility] Surg  General Surgery  Progress Note    Subjective:     History of Present Illness:  Cheryl Kirkland is a 74 y.o. year old female with a history of ovarian cancer status post surgery and chemotherapy as well as epilepsy and DVT (provoked during her treatment for ovarian cancer), and MSI high gastric adenocarcinoma.  She was originally diagnosed back in February of this past year.  She was noted to have a very proximal enlarged tumor.  EGD was done as was complete metastatic workup which was negative.  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  Due to her significant adhesive disease she understood that she would require an open operation.  She was discussed in multidisciplinary tumor board and the consensus was to proceed with immunotherapy due to her MSI high status.  She completed 5 cycles of neoadjuvant immunotherapy with Keytruda and on restaging imaging was found to have enlarged Danelle portal lymph nodes.  Consensus was that she would need a total gastrectomy due to the proximity of her tumor in relationship to the GE junction.  Due to the extent of the operation required and the her previous history of ovarian cancer the tumor board consensus was to proceed with the EUS guided biopsy to rule out any ovarian cancer and to rule out pseudo progression as well.  This was performed and showed adenocarcinoma consistent with her gastric primary.  Therefore the decision was to proceed with surgical intervention due to the mixed response she had from immunotherapy on PET-CT scan.     Risks and benefits were reviewed including bleeding, infection, pain, scar, damage to surrounding structures, cardiovascular and pulmonary complications, anastomotic leak, positive margins, need for additional procedures, death, and imponderables.  She  expressed understanding and gave informed consent to proceed.    Post-Op Info:  Procedure(s) (LRB):  GASTRECTOMY (N/A)  EGD (ESOPHAGOGASTRODUODENOSCOPY) (N/A)  LAPAROSCOPY, DIAGNOSTIC (N/A)  LYSIS, ADHESIONS (N/A)  INSERTION, JEJUNOSTOMY TUBE (N/A)   16 Days Post-Op     Interval History:  Increase tube feeds to 50 cc an hour   Full liquid diet which she can have as tolerated.      Discuss services available with home health and skilled nursing.   and wife will discuss this.      I have asked case management to see the patient with regard to discharge planning and eligibility for different levels of care, skilled nursing versus home health.  Home health would also require home physical therapy    Medications:  Continuous Infusions:   dextrose 5 % and 0.45 % NaCl with KCl 20 mEq   Intravenous Continuous 80 mL/hr at 09/05/24 0356 New Bag at 09/05/24 0356     Scheduled Meds:   amoxicillin-clavulanate  875.2 mg Oral Q12H    carBAMazepine  200 mg Oral BID    carBAMazepine  300 mg Oral QHS    enoxparin  40 mg Subcutaneous Daily    levETIRAcetam  500 mg Per J Tube BID    metoclopramide HCl  10 mg Oral Q8H    zonisamide  200 mg Per J Tube BID     PRN Meds:  Current Facility-Administered Medications:     dextrose 10%, 12.5 g, Intravenous, PRN    dextrose 10%, 25 g, Intravenous, PRN    glucagon (human recombinant), 1 mg, Intramuscular, PRN    hydrALAZINE, 10 mg, Intravenous, Q6H PRN    hydrocodone-apap 7.5-325 MG/15 ML, 15 mL, Per J Tube, Q4H PRN    hydrocodone-apap 7.5-325 MG/15 ML, 20 mL, Per J Tube, Q6H PRN    HYDROmorphone, 1 mg, Intravenous, Q6H PRN    insulin aspart U-100, 0-5 Units, Subcutaneous, Q4H PRN    loperamide, 2 mg, Oral, QID PRN    morphine, 2 mg, Intravenous, Once PRN    naloxone, 0.02 mg, Intravenous, PRN    ondansetron, 4 mg, Intravenous, Q6H PRN    pneumoc 20-faina conj-dip cr(PF), 0.5 mL, Intramuscular, vaccine x 1 dose    promethazine, 25 mg, Rectal, Q6H PRN     Review of patient's allergies  indicates:  No Known Allergies  Objective:     Vital Signs (Most Recent):  Temp: 98.5 °F (36.9 °C) (09/05/24 0807)  Pulse: 62 (09/05/24 0807)  Resp: 18 (09/05/24 0807)  BP: 121/64 (09/05/24 0807)  SpO2: 95 % (09/05/24 0807) Vital Signs (24h Range):  Temp:  [98.3 °F (36.8 °C)-98.8 °F (37.1 °C)] 98.5 °F (36.9 °C)  Pulse:  [62-85] 62  Resp:  [18-20] 18  SpO2:  [95 %-98 %] 95 %  BP: (121-146)/(60-73) 121/64     Weight: 61.3 kg (135 lb 2.3 oz)  Body mass index is 22.49 kg/m².    Intake/Output - Last 3 Shifts         09/03 0700 09/04 0659 09/04 0700 09/05 0659 09/05 0700 09/06 0659    NG/GT  864     Total Intake(mL/kg)  864 (14.1)     Urine (mL/kg/hr)  350 (0.2)     Drains 20 35     Stool  0     Total Output 20 385     Net -20 +479            Stool Occurrence  2 x              Physical Exam  Vitals reviewed.   Constitutional:       Appearance: She is well-developed. She is ill-appearing (mildly acute and chronic).   HENT:      Head: Normocephalic.   Eyes:      Pupils: Pupils are equal, round, and reactive to light.   Neck:      Thyroid: No thyromegaly.      Vascular: No JVD.      Trachea: No tracheal deviation.   Cardiovascular:      Rate and Rhythm: Normal rate and regular rhythm.      Heart sounds: Normal heart sounds.   Pulmonary:      Breath sounds: Normal breath sounds. No wheezing.   Abdominal:      General: Abdomen is flat. Bowel sounds are normal. There is no distension.      Palpations: Abdomen is soft. Abdomen is not rigid. There is no mass.      Tenderness: There is no abdominal tenderness. There is no guarding or rebound.      Comments: Incision is clean.  Open upper portion had some mucinous purulent material.  There was no essie evidence of feeds.  Remainder of the incision is clean.  Jejunostomy tube site has minimal erythema.  No tenderness around the jejunostomy   Musculoskeletal:         General: Normal range of motion.      Right lower leg: No edema.      Left lower leg: No edema.   Lymphadenopathy:       Cervical: No cervical adenopathy.   Skin:     General: Skin is warm and dry.      Findings: No erythema or rash.   Neurological:      Mental Status: She is oriented to person, place, and time.   Psychiatric:         Mood and Affect: Mood normal.         Behavior: Behavior normal.         Thought Content: Thought content normal.         Judgment: Judgment normal.          Significant Labs:  I have reviewed all pertinent lab results within the past 24 hours.  CBC:   Recent Labs   Lab 09/05/24  0722   WBC 4.94   RBC 2.95*   HGB 10.0*   HCT 31.6*   *   *   MCH 33.9*   MCHC 31.6*     BMP:   Recent Labs   Lab 09/04/24  0419         K 3.7   *   CO2 22*   BUN 5*   CREATININE 0.7   CALCIUM 8.7   MG 2.0       Significant Diagnostics:  I have reviewed all pertinent imaging results/findings within the past 24 hours.  No new  Assessment/Plan:     * Gastric adenocarcinoma  POD #15- s/p exlap, extensive SHAYNA, small bowel resection, total gastrectomy with stapled esophagojejunal anastomosis and D2 lymphadenectomy, and Jtube placement    Overall she was feeling better.  There was some thick drainage from her wound in her tube feeds were held.  CT scan showed no evidence of a leak  -- continue Reglan  -- a full liquid diet  -- tube feeds were restarted at 5th cc an hour and we advanced tomorrow-- continue hycet via J tube for pain   -- ok for liquid medications via J tube if needed  -- Strict I/Os  -- Senna liquid via Jtube BID   -- CM consult for discharge / home health planning   -- OOB To chair and walk today x5, OT/PT consulted  -- encourage IS  Dispo: continued med surg with tele- tentative plan once a full liquid diet is tolerated and the erythema around the jejunostomy tube has improved    Lovenox and SCDs for DVT ppx, no indication for GI ppx     Erythema around jejunostomy has nearly resolved. . convert to oral antibiotics for another 5 days.        If the patient improves over the next  48 hours and tolerates feeds she can be discharged home Friday    Will need local wound care.    Have ask case management to look into whether the patient is eligible for skilled nursing versus home health with home physical therapy.  The patient was expectations as well as the family's expectations and the service level provided by home health in skilled nursing need to be further discuss.      This was discussed with Hospital Medicine      Primary hypertension  -- appreciate medicine assistance with management     Hx of Epileptic seizures  -- appreciate medicine assistance with management         Rafiq White MD  General Surgery  [facility] - Med Surg

## 2024-09-05 NOTE — PLAN OF CARE
Problem: Adult Inpatient Plan of Care  Goal: Plan of Care Review  Outcome: Progressing     Problem: Wound  Goal: Optimal Coping  Outcome: Progressing  Goal: Optimal Functional Ability  Outcome: Progressing  Goal: Absence of Infection Signs and Symptoms  Outcome: Progressing  Goal: Improved Oral Intake  Outcome: Progressing  Goal: Optimal Pain Control and Function  Outcome: Progressing  Goal: Skin Health and Integrity  Outcome: Progressing  Goal: Optimal Wound Healing  Outcome: Progressing     Problem: Infection  Goal: Absence of Infection Signs and Symptoms  Outcome: Progressing

## 2024-09-05 NOTE — PLAN OF CARE
Recommendation/Intervention for malnutrition 9/5/24:  1. Recommend pt continues on continuous Enteral nutrition, Peptamen 1.5 w/ Prebio via J tube at goal rate 50 mL/hr  -100 mL q4h or 150 mL q6h free water flushes, per MD/NP   -Monitor Mg, K+, Na, Phos and Glu, correct as indicated   2. Recommend pt's diet advanced to Full liquid diet per pt tolerance for pleasure feeds   3. Recommend pt receives feeding assistance with tube feeding teachings   4. Updated weight, twice weekly   5. Collaboration by nutrition professional with other providers     Goals:   1. Pt will continue to tolerate and intake > 75% EEN and EPN prior to RD follow up (Meeting)  2. Pt will tolerated Full liquid diet prior to RD follow up  3. Pt will receive TF teachings within 24 hrs     TAWANA Quiñonez, RDN, LDN

## 2024-09-05 NOTE — PROGRESS NOTES
[facility] Medicine  Progress Note    Patient Name: Cheryl Kirkland  MRN: [id number]  Patient Class: IP- Inpatient   Admission Date: 8/20/2024  Length of Stay: 16 days  Attending Physician: Chen Jerome MD  Primary Care Provider: Gagandeep Iglesias MD        Subjective:     Principal Problem:Gastric adenocarcinoma        HPI:  74F  has a past medical history of Anemia, Chronic deep vein thrombosis (DVT) of left lower extremity, Deep vein thrombosis, Drug-induced polyneuropathy, DVT (deep venous thrombosis), Epilepsy, Gastric adenocarcinoma, GERD (gastroesophageal reflux disease), Hyperlipidemia, Mixed epithelial carcinoma of right ovary, OP (osteoporosis), Ovarian cancer, and Primary hypertension.  Presents for definitive treatment of gastric adenocaricoma per primary. Status post total gastrectomy with j tube placement. Transferred to icu postoperatively for close monitoring. Tolerated procedure well. Expected pain and weakness. Physical/occupational therapy recommending low intensity therapy.    Hospital medicine consulted for medical management.    Initial review of chart reveals vital signs stable, on room air. Cbc unremarkable. Cmp with hyperchloremic acidosis improving. Normal renal function. Elevated liver enzymes. Amylase within normal limits. Hypomag. Hypoalbuminemia. Xray abdomen on 8/20 with operative changes.    Overview/Hospital Course:  8/23 admitted for definitive management of gastric adenocarcinoma with total gastrectomy and j tube placement by primary. Upper gastroenterology procedure with no anastomotic leak identified. Possible ngt removal per primary. Physical/occupational therapy recommending low intensity therapy. Antiepileptic levels pending  8/24 antiepileptic levels pending. Ng tube removed. Reports visual disturbance but improving. Has not worked with physical/occupational therapy today.  8/25 doing well. +bowel movement x 2. pca discontinued per primary.  Trickle feeds to be increased per primary.  8/26 patient feels weak today with some shortness of breath.  She states she feels some nausea and just no energy.  She was able to participate therapy and walk in [place] with walker  8/27 patient continues to feel weak.  8/28 patient feels much better today.  No nausea no vomiting.  Has walked 3 times with the assistance today.  8/29- POD 9- developed some Abd fullness- hence Dr. Jerome switched to CLD while hold TF and cont Reglan. Getting PT/OT. Cont present care as per Dr. Jerome. Labs stable. PO4 2.5.    08/30 POD 10- reports being able to tolerate CLD. Started on TPN. Tube feeds resumed. Reports pain around J tube site, evaluation by CT imaging and General Surgery did not note any indication for intervention.   08/31 POD11- still reporting pain around J tube insertion site, started on antibiotics per General surgery.  Reports diarrhea, stool studies pending  09/01- POD 12- reports improvement in pain around J tube site.  General surgery evaluation today recommended discontinuation of peripheral nutrition.  Stool studies negative so far, diarrhea alleviating with loperamide  09/02: POD 13: improvement of erythema surrounding J tube site. Slightly tender. Tolerating TF and medications through tube. PT/OT recommends home health. She remains on full liquid diet.  09/03: POD 14:  Overnight concerns for leak/drainage through abdominal incisions. Tube feedings held.  Patient continues to report bowel movements. Increasing abdominal pain-  Erythema around J tube site continues to improve.   9/4/24: Patient underwent a CT scan abdomen with oral contrast. No essie evidence leak, fistula or abscess. +ileus. Tolerating diet and having flatus/BMs  9/5/24: Initially, pt wanted SNF due to fear of learning tube feeding and j-tube care. However, pt wants to learn and be discharged home with HH. TF infusion rep to meet with pt tomorrow.     Interval History: Ambulating well - 750 feet  without assistance. Tolerating diet, +flatus and BM. Initially, pt wanted SNF due to fear of learning tube feeding and j-tube care. However, pt wants to learn and be discharged home with HH. TF infusion rep to meet with pt tomorrow.     Review of Systems   Constitutional:  Positive for activity change and appetite change. Negative for chills, diaphoresis, fatigue, fever and unexpected weight change.   HENT:  Negative for congestion, nosebleeds, sinus pressure and sore throat.    Eyes:  Negative for pain, discharge and visual disturbance.   Respiratory:  Negative for cough, chest tightness, shortness of breath, wheezing and stridor.    Cardiovascular:  Negative for chest pain, palpitations and leg swelling.   Gastrointestinal:  Negative for abdominal distention, abdominal pain, blood in stool, constipation, diarrhea, nausea and vomiting.   Endocrine: Negative for cold intolerance and heat intolerance.   Genitourinary:  Negative for difficulty urinating, dysuria, flank pain, frequency and urgency.   Musculoskeletal:  Negative for arthralgias, back pain, joint swelling, myalgias, neck pain and neck stiffness.   Skin:  Positive for wound (abominal incision, j tube). Negative for rash.   Allergic/Immunologic: Negative for food allergies and immunocompromised state.   Neurological:  Positive for weakness. Negative for dizziness, seizures, syncope, facial asymmetry, speech difficulty, light-headedness, numbness and headaches.   Hematological:  Negative for adenopathy.   Psychiatric/Behavioral:  Negative for agitation, confusion and hallucinations. The patient is nervous/anxious.      Objective:     Vital Signs (Most Recent):  Temp: 98.7 °F (37.1 °C) (09/05/24 1613)  Pulse: 78 (09/05/24 1613)  Resp: 18 (09/05/24 1613)  BP: 138/72 (09/05/24 1613)  SpO2: 97 % (09/05/24 1613) Vital Signs (24h Range):  Temp:  [98.5 °F (36.9 °C)-99 °F (37.2 °C)] 98.7 °F (37.1 °C)  Pulse:  [62-85] 78  Resp:  [16-20] 18  SpO2:  [95 %-98 %] 97  %  BP: (121-146)/(64-75) 138/72     Weight: 61.3 kg (135 lb 2.3 oz)  Body mass index is 22.49 kg/m².    Intake/Output Summary (Last 24 hours) at 9/5/2024 1615  Last data filed at 9/5/2024 1445  Gross per 24 hour   Intake 884 ml   Output 405 ml   Net 479 ml         Physical Exam  Vitals and nursing note reviewed.   Constitutional:       General: She is not in acute distress.     Appearance: She is well-developed. She is not diaphoretic.   HENT:      Head: Normocephalic and atraumatic.      Nose: Nose normal.   Eyes:      General: No scleral icterus.     Conjunctiva/sclera: Conjunctivae normal.   Neck:      Trachea: No tracheal deviation.   Cardiovascular:      Rate and Rhythm: Normal rate and regular rhythm.      Heart sounds: Normal heart sounds. No murmur heard.     No friction rub. No gallop.   Pulmonary:      Effort: Pulmonary effort is normal. No respiratory distress.      Breath sounds: Normal breath sounds. No stridor. No wheezing or rales.   Chest:      Chest wall: No tenderness.   Abdominal:      General: Bowel sounds are normal. There is no distension.      Palpations: Abdomen is soft. There is no mass.      Tenderness: There is abdominal tenderness (expected post op TTP). There is no guarding or rebound.      Comments: J tube in place- erythema resolving at J tube site. No drainage.   Large midline abdominal incision -Upper portion of the wound with dressing C/D/I    Musculoskeletal:         General: No tenderness or deformity. Normal range of motion.      Cervical back: Normal range of motion and neck supple.   Skin:     General: Skin is warm and dry.      Coloration: Skin is not pale.      Findings: No erythema or rash.   Neurological:      Mental Status: She is alert and oriented to person, place, and time.      Cranial Nerves: No cranial nerve deficit.      Motor: No abnormal muscle tone.      Coordination: Coordination normal.   Psychiatric:         Behavior: Behavior normal.         Thought Content:  "Thought content normal.             Significant Labs: All pertinent labs within the past 24 hours have been reviewed.    Significant Imaging: I have reviewed all pertinent imaging results/findings within the past 24 hours.    Assessment/Plan:      * Gastric adenocarcinoma  Status post total gastrectomy with J tube placement  Repeat CT abd showed no abscess, leak -mild ileus.    Pt passing flatus and BMs  General surgery following-diet advanced  Transition to po abx  PT/OT    Jejunostomy tube present  See "Gastric adenocarcinoma " A&P        Moderate protein-calorie malnutrition  Nutrition consulted. Most recent weight and BMI monitored-     Measurements:  Wt Readings from Last 1 Encounters:   08/22/24 61.3 kg (135 lb 2.3 oz)   Body mass index is 22.49 kg/m².    Patient has been screened and assessed by RD.    Malnutrition Type:  Context: chronic illness  Level: moderate    Malnutrition Characteristic Summary:  Subcutaneous Fat (Malnutrition): moderate depletion  Muscle Mass (Malnutrition): mild depletion    Interventions/Recommendations (treatment strategy):  1. Initate pt onto continuous Enteral nutrition, recommend Impact Peptide 1.5 via J tube, goal rate 50 mL/hr, starting at 10 mL/hr then progress to goal within 24 hrs if pt is tolerating or per MD/NP -Formula at goal rate provides: 1800 kcals/day (102% EEN), 113 g protein/day (100% EPN), 924 mL free formula water/day (52% fluid needs) -140 mL q4h free water flushes (840 mL/day) = total from formula + FWF = 1764 mL water/day (100% fluid needs), per MD/NP -Check Mg, K+, Na, Phos and Glu before and during initiation, correct as indicated 2. Weigh twice weekly      Primary hypertension  Chronic, controlled. Latest blood pressure and vitals reviewed-     Temp:  [98.4 °F (36.9 °C)-99.4 °F (37.4 °C)]   Pulse:  [76-92]   Resp:  [16-20]   BP: (112-131)/(59-69)   SpO2:  [94 %-98 %] .   Home meds for hypertension were reviewed and noted below.       While in the hospital, " will manage blood pressure as follows; Adjust home antihypertensive regimen as follows- monitor, avoid aggressive management of blood pressure in setting of recent surgery     Will utilize p.r.n. blood pressure medication only if patient's blood pressure greater than 160/100 and she develops symptoms such as worsening chest pain or shortness of breath.    Chronic deep vein thrombosis (DVT) of left lower extremity   -Previously on Xarelto, and evaluation by Oncology noted concerns DVT likely secondary to being provoked  from ovarian cancer. Held after concerns for GI bleed early 2024      Scds and lovenox for prophy.    DVT (deep venous thrombosis)  -Initially presented to primary care provider with lower extremity edema   -Workup revealed dvt 2/2 cancer  -Previously on Xarelto, and evaluation by Oncology noted concerns DVT likely secondary to being provoked  from ovarian cancer. Held after concerns for GI bleed early 2024    PLAN:  On loveonx prophy and scds  Ambulate     Hx of Epileptic seizures  Resume home meds   Carbamezapine in therapeutic range  Zonisamide within normal limits   Levetiracetam within normal limits    stable      VTE Risk Mitigation (From admission, onward)           Ordered     enoxaparin injection 40 mg  Daily         08/20/24 2219     IP VTE HIGH RISK PATIENT  Once         08/20/24 2219     Place sequential compression device  Until discontinued         08/20/24 2219                    Discharge Planning   SOFIYA:      Code Status: Full Code   Is the patient medically ready for discharge?:     Reason for patient still in hospital (select all that apply): Patient trending condition  Discharge Plan A: Skilled Nursing Facility                  Vicky Phoenix NP  Department of Hospital Medicine   [facility] - Med Surg

## 2024-09-05 NOTE — SUBJECTIVE & OBJECTIVE
Interval History: Ambulating well - 750 feet without assistance. Tolerating diet, +flatus and BM. Initially, pt wanted SNF due to fear of learning tube feeding and j-tube care. However, pt wants to learn and be discharged home with HH. TF infusion rep to meet with pt tomorrow.     Review of Systems   Constitutional:  Positive for activity change and appetite change. Negative for chills, diaphoresis, fatigue, fever and unexpected weight change.   HENT:  Negative for congestion, nosebleeds, sinus pressure and sore throat.    Eyes:  Negative for pain, discharge and visual disturbance.   Respiratory:  Negative for cough, chest tightness, shortness of breath, wheezing and stridor.    Cardiovascular:  Negative for chest pain, palpitations and leg swelling.   Gastrointestinal:  Negative for abdominal distention, abdominal pain, blood in stool, constipation, diarrhea, nausea and vomiting.   Endocrine: Negative for cold intolerance and heat intolerance.   Genitourinary:  Negative for difficulty urinating, dysuria, flank pain, frequency and urgency.   Musculoskeletal:  Negative for arthralgias, back pain, joint swelling, myalgias, neck pain and neck stiffness.   Skin:  Positive for wound (abominal incision, j tube). Negative for rash.   Allergic/Immunologic: Negative for food allergies and immunocompromised state.   Neurological:  Positive for weakness. Negative for dizziness, seizures, syncope, facial asymmetry, speech difficulty, light-headedness, numbness and headaches.   Hematological:  Negative for adenopathy.   Psychiatric/Behavioral:  Negative for agitation, confusion and hallucinations. The patient is nervous/anxious.      Objective:     Vital Signs (Most Recent):  Temp: 98.7 °F (37.1 °C) (09/05/24 1613)  Pulse: 78 (09/05/24 1613)  Resp: 18 (09/05/24 1613)  BP: 138/72 (09/05/24 1613)  SpO2: 97 % (09/05/24 1613) Vital Signs (24h Range):  Temp:  [98.5 °F (36.9 °C)-99 °F (37.2 °C)] 98.7 °F (37.1 °C)  Pulse:  [62-85]  78  Resp:  [16-20] 18  SpO2:  [95 %-98 %] 97 %  BP: (121-146)/(64-75) 138/72     Weight: 61.3 kg (135 lb 2.3 oz)  Body mass index is 22.49 kg/m².    Intake/Output Summary (Last 24 hours) at 9/5/2024 1615  Last data filed at 9/5/2024 1445  Gross per 24 hour   Intake 884 ml   Output 405 ml   Net 479 ml         Physical Exam  Vitals and nursing note reviewed.   Constitutional:       General: She is not in acute distress.     Appearance: She is well-developed. She is not diaphoretic.   HENT:      Head: Normocephalic and atraumatic.      Nose: Nose normal.   Eyes:      General: No scleral icterus.     Conjunctiva/sclera: Conjunctivae normal.   Neck:      Trachea: No tracheal deviation.   Cardiovascular:      Rate and Rhythm: Normal rate and regular rhythm.      Heart sounds: Normal heart sounds. No murmur heard.     No friction rub. No gallop.   Pulmonary:      Effort: Pulmonary effort is normal. No respiratory distress.      Breath sounds: Normal breath sounds. No stridor. No wheezing or rales.   Chest:      Chest wall: No tenderness.   Abdominal:      General: Bowel sounds are normal. There is no distension.      Palpations: Abdomen is soft. There is no mass.      Tenderness: There is abdominal tenderness (expected post op TTP). There is no guarding or rebound.      Comments: J tube in place- erythema resolving at J tube site. No drainage.   Large midline abdominal incision -Upper portion of the wound with dressing C/D/I    Musculoskeletal:         General: No tenderness or deformity. Normal range of motion.      Cervical back: Normal range of motion and neck supple.   Skin:     General: Skin is warm and dry.      Coloration: Skin is not pale.      Findings: No erythema or rash.   Neurological:      Mental Status: She is alert and oriented to person, place, and time.      Cranial Nerves: No cranial nerve deficit.      Motor: No abnormal muscle tone.      Coordination: Coordination normal.   Psychiatric:          Behavior: Behavior normal.         Thought Content: Thought content normal.             Significant Labs: All pertinent labs within the past 24 hours have been reviewed.    Significant Imaging: I have reviewed all pertinent imaging results/findings within the past 24 hours.

## 2024-09-05 NOTE — PROGRESS NOTES
Pharmacist Renal Dose Adjustment Note    Cheryl Kirkland is a 74 y.o. female being treated with the medication augmentin    Patient Data:    Vital Signs (Most Recent):  Temp: 98.5 °F (36.9 °C) (09/04/24 1655)  Pulse: 78 (09/04/24 1655)  Resp: 20 (09/04/24 1830)  BP: (!) 144/70 (09/04/24 1655)  SpO2: 97 % (09/04/24 1655) Vital Signs (72h Range):  Temp:  [97.6 °F (36.4 °C)-99.4 °F (37.4 °C)]   Pulse:  []   Resp:  [15-20]   BP: (111-144)/(59-73)   SpO2:  [94 %-100 %]      Recent Labs   Lab 09/02/24  0717 09/03/24  0636 09/04/24  0419   CREATININE 0.7 0.7 0.7     Serum creatinine: 0.7 mg/dL 09/04/24 0419  Estimated creatinine clearance: 63.4 mL/min    Medication: augmentin 400mg every 12 hours will be changed to augmentin 875mg every 12 hours for crcl > 30 ml/min    Pharmacist's Name: Zunilda Frederick  Pharmacist's Extension: [phone]

## 2024-09-05 NOTE — PLAN OF CARE
09/05/24 0919   Post-Acute Status   Post-Acute Authorization Placement   Post-Acute Placement Status Referrals Sent   Coverage [employer]   Discharge Plan   Discharge Plan A Skilled Nursing Facility     SW spoke with pt regarding snf placement for her pt/ot and tube feedings. Pt stated she didn't feel comfortable going home and is in agreement with snf placement. SW sent mass referrals. [facility] called in. Pending snf acceptance, 142.

## 2024-09-05 NOTE — SUBJECTIVE & OBJECTIVE
Interval History:  Increase tube feeds to 50 cc an hour   Full liquid diet which she can have as tolerated.      Discuss services available with home health and skilled nursing.   and wife will discuss this.      I have asked case management to see the patient with regard to discharge planning and eligibility for different levels of care, skilled nursing versus home health.  Home health would also require home physical therapy    Medications:  Continuous Infusions:   dextrose 5 % and 0.45 % NaCl with KCl 20 mEq   Intravenous Continuous 80 mL/hr at 09/05/24 0356 New Bag at 09/05/24 0356     Scheduled Meds:   amoxicillin-clavulanate  875.2 mg Oral Q12H    carBAMazepine  200 mg Oral BID    carBAMazepine  300 mg Oral QHS    enoxparin  40 mg Subcutaneous Daily    levETIRAcetam  500 mg Per J Tube BID    metoclopramide HCl  10 mg Oral Q8H    zonisamide  200 mg Per J Tube BID     PRN Meds:  Current Facility-Administered Medications:     dextrose 10%, 12.5 g, Intravenous, PRN    dextrose 10%, 25 g, Intravenous, PRN    glucagon (human recombinant), 1 mg, Intramuscular, PRN    hydrALAZINE, 10 mg, Intravenous, Q6H PRN    hydrocodone-apap 7.5-325 MG/15 ML, 15 mL, Per J Tube, Q4H PRN    hydrocodone-apap 7.5-325 MG/15 ML, 20 mL, Per J Tube, Q6H PRN    HYDROmorphone, 1 mg, Intravenous, Q6H PRN    insulin aspart U-100, 0-5 Units, Subcutaneous, Q4H PRN    loperamide, 2 mg, Oral, QID PRN    morphine, 2 mg, Intravenous, Once PRN    naloxone, 0.02 mg, Intravenous, PRN    ondansetron, 4 mg, Intravenous, Q6H PRN    pneumoc 20-faina conj-dip cr(PF), 0.5 mL, Intramuscular, vaccine x 1 dose    promethazine, 25 mg, Rectal, Q6H PRN     Review of patient's allergies indicates:  No Known Allergies  Objective:     Vital Signs (Most Recent):  Temp: 98.5 °F (36.9 °C) (09/05/24 0807)  Pulse: 62 (09/05/24 0807)  Resp: 18 (09/05/24 0807)  BP: 121/64 (09/05/24 0807)  SpO2: 95 % (09/05/24 0807) Vital Signs (24h Range):  Temp:  [98.3 °F (36.8  °C)-98.8 °F (37.1 °C)] 98.5 °F (36.9 °C)  Pulse:  [62-85] 62  Resp:  [18-20] 18  SpO2:  [95 %-98 %] 95 %  BP: (121-146)/(60-73) 121/64     Weight: 61.3 kg (135 lb 2.3 oz)  Body mass index is 22.49 kg/m².    Intake/Output - Last 3 Shifts         09/03 0700  09/04 0659 09/04 0700  09/05 0659 09/05 0700  09/06 0659    NG/GT  864     Total Intake(mL/kg)  864 (14.1)     Urine (mL/kg/hr)  350 (0.2)     Drains 20 35     Stool  0     Total Output 20 385     Net -20 +479            Stool Occurrence  2 x              Physical Exam  Vitals reviewed.   Constitutional:       Appearance: She is well-developed. She is ill-appearing (mildly acute and chronic).   HENT:      Head: Normocephalic.   Eyes:      Pupils: Pupils are equal, round, and reactive to light.   Neck:      Thyroid: No thyromegaly.      Vascular: No JVD.      Trachea: No tracheal deviation.   Cardiovascular:      Rate and Rhythm: Normal rate and regular rhythm.      Heart sounds: Normal heart sounds.   Pulmonary:      Breath sounds: Normal breath sounds. No wheezing.   Abdominal:      General: Abdomen is flat. Bowel sounds are normal. There is no distension.      Palpations: Abdomen is soft. Abdomen is not rigid. There is no mass.      Tenderness: There is no abdominal tenderness. There is no guarding or rebound.      Comments: Incision is clean.  Open upper portion had some mucinous purulent material.  There was no essie evidence of feeds.  Remainder of the incision is clean.  Jejunostomy tube site has minimal erythema.  No tenderness around the jejunostomy   Musculoskeletal:         General: Normal range of motion.      Right lower leg: No edema.      Left lower leg: No edema.   Lymphadenopathy:      Cervical: No cervical adenopathy.   Skin:     General: Skin is warm and dry.      Findings: No erythema or rash.   Neurological:      Mental Status: She is oriented to person, place, and time.   Psychiatric:         Mood and Affect: Mood normal.         Behavior:  Behavior normal.         Thought Content: Thought content normal.         Judgment: Judgment normal.          Significant Labs:  I have reviewed all pertinent lab results within the past 24 hours.  CBC:   Recent Labs   Lab 09/05/24  0722   WBC 4.94   RBC 2.95*   HGB 10.0*   HCT 31.6*   *   *   MCH 33.9*   MCHC 31.6*     BMP:   Recent Labs   Lab 09/04/24  0419         K 3.7   *   CO2 22*   BUN 5*   CREATININE 0.7   CALCIUM 8.7   MG 2.0       Significant Diagnostics:  I have reviewed all pertinent imaging results/findings within the past 24 hours.  No new

## 2024-09-06 LAB
ALBUMIN SERPL BCP-MCNC: 2.4 G/DL (ref 3.5–5.2)
ALP SERPL-CCNC: 175 U/L (ref 55–135)
ALT SERPL W/O P-5'-P-CCNC: 19 U/L (ref 10–44)
ANION GAP SERPL CALC-SCNC: 11 MMOL/L (ref 8–16)
AST SERPL-CCNC: 13 U/L (ref 10–40)
BILIRUB SERPL-MCNC: 0.2 MG/DL (ref 0.1–1)
BUN SERPL-MCNC: 4 MG/DL (ref 8–23)
CALCIUM SERPL-MCNC: 8.7 MG/DL (ref 8.7–10.5)
CHLORIDE SERPL-SCNC: 113 MMOL/L (ref 95–110)
CO2 SERPL-SCNC: 17 MMOL/L (ref 23–29)
CREAT SERPL-MCNC: 0.7 MG/DL (ref 0.5–1.4)
ERYTHROCYTE [DISTWIDTH] IN BLOOD BY AUTOMATED COUNT: 16.2 % (ref 11.5–14.5)
EST. GFR  (NO RACE VARIABLE): >60 ML/MIN/1.73 M^2
GLUCOSE SERPL-MCNC: 125 MG/DL (ref 70–110)
HCT VFR BLD AUTO: 34.6 % (ref 37–48.5)
HGB BLD-MCNC: 10.4 G/DL (ref 12–16)
MAGNESIUM SERPL-MCNC: 1.9 MG/DL (ref 1.6–2.6)
MCH RBC QN AUTO: 32.9 PG (ref 27–31)
MCHC RBC AUTO-ENTMCNC: 30.1 G/DL (ref 32–36)
MCV RBC AUTO: 110 FL (ref 82–98)
PHOSPHATE SERPL-MCNC: 1.9 MG/DL (ref 2.7–4.5)
PLATELET # BLD AUTO: 763 K/UL (ref 150–450)
PMV BLD AUTO: 8.3 FL (ref 9.2–12.9)
POTASSIUM SERPL-SCNC: 4 MMOL/L (ref 3.5–5.1)
PROT SERPL-MCNC: 6.5 G/DL (ref 6–8.4)
RBC # BLD AUTO: 3.16 M/UL (ref 4–5.4)
SODIUM SERPL-SCNC: 141 MMOL/L (ref 136–145)
WBC # BLD AUTO: 5.56 K/UL (ref 3.9–12.7)

## 2024-09-06 PROCEDURE — 80053 COMPREHEN METABOLIC PANEL: CPT | Performed by: SURGERY

## 2024-09-06 PROCEDURE — 63600175 PHARM REV CODE 636 W HCPCS: Performed by: SURGERY

## 2024-09-06 PROCEDURE — 83735 ASSAY OF MAGNESIUM: CPT | Performed by: SURGERY

## 2024-09-06 PROCEDURE — 84100 ASSAY OF PHOSPHORUS: CPT | Performed by: SURGERY

## 2024-09-06 PROCEDURE — 85027 COMPLETE CBC AUTOMATED: CPT | Performed by: NURSE PRACTITIONER

## 2024-09-06 PROCEDURE — 63600175 PHARM REV CODE 636 W HCPCS: Performed by: NURSE PRACTITIONER

## 2024-09-06 PROCEDURE — 11000001 HC ACUTE MED/SURG PRIVATE ROOM

## 2024-09-06 PROCEDURE — 25000003 PHARM REV CODE 250: Performed by: STUDENT IN AN ORGANIZED HEALTH CARE EDUCATION/TRAINING PROGRAM

## 2024-09-06 PROCEDURE — 36415 COLL VENOUS BLD VENIPUNCTURE: CPT | Performed by: SURGERY

## 2024-09-06 PROCEDURE — 25000003 PHARM REV CODE 250: Performed by: SURGERY

## 2024-09-06 RX ADMIN — ZONISAMIDE 200 MG: 100 SUSPENSION ORAL at 10:09

## 2024-09-06 RX ADMIN — METOCLOPRAMIDE HYDROCHLORIDE 10 MG: 5 SOLUTION ORAL at 02:09

## 2024-09-06 RX ADMIN — LEVETIRACETAM 500 MG: 100 SOLUTION ORAL at 09:09

## 2024-09-06 RX ADMIN — METOCLOPRAMIDE HYDROCHLORIDE 10 MG: 5 SOLUTION ORAL at 05:09

## 2024-09-06 RX ADMIN — CARBAMAZEPINE 200 MG: 100 SUSPENSION ORAL at 05:09

## 2024-09-06 RX ADMIN — HYDROCODONE BITARTRATE AND ACETAMINOPHEN 20 ML: 7.5; 325 SOLUTION ORAL at 02:09

## 2024-09-06 RX ADMIN — AMOXICILLIN AND CLAVULANATE POTASSIUM 875.2 MG: 400; 57 POWDER, FOR SUSPENSION ORAL at 10:09

## 2024-09-06 RX ADMIN — LEVETIRACETAM 500 MG: 100 SOLUTION ORAL at 10:09

## 2024-09-06 RX ADMIN — HYDROMORPHONE HYDROCHLORIDE 1 MG: 2 INJECTION, SOLUTION INTRAMUSCULAR; INTRAVENOUS; SUBCUTANEOUS at 06:09

## 2024-09-06 RX ADMIN — HYDROCODONE BITARTRATE AND ACETAMINOPHEN 15 ML: 7.5; 325 SOLUTION ORAL at 02:09

## 2024-09-06 RX ADMIN — ZONISAMIDE 200 MG: 100 SUSPENSION ORAL at 09:09

## 2024-09-06 RX ADMIN — CARBAMAZEPINE 300 MG: 100 SUSPENSION ORAL at 09:09

## 2024-09-06 RX ADMIN — AMOXICILLIN AND CLAVULANATE POTASSIUM 875.2 MG: 400; 57 POWDER, FOR SUSPENSION ORAL at 09:09

## 2024-09-06 RX ADMIN — METOCLOPRAMIDE HYDROCHLORIDE 10 MG: 5 SOLUTION ORAL at 09:09

## 2024-09-06 RX ADMIN — ENOXAPARIN SODIUM 40 MG: 40 INJECTION SUBCUTANEOUS at 05:09

## 2024-09-06 RX ADMIN — HYDROCODONE BITARTRATE AND ACETAMINOPHEN 15 ML: 7.5; 325 SOLUTION ORAL at 05:09

## 2024-09-06 RX ADMIN — DEXTROSE, SODIUM CHLORIDE, AND POTASSIUM CHLORIDE: 5; .45; .15 INJECTION INTRAVENOUS at 02:09

## 2024-09-06 NOTE — SUBJECTIVE & OBJECTIVE
Interval History: Ambulating in room. Request SNF. Tolerating diet, +flatus and BM. Staples and drains removed. IVFs discontinued. Increased free water with TF.      Review of Systems   Constitutional:  Positive for activity change and appetite change. Negative for chills, diaphoresis, fatigue, fever and unexpected weight change.   HENT:  Negative for congestion, nosebleeds, sinus pressure and sore throat.    Eyes:  Negative for pain, discharge and visual disturbance.   Respiratory:  Negative for cough, chest tightness, shortness of breath, wheezing and stridor.    Cardiovascular:  Negative for chest pain, palpitations and leg swelling.   Gastrointestinal:  Negative for abdominal distention, abdominal pain, blood in stool, constipation, diarrhea, nausea and vomiting.   Endocrine: Negative for cold intolerance and heat intolerance.   Genitourinary:  Negative for difficulty urinating, dysuria, flank pain, frequency and urgency.   Musculoskeletal:  Negative for arthralgias, back pain, joint swelling, myalgias, neck pain and neck stiffness.   Skin:  Positive for wound (abominal incision, j tube). Negative for rash.   Allergic/Immunologic: Negative for food allergies and immunocompromised state.   Neurological:  Positive for weakness. Negative for dizziness, seizures, syncope, facial asymmetry, speech difficulty, light-headedness, numbness and headaches.   Hematological:  Negative for adenopathy.   Psychiatric/Behavioral:  Negative for agitation, confusion and hallucinations. The patient is nervous/anxious.      Objective:     Vital Signs (Most Recent):  Temp: 98.3 °F (36.8 °C) (09/06/24 1213)  Pulse: 88 (09/06/24 1213)  Resp: 18 (09/06/24 1437)  BP: 138/76 (09/06/24 1213)  SpO2: 97 % (09/06/24 1213) Vital Signs (24h Range):  Temp:  [97.6 °F (36.4 °C)-99 °F (37.2 °C)] 98.3 °F (36.8 °C)  Pulse:  [76-88] 88  Resp:  [16-18] 18  SpO2:  [94 %-97 %] 97 %  BP: (121-142)/(66-76) 138/76     Weight: 61.3 kg (135 lb 2.3 oz)  Body  mass index is 22.49 kg/m².    Intake/Output Summary (Last 24 hours) at 9/6/2024 1506  Last data filed at 9/6/2024 1110  Gross per 24 hour   Intake 160 ml   Output 25 ml   Net 135 ml         Physical Exam  Vitals and nursing note reviewed.   Constitutional:       General: She is not in acute distress.     Appearance: She is well-developed. She is not diaphoretic.   HENT:      Head: Normocephalic and atraumatic.      Nose: Nose normal.   Eyes:      General: No scleral icterus.     Conjunctiva/sclera: Conjunctivae normal.   Neck:      Trachea: No tracheal deviation.   Cardiovascular:      Rate and Rhythm: Normal rate and regular rhythm.      Heart sounds: Normal heart sounds. No murmur heard.     No friction rub. No gallop.   Pulmonary:      Effort: Pulmonary effort is normal. No respiratory distress.      Breath sounds: Normal breath sounds. No stridor. No wheezing or rales.   Chest:      Chest wall: No tenderness.   Abdominal:      General: Bowel sounds are normal. There is no distension.      Palpations: Abdomen is soft. There is no mass.      Tenderness: There is abdominal tenderness (expected post op TTP). There is no guarding or rebound.      Comments: J tube in place- erythema resolving at J tube site. No drainage.   Large midline abdominal incision -Upper portion of the wound with scant purulent drainage.     Musculoskeletal:         General: No tenderness or deformity. Normal range of motion.      Cervical back: Normal range of motion and neck supple.   Skin:     General: Skin is warm and dry.      Coloration: Skin is not pale.      Findings: No erythema or rash.   Neurological:      Mental Status: She is alert and oriented to person, place, and time.      Cranial Nerves: No cranial nerve deficit.      Motor: No abnormal muscle tone.      Coordination: Coordination normal.   Psychiatric:         Behavior: Behavior normal.         Thought Content: Thought content normal.             Significant Labs: All  pertinent labs within the past 24 hours have been reviewed.    Significant Imaging: I have reviewed all pertinent imaging results/findings within the past 24 hours.

## 2024-09-06 NOTE — PLAN OF CARE
Discussed poc with pt, pt verbalized understanding    Purposeful rounding every 2hours      Fall precautions in place, remains injury free  Pt  c/o pain 7/10   Pain and nausea under control with PRN meds      Accurate I&Os  Abx given as prescribed  Bed locked at lowest position  Call light within reach    Chart check complete  Will cont with POC

## 2024-09-06 NOTE — PLAN OF CARE
Pt now wants to go to [facility]. LUPE called Wanda in admissions to submit for auth nut unsuccessful. LUPE left an voicemail.

## 2024-09-06 NOTE — PROGRESS NOTES
[facility]  Progress Note    Patient Name: Cheryl Kirkland  MRN: [id number]  Patient Class: IP- Inpatient   Admission Date: 8/20/2024  Length of Stay: 17 days  Attending Physician: Chen Jerome MD  Primary Care Provider: Gagandeep Iglesias MD        Subjective:     Principal Problem:Gastric adenocarcinoma        HPI:  74F  has a past medical history of Anemia, Chronic deep vein thrombosis (DVT) of left lower extremity, Deep vein thrombosis, Drug-induced polyneuropathy, DVT (deep venous thrombosis), Epilepsy, Gastric adenocarcinoma, GERD (gastroesophageal reflux disease), Hyperlipidemia, Mixed epithelial carcinoma of right ovary, OP (osteoporosis), Ovarian cancer, and Primary hypertension.  Presents for definitive treatment of gastric adenocaricoma per primary. Status post total gastrectomy with j tube placement. Transferred to icu postoperatively for close monitoring. Tolerated procedure well. Expected pain and weakness. Physical/occupational therapy recommending low intensity therapy.    Hospital medicine consulted for medical management.    Initial review of chart reveals vital signs stable, on room air. Cbc unremarkable. Cmp with hyperchloremic acidosis improving. Normal renal function. Elevated liver enzymes. Amylase within normal limits. Hypomag. Hypoalbuminemia. Xray abdomen on 8/20 with operative changes.    Overview/Hospital Course:  8/23 admitted for definitive management of gastric adenocarcinoma with total gastrectomy and j tube placement by primary. Upper gastroenterology procedure with no anastomotic leak identified. Possible ngt removal per primary. Physical/occupational therapy recommending low intensity therapy. Antiepileptic levels pending  8/24 antiepileptic levels pending. Ng tube removed. Reports visual disturbance but improving. Has not worked with physical/occupational therapy today.  8/25 doing well. +bowel movement x 2. pca discontinued per primary.  Trickle feeds to be increased per primary.  8/26 patient feels weak today with some shortness of breath.  She states she feels some nausea and just no energy.  She was able to participate therapy and walk in [place] with walker  8/27 patient continues to feel weak.  8/28 patient feels much better today.  No nausea no vomiting.  Has walked 3 times with the assistance today.  8/29- POD 9- developed some Abd fullness- hence Dr. Jerome switched to CLD while hold TF and cont Reglan. Getting PT/OT. Cont present care as per Dr. Jerome. Labs stable. PO4 2.5.    08/30 POD 10- reports being able to tolerate CLD. Started on TPN. Tube feeds resumed. Reports pain around J tube site, evaluation by CT imaging and General Surgery did not note any indication for intervention.   08/31 POD11- still reporting pain around J tube insertion site, started on antibiotics per General surgery.  Reports diarrhea, stool studies pending  09/01- POD 12- reports improvement in pain around J tube site.  General surgery evaluation today recommended discontinuation of peripheral nutrition.  Stool studies negative so far, diarrhea alleviating with loperamide  09/02: POD 13: improvement of erythema surrounding J tube site. Slightly tender. Tolerating TF and medications through tube. PT/OT recommends home health. She remains on full liquid diet.  09/03: POD 14:  Overnight concerns for leak/drainage through abdominal incisions. Tube feedings held.  Patient continues to report bowel movements. Increasing abdominal pain-  Erythema around J tube site continues to improve.   9/4/24: Patient underwent a CT scan abdomen with oral contrast. No essie evidence leak, fistula or abscess. +ileus. Tolerating diet and having flatus/BMs  9/5/24: Initially, pt wanted SNF due to fear of learning tube feeding and j-tube care. However, pt wants to learn and be discharged home with HH. TF infusion rep to meet with pt tomorrow.   9/6/24: No change in medical status. pt/spouse  now request SNF. CM notified.     Interval History: Ambulating in room. Request SNF. Tolerating diet, +flatus and BM. Staples and drains removed. IVFs discontinued. Increased free water with TF.      Review of Systems   Constitutional:  Positive for activity change and appetite change. Negative for chills, diaphoresis, fatigue, fever and unexpected weight change.   HENT:  Negative for congestion, nosebleeds, sinus pressure and sore throat.    Eyes:  Negative for pain, discharge and visual disturbance.   Respiratory:  Negative for cough, chest tightness, shortness of breath, wheezing and stridor.    Cardiovascular:  Negative for chest pain, palpitations and leg swelling.   Gastrointestinal:  Negative for abdominal distention, abdominal pain, blood in stool, constipation, diarrhea, nausea and vomiting.   Endocrine: Negative for cold intolerance and heat intolerance.   Genitourinary:  Negative for difficulty urinating, dysuria, flank pain, frequency and urgency.   Musculoskeletal:  Negative for arthralgias, back pain, joint swelling, myalgias, neck pain and neck stiffness.   Skin:  Positive for wound (abominal incision, j tube). Negative for rash.   Allergic/Immunologic: Negative for food allergies and immunocompromised state.   Neurological:  Positive for weakness. Negative for dizziness, seizures, syncope, facial asymmetry, speech difficulty, light-headedness, numbness and headaches.   Hematological:  Negative for adenopathy.   Psychiatric/Behavioral:  Negative for agitation, confusion and hallucinations. The patient is nervous/anxious.      Objective:     Vital Signs (Most Recent):  Temp: 98.3 °F (36.8 °C) (09/06/24 1213)  Pulse: 88 (09/06/24 1213)  Resp: 18 (09/06/24 1437)  BP: 138/76 (09/06/24 1213)  SpO2: 97 % (09/06/24 1213) Vital Signs (24h Range):  Temp:  [97.6 °F (36.4 °C)-99 °F (37.2 °C)] 98.3 °F (36.8 °C)  Pulse:  [76-88] 88  Resp:  [16-18] 18  SpO2:  [94 %-97 %] 97 %  BP: (121-142)/(66-76) 138/76      Weight: 61.3 kg (135 lb 2.3 oz)  Body mass index is 22.49 kg/m².    Intake/Output Summary (Last 24 hours) at 9/6/2024 1506  Last data filed at 9/6/2024 1110  Gross per 24 hour   Intake 160 ml   Output 25 ml   Net 135 ml         Physical Exam  Vitals and nursing note reviewed.   Constitutional:       General: She is not in acute distress.     Appearance: She is well-developed. She is not diaphoretic.   HENT:      Head: Normocephalic and atraumatic.      Nose: Nose normal.   Eyes:      General: No scleral icterus.     Conjunctiva/sclera: Conjunctivae normal.   Neck:      Trachea: No tracheal deviation.   Cardiovascular:      Rate and Rhythm: Normal rate and regular rhythm.      Heart sounds: Normal heart sounds. No murmur heard.     No friction rub. No gallop.   Pulmonary:      Effort: Pulmonary effort is normal. No respiratory distress.      Breath sounds: Normal breath sounds. No stridor. No wheezing or rales.   Chest:      Chest wall: No tenderness.   Abdominal:      General: Bowel sounds are normal. There is no distension.      Palpations: Abdomen is soft. There is no mass.      Tenderness: There is abdominal tenderness (expected post op TTP). There is no guarding or rebound.      Comments: J tube in place- erythema resolving at J tube site. No drainage.   Large midline abdominal incision -Upper portion of the wound with scant purulent drainage.     Musculoskeletal:         General: No tenderness or deformity. Normal range of motion.      Cervical back: Normal range of motion and neck supple.   Skin:     General: Skin is warm and dry.      Coloration: Skin is not pale.      Findings: No erythema or rash.   Neurological:      Mental Status: She is alert and oriented to person, place, and time.      Cranial Nerves: No cranial nerve deficit.      Motor: No abnormal muscle tone.      Coordination: Coordination normal.   Psychiatric:         Behavior: Behavior normal.         Thought Content: Thought content  "normal.             Significant Labs: All pertinent labs within the past 24 hours have been reviewed.    Significant Imaging: I have reviewed all pertinent imaging results/findings within the past 24 hours.    Assessment/Plan:      * Gastric adenocarcinoma  Status post total gastrectomy with J tube placement  Repeat CT abd showed no abscess, leak -mild ileus.    Pt passing flatus and BMs  General surgery following-diet advanced  Transition to po abx  PT/OT  SNF placement pending     Jejunostomy tube present  See "Gastric adenocarcinoma "   Pt has poor appetite -will need to use tube feeding via J tube until tolerating enough nutrition po to sustain herself        Moderate protein-calorie malnutrition  Nutrition consulted. Most recent weight and BMI monitored-     Measurements:  Wt Readings from Last 1 Encounters:   08/22/24 61.3 kg (135 lb 2.3 oz)   Body mass index is 22.49 kg/m².    Patient has been screened and assessed by RD.    Malnutrition Type:  Context: chronic illness  Level: moderate    Malnutrition Characteristic Summary:  Subcutaneous Fat (Malnutrition): moderate depletion  Muscle Mass (Malnutrition): mild depletion    Interventions/Recommendations (treatment strategy):  1. Initate pt onto continuous Enteral nutrition, recommend Impact Peptide 1.5 via J tube, goal rate 50 mL/hr, starting at 10 mL/hr then progress to goal within 24 hrs if pt is tolerating or per MD/NP -Formula at goal rate provides: 1800 kcals/day (102% EEN), 113 g protein/day (100% EPN), 924 mL free formula water/day (52% fluid needs) -140 mL q4h free water flushes (840 mL/day) = total from formula + FWF = 1764 mL water/day (100% fluid needs), per MD/NP -Check Mg, K+, Na, Phos and Glu before and during initiation, correct as indicated 2. Weigh twice weekly      Primary hypertension  Chronic, controlled. Latest blood pressure and vitals reviewed-     Temp:  [98.4 °F (36.9 °C)-99.4 °F (37.4 °C)]   Pulse:  [76-92]   Resp:  [16-20]   BP: " (112-131)/(59-69)   SpO2:  [94 %-98 %] .   Home meds for hypertension were reviewed and noted below.       While in the hospital, will manage blood pressure as follows; Adjust home antihypertensive regimen as follows- monitor, avoid aggressive management of blood pressure in setting of recent surgery     Will utilize p.r.n. blood pressure medication only if patient's blood pressure greater than 160/100 and she develops symptoms such as worsening chest pain or shortness of breath.    Chronic deep vein thrombosis (DVT) of left lower extremity   -Previously on Xarelto, and evaluation by Oncology noted concerns DVT likely secondary to being provoked  from ovarian cancer. Held after concerns for GI bleed early 2024      Scds and lovenox for prophy.    DVT (deep venous thrombosis)  -Initially presented to primary care provider with lower extremity edema   -Workup revealed dvt 2/2 cancer  -Previously on Xarelto, and evaluation by Oncology noted concerns DVT likely secondary to being provoked from ovarian cancer. Held after concerns for GI bleed early 2024    PLAN:  On Lovenox ppx and scds  Ambulate     Hx of Epileptic seizures  Resume home meds   Carbamezapine in therapeutic range  Zonisamide within normal limits   Levetiracetam within normal limits    stable      VTE Risk Mitigation (From admission, onward)           Ordered     enoxaparin injection 40 mg  Daily         08/20/24 2219     IP VTE HIGH RISK PATIENT  Once         08/20/24 2219     Place sequential compression device  Until discontinued         08/20/24 2219                    Discharge Planning   SOFIYA:      Code Status: Full Code   Is the patient medically ready for discharge?:     Reason for patient still in hospital (select all that apply): Pending disposition  Discharge Plan A: Skilled Nursing Facility                  Vicky Phoenix NP  Department of Hospital Medicine   [facility] - Med Surg

## 2024-09-06 NOTE — PLAN OF CARE
09/06/24 1358   Post-Acute Status   Post-Acute Authorization Placement   Post-Acute Placement Status Pending payor review/awaiting authorization (if required)   Coverage [employer]   Discharge Plan   Discharge Plan A Skilled Nursing Facility     LUPE spoke with [facility] [employer] [facility] who stated they are submitting for auth. Pending auth.

## 2024-09-06 NOTE — PROGRESS NOTES
[facility]  General Surgery  Progress Note    Subjective:     History of Present Illness:  Cheryl Kirkland is a 74 y.o. year old female with a history of ovarian cancer status post surgery and chemotherapy as well as epilepsy and DVT (provoked during her treatment for ovarian cancer), and MSI high gastric adenocarcinoma.  She was originally diagnosed back in February of this past year.  She was noted to have a very proximal enlarged tumor.  EGD was done as was complete metastatic workup which was negative.  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  Due to her significant adhesive disease she understood that she would require an open operation.  She was discussed in multidisciplinary tumor board and the consensus was to proceed with immunotherapy due to her MSI high status.  She completed 5 cycles of neoadjuvant immunotherapy with Keytruda and on restaging imaging was found to have enlarged Danelle portal lymph nodes.  Consensus was that she would need a total gastrectomy due to the proximity of her tumor in relationship to the GE junction.  Due to the extent of the operation required and the her previous history of ovarian cancer the tumor board consensus was to proceed with the EUS guided biopsy to rule out any ovarian cancer and to rule out pseudo progression as well.  This was performed and showed adenocarcinoma consistent with her gastric primary.  Therefore the decision was to proceed with surgical intervention due to the mixed response she had from immunotherapy on PET-CT scan.     Risks and benefits were reviewed including bleeding, infection, pain, scar, damage to surrounding structures, cardiovascular and pulmonary complications, anastomotic leak, positive margins, need for additional procedures, death, and imponderables.  She  expressed understanding and gave informed consent to proceed.    Post-Op Info:  Procedure(s) (LRB):  GASTRECTOMY (N/A)  EGD (ESOPHAGOGASTRODUODENOSCOPY) (N/A)  LAPAROSCOPY, DIAGNOSTIC (N/A)  LYSIS, ADHESIONS (N/A)  INSERTION, JEJUNOSTOMY TUBE (N/A)   17 Days Post-Op     Interval History:  Patient would like to go home however  is concerned about taking care of her and wants her to go to skilled nursing.  Only skilled nursing that has accepted the patient has an hour away they are concerned about this.  Insurance it was not improve.  Somewhat reluctant to go home with home health as they feel are of the believe that home health will be there on a daily or continuous basis to help with all needs.      Staples and drains removed.  Erythema around jejunostomy site is improved.  Upper portion of the wound is clean however there is somewhat of a purulent exudate.      There is no essie evidence of any tube feeds leaks.      Tube feeds have been tolerated.  Increase free water to 150 mL every 6 hours.      Discontinue IV fluids.      Clear liquids says full liquids as tolerated but not eating much, likely not getting any significant value nutritionally from oral intake    Medications:  Continuous Infusions:  Scheduled Meds:   amoxicillin-clavulanate  875.2 mg Oral Q12H    carBAMazepine  200 mg Oral BID    carBAMazepine  300 mg Oral QHS    enoxparin  40 mg Subcutaneous Daily    levETIRAcetam  500 mg Per J Tube BID    metoclopramide HCl  10 mg Oral Q8H    zonisamide  200 mg Per J Tube BID     PRN Meds:  Current Facility-Administered Medications:     dextrose 10%, 12.5 g, Intravenous, PRN    dextrose 10%, 25 g, Intravenous, PRN    glucagon (human recombinant), 1 mg, Intramuscular, PRN    hydrALAZINE, 10 mg, Intravenous, Q6H PRN    hydrocodone-apap 7.5-325 MG/15 ML, 15 mL, Per J Tube, Q4H PRN    hydrocodone-apap 7.5-325 MG/15 ML, 20 mL, Per J Tube, Q6H PRN    HYDROmorphone, 1 mg, Intravenous, Q6H PRN    loperamide, 2  mg, Oral, QID PRN    morphine, 2 mg, Intravenous, Once PRN    naloxone, 0.02 mg, Intravenous, PRN    ondansetron, 4 mg, Intravenous, Q6H PRN    pneumoc 20-faina conj-dip cr(PF), 0.5 mL, Intramuscular, vaccine x 1 dose    promethazine, 25 mg, Rectal, Q6H PRN     Review of patient's allergies indicates:  No Known Allergies  Objective:     Vital Signs (Most Recent):  Temp: 97.6 °F (36.4 °C) (09/06/24 0803)  Pulse: 76 (09/06/24 0803)  Resp: 16 (09/06/24 0803)  BP: 137/70 (09/06/24 0803)  SpO2: 97 % (09/06/24 0803) Vital Signs (24h Range):  Temp:  [97.6 °F (36.4 °C)-99 °F (37.2 °C)] 97.6 °F (36.4 °C)  Pulse:  [76-84] 76  Resp:  [16-18] 16  SpO2:  [94 %-97 %] 97 %  BP: (121-142)/(66-75) 137/70     Weight: 61.3 kg (135 lb 2.3 oz)  Body mass index is 22.49 kg/m².    Intake/Output - Last 3 Shifts         09/04 0700  09/05 0659 09/05 0700  09/06 0659 09/06 0700  09/07 0659    NG/ 140     Total Intake(mL/kg) 864 (14.1) 140 (2.3)     Urine (mL/kg/hr) 350 (0.2)      Drains 35 45     Stool 0 0     Total Output 385 45     Net +479 +95            Stool Occurrence 2 x 5 x              Physical Exam  Constitutional:       Appearance: She is well-developed. Ill appearance: mild acute.      Comments: Slightly frail   HENT:      Head: Normocephalic.   Eyes:      Pupils: Pupils are equal, round, and reactive to light.   Neck:      Thyroid: No thyromegaly.      Vascular: No JVD.      Trachea: No tracheal deviation.   Cardiovascular:      Rate and Rhythm: Normal rate and regular rhythm.      Heart sounds: Normal heart sounds.   Pulmonary:      Breath sounds: Normal breath sounds. No wheezing.   Abdominal:      General: Bowel sounds are normal. There is no distension.      Palpations: Abdomen is soft. Abdomen is not rigid. There is no mass.      Tenderness: There is no abdominal tenderness. There is no guarding or rebound.      Comments: Incision is clean.  Drains are serous.  Open area of the incision has some purulent exudate but no  essie tube feeds.  Jejunostomy site is  with minimal leakage.  Staples were removed and Steri-Strips were placed.      Drains were removed and Band-Aids were placed   Musculoskeletal:         General: Normal range of motion.      Right lower leg: No edema.      Left lower leg: No edema.   Lymphadenopathy:      Cervical: No cervical adenopathy.   Skin:     General: Skin is warm and dry.      Findings: No erythema or rash.   Neurological:      Mental Status: She is oriented to person, place, and time.   Psychiatric:         Mood and Affect: Mood normal.         Thought Content: Thought content normal.         Judgment: Judgment normal.          Significant Labs:  I have reviewed all pertinent lab results within the past 24 hours.  CBC:   Recent Labs   Lab 09/06/24  0605   WBC 5.56   RBC 3.16*   HGB 10.4*   HCT 34.6*   *   *   MCH 32.9*   MCHC 30.1*     BMP:   Recent Labs   Lab 09/06/24  0605   *      K 4.0   *   CO2 17*   BUN 4*   CREATININE 0.7   CALCIUM 8.7   MG 1.9       Significant Diagnostics:  I have reviewed all pertinent imaging results/findings within the past 24 hours.  No new  Assessment/Plan:     * Gastric adenocarcinoma  POD #16- s/p exlap, extensive SHAYNA, small bowel resection, total gastrectomy with stapled esophagojejunal anastomosis and D2 lymphadenectomy, and Jtube placement    Overall she was feeling better.  There was some thick drainage from her wound in her tube feeds were held.  CT scan showed no evidence of a leak  -- continue Reglan  -- a full liquid diet but not eating very much.  -- tube feeds were restarted at 50 cc an hour and w are being tolerate-- continue hycet via J tube for pain   -- ok for liquid medications via J tube if needed  -- Strict I/Os  -- Senna liquid via Jtube BID   -- CM consult for discharge / home health planning   -- OOB To chair and walk today x5, OT/PT consulted  -- encourage IS  Dispo: continued med surg with tele- tentative  plan once a full liquid diet is tolerated and the erythema around the jejunostomy tube has improved    Lovenox and SCDs for DVT ppx, no indication for GI ppx     Erythema around jejunostomy has nearly resolved. . convert to oral antibiotics for another 5 days.        I patient and her  are oscillating between home health and skilled nursing.  The only skilled nursing unit that accepted her as an [facility] but not yet insurance approved.  They may be of the impression that home health will be there continuously.  They are not skilled and jejunostomy tube feed management      Will need local wound care, For the upper portion of the wound      We will continue to work on disposition however not much will happening over the weekend.  Hopefully she will be approved for an accepted at a skilled nursing unit closer to home with the main issue being tube feed skilled.      This was discussed with Hospital Medicine    Staples and drains were removed      Primary hypertension  -- appreciate medicine assistance with management     Hx of Epileptic seizures  -- appreciate medicine assistance with management         Rafiq White MD  General Surgery  [facility]

## 2024-09-06 NOTE — SUBJECTIVE & OBJECTIVE
Interval History:  Patient would like to go home however  is concerned about taking care of her and wants her to go to skilled nursing.  Only skilled nursing that has accepted the patient has an hour away they are concerned about this.  Insurance it was not improve.  Somewhat reluctant to go home with home health as they feel are of the believe that home health will be there on a daily or continuous basis to help with all needs.      Staples and drains removed.  Erythema around jejunostomy site is improved.  Upper portion of the wound is clean however there is somewhat of a purulent exudate.      There is no essie evidence of any tube feeds leaks.      Tube feeds have been tolerated.  Increase free water to 150 mL every 6 hours.      Discontinue IV fluids.      Clear liquids says full liquids as tolerated but not eating much, likely not getting any significant value nutritionally from oral intake    Medications:  Continuous Infusions:  Scheduled Meds:   amoxicillin-clavulanate  875.2 mg Oral Q12H    carBAMazepine  200 mg Oral BID    carBAMazepine  300 mg Oral QHS    enoxparin  40 mg Subcutaneous Daily    levETIRAcetam  500 mg Per J Tube BID    metoclopramide HCl  10 mg Oral Q8H    zonisamide  200 mg Per J Tube BID     PRN Meds:  Current Facility-Administered Medications:     dextrose 10%, 12.5 g, Intravenous, PRN    dextrose 10%, 25 g, Intravenous, PRN    glucagon (human recombinant), 1 mg, Intramuscular, PRN    hydrALAZINE, 10 mg, Intravenous, Q6H PRN    hydrocodone-apap 7.5-325 MG/15 ML, 15 mL, Per J Tube, Q4H PRN    hydrocodone-apap 7.5-325 MG/15 ML, 20 mL, Per J Tube, Q6H PRN    HYDROmorphone, 1 mg, Intravenous, Q6H PRN    loperamide, 2 mg, Oral, QID PRN    morphine, 2 mg, Intravenous, Once PRN    naloxone, 0.02 mg, Intravenous, PRN    ondansetron, 4 mg, Intravenous, Q6H PRN    pneumoc 20-faina conj-dip cr(PF), 0.5 mL, Intramuscular, vaccine x 1 dose    promethazine, 25 mg, Rectal, Q6H PRN     Review of  patient's allergies indicates:  No Known Allergies  Objective:     Vital Signs (Most Recent):  Temp: 97.6 °F (36.4 °C) (09/06/24 0803)  Pulse: 76 (09/06/24 0803)  Resp: 16 (09/06/24 0803)  BP: 137/70 (09/06/24 0803)  SpO2: 97 % (09/06/24 0803) Vital Signs (24h Range):  Temp:  [97.6 °F (36.4 °C)-99 °F (37.2 °C)] 97.6 °F (36.4 °C)  Pulse:  [76-84] 76  Resp:  [16-18] 16  SpO2:  [94 %-97 %] 97 %  BP: (121-142)/(66-75) 137/70     Weight: 61.3 kg (135 lb 2.3 oz)  Body mass index is 22.49 kg/m².    Intake/Output - Last 3 Shifts         09/04 0700 09/05 0659 09/05 0700 09/06 0659 09/06 0700  09/07 0659    NG/ 140     Total Intake(mL/kg) 864 (14.1) 140 (2.3)     Urine (mL/kg/hr) 350 (0.2)      Drains 35 45     Stool 0 0     Total Output 385 45     Net +479 +95            Stool Occurrence 2 x 5 x              Physical Exam  Constitutional:       Appearance: She is well-developed. Ill appearance: mild acute.      Comments: Slightly frail   HENT:      Head: Normocephalic.   Eyes:      Pupils: Pupils are equal, round, and reactive to light.   Neck:      Thyroid: No thyromegaly.      Vascular: No JVD.      Trachea: No tracheal deviation.   Cardiovascular:      Rate and Rhythm: Normal rate and regular rhythm.      Heart sounds: Normal heart sounds.   Pulmonary:      Breath sounds: Normal breath sounds. No wheezing.   Abdominal:      General: Bowel sounds are normal. There is no distension.      Palpations: Abdomen is soft. Abdomen is not rigid. There is no mass.      Tenderness: There is no abdominal tenderness. There is no guarding or rebound.      Comments: Incision is clean.  Drains are serous.  Open area of the incision has some purulent exudate but no essie tube feeds.  Jejunostomy site is  with minimal leakage.  Staples were removed and Steri-Strips were placed.      Drains were removed and Band-Aids were placed   Musculoskeletal:         General: Normal range of motion.      Right lower leg: No edema.       Left lower leg: No edema.   Lymphadenopathy:      Cervical: No cervical adenopathy.   Skin:     General: Skin is warm and dry.      Findings: No erythema or rash.   Neurological:      Mental Status: She is oriented to person, place, and time.   Psychiatric:         Mood and Affect: Mood normal.         Thought Content: Thought content normal.         Judgment: Judgment normal.          Significant Labs:  I have reviewed all pertinent lab results within the past 24 hours.  CBC:   Recent Labs   Lab 09/06/24  0605   WBC 5.56   RBC 3.16*   HGB 10.4*   HCT 34.6*   *   *   MCH 32.9*   MCHC 30.1*     BMP:   Recent Labs   Lab 09/06/24  0605   *      K 4.0   *   CO2 17*   BUN 4*   CREATININE 0.7   CALCIUM 8.7   MG 1.9       Significant Diagnostics:  I have reviewed all pertinent imaging results/findings within the past 24 hours.  No new

## 2024-09-06 NOTE — PLAN OF CARE
09/06/24 0845   Rounds   Attendance Provider;;Charge nurse;Physical therapist   Discharge Plan A Home Health   Why the patient remains in the hospital Requires continued medical care   Transition of Care Barriers None     Pt is now agreeable to learn tube feeding. Pending tube feeding recs, medical clearance.

## 2024-09-07 LAB
ALBUMIN SERPL BCP-MCNC: 2.5 G/DL (ref 3.5–5.2)
ALP SERPL-CCNC: 189 U/L (ref 55–135)
ALT SERPL W/O P-5'-P-CCNC: 23 U/L (ref 10–44)
ANION GAP SERPL CALC-SCNC: 10 MMOL/L (ref 8–16)
AST SERPL-CCNC: 24 U/L (ref 10–40)
BILIRUB SERPL-MCNC: 0.2 MG/DL (ref 0.1–1)
BUN SERPL-MCNC: 6 MG/DL (ref 8–23)
CALCIUM SERPL-MCNC: 8.7 MG/DL (ref 8.7–10.5)
CHLORIDE SERPL-SCNC: 111 MMOL/L (ref 95–110)
CO2 SERPL-SCNC: 19 MMOL/L (ref 23–29)
CREAT SERPL-MCNC: 0.7 MG/DL (ref 0.5–1.4)
ERYTHROCYTE [DISTWIDTH] IN BLOOD BY AUTOMATED COUNT: 16.2 % (ref 11.5–14.5)
EST. GFR  (NO RACE VARIABLE): >60 ML/MIN/1.73 M^2
GLUCOSE SERPL-MCNC: 104 MG/DL (ref 70–110)
HCT VFR BLD AUTO: 36.2 % (ref 37–48.5)
HGB BLD-MCNC: 11 G/DL (ref 12–16)
MAGNESIUM SERPL-MCNC: 2 MG/DL (ref 1.6–2.6)
MCH RBC QN AUTO: 33.2 PG (ref 27–31)
MCHC RBC AUTO-ENTMCNC: 30.4 G/DL (ref 32–36)
MCV RBC AUTO: 109 FL (ref 82–98)
PHOSPHATE SERPL-MCNC: 1.8 MG/DL (ref 2.7–4.5)
PLATELET # BLD AUTO: 753 K/UL (ref 150–450)
PMV BLD AUTO: 8 FL (ref 9.2–12.9)
POTASSIUM SERPL-SCNC: 3.9 MMOL/L (ref 3.5–5.1)
PROT SERPL-MCNC: 6.4 G/DL (ref 6–8.4)
RBC # BLD AUTO: 3.31 M/UL (ref 4–5.4)
SODIUM SERPL-SCNC: 140 MMOL/L (ref 136–145)
WBC # BLD AUTO: 5.5 K/UL (ref 3.9–12.7)

## 2024-09-07 PROCEDURE — 84100 ASSAY OF PHOSPHORUS: CPT | Performed by: SURGERY

## 2024-09-07 PROCEDURE — 85027 COMPLETE CBC AUTOMATED: CPT | Performed by: NURSE PRACTITIONER

## 2024-09-07 PROCEDURE — 63600175 PHARM REV CODE 636 W HCPCS: Performed by: NURSE PRACTITIONER

## 2024-09-07 PROCEDURE — 83735 ASSAY OF MAGNESIUM: CPT | Performed by: SURGERY

## 2024-09-07 PROCEDURE — 11000001 HC ACUTE MED/SURG PRIVATE ROOM

## 2024-09-07 PROCEDURE — 25000003 PHARM REV CODE 250: Performed by: STUDENT IN AN ORGANIZED HEALTH CARE EDUCATION/TRAINING PROGRAM

## 2024-09-07 PROCEDURE — 80053 COMPREHEN METABOLIC PANEL: CPT | Performed by: SURGERY

## 2024-09-07 PROCEDURE — 25000003 PHARM REV CODE 250: Performed by: SURGERY

## 2024-09-07 RX ADMIN — METOCLOPRAMIDE HYDROCHLORIDE 10 MG: 5 SOLUTION ORAL at 05:09

## 2024-09-07 RX ADMIN — CARBAMAZEPINE 300 MG: 100 SUSPENSION ORAL at 08:09

## 2024-09-07 RX ADMIN — CARBAMAZEPINE 200 MG: 100 SUSPENSION ORAL at 05:09

## 2024-09-07 RX ADMIN — HYDROCODONE BITARTRATE AND ACETAMINOPHEN 15 ML: 7.5; 325 SOLUTION ORAL at 04:09

## 2024-09-07 RX ADMIN — LEVETIRACETAM 500 MG: 100 SOLUTION ORAL at 10:09

## 2024-09-07 RX ADMIN — HYDROCODONE BITARTRATE AND ACETAMINOPHEN 15 ML: 7.5; 325 SOLUTION ORAL at 05:09

## 2024-09-07 RX ADMIN — LEVETIRACETAM 500 MG: 100 SOLUTION ORAL at 08:09

## 2024-09-07 RX ADMIN — METOCLOPRAMIDE HYDROCHLORIDE 10 MG: 5 SOLUTION ORAL at 09:09

## 2024-09-07 RX ADMIN — ZONISAMIDE 200 MG: 100 SUSPENSION ORAL at 08:09

## 2024-09-07 RX ADMIN — ENOXAPARIN SODIUM 40 MG: 40 INJECTION SUBCUTANEOUS at 04:09

## 2024-09-07 RX ADMIN — ZONISAMIDE 200 MG: 100 SUSPENSION ORAL at 10:09

## 2024-09-07 RX ADMIN — CARBAMAZEPINE 200 MG: 100 SUSPENSION ORAL at 03:09

## 2024-09-07 RX ADMIN — HYDROCODONE BITARTRATE AND ACETAMINOPHEN 15 ML: 7.5; 325 SOLUTION ORAL at 08:09

## 2024-09-07 RX ADMIN — METOCLOPRAMIDE HYDROCHLORIDE 10 MG: 5 SOLUTION ORAL at 03:09

## 2024-09-07 RX ADMIN — AMOXICILLIN AND CLAVULANATE POTASSIUM 875.2 MG: 400; 57 POWDER, FOR SUSPENSION ORAL at 10:09

## 2024-09-07 RX ADMIN — AMOXICILLIN AND CLAVULANATE POTASSIUM 875.2 MG: 400; 57 POWDER, FOR SUSPENSION ORAL at 08:09

## 2024-09-07 NOTE — SUBJECTIVE & OBJECTIVE
Interval History: tolerating Tfs. Agreeable to discharge to SNF given magnitude of needs at home    Medications:  Continuous Infusions:  Scheduled Meds:   amoxicillin-clavulanate  875.2 mg Oral Q12H    carBAMazepine  200 mg Oral BID    carBAMazepine  300 mg Oral QHS    enoxparin  40 mg Subcutaneous Daily    levETIRAcetam  500 mg Per J Tube BID    metoclopramide HCl  10 mg Oral Q8H    zonisamide  200 mg Per J Tube BID     PRN Meds:  Current Facility-Administered Medications:     dextrose 10%, 12.5 g, Intravenous, PRN    dextrose 10%, 25 g, Intravenous, PRN    glucagon (human recombinant), 1 mg, Intramuscular, PRN    hydrALAZINE, 10 mg, Intravenous, Q6H PRN    hydrocodone-apap 7.5-325 MG/15 ML, 15 mL, Per J Tube, Q4H PRN    hydrocodone-apap 7.5-325 MG/15 ML, 20 mL, Per J Tube, Q6H PRN    HYDROmorphone, 1 mg, Intravenous, Q6H PRN    loperamide, 2 mg, Oral, QID PRN    morphine, 2 mg, Intravenous, Once PRN    naloxone, 0.02 mg, Intravenous, PRN    ondansetron, 4 mg, Intravenous, Q6H PRN    pneumoc 20-faina conj-dip cr(PF), 0.5 mL, Intramuscular, vaccine x 1 dose    promethazine, 25 mg, Rectal, Q6H PRN     Review of patient's allergies indicates:  No Known Allergies  Objective:     Vital Signs (Most Recent):  Temp: 99.1 °F (37.3 °C) (09/07/24 0744)  Pulse: 82 (09/07/24 0744)  Resp: 16 (09/07/24 0744)  BP: 119/61 (09/07/24 0744)  SpO2: 96 % (09/07/24 0744) Vital Signs (24h Range):  Temp:  [96.7 °F (35.9 °C)-99.1 °F (37.3 °C)] 99.1 °F (37.3 °C)  Pulse:  [82-92] 82  Resp:  [16-18] 16  SpO2:  [95 %-98 %] 96 %  BP: (119-153)/(61-78) 119/61     Weight: 61.3 kg (135 lb 2.3 oz)  Body mass index is 22.49 kg/m².    Intake/Output - Last 3 Shifts         09/05 0700 09/06 0659 09/06 0700 09/07 0659 09/07 0700 09/08 0659    NG/ 2557 80    Total Intake(mL/kg) 140 (2.3) 2557 (41.7) 80 (1.3)    Urine (mL/kg/hr)       Drains 45      Stool 0      Total Output 45      Net +95 +2557 +80           Urine Occurrence  4 x     Stool  Occurrence 5 x 1 x              Physical Exam  Constitutional:       Appearance: She is well-developed.   HENT:      Head: Normocephalic and atraumatic.   Eyes:      Conjunctiva/sclera: Conjunctivae normal.      Pupils: Pupils are equal, round, and reactive to light.   Neck:      Thyroid: No thyromegaly.   Cardiovascular:      Rate and Rhythm: Normal rate and regular rhythm.   Pulmonary:      Effort: Pulmonary effort is normal. No respiratory distress.   Abdominal:      General: There is no distension.      Palpations: Abdomen is soft. There is no mass.      Tenderness: There is no abdominal tenderness.      Comments: J tube in place tolerating continuous tube feeds   Musculoskeletal:         General: No tenderness. Normal range of motion.      Cervical back: Normal range of motion.   Skin:     General: Skin is warm and dry.      Capillary Refill: Capillary refill takes less than 2 seconds.   Neurological:      General: No focal deficit present.      Mental Status: She is alert and oriented to person, place, and time.          Significant Labs:  I have reviewed all pertinent lab results within the past 24 hours.  CBC:   Recent Labs   Lab 09/07/24  0617   WBC 5.50   RBC 3.31*   HGB 11.0*   HCT 36.2*   *   *   MCH 33.2*   MCHC 30.4*     CMP:   Recent Labs   Lab 09/07/24  0617      CALCIUM 8.7   ALBUMIN 2.5*   PROT 6.4      K 3.9   CO2 19*   *   BUN 6*   CREATININE 0.7   ALKPHOS 189*   ALT 23   AST 24   BILITOT 0.2       Significant Diagnostics:  I have reviewed all pertinent imaging results/findings within the past 24 hours.

## 2024-09-07 NOTE — PLAN OF CARE
Discussed poc with pt, pt verbalized understanding    Purposeful rounding every 2hours    VS wnl    Fall precautions in place, remains injury free  Pt c/o 8/10   Pain and nausea under control with PRN meds    Accurate I&Os  Abx given as prescribed  Bed locked at lowest position  Call light within reach    Chart check complete  Will cont with POC

## 2024-09-07 NOTE — SUBJECTIVE & OBJECTIVE
Interval History: Ambulating in room. Awaiting SNF approval. . Tolerating diet and Tube feedings/free water, +flatus and BM.      Review of Systems   Constitutional:  Positive for activity change and appetite change. Negative for chills, diaphoresis, fatigue, fever and unexpected weight change.   HENT:  Negative for congestion, nosebleeds, sinus pressure and sore throat.    Eyes:  Negative for pain, discharge and visual disturbance.   Respiratory:  Negative for cough, chest tightness, shortness of breath, wheezing and stridor.    Cardiovascular:  Negative for chest pain, palpitations and leg swelling.   Gastrointestinal:  Negative for abdominal distention, abdominal pain, blood in stool, constipation, diarrhea, nausea and vomiting.   Endocrine: Negative for cold intolerance and heat intolerance.   Genitourinary:  Negative for difficulty urinating, dysuria, flank pain, frequency and urgency.   Musculoskeletal:  Negative for arthralgias, back pain, joint swelling, myalgias, neck pain and neck stiffness.   Skin:  Positive for wound (abominal incision, j tube). Negative for rash.   Allergic/Immunologic: Negative for food allergies and immunocompromised state.   Neurological:  Positive for weakness. Negative for dizziness, seizures, syncope, facial asymmetry, speech difficulty, light-headedness, numbness and headaches.   Hematological:  Negative for adenopathy.   Psychiatric/Behavioral:  Negative for agitation, confusion and hallucinations. The patient is nervous/anxious.      Objective:     Vital Signs (Most Recent):  Temp: 98.8 °F (37.1 °C) (09/07/24 1131)  Pulse: 82 (09/07/24 1131)  Resp: 16 (09/07/24 1131)  BP: 139/73 (09/07/24 1131)  SpO2: 98 % (09/07/24 1131) Vital Signs (24h Range):  Temp:  [96.7 °F (35.9 °C)-99.1 °F (37.3 °C)] 98.8 °F (37.1 °C)  Pulse:  [82-92] 82  Resp:  [16-18] 16  SpO2:  [95 %-98 %] 98 %  BP: (119-153)/(61-78) 139/73     Weight: 61.3 kg (135 lb 2.3 oz)  Body mass index is 22.49  kg/m².    Intake/Output Summary (Last 24 hours) at 9/7/2024 1514  Last data filed at 9/7/2024 1139  Gross per 24 hour   Intake 2631 ml   Output --   Net 2631 ml         Physical Exam  Vitals and nursing note reviewed.   Constitutional:       General: She is not in acute distress.     Appearance: She is well-developed. She is not diaphoretic.   HENT:      Head: Normocephalic and atraumatic.      Nose: Nose normal.   Eyes:      General: No scleral icterus.     Conjunctiva/sclera: Conjunctivae normal.   Neck:      Trachea: No tracheal deviation.   Cardiovascular:      Rate and Rhythm: Normal rate and regular rhythm.      Heart sounds: Normal heart sounds. No murmur heard.     No friction rub. No gallop.   Pulmonary:      Effort: Pulmonary effort is normal. No respiratory distress.      Breath sounds: Normal breath sounds. No stridor. No wheezing or rales.   Chest:      Chest wall: No tenderness.   Abdominal:      General: Bowel sounds are normal. There is no distension.      Palpations: Abdomen is soft. There is no mass.      Tenderness: There is abdominal tenderness (expected post op TTP). There is no guarding or rebound.      Comments: J tube in place- erythema resolving at J tube site. No drainage.   Large midline abdominal incision -Upper portion of the wound with scant purulent drainage with dressing C/D/I   Musculoskeletal:         General: No tenderness or deformity. Normal range of motion.      Cervical back: Normal range of motion and neck supple.   Skin:     General: Skin is warm and dry.      Coloration: Skin is not pale.      Findings: No erythema or rash.   Neurological:      Mental Status: She is alert and oriented to person, place, and time.      Cranial Nerves: No cranial nerve deficit.      Motor: No abnormal muscle tone.      Coordination: Coordination normal.   Psychiatric:         Behavior: Behavior normal.         Thought Content: Thought content normal.             Significant Labs: All pertinent  labs within the past 24 hours have been reviewed.    Significant Imaging: I have reviewed all pertinent imaging results/findings within the past 24 hours.

## 2024-09-07 NOTE — PROGRESS NOTES
[facility] Medicine  Progress Note    Patient Name: Cheryl Kirkland  MRN: [id number]  Patient Class: IP- Inpatient   Admission Date: 8/20/2024  Length of Stay: 18 days  Attending Physician: Chen Jerome MD  Primary Care Provider: Gagandeep Iglesias MD        Subjective:     Principal Problem:Gastric adenocarcinoma        HPI:  74F  has a past medical history of Anemia, Chronic deep vein thrombosis (DVT) of left lower extremity, Deep vein thrombosis, Drug-induced polyneuropathy, DVT (deep venous thrombosis), Epilepsy, Gastric adenocarcinoma, GERD (gastroesophageal reflux disease), Hyperlipidemia, Mixed epithelial carcinoma of right ovary, OP (osteoporosis), Ovarian cancer, and Primary hypertension.  Presents for definitive treatment of gastric adenocaricoma per primary. Status post total gastrectomy with j tube placement. Transferred to icu postoperatively for close monitoring. Tolerated procedure well. Expected pain and weakness. Physical/occupational therapy recommending low intensity therapy.    Hospital medicine consulted for medical management.    Initial review of chart reveals vital signs stable, on room air. Cbc unremarkable. Cmp with hyperchloremic acidosis improving. Normal renal function. Elevated liver enzymes. Amylase within normal limits. Hypomag. Hypoalbuminemia. Xray abdomen on 8/20 with operative changes.    Overview/Hospital Course:  8/23 admitted for definitive management of gastric adenocarcinoma with total gastrectomy and j tube placement by primary. Upper gastroenterology procedure with no anastomotic leak identified. Possible ngt removal per primary. Physical/occupational therapy recommending low intensity therapy. Antiepileptic levels pending  8/24 antiepileptic levels pending. Ng tube removed. Reports visual disturbance but improving. Has not worked with physical/occupational therapy today.  8/25 doing well. +bowel movement x 2. pca discontinued per primary.  Trickle feeds to be increased per primary.  8/26 patient feels weak today with some shortness of breath.  She states she feels some nausea and just no energy.  She was able to participate therapy and walk in [place] with walker  8/27 patient continues to feel weak.  8/28 patient feels much better today.  No nausea no vomiting.  Has walked 3 times with the assistance today.  8/29- POD 9- developed some Abd fullness- hence Dr. Jerome switched to CLD while hold TF and cont Reglan. Getting PT/OT. Cont present care as per Dr. Jerome. Labs stable. PO4 2.5.    08/30 POD 10- reports being able to tolerate CLD. Started on TPN. Tube feeds resumed. Reports pain around J tube site, evaluation by CT imaging and General Surgery did not note any indication for intervention.   08/31 POD11- still reporting pain around J tube insertion site, started on antibiotics per General surgery.  Reports diarrhea, stool studies pending  09/01- POD 12- reports improvement in pain around J tube site.  General surgery evaluation today recommended discontinuation of peripheral nutrition.  Stool studies negative so far, diarrhea alleviating with loperamide  09/02: POD 13: improvement of erythema surrounding J tube site. Slightly tender. Tolerating TF and medications through tube. PT/OT recommends home health. She remains on full liquid diet.  09/03: POD 14:  Overnight concerns for leak/drainage through abdominal incisions. Tube feedings held.  Patient continues to report bowel movements. Increasing abdominal pain-  Erythema around J tube site continues to improve.   9/4/24: Patient underwent a CT scan abdomen with oral contrast. No essie evidence leak, fistula or abscess. +ileus. Tolerating diet and having flatus/BMs  9/5/24: Initially, pt wanted SNF due to fear of learning tube feeding and j-tube care. However, pt wants to learn and be discharged home with HH. TF infusion rep to meet with pt tomorrow.   9/6/24: No change in medical status. pt/spouse  now request SNF. CM notified.   9/7/24: medically cleared for discharge. Awaiting SNF approval. Discussed with pt to allow nursing to teach J tube care. She verbalized understanding     Interval History: Ambulating in room. Awaiting SNF approval. . Tolerating diet and Tube feedings/free water, +flatus and BM.      Review of Systems   Constitutional:  Positive for activity change and appetite change. Negative for chills, diaphoresis, fatigue, fever and unexpected weight change.   HENT:  Negative for congestion, nosebleeds, sinus pressure and sore throat.    Eyes:  Negative for pain, discharge and visual disturbance.   Respiratory:  Negative for cough, chest tightness, shortness of breath, wheezing and stridor.    Cardiovascular:  Negative for chest pain, palpitations and leg swelling.   Gastrointestinal:  Negative for abdominal distention, abdominal pain, blood in stool, constipation, diarrhea, nausea and vomiting.   Endocrine: Negative for cold intolerance and heat intolerance.   Genitourinary:  Negative for difficulty urinating, dysuria, flank pain, frequency and urgency.   Musculoskeletal:  Negative for arthralgias, back pain, joint swelling, myalgias, neck pain and neck stiffness.   Skin:  Positive for wound (abominal incision, j tube). Negative for rash.   Allergic/Immunologic: Negative for food allergies and immunocompromised state.   Neurological:  Positive for weakness. Negative for dizziness, seizures, syncope, facial asymmetry, speech difficulty, light-headedness, numbness and headaches.   Hematological:  Negative for adenopathy.   Psychiatric/Behavioral:  Negative for agitation, confusion and hallucinations. The patient is nervous/anxious.      Objective:     Vital Signs (Most Recent):  Temp: 98.8 °F (37.1 °C) (09/07/24 1131)  Pulse: 82 (09/07/24 1131)  Resp: 16 (09/07/24 1131)  BP: 139/73 (09/07/24 1131)  SpO2: 98 % (09/07/24 1131) Vital Signs (24h Range):  Temp:  [96.7 °F (35.9 °C)-99.1 °F (37.3 °C)]  98.8 °F (37.1 °C)  Pulse:  [82-92] 82  Resp:  [16-18] 16  SpO2:  [95 %-98 %] 98 %  BP: (119-153)/(61-78) 139/73     Weight: 61.3 kg (135 lb 2.3 oz)  Body mass index is 22.49 kg/m².    Intake/Output Summary (Last 24 hours) at 9/7/2024 1514  Last data filed at 9/7/2024 1139  Gross per 24 hour   Intake 2631 ml   Output --   Net 2631 ml         Physical Exam  Vitals and nursing note reviewed.   Constitutional:       General: She is not in acute distress.     Appearance: She is well-developed. She is not diaphoretic.   HENT:      Head: Normocephalic and atraumatic.      Nose: Nose normal.   Eyes:      General: No scleral icterus.     Conjunctiva/sclera: Conjunctivae normal.   Neck:      Trachea: No tracheal deviation.   Cardiovascular:      Rate and Rhythm: Normal rate and regular rhythm.      Heart sounds: Normal heart sounds. No murmur heard.     No friction rub. No gallop.   Pulmonary:      Effort: Pulmonary effort is normal. No respiratory distress.      Breath sounds: Normal breath sounds. No stridor. No wheezing or rales.   Chest:      Chest wall: No tenderness.   Abdominal:      General: Bowel sounds are normal. There is no distension.      Palpations: Abdomen is soft. There is no mass.      Tenderness: There is abdominal tenderness (expected post op TTP). There is no guarding or rebound.      Comments: J tube in place- erythema resolving at J tube site. No drainage.   Large midline abdominal incision -Upper portion of the wound with scant purulent drainage with dressing C/D/I   Musculoskeletal:         General: No tenderness or deformity. Normal range of motion.      Cervical back: Normal range of motion and neck supple.   Skin:     General: Skin is warm and dry.      Coloration: Skin is not pale.      Findings: No erythema or rash.   Neurological:      Mental Status: She is alert and oriented to person, place, and time.      Cranial Nerves: No cranial nerve deficit.      Motor: No abnormal muscle tone.       "Coordination: Coordination normal.   Psychiatric:         Behavior: Behavior normal.         Thought Content: Thought content normal.             Significant Labs: All pertinent labs within the past 24 hours have been reviewed.    Significant Imaging: I have reviewed all pertinent imaging results/findings within the past 24 hours.    Assessment/Plan:      * Gastric adenocarcinoma  Status post total gastrectomy with J tube placement  Repeat CT abd showed no abscess, leak -mild ileus.    Pt passing flatus and BMs  General surgery following-diet advanced  Transition to po abx  PT/OT  SNF placement pending     Jejunostomy tube present  See "Gastric adenocarcinoma "   Pt has poor appetite -will need to use tube feeding via J tube until tolerating enough food and liquids po to sustain her nutrition         Moderate protein-calorie malnutrition  Nutrition consulted. Most recent weight and BMI monitored-     Measurements:  Wt Readings from Last 1 Encounters:   08/22/24 61.3 kg (135 lb 2.3 oz)   Body mass index is 22.49 kg/m².    Patient has been screened and assessed by RD.    Malnutrition Type:  Context: chronic illness  Level: moderate    Malnutrition Characteristic Summary:  Subcutaneous Fat (Malnutrition): moderate depletion  Muscle Mass (Malnutrition): mild depletion    Interventions/Recommendations (treatment strategy):  1. Initate pt onto continuous Enteral nutrition, recommend Impact Peptide 1.5 via J tube, goal rate 50 mL/hr, starting at 10 mL/hr then progress to goal within 24 hrs if pt is tolerating or per MD/NP -Formula at goal rate provides: 1800 kcals/day (102% EEN), 113 g protein/day (100% EPN), 924 mL free formula water/day (52% fluid needs) -140 mL q4h free water flushes (840 mL/day) = total from formula + FWF = 1764 mL water/day (100% fluid needs), per MD/NP -Check Mg, K+, Na, Phos and Glu before and during initiation, correct as indicated 2. Weigh twice weekly      Primary hypertension  Chronic, " controlled. Latest blood pressure and vitals reviewed-     Temp:  [98.4 °F (36.9 °C)-99.4 °F (37.4 °C)]   Pulse:  [76-92]   Resp:  [16-20]   BP: (112-131)/(59-69)   SpO2:  [94 %-98 %] .   Home meds for hypertension were reviewed and noted below.       While in the hospital, will manage blood pressure as follows; Adjust home antihypertensive regimen as follows- monitor, avoid aggressive management of blood pressure in setting of recent surgery     Will utilize p.r.n. blood pressure medication only if patient's blood pressure greater than 160/100 and she develops symptoms such as worsening chest pain or shortness of breath.    Chronic deep vein thrombosis (DVT) of left lower extremity   -Previously on Xarelto, and evaluation by Oncology noted concerns DVT likely secondary to being provoked  from ovarian cancer. Held after concerns for GI bleed early 2024      Scds and lovenox for prophy.    DVT (deep venous thrombosis)  -Initially presented to primary care provider with lower extremity edema   -Workup revealed dvt 2/2 cancer  -Previously on Xarelto, and evaluation by Oncology noted concerns DVT likely secondary to being provoked from ovarian cancer. Held after concerns for GI bleed early 2024    PLAN:  On Lovenox ppx and scds  Ambulate     Hx of Epileptic seizures  Resume home meds   Carbamezapine in therapeutic range  Zonisamide within normal limits   Levetiracetam within normal limits    stable      VTE Risk Mitigation (From admission, onward)           Ordered     enoxaparin injection 40 mg  Daily         08/20/24 2219     IP VTE HIGH RISK PATIENT  Once         08/20/24 2219     Place sequential compression device  Until discontinued         08/20/24 2219                    Discharge Planning   SOFIYA:      Code Status: Full Code   Is the patient medically ready for discharge?:     Reason for patient still in hospital (select all that apply): Pending disposition  Discharge Plan A: Skilled Nursing Facility                   Vicky Phoenix NP  Department of Hospital Medicine   '[facility] S[facility]

## 2024-09-07 NOTE — PROGRESS NOTES
[facility]  General Surgery  Progress Note    Subjective:     History of Present Illness:  Cheryl Kirkland is a 74 y.o. year old female with a history of ovarian cancer status post surgery and chemotherapy as well as epilepsy and DVT (provoked during her treatment for ovarian cancer), and MSI high gastric adenocarcinoma.  She was originally diagnosed back in February of this past year.  She was noted to have a very proximal enlarged tumor.  EGD was done as was complete metastatic workup which was negative.  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  Due to her significant adhesive disease she understood that she would require an open operation.  She was discussed in multidisciplinary tumor board and the consensus was to proceed with immunotherapy due to her MSI high status.  She completed 5 cycles of neoadjuvant immunotherapy with Keytruda and on restaging imaging was found to have enlarged Danelle portal lymph nodes.  Consensus was that she would need a total gastrectomy due to the proximity of her tumor in relationship to the GE junction.  Due to the extent of the operation required and the her previous history of ovarian cancer the tumor board consensus was to proceed with the EUS guided biopsy to rule out any ovarian cancer and to rule out pseudo progression as well.  This was performed and showed adenocarcinoma consistent with her gastric primary.  Therefore the decision was to proceed with surgical intervention due to the mixed response she had from immunotherapy on PET-CT scan.     Risks and benefits were reviewed including bleeding, infection, pain, scar, damage to surrounding structures, cardiovascular and pulmonary complications, anastomotic leak, positive margins, need for additional procedures, death, and imponderables.  She  expressed understanding and gave informed consent to proceed.    Post-Op Info:  Procedure(s) (LRB):  GASTRECTOMY (N/A)  EGD (ESOPHAGOGASTRODUODENOSCOPY) (N/A)  LAPAROSCOPY, DIAGNOSTIC (N/A)  LYSIS, ADHESIONS (N/A)  INSERTION, JEJUNOSTOMY TUBE (N/A)   18 Days Post-Op     Interval History: tolerating Tfs. Agreeable to discharge to SNF given magnitude of needs at home    Medications:  Continuous Infusions:  Scheduled Meds:   amoxicillin-clavulanate  875.2 mg Oral Q12H    carBAMazepine  200 mg Oral BID    carBAMazepine  300 mg Oral QHS    enoxparin  40 mg Subcutaneous Daily    levETIRAcetam  500 mg Per J Tube BID    metoclopramide HCl  10 mg Oral Q8H    zonisamide  200 mg Per J Tube BID     PRN Meds:  Current Facility-Administered Medications:     dextrose 10%, 12.5 g, Intravenous, PRN    dextrose 10%, 25 g, Intravenous, PRN    glucagon (human recombinant), 1 mg, Intramuscular, PRN    hydrALAZINE, 10 mg, Intravenous, Q6H PRN    hydrocodone-apap 7.5-325 MG/15 ML, 15 mL, Per J Tube, Q4H PRN    hydrocodone-apap 7.5-325 MG/15 ML, 20 mL, Per J Tube, Q6H PRN    HYDROmorphone, 1 mg, Intravenous, Q6H PRN    loperamide, 2 mg, Oral, QID PRN    morphine, 2 mg, Intravenous, Once PRN    naloxone, 0.02 mg, Intravenous, PRN    ondansetron, 4 mg, Intravenous, Q6H PRN    pneumoc 20-faina conj-dip cr(PF), 0.5 mL, Intramuscular, vaccine x 1 dose    promethazine, 25 mg, Rectal, Q6H PRN     Review of patient's allergies indicates:  No Known Allergies  Objective:     Vital Signs (Most Recent):  Temp: 99.1 °F (37.3 °C) (09/07/24 0744)  Pulse: 82 (09/07/24 0744)  Resp: 16 (09/07/24 0744)  BP: 119/61 (09/07/24 0744)  SpO2: 96 % (09/07/24 0744) Vital Signs (24h Range):  Temp:  [96.7 °F (35.9 °C)-99.1 °F (37.3 °C)] 99.1 °F (37.3 °C)  Pulse:  [82-92] 82  Resp:  [16-18] 16  SpO2:  [95 %-98 %] 96 %  BP: (119-153)/(61-78) 119/61     Weight: 61.3 kg (135 lb 2.3 oz)  Body mass index is 22.49 kg/m².    Intake/Output - Last 3 Shifts         09/05  0700  09/06 0659 09/06 0700  09/07 0659 09/07 0700  09/08 0659    NG/ 2557 80    Total Intake(mL/kg) 140 (2.3) 2557 (41.7) 80 (1.3)    Urine (mL/kg/hr)       Drains 45      Stool 0      Total Output 45      Net +95 +2557 +80           Urine Occurrence  4 x     Stool Occurrence 5 x 1 x              Physical Exam  Constitutional:       Appearance: She is well-developed.   HENT:      Head: Normocephalic and atraumatic.   Eyes:      Conjunctiva/sclera: Conjunctivae normal.      Pupils: Pupils are equal, round, and reactive to light.   Neck:      Thyroid: No thyromegaly.   Cardiovascular:      Rate and Rhythm: Normal rate and regular rhythm.   Pulmonary:      Effort: Pulmonary effort is normal. No respiratory distress.   Abdominal:      General: There is no distension.      Palpations: Abdomen is soft. There is no mass.      Tenderness: There is no abdominal tenderness.      Comments: J tube in place tolerating continuous tube feeds   Musculoskeletal:         General: No tenderness. Normal range of motion.      Cervical back: Normal range of motion.   Skin:     General: Skin is warm and dry.      Capillary Refill: Capillary refill takes less than 2 seconds.   Neurological:      General: No focal deficit present.      Mental Status: She is alert and oriented to person, place, and time.          Significant Labs:  I have reviewed all pertinent lab results within the past 24 hours.  CBC:   Recent Labs   Lab 09/07/24  0617   WBC 5.50   RBC 3.31*   HGB 11.0*   HCT 36.2*   *   *   MCH 33.2*   MCHC 30.4*     CMP:   Recent Labs   Lab 09/07/24  0617      CALCIUM 8.7   ALBUMIN 2.5*   PROT 6.4      K 3.9   CO2 19*   *   BUN 6*   CREATININE 0.7   ALKPHOS 189*   ALT 23   AST 24   BILITOT 0.2       Significant Diagnostics:  I have reviewed all pertinent imaging results/findings within the past 24 hours.  Assessment/Plan:     * Gastric adenocarcinoma  POD #17- s/p exlap, extensive SHAYNA, small bowel  resection, total gastrectomy with stapled esophagojejunal anastomosis and D2 lymphadenectomy, and Jtube placement    -- continue Reglan  -- a full liquid diet but not eating very much.  -- tube feeds were restarted at 50 cc an hour and w are being tolerate-- continue hycet via J tube for pain   -- ok for liquid medications via J tube if needed  -- Strict I/Os  -- Senna liquid via Jtube BID   -- CM consult for discharge / home health planning   -- OOB To chair and walk today x5, OT/PT consulted  -- encourage IS  -- 5d oral abx  -- lovenox ppx  Dispo: pt agreeable to discharge to SNF. Referrals sent.       Primary hypertension  -- appreciate medicine assistance with management     Hx of Epileptic seizures  -- appreciate medicine assistance with management         Kaitlin Pena MD  General Surgery  [facility]

## 2024-09-07 NOTE — PLAN OF CARE
Notified by charge nurse that patient requesting to see .  Met with patient, reports  was called by [facility] and they do not know why.  CM explained that per primary SW notes, plan is transfer to [facility] pending auth.  Patient denied being aware of that, but is in agreement.

## 2024-09-08 LAB
ALBUMIN SERPL BCP-MCNC: 2.6 G/DL (ref 3.5–5.2)
ALP SERPL-CCNC: 179 U/L (ref 55–135)
ALT SERPL W/O P-5'-P-CCNC: 22 U/L (ref 10–44)
ANION GAP SERPL CALC-SCNC: 9 MMOL/L (ref 8–16)
AST SERPL-CCNC: 24 U/L (ref 10–40)
BILIRUB SERPL-MCNC: 0.2 MG/DL (ref 0.1–1)
BUN SERPL-MCNC: 10 MG/DL (ref 8–23)
CALCIUM SERPL-MCNC: 8.7 MG/DL (ref 8.7–10.5)
CHLORIDE SERPL-SCNC: 109 MMOL/L (ref 95–110)
CO2 SERPL-SCNC: 21 MMOL/L (ref 23–29)
CREAT SERPL-MCNC: 0.7 MG/DL (ref 0.5–1.4)
ERYTHROCYTE [DISTWIDTH] IN BLOOD BY AUTOMATED COUNT: 15.9 % (ref 11.5–14.5)
EST. GFR  (NO RACE VARIABLE): >60 ML/MIN/1.73 M^2
GLUCOSE SERPL-MCNC: 105 MG/DL (ref 70–110)
HCT VFR BLD AUTO: 35.5 % (ref 37–48.5)
HGB BLD-MCNC: 11.1 G/DL (ref 12–16)
MAGNESIUM SERPL-MCNC: 2.1 MG/DL (ref 1.6–2.6)
MCH RBC QN AUTO: 32.8 PG (ref 27–31)
MCHC RBC AUTO-ENTMCNC: 31.3 G/DL (ref 32–36)
MCV RBC AUTO: 105 FL (ref 82–98)
PHOSPHATE SERPL-MCNC: 2.1 MG/DL (ref 2.7–4.5)
PLATELET # BLD AUTO: 728 K/UL (ref 150–450)
PMV BLD AUTO: 8.2 FL (ref 9.2–12.9)
POTASSIUM SERPL-SCNC: 4.2 MMOL/L (ref 3.5–5.1)
PROT SERPL-MCNC: 6.6 G/DL (ref 6–8.4)
RBC # BLD AUTO: 3.38 M/UL (ref 4–5.4)
SODIUM SERPL-SCNC: 139 MMOL/L (ref 136–145)
WBC # BLD AUTO: 6.42 K/UL (ref 3.9–12.7)

## 2024-09-08 PROCEDURE — 84100 ASSAY OF PHOSPHORUS: CPT | Performed by: SURGERY

## 2024-09-08 PROCEDURE — 83735 ASSAY OF MAGNESIUM: CPT | Performed by: SURGERY

## 2024-09-08 PROCEDURE — 80053 COMPREHEN METABOLIC PANEL: CPT | Performed by: SURGERY

## 2024-09-08 PROCEDURE — 25000003 PHARM REV CODE 250: Performed by: SURGERY

## 2024-09-08 PROCEDURE — 85027 COMPLETE CBC AUTOMATED: CPT | Performed by: NURSE PRACTITIONER

## 2024-09-08 PROCEDURE — 63600175 PHARM REV CODE 636 W HCPCS: Performed by: NURSE PRACTITIONER

## 2024-09-08 PROCEDURE — 11000001 HC ACUTE MED/SURG PRIVATE ROOM

## 2024-09-08 PROCEDURE — 63600175 PHARM REV CODE 636 W HCPCS: Performed by: SURGERY

## 2024-09-08 PROCEDURE — 36415 COLL VENOUS BLD VENIPUNCTURE: CPT | Performed by: NURSE PRACTITIONER

## 2024-09-08 PROCEDURE — 25000003 PHARM REV CODE 250: Performed by: STUDENT IN AN ORGANIZED HEALTH CARE EDUCATION/TRAINING PROGRAM

## 2024-09-08 RX ADMIN — ZONISAMIDE 200 MG: 100 SUSPENSION ORAL at 09:09

## 2024-09-08 RX ADMIN — ZONISAMIDE 200 MG: 100 SUSPENSION ORAL at 10:09

## 2024-09-08 RX ADMIN — CARBAMAZEPINE 300 MG: 100 SUSPENSION ORAL at 09:09

## 2024-09-08 RX ADMIN — METOCLOPRAMIDE HYDROCHLORIDE 10 MG: 5 SOLUTION ORAL at 01:09

## 2024-09-08 RX ADMIN — ENOXAPARIN SODIUM 40 MG: 40 INJECTION SUBCUTANEOUS at 04:09

## 2024-09-08 RX ADMIN — METOCLOPRAMIDE HYDROCHLORIDE 10 MG: 5 SOLUTION ORAL at 06:09

## 2024-09-08 RX ADMIN — HYDROCODONE BITARTRATE AND ACETAMINOPHEN 20 ML: 7.5; 325 SOLUTION ORAL at 09:09

## 2024-09-08 RX ADMIN — HYDROCODONE BITARTRATE AND ACETAMINOPHEN 15 ML: 7.5; 325 SOLUTION ORAL at 06:09

## 2024-09-08 RX ADMIN — LEVETIRACETAM 500 MG: 100 SOLUTION ORAL at 09:09

## 2024-09-08 RX ADMIN — HYDROCODONE BITARTRATE AND ACETAMINOPHEN 15 ML: 7.5; 325 SOLUTION ORAL at 12:09

## 2024-09-08 RX ADMIN — AMOXICILLIN AND CLAVULANATE POTASSIUM 875.2 MG: 400; 57 POWDER, FOR SUSPENSION ORAL at 09:09

## 2024-09-08 RX ADMIN — HYDROMORPHONE HYDROCHLORIDE 1 MG: 2 INJECTION, SOLUTION INTRAMUSCULAR; INTRAVENOUS; SUBCUTANEOUS at 01:09

## 2024-09-08 RX ADMIN — HYDROCODONE BITARTRATE AND ACETAMINOPHEN 15 ML: 7.5; 325 SOLUTION ORAL at 04:09

## 2024-09-08 RX ADMIN — LEVETIRACETAM 500 MG: 100 SOLUTION ORAL at 10:09

## 2024-09-08 RX ADMIN — METOCLOPRAMIDE HYDROCHLORIDE 10 MG: 5 SOLUTION ORAL at 09:09

## 2024-09-08 RX ADMIN — CARBAMAZEPINE 200 MG: 100 SUSPENSION ORAL at 01:09

## 2024-09-08 RX ADMIN — AMOXICILLIN AND CLAVULANATE POTASSIUM 875.2 MG: 400; 57 POWDER, FOR SUSPENSION ORAL at 10:09

## 2024-09-08 RX ADMIN — CARBAMAZEPINE 200 MG: 100 SUSPENSION ORAL at 06:09

## 2024-09-08 NOTE — SUBJECTIVE & OBJECTIVE
Interval History: Ambulating in room. Awaiting SNF approval. . Tolerating diet and Tube feedings/free water, +flatus and BM. Encouraged pt to allow nursing staff to teach her J-tube and TF management      Review of Systems   Constitutional:  Positive for activity change and appetite change. Negative for chills, diaphoresis, fatigue, fever and unexpected weight change.   HENT:  Negative for congestion, nosebleeds, sinus pressure and sore throat.    Eyes:  Negative for pain, discharge and visual disturbance.   Respiratory:  Negative for cough, chest tightness, shortness of breath, wheezing and stridor.    Cardiovascular:  Negative for chest pain, palpitations and leg swelling.   Gastrointestinal:  Negative for abdominal distention, abdominal pain, blood in stool, constipation, diarrhea, nausea and vomiting.   Endocrine: Negative for cold intolerance and heat intolerance.   Genitourinary:  Negative for difficulty urinating, dysuria, flank pain, frequency and urgency.   Musculoskeletal:  Negative for arthralgias, back pain, joint swelling, myalgias, neck pain and neck stiffness.   Skin:  Positive for wound (abominal incision, j tube). Negative for rash.   Allergic/Immunologic: Negative for food allergies and immunocompromised state.   Neurological:  Positive for weakness. Negative for dizziness, seizures, syncope, facial asymmetry, speech difficulty, light-headedness, numbness and headaches.   Hematological:  Negative for adenopathy.   Psychiatric/Behavioral:  Negative for agitation, confusion and hallucinations. The patient is nervous/anxious.      Objective:     Vital Signs (Most Recent):  Temp: 98.2 °F (36.8 °C) (09/08/24 1347)  Pulse: 86 (09/08/24 1347)  Resp: 18 (09/08/24 1347)  BP: 136/80 (09/08/24 1347)  SpO2: 98 % (09/08/24 0741) Vital Signs (24h Range):  Temp:  [98.2 °F (36.8 °C)-98.9 °F (37.2 °C)] 98.2 °F (36.8 °C)  Pulse:  [78-89] 86  Resp:  [16-20] 18  SpO2:  [91 %-98 %] 98 %  BP: (119-138)/(64-80) 136/80      Weight: 61.3 kg (135 lb 2.3 oz)  Body mass index is 22.49 kg/m².    Intake/Output Summary (Last 24 hours) at 9/8/2024 1519  Last data filed at 9/8/2024 1511  Gross per 24 hour   Intake 1534 ml   Output --   Net 1534 ml         Physical Exam  Vitals and nursing note reviewed.   Constitutional:       General: She is not in acute distress.     Appearance: She is well-developed. She is not diaphoretic.   HENT:      Head: Normocephalic and atraumatic.      Nose: Nose normal.   Eyes:      General: No scleral icterus.     Conjunctiva/sclera: Conjunctivae normal.   Neck:      Trachea: No tracheal deviation.   Cardiovascular:      Rate and Rhythm: Normal rate and regular rhythm.      Heart sounds: Normal heart sounds. No murmur heard.     No friction rub. No gallop.   Pulmonary:      Effort: Pulmonary effort is normal. No respiratory distress.      Breath sounds: Normal breath sounds. No stridor. No wheezing or rales.   Chest:      Chest wall: No tenderness.   Abdominal:      General: Bowel sounds are normal. There is no distension.      Palpations: Abdomen is soft. There is no mass.      Tenderness: There is abdominal tenderness (expected post op TTP). There is no guarding or rebound.      Comments: J tube in place- erythema resolving at J tube site. No drainage.   Large midline abdominal incision -Upper portion of the wound with scant purulent drainage with dressing C/D/I   Musculoskeletal:         General: No tenderness or deformity. Normal range of motion.      Cervical back: Normal range of motion and neck supple.   Skin:     General: Skin is warm and dry.      Coloration: Skin is not pale.      Findings: No erythema or rash.   Neurological:      Mental Status: She is alert and oriented to person, place, and time.      Cranial Nerves: No cranial nerve deficit.      Motor: No abnormal muscle tone.      Coordination: Coordination normal.   Psychiatric:         Behavior: Behavior normal.         Thought Content:  Thought content normal.             Significant Labs: All pertinent labs within the past 24 hours have been reviewed.    Significant Imaging: I have reviewed all pertinent imaging results/findings within the past 24 hours.

## 2024-09-08 NOTE — SUBJECTIVE & OBJECTIVE
Interval History: no complaints today. Continues to tolerate TF    Medications:  Continuous Infusions:  Scheduled Meds:   amoxicillin-clavulanate  875.2 mg Oral Q12H    carBAMazepine  200 mg Oral BID    carBAMazepine  300 mg Oral QHS    enoxparin  40 mg Subcutaneous Daily    levETIRAcetam  500 mg Per J Tube BID    metoclopramide HCl  10 mg Oral Q8H    zonisamide  200 mg Per J Tube BID     PRN Meds:  Current Facility-Administered Medications:     dextrose 10%, 12.5 g, Intravenous, PRN    dextrose 10%, 25 g, Intravenous, PRN    glucagon (human recombinant), 1 mg, Intramuscular, PRN    hydrALAZINE, 10 mg, Intravenous, Q6H PRN    hydrocodone-apap 7.5-325 MG/15 ML, 15 mL, Per J Tube, Q4H PRN    hydrocodone-apap 7.5-325 MG/15 ML, 20 mL, Per J Tube, Q6H PRN    HYDROmorphone, 1 mg, Intravenous, Q6H PRN    loperamide, 2 mg, Oral, QID PRN    morphine, 2 mg, Intravenous, Once PRN    naloxone, 0.02 mg, Intravenous, PRN    ondansetron, 4 mg, Intravenous, Q6H PRN    pneumoc 20-faina conj-dip cr(PF), 0.5 mL, Intramuscular, vaccine x 1 dose    promethazine, 25 mg, Rectal, Q6H PRN     Review of patient's allergies indicates:  No Known Allergies  Objective:     Vital Signs (Most Recent):  Temp: 98.3 °F (36.8 °C) (09/08/24 0741)  Pulse: 78 (09/08/24 0741)  Resp: 18 (09/08/24 0741)  BP: 119/64 (09/08/24 0741)  SpO2: 98 % (09/08/24 0741) Vital Signs (24h Range):  Temp:  [98.3 °F (36.8 °C)-98.9 °F (37.2 °C)] 98.3 °F (36.8 °C)  Pulse:  [78-89] 78  Resp:  [16-20] 18  SpO2:  [91 %-98 %] 98 %  BP: (119-139)/(64-76) 119/64     Weight: 61.3 kg (135 lb 2.3 oz)  Body mass index is 22.49 kg/m².    Intake/Output - Last 3 Shifts         09/06 0700 09/07 0659 09/07 0700 09/08 0659 09/08 0700 09/09 0659    NG/GT 2557 1344 248    Total Intake(mL/kg) 2557 (41.7) 1344 (21.9) 248 (4)    Drains       Stool       Total Output       Net +2557 +1344 +248           Urine Occurrence 4 x  1 x    Stool Occurrence 1 x 1 x 1 x             Physical  Exam  Constitutional:       Appearance: She is well-developed.   HENT:      Head: Normocephalic and atraumatic.   Eyes:      Conjunctiva/sclera: Conjunctivae normal.      Pupils: Pupils are equal, round, and reactive to light.   Neck:      Thyroid: No thyromegaly.   Cardiovascular:      Rate and Rhythm: Normal rate and regular rhythm.   Pulmonary:      Effort: Pulmonary effort is normal. No respiratory distress.   Abdominal:      General: There is no distension.      Palpations: Abdomen is soft. There is no mass.      Tenderness: There is no abdominal tenderness.      Comments: J tube with TF. Wound well healed. Incision c/d/i   Musculoskeletal:         General: No tenderness. Normal range of motion.      Cervical back: Normal range of motion.   Skin:     General: Skin is warm and dry.      Capillary Refill: Capillary refill takes less than 2 seconds.   Neurological:      General: No focal deficit present.      Mental Status: She is alert and oriented to person, place, and time.          Significant Labs:  I have reviewed all pertinent lab results within the past 24 hours.  CBC:   Recent Labs   Lab 09/08/24  0546   WBC 6.42   RBC 3.38*   HGB 11.1*   HCT 35.5*   *   *   MCH 32.8*   MCHC 31.3*     CMP:   Recent Labs   Lab 09/08/24  0546      CALCIUM 8.7   ALBUMIN 2.6*   PROT 6.6      K 4.2   CO2 21*      BUN 10   CREATININE 0.7   ALKPHOS 179*   ALT 22   AST 24   BILITOT 0.2       Significant Diagnostics:  I have reviewed all pertinent imaging results/findings within the past 24 hours.

## 2024-09-08 NOTE — PLAN OF CARE
Patient is stable. No signs or symptoms of acute distress. Meds given as ordered. Incision, CDI. J-tube in place with pt tolerating feedings at goal rate of 50 ml/hr. Bed in lowest position, call light in reach. Chart orders reviewed.

## 2024-09-08 NOTE — PROGRESS NOTES
[facility]  General Surgery  Progress Note    Subjective:     History of Present Illness:  Cheryl Kirkland is a 74 y.o. year old female with a history of ovarian cancer status post surgery and chemotherapy as well as epilepsy and DVT (provoked during her treatment for ovarian cancer), and MSI high gastric adenocarcinoma.  She was originally diagnosed back in February of this past year.  She was noted to have a very proximal enlarged tumor.  EGD was done as was complete metastatic workup which was negative.  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  Due to her significant adhesive disease she understood that she would require an open operation.  She was discussed in multidisciplinary tumor board and the consensus was to proceed with immunotherapy due to her MSI high status.  She completed 5 cycles of neoadjuvant immunotherapy with Keytruda and on restaging imaging was found to have enlarged Danelle portal lymph nodes.  Consensus was that she would need a total gastrectomy due to the proximity of her tumor in relationship to the GE junction.  Due to the extent of the operation required and the her previous history of ovarian cancer the tumor board consensus was to proceed with the EUS guided biopsy to rule out any ovarian cancer and to rule out pseudo progression as well.  This was performed and showed adenocarcinoma consistent with her gastric primary.  Therefore the decision was to proceed with surgical intervention due to the mixed response she had from immunotherapy on PET-CT scan.     Risks and benefits were reviewed including bleeding, infection, pain, scar, damage to surrounding structures, cardiovascular and pulmonary complications, anastomotic leak, positive margins, need for additional procedures, death, and imponderables.  She  expressed understanding and gave informed consent to proceed.    Post-Op Info:  Procedure(s) (LRB):  GASTRECTOMY (N/A)  EGD (ESOPHAGOGASTRODUODENOSCOPY) (N/A)  LAPAROSCOPY, DIAGNOSTIC (N/A)  LYSIS, ADHESIONS (N/A)  INSERTION, JEJUNOSTOMY TUBE (N/A)   19 Days Post-Op     Interval History: no complaints today. Continues to tolerate TF    Medications:  Continuous Infusions:  Scheduled Meds:   amoxicillin-clavulanate  875.2 mg Oral Q12H    carBAMazepine  200 mg Oral BID    carBAMazepine  300 mg Oral QHS    enoxparin  40 mg Subcutaneous Daily    levETIRAcetam  500 mg Per J Tube BID    metoclopramide HCl  10 mg Oral Q8H    zonisamide  200 mg Per J Tube BID     PRN Meds:  Current Facility-Administered Medications:     dextrose 10%, 12.5 g, Intravenous, PRN    dextrose 10%, 25 g, Intravenous, PRN    glucagon (human recombinant), 1 mg, Intramuscular, PRN    hydrALAZINE, 10 mg, Intravenous, Q6H PRN    hydrocodone-apap 7.5-325 MG/15 ML, 15 mL, Per J Tube, Q4H PRN    hydrocodone-apap 7.5-325 MG/15 ML, 20 mL, Per J Tube, Q6H PRN    HYDROmorphone, 1 mg, Intravenous, Q6H PRN    loperamide, 2 mg, Oral, QID PRN    morphine, 2 mg, Intravenous, Once PRN    naloxone, 0.02 mg, Intravenous, PRN    ondansetron, 4 mg, Intravenous, Q6H PRN    pneumoc 20-faina conj-dip cr(PF), 0.5 mL, Intramuscular, vaccine x 1 dose    promethazine, 25 mg, Rectal, Q6H PRN     Review of patient's allergies indicates:  No Known Allergies  Objective:     Vital Signs (Most Recent):  Temp: 98.3 °F (36.8 °C) (09/08/24 0741)  Pulse: 78 (09/08/24 0741)  Resp: 18 (09/08/24 0741)  BP: 119/64 (09/08/24 0741)  SpO2: 98 % (09/08/24 0741) Vital Signs (24h Range):  Temp:  [98.3 °F (36.8 °C)-98.9 °F (37.2 °C)] 98.3 °F (36.8 °C)  Pulse:  [78-89] 78  Resp:  [16-20] 18  SpO2:  [91 %-98 %] 98 %  BP: (119-139)/(64-76) 119/64     Weight: 61.3 kg (135 lb 2.3 oz)  Body mass index is 22.49 kg/m².    Intake/Output - Last 3 Shifts         09/06 0700 09/07 0659 09/07 0700 09/08 0659  09/08 0700  09/09 0659    NG/GT 2557 1344 248    Total Intake(mL/kg) 2557 (41.7) 1344 (21.9) 248 (4)    Drains       Stool       Total Output       Net +2557 +1344 +248           Urine Occurrence 4 x  1 x    Stool Occurrence 1 x 1 x 1 x             Physical Exam  Constitutional:       Appearance: She is well-developed.   HENT:      Head: Normocephalic and atraumatic.   Eyes:      Conjunctiva/sclera: Conjunctivae normal.      Pupils: Pupils are equal, round, and reactive to light.   Neck:      Thyroid: No thyromegaly.   Cardiovascular:      Rate and Rhythm: Normal rate and regular rhythm.   Pulmonary:      Effort: Pulmonary effort is normal. No respiratory distress.   Abdominal:      General: There is no distension.      Palpations: Abdomen is soft. There is no mass.      Tenderness: There is no abdominal tenderness.      Comments: J tube with TF. Wound well healed. Incision c/d/i   Musculoskeletal:         General: No tenderness. Normal range of motion.      Cervical back: Normal range of motion.   Skin:     General: Skin is warm and dry.      Capillary Refill: Capillary refill takes less than 2 seconds.   Neurological:      General: No focal deficit present.      Mental Status: She is alert and oriented to person, place, and time.          Significant Labs:  I have reviewed all pertinent lab results within the past 24 hours.  CBC:   Recent Labs   Lab 09/08/24  0546   WBC 6.42   RBC 3.38*   HGB 11.1*   HCT 35.5*   *   *   MCH 32.8*   MCHC 31.3*     CMP:   Recent Labs   Lab 09/08/24  0546      CALCIUM 8.7   ALBUMIN 2.6*   PROT 6.6      K 4.2   CO2 21*      BUN 10   CREATININE 0.7   ALKPHOS 179*   ALT 22   AST 24   BILITOT 0.2       Significant Diagnostics:  I have reviewed all pertinent imaging results/findings within the past 24 hours.  Assessment/Plan:     * Gastric adenocarcinoma  s/p exlap, extensive SHAYNA, small bowel resection, total gastrectomy with stapled esophagojejunal  anastomosis and D2 lymphadenectomy, and Jtube placement    -- continue Reglan  -- continue FLD  -- tolerating TF at 50cc/h  -- continue hycet via J tube for pain   -- ok for liquid medications via J tube if needed  -- Strict I/Os  -- Senna liquid via Jtube BID   -- CM consult for discharge, pending acceptance at [facility]  -- OOB, OT/PT consulted  -- encourage IS  -- lovenox ppx  Dispo: pt agreeable to discharge to SNF. Referrals sent.       Primary hypertension  -- appreciate medicine assistance with management     Hx of Epileptic seizures  -- appreciate medicine assistance with management         Kaitlin Pena MD  General Surgery  [facility] Med S[facility]

## 2024-09-08 NOTE — PROGRESS NOTES
[facility] Medicine  Progress Note    Patient Name: Cheryl Kirkland  MRN: [id number]  Patient Class: IP- Inpatient   Admission Date: 8/20/2024  Length of Stay: 19 days  Attending Physician: Chen Jerome MD  Primary Care Provider: Gagandeep Iglesias MD        Subjective:     Principal Problem:Gastric adenocarcinoma        HPI:  74F  has a past medical history of Anemia, Chronic deep vein thrombosis (DVT) of left lower extremity, Deep vein thrombosis, Drug-induced polyneuropathy, DVT (deep venous thrombosis), Epilepsy, Gastric adenocarcinoma, GERD (gastroesophageal reflux disease), Hyperlipidemia, Mixed epithelial carcinoma of right ovary, OP (osteoporosis), Ovarian cancer, and Primary hypertension.  Presents for definitive treatment of gastric adenocaricoma per primary. Status post total gastrectomy with j tube placement. Transferred to icu postoperatively for close monitoring. Tolerated procedure well. Expected pain and weakness. Physical/occupational therapy recommending low intensity therapy.    Hospital medicine consulted for medical management.    Initial review of chart reveals vital signs stable, on room air. Cbc unremarkable. Cmp with hyperchloremic acidosis improving. Normal renal function. Elevated liver enzymes. Amylase within normal limits. Hypomag. Hypoalbuminemia. Xray abdomen on 8/20 with operative changes.    Overview/Hospital Course:  8/23 admitted for definitive management of gastric adenocarcinoma with total gastrectomy and j tube placement by primary. Upper gastroenterology procedure with no anastomotic leak identified. Possible ngt removal per primary. Physical/occupational therapy recommending low intensity therapy. Antiepileptic levels pending  8/24 antiepileptic levels pending. Ng tube removed. Reports visual disturbance but improving. Has not worked with physical/occupational therapy today.  8/25 doing well. +bowel movement x 2. pca discontinued per primary.  Trickle feeds to be increased per primary.  8/26 patient feels weak today with some shortness of breath.  She states she feels some nausea and just no energy.  She was able to participate therapy and walk in [place] with walker  8/27 patient continues to feel weak.  8/28 patient feels much better today.  No nausea no vomiting.  Has walked 3 times with the assistance today.  8/29- POD 9- developed some Abd fullness- hence Dr. Jerome switched to CLD while hold TF and cont Reglan. Getting PT/OT. Cont present care as per Dr. Jerome. Labs stable. PO4 2.5.    08/30 POD 10- reports being able to tolerate CLD. Started on TPN. Tube feeds resumed. Reports pain around J tube site, evaluation by CT imaging and General Surgery did not note any indication for intervention.   08/31 POD11- still reporting pain around J tube insertion site, started on antibiotics per General surgery.  Reports diarrhea, stool studies pending  09/01- POD 12- reports improvement in pain around J tube site.  General surgery evaluation today recommended discontinuation of peripheral nutrition.  Stool studies negative so far, diarrhea alleviating with loperamide  09/02: POD 13: improvement of erythema surrounding J tube site. Slightly tender. Tolerating TF and medications through tube. PT/OT recommends home health. She remains on full liquid diet.  09/03: POD 14:  Overnight concerns for leak/drainage through abdominal incisions. Tube feedings held.  Patient continues to report bowel movements. Increasing abdominal pain-  Erythema around J tube site continues to improve.   9/4/24: Patient underwent a CT scan abdomen with oral contrast. No essie evidence leak, fistula or abscess. +ileus. Tolerating diet and having flatus/BMs  9/5/24: Initially, pt wanted SNF due to fear of learning tube feeding and j-tube care. However, pt wants to learn and be discharged home with HH. TF infusion rep to meet with pt tomorrow.   9/6/24: No change in medical status. pt/spouse  now request SNF. CM notified.      Medically cleared for discharge. Awaiting SNF approval. Discussed with pt to allow nursing to teach J tube care. She verbalized understanding     Interval History: Ambulating in room. Awaiting SNF approval. . Tolerating diet and Tube feedings/free water, +flatus and BM. Encouraged pt to allow nursing staff to teach her J-tube and TF management      Review of Systems   Constitutional:  Positive for activity change and appetite change. Negative for chills, diaphoresis, fatigue, fever and unexpected weight change.   HENT:  Negative for congestion, nosebleeds, sinus pressure and sore throat.    Eyes:  Negative for pain, discharge and visual disturbance.   Respiratory:  Negative for cough, chest tightness, shortness of breath, wheezing and stridor.    Cardiovascular:  Negative for chest pain, palpitations and leg swelling.   Gastrointestinal:  Negative for abdominal distention, abdominal pain, blood in stool, constipation, diarrhea, nausea and vomiting.   Endocrine: Negative for cold intolerance and heat intolerance.   Genitourinary:  Negative for difficulty urinating, dysuria, flank pain, frequency and urgency.   Musculoskeletal:  Negative for arthralgias, back pain, joint swelling, myalgias, neck pain and neck stiffness.   Skin:  Positive for wound (abominal incision, j tube). Negative for rash.   Allergic/Immunologic: Negative for food allergies and immunocompromised state.   Neurological:  Positive for weakness. Negative for dizziness, seizures, syncope, facial asymmetry, speech difficulty, light-headedness, numbness and headaches.   Hematological:  Negative for adenopathy.   Psychiatric/Behavioral:  Negative for agitation, confusion and hallucinations. The patient is nervous/anxious.      Objective:     Vital Signs (Most Recent):  Temp: 98.2 °F (36.8 °C) (09/08/24 1347)  Pulse: 86 (09/08/24 1347)  Resp: 18 (09/08/24 1347)  BP: 136/80 (09/08/24 1347)  SpO2: 98 % (09/08/24 0741)  Vital Signs (24h Range):  Temp:  [98.2 °F (36.8 °C)-98.9 °F (37.2 °C)] 98.2 °F (36.8 °C)  Pulse:  [78-89] 86  Resp:  [16-20] 18  SpO2:  [91 %-98 %] 98 %  BP: (119-138)/(64-80) 136/80     Weight: 61.3 kg (135 lb 2.3 oz)  Body mass index is 22.49 kg/m².    Intake/Output Summary (Last 24 hours) at 9/8/2024 1519  Last data filed at 9/8/2024 1511  Gross per 24 hour   Intake 1534 ml   Output --   Net 1534 ml         Physical Exam  Vitals and nursing note reviewed.   Constitutional:       General: She is not in acute distress.     Appearance: She is well-developed. She is not diaphoretic.   HENT:      Head: Normocephalic and atraumatic.      Nose: Nose normal.   Eyes:      General: No scleral icterus.     Conjunctiva/sclera: Conjunctivae normal.   Neck:      Trachea: No tracheal deviation.   Cardiovascular:      Rate and Rhythm: Normal rate and regular rhythm.      Heart sounds: Normal heart sounds. No murmur heard.     No friction rub. No gallop.   Pulmonary:      Effort: Pulmonary effort is normal. No respiratory distress.      Breath sounds: Normal breath sounds. No stridor. No wheezing or rales.   Chest:      Chest wall: No tenderness.   Abdominal:      General: Bowel sounds are normal. There is no distension.      Palpations: Abdomen is soft. There is no mass.      Tenderness: There is abdominal tenderness (expected post op TTP). There is no guarding or rebound.      Comments: J tube in place- erythema resolving at J tube site. No drainage.   Large midline abdominal incision -Upper portion of the wound with scant purulent drainage with dressing C/D/I   Musculoskeletal:         General: No tenderness or deformity. Normal range of motion.      Cervical back: Normal range of motion and neck supple.   Skin:     General: Skin is warm and dry.      Coloration: Skin is not pale.      Findings: No erythema or rash.   Neurological:      Mental Status: She is alert and oriented to person, place, and time.      Cranial Nerves:  "No cranial nerve deficit.      Motor: No abnormal muscle tone.      Coordination: Coordination normal.   Psychiatric:         Behavior: Behavior normal.         Thought Content: Thought content normal.             Significant Labs: All pertinent labs within the past 24 hours have been reviewed.    Significant Imaging: I have reviewed all pertinent imaging results/findings within the past 24 hours.    Assessment/Plan:      * Gastric adenocarcinoma  Status post total gastrectomy with J tube placement  Repeat CT abd showed no abscess, leak -mild ileus.    Pt passing flatus and BMs  General surgery following-diet advanced  Transition to po abx  PT/OT  SNF placement pending     Jejunostomy tube present  See "Gastric adenocarcinoma "   Pt has poor appetite -will need to use tube feeding via J tube until tolerating enough food and liquids po to sustain her nutrition         Moderate protein-calorie malnutrition  Nutrition consulted. Most recent weight and BMI monitored-     Measurements:  Wt Readings from Last 1 Encounters:   08/22/24 61.3 kg (135 lb 2.3 oz)   Body mass index is 22.49 kg/m².    Patient has been screened and assessed by RD.    Malnutrition Type:  Context: chronic illness  Level: moderate    Malnutrition Characteristic Summary:  Subcutaneous Fat (Malnutrition): moderate depletion  Muscle Mass (Malnutrition): mild depletion    Interventions/Recommendations (treatment strategy):  1. Initate pt onto continuous Enteral nutrition, recommend Impact Peptide 1.5 via J tube, goal rate 50 mL/hr, starting at 10 mL/hr then progress to goal within 24 hrs if pt is tolerating or per MD/NP -Formula at goal rate provides: 1800 kcals/day (102% EEN), 113 g protein/day (100% EPN), 924 mL free formula water/day (52% fluid needs) -140 mL q4h free water flushes (840 mL/day) = total from formula + FWF = 1764 mL water/day (100% fluid needs), per MD/NP -Check Mg, K+, Na, Phos and Glu before and during initiation, correct as indicated " 2. Weigh twice weekly      Primary hypertension  Chronic, controlled. Latest blood pressure and vitals reviewed-     Temp:  [98.4 °F (36.9 °C)-99.4 °F (37.4 °C)]   Pulse:  [76-92]   Resp:  [16-20]   BP: (112-131)/(59-69)   SpO2:  [94 %-98 %] .   Home meds for hypertension were reviewed and noted below.       While in the hospital, will manage blood pressure as follows; Adjust home antihypertensive regimen as follows- monitor, avoid aggressive management of blood pressure in setting of recent surgery     Will utilize p.r.n. blood pressure medication only if patient's blood pressure greater than 160/100 and she develops symptoms such as worsening chest pain or shortness of breath.    Chronic deep vein thrombosis (DVT) of left lower extremity   -Previously on Xarelto, and evaluation by Oncology noted concerns DVT likely secondary to being provoked  from ovarian cancer. Held after concerns for GI bleed early 2024      Scds and lovenox for prophy.    DVT (deep venous thrombosis)  -Initially presented to primary care provider with lower extremity edema   -Workup revealed dvt 2/2 cancer  -Previously on Xarelto, and evaluation by Oncology noted concerns DVT likely secondary to being provoked from ovarian cancer. Held after concerns for GI bleed early 2024    PLAN:  On Lovenox ppx and scds  Ambulate     Hx of Epileptic seizures  Resume home meds   Carbamezapine in therapeutic range  Zonisamide within normal limits   Levetiracetam within normal limits    stable      VTE Risk Mitigation (From admission, onward)           Ordered     enoxaparin injection 40 mg  Daily         08/20/24 2219     IP VTE HIGH RISK PATIENT  Once         08/20/24 2219     Place sequential compression device  Until discontinued         08/20/24 2219                    Discharge Planning   SOFIYA:      Code Status: Full Code   Is the patient medically ready for discharge?:     Reason for patient still in hospital (select all that apply): Patient trending  condition  Discharge Plan A: Skilled Nursing Facility                  Vicky Phoenix NP  Department of Hospital Medicine   [facility]

## 2024-09-09 LAB
ALBUMIN SERPL BCP-MCNC: 2.6 G/DL (ref 3.5–5.2)
ALP SERPL-CCNC: 159 U/L (ref 55–135)
ALT SERPL W/O P-5'-P-CCNC: 16 U/L (ref 10–44)
ANION GAP SERPL CALC-SCNC: 11 MMOL/L (ref 8–16)
AST SERPL-CCNC: 16 U/L (ref 10–40)
BILIRUB SERPL-MCNC: 0.2 MG/DL (ref 0.1–1)
BUN SERPL-MCNC: 10 MG/DL (ref 8–23)
CALCIUM SERPL-MCNC: 8.6 MG/DL (ref 8.7–10.5)
CHLORIDE SERPL-SCNC: 108 MMOL/L (ref 95–110)
CO2 SERPL-SCNC: 21 MMOL/L (ref 23–29)
CREAT SERPL-MCNC: 0.7 MG/DL (ref 0.5–1.4)
ERYTHROCYTE [DISTWIDTH] IN BLOOD BY AUTOMATED COUNT: 15.7 % (ref 11.5–14.5)
EST. GFR  (NO RACE VARIABLE): >60 ML/MIN/1.73 M^2
GLUCOSE SERPL-MCNC: 99 MG/DL (ref 70–110)
HCT VFR BLD AUTO: 35.3 % (ref 37–48.5)
HGB BLD-MCNC: 10.8 G/DL (ref 12–16)
MAGNESIUM SERPL-MCNC: 2.1 MG/DL (ref 1.6–2.6)
MCH RBC QN AUTO: 33.1 PG (ref 27–31)
MCHC RBC AUTO-ENTMCNC: 30.6 G/DL (ref 32–36)
MCV RBC AUTO: 108 FL (ref 82–98)
PHOSPHATE SERPL-MCNC: 2.1 MG/DL (ref 2.7–4.5)
PLATELET # BLD AUTO: 746 K/UL (ref 150–450)
PMV BLD AUTO: 8.1 FL (ref 9.2–12.9)
POTASSIUM SERPL-SCNC: 4 MMOL/L (ref 3.5–5.1)
PROT SERPL-MCNC: 6.4 G/DL (ref 6–8.4)
RBC # BLD AUTO: 3.26 M/UL (ref 4–5.4)
SODIUM SERPL-SCNC: 140 MMOL/L (ref 136–145)
WBC # BLD AUTO: 6.29 K/UL (ref 3.9–12.7)

## 2024-09-09 PROCEDURE — 85027 COMPLETE CBC AUTOMATED: CPT | Performed by: NURSE PRACTITIONER

## 2024-09-09 PROCEDURE — 25000003 PHARM REV CODE 250: Performed by: SURGERY

## 2024-09-09 PROCEDURE — 80053 COMPREHEN METABOLIC PANEL: CPT | Performed by: SURGERY

## 2024-09-09 PROCEDURE — 84100 ASSAY OF PHOSPHORUS: CPT | Performed by: SURGERY

## 2024-09-09 PROCEDURE — 63600175 PHARM REV CODE 636 W HCPCS: Performed by: NURSE PRACTITIONER

## 2024-09-09 PROCEDURE — 11000001 HC ACUTE MED/SURG PRIVATE ROOM

## 2024-09-09 PROCEDURE — 36415 COLL VENOUS BLD VENIPUNCTURE: CPT | Performed by: SURGERY

## 2024-09-09 PROCEDURE — 25000003 PHARM REV CODE 250: Performed by: STUDENT IN AN ORGANIZED HEALTH CARE EDUCATION/TRAINING PROGRAM

## 2024-09-09 PROCEDURE — 83735 ASSAY OF MAGNESIUM: CPT | Performed by: SURGERY

## 2024-09-09 RX ADMIN — ZONISAMIDE 200 MG: 100 SUSPENSION ORAL at 08:09

## 2024-09-09 RX ADMIN — LEVETIRACETAM 500 MG: 100 SOLUTION ORAL at 08:09

## 2024-09-09 RX ADMIN — AMOXICILLIN AND CLAVULANATE POTASSIUM 875.2 MG: 400; 57 POWDER, FOR SUSPENSION ORAL at 09:09

## 2024-09-09 RX ADMIN — CARBAMAZEPINE 200 MG: 100 SUSPENSION ORAL at 06:09

## 2024-09-09 RX ADMIN — METOCLOPRAMIDE HYDROCHLORIDE 10 MG: 5 SOLUTION ORAL at 09:09

## 2024-09-09 RX ADMIN — METOCLOPRAMIDE HYDROCHLORIDE 10 MG: 5 SOLUTION ORAL at 02:09

## 2024-09-09 RX ADMIN — ZONISAMIDE 200 MG: 100 SUSPENSION ORAL at 09:09

## 2024-09-09 RX ADMIN — CARBAMAZEPINE 200 MG: 100 SUSPENSION ORAL at 02:09

## 2024-09-09 RX ADMIN — AMOXICILLIN AND CLAVULANATE POTASSIUM 875.2 MG: 400; 57 POWDER, FOR SUSPENSION ORAL at 08:09

## 2024-09-09 RX ADMIN — LEVETIRACETAM 500 MG: 100 SOLUTION ORAL at 09:09

## 2024-09-09 RX ADMIN — HYDROCODONE BITARTRATE AND ACETAMINOPHEN 15 ML: 7.5; 325 SOLUTION ORAL at 05:09

## 2024-09-09 RX ADMIN — ENOXAPARIN SODIUM 40 MG: 40 INJECTION SUBCUTANEOUS at 05:09

## 2024-09-09 RX ADMIN — CARBAMAZEPINE 300 MG: 100 SUSPENSION ORAL at 09:09

## 2024-09-09 RX ADMIN — HYDROCODONE BITARTRATE AND ACETAMINOPHEN 20 ML: 7.5; 325 SOLUTION ORAL at 03:09

## 2024-09-09 RX ADMIN — METOCLOPRAMIDE HYDROCHLORIDE 10 MG: 5 SOLUTION ORAL at 05:09

## 2024-09-09 NOTE — PLAN OF CARE
09/09/24 0854   Rounds   Attendance Provider;;Charge nurse;Physical therapist   Discharge Plan A Skilled Nursing Facility   Why the patient remains in the hospital Requires continued medical care   Transition of Care Barriers None       LUPE spoke with Socorro at [facility] who stated they are submitting for auth. Pending auth.

## 2024-09-09 NOTE — PLAN OF CARE
MD and LUPE had an long conversation with pt regarding the safest and adequate dc plan. Pt is now agreeable to go home with [facility] and [facility] with the help of her sister and spouse. Rebecca with [facility] will work on getting pt's feedings set up. Pending approval.

## 2024-09-09 NOTE — PROGRESS NOTES
[facility] Surg  General Surgery  Progress Note    Subjective:     History of Present Illness:  Cheryl Kirkland is a 74 y.o. year old female with a history of ovarian cancer status post surgery and chemotherapy as well as epilepsy and DVT (provoked during her treatment for ovarian cancer), and MSI high gastric adenocarcinoma.  She was originally diagnosed back in February of this past year.  She was noted to have a very proximal enlarged tumor.  EGD was done as was complete metastatic workup which was negative.  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  Due to her significant adhesive disease she understood that she would require an open operation.  She was discussed in multidisciplinary tumor board and the consensus was to proceed with immunotherapy due to her MSI high status.  She completed 5 cycles of neoadjuvant immunotherapy with Keytruda and on restaging imaging was found to have enlarged Danelle portal lymph nodes.  Consensus was that she would need a total gastrectomy due to the proximity of her tumor in relationship to the GE junction.  Due to the extent of the operation required and the her previous history of ovarian cancer the tumor board consensus was to proceed with the EUS guided biopsy to rule out any ovarian cancer and to rule out pseudo progression as well.  This was performed and showed adenocarcinoma consistent with her gastric primary.  Therefore the decision was to proceed with surgical intervention due to the mixed response she had from immunotherapy on PET-CT scan.     Risks and benefits were reviewed including bleeding, infection, pain, scar, damage to surrounding structures, cardiovascular and pulmonary complications, anastomotic leak, positive margins, need for additional procedures, death, and imponderables.  She  expressed understanding and gave informed consent to proceed.    Post-Op Info:  Procedure(s) (LRB):  GASTRECTOMY (N/A)  EGD (ESOPHAGOGASTRODUODENOSCOPY) (N/A)  LAPAROSCOPY, DIAGNOSTIC (N/A)  LYSIS, ADHESIONS (N/A)  INSERTION, JEJUNOSTOMY TUBE (N/A)   20 Days Post-Op     Interval History: AFVSS.  FRANCY.  Tolerating TF @goal.     Medications:  Continuous Infusions:  Scheduled Meds:   amoxicillin-clavulanate  875.2 mg Oral Q12H    carBAMazepine  200 mg Oral BID    carBAMazepine  300 mg Oral QHS    enoxparin  40 mg Subcutaneous Daily    levETIRAcetam  500 mg Per J Tube BID    metoclopramide HCl  10 mg Oral Q8H    zonisamide  200 mg Per J Tube BID     PRN Meds:  Current Facility-Administered Medications:     dextrose 10%, 12.5 g, Intravenous, PRN    dextrose 10%, 25 g, Intravenous, PRN    glucagon (human recombinant), 1 mg, Intramuscular, PRN    hydrALAZINE, 10 mg, Intravenous, Q6H PRN    hydrocodone-apap 7.5-325 MG/15 ML, 15 mL, Per J Tube, Q4H PRN    hydrocodone-apap 7.5-325 MG/15 ML, 20 mL, Per J Tube, Q6H PRN    HYDROmorphone, 1 mg, Intravenous, Q6H PRN    loperamide, 2 mg, Oral, QID PRN    morphine, 2 mg, Intravenous, Once PRN    naloxone, 0.02 mg, Intravenous, PRN    ondansetron, 4 mg, Intravenous, Q6H PRN    pneumoc 20-faina conj-dip cr(PF), 0.5 mL, Intramuscular, vaccine x 1 dose    promethazine, 25 mg, Rectal, Q6H PRN     Review of patient's allergies indicates:  No Known Allergies  Objective:     Vital Signs (Most Recent):  Temp: 98.9 °F (37.2 °C) (09/09/24 1134)  Pulse: 79 (09/09/24 1134)  Resp: 16 (09/09/24 1134)  BP: 125/76 (09/09/24 1134)  SpO2: 98 % (09/09/24 1134) Vital Signs (24h Range):  Temp:  [97.9 °F (36.6 °C)-98.9 °F (37.2 °C)] 98.9 °F (37.2 °C)  Pulse:  [71-86] 79  Resp:  [16-20] 16  SpO2:  [96 %-98 %] 98 %  BP: (105-137)/(59-80) 125/76     Weight: 60.4 kg (133 lb 2.5 oz)  Body mass index is 22.16 kg/m².    Intake/Output - Last 3 Shifts         09/07 0700 09/08 0659 09/08 0700 09/09 0659 09/09  0700  09/10 0659    NG/GT 1344 1814     TPN  2133.5     Total Intake(mL/kg) 1344 (21.9) 3947.5 (65.4)     Drains  0     Total Output  0     Net +1344 +3947.5            Urine Occurrence  5 x     Stool Occurrence 1 x 3 x              Physical Exam  Constitutional:       General: She is not in acute distress.     Appearance: Normal appearance. She is not ill-appearing.   HENT:      Head: Normocephalic and atraumatic.   Eyes:      Extraocular Movements: Extraocular movements intact.      Conjunctiva/sclera: Conjunctivae normal.   Cardiovascular:      Rate and Rhythm: Normal rate and regular rhythm.   Pulmonary:      Effort: Pulmonary effort is normal.   Abdominal:      General: Abdomen is flat. There is no distension.      Palpations: Abdomen is soft. There is no mass.      Hernia: No hernia is present.      Comments: Jtube site clean, dressing in place along superior aspect of incision    Musculoskeletal:         General: No swelling, tenderness, deformity or signs of injury. Normal range of motion.   Skin:     General: Skin is warm and dry.      Coloration: Skin is not jaundiced.   Neurological:      General: No focal deficit present.      Mental Status: She is alert and oriented to person, place, and time.   Psychiatric:         Mood and Affect: Mood normal.         Behavior: Behavior normal.         Thought Content: Thought content normal.          Significant Labs:  I have reviewed all pertinent lab results within the past 24 hours.  CBC:   Recent Labs   Lab 09/09/24  0553   WBC 6.29   RBC 3.26*   HGB 10.8*   HCT 35.3*   *   *   MCH 33.1*   MCHC 30.6*     BMP:   Recent Labs   Lab 09/09/24  0553   GLU 99      K 4.0      CO2 21*   BUN 10   CREATININE 0.7   CALCIUM 8.6*   MG 2.1       Significant Diagnostics:  I have reviewed all pertinent imaging results/findings within the past 24 hours.  Assessment/Plan:     * Gastric adenocarcinoma  s/p exlap, extensive SHAYNA, small bowel resection, total  gastrectomy with stapled esophagojejunal anastomosis and D2 lymphadenectomy, and Jtube placement    -- continue Reglan  -- continue FLD  -- tolerating TF at 50cc/h  -- continue hycet via J tube for pain   -- ok for liquid medications via J tube if needed  -- Strict I/Os  -- Senna liquid via Jtube BID   -- CM consult for discharge, now planning on home with family member.  Nursing to start Jtube education with pt and sister (Erica)  -- OOB, OT/PT consulted  -- encourage IS  -- lovenox ppx  Dispo: plan for d/c home with sister Erica tomorrow       Primary hypertension  -- appreciate medicine assistance with management     Hx of Epileptic seizures  -- appreciate medicine assistance with management         Chen Jerome MD  General Surgery  [facility] - Med Surg

## 2024-09-09 NOTE — SUBJECTIVE & OBJECTIVE
Interval History: AFVSS.  FRANCY.  Tolerating TF @goal.     Medications:  Continuous Infusions:  Scheduled Meds:   amoxicillin-clavulanate  875.2 mg Oral Q12H    carBAMazepine  200 mg Oral BID    carBAMazepine  300 mg Oral QHS    enoxparin  40 mg Subcutaneous Daily    levETIRAcetam  500 mg Per J Tube BID    metoclopramide HCl  10 mg Oral Q8H    zonisamide  200 mg Per J Tube BID     PRN Meds:  Current Facility-Administered Medications:     dextrose 10%, 12.5 g, Intravenous, PRN    dextrose 10%, 25 g, Intravenous, PRN    glucagon (human recombinant), 1 mg, Intramuscular, PRN    hydrALAZINE, 10 mg, Intravenous, Q6H PRN    hydrocodone-apap 7.5-325 MG/15 ML, 15 mL, Per J Tube, Q4H PRN    hydrocodone-apap 7.5-325 MG/15 ML, 20 mL, Per J Tube, Q6H PRN    HYDROmorphone, 1 mg, Intravenous, Q6H PRN    loperamide, 2 mg, Oral, QID PRN    morphine, 2 mg, Intravenous, Once PRN    naloxone, 0.02 mg, Intravenous, PRN    ondansetron, 4 mg, Intravenous, Q6H PRN    pneumoc 20-faina conj-dip cr(PF), 0.5 mL, Intramuscular, vaccine x 1 dose    promethazine, 25 mg, Rectal, Q6H PRN     Review of patient's allergies indicates:  No Known Allergies  Objective:     Vital Signs (Most Recent):  Temp: 98.9 °F (37.2 °C) (09/09/24 1134)  Pulse: 79 (09/09/24 1134)  Resp: 16 (09/09/24 1134)  BP: 125/76 (09/09/24 1134)  SpO2: 98 % (09/09/24 1134) Vital Signs (24h Range):  Temp:  [97.9 °F (36.6 °C)-98.9 °F (37.2 °C)] 98.9 °F (37.2 °C)  Pulse:  [71-86] 79  Resp:  [16-20] 16  SpO2:  [96 %-98 %] 98 %  BP: (105-137)/(59-80) 125/76     Weight: 60.4 kg (133 lb 2.5 oz)  Body mass index is 22.16 kg/m².    Intake/Output - Last 3 Shifts         09/07 0700 09/08 0659 09/08 0700 09/09 0659 09/09 0700  09/10 0659    NG/GT 1344 1814     TPN  2133.5     Total Intake(mL/kg) 1344 (21.9) 3947.5 (65.4)     Drains  0     Total Output  0     Net +1344 +3947.5            Urine Occurrence  5 x     Stool Occurrence 1 x 3 x              Physical Exam  Constitutional:        General: She is not in acute distress.     Appearance: Normal appearance. She is not ill-appearing.   HENT:      Head: Normocephalic and atraumatic.   Eyes:      Extraocular Movements: Extraocular movements intact.      Conjunctiva/sclera: Conjunctivae normal.   Cardiovascular:      Rate and Rhythm: Normal rate and regular rhythm.   Pulmonary:      Effort: Pulmonary effort is normal.   Abdominal:      General: Abdomen is flat. There is no distension.      Palpations: Abdomen is soft. There is no mass.      Hernia: No hernia is present.      Comments: Jtube site clean, dressing in place along superior aspect of incision    Musculoskeletal:         General: No swelling, tenderness, deformity or signs of injury. Normal range of motion.   Skin:     General: Skin is warm and dry.      Coloration: Skin is not jaundiced.   Neurological:      General: No focal deficit present.      Mental Status: She is alert and oriented to person, place, and time.   Psychiatric:         Mood and Affect: Mood normal.         Behavior: Behavior normal.         Thought Content: Thought content normal.          Significant Labs:  I have reviewed all pertinent lab results within the past 24 hours.  CBC:   Recent Labs   Lab 09/09/24  0553   WBC 6.29   RBC 3.26*   HGB 10.8*   HCT 35.3*   *   *   MCH 33.1*   MCHC 30.6*     BMP:   Recent Labs   Lab 09/09/24  0553   GLU 99      K 4.0      CO2 21*   BUN 10   CREATININE 0.7   CALCIUM 8.6*   MG 2.1       Significant Diagnostics:  I have reviewed all pertinent imaging results/findings within the past 24 hours.

## 2024-09-10 VITALS
HEIGHT: 65 IN | WEIGHT: 133.19 LBS | BODY MASS INDEX: 22.19 KG/M2 | OXYGEN SATURATION: 96 % | TEMPERATURE: 98 F | DIASTOLIC BLOOD PRESSURE: 78 MMHG | HEART RATE: 83 BPM | SYSTOLIC BLOOD PRESSURE: 134 MMHG | RESPIRATION RATE: 18 BRPM

## 2024-09-10 LAB
ALBUMIN SERPL BCP-MCNC: 2.9 G/DL (ref 3.5–5.2)
ALP SERPL-CCNC: 166 U/L (ref 55–135)
ALT SERPL W/O P-5'-P-CCNC: 17 U/L (ref 10–44)
ANION GAP SERPL CALC-SCNC: 11 MMOL/L (ref 8–16)
AST SERPL-CCNC: 12 U/L (ref 10–40)
BILIRUB SERPL-MCNC: 0.1 MG/DL (ref 0.1–1)
BUN SERPL-MCNC: 11 MG/DL (ref 8–23)
CALCIUM SERPL-MCNC: 8.8 MG/DL (ref 8.7–10.5)
CHLORIDE SERPL-SCNC: 107 MMOL/L (ref 95–110)
CO2 SERPL-SCNC: 19 MMOL/L (ref 23–29)
CREAT SERPL-MCNC: 0.7 MG/DL (ref 0.5–1.4)
ERYTHROCYTE [DISTWIDTH] IN BLOOD BY AUTOMATED COUNT: 15.4 % (ref 11.5–14.5)
EST. GFR  (NO RACE VARIABLE): >60 ML/MIN/1.73 M^2
GLUCOSE SERPL-MCNC: 109 MG/DL (ref 70–110)
HCT VFR BLD AUTO: 35.4 % (ref 37–48.5)
HGB BLD-MCNC: 11.1 G/DL (ref 12–16)
MAGNESIUM SERPL-MCNC: 1.9 MG/DL (ref 1.6–2.6)
MCH RBC QN AUTO: 32.6 PG (ref 27–31)
MCHC RBC AUTO-ENTMCNC: 31.4 G/DL (ref 32–36)
MCV RBC AUTO: 104 FL (ref 82–98)
PHOSPHATE SERPL-MCNC: 1.9 MG/DL (ref 2.7–4.5)
PLATELET # BLD AUTO: 700 K/UL (ref 150–450)
PMV BLD AUTO: 7.9 FL (ref 9.2–12.9)
POTASSIUM SERPL-SCNC: 4.1 MMOL/L (ref 3.5–5.1)
PROT SERPL-MCNC: 6.8 G/DL (ref 6–8.4)
RBC # BLD AUTO: 3.4 M/UL (ref 4–5.4)
SODIUM SERPL-SCNC: 137 MMOL/L (ref 136–145)
TRIGL SERPL-MCNC: 203 MG/DL (ref 30–150)
WBC # BLD AUTO: 6.29 K/UL (ref 3.9–12.7)

## 2024-09-10 PROCEDURE — 85027 COMPLETE CBC AUTOMATED: CPT | Performed by: NURSE PRACTITIONER

## 2024-09-10 PROCEDURE — 25000003 PHARM REV CODE 250: Performed by: SURGERY

## 2024-09-10 PROCEDURE — 99900035 HC TECH TIME PER 15 MIN (STAT)

## 2024-09-10 PROCEDURE — 84478 ASSAY OF TRIGLYCERIDES: CPT | Performed by: SURGERY

## 2024-09-10 PROCEDURE — 80053 COMPREHEN METABOLIC PANEL: CPT | Performed by: SURGERY

## 2024-09-10 PROCEDURE — 36415 COLL VENOUS BLD VENIPUNCTURE: CPT | Performed by: SURGERY

## 2024-09-10 PROCEDURE — 84100 ASSAY OF PHOSPHORUS: CPT | Performed by: SURGERY

## 2024-09-10 PROCEDURE — 83735 ASSAY OF MAGNESIUM: CPT | Performed by: SURGERY

## 2024-09-10 PROCEDURE — 25000003 PHARM REV CODE 250: Performed by: STUDENT IN AN ORGANIZED HEALTH CARE EDUCATION/TRAINING PROGRAM

## 2024-09-10 RX ORDER — AMOXICILLIN AND CLAVULANATE POTASSIUM 400; 57 MG/5ML; MG/5ML
875 POWDER, FOR SUSPENSION ORAL EVERY 12 HOURS
Qty: 200 ML | Refills: 0 | Status: SHIPPED | OUTPATIENT
Start: 2024-09-10 | End: 2024-09-19

## 2024-09-10 RX ORDER — HYDROCODONE BITARTRATE AND ACETAMINOPHEN 7.5; 325 MG/15ML; MG/15ML
15 SOLUTION ORAL EVERY 8 HOURS PRN
Qty: 118 ML | Refills: 0 | Status: SHIPPED | OUTPATIENT
Start: 2024-09-10 | End: 2024-09-17 | Stop reason: DRUGHIGH

## 2024-09-10 RX ADMIN — METOCLOPRAMIDE HYDROCHLORIDE 10 MG: 5 SOLUTION ORAL at 06:09

## 2024-09-10 RX ADMIN — HYDROCODONE BITARTRATE AND ACETAMINOPHEN 20 ML: 7.5; 325 SOLUTION ORAL at 12:09

## 2024-09-10 RX ADMIN — CARBAMAZEPINE 200 MG: 100 SUSPENSION ORAL at 05:09

## 2024-09-10 RX ADMIN — LEVETIRACETAM 500 MG: 100 SOLUTION ORAL at 08:09

## 2024-09-10 RX ADMIN — AMOXICILLIN AND CLAVULANATE POTASSIUM 875.2 MG: 400; 57 POWDER, FOR SUSPENSION ORAL at 08:09

## 2024-09-10 RX ADMIN — ZONISAMIDE 200 MG: 100 SUSPENSION ORAL at 08:09

## 2024-09-10 NOTE — DISCHARGE INSTRUCTIONS
Discharge Instructions: Caring for Your Jejunostomy Tube (J-Tube)  You have been discharged with a feeding tube called a jejunostomy tube (J-tube or jejunal tube). The J-tube was put through your skin and into your small bowel (jejunum). This allows for feeding directly into your small bowel. Your feeding tube was put in because you are not able to take in enough food or drink through your mouth to keep your normal body weight. You were shown how to care for your J-tube in the hospital. This sheet helps you remember the steps when youre at home.    Feeding tube instructions  The name of my feeding supplement/formula is:  ______________________________________  Amount per feeding:  _____________________  Times per day: __________________________  Amount of water used to flush tube:  ______________________________________  My healthcare provider's name and phone number are:  ______________________________________    General Care Instructions    Clean the J tube site every day with soap and water. Dont allow the J tube site to be submerged in bath water for one to two weeks after surgery. Baths are fine, but the water level must be below the J tube site. Remember not to rotate or excessively manipulate these tubes.  Flush the tube daily and before and after any medications.  Oral care is important even if foods or medications arent taken orally.  Frequent toothbrushing and rinsing your mouth several times a day can be helpful.    Its normal for there to be some drainage after the procedure thats blood-tinged, clear, or cream-colored. Your care team may use a dressing to absorb the drainage. Dressings will be used only in the initial days of post op, if drainage occurs.  Be sure to change the dressing as often as needed to keep the site dry, as wet and soiled dressings can cause the skin to breakdown around the stoma site.  Some redness around the stoma is normal and should go away in about three days.      Guidelines for continuous tube feeding  Make arrangements for a special feeding pump to be delivered to your home.  Check with your healthcare provider before putting any medicine into a J-tube. Only certain medicines can be put through a J-tube, and there will be specific instructions on how to do this safely and correctly.  Keep tension off the tubing by taping it up onto your belly (abdomen).    Clean around the tube  Wash your hands thoroughly with mild soap and clean, running water before starting your feeding.  Clean the area around the tube with mild soap and water.  Pat the area dry using a clean washcloth.    Get ready  Gather the supplies you will need. These include formula, water, feeding bag, pump, and a 30 mL to 60 mL syringe.  Close the clamp on the feeding bag tubing.  Slowly pour the formula into the feeding bag. Use the prescribed amount of feeding.  Hang the feeding bag on the pole about 1 to 2 feet above your head.    Begin feeding  Open the clamp and let the formula fill the entire tubing, clearing any air.  Close the clamp.  Connect the feeding bag tubing to the pump. Adjust the settings on the pump.  Using the syringe, flush the J-tube with the prescribed amount of water.  Connect the tubing of the feeding bag to the J-tube.  Open the clamp. Start the pump.    After the feeding  When the feeding is finished, stop the infusion and flush the J-tube with the prescribed amount of water.  Stop the feeding once each day to clean the bag. Wash the feeding bag with soapy water.  Rinse the bag thoroughly so that there is no soapy film in the bag. Hang the bag to dry.  Flush the J-tube with the prescribed amount of water every 4 to 6 hours through the flush port. If there is no flush port, then stop the pump, disconnect the feeding bag tubing, and flush the J-tube. Flushing is important because J-tubes can clog.    Follow-up  Follow up with your healthcare provider as advised.    When to call your  healthcare provider  Call your healthcare provider right away or get medical care right away if you have any of the following:  A tube that is clogged or dislodged  Belly pain that gets worse  Vomiting  Fever of  100.4° F ( 38° C) or higher, or as advised by your provider  Chills  Diarrhea that lasts more than  2 days  Signs of infection (redness, swelling, or warmth at the tube site)  Drainage from the tube site  Weight loss of  2 or more pounds in  24 hours  Decreased urination    -- No heavy lifting (nothing >10-15 lbs) for 4-6 weeks.   -- No driving while taking pain medications   -- Ok to shower with soap and water, no scrubbing incision.  No lotions or ointments to incisions.    -- No tub baths, swimming or submerging underwater for 4 weeks or until wound completely healed  -- Ok for Tylenol 1gram (2 extra strength Tylenol) every 8 hours and Ibuprofen 800mg every 8 hours alternating for pain.  Hycet for pain unrelieved by over the counter medications.

## 2024-09-10 NOTE — PLAN OF CARE
[facility]  Discharge Final Note    Primary Care Provider: Gagandeep Iglesias MD    Expected Discharge Date: 9/10/2024    Final Discharge Note (most recent)       Final Note - 09/10/24 1431          Final Note    Assessment Type Final Discharge Note     Anticipated Discharge Disposition Home-Health Care Svc        Post-Acute Status    Post-Acute Authorization IV Infusion;Home Health     Home Health Status Set-up Complete/Auth obtained     Coverage [employer]     IV Infusion Status Set-up Complete/Auth obtained                              Contact Info       [facility]   Specialty: Home Health Services, Home Therapy Services, Home Living Aide Services    [address] [facility] [address]   Phone: [phone]       Next Steps: Follow up    [facility]    [address] [place] [id number]       Next Steps: Follow up        Pt is going home with [facility] and [facility]. SW provided pt with an book of sitter and nursing resources.

## 2024-09-10 NOTE — PLAN OF CARE
Bed locked, in lowest position. Call bell in reach. Purposeful rounding done. Wound care complete. Chart check complete. Will continue with plan of care.

## 2024-09-10 NOTE — PLAN OF CARE
Discharge education given. Patient verbalized an understanding. IV removed, catheter intact. Patient to be discharged with Rx meds, tube feedings supplies, nutritional supplements and personal belongings. Patient  present to bring her home.

## 2024-09-10 NOTE — DISCHARGE SUMMARY
[facility] Surg  General Surgery  Discharge Summary      Patient Name: Cheryl Kirkland  MRN: [id number]  Admission Date: 8/20/2024  Hospital Length of Stay: 21 days  Discharge Date and Time:  09/10/2024 1:23 PM  Attending Physician: Chen Jerome MD   Discharging Provider: Chen Jerome MD  Primary Care Provider: Gagandeep Iglesias MD    HPI:   Cehryl Kirkland is a 74 y.o. year old female with a history of ovarian cancer status post surgery and chemotherapy as well as epilepsy and DVT (provoked during her treatment for ovarian cancer), and MSI high gastric adenocarcinoma.  She was originally diagnosed back in February of this past year.  She was noted to have a very proximal enlarged tumor.  EGD was done as was complete metastatic workup which was negative.  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  Due to her significant adhesive disease she understood that she would require an open operation.  She was discussed in multidisciplinary tumor board and the consensus was to proceed with immunotherapy due to her MSI high status.  She completed 5 cycles of neoadjuvant immunotherapy with Keytruda and on restaging imaging was found to have enlarged Danelle portal lymph nodes.  Consensus was that she would need a total gastrectomy due to the proximity of her tumor in relationship to the GE junction.  Due to the extent of the operation required and the her previous history of ovarian cancer the tumor board consensus was to proceed with the EUS guided biopsy to rule out any ovarian cancer and to rule out pseudo progression as well.  This was performed and showed adenocarcinoma consistent with her gastric primary.  Therefore the decision was to proceed with surgical intervention due to the mixed response she had from immunotherapy on PET-CT  scan.     Risks and benefits were reviewed including bleeding, infection, pain, scar, damage to surrounding structures, cardiovascular and pulmonary complications, anastomotic leak, positive margins, need for additional procedures, death, and imponderables.  She expressed understanding and gave informed consent to proceed.    Procedure(s) (LRB):  GASTRECTOMY (N/A)  EGD (ESOPHAGOGASTRODUODENOSCOPY) (N/A)  LAPAROSCOPY, DIAGNOSTIC (N/A)  LYSIS, ADHESIONS (N/A)  INSERTION, JEJUNOSTOMY TUBE (N/A)      Indwelling Lines/Drains at time of discharge:   Lines/Drains/Airways       Drain  Duration                  Gastrostomy/Enterostomy 08/20/24 2009 Jejunostomy tube LUQ feeding 20 days                  Hospital Course: 8/21/2024 POD #1-- AFVSS.  Doing fine.  Trickle tube feeds started.  Pain control an issue. Serum and drain amylases checked and elevated    8/22/2024:  POD #2- AFVSS.  Doing well.  Tolerating trickle tube feeds.  Pain control improving.  APOLINAR drains still serosang. Transfer to floor orders placed.     8/23/2024:  POD #3- AFVSS.  UGI negative for leak and NGT removed.  APOLINAR drain amylases decreased to normal.  Walked 4x    8/24/2024:  POD #4- aFVSS.  Feeling more tired and pain today.  More distended.  Left NPO.  KUB obtained and subsequent CT scan obtained which did not show any extraluminal contrast.  Pt w/ BM that evening    8/25/2024 : POD #5- doing well.  PCA stopped and hycet via J tube ordered.  +bowel movement x 2. Started on sips of CLD and began advancing J tube feeds slowly.      08/26/2024:  POD #6- feeling weak with some shortness of breath.  She states she feels some nausea and just no energy.  She was able to participate therapy and walk in [place] with walker    08/27/2024:  POD #7- continues to feel weak. Repeat CT scan performed- showed no evidence leak or fluid collections.  TF continued to be held.      08/28/2024:  POD #8- feeling better.  No nausea/ vomiting.  Walked 3x.  TF restarted.       08/29/2024:  POD #9- abdominal fullness again.  Started on CLD, TF held again and started on Reglan.      08/30/2024:  POD #10- reports being able to tolerate CLD. Started on TPN. Tube feeds resumed. Reports pain around J tube site, Additional CT scan performed-did not show any inflammation or fluid collections.     08/31/2024.  POD #11- Patient is having bowel movements.  Her only complaint is pain around her jejunostomy tube site.  We will start antibiotics due to the erythema around it.  Advanced to full liquids    09/01/2024. POD #12-  Feeling better.  Less pain around the J-tube.  Tolerating feeds    09/02/2024 POD #13-  pain continues to improve.  Tolerating feeds.  Bowel function returning to normal    09/03/2024.- POD #14-   Pain is improving.  She has been tolerating her diet but not eating much.  She was noted to have some thick drainage from her wound the tube feeds were held.  The wound was opened and there was some thick purulent looking material but no essie evidence of feeds.  CT scan with contrast via J tube showed now evidence of leak from J tube.      09/04/2024- POD #15-  Feeling better.  CT showed no evidence of leak.  Restart clear liquids as she was not taking much a full liquids.  Restart tube feeds and advance over the next 24 hours.  Superior aspect of the wound opened with gauze placement.        09/05/2024 - POD #16-   Feels better.  Erythema around the jejunostomy tube site has essentially resolved.  Tolerated clear liquids but not drinking much.  Will increase to full liquids and see how she does.  Increase tube feeds to goal of 50 mL an hour.  Have requested case management to discuss home health versus skilled nursing with the patient and her family as there might be issues with what the family is expecting from home health versus what can be provided.  This was discussed with Hospital Medicine    09/06/2024- POD #17-  Patient and family or trying to decide if they want home health  or skilled nursing.  The only skilled nursing unit that is said they would take her as an hour away.  Insurance has not approved this.  The patient was able to ambulate significantly however they are not skilled with caring for the jejunostomy tube and there is some resistance in learning.  Drains and staples were removed.    09/07/2024- POD #18- pt elected for SNF placement- does not want to learn Jtube feeds     09/08/2024:  POD #19- appropriate for d/c, awaiting placement.     09/09/2024:  POD #20-  Pt and family now decided she is going to go home with sister Erica instead of SNF.  Teaching pt and family how to do tube feeds and dressing changes.  Tentative plan for d/c home tomorrow      9/10/2024:  POD #21- doing well.  Tolerating [place] 1.5 tube feeds at goal.  Dressing changed at bedside.  Home health arranged.  Pt appropriate for d/c home.      Goals of Care Treatment Preferences:  Code Status: Full Code      Consults:   Consults (From admission, onward)          Status Ordering Provider     Inpatient consult to Social Work  Once        Provider:  (Not yet assigned)    Completed JULY MACHADO     Inpatient consult to Midline team  Once        Provider:  (Not yet assigned)    Acknowledged NOEL CHASE     Inpatient consult to Hospitalist  Once        Provider:  (Not yet assigned)    Acknowledged ZOFIA MAKI     Inpatient consult to Social Work  Once        Provider:  (Not yet assigned)    Completed CYN GIL     Inpatient consult to Social Work  Once        Provider:  (Not yet assigned)    Completed CYN GIL     Inpatient consult to Registered Dietitian/Nutritionist  Once        Provider:  (Not yet assigned)    Completed KINGSLEY BROSUSARD     Consult to Case Management/Social Work  Once        Provider:  (Not yet assigned)    Completed KINGSLEY BROUSSARD     Inpatient consult to Critical Care Medicine  Once        Provider:  Harley Kelly MD    Completed KINGSLEY BROUSSARD             Significant Diagnostic Studies: Labs: BMP:   Recent Labs   Lab 09/09/24  0553 09/10/24  0523   GLU 99 109    137   K 4.0 4.1    107   CO2 21* 19*   BUN 10 11   CREATININE 0.7 0.7   CALCIUM 8.6* 8.8   MG 2.1 1.9   , CMP   Recent Labs   Lab 09/09/24  0553 09/10/24  0523    137   K 4.0 4.1    107   CO2 21* 19*   GLU 99 109   BUN 10 11   CREATININE 0.7 0.7   CALCIUM 8.6* 8.8   PROT 6.4 6.8   ALBUMIN 2.6* 2.9*   BILITOT 0.2 0.1   ALKPHOS 159* 166*   AST 16 12   ALT 16 17   ANIONGAP 11 11   , CBC   Recent Labs   Lab 09/09/24  0553 09/10/24  0523   WBC 6.29 6.29   HGB 10.8* 11.1*   HCT 35.3* 35.4*   * 700*   , and All labs within the past 24 hours have been reviewed    Pending Diagnostic Studies:       None          Final Active Diagnoses:    Diagnosis Date Noted POA    PRINCIPAL PROBLEM:  Gastric adenocarcinoma [C16.9] 02/27/2024 Yes    Moderate protein-calorie malnutrition [E44.0] 09/04/2024 Yes    Jejunostomy tube present [Z93.4] 09/03/2024 Not Applicable    Hx of Epileptic seizures [G40.909] 02/07/2024 Yes     Chronic    DVT (deep venous thrombosis) [I82.409] 02/07/2024 Yes     Chronic    Primary hypertension [I10] 12/01/2022 Yes    Chronic deep vein thrombosis (DVT) of left lower extremity [I82.502] 10/21/2022 Yes      Problems Resolved During this Admission:    Diagnosis Date Noted Date Resolved POA    Diarrhea [R19.7] 08/31/2024 09/03/2024 No      Discharged Condition: good    Disposition: Home-Health Care [facility]    Follow Up:   Follow-up Information       [facility] Follow up.    Specialties: Home Health Services, Home Therapy Services, Home Living Aide Services  Contact information:  [id number] [facility] [address] [phone]             [facility] .    Contact information:  [address]  [facility] [address]                         Patient Instructions:      Diet full liquid      Notify your health care provider if you experience any of the following:  temperature >100.4     Notify your health care provider if you experience any of the following:  persistent nausea and vomiting or diarrhea     Notify your health care provider if you experience any of the following:  severe uncontrolled pain     Notify your health care provider if you experience any of the following:  redness, tenderness, or signs of infection (pain, swelling, redness, odor or green/yellow discharge around incision site)     Notify your health care provider if you experience any of the following:  difficulty breathing or increased cough     Change dressing (specify)   Order Comments: Dressing change: twice daily using saline and gauze (as seen on the video)     Tube Feedings/Formulas     Order Specific Question Answer Comments   Select Adult Formula: Other    Route: Jejunostomy    Formula Rate (mL/hr): 50      Activity as tolerated     Medications:  Reconciled Home Medications:      Medication List        START taking these medications      amoxicillin-clavulanate 400-57 mg/5 mL Susr  Commonly known as: AUGMENTIN  Take 10.9 mLs (872 mg total) by mouth every 12 (twelve) hours. for 7 days     hydrocodone-apap 7.5-325 MG/15 ML oral solution  Commonly known as: HYCET  Take 15 mLs by mouth every 8 (eight) hours as needed for Pain.            CONTINUE taking these medications      acetaminophen 500 MG tablet  Commonly known as: TYLENOL  Take 2 tablets (1,000 mg total) by mouth every 8 (eight) hours.     alendronate 70 MG tablet  Commonly known as: FOSAMAX  Take 70 mg by mouth every 7 days. Pt stated she takes this every Sunday     CALCIUM PHOSPHATE-VITAMIN D3 ORAL  Take 1 tablet by mouth 2 (two) times daily.     carBAMazepine 200 mg tablet  Commonly known as: TEGRETOL  Take by mouth. Pt states she take TID  1 tablet with breakfast  1 tablet with lunch   1.5 tablet at bedtime     ferrous sulfate 325 (65 FE) MG EC tablet  Take 65  mg by mouth 2 (two) times daily with meals.     hydrocortisone 2.5 % cream  Apply topically 2 (two) times daily.     hydrOXYzine HCL 25 MG tablet  Commonly known as: ATARAX  Take 1 tablet (25 mg total) by mouth 3 (three) times daily as needed for Itching.     ibuprofen 800 MG tablet  Commonly known as: ADVIL,MOTRIN  Take 1 tablet (800 mg total) by mouth every 8 (eight) hours.     levETIRAcetam 500 MG Tab  Commonly known as: KEPPRA  Take 1 tablet by mouth 2 (two) times daily. Pt states she takes one pill at dinner and one at bedtime     zonisamide 100 MG Cap  Commonly known as: ZONEGRAN  Take 200 mg by mouth 2 (two) times daily. 2cap w/ brkfst 2cap w/dinner            STOP taking these medications      oxyCODONE 5 MG immediate release tablet  Commonly known as: ROXICODONE            Time spent on the discharge of patient: 60 minutes    Chen Jerome MD  General Surgery  [facility] - Med Surg

## 2024-09-11 PROCEDURE — G0180 MD CERTIFICATION HHA PATIENT: HCPCS | Mod: ,,, | Performed by: SURGERY

## 2024-09-16 ENCOUNTER — TELEPHONE (OUTPATIENT)
Dept: SURGICAL ONCOLOGY | Facility: CLINIC | Age: 74
End: 2024-09-16
Payer: MEDICARE

## 2024-09-16 NOTE — TELEPHONE ENCOUNTER
Called pt to check on her post sx. Pt had a bad day yesterday with nausea from eating and bowel movements throughout the day. PT states she feels better today and looks forward to seeing us tomorrow

## 2024-09-17 ENCOUNTER — OFFICE VISIT (OUTPATIENT)
Dept: SURGICAL ONCOLOGY | Facility: CLINIC | Age: 74
End: 2024-09-17
Payer: MEDICARE

## 2024-09-17 VITALS
HEART RATE: 109 BPM | SYSTOLIC BLOOD PRESSURE: 113 MMHG | HEIGHT: 65 IN | WEIGHT: 112 LBS | TEMPERATURE: 98 F | DIASTOLIC BLOOD PRESSURE: 71 MMHG | BODY MASS INDEX: 18.66 KG/M2

## 2024-09-17 DIAGNOSIS — C16.9 GASTRIC ADENOCARCINOMA: Primary | ICD-10-CM

## 2024-09-17 PROCEDURE — 3078F DIAST BP <80 MM HG: CPT | Mod: CPTII,S$GLB,, | Performed by: SURGERY

## 2024-09-17 PROCEDURE — 99999 PR PBB SHADOW E&M-EST. PATIENT-LVL III: CPT | Mod: PBBFAC,,, | Performed by: SURGERY

## 2024-09-17 PROCEDURE — 3074F SYST BP LT 130 MM HG: CPT | Mod: CPTII,S$GLB,, | Performed by: SURGERY

## 2024-09-17 PROCEDURE — 1125F AMNT PAIN NOTED PAIN PRSNT: CPT | Mod: CPTII,S$GLB,, | Performed by: SURGERY

## 2024-09-17 PROCEDURE — 1159F MED LIST DOCD IN RCRD: CPT | Mod: CPTII,S$GLB,, | Performed by: SURGERY

## 2024-09-17 PROCEDURE — 99024 POSTOP FOLLOW-UP VISIT: CPT | Mod: S$GLB,,, | Performed by: SURGERY

## 2024-09-17 RX ORDER — OXYCODONE HYDROCHLORIDE 5 MG/1
5 TABLET ORAL EVERY 4 HOURS PRN
Qty: 10 TABLET | Refills: 0 | Status: SHIPPED | OUTPATIENT
Start: 2024-09-17

## 2024-09-17 NOTE — PROGRESS NOTES
Surgical Oncology Clinic Note      Referring Provider: Dr. Claudia Ambrose   PCP: Gagandeep Iglesias MD    Reason For Visit: Gastroesophageal: gastric cancer (proximal)      Oncology History   Mixed epithelial carcinoma of right ovary   1/2/2023 Initial Diagnosis    Mixed epithelial carcinoma of left ovary     1/9/2023 Surgery    Laparotomy for radical resection of gynecologic cancer. Removal of pelvic mass (right salpingo-oophorectomy), left salpingo-oophorectomy, extensive enterolysis, extensive adhesiolysis, left pelvic lymph node biopsy, omental biopsy, small bowel resection with primary functional end-to-end anastomosis, placement of pelvic drain. Path: Right ovary, tumor site, 11 cm, mixed epithelial carcinoma (50% mucinous, 50% endometrioid), G2 moderately differentiated, ovarian surface involvement-indeterminate-specimen disrupted. 1 left pelvic lymph node negative for neoplasia. FIGO stage IC2       1/9/2023 Cancer Staged    Staging form: Ovary, Fallopian Tube, and Primary Peritoneal Carcinoma, AJCC 8th Edition  - Clinical stage from 1/9/2023: FIGO Stage IC2, calculated as Stage IC (cT1c2, cN0, cM0)     4/5/2023 - 7/19/2023 Chemotherapy    4/5/2023: Cycle 1- paclitaxel 135 mg/m2 and carboplatin AUC 5 as patient is frail and elderly.  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 - [id number], MMR deficient- loss  MLH1 and PMS2     2/8/2024 Cancer Staged    Staging form: Stomach, AJCC 8th Edition  - Clinical stage from 2/8/2024: Stage IIA (cT2, cN1, cM0)     3/4/2024 Tumor Conference    Date Presented to Tumor Board: 03/04/24  [facility] UGI MULTIDISCIPLINARY TUMOR BOARD  PATIENT REVIEW FORM   ____________________________________________________________    CLINIC #: [id number]  DATE: 3/4/2024    DIAGNOSIS: gastric GARRY    PRESENTER: Perone    PATIENT SUMMARY:   This 72 y/o female was dx with ovarian CA 1/2023, underwent surgery and completed 6 cycles of adj chemotherapy 7/2023. She presented last month with hematemesis. EGD revealed large ulcerated gastric mass, which was biopsied. EGD pathology reviewed - poorly differentiated adenocarcinoma, with intestinal expression, MSI high, HER 2 negative. Staging imaging found irregular wall thickening in mid gastric body, enlarged gastrohepatic lymph node, no evidence of distant metastatic disease.   Reviewed PET - uptake in gastric wall and gastrohepatic lymph node with hypermetabolic activity.   Scheduled to see Dr. Amin in [place] later this week.   + family hx of cancer, pending genetics referral    BOARD RECOMMENDATIONS:   Proceed with systemic chemotherapy, consider neoadj immunotherapy  Proceed with diag lap  Await genetics testing, pathology will send to [facility]    CONSULT NEEDED:     [] Surgery    [] Hem/Onc    [] Rad/Onc    [] Dietary                 [] Social Service    [x] Genetics       [] AES  [] Radiology     Clinical Stage: Tumor   Node(s)  1  Metastasis      GROUP STAGE:  [] O    [] 1A    [] IB    [] IIA    [] IIB     [] IIIA     [] IIIB     [] IIIC    []IV  [] Local recurrence     [] Regional recurrence     [] Distant recurrence   Metastatic site(s): none         [x] Edna'l Treatment Guidelines reviewed and care planned is consistent with guidelines.         (i.e., NCCN, NCI, PD, ACO, AUA, etc.)    PRESENTATION AT CANCER CONFERENCE:         [x] Prospective     [] Retrospective     [] Follow-Up         3/14/2024 Surgery    Procedure:  LAPAROSCOPY, DIAGNOSTIC (N/A)  LYSIS, ADHESIONS, LAPAROSCOPIC (N/A)  LAVAGE, PERITONEAL, THERAPEUTIC (N/A)      Surgeon(s) and Role: Chen Jerome MD - Primary    Pre-Operative Diagnosis: Gastric adenocarcinoma [C16.9]     Post-Operative Diagnosis: Same     Pre-Operative Variables:  Stage:  III (T4a N1 M0)   Adjacent organ involvement: None  Chemotherapy within 90 Days: No  Radiation Therapy within 90 Days: No      Procedure:  Diagnostic laparoscopy   Extensive lysis of adhesions  Peritoneal biopsy   Peritoneal washings for cytology     3/26/2024 -  Chemotherapy    Treatment Summary   Plan Name: OP pembrolizumab 200mg Q3W  Treatment Goal: Curative  Status: Active  Start Date: 3/26/2024  End Date: 3/24/2026 (Planned)  Provider: Claudia Ambrose MD  Chemotherapy: [No matching medication found in this treatment plan]      Chemotherapy    Treatment Summary   Plan Name: OP pembrolizumab 200mg Q3W  Treatment Goal: Curative  Status: Active  Start Date: 3/15/2024 (Planned)  End Date: 2/27/2026 (Planned)  Provider: Claudia Ambrose MD  Chemotherapy: [No matching medication found in this treatment plan]     4/8/2024 Tumor Conference       [facility] UGI MULTIDISCIPLINARY TUMOR BOARD  PATIENT REVIEW FORM   ____________________________________________________________    CLINIC #: [id number]  DATE: 4/8/2024    DIAGNOSIS: Gastric GARRY    PRESENTER: Justus    PATIENT SUMMARY:   This 72 y/o female was dx with ovarian CA 1/2023, underwent surgery and completed 6 cycles of adj chemotherapy 7/2023. She developed hematemesis in Feb 2024, then underwent EGD with bx of a large ulcerated gastric mass. Pathology revealed poorly differentiated adenocarcinoma, with intestinal expression, MSI high, HER 2 negative. She was presented [place] [facility] last month. Since then, she underwent diag lap and today's presentation is for pathology review. Negative  for malignancy, reactive atypia     BOARD RECOMMENDATIONS:   Proceed with immunotherapy, then restage with imaging  Potential candidate for subtotal distal gastrectomy    CONSULT NEEDED:     [] Surgery    [] Hem/Onc    [] Rad/Onc    [] Dietary                 [] Social Service    [] Psychology       [] AES  [] Radiology     Clinical Stage: Tumor   Node(s) 1 Metastasis 0      GROUP STAGE:  [] O    [] 1A    [] IB    [] IIA    [] IIB     [] IIIA     [] IIIB     [] IIIC    []IV  [] Local recurrence     [] Regional recurrence     [] Distant recurrence   Metastatic site(s): none         [x] Edna'l Treatment Guidelines reviewed and care planned is consistent with guidelines.         (i.e., NCCN, NCI, PD, ACO, AUA, etc.)    PRESENTATION AT CANCER CONFERENCE:         [x] Prospective    [] Retrospective     [] Follow-Up           7/1/2024 Tumor Conference     [facility] UGI MULTIDISCIPLINARY TUMOR BOARD  PATIENT REVIEW FORM   ____________________________________________________________    CLINIC #: [id number]   DATE: 7/1/2024    DIAGNOSIS: gastric GARRY    PRESENTER: Justus    PATIENT SUMMARY:   This 74 y/o female was dx with ovarian CA 1/2023, underwent surgery and completed 6 cycles of adj chemotherapy in 7/2023. She developed hematemesis in Feb 2024 and underwent EGD with bx of a large ulcerated gastric mass. Pathology revealed poorly differentiated adenocarcinoma, with intestinal expression, MSI high, HER 2 negative. She underwent diag lap and pathology was negative for malignancy, reactive atypia. She was presented to this [facility] in 3/2024 and again in 4/2024. Since then, she received immunotherapy Keytruda 200 mg every 3 weeks, total of 4 cycles thus far.   Reviewed re-staging CT scan - perigastric lymph nodes larger in size  She remains asymptomatic.     BOARD RECOMMENDATIONS:   Obtain repeat PET  Consider ct DNA    CONSULT NEEDED:     [] Surgery    [] Hem/Onc    [] Rad/Onc    [] Dietary                 []  Genetics    [] Psychology       [] AES  [] Interventional Radiology     Clinical Stage: Tumor 2 Node(s) 1 Metastasis 0      GROUP STAGE:  [] O    [] 1A    [] IB    [x] IIA    [] IIB     [] IIIA     [] IIIB     [] IIIC    []IV  [] Local recurrence     [] Regional recurrence     [] Distant recurrence   Metastatic site(s): none         [x] Edna'l Treatment Guidelines reviewed and care planned is consistent with guidelines.         (i.e., NCCN, NCI, PD, ACO, AUA, etc.)    PRESENTATION AT CANCER CONFERENCE:         [x] Prospective    [] Retrospective     [] Follow-Up         8/20/2024 Surgery    Open total gastrectomy with D2 WHITNEY and extensive lysis of adhesions   (Path: pT2 pN3a (8/35 LN positive), G3 poorly differentiated adenocarcinoma, margins negative         Staging:  Cancer Staging   Gastric adenocarcinoma  Staging form: Stomach, AJCC 8th Edition  - Clinical stage from 2/8/2024: Stage IIA (cT2, cN1, cM0) - Signed by Chen Jerome MD on 7/2/2024  - Pathologic stage from 8/20/2024: Stage III (ypT2, pN3a, cM0) - Signed by Chen Jerome MD on 9/17/2024    Mixed epithelial carcinoma of right ovary  Staging form: Ovary, Fallopian Tube, and Primary Peritoneal Carcinoma, AJCC 8th Edition  - Clinical stage from 1/9/2023: FIGO Stage IC2, calculated as Stage IC (cT1c2, cN0, cM0) - Signed by Chen Jerome MD on 2/27/2024       HISTORY OF PRESENT ILLNESS   Cheryl Garcia is a 73 y.o. female with history of epilepsy and treated ovarian cancer (s/p surgery and chemotherapy) who presents today for evaluation and management of newly diagnosed gastric adenocarcinoma.   She originally presented to [facility] on 02/06/2024 following an episode of hematemesis.  A upon presentation she was having epigastric tenderness but had not had any additional episodes of vomiting.  She was otherwise doing well.  She was given IV fluids and IV Protonix and transferred to [facility] for further evaluation.  She  was found to have an acute decrease in her hemoglobin from 11-8.9 over 10 hours however her vitals all remained within normal limits.  She underwent an EGD during that admission which showed a large, ulcerated gastric mass.  Pathology returned consistent with poorly differentiated adenocarcinoma.   A CT abdomen and pelvis was done that day which showed an eccentric irregular wall thickening of the mid gastric body concerning for malignancy.  She was discharged the following day in stable condition with follow-up.  Her Xarelto was held at that time due to her bleeding.  She had been on Xarelto since October of 2022 due to a history of lower extremity DVT at the around the time she was diagnosed with ovarian cancer.  This was believed to be due to the malignancy and direct compression of the vessels in that region.  Since her discharge she has overall been doing well and has no new complaints at this time.     She states she did not start having any reflux until after she completed her chemotherapy this summer.  She was seen by her internist a few times as well as seen by GI over the [facility] with Dr. Tinajero for its Giovany.  She had been very diligent about reflux precautions and had reduced high acid foods.  She had been started on Protonix which completely resolved her symptoms.     As stated she does have a history of ovarian cancer diagnosed back in January of 2023.  The workup for that was initiated back in October of 2022 when she presented with a lower extremity swelling in the DVT.  During that workup they did a CT scan which showed a large lesion on her ovary concerning for malignancy.  She subsequently underwent midline laparotomy for radical resection with right salpingo-oophorectomy, left salpingo-oophorectomy, extensive enterolysis, extensive adhesiolysis, left pelvic lymph node biopsy, omental biopsy, small bowel resection with primary functional end-to-end anastomosis, placement of pelvic drain.  Path: Right ovary, tumor site, 11 cm, mixed epithelial carcinoma (50% mucinous, 50% endometrioid), G2 moderately differentiated, ovarian surface involvement-indeterminate-specimen disrupted. 1 left pelvic lymph node negative for neoplasia. FIGO stage IC2.  All this was done at [facility].  She was subsequently treated with 6 cycles of paclitaxel and carboplatin, her last cycle was in July of 2023.     Of note her last colonoscopy was in 2023 and reportedly revealed polyps.  According to the records from [facility] she does have a history of an EGD on 06/20/2012 done by Dr. Boyle, which reportedly showed mild gastritis and otherwise was normal.     Her history is otherwise significant for epilepsy and she remains on antiepileptic medications however she has not had a seizure since 2009.    INTERVAL HISTORY     06/38/2024:  She was presented in tumor board on 04/08/2024 for the atypical cells seen on her washings.  Group consensus was these were likely reactive atypia and to proceed with therapy and restaging as originally planned.  She presents for a post systemic therapy appointment.  She is completed 5 cycles of Keytruda, the last was Tuesday 06/18/2024.  She is overall tolerated it very well and has had no side effects in no hospitalizations.  She states she is feels well and that she is walking daily and has no complaints aside from very mild cough that she developed today    07/19/2024:  Discussed in [facility] last week and all in agreement that the LN are within the field of resection, however since she will likely require a total gastrectomy (which is a morbid operation), would be best to sample the LN to ensure they do not represent a different malignancy, and to continue on immunotherapy for now.      8/14/2024:  EUS biopsy done- showed adenocarcinoma.  She is otherwise doing well.     9/17/2024:  Overall doing well.  She is back home with her  having only stayed 2 days with her sister.   Biggest complaint at this point in time is pain from around her J-tube site.  She has been tolerating liquids and then did manage eat an entire thing of yogurt yesterday.      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    OP (osteoporosis) 02/28/2024    Ovarian cancer     Primary hypertension 12/01/2022       Past Surgical History:   Procedure Laterality Date    ABDOMINAL WASHOUT N/A 3/14/2024    Procedure: LAVAGE, PERITONEAL, THERAPEUTIC;  Surgeon: Chen Jerome MD;  Location: [facility];  Service: General;  Laterality: N/A;    APPENDECTOMY  2015    BIOPSY OF PERITONEUM N/A 3/14/2024    Procedure: BIOPSY, PERITONEUM;  Surgeon: Chen Jerome MD;  Location: [facility] OR;  Service: General;  Laterality: N/A;    COLONOSCOPY  06/20/2012    Dr Boyle- Tubular adenoma polyps. Repeat in 3 years.    COLONOSCOPY  06/17/2015    -Nodular mucosa at appendiceal orfice, sigmoid and desc diverticulosis otherwise normal.    COLONOSCOPY  2020    >3 TAs    COLONOSCOPY  12/2023    DIAGNOSTIC LAPAROSCOPY N/A 3/14/2024    Procedure: LAPAROSCOPY, DIAGNOSTIC;  Surgeon: Chen Jerome MD;  Location: [facility] OR;  Service: General;  Laterality: N/A;  1. Diagnostic laparoscopy   2. Extensive lysis of adhesions  3. Peritoneal biopsy   4. Peritoneal washings for cytology    DIAGNOSTIC LAPAROSCOPY N/A 8/20/2024    Procedure: LAPAROSCOPY, DIAGNOSTIC;  Surgeon: Chen Jerome MD;  Location: [facility] OR;  Service: General;  Laterality:  N/A;    EGD - EXTERNAL RESULT  06/20/2012    Dr Boyle- Mild gastritis otherwise normal.    ENDOSCOPIC ULTRASOUND OF UPPER GASTROINTESTINAL TRACT N/A 7/26/2024    Procedure: ULTRASOUND, UPPER GI TRACT, ENDOSCOPIC;  Surgeon: Valdo Aguilera MD;  Location: [facility];  Service: Endoscopy;  Laterality: N/A;  7/22/24: instructions sent via portal-. Pt no longer takling eliquis-GD    ESOPHAGOGASTRODUODENOSCOPY N/A 02/08/2024    Procedure: EGD (ESOPHAGOGASTRODUODENOSCOPY);  Surgeon: Jayla Arteaga MD;  Location: [facility] ENDO;  Service: Endoscopy;  Laterality: N/A;    ESOPHAGOGASTRODUODENOSCOPY N/A 8/20/2024    Procedure: EGD (ESOPHAGOGASTRODUODENOSCOPY);  Surgeon: Chen Jerome MD;  Location: [facility] OR;  Service: General;  Laterality: N/A;    GASTRECTOMY N/A 8/20/2024    Procedure: GASTRECTOMY;  Surgeon: Chen Jerome MD;  Location: [facility] OR;  Service: General;  Laterality: N/A;    HYSTERECTOMY      LAPAROSCOPIC LYSIS OF ADHESIONS N/A 3/14/2024    Procedure: LYSIS, ADHESIONS, LAPAROSCOPIC;  Surgeon: Chen Jerome MD;  Location[facility] OR;  Service: General;  Laterality: N/A;    LYSIS OF ADHESIONS N/A 8/20/2024    Procedure: LYSIS, ADHESIONS;  Surgeon: Chen Jerome MD;  Location: [facility] OR;  Service: General;  Laterality: N/A;    PLACEMENT OF JEJUNOSTOMY TUBE N/A 8/20/2024    Procedure: INSERTION, JEJUNOSTOMY TUBE;  Surgeon: Chen Jerome MD;  Location: [facility] OR;  Service: General;  Laterality: N/A;    TOTAL ABDOMINAL HYSTERECTOMY W/ BILATERAL SALPINGOOPHORECTOMY  01/09/2023    radical resection with right salpingo-oophorectomy, left salpingo-oophorectomy, extensive enterolysis, extensive adhesiolysis, left pelvic lymph node biopsy, omental biopsy, small bowel resection with primary functional end-to-end anastomosis, placement of pelvic drain       Family History   Problem Relation Name Age of Onset    Pancreatic cancer Brother Misael Calderon. 76    Prostate cancer Brother Zachary 67    Pancreatic cancer  Half-brother Gasper 74    Colon cancer Half-sister Elton 83    Lung cancer Half-sister Elton         +smoking hx    Breast cancer Half-sister Vidhi 58    Lymphoma Other Cara Sun    Prostate cancer Other Fernando     Prostate cancer Other Gasper Jr     Ovarian cancer Other Inerris     Breast cancer Other Aleida     Colon cancer Other Aleida        Social History     Socioeconomic History    Marital status:    Tobacco Use    Smoking status: Former     Types: Cigarettes    Smokeless tobacco: Never    Tobacco comments:     Quit 1989 smoked 10 years smoked 0.25 ppd    Substance and Sexual Activity    Alcohol use: Not Currently    Drug use: Never     Social Determinants of Health     Financial Resource Strain: Low Risk  (2/23/2024)    Overall Financial Resource Strain (CARDIA)     Difficulty of Paying Living Expenses: Not hard at all   Food Insecurity: No Food Insecurity (2/23/2024)    Hunger Vital Sign     Worried About Running Out of Food in the Last Year: Never true     Ran Out of Food in the Last Year: Never true   Transportation Needs: No Transportation Needs (2/23/2024)    PRAPARE - Transportation     Lack of Transportation (Medical): No     Lack of Transportation (Non-Medical): No   Physical Activity: Inactive (2/23/2024)    Exercise Vital Sign     Days of Exercise per Week: 0 days     Minutes of Exercise per Session: 10 min   Stress: No Stress Concern Present (2/23/2024)    [employer] [place] [employer] - Occupational Stress Questionnaire     Feeling of Stress : Not at all   Housing Stability: Low Risk  (2/23/2024)    Housing Stability Vital Sign     Unable to Pay for Housing in the Last Year: No     Number of Places Lived in the Last Year: 1     Unstable Housing in the Last Year: No          Medication List            Accurate as of September 17, 2024  4:07 PM. If you have any questions, ask your nurse or doctor.                CHANGE how you take these medications       acetaminophen 500 MG tablet  Commonly known as: TYLENOL  Take 2 tablets (1,000 mg total) by mouth every 8 (eight) hours.  What changed:   when to take this  reasons to take this     hydrocortisone 2.5 % cream  Apply topically 2 (two) times daily.  What changed:   when to take this  reasons to take this     ibuprofen 800 MG tablet  Commonly known as: ADVIL,MOTRIN  Take 1 tablet (800 mg total) by mouth every 8 (eight) hours.  What changed:   when to take this  reasons to take this            CONTINUE taking these medications      alendronate 70 MG tablet  Commonly known as: FOSAMAX     amoxicillin-clavulanate 400-57 mg/5 mL Susr  Commonly known as: AUGMENTIN  Take 10.9 mLs (872 mg total) by mouth every 12 (twelve) hours. for 7 days     CALCIUM PHOSPHATE-VITAMIN D3 ORAL     carBAMazepine 200 mg tablet  Commonly known as: TEGRETOL     ferrous sulfate 325 (65 FE) MG EC tablet     hydrocodone-apap 7.5-325 MG/15 ML oral solution  Commonly known as: HYCET  Take 15 mLs by mouth every 8 (eight) hours as needed for Pain.     hydrOXYzine HCL 25 MG tablet  Commonly known as: ATARAX  Take 1 tablet (25 mg total) by mouth 3 (three) times daily as needed for Itching.     levETIRAcetam 500 MG Tab  Commonly known as: KEPPRA     zonisamide 100 MG Cap  Commonly known as: ZONEGRAN              Review of patient's allergies indicates:  No Known Allergies    Review of Systems   Constitutional:  Negative for chills, fever, malaise/fatigue and weight loss.   Respiratory:  Negative for cough, shortness of breath and wheezing.    Cardiovascular:  Negative for chest pain and palpitations.   Gastrointestinal:  Negative for abdominal pain, constipation, diarrhea, nausea and vomiting.   Musculoskeletal:  Negative for back pain, falls and joint pain.   Neurological:  Negative for dizziness, sensory change, speech change, focal weakness, seizures, loss of consciousness and weakness.   Psychiatric/Behavioral:  Negative for depression and  hallucinations. The patient is not nervous/anxious.      PHYSICAL EXAM     Vitals:    09/17/24 1548   BP: 113/71   Pulse: 109   Temp: 98.2 °F (36.8 °C)     Body mass index is 18.64 kg/m².  ECOG SCORE           Physical Exam  Vitals reviewed.   Constitutional:       General: She is not in acute distress.     Appearance: Normal appearance.   HENT:      Head: Normocephalic and atraumatic.      Mouth/Throat:      Mouth: Mucous membranes are moist.   Eyes:      General: No scleral icterus.     Extraocular Movements: Extraocular movements intact.      Conjunctiva/sclera: Conjunctivae normal.   Pulmonary:      Effort: Pulmonary effort is normal.   Abdominal:      General: There is no distension.      Palpations: Abdomen is soft. There is no mass.      Tenderness: There is no abdominal tenderness.      Comments: Superior aspect of midline incision open with the healthy, pink granulation tissue, well healed.  J-tube site without any erythema or induration.   Musculoskeletal:         General: No swelling. Normal range of motion.   Skin:     General: Skin is warm and dry.   Neurological:      General: No focal deficit present.      Mental Status: She is alert.   Psychiatric:         Mood and Affect: Mood normal.         Behavior: Behavior normal.         Thought Content: Thought content normal.         Judgment: Judgment normal.          DATA REVIEW:  I personally reviewed the following records for this visit: lab work from prior visit, notes from other physicians, computed tomography/CT imaging, surgical pathology results, endoscopy results, radiographic study evaluation, and laboratory results done by primary care physician    LABS       Lab Results   Component Value Date    WBC 6.29 09/10/2024    HGB 11.1 (L) 09/10/2024    HCT 35.4 (L) 09/10/2024     (H) 09/10/2024     Lab Results   Component Value Date     09/10/2024    CALCIUM 8.8 09/10/2024    ALBUMIN 2.9 (L) 09/10/2024    PROT 6.8 09/10/2024      "09/10/2024    K 4.1 09/10/2024    CO2 19 (L) 09/10/2024     09/10/2024    BUN 11 09/10/2024    CREATININE 0.7 09/10/2024    ALKPHOS 166 (H) 09/10/2024    ALT 17 09/10/2024    AST 12 09/10/2024    BILITOT 0.1 09/10/2024       Nutritional:  No results found for: "PREALBUMIN"    Tumor Markers:  Lab Results   Component Value Date    AMYLASE 67 08/23/2024     No results found for: ""    PATHOLOGY     Final Pathologic Diagnosis  Abnormal   STOMACH, 'GASTRIC MASS', BIOPSY:  - 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 Scattered p53 positivity (wild-type pattern of expression) and p16 positivity are also noted. Additional studies, including CK7, PAX8, ER, HER2, TTF-1, GATA3, WT-1,  synaptophysin, and chromogranin, are either negative or negative in the cells of interest. This patient's prior history of ovarian carcinoma resected at [facility] in [place], [place] is noted and the outside pathology report (W-) is reviewed.   While both tumors do have similar immunohistochemical staining patterns, they are not identical, with notable reported differences being CK7 and PAX8 positivity in the ovarian tumor. The presence of a large, fungating, and ulcerated mass in the stomach  is suspicious for a gastric primary; however, it is unclear if the masses represent two separate primaries or one p rimary and one metastatic focus. Abnormal mismatch repair studies do suggest increased risk for development of multiple tumor types (see  below). Requesting outside slides for review to compare morphology may be helpful.    DNA mismatch repair IHC studies are ABNORMAL and demonstrate LOSS OF MLH1 and PMS2 expression within tumor cells. MSH2 and MSH6 exhibit retained expression. 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    Tissue will be sent " for molecular profiling by next-generation sequencing ([facility]). Results of this study will be issued directly to the electronic medical record.     3/14/2024:  Final Pathologic Diagnosis 1. Perigastric nodule, biopsy:Fibroadipose tissue, lined by reactive mesothelial cells.  Negative for carcinoma.         Component 3 mo ago   Final Pathologic Diagnosis  Abnormal   1. Atypical cells present.      2. Negative for malignant cells.    Comment: Interp By Jami Goode M.D., Signed on 03/22/2024 at 14:39   Comment  Abnormal   For part 1, rare atypical appearing cell groups with abundant benign and reactive mesothelial cell groups.  Atypical groups cannot be classified further as are not present in cell block material.    For part 2, there is predominantly blood. Negative for malignancy.    Broderick-EP4 and PAX8 were performed on both part 1 and part 2 cell blocks and are both negative with appropriate controls.    This case was seen in consultation by cytopathologist Dr. VALERIE Da Silva who concurs with the above diagnoses and assessment.        07/26/2024:  EUS biopsy lesser curve lymph nodes       Component 3 wk ago   Final Pathologic Diagnosis      Abnormal   Lymph node, perigastric, EUS guided fine-needle aspiration/biopsy:  - Adenocarcinoma involving lymphoid tissue consistent with patient's previous gastric mass biopsy, see comment.    COMMENT:  The biopsy shows a malignant epithelial proliferation involving lymphoid tissue consistent with tumor involving lymph node.  Immunostains show tumor cells to be positive for cytokeratin, CDX2, and patchy CK20.  Tumor cells are negative for PAX8   and CK7.  Patient had a previous stomach mass biopsy ([facility]-) that is pulled and reviewed.  The current biopsy shows similar morphology and immunoprofile as the stomach mass biopsy.  MMR and NGS performed on previous biopsy.  Claudin 18.2 pending,  results will be issued in an addendum.      HER2 by immunohistochemistry:  Negative (1+)          08/20/2024:  Surgical pathology  Final Pathologic Diagnosis 1. Small intestine, peritoneal scarring, excision:  - Benign fibroadipose tissue  - Negative for malignancy    2. Spleen, lesion, excision:- Benign splenic tissue  - Negative for malignancy    3. Small intestine, resection:- Benign small intestinal tissue  - Negative for malignancy    4. Lymph node, hepatic artery, excision:- Two lymph nodes, negative for metastatic carcinoma (0/2)    5. Stomach, total gastrectomy:  - Adenocarcinoma, poorly cohesive, with features of undifferentiated carcinoma  - Tumor involves subserosal tissue  - Margins negative for carcinoma  - Pathologic stage y pT2 pN3a  - Eight of 35 lymph nodes are POSITIVE for metastatic carcinoma (8/35)  - Evidence of tumor regression is seen in a lymph node  - Incidental benign leiomyoma identified in perigastric tissue  - Background chronic gastritis  - Helicobacter pylori-type microorganisms are identified by immunohistochemistry  - See synoptic report below    6. Lymph nodes, D2 and periportal lymph node, lymphadenectomy:  - Eight lymph nodes, all negative for metastatic carcinoma (0/8)     7. Lymph node, aortocaval, excision:  - One lymph node, negative for metastatic carcinoma (0/1)    8. Anastomosis, anastomotic donuts:  - Benign esophageal and small intestinal tissue  - Negative for malignancy    _____________________________________________________________________  CASE SUMMARY: (STOMACH)  Standard(s): AJCC-UICC 8    SPECIMEN    Procedure: Total gastrectomy    TUMOR    Tumor Site: Body: Lesser curvature    Histologic Type: Adenocarcinoma    Adenocarcinoma Classification (based on WHO): Poorly cohesive carcinoma, Undifferentiated (anaplastic) carcinoma    Histologic Type Comment: Tumor is poorly cohesive, but in some areas a better differentiated component is identified. The tumor diffusely expresses CDX2.  Histologic Grade: G3, poorly differentiated, undifferentiated  Tumor Size:  Greatest dimension in Centimeters (cm): 6.6 cm    Additional Dimension in Centimeters (cm): 4.2 x 1.4 cm  Tumor Extent: Invades muscularis propria  Treatment Effect: Present, with residual cancer showing evident tumor regression, but more than single cells or rare small groups of cancer cells (partial response, score 2)  Lymphatic and / or Vascular Invasion: Present    MARGINS    Margin Status for Invasive Carcinoma: All margins negative for invasive carcinoma    Closest Margin(s) to Invasive Carcinoma: Omental (radial): lesser curvature    Distance from Invasive Carcinoma to Closest Margin: Exact distance in cm: 3 cm  Margin Status for Dysplasia: All margins negative for dysplasia    REGIONAL LYMPH NODES    Regional Lymph Node Status: Regional lymph nodes present    Tumor present in regional lymph node(s)      Number of Lymph Nodes with Tumor: Exact number: 8    Number of Lymph Nodes Examined: 45    DISTANT METASTASIS    Distant Site(s) Involved, if applicable: Not applicable    pTNM CLASSIFICATION (AJCC 8TH Edition)  Reporting of pT, pN, and (when applicable) pM categories is based on information available to the pathologist at the time the report is issued. As per the AJCC (Chapter 1, 8th Ed.) it is the managing physician's responsibility to establish the final  pathologic stage based upon all pertinent information, including but potentially not limited to this pathology report.    Modified Classification: y (post-neoadjuvant therapy)  pT Category: pT2: invades the muscularis propria  pN Category: pN3a: Metastasis in seven to 15 regional lymph nodes  pM Category: Not applicable - pM cannot be determined from the submitted specimen(s)    ADDITIONAL FINDINGS    Additional Findings: Chronic gastritis, Helicobacter pylori present    SPECIAL STUDIES    HER2 and mismatch repair testing have been performed on the previous biopsy cases [facility]- and [facility]-; see those cases for a full report. HER2 was negative and  mismatch repair testing revealed loss of nuclear expression of MLH1 and PMS2. Claudin  18.2 was negative.       IMAGING     02/08/2024:  CT Abdomen/ Pelvis  Impression:   Eccentric irregular wall thickening of the mid gastric body concerning for malignancy.  There appears to be associated enlarged 1.6 cm gastrohepatic lymph node.  Clinical correlation advised.  Correlation with endoscopy is recommended if not previously performed.    02/27/2024:  CT Chest  Impression:   No evidence of intrathoracic metastatic disease.    02/28/2024: PET CT Scan  Impression:   1. The biopsy proving gastric cancer is markedly FDG avid with SUV max 18.  2. Moderately FDG avid gastrohepatic ligament lymph node consistent with local metastatic disease.  3. No evidence for distant metastatic disease.    06/24/2024:  CT Chest, Abdomen, and Pelvis with IV contrast   Stable left adrenal nodule.  The right adrenal gland, pancreas, gallbladder, and spleen are within normal limits.  Demonstration of the annular ulcerated gastric body mass with mucosal thickening and irregularity that has decreased since the prior examination.  However, there has been enlargement of several perigastric lymph nodes in the lesser sac measuring up to 3.2 x 2.9 x 4 cm on axial image 33 of series 3 and 1.8 x 1.7 x 1.8 cm on axial image 41.   Impression:   Progression of upper abdominal metastatic lymphadenopathy in the lesser sac.  No distant sites of metastatic disease identified in the chest, abdomen, or pelvis.    07/10/2024:  PET CT Scan  Impression:   Mixed interval changes.  Decreased hypermetabolic wall thickening of the stomach and stable hypermetabolic index gastrohepatic lymph node.  Additional new enlarged hypermetabolic gastrohepatic lymph node concerning for progression of local alis disease.    ASSESSMENT & PLAN     1. Gastric adenocarcinoma        Cheryl Kirkland is a 74 y.o. 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    She was overall doing quite well.  I believe her pain from around her J-tube site is actually from the J-tube itself pulling.  I did show her how to taper J-tube down to provide some offloading.  I have also encouraged her to start taking some anti-inflammatory medicine such as Aleve or ibuprofen top of the pain as well as Tylenol.  For the short term I have sent her some additional oxycodone to her pharmacy.  I did discuss with her that if she continues to have pain I will send her to our palliative care team who can specialized in more long term pain as opposed the acute postsurgical pain.  She is several weeks out from surgery at this point in time.  I have encouraged her to continue ambulating.  I also discussed with her and her  that she no longer needs to do any sort of packing to that upper midline wound but can just start in a treating it with a Band-Aid or any sort of dressings.  With regards to her nutrition she will see our nutritionist next week and I have instructed her to start keeping a food diary including what she is eating and how much so we can see when we can start weaning her off of tube feeds.  She is cleared to advance to a soft diet and regular boost as well.    We also we reviewed her final pathology which does show positive lymph nodes, which is not entirely unexpected.  She will need to be seen by Dr. Amin to discuss when she will resume her systemic therapy.    Ms. BinghamJuan DavidIsael expressed understanding in regards to our discussion today. Many good questions were asked on today's visit, all of which were answered to their satisfaction.    Follow-up: Follow up in about 2 weeks (around 10/1/2024).                Chen Jerome MD MS              Surgical Oncology              [facility]  [facility] [place]              Office: [phone]

## 2024-09-18 ENCOUNTER — TELEPHONE (OUTPATIENT)
Dept: NUTRITION | Facility: CLINIC | Age: 74
End: 2024-09-18
Payer: MEDICARE

## 2024-09-18 NOTE — TELEPHONE ENCOUNTER
Spoke with patient about her surgery. She is getting liquid nutrition via J tube and is at home. Ms. Luna will try to eat soft, solid foods starting today and will meet with me next week. She has my callback number.   Signature: Sonali Garcia, MPH, RD, LDN

## 2024-09-20 ENCOUNTER — LAB VISIT (OUTPATIENT)
Dept: LAB | Facility: HOSPITAL | Age: 74
End: 2024-09-20
Attending: INTERNAL MEDICINE
Payer: MEDICARE

## 2024-09-20 ENCOUNTER — TELEPHONE (OUTPATIENT)
Dept: SURGICAL ONCOLOGY | Facility: CLINIC | Age: 74
End: 2024-09-20
Payer: MEDICARE

## 2024-09-20 DIAGNOSIS — C56.1: ICD-10-CM

## 2024-09-20 LAB
ALBUMIN SERPL BCP-MCNC: 3.4 G/DL (ref 3.5–5.2)
ALP SERPL-CCNC: 121 U/L (ref 55–135)
ALT SERPL W/O P-5'-P-CCNC: 12 U/L (ref 10–44)
ANION GAP SERPL CALC-SCNC: 9 MMOL/L (ref 8–16)
AST SERPL-CCNC: 18 U/L (ref 10–40)
BASOPHILS # BLD AUTO: 0.05 K/UL (ref 0–0.2)
BASOPHILS NFR BLD: 0.8 % (ref 0–1.9)
BILIRUB SERPL-MCNC: 0.2 MG/DL (ref 0.1–1)
BUN SERPL-MCNC: 14 MG/DL (ref 8–23)
CALCIUM SERPL-MCNC: 9.4 MG/DL (ref 8.7–10.5)
CHLORIDE SERPL-SCNC: 119 MMOL/L (ref 95–110)
CO2 SERPL-SCNC: 17 MMOL/L (ref 23–29)
CREAT SERPL-MCNC: 1 MG/DL (ref 0.5–1.4)
DIFFERENTIAL METHOD BLD: ABNORMAL
EOSINOPHIL # BLD AUTO: 0.5 K/UL (ref 0–0.5)
EOSINOPHIL NFR BLD: 8 % (ref 0–8)
ERYTHROCYTE [DISTWIDTH] IN BLOOD BY AUTOMATED COUNT: 15.7 % (ref 11.5–14.5)
EST. GFR  (NO RACE VARIABLE): 59 ML/MIN/1.73 M^2
GLUCOSE SERPL-MCNC: 100 MG/DL (ref 70–110)
HCT VFR BLD AUTO: 44.6 % (ref 37–48.5)
HGB BLD-MCNC: 13.5 G/DL (ref 12–16)
IMM GRANULOCYTES # BLD AUTO: 0.1 K/UL (ref 0–0.04)
IMM GRANULOCYTES NFR BLD AUTO: 1.5 % (ref 0–0.5)
LYMPHOCYTES # BLD AUTO: 1.4 K/UL (ref 1–4.8)
LYMPHOCYTES NFR BLD: 21.8 % (ref 18–48)
MAGNESIUM SERPL-MCNC: 2 MG/DL (ref 1.6–2.6)
MCH RBC QN AUTO: 32.6 PG (ref 27–31)
MCHC RBC AUTO-ENTMCNC: 30.3 G/DL (ref 32–36)
MCV RBC AUTO: 108 FL (ref 82–98)
MONOCYTES # BLD AUTO: 0.9 K/UL (ref 0.3–1)
MONOCYTES NFR BLD: 13.6 % (ref 4–15)
NEUTROPHILS # BLD AUTO: 3.5 K/UL (ref 1.8–7.7)
NEUTROPHILS NFR BLD: 54.3 % (ref 38–73)
NRBC BLD-RTO: 0 /100 WBC
PHOSPHATE SERPL-MCNC: 2.7 MG/DL (ref 2.7–4.5)
PLATELET # BLD AUTO: 391 K/UL (ref 150–450)
PMV BLD AUTO: 8.9 FL (ref 9.2–12.9)
POTASSIUM SERPL-SCNC: 4.2 MMOL/L (ref 3.5–5.1)
PROT SERPL-MCNC: 7.3 G/DL (ref 6–8.4)
RBC # BLD AUTO: 4.14 M/UL (ref 4–5.4)
SODIUM SERPL-SCNC: 145 MMOL/L (ref 136–145)
WBC # BLD AUTO: 6.47 K/UL (ref 3.9–12.7)

## 2024-09-20 PROCEDURE — 83735 ASSAY OF MAGNESIUM: CPT | Performed by: INTERNAL MEDICINE

## 2024-09-20 PROCEDURE — 84100 ASSAY OF PHOSPHORUS: CPT | Performed by: INTERNAL MEDICINE

## 2024-09-20 PROCEDURE — 80053 COMPREHEN METABOLIC PANEL: CPT | Performed by: INTERNAL MEDICINE

## 2024-09-20 PROCEDURE — 36415 COLL VENOUS BLD VENIPUNCTURE: CPT | Mod: PO | Performed by: INTERNAL MEDICINE

## 2024-09-20 PROCEDURE — 85025 COMPLETE CBC W/AUTO DIFF WBC: CPT | Performed by: INTERNAL MEDICINE

## 2024-09-20 NOTE — TELEPHONE ENCOUNTER
Spoke with pt's . He is concerned that Home Health has not reached out today. Made contact with Home Health. They are contacting them now. Reached out to  to let him know. He verbalized marktanding

## 2024-09-20 NOTE — TELEPHONE ENCOUNTER
Pt's daughter called back to confirm time date and location of rescheduled appt. She states they will make this appt

## 2024-09-24 ENCOUNTER — NUTRITION (OUTPATIENT)
Dept: NUTRITION | Facility: CLINIC | Age: 74
End: 2024-09-24
Payer: MEDICARE

## 2024-09-24 ENCOUNTER — INFUSION (OUTPATIENT)
Dept: INFUSION THERAPY | Facility: HOSPITAL | Age: 74
End: 2024-09-24
Attending: INTERNAL MEDICINE
Payer: MEDICARE

## 2024-09-24 ENCOUNTER — OFFICE VISIT (OUTPATIENT)
Dept: HEMATOLOGY/ONCOLOGY | Facility: CLINIC | Age: 74
End: 2024-09-24
Payer: MEDICARE

## 2024-09-24 VITALS
HEART RATE: 85 BPM | SYSTOLIC BLOOD PRESSURE: 104 MMHG | HEIGHT: 65 IN | DIASTOLIC BLOOD PRESSURE: 68 MMHG | WEIGHT: 118.94 LBS | OXYGEN SATURATION: 97 % | TEMPERATURE: 97 F | BODY MASS INDEX: 19.82 KG/M2

## 2024-09-24 VITALS
OXYGEN SATURATION: 99 % | BODY MASS INDEX: 19.82 KG/M2 | HEIGHT: 65 IN | HEART RATE: 83 BPM | SYSTOLIC BLOOD PRESSURE: 115 MMHG | RESPIRATION RATE: 16 BRPM | TEMPERATURE: 98 F | WEIGHT: 118.94 LBS | DIASTOLIC BLOOD PRESSURE: 72 MMHG

## 2024-09-24 DIAGNOSIS — C16.9 GASTRIC ADENOCARCINOMA: ICD-10-CM

## 2024-09-24 DIAGNOSIS — E44.0 MODERATE PROTEIN-CALORIE MALNUTRITION: ICD-10-CM

## 2024-09-24 DIAGNOSIS — Z93.4 JEJUNOSTOMY TUBE PRESENT: Primary | ICD-10-CM

## 2024-09-24 DIAGNOSIS — C16.9 GASTRIC ADENOCARCINOMA: Primary | ICD-10-CM

## 2024-09-24 PROCEDURE — 1111F DSCHRG MED/CURRENT MED MERGE: CPT | Mod: CPTII,S$GLB,, | Performed by: INTERNAL MEDICINE

## 2024-09-24 PROCEDURE — 99999 PR PBB SHADOW E&M-EST. PATIENT-LVL I: CPT | Mod: PBBFAC,,,

## 2024-09-24 PROCEDURE — 3288F FALL RISK ASSESSMENT DOCD: CPT | Mod: CPTII,S$GLB,, | Performed by: INTERNAL MEDICINE

## 2024-09-24 PROCEDURE — 3074F SYST BP LT 130 MM HG: CPT | Mod: CPTII,S$GLB,, | Performed by: INTERNAL MEDICINE

## 2024-09-24 PROCEDURE — 96375 TX/PRO/DX INJ NEW DRUG ADDON: CPT

## 2024-09-24 PROCEDURE — 63600175 PHARM REV CODE 636 W HCPCS: Performed by: INTERNAL MEDICINE

## 2024-09-24 PROCEDURE — 3078F DIAST BP <80 MM HG: CPT | Mod: CPTII,S$GLB,, | Performed by: INTERNAL MEDICINE

## 2024-09-24 PROCEDURE — 1101F PT FALLS ASSESS-DOCD LE1/YR: CPT | Mod: CPTII,S$GLB,, | Performed by: INTERNAL MEDICINE

## 2024-09-24 PROCEDURE — 25000003 PHARM REV CODE 250: Performed by: INTERNAL MEDICINE

## 2024-09-24 PROCEDURE — 99999 PR PBB SHADOW E&M-EST. PATIENT-LVL III: CPT | Mod: PBBFAC,,, | Performed by: INTERNAL MEDICINE

## 2024-09-24 PROCEDURE — 3008F BODY MASS INDEX DOCD: CPT | Mod: CPTII,S$GLB,, | Performed by: INTERNAL MEDICINE

## 2024-09-24 PROCEDURE — 96413 CHEMO IV INFUSION 1 HR: CPT

## 2024-09-24 PROCEDURE — 99215 OFFICE O/P EST HI 40 MIN: CPT | Mod: S$GLB,,, | Performed by: INTERNAL MEDICINE

## 2024-09-24 PROCEDURE — 97802 MEDICAL NUTRITION INDIV IN: CPT | Mod: S$GLB,,,

## 2024-09-24 PROCEDURE — 1125F AMNT PAIN NOTED PAIN PRSNT: CPT | Mod: CPTII,S$GLB,, | Performed by: INTERNAL MEDICINE

## 2024-09-24 RX ORDER — HEPARIN 100 UNIT/ML
500 SYRINGE INTRAVENOUS
OUTPATIENT
Start: 2024-10-16

## 2024-09-24 RX ORDER — DIPHENHYDRAMINE HYDROCHLORIDE 50 MG/ML
50 INJECTION INTRAMUSCULAR; INTRAVENOUS ONCE AS NEEDED
OUTPATIENT
Start: 2024-10-16

## 2024-09-24 RX ORDER — MORPHINE SULFATE 10 MG/ML
5 INJECTION, SOLUTION INTRAMUSCULAR; INTRAVENOUS ONCE
Status: CANCELLED
Start: 2024-09-24 | End: 2024-09-24

## 2024-09-24 RX ORDER — MORPHINE SULFATE 10 MG/ML
5 INJECTION, SOLUTION INTRAMUSCULAR; INTRAVENOUS ONCE
Start: 2024-10-16 | End: 2024-09-24

## 2024-09-24 RX ORDER — SODIUM CHLORIDE 0.9 % (FLUSH) 0.9 %
10 SYRINGE (ML) INJECTION
OUTPATIENT
Start: 2024-10-16

## 2024-09-24 RX ORDER — PROCHLORPERAZINE EDISYLATE 5 MG/ML
5 INJECTION INTRAMUSCULAR; INTRAVENOUS ONCE AS NEEDED
OUTPATIENT
Start: 2024-10-16

## 2024-09-24 RX ORDER — MORPHINE SULFATE 2 MG/ML
5 INJECTION, SOLUTION INTRAMUSCULAR; INTRAVENOUS ONCE
Status: COMPLETED | OUTPATIENT
Start: 2024-09-24 | End: 2024-09-24

## 2024-09-24 RX ORDER — OXYCODONE HYDROCHLORIDE 10 MG/1
10 TABLET ORAL EVERY 4 HOURS PRN
Qty: 45 TABLET | Refills: 0 | Status: SHIPPED | OUTPATIENT
Start: 2024-09-24

## 2024-09-24 RX ORDER — EPINEPHRINE 0.3 MG/.3ML
0.3 INJECTION SUBCUTANEOUS ONCE AS NEEDED
OUTPATIENT
Start: 2024-10-16

## 2024-09-24 RX ADMIN — SODIUM CHLORIDE 200 MG: 9 INJECTION, SOLUTION INTRAVENOUS at 10:09

## 2024-09-24 RX ADMIN — MORPHINE SULFATE 5 MG: 2 INJECTION, SOLUTION INTRAMUSCULAR; INTRAVENOUS at 11:09

## 2024-09-24 RX ADMIN — SODIUM CHLORIDE: 9 INJECTION, SOLUTION INTRAVENOUS at 10:09

## 2024-09-24 NOTE — PROGRESS NOTES
Patient ID: Cheryl Kirkland   Chief Complaint: Follow-up  MRN:  [id number]     Oncologic Diagnosis:  Stage III  (ypT2, pN3a, cM0) Gastric Adenocarcinoma  Previous Treatment:    NA Immunotherapy (Pembrolizumab 03/26/2024 - 07/23/2024)  s/p open total gastrectomy with D2 lymphadenectomy extensive lysis of adhesions on 08/20/2024.   Current Treatment:  Adjuvant Pembrolizumab     Subjective   The patient presents for follow up and is accompanied by her .    She tolerated NA immunotherapy without significant adverse effects.  She underwent total gastrectomy 08/20/24.  Pathology showed negative margins but 8/45 lymph nodes were positive which was reviewed with the patient.  She is recovering from surgery and reports continued significant pain and limitations in mobility.  Counseled on pain control regimen.     Review of Systems   Constitutional:  Negative for activity change, appetite change, chills, diaphoresis, fatigue, fever and unexpected weight change.   HENT:  Negative for nosebleeds.    Respiratory:  Negative for shortness of breath.    Cardiovascular:  Negative for chest pain.   Gastrointestinal:  Negative for abdominal distention, abdominal pain, anal bleeding, blood in stool, constipation, diarrhea, nausea and vomiting.   Genitourinary:  Negative for difficulty urinating and hematuria.   Musculoskeletal:  Negative for arthralgias, back pain and myalgias.   Skin:  Negative for rash.   Neurological:  Negative for dizziness, weakness, light-headedness and headaches.   Hematological:  Does not bruise/bleed easily.   Psychiatric/Behavioral:  The patient is not nervous/anxious.      History     Oncology History   Mixed epithelial carcinoma of right ovary   1/2/2023 Initial Diagnosis    Mixed epithelial carcinoma of left ovary     1/9/2023 Surgery    Laparotomy for radical resection of gynecologic cancer. Removal of pelvic mass (right salpingo-oophorectomy), left salpingo-oophorectomy, extensive  enterolysis, extensive adhesiolysis, left pelvic lymph node biopsy, omental biopsy, small bowel resection with primary functional end-to-end anastomosis, placement of pelvic drain. Path: Right ovary, tumor site, 11 cm, mixed epithelial carcinoma (50% mucinous, 50% endometrioid), G2 moderately differentiated, ovarian surface involvement-indeterminate-specimen disrupted. 1 left pelvic lymph node negative for neoplasia. FIGO stage IC2       1/9/2023 Cancer Staged    Staging form: Ovary, Fallopian Tube, and Primary Peritoneal Carcinoma, AJCC 8th Edition  - Clinical stage from 1/9/2023: FIGO Stage IC2, calculated as Stage IC (cT1c2, cN0, cM0)     4/5/2023 - 7/19/2023 Chemotherapy    4/5/2023: Cycle 1- paclitaxel 135 mg/m2 and carboplatin AUC 5 as patient is frail and elderly.  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 - [id number], MMR deficient- loss MLH1 and PMS2     2/8/2024 Cancer Staged    Staging form: Stomach, AJCC 8th Edition  - Clinical stage from 2/8/2024: Stage IIA (cT2, cN1, cM0)     3/4/2024 Tumor Conference    Date Presented to Tumor Board: 03/04/24  [facility] UGI MULTIDISCIPLINARY TUMOR BOARD  PATIENT REVIEW FORM   ____________________________________________________________    CLINIC #: [id number]  DATE: 3/4/2024    DIAGNOSIS: gastric GARRY    PRESENTER: Justus    PATIENT SUMMARY:   This 74 y/o female was  dx with ovarian CA 1/2023, underwent surgery and completed 6 cycles of adj chemotherapy 7/2023. She presented last month with hematemesis. EGD revealed large ulcerated gastric mass, which was biopsied. EGD pathology reviewed - poorly differentiated adenocarcinoma, with intestinal expression, MSI high, HER 2 negative. Staging imaging found irregular wall thickening in mid gastric body, enlarged gastrohepatic lymph node, no evidence of distant metastatic disease.   Reviewed PET - uptake in gastric wall and gastrohepatic lymph node with hypermetabolic activity.   Scheduled to see Dr. Amin in [place] later this week.   + family hx of cancer, pending genetics referral    BOARD RECOMMENDATIONS:   Proceed with systemic chemotherapy, consider neoadj immunotherapy  Proceed with diag lap  Await genetics testing, pathology will send to [facility]    CONSULT NEEDED:     [] Surgery    [] Hem/Onc    [] Rad/Onc    [] Dietary                 [] Social Service    [x] Genetics       [] AES  [] Radiology     Clinical Stage: Tumor   Node(s)  1  Metastasis      GROUP STAGE:  [] O    [] 1A    [] IB    [] IIA    [] IIB     [] IIIA     [] IIIB     [] IIIC    []IV  [] Local recurrence     [] Regional recurrence     [] Distant recurrence   Metastatic site(s): none         [x] Edna'l Treatment Guidelines reviewed and care planned is consistent with guidelines.         (i.e., NCCN, NCI, PD, ACO, AUA, etc.)    PRESENTATION AT CANCER CONFERENCE:         [x] Prospective    [] Retrospective     [] Follow-Up         3/14/2024 Surgery    Procedure:  LAPAROSCOPY, DIAGNOSTIC (N/A)  LYSIS, ADHESIONS, LAPAROSCOPIC (N/A)  LAVAGE, PERITONEAL, THERAPEUTIC (N/A)      Surgeon(s) and Role: Chen Jerome MD - Primary    Pre-Operative Diagnosis: Gastric adenocarcinoma [C16.9]     Post-Operative Diagnosis: Same     Pre-Operative Variables:  Stage:  III (T4a N1 M0)   Adjacent organ involvement: None  Chemotherapy within 90 Days: No  Radiation Therapy within 90 Days:  No      Procedure:  Diagnostic laparoscopy   Extensive lysis of adhesions  Peritoneal biopsy   Peritoneal washings for cytology     3/26/2024 -  Chemotherapy    Treatment Summary   Plan Name: OP pembrolizumab 200mg Q3W  Treatment Goal: Curative  Status: Active  Start Date: 3/26/2024  End Date: 5/6/2026 (Planned)  Provider: Claudia Ambrose MD  Chemotherapy: [No matching medication found in this treatment plan]      Chemotherapy    Treatment Summary   Plan Name: OP pembrolizumab 200mg Q3W  Treatment Goal: Curative  Status: Active  Start Date: 3/15/2024 (Planned)  End Date: 2/27/2026 (Planned)  Provider: Claudia Ambrose MD  Chemotherapy: [No matching medication found in this treatment plan]     4/8/2024 Tumor Conference       [facility] UGI MULTIDISCIPLINARY TUMOR BOARD  PATIENT REVIEW FORM   ____________________________________________________________    CLINIC #: [id number]  DATE: 4/8/2024    DIAGNOSIS: Gastric GARRY    PRESENTER: Justus    PATIENT SUMMARY:   This 72 y/o female was dx with ovarian CA 1/2023, underwent surgery and completed 6 cycles of adj chemotherapy 7/2023. She developed hematemesis in Feb 2024, then underwent EGD with bx of a large ulcerated gastric mass. Pathology revealed poorly differentiated adenocarcinoma, with intestinal expression, MSI high, HER 2 negative. She was [facility] last month. Since then, she underwent diag lap and today's presentation is for pathology review. Negative for malignancy, reactive atypia     BOARD RECOMMENDATIONS:   Proceed with immunotherapy, then restage with imaging  Potential candidate for subtotal distal gastrectomy    CONSULT NEEDED:     [] Surgery    [] Hem/Onc    [] Rad/Onc    [] Dietary                 [] Social Service    [] Psychology       [] AES  [] Radiology     Clinical Stage: Tumor   Node(s) 1 Metastasis 0      GROUP STAGE:  [] O    [] 1A    [] IB    [] IIA    [] IIB     [] IIIA     [] IIIB     [] IIIC    []IV  [] Local  recurrence     [] Regional recurrence     [] Distant recurrence   Metastatic site(s): none         [x] Edna'l Treatment Guidelines reviewed and care planned is consistent with guidelines.         (i.e., NCCN, NCI, PD, ACO, AUA, etc.)    PRESENTATION AT CANCER CONFERENCE:         [x] Prospective    [] Retrospective     [] Follow-Up           7/1/2024 Tumor Conference     [facility] UGI MULTIDISCIPLINARY TUMOR BOARD  PATIENT REVIEW FORM   ____________________________________________________________    CLINIC #: [id number]   DATE: 7/1/2024    DIAGNOSIS: gastric GARRY    PRESENTER: Justus    PATIENT SUMMARY:   This 72 y/o female was dx with ovarian CA 1/2023, underwent surgery and completed 6 cycles of adj chemotherapy in 7/2023. She developed hematemesis in Feb 2024 and underwent EGD with bx of a large ulcerated gastric mass. Pathology revealed poorly differentiated adenocarcinoma, with intestinal expression, MSI high, HER 2 negative. She underwent diag lap and pathology was negative for malignancy, reactive atypia. She was presented to this [facility] in 3/2024 and again in 4/2024. Since then, she received immunotherapy Keytruda 200 mg every 3 weeks, total of 4 cycles thus far.   Reviewed re-staging CT scan - perigastric lymph nodes larger in size  She remains asymptomatic.     BOARD RECOMMENDATIONS:   Obtain repeat PET  Consider ct DNA    CONSULT NEEDED:     [] Surgery    [] Hem/Onc    [] Rad/Onc    [] Dietary                 [] Genetics    [] Psychology       [] AES  [] Interventional Radiology     Clinical Stage: Tumor 2 Node(s) 1 Metastasis 0      GROUP STAGE:  [] O    [] 1A    [] IB    [x] IIA    [] IIB     [] IIIA     [] IIIB     [] IIIC    []IV  [] Local recurrence     [] Regional recurrence     [] Distant recurrence   Metastatic site(s): none         [x] Edna'l Treatment Guidelines reviewed and care planned is consistent with guidelines.         (i.e., NCCN, NCI, PD, ACO, AUA, etc.)    PRESENTATION AT  CANCER CONFERENCE:         [x] Prospective    [] Retrospective     [] Follow-Up         8/20/2024 Surgery    Open total gastrectomy with D2 WHITNEY and extensive lysis of adhesions   (Path: pT2 pN3a (8/35 LN positive), G3 poorly differentiated adenocarcinoma, margins negative     8/20/2024 Cancer Staged    Staging form: Stomach, AJCC 8th Edition  - Pathologic stage from 8/20/2024: Stage III (ypT2, pN3a, cM0)           Previous HPI  Cheryl Garcia is a 73 y.o. female with history of DVT not currently on AC, previously on Xarelto, FIGO Stage IC (cT1c2, cN0, cM0) Mixed epithelial carcinoma of right ovary s/p surgery 01/09/023 and 6C Carbo/Paclitaxel completed 07/19/2023, Osteoporosis on Fosamax, HTN and Hx of Epilepsy on Keppra, Tegretol, Zonegran (last seizure in 2009) who presents to clinic to establish care on referral for gastric cancer.    Patient reports that she [place] with report of hematemeiss; she was transferred to [facility] for higher level of care.  On admission to [facility], EGD  was performed and showed a large ulcerated gastric mass.  Pathology results it as poorly differentiated adenocarcinoma.  She states that her weight is currently stable.  The last time she lost any weight was 2 her ovarian cancer diagnosis and treatment at which time she lost 5 lb but gained it back.  On hospitalization here at [facility], during GI workup she lost those 5 lbs a cane because she was NPO.    The patient has already established care with surgical oncology, Dr. Jerome.  She was presented in the upper GI tumor board with consensus for neoadjuvant immunotherapy given MSI high status of her tumor.  She is here for medical oncology recommendations.    I reviewed the recommendations of the tumor board consensus for neoadjuvant immunotherapy and I reviewed her pathology and biomarkers in detail.  She is expressed understanding in his in agreement with the plan.   She also understands that she still needs to undergo diagnostic laparoscopy for completed staging.    Otherwise, she is a former light smoker; quit 30-40 years ago. No alcohol use in 30-40 years. No illicit drug use.  She has an extensive family history of malignancy in his already been referred to our genetic counselor.      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    OP (osteoporosis) 02/28/2024    Ovarian cancer     Primary hypertension 12/01/2022       Past Surgical History:   Procedure Laterality Date    ABDOMINAL WASHOUT N/A 3/14/2024    Procedure: LAVAGE, PERITONEAL, THERAPEUTIC;  Surgeon: Chen Jerome MD;  Location: [facility] OR;  Service: General;  Laterality: N/A;    APPENDECTOMY  2015    BIOPSY OF PERITONEUM N/A 3/14/2024    Procedure: BIOPSY, PERITONEUM;  Surgeon: Chen Jerome MD;  Location: [facility] OR;  Service: General;  Laterality: N/A;    COLONOSCOPY  06/20/2012    Dr Boyle- Tubular adenoma polyps. Repeat in 3 years.    COLONOSCOPY  06/17/2015    -Nodular mucosa at appendiceal orfice, sigmoid and desc diverticulosis otherwise normal.    COLONOSCOPY  2020    >3 TAs    COLONOSCOPY  12/2023    DIAGNOSTIC LAPAROSCOPY N/A 3/14/2024    Procedure: LAPAROSCOPY, DIAGNOSTIC;  Surgeon: Chen Jerome MD;  Location: [facility] OR;  Service: General;  Laterality: N/A;  1. Diagnostic laparoscopy   2. Extensive lysis of adhesions  3. Peritoneal biopsy   4. Peritoneal washings for cytology     DIAGNOSTIC LAPAROSCOPY N/A 8/20/2024    Procedure: LAPAROSCOPY, DIAGNOSTIC;  Surgeon: Chen Jerome MD;  Location: [facility] OR;  Service: General;  Laterality: N/A;    EGD - EXTERNAL RESULT  06/20/2012    Dr Boyle- Mild gastritis otherwise normal.    ENDOSCOPIC ULTRASOUND OF UPPER GASTROINTESTINAL TRACT N/A 7/26/2024    Procedure: ULTRASOUND, UPPER GI TRACT, ENDOSCOPIC;  Surgeon: Valdo Aguilera MD;  Location: [facility];  Service: Endoscopy;  Laterality: N/A;  7/22/24: instructions sent via portal-. Pt no longer takling eliquis-GD    ESOPHAGOGASTRODUODENOSCOPY N/A 02/08/2024    Procedure: EGD (ESOPHAGOGASTRODUODENOSCOPY);  Surgeon: Jayla Arteaga MD;  Location: [facility];  Service: Endoscopy;  Laterality: N/A;    ESOPHAGOGASTRODUODENOSCOPY N/A 8/20/2024    Procedure: EGD (ESOPHAGOGASTRODUODENOSCOPY);  Surgeon: Chen Jerome MD;  Location: [facility] OR;  Service: General;  Laterality: N/A;    GASTRECTOMY N/A 8/20/2024    Procedure: GASTRECTOMY;  Surgeon: Chen Jerome MD;  Location: [facility] OR;  Service: General;  Laterality: N/A;    HYSTERECTOMY      LAPAROSCOPIC LYSIS OF ADHESIONS N/A 3/14/2024    Procedure: LYSIS, ADHESIONS, LAPAROSCOPIC;  Surgeon: hCen Jerome MD;  Location: [facility] OR;  Service: General;  Laterality: N/A;    LYSIS OF ADHESIONS N/A 8/20/2024    Procedure: LYSIS, ADHESIONS;  Surgeon: Chen Jerome MD;  Location: [facility] OR;  Service: General;  Laterality: N/A;    PLACEMENT OF JEJUNOSTOMY TUBE N/A 8/20/2024    Procedure: INSERTION, JEJUNOSTOMY TUBE;  Surgeon: Chen Jerome MD;  Location: [facility] OR;  Service: General;  Laterality: N/A;    TOTAL ABDOMINAL HYSTERECTOMY W/ BILATERAL SALPINGOOPHORECTOMY  01/09/2023    radical resection with right salpingo-oophorectomy, left salpingo-oophorectomy, extensive enterolysis, extensive adhesiolysis, left pelvic lymph node biopsy, omental biopsy, small bowel resection with primary functional end-to-end anastomosis, placement of pelvic drain        Family History   Problem Relation Name Age of Onset    Pancreatic cancer Brother Misael Mccray 76    Prostate cancer Brother Zachary 67    Pancreatic cancer Half-brother Gasper 74    Colon cancer Half-sister Elton 83    Lung cancer Half-sister Elton         +smoking hx    Breast cancer Half-sister Vidhi 58    Lymphoma Other Cara Sun    Prostate cancer Other Fernando     Prostate cancer Other Gasper Calderon     Ovarian cancer Other Inerris     Breast cancer Other Aleida     Colon cancer Other Aleida        Review of patient's allergies indicates:  No Known Allergies    Social History     Tobacco Use    Smoking status: Former     Types: Cigarettes    Smokeless tobacco: Never    Tobacco comments:     Quit 1989 smoked 10 years smoked 0.25 ppd    Substance Use Topics    Alcohol use: Not Currently    Drug use: Never     Physical Exam     ECOG SCORE    2 - Capable of all selfcare but unable to carry out any work activities, active > 50% of hours, 3 - Capable of only limited selfcare, confined to bed or chair more than 50% of waking hours       Vitals:    09/24/24 0903   BP: 104/68   Pulse: 85   Temp: 97.1 °F (36.2 °C)         Physical Exam  Constitutional:       General: She is not in acute distress.     Appearance: She is normal weight. She is ill-appearing.   HENT:      Head: Normocephalic and atraumatic.   Eyes:      Extraocular Movements: Extraocular movements intact.      Conjunctiva/sclera: Conjunctivae normal.   Cardiovascular:      Rate and Rhythm: Normal rate.   Pulmonary:      Effort: Pulmonary effort is normal.   Neurological:      General: No focal deficit present.      Mental Status: She is alert and oriented to person, place, and time. Mental status is at baseline.   Psychiatric:         Mood and Affect: Mood normal.         Behavior: Behavior normal.         Thought Content: Thought content normal.         Judgment: Judgment normal.         Labs   Labs:  No visits with results within 2  Day(s) from this visit.   Latest known visit with results is:   Lab Visit on 09/20/2024   Component Date Value Ref Range Status    Phosphorus 09/20/2024 2.7  2.7 - 4.5 mg/dL Final    Magnesium 09/20/2024 2.0  1.6 - 2.6 mg/dL Final    Sodium 09/20/2024 145  136 - 145 mmol/L Final    Potassium 09/20/2024 4.2  3.5 - 5.1 mmol/L Final    Chloride 09/20/2024 119 (H)  95 - 110 mmol/L Final    CO2 09/20/2024 17 (L)  23 - 29 mmol/L Final    Glucose 09/20/2024 100  70 - 110 mg/dL Final    BUN 09/20/2024 14  8 - 23 mg/dL Final    Creatinine 09/20/2024 1.0  0.5 - 1.4 mg/dL Final    Calcium 09/20/2024 9.4  8.7 - 10.5 mg/dL Final    Total Protein 09/20/2024 7.3  6.0 - 8.4 g/dL Final    Albumin 09/20/2024 3.4 (L)  3.5 - 5.2 g/dL Final    Total Bilirubin 09/20/2024 0.2  0.1 - 1.0 mg/dL Final    Comment: For infants and newborns, interpretation of results should be based  on gestational age, weight and in agreement with clinical  observations.    Premature Infant recommended reference ranges:  Up to 24 hours.............<8.0 mg/dL  Up to 48 hours............<12.0 mg/dL  3-5 days..................<15.0 mg/dL  6-29 days.................<15.0 mg/dL      Alkaline Phosphatase 09/20/2024 121  55 - 135 U/L Final    AST 09/20/2024 18  10 - 40 U/L Final    ALT 09/20/2024 12  10 - 44 U/L Final    eGFR 09/20/2024 59 (A)  >60 mL/min/1.73 m^2 Final    Anion Gap 09/20/2024 9  8 - 16 mmol/L Final    WBC 09/20/2024 6.47  3.90 - 12.70 K/uL Final    RBC 09/20/2024 4.14  4.00 - 5.40 M/uL Final    Hemoglobin 09/20/2024 13.5  12.0 - 16.0 g/dL Final    Hematocrit 09/20/2024 44.6  37.0 - 48.5 % Final    MCV 09/20/2024 108 (H)  82 - 98 fL Final    MCH 09/20/2024 32.6 (H)  27.0 - 31.0 pg Final    MCHC 09/20/2024 30.3 (L)  32.0 - 36.0 g/dL Final    RDW 09/20/2024 15.7 (H)  11.5 - 14.5 % Final    Platelets 09/20/2024 391  150 - 450 K/uL Final    MPV 09/20/2024 8.9 (L)  9.2 - 12.9 fL Final    Immature Granulocytes 09/20/2024 1.5 (H)  0.0 - 0.5 % Final    Gran  # (ANC) 09/20/2024 3.5  1.8 - 7.7 K/uL Final    Immature Grans (Abs) 09/20/2024 0.10 (H)  0.00 - 0.04 K/uL Final    Comment: Mild elevation in immature granulocytes is non specific and   can be seen in a variety of conditions including stress response,   acute inflammation, trauma and pregnancy. Correlation with other   laboratory and clinical findings is essential.      Lymph # 09/20/2024 1.4  1.0 - 4.8 K/uL Final    Mono # 09/20/2024 0.9  0.3 - 1.0 K/uL Final    Eos # 09/20/2024 0.5  0.0 - 0.5 K/uL Final    Baso # 09/20/2024 0.05  0.00 - 0.20 K/uL Final    nRBC 09/20/2024 0  0 /100 WBC Final    Gran % 09/20/2024 54.3  38.0 - 73.0 % Final    Lymph % 09/20/2024 21.8  18.0 - 48.0 % Final    Mono % 09/20/2024 13.6  4.0 - 15.0 % Final    Eosinophil % 09/20/2024 8.0  0.0 - 8.0 % Final    Basophil % 09/20/2024 0.8  0.0 - 1.9 % Final    Differential Method 09/20/2024 Automated   Final        Pathology   Contains abnormal data Specimen to Pathology, Surgery Gastrointestinal tract - 02/28/2024      Component 3 wk ago   Final Pathologic Diagnosis  Abnormal   STOMACH, 'GASTRIC MASS', BIOPSY:  - 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 Scattered p53 positivity (wild-type pattern of expression) and p16 positivity are also noted. Additional studies, including CK7, PAX8, ER, HER2, TTF-1, GATA3, WT-1,  synaptophysin, and chromogranin, are either negative or negative in the cells of interest. This patient's prior history of ovarian carcinoma resected at [facility] in [place], [place] is noted and the outside pathology report (S-) is reviewed.   While both tumors do have similar immunohistochemical staining patterns, they are not identical, with notable reported differences being CK7 and PAX8 positivity in the ovarian tumor. The presence of a large, fungating, and ulcerated mass in the  stomach  is suspicious for a gastric primary; however, it is unclear if the masses represent two separate primaries or one p rimary and one metastatic focus. Abnormal mismatch repair studies do suggest increased risk for development of multiple tumor types (see  below). Requesting outside slides for review to compare morphology may be helpful.    DNA mismatch repair IHC studies are ABNORMAL and demonstrate LOSS OF MLH1 and PMS2 expression within tumor cells. MSH2 and MSH6 exhibit retained expression. 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    Tissue will be sent for molecular profiling by next-generation sequencing ([facility]). Results of this study will be issued directly to the electronic medical record.                        Tumor NGS Tissue - 02/29/2024  MSI: MSI-H         TMB: 42.6 m/MB         Low Coverage Regions: KDM5D, PMS2         Fusion Addendum Issue Type: NEGATIVE  Negative - This addendum is being issued to report that no gene fusions or altered splicing variants from RNA sequencing analysis were found.               Cytology-FNA Non-Radiology Clinician Performed w/o on site - 07/26/2024        Component 2 mo ago   Final Pathologic Diagnosis      Abnormal   Lymph node, perigastric, EUS guided fine-needle aspiration/biopsy:  - Adenocarcinoma involving lymphoid tissue consistent with patient's previous gastric mass biopsy, see comment.    COMMENT:  The biopsy shows a malignant epithelial proliferation involving lymphoid tissue consistent with tumor involving lymph node.  Immunostains show tumor cells to be positive for cytokeratin, CDX2, and patchy CK20.  Tumor cells are negative for PAX8   and CK7.  Patient had a previous stomach mass biopsy (RQM-) that is pulled and reviewed.  The current biopsy shows similar morphology and immunoprofile as the stomach mass biopsy.  MMR and NGS performed on previous biopsy.  Claudin 18.2 pending,  results  will be issued in an addendum.      HER2 by immunohistochemistry:  Negative (1+)  VC      Comment: Interp By Tiny Ma MD, Signed on 08/06/2024 at 13:07   Supplemental Diagnosis      Abnormal   CLDN18, SemiQuant IHC, Manual    Interpretation  FNA lymph node, specimen for CLD18 immunohistochemistry studies (anti-[id number] (CLDN18) clone [id number], [employer], [place], [place]; using a proprietary detection system) ([id number], [id number])    Claudin 18.2: Negative; 40% of tumor cells with 2+ and/or 3+ membrane staining.    Interpretation: The Claudin 18.2 antibody (clone [id number]) was performed per clinical validation and relevant - provided instructions.    This result should be interpreted in the appropriate clinical context.    Fixation: This test has been validated for non-decalcified paraffin embedded tissue specimens fixed in 10% neutral buffered formalin. Recommended fixation time is between 6?48 hours. This assay has not been validated on tissues  subjected to the decalcification process and /or use of alternative fixatives.      Report electronically signed by  Maribell Glover M.D.      Report attached.    Performing  location:  [place] [address]    &quot;Disclaimer:  This case diagnosis was rendered completely by the outside consultation pathologist and the case is electronically signed by an [facility] pathologist listed below solely to release the report into the medical record.&quot;  VC      Disclaimer      Abnormal   HER-2/candida IHC (4B5) clone, DAB detection method) is done on 10% buffered formalin-fixed (for 6-72 hrs), paraffin embedded tissue sections. The scoring is completed on a 4-tiered scoring system of membrane staining using the 2014 ASCO/CAP scoring  guidelines. It has been cleared by the U.S. FDA for use as an IVD test. This assay has not been validated on decalcified  tissues. Results should be interpreted with caution given the likelihood of false negativity on decalcified specimens.    HER-2/candida IHC (4B5) clone, DAB detection method) is done on 10% buffered formalin-fixed (for 6-72 hrs), paraffin embedded tissue sections. The scoring is completed on a 4-tiered scoring system of membrane staining using the 2014 ASCO/CAP scoring  guidelines. It has been cleared by the U.S. FDA for use as an IVD test. This assay has not been validated on decalcified tissues. Results should be interpreted with caution given the likelihood of false negativity on decalcified specimens.    VC      Resulting Agency [facility]            Specimen to Pathology, Surgery General Surgery - 08/20/2024      Component 1 mo ago   Final Pathologic Diagnosis 1. Small intestine, peritoneal scarring, excision:  - Benign fibroadipose tissue  - Negative for malignancy    2. Spleen, lesion, excision:- Benign splenic tissue  - Negative for malignancy    3. Small intestine, resection:- Benign small intestinal tissue  - Negative for malignancy    4. Lymph node, hepatic artery, excision:- Two lymph nodes, negative for metastatic carcinoma (0/2)    5. Stomach, total gastrectomy:  - Adenocarcinoma, poorly cohesive, with features of undifferentiated carcinoma  - Tumor involves subserosal tissue  - Margins negative for carcinoma  - Pathologic stage y pT2 pN3a  - Eight of 35 lymph nodes are POSITIVE for metastatic carcinoma (8/35)  - Evidence of tumor regression is seen in a lymph node  - Incidental benign leiomyoma identified in perigastric tissue  - Background chronic gastritis  - Helicobacter pylori-type microorganisms are identified by immunohistochemistry  - See synoptic report below    6. Lymph nodes, D2 and periportal lymph node, lymphadenectomy:  - Eight lymph nodes, all negative for metastatic carcinoma (0/8)     7. Lymph node, aortocaval, excision:  - One lymph node, negative for metastatic carcinoma (0/1)    8.  Anastomosis, anastomotic donuts:  - Benign esophageal and small intestinal tissue  - Negative for malignancy    _____________________________________________________________________  CASE SUMMARY: (STOMACH)  Standard(s): AJCC-UICC 8    SPECIMEN    Procedure: Total gastrectomy    TUMOR    Tumor Site: Body: Lesser curvature    Histologic Type: Adenocarcinoma    Adenocarcinoma Classification (based on WHO): Poorly cohesive carcinoma, Undifferentiated (anaplastic) carcinoma    Histologic Type Comment: Tumor is poorly cohesive, but in some areas a better differentiated component is identified. The tumor diffusely expresses CDX2.  Histologic Grade: G3, poorly differentiated, undifferentiated  Tumor Size: Greatest dimension in Centimeters (cm): 6.6 cm    Additional Dimension in Centimeters (cm): 4.2 x 1.4 cm  Tumor Extent: Invades muscularis propria  Treatment Effect: Present, with residual cancer showing evident tumor regression, but more than single cells or rare small groups of cancer cells (partial response, score 2)  Lymphatic and / or Vascular Invasion: Present    MARGINS    Margin Status for Invasive Carcinoma: All margins negative for invasive carcinoma    Closest Margin(s) to Invasive Carcinoma: Omental (radial): lesser curvature    Distance from Invasive Carcinoma to Closest Margin: Exact distance in cm: 3 cm  Margin Status for Dysplasia: All margins negative for dysplasia    REGIONAL LYMPH NODES    Regional Lymph Node Status: Regional lymph nodes present    Tumor present in regional lymph node(s)      Number of Lymph Nodes with Tumor: Exact number: 8    Number of Lymph Nodes Examined: 45    DISTANT METASTASIS    Distant Site(s) Involved, if applicable: Not applicable    pTNM CLASSIFICATION (AJCC 8TH Edition)  Reporting of pT, pN, and (when applicable) pM categories is based on information available to the pathologist at the time the report is issued. As per the AJCC (Chapter 1, 8th Ed.) it is the managing  physician's responsibility to establish the final  pathologic stage based upon all pertinent information, including but potentially not limited to this pathology report.    Modified Classification: y (post-neoadjuvant therapy)  pT Category: pT2: invades the muscularis propria  pN Category: pN3a: Metastasis in seven to 15 regional lymph nodes  pM Category: Not applicable - pM cannot be determined from the submitted specimen(s)    ADDITIONAL FINDINGS    Additional Findings: Chronic gastritis, Helicobacter pylori present    SPECIAL STUDIES    HER2 and mismatch repair testing have been performed on the previous biopsy cases [facility]- and [facility]-; see those cases for a full report. HER2 was negative and mismatch repair testing revealed loss of nuclear expression of MLH1 and PMS2. Claudin  18.2 was negative.   Comment: Interp By Jorge Luis Morales D.O., Signed on 08/23/2024 at 17:04   Frozen Section Diagnosis 1.Negative for malignacy  2.Negative for malignacy  Frozen diagnosis made by Dr. Goode for part 1 and 2  5. Proximal and distal margins negative for malignancy.  Frozen diagnosis made by Dr. Elena for part 5.   Microscopic Exam Immunohistochemical stains for PAX8, cytokeratin 7, cytokeratin 20, CDX2, CD45, synaptophysin and chromogranin are performed on block 5-F with adequate and reactive controls and the following results:  CDX2 is positive.  Cytokeratin 20 is patchy, but focally positive in tumor cells.  Cytokeratin 7, CD45, synaptophysin, chromogranin, and PAX8 are negative.    EBV in-situ hybridization, performed on block 5-F, is negative. RNA positive and GUILLAUME negative controls stain appropriately.    An immunohistochemical stain for H. pylori, performed on block 5-K with adequate and reactive controls, is positive.   Gross 1: Surgery ID:  [id number]    Pathology ID:  [id number]  1. Received fresh and subsequently placed in formalin labeled &quot;small bowel peritoneal scarring&quot; is a 0.7 x 0.5 x 0.3 cm tan  tissue fragment.  The specimen is submitted entirely for frozen section with the frozen section remnant submitted in  cassette 1A.    FBS--1-A-FS    Grossed by: Rose Mary Melara MS, PA([facility])     2: Surgery ID:  [id number]    Pathology ID:  [id number]  2. Received fresh and subsequently placed in formalin labeled &quot;splenic flexure lesion&quot; is a 0.6 x 0.4 x 0.3 cm tan-red tissue fragments.  The specimen is submitted entirely for frozen section with the frozen section remnant submitted in  cassette 2A.  QUR--2-A-FS    Grossed by: Rose Mary Melara MS, PA([facility])     3: Surgery ID:  [id number]   Pathology ID:  [id number] 3.  Small bowel Received in formalin labeled &quot;small bowel&quot; is a 4.4 cm in length by 1.9 cm in diameter segments of small bowel with 2 stapled ends.  The serosa is pink-red with areas of adhesions.  Opening the specimen reveals pink-red, well folded   mucosa.  Representative sections are submitted as follows:  PLU--3-A: Tissue from staple line margin  TDA--3-B: Tissue from opposite staple line margin  IQM--3-C: Representative sections of specimen      Grossed by: Rose Mary Melara MS, PA([facility])     4: Surgery ID:  [id number]   Pathology ID:  [id number]  4. Received in formalin labeled &quot;hepatic artery lymph node&quot; is a 0.8 x 0.5 x 0.4 cm tan-pink lymph node.  The lymph node is bisected and submitted entirely in cassette 4A.    PDH--4-A    Grossed by: Rose Mary Melara MS, PA([facility])     5: Surgery ID:  [id number]   Pathology ID:  [id number]  5. Received fresh and subsequently placed in formalin labeled &quot;total gastrectomy distal and proximal margin&quot; is a 23 x 7.6 x 4 cm (lesser curvature is 10 cm and greater curvature is 23 cm) gastrectomy specimen.  The serosa is pink-red.  Opening   the specimen reveals a 6.6 x 4.2 cm firm, tan-pink, nodular, slightly raised mass.  The mass invades the gastric wall and involves the serosa.  The mass does not grossly  invade into the perigastric fat measuring 3 cm from the lesser curvature fat margin   and 7 cm from the greater curvature fat margin.  The mass measures 5.2 cm from the distal duodenal staple line margin and 4 cm from the proximal staple line margin.  The remaining mucosa is tan-pink and moderately well folded.  Multiple lymph nodes are  identified.  The serosa is inked blue.  The proximal staple line margin is submitted for frozen section with the frozen section remnant submitted in cassette AYB--5-A-FS.  The distal duodenal staple line margin is submitted for frozen section with   the frozen section remnant submitted in cassette HDX--5-B-FS.  Representative sections are submitted as follows:  ZIR--5-C:  Lesser curvature fat margin nearest mass  FVS--5-D: Greater curvature fat margin nearest mass  GER--5-E: Mass to serosa  KEX--5-F: Mass to lesser curvature fat  IYI--5-G: Mass to adjacent distal mucosa  XOE--5-H: Mass to adjacent proximal mucosa  GEU--5-I: Representative section of mass  GQT--5-J: Representative section of mass  URO--5-K: Representative sections of uninvolved mucosa  TQJ--5-L: 7 lesser curvature lymph nodes  DDP--5-M: 5 lesser curvature lymph nodes  CYR--5-N: 5 lesser curvature lymph nodes  VAJ--5-O: 5 lymph nodes  UNZ--5-P: 2 lymph nodes  YEX--5-Q - TXY--5-R - ODR--5-S: 1 lymph node, serially sectioned and submitted entirely  CZM--5-T - MHK--5-X: 1 lymph node, serially sectioned and submitted entirely    Grossed by: Rose Mary Melara MS, PA([facility])     6: Surgery ID:  [id number]   Pathology ID:  [id number]  6. Received in formalin labeled &quot;D2 lymphadenopathy with alea portal lymph node&quot; is a 4 x 4 x 1 cm aggregate of tan-pink to tan-yellow, nodular tissue fragments.  7 candidate lymph nodes ranging from 0.3-1.8 cm in greatest dimension  are  identified.  The lymph nodes are submitted entirely as follows:  HVL--6-A: 4 lymph nodes  PET--6-B: 2 lymph nodes  EUM--6-C: 1 lymph node, bisected    Grossed by: Rose Mary Melara MS, PA([facility])     7: Surgery ID:  [id number]   Pathology ID:  [id number]  7. Received in formalin labeled &quot;Aortic cable&quot; is a 1 x 0.5 x 0.4 cm purple-gray, rubbery portion of tissue.  The specimen is submitted entirely in cassette 7A.    OTR--7-A    Grossed by: Rose Mary Melara MS, PA([facility])     8: Surgery ID:  [id number]   Pathology ID:  [id number]  8. Received in formalin labeled &quot;anastomotic donuts&quot; are 2 circular, pink-red mucosal surfaced portions of tissue measuring 1.7 cm in diameter by 1 cm in length and 1.5 cm in diameter by 1.1 cm in length.  Representative sections are submitted  in cassette 8A.    HSQ--8-A    Grossed by: Rose Mary Melara MS, PA([facility])   Frozen Section Footnote Frozen section performed at [address] [place], [address]   Disclaimer Unless the case is a 'gross only' or additional testing only, the final diagnosis for each specimen is based on a microscopic examination of appropriate tissue sections.  This test was developed and its performance characteristics determined by [facility], Department of Pathology and Laboratory Medicine. It has not been cleared or approved by the US Food and Drug Administration. The FDA has determined that  such clearance or approval is not necessary. This test is used for clinical purposes. It should not be regarded as investigational or for research. This laboratory is certified under the Clinical Laboratory Improvement Admendments (CLIA) as qualified to  perform such high complexity clinical laboratory testing   Resulting Agency [facility]          Imaging   NM PET CT FDG SKULL BASE TO MID THIGH - 02/28/2024  CLINICAL HISTORY:  Gastric cancer.  Malignant neoplasm of stomach, unspecified.   Initial staging.  History of left ovarian epithelial carcinoma.     TECHNIQUE:  11.45 mCi of F18-FDG was administered intravenously in the left forearm.  After an approximately 60 min distribution time, PET/CT images were acquired from the skull base to the mid thigh. Transmission images were acquired to correct for attenuation using a whole body low-dose CT scan without contrast with the arms positioned above the head. Glycemia at the time of injection was 113 mg/dL.     COMPARISON:  CT abdomen pelvis, 02/08/2024 and chest CT, 02/27/2024     FINDINGS:  Quality of the study: Adequate.     SUV max of the liver parenchyma is 2.8     Neck: No abnormal FDG avidity.  No lymphadenopathy or mass.     Chest: No abnormal FDG avidity.  No pleural effusion or lymphadenopathy or pulmonary nodule.     Abdomen/pelvis: Marked thickening of the wall of the stomach involving the fundus and proximal body with marked FDG avidity with SUV max 18.  The gastrohepatic ligament lymph node measuring 19 x 13 mm shows moderate FDG avidity with SUV max 4.6.     No ascites.  No other focus of abnormal FDG avidity in the abdomen or pelvis.     Skeletal structures: No abnormal FDG avidity.  No focal lytic or sclerotic lesion.     Physiologic uptake of the tracer is present within the brain, salivary glands, myocardium, GI and  tracts.     Incidental CT findings: N/A     Impression:  1. The biopsy proving gastric cancer is markedly FDG avid with SUV max 18.  2. Moderately FDG avid gastrohepatic ligament lymph node consistent with local metastatic disease.  3. No evidence for distant metastatic disease.  All CT scans at this facility are performed  using dose modulation techniques as appropriate to performed exam including the following:  automated exposure control; adjustment of mA and/or kV according to the patients size (this includes techniques or standardized protocols for targeted exams where dose is matched to indication/reason for exam:  i.e. extremities or head);  iterative reconstruction technique.    Assessment and Plan   Stage III  (ypT2, pN3a, cM0) Gastric Adenocarcinoma  EGD 02/08/2024: Gastric Mass  Path: Poorly-differentiated adenocarcinoma with intestinal phenotype -  HER2-, MMR - Deficient (MLH1 and PMS2 loss)  [facility] NGS:   MSI-H, TMB 42.6  Potentially actionable mutation in CHRIS  Multiple clinical trials available  PET-CT 02/28/24 showed no evidence of distant metastatic disease  Presented in Tb 03/04/24 with consensus for Proceed with systemic chemotherapy, consider neoadj immunotherapy, Proceed with diag lap  Diagnostic Laparoscopy 03/14/2024 with Dr. Jerome negative for malignant cells  Per NCCN guidelines, NA immunotherapy is useful in MSI-H/dMMR tumors) with options being Nivolumab and ipilimumab followed by nivolumab or Pembrolizumab.  Given patient's comorbid conditions and potentially increased toxicity with CTLA4 inhibitor and PD-1 inhibitor, decision made to proceed with Pembrolizumab. Started 03/14/24.  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   Follow up with JENNIE    Infusion  scheduling note   Keytruda today + pain medication and in 3 weeks   Injection scheduling note    Labs CMP, CBC, phosphorus, magnesium and TSH   Scheduling:  Preferred lab:  Lab interval:     Imaging    Pharmacy appointment    Other referrals                  The patient was seen, interviewed and examined. Pertinent lab and radiologic studies were reviewed. Pt instructed to call should they develop concerning signs/symptoms or have further questions.        Portions of the record may have been created with voice recognition software. Occasional wrong-word or sound-a-like substitutions may have occurred due to the inherent limitations of voice recognition software. Read the chart carefully and recognize, using context, where substitutions have occurred.      Claudia Amin MD    Hematology/Oncology

## 2024-09-24 NOTE — DISCHARGE INSTRUCTIONS
..[facility]  [address] [facility]  [address]  [phone] phone     [phone] fax  Hours of Operation: Monday- Friday 8:00am- 5:00pm  After hours phone  [phone]  Hematology / Oncology Physicians on call    Dr. Sheng Alston        Nurse Practitioners:    Bibiana Banegas, IESHA Francis, IESHA Larsen, IESHA Luis, IESHA Matos, IESHA Fry, PA      Please don't hesitate to call if you have any concerns.    .FALL PREVENTION   Falls often occur due to slipping, tripping or losing your balance. Here are ways to reduce your risk of falling again.   Was there anything that caused your fall that can be fixed, removed or replaced?   Make your home safe by keeping walkways clear of objects you may trip over.   Use non-slip pads under rugs.   Do not walk in poorly lit areas.   Do not stand on chairs or wobbly ladders.   Use caution when reaching overhead or looking upward. This position can cause a loss of balance.   Be sure your shoes fit properly, have non-slip bottoms and are in good condition.   Be cautious when going up and down stairs, curbs, and when walking on uneven sidewalks.   If your balance is poor, consider using a cane or walker.   If your fall was related to alcohol use, stop or limit alcohol intake.   If your fall was related to use of sleeping medicines, talk to your doctor about this. You may need to reduce your dosage at bedtime if you awaken during the night to go to the bathroom.   To reduce the need for nighttime bathroom trips:   Avoid drinking fluids for several hours before going to bed   Empty your bladder before going to bed   Men can keep a urinal at the bedside   © 4007-6187 [employer], [address], [place] [address]. All rights reserved. This information is not intended as a substitute for professional medical care. Always follow your healthcare  professional's instructions.  .WAYS TO HELP PREVENT INFECTION        WASH YOUR HANDS OFTEN DURING THE DAY, ESPECIALLY BEFORE YOU EAT, AFTER USING THE BATHROOM, AND AFTER TOUCHING ANIMALS    STAY AWAY FROM PEOPLE WHO HAVE ILLNESSES YOU CAN CATCH; SUCH AS COLDS, FLU, CHICKEN POX    TRY TO AVOID CROWDS    STAY AWAY FROM CHILDREN WHO RECENTLY HAVE RECEIVED LIVE VIRUS VACCINES    MAINTAIN GOOD MOUTH CARE    DO NOT SQUEEZE OR SCRATCH PIMPLES    CLEAN CUTS & SCRAPES RIGHT AWAY AND DAILY UNTIL HEALED WITH WARM WATER, SOAP & AN ANTISEPTIC    AVOID CONTACT WITH LITTER BOXES, BIRD CAGES, & FISH TANKS    AVOID STANDING WATER, IE., BIRD BATHS, FLOWER POTS/VASES, OR HUMIDIFIERS    WEAR GLOVES WHEN GARDENING OR CLEANING UP AFTER OTHERS, ESPECIALLY BABIES & SMALL CHILDREN    DO NOT EAT RAW FISH, SEAFOOD, MEAT, OR EGGS

## 2024-09-24 NOTE — PROGRESS NOTES
"Oncology Nutrition Assessment for Medical Nutrition Therapy Initial Visit  Consultation Time: 45 Minutes  Referring Provider:  Dr. Chen Jerome  Reason for Nutrition Consult: Decreased Appetite, Enteral Formula , New Feeding Tube - Jejunostomy Tube, New Patient - Nutrition Counseling and Education , Nutrition related questions, and Weight Loss    Nutrition Assessment      Patient Information:    Cheryl Kirkland  : 1950   74 y.o. female    Allergies/Intolerances: No known food allergies  Social Data: lives with spouse/significant Other.   Anthropometrics:     Weight:    118 lb (54 kg)                               Height:   5'5" (1.65 m)    BMI: 19.8            Usual BW: 130 lb   Weight Change: loss of 12 lb since getting stomach cancer      Supplements/Vitamins:    MVI/Supp: No  Drug/Nutrient interactions: No Activity Level:     Sedentary     Form of Activity: activities of daily living only      Malnutrition Assessment:   Nutrition Risk: Patient meets at least 2 characteristics of the ASPEN/AND criteria for Moderate Malnutrition in context of Chronic illness    Energy Intake [Moderate, Chronic <75% of estimated energy requirement for >/=1 mo]: see diet history  Interpretation of Weight Loss [Moderate, Chronic 10% weight loss within 6 mo ]: loss of 12 lb in the past 6 months, 9% of weight      Food/Nutrition-related history:    DME/Insurance:  [facility]  Formula: [employer] Peptide 1.5  Rate/Volume/Schedule: Rate/Volume/Schedule: Administer [employer] Peptide 1.5 via enteral pump via J tube at a rate of 50 ml/hr  Additional Water flushes: 150 mL every 6 hours  Provides: 1800 mL/day total volume, 1800 kcals/day, 88 g/day protein    Diet/PO Recall:   Appetite: Fair  Fluid Intake: Adequate  Diet Recall:  Breakfast: NPO  Lunch: NPO  Dinner: NPO  Snacks: NPO  Drinks: water, crushed ice  ONS: NPO  Servings of F/V per day: NPO  Eating out: NPO   Cultural/Spiritual/Personal Preferences: Texture " specific - mechanical soft diet recommended    GI Symptoms: weight loss 12 lbs. within the last 6 month(s)   Oncology Nutrition Symptoms: weight loss and feel full quickly              Difficulty chewing or swallowing?  No    Patient Notes/Reports: Pt referred for a Nutrition Consultation related to Decreased Appetite, Enteral Formula , New Feeding Tube - Jejunostomy Tube, New Patient - Nutrition Counseling and Education , Nutrition related questions, and Weight Loss. Patient has a medical diagnosis of stomach cancer. Currently on keytruda chemotherapy and got gastrectomy and J tube placement a few weeks ago. Ms. Luna says she always ate a limited amount but has lost significant weight dealing with ovarian cancer and stomach cancer in recent years. She had not started soft foods yet and so encouraged her to eat mashed potatoes, red beans, eggs, and other soft foods she enjoys to supplement her Playto formula.    Medical Tests and Procedures:  Patient Active Problem List   Diagnosis    Acute blood loss anemia    Hx of Epileptic seizures    DVT (deep venous thrombosis)    Mixed epithelial carcinoma of right ovary    Gastric adenocarcinoma    Chronic deep vein thrombosis (DVT) of left lower extremity    Drug-induced polyneuropathy    Family history of breast cancer    Fibrocystic breast changes, bilateral    Heart failure, unspecified    HLD (hyperlipidemia)    OP (osteoporosis)    Primary hypertension    Pre-op exam    Anemia    Fibroma of tongue    Thyroid nodule    Drug-induced skin rash    Pruritus    Jejunostomy tube present    Moderate protein-calorie malnutrition      Past Medical History:   Diagnosis Date    Anemia     Chronic deep vein thrombosis (DVT) of left lower extremity 10/21/2022    Deep vein thrombosis     Drug-induced polyneuropathy 02/28/2024    Noted by ROCK KAY NP  last documented on [id number]    DVT (deep venous thrombosis)     Epilepsy     Gastric adenocarcinoma 02/27/2024    GERD  (gastroesophageal reflux disease)     Hyperlipidemia 01/10/2013    Formatting of this note might be different from the original.   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    OP (osteoporosis) 02/28/2024    Ovarian cancer     Primary hypertension 12/01/2022     Past Surgical History:   Procedure Laterality Date    ABDOMINAL WASHOUT N/A 3/14/2024    Procedure: LAVAGE, PERITONEAL, THERAPEUTIC;  Surgeon: Chen Jerome MD;  Location: [facility] OR;  Service: General;  Laterality: N/A;    APPENDECTOMY  2015    BIOPSY OF PERITONEUM N/A 3/14/2024    Procedure: BIOPSY, PERITONEUM;  Surgeon: Chen Jerome MD;  Location: [facility] OR;  Service: General;  Laterality: N/A;    COLONOSCOPY  06/20/2012    Dr Boyle- Tubular adenoma polyps. Repeat in 3 years.    COLONOSCOPY  06/17/2015    -Nodular mucosa at appendiceal orfice, sigmoid and desc diverticulosis otherwise normal.    COLONOSCOPY  2020    >3 TAs    COLONOSCOPY  12/2023    DIAGNOSTIC LAPAROSCOPY N/A 3/14/2024    Procedure: LAPAROSCOPY, DIAGNOSTIC;  Surgeon: Chen Jerome MD;  Location: [facility] OR;  Service: General;  Laterality: N/A;  1. Diagnostic laparoscopy   2. Extensive lysis of adhesions  3. Peritoneal biopsy   4. Peritoneal washings for cytology    DIAGNOSTIC LAPAROSCOPY N/A 8/20/2024    Procedure: LAPAROSCOPY, DIAGNOSTIC;  Surgeon: Chen Jerome MD;  Location: [facility] OR;  Service: General;  Laterality: N/A;    EGD - EXTERNAL RESULT  06/20/2012    Dr Boyle- Mild gastritis otherwise normal.    ENDOSCOPIC ULTRASOUND OF UPPER GASTROINTESTINAL TRACT N/A 7/26/2024    Procedure: ULTRASOUND, UPPER GI TRACT, ENDOSCOPIC;  Surgeon: Valdo Aguilera MD;  Location: [facility] ENDO;  Service: Endoscopy;  Laterality: N/A;  7/22/24: instructions sent via portal-. Pt no longer takling eliquis-GD    ESOPHAGOGASTRODUODENOSCOPY N/A 02/08/2024    Procedure: EGD (ESOPHAGOGASTRODUODENOSCOPY);  Surgeon: Jayla Arteaga MD;  Location:  [facility] ENDO;  Service: Endoscopy;  Laterality: N/A;    ESOPHAGOGASTRODUODENOSCOPY N/A 8/20/2024    Procedure: EGD (ESOPHAGOGASTRODUODENOSCOPY);  Surgeon: Chen Jerome MD;  Location: [facility] OR;  Service: General;  Laterality: N/A;    GASTRECTOMY N/A 8/20/2024    Procedure: GASTRECTOMY;  Surgeon: Chen Jerome MD;  Location: [facility] OR;  Service: General;  Laterality: N/A;    HYSTERECTOMY      LAPAROSCOPIC LYSIS OF ADHESIONS N/A 3/14/2024    Procedure: LYSIS, ADHESIONS, LAPAROSCOPIC;  Surgeon: Chen Jerome MD;  Location: [facility] OR;  Service: General;  Laterality: N/A;    LYSIS OF ADHESIONS N/A 8/20/2024    Procedure: LYSIS, ADHESIONS;  Surgeon: Chen Jerome MD;  Location: [facility] OR;  Service: General;  Laterality: N/A;    PLACEMENT OF JEJUNOSTOMY TUBE N/A 8/20/2024    Procedure: INSERTION, JEJUNOSTOMY TUBE;  Surgeon: Chen Jerome MD;  Location: [facility] OR;  Service: General;  Laterality: N/A;    TOTAL ABDOMINAL HYSTERECTOMY W/ BILATERAL SALPINGOOPHORECTOMY  01/09/2023    radical resection with right salpingo-oophorectomy, left salpingo-oophorectomy, extensive enterolysis, extensive adhesiolysis, left pelvic lymph node biopsy, omental biopsy, small bowel resection with primary functional end-to-end anastomosis, placement of pelvic drain       Current Outpatient Medications   Medication Instructions    acetaminophen (TYLENOL) 1,000 mg, Oral, Every 8 hours    alendronate (FOSAMAX) 70 mg, Oral, Every 7 days, Pt stated she takes this every Sunday    calcium phosphate trib/vit D3 (CALCIUM PHOSPHATE-VITAMIN D3 ORAL) 1 tablet, Oral, 2 times daily    carBAMazepine (TEGRETOL) 200 mg tablet Oral, Pt states she take TID<BR>1 tablet with breakfast<BR>1 tablet with lunch <BR>1.5 tablet at bedtime    ferrous sulfate 65 mg, Oral, 2 times daily with meals    hydrocortisone 2.5 % cream Topical (Top), 2 times daily    hydrOXYzine HCL (ATARAX) 25 mg, Oral, 3 times daily PRN    ibuprofen (ADVIL,MOTRIN) 800 mg, Oral, Every 8  hours    levETIRAcetam (KEPPRA) 500 MG Tab 1 tablet, Oral, 2 times daily, Pt states she takes one pill at dinner and one at bedtime    oxyCODONE (ROXICODONE) 10 mg, Oral, Every 4 hours PRN    zonisamide (ZONEGRAN) 200 mg, Oral, 2 times daily, 2cap w/ brkfst 2cap w/dinner      Labs:  Reviewed - followed by MD marina        Nutrition Diagnosis    Nutrition Problem: inadequate protein/energy intake and altered GI function  Etiology (related to): tumor location  Signs/Symptoms (as evidenced by): weight loss and requiring tube feeding for nutrition support    Nutrition Intervention      Estimated Energy/Fluid Requirements:   Weight used: CBW 54 kg  Calories: 6769-5426 kcal/day (30-35 kcal/kg)  Protein: 76-86 g/day (1.4-1.6 g/kg)  Fluid: 2915-5902 mL/day (1 mL/kcal)   Recommendations:   Start soft foods   Rate/Volume/Schedule: Administer [employer] Peptide 1.5 via enteral pump via J tube at a rate of 50 ml/hr  Additional Water flushes: 150 mL every 6 hours  Provides: 1800 mL/day total volume, 1800 kcals/day, 88 g/day protein  3. Call dietitian if need assistance with formula order.      Education Needs Satisfied: yes   Education Materials Provided / Reviewed:   Diet and Nutrition After a Gastrectomy Procedure   Pump Tube Feeding Instructions   Poor Appetite and Foods That Are Easy to Chew and Swallow   Nutrition During Your Cancer Treatment  Nutrition Plan  Barriers to Learning: none identified  Patient and/ or Family Verbalizes understanding: yes      Nutrition Monitoring and Evaluation    Monitor: energy intake, rate/formulation of tube feeding, weight, and symptom management     Goals:   1: Adequate intake of calories and protein to promote weight gain   Indicator: Weight/BMI    2: Patient or caregiver is able to experience a smooth initiation of enteral feeding start-up, including delivery of supplies and patient education on the use and care of the feeding tube. , Patient or caregiver reports being able to achieve goal  rate for enteral feedings., and Patient or caregiver reports no problems with diarrhea, constipation, regurgitation, bloating, nausea or vomiting.    Indicator: Diet Recall     Follow up In 1 months    Communication to referring provider/care team: note available in chart  Signature: Sonali Garcia, MPH, RD, LDN

## 2024-09-24 NOTE — PLAN OF CARE
Problem: Adult Inpatient Plan of Care  Goal: Plan of Care Review  Outcome: Progressing  Flowsheets (Taken 9/24/2024 1050)  Plan of Care Reviewed With:   patient   spouse  Goal: Absence of Hospital-Acquired Illness or Injury  Outcome: Progressing  Goal: Optimal Comfort and Wellbeing  Outcome: Progressing  Goal: Patient-Specific Goal (Individualized)  Outcome: Progressing  Flowsheets (Taken 9/24/2024 1050)  Individualized Care Needs: Pt in recliner, feet elevated, warm blanket/pillow provided  Anxieties, Fears or Concerns: Patient voices no concerns at this time  Patient/Family-Specific Goals (Include Timeframe): Patient will tolerate Keytruda infusion without adverse reaction     Problem: Chemotherapy Effects  Goal: Anemia Symptom Improvement  Outcome: Progressing  Goal: Safety Maintained  Outcome: Progressing  Goal: Absence of Hematuria  Outcome: Progressing  Goal: Nausea and Vomiting Relief  Outcome: Progressing  Goal: Neurotoxicity Symptom Control  Outcome: Progressing  Goal: Absence of Infection  Outcome: Progressing  Goal: Absence of Bleeding  Outcome: Progressing

## 2024-09-25 ENCOUNTER — TELEPHONE (OUTPATIENT)
Dept: HEMATOLOGY/ONCOLOGY | Facility: CLINIC | Age: 74
End: 2024-09-25
Payer: MEDICARE

## 2024-09-25 NOTE — TELEPHONE ENCOUNTER
9/24/2024    10:22 AM 9/24/2024     9:02 AM 9/19/2024     2:39 PM 8/6/2024    11:02 AM 7/23/2024     1:44 PM 7/9/2024     8:51 AM 6/18/2024     9:51 AM   DISTRESS SCREENING   Distress Score 0 - No Distress 10 - Extreme Distress 0 - No Distress 1 0 - No Distress 0 - No Distress 0 - No Distress   Practical Concerns None of these  Taking care of myself None of these  None of these    Social Concerns None of these  None of these None of these  None of these    Emotional Concerns None of these Changes in appearance Changes in appearance None of these  None of these    Spiritual or Muslim Concerns None of these  None of these None of these  None of these    Physical Concerns None of these Sleep;Loss or change of physical abilities Pain;Changes in eating None of these  None of these    Other Problems   Nono Concerned that [facility] did not provide [employer] info to approve my care - Why???        LUPE Nelson contacted pt to discuss distress score of 10 on 9/24. Pt told LUPE Intern that she was having a bad day yesterday and is feeling better today. Pt told LUPE Intern she was able to eat better, sleep better, and had a clearer mind when she woke up. Pt denied needing any resources/help. Pt thanked LUPE Nelson for checking in on her. LUPE Nelson will remain available.

## 2024-10-01 ENCOUNTER — TELEPHONE (OUTPATIENT)
Dept: NUTRITION | Facility: CLINIC | Age: 74
End: 2024-10-01
Payer: MEDICARE

## 2024-10-01 NOTE — TELEPHONE ENCOUNTER
Called patient about her tube feed and issues with diarrhea. The diarrhea eased up some today. Ms. Luna's [employer] Peptide 1.5 is at a 50 ml/hr rate. She is eating some grits, potatoes, and seafood. Will remain available.  Signature: Sonali Garcia, MPH, RD, LDN

## 2024-10-07 ENCOUNTER — OFFICE VISIT (OUTPATIENT)
Dept: SURGICAL ONCOLOGY | Facility: CLINIC | Age: 74
End: 2024-10-07
Payer: MEDICARE

## 2024-10-07 VITALS
BODY MASS INDEX: 18.49 KG/M2 | HEIGHT: 65 IN | DIASTOLIC BLOOD PRESSURE: 66 MMHG | HEART RATE: 67 BPM | TEMPERATURE: 97 F | SYSTOLIC BLOOD PRESSURE: 101 MMHG | WEIGHT: 111 LBS

## 2024-10-07 DIAGNOSIS — Z86.72 HX OF DEEP VEIN THROMBOPHLEBITIS OF LOWER EXTREMITY: ICD-10-CM

## 2024-10-07 DIAGNOSIS — C16.9 GASTRIC ADENOCARCINOMA: Primary | ICD-10-CM

## 2024-10-07 DIAGNOSIS — R19.7 DIARRHEA, UNSPECIFIED TYPE: ICD-10-CM

## 2024-10-07 DIAGNOSIS — R60.1 GENERALIZED EDEMA: ICD-10-CM

## 2024-10-07 PROCEDURE — 3078F DIAST BP <80 MM HG: CPT | Mod: CPTII,S$GLB,, | Performed by: SURGERY

## 2024-10-07 PROCEDURE — 99024 POSTOP FOLLOW-UP VISIT: CPT | Mod: S$GLB,,, | Performed by: SURGERY

## 2024-10-07 PROCEDURE — 1159F MED LIST DOCD IN RCRD: CPT | Mod: CPTII,S$GLB,, | Performed by: SURGERY

## 2024-10-07 PROCEDURE — 1126F AMNT PAIN NOTED NONE PRSNT: CPT | Mod: CPTII,S$GLB,, | Performed by: SURGERY

## 2024-10-07 PROCEDURE — 99999 PR PBB SHADOW E&M-EST. PATIENT-LVL III: CPT | Mod: PBBFAC,,, | Performed by: SURGERY

## 2024-10-07 PROCEDURE — 3074F SYST BP LT 130 MM HG: CPT | Mod: CPTII,S$GLB,, | Performed by: SURGERY

## 2024-10-07 NOTE — PROGRESS NOTES
Surgical Oncology Clinic Note      Referring Provider: No ref. provider found   PCP: Gagandeep Iglesias MD    Reason For Visit: Gastroesophageal: gastric cancer (proximal)      Oncology History   Mixed epithelial carcinoma of right ovary   1/2/2023 Initial Diagnosis    Mixed epithelial carcinoma of left ovary     1/9/2023 Surgery    Laparotomy for radical resection of gynecologic cancer. Removal of pelvic mass (right salpingo-oophorectomy), left salpingo-oophorectomy, extensive enterolysis, extensive adhesiolysis, left pelvic lymph node biopsy, omental biopsy, small bowel resection with primary functional end-to-end anastomosis, placement of pelvic drain. Path: Right ovary, tumor site, 11 cm, mixed epithelial carcinoma (50% mucinous, 50% endometrioid), G2 moderately differentiated, ovarian surface involvement-indeterminate-specimen disrupted. 1 left pelvic lymph node negative for neoplasia. FIGO stage IC2       1/9/2023 Cancer Staged    Staging form: Ovary, Fallopian Tube, and Primary Peritoneal Carcinoma, AJCC 8th Edition  - Clinical stage from 1/9/2023: FIGO Stage IC2, calculated as Stage IC (cT1c2, cN0, cM0)     4/5/2023 - 7/19/2023 Chemotherapy    4/5/2023: Cycle 1- paclitaxel 135 mg/m2 and carboplatin AUC 5 as patient is frail and elderly.  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 - Wre2Cdmolkcf, MMR deficient- loss  MLH1 and PMS2     2/8/2024 Cancer Staged    Staging form: Stomach, AJCC 8th Edition  - Clinical stage from 2/8/2024: Stage IIA (cT2, cN1, cM0)     3/4/2024 Tumor Conference    Date Presented to Tumor Board: 03/04/24  [facility] UGI MULTIDISCIPLINARY TUMOR BOARD  PATIENT REVIEW FORM   ____________________________________________________________    CLINIC #: [id number]  DATE: 3/4/2024    DIAGNOSIS: gastric GARRY    PRESENTER: Perone    PATIENT SUMMARY:   This 74 y/o female was dx with ovarian CA 1/2023, underwent surgery and completed 6 cycles of adj chemotherapy 7/2023. She presented last month with hematemesis. EGD revealed large ulcerated gastric mass, which was biopsied. EGD pathology reviewed - poorly differentiated adenocarcinoma, with intestinal expression, MSI high, HER 2 negative. Staging imaging found irregular wall thickening in mid gastric body, enlarged gastrohepatic lymph node, no evidence of distant metastatic disease.   Reviewed PET - uptake in gastric wall and gastrohepatic lymph node with hypermetabolic activity.   Scheduled to see Dr. Amin in [place] later this week.   + family hx of cancer, pending genetics referral    BOARD RECOMMENDATIONS:   Proceed with systemic chemotherapy, consider neoadj immunotherapy  Proceed with diag lap  Await genetics testing, pathology will send to [facility]    CONSULT NEEDED:     [] Surgery    [] Hem/Onc    [] Rad/Onc    [] Dietary                 [] Social Service    [x] Genetics       [] AES  [] Radiology     Clinical Stage: Tumor   Node(s)  1  Metastasis      GROUP STAGE:  [] O    [] 1A    [] IB    [] IIA    [] IIB     [] IIIA     [] IIIB     [] IIIC    []IV  [] Local recurrence     [] Regional recurrence     [] Distant recurrence   Metastatic site(s): none         [x] Edna'l Treatment Guidelines reviewed and care planned is consistent with guidelines.         (i.e., NCCN, NCI, PD, ACO, AUA, etc.)    PRESENTATION AT CANCER CONFERENCE:         [x] Prospective     [] Retrospective     [] Follow-Up         3/14/2024 Surgery    Procedure:  LAPAROSCOPY, DIAGNOSTIC (N/A)  LYSIS, ADHESIONS, LAPAROSCOPIC (N/A)  LAVAGE, PERITONEAL, THERAPEUTIC (N/A)      Surgeon(s) and Role: Chen Jerome MD - Primary    Pre-Operative Diagnosis: Gastric adenocarcinoma [C16.9]     Post-Operative Diagnosis: Same     Pre-Operative Variables:  Stage:  III (T4a N1 M0)   Adjacent organ involvement: None  Chemotherapy within 90 Days: No  Radiation Therapy within 90 Days: No      Procedure:  Diagnostic laparoscopy   Extensive lysis of adhesions  Peritoneal biopsy   Peritoneal washings for cytology     3/26/2024 -  Chemotherapy    Treatment Summary   Plan Name: OP pembrolizumab 200mg Q3W  Treatment Goal: Curative  Status: Active  Start Date: 3/26/2024  End Date: 5/6/2026 (Planned)  Provider: Claudia Ambrose MD  Chemotherapy: [No matching medication found in this treatment plan]      Chemotherapy    Treatment Summary   Plan Name: OP pembrolizumab 200mg Q3W  Treatment Goal: Curative  Status: Active  Start Date: 3/15/2024 (Planned)  End Date: 2/27/2026 (Planned)  Provider: Claudia Ambrose MD  Chemotherapy: [No matching medication found in this treatment plan]     4/8/2024 Tumor Conference       [facility] UGI MULTIDISCIPLINARY TUMOR BOARD  PATIENT REVIEW FORM   ____________________________________________________________    CLINIC #: [id number]  DATE: 4/8/2024    DIAGNOSIS: Gastric GARRY    PRESENTER: Justus    PATIENT SUMMARY:   This 72 y/o female was dx with ovarian CA 1/2023, underwent surgery and completed 6 cycles of adj chemotherapy 7/2023. She developed hematemesis in Feb 2024, then underwent EGD with bx of a large ulcerated gastric mass. Pathology revealed poorly differentiated adenocarcinoma, with intestinal expression, MSI high, HER 2 negative. She was presented to this [facility] last month. Since then, she underwent diag lap and today's presentation is for pathology review. Negative  for malignancy, reactive atypia     BOARD RECOMMENDATIONS:   Proceed with immunotherapy, then restage with imaging  Potential candidate for subtotal distal gastrectomy    CONSULT NEEDED:     [] Surgery    [] Hem/Onc    [] Rad/Onc    [] Dietary                 [] Social Service    [] Psychology       [] AES  [] Radiology     Clinical Stage: Tumor   Node(s) 1 Metastasis 0      GROUP STAGE:  [] O    [] 1A    [] IB    [] IIA    [] IIB     [] IIIA     [] IIIB     [] IIIC    []IV  [] Local recurrence     [] Regional recurrence     [] Distant recurrence   Metastatic site(s): none         [x] Edna'l Treatment Guidelines reviewed and care planned is consistent with guidelines.         (i.e., NCCN, NCI, PD, ACO, AUA, etc.)    PRESENTATION AT CANCER CONFERENCE:         [x] Prospective    [] Retrospective     [] Follow-Up           7/1/2024 Tumor Conference     [facility] UGI MULTIDISCIPLINARY TUMOR BOARD  PATIENT REVIEW FORM   ____________________________________________________________    CLINIC #: [id number]   DATE: 7/1/2024    DIAGNOSIS: gastric GARRY    PRESENTER: Justus    PATIENT SUMMARY:   This 72 y/o female was dx with ovarian CA 1/2023, underwent surgery and completed 6 cycles of adj chemotherapy in 7/2023. She developed hematemesis in Feb 2024 and underwent EGD with bx of a large ulcerated gastric mass. Pathology revealed poorly differentiated adenocarcinoma, with intestinal expression, MSI high, HER 2 negative. She underwent diag lap and pathology was negative for malignancy, reactive atypia. She was presented to this [facility] in 3/2024 and again in 4/2024. Since then, she received immunotherapy Keytruda 200 mg every 3 weeks, total of 4 cycles thus far.   Reviewed re-staging CT scan - perigastric lymph nodes larger in size  She remains asymptomatic.     BOARD RECOMMENDATIONS:   Obtain repeat PET  Consider ct DNA    CONSULT NEEDED:     [] Surgery    [] Hem/Onc    [] Rad/Onc    [] Dietary                 []  Genetics    [] Psychology       [] AES  [] Interventional Radiology     Clinical Stage: Tumor 2 Node(s) 1 Metastasis 0      GROUP STAGE:  [] O    [] 1A    [] IB    [x] IIA    [] IIB     [] IIIA     [] IIIB     [] IIIC    []IV  [] Local recurrence     [] Regional recurrence     [] Distant recurrence   Metastatic site(s): none         [x] Edna'l Treatment Guidelines reviewed and care planned is consistent with guidelines.         (i.e., NCCN, NCI, PD, ACO, AUA, etc.)    PRESENTATION AT CANCER CONFERENCE:         [x] Prospective    [] Retrospective     [] Follow-Up         8/20/2024 Surgery    Open total gastrectomy with D2 WHITNEY and extensive lysis of adhesions   (Path: pT2 pN3a (8/35 LN positive), G3 poorly differentiated adenocarcinoma, margins negative     8/20/2024 Cancer Staged    Staging form: Stomach, AJCC 8th Edition  - Pathologic stage from 8/20/2024: Stage III (ypT2, pN3a, cM0)         Staging:  Cancer Staging   Gastric adenocarcinoma  Staging form: Stomach, AJCC 8th Edition  - Clinical stage from 2/8/2024: Stage IIA (cT2, cN1, cM0) - Signed by Chen Jerome MD on 7/2/2024  - Pathologic stage from 8/20/2024: Stage III (ypT2, pN3a, cM0) - Signed by Chen Jerome MD on 9/17/2024    Mixed epithelial carcinoma of right ovary  Staging form: Ovary, Fallopian Tube, and Primary Peritoneal Carcinoma, AJCC 8th Edition  - Clinical stage from 1/9/2023: FIGO Stage IC2, calculated as Stage IC (cT1c2, cN0, cM0) - Signed by Chen Jerome MD on 2/27/2024       HISTORY OF PRESENT ILLNESS   Cheryl Garcia is a 73 y.o. female with history of epilepsy and treated ovarian cancer (s/p surgery and chemotherapy) who presents today for evaluation and management of newly diagnosed gastric adenocarcinoma.   [facility] an episode of hematemesis.  A upon presentation she was having epigastric tenderness but had not had any additional episodes of vomiting.  She  was otherwise doing well.  She was given IV fluids and IV Protonix and transferred to [facility] for further evaluation.  She was found to have an acute decrease in her hemoglobin from 11-8.9 over 10 hours however her vitals all remained within normal limits.  She underwent an EGD during that admission which showed a large, ulcerated gastric mass.  Pathology returned consistent with poorly differentiated adenocarcinoma.   A CT abdomen and pelvis was done that day which showed an eccentric irregular wall thickening of the mid gastric body concerning for malignancy.  She was discharged the following day in stable condition with follow-up.  Her Xarelto was held at that time due to her bleeding.  She had been on Xarelto since October of 2022 due to a history of lower extremity DVT at the around the time she was diagnosed with ovarian cancer.  This was believed to be due to the malignancy and direct compression of the vessels in that region.  Since her discharge she has overall been doing well and has no new complaints at this time.     She states she did not start having any reflux until after she completed her chemotherapy this summer.  She was seen by her internist a few times as well as seen by GI over the [facility] with Dr. Tinajero for its Giovany.  She had been very diligent about reflux precautions and had reduced high acid foods.  She had been started on Protonix which completely resolved her symptoms.     As stated she does have a history of ovarian cancer diagnosed back in January of 2023.  The workup for that was initiated back in October of 2022 when she presented with a lower extremity swelling in the DVT.  During that workup they did a CT scan which showed a large lesion on her ovary concerning for malignancy.  She subsequently underwent midline laparotomy for radical resection with right salpingo-oophorectomy, left salpingo-oophorectomy, extensive enterolysis, extensive adhesiolysis, left  pelvic lymph node biopsy, omental biopsy, small bowel resection with primary functional end-to-end anastomosis, placement of pelvic drain. Path: Right ovary, tumor site, 11 cm, mixed epithelial carcinoma (50% mucinous, 50% endometrioid), G2 moderately differentiated, ovarian surface involvement-indeterminate-specimen disrupted. 1 left pelvic lymph node negative for neoplasia. FIGO stage IC2.  All this was done at [facility].  She was subsequently treated with 6 cycles of paclitaxel and carboplatin, her last cycle was in July of 2023.     Of note her last colonoscopy was in 2023 and reportedly revealed polyps.  According to the records from [facility] she does have a history of an EGD on 06/20/2012 done by Dr. Boyle, which reportedly showed mild gastritis and otherwise was normal.     Her history is otherwise significant for epilepsy and she remains on antiepileptic medications however she has not had a seizure since 2009.    INTERVAL HISTORY     06/38/2024:  She was presented in tumor board on 04/08/2024 for the atypical cells seen on her washings.  Group consensus was these were likely reactive atypia and to proceed with therapy and restaging as originally planned.  She presents for a post systemic therapy appointment.  She is completed 5 cycles of Keytruda, the last was Tuesday 06/18/2024.  She is overall tolerated it very well and has had no side effects in no hospitalizations.  She states she is feels well and that she is walking daily and has no complaints aside from very mild cough that she developed today    07/19/2024:  Discussed in [facility] last week and all in agreement that the LN are within the field of resection, however since she will likely require a total gastrectomy (which is a morbid operation), would be best to sample the LN to ensure they do not represent a different malignancy, and to continue on immunotherapy for now.      8/14/2024:  EUS biopsy done- showed adenocarcinoma.  She is  otherwise doing well.     9/17/2024:  Overall doing well.  She is back home with her  having only stayed 2 days with her sister.  Biggest complaint at this point in time is pain from around her J-tube site.  She has been tolerating liquids and then did manage eat an entire thing of yogurt yesterday.    10/07/2024:  only used other tube feeds once and then changed back.  Has had watery diarrhea for about a week now.  Has not tried anything for it yet.  Still not thirsty or hungry.  Leg was feeling tight but now better.        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    OP (osteoporosis) 02/28/2024    Ovarian cancer     Primary hypertension 12/01/2022       Past Surgical History:   Procedure Laterality Date    ABDOMINAL WASHOUT N/A 3/14/2024    Procedure: LAVAGE, PERITONEAL, THERAPEUTIC;  Surgeon: Chen Jerome MD;  Location: [facility] OR;  Service: General;  Laterality: N/A;    APPENDECTOMY  2015    BIOPSY OF PERITONEUM N/A 3/14/2024    Procedure: BIOPSY, PERITONEUM;  Surgeon: Chen Jerome MD;  Location: [facility] OR;  Service: General;  Laterality: N/A;    COLONOSCOPY  06/20/2012    Dr Boyle- Tubular adenoma polyps. Repeat in 3 years.    COLONOSCOPY  06/17/2015    -Nodular mucosa at appendiceal orfice, sigmoid and desc diverticulosis otherwise normal.    COLONOSCOPY  2020    >3 TAs    COLONOSCOPY  12/2023    DIAGNOSTIC LAPAROSCOPY N/A 3/14/2024    Procedure: LAPAROSCOPY, DIAGNOSTIC;  Surgeon:  Chen Jerome MD;  Location: [facility];  Service: General;  Laterality: N/A;  1. Diagnostic laparoscopy   2. Extensive lysis of adhesions  3. Peritoneal biopsy   4. Peritoneal washings for cytology    DIAGNOSTIC LAPAROSCOPY N/A 8/20/2024    Procedure: LAPAROSCOPY, DIAGNOSTIC;  Surgeon: Chen Jerome MD;  Location: [facility];  Service: General;  Laterality: N/A;    EGD - EXTERNAL RESULT  06/20/2012    Dr Boyle- Mild gastritis otherwise normal.    ENDOSCOPIC ULTRASOUND OF UPPER GASTROINTESTINAL TRACT N/A 7/26/2024    Procedure: ULTRASOUND, UPPER GI TRACT, ENDOSCOPIC;  Surgeon: Valdo Aguilera MD;  Location: [facility];  Service: Endoscopy;  Laterality: N/A;  7/22/24: instructions sent via portal-. Pt no longer takling eliquis-GD    ESOPHAGOGASTRODUODENOSCOPY N/A 02/08/2024    Procedure: EGD (ESOPHAGOGASTRODUODENOSCOPY);  Surgeon: Jayla Arteaga MD;  Location: [facility];  Service: Endoscopy;  Laterality: N/A;    ESOPHAGOGASTRODUODENOSCOPY N/A 8/20/2024    Procedure: EGD (ESOPHAGOGASTRODUODENOSCOPY);  Surgeon: Chen Jeorme MD;  Location: [facility] OR;  Service: General;  Laterality: N/A;    GASTRECTOMY N/A 8/20/2024    Procedure: GASTRECTOMY;  Surgeon: Chen Jerome MD;  Location: [facility] OR;  Service: General;  Laterality: N/A;    HYSTERECTOMY      LAPAROSCOPIC LYSIS OF ADHESIONS N/A 3/14/2024    Procedure: LYSIS, ADHESIONS, LAPAROSCOPIC;  Surgeon: Chen Jerome MD;  Location: [facility] OR;  Service: General;  Laterality: N/A;    LYSIS OF ADHESIONS N/A 8/20/2024    Procedure: LYSIS, ADHESIONS;  Surgeon: Chen Jerome MD;  Location: [facility] OR;  Service: General;  Laterality: N/A;    PLACEMENT OF JEJUNOSTOMY TUBE N/A 8/20/2024    Procedure: INSERTION, JEJUNOSTOMY TUBE;  Surgeon: Chen Jerome MD;  Location: [facility] OR;  Service: General;  Laterality: N/A;    TOTAL ABDOMINAL HYSTERECTOMY W/ BILATERAL SALPINGOOPHORECTOMY  01/09/2023    radical resection with right salpingo-oophorectomy, left  salpingo-oophorectomy, extensive enterolysis, extensive adhesiolysis, left pelvic lymph node biopsy, omental biopsy, small bowel resection with primary functional end-to-end anastomosis, placement of pelvic drain       Family History   Problem Relation Name Age of Onset    Pancreatic cancer Brother Misael Mccray 76    Prostate cancer Brother Zachary 67    Pancreatic cancer Half-brother Gasper 74    Colon cancer Half-sister Elton 83    Lung cancer Half-sister Elton         +smoking hx    Breast cancer Half-sister Vidhi 58    Lymphoma Other Cara         Corinne    Prostate cancer Other Fernando     Prostate cancer Other Gasper Calderon     Ovarian cancer Other Inermark     Breast cancer Other Aleida     Colon cancer Other Aleida        Social History     Socioeconomic History    Marital status:    Tobacco Use    Smoking status: Former     Types: Cigarettes    Smokeless tobacco: Never    Tobacco comments:     Quit 1989 smoked 10 years smoked 0.25 ppd    Substance and Sexual Activity    Alcohol use: Not Currently    Drug use: Never     Social Drivers of Health     Financial Resource Strain: Low Risk  (2/23/2024)    Overall Financial Resource Strain (CARDIA)     Difficulty of Paying Living Expenses: Not hard at all   Food Insecurity: No Food Insecurity (2/23/2024)    Hunger Vital Sign     Worried About Running Out of Food in the Last Year: Never true     Ran Out of Food in the Last Year: Never true   Transportation Needs: No Transportation Needs (2/23/2024)    PRAPARE - Transportation     Lack of Transportation (Medical): No     Lack of Transportation (Non-Medical): No   Physical Activity: Inactive (2/23/2024)    Exercise Vital Sign     Days of Exercise per Week: 0 days     Minutes of Exercise per Session: 10 min   Stress: No Stress Concern Present (2/23/2024)    [employer] - Occupational Stress Questionnaire     Feeling of Stress : Not at all   Housing Stability: Low Risk  (2/23/2024)     Housing Stability Vital Sign     Unable to Pay for Housing in the Last Year: No     Number of Places Lived in the Last Year: 1     Unstable Housing in the Last Year: No          Medication List            Accurate as of October 7, 2024 11:09 AM. If you have any questions, ask your nurse or doctor.                CHANGE how you take these medications      acetaminophen 500 MG tablet  Commonly known as: TYLENOL  Take 2 tablets (1,000 mg total) by mouth every 8 (eight) hours.  What changed:   when to take this  reasons to take this     hydrocortisone 2.5 % cream  Apply topically 2 (two) times daily.  What changed:   when to take this  reasons to take this     ibuprofen 800 MG tablet  Commonly known as: ADVIL,MOTRIN  Take 1 tablet (800 mg total) by mouth every 8 (eight) hours.  What changed:   when to take this  reasons to take this            CONTINUE taking these medications      alendronate 70 MG tablet  Commonly known as: FOSAMAX     CALCIUM PHOSPHATE-VITAMIN D3 ORAL     carBAMazepine 200 mg tablet  Commonly known as: TEGRETOL     ferrous sulfate 325 (65 FE) MG EC tablet     hydrOXYzine HCL 25 MG tablet  Commonly known as: ATARAX  Take 1 tablet (25 mg total) by mouth 3 (three) times daily as needed for Itching.     levETIRAcetam 500 MG Tab  Commonly known as: KEPPRA     oxyCODONE 10 mg Tab immediate release tablet  Commonly known as: ROXICODONE  Take 1 tablet (10 mg total) by mouth every 4 (four) hours as needed for Pain.     zonisamide 100 MG Cap  Commonly known as: ZONEGRAN              Review of patient's allergies indicates:  No Known Allergies    Review of Systems   Constitutional:  Negative for chills, fever, malaise/fatigue and weight loss.   Respiratory:  Negative for cough, shortness of breath and wheezing.    Cardiovascular:  Negative for chest pain and palpitations.   Gastrointestinal:  Negative for abdominal pain, constipation, diarrhea, nausea and vomiting.   Musculoskeletal:  Negative for back pain,  falls and joint pain.   Neurological:  Negative for dizziness, sensory change, speech change, focal weakness, seizures, loss of consciousness and weakness.   Psychiatric/Behavioral:  Negative for depression and hallucinations. The patient is not nervous/anxious.      PHYSICAL EXAM     Vitals:    10/07/24 1101   BP: 101/66   Pulse: 67   Temp: 96.9 °F (36.1 °C)     Body mass index is 18.47 kg/m².  ECOG SCORE           Physical Exam  Vitals reviewed.   Constitutional:       General: She is not in acute distress.     Appearance: Normal appearance.   HENT:      Head: Normocephalic and atraumatic.      Mouth/Throat:      Mouth: Mucous membranes are moist.   Eyes:      General: No scleral icterus.     Extraocular Movements: Extraocular movements intact.      Conjunctiva/sclera: Conjunctivae normal.   Pulmonary:      Effort: Pulmonary effort is normal.   Abdominal:      General: There is no distension.      Palpations: Abdomen is soft. There is no mass.      Tenderness: There is no abdominal tenderness.      Comments: Superior aspect of midline incision open with the healthy, pink granulation tissue, well healed.  J-tube site without any erythema or induration.   Musculoskeletal:         General: No swelling. Normal range of motion.   Skin:     General: Skin is warm and dry.   Neurological:      General: No focal deficit present.      Mental Status: She is alert.   Psychiatric:         Mood and Affect: Mood normal.         Behavior: Behavior normal.         Thought Content: Thought content normal.         Judgment: Judgment normal.          DATA REVIEW:  I personally reviewed the following records for this visit: lab work from prior visit, notes from other physicians, computed tomography/CT imaging, surgical pathology results, endoscopy results, radiographic study evaluation, and laboratory results done by primary care physician    LABS       Lab Results   Component Value Date    WBC 6.47 09/20/2024    HGB 13.5 09/20/2024  "   HCT 44.6 09/20/2024     09/20/2024     Lab Results   Component Value Date     09/20/2024    CALCIUM 9.4 09/20/2024    ALBUMIN 3.4 (L) 09/20/2024    PROT 7.3 09/20/2024     09/20/2024    K 4.2 09/20/2024    CO2 17 (L) 09/20/2024     (H) 09/20/2024    BUN 14 09/20/2024    CREATININE 1.0 09/20/2024    ALKPHOS 121 09/20/2024    ALT 12 09/20/2024    AST 18 09/20/2024    BILITOT 0.2 09/20/2024       Nutritional:  No results found for: "PREALBUMIN"    Tumor Markers:  Lab Results   Component Value Date    AMYLASE 67 08/23/2024     No results found for: ""    PATHOLOGY     Final Pathologic Diagnosis  Abnormal   STOMACH, 'GASTRIC MASS', BIOPSY:  - 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 Scattered p53 positivity (wild-type pattern of expression) and p16 positivity are also noted. Additional studies, including CK7, PAX8, ER, HER2, TTF-1, GATA3, WT-1,  synaptophysin, and chromogranin, are either negative or negative in the cells of interest. This patient's prior history of ovarian carcinoma resected at [facility] in [place], [place] is noted and the outside pathology report (F-) is reviewed.   While both tumors do have similar immunohistochemical staining patterns, they are not identical, with notable reported differences being CK7 and PAX8 positivity in the ovarian tumor. The presence of a large, fungating, and ulcerated mass in the stomach  is suspicious for a gastric primary; however, it is unclear if the masses represent two separate primaries or one p rimary and one metastatic focus. Abnormal mismatch repair studies do suggest increased risk for development of multiple tumor types (see  below). Requesting outside slides for review to compare morphology may be helpful.    DNA mismatch repair IHC studies are ABNORMAL and demonstrate LOSS OF MLH1 and PMS2 " expression within tumor cells. MSH2 and MSH6 exhibit retained expression. 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    Tissue will be sent for molecular profiling by next-generation sequencing ([employer]). Results of this study will be issued directly to the electronic medical record.     3/14/2024:  Final Pathologic Diagnosis 1. Perigastric nodule, biopsy:Fibroadipose tissue, lined by reactive mesothelial cells.  Negative for carcinoma.         Component 3 mo ago   Final Pathologic Diagnosis  Abnormal   1. Atypical cells present.      2. Negative for malignant cells.    Comment: Interp By Jami Goode M.D., Signed on 03/22/2024 at 14:39   Comment  Abnormal   For part 1, rare atypical appearing cell groups with abundant benign and reactive mesothelial cell groups.  Atypical groups cannot be classified further as are not present in cell block material.    For part 2, there is predominantly blood. Negative for malignancy.    Broderick-EP4 and PAX8 were performed on both part 1 and part 2 cell blocks and are both negative with appropriate controls.    This case was seen in consultation by cytopathologist Dr. VALERIE Da Silva who concurs with the above diagnoses and assessment.        07/26/2024:  EUS biopsy lesser curve lymph nodes       Component 3 wk ago   Final Pathologic Diagnosis      Abnormal   Lymph node, perigastric, EUS guided fine-needle aspiration/biopsy:  - Adenocarcinoma involving lymphoid tissue consistent with patient's previous gastric mass biopsy, see comment.    COMMENT:  The biopsy shows a malignant epithelial proliferation involving lymphoid tissue consistent with tumor involving lymph node.  Immunostains show tumor cells to be positive for cytokeratin, CDX2, and patchy CK20.  Tumor cells are negative for PAX8   and CK7.  Patient had a previous stomach mass biopsy (BRS-) that is pulled and reviewed.  The current biopsy shows similar morphology and  immunoprofile as the stomach mass biopsy.  MMR and NGS performed on previous biopsy.  Claudin 18.2 pending,  results will be issued in an addendum.      HER2 by immunohistochemistry:  Negative (1+)         08/20/2024:  Surgical pathology  Final Pathologic Diagnosis 1. Small intestine, peritoneal scarring, excision:  - Benign fibroadipose tissue  - Negative for malignancy    2. Spleen, lesion, excision:- Benign splenic tissue  - Negative for malignancy    3. Small intestine, resection:- Benign small intestinal tissue  - Negative for malignancy    4. Lymph node, hepatic artery, excision:- Two lymph nodes, negative for metastatic carcinoma (0/2)    5. Stomach, total gastrectomy:  - Adenocarcinoma, poorly cohesive, with features of undifferentiated carcinoma  - Tumor involves subserosal tissue  - Margins negative for carcinoma  - Pathologic stage y pT2 pN3a  - Eight of 35 lymph nodes are POSITIVE for metastatic carcinoma (8/35)  - Evidence of tumor regression is seen in a lymph node  - Incidental benign leiomyoma identified in perigastric tissue  - Background chronic gastritis  - Helicobacter pylori-type microorganisms are identified by immunohistochemistry  - See synoptic report below    6. Lymph nodes, D2 and periportal lymph node, lymphadenectomy:  - Eight lymph nodes, all negative for metastatic carcinoma (0/8)     7. Lymph node, aortocaval, excision:  - One lymph node, negative for metastatic carcinoma (0/1)    8. Anastomosis, anastomotic donuts:  - Benign esophageal and small intestinal tissue  - Negative for malignancy    _____________________________________________________________________  CASE SUMMARY: (STOMACH)  Standard(s): AJCC-UICC 8    SPECIMEN    Procedure: Total gastrectomy    TUMOR    Tumor Site: Body: Lesser curvature    Histologic Type: Adenocarcinoma    Adenocarcinoma Classification (based on WHO): Poorly cohesive carcinoma, Undifferentiated (anaplastic) carcinoma    Histologic Type Comment: Tumor  is poorly cohesive, but in some areas a better differentiated component is identified. The tumor diffusely expresses CDX2.  Histologic Grade: G3, poorly differentiated, undifferentiated  Tumor Size: Greatest dimension in Centimeters (cm): 6.6 cm    Additional Dimension in Centimeters (cm): 4.2 x 1.4 cm  Tumor Extent: Invades muscularis propria  Treatment Effect: Present, with residual cancer showing evident tumor regression, but more than single cells or rare small groups of cancer cells (partial response, score 2)  Lymphatic and / or Vascular Invasion: Present    MARGINS    Margin Status for Invasive Carcinoma: All margins negative for invasive carcinoma    Closest Margin(s) to Invasive Carcinoma: Omental (radial): lesser curvature    Distance from Invasive Carcinoma to Closest Margin: Exact distance in cm: 3 cm  Margin Status for Dysplasia: All margins negative for dysplasia    REGIONAL LYMPH NODES    Regional Lymph Node Status: Regional lymph nodes present    Tumor present in regional lymph node(s)      Number of Lymph Nodes with Tumor: Exact number: 8    Number of Lymph Nodes Examined: 45    DISTANT METASTASIS    Distant Site(s) Involved, if applicable: Not applicable    pTNM CLASSIFICATION (AJCC 8TH Edition)  Reporting of pT, pN, and (when applicable) pM categories is based on information available to the pathologist at the time the report is issued. As per the AJCC (Chapter 1, 8th Ed.) it is the managing physician's responsibility to establish the final  pathologic stage based upon all pertinent information, including but potentially not limited to this pathology report.    Modified Classification: y (post-neoadjuvant therapy)  pT Category: pT2: invades the muscularis propria  pN Category: pN3a: Metastasis in seven to 15 regional lymph nodes  pM Category: Not applicable - pM cannot be determined from the submitted specimen(s)    ADDITIONAL FINDINGS    Additional Findings: Chronic gastritis, Helicobacter pylori  present    SPECIAL STUDIES    HER2 and mismatch repair testing have been performed on the previous biopsy cases [id number] and [id number]; see those cases for a full report. HER2 was negative and mismatch repair testing revealed loss of nuclear expression of MLH1 and PMS2. Claudin  18.2 was negative.       IMAGING     02/08/2024:  CT Abdomen/ Pelvis  Impression:   Eccentric irregular wall thickening of the mid gastric body concerning for malignancy.  There appears to be associated enlarged 1.6 cm gastrohepatic lymph node.  Clinical correlation advised.  Correlation with endoscopy is recommended if not previously performed.    02/27/2024:  CT Chest  Impression:   No evidence of intrathoracic metastatic disease.    02/28/2024: PET CT Scan  Impression:   1. The biopsy proving gastric cancer is markedly FDG avid with SUV max 18.  2. Moderately FDG avid gastrohepatic ligament lymph node consistent with local metastatic disease.  3. No evidence for distant metastatic disease.    06/24/2024:  CT Chest, Abdomen, and Pelvis with IV contrast   Stable left adrenal nodule.  The right adrenal gland, pancreas, gallbladder, and spleen are within normal limits.  Demonstration of the annular ulcerated gastric body mass with mucosal thickening and irregularity that has decreased since the prior examination.  However, there has been enlargement of several perigastric lymph nodes in the lesser sac measuring up to 3.2 x 2.9 x 4 cm on axial image 33 of series 3 and 1.8 x 1.7 x 1.8 cm on axial image 41.   Impression:   Progression of upper abdominal metastatic lymphadenopathy in the lesser sac.  No distant sites of metastatic disease identified in the chest, abdomen, or pelvis.    07/10/2024:  PET CT Scan  Impression:   Mixed interval changes.  Decreased hypermetabolic wall thickening of the stomach and stable hypermetabolic index gastrohepatic lymph node.  Additional new enlarged hypermetabolic gastrohepatic lymph node concerning for  progression of local alis disease.    ASSESSMENT & PLAN     1. Gastric adenocarcinoma    2. Hx of deep vein thrombophlebitis of lower extremity    3. Diarrhea, unspecified type        Cheryl Kirkland is a 74 y.o. 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     She still has not bounced back.  She is doing tube feeds but is still losing weight.  She is still feeling fatigued.  I suspect in large part due to this diarrhea.      -- check cdiff  -- ok for imodium once cdiff negative  -- may need IVF- will discus w/ Dr. Ambrose  -- will discuss increased TF goal with nutrition   -- re-enforced how to care for Jtube site (which looks good overall without erythema or induration)  -- will obtain bilateral LE duplexes since she has hx DVT and has been largely sedentary   -- encouraged more ambulation and mobility     Ms. Garcia expressed understanding in regards to our discussion today. Many good questions were asked on today's visit, all of which were answered to their satisfaction.    Follow-up: Follow up in about 3 weeks (around 10/28/2024).                Chen Jerome MD MS              Surgical Oncology              [facility] [place] [facility] [place]              Office: [phone]

## 2024-10-08 ENCOUNTER — TELEPHONE (OUTPATIENT)
Dept: NUTRITION | Facility: CLINIC | Age: 74
End: 2024-10-08
Payer: MEDICARE

## 2024-10-08 ENCOUNTER — DOCUMENT SCAN (OUTPATIENT)
Dept: HOME HEALTH SERVICES | Facility: HOSPITAL | Age: 74
End: 2024-10-08
Payer: MEDICARE

## 2024-10-08 NOTE — TELEPHONE ENCOUNTER
Spoke with Ms. Luna about diarrhea and weight loss. She started immodium this morning and wants a callback tomorrow to schedule nutrition. Her KF 1.5 Peptide tube feed is set at 50 ml/ hr about 22 hours per day. She eats potatoes, grits, and fruit.   Signature: Sonali Garcia, MPH, RD, LDN

## 2024-10-08 NOTE — TELEPHONE ENCOUNTER
Left voicemail with Ms. Cheryl with RD phone number, in attempt to schedule follow up nutrition visit.   Signature: Sonali Garcia, MPH, RD, LDN

## 2024-10-09 ENCOUNTER — TELEPHONE (OUTPATIENT)
Dept: NUTRITION | Facility: CLINIC | Age: 74
End: 2024-10-09
Payer: MEDICARE

## 2024-10-09 ENCOUNTER — HOSPITAL ENCOUNTER (INPATIENT)
Facility: HOSPITAL | Age: 74
LOS: 12 days | Discharge: NURSING, REHAB OR LONG-TERM CARE | DRG: 682 | End: 2024-10-21
Attending: FAMILY MEDICINE | Admitting: INTERNAL MEDICINE
Payer: MEDICARE

## 2024-10-09 ENCOUNTER — TELEPHONE (OUTPATIENT)
Dept: SURGICAL ONCOLOGY | Facility: CLINIC | Age: 74
End: 2024-10-09
Payer: MEDICARE

## 2024-10-09 DIAGNOSIS — R10.9 ABDOMINAL PAIN: ICD-10-CM

## 2024-10-09 DIAGNOSIS — N17.9 AKI (ACUTE KIDNEY INJURY): ICD-10-CM

## 2024-10-09 DIAGNOSIS — E87.20 METABOLIC ACIDOSIS: ICD-10-CM

## 2024-10-09 DIAGNOSIS — R07.9 CHEST PAIN: ICD-10-CM

## 2024-10-09 DIAGNOSIS — I10 HYPERTENSION: ICD-10-CM

## 2024-10-09 DIAGNOSIS — N13.2 URETERAL STONE WITH HYDRONEPHROSIS: Primary | ICD-10-CM

## 2024-10-09 DIAGNOSIS — C16.9 GASTRIC ADENOCARCINOMA: ICD-10-CM

## 2024-10-09 DIAGNOSIS — N20.0 KIDNEY STONE ON RIGHT SIDE: ICD-10-CM

## 2024-10-09 DIAGNOSIS — R10.9 INTRACTABLE ABDOMINAL PAIN: ICD-10-CM

## 2024-10-09 DIAGNOSIS — E86.0 DEHYDRATION: ICD-10-CM

## 2024-10-09 LAB
ABO GROUP BLD: NORMAL
ALBUMIN SERPL BCP-MCNC: 2.5 G/DL (ref 3.5–5.2)
ALBUMIN SERPL BCP-MCNC: 2.9 G/DL (ref 3.5–5.2)
ALLENS TEST: ABNORMAL
ALLENS TEST: ABNORMAL
ALP SERPL-CCNC: 195 U/L (ref 55–135)
ALP SERPL-CCNC: 210 U/L (ref 55–135)
ALT SERPL W/O P-5'-P-CCNC: 26 U/L (ref 10–44)
ALT SERPL W/O P-5'-P-CCNC: 27 U/L (ref 10–44)
ANION GAP SERPL CALC-SCNC: 11 MMOL/L (ref 8–16)
ANION GAP SERPL CALC-SCNC: 11 MMOL/L (ref 8–16)
ANISOCYTOSIS BLD QL SMEAR: SLIGHT
APTT PPP: 41.6 SEC (ref 21–32)
AST SERPL-CCNC: 25 U/L (ref 10–40)
AST SERPL-CCNC: 27 U/L (ref 10–40)
BACTERIA #/AREA URNS HPF: ABNORMAL /HPF
BASOPHILS # BLD AUTO: 0.03 K/UL (ref 0–0.2)
BASOPHILS NFR BLD: 0.3 % (ref 0–1.9)
BILIRUB SERPL-MCNC: 0.2 MG/DL (ref 0.1–1)
BILIRUB SERPL-MCNC: 0.3 MG/DL (ref 0.1–1)
BILIRUB UR QL STRIP: NEGATIVE
BLD GP AB SCN CELLS X3 SERPL QL: NORMAL
BUN SERPL-MCNC: 21 MG/DL (ref 8–23)
BUN SERPL-MCNC: 21 MG/DL (ref 8–23)
BURR CELLS BLD QL SMEAR: ABNORMAL
C DIFF GDH STL QL: NEGATIVE
C DIFF TOX A+B STL QL IA: NEGATIVE
CALCIUM SERPL-MCNC: 10 MG/DL (ref 8.7–10.5)
CALCIUM SERPL-MCNC: 9.1 MG/DL (ref 8.7–10.5)
CHLORIDE SERPL-SCNC: 120 MMOL/L (ref 95–110)
CHLORIDE SERPL-SCNC: 123 MMOL/L (ref 95–110)
CK SERPL-CCNC: 24 U/L (ref 20–180)
CLARITY UR: ABNORMAL
CO2 SERPL-SCNC: 6 MMOL/L (ref 23–29)
CO2 SERPL-SCNC: 7 MMOL/L (ref 23–29)
COLOR UR: YELLOW
CREAT SERPL-MCNC: 1.7 MG/DL (ref 0.5–1.4)
CREAT SERPL-MCNC: 2 MG/DL (ref 0.5–1.4)
DELSYS: ABNORMAL
DELSYS: ABNORMAL
DIFFERENTIAL METHOD BLD: ABNORMAL
EOSINOPHIL # BLD AUTO: 0 K/UL (ref 0–0.5)
EOSINOPHIL NFR BLD: 0.3 % (ref 0–8)
ERYTHROCYTE [DISTWIDTH] IN BLOOD BY AUTOMATED COUNT: 15.7 % (ref 11.5–14.5)
EST. GFR  (NO RACE VARIABLE): 26 ML/MIN/1.73 M^2
EST. GFR  (NO RACE VARIABLE): 31 ML/MIN/1.73 M^2
GLUCOSE SERPL-MCNC: 106 MG/DL (ref 70–110)
GLUCOSE SERPL-MCNC: 124 MG/DL (ref 70–110)
GLUCOSE SERPL-MCNC: 175 MG/DL (ref 70–110)
GLUCOSE UR QL STRIP: ABNORMAL
HCO3 UR-SCNC: 11.3 MMOL/L (ref 24–28)
HCO3 UR-SCNC: 4.5 MMOL/L (ref 24–28)
HCT VFR BLD AUTO: 50.5 % (ref 37–48.5)
HCT VFR BLD CALC: 45 %PCV (ref 36–54)
HGB BLD-MCNC: 15.7 G/DL (ref 12–16)
HGB UR QL STRIP: ABNORMAL
HYALINE CASTS #/AREA URNS LPF: 1 /LPF
IMM GRANULOCYTES # BLD AUTO: 0.11 K/UL (ref 0–0.04)
IMM GRANULOCYTES NFR BLD AUTO: 1.2 % (ref 0–0.5)
INR PPP: 1.2 (ref 0.8–1.2)
KETONES UR QL STRIP: NEGATIVE
LEUKOCYTE ESTERASE UR QL STRIP: ABNORMAL
LIPASE SERPL-CCNC: 6 U/L (ref 4–60)
LYMPHOCYTES # BLD AUTO: 0.8 K/UL (ref 1–4.8)
LYMPHOCYTES NFR BLD: 8.3 % (ref 18–48)
MAGNESIUM SERPL-MCNC: 2.1 MG/DL (ref 1.6–2.6)
MCH RBC QN AUTO: 31.7 PG (ref 27–31)
MCHC RBC AUTO-ENTMCNC: 31.1 G/DL (ref 32–36)
MCV RBC AUTO: 102 FL (ref 82–98)
MICROSCOPIC COMMENT: ABNORMAL
MODE: ABNORMAL
MODE: ABNORMAL
MONOCYTES # BLD AUTO: 0.8 K/UL (ref 0.3–1)
MONOCYTES NFR BLD: 8.3 % (ref 4–15)
NEUTROPHILS # BLD AUTO: 7.8 K/UL (ref 1.8–7.7)
NEUTROPHILS NFR BLD: 81.6 % (ref 38–73)
NITRITE UR QL STRIP: NEGATIVE
NRBC BLD-RTO: 0 /100 WBC
OB PNL STL: NEGATIVE
OVALOCYTES BLD QL SMEAR: ABNORMAL
PCO2 BLDA: 17 MMHG (ref 35–45)
PCO2 BLDA: 37.8 MMHG (ref 35–45)
PH SMN: 7.03 [PH] (ref 7.35–7.45)
PH SMN: 7.09 [PH] (ref 7.35–7.45)
PH UR STRIP: 6 [PH] (ref 5–8)
PHOSPHATE SERPL-MCNC: 5 MG/DL (ref 2.7–4.5)
PLATELET # BLD AUTO: 378 K/UL (ref 150–450)
PLATELET BLD QL SMEAR: ABNORMAL
PMV BLD AUTO: 8.2 FL (ref 9.2–12.9)
PO2 BLDA: 20 MMHG (ref 40–60)
PO2 BLDA: 95 MMHG (ref 40–60)
POC BE: -19 MMOL/L
POC BE: -26 MMOL/L
POC IONIZED CALCIUM: 1.31 MMOL/L (ref 1.06–1.42)
POC SATURATED O2: 18 % (ref 95–100)
POC SATURATED O2: 93 % (ref 95–100)
POIKILOCYTOSIS BLD QL SMEAR: SLIGHT
POLYCHROMASIA BLD QL SMEAR: ABNORMAL
POTASSIUM BLD-SCNC: 2.8 MMOL/L (ref 3.5–5.1)
POTASSIUM SERPL-SCNC: 3.3 MMOL/L (ref 3.5–5.1)
POTASSIUM SERPL-SCNC: 3.9 MMOL/L (ref 3.5–5.1)
PROCALCITONIN SERPL IA-MCNC: 0.58 NG/ML
PROT SERPL-MCNC: 6.7 G/DL (ref 6–8.4)
PROT SERPL-MCNC: 7.5 G/DL (ref 6–8.4)
PROT UR QL STRIP: ABNORMAL
PROTHROMBIN TIME: 13.8 SEC (ref 9–12.5)
RBC # BLD AUTO: 4.95 M/UL (ref 4–5.4)
RBC #/AREA URNS HPF: >100 /HPF (ref 0–4)
RH BLD: NORMAL
RV AG STL QL IA.RAPID: NEGATIVE
SAMPLE: ABNORMAL
SAMPLE: ABNORMAL
SITE: ABNORMAL
SITE: ABNORMAL
SODIUM BLD-SCNC: 144 MMOL/L (ref 136–145)
SODIUM SERPL-SCNC: 137 MMOL/L (ref 136–145)
SODIUM SERPL-SCNC: 141 MMOL/L (ref 136–145)
SP GR UR STRIP: 1.02 (ref 1–1.03)
T4 FREE SERPL-MCNC: 0.69 NG/DL (ref 0.71–1.51)
URN SPEC COLLECT METH UR: ABNORMAL
UROBILINOGEN UR STRIP-ACNC: NEGATIVE EU/DL
WBC # BLD AUTO: 9.51 K/UL (ref 3.9–12.7)
WBC #/AREA URNS HPF: 31 /HPF (ref 0–5)

## 2024-10-09 PROCEDURE — 81000 URINALYSIS NONAUTO W/SCOPE: CPT | Performed by: FAMILY MEDICINE

## 2024-10-09 PROCEDURE — 85730 THROMBOPLASTIN TIME PARTIAL: CPT | Performed by: NURSE PRACTITIONER

## 2024-10-09 PROCEDURE — 87209 SMEAR COMPLEX STAIN: CPT | Performed by: FAMILY MEDICINE

## 2024-10-09 PROCEDURE — 83605 ASSAY OF LACTIC ACID: CPT | Performed by: INTERNAL MEDICINE

## 2024-10-09 PROCEDURE — 93010 ELECTROCARDIOGRAM REPORT: CPT | Mod: ,,, | Performed by: STUDENT IN AN ORGANIZED HEALTH CARE EDUCATION/TRAINING PROGRAM

## 2024-10-09 PROCEDURE — 85610 PROTHROMBIN TIME: CPT | Performed by: NURSE PRACTITIONER

## 2024-10-09 PROCEDURE — 83993 ASSAY FOR CALPROTECTIN FECAL: CPT | Performed by: FAMILY MEDICINE

## 2024-10-09 PROCEDURE — 93005 ELECTROCARDIOGRAM TRACING: CPT

## 2024-10-09 PROCEDURE — 87449 NOS EACH ORGANISM AG IA: CPT | Performed by: EMERGENCY MEDICINE

## 2024-10-09 PROCEDURE — 84295 ASSAY OF SERUM SODIUM: CPT

## 2024-10-09 PROCEDURE — 82550 ASSAY OF CK (CPK): CPT | Performed by: NURSE PRACTITIONER

## 2024-10-09 PROCEDURE — 82803 BLOOD GASES ANY COMBINATION: CPT

## 2024-10-09 PROCEDURE — 82272 OCCULT BLD FECES 1-3 TESTS: CPT | Performed by: FAMILY MEDICINE

## 2024-10-09 PROCEDURE — 21400001 HC TELEMETRY ROOM

## 2024-10-09 PROCEDURE — 83690 ASSAY OF LIPASE: CPT | Performed by: FAMILY MEDICINE

## 2024-10-09 PROCEDURE — 83735 ASSAY OF MAGNESIUM: CPT | Performed by: NURSE PRACTITIONER

## 2024-10-09 PROCEDURE — 87425 ROTAVIRUS AG IA: CPT | Performed by: FAMILY MEDICINE

## 2024-10-09 PROCEDURE — 86901 BLOOD TYPING SEROLOGIC RH(D): CPT | Performed by: NURSE PRACTITIONER

## 2024-10-09 PROCEDURE — 87427 SHIGA-LIKE TOXIN AG IA: CPT | Mod: 59 | Performed by: FAMILY MEDICINE

## 2024-10-09 PROCEDURE — 99285 EMERGENCY DEPT VISIT HI MDM: CPT | Mod: 25

## 2024-10-09 PROCEDURE — 89055 LEUKOCYTE ASSESSMENT FECAL: CPT | Performed by: FAMILY MEDICINE

## 2024-10-09 PROCEDURE — 96375 TX/PRO/DX INJ NEW DRUG ADDON: CPT

## 2024-10-09 PROCEDURE — 96374 THER/PROPH/DIAG INJ IV PUSH: CPT

## 2024-10-09 PROCEDURE — 96361 HYDRATE IV INFUSION ADD-ON: CPT

## 2024-10-09 PROCEDURE — 36415 COLL VENOUS BLD VENIPUNCTURE: CPT | Performed by: INTERNAL MEDICINE

## 2024-10-09 PROCEDURE — 87045 FECES CULTURE AEROBIC BACT: CPT | Performed by: FAMILY MEDICINE

## 2024-10-09 PROCEDURE — 99900035 HC TECH TIME PER 15 MIN (STAT)

## 2024-10-09 PROCEDURE — 86850 RBC ANTIBODY SCREEN: CPT | Performed by: NURSE PRACTITIONER

## 2024-10-09 PROCEDURE — 11000001 HC ACUTE MED/SURG PRIVATE ROOM

## 2024-10-09 PROCEDURE — 84100 ASSAY OF PHOSPHORUS: CPT | Performed by: NURSE PRACTITIONER

## 2024-10-09 PROCEDURE — 85025 COMPLETE CBC W/AUTO DIFF WBC: CPT | Performed by: FAMILY MEDICINE

## 2024-10-09 PROCEDURE — 80053 COMPREHEN METABOLIC PANEL: CPT | Performed by: FAMILY MEDICINE

## 2024-10-09 PROCEDURE — 87177 OVA AND PARASITES SMEARS: CPT | Performed by: FAMILY MEDICINE

## 2024-10-09 PROCEDURE — 84145 PROCALCITONIN (PCT): CPT | Performed by: NURSE PRACTITIONER

## 2024-10-09 PROCEDURE — 63600175 PHARM REV CODE 636 W HCPCS: Performed by: FAMILY MEDICINE

## 2024-10-09 PROCEDURE — 82705 FATS/LIPIDS FECES QUAL: CPT | Performed by: FAMILY MEDICINE

## 2024-10-09 PROCEDURE — 25000003 PHARM REV CODE 250: Performed by: FAMILY MEDICINE

## 2024-10-09 PROCEDURE — 86900 BLOOD TYPING SEROLOGIC ABO: CPT | Performed by: NURSE PRACTITIONER

## 2024-10-09 PROCEDURE — 85014 HEMATOCRIT: CPT

## 2024-10-09 PROCEDURE — 84132 ASSAY OF SERUM POTASSIUM: CPT

## 2024-10-09 PROCEDURE — 87046 STOOL CULTR AEROBIC BACT EA: CPT | Performed by: FAMILY MEDICINE

## 2024-10-09 PROCEDURE — 87338 HPYLORI STOOL AG IA: CPT | Performed by: FAMILY MEDICINE

## 2024-10-09 PROCEDURE — 87324 CLOSTRIDIUM AG IA: CPT | Performed by: EMERGENCY MEDICINE

## 2024-10-09 PROCEDURE — 87449 NOS EACH ORGANISM AG IA: CPT | Mod: 91 | Performed by: FAMILY MEDICINE

## 2024-10-09 PROCEDURE — 63600175 PHARM REV CODE 636 W HCPCS: Performed by: NURSE PRACTITIONER

## 2024-10-09 PROCEDURE — 80053 COMPREHEN METABOLIC PANEL: CPT | Mod: 91 | Performed by: NURSE PRACTITIONER

## 2024-10-09 PROCEDURE — 82653 EL-1 FECAL QUANTITATIVE: CPT | Performed by: FAMILY MEDICINE

## 2024-10-09 PROCEDURE — 87329 GIARDIA AG IA: CPT | Performed by: FAMILY MEDICINE

## 2024-10-09 PROCEDURE — 25000003 PHARM REV CODE 250: Performed by: EMERGENCY MEDICINE

## 2024-10-09 PROCEDURE — 25000003 PHARM REV CODE 250: Performed by: NURSE PRACTITIONER

## 2024-10-09 PROCEDURE — 84439 ASSAY OF FREE THYROXINE: CPT | Performed by: NURSE PRACTITIONER

## 2024-10-09 PROCEDURE — 63600175 PHARM REV CODE 636 W HCPCS: Performed by: EMERGENCY MEDICINE

## 2024-10-09 PROCEDURE — 82330 ASSAY OF CALCIUM: CPT

## 2024-10-09 PROCEDURE — 80047 BASIC METABLC PNL IONIZED CA: CPT

## 2024-10-09 RX ORDER — INDOMETHACIN 25 MG/1
100 CAPSULE ORAL
Status: COMPLETED | OUTPATIENT
Start: 2024-10-09 | End: 2024-10-09

## 2024-10-09 RX ORDER — ACETAMINOPHEN 325 MG/1
650 TABLET ORAL EVERY 6 HOURS PRN
Status: DISCONTINUED | OUTPATIENT
Start: 2024-10-09 | End: 2024-10-21 | Stop reason: HOSPADM

## 2024-10-09 RX ORDER — SODIUM CHLORIDE, SODIUM LACTATE, POTASSIUM CHLORIDE, CALCIUM CHLORIDE 600; 310; 30; 20 MG/100ML; MG/100ML; MG/100ML; MG/100ML
1000 INJECTION, SOLUTION INTRAVENOUS CONTINUOUS
Status: DISCONTINUED | OUTPATIENT
Start: 2024-10-09 | End: 2024-10-09

## 2024-10-09 RX ORDER — HEPARIN SODIUM,PORCINE/D5W 25000/250
0-40 INTRAVENOUS SOLUTION INTRAVENOUS CONTINUOUS
Status: DISPENSED | OUTPATIENT
Start: 2024-10-09 | End: 2024-10-12

## 2024-10-09 RX ORDER — MORPHINE SULFATE 4 MG/ML
1 INJECTION, SOLUTION INTRAMUSCULAR; INTRAVENOUS EVERY 4 HOURS PRN
Status: DISCONTINUED | OUTPATIENT
Start: 2024-10-09 | End: 2024-10-21 | Stop reason: HOSPADM

## 2024-10-09 RX ORDER — SODIUM CHLORIDE 0.9 % (FLUSH) 0.9 %
10 SYRINGE (ML) INJECTION EVERY 8 HOURS PRN
Status: DISCONTINUED | OUTPATIENT
Start: 2024-10-09 | End: 2024-10-21 | Stop reason: HOSPADM

## 2024-10-09 RX ORDER — SODIUM CHLORIDE 9 MG/ML
1000 INJECTION, SOLUTION INTRAVENOUS CONTINUOUS
Status: DISCONTINUED | OUTPATIENT
Start: 2024-10-09 | End: 2024-10-09

## 2024-10-09 RX ORDER — NALOXONE HCL 0.4 MG/ML
0.02 VIAL (ML) INJECTION
Status: DISCONTINUED | OUTPATIENT
Start: 2024-10-09 | End: 2024-10-21 | Stop reason: HOSPADM

## 2024-10-09 RX ORDER — MORPHINE SULFATE 4 MG/ML
4 INJECTION, SOLUTION INTRAMUSCULAR; INTRAVENOUS
Status: COMPLETED | OUTPATIENT
Start: 2024-10-09 | End: 2024-10-09

## 2024-10-09 RX ORDER — GLUCAGON 1 MG
1 KIT INJECTION
Status: DISCONTINUED | OUTPATIENT
Start: 2024-10-09 | End: 2024-10-21 | Stop reason: HOSPADM

## 2024-10-09 RX ORDER — ONDANSETRON HYDROCHLORIDE 2 MG/ML
4 INJECTION, SOLUTION INTRAVENOUS ONCE
Status: COMPLETED | OUTPATIENT
Start: 2024-10-09 | End: 2024-10-09

## 2024-10-09 RX ORDER — INDOMETHACIN 25 MG/1
100 CAPSULE ORAL ONCE
Status: COMPLETED | OUTPATIENT
Start: 2024-10-09 | End: 2024-10-10

## 2024-10-09 RX ORDER — IBUPROFEN 200 MG
16 TABLET ORAL
Status: DISCONTINUED | OUTPATIENT
Start: 2024-10-09 | End: 2024-10-21 | Stop reason: HOSPADM

## 2024-10-09 RX ORDER — ONDANSETRON HYDROCHLORIDE 2 MG/ML
4 INJECTION, SOLUTION INTRAVENOUS EVERY 6 HOURS PRN
Status: DISCONTINUED | OUTPATIENT
Start: 2024-10-09 | End: 2024-10-21 | Stop reason: HOSPADM

## 2024-10-09 RX ORDER — IBUPROFEN 200 MG
24 TABLET ORAL
Status: DISCONTINUED | OUTPATIENT
Start: 2024-10-09 | End: 2024-10-21 | Stop reason: HOSPADM

## 2024-10-09 RX ADMIN — MORPHINE SULFATE 4 MG: 4 INJECTION INTRAVENOUS at 01:10

## 2024-10-09 RX ADMIN — HEPARIN SODIUM 18 UNITS/KG/HR: 10000 INJECTION, SOLUTION INTRAVENOUS at 11:10

## 2024-10-09 RX ADMIN — CEFTRIAXONE SODIUM 1 G: 1 INJECTION, POWDER, FOR SOLUTION INTRAMUSCULAR; INTRAVENOUS at 07:10

## 2024-10-09 RX ADMIN — ONDANSETRON 4 MG: 2 INJECTION INTRAMUSCULAR; INTRAVENOUS at 01:10

## 2024-10-09 RX ADMIN — SODIUM CHLORIDE 1401 ML: 9 INJECTION, SOLUTION INTRAVENOUS at 01:10

## 2024-10-09 RX ADMIN — SODIUM BICARBONATE 100 MEQ: 84 INJECTION, SOLUTION INTRAVENOUS at 04:10

## 2024-10-09 RX ADMIN — POTASSIUM BICARBONATE 50 MEQ: 978 TABLET, EFFERVESCENT ORAL at 10:10

## 2024-10-09 NOTE — TELEPHONE ENCOUNTER
Called to check in whether immodium improved patient's GI issues. Requested she call RD back to schedule follow up visit.   Signature: Sonali Garcia, MPH, RD, LDN

## 2024-10-09 NOTE — PHARMACY MED REC
"Admission Medication History     The home medication history was taken by Trev Juarez.    You may go to "Admission" then "Reconcile Home Medications" tabs to review and/or act upon these items.     The home medication list has been updated by the Pharmacy department.   Please read ALL comments highlighted in yellow.   Please address this information as you see fit.    Feel free to contact us if you have any questions or require assistance.      Medications listed below were obtained from: Patient/family and Analytic software- [employer]  (Not in a hospital admission)    Trev Juarez  [id number]    Current Outpatient Medications on File Prior to Encounter   Medication Sig Dispense Refill Last Dose/Taking    alendronate (FOSAMAX) 70 MG tablet Take 70 mg by mouth every 7 days. (Sundays)   Past Week    calcium phosphate trib/vit D3 (CALCIUM PHOSPHATE-VITAMIN D3 ORAL) Take 1 tablet by mouth 2 (two) times daily.   Past Week    carBAMazepine (TEGRETOL) 200 mg tablet 1 tablet with breakfast  1 tablet with lunch   1.5 tablet at bedtime   Past Week    ferrous sulfate 325 (65 FE) MG EC tablet Take 65 mg by mouth 2 (two) times daily with meals.   Past Week    hydrocortisone 2.5 % cream Apply topically 2 (two) times daily. 28 g 1 Taking    levETIRAcetam (KEPPRA) 500 MG Tab Take 1 tablet by mouth 2 (two) times daily.   Past Week    oxyCODONE (ROXICODONE) 10 mg Tab immediate release tablet Take 1 tablet (10 mg total) by mouth every 4 (four) hours as needed for Pain. 45 tablet 0 Past Week    zonisamide (ZONEGRAN) 100 MG Cap Take 200 mg by mouth 2 (two) times daily.   Past Week    acetaminophen (TYLENOL) 500 MG tablet Take 2 tablets (1,000 mg total) by mouth every 8 (eight) hours. (Patient taking differently: Take 1,000 mg by mouth every 8 (eight) hours as needed for Pain.)  0     hydrOXYzine HCL (ATARAX) 25 MG tablet Take 1 tablet (25 mg total) by mouth 3 (three) times daily as needed for Itching. 90 tablet 1     ibuprofen " (ADVIL,MOTRIN) 800 MG tablet Take 1 tablet (800 mg total) by mouth every 8 (eight) hours. (Patient taking differently: Take 800 mg by mouth every 8 (eight) hours as needed.)                            .

## 2024-10-09 NOTE — Clinical Note
Diagnosis: SHELLEY (acute kidney injury) [[id number]]   Future Attending Provider: NOEL CHASE [[id number]]   Reason for IP Medical Treatment  (Clinical interventions that can only be accomplished in the IP setting? ) :: shelley   Plans for Post-Acute care--if anticipated (pick the single best option):: A. No post acute care anticipated at this time

## 2024-10-09 NOTE — RESPIRATORY THERAPY
VBG results as follows:    pH 7.030  PCO2: 17.0  PO2: 95    HCO3: 4.5  SaO2: 93    Dr. Davila notified of critical's at 1413     Results appear to be arterial. RN and MD notified.

## 2024-10-09 NOTE — TELEPHONE ENCOUNTER
Pt's  called stating pt will not stop vomiting. Told him to bring her to the ED. He verbalized understanding

## 2024-10-09 NOTE — ED PROVIDER NOTES
SCRIBE #1 NOTE: IMarcus, am scribing for, and in the presence of, Yue Davila MD. I have scribed the HPI, ROS, and PEx.     SCRIBE #2 NOTE: I, Antonio Mario, am scribing for, and in the presence of,  Lorri Marlow DO. I have scribed the remaining portions of the note not scribed by Scribe #1.      History     Chief Complaint   Patient presents with    Weakness     Patient with history stomach cancer presents to ED with c/o weakness, nausea, and abdominal pain     Review of patient's allergies indicates:  No Known Allergies      History of Present Illness     HPI    10/9/2024, 12:48 PM  History obtained from the  and patient      History of Present Illness: Cheryl Kirkland is a 74 y.o. female patient with a PMHx of anemia, chronic deep vein thrombosis, drug induced polyneuropathy, gastric adenocarcinoma, gerd, hyperlipidemia, osteoporosis, ovarian cancer, and primary hypertension who presents to the Emergency Department for weakness than began several weeks ago. Patient had a Esophagogastroduodenoscopy completed on 02/08/2024. Pt reports having a diagnostic laparoscopy and laparoscopic lysis of adhesions March 14 2024. Patients  also notes that patient has developed severe diarrhea and notices it occurs after her feeding via J-tube.  Patient c/o pain located around her J-tube. Symptoms are constant and moderate in severity. No mitigating or exacerbating factors reported. Associated sxs include nausea, decrease in appetite, and abdominal pain. Patient denies any fever, chest pain, and all other sxs at this time. Pt is currently in immunotherapy. No further complaints or concerns at this time.       Arrival mode: Personal vehicle    PCP: Gagandeep Iglesias MD        Past Medical History:  Past Medical History:   Diagnosis Date    Anemia     Chronic deep vein thrombosis (DVT) of left lower extremity 10/21/2022    Deep vein thrombosis     Drug-induced polyneuropathy 02/28/2024     Noted by ROCK KAY NP  last documented on [id number]    DVT (deep venous thrombosis)     Epilepsy     Gastric adenocarcinoma 02/27/2024    GERD (gastroesophageal reflux disease)     Hyperlipidemia 01/10/2013    Formatting of this note might be different from the original.   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    OP (osteoporosis) 02/28/2024    Ovarian cancer     Primary hypertension 12/01/2022       Past Surgical History:  Past Surgical History:   Procedure Laterality Date    ABDOMINAL WASHOUT N/A 3/14/2024    Procedure: LAVAGE, PERITONEAL, THERAPEUTIC;  Surgeon: Chen Jerome MD;  Location: [facility] OR;  Service: General;  Laterality: N/A;    APPENDECTOMY  2015    BIOPSY OF PERITONEUM N/A 3/14/2024    Procedure: BIOPSY, PERITONEUM;  Surgeon: Chen Jermoe MD;  Location: [facility] OR;  Service: General;  Laterality: N/A;    COLONOSCOPY  06/20/2012    Dr Boyle- Tubular adenoma polyps. Repeat in 3 years.    COLONOSCOPY  06/17/2015    -Nodular mucosa at appendiceal orfice, sigmoid and desc diverticulosis otherwise normal.    COLONOSCOPY  2020    >3 TAs    COLONOSCOPY  12/2023    DIAGNOSTIC LAPAROSCOPY N/A 3/14/2024    Procedure: LAPAROSCOPY, DIAGNOSTIC;  Surgeon: Chen Jerome MD;  Location: [facility] OR;  Service: General;  Laterality: N/A;  1. Diagnostic laparoscopy   2. Extensive lysis of adhesions  3. Peritoneal biopsy   4. Peritoneal washings for cytology    DIAGNOSTIC LAPAROSCOPY N/A 8/20/2024    Procedure: LAPAROSCOPY, DIAGNOSTIC;  Surgeon: Chen Jerome MD;  Location: [facility] OR;  Service: General;  Laterality: N/A;    EGD - EXTERNAL RESULT  06/20/2012    Dr Boyle- Mild gastritis otherwise normal.    ENDOSCOPIC ULTRASOUND OF UPPER GASTROINTESTINAL TRACT N/A 7/26/2024    Procedure: ULTRASOUND, UPPER GI TRACT, ENDOSCOPIC;  Surgeon: Valdo Aguilera MD;  Location: [facility] ENDO;  Service: Endoscopy;  Laterality: N/A;  7/22/24: instructions sent via  portal-. Pt no longer takling eliquis-GD    ESOPHAGOGASTRODUODENOSCOPY N/A 02/08/2024    Procedure: EGD (ESOPHAGOGASTRODUODENOSCOPY);  Surgeon: Jayla Arteaga MD;  Location: [facility] ENDO;  Service: Endoscopy;  Laterality: N/A;    ESOPHAGOGASTRODUODENOSCOPY N/A 8/20/2024    Procedure: EGD (ESOPHAGOGASTRODUODENOSCOPY);  Surgeon: Chen Jerome MD;  Location: [facility] OR;  Service: General;  Laterality: N/A;    GASTRECTOMY N/A 8/20/2024    Procedure: GASTRECTOMY;  Surgeon: Chen Jerome MD;  Location: [facility] OR;  Service: General;  Laterality: N/A;    HYSTERECTOMY      LAPAROSCOPIC LYSIS OF ADHESIONS N/A 3/14/2024    Procedure: LYSIS, ADHESIONS, LAPAROSCOPIC;  Surgeon: Chen Jerome MD;  Location: [facility] OR;  Service: General;  Laterality: N/A;    LYSIS OF ADHESIONS N/A 8/20/2024    Procedure: LYSIS, ADHESIONS;  Surgeon: Chen Jerome MD;  Location: [facility] OR;  Service: General;  Laterality: N/A;    PLACEMENT OF JEJUNOSTOMY TUBE N/A 8/20/2024    Procedure: INSERTION, JEJUNOSTOMY TUBE;  Surgeon: Chen Jerome MD;  Location: [facility] OR;  Service: General;  Laterality: N/A;    TOTAL ABDOMINAL HYSTERECTOMY W/ BILATERAL SALPINGOOPHORECTOMY  01/09/2023    radical resection with right salpingo-oophorectomy, left salpingo-oophorectomy, extensive enterolysis, extensive adhesiolysis, left pelvic lymph node biopsy, omental biopsy, small bowel resection with primary functional end-to-end anastomosis, placement of pelvic drain         Family History:  Family History   Problem Relation Name Age of Onset    Pancreatic cancer Brother Misael Mccray 76    Prostate cancer Brother Zachary 67    Pancreatic cancer Half-brother Gasper 74    Colon cancer Half-sister Elton 83    Lung cancer Half-sister Elton         +smoking hx    Breast cancer Half-sister Vidhi 58    Lymphoma Other Cara Sun    Prostate cancer Other Fernando     Prostate cancer Other Gasper Jr     Ovarian cancer Other Inerris     Breast cancer Other  Aleida     Colon cancer Other [facility]        Social History:  Social History     Tobacco Use    Smoking status: Former     Types: Cigarettes    Smokeless tobacco: Never    Tobacco comments:     Quit 1989 smoked 10 years smoked 0.25 ppd    Substance and Sexual Activity    Alcohol use: Not Currently    Drug use: Never    Sexual activity: Not on file        Review of Systems     Review of Systems   Constitutional:  Positive for appetite change (decrease). Negative for fever.   Cardiovascular:  Negative for chest pain.   Gastrointestinal:  Positive for abdominal pain, diarrhea and nausea.   Neurological:  Positive for weakness.        Physical Exam     Initial Vitals [10/09/24 1134]   BP Pulse Resp Temp SpO2   110/78 95 12 97.3 °F (36.3 °C) 97 %      MAP       --          Physical Exam  Nursing Notes and Vital Signs Reviewed.  Constitutional: Patient is in no apparent distress. Patient is frail and ill appearing.  Head: Atraumatic. Normocephalic.  Eyes: PERRL. EOM intact. Conjunctivae are not pale. No scleral icterus.  ENT: Mucous membranes are moist. Oropharynx is clear and symmetric.    Neck: Supple. Full ROM.   Cardiovascular: Tachycardic. Regular rhythm. No murmurs, rubs, or gallops. Distal pulses are 2+ and symmetric.  Pulmonary/Chest: No respiratory distress. Clear to auscultation bilaterally. No wheezing or rales.  Abdominal: Soft and non-distended.  J tube in LLQ. Continuous feeds are going in with pump. Tenderness around J-tube.  No rebound, guarding, or rigidity.   Genitourinary: No CVA tenderness  Musculoskeletal: Moves all extremities. No obvious deformities. No edema. No calf tenderness.  Skin: Warm and dry. Midline incision healing appropriately. No redness or drainage.   Neurological:  Alert, awake, and appropriate.  Normal speech.  No acute focal neurological deficits are appreciated.  Psychiatric: Normal affect. Good eye contact. Appropriate in content.     ED Course   Critical Care    Date/Time:  "10/9/2024 4:00 PM    Performed by: Lorri Marlow DO  Authorized by: Tasha Vides MD  Direct patient critical care time: 18 minutes  Ordering / reviewing critical care time: 3 minutes  Documentation critical care time: 6 minutes  Consulting other physicians critical care time: 5 minutes  Total critical care time (exclusive of procedural time) : 32 minutes  Critical care time was exclusive of separately billable procedures and treating other patients.  Critical care was necessary to treat or prevent imminent or life-threatening deterioration of the following conditions: dehydration.  Critical care was time spent personally by me on the following activities: blood draw for specimens, development of treatment plan with patient or surrogate, discussions with consultants, evaluation of patient's response to treatment, examination of patient, obtaining history from patient or surrogate, ordering and performing treatments and interventions, ordering and review of laboratory studies, ordering and review of radiographic studies, pulse oximetry, re-evaluation of patient's condition, review of old charts and vascular access procedures.  Subsequent provider of critical care: I assumed direction of critical care for this patient from another provider of my specialty.        ED Vital Signs:  Vitals:    10/09/24 1134 10/09/24 1150 10/09/24 1327 10/09/24 1350   BP: 110/78  115/76    Pulse: 95  100    Resp: 12   18   Temp: 97.3 °F (36.3 °C)      TempSrc: Oral      SpO2: 97%  100%    Weight:  46.7 kg (103 lb)     Height: 5' 5" (1.651 m)       10/09/24 1400 10/09/24 1410 10/09/24 1421 10/09/24 1715   BP: 121/76  121/71 130/71   Pulse: 86 86 86 85   Resp: 18 20 18 18   Temp:   97.2 °F (36.2 °C)    TempSrc:   Oral    SpO2: 100% 100%  100%   Weight:       Height:        10/09/24 2024 10/09/24 2036 10/09/24 2140 10/10/24 0122   BP:   113/66 124/61   Pulse: 86 87 85 87   Resp: 19  16 16   Temp:   97.9 °F (36.6 °C) 97.2 °F (36.2 " °C)   TempSrc:   Oral Oral   SpO2: 100%  100% 99%   Weight:       Height:        10/10/24 0347   BP: 130/69   Pulse: 88   Resp: 18   Temp: 97.7 °F (36.5 °C)   TempSrc: Oral   SpO2: 99%   Weight:    Height:        Abnormal Lab Results:  Labs Reviewed   CBC W/ AUTO DIFFERENTIAL - Abnormal       Result Value    WBC 9.51      RBC 4.95      Hemoglobin 15.7      Hematocrit 50.5 (*)      (*)     MCH 31.7 (*)     MCHC 31.1 (*)     RDW 15.7 (*)     Platelets 378      MPV 8.2 (*)     Immature Granulocytes 1.2 (*)     Gran # (ANC) 7.8 (*)     Immature Grans (Abs) 0.11 (*)     Lymph # 0.8 (*)     Mono # 0.8      Eos # 0.0      Baso # 0.03      nRBC 0      Gran % 81.6 (*)     Lymph % 8.3 (*)     Mono % 8.3      Eosinophil % 0.3      Basophil % 0.3      Platelet Estimate Appears normal      Aniso Slight      Poik Slight      Poly Occasional      Ovalocytes Occasional      Laci Cells Occasional      Differential Method Automated      Narrative:     Release to patient->Immediate   COMPREHENSIVE METABOLIC PANEL - Abnormal    Sodium 137      Potassium 3.9      Chloride 120 (*)     CO2 6 (*)     Glucose 124 (*)     BUN 21      Creatinine 2.0 (*)     Calcium 10.0      Total Protein 7.5      Albumin 2.9 (*)     Total Bilirubin 0.3      Alkaline Phosphatase 210 (*)     AST 25      ALT 27      eGFR 26 (*)     Anion Gap 11      Narrative:     Release to patient->Immediate   co2  critical result(s) called and verbal readback obtained from   salomón leblanc rn by [facility] 10/09/2024 13:17   URINALYSIS - Abnormal    Specimen UA Urine, Catheterized      Color, UA Yellow      Appearance, UA Hazy (*)     pH, UA 6.0      Specific Gravity, UA 1.020      Protein, UA 2+ (*)     Glucose, UA 1+ (*)     Ketones, UA Negative      Bilirubin (UA) Negative      Occult Blood UA 3+ (*)     Nitrite, UA Negative      Urobilinogen, UA Negative      Leukocytes, UA Trace (*)    URINALYSIS MICROSCOPIC - Abnormal    RBC, UA >100 (*)     WBC, UA 31 (*)     Bacteria  Rare      Hyaline Casts, UA 1      Microscopic Comment SEE COMMENT     ISTAT PROCEDURE - Abnormal    POC PH 7.030 (*)     POC PCO2 17.0 (*)     POC PO2 95 (*)     POC HCO3 4.5 (*)     POC BE -26 (*)     POC SATURATED O2 93      Sample VENOUS      Site Other      Allens Test N/A      DelSys Room Air      Mode SPONT     ISTAT PROCEDURE - Abnormal    POC PH 7.086 (*)     POC PCO2 37.8      POC PO2 20 (*)     POC HCO3 11.3 (*)     POC BE -19 (*)     POC SATURATED O2 18      POC Glucose 175 (*)     POC Sodium 144      POC Potassium 2.8 (*)     POC Ionized Calcium 1.31      POC Hematocrit 45      Sample VENOUS      Site Other      Allens Test N/A      DelSys Room Air      Mode SPONT     CLOSTRIDIUM DIFFICILE    C. diff Antigen Negative      C difficile Toxins A+B, EIA Negative     LIPASE    Lipase 6      Narrative:     Release to patient->Immediate   OCCULT BLOOD X 1, STOOL    Occult Blood Negative     ROTAVIRUS ANTIGEN, STOOL    Rotavirus Negative          All Lab Results:  Results for orders placed or performed during the hospital encounter of 10/09/24   EKG 12-lead    Collection Time: 10/09/24 11:54 AM   Result Value Ref Range    QRS Duration 74 ms    OHS QTC Calculation 382 ms   CBC auto differential    Collection Time: 10/09/24 12:35 PM   Result Value Ref Range    WBC 9.51 3.90 - 12.70 K/uL    RBC 4.95 4.00 - 5.40 M/uL    Hemoglobin 15.7 12.0 - 16.0 g/dL    Hematocrit 50.5 (H) 37.0 - 48.5 %     (H) 82 - 98 fL    MCH 31.7 (H) 27.0 - 31.0 pg    MCHC 31.1 (L) 32.0 - 36.0 g/dL    RDW 15.7 (H) 11.5 - 14.5 %    Platelets 378 150 - 450 K/uL    MPV 8.2 (L) 9.2 - 12.9 fL    Immature Granulocytes 1.2 (H) 0.0 - 0.5 %    Gran # (ANC) 7.8 (H) 1.8 - 7.7 K/uL    Immature Grans (Abs) 0.11 (H) 0.00 - 0.04 K/uL    Lymph # 0.8 (L) 1.0 - 4.8 K/uL    Mono # 0.8 0.3 - 1.0 K/uL    Eos # 0.0 0.0 - 0.5 K/uL    Baso # 0.03 0.00 - 0.20 K/uL    nRBC 0 0 /100 WBC    Gran % 81.6 (H) 38.0 - 73.0 %    Lymph % 8.3 (L) 18.0 - 48.0 %    Mono %  8.3 4.0 - 15.0 %    Eosinophil % 0.3 0.0 - 8.0 %    Basophil % 0.3 0.0 - 1.9 %    Platelet Estimate Appears normal     Aniso Slight     Poik Slight     Poly Occasional     Ovalocytes Occasional     Laci Cells Occasional     Differential Method Automated    Comprehensive metabolic panel    Collection Time: 10/09/24 12:35 PM   Result Value Ref Range    Sodium 137 136 - 145 mmol/L    Potassium 3.9 3.5 - 5.1 mmol/L    Chloride 120 (H) 95 - 110 mmol/L    CO2 6 (LL) 23 - 29 mmol/L    Glucose 124 (H) 70 - 110 mg/dL    BUN 21 8 - 23 mg/dL    Creatinine 2.0 (H) 0.5 - 1.4 mg/dL    Calcium 10.0 8.7 - 10.5 mg/dL    Total Protein 7.5 6.0 - 8.4 g/dL    Albumin 2.9 (L) 3.5 - 5.2 g/dL    Total Bilirubin 0.3 0.1 - 1.0 mg/dL    Alkaline Phosphatase 210 (H) 55 - 135 U/L    AST 25 10 - 40 U/L    ALT 27 10 - 44 U/L    eGFR 26 (A) >60 mL/min/1.73 m^2    Anion Gap 11 8 - 16 mmol/L   Lipase    Collection Time: 10/09/24 12:35 PM   Result Value Ref Range    Lipase 6 4 - 60 U/L   Occult blood x 1, stool    Collection Time: 10/09/24  1:26 PM    Specimen: Stool   Result Value Ref Range    Occult Blood Negative Negative   Rotavirus antigen, stool    Collection Time: 10/09/24  1:26 PM   Result Value Ref Range    Rotavirus Negative Negative   ISTAT PROCEDURE    Collection Time: 10/09/24  2:11 PM   Result Value Ref Range    POC PH 7.030 (LL) 7.35 - 7.45    POC PCO2 17.0 (L) 35 - 45 mmHg    POC PO2 95 (HH) 40 - 60 mmHg    POC HCO3 4.5 (L) 24 - 28 mmol/L    POC BE -26 (L) -2 to 2 mmol/L    POC SATURATED O2 93 95 - 100 %    Sample VENOUS     Site Other     Allens Test N/A     DelSys Room Air     Mode SPONT    Clostridium difficile EIA    Collection Time: 10/09/24  5:57 PM    Specimen: Stool   Result Value Ref Range    C. diff Antigen Negative Negative    C difficile Toxins A+B, EIA Negative Negative   Urinalysis - Clean Catch    Collection Time: 10/09/24  6:39 PM   Result Value Ref Range    Specimen UA Urine, Catheterized     Color, UA Yellow Yellow,  Straw, Marina    Appearance, UA Hazy (A) Clear    pH, UA 6.0 5.0 - 8.0    Specific Gravity, UA 1.020 1.005 - 1.030    Protein, UA 2+ (A) Negative    Glucose, UA 1+ (A) Negative    Ketones, UA Negative Negative    Bilirubin (UA) Negative Negative    Occult Blood UA 3+ (A) Negative    Nitrite, UA Negative Negative    Urobilinogen, UA Negative <2.0 EU/dL    Leukocytes, UA Trace (A) Negative   Urinalysis Microscopic    Collection Time: 10/09/24  6:39 PM   Result Value Ref Range    RBC, UA >100 (H) 0 - 4 /hpf    WBC, UA 31 (H) 0 - 5 /hpf    Bacteria Rare None-Occ /hpf    Hyaline Casts, UA 1 0-1/lpf /lpf    Microscopic Comment SEE COMMENT    ISTAT PROCEDURE    Collection Time: 10/09/24  8:24 PM   Result Value Ref Range    POC PH 7.086 (LL) 7.35 - 7.45    POC PCO2 37.8 35 - 45 mmHg    POC PO2 20 (LL) 40 - 60 mmHg    POC HCO3 11.3 (L) 24 - 28 mmol/L    POC BE -19 (L) -2 to 2 mmol/L    POC SATURATED O2 18 95 - 100 %    POC Glucose 175 (H) 70 - 110 mg/dL    POC Sodium 144 136 - 145 mmol/L    POC Potassium 2.8 (LL) 3.5 - 5.1 mmol/L    POC Ionized Calcium 1.31 1.06 - 1.42 mmol/L    POC Hematocrit 45 36 - 54 %PCV    Sample VENOUS     Site Other     Allens Test N/A     DelSys Room Air     Mode SPONT    CK    Collection Time: 10/09/24  8:59 PM   Result Value Ref Range    CPK 24 20 - 180 U/L   T4, free    Collection Time: 10/09/24  8:59 PM   Result Value Ref Range    Free T4 0.69 (L) 0.71 - 1.51 ng/dL   APTT    Collection Time: 10/09/24  9:11 PM   Result Value Ref Range    aPTT 41.6 (H) 21.0 - 32.0 sec   Protime-INR    Collection Time: 10/09/24  9:11 PM   Result Value Ref Range    Prothrombin Time 13.8 (H) 9.0 - 12.5 sec    INR 1.2 0.8 - 1.2   Procalcitonin    Collection Time: 10/09/24  9:11 PM   Result Value Ref Range    Procalcitonin 0.58 (H) <0.25 ng/mL   Comprehensive metabolic panel    Collection Time: 10/09/24  9:11 PM   Result Value Ref Range    Sodium 141 136 - 145 mmol/L    Potassium 3.3 (L) 3.5 - 5.1 mmol/L    Chloride 123  (H) 95 - 110 mmol/L    CO2 7 (LL) 23 - 29 mmol/L    Glucose 106 70 - 110 mg/dL    BUN 21 8 - 23 mg/dL    Creatinine 1.7 (H) 0.5 - 1.4 mg/dL    Calcium 9.1 8.7 - 10.5 mg/dL    Total Protein 6.7 6.0 - 8.4 g/dL    Albumin 2.5 (L) 3.5 - 5.2 g/dL    Total Bilirubin 0.2 0.1 - 1.0 mg/dL    Alkaline Phosphatase 195 (H) 55 - 135 U/L    AST 27 10 - 40 U/L    ALT 26 10 - 44 U/L    eGFR 31 (A) >60 mL/min/1.73 m^2    Anion Gap 11 8 - 16 mmol/L   Magnesium    Collection Time: 10/09/24  9:11 PM   Result Value Ref Range    Magnesium 2.1 1.6 - 2.6 mg/dL   Phosphorus    Collection Time: 10/09/24  9:11 PM   Result Value Ref Range    Phosphorus 5.0 (H) 2.7 - 4.5 mg/dL   Group & Rh    Collection Time: 10/09/24  9:11 PM   Result Value Ref Range    ABO O     Rh Type POS    Indirect Antiglobulin Test    Collection Time: 10/09/24  9:11 PM   Result Value Ref Range    Antibody Screen NEG    CBC auto differential    Collection Time: 10/10/24  1:22 AM   Result Value Ref Range    WBC 9.69 3.90 - 12.70 K/uL    RBC 4.47 4.00 - 5.40 M/uL    Hemoglobin 14.3 12.0 - 16.0 g/dL    Hematocrit 45.1 37.0 - 48.5 %     (H) 82 - 98 fL    MCH 32.0 (H) 27.0 - 31.0 pg    MCHC 31.7 (L) 32.0 - 36.0 g/dL    RDW 15.7 (H) 11.5 - 14.5 %    Platelets 317 150 - 450 K/uL    MPV 8.8 (L) 9.2 - 12.9 fL    Immature Granulocytes 1.2 (H) 0.0 - 0.5 %    Gran # (ANC) 7.8 (H) 1.8 - 7.7 K/uL    Immature Grans (Abs) 0.12 (H) 0.00 - 0.04 K/uL    Lymph # 0.8 (L) 1.0 - 4.8 K/uL    Mono # 0.8 0.3 - 1.0 K/uL    Eos # 0.0 0.0 - 0.5 K/uL    Baso # 0.09 0.00 - 0.20 K/uL    nRBC 0 0 /100 WBC    Gran % 80.7 (H) 38.0 - 73.0 %    Lymph % 8.2 (L) 18.0 - 48.0 %    Mono % 8.6 4.0 - 15.0 %    Eosinophil % 0.4 0.0 - 8.0 %    Basophil % 0.9 0.0 - 1.9 %    Differential Method Automated    Lactic acid, plasma    Collection Time: 10/10/24  1:22 AM   Result Value Ref Range    Lactate (Lactic Acid) 2.6 (H) 0.5 - 2.2 mmol/L   Brain natriuretic peptide    Collection Time: 10/10/24  1:22 AM   Result  Value Ref Range    BNP 27 0 - 99 pg/mL   Troponin I    Collection Time: 10/10/24  1:22 AM   Result Value Ref Range    Troponin I <0.006 0.000 - 0.026 ng/mL         Imaging Results:  Imaging Results              X-Ray Chest 1 View (Final result)  Result time 10/09/24 21:33:21      Final result by Cruz Myers MD (10/09/24 21:33:21)                   Impression:      No acute abnormality.      Electronically signed by: Cruz Myers  Date:    10/09/2024  Time:    21:33               Narrative:    EXAMINATION:  XR CHEST 1 VIEW    CLINICAL HISTORY:  weakness;    TECHNIQUE:  Single frontal view of the chest was performed.    COMPARISON:  None    FINDINGS:  The lungs are clear, with normal appearance of pulmonary vasculature and no pleural effusion or pneumothorax.    The cardiac silhouette is normal in size. The hilar and mediastinal contours are unremarkable.    Old deformity of the posterior left ribs.                                       US Lower Extremity Veins Bilateral (Final result)  Result time 10/09/24 20:10:08      Final result by Cruz Myers MD (10/09/24 20:10:08)                   Impression:      Extensive clot from the right common femoral vein to the right posterior tibial vein.  Occlusive right is anterior tibial vein.    No left-sided DVT      Electronically signed by: Cruz Myers  Date:    10/09/2024  Time:    20:10               Narrative:    EXAMINATION:  US LOWER EXTREMITY VEINS BILATERAL    CLINICAL HISTORY:  leg pain, tightness/swelling;    TECHNIQUE:  Duplex and color flow Doppler and dynamic compression was performed of the bilateral lower extremity veins was performed.    COMPARISON:  None    FINDINGS:  Right thigh veins: Extensive clot from the common femoral vein to the popliteal vein    Right calf veins: Occluded anterior tibial vein.  Peroneal vein is patent.    Left thigh veins: The common femoral, femoral, popliteal, upper greater saphenous, and deep femoral veins are patent and  free of thrombus. The veins are normally compressible and have normal phasic flow and augmentation response.    Left calf veins: The visualized calf veins are patent.    Miscellaneous: None                                       CT Abdomen Pelvis  Without Contrast (Final result)  Result time 10/09/24 16:30:50      Final result by Cruz Myers MD (10/09/24 16:30:50)                   Impression:      Right-sided hydronephrosis with a approximately 8 mm mid right ureteral obstructing stone.  Punctate nonobstructing left renal calculi.  Postsurgical changes of the bowel.  Fluid within the colonic loops of bowel may relate to diarrheal state.  Left hemiabdomen enteric tube noted    All CT scans   are performed using dose optimization techniques including the following: automated exposure control; adjustment of the mA and/or kV; use of iterative reconstruction technique.  Dose modulation was employed for ALARA by means of: Automated exposure control; adjustment of the mA and/or kV according to patient size (this includes techniques or standardized protocols for targeted exams where dose is matched to indication/reason for exam; i.e. extremities or head); and/or use of iterative reconstructive technique.      Electronically signed by: Cruz Myers  Date:    10/09/2024  Time:    16:30               Narrative:    EXAMINATION:  CT ABDOMEN PELVIS WITHOUT CONTRAST    CLINICAL HISTORY:  Abdominal pain, acute, nonlocalized;    TECHNIQUE:  Low dose axial images, sagittal and coronal reformations were obtained from the lung bases to the pubic symphysis.    COMPARISON:  Prior    FINDINGS:  Enteric tube in the left hemiabdomen within the small bowel loops is identified.  Postsurgical changes of.  Fluid within the colonic loops of bowel suggestive diarrheal state.  Right-sided hydronephrosis.  Abdominal wall postsurgical changes noted.  Obstructing mid right ureteral stone measures 8 mm.  Nonobstructing punctate left renal calculi  noted.  Lung bases are clear.  No gallstones.  No bowel obstruction.  The appendix is not identified.    Evaluation limited by the lack of intravenous contrast.  Pancreas is grossly unremarkable.  No gallstones.                                       The EKG was ordered, reviewed, and independently interpreted by the ED provider.  Interpretation time: 11:54  Rate: 88 BPM  Rhythm: Sinus rhythm with premature atrial complexes in a pattern of bigeminy  Interpretation: Nonspecific T wave abnormality. No STEMI.             The Emergency Provider reviewed the vital signs and test results, which are outlined above.     ED Discussion       4:00 PM: Dr. Davila transfers care of patient to Dr. Marlow pending lab results.    6:33 PM: Discussed pt's case with Dr. Kelly (Urology) who recommends admit to hospital medicine. Dr. Kelly will consult in the am. Dr. Kelly request to make her NPO after midnight.    6:54 PM: Discussed case with Dr. Vides ([facility]). Dr. Vides agrees with current care and management of pt and accepts admission.   Admitting Service: [facility]  Admitting Physician: Dr. Vides  Admit to: Inpt tele     7:00 PM: Re-evaluated pt. I have discussed test results, shared treatment plan, and the need for admission with patient and family at bedside. Pt and family express understanding at this time and agree with all information. All questions answered. Pt and family have no further questions or concerns at this time. Pt is ready for admit.     ED Course as of 10/10/24 0412   Wed Oct 09, 2024   1912 WBC, UA(!): 31 [LB]   1913 RBC, UA(!): >100 [LB]   1913 C. diff Antigen: Negative [LB]   1913 C difficile Toxins A+B, EIA: Negative  Updated patient and family member regarding SHELLEY.  Discussed findings of right-sided kidney stone.  Patient is not having symptoms on the right side.  Discussed need for NPO after midnight.  Including tube feeding. [LB]      ED Course User Index  [LB] Ele  Lorri BELLO, DO     Medical Decision Making  73 yo female most recently treated for adenocarcinoma of the stomach c/o abdominal pain, weakness and nausea that has gotten worse over past few days.  She is on continuous J tube feeds, but c/o a lot of pain around the J tube site.  Her midline surgical incision is mostly healed with only a small area most proximally that hasn't completely closed.  There is no drainage, redness or fluctuance to either the midline incision or J tube site.  Her abdomen is not distended and is tender in left lower quadrant.  She is emaciated and frail/ill appearing and says she hasn't even be well enough to take her regular medications.  She also is hesitant to take pain meds because she doesn't want to get addicted to oxycodone.  WBC normal but CO2 6 and pH on vbg 7.030.  Cr today is 2.0, which is above her baseline.  NS IVF bolus given along with morphine IV.    Care assumed from Dr. Davila.      Differential diagnosis small-bowel obstruction, acute kidney injury, urinary tract infection, electrolyte abnormality    Patient given 1401 mL fluid bolus of normal saline.  As well as sodium bicarbonate.  Patient noted to have pH of 7.030.  White cell count 9.51.  Hemoglobin/hematocrit normal.  Positive left shift.  CO2 6.  Creatinine 2.0.  This demonstrates acute kidney injury as patient's creatinine was 1.02 weeks ago on September 20, 2024.  Recommend observation as patient has acute kidney injury as well as large right-sided kidney stone 8 mm mid right ureteral stone.  Secure chat with Hospital Medicine Service as well as general Urology.      Problems Addressed:  SHELLEY (acute kidney injury): acute illness or injury     Details: Fluids    Amount and/or Complexity of Data Reviewed  Labs: ordered. Decision-making details documented in ED Course.  Radiology: ordered. Decision-making details documented in ED Course.  ECG/medicine tests: ordered and independent interpretation performed. Decision-making  details documented in ED Course.    Risk  Prescription drug management.  Decision regarding hospitalization.                ED Medication(s):  Medications   cefTRIAXone (Rocephin) 1 g in D5W 100 mL IVPB (MB+) (has no administration in time range)   heparin 25,000 units in dextrose 5% 250 mL (100 units/mL) infusion HIGH INTENSITY nomogram - [facility] (18 Units/kg/hr × 46.7 kg Intravenous Restarted 10/10/24 0341)   heparin 25,000 units in dextrose 5% (100 units/ml) IV bolus from bag HIGH INTENSITY nomogram - [facility] (has no administration in time range)   heparin 25,000 units in dextrose 5% (100 units/ml) IV bolus from bag HIGH INTENSITY nomogram - [facility] (has no administration in time range)   sodium chloride 0.9% flush 10 mL (has no administration in time range)   ondansetron injection 4 mg (has no administration in time range)   naloxone 0.4 mg/mL injection 0.02 mg (has no administration in time range)   glucose chewable tablet 16 g (has no administration in time range)   glucose chewable tablet 24 g (has no administration in time range)   glucagon (human recombinant) injection 1 mg (has no administration in time range)   acetaminophen tablet 650 mg (has no administration in time range)   dextrose 10% bolus 125 mL 125 mL (has no administration in time range)   dextrose 10% bolus 250 mL 250 mL (has no administration in time range)   sodium bicarbonate 100 mEq in D5 and 0.45% NaCl 1,100 mL infusion ( Intravenous New Bag 10/10/24 0254)   morphine injection 1 mg (has no administration in time range)   levETIRAcetam in NaCl (iso-os) IVPB 500 mg (500 mg Intravenous New Bag 10/10/24 0348)   morphine injection 4 mg (4 mg Intravenous Given 10/9/24 1350)   ondansetron injection 4 mg (4 mg Intravenous Given 10/9/24 1351)   sodium chloride 0.9% bolus 1,401 mL 1,401 mL (0 mLs Intravenous Stopped 10/9/24 1840)   sodium bicarbonate solution 100 mEq (100 mEq Intravenous Given 10/9/24 1600)   cefTRIAXone (Rocephin) 1 g in D5W 100 mL IVPB (MB+)  (0 g Intravenous Stopped 10/9/24 2000)   heparin 25,000 units in dextrose 5% (100 units/ml) IV bolus from bag HIGH INTENSITY nomogram - [facility] (3,736 Units Intravenous Bolus from Bag 10/9/24 2306)   sodium bicarbonate solution 100 mEq (100 mEq Intravenous Given 10/10/24 0203)   potassium bicarbonate disintegrating tablet 50 mEq (50 mEq Per J Tube Given 10/9/24 2216)       Current Discharge Medication List                  Scribe Attestation:   Scribe #1: I performed the above scribed service and the documentation accurately describes the services I performed. I attest to the accuracy of the note.     Attending:   Physician Attestation Statement for Scribe #1: I, Yue Davila MD, personally performed the services described in this documentation, as scribed by Marcus Hankins, in my presence, and it is both accurate and complete.       Scribe Attestation:   Scribe #2: I performed the above scribed service and the documentation accurately describes the services I performed. I attest to the accuracy of the note.    Attending Attestation:           Physician Attestation for Scribe:    Physician Attestation Statement for Scribe #2: I, Lorri Marlow DO, reviewed documentation, as scribed by Antonio Mario in my presence, and it is both accurate and complete. I also acknowledge and confirm the content of the note done by Scribe #1.           Clinical Impression       ICD-10-CM ICD-9-CM   1. SHELLEY (acute kidney injury)  N17.9 584.9   2. Abdominal pain  R10.9 789.00   3. Gastric adenocarcinoma  C16.9 151.9   4. Dehydration  E86.0 276.51   5. Intractable abdominal pain  R10.9 789.00   6. Metabolic acidosis  E87.20 276.2   7. Hypertension  I10 401.9         8. Kidney stone on right side  N20.0 592.0         Disposition:   Disposition: Admitted  Condition: Fair         Lorri Marlow DO  10/10/24 7675

## 2024-10-09 NOTE — ED NOTES
Pt needed assistance to toilet on bedside commode. Pt tolerated sitting and cleaning well. Assisted pt back to bed. Monitors hooked back up and call light within reach.

## 2024-10-10 ENCOUNTER — ANESTHESIA (OUTPATIENT)
Dept: SURGERY | Facility: HOSPITAL | Age: 74
End: 2024-10-10
Payer: MEDICARE

## 2024-10-10 ENCOUNTER — ANESTHESIA EVENT (OUTPATIENT)
Dept: SURGERY | Facility: HOSPITAL | Age: 74
End: 2024-10-10
Payer: MEDICARE

## 2024-10-10 PROBLEM — N30.01 ACUTE CYSTITIS WITH HEMATURIA: Status: ACTIVE | Noted: 2024-10-10

## 2024-10-10 PROBLEM — N13.2 URETERAL STONE WITH HYDRONEPHROSIS: Status: ACTIVE | Noted: 2024-10-10

## 2024-10-10 LAB
ALBUMIN SERPL BCP-MCNC: 2.2 G/DL (ref 3.5–5.2)
ALLENS TEST: ABNORMAL
ALP SERPL-CCNC: 169 U/L (ref 55–135)
ALT SERPL W/O P-5'-P-CCNC: 23 U/L (ref 10–44)
ANION GAP SERPL CALC-SCNC: 10 MMOL/L (ref 8–16)
AORTIC ROOT ANNULUS: 2.82 CM
APTT PPP: 28.2 SEC (ref 21–32)
APTT PPP: 59.2 SEC (ref 21–32)
ASCENDING AORTA: 2.62 CM
AST SERPL-CCNC: 44 U/L (ref 10–40)
AV INDEX (PROSTH): 0.88
AV MEAN GRADIENT: 6.3 MMHG
AV PEAK GRADIENT: 9 MMHG
AV VALVE AREA BY VELOCITY RATIO: 1.9 CM²
AV VALVE AREA: 2.5 CM²
AV VELOCITY RATIO: 0.67
BASOPHILS # BLD AUTO: 0.09 K/UL (ref 0–0.2)
BASOPHILS NFR BLD: 0 % (ref 0–1.9)
BASOPHILS NFR BLD: 0.9 % (ref 0–1.9)
BILIRUB SERPL-MCNC: 0.2 MG/DL (ref 0.1–1)
BNP SERPL-MCNC: 27 PG/ML (ref 0–99)
BUN SERPL-MCNC: 22 MG/DL (ref 8–23)
BURR CELLS BLD QL SMEAR: ABNORMAL
CALCIUM SERPL-MCNC: 8.5 MG/DL (ref 8.7–10.5)
CHLORIDE SERPL-SCNC: 121 MMOL/L (ref 95–110)
CO2 SERPL-SCNC: 16 MMOL/L (ref 23–29)
CREAT SERPL-MCNC: 1.5 MG/DL (ref 0.5–1.4)
CRYPTOSP AG STL QL IA: NEGATIVE
CV ECHO LV RWT: 0.73 CM
DACRYOCYTES BLD QL SMEAR: ABNORMAL
DELSYS: ABNORMAL
DIFFERENTIAL METHOD BLD: ABNORMAL
DIFFERENTIAL METHOD BLD: ABNORMAL
DOP CALC AO PEAK VEL: 1.5 M/S
DOP CALC AO VTI: 23.3 CM
DOP CALC LVOT AREA: 2.8 CM2
DOP CALC LVOT DIAMETER: 1.9 CM
DOP CALC LVOT PEAK VEL: 1 M/S
DOP CALC LVOT STROKE VOLUME: 58.1 CM3
DOP CALC RVOT PEAK VEL: 0.75 M/S
DOP CALC RVOT VTI: 14.3 CM
DOP CALCLVOT PEAK VEL VTI: 20.5 CM
E COLI SXT1 STL QL IA: NEGATIVE
E COLI SXT2 STL QL IA: NEGATIVE
E WAVE DECELERATION TIME: 283.3 MSEC
E/A RATIO: 0.71
E/E' RATIO: 5.79 M/S
ECHO LV POSTERIOR WALL: 1.2 CM (ref 0.6–1.1)
EJECTION FRACTION: 65 %
EOSINOPHIL # BLD AUTO: 0 K/UL (ref 0–0.5)
EOSINOPHIL NFR BLD: 0 % (ref 0–8)
EOSINOPHIL NFR BLD: 0.4 % (ref 0–8)
ERYTHROCYTE [DISTWIDTH] IN BLOOD BY AUTOMATED COUNT: 15.5 % (ref 11.5–14.5)
ERYTHROCYTE [DISTWIDTH] IN BLOOD BY AUTOMATED COUNT: 15.7 % (ref 11.5–14.5)
EST. GFR  (NO RACE VARIABLE): 36 ML/MIN/1.73 M^2
FIO2: 21
FRACTIONAL SHORTENING: 27.3 % (ref 28–44)
G LAMBLIA AG STL QL IA: NEGATIVE
GLUCOSE SERPL-MCNC: 127 MG/DL (ref 70–110)
GLUCOSE SERPL-MCNC: 141 MG/DL (ref 70–110)
HCO3 UR-SCNC: 13.5 MMOL/L (ref 24–28)
HCT VFR BLD AUTO: 38.1 % (ref 37–48.5)
HCT VFR BLD AUTO: 45.1 % (ref 37–48.5)
HCT VFR BLD CALC: 39 %PCV (ref 36–54)
HGB BLD-MCNC: 12.6 G/DL (ref 12–16)
HGB BLD-MCNC: 14.3 G/DL (ref 12–16)
IMM GRANULOCYTES # BLD AUTO: 0.12 K/UL (ref 0–0.04)
IMM GRANULOCYTES # BLD AUTO: ABNORMAL K/UL (ref 0–0.04)
IMM GRANULOCYTES NFR BLD AUTO: 1.2 % (ref 0–0.5)
IMM GRANULOCYTES NFR BLD AUTO: ABNORMAL % (ref 0–0.5)
INTERVENTRICULAR SEPTUM: 1.3 CM (ref 0.6–1.1)
IVC DIAMETER: 1.42 CM
IVRT: 83.73 MSEC
LA MAJOR: 4.17 CM
LA MINOR: 4.3 CM
LA WIDTH: 2.8 CM
LACTATE SERPL-SCNC: 2 MMOL/L (ref 0.5–2.2)
LACTATE SERPL-SCNC: 2.6 MMOL/L (ref 0.5–2.2)
LEFT ATRIUM SIZE: 2.91 CM
LEFT ATRIUM VOLUME INDEX: 19.7 ML/M2
LEFT ATRIUM VOLUME: 29.32 CM3
LEFT INTERNAL DIMENSION IN SYSTOLE: 2.4 CM (ref 2.1–4)
LEFT VENTRICLE DIASTOLIC VOLUME INDEX: 29.75 ML/M2
LEFT VENTRICLE DIASTOLIC VOLUME: 44.33 ML
LEFT VENTRICLE MASS INDEX: 89.3 G/M2
LEFT VENTRICLE SYSTOLIC VOLUME INDEX: 12.9 ML/M2
LEFT VENTRICLE SYSTOLIC VOLUME: 19.24 ML
LEFT VENTRICULAR INTERNAL DIMENSION IN DIASTOLE: 3.3 CM (ref 3.5–6)
LEFT VENTRICULAR MASS: 133 G
LV LATERAL E/E' RATIO: 5 M/S
LV SEPTAL E/E' RATIO: 6.88 M/S
LVED V (TEICH): 44.33 ML
LVES V (TEICH): 19.24 ML
LVOT MG: 2.52 MMHG
LVOT MV: 0.78 CM/S
LYMPHOCYTES # BLD AUTO: 0.8 K/UL (ref 1–4.8)
LYMPHOCYTES NFR BLD: 8.2 % (ref 18–48)
LYMPHOCYTES NFR BLD: 9 % (ref 18–48)
MAGNESIUM SERPL-MCNC: 2.1 MG/DL (ref 1.6–2.6)
MCH RBC QN AUTO: 31.9 PG (ref 27–31)
MCH RBC QN AUTO: 32 PG (ref 27–31)
MCHC RBC AUTO-ENTMCNC: 31.7 G/DL (ref 32–36)
MCHC RBC AUTO-ENTMCNC: 33.1 G/DL (ref 32–36)
MCV RBC AUTO: 101 FL (ref 82–98)
MCV RBC AUTO: 97 FL (ref 82–98)
METAMYELOCYTES NFR BLD MANUAL: 1 %
MODE: ABNORMAL
MONOCYTES # BLD AUTO: 0.8 K/UL (ref 0.3–1)
MONOCYTES NFR BLD: 14 % (ref 4–15)
MONOCYTES NFR BLD: 8.6 % (ref 4–15)
MV PEAK A VEL: 0.78 M/S
MV PEAK E VEL: 0.55 M/S
MV STENOSIS PRESSURE HALF TIME: 82.16 MS
MV VALVE AREA P 1/2 METHOD: 2.68 CM2
NEUTROPHILS # BLD AUTO: 7.8 K/UL (ref 1.8–7.7)
NEUTROPHILS NFR BLD: 42 % (ref 38–73)
NEUTROPHILS NFR BLD: 80.7 % (ref 38–73)
NEUTS BAND NFR BLD MANUAL: 34 %
NRBC BLD-RTO: 0 /100 WBC
NRBC BLD-RTO: 0 /100 WBC
OHS QRS DURATION: 74 MS
OHS QTC CALCULATION: 382 MS
OVALOCYTES BLD QL SMEAR: ABNORMAL
PATH REV BLD -IMP: NORMAL
PCO2 BLDA: 27 MMHG (ref 35–45)
PH SMN: 7.3 [PH] (ref 7.35–7.45)
PISA TR MAX VEL: 1.72 M/S
PLATELET # BLD AUTO: 317 K/UL (ref 150–450)
PLATELET # BLD AUTO: 412 K/UL (ref 150–450)
PLATELET BLD QL SMEAR: ABNORMAL
PMV BLD AUTO: 8.8 FL (ref 9.2–12.9)
PMV BLD AUTO: 9.2 FL (ref 9.2–12.9)
PO2 BLDA: 107 MMHG (ref 80–100)
POC BE: -13 MMOL/L
POC IONIZED CALCIUM: 1.32 MMOL/L (ref 1.06–1.42)
POC SATURATED O2: 98 % (ref 95–100)
POIKILOCYTOSIS BLD QL SMEAR: SLIGHT
POTASSIUM BLD-SCNC: 2 MMOL/L (ref 3.5–5.1)
POTASSIUM SERPL-SCNC: 4 MMOL/L (ref 3.5–5.1)
PROT SERPL-MCNC: 6.5 G/DL (ref 6–8.4)
PV MEAN GRADIENT: 2 MMHG
RA MAJOR: 3.34 CM
RA PRESSURE ESTIMATED: 3 MMHG
RA WIDTH: 2.4 CM
RBC # BLD AUTO: 3.95 M/UL (ref 4–5.4)
RBC # BLD AUTO: 4.47 M/UL (ref 4–5.4)
RV TB RVSP: 5 MMHG
SAMPLE: ABNORMAL
SITE: ABNORMAL
SODIUM BLD-SCNC: 149 MMOL/L (ref 136–145)
SODIUM SERPL-SCNC: 147 MMOL/L (ref 136–145)
STJ: 2.63 CM
TDI LATERAL: 0.11 M/S
TDI SEPTAL: 0.08 M/S
TDI: 0.1 M/S
TR MAX PG: 12 MMHG
TRICUSPID ANNULAR PLANE SYSTOLIC EXCURSION: 1.81 CM
TROPONIN I SERPL DL<=0.01 NG/ML-MCNC: <0.006 NG/ML (ref 0–0.03)
TSH SERPL DL<=0.005 MIU/L-ACNC: 0.74 UIU/ML (ref 0.4–4)
TV REST PULMONARY ARTERY PRESSURE: 15 MMHG
WBC # BLD AUTO: 10.12 K/UL (ref 3.9–12.7)
WBC # BLD AUTO: 9.69 K/UL (ref 3.9–12.7)
WBC #/AREA STL HPF: NORMAL /[HPF]
Z-SCORE OF LEFT VENTRICULAR DIMENSION IN END DIASTOLE: -2.93
Z-SCORE OF LEFT VENTRICULAR DIMENSION IN END SYSTOLE: -1.06

## 2024-10-10 PROCEDURE — 85027 COMPLETE CBC AUTOMATED: CPT | Performed by: NURSE PRACTITIONER

## 2024-10-10 PROCEDURE — 52351 CYSTOURETERO & OR PYELOSCOPE: CPT | Mod: ,,, | Performed by: UROLOGY

## 2024-10-10 PROCEDURE — 25000003 PHARM REV CODE 250: Performed by: HOSPITALIST

## 2024-10-10 PROCEDURE — 25000003 PHARM REV CODE 250: Performed by: UROLOGY

## 2024-10-10 PROCEDURE — 37000008 HC ANESTHESIA 1ST 15 MINUTES: Performed by: UROLOGY

## 2024-10-10 PROCEDURE — 99222 1ST HOSP IP/OBS MODERATE 55: CPT | Mod: ,,, | Performed by: UROLOGY

## 2024-10-10 PROCEDURE — 36415 COLL VENOUS BLD VENIPUNCTURE: CPT | Performed by: NURSE PRACTITIONER

## 2024-10-10 PROCEDURE — 63600175 PHARM REV CODE 636 W HCPCS: Performed by: UROLOGY

## 2024-10-10 PROCEDURE — 11000001 HC ACUTE MED/SURG PRIVATE ROOM

## 2024-10-10 PROCEDURE — 82330 ASSAY OF CALCIUM: CPT

## 2024-10-10 PROCEDURE — 85730 THROMBOPLASTIN TIME PARTIAL: CPT | Performed by: INTERNAL MEDICINE

## 2024-10-10 PROCEDURE — 84484 ASSAY OF TROPONIN QUANT: CPT | Performed by: NURSE PRACTITIONER

## 2024-10-10 PROCEDURE — 83735 ASSAY OF MAGNESIUM: CPT | Performed by: NURSE PRACTITIONER

## 2024-10-10 PROCEDURE — 0TJ98ZZ INSPECTION OF URETER, VIA NATURAL OR ARTIFICIAL OPENING ENDOSCOPIC: ICD-10-PCS | Performed by: UROLOGY

## 2024-10-10 PROCEDURE — 51702 INSERT TEMP BLADDER CATH: CPT

## 2024-10-10 PROCEDURE — 83880 ASSAY OF NATRIURETIC PEPTIDE: CPT | Performed by: INTERNAL MEDICINE

## 2024-10-10 PROCEDURE — 84295 ASSAY OF SERUM SODIUM: CPT

## 2024-10-10 PROCEDURE — 84132 ASSAY OF SERUM POTASSIUM: CPT

## 2024-10-10 PROCEDURE — C1758 CATHETER, URETERAL: HCPCS | Performed by: UROLOGY

## 2024-10-10 PROCEDURE — 25000003 PHARM REV CODE 250: Performed by: NURSE PRACTITIONER

## 2024-10-10 PROCEDURE — 36415 COLL VENOUS BLD VENIPUNCTURE: CPT | Performed by: HOSPITALIST

## 2024-10-10 PROCEDURE — 83605 ASSAY OF LACTIC ACID: CPT | Performed by: HOSPITALIST

## 2024-10-10 PROCEDURE — 85025 COMPLETE CBC W/AUTO DIFF WBC: CPT | Performed by: INTERNAL MEDICINE

## 2024-10-10 PROCEDURE — 84443 ASSAY THYROID STIM HORMONE: CPT | Performed by: NURSE PRACTITIONER

## 2024-10-10 PROCEDURE — S5010 5% DEXTROSE AND 0.45% SALINE: HCPCS | Performed by: NURSE PRACTITIONER

## 2024-10-10 PROCEDURE — 76937 US GUIDE VASCULAR ACCESS: CPT

## 2024-10-10 PROCEDURE — 99900035 HC TECH TIME PER 15 MIN (STAT)

## 2024-10-10 PROCEDURE — 36600 WITHDRAWAL OF ARTERIAL BLOOD: CPT

## 2024-10-10 PROCEDURE — 25000003 PHARM REV CODE 250

## 2024-10-10 PROCEDURE — 85007 BL SMEAR W/DIFF WBC COUNT: CPT | Performed by: NURSE PRACTITIONER

## 2024-10-10 PROCEDURE — 21400001 HC TELEMETRY ROOM

## 2024-10-10 PROCEDURE — 97530 THERAPEUTIC ACTIVITIES: CPT

## 2024-10-10 PROCEDURE — S5010 5% DEXTROSE AND 0.45% SALINE: HCPCS | Performed by: HOSPITALIST

## 2024-10-10 PROCEDURE — 36000707: Performed by: UROLOGY

## 2024-10-10 PROCEDURE — 85730 THROMBOPLASTIN TIME PARTIAL: CPT | Mod: 91 | Performed by: HOSPITALIST

## 2024-10-10 PROCEDURE — 97162 PT EVAL MOD COMPLEX 30 MIN: CPT

## 2024-10-10 PROCEDURE — 25500020 PHARM REV CODE 255: Performed by: UROLOGY

## 2024-10-10 PROCEDURE — 80053 COMPREHEN METABOLIC PANEL: CPT | Performed by: NURSE PRACTITIONER

## 2024-10-10 PROCEDURE — 97166 OT EVAL MOD COMPLEX 45 MIN: CPT

## 2024-10-10 PROCEDURE — 63600175 PHARM REV CODE 636 W HCPCS: Performed by: NURSE PRACTITIONER

## 2024-10-10 PROCEDURE — 36000706: Performed by: UROLOGY

## 2024-10-10 PROCEDURE — C1747 OPTIME ENDOSCOPE, SINGLE, URINARY TRACT: HCPCS | Performed by: UROLOGY

## 2024-10-10 PROCEDURE — 85014 HEMATOCRIT: CPT

## 2024-10-10 PROCEDURE — 82803 BLOOD GASES ANY COMBINATION: CPT

## 2024-10-10 PROCEDURE — 87086 URINE CULTURE/COLONY COUNT: CPT | Performed by: NURSE PRACTITIONER

## 2024-10-10 PROCEDURE — 85060 BLOOD SMEAR INTERPRETATION: CPT | Mod: ,,, | Performed by: STUDENT IN AN ORGANIZED HEALTH CARE EDUCATION/TRAINING PROGRAM

## 2024-10-10 PROCEDURE — 99223 1ST HOSP IP/OBS HIGH 75: CPT | Mod: 95,,, | Performed by: INTERNAL MEDICINE

## 2024-10-10 PROCEDURE — 63600175 PHARM REV CODE 636 W HCPCS

## 2024-10-10 PROCEDURE — 37000009 HC ANESTHESIA EA ADD 15 MINS: Performed by: UROLOGY

## 2024-10-10 PROCEDURE — 71000033 HC RECOVERY, INTIAL HOUR: Performed by: UROLOGY

## 2024-10-10 PROCEDURE — C1769 GUIDE WIRE: HCPCS | Performed by: UROLOGY

## 2024-10-10 PROCEDURE — 74420 UROGRAPHY RTRGR +-KUB: CPT | Mod: 26,,, | Performed by: UROLOGY

## 2024-10-10 RX ORDER — MEPERIDINE HYDROCHLORIDE 25 MG/ML
12.5 INJECTION INTRAMUSCULAR; INTRAVENOUS; SUBCUTANEOUS ONCE AS NEEDED
Status: DISCONTINUED | OUTPATIENT
Start: 2024-10-10 | End: 2024-10-10 | Stop reason: HOSPADM

## 2024-10-10 RX ORDER — HYDROMORPHONE HYDROCHLORIDE 1 MG/ML
0.2 INJECTION, SOLUTION INTRAMUSCULAR; INTRAVENOUS; SUBCUTANEOUS EVERY 5 MIN PRN
Status: DISCONTINUED | OUTPATIENT
Start: 2024-10-10 | End: 2024-10-10 | Stop reason: HOSPADM

## 2024-10-10 RX ORDER — ONDANSETRON HYDROCHLORIDE 2 MG/ML
4 INJECTION, SOLUTION INTRAVENOUS ONCE AS NEEDED
Status: DISCONTINUED | OUTPATIENT
Start: 2024-10-10 | End: 2024-10-10 | Stop reason: HOSPADM

## 2024-10-10 RX ORDER — FENTANYL CITRATE 50 UG/ML
25 INJECTION, SOLUTION INTRAMUSCULAR; INTRAVENOUS EVERY 5 MIN PRN
Status: DISCONTINUED | OUTPATIENT
Start: 2024-10-10 | End: 2024-10-10 | Stop reason: HOSPADM

## 2024-10-10 RX ORDER — OXYCODONE AND ACETAMINOPHEN 5; 325 MG/1; MG/1
1 TABLET ORAL
Status: DISCONTINUED | OUTPATIENT
Start: 2024-10-10 | End: 2024-10-10 | Stop reason: HOSPADM

## 2024-10-10 RX ORDER — CARBAMAZEPINE 200 MG/1
200 TABLET ORAL 3 TIMES DAILY
Status: DISCONTINUED | OUTPATIENT
Start: 2024-10-10 | End: 2024-10-21 | Stop reason: HOSPADM

## 2024-10-10 RX ORDER — ZONISAMIDE 100 MG/1
200 CAPSULE ORAL 2 TIMES DAILY
Status: DISCONTINUED | OUTPATIENT
Start: 2024-10-10 | End: 2024-10-21 | Stop reason: HOSPADM

## 2024-10-10 RX ORDER — PROPOFOL 10 MG/ML
VIAL (ML) INTRAVENOUS
Status: DISCONTINUED | OUTPATIENT
Start: 2024-10-10 | End: 2024-10-10

## 2024-10-10 RX ORDER — ONDANSETRON HYDROCHLORIDE 2 MG/ML
INJECTION, SOLUTION INTRAVENOUS
Status: DISCONTINUED | OUTPATIENT
Start: 2024-10-10 | End: 2024-10-10

## 2024-10-10 RX ORDER — LEVETIRACETAM 500 MG/1
500 TABLET ORAL 2 TIMES DAILY
Status: DISCONTINUED | OUTPATIENT
Start: 2024-10-10 | End: 2024-10-10

## 2024-10-10 RX ORDER — HYDROXYZINE HYDROCHLORIDE 25 MG/1
25 TABLET, FILM COATED ORAL 3 TIMES DAILY PRN
Status: DISCONTINUED | OUTPATIENT
Start: 2024-10-10 | End: 2024-10-21 | Stop reason: HOSPADM

## 2024-10-10 RX ORDER — LIDOCAINE HYDROCHLORIDE 10 MG/ML
INJECTION, SOLUTION EPIDURAL; INFILTRATION; INTRACAUDAL; PERINEURAL
Status: DISCONTINUED | OUTPATIENT
Start: 2024-10-10 | End: 2024-10-10

## 2024-10-10 RX ORDER — LOPERAMIDE HYDROCHLORIDE 2 MG/1
2 CAPSULE ORAL 4 TIMES DAILY PRN
Status: DISCONTINUED | OUTPATIENT
Start: 2024-10-10 | End: 2024-10-21 | Stop reason: HOSPADM

## 2024-10-10 RX ORDER — LEVETIRACETAM 5 MG/ML
500 INJECTION INTRAVASCULAR EVERY 12 HOURS
Status: DISCONTINUED | OUTPATIENT
Start: 2024-10-10 | End: 2024-10-21 | Stop reason: HOSPADM

## 2024-10-10 RX ORDER — POTASSIUM CHLORIDE 7.45 MG/ML
10 INJECTION INTRAVENOUS
Status: ACTIVE | OUTPATIENT
Start: 2024-10-10 | End: 2024-10-10

## 2024-10-10 RX ORDER — ONDANSETRON HYDROCHLORIDE 2 MG/ML
4 INJECTION, SOLUTION INTRAVENOUS DAILY PRN
Status: DISCONTINUED | OUTPATIENT
Start: 2024-10-10 | End: 2024-10-10 | Stop reason: HOSPADM

## 2024-10-10 RX ORDER — OXYCODONE HYDROCHLORIDE 5 MG/1
10 TABLET ORAL EVERY 4 HOURS PRN
Status: DISCONTINUED | OUTPATIENT
Start: 2024-10-10 | End: 2024-10-21 | Stop reason: HOSPADM

## 2024-10-10 RX ORDER — POTASSIUM CHLORIDE 7.45 MG/ML
30 INJECTION INTRAVENOUS ONCE
Status: DISCONTINUED | OUTPATIENT
Start: 2024-10-10 | End: 2024-10-10

## 2024-10-10 RX ADMIN — LEVETIRACETAM 500 MG: 500 INJECTION, SOLUTION INTRAVENOUS at 09:10

## 2024-10-10 RX ADMIN — SODIUM BICARBONATE: 84 INJECTION, SOLUTION INTRAVENOUS at 08:10

## 2024-10-10 RX ADMIN — OXYCODONE HYDROCHLORIDE 10 MG: 5 TABLET ORAL at 08:10

## 2024-10-10 RX ADMIN — SODIUM BICARBONATE 100 MEQ: 84 INJECTION, SOLUTION INTRAVENOUS at 02:10

## 2024-10-10 RX ADMIN — PROPOFOL 80 MG: 10 INJECTION, EMULSION INTRAVENOUS at 04:10

## 2024-10-10 RX ADMIN — ZONISAMIDE 200 MG: 100 CAPSULE ORAL at 08:10

## 2024-10-10 RX ADMIN — LIDOCAINE HYDROCHLORIDE 30 MG: 10 SOLUTION INTRAVENOUS at 04:10

## 2024-10-10 RX ADMIN — SODIUM CHLORIDE: 9 INJECTION, SOLUTION INTRAVENOUS at 04:10

## 2024-10-10 RX ADMIN — LEVETIRACETAM 500 MG: 500 INJECTION, SOLUTION INTRAVENOUS at 03:10

## 2024-10-10 RX ADMIN — CARBAMAZEPINE 200 MG: 200 TABLET ORAL at 09:10

## 2024-10-10 RX ADMIN — SODIUM BICARBONATE: 84 INJECTION, SOLUTION INTRAVENOUS at 02:10

## 2024-10-10 RX ADMIN — CEFTRIAXONE 1 G: 1 INJECTION, POWDER, FOR SOLUTION INTRAMUSCULAR; INTRAVENOUS at 08:10

## 2024-10-10 RX ADMIN — ONDANSETRON 4 MG: 2 INJECTION INTRAMUSCULAR; INTRAVENOUS at 05:10

## 2024-10-10 NOTE — PT/OT/SLP EVAL
Occupational Therapy Evaluation and Treatment    Name: Cheryl Kirkland  MRN: [id number]  Admitting Diagnosis: SHELLEY (acute kidney injury)  Recent Surgery: Procedure(s) (LRB):  CYSTOSCOPY, WITH RETROGRADE PYELOGRAM AND URETERAL STENT INSERTION (Right) Day of Surgery    Recommendations:     Discharge Recommendations: Low Intensity Therapy  Level of Assistance Recommended: Intermittent assistance  Discharge Equipment Recommendations: none  Barriers to discharge:      Assessment:     Cheryl Kirkland is a 74 y.o. female with a medical diagnosis of SHELLEY (acute kidney injury). She presents with performance deficits affecting function including gait instability, weakness, decreased upper extremity function, impaired endurance, impaired balance, impaired self care skills, impaired functional mobility.     Rehab Prognosis: Good; patient would benefit from acute OT services to address these deficits and reach maximum level of function.    Plan:     Patient to be seen 2 x/week to address the above listed problems via self-care/home management, therapeutic activities, therapeutic exercises  Plan of Care Expires: 10/24/24  Plan of Care Reviewed with:      Subjective     Chief Complaint: debility and generalized weakness  Patient Comments/Goals: return home  Pain/Comfort:  Pain Rating 1: 0/10    Patients cultural, spiritual, Congregational conflicts given the current situation:      Social History:  Living Environment: Patient lives with their spouse in a 2 story home with bed and bathroom on 1 st floor with number of outside stair(s): 0  Prior Level of Function: Prior to admission, patient was independent  Roles and Routines: Patient was not driving and not working prior to admission.  Equipment Used at Home: shower chair  DME owned (not currently used): rolling walker  Assistance Upon Discharge: family    Objective:     Communicated with nurse and epic chart review prior to session. Patient found HOB elevated with  telemetry upon OT entry to room.    General Precautions: Standard, fall   Orthopedic Precautions: N/A   Braces: N/A    Respiratory Status: Room air    Occupational Performance    Gait belt applied - Yes    Bed Mobility:   Rolling/Turning to Left with stand by assistance  Rolling/Turning to Right with stand by assistance  Scooting anteriorly to EOB to have both feet planted on floor: stand by assistance  Supine to sit from right side of bed with stand by assistance  Supine to sit from left side of bed with stand by assistance    Functional Mobility/Transfers:  Sit <> Stand Transfer with stand by assistance with no AD  Functional Mobility: pt ambulated 300 feet with CGA IV pole and marie in tow    Activities of Daily Living:  Lower Body Dressing: stand by assistance  Toileting: minimum assistance    Cognitive/Visual Perceptual:  Cognitive/Psychosocial Skills:    -     Oriented to: Person, Place, Time, Situation  -     Follows Commands/attention: Follows multistep  commands  -     Communication: clear/fluent  -     Memory: No Deficits noted  -     Safety awareness/insight to disability: impaired    Physical Exam:  Upper Extremity Range of Motion:     -       Right Upper Extremity: WFL  -       Left Upper Extremity: WFL  Upper Extremity Strength:    -       Right Upper Extremity: mmt: 3+/5 grossly  -       Left Upper Extremity: mmt: 3+/5 grossly   Strength:    -       Right Upper Extremity: mmt: 3+/5 grossly  -       Left Upper Extremity: mmt: 3+/5 grossly    AMPAC 6 Click ADL:  AMPAC Total Score: 18    Treatment & Education:  Educated patient on importance of increased tolerance to upright position and direct impact on CV endurance and strength. Patient encouraged to sit up in chair/ EOB, for a minimum of 2 consecutive hours including for all meals.. Encouraged patient to perform AROM TE to BUE throughout the day within all available planes of motion. Re enforced importance of utilizing call light to meet needs in  room and not attempt to get up without staff assistance. Patient verbalized understanding and agreed to comply.           Patient clear to stand pivot transfer with RN/PCT, assist xcga with step<pivot transfer with no AE .    Patient left HOB elevated due to anticipated procedure soonall lines intact, call button in reach, RN notified, and spouse present.    GOALS:   Multidisciplinary Problems       Occupational Therapy Goals          Problem: Occupational Therapy    Goal Priority Disciplines Outcome Interventions   Occupational Therapy Goal     OT, PT/OT     Description: OT goals met 10-24-24  Pt will tolerate 1 set x 15 reps b ue rom exercise  (I) with toilet transfers  (I) with le dressing                       History:     Past Medical History:   Diagnosis Date    Anemia     Chronic deep vein thrombosis (DVT) of left lower extremity 10/21/2022    Deep vein thrombosis     Drug-induced polyneuropathy 02/28/2024    Noted by ROCK KAY NP  last documented on [id number]    DVT (deep venous thrombosis)     Epilepsy     Gastric adenocarcinoma 02/27/2024    GERD (gastroesophageal reflux disease)     Hyperlipidemia 01/10/2013    Formatting of this note might be different from the original.   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 Repeat in 3 years.    COLONOSCOPY  06/17/2015    -Nodular mucosa at appendiceal  orfice, sigmoid and desc diverticulosis otherwise normal.    COLONOSCOPY  2020    >3 TAs    COLONOSCOPY  12/2023    DIAGNOSTIC LAPAROSCOPY N/A 3/14/2024    Procedure: LAPAROSCOPY, DIAGNOSTIC;  Surgeon: Chen Jerome MD;  Location: [facility];  Service: General;  Laterality: N/A;  1. Diagnostic laparoscopy   2. Extensive lysis of adhesions  3. Peritoneal biopsy   4. Peritoneal washings for cytology    DIAGNOSTIC LAPAROSCOPY N/A 8/20/2024    Procedure: LAPAROSCOPY, DIAGNOSTIC;  Surgeon: Chen Jerome MD;  Location: [facility] OR;  Service: General;  Laterality: N/A;    EGD - EXTERNAL RESULT  06/20/2012    Dr Boyle- Mild gastritis otherwise normal.    ENDOSCOPIC ULTRASOUND OF UPPER GASTROINTESTINAL TRACT N/A 7/26/2024    Procedure: ULTRASOUND, UPPER GI TRACT, ENDOSCOPIC;  Surgeon: Valdo Aguilera MD;  Location: [facility] ENDO;  Service: Endoscopy;  Laterality: N/A;  7/22/24: instructions sent via portal-. Pt no longer takling eliquis-GD    ESOPHAGOGASTRODUODENOSCOPY N/A 02/08/2024    Procedure: EGD (ESOPHAGOGASTRODUODENOSCOPY);  Surgeon: Jayla Arteaga MD;  Location: [facility];  Service: Endoscopy;  Laterality: N/A;    ESOPHAGOGASTRODUODENOSCOPY N/A 8/20/2024    Procedure: EGD (ESOPHAGOGASTRODUODENOSCOPY);  Surgeon: Chen Jerome MD;  Location: [facility] OR;  Service: General;  Laterality: N/A;    GASTRECTOMY N/A 8/20/2024    Procedure: GASTRECTOMY;  Surgeon: Chen Jerome MD;  Location: [facility] OR;  Service: General;  Laterality: N/A;    HYSTERECTOMY      LAPAROSCOPIC LYSIS OF ADHESIONS N/A 3/14/2024    Procedure: LYSIS, ADHESIONS, LAPAROSCOPIC;  Surgeon: Chen Jerome MD;  Location: [facility] OR;  Service: General;  Laterality: N/A;    LYSIS OF ADHESIONS N/A 8/20/2024    Procedure: LYSIS, ADHESIONS;  Surgeon: Chen Jerome MD;  Location: [facility] OR;  Service: General;  Laterality: N/A;    PLACEMENT OF JEJUNOSTOMY TUBE N/A 8/20/2024    Procedure: INSERTION, JEJUNOSTOMY TUBE;  Surgeon: Chen Jerome MD;   Location: [facility];  Service: General;  Laterality: N/A;    TOTAL ABDOMINAL HYSTERECTOMY W/ BILATERAL SALPINGOOPHORECTOMY  01/09/2023    radical resection with right salpingo-oophorectomy, left salpingo-oophorectomy, extensive enterolysis, extensive adhesiolysis, left pelvic lymph node biopsy, omental biopsy, small bowel resection with primary functional end-to-end anastomosis, placement of pelvic drain       Time Tracking:     OT Date of Treatment: 10/10/24  OT Start Time: 1021  OT Stop Time: 1037  OT Total Time (min): 16 min    Billable Minutes: Evaluation 8 minutes and Therapeutic Activity 8 minutes    10/10/2024

## 2024-10-10 NOTE — OP NOTE
Date of Procedure: 10/10/2024    Pre-operative Diagnosis:   1.  Right ureteral stone    Post-operative Diagnosis:   1.  Impacted right ureteral stone    Surgeon: Lata Kelly MD    Assistants: None    Specimen: none    Prosthetics, Devices, Grafts: None    Anesthesia: General    Procedures:  1. Cysto with right retrograde pyelogram  2. Incomplete right ureteroscopy  3. Fluoro less than one hour    Procedure in Detail:  After proper consents were obtained, the patient was prepped and draped in normal sterile fashion in the dorsal lithotomy position.  The 22 Fr cystoscope was assembled and introduced per urethra.  The bladder was inspected. A 5-Norwegian open-ended catheter was advanced into the right ureteral orifice and gentle retrograde pyelogram was performed, revealing a U-shaped kink in the ureter at the mid-ureter with stone present at that location  with proximal hydronephrosis. I advanced a glidewire through the pollock catheter beyond the stone and into the renal pelvis under fluoroscopic guidance.  The 5-Norwegian open-ended catheter was then advanced over the wire but would not advance beyond the stone.  I attempted this with the cystoscope in place as well, and the open-ended catheter would not advance.  I utilized a dual-lumen catheter and attempted to establish access with the motion wire.  I was never able to maneuver the motion wire beyond the stone.  I utilized the semi-rigid ureteroscope and I was only able to advance this to the mid ureter, and the stone was never visualized.  I established access with the motion wire up to the level of the stone, and I advanced the flexible ureteroscope over the motion wire.  There was noted to be a complete narrowing of the ureter, which would only accommodate a wire just distal to the stone.  Tissue was completely covering the stone, and I was unable to visualize it.  I attempted for quite some time to maneuver a motion wire beyond the stone, but I was never able  to advance it.  Ultimately, I removed the wires and concluded the procedure.  A Roca catheter was placed.    Blood Loss: none    Fluids: Per anesthesia.    Drains: none    Complications: None.

## 2024-10-10 NOTE — PLAN OF CARE
Nutrition Recommendations/Interventions 10/10/24:   1. Initiate pt onto continuous Enteral nutrition, recommend Lisa Delmi peptide 1.5 (vanilla) via J tube, goal rate 30 mL/hr, starting at 10 mL/hr then progress to goal rate within 24 hrs if pt is tolerating or per MD/NP   -150 mL q4h free water flushes (900 mL/day), per MD/NP   -Check Mg, K+, Na, Phos and Glu before and during initation, correct as indicated   2. Recommend Banatrol TID to assist with diarrhea or BID for loose stools, as warranted   3. Weigh twice weekly  4. Collaboration by nutrition professional with other providers     Goals:   1. Pt will be initiated onto continuous EN within 24 hrs   2. Pt will tolerate and intake >75% EEN and EPN prior to RD follow up  3. Pt's diarrhea/ loose stools will improve prior to RD follow up    Patty Gupta, TAWANA, RDN, LDN

## 2024-10-10 NOTE — NURSING
Per dr quiroz verbal order given to stop heparin gtt for cystoscope surgery today    Cancelled aptt to be collected for 940am   will reorder stat aptt when pt returns from surgery

## 2024-10-10 NOTE — NURSING TRANSFER
Nursing Transfer Note      10/10/2024   12:13 PM    Nurse giving handoff:patricia  Nurse receiving handoff:karyn    Reason patient is being transferred: surgery    Transfer To: preop    Transfer via bed  Transported by karyn

## 2024-10-10 NOTE — ASSESSMENT & PLAN NOTE
Nutrition consulted. Most recent weight and BMI monitored- Has J-tube with tube feedings at home, however has been having diarrhea.    Measurements:  Wt Readings from Last 1 Encounters:   10/09/24 46.7 kg (103 lb)   Body mass index is 17.14 kg/m².

## 2024-10-10 NOTE — PLAN OF CARE
See eval for details. Pt displayed deficits with functional mobility/ transfers, deficits with adl's skills also decrease b ue strength/endurance. Recommendation: low intensity

## 2024-10-10 NOTE — ASSESSMENT & PLAN NOTE
Followed by Dr. Ambrose and Dr. Jerome (will consult both services)  -- s/p total gastrectomy on 8/20, with lysis of adhesions, lymphadenectomy, and jejunostomy tube placement, d/c on 9/10  -- Pathologic stage from 8/20/2024: Stage III (ypT2, pN3a, cM0), completed 5 cycles Keytruda prior to surgery

## 2024-10-10 NOTE — H&P (VIEW-ONLY)
Reason for consultation: ureteral stone    Provider requesting consultation: Lorri Marlow DO     History of Present Illness:   Cheryl Kirkland is a 74 y.o. female with a complex medical history, which includes gastric adenocarcinoma s/p total gastrectomy about 6 weeks ago. She presented to the ED last night with complaints of weakness, abdominal pain around her J-tube, nausea and was noted to have SHELLEY on labs. CT scan revealed an 8mm R mid-ureteral stone. She denies any prior h/o stones. She has had some dysuria for the past couple of days. No gross hematuria. No flank pain.       Review of Systems   Constitutional:  Negative for chills and fever.   Respiratory:  Negative for shortness of breath.    Cardiovascular:  Positive for leg swelling.   Gastrointestinal:  Positive for abdominal pain.   Neurological:  Positive for weakness.   All other systems reviewed and are negative.      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 Repeat in 3 years.    COLONOSCOPY  06/17/2015    -Nodular mucosa at appendiceal orfice, sigmoid and desc diverticulosis otherwise normal.    COLONOSCOPY  2020    >3 TAs    COLONOSCOPY  12/2023    DIAGNOSTIC LAPAROSCOPY N/A 3/14/2024    Procedure: LAPAROSCOPY, DIAGNOSTIC;  Surgeon: Chen Jerome MD;  Location: [facility];  Service: General;  Laterality: N/A;  1. Diagnostic laparoscopy   2. Extensive lysis of adhesions  3. Peritoneal biopsy   4. Peritoneal washings for cytology    DIAGNOSTIC LAPAROSCOPY N/A 8/20/2024    Procedure: LAPAROSCOPY, DIAGNOSTIC;  Surgeon: Chen Jerome MD;  Location: [facility] OR;  Service: General;  Laterality: N/A;    EGD - EXTERNAL RESULT  06/20/2012    Dr Boyle- Mild gastritis otherwise normal.    ENDOSCOPIC ULTRASOUND OF UPPER GASTROINTESTINAL TRACT N/A 7/26/2024    Procedure: ULTRASOUND, UPPER GI TRACT, ENDOSCOPIC;  Surgeon: Valdo Aguilera MD;  Location: [facility];  Service: Endoscopy;  Laterality: N/A;  7/22/24: instructions sent via portal-. Pt no longer takling eliquis-GD    ESOPHAGOGASTRODUODENOSCOPY N/A 02/08/2024    Procedure: EGD (ESOPHAGOGASTRODUODENOSCOPY);  Surgeon: Jayla Arteaga MD;  Location: [facility];  Service: Endoscopy;  Laterality: N/A;    ESOPHAGOGASTRODUODENOSCOPY N/A 8/20/2024    Procedure: EGD (ESOPHAGOGASTRODUODENOSCOPY);  Surgeon: Chen Jerome MD;  Location: [facility] OR;  Service: General;  Laterality: N/A;    GASTRECTOMY N/A 8/20/2024    Procedure: GASTRECTOMY;  Surgeon: Chen Jerome MD;  Location: [facility] OR;  Service: General;  Laterality: N/A;    HYSTERECTOMY      LAPAROSCOPIC LYSIS OF ADHESIONS N/A 3/14/2024    Procedure: LYSIS, ADHESIONS, LAPAROSCOPIC;  Surgeon: Chen Jerome MD;  Location: [facility] OR;  Service: General;  Laterality: N/A;    LYSIS OF ADHESIONS N/A 8/20/2024    Procedure: LYSIS, ADHESIONS;  Surgeon: Chen Jerome MD;   Location: [facility] OR;  Service: General;  Laterality: N/A;    PLACEMENT OF JEJUNOSTOMY TUBE N/A 8/20/2024    Procedure: INSERTION, JEJUNOSTOMY TUBE;  Surgeon: Chen Jerome MD;  Location: [facility] OR;  Service: General;  Laterality: N/A;    TOTAL ABDOMINAL HYSTERECTOMY W/ BILATERAL SALPINGOOPHORECTOMY  01/09/2023    radical resection with right salpingo-oophorectomy, left salpingo-oophorectomy, extensive enterolysis, extensive adhesiolysis, left pelvic lymph node biopsy, omental biopsy, small bowel resection with primary functional end-to-end anastomosis, placement of pelvic drain       Family History   Problem Relation Name Age of Onset    Pancreatic cancer Brother Misael Mccray 76    Prostate cancer Brother Zachary 67    Pancreatic cancer Half-brother Gasper 74    Colon cancer Half-sister Elton 83    Lung cancer Half-sister Etlon         +smoking hx    Breast cancer Half-sister Vidhi 58    Lymphoma Other Cara Sun    Prostate cancer Other Fernando     Prostate cancer Other Gasper Calderon     Ovarian cancer Other Inerris     Breast cancer Other Aleida     Colon cancer Other [facility]        Social History     Tobacco Use    Smoking status: Former     Types: Cigarettes    Smokeless tobacco: Never    Tobacco comments:     Quit 1989 smoked 10 years smoked 0.25 ppd    Substance Use Topics    Alcohol use: Not Currently    Drug use: Never       Current Facility-Administered Medications   Medication Dose Route Frequency Provider Last Rate Last Admin    acetaminophen tablet 650 mg  650 mg Oral Q6H PRN Melba Doan NP        cefTRIAXone (Rocephin) 1 g in D5W 100 mL IVPB (MB+)  1 g Intravenous Q24H Melba Doan NP        dextrose 10% bolus 125 mL 125 mL  12.5 g Intravenous PRN Melba Doan NP        dextrose 10% bolus 250 mL 250 mL  25 g Intravenous PRN Melba Doan NP        glucagon (human recombinant) injection 1 mg  1 mg Intramuscular PRN Melba Doan NP        glucose chewable tablet 16 g  16 g  Oral PRN Melba Doan NP        glucose chewable tablet 24 g  24 g Oral PRN Melba Doan NP        heparin 25,000 units in dextrose 5% (100 units/ml) IV bolus from bag HIGH INTENSITY nomogram - [facility]  60 Units/kg Intravenous PRN Melba Doan NP        heparin 25,000 units in dextrose 5% (100 units/ml) IV bolus from bag HIGH INTENSITY nomogram - [facility]  30 Units/kg Intravenous PRN Melba Doan NP        heparin 25,000 units in dextrose 5% 250 mL (100 units/mL) infusion HIGH INTENSITY nomogram - [facility]  0-40 Units/kg/hr Intravenous Continuous Melba Doan NP   Stopped at 10/10/24 0943    levETIRAcetam in NaCl (iso-os) IVPB 500 mg  500 mg Intravenous Q12H Melba Doan NP   Stopped at 10/10/24 0933    morphine injection 1 mg  1 mg Intravenous Q4H PRN Tasha Vides MD        naloxone 0.4 mg/mL injection 0.02 mg  0.02 mg Intravenous PRN Melba Doan NP        ondansetron injection 4 mg  4 mg Intravenous Q6H PRN Melba Doan NP        sodium bicarbonate 100 mEq in D5 and 0.45% NaCl 1,100 mL infusion   Intravenous Continuous Melba Doan  mL/hr at 10/10/24 1046 Rate Verify at 10/10/24 1046    sodium chloride 0.9% flush 10 mL  10 mL Intravenous Q8H PRN Melba Doan NP           Review of patient's allergies indicates:  No Known Allergies    Physical Exam  Vitals:    10/10/24 0944   BP: (!) 96/57   Pulse: 71   Resp: 20   Temp: 97.9 °F (36.6 °C)     General: Well-developed, frail, in no acute distress  HEENT: Normocephalic, atraumatic, extraocular movements intact  Neck: Supple, no supraclavicular or cervical lymphadenopathy, trachea midline  Respirations: even and unlabored  Back: midline spine, No CVA tenderness  Abdomen: soft, Non-tender, J-tube on left side, non-distended, no palpable masses, no rebound or guarding  Extremities: moves all equally, no clubbing, +R sided swelling of the thigh  Skin: Warm and dry. No lesions  Psych: normal affect  Neuro: Alert and oriented x 3. Cranial  nerves II-XII intact      Urinalysis  pH, UA   Date Value Ref Range Status   10/09/2024 6.0 5.0 - 8.0 Final     Protein, UA   Date Value Ref Range Status   10/09/2024 2+ (A) Negative Final     Comment:     Recommend a 24 hour urine protein or a urine   protein/creatinine ratio if globulin induced proteinuria is  clinically suspected.       Glucose, UA   Date Value Ref Range Status   10/09/2024 1+ (A) Negative Final     Occult Blood UA   Date Value Ref Range Status   10/09/2024 3+ (A) Negative Final     Nitrite, UA   Date Value Ref Range Status   10/09/2024 Negative Negative Final     Leukocytes, UA   Date Value Ref Range Status   10/09/2024 Trace (A) Negative Final     Lab Results   Component Value Date    WBC 10.12 10/10/2024    HGB 12.6 10/10/2024    HCT 38.1 10/10/2024    MCV 97 10/10/2024     10/10/2024       BMP  Lab Results   Component Value Date     (H) 10/10/2024    K 4.0 10/10/2024     (H) 10/10/2024    CO2 16 (L) 10/10/2024    BUN 22 10/10/2024    CREATININE 1.5 (H) 10/10/2024    CALCIUM 8.5 (L) 10/10/2024    ANIONGAP 10 10/10/2024    EGFRNORACEVR 36 (A) 10/10/2024     CT scan 10/9/24 personally reviewed, and agree with official read:   Impression:     Right-sided hydronephrosis with a approximately 8 mm mid right ureteral obstructing stone.  Punctate nonobstructing left renal calculi.  Postsurgical changes of the bowel.  Fluid within the colonic loops of bowel may relate to diarrheal state.  Left hemiabdomen enteric tube noted    Assessment:    R ureteral stone  SHELLEY  Chronic DVT  Gastric Adenocarcinoma  Protein malnutrition      Plan:     Management options for R ureteral stone discussed including ESWL and USE. Discussed stenting today in the acute setting and my recommendation for R USE in 2 weeks. Pt in agreement. Will proceed today.

## 2024-10-10 NOTE — ASSESSMENT & PLAN NOTE
Severe metabolic acidosis -- with SHELLEY and obstructing ureteral stone as well as UTI and dehydration/diarrhea  Check lactic acid  On broad spectrum antibiotics for UTI  Aggressive hydration with IV fluids  Bicarb IVP ordered followed by bicarb drip  Trend labs  Repeat labs ordered STAT

## 2024-10-10 NOTE — H&P
[facility] - Emergency Dept.  [facility] Medicine  History & Physical    Patient Name: Cheryl Kirkland  MRN: [id number]  Patient Class: IP- Inpatient  Admission Date: 10/9/2024  Attending Physician: Tasha Vides MD   Primary Care Provider: Gagandeep Iglesias MD         Patient information was obtained from patient, spouse/SO, past medical records, and ER records.     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  On exam she is noted to have left lower quadrant tenderness, appears frail and emaciated, has not been taking all of her scheduled medications and is hesitant to use pain medication, stating that she does not want to be addicted to pain medication.  Vital signs on arrival stable-- blood pressure 110/78, heart rate 95, temp 97.3° respirations 12, 97% SpO2 on room air.   Significant labs include CO2 6, BUN 21, creatinine 2.0, alk-phos 210, venous blood gas shows pH 7.030 and HC03 4.5, urinalysis with hazy appearance, 3+ blood, trace leukocyte esterase, greater than 100 RBCs, 31 WBCs, rare bacteria.  Stool studies negative for occult blood, negative for rotavirus, negative for C diff.  Stool culture is in progress.  EKG showed SR with PAC's in pattern  of bigeminy with nonspecific T wave abnormality. CT abdomen and pelvis without contrast was done which revealed right-sided hydronephrosis with approximately 8 mm mid right ureteral obstructing stone.  Urology was consulted and recommended NPO after midnight and will see patient in AM, also recommended urinalysis with urine culture which has been collected and does show acute cystitis with hematuria. While in ED, fluid bolus 1.5L, sodium bicarb --2 amps, morphine, Zofran, and Rocephin were all given. Hospital Medicine was consulted for admission due to SHELLEY, obstructing right ureteral stone 8mm and right sided hydronephrosis.     During exam, patient complains of pain and tightness in right leg so US was ordered and she was found to have extensive clot from right common femoral vein to right posterior tibial vein as well as occluded anterior tibial vein. IR was consulted and will evaluate for possible intervention in AM, heparin drip was recommended and ordered.     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 Repeat in 3 years.    COLONOSCOPY  06/17/2015    -Nodular mucosa at appendiceal orfice, sigmoid and desc diverticulosis otherwise normal.    COLONOSCOPY  2020    >3 TAs    COLONOSCOPY  12/2023    DIAGNOSTIC LAPAROSCOPY N/A 3/14/2024    Procedure: LAPAROSCOPY, DIAGNOSTIC;  Surgeon: Chen Jreome MD;  Location: [facility] OR;  Service: General;  Laterality: N/A;  1. Diagnostic laparoscopy   2. Extensive lysis of adhesions  3. Peritoneal biopsy   4. Peritoneal washings for cytology    DIAGNOSTIC LAPAROSCOPY N/A 8/20/2024    Procedure: LAPAROSCOPY, DIAGNOSTIC;  Surgeon: Chen Jerome MD;  Location: [facility] OR;  Service: General;  Laterality: N/A;    EGD - EXTERNAL RESULT  06/20/2012    Dr Boyle- Mild gastritis otherwise normal.    ENDOSCOPIC ULTRASOUND OF UPPER GASTROINTESTINAL TRACT N/A 7/26/2024    Procedure: ULTRASOUND, UPPER GI TRACT, ENDOSCOPIC;  Surgeon: Valdo Aguilera MD;  Location: [facility];  Service: Endoscopy;  Laterality: N/A;  7/22/24: instructions sent via portal-. Pt no longer takling eliquis-GD    ESOPHAGOGASTRODUODENOSCOPY N/A 02/08/2024    Procedure: EGD (ESOPHAGOGASTRODUODENOSCOPY);  Surgeon: Jayla Arteaga MD;  Location: [facility] ENDO;  Service: Endoscopy;  Laterality: N/A;    ESOPHAGOGASTRODUODENOSCOPY N/A 8/20/2024    Procedure: EGD (ESOPHAGOGASTRODUODENOSCOPY);  Surgeon: Chen Jerome MD;  Location: [facility] OR;  Service: General;  Laterality: N/A;    GASTRECTOMY N/A 8/20/2024    Procedure: GASTRECTOMY;  Surgeon: Chen Jerome MD;  Location: [facility] OR;  Service: General;  Laterality: N/A;    HYSTERECTOMY      LAPAROSCOPIC LYSIS OF ADHESIONS N/A 3/14/2024    Procedure: LYSIS, ADHESIONS, LAPAROSCOPIC;  Surgeon: Chen Jerome MD;  Location: [facility] OR;  Service: General;  Laterality: N/A;    LYSIS OF ADHESIONS N/A 8/20/2024     Procedure: LYSIS, ADHESIONS;  Surgeon: Chen Jerome MD;  Location: [facility] OR;  Service: General;  Laterality: N/A;    PLACEMENT OF JEJUNOSTOMY TUBE N/A 8/20/2024    Procedure: INSERTION, JEJUNOSTOMY TUBE;  Surgeon: Chen Jerome MD;  Location: [facility] OR;  Service: General;  Laterality: N/A;    TOTAL ABDOMINAL HYSTERECTOMY W/ BILATERAL SALPINGOOPHORECTOMY  01/09/2023    radical resection with right salpingo-oophorectomy, left salpingo-oophorectomy, extensive enterolysis, extensive adhesiolysis, left pelvic lymph node biopsy, omental biopsy, small bowel resection with primary functional end-to-end anastomosis, placement of pelvic drain       Review of patient's allergies indicates:  No Known Allergies    No current facility-administered medications on file prior to encounter.     Current Outpatient Medications on File Prior to Encounter   Medication Sig    alendronate (FOSAMAX) 70 MG tablet Take 70 mg by mouth every 7 days. (Sundays)    calcium phosphate trib/vit D3 (CALCIUM PHOSPHATE-VITAMIN D3 ORAL) Take 1 tablet by mouth 2 (two) times daily.    carBAMazepine (TEGRETOL) 200 mg tablet 1 tablet with breakfast  1 tablet with lunch   1.5 tablet at bedtime    ferrous sulfate 325 (65 FE) MG EC tablet Take 65 mg by mouth 2 (two) times daily with meals.    hydrocortisone 2.5 % cream Apply topically 2 (two) times daily.    levETIRAcetam (KEPPRA) 500 MG Tab Take 1 tablet by mouth 2 (two) times daily.    oxyCODONE (ROXICODONE) 10 mg Tab immediate release tablet Take 1 tablet (10 mg total) by mouth every 4 (four) hours as needed for Pain.    zonisamide (ZONEGRAN) 100 MG Cap Take 200 mg by mouth 2 (two) times daily.    acetaminophen (TYLENOL) 500 MG tablet Take 2 tablets (1,000 mg total) by mouth every 8 (eight) hours. (Patient taking differently: Take 1,000 mg by mouth every 8 (eight) hours as needed for Pain.)    hydrOXYzine HCL (ATARAX) 25 MG tablet Take 1 tablet (25 mg total) by mouth 3 (three) times daily as needed for  Itching.    ibuprofen (ADVIL,MOTRIN) 800 MG tablet Take 1 tablet (800 mg total) by mouth every 8 (eight) hours. (Patient taking differently: Take 800 mg by mouth every 8 (eight) hours as needed.)     Family History       Problem Relation (Age of Onset)    Breast cancer Half-sister (58), Other    Colon cancer Half-sister (83), Other    Lung cancer Half-sister    Lymphoma Other    Ovarian cancer Other    Pancreatic cancer Brother (76), Half-brother (74)    Prostate cancer Brother (67), Other, Other          Tobacco Use    Smoking status: Former     Types: Cigarettes    Smokeless tobacco: Never    Tobacco comments:     Quit 1989 smoked 10 years smoked 0.25 ppd    Substance and Sexual Activity    Alcohol use: Not Currently    Drug use: Never    Sexual activity: Not on file     Review of Systems   Constitutional:  Positive for activity change, appetite change, fatigue and unexpected weight change. Negative for chills, diaphoresis and fever.        Weight loss, decreased appetite   HENT: Negative.  Negative for trouble swallowing.    Eyes: Negative.  Negative for photophobia.   Respiratory:  Negative for cough, chest tightness, shortness of breath and wheezing.    Cardiovascular:  Positive for leg swelling. Negative for chest pain and palpitations.        Right leg pain/tight feeling, swelling   Gastrointestinal:  Positive for abdominal pain, diarrhea and nausea. Negative for blood in stool and vomiting.   Endocrine: Negative.    Genitourinary:  Positive for decreased urine volume. Negative for dysuria and urgency.   Musculoskeletal:  Positive for gait problem.   Skin:  Positive for wound.        Abdominal incision   Allergic/Immunologic: Positive for immunocompromised state.   Neurological:  Positive for weakness and light-headedness. Negative for dizziness, seizures, syncope, facial asymmetry, speech difficulty and headaches.   Psychiatric/Behavioral: Negative.     All other systems reviewed and are  negative.    Objective:     Vital Signs (Most Recent):  Temp: 97.2 °F (36.2 °C) (10/09/24 1421)  Pulse: 85 (10/09/24 1715)  Resp: 18 (10/09/24 1715)  BP: 130/71 (10/09/24 1715)  SpO2: 100 % (10/09/24 1715) Vital Signs (24h Range):  Temp:  [97.2 °F (36.2 °C)-97.3 °F (36.3 °C)] 97.2 °F (36.2 °C)  Pulse:  [] 85  Resp:  [12-20] 18  SpO2:  [97 %-100 %] 100 %  BP: (110-130)/(71-78) 130/71     Weight: 46.7 kg (103 lb)  Body mass index is 17.14 kg/m².     Physical Exam  Vitals and nursing note reviewed. Exam conducted with a chaperone present.   Constitutional:       General: She is not in acute distress.     Appearance: She is ill-appearing and toxic-appearing. She is not diaphoretic.      Comments: Very frail appearing, cachectic elderly female, lethargic   HENT:      Head: Normocephalic and atraumatic.      Nose: Nose normal.      Mouth/Throat:      Mouth: Mucous membranes are moist.      Pharynx: Oropharynx is clear.   Eyes:      Extraocular Movements: Extraocular movements intact.      Pupils: Pupils are equal, round, and reactive to light.   Cardiovascular:      Rate and Rhythm: Normal rate and regular rhythm.      Pulses: Normal pulses.      Heart sounds: Normal heart sounds.   Pulmonary:      Effort: No respiratory distress.      Breath sounds: No stridor. No wheezing, rhonchi or rales.      Comments: Shallow respirations and lung sounds diminished  Abdominal:      General: There is no distension.      Palpations: Abdomen is soft.      Tenderness: There is abdominal tenderness. There is right CVA tenderness and left CVA tenderness. There is no guarding or rebound.      Comments: Hypoactive bowel sounds  Abdominal incision-- superior portion of midline incision is open with pink tissue noted, no drainage noted  J-tube present, with TTP around the site and generalized TTP throughout abdomen, no erythema noted, no induration noted    Musculoskeletal:         General: Swelling and tenderness present. Normal range  "of motion.      Cervical back: Normal range of motion and neck supple.      Right lower leg: Edema present.      Left lower leg: No edema.      Comments: RLL -- + edema and tightness, TTP   Skin:     General: Skin is warm and dry.      Capillary Refill: Capillary refill takes less than 2 seconds.   Neurological:      General: No focal deficit present.      Mental Status: She is oriented to person, place, and time.      Motor: Weakness present.      Comments: Overall generalized weakness noted, ASCENCIO 5/5   Psychiatric:      Comments: Flat depressed affect              CRANIAL NERVES     CN III, IV, VI   Pupils are equal, round, and reactive to light.       Significant Labs: All pertinent labs within the past 24 hours have been reviewed.    ABGs:   Recent Labs   Lab 10/09/24  1411 10/09/24  2024   PH 7.030* 7.086*   PCO2 17.0* 37.8   HCO3 4.5* 11.3*   POCSATURATED 93 18   BE -26* -19*   PO2 95* 20*     Bilirubin:   Recent Labs   Lab 09/10/24  0523 09/20/24  1335 10/09/24  1235   BILITOT 0.1 0.2 0.3     Blood Culture: No results for input(s): "LABBLOO" in the last 48 hours.    CBC:   Recent Labs   Lab 10/09/24  1235 10/09/24  2024   WBC 9.51  --    HGB 15.7  --    HCT 50.5* 45     --      CMP:   Recent Labs   Lab 10/09/24  1235      K 3.9   *   CO2 6*   *   BUN 21   CREATININE 2.0*   CALCIUM 10.0   PROT 7.5   ALBUMIN 2.9*   BILITOT 0.3   ALKPHOS 210*   AST 25   ALT 27   ANIONGAP 11     Cardiac Markers: No results for input(s): "CKMB", "MYOGLOBIN", "BNP", "TROPISTAT" in the last 48 hours.  Coagulation: No results for input(s): "PT", "INR", "APTT" in the last 48 hours.  Lactic Acid: No results for input(s): "LACTATE" in the last 48 hours.    Lipase:   Recent Labs   Lab 10/09/24  1235   LIPASE 6     Magnesium: No results for input(s): "MG" in the last 48 hours.  Troponin: No results for input(s): "TROPONINI", "TROPONINIHS" in the last 48 hours.    TSH:   Recent Labs   Lab 08/12/24  1353   TSH 1.044 "     Urine Studies:   Recent Labs   Lab 10/09/24  1839   COLORU Yellow   APPEARANCEUA Hazy*   PHUR 6.0   SPECGRAV 1.020   PROTEINUA 2+*   GLUCUA 1+*   KETONESU Negative   BILIRUBINUA Negative   OCCULTUA 3+*   NITRITE Negative   UROBILINOGEN Negative   LEUKOCYTESUR Trace*   RBCUA >100*   WBCUA 31*   BACTERIA Rare   HYALINECASTS 1      Additional STAT labs ordered at this time and pending     EKG reviewed -- SR with PAC's, HR 88, nonspecific T wave abnormality    Significant Imaging: I have reviewed all pertinent imaging results/findings within the past 24 hours.    US Lower Extremities -- bilateral:  Extensive clot from right common femoral vein to the right posterior tibial vein, occluded anterior tibial vein  No left-sided DVT    CT abdomen pelvis without contrast:  Enteric tube in left eddie-abdomen  Postsurgical changes noted  Fluid within colonic loops of bowel, suggestive of diarrhea  Right-sided hydronephrosis, obstructing mid right ureteral stone measuring 8 mm  Nonobstructing punctate left renal calculi  Lung bases are clear  No bowel obstruction  No gallstones    Assessment/Plan:     * SHELLEY (acute kidney injury)  SHELLEY is likely due to  multifactorial due to obstructing ureteral stone as well as dehydration from diarrhea/poor oral intake . Baseline creatinine is  0.7 (normal) . Most recent creatinine and eGFR are listed below.    Recent Labs     10/09/24  1235   CREATININE 2.0*   EGFRNORACEVR 26*      Plan  - SHELLEY is new finding  - Avoid nephrotoxins and renally dose meds for GFR listed above  - Monitor urine output, serial BMP, and adjust therapy as needed  - Aggressive hydration with IV fluids/bicarb  - Urology consulted, keeping patient NPO after midnight    Metabolic acidosis  Severe metabolic acidosis -- with SHELLEY and obstructing ureteral stone as well as UTI and dehydration/diarrhea  Check lactic acid  On broad spectrum antibiotics for UTI  Aggressive hydration with IV fluids  Bicarb IVP ordered followed by  "bicarb drip  Trend labs  Repeat labs ordered STAT      Ureteral stone with hydronephrosis  Right-sided hydronephrosis with approximately 8 mm mid right ureteral obstructing stone   Urology consulted (per ED, Dr. Kelly)  NPO after midnight        DVT (deep venous thrombosis)  Extensive DVT-- RLE   "Extensive clot from the right common femoral vein to the right posterior tibial vein.  Occluded anterior tibial vein"  -- likely due to immobility after surgery  -- Dr. Valdez with IR consulted, he recommended heparin infusion and will have Dr. Aggarwal evaluate tomorrow  -- Heparin drip ordered, monitor PTT  -- consult hematology/oncology (followed by Dr. Ambrose)      Acute cystitis with hematuria  Rocephin ordered  Monitor urine culture and blood culture reports      Gastric adenocarcinoma  Followed by Dr. Ambrose and Dr. Jerome (will consult both services)  -- s/p total gastrectomy on 8/20, with lysis of adhesions, lymphadenectomy, and jejunostomy tube placement, d/c on 9/10  -- Pathologic stage from 8/20/2024: Stage III (ypT2, pN3a, cM0), completed 5 cycles Keytruda prior to surgery      Moderate protein-calorie malnutrition  Nutrition consulted. Most recent weight and BMI monitored- Has J-tube with tube feedings at home, however has been having diarrhea.    Measurements:  Wt Readings from Last 1 Encounters:   10/09/24 46.7 kg (103 lb)   Body mass index is 17.14 kg/m².                Diarrhea  Stool studies pending  Hydrating with IV fluids      Jejunostomy tube present  Complains of pain around j-tube -- site appears normal      Hx of Epileptic seizures  No recent seizure activity  Continue Keppra      Thyroid nodule  Outpatient surveillance        VTE Risk Mitigation (From admission, onward)           Ordered     heparin 25,000 units in dextrose 5% (100 units/ml) IV bolus from bag HIGH INTENSITY nomogram - [facility]  As needed (PRN)        Question:  Heparin Infusion Adjustment (DO NOT MODIFY ANSWER)  Answer:  " [email or web address]\epic\Images\Pharmacy\HeparinInfusions\heparin HIGH INTENSITY nomogram for [facility] [id number]    10/09/24 2013     heparin 25,000 units in dextrose 5% (100 units/ml) IV bolus from bag HIGH INTENSITY nomogram - [facility]  As needed (PRN)        Question:  Heparin Infusion Adjustment (DO NOT MODIFY ANSWER)  Answer:  \\[email or web address]\epic\Images\Pharmacy\HeparinInfusions\heparin HIGH INTENSITY nomogram for [facility] [id number]    10/09/24 2013     Reason for No Pharmacological VTE Prophylaxis  Once        Comments: Heparin drip ordered for acute occlusive DVT   Question:  Reasons:  Answer:  Physician Provided (leave comment)    10/09/24 2027     IP VTE HIGH RISK PATIENT  Once         10/09/24 2027     heparin 25,000 units in dextrose 5% 250 mL (100 units/mL) infusion HIGH INTENSITY nomogram - [facility]  Continuous        Question:  Begin at (units/kg/hr)  Answer:  18    10/09/24 2013                   This patient's case has been reviewed by my supervising physician, Dr. Tasha Vides.  Supervising physician will provide additional orders and recommendations at his or her discretion.    Also discussed with ED physician, Dr. Valdez, Dr Jerome (she will let Dr. Ambrose know about admission), ED nurse, and rad tech.    Melba Doan NP  Department of Hospital Medicine  '[place] - Emergency Dept.

## 2024-10-10 NOTE — CONSULTS
[facility] ([facility])  Adult Nutrition  Consult Note    SUMMARY     Recommendations    Recommendation/Intervention:   1. Initiate pt onto continuous Enteral nutrition, recommend [employer] peptide 1.5 (vanilla) via J tube, goal rate 30 mL/hr, starting at 10 mL/hr then progress to goal rate within 24 hrs if pt is tolerating or per MD/NP   -Formula at goal rate provides: 1107 kcals/day (79% EEN), 53 g protein/day (113% EPN), 99 g CHO/day (57% CHO needs), 504 mL free formula water/day   -150 mL q4h free water flushes (900 mL/day) = total from formula + FWF = 1404 mL water/day, per MD/NP   -Check Mg, K+, Na, Phos and Glu before and during initation, correct as indicated   2. Recommend Banatrol TID to assist with diarrhea or BID for loose stools, as warranted   3. Weigh twice weekly    Goals:   1. Pt will be initiated onto continuous EN within 24 hrs   2. Pt will tolerate and intake >75% EEN and EPN prior to RD follow up  3. Pt's diarrhea/ loose stools will improve prior to RD follow up  Nutrition Goal Status: new  Communication of RD Recs: other (comment) (POC, sticky note, secure chat)    Assessment and Plan    Nutrition Problem  Underweight   Inadequate protein/energy intake     Related to (etiology):   Inadequate energy intake, Altered GI function   Decreased ability to consume sufficient protein/energy     Signs and Symptoms (as evidenced by):   BMI < 18.5, Abdominal pain, Diarrhea, -18 lb wt loss x 3 months  NPO, J tube, Estimated intake less than estimated needs    Interventions/Recommendations (treatment strategy):  1. Enteral nutrition management   2. Commercial food medical food supplement therapy  3. Collaboration by nutrition professional with other providers     Nutrition Diagnosis Status:   New       Malnutrition Assessment     Skin (Micronutrient): red (Chetan score = 22 (no risk)       Weight Loss (Malnutrition): greater than 7.5% in 3 months                         Reason for Assessment    Reason  For Assessment: consult, new tube feeding (tube feeding dependent)  Diagnosis:  (SHELLEY (acute kidney injury))  General Information Comments:   10/10/24: 74 y.o. Female admitted for SHELLEY (acute kidney injury). PMH: DVT, Gastric adenocarcinoma, Drug induced polyneuropathy, Thyroid nodule, Diarrhea, Moderate protein-calorie malnutrition, Metabolic acidosis, Ureteral stone w/ hydronephrosis, Acute cystitis w/ hematuria, J tube present; Hx: Epiletic seizures, Ovarian cancer. Pt currently NPO. RD consulted for TF recommendations. H&P noted that the pt presented to the ED c/o  weakness, abd pain, decreased appetite, nausea, fatigue, wt loss, and watery diarrhea x 5-7 days PTA, noted pt had L lower quadrant tenderness, appears frail and emaciated, and has been non compliant w/ her scheduled meds, note negative for C diff. EMR noted cystoscope surgery planned today. Visited pt at bedside, pt was not in the room. Spoke to RN, confirmed pt is in surgery, stated she was unsure what formula the pt uses at home, informed RN will provide TF recs and can modify to home formula if perferred if appropriate and warranted, RD ordered TF's and informed RN via secure chat. Reviewed chart: LBM 10/10 (loose, seedy, creamy); Skin: blanchable redness to bottom; Chetan score: 22 9no risk); Edema: none. Labs, meds, weight reviewed. Weight charted 7/9/24 129 lbs, 10/7/24 111 lbs, -18 lb wt loss (14% wt change) x 3 months, significant wt loss, note current weight charted 10/9/24 103 lbs (BMI 17.14, protein-energy malnutrition grade 1), -8 lb wt loss x 2 days, re weigh for accuracy warranted. Unable to perform NFPE do to pt in surgery, will perform at follow up. RD will continue to follow and monitor pt's nutritional status during admit.    Nutrition Discharge Planning: Enteral nutrition via J tube    Nutrition Risk Screen    Nutrition Risk Screen: tube feeding or parenteral nutrition    Nutrition Related Social Determinants of Health: SDOH: Unable  "to assess at this time.     Nutrition/Diet History    Spiritual, Cultural Beliefs, Sikh Practices, Values that Affect Care: no  Food Allergies: NKFA  Factors Affecting Nutritional Intake: abdominal pain, decreased appetite, diarrhea, nausea/vomiting, NPO    Anthropometrics    Temp: 97.9 °F (36.6 °C)  Height Method: Stated  Height: 5' 5" (165.1 cm)  Height (inches): 65 in  Weight Method: Bed Scale  Weight: 46.7 kg (102 lb 15.3 oz)  Weight (lb): 102.96 lb  Ideal Body Weight (IBW), Female: 125 lb  % Ideal Body Weight, Female (lb): 82.37 %  % Ideal Body Weight Malnutrition: 80-90% - mild deficit  BMI (Calculated): 17.1  BMI Grade: 17 - 18.4 protein-energy malnutrition grade I  Weight Loss: unintentional  Usual Body Weight (UBW), k.63 kg  Weight Change Amount: 26 lb  % Usual Body Weight: 79.82  % Weight Change From Usual Weight: -20.35 %     Wt Readings from Last 15 Encounters:   10/10/24 46.7 kg (102 lb 15.3 oz)   10/07/24 50.4 kg (111 lb)   24 53.9 kg (118 lb 15 oz)   24 53.9 kg (118 lb 15 oz)   24 50.8 kg (112 lb)   24 60.4 kg (133 lb 2.5 oz)   24 58.7 kg (129 lb 4.8 oz)   24 59 kg (130 lb)   24 60.5 kg (133 lb 6.1 oz)   24 59.5 kg (131 lb 2.8 oz)   24 58.9 kg (129 lb 13.6 oz)   24 58.9 kg (129 lb 13.6 oz)   24 58.9 kg (129 lb 13.6 oz)   24 59.7 kg (131 lb 9.8 oz)   24 59.7 kg (131 lb 9.8 oz)     Lab/Procedures/Meds    Pertinent Labs Reviewed: reviewed  Pertinent Labs Comments: Calcium (L), Albumin (L), Na (H), Cl (H), Glu (H), Cr (H), Alk phos (H), AST (H), eGFR 36  Pertinent Medications Reviewed: reviewed  Pertinent Medications Comments: potassium chloride, levetiracetam in NaCl    BMP  Lab Results   Component Value Date     (H) 10/10/2024    K 4.0 10/10/2024     (H) 10/10/2024    CO2 16 (L) 10/10/2024    BUN 22 10/10/2024    CREATININE 1.5 (H) 10/10/2024    CALCIUM 8.5 (L) 10/10/2024    ANIONGAP 10 10/10/2024    " "EGFRNORACEVR 36 (A) 10/10/2024     Lab Results   Component Value Date    CALCIUM 8.5 (L) 10/10/2024    PHOS 5.0 (H) 10/09/2024     Lab Results   Component Value Date    ALBUMIN 2.2 (L) 10/10/2024     Lab Results   Component Value Date    ALT 23 10/10/2024    AST 44 (H) 10/10/2024    ALKPHOS 169 (H) 10/10/2024    BILITOT 0.2 10/10/2024     No results for input(s): "POCTGLUCOSE" in the last 24 hours.    No results found for: "LABA1C", "HGBA1C"    Lab Results   Component Value Date    WBC 10.12 10/10/2024    HGB 12.6 10/10/2024    HCT 38.1 10/10/2024    MCV 97 10/10/2024     10/10/2024       Scheduled Meds:   cefTRIAXone (Rocephin) IV (PEDS and ADULTS)  1 g Intravenous Q24H    levETIRAcetam (Keppra) IV (PEDS and ADULTS)  500 mg Intravenous Q12H     Continuous Infusions:   heparin (porcine) in D5W  0-40 Units/kg/hr Intravenous Continuous   Stopped at 10/10/24 0943    sodium bicarbonate 100 mEq in D5 and 0.45% NaCl 1,100 mL infusion   Intravenous Continuous 100 mL/hr at 10/10/24 1046 Rate Verify at 10/10/24 1046     PRN Meds:.  Current Facility-Administered Medications:     acetaminophen, 650 mg, Oral, Q6H PRN    dextrose 10%, 12.5 g, Intravenous, PRN    dextrose 10%, 25 g, Intravenous, PRN    glucagon (human recombinant), 1 mg, Intramuscular, PRN    glucose, 16 g, Oral, PRN    glucose, 24 g, Oral, PRN    heparin (PORCINE), 60 Units/kg, Intravenous, PRN    heparin (PORCINE), 30 Units/kg, Intravenous, PRN    loperamide, 2 mg, Oral, QID PRN    morphine, 1 mg, Intravenous, Q4H PRN    naloxone, 0.02 mg, Intravenous, PRN    ondansetron, 4 mg, Intravenous, Q6H PRN    sodium chloride 0.9%, 10 mL, Intravenous, Q8H PRN      Physical Findings/Assessment         Estimated/Assessed Needs    Weight Used For Calorie Calculations: 46.7 kg (102 lb 15.3 oz)  Energy Calorie Requirements (kcal): 7900-8342 kcals (30-35 kcals/kg ABW (Underweight vs Cancer vs GI Disorder)  Energy Need Method: Kcal/kg  Protein Requirements:  (0.8-1.0 " g/kg ABW (SHELLEY, no dialysis vs Underweight vs Cancer vs GI Disorder, older adult)  Weight Used For Protein Calculations: 46.7 kg (102 lb 15.3 oz)  Fluid Requirements (mL): 500 mL + total output (SHELLEY)  Estimated Fluid Requirement Method: other (see comments)  RDA Method (mL): 1401  CHO Requirement: 175-204 g (4847-4621 kcals/8)      Nutrition Prescription Ordered    Current Diet Order: NPO    Evaluation of Received Nutrient/Fluid Intake  I/O: (Net since admit):   10/10/24: +2181 mL    Energy Calories Required: not meeting needs  Protein Required: not meeting needs  Fluid Required: not meeting needs  Total Fluid Intake (mL): 1501  Total Fluid Output (mL): 75  Tolerance: other (see comments) (Currently no intake)  % Intake of Estimated Energy Needs: 0 - 25 %  % Meal Intake: NPO    Nutrition Risk    Level of Risk/Frequency of Follow-up: high (F/u x 2 weekly)       Monitor and Evaluation    Food and Nutrient Intake: energy intake, enteral nutrition intake  Food and Nutrient Adminstration: enteral and parenteral nutrition administration  Knowledge/Beliefs/Attitudes: food and nutrition knowledge/skill, beliefs and attitudes  Anthropometric Measurements: weight change, weight, body mass index  Biochemical Data, Medical Tests and Procedures: electrolyte and renal panel, gastrointestinal profile, glucose/endocrine profile       Nutrition Follow-Up    RD Follow-up?: Yes  Patty Gupta, BS, RDN, LDN

## 2024-10-10 NOTE — TRANSFER OF CARE
"Anesthesia Transfer of Care Note    Patient: Cheryl Kirkland    Procedure(s) Performed: Procedure(s) (LRB):  CYSTOSCOPY, WITH RETROGRADE PYELOGRAM (Right)    Patient location: PACU    Anesthesia Type: general    Transport from OR: Transported from OR on room air with adequate spontaneous ventilation    Post pain: adequate analgesia    Post assessment: no apparent anesthetic complications    Post vital signs: stable    Level of consciousness: responds to stimulation    Nausea/Vomiting: no nausea/vomiting    Complications: none    Transfer of care protocol was followed      Last vitals: Visit Vitals  BP (!) 96/57 (BP Location: Left arm, Patient Position: Lying)   Pulse 71   Temp 36.6 °C (97.9 °F) (Oral)   Resp 20   Ht 5' 5" (1.651 m)   Wt 46.7 kg (102 lb 15.3 oz)   SpO2 100%   Breastfeeding No   BMI 17.13 kg/m²     "

## 2024-10-10 NOTE — PLAN OF CARE
[facility] ([facility])  Initial Discharge Assessment       Primary Care Provider: Gagandeep Iglesias MD    Admission Diagnosis: Dehydration [E86.0]  Metabolic acidosis [E87.20]  Hypertension [I10]  Gastric adenocarcinoma [C16.9]  Intractable abdominal pain [R10.9]  SHELLEY (acute kidney injury) [N17.9]  Abdominal pain [R10.9]  Chest pain [R07.9]    Admission Date: 10/9/2024  Expected Discharge Date: per attending         Payor: [employer] MEDICARE / Plan: [employer] MEDICARE HMO / Product Type: Capitation /     Extended Emergency Contact Information  Primary Emergency Contact: Ra Kirkland  Address: [address] [address]  Home Phone: [phone]  Mobile Phone: [phone]  Relation: Spouse  Secondary Emergency Contact: Elton Leary  Address: [address]  Home Phone: [phone]  Relation: Sister    Discharge Plan A: Home Health  Discharge Plan B: Skilled Nursing Facility      [id number] [place], [place] - [address] [facility]  [address] [facility] [address]  Phone: [phone] Fax: [phone]    [facility] - [facility] [address] [facility]  [address] [facility] [address]  Phone: [phone] Fax: [phone]      Initial Assessment (most recent)       Adult Discharge Assessment - 10/10/24 1215          Discharge Assessment    Assessment Type Discharge Planning Assessment     Confirmed/corrected address, phone number and insurance Yes     Confirmed Demographics Correct on Facesheet     Source of Information patient     When was your last doctors appointment? --   n/a    Communicated SOFIYA with patient/caregiver Date not available/Unable to determine     Reason For Admission shelley     People in Home spouse;sibling(s)     Do you expect to return to your current living situation? Yes     Do you have help at home or someone to help  you manage your care at home? Yes     Who are your caregiver(s) and their phone number(s)? spouse and sister     Prior to hospitilization cognitive status: Alert/Oriented     Current cognitive status: Alert/Oriented     Walking or Climbing Stairs Difficulty no     Dressing/Bathing Difficulty no     Equipment Currently Used at Home walker, rolling     Readmission within 30 days? Yes     Patient currently being followed by outpatient case management? No     Do you currently have service(s) that help you manage your care at home? No     Do you take prescription medications? Yes     Do you have prescription coverage? Yes     Coverage human[employer]     Do you have any problems affording any of your prescribed medications? No     Is the patient taking medications as prescribed? yes     Who is going to help you get home at discharge? spouse     How do you get to doctors appointments? family or friend will provide     Are you on dialysis? No     Do you take coumadin? No     Discharge Plan A Home Health     Discharge Plan B Skilled Nursing Facility                     Readmission Assessment (most recent)       Readmission Assessment - 10/10/24 1214          Readmission    Was this a planned readmission? Yes     Why were you hospitalized in the last 30 days? yes     Why were you readmitted? Related to previous admission     When you left the hospital where did you go? Home with Home Health     Did patient/caregiver refused recommended DC plan? Yes     Tell me about what happened between when you left the hospital and the day you returned. felt bad     When did you start not feeling well? day ago     Did you try to manage your symptoms your self? No     Did you call anyone? No     Why? came to the er     Did you try to see or did see a doctor or nurse before you came? No     Why? came straight to the hospital     Did you have  a follow-up appointment on discharge? Yes     Did you go? No

## 2024-10-10 NOTE — PLAN OF CARE
Discussed poc with pt, pt verbalized understanding    Purposeful rounding every 2hours      Cardiac monitoring in use, pt is NSR, tele monitor # [id number]    Fall precautions in place, remains injury free  Pt denies c/o Pain and nausea     IVFs  Accurate I&Os    Bed locked at lowest position  Call light within reach    Chart check complete  Will cont with POC    Pt to or today for cystoscope with stent

## 2024-10-10 NOTE — PROCEDURES
"Cheryl Kirkland is a 74 y.o. female patient.    Temp: 97.7 °F (36.5 °C) (10/10/24 0347)  Pulse: 85 (10/10/24 0425)  Resp: 18 (10/10/24 0347)  BP: 130/69 (10/10/24 0347)  SpO2: 99 % (10/10/24 0347)  Weight: 46.7 kg (103 lb) (10/09/24 1150)  Height: 5' 5" (165.1 cm) (10/09/24 1134)    <PICC>  Unable to advance picc past region of subclavian vessel. Discussed removal of PIV to R basilic for second attempt with SEAN Doan NP.  Order was cancelled for PICC, requested to leave PIV to R basilic that was started after call for PICC.     Name Ras Domínguez RN  10/10/2024    "

## 2024-10-10 NOTE — SUBJECTIVE & OBJECTIVE
Review of Systems   All other systems reviewed and are negative.    Objective:     Vital Signs (Most Recent):  Temp: 97.9 °F (36.6 °C) (10/10/24 0944)  Pulse: 71 (10/10/24 0944)  Resp: 20 (10/10/24 0944)  BP: (!) 96/57 (10/10/24 0944)  SpO2: 100 % (10/10/24 0944) Vital Signs (24h Range):  Temp:  [97.2 °F (36.2 °C)-97.9 °F (36.6 °C)] 97.9 °F (36.6 °C)  Pulse:  [70-88] 71  Resp:  [16-20] 20  SpO2:  [99 %-100 %] 100 %  BP: ()/(57-76) 96/57     Weight: 46.7 kg (103 lb)  Body mass index is 17.14 kg/m².    Intake/Output Summary (Last 24 hours) at 10/10/2024 1333  Last data filed at 10/10/2024 1046  Gross per 24 hour   Intake 2355.93 ml   Output 75 ml   Net 2280.93 ml         Physical Exam  Vitals and nursing note reviewed.   Constitutional:       General: She is not in acute distress.     Appearance: She is ill-appearing.   Cardiovascular:      Rate and Rhythm: Normal rate and regular rhythm.      Heart sounds: No murmur heard.  Pulmonary:      Effort: Pulmonary effort is normal. No respiratory distress.      Breath sounds: Rhonchi present. No wheezing.      Comments: Decreased BS bilaterally  Abdominal:      Comments: G-tube   Genitourinary:     Comments: Roca  Musculoskeletal:      Right lower leg: Edema present.   Neurological:      Mental Status: She is alert.             Significant Labs: All pertinent labs within the past 24 hours have been reviewed.  Recent Lab Results  (Last 5 results in the past 24 hours)        10/10/24  0744   10/10/24  0731   10/10/24  0610   10/10/24  0122   10/09/24  2111        Procalcitonin         0.58  Comment: A concentration < 0.25 ng/mL represents a low risk of bacterial   infection.  Procalcitonin may not be accurate among patients with localized   infection, recent trauma or major surgery, immunosuppressed state,   invasive fungal infection, renal dysfunction. Decisions regarding   initiation or continuation of antibiotic therapy should not be based   solely on  procalcitonin levels.         ABO         O       Albumin   2.2       2.5       ALP   169       195       Allens Test     Pass           ALT   23       26       Anion Gap   10       11       Antibody Screen         NEG       PTT   59.2  Comment: Refer to local heparin nomogram for intensity/dose specific   therapeutic   range.  Reviewed by Technologist.         41.6  Comment: Refer to local heparin nomogram for intensity/dose specific   therapeutic   range.         AST   44       27       Bands   34.0             Baso #       0.09         Basophil %   0.0     0.9         BILIRUBIN TOTAL   0.2  Comment: For infants and newborns, interpretation of results should be based  on gestational age, weight and in agreement with clinical  observations.    Premature Infant recommended reference ranges:  Up to 24 hours.............<8.0 mg/dL  Up to 48 hours............<12.0 mg/dL  3-5 days..................<15.0 mg/dL  6-29 days.................<15.0 mg/dL         0.2  Comment: For infants and newborns, interpretation of results should be based  on gestational age, weight and in agreement with clinical  observations.    Premature Infant recommended reference ranges:  Up to 24 hours.............<8.0 mg/dL  Up to 48 hours............<12.0 mg/dL  3-5 days..................<15.0 mg/dL  6-29 days.................<15.0 mg/dL         BNP       27  Comment: Values of less than 100 pg/ml are consistent with non-CHF populations.         Site     LR           BUN   22       21       [place]/Echinocytes   Occasional             Calcium   8.5       9.1       Chloride   121       123       CO2   16       7  Comment: CO2 critical result(s) called and verbal readback obtained from   Newton Blackwell RN by MPG1 10/09/2024 22:27         Creatinine   1.5       1.7       DelSys     Room Air           Differential Method   Manual  Comment: CORRECTED RESULT; previously reported as Automated on 10/10/2024 at   08:32.    [C]     Automated         eGFR    36       31       Eos #       0.0         Eos %   0.0     0.4         FiO2     21           Glucose   127       106       Gran # (ANC)       7.8         Gran %   42.0     80.7         Hematocrit   38.1     45.1         Hemoglobin   12.6     14.3         Immature Grans (Abs)   CANCELED  Comment: Mild elevation in immature granulocytes is non specific and   can be seen in a variety of conditions including stress response,   acute inflammation, trauma and pregnancy. Correlation with other   laboratory and clinical findings is essential.    Result canceled by the ancillary.       0.12  Comment: Mild elevation in immature granulocytes is non specific and   can be seen in a variety of conditions including stress response,   acute inflammation, trauma and pregnancy. Correlation with other   laboratory and clinical findings is essential.           Immature Granulocytes   CANCELED  Comment: Result canceled by the ancillary.     1.2         INR         1.2  Comment: Coumadin Therapy:  2.0 - 3.0 for INR for all indicators except mechanical heart valves  and antiphospholipid syndromes which should use 2.5 - 3.5.         Lactic Acid Level 2.0  Comment: Falsely low lactic acid results can be found in samples   containing >=13.0 mg/dL total bilirubin and/or >=3.5 mg/dL   direct bilirubin.         2.6  Comment: Falsely low lactic acid results can be found in samples   containing >=13.0 mg/dL total bilirubin and/or >=3.5 mg/dL   direct bilirubin.           Lymph #       0.8         Lymph %   9.0     8.2         Magnesium    2.1       2.1       MCH   31.9     32.0         MCHC   33.1     31.7         MCV   97     101         Metamyelocytes   1.0             Mode     SPONT           Mono #       0.8         Mono %   14.0     8.6         MPV   9.2     8.8         nRBC   0     0         Ovalocytes   Occasional             Phosphorus Level         5.0       Platelet Estimate   Appears normal             Platelet Count   412     317          POC BE     -13           POC Glucose     141           POC HCO3     13.5           POC Hematocrit     39           POC Ionized Calcium     1.32           POC PCO2     27.0           POC PH     7.305           POC PO2     107           POC Potassium     2.0           POC SATURATED O2     98           POC Sodium     149           Poikilocytosis   Slight             Potassium   4.0       3.3       PROTEIN TOTAL   6.5       6.7       PT         13.8       RBC   3.95     4.47         RDW   15.5     15.7         Rh Type         POS       Sample     ARTERIAL           Sodium   147       141       Teardrop Cells   Occasional             Troponin I       <0.006  Comment: The reference interval for Troponin I represents the 99th percentile   cutoff   for our facility and is consistent with 3rd generation assay   performance.           TSH   0.739             WBC   10.12     9.69                                 [C] - Corrected Result               Significant Imaging: I have reviewed all pertinent imaging results/findings within the past 24 hours.    X-Ray Chest 1 View   Final Result      No acute abnormality.         Electronically signed by: Cruz Myers   Date:    10/09/2024   Time:    21:33      US Lower Extremity Veins Bilateral   Final Result      Extensive clot from the right common femoral vein to the right posterior tibial vein.  Occlusive right is anterior tibial vein.      No left-sided DVT         Electronically signed by: Cruz Myers   Date:    10/09/2024   Time:    20:10      CT Abdomen Pelvis  Without Contrast   Final Result      Right-sided hydronephrosis with a approximately 8 mm mid right ureteral obstructing stone.  Punctate nonobstructing left renal calculi.  Postsurgical changes of the bowel.  Fluid within the colonic loops of bowel may relate to diarrheal state.  Left hemiabdomen enteric tube noted      All CT scans   are performed using dose optimization techniques including the following:  automated exposure control; adjustment of the mA and/or kV; use of iterative reconstruction technique.  Dose modulation was employed for ALARA by means of: Automated exposure control; adjustment of the mA and/or kV according to patient size (this includes techniques or standardized protocols for targeted exams where dose is matched to indication/reason for exam; i.e. extremities or head); and/or use of iterative reconstructive technique.         Electronically signed by: Cruz Myers   Date:    10/09/2024   Time:    16:30      Anesthesia US Guide Vascular Access    (Results Pending)   IR Thrombectomy Veins Inc Thrombolysis and Fluoro    (Results Pending)

## 2024-10-10 NOTE — ASSESSMENT & PLAN NOTE
"Extensive DVT-- RLE   "Extensive clot from the right common femoral vein to the right posterior tibial vein.  Occluded anterior tibial vein"  -- likely due to immobility after surgery  -- Dr. Valdez with IR consulted, he recommended heparin infusion and will have Dr. Aggarwal evaluate tomorrow  -- Heparin drip ordered, monitor PTT  -- consult hematology/oncology (followed by Dr. Ambrose)    "

## 2024-10-10 NOTE — ASSESSMENT & PLAN NOTE
Right-sided hydronephrosis with approximately 8 mm mid right ureteral obstructing stone   Urology consulted (per ED, Dr. Kelly)  NPO after midnight

## 2024-10-10 NOTE — SUBJECTIVE & OBJECTIVE
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    OP (osteoporosis) 02/28/2024    Ovarian cancer     Primary hypertension 12/01/2022       Past Surgical History:   Procedure Laterality Date    ABDOMINAL WASHOUT N/A 3/14/2024    Procedure: LAVAGE, PERITONEAL, THERAPEUTIC;  Surgeon: Chen Jerome MD;  Location: [facility] OR;  Service: General;  Laterality: N/A;    APPENDECTOMY  2015    BIOPSY OF PERITONEUM N/A 3/14/2024    Procedure: BIOPSY, PERITONEUM;  Surgeon: Chen Jerome MD;  Location: [facility] OR;  Service: General;  Laterality: N/A;    COLONOSCOPY  06/20/2012    Dr Boyle- Tubular adenoma polyps. Repeat in 3 years.    COLONOSCOPY  06/17/2015    -Nodular mucosa at appendiceal orfice, sigmoid and desc diverticulosis otherwise normal.    COLONOSCOPY  2020    >3 TAs    COLONOSCOPY  12/2023    DIAGNOSTIC LAPAROSCOPY N/A 3/14/2024    Procedure: LAPAROSCOPY, DIAGNOSTIC;  Surgeon: Chen Jerome MD;  Location: [facility] OR;  Service: General;  Laterality: N/A;  1. Diagnostic laparoscopy   2. Extensive lysis of adhesions  3. Peritoneal biopsy   4. Peritoneal washings for cytology    DIAGNOSTIC LAPAROSCOPY N/A 8/20/2024    Procedure: LAPAROSCOPY, DIAGNOSTIC;  Surgeon: Chen Jerome MD;  Location: [facility] OR;  Service: General;  Laterality: N/A;    EGD - EXTERNAL RESULT  06/20/2012    Dr Boyle- Mild gastritis otherwise normal.    ENDOSCOPIC ULTRASOUND OF UPPER GASTROINTESTINAL TRACT N/A 7/26/2024     Procedure: ULTRASOUND, UPPER GI TRACT, ENDOSCOPIC;  Surgeon: Valdo Aguilera MD;  Location: [facility] ENDO;  Service: Endoscopy;  Laterality: N/A;  7/22/24: instructions sent via portal-. Pt no longer takling eliquis-GD    ESOPHAGOGASTRODUODENOSCOPY N/A 02/08/2024    Procedure: EGD (ESOPHAGOGASTRODUODENOSCOPY);  Surgeon: Jayla Arteaga MD;  Location: [facility] ENDO;  Service: Endoscopy;  Laterality: N/A;    ESOPHAGOGASTRODUODENOSCOPY N/A 8/20/2024    Procedure: EGD (ESOPHAGOGASTRODUODENOSCOPY);  Surgeon: Chen Jerome MD;  Location: [facility] OR;  Service: General;  Laterality: N/A;    GASTRECTOMY N/A 8/20/2024    Procedure: GASTRECTOMY;  Surgeon: Chen Jerome MD;  Location: [facility] OR;  Service: General;  Laterality: N/A;    HYSTERECTOMY      LAPAROSCOPIC LYSIS OF ADHESIONS N/A 3/14/2024    Procedure: LYSIS, ADHESIONS, LAPAROSCOPIC;  Surgeon: Chen Jerome MD;  Location: [facility] OR;  Service: General;  Laterality: N/A;    LYSIS OF ADHESIONS N/A 8/20/2024    Procedure: LYSIS, ADHESIONS;  Surgeon: Chen Jerome MD;  Location: [facility] OR;  Service: General;  Laterality: N/A;    PLACEMENT OF JEJUNOSTOMY TUBE N/A 8/20/2024    Procedure: INSERTION, JEJUNOSTOMY TUBE;  Surgeon: Chen Jerome MD;  Location: [facility] OR;  Service: General;  Laterality: N/A;    TOTAL ABDOMINAL HYSTERECTOMY W/ BILATERAL SALPINGOOPHORECTOMY  01/09/2023    radical resection with right salpingo-oophorectomy, left salpingo-oophorectomy, extensive enterolysis, extensive adhesiolysis, left pelvic lymph node biopsy, omental biopsy, small bowel resection with primary functional end-to-end anastomosis, placement of pelvic drain       Review of patient's allergies indicates:  No Known Allergies    No current facility-administered medications on file prior to encounter.     Current Outpatient Medications on File Prior to Encounter   Medication Sig    alendronate (FOSAMAX) 70 MG tablet Take 70 mg by mouth every 7 days. (Sundays)    calcium phosphate  trib/vit D3 (CALCIUM PHOSPHATE-VITAMIN D3 ORAL) Take 1 tablet by mouth 2 (two) times daily.    carBAMazepine (TEGRETOL) 200 mg tablet 1 tablet with breakfast  1 tablet with lunch   1.5 tablet at bedtime    ferrous sulfate 325 (65 FE) MG EC tablet Take 65 mg by mouth 2 (two) times daily with meals.    hydrocortisone 2.5 % cream Apply topically 2 (two) times daily.    levETIRAcetam (KEPPRA) 500 MG Tab Take 1 tablet by mouth 2 (two) times daily.    oxyCODONE (ROXICODONE) 10 mg Tab immediate release tablet Take 1 tablet (10 mg total) by mouth every 4 (four) hours as needed for Pain.    zonisamide (ZONEGRAN) 100 MG Cap Take 200 mg by mouth 2 (two) times daily.    acetaminophen (TYLENOL) 500 MG tablet Take 2 tablets (1,000 mg total) by mouth every 8 (eight) hours. (Patient taking differently: Take 1,000 mg by mouth every 8 (eight) hours as needed for Pain.)    hydrOXYzine HCL (ATARAX) 25 MG tablet Take 1 tablet (25 mg total) by mouth 3 (three) times daily as needed for Itching.    ibuprofen (ADVIL,MOTRIN) 800 MG tablet Take 1 tablet (800 mg total) by mouth every 8 (eight) hours. (Patient taking differently: Take 800 mg by mouth every 8 (eight) hours as needed.)     Family History       Problem Relation (Age of Onset)    Breast cancer Half-sister (58), Other    Colon cancer Half-sister (83), Other    Lung cancer Half-sister    Lymphoma Other    Ovarian cancer Other    Pancreatic cancer Brother (76), Half-brother (74)    Prostate cancer Brother (67), Other, Other          Tobacco Use    Smoking status: Former     Types: Cigarettes    Smokeless tobacco: Never    Tobacco comments:     Quit 1989 smoked 10 years smoked 0.25 ppd    Substance and Sexual Activity    Alcohol use: Not Currently    Drug use: Never    Sexual activity: Not on file     Review of Systems   Constitutional:  Positive for activity change, appetite change, fatigue and unexpected weight change. Negative for chills, diaphoresis and fever.        Weight loss,  decreased appetite   HENT: Negative.  Negative for trouble swallowing.    Eyes: Negative.  Negative for photophobia.   Respiratory:  Negative for cough, chest tightness, shortness of breath and wheezing.    Cardiovascular:  Positive for leg swelling. Negative for chest pain and palpitations.        Right leg pain/tight feeling, swelling   Gastrointestinal:  Positive for abdominal pain, diarrhea and nausea. Negative for blood in stool and vomiting.   Endocrine: Negative.    Genitourinary:  Positive for decreased urine volume. Negative for dysuria and urgency.   Musculoskeletal:  Positive for gait problem.   Skin:  Positive for wound.        Abdominal incision   Allergic/Immunologic: Positive for immunocompromised state.   Neurological:  Positive for weakness and light-headedness. Negative for dizziness, seizures, syncope, facial asymmetry, speech difficulty and headaches.   Psychiatric/Behavioral: Negative.     All other systems reviewed and are negative.    Objective:     Vital Signs (Most Recent):  Temp: 97.2 °F (36.2 °C) (10/09/24 1421)  Pulse: 85 (10/09/24 1715)  Resp: 18 (10/09/24 1715)  BP: 130/71 (10/09/24 1715)  SpO2: 100 % (10/09/24 1715) Vital Signs (24h Range):  Temp:  [97.2 °F (36.2 °C)-97.3 °F (36.3 °C)] 97.2 °F (36.2 °C)  Pulse:  [] 85  Resp:  [12-20] 18  SpO2:  [97 %-100 %] 100 %  BP: (110-130)/(71-78) 130/71     Weight: 46.7 kg (103 lb)  Body mass index is 17.14 kg/m².     Physical Exam  Vitals and nursing note reviewed. Exam conducted with a chaperone present.   Constitutional:       General: She is not in acute distress.     Appearance: She is ill-appearing and toxic-appearing. She is not diaphoretic.      Comments: Very frail appearing, cachectic elderly female, lethargic   HENT:      Head: Normocephalic and atraumatic.      Nose: Nose normal.      Mouth/Throat:      Mouth: Mucous membranes are moist.      Pharynx: Oropharynx is clear.   Eyes:      Extraocular Movements: Extraocular movements  intact.      Pupils: Pupils are equal, round, and reactive to light.   Cardiovascular:      Rate and Rhythm: Normal rate and regular rhythm.      Pulses: Normal pulses.      Heart sounds: Normal heart sounds.   Pulmonary:      Effort: No respiratory distress.      Breath sounds: No stridor. No wheezing, rhonchi or rales.      Comments: Shallow respirations and lung sounds diminished  Abdominal:      General: There is no distension.      Palpations: Abdomen is soft.      Tenderness: There is abdominal tenderness. There is right CVA tenderness and left CVA tenderness. There is no guarding or rebound.      Comments: Hypoactive bowel sounds  Abdominal incision-- superior portion of midline incision is open with pink tissue noted, no drainage noted  J-tube present, with TTP around the site and generalized TTP throughout abdomen, no erythema noted, no induration noted    Musculoskeletal:         General: Swelling and tenderness present. Normal range of motion.      Cervical back: Normal range of motion and neck supple.      Right lower leg: Edema present.      Left lower leg: No edema.      Comments: RLL -- + edema and tightness, TTP   Skin:     General: Skin is warm and dry.      Capillary Refill: Capillary refill takes less than 2 seconds.   Neurological:      General: No focal deficit present.      Mental Status: She is oriented to person, place, and time.      Motor: Weakness present.      Comments: Overall generalized weakness noted, ASCENCIO 5/5   Psychiatric:      Comments: Flat depressed affect              CRANIAL NERVES     CN III, IV, VI   Pupils are equal, round, and reactive to light.       Significant Labs: All pertinent labs within the past 24 hours have been reviewed.    ABGs:   Recent Labs   Lab 10/09/24  1411 10/09/24  2024   PH 7.030* 7.086*   PCO2 17.0* 37.8   HCO3 4.5* 11.3*   POCSATURATED 93 18   BE -26* -19*   PO2 95* 20*     Bilirubin:   Recent Labs   Lab 09/10/24  0523 09/20/24  1335 10/09/24  1235  "  BILITOT 0.1 0.2 0.3     Blood Culture: No results for input(s): "LABBLOO" in the last 48 hours.    CBC:   Recent Labs   Lab 10/09/24  1235 10/09/24  2024   WBC 9.51  --    HGB 15.7  --    HCT 50.5* 45     --      CMP:   Recent Labs   Lab 10/09/24  1235      K 3.9   *   CO2 6*   *   BUN 21   CREATININE 2.0*   CALCIUM 10.0   PROT 7.5   ALBUMIN 2.9*   BILITOT 0.3   ALKPHOS 210*   AST 25   ALT 27   ANIONGAP 11     Cardiac Markers: No results for input(s): "CKMB", "MYOGLOBIN", "BNP", "TROPISTAT" in the last 48 hours.  Coagulation: No results for input(s): "PT", "INR", "APTT" in the last 48 hours.  Lactic Acid: No results for input(s): "LACTATE" in the last 48 hours.    Lipase:   Recent Labs   Lab 10/09/24  1235   LIPASE 6     Magnesium: No results for input(s): "MG" in the last 48 hours.  Troponin: No results for input(s): "TROPONINI", "TROPONINIHS" in the last 48 hours.    TSH:   Recent Labs   Lab 08/12/24  1353   TSH 1.044     Urine Studies:   Recent Labs   Lab 10/09/24  1839   COLORU Yellow   APPEARANCEUA Hazy*   PHUR 6.0   SPECGRAV 1.020   PROTEINUA 2+*   GLUCUA 1+*   KETONESU Negative   BILIRUBINUA Negative   OCCULTUA 3+*   NITRITE Negative   UROBILINOGEN Negative   LEUKOCYTESUR Trace*   RBCUA >100*   WBCUA 31*   BACTERIA Rare   HYALINECASTS 1      Additional STAT labs ordered at this time and pending     EKG reviewed -- SR with PAC's, HR 88, nonspecific T wave abnormality    Significant Imaging: I have reviewed all pertinent imaging results/findings within the past 24 hours.    US Lower Extremities -- bilateral:  Extensive clot from right common femoral vein to the right posterior tibial vein, occluded anterior tibial vein  No left-sided DVT    CT abdomen pelvis without contrast:  Enteric tube in left eddie-abdomen  Postsurgical changes noted  Fluid within colonic loops of bowel, suggestive of diarrhea  Right-sided hydronephrosis, obstructing mid right ureteral stone measuring 8 " mm  Nonobstructing punctate left renal calculi  Lung bases are clear  No bowel obstruction  No gallstones

## 2024-10-10 NOTE — CONSULTS
Reason for consultation: ureteral stone    Provider requesting consultation: Lorri Marlow DO     History of Present Illness:   Cheryl Kirkland is a 74 y.o. female with a complex medical history, which includes gastric adenocarcinoma s/p total gastrectomy about 6 weeks ago. She presented to the ED last night with complaints of weakness, abdominal pain around her J-tube, nausea and was noted to have SHELLEY on labs. CT scan revealed an 8mm R mid-ureteral stone. She denies any prior h/o stones. She has had some dysuria for the past couple of days. No gross hematuria. No flank pain.       Review of Systems   Constitutional:  Negative for chills and fever.   Respiratory:  Negative for shortness of breath.    Cardiovascular:  Positive for leg swelling.   Gastrointestinal:  Positive for abdominal pain.   Neurological:  Positive for weakness.   All other systems reviewed and are negative.      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 Repeat in 3 years.    COLONOSCOPY  06/17/2015    -Nodular mucosa at appendiceal orfice, sigmoid and desc diverticulosis otherwise normal.    COLONOSCOPY  2020    >3 TAs    COLONOSCOPY  12/2023    DIAGNOSTIC LAPAROSCOPY N/A 3/14/2024    Procedure: LAPAROSCOPY, DIAGNOSTIC;  Surgeon: Chen Jerome MD;  Location: [facility];  Service: General;  Laterality: N/A;  1. Diagnostic laparoscopy   2. Extensive lysis of adhesions  3. Peritoneal biopsy   4. Peritoneal washings for cytology    DIAGNOSTIC LAPAROSCOPY N/A 8/20/2024    Procedure: LAPAROSCOPY, DIAGNOSTIC;  Surgeon: Chen Jerome MD;  Location: [facility] OR;  Service: General;  Laterality: N/A;    EGD - EXTERNAL RESULT  06/20/2012    Dr Boyle- Mild gastritis otherwise normal.    ENDOSCOPIC ULTRASOUND OF UPPER GASTROINTESTINAL TRACT N/A 7/26/2024    Procedure: ULTRASOUND, UPPER GI TRACT, ENDOSCOPIC;  Surgeon: Valdo Aguilera MD;  Location: [facility];  Service: Endoscopy;  Laterality: N/A;  7/22/24: instructions sent via portal-. Pt no longer takling eliquis-GD    ESOPHAGOGASTRODUODENOSCOPY N/A 02/08/2024    Procedure: EGD (ESOPHAGOGASTRODUODENOSCOPY);  Surgeon: Jayla Arteaga MD;  Location: [facility];  Service: Endoscopy;  Laterality: N/A;    ESOPHAGOGASTRODUODENOSCOPY N/A 8/20/2024    Procedure: EGD (ESOPHAGOGASTRODUODENOSCOPY);  Surgeon: Chen Jerome MD;  Location: [facility] OR;  Service: General;  Laterality: N/A;    GASTRECTOMY N/A 8/20/2024    Procedure: GASTRECTOMY;  Surgeon: Chen Jerome MD;  Location: [facility] OR;  Service: General;  Laterality: N/A;    HYSTERECTOMY      LAPAROSCOPIC LYSIS OF ADHESIONS N/A 3/14/2024    Procedure: LYSIS, ADHESIONS, LAPAROSCOPIC;  Surgeon: Chen Jerome MD;  Location: [facility] OR;  Service: General;  Laterality: N/A;    LYSIS OF ADHESIONS N/A 8/20/2024    Procedure: LYSIS, ADHESIONS;  Surgeon: Chen Jerome MD;   Location: [facility] OR;  Service: General;  Laterality: N/A;    PLACEMENT OF JEJUNOSTOMY TUBE N/A 8/20/2024    Procedure: INSERTION, JEJUNOSTOMY TUBE;  Surgeon: Chen Jerome MD;  Location: [facility] OR;  Service: General;  Laterality: N/A;    TOTAL ABDOMINAL HYSTERECTOMY W/ BILATERAL SALPINGOOPHORECTOMY  01/09/2023    radical resection with right salpingo-oophorectomy, left salpingo-oophorectomy, extensive enterolysis, extensive adhesiolysis, left pelvic lymph node biopsy, omental biopsy, small bowel resection with primary functional end-to-end anastomosis, placement of pelvic drain       Family History   Problem Relation Name Age of Onset    Pancreatic cancer Brother Misael Mccray 76    Prostate cancer Brother Zachary 67    Pancreatic cancer Half-brother Gasper 74    Colon cancer Half-sister Elton 83    Lung cancer Half-sister Elton         +smoking hx    Breast cancer Half-sister Vidhi 58    Lymphoma Other Cara Sun    Prostate cancer Other Fernando     Prostate cancer Other Gasper Calderon     Ovarian cancer Other Inerris     Breast cancer Other Aleida     Colon cancer Other [facility]        Social History     Tobacco Use    Smoking status: Former     Types: Cigarettes    Smokeless tobacco: Never    Tobacco comments:     Quit 1989 smoked 10 years smoked 0.25 ppd    Substance Use Topics    Alcohol use: Not Currently    Drug use: Never       Current Facility-Administered Medications   Medication Dose Route Frequency Provider Last Rate Last Admin    acetaminophen tablet 650 mg  650 mg Oral Q6H PRN Melba Doan NP        cefTRIAXone (Rocephin) 1 g in D5W 100 mL IVPB (MB+)  1 g Intravenous Q24H Melba Doan NP        dextrose 10% bolus 125 mL 125 mL  12.5 g Intravenous PRN Melba Doan NP        dextrose 10% bolus 250 mL 250 mL  25 g Intravenous PRN Melba Doan NP        glucagon (human recombinant) injection 1 mg  1 mg Intramuscular PRN Melba Doan NP        glucose chewable tablet 16 g  16 g  Oral PRN Melba Doan NP        glucose chewable tablet 24 g  24 g Oral PRN Melba Doan NP        heparin 25,000 units in dextrose 5% (100 units/ml) IV bolus from bag HIGH INTENSITY nomogram - [facility]  60 Units/kg Intravenous PRN Melba Doan NP        heparin 25,000 units in dextrose 5% (100 units/ml) IV bolus from bag HIGH INTENSITY nomogram - [facility]  30 Units/kg Intravenous PRN Melba Doan NP        heparin 25,000 units in dextrose 5% 250 mL (100 units/mL) infusion HIGH INTENSITY nomogram - [facility]  0-40 Units/kg/hr Intravenous Continuous Melba Doan NP   Stopped at 10/10/24 0943    levETIRAcetam in NaCl (iso-os) IVPB 500 mg  500 mg Intravenous Q12H Melba Doan NP   Stopped at 10/10/24 0933    morphine injection 1 mg  1 mg Intravenous Q4H PRN Tasha Vides MD        naloxone 0.4 mg/mL injection 0.02 mg  0.02 mg Intravenous PRN Melba Doan NP        ondansetron injection 4 mg  4 mg Intravenous Q6H PRN Melba Doan NP        sodium bicarbonate 100 mEq in D5 and 0.45% NaCl 1,100 mL infusion   Intravenous Continuous Melba Doan  mL/hr at 10/10/24 1046 Rate Verify at 10/10/24 1046    sodium chloride 0.9% flush 10 mL  10 mL Intravenous Q8H PRN Melba Doan NP           Review of patient's allergies indicates:  No Known Allergies    Physical Exam  Vitals:    10/10/24 0944   BP: (!) 96/57   Pulse: 71   Resp: 20   Temp: 97.9 °F (36.6 °C)     General: Well-developed, frail, in no acute distress  HEENT: Normocephalic, atraumatic, extraocular movements intact  Neck: Supple, no supraclavicular or cervical lymphadenopathy, trachea midline  Respirations: even and unlabored  Back: midline spine, No CVA tenderness  Abdomen: soft, Non-tender, J-tube on left side, non-distended, no palpable masses, no rebound or guarding  Extremities: moves all equally, no clubbing, +R sided swelling of the thigh  Skin: Warm and dry. No lesions  Psych: normal affect  Neuro: Alert and oriented x 3. Cranial  nerves II-XII intact      Urinalysis  pH, UA   Date Value Ref Range Status   10/09/2024 6.0 5.0 - 8.0 Final     Protein, UA   Date Value Ref Range Status   10/09/2024 2+ (A) Negative Final     Comment:     Recommend a 24 hour urine protein or a urine   protein/creatinine ratio if globulin induced proteinuria is  clinically suspected.       Glucose, UA   Date Value Ref Range Status   10/09/2024 1+ (A) Negative Final     Occult Blood UA   Date Value Ref Range Status   10/09/2024 3+ (A) Negative Final     Nitrite, UA   Date Value Ref Range Status   10/09/2024 Negative Negative Final     Leukocytes, UA   Date Value Ref Range Status   10/09/2024 Trace (A) Negative Final     Lab Results   Component Value Date    WBC 10.12 10/10/2024    HGB 12.6 10/10/2024    HCT 38.1 10/10/2024    MCV 97 10/10/2024     10/10/2024       BMP  Lab Results   Component Value Date     (H) 10/10/2024    K 4.0 10/10/2024     (H) 10/10/2024    CO2 16 (L) 10/10/2024    BUN 22 10/10/2024    CREATININE 1.5 (H) 10/10/2024    CALCIUM 8.5 (L) 10/10/2024    ANIONGAP 10 10/10/2024    EGFRNORACEVR 36 (A) 10/10/2024     CT scan 10/9/24 personally reviewed, and agree with official read:   Impression:     Right-sided hydronephrosis with a approximately 8 mm mid right ureteral obstructing stone.  Punctate nonobstructing left renal calculi.  Postsurgical changes of the bowel.  Fluid within the colonic loops of bowel may relate to diarrheal state.  Left hemiabdomen enteric tube noted    Assessment:    R ureteral stone  SHELLEY  Chronic DVT  Gastric Adenocarcinoma  Protein malnutrition      Plan:     Management options for R ureteral stone discussed including ESWL and USE. Discussed stenting today in the acute setting and my recommendation for R USE in 2 weeks. Pt in agreement. Will proceed today.

## 2024-10-10 NOTE — ANESTHESIA PREPROCEDURE EVALUATION
10/10/2024  Cheryl Kirkland is a 74 y.o., female.      Pre-op Assessment    I have reviewed the Patient Summary Reports.     I have reviewed the Nursing Notes. I have reviewed the NPO Status.      Review of Systems  Anesthesia Hx:  No problems with previous Anesthesia                Hematology/Oncology:  Hematology Normal   Oncology Normal                                   Cardiovascular:     Hypertension                                        Renal/:  Renal/ Normal                 Hepatic/GI:  Hepatic/GI Normal                 Neurological:    Neuromuscular Disease,   Seizures                                Endocrine:  Endocrine Normal            Dermatological:  Skin Normal    Psych:  Psychiatric Normal                    Physical Exam  General: Well nourished, Cooperative, Alert and Oriented    Airway:  Mallampati: II / II  Mouth Opening: Normal  TM Distance: Normal  Tongue: Normal  Neck ROM: Normal ROM    Dental:  Intact      Patient Active Problem List   Diagnosis    Acute blood loss anemia    Hx of Epileptic seizures    DVT (deep venous thrombosis)    Mixed epithelial carcinoma of right ovary    Gastric adenocarcinoma    Chronic deep vein thrombosis (DVT) of left lower extremity    Drug-induced polyneuropathy    Family history of breast cancer    Fibrocystic breast changes, bilateral    Heart failure, unspecified    HLD (hyperlipidemia)    OP (osteoporosis)    Primary hypertension    Pre-op exam    Anemia    Fibroma of tongue    Thyroid nodule    Drug-induced skin rash    Pruritus    Diarrhea    Jejunostomy tube present    Moderate protein-calorie malnutrition    SHELLEY (acute kidney injury)    Metabolic acidosis    Ureteral stone with hydronephrosis    Acute cystitis with hematuria     Results for orders placed during the hospital encounter of 10/09/24    Echo    Interpretation Summary     Left Ventricle: The left ventricle is normal in size. Normal wall thickness. There is concentric remodeling. Normal wall motion. There is normal systolic function with a visually estimated ejection fraction of 65 - 70%. Ejection fraction is approximately 65%. There is normal diastolic function.    Right Ventricle: Normal right ventricular cavity size. Wall thickness is normal. Systolic function is normal.    Pulmonary Artery: The estimated pulmonary artery systolic pressure is 15 mmHg.    IVC/SVC: Normal venous pressure at 3 mmHg.      Anesthesia Plan  Type of Anesthesia, risks & benefits discussed:    Anesthesia Type: Gen ETT, MAC, Gen Supraglottic Airway  Intra-op Monitoring Plan: Standard ASA Monitors  Post Op Pain Control Plan: multimodal analgesia  Induction:  IV  Airway Plan: Direct  Informed Consent: Informed consent signed with the Patient and all parties understand the risks and agree with anesthesia plan.  All questions answered.   ASA Score: 3  Day of Surgery Review of History & Physical: H&P Update referred to the surgeon/provider.I have interviewed and examined the patient. I have reviewed the patient's H&P dated: There are no significant changes. H&P completed by Anesthesiologist.    Ready For Surgery From Anesthesia Perspective.     .

## 2024-10-10 NOTE — HPI
Patient is a 74-year-old female with past medical history significant for hypertension, hyperlipidemia, DVT, epilepsy, neuropathy, anemia, GERD, gastritis, polyps,  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  On exam she is noted to have left lower quadrant tenderness, appears frail and emaciated, has not been taking all of her scheduled medications and is hesitant to use pain medication, stating that she does not want to be addicted to pain medication.  Vital signs on arrival stable-- blood pressure 110/78, heart rate 95, temp 97.3° respirations 12, 97% SpO2 on room air.   Significant labs include CO2 6, BUN 21, creatinine 2.0, alk-phos 210, venous blood gas shows pH 7.030 and HC03 4.5, urinalysis with hazy appearance, 3+ blood, trace leukocyte esterase, greater than 100 RBCs, 31 WBCs, rare bacteria.  Stool studies negative for occult blood, negative for rotavirus, negative for C diff.  Stool culture is in progress.  EKG showed SR with PAC's in pattern of bigeminy with nonspecific T wave abnormality. CT abdomen and pelvis without contrast was done which revealed right-sided hydronephrosis with approximately 8 mm mid right ureteral obstructing stone.  Urology was consulted and recommended NPO after midnight and will see patient in AM, also recommended urinalysis with urine culture which has been collected and does show acute cystitis with hematuria. While in ED, fluid bolus 1.5L, sodium bicarb --2 amps, morphine, Zofran, and Rocephin were all given. Hospital Medicine was consulted for admission due to SHELLEY, obstructing right ureteral stone 8mm and right sided  hydronephrosis.     During exam, patient complains of pain and tightness in right leg so US was ordered and she was found to have extensive clot from right common femoral vein to right posterior tibial vein as well as occluded anterior tibial vein. IR was consulted and will evaluate for possible intervention in AM, heparin drip was recommended and ordered.

## 2024-10-10 NOTE — PT/OT/SLP EVAL
Physical Therapy Evaluation    Patient Name:  Cheryl Kirkland   MRN:  [id number]    Recommendations:     Discharge Recommendations: Low Intensity Therapy   Discharge Equipment Recommendations: other (see comments) (TBD)   Barriers to discharge: None    Assessment:     Cheryl Kirkland is a 74 y.o. female admitted with a medical diagnosis of SHELLEY (acute kidney injury).  She presents with the following impairments/functional limitations: weakness, impaired endurance, impaired self care skills, gait instability, impaired balance, impaired functional mobility, decreased lower extremity function, decreased coordination, decreased ROM .    Rehab Prognosis: Good; patient would benefit from acute skilled PT services to address these deficits and reach maximum level of function.    Recent Surgery: Procedure(s) (LRB):  CYSTOSCOPY, WITH RETROGRADE PYELOGRAM AND URETERAL STENT INSERTION (Right) Day of Surgery    Plan:     During this hospitalization, patient to be seen 3 x/week to address the identified rehab impairments via gait training, therapeutic activities, therapeutic exercises and progress toward the following goals:    Plan of Care Expires:  10/24/24    Subjective     Chief Complaint: NONE   Patient/Family Comments/goals: WALK  Pain/Comfort:  Pain Rating 1: 0/10  Pain Rating Post-Intervention 1: 0/10    Patients cultural, spiritual, [employer]ism conflicts given the current situation:      Living Environment:   PT LIVES AT HOME WITH  IN A ONE STORY HOME WITH 2 STEPS WITH BED AND BATHROOM DOWN STAIRS  Prior to admission, patients level of function was IND AND HASN'T DRIVEN SINCE LAST SX. .  Equipment used at home:  .  DME owned (not currently used): rolling walker.  Upon discharge, patient will have assistance from  .    Objective:     Communicated with NURSE ALEXANDR AND [facility] CHART REVIEW  prior to session.  Patient found supine with peripheral IV, telemetry, PEG Tube  upon PT entry to  room.    General Precautions: Standard, fall  Orthopedic Precautions:N/A   Braces: N/A  Respiratory Status: Room air    Exams:  Postural Exam:  Patient presented with the following abnormalities:    -       No postural abnormalities identified  RLE ROM: WFL  RLE Strength: LIMITED   LLE ROM: WFL  LLE Strength: LIMITED    Functional Mobility:  Bed Mobility:     Rolling Left:  supervision  Scooting: supervision  Supine to Sit: supervision  Transfers:     Sit to Stand:  contact guard assistance with no AD  Bed to Chair: contact guard assistance with  no AD  using  Stand Pivot  Gait: PT GT TRAINED X 450' WITH CGA AND NO AD.       AM-PAC 6 CLICK MOBILITY  Total Score:18       Treatment & Education:  GT. BELT AND  SOCKS DONNED PRIOR TO OOB MOBILITY.  PT GT TRAINED ON LEVEL INDOOR SURFACES WITH SLOW PACE GT. PT RETURNED TO RM TO BATHROOM FOR TOILETING WITH MIN A TO MANAGE DIAPER AND LINES. PT RETURNED TO RM T/F TO EOB AND SUP IN BED WITH S. PT EDUCATED ON ROLE OF P.T. AND LEFT WITH ALL NEEDS MET     Patient left HOB elevated with call button in reach.    GOALS:   Multidisciplinary Problems       Physical Therapy Goals          Problem: Physical Therapy    Goal Priority Disciplines Outcome Interventions   Physical Therapy Goal     PT, PT/OT     Description: LTG: 10/24/24  1. PT WILL COMPLETE BED MOBILITY IND  2. PT WILL STAND T/F TO CHAIR IND FOR OOB TOLERANCE.  3. PT WILL GT TRAIN X 500' WITH NO AD IND TO INC ENDURANCE  4. PT WILL INC AMPAC SCORE BY 2 POINTS TO PROGRESS GROSS FUNC MOBILITY.                          History:     Past Medical History:   Diagnosis Date    Anemia     Chronic deep vein thrombosis (DVT) of left lower extremity 10/21/2022    Deep vein thrombosis     Drug-induced polyneuropathy 02/28/2024    Noted by ROCK KAY NP  last documented on [id number]    DVT (deep venous thrombosis)     Epilepsy     Gastric adenocarcinoma 02/27/2024    GERD (gastroesophageal reflux disease)     Hyperlipidemia  01/10/2013    Formatting of this note might be different from the original.   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    OP (osteoporosis) 02/28/2024    Ovarian cancer     Primary hypertension 12/01/2022       Past Surgical History:   Procedure Laterality Date    ABDOMINAL WASHOUT N/A 3/14/2024    Procedure: LAVAGE, PERITONEAL, THERAPEUTIC;  Surgeon: Chen Jerome MD;  Location: [facility] OR;  Service: General;  Laterality: N/A;    APPENDECTOMY  2015    BIOPSY OF PERITONEUM N/A 3/14/2024    Procedure: BIOPSY, PERITONEUM;  Surgeon: Chen Jerome MD;  Location: [facility] OR;  Service: General;  Laterality: N/A;    COLONOSCOPY  06/20/2012    Dr Boyle- Tubular adenoma polyps. Repeat in 3 years.    COLONOSCOPY  06/17/2015    -Nodular mucosa at appendiceal orfice, sigmoid and desc diverticulosis otherwise normal.    COLONOSCOPY  2020    >3 TAs    COLONOSCOPY  12/2023    DIAGNOSTIC LAPAROSCOPY N/A 3/14/2024    Procedure: LAPAROSCOPY, DIAGNOSTIC;  Surgeon: Chen Jerome MD;  Location: [facility] OR;  Service: General;  Laterality: N/A;  1. Diagnostic laparoscopy   2. Extensive lysis of adhesions  3. Peritoneal biopsy   4. Peritoneal washings for cytology    DIAGNOSTIC LAPAROSCOPY N/A 8/20/2024    Procedure: LAPAROSCOPY, DIAGNOSTIC;  Surgeon: Chen Jerome MD;  Location: [facility] OR;  Service: General;  Laterality: N/A;    EGD - EXTERNAL RESULT  06/20/2012    Dr Boyle- Mild gastritis otherwise normal.    ENDOSCOPIC ULTRASOUND OF UPPER GASTROINTESTINAL TRACT N/A 7/26/2024    Procedure: ULTRASOUND, UPPER GI TRACT, ENDOSCOPIC;  Surgeon: Valdo Aguilera MD;  Location: [facility] ENDO;  Service: Endoscopy;  Laterality: N/A;  7/22/24: instructions sent via portal-. Pt no longer takling eliquis-GD    ESOPHAGOGASTRODUODENOSCOPY N/A 02/08/2024    Procedure: EGD (ESOPHAGOGASTRODUODENOSCOPY);  Surgeon: Jayla Arteaga MD;  Location: [facility] ENDO;  Service: Endoscopy;  Laterality: N/A;     ESOPHAGOGASTRODUODENOSCOPY N/A 8/20/2024    Procedure: EGD (ESOPHAGOGASTRODUODENOSCOPY);  Surgeon: Chen Jerome MD;  Location: [facility] OR;  Service: General;  Laterality: N/A;    GASTRECTOMY N/A 8/20/2024    Procedure: GASTRECTOMY;  Surgeon: Chen Jerome MD;  Location: [facility] OR;  Service: General;  Laterality: N/A;    HYSTERECTOMY      LAPAROSCOPIC LYSIS OF ADHESIONS N/A 3/14/2024    Procedure: LYSIS, ADHESIONS, LAPAROSCOPIC;  Surgeon: Chen Jerome MD;  Location: [facility] OR;  Service: General;  Laterality: N/A;    LYSIS OF ADHESIONS N/A 8/20/2024    Procedure: LYSIS, ADHESIONS;  Surgeon: Chen Jerome MD;  Location: [facility] OR;  Service: General;  Laterality: N/A;    PLACEMENT OF JEJUNOSTOMY TUBE N/A 8/20/2024    Procedure: INSERTION, JEJUNOSTOMY TUBE;  Surgeon: Chen Jerome MD;  Location: [facility] OR;  Service: General;  Laterality: N/A;    TOTAL ABDOMINAL HYSTERECTOMY W/ BILATERAL SALPINGOOPHORECTOMY  01/09/2023    radical resection with right salpingo-oophorectomy, left salpingo-oophorectomy, extensive enterolysis, extensive adhesiolysis, left pelvic lymph node biopsy, omental biopsy, small bowel resection with primary functional end-to-end anastomosis, placement of pelvic drain       Time Tracking:     PT Received On: 10/10/24  PT Start Time: 1020     PT Stop Time: 1045  PT Total Time (min): 25 min     Billable Minutes: Evaluation 15 and Therapeutic Activity 10      10/10/2024

## 2024-10-10 NOTE — NURSING
aPTT this am at 730 is therapeutic however since there was an interruption in her heparin infusion from 158a-341a OC jamel has instructed me to get one more therapeutic (6hrs from restart of gtt) at 941 then pt will be daily draws

## 2024-10-10 NOTE — HOSPITAL COURSE
10/10/24  75 y/o female with gastric adenocarcinoma, here with abdominal pain  Patient rc/o lower quadrant abdominal pain, nausea, RLE pain, fatigue  Found to have 8 mm right sided obstructing ureteral stone and right sided hydronephrosis  Also with RLE extensive DVT; patient states she was previously on AC but stopped due to gastric cancer/bleeding  Urology with plans for stent placement today  IR with plans for RLE thrombectomy tomorrow morning   at bedside, updated    10/11/24  NAEON, patient resting in bed, feels some better today  Patient s/p cystoscopy yesterday with Dr. Kelly  Patient has impacted right ureteral stone, unable to place stent  Roca currently in place, continue ceftriaxone  Cr trending down with IV fluids  K 2.2, replete PO and IV, recheck this afternoon  Plans for right nephrostomy tube today  Plans for RLE thrombectomy for extensive DVT pending  Continue heparin drip, plan to transition to NOAC after thrombectomy    10/12  Awake, alert, lying in bed, cachetic, NPO since yesterday waiting on right nephrostomy tube/stent, was unable to do yesterday because of hypokalemia. Discussed with IR today, will give K riders today and repeat tomorrow and plan for procedure tomorrow if Potassium stable.   IR said no indication for thrombectomy at this time  Cont heparin gtt and hold after midnight  Restart tube feedings, hold after midnight    10/13  Awake, alert, lying in bed,  at bedside  K 3.2 this am, given K rider and Effer-K, repeat K 3.9  IR placed a right nephrostomy tube today, no stent because of concern for infection, will need to follow up with urology and can have a antegrade stent placed inpatient/outpatient after adequate decompression    10/14  NAEON, patient states she feels unwell, fatigued  S/p right nephrostomy tube placement yesterday, cultures pending  Ceftriaxone day # 6  PT/OT with reccs for LIT\    10/15  K 3.0, Mg 1.2 - replete  Recheck K this afternoon  Patient  states today that no one is able to care for her at home   at bedside wishes to pursue SNF placement, states he is unable to care for her  Consult SW for SNF placement    10/16  NAEON  Loose stools reported, schedule imodium short course  Adjusted TF regimen, monitor for tolerance  Replete electrolytes  SNF pending    10/17  NAEON  Nephrostomy tube dislodged yesterday evening  IR and Urology following, plans for replacement pending  Patient sitting in chair, reports pain controlled  SNF placement pending    10/18  NAEON  Patient sitting in chair, denies complaints, pain controlled  Nephrostomy tube placement pending for Monday morning    10/19  NAEON, patient sitting up in bed, denies complaints    10/20  NAEON  Plans for IR nephrostomy tube to be replaced tomorrow morning  NPO after midnight  Plan to discharge to SNF tomorrow after procedure completed    10/21   No acute events overnight.    Patient underwent nephrostomy tube replacement with IR this morning.  Patient seen postprocedure with  at bedside.  She reports some pain at nephrostomy insertion site.  Denies chest pain, shortness of breath, nausea, vomiting, abdominal pain.  Appears stable for transfer to SNF today per my exam.  We will follow up with PCP/nursing home physician within 1 week, urology ASAP to discuss stent placement, surg Onc as scheduled on 10/29 and Hematology/Oncology ASAP.  She will transition from heparin drip to p.o. Eliquis on discharge for anticoagulation for DVT per Hematology recommendations.  See below for further details.

## 2024-10-10 NOTE — CONSULTS
Hematology and Medical Oncology   New Patient Consult     10/10/2024      Reason For Consult: DVT with known Gastric Cancer    Oncologic Diagnosis:  Stage III  (ypT2, pN3a, cM0) Gastric Adenocarcinoma  Previous Treatment:    NA Immunotherapy (Pembrolizumab 03/26/2024 - 07/23/2024)  s/p open total gastrectomy with D2 lymphadenectomy extensive lysis of adhesions on 08/20/2024.   Current Treatment:  Adjuvant Pembrolizumab     Telemedicine Documentation:  Visit type: audiovisual    This encounter, which includes face to face time and non-face to face time preparing to see the patient (eg, review of tests), Obtaining and/or reviewing separately obtained history, Documenting clinical information in the electronic or other health record, Independently interpreting results (not separately reported) and communicating results to the patient/family/caregiver, or Care coordination (not separately reported).     Each patient to whom he or she provides medical services by telemedicine is:  (1) informed of the relationship between the physician and patient and the respective role of any other health care provider with respect to management of the patient; and (2) notified that he or she may decline to receive medical services by telemedicine and may withdraw from such care at any time.    History of Present Ilness:   Cheryl Martines) is a pleasant 74 y.o.female seen virtually today to discuss lower extremity blood clot.    She tolerated NA immunotherapy without significant adverse effects.  She underwent total gastrectomy 08/20/24.  Pathology showed negative margins but 8/45 lymph nodes were positive which was reviewed with the patient.  She is recovering from surgery and reports continued significant pain and limitations in mobility.  Counseled on pain control regimen.      Yesterday she  chronic deep vein thrombosis, drug induced polyneuropathy, gastric adenocarcinoma, gerd, hyperlipidemia, osteoporosis,  ovarian cancer, and primary hypertension who presents to the Emergency Department for weakness than began several weeks ago. Patient had a Esophagogastroduodenoscopy completed on 02/08/2024. Pt reports having a diagnostic laparoscopy and laparoscopic lysis of adhesions March 14 2024. Patients  also notes that patient has developed severe diarrhea and notices it occurs after her feeding via J-tube.  Patient c/o pain located around her J-tube. Symptoms are constant and moderate in severity.      --Lower extremity ultrasound on 10/9/24: Right thigh veins: Extensive clot from the common femoral vein to the popliteal vein  Right calf veins: Occluded anterior tibial vein.  Peroneal vein is patent.  --Started on heparin infusion      PAST MEDICAL HISTORY:   Past Medical History:   Diagnosis Date    Anemia     Chronic deep vein thrombosis (DVT) of left lower extremity 10/21/2022    Deep vein thrombosis     Drug-induced polyneuropathy 02/28/2024    Noted by ROCK KAY NP  last documented on [id number]    DVT (deep venous thrombosis)     Epilepsy     Gastric adenocarcinoma 02/27/2024    GERD (gastroesophageal reflux disease)     Hyperlipidemia 01/10/2013    Formatting of this note might be different from the original.   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    OP (osteoporosis) 02/28/2024    Ovarian cancer     Primary hypertension 12/01/2022       PAST SURGICAL HISTORY:   Past Surgical History:   Procedure Laterality Date    ABDOMINAL WASHOUT N/A 3/14/2024    Procedure: LAVAGE, PERITONEAL, THERAPEUTIC;  Surgeon: Chen Jerome MD;  Location: [facility] OR;  Service: General;  Laterality: N/A;    APPENDECTOMY  2015    BIOPSY OF PERITONEUM N/A 3/14/2024    Procedure: BIOPSY, PERITONEUM;  Surgeon: Chen Jerome MD;  Location: [facility] OR;  Service: General;  Laterality: N/A;    COLONOSCOPY  06/20/2012    Dr Boyle- Tubular adenoma polyps. Repeat in 3 years.    COLONOSCOPY   06/17/2015    -Nodular mucosa at appendiceal orfice, sigmoid and desc diverticulosis otherwise normal.    COLONOSCOPY  2020    >3 TAs    COLONOSCOPY  12/2023    DIAGNOSTIC LAPAROSCOPY N/A 3/14/2024    Procedure: LAPAROSCOPY, DIAGNOSTIC;  Surgeon: Chen Jerome MD;  Location: [facility];  Service: General;  Laterality: N/A;  1. Diagnostic laparoscopy   2. Extensive lysis of adhesions  3. Peritoneal biopsy   4. Peritoneal washings for cytology    DIAGNOSTIC LAPAROSCOPY N/A 8/20/2024    Procedure: LAPAROSCOPY, DIAGNOSTIC;  Surgeon: Chen Jerome MD;  Location: [facility] OR;  Service: General;  Laterality: N/A;    EGD - EXTERNAL RESULT  06/20/2012    Dr Boyle- Mild gastritis otherwise normal.    ENDOSCOPIC ULTRASOUND OF UPPER GASTROINTESTINAL TRACT N/A 7/26/2024    Procedure: ULTRASOUND, UPPER GI TRACT, ENDOSCOPIC;  Surgeon: Valdo Aguilera MD;  Location: [facility];  Service: Endoscopy;  Laterality: N/A;  7/22/24: instructions sent via portal-. Pt no longer takling eliquis-GD    ESOPHAGOGASTRODUODENOSCOPY N/A 02/08/2024    Procedure: EGD (ESOPHAGOGASTRODUODENOSCOPY);  Surgeon: Jayla Arteaga MD;  Location: [facility];  Service: Endoscopy;  Laterality: N/A;    ESOPHAGOGASTRODUODENOSCOPY N/A 8/20/2024    Procedure: EGD (ESOPHAGOGASTRODUODENOSCOPY);  Surgeon: Chen Jerome MD;  Location: [facility] OR;  Service: General;  Laterality: N/A;    GASTRECTOMY N/A 8/20/2024    Procedure: GASTRECTOMY;  Surgeon: Chen Jerome MD;  Location: [facility] OR;  Service: General;  Laterality: N/A;    HYSTERECTOMY      LAPAROSCOPIC LYSIS OF ADHESIONS N/A 3/14/2024    Procedure: LYSIS, ADHESIONS, LAPAROSCOPIC;  Surgeon: Chen Jerome MD;  Location: [facility] OR;  Service: General;  Laterality: N/A;    LYSIS OF ADHESIONS N/A 8/20/2024    Procedure: LYSIS, ADHESIONS;  Surgeon: Chen Jerome MD;  Location: [facility] OR;  Service: General;  Laterality: N/A;    PLACEMENT OF JEJUNOSTOMY TUBE N/A 8/20/2024    Procedure:  INSERTION, JEJUNOSTOMY TUBE;  Surgeon: Chen Jerome MD;  Location: [facility];  Service: General;  Laterality: N/A;    TOTAL ABDOMINAL HYSTERECTOMY W/ BILATERAL SALPINGOOPHORECTOMY  01/09/2023    radical resection with right salpingo-oophorectomy, left salpingo-oophorectomy, extensive enterolysis, extensive adhesiolysis, left pelvic lymph node biopsy, omental biopsy, small bowel resection with primary functional end-to-end anastomosis, placement of pelvic drain       PAST SOCIAL HISTORY:   reports that she has quit smoking. Her smoking use included cigarettes. She has never used smokeless tobacco. She reports that she does not currently use alcohol. She reports that she does not use drugs.    FAMILY HISTORY:  Family History   Problem Relation Name Age of Onset    Pancreatic cancer Brother Misael Mccray 76    Prostate cancer Brother Zachary 67    Pancreatic cancer Half-brother Gasper 74    Colon cancer Half-sister Elton 83    Lung cancer Half-sister Elton         +smoking hx    Breast cancer Half-sister Vidhi 58    Lymphoma Other Cara Sun    Prostate cancer Other Fernando     Prostate cancer Other Gasper Calderon     Ovarian cancer Other Inerris     Breast cancer Other Aleida     Colon cancer Other Aleida        CURRENT MEDICATIONS:   Current Facility-Administered Medications   Medication    acetaminophen tablet 650 mg    cefTRIAXone (Rocephin) 1 g in D5W 100 mL IVPB (MB+)    dextrose 10% bolus 125 mL 125 mL    dextrose 10% bolus 250 mL 250 mL    glucagon (human recombinant) injection 1 mg    glucose chewable tablet 16 g    glucose chewable tablet 24 g    heparin 25,000 units in dextrose 5% (100 units/ml) IV bolus from bag HIGH INTENSITY nomogram - [facility]    heparin 25,000 units in dextrose 5% (100 units/ml) IV bolus from bag HIGH INTENSITY nomogram - [facility]    heparin 25,000 units in dextrose 5% 250 mL (100 units/mL) infusion HIGH INTENSITY nomogram - [facility]    levETIRAcetam in NaCl (iso-os) IVPB 500 mg     morphine injection 1 mg    naloxone 0.4 mg/mL injection 0.02 mg    ondansetron injection 4 mg    sodium bicarbonate 100 mEq in D5 and 0.45% NaCl 1,100 mL infusion    sodium chloride 0.9% flush 10 mL     ALLERGIES:   Review of patient's allergies indicates:  No Known Allergies      Review of Systems:     Review of Systems - Oncology       Physical Exam:     Vitals:    10/10/24 0425   BP:    Pulse: 85   Resp:    Temp:      Limited secondary to virtual visit    Labs:   Recent Labs   Lab 10/09/24  1235 10/09/24  2024 10/10/24  0122   WBC 9.51  --  9.69   HGB 15.7  --  14.3   HCT 50.5* 45 45.1     --  317       Recent Labs   Lab 10/09/24  1235 10/09/24  2111    141   K 3.9 3.3*   * 123*   CO2 6* 7*   BUN 21 21   CREATININE 2.0* 1.7*   CALCIUM 10.0 9.1   PROT 7.5 6.7   BILITOT 0.3 0.2   ALKPHOS 210* 195*   ALT 27 26   AST 25 27   MG  --  2.1   PHOS  --  5.0*          Assessment and Plan:     Ms. Zachery Kirkland is a pleasant 74 year old female with known Gastric Caner and newly identified blood clot.    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 Jerome negative for malignant cells  Per NCCN guidelines, NA immunotherapy is useful in MSI-H/dMMR tumors) with options being Nivolumab and ipilimumab followed by nivolumab or Pembrolizumab.  Given patient's comorbid conditions and potentially increased toxicity with CTLA4 inhibitor and PD-1 inhibitor, decision made to proceed with Pembrolizumab. Started  03/14/24.  Patient to completed 12 weeks of NA immunotherapy 06/18/2024  CT CAP 06/24/25 showed progression of LAD but no other signs of disease  Plan  s/p open total gastrectomy with D2 lymphadenectomy extensive lysis of adhesions on 08/20/2024  Eight of 35 lymph nodes are POSITIVE for metastatic carcinoma   Patient to continue with adjuvant immunotherapy       Chronic DVT  Seen on imaging on 10/9  Currently on a heparin drip  Plan for thrombetomy tomorrow with IR  Would recommend DOAC therapy at discharge, established hem/onc can manage this on an out patient basis     I have provided the patient with an opportunity to ask questions and have all questions answered to his satisfaction.       Thank you for the consult. Please reach out for further assistance.         Glo Alston MD  Hematology and Medical Oncology  Bone Marrow Transplant  [facility]

## 2024-10-10 NOTE — ANESTHESIA PROCEDURE NOTES
Intubation    Date/Time: 10/10/2024 4:30 PM    Performed by: Odell Edwards CRNA  Authorized by: Fernanda ePña MD    Intubation:     Induction:  Intravenous    Intubated:  Postinduction    Mask Ventilation:  Easy mask    Attempts:  1    Attempted By:  CRNA    Difficult Airway Encountered?: No      Complications:  None    Airway Device:  Supraglottic airway/LMA    Airway Device Size:  3.0    Style/Cuff Inflation:  Cuffed (inflated to minimal occlusive pressure)    Placement Verified By:  Capnometry    Complicating Factors:  None    Findings Post-Intubation:  BS equal bilateral

## 2024-10-10 NOTE — ASSESSMENT & PLAN NOTE
SHELLEY is likely due to  multifactorial due to obstructing ureteral stone as well as dehydration from diarrhea/poor oral intake . Baseline creatinine is  0.7 (normal) . Most recent creatinine and eGFR are listed below.    Recent Labs     10/09/24  1235   CREATININE 2.0*   EGFRNORACEVR 26*      Plan  - SHELLEY is new finding  - Avoid nephrotoxins and renally dose meds for GFR listed above  - Monitor urine output, serial BMP, and adjust therapy as needed  - Aggressive hydration with IV fluids/bicarb  - Urology consulted, keeping patient NPO after midnight

## 2024-10-10 NOTE — PLAN OF CARE
Problem: Adult Inpatient Plan of Care  Goal: Plan of Care Review  Outcome: Progressing  Goal: Patient-Specific Goal (Individualized)  Outcome: Progressing  Goal: Absence of Hospital-Acquired Illness or Injury  Outcome: Progressing  Goal: Optimal Comfort and Wellbeing  Outcome: Progressing  Goal: Readiness for Transition of Care  Outcome: Progressing     Problem: Infection  Goal: Absence of Infection Signs and Symptoms  Outcome: Progressing     Problem: Acute Kidney Injury/Impairment  Goal: Fluid and Electrolyte Balance  Outcome: Progressing  Goal: Improved Oral Intake  Outcome: Progressing  Goal: Effective Renal Function  Outcome: Progressing     Problem: Wound  Goal: Optimal Coping  Outcome: Progressing  Goal: Optimal Functional Ability  Outcome: Progressing  Goal: Absence of Infection Signs and Symptoms  Outcome: Progressing  Goal: Improved Oral Intake  Outcome: Progressing  Goal: Optimal Pain Control and Function  Outcome: Progressing  Goal: Skin Health and Integrity  Outcome: Progressing  Goal: Optimal Wound Healing  Outcome: Progressing

## 2024-10-10 NOTE — ASSESSMENT & PLAN NOTE
US - RLE extensive clot from the right common femoral vein to the right posterior tibial vein.  Occluded anterior tibial vein  IR consulted, plan for thrombectomy in AM (10/11/24)  Continue heparin drip  Heme/Onc consulted for AC reccs

## 2024-10-11 ENCOUNTER — TELEPHONE (OUTPATIENT)
Dept: UROLOGY | Facility: CLINIC | Age: 74
End: 2024-10-11
Payer: MEDICARE

## 2024-10-11 ENCOUNTER — PATIENT MESSAGE (OUTPATIENT)
Dept: UROLOGY | Facility: CLINIC | Age: 74
End: 2024-10-11
Payer: MEDICARE

## 2024-10-11 PROBLEM — E87.6 HYPOKALEMIA: Status: ACTIVE | Noted: 2024-10-11

## 2024-10-11 LAB
ACANTHOCYTES BLD QL SMEAR: PRESENT
ALBUMIN SERPL BCP-MCNC: 1.9 G/DL (ref 3.5–5.2)
ALBUMIN SERPL BCP-MCNC: 2 G/DL (ref 3.5–5.2)
ALP SERPL-CCNC: 151 U/L (ref 55–135)
ALP SERPL-CCNC: 164 U/L (ref 55–135)
ALT SERPL W/O P-5'-P-CCNC: 19 U/L (ref 10–44)
ALT SERPL W/O P-5'-P-CCNC: 19 U/L (ref 10–44)
ANION GAP SERPL CALC-SCNC: 10 MMOL/L (ref 8–16)
ANION GAP SERPL CALC-SCNC: 13 MMOL/L (ref 8–16)
ANISOCYTOSIS BLD QL SMEAR: SLIGHT
APTT PPP: 116.4 SEC (ref 21–32)
APTT PPP: 58.4 SEC (ref 21–32)
AST SERPL-CCNC: 12 U/L (ref 10–40)
AST SERPL-CCNC: 13 U/L (ref 10–40)
BACTERIA UR CULT: NO GROWTH
BASOPHILS # BLD AUTO: 0.04 K/UL (ref 0–0.2)
BASOPHILS NFR BLD: 0.4 % (ref 0–1.9)
BILIRUB SERPL-MCNC: 0.2 MG/DL (ref 0.1–1)
BILIRUB SERPL-MCNC: 0.3 MG/DL (ref 0.1–1)
BUN SERPL-MCNC: 23 MG/DL (ref 8–23)
BUN SERPL-MCNC: 25 MG/DL (ref 8–23)
CALCIUM SERPL-MCNC: 7.5 MG/DL (ref 8.7–10.5)
CALCIUM SERPL-MCNC: 8 MG/DL (ref 8.7–10.5)
CHLORIDE SERPL-SCNC: 117 MMOL/L (ref 95–110)
CHLORIDE SERPL-SCNC: 118 MMOL/L (ref 95–110)
CO2 SERPL-SCNC: 18 MMOL/L (ref 23–29)
CO2 SERPL-SCNC: 21 MMOL/L (ref 23–29)
CREAT SERPL-MCNC: 1.4 MG/DL (ref 0.5–1.4)
CREAT SERPL-MCNC: 1.6 MG/DL (ref 0.5–1.4)
DACRYOCYTES BLD QL SMEAR: ABNORMAL
DIFFERENTIAL METHOD BLD: ABNORMAL
EOSINOPHIL # BLD AUTO: 0.1 K/UL (ref 0–0.5)
EOSINOPHIL NFR BLD: 0.5 % (ref 0–8)
ERYTHROCYTE [DISTWIDTH] IN BLOOD BY AUTOMATED COUNT: 15.2 % (ref 11.5–14.5)
EST. GFR  (NO RACE VARIABLE): 34 ML/MIN/1.73 M^2
EST. GFR  (NO RACE VARIABLE): 39 ML/MIN/1.73 M^2
GLUCOSE SERPL-MCNC: 121 MG/DL (ref 70–110)
GLUCOSE SERPL-MCNC: 144 MG/DL (ref 70–110)
HCT VFR BLD AUTO: 34.1 % (ref 37–48.5)
HGB BLD-MCNC: 11.6 G/DL (ref 12–16)
IMM GRANULOCYTES # BLD AUTO: 0.09 K/UL (ref 0–0.04)
IMM GRANULOCYTES NFR BLD AUTO: 0.9 % (ref 0–0.5)
LYMPHOCYTES # BLD AUTO: 1.1 K/UL (ref 1–4.8)
LYMPHOCYTES NFR BLD: 10.9 % (ref 18–48)
MAGNESIUM SERPL-MCNC: 1.9 MG/DL (ref 1.6–2.6)
MCH RBC QN AUTO: 31.4 PG (ref 27–31)
MCHC RBC AUTO-ENTMCNC: 34 G/DL (ref 32–36)
MCV RBC AUTO: 92 FL (ref 82–98)
MONOCYTES # BLD AUTO: 1 K/UL (ref 0.3–1)
MONOCYTES NFR BLD: 10.2 % (ref 4–15)
NEUTROPHILS # BLD AUTO: 7.6 K/UL (ref 1.8–7.7)
NEUTROPHILS NFR BLD: 77.1 % (ref 38–73)
NRBC BLD-RTO: 0 /100 WBC
PLATELET # BLD AUTO: 283 K/UL (ref 150–450)
PLATELET BLD QL SMEAR: ABNORMAL
PMV BLD AUTO: 8.4 FL (ref 9.2–12.9)
POIKILOCYTOSIS BLD QL SMEAR: SLIGHT
POTASSIUM SERPL-SCNC: 2.2 MMOL/L (ref 3.5–5.1)
POTASSIUM SERPL-SCNC: 2.2 MMOL/L (ref 3.5–5.1)
PROT SERPL-MCNC: 5.1 G/DL (ref 6–8.4)
PROT SERPL-MCNC: 5.4 G/DL (ref 6–8.4)
RBC # BLD AUTO: 3.69 M/UL (ref 4–5.4)
SCHISTOCYTES BLD QL SMEAR: PRESENT
SODIUM SERPL-SCNC: 146 MMOL/L (ref 136–145)
SODIUM SERPL-SCNC: 151 MMOL/L (ref 136–145)
WBC # BLD AUTO: 9.83 K/UL (ref 3.9–12.7)

## 2024-10-11 PROCEDURE — 85730 THROMBOPLASTIN TIME PARTIAL: CPT | Mod: 91 | Performed by: RADIOLOGY

## 2024-10-11 PROCEDURE — 80053 COMPREHEN METABOLIC PANEL: CPT | Mod: 91 | Performed by: RADIOLOGY

## 2024-10-11 PROCEDURE — 76937 US GUIDE VASCULAR ACCESS: CPT

## 2024-10-11 PROCEDURE — 80053 COMPREHEN METABOLIC PANEL: CPT | Performed by: UROLOGY

## 2024-10-11 PROCEDURE — 63600175 PHARM REV CODE 636 W HCPCS: Performed by: UROLOGY

## 2024-10-11 PROCEDURE — 99231 SBSQ HOSP IP/OBS SF/LOW 25: CPT | Mod: ,,, | Performed by: UROLOGY

## 2024-10-11 PROCEDURE — 25000003 PHARM REV CODE 250: Performed by: HOSPITALIST

## 2024-10-11 PROCEDURE — 97110 THERAPEUTIC EXERCISES: CPT

## 2024-10-11 PROCEDURE — 85730 THROMBOPLASTIN TIME PARTIAL: CPT | Performed by: HOSPITALIST

## 2024-10-11 PROCEDURE — 51798 US URINE CAPACITY MEASURE: CPT

## 2024-10-11 PROCEDURE — 97530 THERAPEUTIC ACTIVITIES: CPT

## 2024-10-11 PROCEDURE — 11000001 HC ACUTE MED/SURG PRIVATE ROOM

## 2024-10-11 PROCEDURE — 97116 GAIT TRAINING THERAPY: CPT

## 2024-10-11 PROCEDURE — 83735 ASSAY OF MAGNESIUM: CPT | Performed by: UROLOGY

## 2024-10-11 PROCEDURE — 21400001 HC TELEMETRY ROOM

## 2024-10-11 PROCEDURE — 36415 COLL VENOUS BLD VENIPUNCTURE: CPT | Performed by: RADIOLOGY

## 2024-10-11 PROCEDURE — 25000003 PHARM REV CODE 250: Performed by: UROLOGY

## 2024-10-11 PROCEDURE — 63600175 PHARM REV CODE 636 W HCPCS: Performed by: HOSPITALIST

## 2024-10-11 PROCEDURE — 36415 COLL VENOUS BLD VENIPUNCTURE: CPT | Performed by: HOSPITALIST

## 2024-10-11 PROCEDURE — 85025 COMPLETE CBC W/AUTO DIFF WBC: CPT | Performed by: UROLOGY

## 2024-10-11 RX ORDER — POTASSIUM CHLORIDE 7.45 MG/ML
10 INJECTION INTRAVENOUS
Status: COMPLETED | OUTPATIENT
Start: 2024-10-11 | End: 2024-10-11

## 2024-10-11 RX ORDER — POTASSIUM CHLORIDE 20 MEQ/1
40 TABLET, EXTENDED RELEASE ORAL ONCE
Status: COMPLETED | OUTPATIENT
Start: 2024-10-11 | End: 2024-10-11

## 2024-10-11 RX ORDER — MUPIROCIN 20 MG/G
OINTMENT TOPICAL 2 TIMES DAILY
Status: DISPENSED | OUTPATIENT
Start: 2024-10-11 | End: 2024-10-16

## 2024-10-11 RX ORDER — POTASSIUM CHLORIDE 7.45 MG/ML
30 INJECTION INTRAVENOUS ONCE
Status: DISCONTINUED | OUTPATIENT
Start: 2024-10-11 | End: 2024-10-11

## 2024-10-11 RX ADMIN — CARBAMAZEPINE 200 MG: 200 TABLET ORAL at 09:10

## 2024-10-11 RX ADMIN — CARBAMAZEPINE 200 MG: 200 TABLET ORAL at 04:10

## 2024-10-11 RX ADMIN — CARBAMAZEPINE 200 MG: 200 TABLET ORAL at 10:10

## 2024-10-11 RX ADMIN — POTASSIUM CHLORIDE 10 MEQ: 7.46 INJECTION, SOLUTION INTRAVENOUS at 11:10

## 2024-10-11 RX ADMIN — POTASSIUM CHLORIDE 40 MEQ: 1500 TABLET, EXTENDED RELEASE ORAL at 11:10

## 2024-10-11 RX ADMIN — MUPIROCIN: 20 OINTMENT TOPICAL at 11:10

## 2024-10-11 RX ADMIN — LEVETIRACETAM 500 MG: 500 INJECTION, SOLUTION INTRAVENOUS at 10:10

## 2024-10-11 RX ADMIN — OXYCODONE HYDROCHLORIDE 10 MG: 5 TABLET ORAL at 09:10

## 2024-10-11 RX ADMIN — CEFTRIAXONE 1 G: 1 INJECTION, POWDER, FOR SOLUTION INTRAMUSCULAR; INTRAVENOUS at 10:10

## 2024-10-11 RX ADMIN — POTASSIUM CHLORIDE 10 MEQ: 7.46 INJECTION, SOLUTION INTRAVENOUS at 01:10

## 2024-10-11 RX ADMIN — ZONISAMIDE 200 MG: 100 CAPSULE ORAL at 10:10

## 2024-10-11 RX ADMIN — LEVETIRACETAM 500 MG: 500 INJECTION, SOLUTION INTRAVENOUS at 09:10

## 2024-10-11 RX ADMIN — ZONISAMIDE 200 MG: 100 CAPSULE ORAL at 09:10

## 2024-10-11 RX ADMIN — POTASSIUM CHLORIDE 10 MEQ: 7.46 INJECTION, SOLUTION INTRAVENOUS at 12:10

## 2024-10-11 NOTE — ASSESSMENT & PLAN NOTE
Severe metabolic acidosis -- with SHELLEY and obstructing ureteral stone as well as UTI and dehydration/diarrhea  Check lactic acid  On broad spectrum antibiotics for UTI  Aggressive hydration with IV fluids  Bicarb IVP ordered followed by bicarb drip  Trend labs  Repeat labs ordered STAT    10/10/24  Acidosis improving  Continue bicarb overnight  AM labs

## 2024-10-11 NOTE — ASSESSMENT & PLAN NOTE
Followed by Dr. Ambrose and Dr. Jerome  S/p total gastrectomy on 8/20, with lysis of adhesions, lymphadenectomy, and jejunostomy tube placement, d/c on 9/10  Pathologic stage from 8/20/2024: Stage III (ypT2, pN3a, cM0), completed 5 cycles Keytruda prior to surgery

## 2024-10-11 NOTE — ASSESSMENT & PLAN NOTE
- Right-sided hydronephrosis with approximately 8 mm mid right ureteral obstructing stone   - Urology consulted (per ED, Dr. Kelly)  - S/p cystoscopy 10/10/24 - noted impacted right ureteral stone, unable to place stent  - IR consulted for nephrostomy tube placement, plan for 10/12 if potassium stable  - Roca in place, monitor I/O's

## 2024-10-11 NOTE — ASSESSMENT & PLAN NOTE
- US - RLE extensive clot from the right common femoral vein to the right posterior tibial vein.  Occluded anterior tibial vein  - IR consulted for thrombectomy, not indicated at this time per IR  - Continue heparin drip, hold after midnight for nephrostomy tube placement tomorrow  - Heme/Onc consulted for AC reccs

## 2024-10-11 NOTE — TELEPHONE ENCOUNTER
.Outgoing call attempted to notify patient regarding below but no answer, unable to leave a voice message as the mailbox was full, a Xyot message was sent in regards.      ----- Message from Lata Kelly MD sent at 10/11/2024 12:36 PM CDT -----  Please schedule hospital follow up, probably needs the [place], in 1 week

## 2024-10-11 NOTE — PT/OT/SLP PROGRESS
Occupational Therapy  Treatment    Cheryl Kirkland   MRN: [id number]   Admitting Diagnosis: Ureteral stone with hydronephrosis    OT Date of Treatment: 10/11/24   OT Start Time: 1336  OT Stop Time: 1406  OT Total Time (min): 30 min    Billable Minutes:  Therapeutic Activity 15 minutes and Therapeutic Exercise 15 minutes    OT/PARAMJIT: OT          General Precautions: Standard, fall  Orthopedic Precautions: N/A  Braces: N/A  Respiratory Status: Room air         Subjective:  Communicated with Nurse Que and epic chart review prior to session.    Pain/Comfort  Pain Rating 1: 0/10    Objective:  Patient found with: peripheral IV, telemetry     Functional Mobility:  Bed Mobility:   Sba with rolling r<l  Sba with seated scooting    Sba with supine<sit    Transfers:   sit <>stand with CGA with verbal cues for hand placement      Functional Ambulation:  cga with rolling walker a few feet  Activities of Daily Living:        UE  dressing min/ mod a   Balance:   Static Sit: FAIR+: Able to take MINIMAL challenges from all directions  Dynamic Sit: FAIR: Cannot move trunk without losing balance  Static Stand: POOR+: Needs MINIMAL assist to maintain  Dynamic stand: POOR+: Needs MIN (minimal ) assist during gait    Therapeutic Activities and Exercises:  Educated patient on importance of increased tolerance to upright position and direct impact on CV endurance and strength. Patient encouraged to sit up in chair/ EOB, for a minimum of 2 consecutive hours including for all meals.. Encouraged patient to perform AROM TE to BUE throughout the day within all available planes of motion. Re enforced importance of utilizing call light to meet needs in room and not attempt to get up without staff assistance. Patient verbalized understanding and agreed to comply.           AM-PAC 6 CLICK ADL   How much help from another person does this patient currently need?   1 = Unable, Total/Dependent Assistance  2 = A lot, Maximum/Moderate  "Assistance  3 = A little, Minimum/Contact Guard/Supervision  4 = None, Modified [place]/Independent    Putting on and taking off regular lower body clothing? : 2  Bathing (including washing, rinsing, drying)?: 2  Toileting, which includes using toilet, bedpan, or urinal? : 2  Putting on and taking off regular upper body clothing?: 2 (mod / min a)  Taking care of personal grooming such as brushing teeth?: 2  Eating meals?: 2  Daily Activity Total Score: 12     AM-PAC Raw Score CMS "G-Code Modifier Level of Impairment Assistance   6 % Total / Unable   7 - 8 CM 80 - 100% Maximal Assist   9-13 CL 60 - 80% Moderate Assist   14 - 19 CK 40 - 60% Moderate Assist   20 - 22 CJ 20 - 40% Minimal Assist   23 CI 1-20% SBA / CGA   24 CH 0% Independent/ Mod I       Patient left up in chair with all lines intact, call button in reach, chair alarm on, nurse notified, and nurse and spouse present    ASSESSMENT:  Cheryl Kirkland is a 74 y.o. female with a medical diagnosis of Ureteral stone with hydronephrosis and presents with debility and generalized weaakness.    Rehab identified problem list/impairments:  gait instability, decreased upper extremity function, weakness, impaired endurance, impaired balance, decreased safety awareness, impaired self care skills, impaired functional mobility, decreased coordination    Rehab potential is good.    Activity tolerance: Good    Discharge recommendations: Low Intensity Therapy   Barriers to discharge:      Equipment recommendations: none    GOALS:   Multidisciplinary Problems       Occupational Therapy Goals          Problem: Occupational Therapy    Goal Priority Disciplines Outcome Interventions   Occupational Therapy Goal     OT, PT/OT Progressing    Description: OT goals met 10-24-24  Pt will tolerate 1 set x 15 reps b ue rom exercise  (I) with toilet transfers  (I) with le dressing                       Plan:  Patient to be seen 2 x/week to address the above listed " problems via self-care/home management, therapeutic activities, therapeutic exercises  Plan of Care expires: 10/25/24  Plan of Care reviewed with:           10/11/2024

## 2024-10-11 NOTE — ASSESSMENT & PLAN NOTE
Nutrition consulted. Most recent weight and BMI monitored- Has J-tube with tube feedings at home, however has been having diarrhea.    Measurements:  Wt Readings from Last 1 Encounters:   10/10/24 46.7 kg (102 lb 15.3 oz)   Body mass index is 17.13 kg/m².      Weight Loss (Malnutrition): greater than 7.5% in 3 months  1. Initiate pt onto continuous Enteral nutrition, recommend QSecure peptide 1.5 (vanilla) via J tube, goal rate 30 mL/hr, starting at 10 mL/hr then progress to goal rate within 24 hrs if pt is tolerating or per MD/NP -Formula at goal rate provides: 1107 kcals/day (79% EEN), 53 g protein/day (113% EPN), 99 g CHO/day (57% CHO needs), 504 mL free formula water/day -150 mL q4h free water flushes (900 mL/day) = total from formula + FWF = 1404 mL water/day, per MD/NP -Check Mg, K+, Na, Phos and Glu before and during initation, correct as indicated 2. Recommend Banatrol TID to assist with diarrhea or BID for loose stools, as warranted 3. Weigh twice weekly

## 2024-10-11 NOTE — ASSESSMENT & PLAN NOTE
Right-sided hydronephrosis with approximately 8 mm mid right ureteral obstructing stone   Urology consulted (per ED, Dr. Kelly)  S/p cystoscopy 10/10/24 - noted impacted right ureteral stone, unable to place stent  Roca in place, monitor I/O's

## 2024-10-11 NOTE — ASSESSMENT & PLAN NOTE
Patient's most recent potassium results are listed below.   Recent Labs     10/11/24  0848 10/12/24  0044 10/12/24  1221   K 2.2* 3.2* 2.8*     Plan  - Replete potassium per protocol  - Monitor potassium Every 12 hours  - Patient's hypokalemia is worsening. Will adjust treatment as follows: add PO and IV KCl

## 2024-10-11 NOTE — ASSESSMENT & PLAN NOTE
SHELLEY is likely due to  multifactorial due to obstructing ureteral stone as well as dehydration from diarrhea/poor oral intake . Baseline creatinine is  0.7 (normal) . Most recent creatinine and eGFR are listed below.    Recent Labs     10/09/24  1235 10/09/24  2111 10/10/24  0731   CREATININE 2.0* 1.7* 1.5*   EGFRNORACEVR 26* 31* 36*        Plan  - SHELLEY is new finding  - Avoid nephrotoxins and renally dose meds for GFR listed above  - Monitor urine output, serial BMP, and adjust therapy as needed  - Aggressive hydration with IV fluids/bicarb

## 2024-10-11 NOTE — ANESTHESIA POSTPROCEDURE EVALUATION
Anesthesia Post Evaluation    Patient: Cheryl Kirkland    Procedure(s) Performed: Procedure(s) (LRB):  CYSTOSCOPY, WITH RETROGRADE PYELOGRAM (Right)    Final Anesthesia Type: general      Patient location during evaluation: PACU  Patient participation: Yes- Able to Participate  Level of consciousness: awake and alert, oriented and awake  Post-procedure vital signs: reviewed and stable  Pain management: adequate  Airway patency: patent    PONV status at discharge: No PONV  Anesthetic complications: no      Cardiovascular status: blood pressure returned to baseline  Respiratory status: unassisted  Hydration status: euvolemic  Follow-up not needed.              Vitals Value Taken Time   /60 10/11/24 0408   Temp 36.7 °C (98.1 °F) 10/11/24 0408   Pulse 73 10/11/24 0457   Resp 16 10/11/24 0408   SpO2 100 % 10/11/24 0408         Event Time   Out of Recovery 10/10/2024 18:15:00         Pain/Marli Score: Pain Rating Prior to Med Admin: 8 (10/10/2024  8:42 PM)  Pain Rating Post Med Admin: 0 (10/10/2024  9:42 PM)  Marli Score: 10 (10/10/2024  5:45 PM)

## 2024-10-11 NOTE — ASSESSMENT & PLAN NOTE
Right-sided hydronephrosis with approximately 8 mm mid right ureteral obstructing stone   Urology consulted (per ED, Dr. Kelly)  Plans for ureteral stent placement today

## 2024-10-11 NOTE — PLAN OF CARE
Problem: Adult Inpatient Plan of Care  Goal: Plan of Care Review  Outcome: Progressing  Goal: Patient-Specific Goal (Individualized)  Outcome: Progressing  Goal: Absence of Hospital-Acquired Illness or Injury  Outcome: Progressing  Goal: Optimal Comfort and Wellbeing  Outcome: Progressing  Goal: Readiness for Transition of Care  Outcome: Progressing     Problem: Infection  Goal: Absence of Infection Signs and Symptoms  Outcome: Progressing     Problem: Acute Kidney Injury/Impairment  Goal: Fluid and Electrolyte Balance  Outcome: Progressing  Goal: Improved Oral Intake  Outcome: Progressing  Goal: Effective Renal Function  Outcome: Progressing     Problem: Wound  Goal: Optimal Coping  Outcome: Progressing  Goal: Optimal Functional Ability  Outcome: Progressing  Goal: Absence of Infection Signs and Symptoms  Outcome: Progressing  Goal: Improved Oral Intake  Outcome: Progressing  Goal: Optimal Pain Control and Function  Outcome: Progressing  Goal: Skin Health and Integrity  Outcome: Progressing  Goal: Optimal Wound Healing  Outcome: Progressing     Problem: Fall Injury Risk  Goal: Absence of Fall and Fall-Related Injury  Outcome: Progressing     Problem: Skin Injury Risk Increased  Goal: Skin Health and Integrity  Outcome: Progressing     Problem: Thrombolytic Therapy  Goal: Absence of Bleeding  Outcome: Progressing  Goal: Unobstructed Breathing  Outcome: Progressing

## 2024-10-11 NOTE — ASSESSMENT & PLAN NOTE
US - RLE extensive clot from the right common femoral vein to the right posterior tibial vein.  Occluded anterior tibial vein  IR consulted, plan for thrombectomy today (10/11/24)  Continue heparin drip  Heme/Onc consulted for AC reccs

## 2024-10-11 NOTE — PT/OT/SLP PROGRESS
"Physical Therapy  Treatment    Cheryl Kirkland   MRN: [id number]   Admitting Diagnosis: Ureteral stone with hydronephrosis    PT Received On: 10/11/24  PT Start Time: 0755     PT Stop Time: 0820    PT Total Time (min): 25 min       Billable Minutes:  Gait Training 15 and Therapeutic Activity 10    Treatment Type: Treatment  PT/PTA: PT     Number of PTA visits since last PT visit: 0       General Precautions: Standard, fall  Orthopedic Precautions: N/A  Braces: N/A  Respiratory Status: Room air         Subjective:  Communicated with NURSE JENNIFER AND EPIC CHART REVIEW  prior to session.   PT AGREED TO TX     Pain/Comfort  Pain Rating 1: 10/10  Location - Side 1: Left  Location 1: arm  Pain Addressed 1: Nurse notified, Other (see comments) (WARM COMPRESS TO LEFT SIDE UPPER BACK AND UE)  Pain Rating Post-Intervention 1: 10/10    Objective:   Patient found with: peripheral IV, telemetry, PEG Tube, marie catheter    Functional Mobility:  PT MET IN RM SUP>SIT EOB WITH CGA AND SCOOTED IN BED WITH CGA. PT SEATED FOR B LE TE X 10 REPS OF AP, TKE AND MIP. PT WITH INC C/O PAIN IN L UE AND REPORTED, " I HAVEN'T FELT THIS BAD. I FELT BETTER WHEN I CAME IN. " PT AGREED TO TRY GT TRAINING. GT. BELT AND  SOCKS DONNED PRIOR TO OOB MOBILITY.  PT STOOD WITH RW AND CGA. PT GT TRAINED X 240' WITH SLOW STEP TO GT AND CGA. PT RETURNED TO ROOM T/F TO BEDSIDE AND SUP IN BED. P.T. ASSIST IN DONNING WARM COMPRESS TO L UPPER BACK AND  LUE TO ASSIST WITH PAIN MANAGEMENT. PT INSTRUCTED TO LEAVE FOR 10 MIN AT A TIME AND CALL NURSE FOR MORE COMPRESSES IF PAIN IMPROVED.   Treatment and Education:  PT EDUCATED ON "CALL DON'T FALL", ENCOURAGED TO CALL FOR ASSISTANCE WITH ALL NEEDS FOR OOB MOBILITY.       AM-PAC 6 CLICK MOBILITY  How much help from another person does this patient currently need?   1 = Unable, Total/Dependent Assistance  2 = A lot, Maximum/Moderate Assistance  3 = A little, Minimum/Contact Guard/Supervision  4 = None, " Modified [place]/Independent    Turning over in bed (including adjusting bedclothes, sheets and blankets)?: 3  Sitting down on and standing up from a chair with arms (e.g., wheelchair, bedside commode, etc.): 3  Moving from lying on back to sitting on the side of the bed?: 3  Moving to and from a bed to a chair (including a wheelchair)?: 3  Need to walk in hospital room?: 3  Climbing 3-5 steps with a railing?: 1  Basic Mobility Total Score: 16    AM-PAC Raw Score CMS G-Code Modifier Level of Impairment Assistance   6 % Total / Unable   7 - 9 CM 80 - 100% Maximal Assist   10 - 14 CL 60 - 80% Moderate Assist   15 - 19 CK 40 - 60% Moderate Assist   20 - 22 CJ 20 - 40% Minimal Assist   23 CI 1-20% SBA / CGA   24 CH 0% Independent/ Mod I     Patient left supine with call button in reach.    Assessment:  PT LIU TX WITH INC PAIN    Rehab identified problem list/impairments: weakness, impaired endurance, impaired self care skills, impaired functional mobility, gait instability, impaired balance, pain, edema, decreased lower extremity function, decreased ROM    Rehab potential is good.    Activity tolerance: Fair    Discharge recommendations: Low Intensity Therapy      Barriers to discharge:      Equipment recommendations: other (see comments) (TBD)     GOALS:   Multidisciplinary Problems       Physical Therapy Goals          Problem: Physical Therapy    Goal Priority Disciplines Outcome Interventions   Physical Therapy Goal     PT, PT/OT     Description: LTG: 10/24/24  1. PT WILL COMPLETE BED MOBILITY IND  2. PT WILL STAND T/F TO CHAIR IND FOR OOB TOLERANCE.  3. PT WILL GT TRAIN X 500' WITH NO AD IND TO INC ENDURANCE  4. PT WILL INC AMPAC SCORE BY 2 POINTS TO PROGRESS GROSS FUNC MOBILITY.                          PLAN:    Patient to be seen 3 x/week to address the above listed problems via gait training, therapeutic activities, therapeutic exercises  Plan of Care expires: 10/24/24  Plan of Care reviewed with:  patient, spouse         10/11/2024

## 2024-10-11 NOTE — CONSULTS
Chart reviewed by Dr. Aggarwal.       ASSESSMENT/PLAN:    Right hydronephrosis    The order for a Ureteral stent placement and nephrostomy exchange on the right has been placed and the procedure will be performed asap.        Right leg DVT-Patient is not a candidate for a thrombectomy at this time. If condition changes please re consult and we will evaluate at that time.      Thank you for the consult.

## 2024-10-11 NOTE — PROGRESS NOTES
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  On exam she is noted to have left lower quadrant tenderness, appears frail and emaciated, has not been taking all of her scheduled medications and is hesitant to use pain medication, stating that she does not want to be addicted to pain medication.  Vital signs on arrival stable-- blood pressure 110/78, heart rate 95, temp 97.3° respirations 12, 97% SpO2 on room air.   Significant labs include CO2 6, BUN 21, creatinine 2.0, alk-phos 210, venous blood gas shows pH 7.030 and HC03 4.5, urinalysis with hazy appearance, 3+ blood, trace leukocyte esterase, greater than 100 RBCs, 31 WBCs, rare bacteria.  Stool studies negative for occult blood, negative for rotavirus, negative for C diff.  Stool culture is in progress.  EKG showed SR with PAC's in pattern of bigeminy with nonspecific T wave abnormality. CT abdomen and pelvis without contrast was done which revealed right-sided hydronephrosis with approximately 8 mm mid right ureteral obstructing stone.  Urology was consulted and recommended NPO after midnight  and will see patient in AM, also recommended urinalysis with urine culture which has been collected and does show acute cystitis with hematuria. While in ED, fluid bolus 1.5L, sodium bicarb --2 amps, morphine, Zofran, and Rocephin were all given. Hospital Medicine was consulted for admission due to SHELLEY, obstructing right ureteral stone 8mm and right sided hydronephrosis.     During exam, patient complains of pain and tightness in right leg so US was ordered and she was found to have extensive clot from right common femoral vein to right posterior tibial vein as well as occluded anterior tibial vein. IR was consulted and will evaluate for possible intervention in AM, heparin drip was recommended and ordered.     Overview/Hospital Course:    10/10/24  75 y/o female with gastric adenocarcinoma, here with abdominal pain  Patient rc/o lower quadrant abdominal pain, nausea, RLE pain, fatigue  Found to have 8 mm right sided obstructing ureteral stone and right sided hydronephrosis  Also with RLE extensive DVT; patient states she was previously on AC but stopped due to gastric cancer/bleeding  Urology with plans for stent placement today  IR with plans for RLE thrombectomy tomorrow morning   at bedside, updated      Review of Systems   All other systems reviewed and are negative.    Objective:     Vital Signs (Most Recent):  Temp: 97.9 °F (36.6 °C) (10/10/24 0944)  Pulse: 71 (10/10/24 0944)  Resp: 20 (10/10/24 0944)  BP: (!) 96/57 (10/10/24 0944)  SpO2: 100 % (10/10/24 0944) Vital Signs (24h Range):  Temp:  [97.2 °F (36.2 °C)-97.9 °F (36.6 °C)] 97.9 °F (36.6 °C)  Pulse:  [70-88] 71  Resp:  [16-20] 20  SpO2:  [99 %-100 %] 100 %  BP: ()/(57-76) 96/57     Weight: 46.7 kg (103 lb)  Body mass index is 17.14 kg/m².    Intake/Output Summary (Last 24 hours) at 10/10/2024 1333  Last data filed at 10/10/2024 1046  Gross per 24 hour   Intake 2355.93 ml   Output 75 ml   Net 2280.93 ml         Physical Exam  Vitals and  nursing note reviewed.   Constitutional:       General: She is not in acute distress.     Appearance: She is ill-appearing.   Cardiovascular:      Rate and Rhythm: Normal rate and regular rhythm.      Heart sounds: No murmur heard.  Pulmonary:      Effort: Pulmonary effort is normal. No respiratory distress.      Breath sounds: Rhonchi present. No wheezing.      Comments: Decreased BS bilaterally  Abdominal:      Comments: G-tube   Genitourinary:     Comments: Roca  Musculoskeletal:      Right lower leg: Edema present.   Neurological:      Mental Status: She is alert.             Significant Labs: All pertinent labs within the past 24 hours have been reviewed.  Recent Lab Results  (Last 5 results in the past 24 hours)        10/10/24  0744   10/10/24  0731   10/10/24  0610   10/10/24  0122   10/09/24  2111        Procalcitonin         0.58  Comment: A concentration < 0.25 ng/mL represents a low risk of bacterial   infection.  Procalcitonin may not be accurate among patients with localized   infection, recent trauma or major surgery, immunosuppressed state,   invasive fungal infection, renal dysfunction. Decisions regarding   initiation or continuation of antibiotic therapy should not be based   solely on procalcitonin levels.         ABO         O       Albumin   2.2       2.5       ALP   169       195       Allens Test     Pass           ALT   23       26       Anion Gap   10       11       Antibody Screen         NEG       PTT   59.2  Comment: Refer to local heparin nomogram for intensity/dose specific   therapeutic   range.  Reviewed by Technologist.         41.6  Comment: Refer to local heparin nomogram for intensity/dose specific   therapeutic   range.         AST   44       27       Bands   34.0             Baso #       0.09         Basophil %   0.0     0.9         BILIRUBIN TOTAL   0.2  Comment: For infants and newborns, interpretation of results should be based  on gestational age, weight and in agreement  with clinical  observations.    Premature Infant recommended reference ranges:  Up to 24 hours.............<8.0 mg/dL  Up to 48 hours............<12.0 mg/dL  3-5 days..................<15.0 mg/dL  6-29 days.................<15.0 mg/dL         0.2  Comment: For infants and newborns, interpretation of results should be based  on gestational age, weight and in agreement with clinical  observations.    Premature Infant recommended reference ranges:  Up to 24 hours.............<8.0 mg/dL  Up to 48 hours............<12.0 mg/dL  3-5 days..................<15.0 mg/dL  6-29 days.................<15.0 mg/dL         BNP       27  Comment: Values of less than 100 pg/ml are consistent with non-CHF populations.         Site     LR           BUN   22       21       [place]/Echinocytes   Occasional             Calcium   8.5       9.1       Chloride   121       123       CO2   16       7  Comment: CO2 critical result(s) called and verbal readback obtained from   Newton Blackwell RN by MPG1 10/09/2024 22:27         Creatinine   1.5       1.7       DelSys     Room Air           Differential Method   Manual  Comment: CORRECTED RESULT; previously reported as Automated on 10/10/2024 at   08:32.    [C]     Automated         eGFR   36       31       Eos #       0.0         Eos %   0.0     0.4         FiO2     21           Glucose   127       106       Gran # (ANC)       7.8         Gran %   42.0     80.7         Hematocrit   38.1     45.1         Hemoglobin   12.6     14.3         Immature Grans (Abs)   CANCELED  Comment: Mild elevation in immature granulocytes is non specific and   can be seen in a variety of conditions including stress response,   acute inflammation, trauma and pregnancy. Correlation with other   laboratory and clinical findings is essential.    Result canceled by the ancillary.       0.12  Comment: Mild elevation in immature granulocytes is non specific and   can be seen in a variety of conditions including stress  response,   acute inflammation, trauma and pregnancy. Correlation with other   laboratory and clinical findings is essential.           Immature Granulocytes   CANCELED  Comment: Result canceled by the ancillary.     1.2         INR         1.2  Comment: Coumadin Therapy:  2.0 - 3.0 for INR for all indicators except mechanical heart valves  and antiphospholipid syndromes which should use 2.5 - 3.5.         Lactic Acid Level 2.0  Comment: Falsely low lactic acid results can be found in samples   containing >=13.0 mg/dL total bilirubin and/or >=3.5 mg/dL   direct bilirubin.         2.6  Comment: Falsely low lactic acid results can be found in samples   containing >=13.0 mg/dL total bilirubin and/or >=3.5 mg/dL   direct bilirubin.           Lymph #       0.8         Lymph %   9.0     8.2         Magnesium    2.1       2.1       MCH   31.9     32.0         MCHC   33.1     31.7         MCV   97     101         Metamyelocytes   1.0             Mode     SPONT           Mono #       0.8         Mono %   14.0     8.6         MPV   9.2     8.8         nRBC   0     0         Ovalocytes   Occasional             Phosphorus Level         5.0       Platelet Estimate   Appears normal             Platelet Count   412     317         POC BE     -13           POC Glucose     141           POC HCO3     13.5           POC Hematocrit     39           POC Ionized Calcium     1.32           POC PCO2     27.0           POC PH     7.305           POC PO2     107           POC Potassium     2.0           POC SATURATED O2     98           POC Sodium     149           Poikilocytosis   Slight             Potassium   4.0       3.3       PROTEIN TOTAL   6.5       6.7       PT         13.8       RBC   3.95     4.47         RDW   15.5     15.7         Rh Type         POS       Sample     ARTERIAL           Sodium   147       141       Teardrop Cells   Occasional             Troponin I       <0.006  Comment: The reference interval for Troponin I  represents the 99th percentile   cutoff   for our facility and is consistent with 3rd generation assay   performance.           TSH   0.739             WBC   10.12     9.69                                 [C] - Corrected Result               Significant Imaging: I have reviewed all pertinent imaging results/findings within the past 24 hours.    X-Ray Chest 1 View   Final Result      No acute abnormality.         Electronically signed by: Cruz Myers   Date:    10/09/2024   Time:    21:33      US Lower Extremity Veins Bilateral   Final Result      Extensive clot from the right common femoral vein to the right posterior tibial vein.  Occlusive right is anterior tibial vein.      No left-sided DVT         Electronically signed by: Cruz Myers   Date:    10/09/2024   Time:    20:10      CT Abdomen Pelvis  Without Contrast   Final Result      Right-sided hydronephrosis with a approximately 8 mm mid right ureteral obstructing stone.  Punctate nonobstructing left renal calculi.  Postsurgical changes of the bowel.  Fluid within the colonic loops of bowel may relate to diarrheal state.  Left hemiabdomen enteric tube noted      All CT scans   are performed using dose optimization techniques including the following: automated exposure control; adjustment of the mA and/or kV; use of iterative reconstruction technique.  Dose modulation was employed for ALARA by means of: Automated exposure control; adjustment of the mA and/or kV according to patient size (this includes techniques or standardized protocols for targeted exams where dose is matched to indication/reason for exam; i.e. extremities or head); and/or use of iterative reconstructive technique.         Electronically signed by: Cruz Myers   Date:    10/09/2024   Time:    16:30      Anesthesia US Guide Vascular Access    (Results Pending)   IR Thrombectomy Veins Inc Thrombolysis and Fluoro    (Results Pending)         Assessment/Plan:      * Ureteral stone with  hydronephrosis  Right-sided hydronephrosis with approximately 8 mm mid right ureteral obstructing stone   Urology consulted (per ED, Dr. Kelly)  Plans for ureteral stent placement today    Acute cystitis with hematuria  Continue ceftriaxone  Blood and urine cx pending    DVT (deep venous thrombosis)  US - RLE extensive clot from the right common femoral vein to the right posterior tibial vein.  Occluded anterior tibial vein  IR consulted, plan for thrombectomy in AM (10/11/24)  Continue heparin drip  Heme/Onc consulted for AC reccs    SHELLEY (acute kidney injury)  SHELLEY is likely due to  multifactorial due to obstructing ureteral stone as well as dehydration from diarrhea/poor oral intake . Baseline creatinine is  0.7 (normal) . Most recent creatinine and eGFR are listed below.    Recent Labs     10/09/24  1235 10/09/24  2111 10/10/24  0731   CREATININE 2.0* 1.7* 1.5*   EGFRNORACEVR 26* 31* 36*       SHELLEY is new finding  Avoid nephrotoxins and renally dose meds for GFR listed above  Monitor urine output, serial BMP, and adjust therapy as needed  Aggressive hydration with IV fluids/bicarb    Metabolic acidosis  Severe metabolic acidosis -- with SHELLEY and obstructing ureteral stone as well as UTI and dehydration/diarrhea  Check lactic acid  On broad spectrum antibiotics for UTI  Aggressive hydration with IV fluids  Bicarb IVP ordered followed by bicarb drip  Trend labs  Repeat labs ordered STAT    10/10/24  Acidosis improving  Continue bicarb overnight  AM labs    Jejunostomy tube present  Complains of pain around j-tube  Plan to restart TF after procedure (10/11/24)    Diarrhea  C diff negative  Imodium PRN  Hydrating with IV fluids    Gastric adenocarcinoma  Followed by Dr. Ambrose and Dr. Jerome  S/p total gastrectomy on 8/20, with lysis of adhesions, lymphadenectomy, and jejunostomy tube placement, d/c on 9/10  Pathologic stage from 8/20/2024: Stage III (ypT2, pN3a, cM0), completed 5 cycles Keytruda prior to  surgery    Hx of Epileptic seizures  No recent seizure activity  Continue Keppra      Moderate protein-calorie malnutrition  Nutrition consulted. Most recent weight and BMI monitored- Has J-tube with tube feedings at home, however has been having diarrhea.    Measurements:  Wt Readings from Last 1 Encounters:   10/10/24 46.7 kg (102 lb 15.3 oz)   Body mass index is 17.13 kg/m².      Weight Loss (Malnutrition): greater than 7.5% in 3 months  1. Initiate pt onto continuous Enteral nutrition, recommend [employer] peptide 1.5 (vanilla) via J tube, goal rate 30 mL/hr, starting at 10 mL/hr then progress to goal rate within 24 hrs if pt is tolerating or per MD/NP -Formula at goal rate provides: 1107 kcals/day (79% EEN), 53 g protein/day (113% EPN), 99 g CHO/day (57% CHO needs), 504 mL free formula water/day -150 mL q4h free water flushes (900 mL/day) = total from formula + FWF = 1404 mL water/day, per MD/NP -Check Mg, K+, Na, Phos and Glu before and during initation, correct as indicated 2. Recommend Banatrol TID to assist with diarrhea or BID for loose stools, as warranted 3. Weigh twice weekly        VTE Risk Mitigation (From admission, onward)           Ordered     heparin 25,000 units in dextrose 5% (100 units/ml) IV bolus from bag HIGH INTENSITY nomogram - [facility]  As needed (PRN)        Question:  Heparin Infusion Adjustment (DO NOT MODIFY ANSWER)  Answer:  \[email or web address]\[email or web address]\HeparinInfusions\heparin HIGH INTENSITY nomogram for [facility] [id number]    10/09/24 2013     heparin 25,000 units in dextrose 5% (100 units/ml) IV bolus from bag HIGH INTENSITY nomogram - [facility]  As needed (PRN)        Question:  Heparin Infusion Adjustment (DO NOT MODIFY ANSWER)  Answer:  \[facility] [email or web address]\Images\[email or web address]\HeparinInfusions\heparin HIGH INTENSITY nomogram for [facility] [id number]    10/09/24 2013     Reason for No Pharmacological VTE Prophylaxis  Once        Comments: Heparin drip ordered for acute occlusive DVT   Question:   Reasons:  Answer:  Physician Provided (leave comment)    10/09/24 2027     IP VTE HIGH RISK PATIENT  Once         10/09/24 2027     heparin 25,000 units in dextrose 5% 250 mL (100 units/mL) infusion HIGH INTENSITY nomogram - [facility]  Continuous        Question:  Begin at (units/kg/hr)  Answer:  18    10/09/24 2013                    Discharge Planning   SOFIYA:      Code Status: Full Code   Is the patient medically ready for discharge?:     Reason for patient still in hospital (select all that apply): Patient trending condition, Laboratory test, Treatment, Imaging, and Consult recommendations  Discharge Plan A: Home Health                  Zachery Boyd MD  Department of Hospital Medicine   [facility] - Med Surg

## 2024-10-11 NOTE — SUBJECTIVE & OBJECTIVE
Review of Systems   All other systems reviewed and are negative.    Objective:     Vital Signs (Most Recent):  Temp: 98.1 °F (36.7 °C) (10/11/24 0408)  Pulse: 73 (10/11/24 0457)  Resp: 16 (10/11/24 0408)  BP: 110/60 (10/11/24 0408)  SpO2: 100 % (10/11/24 0408) Vital Signs (24h Range):  Temp:  [97.5 °F (36.4 °C)-98.4 °F (36.9 °C)] 98.1 °F (36.7 °C)  Pulse:  [45-82] 73  Resp:  [16-20] 16  SpO2:  [96 %-100 %] 100 %  BP: ()/(57-87) 110/60     Weight: 46.7 kg (102 lb 15.3 oz)  Body mass index is 17.13 kg/m².    Intake/Output Summary (Last 24 hours) at 10/11/2024 0717  Last data filed at 10/11/2024 0447  Gross per 24 hour   Intake 954.93 ml   Output 100 ml   Net 854.93 ml         Physical Exam  Vitals and nursing note reviewed.   Constitutional:       General: She is not in acute distress.     Appearance: She is ill-appearing.   Cardiovascular:      Rate and Rhythm: Normal rate and regular rhythm.      Heart sounds: No murmur heard.  Pulmonary:      Effort: Pulmonary effort is normal. No respiratory distress.      Breath sounds: Rhonchi present. No wheezing.      Comments: Decreased BS bilaterally  Abdominal:      Comments: G-tube   Genitourinary:     Comments: Roca  Musculoskeletal:      Right lower leg: Edema present.   Neurological:      Mental Status: She is alert.             Significant Labs: All pertinent labs within the past 24 hours have been reviewed.  Recent Lab Results  (Last 5 results in the past 24 hours)        10/11/24  0551   10/10/24  2059   10/10/24  0946   10/10/24  0744   10/10/24  0731        Albumin         2.2       ALP         169       ALT         23       Anion Gap         10       Ao root annulus     2.82           Ascending aorta     2.62           Ao peak liya     1.5           Ao VTI     23.3           PTT   28.2  Comment: Refer to local heparin nomogram for intensity/dose specific   therapeutic   range.         59.2  Comment: Refer to local heparin nomogram for intensity/dose  specific   therapeutic   range.  Reviewed by Technologist.         AST         44       AV valve area     2.5           SHARAD by Velocity Ratio     1.9           AV mean gradient     6.3           AV index (prosthetic)     0.88           AV peak gradient     9.0           AV Velocity Ratio     0.67           Bands         34.0       Basophil %         0.0       BILIRUBIN TOTAL         0.2  Comment: For infants and newborns, interpretation of results should be based  on gestational age, weight and in agreement with clinical  observations.    Premature Infant recommended reference ranges:  Up to 24 hours.............<8.0 mg/dL  Up to 48 hours............<12.0 mg/dL  3-5 days..................<15.0 mg/dL  6-29 days.................<15.0 mg/dL         BUN         22       Hana/Echinocytes         Occasional       Calcium         8.5       Chloride         121       CO2         16       Creatinine         1.5       Left Ventricle Relative Wall Thickness     0.73           Differential Method         Manual  Comment: CORRECTED RESULT; previously reported as Automated on 10/10/2024 at   08:32.    [C]       E/A ratio     0.71           E/E' ratio     5.79           eGFR         36       EF     65           Eos %         0.0       E wave deceleration time     283.30           FS     27.3           Glucose         127       Gran %         42.0       Hematocrit 34.1         38.1       Hemoglobin 11.6         12.6       Immature Grans (Abs)         CANCELED  Comment: Mild elevation in immature granulocytes is non specific and   can be seen in a variety of conditions including stress response,   acute inflammation, trauma and pregnancy. Correlation with other   laboratory and clinical findings is essential.    Result canceled by the ancillary.         Immature Granulocytes         CANCELED  Comment: Result canceled by the ancillary.       IVC diameter     1.42           IVRT     83.73           IVSd     1.3           LA WIDTH      2.8           Lactic Acid Level       2.0  Comment: Falsely low lactic acid results can be found in samples   containing >=13.0 mg/dL total bilirubin and/or >=3.5 mg/dL   direct bilirubin.           Left Atrium Major Axis     4.17           Left Atrium Minor Axis     4.30           LA size     2.91           LA Vol     29.32           LA vol index     19.7           LVOT area     2.8           LV LATERAL E/E' RATIO     5.00           LV SEPTAL E/E' RATIO     6.88           LV EDV BP     44.33           LV Diastolic Volume Index     29.75           Left Ventricular End Diastolic Volume by Teichholz Method     44.33           Left Ventricular End Systolic Volume by Teichholz Method     19.24           LVIDd     3.3           LVIDs     2.4           LV mass     133.0           LV Mass Index     89.3           Left Ventricular Outflow Tract Mean Gradient     2.52           Left Ventricular Outflow Tract Mean Velocity     0.78           LVOT diameter     1.9           LVOT peak louis     1.0           LVOT stroke volume     58.1           LVOT peak VTI     20.5           LV ESV BP     19.24           LV Systolic Volume Index     12.9           Lymph %         9.0       Magnesium          2.1       MCH 31.4         31.9       MCHC 34.0         33.1       MCV 92  Comment: Results confirmed, test repeated         97       Mean e'     0.10           Metamyelocytes         1.0       Mono %         14.0       MPV 8.4         9.2       MV valve area p 1/2 method     2.68           MV Peak A Louis     0.78           MV Peak E Louis     0.55           MV stenosis pressure 1/2 time     82.16           nRBC         0       Ovalocytes         Occasional       Pathologist Review Peripheral Smear         REVIEWED  Comment:   Electronically reviewed and signed by:  Blake Singh M.D.  Signed on 10/10/24 at 15:38  Red blood cells within normal limits. White blood cells with marked   left shift in the granulocyte lineage. Decreased  lymphocytes.   Macrophages within normal limits. Platelets within normal limits.         Platelet Estimate         Appears normal       Platelet Count 283         412       Poikilocytosis         Slight       Potassium         4.0       PROTEIN TOTAL         6.5       PV mean gradient     2           PW     1.2           RA Major Axis     3.34           Est. RA pres     3           RA Width     2.4           RBC 3.69         3.95       RDW 15.2         15.5       RV TB RVSP     5           RVOT peak louis     0.75           RVOT peak VTI     14.3           Sodium         147       STJ     2.63           TAPSE     1.81           TDI SEPTAL     0.08           TDI LATERAL     0.11           Teardrop Cells         Occasional       Triscuspid Valve Regurgitation Peak Gradient     12           TR Max Louis     1.72           TSH         0.739       TV resting pulmonary artery pressure     15           WBC 9.83         10.12       ZLVIDD     -2.93           ZLVIDS     -1.06                                   [C] - Corrected Result               Significant Imaging: I have reviewed all pertinent imaging results/findings within the past 24 hours.    X-Ray Chest 1 View   Final Result      No acute abnormality.         Electronically signed by: Cruz Myers   Date:    10/09/2024   Time:    21:33      US Lower Extremity Veins Bilateral   Final Result      Extensive clot from the right common femoral vein to the right posterior tibial vein.  Occlusive right is anterior tibial vein.      No left-sided DVT         Electronically signed by: Cruz Myers   Date:    10/09/2024   Time:    20:10      CT Abdomen Pelvis  Without Contrast   Final Result      Right-sided hydronephrosis with a approximately 8 mm mid right ureteral obstructing stone.  Punctate nonobstructing left renal calculi.  Postsurgical changes of the bowel.  Fluid within the colonic loops of bowel may relate to diarrheal state.  Left hemiabdomen enteric tube noted       All CT scans   are performed using dose optimization techniques including the following: automated exposure control; adjustment of the mA and/or kV; use of iterative reconstruction technique.  Dose modulation was employed for ALARA by means of: Automated exposure control; adjustment of the mA and/or kV according to patient size (this includes techniques or standardized protocols for targeted exams where dose is matched to indication/reason for exam; i.e. extremities or head); and/or use of iterative reconstructive technique.         Electronically signed by: Cruz Myers   Date:    10/09/2024   Time:    16:30      Anesthesia US Guide Vascular Access    (Results Pending)   IR Thrombectomy Veins Inc Thrombolysis and Fluoro    (Results Pending)

## 2024-10-11 NOTE — ASSESSMENT & PLAN NOTE
SHELLEY is likely due to  multifactorial due to obstructing ureteral stone as well as dehydration from diarrhea/poor oral intake . Baseline creatinine is  0.7 (normal) . Most recent creatinine and eGFR are listed below.    Recent Labs     10/09/24  1235 10/09/24  2111 10/10/24  0731   CREATININE 2.0* 1.7* 1.5*   EGFRNORACEVR 26* 31* 36*       SHELLEY is new finding  Avoid nephrotoxins and renally dose meds for GFR listed above  Monitor urine output, serial BMP, and adjust therapy as needed  Aggressive hydration with IV fluids/bicarb

## 2024-10-11 NOTE — PROGRESS NOTES
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  On exam she is noted to have left lower quadrant tenderness, appears frail and emaciated, has not been taking all of her scheduled medications and is hesitant to use pain medication, stating that she does not want to be addicted to pain medication.  Vital signs on arrival stable-- blood pressure 110/78, heart rate 95, temp 97.3° respirations 12, 97% SpO2 on room air.   Significant labs include CO2 6, BUN 21, creatinine 2.0, alk-phos 210, venous blood gas shows pH 7.030 and HC03 4.5, urinalysis with hazy appearance, 3+ blood, trace leukocyte esterase, greater than 100 RBCs, 31 WBCs, rare bacteria.  Stool studies negative for occult blood, negative for rotavirus, negative for C diff.  Stool culture is in progress.  EKG showed SR with PAC's in pattern of bigeminy with nonspecific T wave abnormality. CT abdomen and pelvis without contrast was done which revealed right-sided hydronephrosis with approximately 8 mm mid right ureteral obstructing stone.  Urology was consulted and recommended NPO after midnight  and will see patient in AM, also recommended urinalysis with urine culture which has been collected and does show acute cystitis with hematuria. While in ED, fluid bolus 1.5L, sodium bicarb --2 amps, morphine, Zofran, and Rocephin were all given. Hospital Medicine was consulted for admission due to SHELLEY, obstructing right ureteral stone 8mm and right sided hydronephrosis.     During exam, patient complains of pain and tightness in right leg so US was ordered and she was found to have extensive clot from right common femoral vein to right posterior tibial vein as well as occluded anterior tibial vein. IR was consulted and will evaluate for possible intervention in AM, heparin drip was recommended and ordered.     Overview/Hospital Course:    10/10/24  75 y/o female with gastric adenocarcinoma, here with abdominal pain  Patient rc/o lower quadrant abdominal pain, nausea, RLE pain, fatigue  Found to have 8 mm right sided obstructing ureteral stone and right sided hydronephrosis  Also with RLE extensive DVT; patient states she was previously on AC but stopped due to gastric cancer/bleeding  Urology with plans for stent placement today  IR with plans for RLE thrombectomy tomorrow morning   at bedside, updated    10/11/24  NAEON, patient resting in bed, feels some better today  Patient s/p cystoscopy yesterday with Dr. Kelly  Patient has impacted right ureteral stone, unable to place stent  Roca currently in place, continue ceftriaxone  Cr trending down with IV fluids  K 2.2, replete PO and IV, recheck this afternoon  ---  Plans for right nephrostomy tube today  Plans for RLE thrombectomy for extensive DVT pending  Continue heparin drip, plan to transition to NOAC after thrombectomy      Review of Systems   All other systems reviewed and are negative.    Objective:     Vital Signs (Most Recent):  Temp: 98.1 °F (36.7 °C) (10/11/24 0408)  Pulse: 73 (10/11/24 0457)  Resp: 16 (10/11/24 0408)  BP: 110/60 (10/11/24  0408)  SpO2: 100 % (10/11/24 0408) Vital Signs (24h Range):  Temp:  [97.5 °F (36.4 °C)-98.4 °F (36.9 °C)] 98.1 °F (36.7 °C)  Pulse:  [45-82] 73  Resp:  [16-20] 16  SpO2:  [96 %-100 %] 100 %  BP: ()/(57-87) 110/60     Weight: 46.7 kg (102 lb 15.3 oz)  Body mass index is 17.13 kg/m².    Intake/Output Summary (Last 24 hours) at 10/11/2024 0717  Last data filed at 10/11/2024 0447  Gross per 24 hour   Intake 954.93 ml   Output 100 ml   Net 854.93 ml         Physical Exam  Vitals and nursing note reviewed.   Constitutional:       General: She is not in acute distress.     Appearance: She is ill-appearing.   Cardiovascular:      Rate and Rhythm: Normal rate and regular rhythm.      Heart sounds: No murmur heard.  Pulmonary:      Effort: Pulmonary effort is normal. No respiratory distress.      Breath sounds: Rhonchi present. No wheezing.      Comments: Decreased BS bilaterally  Abdominal:      Comments: G-tube   Genitourinary:     Comments: Roca  Musculoskeletal:      Right lower leg: Edema present.   Neurological:      Mental Status: She is alert.             Significant Labs: All pertinent labs within the past 24 hours have been reviewed.  Recent Lab Results  (Last 5 results in the past 24 hours)        10/11/24  0551   10/10/24  2059   10/10/24  0946   10/10/24  0744   10/10/24  0731        Albumin         2.2       ALP         169       ALT         23       Anion Gap         10       Ao root annulus     2.82           Ascending aorta     2.62           Ao peak liya     1.5           Ao VTI     23.3           PTT   28.2  Comment: Refer to local heparin nomogram for intensity/dose specific   therapeutic   range.         59.2  Comment: Refer to local heparin nomogram for intensity/dose specific   therapeutic   range.  Reviewed by Technologist.         AST         44       AV valve area     2.5           SHARAD by Velocity Ratio     1.9           AV mean gradient     6.3           AV index (prosthetic)     0.88            AV peak gradient     9.0           AV Velocity Ratio     0.67           Bands         34.0       Basophil %         0.0       BILIRUBIN TOTAL         0.2  Comment: For infants and newborns, interpretation of results should be based  on gestational age, weight and in agreement with clinical  observations.    Premature Infant recommended reference ranges:  Up to 24 hours.............<8.0 mg/dL  Up to 48 hours............<12.0 mg/dL  3-5 days..................<15.0 mg/dL  6-29 days.................<15.0 mg/dL         BUN         22       Laci/Echinocytes         Occasional       Calcium         8.5       Chloride         121       CO2         16       Creatinine         1.5       Left Ventricle Relative Wall Thickness     0.73           Differential Method         Manual  Comment: CORRECTED RESULT; previously reported as Automated on 10/10/2024 at   08:32.    [C]       E/A ratio     0.71           E/E' ratio     5.79           eGFR         36       EF     65           Eos %         0.0       E wave deceleration time     283.30           FS     27.3           Glucose         127       Gran %         42.0       Hematocrit 34.1         38.1       Hemoglobin 11.6         12.6       Immature Grans (Abs)         CANCELED  Comment: Mild elevation in immature granulocytes is non specific and   can be seen in a variety of conditions including stress response,   acute inflammation, trauma and pregnancy. Correlation with other   laboratory and clinical findings is essential.    Result canceled by the ancillary.         Immature Granulocytes         CANCELED  Comment: Result canceled by the ancillary.       IVC diameter     1.42           IVRT     83.73           IVSd     1.3           LA WIDTH     2.8           Lactic Acid Level       2.0  Comment: Falsely low lactic acid results can be found in samples   containing >=13.0 mg/dL total bilirubin and/or >=3.5 mg/dL   direct bilirubin.           Left Atrium Major Axis     4.17            Left Atrium Minor Axis     4.30           LA size     2.91           LA Vol     29.32           LA vol index     19.7           LVOT area     2.8           LV LATERAL E/E' RATIO     5.00           LV SEPTAL E/E' RATIO     6.88           LV EDV BP     44.33           LV Diastolic Volume Index     29.75           Left Ventricular End Diastolic Volume by Teichholz Method     44.33           Left Ventricular End Systolic Volume by Teichholz Method     19.24           LVIDd     3.3           LVIDs     2.4           LV mass     133.0           LV Mass Index     89.3           Left Ventricular Outflow Tract Mean Gradient     2.52           Left Ventricular Outflow Tract Mean Velocity     0.78           LVOT diameter     1.9           LVOT peak louis     1.0           LVOT stroke volume     58.1           LVOT peak VTI     20.5           LV ESV BP     19.24           LV Systolic Volume Index     12.9           Lymph %         9.0       Magnesium          2.1       MCH 31.4         31.9       MCHC 34.0         33.1       MCV 92  Comment: Results confirmed, test repeated         97       Mean e'     0.10           Metamyelocytes         1.0       Mono %         14.0       MPV 8.4         9.2       MV valve area p 1/2 method     2.68           MV Peak A Louis     0.78           MV Peak E Louis     0.55           MV stenosis pressure 1/2 time     82.16           nRBC         0       Ovalocytes         Occasional       Pathologist Review Peripheral Smear         REVIEWED  Comment:   Electronically reviewed and signed by:  Blake Singh M.D.  Signed on 10/10/24 at 15:38  Red blood cells within normal limits. White blood cells with marked   left shift in the granulocyte lineage. Decreased lymphocytes.   Macrophages within normal limits. Platelets within normal limits.         Platelet Estimate         Appears normal       Platelet Count 283         412       Poikilocytosis         Slight       Potassium         4.0       PROTEIN  TOTAL         6.5       PV mean gradient     2           PW     1.2           RA Major Axis     3.34           Est. RA pres     3           RA Width     2.4           RBC 3.69         3.95       RDW 15.2         15.5       RV TB RVSP     5           RVOT peak louis     0.75           RVOT peak VTI     14.3           Sodium         147       STJ     2.63           TAPSE     1.81           TDI SEPTAL     0.08           TDI LATERAL     0.11           Teardrop Cells         Occasional       Triscuspid Valve Regurgitation Peak Gradient     12           TR Max Louis     1.72           TSH         0.739       TV resting pulmonary artery pressure     15           WBC 9.83         10.12       ZLVIDD     -2.93           ZLVIDS     -1.06                                   [C] - Corrected Result               Significant Imaging: I have reviewed all pertinent imaging results/findings within the past 24 hours.    X-Ray Chest 1 View   Final Result      No acute abnormality.         Electronically signed by: Cruz Myers   Date:    10/09/2024   Time:    21:33      US Lower Extremity Veins Bilateral   Final Result      Extensive clot from the right common femoral vein to the right posterior tibial vein.  Occlusive right is anterior tibial vein.      No left-sided DVT         Electronically signed by: Cruz Myers   Date:    10/09/2024   Time:    20:10      CT Abdomen Pelvis  Without Contrast   Final Result      Right-sided hydronephrosis with a approximately 8 mm mid right ureteral obstructing stone.  Punctate nonobstructing left renal calculi.  Postsurgical changes of the bowel.  Fluid within the colonic loops of bowel may relate to diarrheal state.  Left hemiabdomen enteric tube noted      All CT scans   are performed using dose optimization techniques including the following: automated exposure control; adjustment of the mA and/or kV; use of iterative reconstruction technique.  Dose modulation was employed for BRENNON by means of:  Automated exposure control; adjustment of the mA and/or kV according to patient size (this includes techniques or standardized protocols for targeted exams where dose is matched to indication/reason for exam; i.e. extremities or head); and/or use of iterative reconstructive technique.         Electronically signed by: Cruz Myers   Date:    10/09/2024   Time:    16:30      Anesthesia US Guide Vascular Access    (Results Pending)   IR Thrombectomy Veins Inc Thrombolysis and Fluoro    (Results Pending)         Assessment/Plan:      * Ureteral stone with hydronephrosis  Right-sided hydronephrosis with approximately 8 mm mid right ureteral obstructing stone   Urology consulted (per ED, Dr. Kelly)  S/p cystoscopy 10/10/24 - noted impacted right ureteral stone, unable to place stent  IR consulted for nephrostomy tube placement   Roca in place, monitor I/O's    Hypokalemia  Patient's most recent potassium results are listed below.   Recent Labs     10/10/24  0731 10/11/24  0551 10/11/24  0848   K 4.0 2.2* 2.2*     Plan  - Replete potassium per protocol  - Monitor potassium Every 12 hours  - Patient's hypokalemia is worsening. Will adjust treatment as follows: add PO and IV KCl    Acute cystitis with hematuria  Continue ceftriaxone, day # 3  Blood and urine cx pending    DVT (deep venous thrombosis)  US - RLE extensive clot from the right common femoral vein to the right posterior tibial vein.  Occluded anterior tibial vein  IR consulted, plan for thrombectomy pending  Continue heparin drip  Heme/Onc consulted for AC reccs    SHELLEY (acute kidney injury)  SHELLEY is likely due to  multifactorial due to obstructing ureteral stone as well as dehydration from diarrhea/poor oral intake . Baseline creatinine is  0.7 (normal) . Most recent creatinine and eGFR are listed below.    Recent Labs     10/09/24  1235 10/09/24  2111 10/10/24  0731   CREATININE 2.0* 1.7* 1.5*   EGFRNORACEVR 26* 31* 36*       SHELLEY is new finding  Avoid  nephrotoxins and renally dose meds for GFR listed above  Monitor urine output, serial BMP, and adjust therapy as needed  Aggressive hydration with IV fluids/bicarb    Metabolic acidosis  Severe metabolic acidosis -- with SHELLEY and obstructing ureteral stone as well as UTI and dehydration/diarrhea  Check lactic acid  On broad spectrum antibiotics for UTI  Aggressive hydration with IV fluids  Bicarb IVP ordered followed by bicarb drip  Trend labs  Repeat labs ordered STAT    10/10/24  Acidosis improving  Continue bicarb overnight  AM labs    Jejunostomy tube present  Complains of pain around j-tube  Plan to restart TF after procedure (10/11/24)    Diarrhea  C diff negative  Imodium PRN  Hydrating with IV fluids    Gastric adenocarcinoma  Followed by Dr. Ambrose and Dr. Jerome  S/p total gastrectomy on 8/20, with lysis of adhesions, lymphadenectomy, and jejunostomy tube placement, d/c on 9/10  Pathologic stage from 8/20/2024: Stage III (ypT2, pN3a, cM0), completed 5 cycles Keytruda prior to surgery    Hx of Epileptic seizures  No recent seizure activity  Continue Keppra      Moderate protein-calorie malnutrition  Nutrition consulted. Most recent weight and BMI monitored- Has J-tube with tube feedings at home, however has been having diarrhea.    Measurements:  Wt Readings from Last 1 Encounters:   10/10/24 46.7 kg (102 lb 15.3 oz)   Body mass index is 17.13 kg/m².      Weight Loss (Malnutrition): greater than 7.5% in 3 months  1. Initiate pt onto continuous Enteral nutrition, recommend Lisa Searchspace peptide 1.5 (vanilla) via J tube, goal rate 30 mL/hr, starting at 10 mL/hr then progress to goal rate within 24 hrs if pt is tolerating or per MD/NP -Formula at goal rate provides: 1107 kcals/day (79% EEN), 53 g protein/day (113% EPN), 99 g CHO/day (57% CHO needs), 504 mL free formula water/day -150 mL q4h free water flushes (900 mL/day) = total from formula + FWF = 1404 mL water/day, per MD/NP -Check Mg, K+, Na, Phos and Glu  before and during initation, correct as indicated 2. Recommend Banatrol TID to assist with diarrhea or BID for loose stools, as warranted 3. Weigh twice weekly        VTE Risk Mitigation (From admission, onward)           Ordered     heparin 25,000 units in dextrose 5% (100 units/ml) IV bolus from bag HIGH INTENSITY nomogram - [facility]  As needed (PRN)        Question:  Heparin Infusion Adjustment (DO NOT MODIFY ANSWER)  Answer:  [email or web address]\Images\Pharmacy\HeparinInfusions\heparin HIGH INTENSITY nomogram for [facility] [id number]    10/09/24 2013     heparin 25,000 units in dextrose 5% (100 units/ml) IV bolus from bag HIGH INTENSITY nomogram - [facility]  As needed (PRN)        Question:  Heparin Infusion Adjustment (DO NOT MODIFY ANSWER)  Answer:  [email or web address]\Images\Pharmacy\HeparinInfusions\heparin HIGH INTENSITY nomogram for [facility] [id number]    10/09/24 2013     Reason for No Pharmacological VTE Prophylaxis  Once        Comments: Heparin drip ordered for acute occlusive DVT   Question:  Reasons:  Answer:  Physician Provided (leave comment)    10/09/24 2027     IP VTE HIGH RISK PATIENT  Once         10/09/24 2027     heparin 25,000 units in dextrose 5% 250 mL (100 units/mL) infusion HIGH INTENSITY nomogram - [facility]  Continuous        Question:  Begin at (units/kg/hr)  Answer:  18    10/09/24 2013                    Discharge Planning   SOFIYA:      Code Status: Full Code   Is the patient medically ready for discharge?:     Reason for patient still in hospital (select all that apply): Patient trending condition, Laboratory test, Treatment, Imaging, Consult recommendations, PT / OT recommendations, and Pending disposition  Discharge Plan A: Home Health                  Zachery Boyd MD  Department of Hospital Medicine   [facility] - Med Surg

## 2024-10-11 NOTE — PROGRESS NOTES
History of Present Illness:       10/11/24: No acute events overnight. Plans for R PCN tube placement today by IR with attempt at antegrade stent placement.   10/10/24-Cheryl Kirkland is a 74 y.o. female with a complex medical history, which includes gastric adenocarcinoma s/p total gastrectomy about 6 weeks ago. She presented to the ED last night with complaints of weakness, abdominal pain around her J-tube, nausea and was noted to have SHELLEY on labs. CT scan revealed an 8mm R mid-ureteral stone. She denies any prior h/o stones. She has had some dysuria for the past couple of days. No gross hematuria. No flank pain.       Review of Systems   Constitutional:  Negative for chills and fever.   Respiratory:  Negative for shortness of breath.    Cardiovascular:  Positive for leg swelling.   Gastrointestinal:  Positive for abdominal pain.   Neurological:  Positive for weakness.   All other systems reviewed and are negative.      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 Repeat in 3 years.    COLONOSCOPY  06/17/2015    -Nodular mucosa at appendiceal orfice, sigmoid and desc diverticulosis otherwise normal.    COLONOSCOPY  2020    >3 TAs    COLONOSCOPY  12/2023    DIAGNOSTIC LAPAROSCOPY N/A 3/14/2024    Procedure: LAPAROSCOPY, DIAGNOSTIC;  Surgeon: Chen Jerome MD;  Location: [facility] OR;  Service: General;  Laterality: N/A;  1. Diagnostic laparoscopy   2. Extensive lysis of adhesions  3. Peritoneal biopsy   4. Peritoneal washings for cytology    DIAGNOSTIC LAPAROSCOPY N/A 8/20/2024    Procedure: LAPAROSCOPY, DIAGNOSTIC;  Surgeon: Chen Jerome MD;  Location: [facility] OR;  Service: General;  Laterality: N/A;    EGD - EXTERNAL RESULT  06/20/2012    Dr Boyle- Mild gastritis otherwise normal.    ENDOSCOPIC ULTRASOUND OF UPPER GASTROINTESTINAL TRACT N/A 7/26/2024    Procedure: ULTRASOUND, UPPER GI TRACT, ENDOSCOPIC;  Surgeon: Valdo Aguilera MD;  Location: [facility];  Service: Endoscopy;  Laterality: N/A;  7/22/24: instructions sent via portal-. Pt no longer takling eliquis-GD    ESOPHAGOGASTRODUODENOSCOPY N/A 02/08/2024    Procedure: EGD (ESOPHAGOGASTRODUODENOSCOPY);  Surgeon: Jayla Arteaga MD;  Location: [facility];  Service: Endoscopy;  Laterality: N/A;    ESOPHAGOGASTRODUODENOSCOPY N/A 8/20/2024    Procedure: EGD (ESOPHAGOGASTRODUODENOSCOPY);  Surgeon: Chen Jerome MD;  Location: [facility] OR;  Service: General;  Laterality: N/A;    GASTRECTOMY N/A 8/20/2024    Procedure: GASTRECTOMY;  Surgeon: Chen Jerome MD;  Location: [facility] OR;  Service: General;  Laterality: N/A;    HYSTERECTOMY      LAPAROSCOPIC LYSIS OF ADHESIONS N/A 3/14/2024    Procedure: LYSIS, ADHESIONS, LAPAROSCOPIC;  Surgeon: Chen Jerome MD;  Location: [facility] OR;  Service: General;  Laterality: N/A;    LYSIS OF ADHESIONS N/A 8/20/2024    Procedure: LYSIS, ADHESIONS;  Surgeon:  Chen Jerome MD;  Location: [facility] OR;  Service: General;  Laterality: N/A;    PLACEMENT OF JEJUNOSTOMY TUBE N/A 8/20/2024    Procedure: INSERTION, JEJUNOSTOMY TUBE;  Surgeon: Chen Jerome MD;  Location: [facility] OR;  Service: General;  Laterality: N/A;    TOTAL ABDOMINAL HYSTERECTOMY W/ BILATERAL SALPINGOOPHORECTOMY  01/09/2023    radical resection with right salpingo-oophorectomy, left salpingo-oophorectomy, extensive enterolysis, extensive adhesiolysis, left pelvic lymph node biopsy, omental biopsy, small bowel resection with primary functional end-to-end anastomosis, placement of pelvic drain       Family History   Problem Relation Name Age of Onset    Pancreatic cancer Brother Misael Mccray 76    Prostate cancer Brother Zachary 67    Pancreatic cancer Half-brother Gasper 74    Colon cancer Half-sister Elton 83    Lung cancer Half-sister Elton         +smoking hx    Breast cancer Half-sister Vidhi 58    Lymphoma Other Cara Sun    Prostate cancer Other Fernando     Prostate cancer Other Gasper Calderon     Ovarian cancer Other Inerris     Breast cancer Other Aleida     Colon cancer Other [facility]        Social History     Tobacco Use    Smoking status: Former     Types: Cigarettes    Smokeless tobacco: Never    Tobacco comments:     Quit 1989 smoked 10 years smoked 0.25 ppd    Substance Use Topics    Alcohol use: Not Currently    Drug use: Never       Current Facility-Administered Medications   Medication Dose Route Frequency Provider Last Rate Last Admin    acetaminophen tablet 650 mg  650 mg Oral Q6H PRN Lata Kelly MD        carBAMazepine tablet 200 mg  200 mg Oral TID Zachery Boyd MD   200 mg at 10/11/24 0905    cefTRIAXone (Rocephin) 1 g in D5W 100 mL IVPB (MB+)  1 g Intravenous Q24H Lata Kelly MD   Stopped at 10/10/24 2106    dextrose 10% bolus 125 mL 125 mL  12.5 g Intravenous PRN Lata Kelly MD        dextrose 10% bolus 250 mL 250 mL  25 g Intravenous PRN Robin  Lata MENDOZA MD        glucagon (human recombinant) injection 1 mg  1 mg Intramuscular PRN Lata Kelly MD        glucose chewable tablet 16 g  16 g Oral PRN Lata Kelly MD        glucose chewable tablet 24 g  24 g Oral PRN Lata Kelly MD        heparin 25,000 units in dextrose 5% (100 units/ml) IV bolus from bag HIGH INTENSITY nomogram - [facility]  60 Units/kg Intravenous PRN Lata Kelly MD        heparin 25,000 units in dextrose 5% (100 units/ml) IV bolus from bag HIGH INTENSITY nomogram - [facility]  30 Units/kg Intravenous PRN Lata Kelly MD        heparin 25,000 units in dextrose 5% 250 mL (100 units/mL) infusion HIGH INTENSITY nomogram - [facility]  0-40 Units/kg/hr Intravenous Continuous Lata Kelly MD   Paused at 10/11/24 0732    hydrOXYzine HCL tablet 25 mg  25 mg Oral TID PRN Zachery Boyd MD        levETIRAcetam in NaCl (iso-os) IVPB 500 mg  500 mg Intravenous Q12H Lata Kelly MD   Stopped at 10/11/24 0943    loperamide capsule 2 mg  2 mg Oral QID PRN Lata Kelly MD        morphine injection 1 mg  1 mg Intravenous Q4H PRN Lata Kelly MD        mupirocin 2 % ointment   Nasal BID Zachery Boyd MD   Given at 10/11/24 1137    naloxone 0.4 mg/mL injection 0.02 mg  0.02 mg Intravenous PRN Lata Kelly MD        ondansetron injection 4 mg  4 mg Intravenous Q6H PRN Lata Kelly MD        oxyCODONE immediate release tablet 10 mg  10 mg Oral Q4H PRN Zachery Boyd MD   10 mg at 10/11/24 0905    potassium chloride 10 mEq in 100 mL IVPB  10 mEq Intravenous Q1H Zachery Boyd  mL/hr at 10/11/24 1141 10 mEq at 10/11/24 1141    sodium chloride 0.9% flush 10 mL  10 mL Intravenous Q8H PRN Lata Kelly MD        zonisamide capsule 200 mg  200 mg Oral BID Zachery Boyd MD   200 mg at 10/11/24 0905       Review of patient's allergies indicates:  No Known Allergies    Physical Exam  Vitals:    10/11/24 1233   BP: 137/64   Pulse: 79   Resp: 18    Temp: 97.9 °F (36.6 °C)     General: Well-developed, frail, in no acute distress  HEENT: Normocephalic, atraumatic, extraocular movements intact  Neck: Supple, no supraclavicular or cervical lymphadenopathy, trachea midline  Respirations: even and unlabored  Back: midline spine, No CVA tenderness  Abdomen: soft, Non-tender, J-tube on left side, non-distended, no palpable masses, no rebound or guarding  Extremities: moves all equally, no clubbing, +R sided swelling of the thigh  Skin: Warm and dry. No lesions  Psych: normal affect  Neuro: Alert and oriented x 3. Cranial nerves II-XII intact      Urinalysis  pH, UA   Date Value Ref Range Status   10/09/2024 6.0 5.0 - 8.0 Final     Protein, UA   Date Value Ref Range Status   10/09/2024 2+ (A) Negative Final     Comment:     Recommend a 24 hour urine protein or a urine   protein/creatinine ratio if globulin induced proteinuria is  clinically suspected.       Glucose, UA   Date Value Ref Range Status   10/09/2024 1+ (A) Negative Final     Occult Blood UA   Date Value Ref Range Status   10/09/2024 3+ (A) Negative Final     Nitrite, UA   Date Value Ref Range Status   10/09/2024 Negative Negative Final     Leukocytes, UA   Date Value Ref Range Status   10/09/2024 Trace (A) Negative Final     Lab Results   Component Value Date    WBC 9.83 10/11/2024    HGB 11.6 (L) 10/11/2024    HCT 34.1 (L) 10/11/2024    MCV 92 10/11/2024     10/11/2024       BMP  Lab Results   Component Value Date     (H) 10/11/2024    K 2.2 (LL) 10/11/2024     (H) 10/11/2024    CO2 21 (L) 10/11/2024    BUN 25 (H) 10/11/2024    CREATININE 1.6 (H) 10/11/2024    CALCIUM 8.0 (L) 10/11/2024    ANIONGAP 13 10/11/2024    EGFRNORACEVR 34 (A) 10/11/2024     CT scan 10/9/24 personally reviewed, and agree with official read:   Impression:     Right-sided hydronephrosis with a approximately 8 mm mid right ureteral obstructing stone.  Punctate nonobstructing left renal calculi.  Postsurgical  changes of the bowel.  Fluid within the colonic loops of bowel may relate to diarrheal state.  Left hemiabdomen enteric tube noted    Assessment:    R ureteral stone  SHELLEY  Chronic DVT  Gastric Adenocarcinoma  Protein malnutrition      Plan:     Pt to undergo R PCN tube vs antegrade stent today. Pt will need follow up in my office in 1 week. Will arrange scheduling. Please call with any questions.

## 2024-10-12 LAB
ALBUMIN SERPL BCP-MCNC: 1.9 G/DL (ref 3.5–5.2)
ALP SERPL-CCNC: 152 U/L (ref 55–135)
ALT SERPL W/O P-5'-P-CCNC: 14 U/L (ref 10–44)
ANION GAP SERPL CALC-SCNC: 12 MMOL/L (ref 8–16)
ANISOCYTOSIS BLD QL SMEAR: SLIGHT
APTT PPP: 107.8 SEC (ref 21–32)
APTT PPP: 63.4 SEC (ref 21–32)
APTT PPP: 71.7 SEC (ref 21–32)
AST SERPL-CCNC: 14 U/L (ref 10–40)
BACTERIA STL CULT: NORMAL
BASOPHILS # BLD AUTO: 0.05 K/UL (ref 0–0.2)
BASOPHILS NFR BLD: 0.5 % (ref 0–1.9)
BILIRUB SERPL-MCNC: 0.4 MG/DL (ref 0.1–1)
BUN SERPL-MCNC: 26 MG/DL (ref 8–23)
BURR CELLS BLD QL SMEAR: ABNORMAL
CALCIUM SERPL-MCNC: 7.4 MG/DL (ref 8.7–10.5)
CHLORIDE SERPL-SCNC: 113 MMOL/L (ref 95–110)
CO2 SERPL-SCNC: 19 MMOL/L (ref 23–29)
CREAT SERPL-MCNC: 1.2 MG/DL (ref 0.5–1.4)
DACRYOCYTES BLD QL SMEAR: ABNORMAL
DIFFERENTIAL METHOD BLD: ABNORMAL
ELASTASE 1, FECAL: <40 MCG/G
EOSINOPHIL # BLD AUTO: 0.1 K/UL (ref 0–0.5)
EOSINOPHIL NFR BLD: 0.7 % (ref 0–8)
ERYTHROCYTE [DISTWIDTH] IN BLOOD BY AUTOMATED COUNT: 15.3 % (ref 11.5–14.5)
EST. GFR  (NO RACE VARIABLE): 48 ML/MIN/1.73 M^2
GLUCOSE SERPL-MCNC: 84 MG/DL (ref 70–110)
HCT VFR BLD AUTO: 31.4 % (ref 37–48.5)
HGB BLD-MCNC: 10.8 G/DL (ref 12–16)
IMM GRANULOCYTES # BLD AUTO: 0.22 K/UL (ref 0–0.04)
IMM GRANULOCYTES NFR BLD AUTO: 2.3 % (ref 0–0.5)
LYMPHOCYTES # BLD AUTO: 1.5 K/UL (ref 1–4.8)
LYMPHOCYTES NFR BLD: 15.6 % (ref 18–48)
MAGNESIUM SERPL-MCNC: 2.3 MG/DL (ref 1.6–2.6)
MCH RBC QN AUTO: 32 PG (ref 27–31)
MCHC RBC AUTO-ENTMCNC: 34.4 G/DL (ref 32–36)
MCV RBC AUTO: 93 FL (ref 82–98)
MONOCYTES # BLD AUTO: 0.8 K/UL (ref 0.3–1)
MONOCYTES NFR BLD: 8.9 % (ref 4–15)
NEUTROPHILS # BLD AUTO: 6.8 K/UL (ref 1.8–7.7)
NEUTROPHILS NFR BLD: 72 % (ref 38–73)
NRBC BLD-RTO: 0 /100 WBC
OVALOCYTES BLD QL SMEAR: ABNORMAL
PLATELET # BLD AUTO: 213 K/UL (ref 150–450)
PLATELET BLD QL SMEAR: ABNORMAL
PMV BLD AUTO: 8.8 FL (ref 9.2–12.9)
POIKILOCYTOSIS BLD QL SMEAR: SLIGHT
POTASSIUM SERPL-SCNC: 2.8 MMOL/L (ref 3.5–5.1)
POTASSIUM SERPL-SCNC: 3.2 MMOL/L (ref 3.5–5.1)
PROT SERPL-MCNC: 5 G/DL (ref 6–8.4)
RBC # BLD AUTO: 3.38 M/UL (ref 4–5.4)
SCHISTOCYTES BLD QL SMEAR: PRESENT
SICKLE CELLS BLD QL SMEAR: ABNORMAL
SODIUM SERPL-SCNC: 144 MMOL/L (ref 136–145)
WBC # BLD AUTO: 9.42 K/UL (ref 3.9–12.7)

## 2024-10-12 PROCEDURE — 11000001 HC ACUTE MED/SURG PRIVATE ROOM

## 2024-10-12 PROCEDURE — 21400001 HC TELEMETRY ROOM

## 2024-10-12 PROCEDURE — 63600175 PHARM REV CODE 636 W HCPCS: Performed by: UROLOGY

## 2024-10-12 PROCEDURE — 85730 THROMBOPLASTIN TIME PARTIAL: CPT | Mod: 91 | Performed by: SPECIALIST

## 2024-10-12 PROCEDURE — 36573 INSJ PICC RS&I 5 YR+: CPT

## 2024-10-12 PROCEDURE — 25000003 PHARM REV CODE 250: Performed by: UROLOGY

## 2024-10-12 PROCEDURE — 25000003 PHARM REV CODE 250: Performed by: HOSPITALIST

## 2024-10-12 PROCEDURE — 83735 ASSAY OF MAGNESIUM: CPT | Performed by: UROLOGY

## 2024-10-12 PROCEDURE — 02HV33Z INSERTION OF INFUSION DEVICE INTO SUPERIOR VENA CAVA, PERCUTANEOUS APPROACH: ICD-10-PCS | Performed by: INTERNAL MEDICINE

## 2024-10-12 PROCEDURE — C1751 CATH, INF, PER/CENT/MIDLINE: HCPCS

## 2024-10-12 PROCEDURE — 85730 THROMBOPLASTIN TIME PARTIAL: CPT | Mod: 91 | Performed by: HOSPITALIST

## 2024-10-12 PROCEDURE — 84132 ASSAY OF SERUM POTASSIUM: CPT | Performed by: HOSPITALIST

## 2024-10-12 PROCEDURE — 63600175 PHARM REV CODE 636 W HCPCS: Performed by: NURSE PRACTITIONER

## 2024-10-12 PROCEDURE — 85730 THROMBOPLASTIN TIME PARTIAL: CPT | Performed by: HOSPITALIST

## 2024-10-12 PROCEDURE — 85025 COMPLETE CBC W/AUTO DIFF WBC: CPT | Performed by: UROLOGY

## 2024-10-12 PROCEDURE — 36415 COLL VENOUS BLD VENIPUNCTURE: CPT | Performed by: HOSPITALIST

## 2024-10-12 PROCEDURE — 80053 COMPREHEN METABOLIC PANEL: CPT | Performed by: UROLOGY

## 2024-10-12 RX ORDER — POTASSIUM CHLORIDE 7.45 MG/ML
10 INJECTION INTRAVENOUS
Status: COMPLETED | OUTPATIENT
Start: 2024-10-12 | End: 2024-10-12

## 2024-10-12 RX ORDER — MAGNESIUM SULFATE HEPTAHYDRATE 40 MG/ML
2 INJECTION, SOLUTION INTRAVENOUS ONCE
Status: COMPLETED | OUTPATIENT
Start: 2024-10-12 | End: 2024-10-12

## 2024-10-12 RX ADMIN — LEVETIRACETAM 500 MG: 500 INJECTION, SOLUTION INTRAVENOUS at 09:10

## 2024-10-12 RX ADMIN — CARBAMAZEPINE 200 MG: 200 TABLET ORAL at 09:10

## 2024-10-12 RX ADMIN — POTASSIUM CHLORIDE 10 MEQ: 10 INJECTION, SOLUTION INTRAVENOUS at 02:10

## 2024-10-12 RX ADMIN — ONDANSETRON 4 MG: 2 INJECTION INTRAMUSCULAR; INTRAVENOUS at 09:10

## 2024-10-12 RX ADMIN — MUPIROCIN: 20 OINTMENT TOPICAL at 09:10

## 2024-10-12 RX ADMIN — POTASSIUM CHLORIDE 10 MEQ: 10 INJECTION, SOLUTION INTRAVENOUS at 11:10

## 2024-10-12 RX ADMIN — HEPARIN SODIUM 18 UNITS/KG/HR: 10000 INJECTION, SOLUTION INTRAVENOUS at 03:10

## 2024-10-12 RX ADMIN — CEFTRIAXONE 1 G: 1 INJECTION, POWDER, FOR SOLUTION INTRAMUSCULAR; INTRAVENOUS at 09:10

## 2024-10-12 RX ADMIN — MAGNESIUM SULFATE HEPTAHYDRATE 2 G: 40 INJECTION, SOLUTION INTRAVENOUS at 09:10

## 2024-10-12 RX ADMIN — ZONISAMIDE 200 MG: 100 CAPSULE ORAL at 09:10

## 2024-10-12 RX ADMIN — MORPHINE SULFATE 1 MG: 4 INJECTION INTRAVENOUS at 11:10

## 2024-10-12 RX ADMIN — CARBAMAZEPINE 200 MG: 200 TABLET ORAL at 02:10

## 2024-10-12 NOTE — PROCEDURES
"Cheryl Kirkland is a 74 y.o. female patient.    Temp: 97.7 °F (36.5 °C) (10/12/24 1220)  Pulse: 73 (10/12/24 1220)  Resp: 20 (10/12/24 1220)  BP: 133/78 (10/12/24 1220)  SpO2: 97 % (10/12/24 1220)  Weight: 46.7 kg (102 lb 15.3 oz) (10/10/24 1546)  Height: 5' 5" (165.1 cm) (10/10/24 1603)    PICC  Date/Time: 10/12/2024 12:10 PM  Performed by: Cal Avery RN  Consent Done: Yes  Time out: Immediately prior to procedure a time out was called to verify the correct patient, procedure, equipment, support staff and site/side marked as required  Indications: med administration and vascular access  Anesthesia: local infiltration  Local anesthetic: lidocaine 1% without epinephrine  Anesthetic Total (mL): 2  Preparation: skin prepped with chlorhexidine (without alcohol)  Skin prep agent dried: skin prep agent completely dried prior to procedure  Sterile barriers: all five maximum sterile barriers used - cap, mask, sterile gown, sterile gloves, and large sterile sheet  Hand hygiene: hand hygiene performed prior to central venous catheter insertion  Location details: right basilic  Catheter type: double lumen  Catheter size: 4 Fr  Catheter Length: 28cm    Ultrasound guidance: yes  Vessel Caliber: large and patent, compressibility normal  Needle advanced into vessel with real time Ultrasound guidance.  Guidewire confirmed in vessel.  Sterile sheath used.  Number of attempts: 1  Post-procedure: blood return through all ports, chlorhexidine patch and sterile dressing applied  Specimens: No  Implants: No  Assessment: placement verified by x-ray, free fluid flow, no pneumothorax on x-ray and successful placement          Cal Avery RN  10/12/2024    "

## 2024-10-12 NOTE — ASSESSMENT & PLAN NOTE
Nutrition consulted. Most recent weight and BMI monitored- Has J-tube with tube feedings at home, however has been having diarrhea.    Measurements:  Wt Readings from Last 1 Encounters:   10/10/24 46.7 kg (102 lb 15.3 oz)   Body mass index is 17.13 kg/m².      Weight Loss (Malnutrition): greater than 7.5% in 3 months  1. Initiate pt onto continuous Enteral nutrition, recommend Pazien peptide 1.5 (vanilla) via J tube, goal rate 30 mL/hr, starting at 10 mL/hr then progress to goal rate within 24 hrs if pt is tolerating or per MD/NP -Formula at goal rate provides: 1107 kcals/day (79% EEN), 53 g protein/day (113% EPN), 99 g CHO/day (57% CHO needs), 504 mL free formula water/day -150 mL q4h free water flushes (900 mL/day) = total from formula + FWF = 1404 mL water/day, per MD/NP -Check Mg, K+, Na, Phos and Glu before and during initation, correct as indicated 2. Recommend Banatrol TID to assist with diarrhea or BID for loose stools, as warranted 3. Weigh twice weekly

## 2024-10-12 NOTE — PROGRESS NOTES
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  On exam she is noted to have left lower quadrant tenderness, appears frail and emaciated, has not been taking all of her scheduled medications and is hesitant to use pain medication, stating that she does not want to be addicted to pain medication.  Vital signs on arrival stable-- blood pressure 110/78, heart rate 95, temp 97.3° respirations 12, 97% SpO2 on room air.   Significant labs include CO2 6, BUN 21, creatinine 2.0, alk-phos 210, venous blood gas shows pH 7.030 and HC03 4.5, urinalysis with hazy appearance, 3+ blood, trace leukocyte esterase, greater than 100 RBCs, 31 WBCs, rare bacteria.  Stool studies negative for occult blood, negative for rotavirus, negative for C diff.  Stool culture is in progress.  EKG showed SR with PAC's in pattern of bigeminy with nonspecific T wave abnormality. CT abdomen and pelvis without contrast was done which revealed right-sided hydronephrosis with approximately 8 mm mid right ureteral obstructing stone.  Urology was consulted and recommended NPO after  midnight and will see patient in AM, also recommended urinalysis with urine culture which has been collected and does show acute cystitis with hematuria. While in ED, fluid bolus 1.5L, sodium bicarb --2 amps, morphine, Zofran, and Rocephin were all given. Hospital Medicine was consulted for admission due to SHELLEY, obstructing right ureteral stone 8mm and right sided hydronephrosis.     During exam, patient complains of pain and tightness in right leg so US was ordered and she was found to have extensive clot from right common femoral vein to right posterior tibial vein as well as occluded anterior tibial vein. IR was consulted and will evaluate for possible intervention in AM, heparin drip was recommended and ordered.     Overview/Hospital Course:  10/10/24  75 y/o female with gastric adenocarcinoma, here with abdominal pain  Patient rc/o lower quadrant abdominal pain, nausea, RLE pain, fatigue  Found to have 8 mm right sided obstructing ureteral stone and right sided hydronephrosis  Also with RLE extensive DVT; patient states she was previously on AC but stopped due to gastric cancer/bleeding  Urology with plans for stent placement today  IR with plans for RLE thrombectomy tomorrow morning   at bedside, updated    10/11/24  NAEON, patient resting in bed, feels some better today  Patient s/p cystoscopy yesterday with Dr. Kelly  Patient has impacted right ureteral stone, unable to place stent  Roca currently in place, continue ceftriaxone  Cr trending down with IV fluids  K 2.2, replete PO and IV, recheck this afternoon  Plans for right nephrostomy tube today  Plans for RLE thrombectomy for extensive DVT pending  Continue heparin drip, plan to transition to NOAC after thrombectomy    10/12  Awake, alert, lying in bed, cachetic, NPO since yesterday waiting on right nephrostomy tube/stent, was unable to do yesterday because of hypokalemia. Discussed with IR today, will give K riders today and repeat tomorrow  and plan for procedure tomorrow if Potassium stable.   IR said no indication for thrombectomy at this time  Cont heparin gtt and hold after midnight  Restart tube feedings, hold after midnight    Interval History: f/u hydronephrosis. See HPI    Review of Systems   Constitutional:  Negative for fatigue and fever.   HENT:  Negative for congestion.    Respiratory:  Negative for cough and shortness of breath.    Cardiovascular:  Negative for chest pain and palpitations.   Gastrointestinal:  Positive for abdominal pain. Negative for constipation, nausea and vomiting.   Genitourinary:  Negative for difficulty urinating and dysuria.   Neurological:  Positive for weakness.   All other systems reviewed and are negative.    Objective:     Vital Signs (Most Recent):  Temp: 97.8 °F (36.6 °C) (10/12/24 1607)  Pulse: 69 (10/12/24 1610)  Resp: 20 (10/12/24 1607)  BP: 127/70 (10/12/24 1607)  SpO2: 97 % (10/12/24 1607) Vital Signs (24h Range):  Temp:  [97.5 °F (36.4 °C)-98.2 °F (36.8 °C)] 97.8 °F (36.6 °C)  Pulse:  [69-81] 69  Resp:  [18-20] 20  SpO2:  [92 %-99 %] 97 %  BP: (113-133)/(65-78) 127/70     Weight: 46.7 kg (102 lb 15.3 oz)  Body mass index is 17.13 kg/m².    Intake/Output Summary (Last 24 hours) at 10/12/2024 1651  Last data filed at 10/12/2024 0631  Gross per 24 hour   Intake --   Output 300 ml   Net -300 ml         Physical Exam  Vitals and nursing note reviewed.   Constitutional:       Appearance: She is cachectic. She is ill-appearing.   Cardiovascular:      Rate and Rhythm: Normal rate and regular rhythm.      Heart sounds: No murmur heard.  Pulmonary:      Effort: Pulmonary effort is normal.      Breath sounds: Normal breath sounds. No wheezing or rales.   Abdominal:      General: Bowel sounds are normal.      Palpations: Abdomen is soft.      Tenderness: There is abdominal tenderness.      Comments: PEG tube to lower abdomen   Musculoskeletal:         General: Normal range of motion.   Skin:     General: Skin is  warm and dry.   Neurological:      General: No focal deficit present.      Mental Status: She is alert and oriented to person, place, and time.      Motor: Weakness present.   Psychiatric:         Mood and Affect: Mood normal.         Behavior: Behavior normal.             Significant Labs: All pertinent labs within the past 24 hours have been reviewed.  CBC:   Recent Labs   Lab 10/11/24  0551 10/12/24  1221   WBC 9.83 9.42   HGB 11.6* 10.8*   HCT 34.1* 31.4*    213     CMP:   Recent Labs   Lab 10/11/24  0551 10/11/24  0848 10/12/24  0044 10/12/24  1221   * 151*  --  144   K 2.2* 2.2* 3.2* 2.8*   * 117*  --  113*   CO2 18* 21*  --  19*   * 121*  --  84   BUN 23 25*  --  26*   CREATININE 1.4 1.6*  --  1.2   CALCIUM 7.5* 8.0*  --  7.4*   PROT 5.1* 5.4*  --  5.0*   ALBUMIN 1.9* 2.0*  --  1.9*   BILITOT 0.2 0.3  --  0.4   ALKPHOS 151* 164*  --  152*   AST 12 13  --  14   ALT 19 19  --  14   ANIONGAP 10 13  --  12       Significant Imaging: I have reviewed all pertinent imaging results/findings within the past 24 hours.    Assessment/Plan:      * Ureteral stone with hydronephrosis  - Right-sided hydronephrosis with approximately 8 mm mid right ureteral obstructing stone   - Urology consulted (per ED, Dr. Kelly)  - S/p cystoscopy 10/10/24 - noted impacted right ureteral stone, unable to place stent  - IR consulted for nephrostomy tube placement, plan for 10/12 if potassium stable  - Roca in place, monitor I/O's    Hypokalemia  Patient's most recent potassium results are listed below.   Recent Labs     10/11/24  0848 10/12/24  0044 10/12/24  1221   K 2.2* 3.2* 2.8*     Plan  - Replete potassium per protocol  - Monitor potassium Every 12 hours  - Patient's hypokalemia is worsening. Will adjust treatment as follows: add PO and IV KCl    Acute cystitis with hematuria  - Continue ceftriaxone, day # 3  - urine culture negative    Metabolic acidosis  - with SHELLEY and obstructing ureteral stone as well  as UTI and dehydration/diarrhea  - lactic acid was 2.6 > 2.0  - improved with IVF hydration and bicarb drip  - cont IV Rocephin   - monitor labs daily    SHELLEY (acute kidney injury)  SHELLEY is likely due to  multifactorial due to obstructing ureteral stone as well as dehydration from diarrhea/poor oral intake . Baseline creatinine is  0.7 (normal) . Most recent creatinine and eGFR are listed below.    Recent Labs     10/11/24  0551 10/11/24  0848 10/12/24  1221   CREATININE 1.4 1.6* 1.2   EGFRNORACEVR 39* 34* 48*       - SHELLEY improving  - improved with IVF hydration  - Avoid nephrotoxins and renally dose meds for GFR listed above  - Monitor urine output, serial BMP, and adjust therapy as needed    Moderate protein-calorie malnutrition  Nutrition consulted. Most recent weight and BMI monitored- Has J-tube with tube feedings at home, however has been having diarrhea.    Measurements:  Wt Readings from Last 1 Encounters:   10/10/24 46.7 kg (102 lb 15.3 oz)   Body mass index is 17.13 kg/m².      Weight Loss (Malnutrition): greater than 7.5% in 3 months  1. Initiate pt onto continuous Enteral nutrition, recommend [employer] peptide 1.5 (vanilla) via J tube, goal rate 30 mL/hr, starting at 10 mL/hr then progress to goal rate within 24 hrs if pt is tolerating or per MD/NP -Formula at goal rate provides: 1107 kcals/day (79% EEN), 53 g protein/day (113% EPN), 99 g CHO/day (57% CHO needs), 504 mL free formula water/day -150 mL q4h free water flushes (900 mL/day) = total from formula + FWF = 1404 mL water/day, per MD/NP -Check Mg, K+, Na, Phos and Glu before and during initation, correct as indicated 2. Recommend Banatrol TID to assist with diarrhea or BID for loose stools, as warranted 3. Weigh twice weekly      Jejunostomy tube present  - Complains of pain around j-tube  - restart tube feedings today, plan to keep NPO after midnight for procedure tomorrow    Diarrhea  - C diff negative  - Imodium PRN    Gastric adenocarcinoma  -  Followed by Dr. Ambrose and Dr. Jerome  - S/p total gastrectomy on 8/20, with lysis of adhesions, lymphadenectomy, and jejunostomy tube placement, d/c on 9/10  - Pathologic stage from 8/20/2024: Stage III (ypT2, pN3a, cM0), completed 5 cycles Keytruda prior to surgery    DVT (deep venous thrombosis)  - US - RLE extensive clot from the right common femoral vein to the right posterior tibial vein.  Occluded anterior tibial vein  - IR consulted for thrombectomy, not indicated at this time per IR  - Continue heparin drip, hold after midnight for nephrostomy tube placement tomorrow  - Heme/Onc consulted for AC reccs    Hx of Epileptic seizures  - No recent seizure activity  - Continue Keppra      VTE Risk Mitigation (From admission, onward)           Ordered     heparin 25,000 units in dextrose 5% (100 units/ml) IV bolus from bag HIGH INTENSITY nomogram - [facility]  As needed (PRN)        Question:  Heparin Infusion Adjustment (DO NOT MODIFY ANSWER)  Answer:  [email or web address]\epic\Images\Pharmacy\HeparinInfusions\heparin HIGH INTENSITY nomogram for [facility] [id number]    10/09/24 2013     heparin 25,000 units in dextrose 5% (100 units/ml) IV bolus from bag HIGH INTENSITY nomogram - [facility]  As needed (PRN)        Question:  Heparin Infusion Adjustment (DO NOT MODIFY ANSWER)  Answer:  [email or web address]\epic\Images\Pharmacy\HeparinInfusions\heparin HIGH INTENSITY nomogram for [facility] [id number]    10/09/24 2013     Reason for No Pharmacological VTE Prophylaxis  Once        Comments: Heparin drip ordered for acute occlusive DVT   Question:  Reasons:  Answer:  Physician Provided (leave comment)    10/09/24 2027     IP VTE HIGH RISK PATIENT  Once         10/09/24 2027     heparin 25,000 units in dextrose 5% 250 mL (100 units/mL) infusion HIGH INTENSITY nomogram - [facility]  Continuous        Question:  Begin at (units/kg/hr)  Answer:  18    10/09/24 2013                    Discharge Planning   SOFIYA:      Code Status: Full Code   Is the patient medically  ready for discharge?:     Reason for patient still in hospital (select all that apply): Patient trending condition, Laboratory test, Treatment, and Consult recommendations  Discharge Plan A: Home Health              Chen Sullivan NP  Department of Hospital Medicine   [facility]

## 2024-10-12 NOTE — ASSESSMENT & PLAN NOTE
- Followed by Dr. Ambrose and Dr. Jerome  - S/p total gastrectomy on 8/20, with lysis of adhesions, lymphadenectomy, and jejunostomy tube placement, d/c on 9/10  - Pathologic stage from 8/20/2024: Stage III (ypT2, pN3a, cM0), completed 5 cycles Keytruda prior to surgery

## 2024-10-12 NOTE — PLAN OF CARE
Discussed poc with pt, pt verbalized understanding  Purposeful rounding every 2hours  VS wnl  Cardiac monitoring in use, pt is KELLEN, tele monitor #[id number]  Fall precautions in place, remains injury free  Pain and nausea under control with PRN meds  IVFs  Accurate I&Os  Abx given as prescribed  Bed locked at lowest position  Call light within reach  Chart check complete  Will cont with POC    Problem: Adult Inpatient Plan of Care  Goal: Plan of Care Review  Outcome: Progressing  Goal: Patient-Specific Goal (Individualized)  Outcome: Progressing  Goal: Absence of Hospital-Acquired Illness or Injury  Outcome: Progressing  Goal: Optimal Comfort and Wellbeing  Outcome: Progressing  Goal: Readiness for Transition of Care  Outcome: Progressing     Problem: Infection  Goal: Absence of Infection Signs and Symptoms  Outcome: Progressing     Problem: Acute Kidney Injury/Impairment  Goal: Fluid and Electrolyte Balance  Outcome: Progressing  Goal: Improved Oral Intake  Outcome: Progressing  Goal: Effective Renal Function  Outcome: Progressing     Problem: Wound  Goal: Optimal Coping  Outcome: Progressing  Goal: Optimal Functional Ability  Outcome: Progressing  Goal: Absence of Infection Signs and Symptoms  Outcome: Progressing  Goal: Improved Oral Intake  Outcome: Progressing  Goal: Optimal Pain Control and Function  Outcome: Progressing  Goal: Skin Health and Integrity  Outcome: Progressing  Goal: Optimal Wound Healing  Outcome: Progressing     Problem: Fall Injury Risk  Goal: Absence of Fall and Fall-Related Injury  Outcome: Progressing     Problem: Skin Injury Risk Increased  Goal: Skin Health and Integrity  Outcome: Progressing     Problem: Thrombolytic Therapy  Goal: Absence of Bleeding  Outcome: Progressing  Goal: Unobstructed Breathing  Outcome: Progressing

## 2024-10-12 NOTE — SUBJECTIVE & OBJECTIVE
Interval History: f/u hydronephrosis. See HPI    Review of Systems   Constitutional:  Negative for fatigue and fever.   HENT:  Negative for congestion.    Respiratory:  Negative for cough and shortness of breath.    Cardiovascular:  Negative for chest pain and palpitations.   Gastrointestinal:  Positive for abdominal pain. Negative for constipation, nausea and vomiting.   Genitourinary:  Negative for difficulty urinating and dysuria.   Neurological:  Positive for weakness.   All other systems reviewed and are negative.    Objective:     Vital Signs (Most Recent):  Temp: 97.8 °F (36.6 °C) (10/12/24 1607)  Pulse: 69 (10/12/24 1610)  Resp: 20 (10/12/24 1607)  BP: 127/70 (10/12/24 1607)  SpO2: 97 % (10/12/24 1607) Vital Signs (24h Range):  Temp:  [97.5 °F (36.4 °C)-98.2 °F (36.8 °C)] 97.8 °F (36.6 °C)  Pulse:  [69-81] 69  Resp:  [18-20] 20  SpO2:  [92 %-99 %] 97 %  BP: (113-133)/(65-78) 127/70     Weight: 46.7 kg (102 lb 15.3 oz)  Body mass index is 17.13 kg/m².    Intake/Output Summary (Last 24 hours) at 10/12/2024 1651  Last data filed at 10/12/2024 0631  Gross per 24 hour   Intake --   Output 300 ml   Net -300 ml         Physical Exam  Vitals and nursing note reviewed.   Constitutional:       Appearance: She is cachectic. She is ill-appearing.   Cardiovascular:      Rate and Rhythm: Normal rate and regular rhythm.      Heart sounds: No murmur heard.  Pulmonary:      Effort: Pulmonary effort is normal.      Breath sounds: Normal breath sounds. No wheezing or rales.   Abdominal:      General: Bowel sounds are normal.      Palpations: Abdomen is soft.      Tenderness: There is abdominal tenderness.      Comments: PEG tube to lower abdomen   Musculoskeletal:         General: Normal range of motion.   Skin:     General: Skin is warm and dry.   Neurological:      General: No focal deficit present.      Mental Status: She is alert and oriented to person, place, and time.      Motor: Weakness present.   Psychiatric:          Mood and Affect: Mood normal.         Behavior: Behavior normal.             Significant Labs: All pertinent labs within the past 24 hours have been reviewed.  CBC:   Recent Labs   Lab 10/11/24  0551 10/12/24  1221   WBC 9.83 9.42   HGB 11.6* 10.8*   HCT 34.1* 31.4*    213     CMP:   Recent Labs   Lab 10/11/24  0551 10/11/24  0848 10/12/24  0044 10/12/24  1221   * 151*  --  144   K 2.2* 2.2* 3.2* 2.8*   * 117*  --  113*   CO2 18* 21*  --  19*   * 121*  --  84   BUN 23 25*  --  26*   CREATININE 1.4 1.6*  --  1.2   CALCIUM 7.5* 8.0*  --  7.4*   PROT 5.1* 5.4*  --  5.0*   ALBUMIN 1.9* 2.0*  --  1.9*   BILITOT 0.2 0.3  --  0.4   ALKPHOS 151* 164*  --  152*   AST 12 13  --  14   ALT 19 19  --  14   ANIONGAP 10 13  --  12       Significant Imaging: I have reviewed all pertinent imaging results/findings within the past 24 hours.

## 2024-10-12 NOTE — ASSESSMENT & PLAN NOTE
SHELLEY is likely due to  multifactorial due to obstructing ureteral stone as well as dehydration from diarrhea/poor oral intake . Baseline creatinine is  0.7 (normal) . Most recent creatinine and eGFR are listed below.    Recent Labs     10/11/24  0551 10/11/24  0848 10/12/24  1221   CREATININE 1.4 1.6* 1.2   EGFRNORACEVR 39* 34* 48*       - SHELLEY improving  - improved with IVF hydration  - Avoid nephrotoxins and renally dose meds for GFR listed above  - Monitor urine output, serial BMP, and adjust therapy as needed   [employer]

## 2024-10-12 NOTE — ASSESSMENT & PLAN NOTE
- with SHELLEY and obstructing ureteral stone as well as UTI and dehydration/diarrhea  - lactic acid was 2.6 > 2.0  - improved with IVF hydration and bicarb drip  - cont IV Rocephin   - monitor labs daily

## 2024-10-12 NOTE — ASSESSMENT & PLAN NOTE
- Complains of pain around j-tube  - restart tube feedings today, plan to keep NPO after midnight for procedure tomorrow

## 2024-10-13 LAB
ANION GAP SERPL CALC-SCNC: 10 MMOL/L (ref 8–16)
APTT PPP: 46.1 SEC (ref 21–32)
BASOPHILS # BLD AUTO: 0.05 K/UL (ref 0–0.2)
BASOPHILS NFR BLD: 0.8 % (ref 0–1.9)
BUN SERPL-MCNC: 26 MG/DL (ref 8–23)
CALCIUM SERPL-MCNC: 7.2 MG/DL (ref 8.7–10.5)
CHLORIDE SERPL-SCNC: 114 MMOL/L (ref 95–110)
CO2 SERPL-SCNC: 19 MMOL/L (ref 23–29)
CREAT SERPL-MCNC: 0.9 MG/DL (ref 0.5–1.4)
DIFFERENTIAL METHOD BLD: ABNORMAL
EOSINOPHIL # BLD AUTO: 0.1 K/UL (ref 0–0.5)
EOSINOPHIL NFR BLD: 0.8 % (ref 0–8)
ERYTHROCYTE [DISTWIDTH] IN BLOOD BY AUTOMATED COUNT: 15.4 % (ref 11.5–14.5)
EST. GFR  (NO RACE VARIABLE): >60 ML/MIN/1.73 M^2
GLUCOSE SERPL-MCNC: 92 MG/DL (ref 70–110)
GRAM STN SPEC: NORMAL
GRAM STN SPEC: NORMAL
HCT VFR BLD AUTO: 34.7 % (ref 37–48.5)
HGB BLD-MCNC: 11.8 G/DL (ref 12–16)
IMM GRANULOCYTES # BLD AUTO: 0.07 K/UL (ref 0–0.04)
IMM GRANULOCYTES NFR BLD AUTO: 1.2 % (ref 0–0.5)
LYMPHOCYTES # BLD AUTO: 0.7 K/UL (ref 1–4.8)
LYMPHOCYTES NFR BLD: 11.3 % (ref 18–48)
MCH RBC QN AUTO: 32.1 PG (ref 27–31)
MCHC RBC AUTO-ENTMCNC: 34 G/DL (ref 32–36)
MCV RBC AUTO: 94 FL (ref 82–98)
MONOCYTES # BLD AUTO: 0.4 K/UL (ref 0.3–1)
MONOCYTES NFR BLD: 6.8 % (ref 4–15)
NEUTROPHILS # BLD AUTO: 4.7 K/UL (ref 1.8–7.7)
NEUTROPHILS NFR BLD: 79.1 % (ref 38–73)
NRBC BLD-RTO: 0 /100 WBC
PLATELET # BLD AUTO: 210 K/UL (ref 150–450)
PMV BLD AUTO: 8.7 FL (ref 9.2–12.9)
POTASSIUM SERPL-SCNC: 3.2 MMOL/L (ref 3.5–5.1)
POTASSIUM SERPL-SCNC: 3.9 MMOL/L (ref 3.5–5.1)
RBC # BLD AUTO: 3.68 M/UL (ref 4–5.4)
SODIUM SERPL-SCNC: 143 MMOL/L (ref 136–145)
WBC # BLD AUTO: 5.91 K/UL (ref 3.9–12.7)

## 2024-10-13 PROCEDURE — 63600175 PHARM REV CODE 636 W HCPCS: Performed by: UROLOGY

## 2024-10-13 PROCEDURE — 63600175 PHARM REV CODE 636 W HCPCS: Performed by: STUDENT IN AN ORGANIZED HEALTH CARE EDUCATION/TRAINING PROGRAM

## 2024-10-13 PROCEDURE — 25000003 PHARM REV CODE 250: Performed by: NURSE PRACTITIONER

## 2024-10-13 PROCEDURE — 80048 BASIC METABOLIC PNL TOTAL CA: CPT | Performed by: NURSE PRACTITIONER

## 2024-10-13 PROCEDURE — 85730 THROMBOPLASTIN TIME PARTIAL: CPT | Performed by: NURSE PRACTITIONER

## 2024-10-13 PROCEDURE — 25000003 PHARM REV CODE 250: Performed by: STUDENT IN AN ORGANIZED HEALTH CARE EDUCATION/TRAINING PROGRAM

## 2024-10-13 PROCEDURE — 25000003 PHARM REV CODE 250: Performed by: HOSPITALIST

## 2024-10-13 PROCEDURE — 87075 CULTR BACTERIA EXCEPT BLOOD: CPT | Performed by: SPECIALIST

## 2024-10-13 PROCEDURE — 84132 ASSAY OF SERUM POTASSIUM: CPT | Performed by: NURSE PRACTITIONER

## 2024-10-13 PROCEDURE — 85025 COMPLETE CBC W/AUTO DIFF WBC: CPT | Performed by: UROLOGY

## 2024-10-13 PROCEDURE — 87205 SMEAR GRAM STAIN: CPT | Performed by: SPECIALIST

## 2024-10-13 PROCEDURE — 0T9030Z DRAINAGE OF RIGHT KIDNEY WITH DRAINAGE DEVICE, PERCUTANEOUS APPROACH: ICD-10-PCS | Performed by: STUDENT IN AN ORGANIZED HEALTH CARE EDUCATION/TRAINING PROGRAM

## 2024-10-13 PROCEDURE — 87070 CULTURE OTHR SPECIMN AEROBIC: CPT | Performed by: SPECIALIST

## 2024-10-13 PROCEDURE — 63600175 PHARM REV CODE 636 W HCPCS: Performed by: NURSE PRACTITIONER

## 2024-10-13 PROCEDURE — 21400001 HC TELEMETRY ROOM

## 2024-10-13 PROCEDURE — 25000003 PHARM REV CODE 250: Performed by: UROLOGY

## 2024-10-13 PROCEDURE — 11000001 HC ACUTE MED/SURG PRIVATE ROOM

## 2024-10-13 RX ORDER — HEPARIN SODIUM,PORCINE/D5W 25000/250
0-40 INTRAVENOUS SOLUTION INTRAVENOUS CONTINUOUS
Status: DISCONTINUED | OUTPATIENT
Start: 2024-10-13 | End: 2024-10-14

## 2024-10-13 RX ORDER — MIDAZOLAM HYDROCHLORIDE 1 MG/ML
INJECTION, SOLUTION INTRAMUSCULAR; INTRAVENOUS CODE/TRAUMA/SEDATION MEDICATION
Status: COMPLETED | OUTPATIENT
Start: 2024-10-13 | End: 2024-10-13

## 2024-10-13 RX ORDER — FENTANYL CITRATE 50 UG/ML
INJECTION, SOLUTION INTRAMUSCULAR; INTRAVENOUS CODE/TRAUMA/SEDATION MEDICATION
Status: COMPLETED | OUTPATIENT
Start: 2024-10-13 | End: 2024-10-13

## 2024-10-13 RX ORDER — HEPARIN SODIUM,PORCINE/D5W 25000/250
0-40 INTRAVENOUS SOLUTION INTRAVENOUS CONTINUOUS
Status: DISCONTINUED | OUTPATIENT
Start: 2024-10-13 | End: 2024-10-13

## 2024-10-13 RX ORDER — POTASSIUM CHLORIDE 7.45 MG/ML
10 INJECTION INTRAVENOUS
Status: COMPLETED | OUTPATIENT
Start: 2024-10-13 | End: 2024-10-13

## 2024-10-13 RX ADMIN — ZONISAMIDE 200 MG: 100 CAPSULE ORAL at 08:10

## 2024-10-13 RX ADMIN — MUPIROCIN: 20 OINTMENT TOPICAL at 08:10

## 2024-10-13 RX ADMIN — FENTANYL CITRATE 25 MCG: 0.05 INJECTION, SOLUTION INTRAMUSCULAR; INTRAVENOUS at 12:10

## 2024-10-13 RX ADMIN — POTASSIUM CHLORIDE 10 MEQ: 7.46 INJECTION, SOLUTION INTRAVENOUS at 07:10

## 2024-10-13 RX ADMIN — LEVETIRACETAM 500 MG: 500 INJECTION, SOLUTION INTRAVENOUS at 08:10

## 2024-10-13 RX ADMIN — MIDAZOLAM 0.5 MG: 1 INJECTION INTRAMUSCULAR; INTRAVENOUS at 11:10

## 2024-10-13 RX ADMIN — HEPARIN SODIUM 12 UNITS/KG/HR: 10000 INJECTION, SOLUTION INTRAVENOUS at 07:10

## 2024-10-13 RX ADMIN — LEVETIRACETAM 500 MG: 500 INJECTION, SOLUTION INTRAVENOUS at 10:10

## 2024-10-13 RX ADMIN — CEFTRIAXONE 1 G: 1 INJECTION, POWDER, FOR SOLUTION INTRAMUSCULAR; INTRAVENOUS at 01:10

## 2024-10-13 RX ADMIN — POTASSIUM CHLORIDE 10 MEQ: 7.46 INJECTION, SOLUTION INTRAVENOUS at 10:10

## 2024-10-13 RX ADMIN — CEFTRIAXONE 1 G: 1 INJECTION, POWDER, FOR SOLUTION INTRAMUSCULAR; INTRAVENOUS at 08:10

## 2024-10-13 RX ADMIN — ZONISAMIDE 200 MG: 100 CAPSULE ORAL at 09:10

## 2024-10-13 RX ADMIN — POTASSIUM BICARBONATE 25 MEQ: 978 TABLET, EFFERVESCENT ORAL at 07:10

## 2024-10-13 RX ADMIN — MUPIROCIN: 20 OINTMENT TOPICAL at 09:10

## 2024-10-13 RX ADMIN — CARBAMAZEPINE 200 MG: 200 TABLET ORAL at 08:10

## 2024-10-13 RX ADMIN — CARBAMAZEPINE 200 MG: 200 TABLET ORAL at 03:10

## 2024-10-13 RX ADMIN — FENTANYL CITRATE 25 MCG: 0.05 INJECTION, SOLUTION INTRAMUSCULAR; INTRAVENOUS at 11:10

## 2024-10-13 RX ADMIN — MIDAZOLAM 0.5 MG: 1 INJECTION INTRAMUSCULAR; INTRAVENOUS at 12:10

## 2024-10-13 RX ADMIN — CARBAMAZEPINE 200 MG: 200 TABLET ORAL at 09:10

## 2024-10-13 RX ADMIN — POTASSIUM CHLORIDE 10 MEQ: 7.46 INJECTION, SOLUTION INTRAVENOUS at 09:10

## 2024-10-13 NOTE — ASSESSMENT & PLAN NOTE
Nutrition consulted. Most recent weight and BMI monitored- Has J-tube with tube feedings at home, however has been having diarrhea.    Measurements:  Wt Readings from Last 1 Encounters:   10/10/24 46.7 kg (102 lb 15.3 oz)   Body mass index is 17.13 kg/m².      Weight Loss (Malnutrition): greater than 7.5% in 3 months  1. Initiate pt onto continuous Enteral nutrition, recommend [employer] peptide 1.5 (vanilla) via J tube, goal rate 30 mL/hr, starting at 10 mL/hr then progress to goal rate within 24 hrs if pt is tolerating or per MD/NP -Formula at goal rate provides: 1107 kcals/day (79% EEN), 53 g protein/day (113% EPN), 99 g CHO/day (57% CHO needs), 504 mL free formula water/day -150 mL q4h free water flushes (900 mL/day) = total from formula + FWF = 1404 mL water/day, per MD/NP -Check Mg, K+, Na, Phos and Glu before and during initation, correct as indicated 2. Recommend Banatrol TID to assist with diarrhea or BID for loose stools, as warranted 3. Weigh twice weekly

## 2024-10-13 NOTE — PROCEDURES
Radiology Post-Procedure Note    Pre Op Diagnosis: right hydronephrosis    Post Op Diagnosis: right hydronephrosis    Procedure:right percutaneous nephrostomy    Procedure performed by: Stephanie Cintron MD    Written Informed Consent Obtained: Yes    Specimen Removed: YES Sent to lab for culture    Estimated Blood Loss: Minimal    Findings: Local anesthesia and moderate sedation were used.      Ultrasound and fluoroscopy were used to localize the right collecting system and a percutaneous catheter was introduced.  Position of the catheter in the collecting system was confirmed using injection of iodinated contrast.  Dark brown cloudy urine was aspirated with sample sent to the lab for culture.  Given concern for infection, additional manipulation/stent placement was not performed.    The patient tolerated the procedure well and there were no complications.  A specimen was sent to the lab for further analysis and culture. Please see Imaging report for further details.    Plan:  Follow-up urine culture.  Monitor output.  Follow-up with urology.  Antegrade ureteral stent placement may be performed as inpatient or outpatient after adequate decompression.    Stephanie Cintron MD  Interventional Radiology

## 2024-10-13 NOTE — SUBJECTIVE & OBJECTIVE
Interval History: f/u hydronephrosis. See HPI    Review of Systems   Constitutional:  Negative for fatigue and fever.   HENT:  Negative for congestion.    Respiratory:  Negative for cough and shortness of breath.    Cardiovascular:  Negative for chest pain and palpitations.   Gastrointestinal:  Positive for abdominal pain. Negative for constipation, nausea and vomiting.   Genitourinary:  Negative for difficulty urinating and dysuria.   Neurological:  Positive for weakness.   All other systems reviewed and are negative.    Objective:     Vital Signs (Most Recent):  Temp: 98.3 °F (36.8 °C) (10/13/24 1630)  Pulse: 80 (10/13/24 1630)  Resp: 20 (10/13/24 1630)  BP: 125/79 (10/13/24 1630)  SpO2: 97 % (10/13/24 1630) Vital Signs (24h Range):  Temp:  [98.1 °F (36.7 °C)-98.5 °F (36.9 °C)] 98.3 °F (36.8 °C)  Pulse:  [72-94] 80  Resp:  [16-20] 20  SpO2:  [95 %-99 %] 97 %  BP: (117-132)/(63-79) 125/79     Weight: 46.7 kg (102 lb 15.3 oz)  Body mass index is 17.13 kg/m².    Intake/Output Summary (Last 24 hours) at 10/13/2024 1733  Last data filed at 10/13/2024 1630  Gross per 24 hour   Intake --   Output 450 ml   Net -450 ml         Physical Exam  Vitals and nursing note reviewed.   Constitutional:       Appearance: She is cachectic. She is ill-appearing.   Cardiovascular:      Rate and Rhythm: Normal rate and regular rhythm.      Heart sounds: No murmur heard.  Pulmonary:      Effort: Pulmonary effort is normal.      Breath sounds: Normal breath sounds. No wheezing or rales.   Abdominal:      General: Bowel sounds are normal.      Palpations: Abdomen is soft.      Tenderness: There is abdominal tenderness.      Comments: PEG tube to lower abdomen   Musculoskeletal:         General: Normal range of motion.   Skin:     General: Skin is warm and dry.   Neurological:      General: No focal deficit present.      Mental Status: She is alert and oriented to person, place, and time.      Motor: Weakness present.   Psychiatric:          Mood and Affect: Mood normal.         Behavior: Behavior normal.             Significant Labs: All pertinent labs within the past 24 hours have been reviewed.  CBC:   Recent Labs   Lab 10/12/24  1221 10/13/24  0343   WBC 9.42 5.91   HGB 10.8* 11.8*   HCT 31.4* 34.7*    210     CMP:   Recent Labs   Lab 10/12/24  1221 10/13/24  0343 10/13/24  1302    143  --    K 2.8* 3.2* 3.9   * 114*  --    CO2 19* 19*  --    GLU 84 92  --    BUN 26* 26*  --    CREATININE 1.2 0.9  --    CALCIUM 7.4* 7.2*  --    PROT 5.0*  --   --    ALBUMIN 1.9*  --   --    BILITOT 0.4  --   --    ALKPHOS 152*  --   --    AST 14  --   --    ALT 14  --   --    ANIONGAP 12 10  --        Significant Imaging: I have reviewed all pertinent imaging results/findings within the past 24 hours.

## 2024-10-13 NOTE — ASSESSMENT & PLAN NOTE
SHELLEY is likely due to  multifactorial due to obstructing ureteral stone as well as dehydration from diarrhea/poor oral intake . Baseline creatinine is  0.7 (normal) . Most recent creatinine and eGFR are listed below.    Recent Labs     10/11/24  0848 10/12/24  1221 10/13/24  0343   CREATININE 1.6* 1.2 0.9   EGFRNORACEVR 34* 48* >60       - SHELLEY improving  - improved with IVF hydration  - Avoid nephrotoxins and renally dose meds for GFR listed above  - Monitor urine output, serial BMP, and adjust therapy as needed

## 2024-10-13 NOTE — ASSESSMENT & PLAN NOTE
- Right-sided hydronephrosis with approximately 8 mm mid right ureteral obstructing stone   - Urology consulted (per ED, Dr. Kelly)  - S/p cystoscopy 10/10/24 - noted impacted right ureteral stone, unable to place stent  - s/p IR placement of right nephrostomy tube 10/13, will need to follow up with urology, can place antegrade stent after adequate decompression as inpt/outpt  - Roca in place, monitor I/O's

## 2024-10-13 NOTE — INTERVAL H&P NOTE
The patient has been examined and the H&P has been reviewed:    I concur with the findings and changes have been noted since the H&P was written: Retrograde attempt at ureteral stent placement not successful.  Patient presents for right PCN placement with possible antegrade ureteral stent.    Procedure risks, benefits and alternative options discussed and understood by patient/family.          Active Hospital Problems    Diagnosis  POA    *Ureteral stone with hydronephrosis [N13.2]  Yes    Hypokalemia [E87.6]  No    Acute cystitis with hematuria [N30.01]  Yes    SHELLEY (acute kidney injury) [N17.9]  Yes    Metabolic acidosis [E87.20]  Yes    Moderate protein-calorie malnutrition [E44.0]  Yes    Jejunostomy tube present [Z93.4]  Not Applicable    Diarrhea [R19.7]  Yes    Gastric adenocarcinoma [C16.9]  Yes    Hx of Epileptic seizures [G40.909]  Yes     Chronic    DVT (deep venous thrombosis) [I82.409]  Yes     Chronic      Resolved Hospital Problems   No resolved problems to display.

## 2024-10-13 NOTE — PROGRESS NOTES
[facility] [place] [facility]  Progress Note    Patient Name: Cheryl Kirkland  MRN: [id number]  Patient Class: IP- Inpatient   Admission Date: 10/9/2024  Length of Stay: 4 days  Attending Physician: Zara Pro MD  Primary Care Provider: Gagandeep Iglesias MD        Subjective:     Principal Problem:Ureteral stone with hydronephrosis        HPI:  Patient is a 74-year-old female with past medical history significant for hypertension, hyperlipidemia, DVT, epilepsy, neuropathy, anemia, GERD, gastritis, polyps,  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  On exam she is noted to have left lower quadrant tenderness, appears frail and emaciated, has not been taking all of her scheduled medications and is hesitant to use pain medication, stating that she does not want to be addicted to pain medication.  Vital signs on arrival stable-- blood pressure 110/78, heart rate 95, temp 97.3° respirations 12, 97% SpO2 on room air.   Significant labs include CO2 6, BUN 21, creatinine 2.0, alk-phos 210, venous blood gas shows pH 7.030 and HC03 4.5, urinalysis with hazy appearance, 3+ blood, trace leukocyte esterase, greater than 100 RBCs, 31 WBCs, rare bacteria.  Stool studies negative for occult blood, negative for rotavirus, negative for C diff.  Stool culture is in progress.  EKG showed SR with PAC's in pattern of bigeminy with nonspecific T wave abnormality. CT abdomen and pelvis without contrast was done which revealed right-sided hydronephrosis with approximately 8 mm mid right ureteral obstructing stone.  Urology was consulted and recommended NPO after  midnight and will see patient in AM, also recommended urinalysis with urine culture which has been collected and does show acute cystitis with hematuria. While in ED, fluid bolus 1.5L, sodium bicarb --2 amps, morphine, Zofran, and Rocephin were all given. Hospital Medicine was consulted for admission due to SHELLEY, obstructing right ureteral stone 8mm and right sided hydronephrosis.     During exam, patient complains of pain and tightness in right leg so US was ordered and she was found to have extensive clot from right common femoral vein to right posterior tibial vein as well as occluded anterior tibial vein. IR was consulted and will evaluate for possible intervention in AM, heparin drip was recommended and ordered.     Overview/Hospital Course:  10/10/24  73 y/o female with gastric adenocarcinoma, here with abdominal pain  Patient rc/o lower quadrant abdominal pain, nausea, RLE pain, fatigue  Found to have 8 mm right sided obstructing ureteral stone and right sided hydronephrosis  Also with RLE extensive DVT; patient states she was previously on AC but stopped due to gastric cancer/bleeding  Urology with plans for stent placement today  IR with plans for RLE thrombectomy tomorrow morning   at bedside, updated    10/11/24  NAEON, patient resting in bed, feels some better today  Patient s/p cystoscopy yesterday with Dr. Kelly  Patient has impacted right ureteral stone, unable to place stent  Roca currently in place, continue ceftriaxone  Cr trending down with IV fluids  K 2.2, replete PO and IV, recheck this afternoon  Plans for right nephrostomy tube today  Plans for RLE thrombectomy for extensive DVT pending  Continue heparin drip, plan to transition to NOAC after thrombectomy    10/12  Awake, alert, lying in bed, cachetic, NPO since yesterday waiting on right nephrostomy tube/stent, was unable to do yesterday because of hypokalemia. Discussed with IR today, will give K riders today and repeat tomorrow  and plan for procedure tomorrow if Potassium stable.   IR said no indication for thrombectomy at this time  Cont heparin gtt and hold after midnight  Restart tube feedings, hold after midnight    10/13  Awake, alert, lying in bed,  at bedside  K 3.2 this am, given K rider and Effer-K, repeat K 3.9  IR placed a right nephrostomy tube today, no stent because of concern for infection, will need to follow up with urology and can have a antegrade stent placed inpatient/outpatient after adequate decompression    Interval History: f/u hydronephrosis. See HPI    Review of Systems   Constitutional:  Negative for fatigue and fever.   HENT:  Negative for congestion.    Respiratory:  Negative for cough and shortness of breath.    Cardiovascular:  Negative for chest pain and palpitations.   Gastrointestinal:  Positive for abdominal pain. Negative for constipation, nausea and vomiting.   Genitourinary:  Negative for difficulty urinating and dysuria.   Neurological:  Positive for weakness.   All other systems reviewed and are negative.    Objective:     Vital Signs (Most Recent):  Temp: 98.3 °F (36.8 °C) (10/13/24 1630)  Pulse: 80 (10/13/24 1630)  Resp: 20 (10/13/24 1630)  BP: 125/79 (10/13/24 1630)  SpO2: 97 % (10/13/24 1630) Vital Signs (24h Range):  Temp:  [98.1 °F (36.7 °C)-98.5 °F (36.9 °C)] 98.3 °F (36.8 °C)  Pulse:  [72-94] 80  Resp:  [16-20] 20  SpO2:  [95 %-99 %] 97 %  BP: (117-132)/(63-79) 125/79     Weight: 46.7 kg (102 lb 15.3 oz)  Body mass index is 17.13 kg/m².    Intake/Output Summary (Last 24 hours) at 10/13/2024 1733  Last data filed at 10/13/2024 1630  Gross per 24 hour   Intake --   Output 450 ml   Net -450 ml         Physical Exam  Vitals and nursing note reviewed.   Constitutional:       Appearance: She is cachectic. She is ill-appearing.   Cardiovascular:      Rate and Rhythm: Normal rate and regular rhythm.      Heart sounds: No murmur heard.  Pulmonary:      Effort: Pulmonary effort is normal.       Breath sounds: Normal breath sounds. No wheezing or rales.   Abdominal:      General: Bowel sounds are normal.      Palpations: Abdomen is soft.      Tenderness: There is abdominal tenderness.      Comments: PEG tube to lower abdomen   Musculoskeletal:         General: Normal range of motion.   Skin:     General: Skin is warm and dry.   Neurological:      General: No focal deficit present.      Mental Status: She is alert and oriented to person, place, and time.      Motor: Weakness present.   Psychiatric:         Mood and Affect: Mood normal.         Behavior: Behavior normal.             Significant Labs: All pertinent labs within the past 24 hours have been reviewed.  CBC:   Recent Labs   Lab 10/12/24  1221 10/13/24  0343   WBC 9.42 5.91   HGB 10.8* 11.8*   HCT 31.4* 34.7*    210     CMP:   Recent Labs   Lab 10/12/24  1221 10/13/24  0343 10/13/24  1302    143  --    K 2.8* 3.2* 3.9   * 114*  --    CO2 19* 19*  --    GLU 84 92  --    BUN 26* 26*  --    CREATININE 1.2 0.9  --    CALCIUM 7.4* 7.2*  --    PROT 5.0*  --   --    ALBUMIN 1.9*  --   --    BILITOT 0.4  --   --    ALKPHOS 152*  --   --    AST 14  --   --    ALT 14  --   --    ANIONGAP 12 10  --        Significant Imaging: I have reviewed all pertinent imaging results/findings within the past 24 hours.    Assessment/Plan:      * Ureteral stone with hydronephrosis  - Right-sided hydronephrosis with approximately 8 mm mid right ureteral obstructing stone   - Urology consulted (per ED, Dr. Kelly)  - S/p cystoscopy 10/10/24 - noted impacted right ureteral stone, unable to place stent  - s/p IR placement of right nephrostomy tube 10/13, will need to follow up with urology, can place antegrade stent after adequate decompression as inpt/outpt  - Roca in place, monitor I/O's    Hypokalemia  Patient's most recent potassium results are listed below.   Recent Labs     10/12/24  1221 10/13/24  0343 10/13/24  1302   K 2.8* 3.2* 3.9       Plan  -  Replete potassium per protocol  - Monitor potassium Every 12 hours  - Patient's hypokalemia is worsening. Will adjust treatment as follows: add PO and IV KCl    Acute cystitis with hematuria  - Continue ceftriaxone, day # 3  - urine culture negative    Metabolic acidosis  - with SHELLEY and obstructing ureteral stone as well as UTI and dehydration/diarrhea  - lactic acid was 2.6 > 2.0  - improved with IVF hydration and bicarb drip  - cont IV Rocephin   - monitor labs daily    SHELLEY (acute kidney injury)  SHELLEY is likely due to  multifactorial due to obstructing ureteral stone as well as dehydration from diarrhea/poor oral intake . Baseline creatinine is  0.7 (normal) . Most recent creatinine and eGFR are listed below.    Recent Labs     10/11/24  0848 10/12/24  1221 10/13/24  0343   CREATININE 1.6* 1.2 0.9   EGFRNORACEVR 34* 48* >60       - SHELLEY improving  - improved with IVF hydration  - Avoid nephrotoxins and renally dose meds for GFR listed above  - Monitor urine output, serial BMP, and adjust therapy as needed    Moderate protein-calorie malnutrition  Nutrition consulted. Most recent weight and BMI monitored- Has J-tube with tube feedings at home, however has been having diarrhea.    Measurements:  Wt Readings from Last 1 Encounters:   10/10/24 46.7 kg (102 lb 15.3 oz)   Body mass index is 17.13 kg/m².      Weight Loss (Malnutrition): greater than 7.5% in 3 months  1. Initiate pt onto continuous Enteral nutrition, recommend [employer] peptide 1.5 (vanilla) via J tube, goal rate 30 mL/hr, starting at 10 mL/hr then progress to goal rate within 24 hrs if pt is tolerating or per MD/NP -Formula at goal rate provides: 1107 kcals/day (79% EEN), 53 g protein/day (113% EPN), 99 g CHO/day (57% CHO needs), 504 mL free formula water/day -150 mL q4h free water flushes (900 mL/day) = total from formula + FWF = 1404 mL water/day, per MD/NP -Check Mg, K+, Na, Phos and Glu before and during initation, correct as indicated 2. Recommend  Banatrol TID to assist with diarrhea or BID for loose stools, as warranted 3. Weigh twice weekly      Jejunostomy tube present  - Complains of pain around j-tube  - restart tube feedings after nephrostomy tube placed today    Diarrhea  - C diff negative  - Imodium PRN    Gastric adenocarcinoma  - Followed by Dr. Ambrose and Dr. Jerome  - S/p total gastrectomy on 8/20, with lysis of adhesions, lymphadenectomy, and jejunostomy tube placement, d/c on 9/10  - Pathologic stage from 8/20/2024: Stage III (ypT2, pN3a, cM0), completed 5 cycles Keytruda prior to surgery    DVT (deep venous thrombosis)  - US - RLE extensive clot from the right common femoral vein to the right posterior tibial vein.  Occluded anterior tibial vein  - IR consulted for thrombectomy, not indicated at this time per IR  - Continue heparin drip, hold after midnight for nephrostomy tube placement tomorrow  - Heme/Onc consulted for AC reccs    Hx of Epileptic seizures  - No recent seizure activity  - Continue Keppra      VTE Risk Mitigation (From admission, onward)           Ordered     heparin 25,000 units in dextrose 5% (100 units/ml) IV bolus from bag HIGH INTENSITY nomogram - [facility]  As needed (PRN)        Question:  Heparin Infusion Adjustment (DO NOT MODIFY ANSWER)  Answer:  \\[email or web address]\Images\Pharmacy\HeparinInfusions\heparin HIGH INTENSITY nomogram for [facility] [id number]    10/13/24 1725     heparin 25,000 units in dextrose 5% (100 units/ml) IV bolus from bag HIGH INTENSITY nomogram - [facility]  As needed (PRN)        Question:  Heparin Infusion Adjustment (DO NOT MODIFY ANSWER)  Answer:  \\[email or web address]\epic\Images\Pharmacy\HeparinInfusions\heparin HIGH INTENSITY nomogram for [facility] [id number]    10/13/24 1725     heparin 25,000 units in dextrose 5% (100 units/ml) IV bolus from bag HIGH INTENSITY nomogram - [facility]  Once        Question:  Heparin Infusion Adjustment (DO NOT MODIFY ANSWER)  Answer:  \[email or web address]\epic\Images\Pharmacy\HeparinInfusions\heparin  HIGH INTENSITY nomogram for [facility] [id number]    10/13/24 1725     heparin 25,000 units in dextrose 5% 250 mL (100 units/mL) infusion HIGH INTENSITY nomogram - [facility]  Continuous        Question:  Begin at (units/kg/hr)  Answer:  18    10/13/24 1725     Reason for No Pharmacological VTE Prophylaxis  Once        Comments: Heparin drip ordered for acute occlusive DVT   Question:  Reasons:  Answer:  Physician Provided (leave comment)    10/09/24 2027     IP VTE HIGH RISK PATIENT  Once         10/09/24 2027     heparin 25,000 units in dextrose 5% 250 mL (100 units/mL) infusion HIGH INTENSITY nomogram - [facility]  Continuous        Question:  Begin at (units/kg/hr)  Answer:  18    10/09/24 2013                    Discharge Planning   SOFIYA:      Code Status: Full Code   Is the patient medically ready for discharge?:     Reason for patient still in hospital (select all that apply): Patient trending condition, Laboratory test, Treatment, and Consult recommendations  Discharge Plan A: Home Health              Chen Sullivan NP  Department of Hospital Medicine   [facility] - Med Surg

## 2024-10-14 ENCOUNTER — DOCUMENTATION ONLY (OUTPATIENT)
Dept: HEMATOLOGY/ONCOLOGY | Facility: CLINIC | Age: 74
End: 2024-10-14
Payer: MEDICARE

## 2024-10-14 LAB
ANION GAP SERPL CALC-SCNC: 10 MMOL/L (ref 8–16)
APTT PPP: 61 SEC (ref 21–32)
BASOPHILS # BLD AUTO: 0.02 K/UL (ref 0–0.2)
BASOPHILS NFR BLD: 0.3 % (ref 0–1.9)
BUN SERPL-MCNC: 21 MG/DL (ref 8–23)
CALCIUM SERPL-MCNC: 7.5 MG/DL (ref 8.7–10.5)
CHLORIDE SERPL-SCNC: 111 MMOL/L (ref 95–110)
CO2 SERPL-SCNC: 20 MMOL/L (ref 23–29)
CREAT SERPL-MCNC: 0.8 MG/DL (ref 0.5–1.4)
DIFFERENTIAL METHOD BLD: ABNORMAL
EOSINOPHIL # BLD AUTO: 0.1 K/UL (ref 0–0.5)
EOSINOPHIL NFR BLD: 1.1 % (ref 0–8)
ERYTHROCYTE [DISTWIDTH] IN BLOOD BY AUTOMATED COUNT: 15.6 % (ref 11.5–14.5)
EST. GFR  (NO RACE VARIABLE): >60 ML/MIN/1.73 M^2
GLUCOSE SERPL-MCNC: 86 MG/DL (ref 70–110)
HCT VFR BLD AUTO: 31.7 % (ref 37–48.5)
HGB BLD-MCNC: 10.5 G/DL (ref 12–16)
IMM GRANULOCYTES # BLD AUTO: 0.13 K/UL (ref 0–0.04)
IMM GRANULOCYTES NFR BLD AUTO: 2 % (ref 0–0.5)
LYMPHOCYTES # BLD AUTO: 1 K/UL (ref 1–4.8)
LYMPHOCYTES NFR BLD: 15.5 % (ref 18–48)
MCH RBC QN AUTO: 31.8 PG (ref 27–31)
MCHC RBC AUTO-ENTMCNC: 33.1 G/DL (ref 32–36)
MCV RBC AUTO: 96 FL (ref 82–98)
MONOCYTES # BLD AUTO: 0.6 K/UL (ref 0.3–1)
MONOCYTES NFR BLD: 9.5 % (ref 4–15)
NEUTROPHILS # BLD AUTO: 4.6 K/UL (ref 1.8–7.7)
NEUTROPHILS NFR BLD: 71.6 % (ref 38–73)
NRBC BLD-RTO: 0 /100 WBC
PLATELET # BLD AUTO: 162 K/UL (ref 150–450)
PMV BLD AUTO: 9.3 FL (ref 9.2–12.9)
POTASSIUM SERPL-SCNC: 3.3 MMOL/L (ref 3.5–5.1)
RBC # BLD AUTO: 3.3 M/UL (ref 4–5.4)
SODIUM SERPL-SCNC: 141 MMOL/L (ref 136–145)
WBC # BLD AUTO: 6.4 K/UL (ref 3.9–12.7)

## 2024-10-14 PROCEDURE — 85025 COMPLETE CBC W/AUTO DIFF WBC: CPT | Performed by: NURSE PRACTITIONER

## 2024-10-14 PROCEDURE — 11000001 HC ACUTE MED/SURG PRIVATE ROOM

## 2024-10-14 PROCEDURE — 63600175 PHARM REV CODE 636 W HCPCS: Performed by: UROLOGY

## 2024-10-14 PROCEDURE — 85730 THROMBOPLASTIN TIME PARTIAL: CPT | Performed by: SPECIALIST

## 2024-10-14 PROCEDURE — 80048 BASIC METABOLIC PNL TOTAL CA: CPT | Performed by: NURSE PRACTITIONER

## 2024-10-14 PROCEDURE — 21400001 HC TELEMETRY ROOM

## 2024-10-14 PROCEDURE — 97530 THERAPEUTIC ACTIVITIES: CPT | Mod: CQ

## 2024-10-14 PROCEDURE — 97530 THERAPEUTIC ACTIVITIES: CPT

## 2024-10-14 PROCEDURE — 25000003 PHARM REV CODE 250: Performed by: HOSPITALIST

## 2024-10-14 PROCEDURE — 25000003 PHARM REV CODE 250: Performed by: UROLOGY

## 2024-10-14 RX ADMIN — APIXABAN 10 MG: 2.5 TABLET, FILM COATED ORAL at 12:10

## 2024-10-14 RX ADMIN — CARBAMAZEPINE 200 MG: 200 TABLET ORAL at 02:10

## 2024-10-14 RX ADMIN — OXYCODONE HYDROCHLORIDE 10 MG: 5 TABLET ORAL at 09:10

## 2024-10-14 RX ADMIN — CEFTRIAXONE 1 G: 1 INJECTION, POWDER, FOR SOLUTION INTRAMUSCULAR; INTRAVENOUS at 09:10

## 2024-10-14 RX ADMIN — APIXABAN 10 MG: 2.5 TABLET, FILM COATED ORAL at 09:10

## 2024-10-14 RX ADMIN — OXYCODONE HYDROCHLORIDE 10 MG: 5 TABLET ORAL at 01:10

## 2024-10-14 RX ADMIN — CARBAMAZEPINE 200 MG: 200 TABLET ORAL at 09:10

## 2024-10-14 RX ADMIN — MUPIROCIN: 20 OINTMENT TOPICAL at 09:10

## 2024-10-14 RX ADMIN — ONDANSETRON 4 MG: 2 INJECTION INTRAMUSCULAR; INTRAVENOUS at 09:10

## 2024-10-14 RX ADMIN — LEVETIRACETAM 500 MG: 500 INJECTION, SOLUTION INTRAVENOUS at 09:10

## 2024-10-14 RX ADMIN — LEVETIRACETAM 500 MG: 500 INJECTION, SOLUTION INTRAVENOUS at 10:10

## 2024-10-14 RX ADMIN — ZONISAMIDE 200 MG: 100 CAPSULE ORAL at 09:10

## 2024-10-14 RX ADMIN — OXYCODONE HYDROCHLORIDE 10 MG: 5 TABLET ORAL at 12:10

## 2024-10-14 NOTE — PT/OT/SLP PROGRESS
Physical Therapy  Treatment    Cheryl Kirkland   MRN: [id number]   Admitting Diagnosis: Ureteral stone with hydronephrosis    PT Received On: 10/14/24  PT Start Time: 0755     PT Stop Time: 0805    PT Total Time (min): 10 min       Billable Minutes:  Therapeutic Activity 10    Treatment Type: Treatment  PT/PTA: PTA     Number of PTA visits since last PT visit: 1       General Precautions: Standard, fall  Orthopedic Precautions: N/A  Braces: N/A  Respiratory Status: Room air         Subjective:  Communicated with charge nurse, Flores, and completed Epic chart review prior to session.  Patient declined to participate in PT session at this time due to complaints of fatigue and lethargy.     Pain/Comfort  Pain Rating 1: 0/10  Pain Rating Post-Intervention 1: 0/10    Objective:   Patient found with: peripheral IV, telemetry, PEG Tube, marie catheter    Educated patient on importance of full and active participation in functional mobility training to prevent further loss of ROM and strength.   Encouraged patient to request assistance from nursing staff to get OOB at least 3x/day with all meals in addition to ambulating to/from the bathroom to promote recovery of CV endurance. Also encouraged patient to perform AROM TE to BLE throughout the day within all available planes of motion. Re enforced importance of utilizing call light to meet needs in room and not attempt to get up without staff assistance. Patient verbalized understanding of all education given and agreed to comply.     AM-PAC 6 CLICK MOBILITY  How much help from another person does this patient currently need?   1 = Unable, Total/Dependent Assistance  2 = A lot, Maximum/Moderate Assistance  3 = A little, Minimum/Contact Guard/Supervision  4 = None, Modified [place]/Independent    Turning over in bed (including adjusting bedclothes, sheets and blankets)?: 1 (REF)  Sitting down on and standing up from a chair with arms (e.g., wheelchair, bedside  commode, etc.): 1 (REF)  Moving from lying on back to sitting on the side of the bed?: 1 (REF)  Moving to and from a bed to a chair (including a wheelchair)?: 1 (REF)  Need to walk in hospital room?: 1 (REF)  Climbing 3-5 steps with a railing?: 1 (NT)  Basic Mobility Total Score: 6    AM-PAC Raw Score CMS G-Code Modifier Level of Impairment Assistance   6 % Total / Unable   7 - 9 CM 80 - 100% Maximal Assist   10 - 14 CL 60 - 80% Moderate Assist   15 - 19 CK 40 - 60% Moderate Assist   20 - 22 CJ 20 - 40% Minimal Assist   23 CI 1-20% SBA / CGA   24 CH 0% Independent/ Mod I     Patient left HOB elevated with call button in reach.    Assessment:  Cheryl Kirkland is a 74 y.o. female with a medical diagnosis of Ureteral stone with hydronephrosis and presents with overall decline in functional mobility. Patient would continue to benefit from skilled PT to address functional limitations listed below in order to return to PLOF/decrease caregiver burden.      Rehab identified problem list/impairments: weakness, impaired endurance, impaired self care skills, impaired functional mobility, gait instability, impaired balance, decreased safety awareness, decreased upper extremity function, decreased lower extremity function, decreased coordination, decreased ROM, impaired cardiopulmonary response to activity    Rehab potential is fair.    Activity tolerance: unable to determine    Discharge recommendations: Low Intensity Therapy      Barriers to discharge:      Equipment recommendations: other (see comments) (TBD)     GOALS:   Multidisciplinary Problems       Physical Therapy Goals          Problem: Physical Therapy    Goal Priority Disciplines Outcome Interventions   Physical Therapy Goal     PT, PT/OT     Description: LTG: 10/24/24  1. PT WILL COMPLETE BED MOBILITY IND  2. PT WILL STAND T/F TO CHAIR IND FOR OOB TOLERANCE.  3. PT WILL GT TRAIN X 500' WITH NO AD IND TO INC ENDURANCE  4. PT WILL INC AMPAC SCORE BY  2 POINTS TO PROGRESS GROSS FUNC MOBILITY.                          PLAN:    Patient to be seen 3 x/week to address the above listed problems via gait training, therapeutic activities, therapeutic exercises  Plan of Care expires: 10/24/24  Plan of Care reviewed with: patient, spouse         10/14/2024

## 2024-10-14 NOTE — PLAN OF CARE
10/14/24 0839   Rounds   Attendance Provider;Physical therapist;;Charge nurse   Discharge Plan A Other  (tbd)   Why the patient remains in the hospital Requires continued medical care   Transition of Care Barriers None

## 2024-10-14 NOTE — PT/OT/SLP PROGRESS
"Occupational Therapy  Treatment    Cheryl Kirkland   MRN: [id number]   Admitting Diagnosis: Ureteral stone with hydronephrosis    OT Date of Treatment: 10/14/24   OT Start Time: 0955  OT Stop Time: 1010  OT Total Time (min): 15 min    Billable Minutes:  Therapeutic Activity 15 minutes    OT/PARAMJIT: OT          General Precautions: Standard, fall  Orthopedic Precautions: N/A  Braces: N/A  Respiratory Status: Room air         Subjective:  Communicated with nurse and epic chart review prior to session.    Pain/Comfort  Pain Rating 1: 10/10  Location - Side 1: Left  Location - Orientation 1: generalized  Location 1: arm    Objective:  Patient found with: peripheral IV, telemetry, marie catheter, PEG Tube     Functional Mobility:  Therapeutic Activities and Exercises:  Educated patient on importance of increased tolerance to upright position and direct impact on CV endurance and strength. Patient encouraged to sit up in chair/ EOB, for a minimum of 2 consecutive hours including for all meals.Pt educated on HEP. Pt attempted 1 set x shoulder flexion and elbow flex/ext. Pt stated," I really don't feel good, not too day." Encouraged patient to perform AROM TE to BUE throughout the day within all available planes of motion. Re enforced importance of utilizing call light to meet needs in room and not attempt to get up without staff assistance. Patient verbalized understanding and agreed to comply.           AM-PAC 6 CLICK ADL   How much help from another person does this patient currently need?   1 = Unable, Total/Dependent Assistance  2 = A lot, Maximum/Moderate Assistance  3 = A little, Minimum/Contact Guard/Supervision  4 = None, Modified [place]/Independent    Putting on and taking off regular lower body clothing? : 2  Bathing (including washing, rinsing, drying)?: 2  Toileting, which includes using toilet, bedpan, or urinal? : 2  Putting on and taking off regular upper body clothing?: 2  Taking care of personal " "grooming such as brushing teeth?: 2  Eating meals?: 2  Daily Activity Total Score: 12     AM-PAC Raw Score CMS "G-Code Modifier Level of Impairment Assistance   6 % Total / Unable   7 - 8 CM 80 - 100% Maximal Assist   9-13 CL 60 - 80% Moderate Assist   14 - 19 CK 40 - 60% Moderate Assist   20 - 22 CJ 20 - 40% Minimal Assist   23 CI 1-20% SBA / CGA   24 CH 0% Independent/ Mod I       Patient left up in chair with all lines intact, call button in reach, and nurse Christy notified    ASSESSMENT:  Cheryl Kirkland is a 74 y.o. female with a medical diagnosis of Ureteral stone with hydronephrosis and presents with debility and generalized weakness.    Rehab identified problem list/impairments:  weakness, impaired endurance, decreased coordination, decreased upper extremity function, impaired self care skills, impaired functional mobility, decreased lower extremity function, gait instability, decreased safety awareness, pain, impaired balance    Rehab potential is good.    Activity tolerance: Good    Discharge recommendations: Low Intensity Therapy   Barriers to discharge:      Equipment recommendations: other (see comments)    GOALS:   Multidisciplinary Problems       Occupational Therapy Goals          Problem: Occupational Therapy    Goal Priority Disciplines Outcome Interventions   Occupational Therapy Goal     OT, PT/OT Progressing    Description: OT goals met 10-24-24  Pt will tolerate 1 set x 15 reps b ue rom exercise  (I) with toilet transfers  (I) with le dressing                       Plan:  Patient to be seen 2 x/week to address the above listed problems via self-care/home management, therapeutic activities, therapeutic exercises  Plan of Care expires: 10/25/24  Plan of Care reviewed with: patient         10/14/2024  "

## 2024-10-14 NOTE — PROGRESS NOTES
"Oncology Navigation   Intake  Date of Diagnosis: 24  Cancer Type: GI  Type of Referral: Internal  Date of Referral: 24  Initial Nurse Navigator Contact: 24  Referral to Initial Contact Timeline (days): 4  Date Worked: 24  Reason if booked > 7 days after scheduling: Specific provider / access; Additional tests/procedures  Start of Treatment: 04/15/24     Treatment  Current Status: Active  Date Presented to Tumor Board: 24  Presentation Notes: proceed with systemic therapy    Surgical Oncologist: Dr. Chen Jerome    Medical Oncologist: Dr. Claudia Ambrose  Chemotherapy: Initiated  Chemotherapy Regimen: pembrolizumab 200mg Q3W  Delays/Reductions Due to:: Other  Other: pt admitted to [facility]  Immunotherapy: Planned  Immunotherapy Name: Pembrolizumab  Start Date: 24       Procedures: PET scan; CT  CT Schedule Date: 24 (24)  PET Scan Schedule Date: 24    General Referrals: Chemo Education; Dietitian; Social Work  Dietitian Referral Date: 24  Social Work Referral Date: 24          Support Systems: Family members     Acuity  Systemic Treatment - predicted or initiated: Immunotherapy (+2)  Treatment Tolerability: 2  ECO  Support: 1 ( is asking for SNF admit)  Verbalizes Financial Concerns: 0  Transportation: 0  History of noncompliance/frequent no shows and cancellations: 0  Verbalizes the need for more education: 0  Other Factors (+1 for Each): 0  Navigation Acuity: 7     Follow Up  No follow-ups on file.       Met with pt during inpt admission at hospital for SHELLEY. PT states that she is feeling "so weak" and "too tired" ONN listened and allowed pt to talk about her frustrations with her illness and where is she is on that journey. Pt's  states that he has asked for her to be placed in a SNF upon dc from inpt. Listened and allowed pt's  to express his feelings on the matter. He does not feel he is up to the task of caring for his " "wife in this state and believes that home health is "not enough" All questions and concerns addressed at this time to the apparent satisfaction of the above parties. ONN will remain available     "

## 2024-10-15 PROBLEM — N30.01 ACUTE CYSTITIS WITH HEMATURIA: Status: RESOLVED | Noted: 2024-10-10 | Resolved: 2024-10-15

## 2024-10-15 PROBLEM — E87.6 HYPOKALEMIA: Status: RESOLVED | Noted: 2024-10-11 | Resolved: 2024-10-15

## 2024-10-15 PROBLEM — R19.7 DIARRHEA: Status: RESOLVED | Noted: 2024-08-31 | Resolved: 2024-10-15

## 2024-10-15 PROBLEM — E87.20 METABOLIC ACIDOSIS: Status: RESOLVED | Noted: 2024-10-09 | Resolved: 2024-10-15

## 2024-10-15 PROBLEM — N17.9 AKI (ACUTE KIDNEY INJURY): Status: RESOLVED | Noted: 2024-10-09 | Resolved: 2024-10-15

## 2024-10-15 LAB
ANION GAP SERPL CALC-SCNC: 9 MMOL/L (ref 8–16)
APTT PPP: 55.1 SEC (ref 21–32)
BUN SERPL-MCNC: 18 MG/DL (ref 8–23)
CALCIUM SERPL-MCNC: 7.3 MG/DL (ref 8.7–10.5)
CHLORIDE SERPL-SCNC: 109 MMOL/L (ref 95–110)
CO2 SERPL-SCNC: 22 MMOL/L (ref 23–29)
CREAT SERPL-MCNC: 0.7 MG/DL (ref 0.5–1.4)
EST. GFR  (NO RACE VARIABLE): >60 ML/MIN/1.73 M^2
GLUCOSE SERPL-MCNC: 81 MG/DL (ref 70–110)
MAGNESIUM SERPL-MCNC: 1.4 MG/DL (ref 1.6–2.6)
POTASSIUM SERPL-SCNC: 3 MMOL/L (ref 3.5–5.1)
POTASSIUM SERPL-SCNC: 3.9 MMOL/L (ref 3.5–5.1)
SODIUM SERPL-SCNC: 140 MMOL/L (ref 136–145)

## 2024-10-15 PROCEDURE — 25000003 PHARM REV CODE 250: Performed by: UROLOGY

## 2024-10-15 PROCEDURE — 84132 ASSAY OF SERUM POTASSIUM: CPT | Performed by: HOSPITALIST

## 2024-10-15 PROCEDURE — 83735 ASSAY OF MAGNESIUM: CPT | Performed by: SPECIALIST

## 2024-10-15 PROCEDURE — 63600175 PHARM REV CODE 636 W HCPCS: Performed by: HOSPITALIST

## 2024-10-15 PROCEDURE — 25000003 PHARM REV CODE 250: Performed by: HOSPITALIST

## 2024-10-15 PROCEDURE — 21400001 HC TELEMETRY ROOM

## 2024-10-15 PROCEDURE — 80048 BASIC METABOLIC PNL TOTAL CA: CPT | Performed by: NURSE PRACTITIONER

## 2024-10-15 PROCEDURE — 11000001 HC ACUTE MED/SURG PRIVATE ROOM

## 2024-10-15 PROCEDURE — 63600175 PHARM REV CODE 636 W HCPCS: Performed by: UROLOGY

## 2024-10-15 PROCEDURE — 85730 THROMBOPLASTIN TIME PARTIAL: CPT | Performed by: SPECIALIST

## 2024-10-15 PROCEDURE — 97530 THERAPEUTIC ACTIVITIES: CPT

## 2024-10-15 PROCEDURE — 97530 THERAPEUTIC ACTIVITIES: CPT | Mod: CQ

## 2024-10-15 PROCEDURE — 97535 SELF CARE MNGMENT TRAINING: CPT

## 2024-10-15 RX ORDER — POTASSIUM CHLORIDE 7.45 MG/ML
30 INJECTION INTRAVENOUS ONCE
Status: COMPLETED | OUTPATIENT
Start: 2024-10-15 | End: 2024-10-15

## 2024-10-15 RX ORDER — POTASSIUM CHLORIDE 20 MEQ/1
20 TABLET, EXTENDED RELEASE ORAL 3 TIMES DAILY
Status: DISCONTINUED | OUTPATIENT
Start: 2024-10-15 | End: 2024-10-20

## 2024-10-15 RX ORDER — MAGNESIUM SULFATE HEPTAHYDRATE 40 MG/ML
2 INJECTION, SOLUTION INTRAVENOUS ONCE
Status: COMPLETED | OUTPATIENT
Start: 2024-10-15 | End: 2024-10-15

## 2024-10-15 RX ADMIN — POTASSIUM CHLORIDE 20 MEQ: 1500 TABLET, EXTENDED RELEASE ORAL at 08:10

## 2024-10-15 RX ADMIN — ZONISAMIDE 200 MG: 100 CAPSULE ORAL at 08:10

## 2024-10-15 RX ADMIN — MAGNESIUM SULFATE HEPTAHYDRATE 2 G: 40 INJECTION, SOLUTION INTRAVENOUS at 11:10

## 2024-10-15 RX ADMIN — APIXABAN 10 MG: 2.5 TABLET, FILM COATED ORAL at 08:10

## 2024-10-15 RX ADMIN — LEVETIRACETAM 500 MG: 500 INJECTION, SOLUTION INTRAVENOUS at 08:10

## 2024-10-15 RX ADMIN — LEVETIRACETAM 500 MG: 500 INJECTION, SOLUTION INTRAVENOUS at 12:10

## 2024-10-15 RX ADMIN — POTASSIUM CHLORIDE 10 MEQ: 7.46 INJECTION, SOLUTION INTRAVENOUS at 09:10

## 2024-10-15 RX ADMIN — CARBAMAZEPINE 200 MG: 200 TABLET ORAL at 08:10

## 2024-10-15 RX ADMIN — CARBAMAZEPINE 200 MG: 200 TABLET ORAL at 09:10

## 2024-10-15 RX ADMIN — ZONISAMIDE 200 MG: 100 CAPSULE ORAL at 09:10

## 2024-10-15 RX ADMIN — POTASSIUM CHLORIDE 20 MEQ: 1500 TABLET, EXTENDED RELEASE ORAL at 04:10

## 2024-10-15 RX ADMIN — OXYCODONE HYDROCHLORIDE 10 MG: 5 TABLET ORAL at 12:10

## 2024-10-15 RX ADMIN — CARBAMAZEPINE 200 MG: 200 TABLET ORAL at 04:10

## 2024-10-15 RX ADMIN — LOPERAMIDE HYDROCHLORIDE 2 MG: 2 CAPSULE ORAL at 04:10

## 2024-10-15 RX ADMIN — MUPIROCIN: 20 OINTMENT TOPICAL at 12:10

## 2024-10-15 RX ADMIN — POTASSIUM CHLORIDE 20 MEQ: 1500 TABLET, EXTENDED RELEASE ORAL at 09:10

## 2024-10-15 RX ADMIN — POTASSIUM CHLORIDE 30 MEQ: 7.46 INJECTION, SOLUTION INTRAVENOUS at 12:10

## 2024-10-15 RX ADMIN — MUPIROCIN: 20 OINTMENT TOPICAL at 08:10

## 2024-10-15 RX ADMIN — APIXABAN 10 MG: 2.5 TABLET, FILM COATED ORAL at 09:10

## 2024-10-15 NOTE — PLAN OF CARE
Had to hold feedings due to abdominal discomfort. Restarted @ 1500 @ 10ml/hr from 20ml/hr this AM    Cardiac monitoring in use  Fall precautions in place, remains injury free  Pain and nausea under control with PRN meds    IVFs  Accurate I&Os. Multiple episodes of diarrhea this shift. MD notified  Abx given as prescribed  Bed locked at lowest position  Call light within reach    Chart check complete  Will cont with POC

## 2024-10-15 NOTE — SUBJECTIVE & OBJECTIVE
Review of Systems   All other systems reviewed and are negative.    Objective:     Vital Signs (Most Recent):  Temp: 98 °F (36.7 °C) (10/14/24 1559)  Pulse: 101 (10/14/24 2300)  Resp: 17 (10/14/24 2153)  BP: 114/75 (10/14/24 1559)  SpO2: 97 % (10/14/24 1559) Vital Signs (24h Range):  Temp:  [98 °F (36.7 °C)-98.8 °F (37.1 °C)] 98 °F (36.7 °C)  Pulse:  [] 101  Resp:  [16-18] 17  SpO2:  [96 %-99 %] 97 %  BP: (114-164)/(70-94) 114/75     Weight: 46.7 kg (102 lb 15.3 oz)  Body mass index is 17.13 kg/m².    Intake/Output Summary (Last 24 hours) at 10/15/2024 0049  Last data filed at 10/14/2024 1827  Gross per 24 hour   Intake --   Output 1070 ml   Net -1070 ml         Physical Exam  Vitals and nursing note reviewed.   Constitutional:       General: She is not in acute distress.     Appearance: She is ill-appearing.   Cardiovascular:      Rate and Rhythm: Normal rate and regular rhythm.      Heart sounds: No murmur heard.  Pulmonary:      Effort: Pulmonary effort is normal. No respiratory distress.      Breath sounds: Rhonchi present. No wheezing.      Comments: Decreased BS bilaterally  Abdominal:      Comments: G-tube   Genitourinary:     Comments: Roca  Musculoskeletal:      Right lower leg: Edema present.   Neurological:      Mental Status: She is alert.             Significant Labs: All pertinent labs within the past 24 hours have been reviewed.  Recent Lab Results         10/14/24  0216        Anion Gap 10       PTT 61.0  Comment: Refer to local heparin nomogram for intensity/dose specific   therapeutic   range.         Baso # 0.02       Basophil % 0.3       BUN 21       Calcium 7.5       Chloride 111       CO2 20       Creatinine 0.8       Differential Method Automated       eGFR >60       Eos # 0.1       Eos % 1.1       Glucose 86       Gran # (ANC) 4.6       Gran % 71.6       Hematocrit 31.7       Hemoglobin 10.5       Immature Grans (Abs) 0.13  Comment: Mild elevation in immature granulocytes is non  specific and   can be seen in a variety of conditions including stress response,   acute inflammation, trauma and pregnancy. Correlation with other   laboratory and clinical findings is essential.         Immature Granulocytes 2.0       Lymph # 1.0       Lymph % 15.5       MCH 31.8       MCHC 33.1       MCV 96       Mono # 0.6       Mono % 9.5       MPV 9.3       nRBC 0       Platelet Count 162       Potassium 3.3       RBC 3.30       RDW 15.6       Sodium 141       WBC 6.40               Significant Imaging: I have reviewed all pertinent imaging results/findings within the past 24 hours.    IR Nephrostomy Tube Placement   Final Result      Right nephrostomy tube placement.      Plan:      Follow-up urine culture.      Monitor output.      Follow-up with urology.  Antegrade ureteral stent placement may be performed as inpatient or outpatient after adequate decompression.         Electronically signed by: Stephanie Cintron   Date:    10/13/2024   Time:    15:22      X-Ray Chest AP Portable   Final Result      1.  Negative for acute process involving the chest.   2.  Incidental findings as noted above.   3.]  PICC line in good position.      Finalized on: 10/12/2024 1:18 PM By:  Jose Angel Ross MD   [facility]# [id number]      2024-10-12 13:21:03.213    [facility]      X-Ray Chest 1 View   Final Result      No acute abnormality.         Electronically signed by: Cruz Myers   Date:    10/09/2024   Time:    21:33      US Lower Extremity Veins Bilateral   Final Result      Extensive clot from the right common femoral vein to the right posterior tibial vein.  Occlusive right is anterior tibial vein.      No left-sided DVT         Electronically signed by: Curz Myers   Date:    10/09/2024   Time:    20:10      CT Abdomen Pelvis  Without Contrast   Final Result      Right-sided hydronephrosis with a approximately 8 mm mid right ureteral obstructing stone.  Punctate nonobstructing left renal calculi.  Postsurgical changes of the bowel.   Fluid within the colonic loops of bowel may relate to diarrheal state.  Left hemiabdomen enteric tube noted      All CT scans   are performed using dose optimization techniques including the following: automated exposure control; adjustment of the mA and/or kV; use of iterative reconstruction technique.  Dose modulation was employed for ALARA by means of: Automated exposure control; adjustment of the mA and/or kV according to patient size (this includes techniques or standardized protocols for targeted exams where dose is matched to indication/reason for exam; i.e. extremities or head); and/or use of iterative reconstructive technique.         Electronically signed by: Cruz Myers   Date:    10/09/2024   Time:    16:30      Anesthesia US Guide Vascular Access    (Results Pending)   IR Ureteral Stent Placement    (Results Pending)   Anesthesia US Guide Vascular Access    (Results Pending)

## 2024-10-15 NOTE — PROGRESS NOTES
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  On exam she is noted to have left lower quadrant tenderness, appears frail and emaciated, has not been taking all of her scheduled medications and is hesitant to use pain medication, stating that she does not want to be addicted to pain medication.  Vital signs on arrival stable-- blood pressure 110/78, heart rate 95, temp 97.3° respirations 12, 97% SpO2 on room air.   Significant labs include CO2 6, BUN 21, creatinine 2.0, alk-phos 210, venous blood gas shows pH 7.030 and HC03 4.5, urinalysis with hazy appearance, 3+ blood, trace leukocyte esterase, greater than 100 RBCs, 31 WBCs, rare bacteria.  Stool studies negative for occult blood, negative for rotavirus, negative for C diff.  Stool culture is in progress.  EKG showed SR with PAC's in pattern of bigeminy with nonspecific T wave abnormality. CT abdomen and pelvis without contrast was done which revealed right-sided hydronephrosis with approximately 8 mm mid right ureteral obstructing stone.  Urology was consulted and recommended NPO after midnight  and will see patient in AM, also recommended urinalysis with urine culture which has been collected and does show acute cystitis with hematuria. While in ED, fluid bolus 1.5L, sodium bicarb --2 amps, morphine, Zofran, and Rocephin were all given. Hospital Medicine was consulted for admission due to SHELLEY, obstructing right ureteral stone 8mm and right sided hydronephrosis.     During exam, patient complains of pain and tightness in right leg so US was ordered and she was found to have extensive clot from right common femoral vein to right posterior tibial vein as well as occluded anterior tibial vein. IR was consulted and will evaluate for possible intervention in AM, heparin drip was recommended and ordered.     Overview/Hospital Course:  10/10/24  73 y/o female with gastric adenocarcinoma, here with abdominal pain  Patient rc/o lower quadrant abdominal pain, nausea, RLE pain, fatigue  Found to have 8 mm right sided obstructing ureteral stone and right sided hydronephrosis  Also with RLE extensive DVT; patient states she was previously on AC but stopped due to gastric cancer/bleeding  Urology with plans for stent placement today  IR with plans for RLE thrombectomy tomorrow morning   at bedside, updated    10/11/24  NAEON, patient resting in bed, feels some better today  Patient s/p cystoscopy yesterday with Dr. Kelly  Patient has impacted right ureteral stone, unable to place stent  Roca currently in place, continue ceftriaxone  Cr trending down with IV fluids  K 2.2, replete PO and IV, recheck this afternoon  Plans for right nephrostomy tube today  Plans for RLE thrombectomy for extensive DVT pending  Continue heparin drip, plan to transition to NOAC after thrombectomy    10/12  Awake, alert, lying in bed, cachetic, NPO since yesterday waiting on right nephrostomy tube/stent, was unable to do yesterday because of hypokalemia. Discussed with IR today, will give K riders today and repeat tomorrow and plan  for procedure tomorrow if Potassium stable.   IR said no indication for thrombectomy at this time  Cont heparin gtt and hold after midnight  Restart tube feedings, hold after midnight    10/13  Awake, alert, lying in bed,  at bedside  K 3.2 this am, given K rider and Effer-K, repeat K 3.9  IR placed a right nephrostomy tube today, no stent because of concern for infection, will need to follow up with urology and can have a antegrade stent placed inpatient/outpatient after adequate decompression    10/14  NAEON, patient states she feels unwell, fatigued  S/p right nephrostomy tube placement yesterday, cultures pending  Ceftriaxone day # 6  PT/OT with reccs for LIT      Review of Systems   All other systems reviewed and are negative.    Objective:     Vital Signs (Most Recent):  Temp: 98 °F (36.7 °C) (10/14/24 1559)  Pulse: 101 (10/14/24 2300)  Resp: 17 (10/14/24 2153)  BP: 114/75 (10/14/24 1559)  SpO2: 97 % (10/14/24 1559) Vital Signs (24h Range):  Temp:  [98 °F (36.7 °C)-98.8 °F (37.1 °C)] 98 °F (36.7 °C)  Pulse:  [] 101  Resp:  [16-18] 17  SpO2:  [96 %-99 %] 97 %  BP: (114-164)/(70-94) 114/75     Weight: 46.7 kg (102 lb 15.3 oz)  Body mass index is 17.13 kg/m².    Intake/Output Summary (Last 24 hours) at 10/15/2024 0049  Last data filed at 10/14/2024 1827  Gross per 24 hour   Intake --   Output 1070 ml   Net -1070 ml         Physical Exam  Vitals and nursing note reviewed.   Constitutional:       General: She is not in acute distress.     Appearance: She is ill-appearing.   Cardiovascular:      Rate and Rhythm: Normal rate and regular rhythm.      Heart sounds: No murmur heard.  Pulmonary:      Effort: Pulmonary effort is normal. No respiratory distress.      Breath sounds: Rhonchi present. No wheezing.      Comments: Decreased BS bilaterally  Abdominal:      Comments: G-tube   Genitourinary:     Comments: Roca  Musculoskeletal:      Right lower leg: Edema present.   Neurological:      Mental Status:  She is alert.             Significant Labs: All pertinent labs within the past 24 hours have been reviewed.  Recent Lab Results         10/14/24  0216        Anion Gap 10       PTT 61.0  Comment: Refer to local heparin nomogram for intensity/dose specific   therapeutic   range.         Baso # 0.02       Basophil % 0.3       BUN 21       Calcium 7.5       Chloride 111       CO2 20       Creatinine 0.8       Differential Method Automated       eGFR >60       Eos # 0.1       Eos % 1.1       Glucose 86       Gran # (ANC) 4.6       Gran % 71.6       Hematocrit 31.7       Hemoglobin 10.5       Immature Grans (Abs) 0.13  Comment: Mild elevation in immature granulocytes is non specific and   can be seen in a variety of conditions including stress response,   acute inflammation, trauma and pregnancy. Correlation with other   laboratory and clinical findings is essential.         Immature Granulocytes 2.0       Lymph # 1.0       Lymph % 15.5       MCH 31.8       MCHC 33.1       MCV 96       Mono # 0.6       Mono % 9.5       MPV 9.3       nRBC 0       Platelet Count 162       Potassium 3.3       RBC 3.30       RDW 15.6       Sodium 141       WBC 6.40               Significant Imaging: I have reviewed all pertinent imaging results/findings within the past 24 hours.    IR Nephrostomy Tube Placement   Final Result      Right nephrostomy tube placement.      Plan:      Follow-up urine culture.      Monitor output.      Follow-up with urology.  Antegrade ureteral stent placement may be performed as inpatient or outpatient after adequate decompression.         Electronically signed by: Stephanie Cintron   Date:    10/13/2024   Time:    15:22      X-Ray Chest AP Portable   Final Result      1.  Negative for acute process involving the chest.   2.  Incidental findings as noted above.   3.]  PICC line in good position.      Finalized on: 10/12/2024 1:18 PM By:  Jose Angel Ross MD   [facility]# [id number]      2024-10-12 13:21:03.213    [facility]       X-Ray Chest 1 View   Final Result      No acute abnormality.         Electronically signed by: Cruz Myers   Date:    10/09/2024   Time:    21:33      US Lower Extremity Veins Bilateral   Final Result      Extensive clot from the right common femoral vein to the right posterior tibial vein.  Occlusive right is anterior tibial vein.      No left-sided DVT         Electronically signed by: Cruz Myers   Date:    10/09/2024   Time:    20:10      CT Abdomen Pelvis  Without Contrast   Final Result      Right-sided hydronephrosis with a approximately 8 mm mid right ureteral obstructing stone.  Punctate nonobstructing left renal calculi.  Postsurgical changes of the bowel.  Fluid within the colonic loops of bowel may relate to diarrheal state.  Left hemiabdomen enteric tube noted      All CT scans   are performed using dose optimization techniques including the following: automated exposure control; adjustment of the mA and/or kV; use of iterative reconstruction technique.  Dose modulation was employed for ALARA by means of: Automated exposure control; adjustment of the mA and/or kV according to patient size (this includes techniques or standardized protocols for targeted exams where dose is matched to indication/reason for exam; i.e. extremities or head); and/or use of iterative reconstructive technique.         Electronically signed by: Cruz Myers   Date:    10/09/2024   Time:    16:30      Anesthesia US Guide Vascular Access    (Results Pending)   IR Ureteral Stent Placement    (Results Pending)   Anesthesia US Guide Vascular Access    (Results Pending)         Assessment/Plan:      * Ureteral stone with hydronephrosis  - Right-sided hydronephrosis with approximately 8 mm mid right ureteral obstructing stone   - Urology consulted (per ED, Dr. Kelly)  - S/p cystoscopy 10/10/24 - noted impacted right ureteral stone, unable to place stent  - s/p IR placement of right nephrostomy tube 10/13, will need to follow  up with urology, can place antegrade stent after adequate decompression as inpt/outpt  - Roca in place, monitor I/O's    Hypokalemia  Patient's most recent potassium results are listed below.   Recent Labs     10/12/24  1221 10/13/24  0343 10/13/24  1302   K 2.8* 3.2* 3.9       Plan  - Replete potassium per protocol  - Monitor potassium Every 12 hours  - Patient's hypokalemia is worsening. Will adjust treatment as follows: add PO and IV KCl    Acute cystitis with hematuria  - Continue ceftriaxone, day # 3  - urine culture negative    DVT (deep venous thrombosis)  - US - RLE extensive clot from the right common femoral vein to the right posterior tibial vein.  Occluded anterior tibial vein  - IR consulted for thrombectomy, not indicated at this time per IR  - Continue heparin drip, hold after midnight for nephrostomy tube placement tomorrow  - Heme/Onc consulted for AC reccs    SHELLEY (acute kidney injury)  SHELLEY is likely due to  multifactorial due to obstructing ureteral stone as well as dehydration from diarrhea/poor oral intake . Baseline creatinine is  0.7 (normal) . Most recent creatinine and eGFR are listed below.    Recent Labs     10/11/24  0848 10/12/24  1221 10/13/24  0343   CREATININE 1.6* 1.2 0.9   EGFRNORACEVR 34* 48* >60       - SHELLEY improving  - improved with IVF hydration  - Avoid nephrotoxins and renally dose meds for GFR listed above  - Monitor urine output, serial BMP, and adjust therapy as needed    Metabolic acidosis  - with SHELLEY and obstructing ureteral stone as well as UTI and dehydration/diarrhea  - lactic acid was 2.6 > 2.0  - improved with IVF hydration and bicarb drip  - cont IV Rocephin   - monitor labs daily    Jejunostomy tube present  - Complains of pain around j-tube  - restart tube feedings after nephrostomy tube placed today    Diarrhea  - C diff negative  - Imodium PRN    Gastric adenocarcinoma  - Followed by Dr. Ambrose and Dr. Jerome  - S/p total gastrectomy on 8/20, with lysis of  adhesions, lymphadenectomy, and jejunostomy tube placement, d/c on 9/10  - Pathologic stage from 8/20/2024: Stage III (ypT2, pN3a, cM0), completed 5 cycles Keytruda prior to surgery    Hx of Epileptic seizures  - No recent seizure activity  - Continue Keppra    Moderate protein-calorie malnutrition  Nutrition consulted. Most recent weight and BMI monitored- Has J-tube with tube feedings at home, however has been having diarrhea.    Measurements:  Wt Readings from Last 1 Encounters:   10/10/24 46.7 kg (102 lb 15.3 oz)   Body mass index is 17.13 kg/m².      Weight Loss (Malnutrition): greater than 7.5% in 3 months  1. Initiate pt onto continuous Enteral nutrition, recommend [employer] peptide 1.5 (vanilla) via J tube, goal rate 30 mL/hr, starting at 10 mL/hr then progress to goal rate within 24 hrs if pt is tolerating or per MD/NP -Formula at goal rate provides: 1107 kcals/day (79% EEN), 53 g protein/day (113% EPN), 99 g CHO/day (57% CHO needs), 504 mL free formula water/day -150 mL q4h free water flushes (900 mL/day) = total from formula + FWF = 1404 mL water/day, per MD/NP -Check Mg, K+, Na, Phos and Glu before and during initation, correct as indicated 2. Recommend Banatrol TID to assist with diarrhea or BID for loose stools, as warranted 3. Weigh twice weekly        VTE Risk Mitigation (From admission, onward)           Ordered     apixaban tablet 5 mg  2 times daily         10/14/24 0949     apixaban tablet 10 mg  2 times daily         10/14/24 0959     Reason for No Pharmacological VTE Prophylaxis  Once        Comments: Heparin drip ordered for acute occlusive DVT   Question:  Reasons:  Answer:  Physician Provided (leave comment)    10/09/24 2027     IP VTE HIGH RISK PATIENT  Once         10/09/24 2027     heparin 25,000 units in dextrose 5% 250 mL (100 units/mL) infusion HIGH INTENSITY nomogram - [facility]  Continuous        Question:  Begin at (units/kg/hr)  Answer:  18    10/09/24 2013                    Discharge  Planning   SOFIYA:      Code Status: Full Code   Is the patient medically ready for discharge?:     Reason for patient still in hospital (select all that apply): Patient trending condition, Laboratory test, and Treatment  Discharge Plan A: Other (tbd)                  Zachery Boyd MD  Department of Hospital Medicine   [facility]

## 2024-10-15 NOTE — PT/OT/SLP PROGRESS
Physical Therapy  Treatment    Cheryl Kirkland   MRN: [id number]   Admitting Diagnosis: Ureteral stone with hydronephrosis    PT Received On: 10/15/24  PT Start Time: 1220     PT Stop Time: 1230    PT Total Time (min): 10 min       Billable Minutes:  Therapeutic Activity 10    Treatment Type: Treatment  PT/PTA: PTA     Number of PTA visits since last PT visit: 2       General Precautions: Standard, fall  Orthopedic Precautions: N/A  Braces: N/A  Respiratory Status: Room air         Subjective:  Communicated with patient's nurse, Michael, and completed Epic chart review prior to session.  Patient agreed to PT session with encouragement.     Pain/Comfort  Pain Rating 1: 0/10  Pain Rating Post-Intervention 1: 0/10    Objective:   Patient found with: peripheral IV, telemetry, PEG Tube, marie catheter, nephrostomy    Supine > sit EOB: Min A (increased time to complete; VC for sequencing of task)    Forward scoot towards EOB: Min A (increased time to complete)    Seated EOB x 5 min total focusing on increased tolerance to upright position, core stability, trunk control and CV endurance.   Maintained self supported sitting balance with SBA     Completed x10 reps AROM TE to BLE: LAQ, AP   Intermittent cues given as needed to maintain correct form during repetitions    Patient noted to be heavily soiled at this time. Nursing staff was notified to clean patient.     Returned to supine with Min A.     AM-PAC 6 CLICK MOBILITY  How much help from another person does this patient currently need?   1 = Unable, Total/Dependent Assistance  2 = A lot, Maximum/Moderate Assistance  3 = A little, Minimum/Contact Guard/Supervision  4 = None, Modified [place]/Independent    Turning over in bed (including adjusting bedclothes, sheets and blankets)?: 3  Sitting down on and standing up from a chair with arms (e.g., wheelchair, bedside commode, etc.): 1 (NT)  Moving from lying on back to sitting on the side of the bed?: 3  Moving to  and from a bed to a chair (including a wheelchair)?: 1 (NT)  Need to walk in hospital room?: 1 (N T)  Climbing 3-5 steps with a railing?: 1 (NT)  Basic Mobility Total Score: 10    AM-PAC Raw Score CMS G-Code Modifier Level of Impairment Assistance   6 % Total / Unable   7 - 9 CM 80 - 100% Maximal Assist   10 - 14 CL 60 - 80% Moderate Assist   15 - 19 CK 40 - 60% Moderate Assist   20 - 22 CJ 20 - 40% Minimal Assist   23 CI 1-20% SBA / CGA   24 CH 0% Independent/ Mod I     Patient left HOB elevated with call button in reach and aide present.    Assessment:  Cheryl Kirkland is a 74 y.o. female with a medical diagnosis of Ureteral stone with hydronephrosis and presents with overall decline in functional mobility. Patient would continue to benefit from skilled PT to address functional limitations listed below in order to return to PLOF/decrease caregiver burden.     Rehab identified problem list/impairments: weakness, impaired endurance, impaired self care skills, impaired functional mobility, gait instability, impaired balance, decreased safety awareness, decreased upper extremity function, decreased lower extremity function, decreased coordination, decreased ROM, impaired cardiopulmonary response to activity    Rehab potential is fair.    Activity tolerance: Fair    Discharge recommendations: Moderate Intensity Therapy (SPOKE WITH SPV PT RE: CHANGE IN D/C REC)      Barriers to discharge:      Equipment recommendations: to be determined by next level of care     GOALS:   Multidisciplinary Problems       Physical Therapy Goals          Problem: Physical Therapy    Goal Priority Disciplines Outcome Interventions   Physical Therapy Goal     PT, PT/OT     Description: LTG: 10/24/24  1. PT WILL COMPLETE BED MOBILITY IND  2. PT WILL STAND T/F TO CHAIR IND FOR OOB TOLERANCE.  3. PT WILL GT TRAIN X 500' WITH NO AD IND TO INC ENDURANCE  4. PT WILL INC AMPAC SCORE BY 2 POINTS TO PROGRESS GROSS FUNC MOBILITY.                           PLAN:    Patient to be seen 3 x/week to address the above listed problems via gait training, therapeutic activities, therapeutic exercises  Plan of Care expires: 10/24/24  Plan of Care reviewed with: patient, spouse         10/15/2024

## 2024-10-15 NOTE — PLAN OF CARE
10/15/24 1224   Post-Acute Status   Post-Acute Authorization Placement   Post-Acute Placement Status Referrals Sent   Coverage [employer]   Discharge Plan   Discharge Plan A Skilled Nursing Facility     SW spoke with tp and spouse regarding snf placement. Pt and spouse in agreement with mass referral being sent. SW sent mass referrals and called in the locet. Pending accepting snf, 142.

## 2024-10-15 NOTE — PLAN OF CARE
Fall precautions in place, bed alarm set. Pain managed with PRN medication.  Continuous tube feeds increased to 20mls/hr. No complaints of nausea. Chart check complete.      Problem: Adult Inpatient Plan of Care  Goal: Plan of Care Review  Outcome: Progressing     Problem: Adult Inpatient Plan of Care  Goal: Patient-Specific Goal (Individualized)  Outcome: Progressing  Flowsheets (Taken 10/15/2024 5217)  Individualized Care Needs: assist with weight shifting  Anxieties, Fears or Concerns: none stated     Problem: Adult Inpatient Plan of Care  Goal: Absence of Hospital-Acquired Illness or Injury  Outcome: Progressing     Problem: Adult Inpatient Plan of Care  Goal: Optimal Comfort and Wellbeing  Outcome: Progressing     Problem: Adult Inpatient Plan of Care  Goal: Readiness for Transition of Care  Outcome: Progressing

## 2024-10-16 PROBLEM — E43 SEVERE PROTEIN-CALORIE MALNUTRITION: Status: ACTIVE | Noted: 2024-09-04

## 2024-10-16 LAB
ANION GAP SERPL CALC-SCNC: 8 MMOL/L (ref 8–16)
APTT PPP: 53.3 SEC (ref 21–32)
BACTERIA SPEC AEROBE CULT: NO GROWTH
BUN SERPL-MCNC: 15 MG/DL (ref 8–23)
CALCIUM SERPL-MCNC: 7.3 MG/DL (ref 8.7–10.5)
CHLORIDE SERPL-SCNC: 109 MMOL/L (ref 95–110)
CO2 SERPL-SCNC: 19 MMOL/L (ref 23–29)
CREAT SERPL-MCNC: 0.6 MG/DL (ref 0.5–1.4)
EST. GFR  (NO RACE VARIABLE): >60 ML/MIN/1.73 M^2
GLUCOSE SERPL-MCNC: 74 MG/DL (ref 70–110)
POTASSIUM SERPL-SCNC: 4 MMOL/L (ref 3.5–5.1)
SODIUM SERPL-SCNC: 136 MMOL/L (ref 136–145)

## 2024-10-16 PROCEDURE — 85730 THROMBOPLASTIN TIME PARTIAL: CPT | Performed by: SPECIALIST

## 2024-10-16 PROCEDURE — 97530 THERAPEUTIC ACTIVITIES: CPT | Mod: CQ

## 2024-10-16 PROCEDURE — 97110 THERAPEUTIC EXERCISES: CPT | Mod: CQ

## 2024-10-16 PROCEDURE — 25000003 PHARM REV CODE 250: Performed by: UROLOGY

## 2024-10-16 PROCEDURE — 21400001 HC TELEMETRY ROOM

## 2024-10-16 PROCEDURE — 11000001 HC ACUTE MED/SURG PRIVATE ROOM

## 2024-10-16 PROCEDURE — 80048 BASIC METABOLIC PNL TOTAL CA: CPT | Performed by: HOSPITALIST

## 2024-10-16 PROCEDURE — 63600175 PHARM REV CODE 636 W HCPCS: Performed by: UROLOGY

## 2024-10-16 PROCEDURE — 25000003 PHARM REV CODE 250: Performed by: HOSPITALIST

## 2024-10-16 RX ORDER — LOPERAMIDE HYDROCHLORIDE 2 MG/1
2 CAPSULE ORAL 2 TIMES DAILY
Status: COMPLETED | OUTPATIENT
Start: 2024-10-16 | End: 2024-10-18

## 2024-10-16 RX ADMIN — CARBAMAZEPINE 200 MG: 200 TABLET ORAL at 09:10

## 2024-10-16 RX ADMIN — POTASSIUM CHLORIDE 20 MEQ: 1500 TABLET, EXTENDED RELEASE ORAL at 09:10

## 2024-10-16 RX ADMIN — APIXABAN 10 MG: 2.5 TABLET, FILM COATED ORAL at 09:10

## 2024-10-16 RX ADMIN — CARBAMAZEPINE 200 MG: 200 TABLET ORAL at 01:10

## 2024-10-16 RX ADMIN — LOPERAMIDE HYDROCHLORIDE 2 MG: 2 CAPSULE ORAL at 09:10

## 2024-10-16 RX ADMIN — LOPERAMIDE HYDROCHLORIDE 2 MG: 2 CAPSULE ORAL at 12:10

## 2024-10-16 RX ADMIN — ZONISAMIDE 200 MG: 100 CAPSULE ORAL at 09:10

## 2024-10-16 RX ADMIN — POTASSIUM CHLORIDE 20 MEQ: 1500 TABLET, EXTENDED RELEASE ORAL at 01:10

## 2024-10-16 RX ADMIN — LOPERAMIDE HYDROCHLORIDE 2 MG: 2 CAPSULE ORAL at 05:10

## 2024-10-16 RX ADMIN — LEVETIRACETAM 500 MG: 500 INJECTION, SOLUTION INTRAVENOUS at 09:10

## 2024-10-16 RX ADMIN — OXYCODONE HYDROCHLORIDE 10 MG: 5 TABLET ORAL at 11:10

## 2024-10-16 NOTE — PROGRESS NOTES
Surgical Oncology Brief Note  10/16/2024     Pt seen and examined.  Still having significant diarrhea, stool cultures and cdiff last week all negative. Has only had one dose of imodium in the last 24 hours and has had 4-6 episodes of diarrhea.  Also c/o abdominal pain with TF advanced past 20 cc/hr.  Pt did not tolerate [employer] Peptide 1.5 last time and had to be placed on peptamin 1.5 w/ prebio which she did tolerate well.      Recommend:  -- resume tube feeds with peptamin 1.5 with prebio advancing 10cc ever 8 hours to goal of 50cc/hr  -- recommend Imodium one tablet BID, with PRN available for now.  Can take up to 8 tablets per day.  If that is ineffective would also start some fiber by mouth (do not give via J tube).    Will continue to follow peripherally, please call with questions     Chen Jerome MD MS  Surgical Oncology  [facility]   MIRZA Ledezma  Office: [phone]

## 2024-10-16 NOTE — ASSESSMENT & PLAN NOTE
Diagnosis:  Acute on chronic illness     Related to (etiology):   Physiological causes increasing nutrient needs d/t illness   Alteration in GI tract structure/function     Signs and Symptoms (as evidenced by):   BMI < 22 (older adult)  Unintentional weight loss, adult, of >5% in 1 month  Underweight with loss of fat and muscle  Inc localized and generalized fluid accumulation  Unable to eat sufficient energy/protein to maintain a healthy weight    Interventions/Recommendations (treatment strategy):  1. Management of composition and delivery rate of enteral nutrition   2. Management of nutrition related prescription medication   3. Collaboration by nutrition professional with other providers     Nutrition Diagnosis Status:   Continues

## 2024-10-16 NOTE — PLAN OF CARE
Nutrition Recommendations/Interventions for malnutrition 10/16/24:   1. Recommend modify pt's continuious Enteral nutrition, recommend Peptamen 1.5 w/ Prebio via J tube, goal rate 40 mL/hr, starting at 10 mL/hr then progress to goal rate within 24 hrs if pt is tolerating or per MD/NP   -150 mL q4h free water flushes (900 mL/day), per MD/NP   -Check Mg, K+, Na, Phos and Glu before and during initiation, correct as indicated   2. Recommend pt continues on antidiarrheal medication as warranted   3. Updated weight, twice weekly  4. Collaboration by nutrition professional with other providers     Goals:   1. Pt will be initiated onto modifed EN at modified goal rate within 24 hrs   2. Pt will tolerate and intake >75% EEN and EPN prior to RD follow up   3. Pt's diarrhea will improve prior to RD follow up    TAWANA Quiñonez, RDN, LDN

## 2024-10-16 NOTE — PLAN OF CARE
POC discussed with pt  Ambulated with assistance  VSS and AAOx4    Tube feedings continued.   Diarrhea episode tonight X1   C/o abd discomfort, continuing at 20ml/hr    Cardiac monitoring in use, NSR  Fall precautions in place, remains injury free  Pain and nausea under control with PRN meds     Accurate I&Os.   Bed locked at lowest position  Call light within reach     Chart check complete  Will cont with POC

## 2024-10-16 NOTE — PLAN OF CARE
Pt required CGA for bed mobility and sit to stand transfers. She was unwilling to attempt ambulation stating that she was too weak and would fall. Pt demonstrated good potential to be ambulatory as she marched in place with the use of a RW and was able to stand with only CGA to MIN A.

## 2024-10-16 NOTE — CONSULTS
[facility] - Med Surg  Adult Nutrition  Consult Note    SUMMARY     Recommendations    Recommendation/Intervention:   1. Recommend modify pt's continuious Enteral nutrition, recommend Peptamen 1.5 w/ Prebio via J tube, goal rate 40 mL/hr, starting at 10 mL/hr then progress to goal rate within 24 hrs if pt is tolerating or per MD/NP   -Formula at goal rate provides: 1440 kcals/day (100% EEN), 65 g protein/day (100% EPN), 184 g CHO/day (100% CHO needs), 741 mL free formula water/day (45% fluid needs)   -150 mL q4h free water flushes (900 mL/day) = total from formula + FWF = 1641 mL water/day (100% fluid needs), per MD/NP   -Check Mg, K+, Na, Phos and Glu before and during initiation, correct as indicated   2. Recommend pt continues on antidiarrheal medication as warranted   3. Updated weight, twice weekly    Goals:   1. Pt will be initiated onto modifed EN at modified goal rate within 24 hrs   2. Pt will tolerate and intake >75% EEN and EPN prior to RD follow up   3. Pt's diarrhea will improve prior to RD follow up  Nutrition Goal Status: new  Communication of RD Recs: other (comment) (POC, sticky note, reviewed with RN)    Assessment and Plan    Renal/  * Ureteral stone with hydronephrosis  Diagnosis:  Acute on chronic illness     Related to (etiology):   Physiological causes increasing nutrient needs d/t illness   Alteration in GI tract structure/function     Signs and Symptoms (as evidenced by):   BMI < 22 (older adult)  Unintentional weight loss, adult, of >5% in 1 month  Underweight with loss of fat and muscle  Inc localized and generalized fluid accumulation  Unable to eat sufficient energy/protein to maintain a healthy weight    Interventions/Recommendations (treatment strategy):  1. Management of composition and delivery rate of enteral nutrition   2. Management of nutrition related prescription medication   3. Collaboration by nutrition professional with other providers     Nutrition Diagnosis Status:    New         Malnutrition Assessment (10/16/24):  Malnutrition Context: acute illness or injury, chronic illness  Malnutrition Level: severe  Skin (Micronutrient): bruised, pallor, wounds unhealed, edema (Chetan score = 17 (mild risk)       Weight Loss (Malnutrition): greater than 5% in 1 month  Energy Intake (Malnutrition): less than or equal to 50% for greater than or equal to 5 days  Subcutaneous Fat (Malnutrition): moderate depletion  Muscle Mass (Malnutrition): severe depletion  Fluid Accumulation (Malnutrition): mild   Orbital Region (Subcutaneous Fat Loss): moderate depletion (Slightly darker circles; Somewhat hallow look)  Upper Arm Region (Subcutaneous Fat Loss): moderate depletion (Some depth pinch but not ample; Fingers almost touch)   [place] Region (Muscle Loss): severe depletion (Hollow, scooping depression)  Clavicle Bone Region (Muscle Loss): severe depletion (Protruding prominent bone)  Clavicle and Acromion Bone Region (Muscle Loss): moderate depletion (Acromion process may slightly protrude)  Dorsal Hand (Muscle Loss): moderate depletion (Slightly depressed)  Patellar Region (Muscle Loss): moderate depletion (Knee cap less prominent, more rounded)  Posterior Calf Region (Muscle Loss): moderate depletion (Not well developed)                 Reason for Assessment    Reason For Assessment: consult, RD follow-up (TF assistance)  Diagnosis:  (Ureteral stone with hydronephrosis)  General Information Comments:   10/16/24: 74 y.o. Female admitted for Ureteral stone with hydronephrosis. PMH: DVT, Gastric adenocarainoma, J tube present, Moderate protein calorie malnutrition, Hypokalemia; Hx: Epileptic seizures. Pt currently NPO, Peptamen 1.5 w/ Prebio ordered at 50 mL/hr. RD consulted for TF assistance. Note RD currently following pt. General surgery noted 10/16/24 that the pt is having significant diarrhea, 4-5 episodes, C Diff negative, pt unale to tolerate [employer] 1.5 peptide to past 20 mL/hr, pt c/o  "abd pain, stated "Pt did not tolerate [employer] Peptide 1.5 last time and had to be placed on peptamin 1.5 w/ prebio which she did tolerate well", recommend Peptamen 1.5 w/ Prebio at 50 mL/hr and modium one tablet BID. Spoke to RN, confirmed pt is unale to tolerate [employer] past 20 mL/hr and pt experiencing diarrhea. Visited pt at bedside, pt sitting up in bed, pt family member present. Pt diarrhea and abd pain/discomfort, pt reported that she uses [employer] at home at rate of 50 mL/hr w/out issues, unsure exactly which formula. Pt expressed concern for L arm edema. Pt stated her UBW is 132 lbs prior to surgery 8/20/24. NFPE performed, moderate/severe malnutrition noted. RD provided RN with 2 bags of Peptamen 1.5 w/ Prebio RTH bags, left at pt bedside, informed RN of new rec rate, RN inquired about current ordered Peptamen Paramjit 1.5 TID, informed RN we do not currently have/normally carry this product and that new goal rate willprovide 100% of pt's energy and protein needs. RD ordered TF's at new recommended goal rate of 40 mL/hr. Reviewed chart: TF currently being held; LBM 10/16; Skin: bruised, pale, incision vagina WDL; Chetan score: 17 (mild risk); Edema: 2+ mild R leg/ L arm, generalized. Labs, meds, weight reviewed. Note loperamide, potassium chloride. Last weight charted 10/10/24 102 lbs (BMI 17.13, protein-energy malnutrition grade I), updated weight for accuracy warranted, note -30 lb wt loss (22% wt change) x 2 months from UBW 8/20/24. RD will continue to follow and monitor pt's nutritional status during admit.    Nutrition Discharge Planning: Enteral nutrition vai J tube    Nutrition Risk Screen    Nutrition Risk Screen: tube feeding or parenteral nutrition    Nutrition Related Social Determinants of Health: SDOH: None Identified    Nutrition/Diet History    Spiritual, Cultural Beliefs, Christianity Practices, Values that Affect Care: no  Food Allergies: NKFA  Factors Affecting Nutritional Intake: " "abdominal pain, altered gastrointestinal function, diarrhea, NPO  Nutrition Support Formula Prior to Admit: Other (see commments) ([employer])  Nutrition Support Rate Prior to Admit: 50 (ml)  Nutrtion Support Frequency Prior to Admit: continuous  Nutrition Support Provision Prior to Admit: via J tube    Anthropometrics    Temp: 98.4 °F (36.9 °C)  Height Method: Stated  Height: 5' 5" (165.1 cm)  Height (inches): 65 in  Weight Method: Bed Scale  Weight: 46.7 kg (102 lb 15.3 oz)  Weight (lb): 102.96 lb  Ideal Body Weight (IBW), Female: 125 lb  % Ideal Body Weight, Female (lb): 82.37 %  % Ideal Body Weight Malnutrition: 80-90% - mild deficit  BMI (Calculated): 17.1  BMI Grade: 17 - 18.4 protein-energy malnutrition grade I  Weight Loss: unintentional  Usual Body Weight (UBW), k kg  Weight Change Amount: 30 lb  % Usual Body Weight: 78  % Weight Change From Usual Weight: -22.17 %     Wt Readings from Last 15 Encounters:   10/16/24 46.7 kg (102 lb 15.3 oz)   10/07/24 50.4 kg (111 lb)   24 53.9 kg (118 lb 15 oz)   24 53.9 kg (118 lb 15 oz)   24 50.8 kg (112 lb)   24 60.4 kg (133 lb 2.5 oz)   24 58.7 kg (129 lb 4.8 oz)   24 59 kg (130 lb)   24 60.5 kg (133 lb 6.1 oz)   24 59.5 kg (131 lb 2.8 oz)   24 58.9 kg (129 lb 13.6 oz)   24 58.9 kg (129 lb 13.6 oz)   24 58.9 kg (129 lb 13.6 oz)   24 59.7 kg (131 lb 9.8 oz)   24 59.7 kg (131 lb 9.8 oz)     Lab/Procedures/Meds    Pertinent Labs Reviewed: reviewed  Pertinent Labs Comments: Calcium (L)  Pertinent Medications Reviewed: reviewed  Pertinent Medications Comments: zonisamide, potassium chloride, levetiracetem in NaCl, carbamazepine, apixaban, loperamide, PRN ondansetron    BMP  Lab Results   Component Value Date     10/16/2024    K 4.0 10/16/2024     10/16/2024    CO2 19 (L) 10/16/2024    BUN 15 10/16/2024    CREATININE 0.6 10/16/2024    CALCIUM 7.3 (L) 10/16/2024    ANIONGAP 8 " "10/16/2024    EGFRNORACEVR >60 10/16/2024     Lab Results   Component Value Date    CALCIUM 7.3 (L) 10/16/2024    PHOS 5.0 (H) 10/09/2024     Lab Results   Component Value Date    ALBUMIN 1.9 (L) 10/12/2024     Lab Results   Component Value Date    ALT 14 10/12/2024    AST 14 10/12/2024    ALKPHOS 152 (H) 10/12/2024    BILITOT 0.4 10/12/2024     No results for input(s): "POCTGLUCOSE" in the last 24 hours.    No results found for: "LABA1C", "HGBA1C"    Lab Results   Component Value Date    WBC 6.40 10/14/2024    HGB 10.5 (L) 10/14/2024    HCT 31.7 (L) 10/14/2024    MCV 96 10/14/2024     10/14/2024       Scheduled Meds:   apixaban  10 mg Oral BID    [START ON 10/18/2024] apixaban  5 mg Oral BID    carBAMazepine  200 mg Oral TID    levETIRAcetam (Keppra) IV (PEDS and ADULTS)  500 mg Intravenous Q12H    loperamide  2 mg Oral BID    potassium chloride  20 mEq Oral TID    zonisamide  200 mg Oral BID     Continuous Infusions:  PRN Meds:.  Current Facility-Administered Medications:     acetaminophen, 650 mg, Oral, Q6H PRN    dextrose 10%, 12.5 g, Intravenous, PRN    dextrose 10%, 25 g, Intravenous, PRN    glucagon (human recombinant), 1 mg, Intramuscular, PRN    glucose, 16 g, Oral, PRN    glucose, 24 g, Oral, PRN    hydrOXYzine HCL, 25 mg, Oral, TID PRN    loperamide, 2 mg, Oral, QID PRN    morphine, 1 mg, Intravenous, Q4H PRN    naloxone, 0.02 mg, Intravenous, PRN    ondansetron, 4 mg, Intravenous, Q6H PRN    oxyCODONE, 10 mg, Oral, Q4H PRN    sodium chloride 0.9%, 10 mL, Intravenous, Q8H PRN      Physical Findings/Assessment         Estimated/Assessed Needs    Weight Used For Calorie Calculations: 46.7 kg (102 lb 15.3 oz)  Energy Calorie Requirements (kcal): 2642-7248 kcals (30-35 kcals/kg ABW (Underweight vs Cancer vs GI Disorder)  Energy Need Method: Kcal/kg  Protein Requirements: 56-70 g (1.2-1.5 g/kg ABW (Underweight vs Cancer vs GI Disorder, older adult)  Weight Used For Protein Calculations: 46.7 kg (102 lb " 15.3 oz)  Fluid Requirements (mL): 8238-9912 mL (1 mL/kcal)  Estimated Fluid Requirement Method: RDA Method  RDA Method (mL): 1401  CHO Requirement: 175-204 g (3664-2327 kcals/8)      Nutrition Prescription Ordered    Current Diet Order: NPO  Nutrition Order Comments: Currently being held  Current Nutrition Support Formula Ordered: [employer] Peptide 1.5 (vanilla)  Current Nutrition Support Rate Ordered: 50 (ml)  Current Nutrition Support Frequency Ordered: continuous    Evaluation of Received Nutrient/Fluid Intake  I/O: (Net since admit):  10/16/24: +1723.6 mL    % Kcal Needs: 0%  % Protein Needs: 0%    Energy Calories Required: not meeting needs  Protein Required: not meeting needs  Fluid Required: not meeting needs  Total Fluid Intake (mL): 611  Tolerance: not tolerating  % Intake of Estimated Energy Needs: 0 - 25 %  % Meal Intake: NPO    Nutrition Risk    Level of Risk/Frequency of Follow-up: high (F/u x 2 weekly)       Monitor and Evaluation    Food and Nutrient Intake: energy intake, enteral nutrition intake  Food and Nutrient Adminstration: enteral and parenteral nutrition administration  Knowledge/Beliefs/Attitudes: food and nutrition knowledge/skill, beliefs and attitudes  Anthropometric Measurements: weight, weight change, body mass index  Biochemical Data, Medical Tests and Procedures: electrolyte and renal panel, gastrointestinal profile, inflammatory profile  Nutrition-Focused Physical Findings: overall appearance       Nutrition Follow-Up    RD Follow-up?: Yes  Patty Gupta, BS, RDN, LDN

## 2024-10-16 NOTE — SUBJECTIVE & OBJECTIVE
Review of Systems   All other systems reviewed and are negative.    Objective:     Vital Signs (Most Recent):  Temp: 98.7 °F (37.1 °C) (10/16/24 0022)  Pulse: 84 (10/16/24 0022)  Resp: 18 (10/16/24 0022)  BP: 119/70 (10/16/24 0022)  SpO2: 96 % (10/16/24 0022) Vital Signs (24h Range):  Temp:  [97.5 °F (36.4 °C)-98.7 °F (37.1 °C)] 98.7 °F (37.1 °C)  Pulse:  [] 84  Resp:  [16-20] 18  SpO2:  [96 %-98 %] 96 %  BP: (110-126)/(67-79) 119/70     Weight: 46.7 kg (102 lb 15.3 oz)  Body mass index is 17.13 kg/m².    Intake/Output Summary (Last 24 hours) at 10/16/2024 0049  Last data filed at 10/16/2024 0024  Gross per 24 hour   Intake 912 ml   Output 851 ml   Net 61 ml         Physical Exam  Vitals and nursing note reviewed.   Constitutional:       General: She is not in acute distress.     Appearance: She is ill-appearing.   Cardiovascular:      Rate and Rhythm: Normal rate and regular rhythm.      Heart sounds: No murmur heard.  Pulmonary:      Effort: Pulmonary effort is normal. No respiratory distress.      Breath sounds: Rhonchi present. No wheezing.      Comments: Decreased BS bilaterally  Abdominal:      Comments: G-tube   Genitourinary:     Comments: Roca  Neurological:      Mental Status: She is alert.             Significant Labs: All pertinent labs within the past 24 hours have been reviewed.  Recent Lab Results         10/15/24  1627   10/15/24  0558        Anion Gap   9       PTT   55.1  Comment: Refer to local heparin nomogram for intensity/dose specific   therapeutic   range.         BUN   18       Calcium   7.3       Chloride   109       CO2   22       Creatinine   0.7       eGFR   >60       Glucose   81       Magnesium    1.4       Potassium 3.9   3.0       Sodium   140               Significant Imaging: I have reviewed all pertinent imaging results/findings within the past 24 hours.    IR Nephrostomy Tube Placement   Final Result      Right nephrostomy tube placement.      Plan:      Follow-up urine  culture.      Monitor output.      Follow-up with urology.  Antegrade ureteral stent placement may be performed as inpatient or outpatient after adequate decompression.         Electronically signed by: Stephanie Cintron   Date:    10/13/2024   Time:    15:22      X-Ray Chest AP Portable   Final Result      1.  Negative for acute process involving the chest.   2.  Incidental findings as noted above.   3.]  PICC line in good position.      Finalized on: 10/12/2024 1:18 PM By:  Jose Angel Ross MD   [facility]# [id number]      2024-10-12 13:21:03.213    [facility]      X-Ray Chest 1 View   Final Result      No acute abnormality.         Electronically signed by: Cruz Myers   Date:    10/09/2024   Time:    21:33      US Lower Extremity Veins Bilateral   Final Result      Extensive clot from the right common femoral vein to the right posterior tibial vein.  Occlusive right is anterior tibial vein.      No left-sided DVT         Electronically signed by: Cruz Myers   Date:    10/09/2024   Time:    20:10      CT Abdomen Pelvis  Without Contrast   Final Result      Right-sided hydronephrosis with a approximately 8 mm mid right ureteral obstructing stone.  Punctate nonobstructing left renal calculi.  Postsurgical changes of the bowel.  Fluid within the colonic loops of bowel may relate to diarrheal state.  Left hemiabdomen enteric tube noted      All CT scans   are performed using dose optimization techniques including the following: automated exposure control; adjustment of the mA and/or kV; use of iterative reconstruction technique.  Dose modulation was employed for ALARA by means of: Automated exposure control; adjustment of the mA and/or kV according to patient size (this includes techniques or standardized protocols for targeted exams where dose is matched to indication/reason for exam; i.e. extremities or head); and/or use of iterative reconstructive technique.         Electronically signed by: Cruz Myers   Date:    10/09/2024    Time:    16:30      Anesthesia US Guide Vascular Access    (Results Pending)   IR Ureteral Stent Placement    (Results Pending)   Anesthesia US Guide Vascular Access    (Results Pending)

## 2024-10-16 NOTE — PROGRESS NOTES
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  On exam she is noted to have left lower quadrant tenderness, appears frail and emaciated, has not been taking all of her scheduled medications and is hesitant to use pain medication, stating that she does not want to be addicted to pain medication.  Vital signs on arrival stable-- blood pressure 110/78, heart rate 95, temp 97.3° respirations 12, 97% SpO2 on room air.   Significant labs include CO2 6, BUN 21, creatinine 2.0, alk-phos 210, venous blood gas shows pH 7.030 and HC03 4.5, urinalysis with hazy appearance, 3+ blood, trace leukocyte esterase, greater than 100 RBCs, 31 WBCs, rare bacteria.  Stool studies negative for occult blood, negative for rotavirus, negative for C diff.  Stool culture is in progress.  EKG showed SR with PAC's in pattern of bigeminy with nonspecific T wave abnormality. CT abdomen and pelvis without contrast was done which revealed right-sided hydronephrosis with approximately 8 mm mid right ureteral obstructing stone.  Urology was consulted and recommended NPO after midnight  and will see patient in AM, also recommended urinalysis with urine culture which has been collected and does show acute cystitis with hematuria. While in ED, fluid bolus 1.5L, sodium bicarb --2 amps, morphine, Zofran, and Rocephin were all given. Hospital Medicine was consulted for admission due to SHELLEY, obstructing right ureteral stone 8mm and right sided hydronephrosis.     During exam, patient complains of pain and tightness in right leg so US was ordered and she was found to have extensive clot from right common femoral vein to right posterior tibial vein as well as occluded anterior tibial vein. IR was consulted and will evaluate for possible intervention in AM, heparin drip was recommended and ordered.     Overview/Hospital Course:  10/10/24  73 y/o female with gastric adenocarcinoma, here with abdominal pain  Patient rc/o lower quadrant abdominal pain, nausea, RLE pain, fatigue  Found to have 8 mm right sided obstructing ureteral stone and right sided hydronephrosis  Also with RLE extensive DVT; patient states she was previously on AC but stopped due to gastric cancer/bleeding  Urology with plans for stent placement today  IR with plans for RLE thrombectomy tomorrow morning   at bedside, updated    10/11/24  NAEON, patient resting in bed, feels some better today  Patient s/p cystoscopy yesterday with Dr. Kelly  Patient has impacted right ureteral stone, unable to place stent  Roca currently in place, continue ceftriaxone  Cr trending down with IV fluids  K 2.2, replete PO and IV, recheck this afternoon  Plans for right nephrostomy tube today  Plans for RLE thrombectomy for extensive DVT pending  Continue heparin drip, plan to transition to NOAC after thrombectomy    10/12  Awake, alert, lying in bed, cachetic, NPO since yesterday waiting on right nephrostomy tube/stent, was unable to do yesterday because of hypokalemia. Discussed with IR today, will give K riders today and repeat tomorrow and plan  for procedure tomorrow if Potassium stable.   IR said no indication for thrombectomy at this time  Cont heparin gtt and hold after midnight  Restart tube feedings, hold after midnight    10/13  Awake, alert, lying in bed,  at bedside  K 3.2 this am, given K rider and Effer-K, repeat K 3.9  IR placed a right nephrostomy tube today, no stent because of concern for infection, will need to follow up with urology and can have a antegrade stent placed inpatient/outpatient after adequate decompression    10/14  NAEON, patient states she feels unwell, fatigued  S/p right nephrostomy tube placement yesterday, cultures pending  Ceftriaxone day # 6  PT/OT with reccs for LIT\    10/15  K 3.0, Mg 1.2 - replete  Recheck K this afternoon  Patient states today that no one is able to care for her at home   at bedside wishes to pursue SNF placement, states he is unable to care for her  Consult SW for SNF placement      Review of Systems   All other systems reviewed and are negative.    Objective:     Vital Signs (Most Recent):  Temp: 98.7 °F (37.1 °C) (10/16/24 0022)  Pulse: 84 (10/16/24 0022)  Resp: 18 (10/16/24 0022)  BP: 119/70 (10/16/24 0022)  SpO2: 96 % (10/16/24 0022) Vital Signs (24h Range):  Temp:  [97.5 °F (36.4 °C)-98.7 °F (37.1 °C)] 98.7 °F (37.1 °C)  Pulse:  [] 84  Resp:  [16-20] 18  SpO2:  [96 %-98 %] 96 %  BP: (110-126)/(67-79) 119/70     Weight: 46.7 kg (102 lb 15.3 oz)  Body mass index is 17.13 kg/m².    Intake/Output Summary (Last 24 hours) at 10/16/2024 0049  Last data filed at 10/16/2024 0024  Gross per 24 hour   Intake 912 ml   Output 851 ml   Net 61 ml         Physical Exam  Vitals and nursing note reviewed.   Constitutional:       General: She is not in acute distress.     Appearance: She is ill-appearing.   Cardiovascular:      Rate and Rhythm: Normal rate and regular rhythm.      Heart sounds: No murmur heard.  Pulmonary:      Effort: Pulmonary effort is normal. No respiratory distress.       Breath sounds: Rhonchi present. No wheezing.      Comments: Decreased BS bilaterally  Abdominal:      Comments: G-tube   Genitourinary:     Comments: Roca  Neurological:      Mental Status: She is alert.             Significant Labs: All pertinent labs within the past 24 hours have been reviewed.  Recent Lab Results         10/15/24  1627   10/15/24  0558        Anion Gap   9       PTT   55.1  Comment: Refer to local heparin nomogram for intensity/dose specific   therapeutic   range.         BUN   18       Calcium   7.3       Chloride   109       CO2   22       Creatinine   0.7       eGFR   >60       Glucose   81       Magnesium    1.4       Potassium 3.9   3.0       Sodium   140               Significant Imaging: I have reviewed all pertinent imaging results/findings within the past 24 hours.    IR Nephrostomy Tube Placement   Final Result      Right nephrostomy tube placement.      Plan:      Follow-up urine culture.      Monitor output.      Follow-up with urology.  Antegrade ureteral stent placement may be performed as inpatient or outpatient after adequate decompression.         Electronically signed by: Stephanie Cintron   Date:    10/13/2024   Time:    15:22      X-Ray Chest AP Portable   Final Result      1.  Negative for acute process involving the chest.   2.  Incidental findings as noted above.   3.]  PICC line in good position.      Finalized on: 10/12/2024 1:18 PM By:  Jose Angel Ross MD   [facility]# [id number]      2024-10-12 13:21:03.213    [facility]      X-Ray Chest 1 View   Final Result      No acute abnormality.         Electronically signed by: Cruz Myers   Date:    10/09/2024   Time:    21:33      US Lower Extremity Veins Bilateral   Final Result      Extensive clot from the right common femoral vein to the right posterior tibial vein.  Occlusive right is anterior tibial vein.      No left-sided DVT         Electronically signed by: Cruz Myers   Date:    10/09/2024   Time:    20:10      CT Abdomen Pelvis   Without Contrast   Final Result      Right-sided hydronephrosis with a approximately 8 mm mid right ureteral obstructing stone.  Punctate nonobstructing left renal calculi.  Postsurgical changes of the bowel.  Fluid within the colonic loops of bowel may relate to diarrheal state.  Left hemiabdomen enteric tube noted      All CT scans   are performed using dose optimization techniques including the following: automated exposure control; adjustment of the mA and/or kV; use of iterative reconstruction technique.  Dose modulation was employed for ALARA by means of: Automated exposure control; adjustment of the mA and/or kV according to patient size (this includes techniques or standardized protocols for targeted exams where dose is matched to indication/reason for exam; i.e. extremities or head); and/or use of iterative reconstructive technique.         Electronically signed by: Cruz Myers   Date:    10/09/2024   Time:    16:30      Anesthesia US Guide Vascular Access    (Results Pending)   IR Ureteral Stent Placement    (Results Pending)   Anesthesia US Guide Vascular Access    (Results Pending)         Assessment/Plan:      * Ureteral stone with hydronephrosis  - Right-sided hydronephrosis with approximately 8 mm mid right ureteral obstructing stone   - Urology consulted (per ED, Dr. Kelly)  - S/p cystoscopy 10/10/24 - noted impacted right ureteral stone, unable to place stent  - s/p IR placement of right nephrostomy tube 10/13, will need to follow up with urology, can place antegrade stent after adequate decompression as inpt/outpt  - Roca in place, monitor I/O's    Hypokalemia  Patient's most recent potassium results are listed below.   Recent Labs     10/12/24  1221 10/13/24  0343 10/13/24  1302   K 2.8* 3.2* 3.9       Plan  - Replete potassium per protocol  - Monitor potassium Every 12 hours  - Patient's hypokalemia is worsening. Will adjust treatment as follows: add PO and IV KCl    DVT (deep venous  thrombosis)  - US - RLE extensive clot from the right common femoral vein to the right posterior tibial vein.  Occluded anterior tibial vein  - IR consulted for thrombectomy, not indicated at this time per IR  - Continue heparin drip, hold after midnight for nephrostomy tube placement tomorrow  - Heme/Onc consulted for AC reccs    Jejunostomy tube present  - Complains of pain around j-tube  - restart tube feedings after nephrostomy tube placed today    Gastric adenocarcinoma  - Followed by Dr. Ambrose and Dr. Jerome  - S/p total gastrectomy on 8/20, with lysis of adhesions, lymphadenectomy, and jejunostomy tube placement, d/c on 9/10  - Pathologic stage from 8/20/2024: Stage III (ypT2, pN3a, cM0), completed 5 cycles Keytruda prior to surgery    Hx of Epileptic seizures  - No recent seizure activity  - Continue Keppra    Moderate protein-calorie malnutrition  Nutrition consulted. Most recent weight and BMI monitored- Has J-tube with tube feedings at home, however has been having diarrhea.    Measurements:  Wt Readings from Last 1 Encounters:   10/10/24 46.7 kg (102 lb 15.3 oz)   Body mass index is 17.13 kg/m².      Weight Loss (Malnutrition): greater than 7.5% in 3 months  1. Initiate pt onto continuous Enteral nutrition, recommend [employer] peptide 1.5 (vanilla) via J tube, goal rate 30 mL/hr, starting at 10 mL/hr then progress to goal rate within 24 hrs if pt is tolerating or per MD/NP -Formula at goal rate provides: 1107 kcals/day (79% EEN), 53 g protein/day (113% EPN), 99 g CHO/day (57% CHO needs), 504 mL free formula water/day -150 mL q4h free water flushes (900 mL/day) = total from formula + FWF = 1404 mL water/day, per MD/NP -Check Mg, K+, Na, Phos and Glu before and during initation, correct as indicated 2. Recommend Banatrol TID to assist with diarrhea or BID for loose stools, as warranted 3. Weigh twice weekly        VTE Risk Mitigation (From admission, onward)           Ordered     apixaban tablet 5 mg  2  times daily         10/14/24 0949     apixaban tablet 10 mg  2 times daily         10/14/24 0959     Reason for No Pharmacological VTE Prophylaxis  Once        Comments: Heparin drip ordered for acute occlusive DVT   Question:  Reasons:  Answer:  Physician Provided (leave comment)    10/09/24 2027     IP VTE HIGH RISK PATIENT  Once         10/09/24 2027     heparin 25,000 units in dextrose 5% 250 mL (100 units/mL) infusion HIGH INTENSITY nomogram - [facility]  Continuous        Question:  Begin at (units/kg/hr)  Answer:  18    10/09/24 2013                    Discharge Planning   SOFIYA: 10/15/2024     Code Status: Full Code   Is the patient medically ready for discharge?:     Reason for patient still in hospital (select all that apply): Patient trending condition, Treatment, PT / OT recommendations, and Pending disposition  Discharge Plan A: Skilled Nursing Facility                  Zachery Boyd MD  Department of Hospital Medicine   [place] - Med Surg

## 2024-10-16 NOTE — PLAN OF CARE
10/16/24 0915   Post-Acute Status   Post-Acute Authorization Placement   Post-Acute Placement Status Pending payor review/awaiting authorization (if required)   Coverage [employer]   Discharge Plan   Discharge Plan A Skilled Nursing Facility     LUPE spoke with pt's spouse who stated they choosing [facility]. LUPE notified Conchita with [facility] to submit for auth. Pending auth.

## 2024-10-16 NOTE — PT/OT/SLP PROGRESS
"Physical Therapy  Treatment    Cheryl Kirkland   MRN: [id number]   Admitting Diagnosis: Ureteral stone with hydronephrosis    PT Received On: 10/16/24  PT Start Time: 1700     PT Stop Time: 1723    PT Total Time (min): 23 min       Billable Minutes:  Therapeutic Activity 15 and Therapeutic Exercise 8    Treatment Type: Treatment  PT/PTA: PTA     Number of PTA visits since last PT visit: 3       General Precautions: Standard, fall  Orthopedic Precautions: N/A  Braces: N/A  Respiratory Status: Room air         Subjective:  Communicated with epic and nurse Michael  prior to session.  "I am too weak but fine , I will get up and fall and hurt myself" ( pt states that she has gotten to the [facility] twice and sat in the chair )     Pain/Comfort  Pain Rating 1:  (Pt would not rate her pain , I questioned her twice. She did state "its a little better")    Objective:   Patient found with: peripheral IV, PICC line, nephrostomy, G/J tube    Functional Mobility:  Bed Mobility:     Supine to sit: CGA   Sit to supine: CGA   Seated lateral scooting: SBA    Transfers:    Sit to stand with a RW: MIN A   Stand to sit: CGA    Gait:     Unwilling to attempt due to weakness.           Treatment and Education:  Pt sat eob with SBA. She performed 15 reps of bilateral , seated, active ap, tke, hf, hip ab/adduction. She stood and marched in place x 10 reps bilateral. Performed seated lateral scooting to the [facility] with cga. Pt was educated on the role of PT services.      AM-PAC 6 CLICK MOBILITY  How much help from another person does this patient currently need?   1 = Unable, Total/Dependent Assistance  2 = A lot, Maximum/Moderate Assistance  3 = A little, Minimum/Contact Guard/Supervision  4 = None, Modified [place]/Independent    Turning over in bed (including adjusting bedclothes, sheets and blankets)?: 3  Sitting down on and standing up from a chair with arms (e.g., wheelchair, bedside commode, etc.): 3  Moving from lying on back to " sitting on the side of the bed?: 3  Moving to and from a bed to a chair (including a wheelchair)?: 3  Need to walk in hospital room?: 1 (pt refused)  Climbing 3-5 steps with a railing?: 1  Basic Mobility Total Score: 14    AM-PAC Raw Score CMS G-Code Modifier Level of Impairment Assistance   6 % Total / Unable   7 - 9 CM 80 - 100% Maximal Assist   10 - 14 CL 60 - 80% Moderate Assist   15 - 19 CK 40 - 60% Moderate Assist   20 - 22 CJ 20 - 40% Minimal Assist   23 CI 1-20% SBA / CGA   24 CH 0% Independent/ Mod I     Patient left HOB elevated with all lines intact and call button in reach.    Assessment:  Cheryl Kirkland is a 74 y.o. female with a medical diagnosis of Ureteral stone with hydronephrosis and presents with limited mobility due to fear of falling. She did demonstrate good potential to be ambulatory as she gains more confidence in her abilities. Pt started the session feeling well but as the session progressed she voiced that she did not feel well.     Rehab identified problem list/impairments: weakness, impaired balance, decreased safety awareness, impaired endurance, pain, impaired cardiopulmonary response to activity, impaired self care skills, decreased coordination, decreased ROM, impaired joint extensibility, impaired functional mobility, gait instability, decreased lower extremity function, decreased upper extremity function    Rehab potential is fair.    Activity tolerance: Fair    Discharge recommendations: Moderate Intensity Therapy      Barriers to discharge:      Equipment recommendations:  (TBD)     GOALS:   Multidisciplinary Problems       Physical Therapy Goals          Problem: Physical Therapy    Goal Priority Disciplines Outcome Interventions   Physical Therapy Goal     PT, PT/OT Progressing    Description: LTG: 10/24/24  1. PT WILL COMPLETE BED MOBILITY IND  2. PT WILL STAND T/F TO CHAIR IND FOR OOB TOLERANCE.  3. PT WILL GT TRAIN X 500' WITH NO AD IND TO INC ENDURANCE  4.  PT WILL INC AMPAC SCORE BY 2 POINTS TO PROGRESS GROSS FUNC MOBILITY.                          PLAN:    Patient to be seen 3 x/week to address the above listed problems via therapeutic activities, therapeutic exercises, gait training  Plan of Care expires: 10/24/24  Plan of Care reviewed with: patient         10/16/2024

## 2024-10-17 ENCOUNTER — DOCUMENT SCAN (OUTPATIENT)
Dept: HOME HEALTH SERVICES | Facility: HOSPITAL | Age: 74
End: 2024-10-17
Payer: MEDICARE

## 2024-10-17 LAB
ANISOCYTOSIS BLD QL SMEAR: SLIGHT
BASOPHILS # BLD AUTO: 0.06 K/UL (ref 0–0.2)
BASOPHILS NFR BLD: 0.9 % (ref 0–1.9)
DACRYOCYTES BLD QL SMEAR: ABNORMAL
DIFFERENTIAL METHOD BLD: ABNORMAL
EOSINOPHIL # BLD AUTO: 0 K/UL (ref 0–0.5)
EOSINOPHIL NFR BLD: 0.6 % (ref 0–8)
ERYTHROCYTE [DISTWIDTH] IN BLOOD BY AUTOMATED COUNT: 16.5 % (ref 11.5–14.5)
H PYLORI AG STL QL IA: NOT DETECTED
HCT VFR BLD AUTO: 29.2 % (ref 37–48.5)
HGB BLD-MCNC: 9.6 G/DL (ref 12–16)
IMM GRANULOCYTES # BLD AUTO: 0.08 K/UL (ref 0–0.04)
IMM GRANULOCYTES NFR BLD AUTO: 1.2 % (ref 0–0.5)
LYMPHOCYTES # BLD AUTO: 0.8 K/UL (ref 1–4.8)
LYMPHOCYTES NFR BLD: 11.7 % (ref 18–48)
MCH RBC QN AUTO: 31.6 PG (ref 27–31)
MCHC RBC AUTO-ENTMCNC: 32.9 G/DL (ref 32–36)
MCV RBC AUTO: 96 FL (ref 82–98)
MONOCYTES # BLD AUTO: 0.7 K/UL (ref 0.3–1)
MONOCYTES NFR BLD: 10.3 % (ref 4–15)
NEUTROPHILS # BLD AUTO: 4.8 K/UL (ref 1.8–7.7)
NEUTROPHILS NFR BLD: 75.3 % (ref 38–73)
NRBC BLD-RTO: 0 /100 WBC
OVALOCYTES BLD QL SMEAR: ABNORMAL
PLATELET # BLD AUTO: 237 K/UL (ref 150–450)
PLATELET BLD QL SMEAR: ABNORMAL
PMV BLD AUTO: 9.1 FL (ref 9.2–12.9)
POIKILOCYTOSIS BLD QL SMEAR: SLIGHT
POLYCHROMASIA BLD QL SMEAR: ABNORMAL
RBC # BLD AUTO: 3.04 M/UL (ref 4–5.4)
WBC # BLD AUTO: 6.41 K/UL (ref 3.9–12.7)

## 2024-10-17 PROCEDURE — 0TJ53ZZ INSPECTION OF KIDNEY, PERCUTANEOUS APPROACH: ICD-10-PCS | Performed by: RADIOLOGY

## 2024-10-17 PROCEDURE — 25500020 PHARM REV CODE 255: Performed by: HOSPITALIST

## 2024-10-17 PROCEDURE — 97530 THERAPEUTIC ACTIVITIES: CPT

## 2024-10-17 PROCEDURE — 11000001 HC ACUTE MED/SURG PRIVATE ROOM

## 2024-10-17 PROCEDURE — 97116 GAIT TRAINING THERAPY: CPT

## 2024-10-17 PROCEDURE — 25000003 PHARM REV CODE 250: Performed by: HOSPITALIST

## 2024-10-17 PROCEDURE — 21400001 HC TELEMETRY ROOM

## 2024-10-17 PROCEDURE — 25000003 PHARM REV CODE 250: Performed by: UROLOGY

## 2024-10-17 PROCEDURE — 97535 SELF CARE MNGMENT TRAINING: CPT

## 2024-10-17 PROCEDURE — 63600175 PHARM REV CODE 636 W HCPCS: Performed by: UROLOGY

## 2024-10-17 PROCEDURE — 85025 COMPLETE CBC W/AUTO DIFF WBC: CPT | Performed by: HOSPITALIST

## 2024-10-17 RX ADMIN — LEVETIRACETAM 500 MG: 500 INJECTION, SOLUTION INTRAVENOUS at 10:10

## 2024-10-17 RX ADMIN — LEVETIRACETAM 500 MG: 500 INJECTION, SOLUTION INTRAVENOUS at 09:10

## 2024-10-17 RX ADMIN — LOPERAMIDE HYDROCHLORIDE 2 MG: 2 CAPSULE ORAL at 04:10

## 2024-10-17 RX ADMIN — CARBAMAZEPINE 200 MG: 200 TABLET ORAL at 09:10

## 2024-10-17 RX ADMIN — OXYCODONE HYDROCHLORIDE 10 MG: 5 TABLET ORAL at 04:10

## 2024-10-17 RX ADMIN — LOPERAMIDE HYDROCHLORIDE 2 MG: 2 CAPSULE ORAL at 09:10

## 2024-10-17 RX ADMIN — ZONISAMIDE 200 MG: 100 CAPSULE ORAL at 10:10

## 2024-10-17 RX ADMIN — LOPERAMIDE HYDROCHLORIDE 2 MG: 2 CAPSULE ORAL at 10:10

## 2024-10-17 RX ADMIN — POTASSIUM CHLORIDE 20 MEQ: 1500 TABLET, EXTENDED RELEASE ORAL at 09:10

## 2024-10-17 RX ADMIN — CARBAMAZEPINE 200 MG: 200 TABLET ORAL at 04:10

## 2024-10-17 RX ADMIN — CARBAMAZEPINE 200 MG: 200 TABLET ORAL at 10:10

## 2024-10-17 RX ADMIN — ZONISAMIDE 200 MG: 100 CAPSULE ORAL at 09:10

## 2024-10-17 RX ADMIN — POTASSIUM CHLORIDE 20 MEQ: 1500 TABLET, EXTENDED RELEASE ORAL at 10:10

## 2024-10-17 RX ADMIN — APIXABAN 10 MG: 2.5 TABLET, FILM COATED ORAL at 10:10

## 2024-10-17 RX ADMIN — IOHEXOL 10 ML: 300 INJECTION, SOLUTION INTRAVENOUS at 03:10

## 2024-10-17 RX ADMIN — APIXABAN 10 MG: 2.5 TABLET, FILM COATED ORAL at 09:10

## 2024-10-17 RX ADMIN — POTASSIUM CHLORIDE 20 MEQ: 1500 TABLET, EXTENDED RELEASE ORAL at 04:10

## 2024-10-17 NOTE — PLAN OF CARE
VS wnl  Cardiac monitoring d/nela  Fall precautions in place, remains injury free  Pain and nausea under control with PRN meds    Accurate I&Os. Started on peptamen 1.5 w/ Prebio. Tolerating much better. Able to advance to 20ml/hr by end of shift  Imodium given for diarrhea  Bed locked at lowest position  Call light within reach    Chart check complete  Will cont with POC

## 2024-10-17 NOTE — PROGRESS NOTES
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  On exam she is noted to have left lower quadrant tenderness, appears frail and emaciated, has not been taking all of her scheduled medications and is hesitant to use pain medication, stating that she does not want to be addicted to pain medication.  Vital signs on arrival stable-- blood pressure 110/78, heart rate 95, temp 97.3° respirations 12, 97% SpO2 on room air.   Significant labs include CO2 6, BUN 21, creatinine 2.0, alk-phos 210, venous blood gas shows pH 7.030 and HC03 4.5, urinalysis with hazy appearance, 3+ blood, trace leukocyte esterase, greater than 100 RBCs, 31 WBCs, rare bacteria.  Stool studies negative for occult blood, negative for rotavirus, negative for C diff.  Stool culture is in progress.  EKG showed SR with PAC's in pattern of bigeminy with nonspecific T wave abnormality. CT abdomen and pelvis without contrast was done which revealed right-sided hydronephrosis with approximately 8 mm mid right ureteral obstructing stone.  Urology was consulted and recommended NPO after midnight  and will see patient in AM, also recommended urinalysis with urine culture which has been collected and does show acute cystitis with hematuria. While in ED, fluid bolus 1.5L, sodium bicarb --2 amps, morphine, Zofran, and Rocephin were all given. Hospital Medicine was consulted for admission due to SHELLEY, obstructing right ureteral stone 8mm and right sided hydronephrosis.     During exam, patient complains of pain and tightness in right leg so US was ordered and she was found to have extensive clot from right common femoral vein to right posterior tibial vein as well as occluded anterior tibial vein. IR was consulted and will evaluate for possible intervention in AM, heparin drip was recommended and ordered.     Overview/Hospital Course:  10/10/24  75 y/o female with gastric adenocarcinoma, here with abdominal pain  Patient rc/o lower quadrant abdominal pain, nausea, RLE pain, fatigue  Found to have 8 mm right sided obstructing ureteral stone and right sided hydronephrosis  Also with RLE extensive DVT; patient states she was previously on AC but stopped due to gastric cancer/bleeding  Urology with plans for stent placement today  IR with plans for RLE thrombectomy tomorrow morning   at bedside, updated    10/11/24  NAEON, patient resting in bed, feels some better today  Patient s/p cystoscopy yesterday with Dr. Kelly  Patient has impacted right ureteral stone, unable to place stent  Roca currently in place, continue ceftriaxone  Cr trending down with IV fluids  K 2.2, replete PO and IV, recheck this afternoon  Plans for right nephrostomy tube today  Plans for RLE thrombectomy for extensive DVT pending  Continue heparin drip, plan to transition to NOAC after thrombectomy    10/12  Awake, alert, lying in bed, cachetic, NPO since yesterday waiting on right nephrostomy tube/stent, was unable to do yesterday because of hypokalemia. Discussed with IR today, will give K riders today and repeat tomorrow and plan  for procedure tomorrow if Potassium stable.   IR said no indication for thrombectomy at this time  Cont heparin gtt and hold after midnight  Restart tube feedings, hold after midnight    10/13  Awake, alert, lying in bed,  at bedside  K 3.2 this am, given K rider and Effer-K, repeat K 3.9  IR placed a right nephrostomy tube today, no stent because of concern for infection, will need to follow up with urology and can have a antegrade stent placed inpatient/outpatient after adequate decompression    10/14  NAEON, patient states she feels unwell, fatigued  S/p right nephrostomy tube placement yesterday, cultures pending  Ceftriaxone day # 6  PT/OT with reccs for LIT\    10/15  K 3.0, Mg 1.2 - replete  Recheck K this afternoon  Patient states today that no one is able to care for her at home   at bedside wishes to pursue SNF placement, states he is unable to care for her  Consult SW for SNF placement    10/16  NAEON  Loose stools reported, schedule imodium short course  Adjusted TF regimen, monitor for tolerance  Replete electrolytes  SNF pending.       Review of Systems   All other systems reviewed and are negative.    Objective:     Vital Signs (Most Recent):  Temp: 98.7 °F (37.1 °C) (10/17/24 0809)  Pulse: 92 (10/17/24 0809)  Resp: 19 (10/17/24 0809)  BP: 119/70 (10/17/24 0809)  SpO2: 95 % (10/17/24 0809) Vital Signs (24h Range):  Temp:  [97.8 °F (36.6 °C)-98.8 °F (37.1 °C)] 98.7 °F (37.1 °C)  Pulse:  [78-95] 92  Resp:  [17-20] 19  SpO2:  [95 %-100 %] 95 %  BP: (109-119)/(58-74) 119/70     Weight: 46.7 kg (102 lb 15.3 oz)  Body mass index is 17.13 kg/m².    Intake/Output Summary (Last 24 hours) at 10/17/2024 0855  Last data filed at 10/16/2024 1751  Gross per 24 hour   Intake 144 ml   Output 100 ml   Net 44 ml         Physical Exam  Vitals and nursing note reviewed.   Constitutional:       General: She is not in acute distress.     Appearance: She is ill-appearing.   Cardiovascular:      Rate and  Rhythm: Normal rate and regular rhythm.      Heart sounds: No murmur heard.  Pulmonary:      Effort: Pulmonary effort is normal. No respiratory distress.      Breath sounds: No wheezing.      Comments: Decreased BS bilaterally  Abdominal:      Comments: G-tube   Genitourinary:     Comments: Chanelle  Neurological:      Mental Status: She is alert.             Significant Labs: All pertinent labs within the past 24 hours have been reviewed.  Recent Lab Results         10/17/24  0532        Aniso Slight       Baso # 0.06       Basophil % 0.9       Differential Method Automated       Eos # 0.0       Eos % 0.6       Gran # (ANC) 4.8       Gran % 75.3       Hematocrit 29.2       Hemoglobin 9.6       Immature Grans (Abs) 0.08  Comment: Mild elevation in immature granulocytes is non specific and   can be seen in a variety of conditions including stress response,   acute inflammation, trauma and pregnancy. Correlation with other   laboratory and clinical findings is essential.         Immature Granulocytes 1.2       Lymph # 0.8       Lymph % 11.7       MCH 31.6       MCHC 32.9       MCV 96       Mono # 0.7       Mono % 10.3       MPV 9.1       nRBC 0       Ovalocytes Occasional       Platelet Estimate Appears normal       Platelet Count 237  Comment: Results confirmed, test repeated       Poikilocytosis Slight       Poly Occasional       RBC 3.04       RDW 16.5       Teardrop Cells Occasional       WBC 6.41               Significant Imaging: I have reviewed all pertinent imaging results/findings within the past 24 hours.    IR Nephrostomy Tube Placement   Final Result      Right nephrostomy tube placement.      Plan:      Follow-up urine culture.      Monitor output.      Follow-up with urology.  Antegrade ureteral stent placement may be performed as inpatient or outpatient after adequate decompression.         Electronically signed by: Stephanie Cintron   Date:    10/13/2024   Time:    15:22      X-Ray Chest AP Portable    Final Result      1.  Negative for acute process involving the chest.   2.  Incidental findings as noted above.   3.]  PICC line in good position.      Finalized on: 10/12/2024 1:18 PM By:  Jose Angel Ross MD   [facility]# [id number]      2024-10-12 13:21:03.213    [facility]      X-Ray Chest 1 View   Final Result      No acute abnormality.         Electronically signed by: Cruz Myers   Date:    10/09/2024   Time:    21:33      US Lower Extremity Veins Bilateral   Final Result      Extensive clot from the right common femoral vein to the right posterior tibial vein.  Occlusive right is anterior tibial vein.      No left-sided DVT         Electronically signed by: Cruz Myers   Date:    10/09/2024   Time:    20:10      CT Abdomen Pelvis  Without Contrast   Final Result      Right-sided hydronephrosis with a approximately 8 mm mid right ureteral obstructing stone.  Punctate nonobstructing left renal calculi.  Postsurgical changes of the bowel.  Fluid within the colonic loops of bowel may relate to diarrheal state.  Left hemiabdomen enteric tube noted      All CT scans   are performed using dose optimization techniques including the following: automated exposure control; adjustment of the mA and/or kV; use of iterative reconstruction technique.  Dose modulation was employed for ALARA by means of: Automated exposure control; adjustment of the mA and/or kV according to patient size (this includes techniques or standardized protocols for targeted exams where dose is matched to indication/reason for exam; i.e. extremities or head); and/or use of iterative reconstructive technique.         Electronically signed by: Cruz Myers   Date:    10/09/2024   Time:    16:30      Anesthesia US Guide Vascular Access    (Results Pending)   IR Ureteral Stent Placement    (Results Pending)   Anesthesia US Guide Vascular Access    (Results Pending)         Assessment/Plan:      * Ureteral stone with hydronephrosis  - Right-sided hydronephrosis  with approximately 8 mm mid right ureteral obstructing stone   - Urology consulted (per ED, Dr. Kelly)  - S/p cystoscopy 10/10/24 - noted impacted right ureteral stone, unable to place stent  - s/p IR placement of right nephrostomy tube 10/13, will need to follow up with urology, can place antegrade stent after adequate decompression as inpt/outpt  - Roca in place, monitor I/O's    Hypokalemia  Patient's most recent potassium results are listed below.   Recent Labs     10/12/24  1221 10/13/24  0343 10/13/24  1302   K 2.8* 3.2* 3.9       Plan  - Replete potassium per protocol  - Monitor potassium Every 12 hours  - Patient's hypokalemia is worsening. Will adjust treatment as follows: add PO and IV KCl    DVT (deep venous thrombosis)  - US - RLE extensive clot from the right common femoral vein to the right posterior tibial vein.  Occluded anterior tibial vein  - IR consulted for thrombectomy, not indicated at this time per IR  - Continue heparin drip, hold after midnight for nephrostomy tube placement tomorrow  - Heme/Onc consulted for AC reccs    Jejunostomy tube present  - Complains of pain around j-tube  - restart tube feedings after nephrostomy tube placed today    Gastric adenocarcinoma  - Followed by Dr. Ambrose and Dr. Jerome  - S/p total gastrectomy on 8/20, with lysis of adhesions, lymphadenectomy, and jejunostomy tube placement, d/c on 9/10  - Pathologic stage from 8/20/2024: Stage III (ypT2, pN3a, cM0), completed 5 cycles Keytruda prior to surgery    Hx of Epileptic seizures  - No recent seizure activity  - Continue Keppra    Severe protein-calorie malnutrition  Nutrition consulted. Most recent weight and BMI monitored- Has J-tube with tube feedings at home, however has been having diarrhea.    Measurements:  Wt Readings from Last 1 Encounters:   10/10/24 46.7 kg (102 lb 15.3 oz)   Body mass index is 17.13 kg/m².      Weight Loss (Malnutrition): greater than 7.5% in 3 months  1. Initiate pt onto  continuous Enteral nutrition, recommend [employer] peptide 1.5 (vanilla) via J tube, goal rate 30 mL/hr, starting at 10 mL/hr then progress to goal rate within 24 hrs if pt is tolerating or per MD/NP -Formula at goal rate provides: 1107 kcals/day (79% EEN), 53 g protein/day (113% EPN), 99 g CHO/day (57% CHO needs), 504 mL free formula water/day -150 mL q4h free water flushes (900 mL/day) = total from formula + FWF = 1404 mL water/day, per MD/NP -Check Mg, K+, Na, Phos and Glu before and during initation, correct as indicated 2. Recommend Banatrol TID to assist with diarrhea or BID for loose stools, as warranted 3. Weigh twice weekly        VTE Risk Mitigation (From admission, onward)           Ordered     apixaban tablet 5 mg  2 times daily         10/14/24 0949     apixaban tablet 10 mg  2 times daily         10/14/24 0959     Reason for No Pharmacological VTE Prophylaxis  Once        Comments: Heparin drip ordered for acute occlusive DVT   Question:  Reasons:  Answer:  Physician Provided (leave comment)    10/09/24 2027     IP VTE HIGH RISK PATIENT  Once         10/09/24 2027     heparin 25,000 units in dextrose 5% 250 mL (100 units/mL) infusion HIGH INTENSITY nomogram - [facility]  Continuous        Question:  Begin at (units/kg/hr)  Answer:  18    10/09/24 2013                    Discharge Planning   SOFIYA: 10/15/2024     Code Status: Full Code   Is the patient medically ready for discharge?:     Reason for patient still in hospital (select all that apply): Patient trending condition, Laboratory test, and Consult recommendations  Discharge Plan A: Skilled Nursing Facility                  Zachery Boyd MD  Department of Hospital Medicine   [facility] - Med Surg

## 2024-10-17 NOTE — PLAN OF CARE
Problem: Infection  Goal: Absence of Infection Signs and Symptoms  Outcome: Progressing     Problem: Acute Kidney Injury/Impairment  Goal: Fluid and Electrolyte Balance  Outcome: Progressing     Problem: Acute Kidney Injury/Impairment  Goal: Effective Renal Function  Outcome: Progressing

## 2024-10-17 NOTE — PROGRESS NOTES
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  On exam she is noted to have left lower quadrant tenderness, appears frail and emaciated, has not been taking all of her scheduled medications and is hesitant to use pain medication, stating that she does not want to be addicted to pain medication.  Vital signs on arrival stable-- blood pressure 110/78, heart rate 95, temp 97.3° respirations 12, 97% SpO2 on room air.   Significant labs include CO2 6, BUN 21, creatinine 2.0, alk-phos 210, venous blood gas shows pH 7.030 and HC03 4.5, urinalysis with hazy appearance, 3+ blood, trace leukocyte esterase, greater than 100 RBCs, 31 WBCs, rare bacteria.  Stool studies negative for occult blood, negative for rotavirus, negative for C diff.  Stool culture is in progress.  EKG showed SR with PAC's in pattern of bigeminy with nonspecific T wave abnormality. CT abdomen and pelvis without contrast was done which revealed right-sided hydronephrosis with approximately 8 mm mid right ureteral obstructing stone.  Urology was consulted and recommended NPO after midnight  and will see patient in AM, also recommended urinalysis with urine culture which has been collected and does show acute cystitis with hematuria. While in ED, fluid bolus 1.5L, sodium bicarb --2 amps, morphine, Zofran, and Rocephin were all given. Hospital Medicine was consulted for admission due to SHELLEY, obstructing right ureteral stone 8mm and right sided hydronephrosis.     During exam, patient complains of pain and tightness in right leg so US was ordered and she was found to have extensive clot from right common femoral vein to right posterior tibial vein as well as occluded anterior tibial vein. IR was consulted and will evaluate for possible intervention in AM, heparin drip was recommended and ordered.     Overview/Hospital Course:  10/10/24  75 y/o female with gastric adenocarcinoma, here with abdominal pain  Patient rc/o lower quadrant abdominal pain, nausea, RLE pain, fatigue  Found to have 8 mm right sided obstructing ureteral stone and right sided hydronephrosis  Also with RLE extensive DVT; patient states she was previously on AC but stopped due to gastric cancer/bleeding  Urology with plans for stent placement today  IR with plans for RLE thrombectomy tomorrow morning   at bedside, updated    10/11/24  NAEON, patient resting in bed, feels some better today  Patient s/p cystoscopy yesterday with Dr. Kelly  Patient has impacted right ureteral stone, unable to place stent  Roca currently in place, continue ceftriaxone  Cr trending down with IV fluids  K 2.2, replete PO and IV, recheck this afternoon  Plans for right nephrostomy tube today  Plans for RLE thrombectomy for extensive DVT pending  Continue heparin drip, plan to transition to NOAC after thrombectomy    10/12  Awake, alert, lying in bed, cachetic, NPO since yesterday waiting on right nephrostomy tube/stent, was unable to do yesterday because of hypokalemia. Discussed with IR today, will give K riders today and repeat tomorrow and plan  for procedure tomorrow if Potassium stable.   IR said no indication for thrombectomy at this time  Cont heparin gtt and hold after midnight  Restart tube feedings, hold after midnight    10/13  Awake, alert, lying in bed,  at bedside  K 3.2 this am, given K rider and Effer-K, repeat K 3.9  IR placed a right nephrostomy tube today, no stent because of concern for infection, will need to follow up with urology and can have a antegrade stent placed inpatient/outpatient after adequate decompression    10/14  NAEON, patient states she feels unwell, fatigued  S/p right nephrostomy tube placement yesterday, cultures pending  Ceftriaxone day # 6  PT/OT with reccs for LIT\    10/15  K 3.0, Mg 1.2 - replete  Recheck K this afternoon  Patient states today that no one is able to care for her at home   at bedside wishes to pursue SNF placement, states he is unable to care for her  Consult SW for SNF placement    10/16  NAEON  Loose stools reported, schedule imodium short course  Adjusted TF regimen, monitor for tolerance  Replete electrolytes  SNF pending    10/17  NAEON  No new complaints, loose stool improving  SNF placement pending      Review of Systems   All other systems reviewed and are negative.    Objective:     Vital Signs (Most Recent):  Temp: 97.7 °F (36.5 °C) (10/17/24 1212)  Pulse: 96 (10/17/24 1212)  Resp: 20 (10/17/24 1212)  BP: 109/61 (10/17/24 1212)  SpO2: 100 % (10/17/24 1212) Vital Signs (24h Range):  Temp:  [97.7 °F (36.5 °C)-98.8 °F (37.1 °C)] 97.7 °F (36.5 °C)  Pulse:  [78-96] 96  Resp:  [17-20] 20  SpO2:  [95 %-100 %] 100 %  BP: (109-119)/(58-70) 109/61     Weight: 46.7 kg (102 lb 15.3 oz)  Body mass index is 17.13 kg/m².    Intake/Output Summary (Last 24 hours) at 10/17/2024 1243  Last data filed at 10/17/2024 1035  Gross per 24 hour   Intake 437 ml   Output 175 ml   Net 262 ml         Physical Exam  Vitals and nursing note reviewed.   Constitutional:       General: She is not in acute  distress.     Appearance: She is ill-appearing.   Cardiovascular:      Rate and Rhythm: Normal rate and regular rhythm.      Heart sounds: No murmur heard.  Pulmonary:      Effort: Pulmonary effort is normal. No respiratory distress.      Breath sounds: No wheezing.      Comments: Decreased BS bilaterally  Abdominal:      Comments: G-tube   Genitourinary:     Comments: Roca  Neurological:      Mental Status: She is alert.             Significant Labs: All pertinent labs within the past 24 hours have been reviewed.  Recent Lab Results         10/17/24  0532        Aniso Slight       Baso # 0.06       Basophil % 0.9       Differential Method Automated       Eos # 0.0       Eos % 0.6       Gran # (ANC) 4.8       Gran % 75.3       Hematocrit 29.2       Hemoglobin 9.6       Immature Grans (Abs) 0.08  Comment: Mild elevation in immature granulocytes is non specific and   can be seen in a variety of conditions including stress response,   acute inflammation, trauma and pregnancy. Correlation with other   laboratory and clinical findings is essential.         Immature Granulocytes 1.2       Lymph # 0.8       Lymph % 11.7       MCH 31.6       MCHC 32.9       MCV 96       Mono # 0.7       Mono % 10.3       MPV 9.1       nRBC 0       Ovalocytes Occasional       Platelet Estimate Appears normal       Platelet Count 237  Comment: Results confirmed, test repeated       Poikilocytosis Slight       Poly Occasional       RBC 3.04       RDW 16.5       Teardrop Cells Occasional       WBC 6.41               Significant Imaging: I have reviewed all pertinent imaging results/findings within the past 24 hours.    IR Nephrostomy Tube Placement   Final Result      Right nephrostomy tube placement.      Plan:      Follow-up urine culture.      Monitor output.      Follow-up with urology.  Antegrade ureteral stent placement may be performed as inpatient or outpatient after adequate decompression.         Electronically signed by: Stephanie  Saida   Date:    10/13/2024   Time:    15:22      X-Ray Chest AP Portable   Final Result      1.  Negative for acute process involving the chest.   2.  Incidental findings as noted above.   3.]  PICC line in good position.      Finalized on: 10/12/2024 1:18 PM By:  Jose Angel Ross MD   [facility]# [id number]      2024-10-12 13:21:03.213    [facility]      X-Ray Chest 1 View   Final Result      No acute abnormality.         Electronically signed by: Cruz Myers   Date:    10/09/2024   Time:    21:33      US Lower Extremity Veins Bilateral   Final Result      Extensive clot from the right common femoral vein to the right posterior tibial vein.  Occlusive right is anterior tibial vein.      No left-sided DVT         Electronically signed by: Cruz Myers   Date:    10/09/2024   Time:    20:10      CT Abdomen Pelvis  Without Contrast   Final Result      Right-sided hydronephrosis with a approximately 8 mm mid right ureteral obstructing stone.  Punctate nonobstructing left renal calculi.  Postsurgical changes of the bowel.  Fluid within the colonic loops of bowel may relate to diarrheal state.  Left hemiabdomen enteric tube noted      All CT scans   are performed using dose optimization techniques including the following: automated exposure control; adjustment of the mA and/or kV; use of iterative reconstruction technique.  Dose modulation was employed for ALARA by means of: Automated exposure control; adjustment of the mA and/or kV according to patient size (this includes techniques or standardized protocols for targeted exams where dose is matched to indication/reason for exam; i.e. extremities or head); and/or use of iterative reconstructive technique.         Electronically signed by: Cruz Myers   Date:    10/09/2024   Time:    16:30      Anesthesia US Guide Vascular Access    (Results Pending)   IR Ureteral Stent Placement    (Results Pending)   Anesthesia US Guide Vascular Access    (Results Pending)          Assessment/Plan:      * Ureteral stone with hydronephrosis  - Right-sided hydronephrosis with approximately 8 mm mid right ureteral obstructing stone   - Urology consulted (per ED, Dr. Kelly)  - S/p cystoscopy 10/10/24 - noted impacted right ureteral stone, unable to place stent  - s/p IR placement of right nephrostomy tube 10/13, will need to follow up with urology, can place antegrade stent after adequate decompression as inpt/outpt  - Roca in place, monitor I/O's    Hypokalemia  Patient's most recent potassium results are listed below.   Recent Labs     10/12/24  1221 10/13/24  0343 10/13/24  1302   K 2.8* 3.2* 3.9       Plan  - Replete potassium per protocol  - Monitor potassium Every 12 hours  - Patient's hypokalemia is worsening. Will adjust treatment as follows: add PO and IV KCl    DVT (deep venous thrombosis)  - US - RLE extensive clot from the right common femoral vein to the right posterior tibial vein.  Occluded anterior tibial vein  - IR consulted for thrombectomy, not indicated at this time per IR  - Continue heparin drip, hold after midnight for nephrostomy tube placement tomorrow  - Heme/Onc consulted for AC reccs    Jejunostomy tube present  - Complains of pain around j-tube  - restart tube feedings after nephrostomy tube placed today    Gastric adenocarcinoma  - Followed by Dr. Ambrose and Dr. Jerome  - S/p total gastrectomy on 8/20, with lysis of adhesions, lymphadenectomy, and jejunostomy tube placement, d/c on 9/10  - Pathologic stage from 8/20/2024: Stage III (ypT2, pN3a, cM0), completed 5 cycles Keytruda prior to surgery    Hx of Epileptic seizures  - No recent seizure activity  - Continue Keppra    Severe protein-calorie malnutrition  Nutrition consulted. Most recent weight and BMI monitored- Has J-tube with tube feedings at home, however has been having diarrhea.    Measurements:  Wt Readings from Last 1 Encounters:   10/10/24 46.7 kg (102 lb 15.3 oz)   Body mass index is 17.13  kg/m².      Weight Loss (Malnutrition): greater than 7.5% in 3 months  1. Initiate pt onto continuous Enteral nutrition, recommend [employer] peptide 1.5 (vanilla) via J tube, goal rate 30 mL/hr, starting at 10 mL/hr then progress to goal rate within 24 hrs if pt is tolerating or per MD/NP -Formula at goal rate provides: 1107 kcals/day (79% EEN), 53 g protein/day (113% EPN), 99 g CHO/day (57% CHO needs), 504 mL free formula water/day -150 mL q4h free water flushes (900 mL/day) = total from formula + FWF = 1404 mL water/day, per MD/NP -Check Mg, K+, Na, Phos and Glu before and during initation, correct as indicated 2. Recommend Banatrol TID to assist with diarrhea or BID for loose stools, as warranted 3. Weigh twice weekly        VTE Risk Mitigation (From admission, onward)           Ordered     apixaban tablet 5 mg  2 times daily         10/14/24 0949     apixaban tablet 10 mg  2 times daily         10/14/24 0959     Reason for No Pharmacological VTE Prophylaxis  Once        Comments: Heparin drip ordered for acute occlusive DVT   Question:  Reasons:  Answer:  Physician Provided (leave comment)    10/09/24 2027     IP VTE HIGH RISK PATIENT  Once         10/09/24 2027     heparin 25,000 units in dextrose 5% 250 mL (100 units/mL) infusion HIGH INTENSITY nomogram - [facility]  Continuous        Question:  Begin at (units/kg/hr)  Answer:  18    10/09/24 2013                    Discharge Planning   SOFIYA: 10/15/2024     Code Status: Full Code   Is the patient medically ready for discharge?:     Reason for patient still in hospital (select all that apply): Pending disposition  Discharge Plan A: Skilled Nursing Facility                  Zachery Boyd MD  Department of Hospital Medicine   [facility] - Med Surg

## 2024-10-17 NOTE — SUBJECTIVE & OBJECTIVE
Review of Systems   All other systems reviewed and are negative.    Objective:     Vital Signs (Most Recent):  Temp: 98.7 °F (37.1 °C) (10/17/24 0809)  Pulse: 92 (10/17/24 0809)  Resp: 19 (10/17/24 0809)  BP: 119/70 (10/17/24 0809)  SpO2: 95 % (10/17/24 0809) Vital Signs (24h Range):  Temp:  [97.8 °F (36.6 °C)-98.8 °F (37.1 °C)] 98.7 °F (37.1 °C)  Pulse:  [78-95] 92  Resp:  [17-20] 19  SpO2:  [95 %-100 %] 95 %  BP: (109-119)/(58-74) 119/70     Weight: 46.7 kg (102 lb 15.3 oz)  Body mass index is 17.13 kg/m².    Intake/Output Summary (Last 24 hours) at 10/17/2024 0855  Last data filed at 10/16/2024 1751  Gross per 24 hour   Intake 144 ml   Output 100 ml   Net 44 ml         Physical Exam  Vitals and nursing note reviewed.   Constitutional:       General: She is not in acute distress.     Appearance: She is ill-appearing.   Cardiovascular:      Rate and Rhythm: Normal rate and regular rhythm.      Heart sounds: No murmur heard.  Pulmonary:      Effort: Pulmonary effort is normal. No respiratory distress.      Breath sounds: No wheezing.      Comments: Decreased BS bilaterally  Abdominal:      Comments: G-tube   Genitourinary:     Comments: Roca  Neurological:      Mental Status: She is alert.             Significant Labs: All pertinent labs within the past 24 hours have been reviewed.  Recent Lab Results         10/17/24  0532        Aniso Slight       Baso # 0.06       Basophil % 0.9       Differential Method Automated       Eos # 0.0       Eos % 0.6       Gran # (ANC) 4.8       Gran % 75.3       Hematocrit 29.2       Hemoglobin 9.6       Immature Grans (Abs) 0.08  Comment: Mild elevation in immature granulocytes is non specific and   can be seen in a variety of conditions including stress response,   acute inflammation, trauma and pregnancy. Correlation with other   laboratory and clinical findings is essential.         Immature Granulocytes 1.2       Lymph # 0.8       Lymph % 11.7       MCH 31.6       MCHC 32.9        MCV 96       Mono # 0.7       Mono % 10.3       MPV 9.1       nRBC 0       Ovalocytes Occasional       Platelet Estimate Appears normal       Platelet Count 237  Comment: Results confirmed, test repeated       Poikilocytosis Slight       Poly Occasional       RBC 3.04       RDW 16.5       Teardrop Cells Occasional       WBC 6.41               Significant Imaging: I have reviewed all pertinent imaging results/findings within the past 24 hours.    IR Nephrostomy Tube Placement   Final Result      Right nephrostomy tube placement.      Plan:      Follow-up urine culture.      Monitor output.      Follow-up with urology.  Antegrade ureteral stent placement may be performed as inpatient or outpatient after adequate decompression.         Electronically signed by: Stephanie Cintron   Date:    10/13/2024   Time:    15:22      X-Ray Chest AP Portable   Final Result      1.  Negative for acute process involving the chest.   2.  Incidental findings as noted above.   3.]  PICC line in good position.      Finalized on: 10/12/2024 1:18 PM By:  Jose Angel Ross MD   [facility]# [id number]      2024-10-12 13:21:03.213    [facility]      X-Ray Chest 1 View   Final Result      No acute abnormality.         Electronically signed by: Cruz Myers   Date:    10/09/2024   Time:    21:33      US Lower Extremity Veins Bilateral   Final Result      Extensive clot from the right common femoral vein to the right posterior tibial vein.  Occlusive right is anterior tibial vein.      No left-sided DVT         Electronically signed by: Cruz Myers   Date:    10/09/2024   Time:    20:10      CT Abdomen Pelvis  Without Contrast   Final Result      Right-sided hydronephrosis with a approximately 8 mm mid right ureteral obstructing stone.  Punctate nonobstructing left renal calculi.  Postsurgical changes of the bowel.  Fluid within the colonic loops of bowel may relate to diarrheal state.  Left hemiabdomen enteric tube noted      All CT scans   are performed  using dose optimization techniques including the following: automated exposure control; adjustment of the mA and/or kV; use of iterative reconstruction technique.  Dose modulation was employed for ALARA by means of: Automated exposure control; adjustment of the mA and/or kV according to patient size (this includes techniques or standardized protocols for targeted exams where dose is matched to indication/reason for exam; i.e. extremities or head); and/or use of iterative reconstructive technique.         Electronically signed by: Cruz Myers   Date:    10/09/2024   Time:    16:30      Anesthesia US Guide Vascular Access    (Results Pending)   IR Ureteral Stent Placement    (Results Pending)   Anesthesia US Guide Vascular Access    (Results Pending)

## 2024-10-17 NOTE — PLAN OF CARE
10/17/24 1051   Post-Acute Status   Post-Acute Authorization Placement   Post-Acute Placement Status Pending payor review/awaiting authorization (if required)   Coverage [employer]   Discharge Plan   Discharge Plan A Skilled Nursing Facility      was notified by [facility] that auth has been submitted. Pending auth.

## 2024-10-17 NOTE — NURSING
Per primary nurse, patient tried to transfer to [facility] [place], [place] and accidentally pulled nephrostomy tube. I went in to see if could be put back together and it was found not able to be put back together at the bedside.  Also approximately 50 cc of bloody drainage noted in the bag.  Providers at the bedside and aware of the bloody drainage.Provider put in a consult for IR to replace the nephrostomy tube.

## 2024-10-17 NOTE — PT/OT/SLP PROGRESS
Physical Therapy Treatment    Patient Name:  Cheryl Kirkland   MRN:  [id number]    Recommendations:     Discharge Recommendations: Moderate Intensity Therapy  Discharge Equipment Recommendations: to be determined by next level of care  Barriers to discharge: None    Assessment:     Cheryl Kirkland is a 74 y.o. female admitted with a medical diagnosis of Ureteral stone with hydronephrosis.  She presents with the following impairments/functional limitations: weakness, impaired endurance, impaired functional mobility, gait instability, impaired balance, pain, decreased safety awareness, decreased lower extremity function, edema, impaired cardiopulmonary response to activity, decreased ROM.    Rehab Prognosis: Good; patient would benefit from acute skilled PT services to address these deficits and reach maximum level of function.    Recent Surgery: Procedure(s) (LRB):  CYSTOSCOPY, WITH RETROGRADE PYELOGRAM (Right) 7 Days Post-Op    Plan:     During this hospitalization, patient to be seen 3 x/week to address the identified rehab impairments via gait training, therapeutic activities, therapeutic exercises and progress toward the following goals:    Plan of Care Expires:  10/24/24    Subjective     Chief Complaint: Pt reports L UE and R LE pain due to edema  Patient/Family Comments/goals: none stated  Pain/Comfort:  Pain Rating 1:  (not rated)  Location - Side 1: Left  Location - Orientation 1: generalized  Location 1: arm  Pain Addressed 1: Reposition, Distraction, Nurse notified  Pain Rating 2:  (not rated)  Location - Side 2: Right  Location - Orientation 2: generalized  Location 2: leg  Pain Addressed 2: Reposition, Distraction, Nurse notified      Objective:     Communicated with nurse and epic chart review prior to session.  Patient found supine with PICC line, G/J tube, nephrostomy, telemetry upon PT entry to room.     General Precautions: Standard, fall  Orthopedic Precautions: N/A  Braces:  "N/A  Respiratory Status: Room air     Functional Mobility:  Gait belt applied - Yes  Bed Mobility  Rolling Left: contact guard assistance  Scooting: contact guard assistance  Supine to Sit: contact guard assistance   Transfers  Sit to Stand: minimum assistance with rolling walker  Bed to Chair: minimum assistance with rolling walker using Step Transfer  Toilet Transfer (Chair to BSC): minimum assistance with hand-held assist using Step Transfer, able to perform toileting hygiene without assistance  BSC to Chair: minimum assistance with hand-held assist using Step Transfer  Gait  Patient ambulated 30ft with rolling walker and minimum assistance. Patient demonstrates occasional unsteady gait, slow pace. No c/o dizziness, mild SOB, educated about pursed lip breathing technique and cued for use with mobility. All lines remained intact throughout ambulation trail.  Balance  Sitting: contact guard assistance  Standing: minimum assistance      AM-PAC 6 CLICK MOBILITY  Turning over in bed (including adjusting bedclothes, sheets and blankets)?: 3  Sitting down on and standing up from a chair with arms (e.g., wheelchair, bedside commode, etc.): 3  Moving from lying on back to sitting on the side of the bed?: 3  Moving to and from a bed to a chair (including a wheelchair)?: 3  Need to walk in hospital room?: 3  Climbing 3-5 steps with a railing?: 1 (NT)  Basic Mobility Total Score: 16       Treatment & Education:  Pt educated on role of PT in acute care and POC. Educated on importance of OOB activities, activity pacing, and HEP (marching/hip flex, hip abd, heel slides/LAQ, quad sets, ankle pumps) in order to maintain/regain strength. Encouraged to sit up in chair for all meals. Educated on proper use of RW for safety and to reduce risk of falling. Educated on "call don't fall" policy and increased risk of falling due to weakness, instructed to utilize call bell for assistance with all transfers. Pt agreeable to all " requests.    Patient left up in chair with all lines intact, call button in reach, and chair alarm on..    GOALS:   Multidisciplinary Problems       Physical Therapy Goals          Problem: Physical Therapy    Goal Priority Disciplines Outcome Interventions   Physical Therapy Goal     PT, PT/OT Progressing    Description: LTG: 10/24/24  1. PT WILL COMPLETE BED MOBILITY IND  2. PT WILL STAND T/F TO CHAIR IND FOR OOB TOLERANCE.  3. PT WILL GT TRAIN X 500' WITH NO AD IND TO INC ENDURANCE  4. PT WILL INC AMPAC SCORE BY 2 POINTS TO PROGRESS GROSS FUNC MOBILITY.                          Time Tracking:     PT Received On: 10/17/24  PT Start Time: 1045     PT Stop Time: 1110  PT Total Time (min): 25 min     Billable Minutes: Gait Training 10min and Therapeutic Activity 15min    Treatment Type: Treatment  PT/PTA: PT     Number of PTA visits since last PT visit: 0     10/17/2024

## 2024-10-17 NOTE — PT/OT/SLP PROGRESS
Occupational Therapy  Treatment    Cheryl Kirkland   MRN: [id number]   Admitting Diagnosis: Ureteral stone with hydronephrosis    OT Date of Treatment: 10/17/24   OT Start Time: 1045  OT Stop Time: 1115  OT Total Time (min): 30 min    Billable Minutes:  Self Care/Home Management 15 minutes and Therapeutic Activity 15 minutes    OT/PARAMJIT: OT          General Precautions: Standard, fall  Orthopedic Precautions: N/A  Braces: N/A  Respiratory Status: Room air         Subjective:  Communicated with nurse and epic chart review  prior to session.    Pain/Comfort  Pain Rating 1:  (did not verbalized pain level)  Location - Side 1: Left  Location - Orientation 1: generalized  Location 1: arm  Pain Addressed 1: Nurse notified    Objective:  Patient found with: peripheral IV, PICC line, nephrostomy     Functional Mobility:  Bed Mobility:   Sba with supine<sit  Rolling r<l sba  Supine< sit cga   Seated scooting forward seated gga    Transfers:   Min a with sit<>stand transfer  Min a with bsc transfers    Functional Ambulation:   Pt ambulated 50 feet x 2 with rolling walker with verbal cues for posture and safety    Activities of Daily Living:  Toileting : min a       Le dressing min a       Balance:   Static Sit: GOOD: Takes MODERATE challenges from all directions supported  Dynamic Sit: GOOD-: Incosistently Maintains balance through MODERATE excursions of active trunk movement,   supported  Static Stand: POOR+: Needs MINIMAL assist to maintain  Dynamic stand: POOR+: Needs MIN (minimal ) assist during gait    Therapeutic Activities and Exercises:  Educated patient on importance of increased tolerance to upright position and direct impact on CV endurance and strength. Patient encouraged to sit up in chair/ EOB, for a minimum of 2 consecutive hours including for all meals.. Encouraged patient to perform AROM TE to BUE throughout the day within all available planes of motion. Re enforced importance of utilizing call light to  "meet needs in room and not attempt to get up without staff assistance. Patient verbalized understanding and agreed to comply.         AM-PAC 6 CLICK ADL   How much help from another person does this patient currently need?   1 = Unable, Total/Dependent Assistance  2 = A lot, Maximum/Moderate Assistance  3 = A little, Minimum/Contact Guard/Supervision  4 = None, Modified Luna/Independent    Putting on and taking off regular lower body clothing? : 3  Bathing (including washing, rinsing, drying)?: 3  Toileting, which includes using toilet, bedpan, or urinal? : 3  Putting on and taking off regular upper body clothing?: 3  Taking care of personal grooming such as brushing teeth?: 4  Eating meals?: 4  Daily Activity Total Score: 20     AM-PAC Raw Score CMS "G-Code Modifier Level of Impairment Assistance   6 % Total / Unable   7 - 8 CM 80 - 100% Maximal Assist   9-13 CL 60 - 80% Moderate Assist   14 - 19 CK 40 - 60% Moderate Assist   20 - 22 CJ 20 - 40% Minimal Assist   23 CI 1-20% SBA / CGA   24 CH 0% Independent/ Mod I       Patient left up in chair with all lines intact, call button in reach, chair alarm on, and nurse notified    ASSESSMENT:  Cheryl Kirkland is a 74 y.o. female with a medical diagnosis of Ureteral stone with hydronephrosis and presents with debility and generalized weakness.    Rehab identified problem list/impairments:  weakness, impaired endurance, decreased ROM, decreased coordination, impaired self care skills, decreased upper extremity function, impaired functional mobility, decreased lower extremity function, gait instability, decreased safety awareness, impaired balance    Rehab potential is good.    Activity tolerance: Good    Discharge recommendations: Moderate Intensity Therapy   Barriers to discharge:      Equipment recommendations: to be determined by next level of care    GOALS:   Multidisciplinary Problems       Occupational Therapy Goals          Problem: " Occupational Therapy    Goal Priority Disciplines Outcome Interventions   Occupational Therapy Goal     OT, PT/OT Progressing    Description: OT goals met 10-24-24  Pt will tolerate 1 set x 15 reps b ue rom exercise  (I) with toilet transfers  (I) with le dressing                       Plan:  Patient to be seen 2 x/week to address the above listed problems via therapeutic activities, therapeutic exercises, self-care/home management  Plan of Care expires: 10/25/24  Plan of Care reviewed with: patient         10/17/2024

## 2024-10-17 NOTE — OP NOTE
IR Brief Op Note    Nephrostomy was completely dislodged from the kidney and the track is not matured. Contrast injection did not delineate a target. I tried some other techniques and am not able to regain access to the kidney. Will need to retry tomorrow. I'm getting a quick CT of her abdomen to look at the kidney just for preplanning purposes.   Attempt at placement of a new nephrostomy will be performed tomorrow 10/17/2024.     Magdaleno Valdez MD

## 2024-10-17 NOTE — PLAN OF CARE
10/17/24 0846   Rounds   Attendance Provider;;Charge nurse;Physical therapist   Discharge Plan A Skilled Nursing Facility   Why the patient remains in the hospital Requires continued medical care   Transition of Care Barriers None     Pending [facility] to submit for auth.

## 2024-10-17 NOTE — SUBJECTIVE & OBJECTIVE
Review of Systems   All other systems reviewed and are negative.    Objective:     Vital Signs (Most Recent):  Temp: 97.7 °F (36.5 °C) (10/17/24 1212)  Pulse: 96 (10/17/24 1212)  Resp: 20 (10/17/24 1212)  BP: 109/61 (10/17/24 1212)  SpO2: 100 % (10/17/24 1212) Vital Signs (24h Range):  Temp:  [97.7 °F (36.5 °C)-98.8 °F (37.1 °C)] 97.7 °F (36.5 °C)  Pulse:  [78-96] 96  Resp:  [17-20] 20  SpO2:  [95 %-100 %] 100 %  BP: (109-119)/(58-70) 109/61     Weight: 46.7 kg (102 lb 15.3 oz)  Body mass index is 17.13 kg/m².    Intake/Output Summary (Last 24 hours) at 10/17/2024 1243  Last data filed at 10/17/2024 1035  Gross per 24 hour   Intake 437 ml   Output 175 ml   Net 262 ml         Physical Exam  Vitals and nursing note reviewed.   Constitutional:       General: She is not in acute distress.     Appearance: She is ill-appearing.   Cardiovascular:      Rate and Rhythm: Normal rate and regular rhythm.      Heart sounds: No murmur heard.  Pulmonary:      Effort: Pulmonary effort is normal. No respiratory distress.      Breath sounds: No wheezing.      Comments: Decreased BS bilaterally  Abdominal:      Comments: G-tube   Genitourinary:     Comments: Roca  Neurological:      Mental Status: She is alert.             Significant Labs: All pertinent labs within the past 24 hours have been reviewed.  Recent Lab Results         10/17/24  0532        [place] Slight       Baso # 0.06       Basophil % 0.9       Differential Method Automated       Eos # 0.0       Eos % 0.6       Gran # (ANC) 4.8       Gran % 75.3       Hematocrit 29.2       Hemoglobin 9.6       Immature Grans (Abs) 0.08  Comment: Mild elevation in immature granulocytes is non specific and   can be seen in a variety of conditions including stress response,   acute inflammation, trauma and pregnancy. Correlation with other   laboratory and clinical findings is essential.         Immature Granulocytes 1.2       Lymph # 0.8       Lymph % 11.7       MCH 31.6       MCHC  32.9       MCV 96       Mono # 0.7       Mono % 10.3       MPV 9.1       nRBC 0       Ovalocytes Occasional       Platelet Estimate Appears normal       Platelet Count 237  Comment: Results confirmed, test repeated       Poikilocytosis Slight       Poly Occasional       RBC 3.04       RDW 16.5       Teardrop Cells Occasional       WBC 6.41               Significant Imaging: I have reviewed all pertinent imaging results/findings within the past 24 hours.    IR Nephrostomy Tube Placement   Final Result      Right nephrostomy tube placement.      Plan:      Follow-up urine culture.      Monitor output.      Follow-up with urology.  Antegrade ureteral stent placement may be performed as inpatient or outpatient after adequate decompression.         Electronically signed by: Stephanie Cintron   Date:    10/13/2024   Time:    15:22      X-Ray Chest AP Portable   Final Result      1.  Negative for acute process involving the chest.   2.  Incidental findings as noted above.   3.]  PICC line in good position.      Finalized on: 10/12/2024 1:18 PM By:  Jose Angel Ross MD   [facility]# [id number]      2024-10-12 13:21:03.213    [facility]      X-Ray Chest 1 View   Final Result      No acute abnormality.         Electronically signed by: Cruz Myers   Date:    10/09/2024   Time:    21:33      US Lower Extremity Veins Bilateral   Final Result      Extensive clot from the right common femoral vein to the right posterior tibial vein.  Occlusive right is anterior tibial vein.      No left-sided DVT         Electronically signed by: Cruz Myers   Date:    10/09/2024   Time:    20:10      CT Abdomen Pelvis  Without Contrast   Final Result      Right-sided hydronephrosis with a approximately 8 mm mid right ureteral obstructing stone.  Punctate nonobstructing left renal calculi.  Postsurgical changes of the bowel.  Fluid within the colonic loops of bowel may relate to diarrheal state.  Left hemiabdomen enteric tube noted      All CT scans   are  performed using dose optimization techniques including the following: automated exposure control; adjustment of the mA and/or kV; use of iterative reconstruction technique.  Dose modulation was employed for ALARA by means of: Automated exposure control; adjustment of the mA and/or kV according to patient size (this includes techniques or standardized protocols for targeted exams where dose is matched to indication/reason for exam; i.e. extremities or head); and/or use of iterative reconstructive technique.         Electronically signed by: Cruz Myers   Date:    10/09/2024   Time:    16:30      Anesthesia US Guide Vascular Access    (Results Pending)   IR Ureteral Stent Placement    (Results Pending)   Anesthesia US Guide Vascular Access    (Results Pending)

## 2024-10-17 NOTE — CONSULTS
Chart reviewed by Dr. Valdez.       ASSESSMENT/PLAN:    Nephrostomy tube dislodged    The order for a Nephrostomy tube replacement has been placed and the procedure will be performed asap.          Thank you for the consult.

## 2024-10-17 NOTE — PLAN OF CARE
LUPE spoke with Conchita at [facility] who stated auth has been received. Plan to admit pt in the morning. Pending admit in the morning.

## 2024-10-17 NOTE — PLAN OF CARE
Pt tolerated interventions well. Required CGA for bed mobility, ambulated 30ft MIN A using RW. Recommending moderate intensity therapy upon d/c.

## 2024-10-18 PROCEDURE — 11000001 HC ACUTE MED/SURG PRIVATE ROOM

## 2024-10-18 PROCEDURE — 97530 THERAPEUTIC ACTIVITIES: CPT

## 2024-10-18 PROCEDURE — 25000003 PHARM REV CODE 250: Performed by: HOSPITALIST

## 2024-10-18 PROCEDURE — 63600175 PHARM REV CODE 636 W HCPCS: Performed by: UROLOGY

## 2024-10-18 RX ADMIN — POTASSIUM CHLORIDE 20 MEQ: 1500 TABLET, EXTENDED RELEASE ORAL at 09:10

## 2024-10-18 RX ADMIN — APIXABAN 5 MG: 2.5 TABLET, FILM COATED ORAL at 09:10

## 2024-10-18 RX ADMIN — CARBAMAZEPINE 200 MG: 200 TABLET ORAL at 09:10

## 2024-10-18 RX ADMIN — ZONISAMIDE 200 MG: 100 CAPSULE ORAL at 09:10

## 2024-10-18 RX ADMIN — LOPERAMIDE HYDROCHLORIDE 2 MG: 2 CAPSULE ORAL at 09:10

## 2024-10-18 RX ADMIN — POTASSIUM CHLORIDE 20 MEQ: 1500 TABLET, EXTENDED RELEASE ORAL at 04:10

## 2024-10-18 RX ADMIN — CARBAMAZEPINE 200 MG: 200 TABLET ORAL at 04:10

## 2024-10-18 RX ADMIN — LEVETIRACETAM 500 MG: 500 INJECTION, SOLUTION INTRAVENOUS at 09:10

## 2024-10-18 NOTE — PLAN OF CARE
Problem: Adult Inpatient Plan of Care  Goal: Plan of Care Review  Outcome: Progressing     Problem: Adult Inpatient Plan of Care  Goal: Patient-Specific Goal (Individualized)  Outcome: Progressing  Flowsheets (Taken 10/18/2024 0407)  Individualized Care Needs: bedside commode close to bed  Anxieties, Fears or Concerns: light on     Problem: Infection  Goal: Absence of Infection Signs and Symptoms  Outcome: Progressing     Problem: Acute Kidney Injury/Impairment  Goal: Fluid and Electrolyte Balance  Outcome: Progressing  Goal: Improved Oral Intake  Outcome: Progressing  Goal: Effective Renal Function  Outcome: Progressing     Problem: Wound  Goal: Optimal Coping  Outcome: Progressing  Goal: Optimal Functional Ability  Outcome: Progressing  Goal: Absence of Infection Signs and Symptoms  Outcome: Progressing  Goal: Improved Oral Intake  Outcome: Progressing  Goal: Optimal Pain Control and Function  Outcome: Progressing  Goal: Skin Health and Integrity  Outcome: Progressing  Goal: Optimal Wound Healing  Outcome: Progressing     Problem: Fall Injury Risk  Goal: Absence of Fall and Fall-Related Injury  Outcome: Progressing     Problem: Skin Injury Risk Increased  Goal: Skin Health and Integrity  Outcome: Progressing       Discussed POC with pt, verbalized understanding.  Patient remains free from injury.  Safety precautions maintained.   Call light and personal belongings within reach, bed in lowest position with bed wheels locked.   No s/s of acute distress.  Purposeful rounding continued this shift.  Pain controlled per MD order.  Diet orders continued, pt diet: Tube feedings paused at midnight for procedure.  Vital signs continued per orders this shift.   Drain care continued this shift.   Chart and orders review completed. Pt education about care completed.

## 2024-10-18 NOTE — PLAN OF CARE
Pt declined OOB activity due to prepping for scheduled procedure and receiving medication from nurse.  Pt educated on up with nursing/staff later today, as well as continuing with BUE AROM exercises. Pt verbalized understanding.    Recommend moderate intensity therapy at DC

## 2024-10-18 NOTE — PLAN OF CARE
Pt inadvertently removed nephrostomy tube while attempting to transfer herself from the bedside commode. IR to place another tube tomorrow. NPO @ midnight    VS wnl  Fall precautions in place, remains injury free  Pain and nausea under control with PRN meds     Accurate I&Os. Tolerating new tube feeds well. At goal rate  Imodium given for diarrhea.  Bed locked at lowest position  Call light within reach     Chart check complete  Will cont with POC

## 2024-10-18 NOTE — PLAN OF CARE
PATO spoke with Conchita from [facility] to inform nephrostomy tube still pending at this time. Conchita [facility] facility cannot accept patient this late pending tube placement, they have also accepted more patients for today than available beds. [facility] facility can admit patient on Monday. Avoidable days documented.    Nurse updated via secure chat.

## 2024-10-18 NOTE — PLAN OF CARE
Pt refused all OOB/EOB activity and supine TherEx, due to prepping for scheduled procedure. Educated on the importance of OOB/EOB activity for her recovery. Educated on importance of consistent participation with PT. Educated on importance of TherEx to maintain/regain strength, encouraged to complete supine TherEx (hip flex, hip abd/add, heel slides, quad sets, ankle pumps) throughout the day. Encouraged frequent position changes to reduce the risk of pressure injury. Encouraged to sit up in the chair for all meals. Encouraged ambulation with staff after procedure once able. Recommending moderate intensity therapy upon d/c.

## 2024-10-18 NOTE — PLAN OF CARE
[facility] - Med Surg  Discharge Final Note    Primary Care Provider: Gagandeep Iglesias MD    Expected Discharge Date: 10/18/2024    Final Discharge Note (most recent)       Final Note - 10/18/24 1043          Final Note    Assessment Type Final Discharge Note     Anticipated Discharge Disposition Skilled Nursing Facility        Post-Acute Status    Post-Acute Authorization Placement     Post-Acute Placement Status Set-up Complete/Auth obtained     Coverage humana                              Contact Info       Gagandeep Iglesias MD   Specialty: Internal Medicine   Relationship: PCP - General    [facility]  [address] [place] [id number]   Phone: [phone]       Next Steps: Schedule an appointment as soon as possible for a visit in 1 week(s)    Instructions: Hospital discharge follow up          Pt going to [facility].

## 2024-10-18 NOTE — PT/OT/SLP PROGRESS
Occupational Therapy   Treatment    Name: Cheryl Kirkland  MRN: [id number]  Admitting Diagnosis:  Ureteral stone with hydronephrosis  8 Days Post-Op    Recommendations:     Discharge Recommendations: Moderate Intensity Therapy  Discharge Equipment Recommendations:  to be determined by next level of care  Barriers to discharge:       Assessment:     Cheryl Kirkland is a 74 y.o. female with a medical diagnosis of Ureteral stone with hydronephrosis. Performance deficits affecting function are weakness, gait instability, decreased upper extremity function, decreased lower extremity function, impaired balance, impaired endurance, impaired cardiopulmonary response to activity, decreased safety awareness, impaired self care skills, impaired functional mobility.     Rehab Prognosis:  Good; patient would benefit from acute skilled OT services to address these deficits and reach maximum level of function.       Plan:     Patient to be seen 2 x/week to address the above listed problems via self-care/home management, therapeutic activities, therapeutic exercises  Plan of Care Expires: 10/25/24  Plan of Care Reviewed with: patient    Subjective     Chief Complaint: Nervous regarding procedure   Patient/Family Comments/goals: Increase independence  Pain/Comfort:  Pain Rating 1: 0/10  Pain Rating Post-Intervention 1: 0/10  Pain Addressed 2: Reposition, Distraction, Nurse notified    Objective:     Communicated with: Nurse prior to session.  Patient found HOB elevated with telemetry, PICC line, nephrostomy, peripheral IV upon OT entry to room.    General Precautions: Standard, fall    Orthopedic Precautions:N/A  Braces: N/A  Respiratory Status: Room air     Occupational Performance:       [facility] 6 Click ADL: 20    Treatment & Education:  Pt declined OOB activity, reporting prepping for scheduled procedure, as well as receiving oral medication from nurse. Pt educated on up with staff later today to prevent pneumonia  and further improve functional strength and endurance. Pt also encouraged to perform BUE AROM exercises in all planes to increase functional strength needed for daily activities. Pt verbalized understanding.     Patient left HOB elevated with all lines intact, call button in reach, and nurse and family member present    GOALS:   Multidisciplinary Problems       Occupational Therapy Goals          Problem: Occupational Therapy    Goal Priority Disciplines Outcome Interventions   Occupational Therapy Goal     OT, PT/OT Progressing    Description: OT goals met 10-24-24  Pt will tolerate 1 set x 15 reps b ue rom exercise  (I) with toilet transfers  (I) with le dressing                       Time Tracking:     OT Date of Treatment: 10/18/24  OT Start Time: 0945  OT Stop Time: 0953  OT Total Time (min): 8 min    Billable Minutes:Therapeutic Activity 8    CARY Power  OT/PARAMJIT: OT          10/18/2024

## 2024-10-18 NOTE — PLAN OF CARE
Discussed poc with pt, pt verbalized understanding    Purposeful rounding every 2hours    VS wnl  Cardiac monitoring in use, pt is NSR, tele monitor # [id number]  Fall precautions in place, remains injury free  Pain and nausea under control with PRN meds    IVFs  Accurate I&Os  Bed locked at lowest position  Call light within reach    Chart check complete  Will cont with POC

## 2024-10-18 NOTE — PT/OT/SLP PROGRESS
Physical Therapy Treatment    Patient Name:  Cheryl Kirkland   MRN:  [id number]    Recommendations:     Discharge Recommendations: Moderate Intensity Therapy  Discharge Equipment Recommendations: to be determined by next level of care  Barriers to discharge: None    Assessment:     Cheryl Kirkland is a 74 y.o. female admitted with a medical diagnosis of Ureteral stone with hydronephrosis.  She presents with the following impairments/functional limitations: weakness, impaired endurance, impaired functional mobility, gait instability, impaired balance, decreased safety awareness, decreased lower extremity function, impaired cardiopulmonary response to activity, decreased ROM, decreased coordination.    Rehab Prognosis: Good; patient would benefit from acute skilled PT services to address these deficits and reach maximum level of function.    Recent Surgery: Procedure(s) (LRB):  CYSTOSCOPY, WITH RETROGRADE PYELOGRAM (Right) 8 Days Post-Op    Plan:     During this hospitalization, patient to be seen 3 x/week to address the identified rehab impairments via gait training, therapeutic activities, therapeutic exercises and progress toward the following goals:    Plan of Care Expires:  10/24/24    Subjective     Chief Complaint: Pt refused all PT intervention at this time due to preparing for a scheduled procedure  Patient/Family Comments/goals: none stated  Pain/Comfort:  Pain Rating 1: 0/10      Objective:     Communicated with nurse and epic chart review prior to session.  Patient found supine with PICC line, nephrostomy, G/J tube, telemetry upon PT entry to room.     General Precautions: Standard, fall  Orthopedic Precautions: N/A  Braces: N/A  Respiratory Status: Room air     Functional Mobility:  Pt refused all functional mobility at this time.      AM-PAC 6 CLICK MOBILITY  Turning over in bed (including adjusting bedclothes, sheets and blankets)?: 3 (scored from previous session, refused this  "date)  Sitting down on and standing up from a chair with arms (e.g., wheelchair, bedside commode, etc.): 3 (scored from previous session, refused this date)  Moving from lying on back to sitting on the side of the bed?: 3 (scored from previous session, refused this date)  Moving to and from a bed to a chair (including a wheelchair)?: 3 (scored from previous session, refused this date)  Need to walk in hospital room?: 3 (scored from previous session, refused this date)  Climbing 3-5 steps with a railing?: 1 (NT)  Basic Mobility Total Score: 16       Treatment & Education:  Reviewed role of PT in acute care and POC. Pt refused all OOB/EOB activity and supine TherEx, due to prepping for scheduled procedure. Educated on the importance of OOB/EOB activity for her recovery. Educated on importance of consistent participation with PT. Educated on importance of TherEx to maintain/regain strength, encouraged to complete supine TherEx (hip flex, hip abd/add, heel slides, quad sets, ankle pumps) throughout the day. Encouraged frequent position changes to reduce the risk of pressure injury. Encouraged to sit up in the chair for all meals. Encouraged ambulation with staff after procedure once able. Reviewed "call don't fall" policy and increased risk of falling due to weakness, instructed to utilize call bell for assistance with all transfers. Pt agreeable to all requests.    Patient left supine with all lines intact, call button in reach, and family and nurse present..    GOALS:   Multidisciplinary Problems       Physical Therapy Goals          Problem: Physical Therapy    Goal Priority Disciplines Outcome Interventions   Physical Therapy Goal     PT, PT/OT Progressing    Description: LTG: 10/24/24  1. PT WILL COMPLETE BED MOBILITY IND  2. PT WILL STAND T/F TO CHAIR IND FOR OOB TOLERANCE.  3. PT WILL GT TRAIN X 500' WITH NO AD IND TO INC ENDURANCE  4. PT WILL INC AMPAC SCORE BY 2 POINTS TO PROGRESS GROSS FUNC MOBILITY.       "                    Time Tracking:     PT Received On: 10/18/24  PT Start Time: 0951     PT Stop Time: 0959  PT Total Time (min): 8 min     Billable Minutes: Therapeutic Activity 8min    Treatment Type: Treatment  PT/PTA: PT     Number of PTA visits since last PT visit: 0     10/18/2024

## 2024-10-19 ENCOUNTER — DOCUMENT SCAN (OUTPATIENT)
Dept: HOME HEALTH SERVICES | Facility: HOSPITAL | Age: 74
End: 2024-10-19
Payer: MEDICARE

## 2024-10-19 PROCEDURE — 25000003 PHARM REV CODE 250: Performed by: HOSPITALIST

## 2024-10-19 PROCEDURE — 63600175 PHARM REV CODE 636 W HCPCS: Performed by: UROLOGY

## 2024-10-19 PROCEDURE — 11000001 HC ACUTE MED/SURG PRIVATE ROOM

## 2024-10-19 PROCEDURE — 25000003 PHARM REV CODE 250: Performed by: UROLOGY

## 2024-10-19 RX ADMIN — APIXABAN 5 MG: 2.5 TABLET, FILM COATED ORAL at 08:10

## 2024-10-19 RX ADMIN — ZONISAMIDE 200 MG: 100 CAPSULE ORAL at 10:10

## 2024-10-19 RX ADMIN — ZONISAMIDE 200 MG: 100 CAPSULE ORAL at 08:10

## 2024-10-19 RX ADMIN — CARBAMAZEPINE 200 MG: 200 TABLET ORAL at 03:10

## 2024-10-19 RX ADMIN — ONDANSETRON 4 MG: 2 INJECTION INTRAMUSCULAR; INTRAVENOUS at 02:10

## 2024-10-19 RX ADMIN — ACETAMINOPHEN 650 MG: 325 TABLET ORAL at 11:10

## 2024-10-19 RX ADMIN — APIXABAN 5 MG: 2.5 TABLET, FILM COATED ORAL at 10:10

## 2024-10-19 RX ADMIN — LEVETIRACETAM 500 MG: 500 INJECTION, SOLUTION INTRAVENOUS at 10:10

## 2024-10-19 RX ADMIN — CARBAMAZEPINE 200 MG: 200 TABLET ORAL at 08:10

## 2024-10-19 RX ADMIN — ACETAMINOPHEN 650 MG: 325 TABLET ORAL at 05:10

## 2024-10-19 RX ADMIN — POTASSIUM CHLORIDE 20 MEQ: 1500 TABLET, EXTENDED RELEASE ORAL at 03:10

## 2024-10-19 RX ADMIN — POTASSIUM CHLORIDE 20 MEQ: 1500 TABLET, EXTENDED RELEASE ORAL at 10:10

## 2024-10-19 RX ADMIN — LEVETIRACETAM 500 MG: 500 INJECTION, SOLUTION INTRAVENOUS at 08:10

## 2024-10-19 RX ADMIN — CARBAMAZEPINE 200 MG: 200 TABLET ORAL at 10:10

## 2024-10-19 RX ADMIN — POTASSIUM CHLORIDE 20 MEQ: 1500 TABLET, EXTENDED RELEASE ORAL at 08:10

## 2024-10-19 NOTE — SUBJECTIVE & OBJECTIVE
Review of Systems   All other systems reviewed and are negative.    Objective:     Vital Signs (Most Recent):  Temp: 98 °F (36.7 °C) (10/19/24 1146)  Pulse: 80 (10/19/24 1553)  Resp: 17 (10/19/24 1553)  BP: (!) 141/72 (10/19/24 1553)  SpO2: 96 % (10/19/24 1146) Vital Signs (24h Range):  Temp:  [97.6 °F (36.4 °C)-98.2 °F (36.8 °C)] 98 °F (36.7 °C)  Pulse:  [77-81] 80  Resp:  [17-19] 17  SpO2:  [92 %-100 %] 96 %  BP: (130-159)/(63-74) 141/72     Weight: 46.7 kg (102 lb 15.3 oz)  Body mass index is 17.13 kg/m².    Intake/Output Summary (Last 24 hours) at 10/19/2024 1705  Last data filed at 10/19/2024 0541  Gross per 24 hour   Intake 650.22 ml   Output --   Net 650.22 ml         Physical Exam  Vitals and nursing note reviewed.   Constitutional:       General: She is not in acute distress.     Appearance: She is ill-appearing.   Cardiovascular:      Rate and Rhythm: Normal rate and regular rhythm.      Heart sounds: No murmur heard.  Pulmonary:      Effort: Pulmonary effort is normal. No respiratory distress.      Breath sounds: No wheezing.      Comments: Decreased BS bilaterally  Abdominal:      Comments: G-tube   Neurological:      Mental Status: She is alert.             Significant Labs: All pertinent labs within the past 24 hours have been reviewed.  Recent Lab Results       None            Significant Imaging: I have reviewed all pertinent imaging results/findings within the past 24 hours.    CT Abdomen Without Contrast   Final Result      Moderate right hydronephrosis.         Electronically signed by: Magdaleno Valdez   Date:    10/17/2024   Time:    16:15      IR Nephrostomy Tube Change   Final Result      Unsuccessful attempt at fluoroscopic replacement of a completely dislodged right nephrostomy tube.      Plan:      A new nephrostomy will be placed tomorrow 10/18/2024.      _______________________________________________________________         Electronically signed by: Magdaleno Valdez   Date:    10/17/2024    Time:    16:23      IR Nephrostomy Tube Placement   Final Result      Right nephrostomy tube placement.      Plan:      Follow-up urine culture.      Monitor output.      Follow-up with urology.  Antegrade ureteral stent placement may be performed as inpatient or outpatient after adequate decompression.         Electronically signed by: Stephanie Cintron   Date:    10/13/2024   Time:    15:22      X-Ray Chest AP Portable   Final Result      1.  Negative for acute process involving the chest.   2.  Incidental findings as noted above.   3.]  PICC line in good position.      Finalized on: 10/12/2024 1:18 PM By:  Jose Angel Ross MD   [facility]# [id number]      2024-10-12 13:21:03.213    [facility]      X-Ray Chest 1 View   Final Result      No acute abnormality.         Electronically signed by: Cruz Myers   Date:    10/09/2024   Time:    21:33      US Lower Extremity Veins Bilateral   Final Result      Extensive clot from the right common femoral vein to the right posterior tibial vein.  Occlusive right is anterior tibial vein.      No left-sided DVT         Electronically signed by: Cruz Myers   Date:    10/09/2024   Time:    20:10      CT Abdomen Pelvis  Without Contrast   Final Result      Right-sided hydronephrosis with a approximately 8 mm mid right ureteral obstructing stone.  Punctate nonobstructing left renal calculi.  Postsurgical changes of the bowel.  Fluid within the colonic loops of bowel may relate to diarrheal state.  Left hemiabdomen enteric tube noted      All CT scans   are performed using dose optimization techniques including the following: automated exposure control; adjustment of the mA and/or kV; use of iterative reconstruction technique.  Dose modulation was employed for ALARA by means of: Automated exposure control; adjustment of the mA and/or kV according to patient size (this includes techniques or standardized protocols for targeted exams where dose is matched to indication/reason for exam; i.e.  extremities or head); and/or use of iterative reconstructive technique.         Electronically signed by: Cruz Myers   Date:    10/09/2024   Time:    16:30      Anesthesia US Guide Vascular Access    (Results Pending)   IR Ureteral Stent Placement    (Results Pending)   Anesthesia US Guide Vascular Access    (Results Pending)   IR Nephrostomy Tube Placement    (Results Pending)

## 2024-10-19 NOTE — SUBJECTIVE & OBJECTIVE
Review of Systems   All other systems reviewed and are negative.    Objective:     Vital Signs (Most Recent):  Temp: 98 °F (36.7 °C) (10/19/24 0434)  Pulse: 79 (10/19/24 0434)  Resp: 18 (10/19/24 0434)  BP: 130/71 (10/19/24 0434)  SpO2: 100 % (10/19/24 0434) Vital Signs (24h Range):  Temp:  [97.6 °F (36.4 °C)-98.5 °F (36.9 °C)] 98 °F (36.7 °C)  Pulse:  [78-83] 79  Resp:  [14-19] 18  SpO2:  [92 %-100 %] 100 %  BP: (129-140)/(63-73) 130/71     Weight: 46.7 kg (102 lb 15.3 oz)  Body mass index is 17.13 kg/m².    Intake/Output Summary (Last 24 hours) at 10/19/2024 0708  Last data filed at 10/19/2024 0541  Gross per 24 hour   Intake 650.22 ml   Output --   Net 650.22 ml         Physical Exam  Vitals and nursing note reviewed.   Constitutional:       General: She is not in acute distress.     Appearance: She is ill-appearing.   Cardiovascular:      Rate and Rhythm: Normal rate and regular rhythm.      Heart sounds: No murmur heard.  Pulmonary:      Effort: Pulmonary effort is normal. No respiratory distress.      Breath sounds: No wheezing.      Comments: Decreased BS bilaterally  Abdominal:      Comments: G-tube   Neurological:      Mental Status: She is alert.             Significant Labs: All pertinent labs within the past 24 hours have been reviewed.  Recent Lab Results       None            Significant Imaging: I have reviewed all pertinent imaging results/findings within the past 24 hours.    CT Abdomen Without Contrast   Final Result      Moderate right hydronephrosis.         Electronically signed by: Magdaleno Valdez   Date:    10/17/2024   Time:    16:15      IR Nephrostomy Tube Change   Final Result      Unsuccessful attempt at fluoroscopic replacement of a completely dislodged right nephrostomy tube.      Plan:      A new nephrostomy will be placed tomorrow 10/18/2024.      _______________________________________________________________         Electronically signed by: Magdaleno Valdez   Date:    10/17/2024    Time:    16:23      IR Nephrostomy Tube Placement   Final Result      Right nephrostomy tube placement.      Plan:      Follow-up urine culture.      Monitor output.      Follow-up with urology.  Antegrade ureteral stent placement may be performed as inpatient or outpatient after adequate decompression.         Electronically signed by: Stephanie Cintron   Date:    10/13/2024   Time:    15:22      X-Ray Chest AP Portable   Final Result      1.  Negative for acute process involving the chest.   2.  Incidental findings as noted above.   3.]  PICC line in good position.      Finalized on: 10/12/2024 1:18 PM By:  Jose Angel Ross MD   [facility]# [id number]      2024-10-12 13:21:03.213    [facility]      X-Ray Chest 1 View   Final Result      No acute abnormality.         Electronically signed by: Cruz Myers   Date:    10/09/2024   Time:    21:33      US Lower Extremity Veins Bilateral   Final Result      Extensive clot from the right common femoral vein to the right posterior tibial vein.  Occlusive right is anterior tibial vein.      No left-sided DVT         Electronically signed by: Cruz Myers   Date:    10/09/2024   Time:    20:10      CT Abdomen Pelvis  Without Contrast   Final Result      Right-sided hydronephrosis with a approximately 8 mm mid right ureteral obstructing stone.  Punctate nonobstructing left renal calculi.  Postsurgical changes of the bowel.  Fluid within the colonic loops of bowel may relate to diarrheal state.  Left hemiabdomen enteric tube noted      All CT scans   are performed using dose optimization techniques including the following: automated exposure control; adjustment of the mA and/or kV; use of iterative reconstruction technique.  Dose modulation was employed for ALARA by means of: Automated exposure control; adjustment of the mA and/or kV according to patient size (this includes techniques or standardized protocols for targeted exams where dose is matched to indication/reason for exam; i.e.  extremities or head); and/or use of iterative reconstructive technique.         Electronically signed by: Cruz Myers   Date:    10/09/2024   Time:    16:30      Anesthesia US Guide Vascular Access    (Results Pending)   IR Ureteral Stent Placement    (Results Pending)   Anesthesia US Guide Vascular Access    (Results Pending)   IR Nephrostomy Tube Placement    (Results Pending)

## 2024-10-19 NOTE — PROGRESS NOTES
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  On exam she is noted to have left lower quadrant tenderness, appears frail and emaciated, has not been taking all of her scheduled medications and is hesitant to use pain medication, stating that she does not want to be addicted to pain medication.  Vital signs on arrival stable-- blood pressure 110/78, heart rate 95, temp 97.3° respirations 12, 97% SpO2 on room air.   Significant labs include CO2 6, BUN 21, creatinine 2.0, alk-phos 210, venous blood gas shows pH 7.030 and HC03 4.5, urinalysis with hazy appearance, 3+ blood, trace leukocyte esterase, greater than 100 RBCs, 31 WBCs, rare bacteria.  Stool studies negative for occult blood, negative for rotavirus, negative for C diff.  Stool culture is in progress.  EKG showed SR with PAC's in pattern of bigeminy with nonspecific T wave abnormality. CT abdomen and pelvis without contrast was done which revealed right-sided hydronephrosis with approximately 8 mm mid right ureteral obstructing stone.  Urology was consulted and recommended NPO after  midnight and will see patient in AM, also recommended urinalysis with urine culture which has been collected and does show acute cystitis with hematuria. While in ED, fluid bolus 1.5L, sodium bicarb --2 amps, morphine, Zofran, and Rocephin were all given. Hospital Medicine was consulted for admission due to SHELLEY, obstructing right ureteral stone 8mm and right sided hydronephrosis.     During exam, patient complains of pain and tightness in right leg so US was ordered and she was found to have extensive clot from right common femoral vein to right posterior tibial vein as well as occluded anterior tibial vein. IR was consulted and will evaluate for possible intervention in AM, heparin drip was recommended and ordered.     Overview/Hospital Course:  10/10/24  73 y/o female with gastric adenocarcinoma, here with abdominal pain  Patient rc/o lower quadrant abdominal pain, nausea, RLE pain, fatigue  Found to have 8 mm right sided obstructing ureteral stone and right sided hydronephrosis  Also with RLE extensive DVT; patient states she was previously on AC but stopped due to gastric cancer/bleeding  Urology with plans for stent placement today  IR with plans for RLE thrombectomy tomorrow morning   at bedside, updated    10/11/24  NAEON, patient resting in bed, feels some better today  Patient s/p cystoscopy yesterday with Dr. Kelly  Patient has impacted right ureteral stone, unable to place stent  Roca currently in place, continue ceftriaxone  Cr trending down with IV fluids  K 2.2, replete PO and IV, recheck this afternoon  Plans for right nephrostomy tube today  Plans for RLE thrombectomy for extensive DVT pending  Continue heparin drip, plan to transition to NOAC after thrombectomy    10/12  Awake, alert, lying in bed, cachetic, NPO since yesterday waiting on right nephrostomy tube/stent, was unable to do yesterday because of hypokalemia. Discussed with IR today, will give K riders today and repeat tomorrow  and plan for procedure tomorrow if Potassium stable.   IR said no indication for thrombectomy at this time  Cont heparin gtt and hold after midnight  Restart tube feedings, hold after midnight    10/13  Awake, alert, lying in bed,  at bedside  K 3.2 this am, given K rider and Effer-K, repeat K 3.9  IR placed a right nephrostomy tube today, no stent because of concern for infection, will need to follow up with urology and can have a antegrade stent placed inpatient/outpatient after adequate decompression    10/14  NAEON, patient states she feels unwell, fatigued  S/p right nephrostomy tube placement yesterday, cultures pending  Ceftriaxone day # 6  PT/OT with reccs for LIT\    10/15  K 3.0, Mg 1.2 - replete  Recheck K this afternoon  Patient states today that no one is able to care for her at home   at bedside wishes to pursue SNF placement, states he is unable to care for her  Consult SW for SNF placement    10/16  NAEON  Loose stools reported, schedule imodium short course  Adjusted TF regimen, monitor for tolerance  Replete electrolytes  SNF pending    10/17  NAEON  Nephrostomy tube dislodged yesterday evening  IR and Urology following, plans for replacement pending  Patient sitting in chair, reports pain controlled  SNF placement pending    10/18  NAEON  Patient sitting in chair, denies complaints, pain controlled  Nephrostomy tube placement pending for Monday morning      Review of Systems   All other systems reviewed and are negative.    Objective:     Vital Signs (Most Recent):  Temp: 98 °F (36.7 °C) (10/19/24 1146)  Pulse: 80 (10/19/24 1553)  Resp: 17 (10/19/24 1553)  BP: (!) 141/72 (10/19/24 1553)  SpO2: 96 % (10/19/24 1146) Vital Signs (24h Range):  Temp:  [97.6 °F (36.4 °C)-98.2 °F (36.8 °C)] 98 °F (36.7 °C)  Pulse:  [77-81] 80  Resp:  [17-19] 17  SpO2:  [92 %-100 %] 96 %  BP: (130-159)/(63-74) 141/72     Weight: 46.7 kg (102 lb 15.3 oz)  Body mass index is 17.13 kg/m².    Intake/Output  Summary (Last 24 hours) at 10/19/2024 1705  Last data filed at 10/19/2024 0541  Gross per 24 hour   Intake 650.22 ml   Output --   Net 650.22 ml         Physical Exam  Vitals and nursing note reviewed.   Constitutional:       General: She is not in acute distress.     Appearance: She is ill-appearing.   Cardiovascular:      Rate and Rhythm: Normal rate and regular rhythm.      Heart sounds: No murmur heard.  Pulmonary:      Effort: Pulmonary effort is normal. No respiratory distress.      Breath sounds: No wheezing.      Comments: Decreased BS bilaterally  Abdominal:      Comments: G-tube   Neurological:      Mental Status: She is alert.             Significant Labs: All pertinent labs within the past 24 hours have been reviewed.  Recent Lab Results       None            Significant Imaging: I have reviewed all pertinent imaging results/findings within the past 24 hours.    CT Abdomen Without Contrast   Final Result      Moderate right hydronephrosis.         Electronically signed by: Magdaleno Valdez   Date:    10/17/2024   Time:    16:15      IR Nephrostomy Tube Change   Final Result      Unsuccessful attempt at fluoroscopic replacement of a completely dislodged right nephrostomy tube.      Plan:      A new nephrostomy will be placed tomorrow 10/18/2024.      _______________________________________________________________         Electronically signed by: Magdaleno Valdez   Date:    10/17/2024   Time:    16:23      IR Nephrostomy Tube Placement   Final Result      Right nephrostomy tube placement.      Plan:      Follow-up urine culture.      Monitor output.      Follow-up with urology.  Antegrade ureteral stent placement may be performed as inpatient or outpatient after adequate decompression.         Electronically signed by: Stephanie Cintron   Date:    10/13/2024   Time:    15:22      X-Ray Chest AP Portable   Final Result      1.  Negative for acute process involving the chest.   2.  Incidental findings as noted above.    3.]  PICC line in good position.      Finalized on: 10/12/2024 1:18 PM By:  Jose Angel Ross MD   [facility]# [id number]      2024-10-12 13:21:03.213    [facility]      X-Ray Chest 1 View   Final Result      No acute abnormality.         Electronically signed by: Cruz Myers   Date:    10/09/2024   Time:    21:33      US Lower Extremity Veins Bilateral   Final Result      Extensive clot from the right common femoral vein to the right posterior tibial vein.  Occlusive right is anterior tibial vein.      No left-sided DVT         Electronically signed by: Cruz Myers   Date:    10/09/2024   Time:    20:10      CT Abdomen Pelvis  Without Contrast   Final Result      Right-sided hydronephrosis with a approximately 8 mm mid right ureteral obstructing stone.  Punctate nonobstructing left renal calculi.  Postsurgical changes of the bowel.  Fluid within the colonic loops of bowel may relate to diarrheal state.  Left hemiabdomen enteric tube noted      All CT scans   are performed using dose optimization techniques including the following: automated exposure control; adjustment of the mA and/or kV; use of iterative reconstruction technique.  Dose modulation was employed for ALARA by means of: Automated exposure control; adjustment of the mA and/or kV according to patient size (this includes techniques or standardized protocols for targeted exams where dose is matched to indication/reason for exam; i.e. extremities or head); and/or use of iterative reconstructive technique.         Electronically signed by: Cruz Myers   Date:    10/09/2024   Time:    16:30      Anesthesia US Guide Vascular Access    (Results Pending)   IR Ureteral Stent Placement    (Results Pending)   Anesthesia US Guide Vascular Access    (Results Pending)   IR Nephrostomy Tube Placement    (Results Pending)         Assessment/Plan:      * Ureteral stone with hydronephrosis  - Right-sided hydronephrosis with approximately 8 mm mid right ureteral obstructing stone   -  Urology consulted (per ED, Dr. Kelly)  - S/p cystoscopy 10/10/24 - noted impacted right ureteral stone, unable to place stent  - s/p IR placement of right nephrostomy tube 10/13, will need to follow up with urology, can place antegrade stent after adequate decompression as inpt/outpt  - Roca in place, monitor I/O's    Hypokalemia  Patient's most recent potassium results are listed below.   Recent Labs     10/12/24  1221 10/13/24  0343 10/13/24  1302   K 2.8* 3.2* 3.9       Plan  - Replete potassium per protocol  - Monitor potassium Every 12 hours  - Patient's hypokalemia is worsening. Will adjust treatment as follows: add PO and IV KCl    DVT (deep venous thrombosis)  - US - RLE extensive clot from the right common femoral vein to the right posterior tibial vein.  Occluded anterior tibial vein  - IR consulted for thrombectomy, not indicated at this time per IR  - Continue heparin drip, hold after midnight for nephrostomy tube placement tomorrow  - Heme/Onc consulted for AC reccs    Jejunostomy tube present  - Complains of pain around j-tube  - restart tube feedings after nephrostomy tube placed today    Gastric adenocarcinoma  - Followed by Dr. Ambrose and Dr. Jerome  - S/p total gastrectomy on 8/20, with lysis of adhesions, lymphadenectomy, and jejunostomy tube placement, d/c on 9/10  - Pathologic stage from 8/20/2024: Stage III (ypT2, pN3a, cM0), completed 5 cycles Keytruda prior to surgery    Hx of Epileptic seizures  - No recent seizure activity  - Continue Keppra    Severe protein-calorie malnutrition  Nutrition consulted. Most recent weight and BMI monitored- Has J-tube with tube feedings at home, however has been having diarrhea.    Measurements:  Wt Readings from Last 1 Encounters:   10/10/24 46.7 kg (102 lb 15.3 oz)   Body mass index is 17.13 kg/m².      Weight Loss (Malnutrition): greater than 7.5% in 3 months  1. Initiate pt onto continuous Enteral nutrition, recommend [employer] peptide 1.5 (vanilla)  via J tube, goal rate 30 mL/hr, starting at 10 mL/hr then progress to goal rate within 24 hrs if pt is tolerating or per MD/NP -Formula at goal rate provides: 1107 kcals/day (79% EEN), 53 g protein/day (113% EPN), 99 g CHO/day (57% CHO needs), 504 mL free formula water/day -150 mL q4h free water flushes (900 mL/day) = total from formula + FWF = 1404 mL water/day, per MD/NP -Check Mg, K+, Na, Phos and Glu before and during initation, correct as indicated 2. Recommend Banatrol TID to assist with diarrhea or BID for loose stools, as warranted 3. Weigh twice weekly        VTE Risk Mitigation (From admission, onward)           Ordered     apixaban tablet 5 mg  2 times daily         10/14/24 0949     Reason for No Pharmacological VTE Prophylaxis  Once        Comments: Heparin drip ordered for acute occlusive DVT   Question:  Reasons:  Answer:  Physician Provided (leave comment)    10/09/24 2027     IP VTE HIGH RISK PATIENT  Once         10/09/24 2027     heparin 25,000 units in dextrose 5% 250 mL (100 units/mL) infusion HIGH INTENSITY nomogram - [facility]  Continuous        Question:  Begin at (units/kg/hr)  Answer:  18    10/09/24 2013                    Discharge Planning   SOFIYA: 10/18/2024     Code Status: Full Code   Is the patient medically ready for discharge?:     Reason for patient still in hospital (select all that apply): Patient trending condition, Laboratory test, Treatment, Imaging, Consult recommendations, and Pending disposition  Discharge Plan A: Skilled Nursing Facility                  Zachery Boyd MD  Department of Hospital Medicine   [facility] - Med Surg

## 2024-10-19 NOTE — PLAN OF CARE
Problem: Adult Inpatient Plan of Care  Goal: Plan of Care Review  Outcome: Progressing     Problem: Adult Inpatient Plan of Care  Goal: Patient-Specific Goal (Individualized)  Outcome: Progressing  Flowsheets (Taken 10/19/2024 0315)  Individualized Care Needs: water  Anxieties, Fears or Concerns: light on     Problem: Infection  Goal: Absence of Infection Signs and Symptoms  Outcome: Progressing     Problem: Fall Injury Risk  Goal: Absence of Fall and Fall-Related Injury  Outcome: Progressing     Problem: Skin Injury Risk Increased  Goal: Skin Health and Integrity  Outcome: Progressing      Discussed POC with pt, verbalized understanding.  Patient remains free from injury.  Safety precautions maintained.   Call light and personal belongings within reach, bed in lowest position with bed wheels locked.   No s/s of acute distress.  Purposeful rounding continued this shift.  Pain controlled per MD order.  Diet orders continued, pt diet: regular and tube feedings  Vital signs continued per orders this shift.   J tube care continued this shift. Tube feeding continued until 0300 when patient requested tube feedings be held due to nausea.   Chart and orders review completed. Pt education about care completed.

## 2024-10-19 NOTE — NURSING
Patient refuses to be restarted on tube feeds. Reports that it makes stomach hurt and nausea. Patient has ate breakfast, lunch, and supper.

## 2024-10-19 NOTE — PROGRESS NOTES
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  On exam she is noted to have left lower quadrant tenderness, appears frail and emaciated, has not been taking all of her scheduled medications and is hesitant to use pain medication, stating that she does not want to be addicted to pain medication.  Vital signs on arrival stable-- blood pressure 110/78, heart rate 95, temp 97.3° respirations 12, 97% SpO2 on room air.   Significant labs include CO2 6, BUN 21, creatinine 2.0, alk-phos 210, venous blood gas shows pH 7.030 and HC03 4.5, urinalysis with hazy appearance, 3+ blood, trace leukocyte esterase, greater than 100 RBCs, 31 WBCs, rare bacteria.  Stool studies negative for occult blood, negative for rotavirus, negative for C diff.  Stool culture is in progress.  EKG showed SR with PAC's in pattern of bigeminy with nonspecific T wave abnormality. CT abdomen and pelvis without contrast was done which revealed right-sided hydronephrosis with approximately 8 mm mid right ureteral obstructing stone.  Urology was consulted and recommended NPO after  midnight and will see patient in AM, also recommended urinalysis with urine culture which has been collected and does show acute cystitis with hematuria. While in ED, fluid bolus 1.5L, sodium bicarb --2 amps, morphine, Zofran, and Rocephin were all given. Hospital Medicine was consulted for admission due to SHELLEY, obstructing right ureteral stone 8mm and right sided hydronephrosis.     During exam, patient complains of pain and tightness in right leg so US was ordered and she was found to have extensive clot from right common femoral vein to right posterior tibial vein as well as occluded anterior tibial vein. IR was consulted and will evaluate for possible intervention in AM, heparin drip was recommended and ordered.     Overview/Hospital Course:  10/10/24  75 y/o female with gastric adenocarcinoma, here with abdominal pain  Patient rc/o lower quadrant abdominal pain, nausea, RLE pain, fatigue  Found to have 8 mm right sided obstructing ureteral stone and right sided hydronephrosis  Also with RLE extensive DVT; patient states she was previously on AC but stopped due to gastric cancer/bleeding  Urology with plans for stent placement today  IR with plans for RLE thrombectomy tomorrow morning   at bedside, updated    10/11/24  NAEON, patient resting in bed, feels some better today  Patient s/p cystoscopy yesterday with Dr. Kelly  Patient has impacted right ureteral stone, unable to place stent  Roca currently in place, continue ceftriaxone  Cr trending down with IV fluids  K 2.2, replete PO and IV, recheck this afternoon  Plans for right nephrostomy tube today  Plans for RLE thrombectomy for extensive DVT pending  Continue heparin drip, plan to transition to NOAC after thrombectomy    10/12  Awake, alert, lying in bed, cachetic, NPO since yesterday waiting on right nephrostomy tube/stent, was unable to do yesterday because of hypokalemia. Discussed with IR today, will give K riders today and repeat tomorrow  and plan for procedure tomorrow if Potassium stable.   IR said no indication for thrombectomy at this time  Cont heparin gtt and hold after midnight  Restart tube feedings, hold after midnight    10/13  Awake, alert, lying in bed,  at bedside  K 3.2 this am, given K rider and Effer-K, repeat K 3.9  IR placed a right nephrostomy tube today, no stent because of concern for infection, will need to follow up with urology and can have a antegrade stent placed inpatient/outpatient after adequate decompression    10/14  NAEON, patient states she feels unwell, fatigued  S/p right nephrostomy tube placement yesterday, cultures pending  Ceftriaxone day # 6  PT/OT with reccs for LIT\    10/15  K 3.0, Mg 1.2 - replete  Recheck K this afternoon  Patient states today that no one is able to care for her at home   at bedside wishes to pursue SNF placement, states he is unable to care for her  Consult SW for SNF placement    10/16  NAEON  Loose stools reported, schedule imodium short course  Adjusted TF regimen, monitor for tolerance  Replete electrolytes  SNF pending    10/17  NAEON  Nephrostomy tube dislodged yesterday evening  IR and Urology following, plans for replacement pending  Patient sitting in chair, reports pain controlled  SNF placement pending      Review of Systems   All other systems reviewed and are negative.    Objective:     Vital Signs (Most Recent):  Temp: 98 °F (36.7 °C) (10/19/24 0434)  Pulse: 79 (10/19/24 0434)  Resp: 18 (10/19/24 0434)  BP: 130/71 (10/19/24 0434)  SpO2: 100 % (10/19/24 0434) Vital Signs (24h Range):  Temp:  [97.6 °F (36.4 °C)-98.5 °F (36.9 °C)] 98 °F (36.7 °C)  Pulse:  [78-83] 79  Resp:  [14-19] 18  SpO2:  [92 %-100 %] 100 %  BP: (129-140)/(63-73) 130/71     Weight: 46.7 kg (102 lb 15.3 oz)  Body mass index is 17.13 kg/m².    Intake/Output Summary (Last 24 hours) at 10/19/2024 0708  Last data filed at 10/19/2024 0541  Gross per 24 hour   Intake 650.22 ml   Output --   Net  650.22 ml         Physical Exam  Vitals and nursing note reviewed.   Constitutional:       General: She is not in acute distress.     Appearance: She is ill-appearing.   Cardiovascular:      Rate and Rhythm: Normal rate and regular rhythm.      Heart sounds: No murmur heard.  Pulmonary:      Effort: Pulmonary effort is normal. No respiratory distress.      Breath sounds: No wheezing.      Comments: Decreased BS bilaterally  Abdominal:      Comments: G-tube   Neurological:      Mental Status: She is alert.             Significant Labs: All pertinent labs within the past 24 hours have been reviewed.  Recent Lab Results       None            Significant Imaging: I have reviewed all pertinent imaging results/findings within the past 24 hours.    CT Abdomen Without Contrast   Final Result      Moderate right hydronephrosis.         Electronically signed by: Magdaleno Valdez   Date:    10/17/2024   Time:    16:15      IR Nephrostomy Tube Change   Final Result      Unsuccessful attempt at fluoroscopic replacement of a completely dislodged right nephrostomy tube.      Plan:      A new nephrostomy will be placed tomorrow 10/18/2024.      _______________________________________________________________         Electronically signed by: Magdaleno Valdez   Date:    10/17/2024   Time:    16:23      IR Nephrostomy Tube Placement   Final Result      Right nephrostomy tube placement.      Plan:      Follow-up urine culture.      Monitor output.      Follow-up with urology.  Antegrade ureteral stent placement may be performed as inpatient or outpatient after adequate decompression.         Electronically signed by: Stephanie Cintron   Date:    10/13/2024   Time:    15:22      X-Ray Chest AP Portable   Final Result      1.  Negative for acute process involving the chest.   2.  Incidental findings as noted above.   3.]  PICC line in good position.      Finalized on: 10/12/2024 1:18 PM By:  Jose Angel Ross MD   [facility]# [id number]      2024-10-12  13:21:[id number]    [facility]      X-Ray Chest 1 View   Final Result      No acute abnormality.         Electronically signed by: Cruz Myers   Date:    10/09/2024   Time:    21:33      US Lower Extremity Veins Bilateral   Final Result      Extensive clot from the right common femoral vein to the right posterior tibial vein.  Occlusive right is anterior tibial vein.      No left-sided DVT         Electronically signed by: Cruz Myers   Date:    10/09/2024   Time:    20:10      CT Abdomen Pelvis  Without Contrast   Final Result      Right-sided hydronephrosis with a approximately 8 mm mid right ureteral obstructing stone.  Punctate nonobstructing left renal calculi.  Postsurgical changes of the bowel.  Fluid within the colonic loops of bowel may relate to diarrheal state.  Left hemiabdomen enteric tube noted      All CT scans   are performed using dose optimization techniques including the following: automated exposure control; adjustment of the mA and/or kV; use of iterative reconstruction technique.  Dose modulation was employed for ALARA by means of: Automated exposure control; adjustment of the mA and/or kV according to patient size (this includes techniques or standardized protocols for targeted exams where dose is matched to indication/reason for exam; i.e. extremities or head); and/or use of iterative reconstructive technique.         Electronically signed by: Cruz Myers   Date:    10/09/2024   Time:    16:30      Anesthesia US Guide Vascular Access    (Results Pending)   IR Ureteral Stent Placement    (Results Pending)   Anesthesia US Guide Vascular Access    (Results Pending)   IR Nephrostomy Tube Placement    (Results Pending)         Assessment/Plan:      * Ureteral stone with hydronephrosis  - Right-sided hydronephrosis with approximately 8 mm mid right ureteral obstructing stone   - Urology consulted (per ED, Dr. Kelly)  - S/p cystoscopy 10/10/24 - noted impacted right ureteral stone, unable to place  stent  - s/p IR placement of right nephrostomy tube 10/13, will need to follow up with urology, can place antegrade stent after adequate decompression as inpt/outpt  - Roca in place, monitor I/O's    Hypokalemia  Patient's most recent potassium results are listed below.   Recent Labs     10/12/24  1221 10/13/24  0343 10/13/24  1302   K 2.8* 3.2* 3.9       Plan  - Replete potassium per protocol  - Monitor potassium Every 12 hours  - Patient's hypokalemia is worsening. Will adjust treatment as follows: add PO and IV KCl    DVT (deep venous thrombosis)  - US - RLE extensive clot from the right common femoral vein to the right posterior tibial vein.  Occluded anterior tibial vein  - IR consulted for thrombectomy, not indicated at this time per IR  - Continue heparin drip, hold after midnight for nephrostomy tube placement tomorrow  - Heme/Onc consulted for AC reccs    Jejunostomy tube present  - Complains of pain around j-tube  - restart tube feedings after nephrostomy tube placed today    Gastric adenocarcinoma  - Followed by Dr. Ambrose and Dr. Jerome  - S/p total gastrectomy on 8/20, with lysis of adhesions, lymphadenectomy, and jejunostomy tube placement, d/c on 9/10  - Pathologic stage from 8/20/2024: Stage III (ypT2, pN3a, cM0), completed 5 cycles Keytruda prior to surgery    Hx of Epileptic seizures  - No recent seizure activity  - Continue Keppra    Severe protein-calorie malnutrition  Nutrition consulted. Most recent weight and BMI monitored- Has J-tube with tube feedings at home, however has been having diarrhea.    Measurements:  Wt Readings from Last 1 Encounters:   10/10/24 46.7 kg (102 lb 15.3 oz)   Body mass index is 17.13 kg/m².      Weight Loss (Malnutrition): greater than 7.5% in 3 months  1. Initiate pt onto continuous Enteral nutrition, recommend [employer] peptide 1.5 (vanilla) via J tube, goal rate 30 mL/hr, starting at 10 mL/hr then progress to goal rate within 24 hrs if pt is tolerating or per  MD/NP -Formula at goal rate provides: 1107 kcals/day (79% EEN), 53 g protein/day (113% EPN), 99 g CHO/day (57% CHO needs), 504 mL free formula water/day -150 mL q4h free water flushes (900 mL/day) = total from formula + FWF = 1404 mL water/day, per MD/NP -Check Mg, K+, Na, Phos and Glu before and during initation, correct as indicated 2. Recommend Banatrol TID to assist with diarrhea or BID for loose stools, as warranted 3. Weigh twice weekly        VTE Risk Mitigation (From admission, onward)           Ordered     apixaban tablet 5 mg  2 times daily         10/14/24 0949     Reason for No Pharmacological VTE Prophylaxis  Once        Comments: Heparin drip ordered for acute occlusive DVT   Question:  Reasons:  Answer:  Physician Provided (leave comment)    10/09/24 2027     IP VTE HIGH RISK PATIENT  Once         10/09/24 2027     heparin 25,000 units in dextrose 5% 250 mL (100 units/mL) infusion HIGH INTENSITY nomogram - [facility]  Continuous        Question:  Begin at (units/kg/hr)  Answer:  18    10/09/24 2013                    Discharge Planning   SOFIYA: 10/18/2024     Code Status: Full Code   Is the patient medically ready for discharge?:     Reason for patient still in hospital (select all that apply): Patient trending condition, Laboratory test, Treatment, Imaging, Consult recommendations, and Pending disposition  Discharge Plan A: Skilled Nursing Facility                  Zachery Boyd MD  Department of Hospital Medicine   [facility] - Med Surg

## 2024-10-20 LAB
ACANTHOCYTES BLD QL SMEAR: PRESENT
ANION GAP SERPL CALC-SCNC: 10 MMOL/L (ref 8–16)
ANISOCYTOSIS BLD QL SMEAR: ABNORMAL
BASOPHILS NFR BLD: 2 % (ref 0–1.9)
BUN SERPL-MCNC: 7 MG/DL (ref 8–23)
CALCIUM SERPL-MCNC: 8 MG/DL (ref 8.7–10.5)
CHLORIDE SERPL-SCNC: 107 MMOL/L (ref 95–110)
CO2 SERPL-SCNC: 19 MMOL/L (ref 23–29)
CREAT SERPL-MCNC: 0.6 MG/DL (ref 0.5–1.4)
DACRYOCYTES BLD QL SMEAR: ABNORMAL
DIFFERENTIAL METHOD BLD: ABNORMAL
EOSINOPHIL NFR BLD: 1 % (ref 0–8)
ERYTHROCYTE [DISTWIDTH] IN BLOOD BY AUTOMATED COUNT: 17.2 % (ref 11.5–14.5)
EST. GFR  (NO RACE VARIABLE): >60 ML/MIN/1.73 M^2
GLUCOSE SERPL-MCNC: 81 MG/DL (ref 70–110)
HCT VFR BLD AUTO: 31.3 % (ref 37–48.5)
HGB BLD-MCNC: 10 G/DL (ref 12–16)
IMM GRANULOCYTES # BLD AUTO: ABNORMAL K/UL (ref 0–0.04)
IMM GRANULOCYTES NFR BLD AUTO: ABNORMAL % (ref 0–0.5)
LYMPHOCYTES NFR BLD: 18 % (ref 18–48)
MCH RBC QN AUTO: 30.9 PG (ref 27–31)
MCHC RBC AUTO-ENTMCNC: 31.9 G/DL (ref 32–36)
MCV RBC AUTO: 97 FL (ref 82–98)
METAMYELOCYTES NFR BLD MANUAL: 1 %
MONOCYTES NFR BLD: 15 % (ref 4–15)
MYELOCYTES NFR BLD MANUAL: 3 %
NEUTROPHILS NFR BLD: 57 % (ref 38–73)
NEUTS BAND NFR BLD MANUAL: 3 %
NRBC BLD-RTO: 0 /100 WBC
OVALOCYTES BLD QL SMEAR: ABNORMAL
PLATELET # BLD AUTO: 417 K/UL (ref 150–450)
PLATELET BLD QL SMEAR: ABNORMAL
PMV BLD AUTO: 9.1 FL (ref 9.2–12.9)
POIKILOCYTOSIS BLD QL SMEAR: SLIGHT
POLYCHROMASIA BLD QL SMEAR: ABNORMAL
POTASSIUM SERPL-SCNC: 5.1 MMOL/L (ref 3.5–5.1)
RBC # BLD AUTO: 3.24 M/UL (ref 4–5.4)
SMUDGE CELLS BLD QL SMEAR: PRESENT
SODIUM SERPL-SCNC: 136 MMOL/L (ref 136–145)
WBC # BLD AUTO: 6.55 K/UL (ref 3.9–12.7)

## 2024-10-20 PROCEDURE — 85027 COMPLETE CBC AUTOMATED: CPT | Performed by: HOSPITALIST

## 2024-10-20 PROCEDURE — 85007 BL SMEAR W/DIFF WBC COUNT: CPT | Performed by: HOSPITALIST

## 2024-10-20 PROCEDURE — 25000003 PHARM REV CODE 250: Performed by: HOSPITALIST

## 2024-10-20 PROCEDURE — 63600175 PHARM REV CODE 636 W HCPCS: Performed by: UROLOGY

## 2024-10-20 PROCEDURE — 11000001 HC ACUTE MED/SURG PRIVATE ROOM

## 2024-10-20 PROCEDURE — 80048 BASIC METABOLIC PNL TOTAL CA: CPT | Performed by: HOSPITALIST

## 2024-10-20 RX ADMIN — CARBAMAZEPINE 200 MG: 200 TABLET ORAL at 03:10

## 2024-10-20 RX ADMIN — CARBAMAZEPINE 200 MG: 200 TABLET ORAL at 10:10

## 2024-10-20 RX ADMIN — APIXABAN 5 MG: 2.5 TABLET, FILM COATED ORAL at 10:10

## 2024-10-20 RX ADMIN — LEVETIRACETAM 500 MG: 500 INJECTION, SOLUTION INTRAVENOUS at 10:10

## 2024-10-20 RX ADMIN — POTASSIUM CHLORIDE 20 MEQ: 1500 TABLET, EXTENDED RELEASE ORAL at 10:10

## 2024-10-20 RX ADMIN — POTASSIUM CHLORIDE 20 MEQ: 1500 TABLET, EXTENDED RELEASE ORAL at 03:10

## 2024-10-20 RX ADMIN — ZONISAMIDE 200 MG: 100 CAPSULE ORAL at 10:10

## 2024-10-20 RX ADMIN — OXYCODONE HYDROCHLORIDE 10 MG: 5 TABLET ORAL at 07:10

## 2024-10-20 NOTE — PLAN OF CARE
Discussed poc with pt, pt verbalized understanding    Purposeful rounding     VS wnl    Fall precautions in place, remains injury free    Accurate I&Os    Pt refused tube feeds. States that she doesn't like the way they make her feel    Bed locked at lowest position    Call light within reach    Chart check complete    Will cont with POC

## 2024-10-21 ENCOUNTER — DOCUMENT SCAN (OUTPATIENT)
Dept: HOME HEALTH SERVICES | Facility: HOSPITAL | Age: 74
End: 2024-10-21
Payer: MEDICARE

## 2024-10-21 VITALS
WEIGHT: 102.94 LBS | BODY MASS INDEX: 17.15 KG/M2 | TEMPERATURE: 98 F | OXYGEN SATURATION: 98 % | SYSTOLIC BLOOD PRESSURE: 160 MMHG | HEIGHT: 65 IN | HEART RATE: 82 BPM | DIASTOLIC BLOOD PRESSURE: 83 MMHG | RESPIRATION RATE: 16 BRPM

## 2024-10-21 LAB
ACANTHOCYTES BLD QL SMEAR: PRESENT
ANISOCYTOSIS BLD QL SMEAR: SLIGHT
BASOPHILS # BLD AUTO: 0.11 K/UL (ref 0–0.2)
BASOPHILS NFR BLD: 1.4 % (ref 0–1.9)
BURR CELLS BLD QL SMEAR: ABNORMAL
DACRYOCYTES BLD QL SMEAR: ABNORMAL
DIFFERENTIAL METHOD BLD: ABNORMAL
EOSINOPHIL # BLD AUTO: 0.1 K/UL (ref 0–0.5)
EOSINOPHIL NFR BLD: 1.4 % (ref 0–8)
ERYTHROCYTE [DISTWIDTH] IN BLOOD BY AUTOMATED COUNT: 17.2 % (ref 11.5–14.5)
GIANT PLATELETS BLD QL SMEAR: PRESENT
HCT VFR BLD AUTO: 31.5 % (ref 37–48.5)
HGB BLD-MCNC: 10.3 G/DL (ref 12–16)
IMM GRANULOCYTES # BLD AUTO: 0.34 K/UL (ref 0–0.04)
IMM GRANULOCYTES NFR BLD AUTO: 4.3 % (ref 0–0.5)
LYMPHOCYTES # BLD AUTO: 1.3 K/UL (ref 1–4.8)
LYMPHOCYTES NFR BLD: 16 % (ref 18–48)
MCH RBC QN AUTO: 31.1 PG (ref 27–31)
MCHC RBC AUTO-ENTMCNC: 32.7 G/DL (ref 32–36)
MCV RBC AUTO: 95 FL (ref 82–98)
MONOCYTES # BLD AUTO: 1 K/UL (ref 0.3–1)
MONOCYTES NFR BLD: 11.9 % (ref 4–15)
NEUTROPHILS # BLD AUTO: 5.2 K/UL (ref 1.8–7.7)
NEUTROPHILS NFR BLD: 65 % (ref 38–73)
NRBC BLD-RTO: 0 /100 WBC
OVALOCYTES BLD QL SMEAR: ABNORMAL
PLATELET # BLD AUTO: 455 K/UL (ref 150–450)
PLATELET BLD QL SMEAR: ABNORMAL
PMV BLD AUTO: 9.1 FL (ref 9.2–12.9)
POIKILOCYTOSIS BLD QL SMEAR: SLIGHT
POLYCHROMASIA BLD QL SMEAR: ABNORMAL
RBC # BLD AUTO: 3.31 M/UL (ref 4–5.4)
SCHISTOCYTES BLD QL SMEAR: PRESENT
SMUDGE CELLS BLD QL SMEAR: PRESENT
WBC # BLD AUTO: 7.96 K/UL (ref 3.9–12.7)

## 2024-10-21 PROCEDURE — 97530 THERAPEUTIC ACTIVITIES: CPT

## 2024-10-21 PROCEDURE — 63600175 PHARM REV CODE 636 W HCPCS: Performed by: RADIOLOGY

## 2024-10-21 PROCEDURE — 25000003 PHARM REV CODE 250: Performed by: HOSPITALIST

## 2024-10-21 PROCEDURE — 97535 SELF CARE MNGMENT TRAINING: CPT

## 2024-10-21 PROCEDURE — 97110 THERAPEUTIC EXERCISES: CPT

## 2024-10-21 PROCEDURE — 85025 COMPLETE CBC W/AUTO DIFF WBC: CPT | Performed by: HOSPITALIST

## 2024-10-21 PROCEDURE — 0T9030Z DRAINAGE OF RIGHT KIDNEY WITH DRAINAGE DEVICE, PERCUTANEOUS APPROACH: ICD-10-PCS | Performed by: RADIOLOGY

## 2024-10-21 PROCEDURE — 63600175 PHARM REV CODE 636 W HCPCS: Performed by: UROLOGY

## 2024-10-21 RX ORDER — CEFAZOLIN SODIUM 1 G/3ML
INJECTION, POWDER, FOR SOLUTION INTRAMUSCULAR; INTRAVENOUS CODE/TRAUMA/SEDATION MEDICATION
Status: COMPLETED | OUTPATIENT
Start: 2024-10-21 | End: 2024-10-21

## 2024-10-21 RX ORDER — FENTANYL CITRATE 50 UG/ML
INJECTION, SOLUTION INTRAMUSCULAR; INTRAVENOUS CODE/TRAUMA/SEDATION MEDICATION
Status: COMPLETED | OUTPATIENT
Start: 2024-10-21 | End: 2024-10-21

## 2024-10-21 RX ORDER — OXYCODONE HYDROCHLORIDE 10 MG/1
10 TABLET ORAL EVERY 4 HOURS PRN
Qty: 6 TABLET | Refills: 0 | Status: SHIPPED | OUTPATIENT
Start: 2024-10-21 | End: 2024-10-22

## 2024-10-21 RX ORDER — LIDOCAINE HYDROCHLORIDE 20 MG/ML
INJECTION, SOLUTION EPIDURAL; INFILTRATION; INTRACAUDAL; PERINEURAL CODE/TRAUMA/SEDATION MEDICATION
Status: COMPLETED | OUTPATIENT
Start: 2024-10-21 | End: 2024-10-21

## 2024-10-21 RX ORDER — MIDAZOLAM HYDROCHLORIDE 1 MG/ML
INJECTION, SOLUTION INTRAMUSCULAR; INTRAVENOUS CODE/TRAUMA/SEDATION MEDICATION
Status: COMPLETED | OUTPATIENT
Start: 2024-10-21 | End: 2024-10-21

## 2024-10-21 RX ADMIN — FENTANYL CITRATE 50 MCG: 0.05 INJECTION, SOLUTION INTRAMUSCULAR; INTRAVENOUS at 09:10

## 2024-10-21 RX ADMIN — LIDOCAINE HYDROCHLORIDE 5 ML: 20 INJECTION, SOLUTION EPIDURAL; INFILTRATION; INTRACAUDAL; PERINEURAL at 09:10

## 2024-10-21 RX ADMIN — MIDAZOLAM 1 MG: 1 INJECTION INTRAMUSCULAR; INTRAVENOUS at 09:10

## 2024-10-21 RX ADMIN — CARBAMAZEPINE 200 MG: 200 TABLET ORAL at 11:10

## 2024-10-21 RX ADMIN — CEFAZOLIN 2 G: 330 INJECTION, POWDER, FOR SOLUTION INTRAMUSCULAR; INTRAVENOUS at 09:10

## 2024-10-21 RX ADMIN — APIXABAN 5 MG: 2.5 TABLET, FILM COATED ORAL at 11:10

## 2024-10-21 RX ADMIN — LEVETIRACETAM 500 MG: 500 INJECTION, SOLUTION INTRAVENOUS at 11:10

## 2024-10-21 RX ADMIN — ZONISAMIDE 200 MG: 100 CAPSULE ORAL at 11:10

## 2024-10-21 NOTE — PLAN OF CARE
Nutrition Recommendations/Interventions for malnutrition 10/21/24:   1. Recommend modify pt's continuious Enteral nutrition, recommend Vital AF 1.2 Guille via J tube, goal rate 40 mL/hr, starting at 10 mL/hr then progress to goal rate within 24 hrs if pt is tolerating or per MD/NP   -105 mL q4h free water flushes (630 mL/day)), per MD/NP   -Check Mg, K+, Na, Phos and Glu before and during initiation, correct as indicated   2. Recommend pt continues on antidiarrheal medication as warranted   3. Updated weight, twice weekly  4. Collaboration by nutrition professional with other providers      Goals:   1. Pt will be initiated onto modifed EN at modified goal rate within 24 hrs   2. Pt will tolerate and intake >75% EEN and EPN prior to RD follow up   3. Pt's diarrhea will improve prior to RD follow up    TAWANA Quiñonez, RDN, LDN

## 2024-10-21 NOTE — ASSESSMENT & PLAN NOTE
- Followed by Dr. Ambrose and Dr. Jerome  - S/p total gastrectomy on 8/20, with lysis of adhesions, lymphadenectomy, and jejunostomy tube placement, d/c on 9/10  - Pathologic stage from 8/20/2024: Stage III (ypT2, pN3a, cM0), completed 5 cycles Keytruda prior to surgery  - outpatient follow up with Dr. Jerome and Dr. Ambrose

## 2024-10-21 NOTE — PT/OT/SLP PROGRESS
Occupational Therapy  Treatment    Cheryl Kirkland   MRN: [id number]   Admitting Diagnosis: Ureteral stone with hydronephrosis    OT Date of Treatment: 10/21/24   OT Start Time: 1103  OT Stop Time: 1130  OT Total Time (min): 27 min    Billable Minutes:  Self Care/Home Management 13 MINUTES and Therapeutic Activity 14 MINUTES    OT/PARAMJIT: OT          General Precautions: Standard, fall  Orthopedic Precautions: N/A  Braces: N/A  Respiratory Status: Room air         Subjective:  Communicated with NURSE AND EPIC CHART REVIEW prior to session.    Pain/Comfort  Pain Rating 1: 8/10  Location - Side 1: Right  Location - Orientation 1: generalized  Location 1:  (SX SITE (KIDNEY AREA))    Objective:  Patient found with: peripheral IV, PEG Tube     Functional Mobility:  Bed Mobility:  CGA WITH SUPINE <>SIT   CGA WITH FORWARD AND BACKWARD SCOOTING      Transfers:   SIT<>STAND WITH CGA   STEP<>[place] [facility] [place]      Activities of Daily Living:  UE DRESSING WITH MIN A WITH ADJUSTING HOSPITAL GOWN  LE DRESSING     Balance:   Static Sit: FAIR+: Able to take MINIMAL challenges from all directions  Dynamic Sit: FAIR+: Maintains balance through MINIMAL excursions of active trunk motion  Static Stand: FAIR: Maintains without assist but unable to take challenges  Dynamic stand: FAIR: Needs CONTACT GUARD during gait    Therapeutic Activities and Exercises:  Educated patient on importance of increased tolerance to upright position and direct impact on CV endurance and strength. Patient encouraged to sit up in chair/ EOB, for a minimum of 2 consecutive hours including for all meals.. Encouraged patient to perform AROM TE to BUE throughout the day within all available planes of motion. Re enforced importance of utilizing call light to meet needs in room and not attempt to get up without staff assistance. Patient verbalized understanding and agreed to comply.           AM-PAC 6 CLICK ADL   How much help from another person does  "this patient currently need?   1 = Unable, Total/Dependent Assistance  2 = A lot, Maximum/Moderate Assistance  3 = A little, Minimum/Contact Guard/Supervision  4 = None, Modified Corn/Independent    Putting on and taking off regular lower body clothing? : 2  Bathing (including washing, rinsing, drying)?: 2  Toileting, which includes using toilet, bedpan, or urinal? : 3  Putting on and taking off regular upper body clothing?: 3  Taking care of personal grooming such as brushing teeth?: 3  Eating meals?: 4  Daily Activity Total Score: 17     AM-PAC Raw Score CMS "G-Code Modifier Level of Impairment Assistance   6 % Total / Unable   7 - 8 CM 80 - 100% Maximal Assist   9-13 CL 60 - 80% Moderate Assist   14 - 19 CK 40 - 60% Moderate Assist   20 - 22 CJ 20 - 40% Minimal Assist   23 CI 1-20% SBA / CGA   24 CH 0% Independent/ Mod I       Patient left HOB elevated with all lines intact, call button in reach, bed alarm on, NURSE notified, and SPOUSE present    ASSESSMENT:  Cheryl Kirkland is a 74 y.o. female with a medical diagnosis of Ureteral stone with hydronephrosis and presents with DEBILITY AND GENERALIZED WEAKNESS.    Rehab identified problem list/impairments:  gait instability, weakness, decreased upper extremity function, impaired endurance, impaired balance, impaired self care skills, impaired functional mobility, decreased coordination    Rehab potential is good.    Activity tolerance: Good    Discharge recommendations: Moderate Intensity Therapy   Barriers to discharge:      Equipment recommendations: to be determined by next level of care    GOALS:   Multidisciplinary Problems       Occupational Therapy Goals          Problem: Occupational Therapy    Goal Priority Disciplines Outcome Interventions   Occupational Therapy Goal     OT, PT/OT Progressing    Description: OT goals met 10-24-24  Pt will tolerate 1 set x 15 reps b ue rom exercise  (I) with toilet transfers  (I) with le dressing    "                    Plan:  Patient to be seen 2 x/week to address the above listed problems via self-care/home management, therapeutic activities, therapeutic exercises  Plan of Care expires: 11/04/24  Plan of Care reviewed with: patient         10/21/2024

## 2024-10-21 NOTE — ASSESSMENT & PLAN NOTE
- Right-sided hydronephrosis with approximately 8 mm mid right ureteral obstructing stone   - Urology consulted (per ED, Dr. Kelly)  - S/p cystoscopy 10/10/24 - noted impacted right ureteral stone, unable to place stent  - s/p IR placement of right nephrostomy tube 10/13, will need to follow up with urology, can place antegrade stent after adequate decompression as inpt/outpt  - nephrostomy dislodged, replaced by IR 10/21/2024  - outpatient followup with Dr. Kelly for further management/discuss antegrade stent placement

## 2024-10-21 NOTE — SUBJECTIVE & OBJECTIVE
Review of Systems   All other systems reviewed and are negative.    Objective:     Vital Signs (Most Recent):  Temp: 97.8 °F (36.6 °C) (10/20/24 1944)  Pulse: 74 (10/20/24 1944)  Resp: 19 (10/20/24 1944)  BP: 139/72 (10/20/24 1944)  SpO2: 97 % (10/20/24 1944) Vital Signs (24h Range):  Temp:  [97.8 °F (36.6 °C)-98.7 °F (37.1 °C)] 97.8 °F (36.6 °C)  Pulse:  [70-80] 74  Resp:  [16-19] 19  SpO2:  [94 %-99 %] 97 %  BP: (128-154)/(68-76) 139/72     Weight: 46.7 kg (102 lb 15.3 oz)  Body mass index is 17.13 kg/m².    Intake/Output Summary (Last 24 hours) at 10/20/2024 2000  Last data filed at 10/20/2024 1707  Gross per 24 hour   Intake 265 ml   Output --   Net 265 ml         Physical Exam  Vitals and nursing note reviewed.   Constitutional:       General: She is not in acute distress.  Cardiovascular:      Rate and Rhythm: Normal rate and regular rhythm.      Heart sounds: No murmur heard.  Pulmonary:      Effort: Pulmonary effort is normal. No respiratory distress.      Breath sounds: No wheezing.   Abdominal:      Comments: G-tube   Neurological:      Mental Status: She is alert.             Significant Labs: All pertinent labs within the past 24 hours have been reviewed.  Recent Lab Results         10/20/24  1309        Acanthocytes Present       Anion Gap 10       Aniso Moderate       Bands 3.0       Basophil % 2.0       BUN 7       Calcium 8.0       Chloride 107       CO2 19       Creatinine 0.6       Differential Method Manual       eGFR >60       Eos % 1.0       Glucose 81       Gran % 57.0       Hematocrit 31.3       Hemoglobin 10.0       Immature Grans (Abs) CANCELED  Comment: Mild elevation in immature granulocytes is non specific and   can be seen in a variety of conditions including stress response,   acute inflammation, trauma and pregnancy. Correlation with other   laboratory and clinical findings is essential.    Result canceled by the ancillary.         Immature Granulocytes CANCELED  Comment: Result  canceled by the ancillary.       Lymph % 18.0       MCH 30.9       MCHC 31.9       MCV 97       Metamyelocytes 1.0       Mono % 15.0       MPV 9.1       Myelocytes 3.0       nRBC 0       Ovalocytes Occasional       Platelet Estimate Appears normal       Platelet Count 417  Comment: Results confirmed, test repeated       Poikilocytosis Slight       Poly Occasional       Potassium 5.1       RBC 3.24       RDW 17.2       Smudge Cells Present  Comment: Smudge cells present;Substantial numbers may affect the   accuracy of the differential.         Sodium 136       Teardrop Cells Occasional       WBC 6.55               Significant Imaging: I have reviewed all pertinent imaging results/findings within the past 24 hours.    CT Abdomen Without Contrast   Final Result      Moderate right hydronephrosis.         Electronically signed by: Magdaleno Valdez   Date:    10/17/2024   Time:    16:15      IR Nephrostomy Tube Change   Final Result      Unsuccessful attempt at fluoroscopic replacement of a completely dislodged right nephrostomy tube.      Plan:      A new nephrostomy will be placed tomorrow 10/18/2024.      _______________________________________________________________         Electronically signed by: Magdaleno Valdez   Date:    10/17/2024   Time:    16:23      IR Nephrostomy Tube Placement   Final Result      Right nephrostomy tube placement.      Plan:      Follow-up urine culture.      Monitor output.      Follow-up with urology.  Antegrade ureteral stent placement may be performed as inpatient or outpatient after adequate decompression.         Electronically signed by: Stephanie Cnitron   Date:    10/13/2024   Time:    15:22      X-Ray Chest AP Portable   Final Result      1.  Negative for acute process involving the chest.   2.  Incidental findings as noted above.   3.]  PICC line in good position.      Finalized on: 10/12/2024 1:18 PM By:  Jose Angel Ross MD   [facility]# [id number]      2024-10-12 13:21:03.213    [facility]      X-Ray  Chest 1 View   Final Result      No acute abnormality.         Electronically signed by: Cruz Myers   Date:    10/09/2024   Time:    21:33      US Lower Extremity Veins Bilateral   Final Result      Extensive clot from the right common femoral vein to the right posterior tibial vein.  Occlusive right is anterior tibial vein.      No left-sided DVT         Electronically signed by: Cruz Myers   Date:    10/09/2024   Time:    20:10      CT Abdomen Pelvis  Without Contrast   Final Result      Right-sided hydronephrosis with a approximately 8 mm mid right ureteral obstructing stone.  Punctate nonobstructing left renal calculi.  Postsurgical changes of the bowel.  Fluid within the colonic loops of bowel may relate to diarrheal state.  Left hemiabdomen enteric tube noted      All CT scans   are performed using dose optimization techniques including the following: automated exposure control; adjustment of the mA and/or kV; use of iterative reconstruction technique.  Dose modulation was employed for ALARA by means of: Automated exposure control; adjustment of the mA and/or kV according to patient size (this includes techniques or standardized protocols for targeted exams where dose is matched to indication/reason for exam; i.e. extremities or head); and/or use of iterative reconstructive technique.         Electronically signed by: Cruz Myers   Date:    10/09/2024   Time:    16:30      Anesthesia US Guide Vascular Access    (Results Pending)   IR Ureteral Stent Placement    (Results Pending)   Anesthesia US Guide Vascular Access    (Results Pending)   IR Nephrostomy Tube Placement    (Results Pending)

## 2024-10-21 NOTE — DISCHARGE SUMMARY
[facility] Medicine  Discharge Summary      Patient Name: Cheryl Kirkland  MRN: [id number]  [facility]: [id number]  Patient Class: IP- Inpatient  Admission Date: 10/9/2024  Hospital Length of Stay: 12 days  Discharge Date and Time:  10/21/2024 4:38 PM  Attending Physician: Christy Ramos MD   Discharging Provider: Christy Ramos MD  Primary Care Provider: Gagandeep Iglesias MD    Primary Care Team: Networked reference to record PCT     HPI:   Patient is a 74-year-old female with past medical history significant for hypertension, hyperlipidemia, DVT, epilepsy, neuropathy, anemia, GERD, gastritis, polyps,  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  On exam she is noted to have left lower quadrant tenderness, appears frail and emaciated, has not been taking all of her scheduled medications and is hesitant to use pain medication, stating that she does not want to be addicted to pain medication.  Vital signs on arrival stable-- blood pressure 110/78, heart rate 95, temp 97.3° respirations 12, 97% SpO2 on room air.   Significant labs include CO2 6, BUN 21, creatinine 2.0, alk-phos 210, venous blood gas shows pH 7.030 and HC03 4.5, urinalysis with hazy appearance, 3+ blood, trace leukocyte esterase, greater than 100 RBCs, 31 WBCs, rare bacteria.  Stool studies negative for occult blood, negative for rotavirus, negative for C diff.  Stool culture is in progress.  EKG showed SR with PAC's in pattern of bigeminy with nonspecific T wave abnormality. CT abdomen and pelvis without contrast was done which revealed right-sided hydronephrosis with  approximately 8 mm mid right ureteral obstructing stone.  Urology was consulted and recommended NPO after midnight and will see patient in AM, also recommended urinalysis with urine culture which has been collected and does show acute cystitis with hematuria. While in ED, fluid bolus 1.5L, sodium bicarb --2 amps, morphine, Zofran, and Rocephin were all given. Hospital Medicine was consulted for admission due to SHELLEY, obstructing right ureteral stone 8mm and right sided hydronephrosis.     During exam, patient complains of pain and tightness in right leg so US was ordered and she was found to have extensive clot from right common femoral vein to right posterior tibial vein as well as occluded anterior tibial vein. IR was consulted and will evaluate for possible intervention in AM, heparin drip was recommended and ordered.     Procedure(s) (LRB):  CYSTOSCOPY, WITH RETROGRADE PYELOGRAM (Right)      Hospital Course:   10/10/24  73 y/o female with gastric adenocarcinoma, here with abdominal pain  Patient rc/o lower quadrant abdominal pain, nausea, RLE pain, fatigue  Found to have 8 mm right sided obstructing ureteral stone and right sided hydronephrosis  Also with RLE extensive DVT; patient states she was previously on AC but stopped due to gastric cancer/bleeding  Urology with plans for stent placement today  IR with plans for RLE thrombectomy tomorrow morning   at bedside, updated    10/11/24  NAEON, patient resting in bed, feels some better today  Patient s/p cystoscopy yesterday with Dr. Kelly  Patient has impacted right ureteral stone, unable to place stent  Roca currently in place, continue ceftriaxone  Cr trending down with IV fluids  K 2.2, replete PO and IV, recheck this afternoon  Plans for right nephrostomy tube today  Plans for RLE thrombectomy for extensive DVT pending  Continue heparin drip, plan to transition to NOAC after thrombectomy    10/12  Awake, alert, lying in bed, cachetic, NPO since  yesterday waiting on right nephrostomy tube/stent, was unable to do yesterday because of hypokalemia. Discussed with IR today, will give K riders today and repeat tomorrow and plan for procedure tomorrow if Potassium stable.   IR said no indication for thrombectomy at this time  Cont heparin gtt and hold after midnight  Restart tube feedings, hold after midnight    10/13  Awake, alert, lying in bed,  at bedside  K 3.2 this am, given K rider and Effer-K, repeat K 3.9  IR placed a right nephrostomy tube today, no stent because of concern for infection, will need to follow up with urology and can have a antegrade stent placed inpatient/outpatient after adequate decompression    10/14  NAEON, patient states she feels unwell, fatigued  S/p right nephrostomy tube placement yesterday, cultures pending  Ceftriaxone day # 6  PT/OT with reccs for LIT\    10/15  K 3.0, Mg 1.2 - replete  Recheck K this afternoon  Patient states today that no one is able to care for her at home   at bedside wishes to pursue SNF placement, states he is unable to care for her  Consult SW for SNF placement    10/16  NAEON  Loose stools reported, schedule imodium short course  Adjusted TF regimen, monitor for tolerance  Replete electrolytes  SNF pending    10/17  NAEON  Nephrostomy tube dislodged yesterday evening  IR and Urology following, plans for replacement pending  Patient sitting in chair, reports pain controlled  SNF placement pending    10/18  NAEON  Patient sitting in chair, denies complaints, pain controlled  Nephrostomy tube placement pending for Monday morning    10/19  NAEON, patient sitting up in bed, denies complaints    10/20  NAEON  Plans for IR nephrostomy tube to be replaced tomorrow morning  NPO after midnight  Plan to discharge to SNF tomorrow after procedure completed    10/21   No acute events overnight.    Patient underwent nephrostomy tube replacement with IR this morning.  Patient seen postprocedure with   at bedside.  She reports some pain at nephrostomy insertion site.  Denies chest pain, shortness of breath, nausea, vomiting, abdominal pain.  Appears stable for transfer to SNF today per my exam.  We will follow up with PCP/nursing home physician within 1 week, urology ASAP to discuss stent placement, surg Onc as scheduled on 10/29 and Hematology/Oncology ASAP.  She will transition from heparin drip to p.o. Eliquis on discharge for anticoagulation for DVT per Hematology recommendations.  See below for further details.     Goals of Care Treatment Preferences:  Code Status: Full Code      SDOH Screening:  The patient was screened for utility difficulties, food insecurity, transport difficulties, housing insecurity, and interpersonal safety and there were no concerns identified this admission.     Consults:   Consults (From admission, onward)          Status Ordering Provider     Inpatient consult to Interventional Radiology  Once        Provider:  Magdaleno Valdez MD    Completed BOLAND, HARI     Inpatient consult to Registered Dietitian/Nutritionist  Once        Provider:  (Not yet assigned)    Completed BOLAND, HARI     Inpatient consult to Social Work  Once        Provider:  (Not yet assigned)    Completed BOLAND, HARI     Inpatient consult to Social Work  Once        Provider:  (Not yet assigned)    Completed BOLAND, HARI     Inpatient consult to PICC team (NIAS)  Once        Provider:  (Not yet assigned)    Acknowledged KINGSLEY KENNEDY     Inpatient consult to Interventional Radiology  Once        Provider:  Harley Aggarwal MD    Completed SAMY TROTTER     Inpatient consult to Registered Dietitian/Nutritionist  Once        Provider:  (Not yet assigned)    Completed TASHA CHASE     Inpatient consult to Hematology/Oncology  Once        Provider:  Glo Alston MD    Completed SAMY TROTTER     Inpatient consult to Hospitalist  Once        Provider:  Tasha Chase MD     Acknowledged SAMY TROTTER     Inpatient consult to Urology  Once        Provider:  Samy Trotter MD    Completed RADHA CHAIDEZ            Renal/  * Ureteral stone with hydronephrosis  - Right-sided hydronephrosis with approximately 8 mm mid right ureteral obstructing stone   - Urology consulted (per ED, Dr. Trotter)  - S/p cystoscopy 10/10/24 - noted impacted right ureteral stone, unable to place stent  - s/p IR placement of right nephrostomy tube 10/13, will need to follow up with urology, can place antegrade stent after adequate decompression as inpt/outpt  - nephrostomy dislodged, replaced by IR 10/21/2024  - outpatient followup with Dr. Trotter for further management/discuss antegrade stent placement     Oncology  Gastric adenocarcinoma  - Followed by Dr. Ambrose and Dr. Jerome  - S/p total gastrectomy on 8/20, with lysis of adhesions, lymphadenectomy, and jejunostomy tube placement, d/c on 9/10  - Pathologic stage from 8/20/2024: Stage III (ypT2, pN3a, cM0), completed 5 cycles Keytruda prior to surgery  - outpatient follow up with Dr. Jerome and Dr. Ambrose     Endocrine  Severe protein-calorie malnutrition  Nutrition consulted. Most recent weight and BMI monitored- Has J-tube with tube feedings at home, however has been having diarrhea.    Measurements:  Wt Readings from Last 1 Encounters:   10/16/24 46.7 kg (102 lb 15.3 oz)   Body mass index is 17.13 kg/m².      Weight Loss (Malnutrition): greater than 5% in 1 month  Energy Intake (Malnutrition): less than or equal to 50% for greater than or equal to 5 days  Subcutaneous Fat (Malnutrition): moderate depletion  Muscle Mass (Malnutrition): severe depletion  Fluid Accumulation (Malnutrition): mild  1. Management of composition and delivery rate of enteral nutrition   2. Management of nutrition related prescription medication   3. Collaboration by nutrition professional with other providers    Continue tube feeds per Nutrition  recommendations. Discussed with Surgery who recommend max goal rate of 50cc/hr     Other  DVT (deep venous thrombosis)  - US - RLE extensive clot from the right common femoral vein to the right posterior tibial vein.  Occluded anterior tibial vein  - IR consulted for thrombectomy, thrombectomy not indicated at this time per IR  - treated with IV heparin drip while inpatient   - Heme/Onc consulted for anticoag recs and recommend transition to NOAC on discharge, pt transitioned to Eliquis for discharge   - outpatient followup with hematology/oncology Dr. Ambrose      Final Active Diagnoses:    Diagnosis Date Noted POA    PRINCIPAL PROBLEM:  Ureteral stone with hydronephrosis [N13.2] 10/10/2024 Yes    DVT (deep venous thrombosis) [I82.409] 02/07/2024 Yes     Chronic    Gastric adenocarcinoma [C16.9] 02/27/2024 Yes    Severe protein-calorie malnutrition [E43] 09/04/2024 Yes    Hypokalemia [E87.6] 10/11/2024 No    Jejunostomy tube present [Z93.4] 09/03/2024 Not Applicable    Hx of Epileptic seizures [G40.909] 02/07/2024 Yes     Chronic      Problems Resolved During this Admission:    Diagnosis Date Noted Date Resolved POA    Acute cystitis with hematuria [N30.01] 10/10/2024 10/15/2024 Yes    SHELLEY (acute kidney injury) [N17.9] 10/09/2024 10/15/2024 Yes    Metabolic acidosis [E87.20] 10/09/2024 10/15/2024 Yes    Diarrhea [R19.7] 08/31/2024 10/15/2024 Yes       Discharged Condition: stable    Disposition: Skilled Nursing Facility    Follow Up:   Follow-up Information       Gagandeep Iglesias MD. Schedule an appointment as soon as possible for a visit in 1 week(s).    Specialty: Internal Medicine  Why: Hospital discharge follow up  Contact information:  [address] [facility] [address] [phone]               Chen Jerome MD Follow up on 10/29/2024.    Specialty: Surgical Oncology  Why: followup as scheduled  Contact information:  [address] [facility]  [facility] [address] [phone]                Claudia Ambrose MD. Schedule an appointment as soon as possible for a visit in 1 week(s).    Specialties: Internal Medicine, Hematology and Oncology  Contact information:  [address]  [place] [address]  [phone]               Lata Kelly MD. Schedule an appointment as soon as possible for a visit.    Specialty: Urology  Contact information:  [address]  [place] [address]  [phone]                           Patient Instructions:      Diet Adult Regular     Notify your health care provider if you experience any of the following:  temperature >100.4     Notify your health care provider if you experience any of the following:  redness, tenderness, or signs of infection (pain, swelling, redness, odor or green/yellow discharge around incision site)     Tube Feedings/Formulas   Order Comments: Vital AF 1.2 jorge with w/ Prebio via J tube, goal rate 40 mL/hr, starting at 10 mL/hr then progress to goal rate within 24 hrs if pt is tolerating or per MD/NP   -150 mL q4h free water flushes (900 mL/day), per MD/NP     Order Specific Question Answer Comments   Select Adult Formula: Other    Route: Jejunostomy    Formula Rate (mL/hr): 40      Activity as tolerated       Significant Diagnostic Studies: See Hospital Course     Pending Diagnostic Studies:       Procedure Component Value Units Date/Time    IR Nephrostomy Tube Placement [[id number]] Resulted: 10/21/24 1037    Order Status: Sent Lab Status: In process Updated: 10/21/24 1042    IR Ureteral Stent Placement [[id number]]     Order Status: Sent Lab Status: No result            Medications:  Reconciled Home Medications:      Medication List        START taking these medications      ELIQUIS 5 mg Tab  Generic drug: apixaban  Take 2 tablets (10 mg total) by mouth 2 (two) times daily for 3 days THEN take 1 tablet by mouth twice a day            CONTINUE taking these medications      acetaminophen 500 MG tablet  Commonly known as:  TYLENOL  Take 2 tablets (1,000 mg total) by mouth every 8 (eight) hours.     alendronate 70 MG tablet  Commonly known as: FOSAMAX  Take 70 mg by mouth every 7 days. (Sundays)     CALCIUM PHOSPHATE-VITAMIN D3 ORAL  Take 1 tablet by mouth 2 (two) times daily.     carBAMazepine 200 mg tablet  Commonly known as: TEGRETOL  1 tablet with breakfast  1 tablet with lunch   1.5 tablet at bedtime     ferrous sulfate 325 (65 FE) MG EC tablet  Take 65 mg by mouth 2 (two) times daily with meals.     hydrocortisone 2.5 % cream  Apply topically 2 (two) times daily.     hydrOXYzine HCL 25 MG tablet  Commonly known as: ATARAX  Take 1 tablet (25 mg total) by mouth 3 (three) times daily as needed for Itching.     levETIRAcetam 500 MG Tab  Commonly known as: KEPPRA  Take 1 tablet by mouth 2 (two) times daily.     oxyCODONE 10 mg Tab immediate release tablet  Commonly known as: ROXICODONE  Take 1 tablet (10 mg total) by mouth every 4 (four) hours as needed for Pain.     zonisamide 100 MG Cap  Commonly known as: ZONEGRAN  Take 200 mg by mouth 2 (two) times daily.            STOP taking these medications      ibuprofen 800 MG tablet  Commonly known as: ADVIL,MOTRIN              Indwelling Lines/Drains at time of discharge:   Lines/Drains/Airways       Drain  Duration                  Gastrostomy/Enterostomy 08/20/24 2009 Jejunostomy tube LUQ feeding 61 days         Nephrostomy 10/21/24 1030 Right 8.5 Fr. <1 day                    Time spent on the discharge of patient: 34 minutes         Christy Ramos MD  Department of Hospital Medicine  '[facility]

## 2024-10-21 NOTE — ASSESSMENT & PLAN NOTE
- US - RLE extensive clot from the right common femoral vein to the right posterior tibial vein.  Occluded anterior tibial vein  - IR consulted for thrombectomy, thrombectomy not indicated at this time per IR  - treated with IV heparin drip while inpatient   - Heme/Onc consulted for anticoag recs and recommend transition to NOAC on discharge, pt transitioned to Eliquis for discharge   - outpatient followup with hematology/oncology Dr. Ambrose

## 2024-10-21 NOTE — NURSING TRANSFER
Nursing Transfer Note      10/21/2024   8:54 AM    Pt taken down to IR for nephrostomy placement

## 2024-10-21 NOTE — PLAN OF CARE
[facility] - Med Surg  Discharge Final Note    Primary Care Provider: Gagandeep Iglesias MD    Expected Discharge Date: 10/21/2024    Final Discharge Note (most recent)       Final Note - 10/21/24 1235          Final Note    Assessment Type Final Discharge Note     Anticipated Discharge Disposition Skilled Nursing Facility        Post-Acute Status    Post-Acute Authorization Placement     Post-Acute Placement Status Set-up Complete/Auth obtained     Coverage humana                              Contact Info       Gagandeep Iglesias MD   Specialty: Internal Medicine   Relationship: PCP - [facility]  [address] [place] [address]   Phone: [phone]       Next Steps: Schedule an appointment as soon as possible for a visit in 1 week(s)    Instructions: Hospital discharge follow up    Chen Jerome MD   Specialty: Surgical Oncology    [address] [place] [address]   Phone: [phone]       Next Steps: Follow up on 10/29/2024    Instructions: followup as scheduled    Claudia Ambrose MD   Specialty: Internal Medicine, Hematology and Oncology   Relationship: Consulting Physician    [address] [place] [address]   Phone: [phone]       Next Steps: Schedule an appointment as soon as possible for a visit in 1 week(s)    Lata Kelly MD   Specialty: Urology    [address] [place] [address]   Phone: [phone]       Next Steps: Schedule an appointment as soon as possible for a visit        Pt is discharging to [facility]. Nutrition is changing recs from peptamen to  Vital AF 1.2 jorge due to [facility] not being able to take peptamen.

## 2024-10-21 NOTE — PLAN OF CARE
Pt to be d/nela to SNF. Report given to nurse @ [facility]. D/c instructions reviewed w/ pt, verbalized understanding. PICC and cardiac monitor removed. Meds delivered to bedside. Questions answered and chart check complete

## 2024-10-21 NOTE — PLAN OF CARE
Discussed poc with pt, pt verbalized understanding    NPO @ midnight for procedure tomorrow. Pt verbalized understanding    VS wnl  Fall precautions in place, remains injury free  Pain under control with PRN meds    IVFs  Accurate I&Os. Tube feedings restarted today. No N/V abdominal discomfort noted  Bed locked at lowest position  Call light within reach    Chart check complete  Will cont with POC

## 2024-10-21 NOTE — PLAN OF CARE
Discussed poc with pt, pt verbalized understanding    Purposeful rounding     VS wnl    Fall precautions in place, remains injury free    Pt denies c/o N/V    Accurate I&Os    Bed locked at lowest position    Call light within reach    Chart check continued    Will cont with POC

## 2024-10-21 NOTE — PLAN OF CARE
TX COMPLETED: Nsg cleared to see following procedure. Pt fatigued and unable to participate in gait but agreeable to supine therex and assist for toileting. Cont POC. Recommend continued PT services at mod intensity.

## 2024-10-21 NOTE — INTERVAL H&P NOTE
The patient has been examined and the H&P has been reviewed:    I concur with the findings and changes have been noted since the H&P was written: stone and hydronephrosis    Procedure risks, benefits and alternative options discussed and understood by patient/family.          Active Hospital Problems    Diagnosis  POA    *Ureteral stone with hydronephrosis [N13.2]  Yes    Hypokalemia [E87.6]  No    Severe protein-calorie malnutrition [E43]  Yes    Jejunostomy tube present [Z93.4]  Not Applicable    Gastric adenocarcinoma [C16.9]  Yes    Hx of Epileptic seizures [G40.909]  Yes     Chronic    DVT (deep venous thrombosis) [I82.409]  Yes     Chronic      Resolved Hospital Problems    Diagnosis Date Resolved POA    Acute cystitis with hematuria [N30.01] 10/15/2024 Yes    SHELLEY (acute kidney injury) [N17.9] 10/15/2024 Yes    Metabolic acidosis [E87.20] 10/15/2024 Yes    Diarrhea [R19.7] 10/15/2024 Yes

## 2024-10-21 NOTE — ASSESSMENT & PLAN NOTE
Nutrition consulted. Most recent weight and BMI monitored- Has J-tube with tube feedings at home, however has been having diarrhea.    Measurements:  Wt Readings from Last 1 Encounters:   10/16/24 46.7 kg (102 lb 15.3 oz)   Body mass index is 17.13 kg/m².      Weight Loss (Malnutrition): greater than 5% in 1 month  Energy Intake (Malnutrition): less than or equal to 50% for greater than or equal to 5 days  Subcutaneous Fat (Malnutrition): moderate depletion  Muscle Mass (Malnutrition): severe depletion  Fluid Accumulation (Malnutrition): mild  1. Management of composition and delivery rate of enteral nutrition   2. Management of nutrition related prescription medication   3. Collaboration by nutrition professional with other providers    Continue tube feeds per Nutrition recommendations. Discussed with Surgery who recommend max goal rate of 50cc/hr

## 2024-10-21 NOTE — PROGRESS NOTES
[facility] Medicine  Progress Note    Patient Name: Cheryl Kirkland  MRN: [id number]  Patient Class: IP- Inpatient   Admission Date: 10/9/2024  Length of Stay: 11 days  Attending Physician: Zachery Boyd MD  Primary Care Provider: Gagandeep Iglesias MD        Subjective:     Principal Problem:Ureteral stone with hydronephrosis        HPI:  Patient is a 74-year-old female with past medical history significant for hypertension, hyperlipidemia, DVT, epilepsy, neuropathy, anemia, GERD, gastritis, polyps,  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  On exam she is noted to have left lower quadrant tenderness, appears frail and emaciated, has not been taking all of her scheduled medications and is hesitant to use pain medication, stating that she does not want to be addicted to pain medication.  Vital signs on arrival stable-- blood pressure 110/78, heart rate 95, temp 97.3° respirations 12, 97% SpO2 on room air.   Significant labs include CO2 6, BUN 21, creatinine 2.0, alk-phos 210, venous blood gas shows pH 7.030 and HC03 4.5, urinalysis with hazy appearance, 3+ blood, trace leukocyte esterase, greater than 100 RBCs, 31 WBCs, rare bacteria.  Stool studies negative for occult blood, negative for rotavirus, negative for C diff.  Stool culture is in progress.  EKG showed SR with PAC's in pattern of bigeminy with nonspecific T wave abnormality. CT abdomen and pelvis without contrast was done which revealed right-sided hydronephrosis with approximately 8 mm mid right ureteral obstructing stone.  Urology was consulted and recommended NPO after  midnight and will see patient in AM, also recommended urinalysis with urine culture which has been collected and does show acute cystitis with hematuria. While in ED, fluid bolus 1.5L, sodium bicarb --2 amps, morphine, Zofran, and Rocephin were all given. Hospital Medicine was consulted for admission due to SHELLEY, obstructing right ureteral stone 8mm and right sided hydronephrosis.     During exam, patient complains of pain and tightness in right leg so US was ordered and she was found to have extensive clot from right common femoral vein to right posterior tibial vein as well as occluded anterior tibial vein. IR was consulted and will evaluate for possible intervention in AM, heparin drip was recommended and ordered.     Overview/Hospital Course:  10/10/24  75 y/o female with gastric adenocarcinoma, here with abdominal pain  Patient rc/o lower quadrant abdominal pain, nausea, RLE pain, fatigue  Found to have 8 mm right sided obstructing ureteral stone and right sided hydronephrosis  Also with RLE extensive DVT; patient states she was previously on AC but stopped due to gastric cancer/bleeding  Urology with plans for stent placement today  IR with plans for RLE thrombectomy tomorrow morning   at bedside, updated    10/11/24  NAEON, patient resting in bed, feels some better today  Patient s/p cystoscopy yesterday with Dr. Kelly  Patient has impacted right ureteral stone, unable to place stent  Roca currently in place, continue ceftriaxone  Cr trending down with IV fluids  K 2.2, replete PO and IV, recheck this afternoon  Plans for right nephrostomy tube today  Plans for RLE thrombectomy for extensive DVT pending  Continue heparin drip, plan to transition to NOAC after thrombectomy    10/12  Awake, alert, lying in bed, cachetic, NPO since yesterday waiting on right nephrostomy tube/stent, was unable to do yesterday because of hypokalemia. Discussed with IR today, will give K riders today and repeat tomorrow  and plan for procedure tomorrow if Potassium stable.   IR said no indication for thrombectomy at this time  Cont heparin gtt and hold after midnight  Restart tube feedings, hold after midnight    10/13  Awake, alert, lying in bed,  at bedside  K 3.2 this am, given K rider and Effer-K, repeat K 3.9  IR placed a right nephrostomy tube today, no stent because of concern for infection, will need to follow up with urology and can have a antegrade stent placed inpatient/outpatient after adequate decompression    10/14  NAEON, patient states she feels unwell, fatigued  S/p right nephrostomy tube placement yesterday, cultures pending  Ceftriaxone day # 6  PT/OT with reccs for LIT\    10/15  K 3.0, Mg 1.2 - replete  Recheck K this afternoon  Patient states today that no one is able to care for her at home   at bedside wishes to pursue SNF placement, states he is unable to care for her  Consult SW for SNF placement    10/16  NAEON  Loose stools reported, schedule imodium short course  Adjusted TF regimen, monitor for tolerance  Replete electrolytes  SNF pending    10/17  NAEON  Nephrostomy tube dislodged yesterday evening  IR and Urology following, plans for replacement pending  Patient sitting in chair, reports pain controlled  SNF placement pending    10/18  NAEON  Patient sitting in chair, denies complaints, pain controlled  Nephrostomy tube placement pending for Monday morning    10/19  NAEON, patient sitting up in bed, denies complaints    10/20  NAEON  Plans for IR nephrostomy tube to be replaced tomorrow morning  NPO after midnight  Plan to discharge to SNF tomorrow after procedure completed      Review of Systems   All other systems reviewed and are negative.    Objective:     Vital Signs (Most Recent):  Temp: 97.8 °F (36.6 °C) (10/20/24 1944)  Pulse: 74 (10/20/24 1944)  Resp: 19 (10/20/24 1944)  BP: 139/72 (10/20/24 1944)  SpO2: 97 % (10/20/24 1944) Vital Signs (24h Range):  Temp:  [97.8 °F (36.6  °C)-98.7 °F (37.1 °C)] 97.8 °F (36.6 °C)  Pulse:  [70-80] 74  Resp:  [16-19] 19  SpO2:  [94 %-99 %] 97 %  BP: (128-154)/(68-76) 139/72     Weight: 46.7 kg (102 lb 15.3 oz)  Body mass index is 17.13 kg/m².    Intake/Output Summary (Last 24 hours) at 10/20/2024 2000  Last data filed at 10/20/2024 1707  Gross per 24 hour   Intake 265 ml   Output --   Net 265 ml         Physical Exam  Vitals and nursing note reviewed.   Constitutional:       General: She is not in acute distress.  Cardiovascular:      Rate and Rhythm: Normal rate and regular rhythm.      Heart sounds: No murmur heard.  Pulmonary:      Effort: Pulmonary effort is normal. No respiratory distress.      Breath sounds: No wheezing.   Abdominal:      Comments: G-tube   Neurological:      Mental Status: She is alert.             Significant Labs: All pertinent labs within the past 24 hours have been reviewed.  Recent Lab Results         10/20/24  1309        Acanthocytes Present       Anion Gap 10       Aniso Moderate       Bands 3.0       Basophil % 2.0       BUN 7       Calcium 8.0       Chloride 107       CO2 19       Creatinine 0.6       Differential Method Manual       eGFR >60       Eos % 1.0       Glucose 81       Gran % 57.0       Hematocrit 31.3       Hemoglobin 10.0       Immature Grans (Abs) CANCELED  Comment: Mild elevation in immature granulocytes is non specific and   can be seen in a variety of conditions including stress response,   acute inflammation, trauma and pregnancy. Correlation with other   laboratory and clinical findings is essential.    Result canceled by the ancillary.         Immature Granulocytes CANCELED  Comment: Result canceled by the ancillary.       Lymph % 18.0       MCH 30.9       MCHC 31.9       MCV 97       Metamyelocytes 1.0       Mono % 15.0       MPV 9.1       Myelocytes 3.0       nRBC 0       Ovalocytes Occasional       Platelet Estimate Appears normal       Platelet Count 417  Comment: Results confirmed, test  repeated       Poikilocytosis Slight       Poly Occasional       Potassium 5.1       RBC 3.24       RDW 17.2       Smudge Cells Present  Comment: Smudge cells present;Substantial numbers may affect the   accuracy of the differential.         Sodium 136       Teardrop Cells Occasional       WBC 6.55               Significant Imaging: I have reviewed all pertinent imaging results/findings within the past 24 hours.    CT Abdomen Without Contrast   Final Result      Moderate right hydronephrosis.         Electronically signed by: Magdaleno Valdez   Date:    10/17/2024   Time:    16:15      IR Nephrostomy Tube Change   Final Result      Unsuccessful attempt at fluoroscopic replacement of a completely dislodged right nephrostomy tube.      Plan:      A new nephrostomy will be placed tomorrow 10/18/2024.      _______________________________________________________________         Electronically signed by: Magdaleno Valdez   Date:    10/17/2024   Time:    16:23      IR Nephrostomy Tube Placement   Final Result      Right nephrostomy tube placement.      Plan:      Follow-up urine culture.      Monitor output.      Follow-up with urology.  Antegrade ureteral stent placement may be performed as inpatient or outpatient after adequate decompression.         Electronically signed by: Stephanie Cintron   Date:    10/13/2024   Time:    15:22      X-Ray Chest AP Portable   Final Result      1.  Negative for acute process involving the chest.   2.  Incidental findings as noted above.   3.]  PICC line in good position.      Finalized on: 10/12/2024 1:18 PM By:  Jose Angel Ross MD   [facility]# [id number]      2024-10-12 13:21:[id number]    [facility]      X-Ray Chest 1 View   Final Result      No acute abnormality.         Electronically signed by: Cruz Myers   Date:    10/09/2024   Time:    21:33      US Lower Extremity Veins Bilateral   Final Result      Extensive clot from the right common femoral vein to the right posterior tibial vein.  Occlusive right is  anterior tibial vein.      No left-sided DVT         Electronically signed by: Cruz Myers   Date:    10/09/2024   Time:    20:10      CT Abdomen Pelvis  Without Contrast   Final Result      Right-sided hydronephrosis with a approximately 8 mm mid right ureteral obstructing stone.  Punctate nonobstructing left renal calculi.  Postsurgical changes of the bowel.  Fluid within the colonic loops of bowel may relate to diarrheal state.  Left hemiabdomen enteric tube noted      All CT scans   are performed using dose optimization techniques including the following: automated exposure control; adjustment of the mA and/or kV; use of iterative reconstruction technique.  Dose modulation was employed for ALARA by means of: Automated exposure control; adjustment of the mA and/or kV according to patient size (this includes techniques or standardized protocols for targeted exams where dose is matched to indication/reason for exam; i.e. extremities or head); and/or use of iterative reconstructive technique.         Electronically signed by: Cruz Myers   Date:    10/09/2024   Time:    16:30      Anesthesia US Guide Vascular Access    (Results Pending)   IR Ureteral Stent Placement    (Results Pending)   Anesthesia US Guide Vascular Access    (Results Pending)   IR Nephrostomy Tube Placement    (Results Pending)         Assessment/Plan:      * Ureteral stone with hydronephrosis  - Right-sided hydronephrosis with approximately 8 mm mid right ureteral obstructing stone   - Urology consulted (per ED, Dr. Kelly)  - S/p cystoscopy 10/10/24 - noted impacted right ureteral stone, unable to place stent  - s/p IR placement of right nephrostomy tube 10/13, will need to follow up with urology, can place antegrade stent after adequate decompression as inpt/outpt  - Roca in place, monitor I/O's    Hypokalemia  Patient's most recent potassium results are listed below.   Recent Labs     10/12/24  1221 10/13/24  0343 10/13/24  1302   K  2.8* 3.2* 3.9       Plan  - Replete potassium per protocol  - Monitor potassium Every 12 hours  - Patient's hypokalemia is worsening. Will adjust treatment as follows: add PO and IV KCl    DVT (deep venous thrombosis)  - US - RLE extensive clot from the right common femoral vein to the right posterior tibial vein.  Occluded anterior tibial vein  - IR consulted for thrombectomy, not indicated at this time per IR  - Continue heparin drip, hold after midnight for nephrostomy tube placement tomorrow  - Heme/Onc consulted for AC reccs    Jejunostomy tube present  - Complains of pain around j-tube  - restart tube feedings after nephrostomy tube placed today    Gastric adenocarcinoma  - Followed by Dr. Ambrose and Dr. Jerome  - S/p total gastrectomy on 8/20, with lysis of adhesions, lymphadenectomy, and jejunostomy tube placement, d/c on 9/10  - Pathologic stage from 8/20/2024: Stage III (ypT2, pN3a, cM0), completed 5 cycles Keytruda prior to surgery    Hx of Epileptic seizures  - No recent seizure activity  - Continue Keppra    Severe protein-calorie malnutrition  Nutrition consulted. Most recent weight and BMI monitored- Has J-tube with tube feedings at home, however has been having diarrhea.    Measurements:  Wt Readings from Last 1 Encounters:   10/10/24 46.7 kg (102 lb 15.3 oz)   Body mass index is 17.13 kg/m².      Weight Loss (Malnutrition): greater than 7.5% in 3 months  1. Initiate pt onto continuous Enteral nutrition, recommend [employer] peptide 1.5 (vanilla) via J tube, goal rate 30 mL/hr, starting at 10 mL/hr then progress to goal rate within 24 hrs if pt is tolerating or per MD/NP -Formula at goal rate provides: 1107 kcals/day (79% EEN), 53 g protein/day (113% EPN), 99 g CHO/day (57% CHO needs), 504 mL free formula water/day -150 mL q4h free water flushes (900 mL/day) = total from formula + FWF = 1404 mL water/day, per MD/NP -Check Mg, K+, Na, Phos and Glu before and during initation, correct as indicated 2.  Recommend Banatrol TID to assist with diarrhea or BID for loose stools, as warranted 3. Weigh twice weekly        VTE Risk Mitigation (From admission, onward)           Ordered     apixaban tablet 5 mg  2 times daily         10/14/24 0949     Reason for No Pharmacological VTE Prophylaxis  Once        Comments: Heparin drip ordered for acute occlusive DVT   Question:  Reasons:  Answer:  Physician Provided (leave comment)    10/09/24 2027     IP VTE HIGH RISK PATIENT  Once         10/09/24 2027     heparin 25,000 units in dextrose 5% 250 mL (100 units/mL) infusion HIGH INTENSITY nomogram - [facility]  Continuous        Question:  Begin at (units/kg/hr)  Answer:  18    10/09/24 2013                    Discharge Planning   SOFIYA: 10/18/2024     Code Status: Full Code   Is the patient medically ready for discharge?:     Reason for patient still in hospital (select all that apply): Treatment, Imaging, and Pending disposition  Discharge Plan A: Skilled Nursing Facility                  Zachery Boyd MD  Department of Hospital Medicine   '[place] - Med Surg

## 2024-10-21 NOTE — PT/OT/SLP PROGRESS
"Physical Therapy  Treatment    Cheryl Kirkland   MRN: [id number]   Admitting Diagnosis: Ureteral stone with hydronephrosis       PT Start Time: 1110     PT Stop Time: 1133    PT Total Time (min): 23 min       Billable Minutes:  Therapeutic Activity 15 and Therapeutic Exercise 8    Treatment Type: Treatment  PT/PTA: PT     Number of PTA visits since last PT visit: 0       General Precautions: Standard, fall  Orthopedic Precautions: N/A  Braces: N/A  Respiratory Status: Room air    Spiritual, Cultural Beliefs, Mandaeism Practices, Values that Affect Care: no    Subjective:  Communicated with nursing (Michael) and performed chart review via epic system prior to session.  Pt fatigued and in pain following procedure. Encouragement to participate in PT services  "I'm rojelio scared now, it was a lot of blood" referring to moving now after recent dislodging of previous nephrostomy tube    Pain/Comfort  Pain Rating 1: 8/10  Pain Addressed 1: Reposition, Distraction  Pain Rating Post-Intervention 1: 8/10    Objective:   Patient found with: peripheral IV, PEG Tube (nephrostomy tube). Supine in bed with spouse at bedside    Functional Mobility:  Bed Mobility:    Sit to supine with CGA   Seated scooting CGA    Transfers:   Sit<>stand CGA with HHA   Stand pivot CGA with HHA    Gait:    Facilitated gait activity x 3 ft CGA with HHA, slow pace, flexed posture, shuffled steps with unsteadiness on feet    Balance:   Dynamic Sit: FAIR+: Maintains balance through MINIMAL excursions of active trunk motion  Dynamic stand: FAIR: Needs CONTACT GUARD during gait       Treatment and Education:  Educated pt on benefits of consistent participation in PT services to meet functional goals. Educated pt on supine therex to promote strength, circulation and joint mobility. Exercises included AP, heel slides. Educated to perform exercises intermittently throughout day to tolerance. Educated pt on importance of sitting OOB to promote endurance and " overall activity tolerance. Educated pt on call don't fall policy and use of call button to alert nursing staff of needs (including to assist in/out of bed). Pt expressed understanding.      AM-PAC 6 CLICK MOBILITY  How much help from another person does this patient currently need?   1 = Unable, Total/Dependent Assistance  2 = A lot, Maximum/Moderate Assistance  3 = A little, Minimum/Contact Guard/Supervision  4 = None, Modified [place]/Independent    Turning over in bed (including adjusting bedclothes, sheets and blankets)?: 3  Sitting down on and standing up from a chair with arms (e.g., wheelchair, bedside commode, etc.): 3  Moving from lying on back to sitting on the side of the bed?: 3  Moving to and from a bed to a chair (including a wheelchair)?: 3  Need to walk in hospital room?: 3  Climbing 3-5 steps with a railing?: 1  Basic Mobility Total Score: 16    AM-PAC Raw Score CMS G-Code Modifier Level of Impairment Assistance   6 % Total / Unable   7 - 9 CM 80 - 100% Maximal Assist   10 - 14 CL 60 - 80% Moderate Assist   15 - 19 CK 40 - 60% Moderate Assist   20 - 22 CJ 20 - 40% Minimal Assist   23 CI 1-20% SBA / CGA   24 CH 0% Independent/ Mod I     Patient left supine with all lines intact, call button in reach, bed alarm on, and nursing notified.    Assessment:  Cheryl Kirkland is a 74 y.o. female with a medical diagnosis of Ureteral stone with hydronephrosis and presents with deficits in overall mobility and increased risk of falls. Pt was limited by pain and fatigue following procedure. Pt will benefit from continued PT services in order to progress toward baseline.    Rehab identified problem list/impairments: weakness, impaired endurance, impaired functional mobility, gait instability, impaired balance, pain    Rehab potential is good.    Activity tolerance: Good    Discharge recommendations: Moderate Intensity Therapy      Barriers to discharge:      Equipment recommendations: to be  determined by next level of care     GOALS:   Multidisciplinary Problems       Physical Therapy Goals          Problem: Physical Therapy    Goal Priority Disciplines Outcome Interventions   Physical Therapy Goal     PT, PT/OT Progressing    Description: LTG: 10/24/24  1. PT WILL COMPLETE BED MOBILITY IND  2. PT WILL STAND T/F TO CHAIR IND FOR OOB TOLERANCE.  3. PT WILL GT TRAIN X 500' WITH NO AD IND TO INC ENDURANCE  4. PT WILL INC AMPAC SCORE BY 2 POINTS TO PROGRESS GROSS FUNC MOBILITY.                          PLAN:    Patient to be seen 3 x/week to address the above listed problems via gait training, therapeutic activities, therapeutic exercises, neuromuscular re-education  Plan of Care expires: 10/24/24  Plan of Care reviewed with: patient, spouse         10/21/2024

## 2024-10-21 NOTE — PROGRESS NOTES
[facility] - Med Surg  Adult Nutrition  Progress Note    SUMMARY       Recommendations    Recommendation/Intervention:   1. Recommend modify pt's continuious Enteral nutrition, recommend Vital AF 1.2 Guille via J tube, goal rate 40 mL/hr, starting at 10 mL/hr then progress to goal rate within 24 hrs if pt is tolerating or per MD/NP   -Formula at goal rate provides: 1152 kcals/day (82% EEN), 72 g protein/day (103% EPN), 106 g CHO/day (61% CHO needs), 779 mL free formula water/day (56% fluid needs)   -105 mL q4h free water flushes (630 mL/day) = total from formula + FWF = 1409 mL water/day (100% fluid needs), per MD/NP   -Check Mg, K+, Na, Phos and Glu before and during initiation, correct as indicated   2. Recommend pt continues on antidiarrheal medication as warranted   3. Updated weight, twice weekly     Goals:   1. Pt will be initiated onto modifed EN at modified goal rate within 24 hrs   2. Pt will tolerate and intake >75% EEN and EPN prior to RD follow up   3. Pt's diarrhea will improve prior to RD follow up  Nutrition Goal Status: continues   Communication of RD Recs: other (comment) (POC, sticky note, reviewed with RN)    Assessment and Plan    Renal/  * Ureteral stone with hydronephrosis  Diagnosis:  Acute on chronic illness     Related to (etiology):   Physiological causes increasing nutrient needs d/t illness   Alteration in GI tract structure/function     Signs and Symptoms (as evidenced by):   BMI < 22 (older adult)  Unintentional weight loss, adult, of >5% in 1 month  Underweight with loss of fat and muscle  Inc localized and generalized fluid accumulation  Unable to eat sufficient energy/protein to maintain a healthy weight    Interventions/Recommendations (treatment strategy):  1. Management of composition and delivery rate of enteral nutrition   2. Management of nutrition related prescription medication   3. Collaboration by nutrition professional with other providers     Nutrition Diagnosis Status:    Continues         Malnutrition Assessment (10/16/24):  Malnutrition Context: acute illness or injury, chronic illness  Malnutrition Level: severe  Skin (Micronutrient): bruised, edema (Chetan score = 16 (mild risk)       Weight Loss (Malnutrition): greater than 5% in 1 month  Energy Intake (Malnutrition): less than or equal to 50% for greater than or equal to 5 days  Subcutaneous Fat (Malnutrition): moderate depletion  Muscle Mass (Malnutrition): severe depletion  Fluid Accumulation (Malnutrition): mild   Orbital Region (Subcutaneous Fat Loss): moderate depletion (Slightly darker circles; Somewhat hallow look)  Upper Arm Region (Subcutaneous Fat Loss): moderate depletion (Some depth pinch but not ample; Fingers almost touch)   Ed[place] Region (Muscle Loss): severe depletion (Hollow, scooping depression)  Clavicle Bone Region (Muscle Loss): severe depletion (Protruding prominent bone)  Clavicle and Acromion Bone Region (Muscle Loss): moderate depletion (Acromion process may slightly protrude)  Dorsal Hand (Muscle Loss): moderate depletion (Slightly depressed)  Patellar Region (Muscle Loss): moderate depletion (Knee cap less prominent, more rounded)  Posterior Calf Region (Muscle Loss): moderate depletion (Not well developed)                 Reason for Assessment    Reason For Assessment: consult, RD follow-up (TF assistance)  Diagnosis:  (Ureteral stone with hydronephrosis)  General Information Comments:   10/16/24: 74 y.o. Female admitted for Ureteral stone with hydronephrosis. PMH: DVT, Gastric adenocarainoma, J tube present, Moderate protein calorie malnutrition, Hypokalemia; Hx: Epileptic seizures. Pt currently NPO, Peptamen 1.5 w/ Prebio ordered at 50 mL/hr. RD consulted for TF assistance. Note RD currently following pt. General surgery noted 10/16/24 that the pt is having significant diarrhea, 4-5 episodes, C Diff negative, pt unale to tolerate [employer] 1.5 peptide to past 20 mL/hr, pt c/o abd pain, stated  ""Pt did not tolerate [employer] Peptide 1.5 last time and had to be placed on peptamin 1.5 w/ prebio which she did tolerate well", recommend Peptamen 1.5 w/ Prebio at 50 mL/hr and modium one tablet BID. Spoke to RN, confirmed pt is unale to tolerate [employer] past 20 mL/hr and pt experiencing diarrhea. Visited pt at bedside, pt sitting up in bed, pt family member present. Pt diarrhea and abd pain/discomfort, pt reported that she uses [employer] at home at rate of 50 mL/hr w/out issues, unsure exactly which formula. Pt expressed concern for L arm edema. Pt stated her UBW is 132 lbs prior to surgery 8/20/24. NFPE performed, moderate/severe malnutrition noted. RD provided RN with 2 bags of Peptamen 1.5 w/ Prebio RTH bags, left at pt bedside, informed RN of new rec rate, RN inquired about current ordered Peptamen Paramjit 1.5 TID, informed RN we do not currently have/normally carry this product and that new goal rate willprovide 100% of pt's energy and protein needs. RD ordered TF's at new recommended goal rate of 40 mL/hr. Reviewed chart: TF currently being held; LBM 10/16; Skin: bruised, pale, incision vagina WDL; Chetan score: 17 (mild risk); Edema: 2+ mild R leg/ L arm, generalized. Labs, meds, weight reviewed. Note loperamide, potassium chloride. Last weight charted 10/10/24 102 lbs (BMI 17.13, protein-energy malnutrition grade I), updated weight for accuracy warranted, note -30 lb wt loss (22% wt change) x 2 months from UBW 8/20/24. RD will continue to follow and monitor pt's nutritional status during admit.    Follow up:   10/21/24: RD follow up. Pt currently NPO, receiving Peptamen 1.5 w/ Prebio. EMR noted nephrostomy tube to be replaced today and plan for d/c to SNF after procedure complete, TF currently being held for procedure. Spoke to SW via secure chat, requested sub for Peptamen 1.5 w/ Prebio d/t SNF facility, RD informed SW that Vital AF 1.2 Guille would be the alternative, acceptable to SNF and requested " "recommendations, provided in secure chat and in RD progress and POC notes. Reviewed chart: LBM 10/19; Skin: bruised, incision vagina WDL; Chetan score decreased to 16 (mild risk); Edema continues 2+ mild R leg/ L arm. Labs, meds, weights reviewed. Last weight charted 10/16/24 102 lbs (BMI 17.13, protein-energy malnutrition grade I), updated weight for accuracy warranted. NFPE performed at previous encounter, moderate/severe malnutrition noted.  RD will continue to follow and monitor pt's nutritional status during admit.     Nutrition Discharge Planning: Continuous enteral nutrition Vital AF 1.2 Guille via J tube @ 40 mL/hr, 105 mL q4h free water flushes (630 mL/day), per MD/NP     Nutrition Risk Screen    Nutrition Risk Screen: tube feeding or parenteral nutrition    Nutrition Related Social Determinants of Health: SDOH: None Identified    Nutrition/Diet History    Spiritual, Cultural Beliefs, Sabianist Practices, Values that Affect Care: no  Food Allergies: NKFA  Factors Affecting Nutritional Intake: abdominal pain, altered gastrointestinal function, diarrhea, NPO  Nutrition Support Formula Prior to Admit: Other (see commments) (txtr)  Nutrition Support Rate Prior to Admit: 50 (ml)  Nutrtion Support Frequency Prior to Admit: continuous  Nutrition Support Provision Prior to Admit: via J tube    Anthropometrics    Temp: 97.4 °F (36.3 °C)  Height Method: Stated  Height: 5' 5" (165.1 cm)  Height (inches): 65 in  Weight Method: Bed Scale  Weight: 46.7 kg (102 lb 15.3 oz)  Weight (lb): 102.96 lb  Ideal Body Weight (IBW), Female: 125 lb  % Ideal Body Weight, Female (lb): 82.37 %  % Ideal Body Weight Malnutrition: 80-90% - mild deficit  BMI (Calculated): 17.1  BMI Grade: 17 - 18.4 protein-energy malnutrition grade I  Weight Loss: unintentional  Usual Body Weight (UBW), k kg  Weight Change Amount: 30 lb  % Usual Body Weight: 78  % Weight Change From Usual Weight: -22.17 %     Wt Readings from Last 15 Encounters: " "  10/16/24 46.7 kg (102 lb 15.3 oz)   10/07/24 50.4 kg (111 lb)   09/24/24 53.9 kg (118 lb 15 oz)   09/24/24 53.9 kg (118 lb 15 oz)   09/17/24 50.8 kg (112 lb)   09/09/24 60.4 kg (133 lb 2.5 oz)   08/14/24 58.7 kg (129 lb 4.8 oz)   07/26/24 59 kg (130 lb)   07/23/24 60.5 kg (133 lb 6.1 oz)   07/19/24 59.5 kg (131 lb 2.8 oz)   07/09/24 58.9 kg (129 lb 13.6 oz)   06/28/24 58.9 kg (129 lb 13.6 oz)   06/18/24 58.9 kg (129 lb 13.6 oz)   05/28/24 59.7 kg (131 lb 9.8 oz)   05/28/24 59.7 kg (131 lb 9.8 oz)     Lab/Procedures/Meds    Pertinent Labs Reviewed: reviewed  Pertinent Labs Comments: Calcium (L)  Pertinent Medications Reviewed: reviewed  Pertinent Medications Comments: zonisamide, potassium chloride, levetiracetem in NaCl, carbamazepine, apixaban, loperamide, PRN ondansetron    BMP  Lab Results   Component Value Date     10/20/2024    K 5.1 10/20/2024     10/20/2024    CO2 19 (L) 10/20/2024    BUN 7 (L) 10/20/2024    CREATININE 0.6 10/20/2024    CALCIUM 8.0 (L) 10/20/2024    ANIONGAP 10 10/20/2024    EGFRNORACEVR >60 10/20/2024     Lab Results   Component Value Date    CALCIUM 8.0 (L) 10/20/2024    PHOS 5.0 (H) 10/09/2024     Lab Results   Component Value Date    ALBUMIN 1.9 (L) 10/12/2024     Lab Results   Component Value Date    ALT 14 10/12/2024    AST 14 10/12/2024    ALKPHOS 152 (H) 10/12/2024    BILITOT 0.4 10/12/2024     No results for input(s): "POCTGLUCOSE" in the last 24 hours.    No results found for: "LABA1C", "HGBA1C"    Lab Results   Component Value Date    WBC 7.96 10/21/2024    HGB 10.3 (L) 10/21/2024    HCT 31.5 (L) 10/21/2024    MCV 95 10/21/2024     (H) 10/21/2024       Scheduled Meds:   apixaban  5 mg Oral BID    carBAMazepine  200 mg Oral TID    levETIRAcetam (Keppra) IV (PEDS and ADULTS)  500 mg Intravenous Q12H    zonisamide  200 mg Oral BID     Continuous Infusions:  PRN Meds:.  Current Facility-Administered Medications:     acetaminophen, 650 mg, Oral, Q6H PRN    dextrose " 10%, 12.5 g, Intravenous, PRN    dextrose 10%, 25 g, Intravenous, PRN    glucagon (human recombinant), 1 mg, Intramuscular, PRN    glucose, 16 g, Oral, PRN    glucose, 24 g, Oral, PRN    hydrOXYzine HCL, 25 mg, Oral, TID PRN    iohexoL, 30 mL, Intra-Catheter, ONCE PRN    loperamide, 2 mg, Oral, QID PRN    morphine, 1 mg, Intravenous, Q4H PRN    naloxone, 0.02 mg, Intravenous, PRN    ondansetron, 4 mg, Intravenous, Q6H PRN    oxyCODONE, 10 mg, Oral, Q4H PRN    sodium chloride 0.9%, 10 mL, Intravenous, Q8H PRN    Physical Findings/Assessment         Estimated/Assessed Needs    Weight Used For Calorie Calculations: 46.7 kg (102 lb 15.3 oz)  Energy Calorie Requirements (kcal): 1738-6438 kcals (30-35 kcals/kg ABW (Underweight vs Cancer vs GI Disorder)  Energy Need Method: Kcal/kg  Protein Requirements: 56-70 g (1.2-1.5 g/kg ABW (Underweight vs Cancer vs GI Disorder, older adult)  Weight Used For Protein Calculations: 46.7 kg (102 lb 15.3 oz)  Fluid Requirements (mL): 4461-0548 mL (1 mL/kcal)  Estimated Fluid Requirement Method: RDA Method  RDA Method (mL): 1401  CHO Requirement: 175-204 g (3764-1123 kcals/8)      Nutrition Prescription Ordered    Current Diet Order: NPO  Nutrition Order Comments: Currently being held  Current Nutrition Support Formula Ordered: Peptamen 1.5 w/Prebio  Current Nutrition Support Rate Ordered: 40 (ml)  Current Nutrition Support Frequency Ordered: continuous    Evaluation of Received Nutrient/Fluid Intake  I/O: (Net since admit):  10/16/24: +1723.6 mL  10/21/24: +5283.5 mL     Energy Calories Required: not meeting needs  Protein Required: not meeting needs  Fluid Required: not meeting needs  Total Fluid Intake (mL): 609  Tolerance: Currently no intake   % Intake of Estimated Energy Needs: 0 - 25 %  % Meal Intake: NPO    Nutrition Risk    Level of Risk/Frequency of Follow-up: high (F/u x 2 weekly)     Monitor and Evaluation    Food and Nutrient Intake: energy intake, enteral nutrition  intake  Food and Nutrient Adminstration: enteral and parenteral nutrition administration  Knowledge/Beliefs/Attitudes: food and nutrition knowledge/skill, beliefs and attitudes  Anthropometric Measurements: weight, weight change, body mass index  Biochemical Data, Medical Tests and Procedures: electrolyte and renal panel, gastrointestinal profile, inflammatory profile  Nutrition-Focused Physical Findings: overall appearance     Nutrition Follow-Up    RD Follow-up?: Yes  TAWANA Quiñonez, RDN, LDN

## 2024-10-22 ENCOUNTER — TELEPHONE (OUTPATIENT)
Dept: UROLOGY | Facility: CLINIC | Age: 74
End: 2024-10-22
Payer: MEDICARE

## 2024-10-22 NOTE — TELEPHONE ENCOUNTER
.Outgoing call placed to Nadeen with [facility] to reschedule patient's post op appointment, she was notified of availability at the recommended location and appointment scheduled per Nadeen's request according to her availability she was notified that this appointment is for a post op evaluation appointment, patient had a procedure on 10/10/24 with Dr. Kelly.        ----- Message from [facility] sent at 10/22/2024 11:06 AM CDT -----  Contact: Nadeen([facility] is stating that patient has been discharged and will need to reschedule her appointment that was scheduled on 10/21/24. Call Back is [phone](Ask for Nadeen)

## 2024-10-23 LAB — BACTERIA SPEC ANAEROBE CULT: NORMAL

## 2024-10-24 ENCOUNTER — HOSPITAL ENCOUNTER (INPATIENT)
Facility: HOSPITAL | Age: 74
LOS: 13 days | Discharge: NURSING, REHAB OR LONG-TERM CARE | DRG: 659 | End: 2024-11-06
Attending: EMERGENCY MEDICINE | Admitting: INTERNAL MEDICINE
Payer: MEDICARE

## 2024-10-24 DIAGNOSIS — E43 SEVERE PROTEIN-CALORIE MALNUTRITION: ICD-10-CM

## 2024-10-24 DIAGNOSIS — I82.401 ACUTE DEEP VEIN THROMBOSIS (DVT) OF RIGHT LOWER EXTREMITY, UNSPECIFIED VEIN: ICD-10-CM

## 2024-10-24 DIAGNOSIS — E87.6 HYPOKALEMIA: ICD-10-CM

## 2024-10-24 DIAGNOSIS — R31.9 URINARY TRACT INFECTION WITH HEMATURIA, SITE UNSPECIFIED: ICD-10-CM

## 2024-10-24 DIAGNOSIS — N13.2 URETERAL STONE WITH HYDRONEPHROSIS: ICD-10-CM

## 2024-10-24 DIAGNOSIS — R55 SYNCOPE: ICD-10-CM

## 2024-10-24 DIAGNOSIS — E83.42 HYPOMAGNESEMIA: ICD-10-CM

## 2024-10-24 DIAGNOSIS — N39.0 URINARY TRACT INFECTION WITH HEMATURIA, SITE UNSPECIFIED: ICD-10-CM

## 2024-10-24 DIAGNOSIS — D64.9 ANEMIA, UNSPECIFIED TYPE: Primary | ICD-10-CM

## 2024-10-24 DIAGNOSIS — Z93.4 JEJUNOSTOMY TUBE PRESENT: ICD-10-CM

## 2024-10-24 LAB
ALBUMIN SERPL BCP-MCNC: 1.8 G/DL (ref 3.5–5.2)
ALP SERPL-CCNC: 174 U/L (ref 40–150)
ALT SERPL W/O P-5'-P-CCNC: 17 U/L (ref 10–44)
ANION GAP SERPL CALC-SCNC: 12 MMOL/L (ref 8–16)
ANION GAP SERPL CALC-SCNC: 9 MMOL/L (ref 8–16)
AST SERPL-CCNC: 12 U/L (ref 10–40)
BACTERIA #/AREA URNS HPF: ABNORMAL /HPF
BASOPHILS # BLD AUTO: 0.05 K/UL (ref 0–0.2)
BASOPHILS NFR BLD: 0.5 % (ref 0–1.9)
BILIRUB SERPL-MCNC: 0.3 MG/DL (ref 0.1–1)
BILIRUB UR QL STRIP: ABNORMAL
BNP SERPL-MCNC: 29 PG/ML (ref 0–99)
BUN SERPL-MCNC: 11 MG/DL (ref 8–23)
BUN SERPL-MCNC: 11 MG/DL (ref 8–23)
CALCIUM SERPL-MCNC: 7.2 MG/DL (ref 8.7–10.5)
CALCIUM SERPL-MCNC: 7.8 MG/DL (ref 8.7–10.5)
CHLORIDE SERPL-SCNC: 112 MMOL/L (ref 95–110)
CHLORIDE SERPL-SCNC: 113 MMOL/L (ref 95–110)
CK SERPL-CCNC: 25 U/L (ref 20–180)
CLARITY UR: ABNORMAL
CO2 SERPL-SCNC: 19 MMOL/L (ref 23–29)
CO2 SERPL-SCNC: 19 MMOL/L (ref 23–29)
COLOR UR: ABNORMAL
CREAT SERPL-MCNC: 0.7 MG/DL (ref 0.5–1.4)
CREAT SERPL-MCNC: 0.7 MG/DL (ref 0.5–1.4)
DIFFERENTIAL METHOD BLD: ABNORMAL
EOSINOPHIL # BLD AUTO: 0 K/UL (ref 0–0.5)
EOSINOPHIL NFR BLD: 0.2 % (ref 0–8)
ERYTHROCYTE [DISTWIDTH] IN BLOOD BY AUTOMATED COUNT: 18.9 % (ref 11.5–14.5)
EST. GFR  (NO RACE VARIABLE): >60 ML/MIN/1.73 M^2
EST. GFR  (NO RACE VARIABLE): >60 ML/MIN/1.73 M^2
GLUCOSE SERPL-MCNC: 107 MG/DL (ref 70–110)
GLUCOSE SERPL-MCNC: 115 MG/DL (ref 70–110)
GLUCOSE UR QL STRIP: NEGATIVE
HCT VFR BLD AUTO: 30.4 % (ref 37–48.5)
HGB BLD-MCNC: 10.1 G/DL (ref 12–16)
HGB UR QL STRIP: ABNORMAL
HYALINE CASTS #/AREA URNS LPF: 0 /LPF
IMM GRANULOCYTES # BLD AUTO: 0.21 K/UL (ref 0–0.04)
IMM GRANULOCYTES NFR BLD AUTO: 2 % (ref 0–0.5)
KETONES UR QL STRIP: NEGATIVE
LEUKOCYTE ESTERASE UR QL STRIP: ABNORMAL
LYMPHOCYTES # BLD AUTO: 0.8 K/UL (ref 1–4.8)
LYMPHOCYTES NFR BLD: 7.9 % (ref 18–48)
MAGNESIUM SERPL-MCNC: 1.5 MG/DL (ref 1.6–2.6)
MAGNESIUM SERPL-MCNC: 2.4 MG/DL (ref 1.6–2.6)
MCH RBC QN AUTO: 31.6 PG (ref 27–31)
MCHC RBC AUTO-ENTMCNC: 33.2 G/DL (ref 32–36)
MCV RBC AUTO: 95 FL (ref 82–98)
MICROSCOPIC COMMENT: ABNORMAL
MONOCYTES # BLD AUTO: 1 K/UL (ref 0.3–1)
MONOCYTES NFR BLD: 9.9 % (ref 4–15)
NEUTROPHILS # BLD AUTO: 8.4 K/UL (ref 1.8–7.7)
NEUTROPHILS NFR BLD: 79.5 % (ref 38–73)
NITRITE UR QL STRIP: POSITIVE
NRBC BLD-RTO: 0 /100 WBC
PH UR STRIP: 6 [PH] (ref 5–8)
PLATELET # BLD AUTO: 559 K/UL (ref 150–450)
PMV BLD AUTO: 8.9 FL (ref 9.2–12.9)
POTASSIUM SERPL-SCNC: 2.9 MMOL/L (ref 3.5–5.1)
POTASSIUM SERPL-SCNC: 3.2 MMOL/L (ref 3.5–5.1)
PROT SERPL-MCNC: 5 G/DL (ref 6–8.4)
PROT UR QL STRIP: ABNORMAL
RBC # BLD AUTO: 3.2 M/UL (ref 4–5.4)
RBC #/AREA URNS HPF: >100 /HPF (ref 0–4)
SODIUM SERPL-SCNC: 141 MMOL/L (ref 136–145)
SODIUM SERPL-SCNC: 143 MMOL/L (ref 136–145)
SP GR UR STRIP: 1.02 (ref 1–1.03)
TROPONIN I SERPL DL<=0.01 NG/ML-MCNC: <0.006 NG/ML (ref 0–0.03)
TROPONIN I SERPL DL<=0.01 NG/ML-MCNC: <0.006 NG/ML (ref 0–0.03)
URN SPEC COLLECT METH UR: ABNORMAL
UROBILINOGEN UR STRIP-ACNC: NEGATIVE EU/DL
WBC # BLD AUTO: 10.52 K/UL (ref 3.9–12.7)
WBC #/AREA URNS HPF: 9 /HPF (ref 0–5)

## 2024-10-24 PROCEDURE — 87086 URINE CULTURE/COLONY COUNT: CPT | Performed by: NURSE PRACTITIONER

## 2024-10-24 PROCEDURE — 83735 ASSAY OF MAGNESIUM: CPT | Performed by: EMERGENCY MEDICINE

## 2024-10-24 PROCEDURE — 25000003 PHARM REV CODE 250: Performed by: NURSE PRACTITIONER

## 2024-10-24 PROCEDURE — 84484 ASSAY OF TROPONIN QUANT: CPT | Performed by: EMERGENCY MEDICINE

## 2024-10-24 PROCEDURE — 25000003 PHARM REV CODE 250: Performed by: EMERGENCY MEDICINE

## 2024-10-24 PROCEDURE — 82550 ASSAY OF CK (CPK): CPT | Performed by: EMERGENCY MEDICINE

## 2024-10-24 PROCEDURE — 93010 ELECTROCARDIOGRAM REPORT: CPT | Mod: ,,, | Performed by: INTERNAL MEDICINE

## 2024-10-24 PROCEDURE — 11000001 HC ACUTE MED/SURG PRIVATE ROOM

## 2024-10-24 PROCEDURE — 63600175 PHARM REV CODE 636 W HCPCS: Performed by: EMERGENCY MEDICINE

## 2024-10-24 PROCEDURE — 81000 URINALYSIS NONAUTO W/SCOPE: CPT | Performed by: EMERGENCY MEDICINE

## 2024-10-24 PROCEDURE — 21400001 HC TELEMETRY ROOM

## 2024-10-24 PROCEDURE — 99285 EMERGENCY DEPT VISIT HI MDM: CPT | Mod: 25

## 2024-10-24 PROCEDURE — 93005 ELECTROCARDIOGRAM TRACING: CPT

## 2024-10-24 PROCEDURE — 85025 COMPLETE CBC W/AUTO DIFF WBC: CPT | Performed by: EMERGENCY MEDICINE

## 2024-10-24 PROCEDURE — 80048 BASIC METABOLIC PNL TOTAL CA: CPT | Mod: XB | Performed by: NURSE PRACTITIONER

## 2024-10-24 PROCEDURE — 96374 THER/PROPH/DIAG INJ IV PUSH: CPT

## 2024-10-24 PROCEDURE — 36415 COLL VENOUS BLD VENIPUNCTURE: CPT | Performed by: NURSE PRACTITIONER

## 2024-10-24 PROCEDURE — 83880 ASSAY OF NATRIURETIC PEPTIDE: CPT | Performed by: EMERGENCY MEDICINE

## 2024-10-24 PROCEDURE — 83735 ASSAY OF MAGNESIUM: CPT | Mod: 91 | Performed by: NURSE PRACTITIONER

## 2024-10-24 PROCEDURE — 80053 COMPREHEN METABOLIC PANEL: CPT | Performed by: EMERGENCY MEDICINE

## 2024-10-24 PROCEDURE — 84484 ASSAY OF TROPONIN QUANT: CPT | Mod: 91 | Performed by: NURSE PRACTITIONER

## 2024-10-24 RX ORDER — IBUPROFEN 200 MG
24 TABLET ORAL
Status: DISCONTINUED | OUTPATIENT
Start: 2024-10-24 | End: 2024-11-06 | Stop reason: HOSPADM

## 2024-10-24 RX ORDER — CEFTRIAXONE 1 G/1
1 INJECTION, POWDER, FOR SOLUTION INTRAMUSCULAR; INTRAVENOUS
Status: COMPLETED | OUTPATIENT
Start: 2024-10-24 | End: 2024-10-24

## 2024-10-24 RX ORDER — MAGNESIUM SULFATE HEPTAHYDRATE 40 MG/ML
2 INJECTION, SOLUTION INTRAVENOUS
Status: COMPLETED | OUTPATIENT
Start: 2024-10-24 | End: 2024-10-24

## 2024-10-24 RX ORDER — CARBAMAZEPINE 200 MG/1
200 TABLET ORAL 3 TIMES DAILY
Status: DISCONTINUED | OUTPATIENT
Start: 2024-10-25 | End: 2024-11-06 | Stop reason: HOSPADM

## 2024-10-24 RX ORDER — DOCUSATE SODIUM 100 MG
400 CAPSULE ORAL
Status: DISCONTINUED | OUTPATIENT
Start: 2024-10-24 | End: 2024-11-06 | Stop reason: HOSPADM

## 2024-10-24 RX ORDER — ACETAMINOPHEN 500 MG
1000 TABLET ORAL EVERY 8 HOURS
Status: DISCONTINUED | OUTPATIENT
Start: 2024-10-25 | End: 2024-11-02

## 2024-10-24 RX ORDER — FERROUS SULFATE, DRIED 160(50) MG
1 TABLET, EXTENDED RELEASE ORAL 2 TIMES DAILY
Status: DISCONTINUED | OUTPATIENT
Start: 2024-10-24 | End: 2024-11-06 | Stop reason: HOSPADM

## 2024-10-24 RX ORDER — IBUPROFEN 200 MG
16 TABLET ORAL
Status: DISCONTINUED | OUTPATIENT
Start: 2024-10-24 | End: 2024-11-06 | Stop reason: HOSPADM

## 2024-10-24 RX ORDER — CEFTRIAXONE 1 G/1
1 INJECTION, POWDER, FOR SOLUTION INTRAMUSCULAR; INTRAVENOUS
Status: DISCONTINUED | OUTPATIENT
Start: 2024-10-25 | End: 2024-10-28

## 2024-10-24 RX ORDER — NALOXONE HCL 0.4 MG/ML
0.02 VIAL (ML) INJECTION
Status: DISCONTINUED | OUTPATIENT
Start: 2024-10-24 | End: 2024-11-06 | Stop reason: HOSPADM

## 2024-10-24 RX ORDER — LANOLIN ALCOHOL/MO/W.PET/CERES
1 CREAM (GRAM) TOPICAL 2 TIMES DAILY
Status: DISCONTINUED | OUTPATIENT
Start: 2024-10-24 | End: 2024-11-06 | Stop reason: HOSPADM

## 2024-10-24 RX ORDER — ZONISAMIDE 100 MG/1
200 CAPSULE ORAL 2 TIMES DAILY
Status: DISCONTINUED | OUTPATIENT
Start: 2024-10-24 | End: 2024-11-06 | Stop reason: HOSPADM

## 2024-10-24 RX ORDER — GLUCAGON 1 MG
1 KIT INJECTION
Status: DISCONTINUED | OUTPATIENT
Start: 2024-10-24 | End: 2024-11-06 | Stop reason: HOSPADM

## 2024-10-24 RX ORDER — POTASSIUM CHLORIDE 20 MEQ/1
40 TABLET, EXTENDED RELEASE ORAL
Status: COMPLETED | OUTPATIENT
Start: 2024-10-24 | End: 2024-10-24

## 2024-10-24 RX ORDER — ONDANSETRON HYDROCHLORIDE 2 MG/ML
4 INJECTION, SOLUTION INTRAVENOUS EVERY 6 HOURS PRN
Status: DISCONTINUED | OUTPATIENT
Start: 2024-10-24 | End: 2024-11-06 | Stop reason: HOSPADM

## 2024-10-24 RX ORDER — SODIUM CHLORIDE 0.9 % (FLUSH) 0.9 %
10 SYRINGE (ML) INJECTION EVERY 8 HOURS PRN
Status: DISCONTINUED | OUTPATIENT
Start: 2024-10-24 | End: 2024-11-06 | Stop reason: HOSPADM

## 2024-10-24 RX ORDER — LEVETIRACETAM 500 MG/1
500 TABLET ORAL 2 TIMES DAILY
Status: DISCONTINUED | OUTPATIENT
Start: 2024-10-24 | End: 2024-10-30

## 2024-10-24 RX ADMIN — FERROUS SULFATE TAB 325 MG (65 MG ELEMENTAL FE) 1 EACH: 325 (65 FE) TAB at 10:10

## 2024-10-24 RX ADMIN — MAGNESIUM SULFATE IN WATER 2 G: 40 INJECTION, SOLUTION INTRAVENOUS at 08:10

## 2024-10-24 RX ADMIN — Medication 400 ML: at 10:10

## 2024-10-24 RX ADMIN — LEVETIRACETAM 500 MG: 500 TABLET, FILM COATED ORAL at 10:10

## 2024-10-24 RX ADMIN — ZONISAMIDE 200 MG: 100 CAPSULE ORAL at 10:10

## 2024-10-24 RX ADMIN — Medication 1 TABLET: at 10:10

## 2024-10-24 RX ADMIN — APIXABAN 5 MG: 2.5 TABLET, FILM COATED ORAL at 10:10

## 2024-10-24 RX ADMIN — CEFTRIAXONE SODIUM 1 G: 1 INJECTION, POWDER, FOR SOLUTION INTRAMUSCULAR; INTRAVENOUS at 01:10

## 2024-10-24 RX ADMIN — POTASSIUM CHLORIDE 40 MEQ: 1500 TABLET, EXTENDED RELEASE ORAL at 07:10

## 2024-10-24 NOTE — Clinical Note
Diagnosis: Syncope [206001]   Reason for IP Medical Treatment  (Clinical interventions that can only be accomplished in the IP setting? ) :: syncope

## 2024-10-24 NOTE — PHARMACY MED REC
"Admission Medication History     The home medication history was taken by Trev Juarez.    You may go to "Admission" then "Reconcile Home Medications" tabs to review and/or act upon these items.     The home medication list has been updated by the Pharmacy department.   Please read ALL comments highlighted in yellow.   Please address this information as you see fit.    Feel free to contact us if you have any questions or require assistance.      Medications listed below were obtained from: Patient/family and Analytic software- [employer]      [facility] REC COMPLETED: 10/09/2024      Trev Juarez  [id number]    Current Outpatient Medications on File Prior to Encounter   Medication Sig Dispense Refill Last Dose/Taking    acetaminophen (TYLENOL) 500 MG tablet Take 2 tablets (1,000 mg total) by mouth every 8 (eight) hours. (Patient taking differently: Take 1,000 mg by mouth every 8 (eight) hours as needed for Pain.)  0     alendronate (FOSAMAX) 70 MG tablet Take 70 mg by mouth every 7 days. (Sundays)       apixaban (ELIQUIS) 5 mg Tab Take 2 tablets (10 mg total) by mouth 2 (two) times daily for 3 days THEN take 1 tablet by mouth twice a day 372 tablet 0     calcium phosphate trib/vit D3 (CALCIUM PHOSPHATE-VITAMIN D3 ORAL) Take 1 tablet by mouth 2 (two) times daily.       carBAMazepine (TEGRETOL) 200 mg tablet 1 tablet with breakfast  1 tablet with lunch   1.5 tablet at bedtime       ferrous sulfate 325 (65 FE) MG EC tablet Take 65 mg by mouth 2 (two) times daily with meals.       hydrocortisone 2.5 % cream Apply topically 2 (two) times daily. 28 g 1     hydrOXYzine HCL (ATARAX) 25 MG tablet Take 1 tablet (25 mg total) by mouth 3 (three) times daily as needed for Itching. 90 tablet 1     levETIRAcetam (KEPPRA) 500 MG Tab Take 1 tablet by mouth 2 (two) times daily.       zonisamide (ZONEGRAN) 100 MG Cap Take 200 mg by mouth 2 (two) times daily.                                  .          "

## 2024-10-24 NOTE — ED PROVIDER NOTES
SCRIBE #1 NOTE: I, Lucy Jean, am scribing for, and in the presence of, Zoran Joe MD. I have scribed the entire note.     SCRIBE #2 NOTE: I, Vanesa Ba, am scribing for, and in the presence of,  Harley Ward MD. I have scribed the remaining portions of the note not scribed by Scribe #1.      History     Chief Complaint   Patient presents with    Loss of Consciousness     Per ems pt had a syncopal episode while doing PT this morning. Pt did not fall or injure herself. Pt normally has a GCS of 15 but is currently confused.      Review of patient's allergies indicates:  No Known Allergies      History of Present Illness     HPI    Limited HPI and ROS due to change in mental status.    10/24/2024, 12:32 PM  History obtained from EMS      History of Present Illness: Cheryl Kirkland is a 74 y.o. female patient with a PMHx of DVT, epilepsy, ovarian cancer, HLD, HTN, anemia, and GERD who presents to the Emergency Department for evaluation of a syncopal episode which onset gradually while doing PT this morning. Pt did not fall or injure herself. Pt normally has a GCS of 15 but presents with confusion. Symptoms are constant and moderate in severity. No mitigating or exacerbating factors reported. No prior Tx. No further complaints or concerns at this time.       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    OP (osteoporosis)  02/28/2024    Ovarian cancer     Primary hypertension 12/01/2022       Past Surgical History:  Past Surgical History:   Procedure Laterality Date    ABDOMINAL WASHOUT N/A 3/14/2024    Procedure: LAVAGE, PERITONEAL, THERAPEUTIC;  Surgeon: Chen Jerome MD;  Location: [facility] OR;  Service: General;  Laterality: N/A;    APPENDECTOMY  2015    BIOPSY OF PERITONEUM N/A 3/14/2024    Procedure: BIOPSY, PERITONEUM;  Surgeon: Chen Jerome MD;  Location: [facility] OR;  Service: General;  Laterality: N/A;    COLONOSCOPY  06/20/2012    Dr Boyle- Tubular adenoma polyps. Repeat in 3 years.    COLONOSCOPY  06/17/2015    -Nodular mucosa at appendiceal orfice, sigmoid and desc diverticulosis otherwise normal.    COLONOSCOPY  2020    >3 TAs    COLONOSCOPY  12/2023    CYSTOSCOPY W/ RETROGRADES Right 10/10/2024    Procedure: CYSTOSCOPY, WITH RETROGRADE PYELOGRAM;  Surgeon: Lata Kelly MD;  Location: [facility] OR;  Service: Urology;  Laterality: Right;    DIAGNOSTIC LAPAROSCOPY N/A 3/14/2024    Procedure: LAPAROSCOPY, DIAGNOSTIC;  Surgeon: Chen Jerome MD;  Location: [facility];  Service: General;  Laterality: N/A;  1. Diagnostic laparoscopy   2. Extensive lysis of adhesions  3. Peritoneal biopsy   4. Peritoneal washings for cytology    DIAGNOSTIC LAPAROSCOPY N/A 8/20/2024    Procedure: LAPAROSCOPY, DIAGNOSTIC;  Surgeon: Chen Jerome MD;  Location: [facility] OR;  Service: General;  Laterality: N/A;    EGD - EXTERNAL RESULT  06/20/2012    Dr Boyle- Mild gastritis otherwise normal.    ENDOSCOPIC ULTRASOUND OF UPPER GASTROINTESTINAL TRACT N/A 7/26/2024    Procedure: ULTRASOUND, UPPER GI TRACT, ENDOSCOPIC;  Surgeon: Valdo Aguilera MD;  Location: [facility] ENDO;  Service: Endoscopy;  Laterality: N/A;  7/22/24: instructions sent via portal-. Pt no longer takling eliquis-GD    ESOPHAGOGASTRODUODENOSCOPY N/A 02/08/2024    Procedure: EGD (ESOPHAGOGASTRODUODENOSCOPY);  Surgeon: Jayla Arteaga MD;  Location: [facility]  ENDO;  Service: Endoscopy;  Laterality: N/A;    ESOPHAGOGASTRODUODENOSCOPY N/A 8/20/2024    Procedure: EGD (ESOPHAGOGASTRODUODENOSCOPY);  Surgeon: Chen Jerome MD;  Location: [facility] OR;  Service: General;  Laterality: N/A;    GASTRECTOMY N/A 8/20/2024    Procedure: GASTRECTOMY;  Surgeon: Chen Jerome MD;  Location: [facility] OR;  Service: General;  Laterality: N/A;    HYSTERECTOMY      LAPAROSCOPIC LYSIS OF ADHESIONS N/A 3/14/2024    Procedure: LYSIS, ADHESIONS, LAPAROSCOPIC;  Surgeon: Chen Jerome MD;  Location: [facility] OR;  Service: General;  Laterality: N/A;    LYSIS OF ADHESIONS N/A 8/20/2024    Procedure: LYSIS, ADHESIONS;  Surgeon: Chen Jerome MD;  Location: [facility] OR;  Service: General;  Laterality: N/A;    PLACEMENT OF JEJUNOSTOMY TUBE N/A 8/20/2024    Procedure: INSERTION, JEJUNOSTOMY TUBE;  Surgeon: Chen Jerome MD;  Location: [facility] OR;  Service: General;  Laterality: N/A;    TOTAL ABDOMINAL HYSTERECTOMY W/ BILATERAL SALPINGOOPHORECTOMY  01/09/2023    radical resection with right salpingo-oophorectomy, left salpingo-oophorectomy, extensive enterolysis, extensive adhesiolysis, left pelvic lymph node biopsy, omental biopsy, small bowel resection with primary functional end-to-end anastomosis, placement of pelvic drain         Family History:  Family History   Problem Relation Name Age of Onset    Pancreatic cancer Brother Misael Mccray 76    Prostate cancer Brother Zachary 67    Pancreatic cancer Half-brother Gasper 74    Colon cancer Half-sister Elton 83    Lung cancer Half-sister Elton         +smoking hx    Breast cancer Half-sister Vidhi 58    Lymphoma Other Cara         Corinne    Prostate cancer Other Fernando     Prostate cancer Other Gasper Jr     Ovarian cancer Other Inerris     Breast cancer Other Aleida     Colon cancer Other Aleida        Social History:  Social History     Tobacco Use    Smoking status: Former     Types: Cigarettes    Smokeless tobacco: Never    Tobacco  "comments:     Quit 1989 smoked 10 years smoked 0.25 ppd    Substance and Sexual Activity    Alcohol use: Not Currently    Drug use: Never    Sexual activity: Not on file        Review of Systems     Review of Systems   Reason unable to perform ROS: change in mental status.   Neurological:  Positive for syncope.   Psychiatric/Behavioral:  Positive for confusion.         Physical Exam     Initial Vitals [10/24/24 1225]   BP Pulse Resp Temp SpO2   (!) 104/41 79 18 98.5 °F (36.9 °C) 100 %      MAP       --          Physical Exam  Nursing Notes and Vital Signs Reviewed.  Constitutional: Patient is in no acute distress. Elderly. Frail.  Head: Atraumatic. Normocephalic.  Eyes: PERRL. EOM intact. Conjunctivae are not pale. No scleral icterus.  ENT: Mucous membranes are moist. Oropharynx is clear and symmetric.    Neck: Supple. Full ROM. No lymphadenopathy.  Cardiovascular: Regular rate. Regular rhythm. No murmurs, rubs, or gallops. Distal pulses are 2+ and symmetric.  Pulmonary/Chest: No respiratory distress. Clear to auscultation bilaterally. No wheezing or rales.  Abdominal: Soft and non-distended.  There is no tenderness.  No rebound, guarding, or rigidity. Good bowel sounds.  Genitourinary: No CVA tenderness.  Musculoskeletal: Moves all extremities. No obvious deformities. No edema. No calf tenderness.  Skin: Warm and dry.  Neurological:  Alert, awake, and appropriate.  Normal speech.  No acute focal neurological deficits are appreciated.  Psychiatric: Normal affect. Good eye contact. Appropriate in content.     ED Course   Procedures  ED Vital Signs:  Vitals:    10/24/24 1225 10/24/24 1332 10/24/24 1606 10/24/24 1915   BP: (!) 104/41 (!) 102/56 (!) 99/55 111/62   Pulse: 79 78 75 78   Resp: 18   18   Temp: 98.5 °F (36.9 °C)   98.3 °F (36.8 °C)   TempSrc: Oral   Oral   SpO2: 100% 95% 100% 98%   Weight: 46.3 kg (102 lb)      Height: 5' 5" (1.651 m)       10/24/24 2047 10/24/24 2121 10/24/24 2124   BP: (!) 110/58 (!) " 118/57    Pulse: 78 80 90   Resp: 18 18    Temp: 98.4 °F (36.9 °C) 97.6 °F (36.4 °C)    TempSrc:      SpO2: 98% 99%    Weight:      Height:          Abnormal Lab Results:  Labs Reviewed   CBC W/ AUTO DIFFERENTIAL - Abnormal       Result Value    WBC 10.52      RBC 3.20 (*)     Hemoglobin 10.1 (*)     Hematocrit 30.4 (*)     MCV 95      MCH 31.6 (*)     MCHC 33.2      RDW 18.9 (*)     Platelets 559 (*)     MPV 8.9 (*)     Immature Granulocytes 2.0 (*)     Gran # (ANC) 8.4 (*)     Immature Grans (Abs) 0.21 (*)     Lymph # 0.8 (*)     Mono # 1.0      Eos # 0.0      Baso # 0.05      nRBC 0      Gran % 79.5 (*)     Lymph % 7.9 (*)     Mono % 9.9      Eosinophil % 0.2      Basophil % 0.5      Differential Method Automated     URINALYSIS, REFLEX TO URINE CULTURE - Abnormal    Specimen UA Urine, Clean Catch      Color, UA Brown (*)     Appearance, UA Cloudy (*)     pH, UA 6.0      Specific Gravity, UA 1.025      Protein, UA 2+ (*)     Glucose, UA Negative      Ketones, UA Negative      Bilirubin (UA) 1+ (*)     Occult Blood UA 3+ (*)     Nitrite, UA Positive (*)     Urobilinogen, UA Negative      Leukocytes, UA Trace (*)     Narrative:     Specimen Source->Urine   URINALYSIS MICROSCOPIC - Abnormal    RBC, UA >100 (*)     WBC, UA 9 (*)     Bacteria Occasional      Hyaline Casts, UA 0      Microscopic Comment SEE COMMENT      Narrative:     Specimen Source->Urine   COMPREHENSIVE METABOLIC PANEL - Abnormal    Sodium 141      Potassium 2.9 (*)     Chloride 113 (*)     CO2 19 (*)     Glucose 115 (*)     BUN 11      Creatinine 0.7      Calcium 7.2 (*)     Total Protein 5.0 (*)     Albumin 1.8 (*)     Total Bilirubin 0.3      Alkaline Phosphatase 174 (*)     AST 12      ALT 17      eGFR >60      Anion Gap 9     MAGNESIUM - Abnormal    Magnesium 1.5 (*)    CK    CPK 25     B-TYPE NATRIURETIC PEPTIDE    BNP 29     TROPONIN I   MAGNESIUM   TROPONIN I    Troponin I <0.006          All Lab Results:  Results for orders placed or  performed during the hospital encounter of 10/24/24   EKG 12-lead    Collection Time: 10/24/24 12:46 PM   Result Value Ref Range    QRS Duration 70 ms    [facility] QTC Calculation 434 ms   CBC Auto Differential    Collection Time: 10/24/24 12:47 PM   Result Value Ref Range    WBC 10.52 3.90 - 12.70 K/uL    RBC 3.20 (L) 4.00 - 5.40 M/uL    Hemoglobin 10.1 (L) 12.0 - 16.0 g/dL    Hematocrit 30.4 (L) 37.0 - 48.5 %    MCV 95 82 - 98 fL    MCH 31.6 (H) 27.0 - 31.0 pg    MCHC 33.2 32.0 - 36.0 g/dL    RDW 18.9 (H) 11.5 - 14.5 %    Platelets 559 (H) 150 - 450 K/uL    MPV 8.9 (L) 9.2 - 12.9 fL    Immature Granulocytes 2.0 (H) 0.0 - 0.5 %    Gran # (ANC) 8.4 (H) 1.8 - 7.7 K/uL    Immature Grans (Abs) 0.21 (H) 0.00 - 0.04 K/uL    Lymph # 0.8 (L) 1.0 - 4.8 K/uL    Mono # 1.0 0.3 - 1.0 K/uL    Eos # 0.0 0.0 - 0.5 K/uL    Baso # 0.05 0.00 - 0.20 K/uL    nRBC 0 0 /100 WBC    Gran % 79.5 (H) 38.0 - 73.0 %    Lymph % 7.9 (L) 18.0 - 48.0 %    Mono % 9.9 4.0 - 15.0 %    Eosinophil % 0.2 0.0 - 8.0 %    Basophil % 0.5 0.0 - 1.9 %    Differential Method Automated    Urinalysis, Reflex to Urine Culture Urine, Clean Catch    Collection Time: 10/24/24 12:51 PM    Specimen: Urine   Result Value Ref Range    Specimen UA Urine, Clean Catch     Color, UA Brown (A) Yellow, Straw, Marina    Appearance, UA Cloudy (A) Clear    pH, UA 6.0 5.0 - 8.0    Specific Gravity, UA 1.025 1.005 - 1.030    Protein, UA 2+ (A) Negative    Glucose, UA Negative Negative    Ketones, UA Negative Negative    Bilirubin (UA) 1+ (A) Negative    Occult Blood UA 3+ (A) Negative    Nitrite, UA Positive (A) Negative    Urobilinogen, UA Negative <2.0 EU/dL    Leukocytes, UA Trace (A) Negative   Urinalysis Microscopic    Collection Time: 10/24/24 12:51 PM   Result Value Ref Range    RBC, UA >100 (H) 0 - 4 /hpf    WBC, UA 9 (H) 0 - 5 /hpf    Bacteria Occasional None-Occ /hpf    Hyaline Casts, UA 0 0-1/lpf /lpf    Microscopic Comment SEE COMMENT    Comprehensive metabolic panel     Collection Time: 10/24/24  4:58 PM   Result Value Ref Range    Sodium 141 136 - 145 mmol/L    Potassium 2.9 (L) 3.5 - 5.1 mmol/L    Chloride 113 (H) 95 - 110 mmol/L    CO2 19 (L) 23 - 29 mmol/L    Glucose 115 (H) 70 - 110 mg/dL    BUN 11 8 - 23 mg/dL    Creatinine 0.7 0.5 - 1.4 mg/dL    Calcium 7.2 (L) 8.7 - 10.5 mg/dL    Total Protein 5.0 (L) 6.0 - 8.4 g/dL    Albumin 1.8 (L) 3.5 - 5.2 g/dL    Total Bilirubin 0.3 0.1 - 1.0 mg/dL    Alkaline Phosphatase 174 (H) 40 - 150 U/L    AST 12 10 - 40 U/L    ALT 17 10 - 44 U/L    eGFR >60 >60 mL/min/1.73 m^2    Anion Gap 9 8 - 16 mmol/L   CPK    Collection Time: 10/24/24  4:58 PM   Result Value Ref Range    CPK 25 20 - 180 U/L   Brain natriuretic peptide    Collection Time: 10/24/24  4:58 PM   Result Value Ref Range    BNP 29 0 - 99 pg/mL   Troponin I    Collection Time: 10/24/24  4:58 PM   Result Value Ref Range    Troponin I <0.006 0.000 - 0.026 ng/mL   Magnesium    Collection Time: 10/24/24  4:58 PM   Result Value Ref Range    Magnesium 1.5 (L) 1.6 - 2.6 mg/dL   Troponin I    Collection Time: 10/24/24 10:24 PM   Result Value Ref Range    Troponin I <0.006 0.000 - 0.026 ng/mL         Imaging Results:  Imaging Results              CT Head Without Contrast (Final result)  Result time 10/24/24 19:48:48      Final result by Rama Paris MD (10/24/24 19:48:48)                   Impression:      No acute abnormality.      Electronically signed by: Rama Paris  Date:    10/24/2024  Time:    19:48               Narrative:    EXAMINATION:  CT HEAD WITHOUT CONTRAST    CLINICAL HISTORY:  Syncope, recurrent;    TECHNIQUE:  Low dose axial CT images obtained throughout the head without intravenous contrast. Sagittal and coronal reconstructions were performed.    COMPARISON:  None.    FINDINGS:  Intracranial compartment:    Ventricles and sulci are normal in size for age without evidence of hydrocephalus. No extra-axial blood or fluid collections.    30No parenchymal mass,  hemorrhage, edema or major vascular distribution infarct.    Skull/extracranial contents (limited evaluation): No fracture. Mastoid air cells and paranasal sinuses are essentially clear.                                       X-Ray Chest AP Portable (Final result)  Result time 10/24/24 14:28:23      Final result by Caden Valdez MD (10/24/24 14:28:23)                   Impression:      No acute abnormality.      Electronically signed by: Caden Valdez  Date:    10/24/2024  Time:    14:28               Narrative:    EXAMINATION:  XR CHEST AP PORTABLE    CLINICAL HISTORY:  syncope;    TECHNIQUE:  Single frontal view of the chest was performed.    COMPARISON:  Multiple    FINDINGS:  The lungs are clear, with normal appearance of pulmonary vasculature and no pleural effusion or pneumothorax.    The cardiac silhouette is normal in size. The hilar and mediastinal contours are unremarkable.    Bones are intact.                                       The EKG was ordered, reviewed, and independently interpreted by the ED provider.  Interpretation time: 12:46  Rate: 76 BPM  Rhythm: normal sinus rhythm  Interpretation: Nonspecific ST and T wave abnormality. No STEMI.             The Emergency Provider reviewed the vital signs and test results, which are outlined above.     ED Discussion     4:00 PM: Dr. Joe transfers care of patient to Dr. Ward pending imaging results.    8:00 PM: Discussed case with Dr. Tasha Vides ([facility]). Dr. Vides agrees with current care and management of pt and accepts admission.   Admitting Service: [facility]  Admitting Physician: Dr. Vides  Admit to: Inpatient/Tele    8:00 PM: Re-evaluated pt. I have discussed test results, shared treatment plan, and the need for admission with patient and family at bedside. Pt and family express understanding at this time and agree with all information. All questions answered. Pt and family have no further questions or concerns at this  time. Pt is ready for admit.         Medical Decision Making  Patient initially seen by another ED provider.  Care handed off to me with workup pending    74 y.o. F with syncope today. potassium 2.9. mag 1.5. confused with UTI too.  Potassium, magnesium, Rocephin given.  Patient admitted to Medicine    Amount and/or Complexity of Data Reviewed  External Data Reviewed: notes.     Details: Past medical history, medications, allergies reviewed  Labs: ordered. Decision-making details documented in ED Course.  Radiology: ordered. Decision-making details documented in ED Course.  ECG/medicine tests: ordered and independent interpretation performed. Decision-making details documented in ED Course.    Risk  Prescription drug management.  Decision regarding hospitalization.                ED Medication(s):  Medications   electrolytes-dextrose (Pedialyte) oral solution 400 mL (400 mLs Per NG tube Given 10/24/24 2258)   sodium chloride 0.9% flush 10 mL (has no administration in time range)   ondansetron injection 4 mg (has no administration in time range)   naloxone 0.4 mg/mL injection 0.02 mg (has no administration in time range)   glucose chewable tablet 16 g (has no administration in time range)   glucose chewable tablet 24 g (has no administration in time range)   glucagon (human recombinant) injection 1 mg (has no administration in time range)   dextrose 10% bolus 125 mL 125 mL (has no administration in time range)   dextrose 10% bolus 250 mL 250 mL (has no administration in time range)   apixaban tablet 5 mg (5 mg Per J Tube Given 10/24/24 2252)   calcium-vitamin D3 500 mg-5 mcg (200 unit) per tablet 1 tablet (1 tablet Per J Tube Given 10/24/24 2252)   acetaminophen tablet 1,000 mg (has no administration in time range)   carBAMazepine tablet 200 mg (has no administration in time range)   ferrous sulfate tablet 1 each (1 each Per J Tube Given 10/24/24 2252)   levETIRAcetam tablet 500 mg (500 mg Oral Given 10/24/24 2252)    zonisamide capsule 200 mg (200 mg Oral Given 10/24/24 2252)   cefTRIAXone injection 1 g (has no administration in time range)   cefTRIAXone injection 1 g (1 g Intravenous Given 10/24/24 1327)   potassium chloride SA CR tablet 40 mEq (40 mEq Oral Given 10/24/24 1956)   magnesium sulfate 2g in water 50mL IVPB (premix) (0 g Intravenous Stopped 10/24/24 2219)       Current Discharge Medication List                  Scribe Attestation:   Scribe #1: I performed the above scribed service and the documentation accurately describes the services I performed. I attest to the accuracy of the note.     Attending:   Physician Attestation Statement for Scribe #1: I, Zoran Joe MD, personally performed the services described in this documentation, as scribed by Lucy Jean, in my presence, and it is both accurate and complete.       Scribe Attestation:   Scribe #2: I performed the above scribed service and the documentation accurately describes the services I performed. I attest to the accuracy of the note.    Attending Attestation:           Physician Attestation for Scribe:    Physician Attestation Statement for Scribe #2: I, Harley Ward MD, reviewed documentation, as scribed by Vanesa Ba in my presence, and it is both accurate and complete. I also acknowledge and confirm the content of the note done by Scribe #1.           Clinical Impression       ICD-10-CM ICD-9-CM   1. Anemia, unspecified type  D64.9 285.9   2. Syncope  R55 780.2   3. Urinary tract infection with hematuria, site unspecified  N39.0 599.0    R31.9 599.70   4. Hypokalemia  E87.6 276.8   5. Hypomagnesemia  E83.42 275.2       Disposition:   Disposition: Admitted  Condition: Stable         Harley Ward MD  10/24/24 9177

## 2024-10-25 LAB — TROPONIN I SERPL DL<=0.01 NG/ML-MCNC: <0.006 NG/ML (ref 0–0.03)

## 2024-10-25 PROCEDURE — 21400001 HC TELEMETRY ROOM

## 2024-10-25 PROCEDURE — 36415 COLL VENOUS BLD VENIPUNCTURE: CPT | Performed by: NURSE PRACTITIONER

## 2024-10-25 PROCEDURE — C1751 CATH, INF, PER/CENT/MIDLINE: HCPCS

## 2024-10-25 PROCEDURE — 99223 1ST HOSP IP/OBS HIGH 75: CPT | Mod: ,,, | Performed by: UROLOGY

## 2024-10-25 PROCEDURE — 84484 ASSAY OF TROPONIN QUANT: CPT | Performed by: NURSE PRACTITIONER

## 2024-10-25 PROCEDURE — 25000003 PHARM REV CODE 250: Performed by: NURSE PRACTITIONER

## 2024-10-25 PROCEDURE — 25000003 PHARM REV CODE 250: Performed by: INTERNAL MEDICINE

## 2024-10-25 PROCEDURE — 63600175 PHARM REV CODE 636 W HCPCS: Performed by: NURSE PRACTITIONER

## 2024-10-25 PROCEDURE — 36410 VNPNXR 3YR/> PHY/QHP DX/THER: CPT

## 2024-10-25 RX ADMIN — CARBAMAZEPINE 200 MG: 200 TABLET ORAL at 09:10

## 2024-10-25 RX ADMIN — ZONISAMIDE 200 MG: 100 CAPSULE ORAL at 09:10

## 2024-10-25 RX ADMIN — Medication 1 TABLET: at 09:10

## 2024-10-25 RX ADMIN — Medication 400 ML: at 10:10

## 2024-10-25 RX ADMIN — FERROUS SULFATE TAB 325 MG (65 MG ELEMENTAL FE) 1 EACH: 325 (65 FE) TAB at 09:10

## 2024-10-25 RX ADMIN — ACETAMINOPHEN 1000 MG: 500 TABLET ORAL at 09:10

## 2024-10-25 RX ADMIN — Medication 400 ML: at 06:10

## 2024-10-25 RX ADMIN — CEFTRIAXONE 1 G: 1 INJECTION, POWDER, FOR SOLUTION INTRAMUSCULAR; INTRAVENOUS at 02:10

## 2024-10-25 RX ADMIN — LEVETIRACETAM 500 MG: 500 TABLET, FILM COATED ORAL at 09:10

## 2024-10-25 RX ADMIN — Medication 400 ML: at 02:10

## 2024-10-25 RX ADMIN — APIXABAN 5 MG: 2.5 TABLET, FILM COATED ORAL at 09:10

## 2024-10-25 RX ADMIN — POTASSIUM BICARBONATE 50 MEQ: 978 TABLET, EFFERVESCENT ORAL at 06:10

## 2024-10-25 RX ADMIN — CARBAMAZEPINE 200 MG: 200 TABLET ORAL at 02:10

## 2024-10-25 RX ADMIN — ACETAMINOPHEN 1000 MG: 500 TABLET ORAL at 06:10

## 2024-10-25 RX ADMIN — ACETAMINOPHEN 1000 MG: 500 TABLET ORAL at 02:10

## 2024-10-25 NOTE — CONSULTS
Urology Consult    Consulting Physician: Rachel SOTO    Reason for consultation:  Right ureteral stone, nephrostomy tube    Chief Complaint:  Syncope    HPI:    Cheryl Kirkland is a 74 y.o. female who presented to the emergency after having a syncopal episode.  During her evaluation she was noted to have significant hematuria from a right nephrostomy tube.  She has a history of gastric adenocarcinoma status post gastrectomy and getting treatment with Keytruda.  She also has a history of ovarian cancer in the past.  During her last hospitalization she was diagnosed with a 8 mm right distal ureteral stone.  Attempts at placing retrograde stent was unsuccessful.  Patient had a right nephrostomy tube placed.  She was discharged with outpatient follow up.  Unfortunately patient developed lower extremity extensive DVTs and has been on anticoagulation.    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 Repeat in 3 years.    COLONOSCOPY  06/17/2015    -Nodular mucosa at appendiceal orfice, sigmoid and desc diverticulosis otherwise normal.    COLONOSCOPY  2020    >3 TAs    COLONOSCOPY  12/2023    CYSTOSCOPY W/ RETROGRADES Right 10/10/2024    Procedure: CYSTOSCOPY, WITH RETROGRADE PYELOGRAM;  Surgeon: Lata Kelly MD;  Location: [facility] OR;  Service: Urology;  Laterality: Right;    DIAGNOSTIC LAPAROSCOPY N/A 3/14/2024    Procedure: LAPAROSCOPY, DIAGNOSTIC;  Surgeon: Chen Jerome MD;  Location: [facility] OR;  Service: General;  Laterality: N/A;  1. Diagnostic laparoscopy   2. Extensive lysis of adhesions  3. Peritoneal biopsy   4. Peritoneal washings for cytology    DIAGNOSTIC LAPAROSCOPY N/A 8/20/2024    Procedure: LAPAROSCOPY, DIAGNOSTIC;  Surgeon: Chen Jerome MD;  Location: [facility] OR;  Service: General;  Laterality: N/A;    EGD - EXTERNAL RESULT  06/20/2012    Dr Boyle- Mild gastritis otherwise normal.    ENDOSCOPIC ULTRASOUND OF UPPER GASTROINTESTINAL TRACT N/A 7/26/2024    Procedure: ULTRASOUND, UPPER GI TRACT, ENDOSCOPIC;  Surgeon: Valdo Aguilera MD;  Location: [facility];  Service: Endoscopy;  Laterality: N/A;  7/22/24: instructions sent via portal-. Pt no longer takling eliquis-GD    ESOPHAGOGASTRODUODENOSCOPY N/A 02/08/2024    Procedure: EGD (ESOPHAGOGASTRODUODENOSCOPY);  Surgeon: Jayla Arteaga MD;  Location: [facility] ENDO;  Service: Endoscopy;  Laterality: N/A;    ESOPHAGOGASTRODUODENOSCOPY N/A 8/20/2024    Procedure: EGD (ESOPHAGOGASTRODUODENOSCOPY);  Surgeon: Chen Jerome MD;  Location: [facility] OR;  Service: General;  Laterality: N/A;    GASTRECTOMY N/A 8/20/2024    Procedure: GASTRECTOMY;  Surgeon: Chen Jerome MD;  Location: [facility] OR;  Service: General;  Laterality: N/A;    HYSTERECTOMY      LAPAROSCOPIC LYSIS OF ADHESIONS N/A 3/14/2024    Procedure: LYSIS, ADHESIONS, LAPAROSCOPIC;  Surgeon: Chen Jerome MD;   Location: [facility] OR;  Service: General;  Laterality: N/A;    LYSIS OF ADHESIONS N/A 8/20/2024    Procedure: LYSIS, ADHESIONS;  Surgeon: Chen Jerome MD;  Location: [facility] OR;  Service: General;  Laterality: N/A;    PLACEMENT OF JEJUNOSTOMY TUBE N/A 8/20/2024    Procedure: INSERTION, JEJUNOSTOMY TUBE;  Surgeon: Chen Jerome MD;  Location: [facility] OR;  Service: General;  Laterality: N/A;    TOTAL ABDOMINAL HYSTERECTOMY W/ BILATERAL SALPINGOOPHORECTOMY  01/09/2023    radical resection with right salpingo-oophorectomy, left salpingo-oophorectomy, extensive enterolysis, extensive adhesiolysis, left pelvic lymph node biopsy, omental biopsy, small bowel resection with primary functional end-to-end anastomosis, placement of pelvic drain        Social History     Socioeconomic History    Marital status:    Tobacco Use    Smoking status: Former     Types: Cigarettes    Smokeless tobacco: Never    Tobacco comments:     Quit 1989 smoked 10 years smoked 0.25 ppd    Substance and Sexual Activity    Alcohol use: Not Currently    Drug use: Never     Social Drivers of Health     Financial Resource Strain: Low Risk  (10/25/2024)    Overall Financial Resource Strain (CARDIA)     Difficulty of Paying Living Expenses: Not hard at all   Food Insecurity: No Food Insecurity (10/25/2024)    Hunger Vital Sign     Worried About Running Out of Food in the Last Year: Never true     Ran Out of Food in the Last Year: Never true   Transportation Needs: No Transportation Needs (10/25/2024)    TRANSPORTATION NEEDS     Transportation : No   Physical Activity: Inactive (2/23/2024)    Exercise Vital Sign     Days of Exercise per Week: 0 days     Minutes of Exercise per Session: 10 min   Stress: No Stress Concern Present (10/25/2024)    [employer] - Occupational Stress Questionnaire     Feeling of Stress : Not at all   Housing Stability: Low Risk  (10/25/2024)    Housing Stability Vital Sign     Unable to Pay for  Housing in the Last Year: No     Homeless in the Last Year: No        Family History   Problem Relation Name Age of Onset    Pancreatic cancer Brother Misael Mccray 76    Prostate cancer Brother Zachary 67    Pancreatic cancer Half-brother Gasper 74    Colon cancer Half-sister Elton 83    Lung cancer Half-sister Elton         +smoking hx    Breast cancer Half-sister Vidhi 58    Lymphoma Other Cara Sun    Prostate cancer Other Fernando     Prostate cancer Other Gasper Calderon     Ovarian cancer Other Inerris     Breast cancer Other Aleida     Colon cancer Other Aleida         Review of patient's allergies indicates:  No Known Allergies    Medications:      No current facility-administered medications on file prior to encounter.     Current Outpatient Medications on File Prior to Encounter   Medication Sig Dispense Refill    acetaminophen (TYLENOL) 500 MG tablet Take 2 tablets (1,000 mg total) by mouth every 8 (eight) hours. (Patient taking differently: Take 1,000 mg by mouth every 8 (eight) hours as needed for Pain.)  0    alendronate (FOSAMAX) 70 MG tablet Take 70 mg by mouth every 7 days. (Sundays)      apixaban (ELIQUIS) 5 mg Tab Take 2 tablets (10 mg total) by mouth 2 (two) times daily for 3 days THEN take 1 tablet by mouth twice a day 372 tablet 0    calcium phosphate trib/vit D3 (CALCIUM PHOSPHATE-VITAMIN D3 ORAL) Take 1 tablet by mouth 2 (two) times daily.      carBAMazepine (TEGRETOL) 200 mg tablet 1 tablet with breakfast  1 tablet with lunch   1.5 tablet at bedtime      ferrous sulfate 325 (65 FE) MG EC tablet Take 65 mg by mouth 2 (two) times daily with meals.      hydrocortisone 2.5 % cream Apply topically 2 (two) times daily. 28 g 1    hydrOXYzine HCL (ATARAX) 25 MG tablet Take 1 tablet (25 mg total) by mouth 3 (three) times daily as needed for Itching. 90 tablet 1    levETIRAcetam (KEPPRA) 500 MG Tab Take 1 tablet by mouth 2 (two) times daily.      zonisamide (ZONEGRAN) 100 MG Cap Take 200 mg  "by mouth 2 (two) times daily.         VITALS (Last 24H):  Temp:  [97.6 °F (36.4 °C)-98.4 °F (36.9 °C)]   Pulse:  [68-90]   Resp:  [17-19]   BP: ()/(54-62)   SpO2:  [97 %-100 %]     INTAKE & OUTPUT (Last 24H):    Intake/Output Summary (Last 24 hours) at 10/25/2024 1428  Last data filed at 10/25/2024 1415  Gross per 24 hour   Intake 1371 ml   Output 353 ml   Net 1018 ml         PHYSICAL EXAM:    No acute distress alert and oriented   Respirations even unlabored   Abdomen is soft nontender   Right percutaneous nephrostomy tube in place with dressing intact.  Hematuric drainage from the tube.  Significant edema of the right lower extremity    Laboratory Data:  Reviewed and noted in plan where applicable. Please see chart for full laboratory data.    Recent Labs   Lab 10/24/24  1658 10/24/24  2224 10/25/24  0331   CPK 25  --   --    TROPONINI <0.006   < > <0.006    < > = values in this interval not displayed.    No results for input(s): "POCTGLUCOSE" in the last 24 hours.     Lab Results   Component Value Date    INR 1.2 10/09/2024    INR 1.0 02/07/2024       Lab Results   Component Value Date    WBC 10.52 10/24/2024    HGB 10.1 (L) 10/24/2024    HCT 30.4 (L) 10/24/2024    MCV 95 10/24/2024     (H) 10/24/2024       BMP  Recent Labs   Lab 10/24/24  2224         K 3.2*   *   CO2 19*   BUN 11   CREATININE 0.7   CALCIUM 7.8*   MG 2.4         Radiology:  Reviewed and noted in plan where applicable. Please see chart for full radiology data.  CT Head Without Contrast  Narrative: EXAMINATION:  CT HEAD WITHOUT CONTRAST    CLINICAL HISTORY:  Syncope, recurrent;    TECHNIQUE:  Low dose axial CT images obtained throughout the head without intravenous contrast. Sagittal and coronal reconstructions were performed.    COMPARISON:  None.    FINDINGS:  Intracranial compartment:    Ventricles and sulci are normal in size for age without evidence of hydrocephalus. No extra-axial blood or fluid " collections.    30No parenchymal mass, hemorrhage, edema or major vascular distribution infarct.    Skull/extracranial contents (limited evaluation): No fracture. Mastoid air cells and paranasal sinuses are essentially clear.  Impression: No acute abnormality.    Electronically signed by: Rama Paris  Date:    10/24/2024  Time:    19:48  X-Ray Chest AP Portable  Narrative: EXAMINATION:  XR CHEST AP PORTABLE    CLINICAL HISTORY:  syncope;    TECHNIQUE:  Single frontal view of the chest was performed.    COMPARISON:  Multiple    FINDINGS:  The lungs are clear, with normal appearance of pulmonary vasculature and no pleural effusion or pneumothorax.    The cardiac silhouette is normal in size. The hilar and mediastinal contours are unremarkable.    Bones are intact.  Impression: No acute abnormality.    Electronically signed by: Caden Valdez  Date:    10/24/2024  Time:    14:28       ASSESSMENT/PLAN:   Right ureteral calculus.  Impacted ureteral stone may require treatment with ESWL once patient can safely be off of anticoagulation for 5-7 days.( 3 days prior and 2 days post op).  Discussed if patient is not cleared to temporarily come off anticoagulation within 3 months the nephrostomy tube may need to be exchanged. Schedule outpatient follow up with Dr. Kelly.     Hematuria.  Suspect combination nephrostomy tube and anticoagulation causing hematuria.  The tube appears to be draining.  We will verify position with a KUB.    UTI?  Patient was being covered with antibiotic therapy.  Urinalysis did show nitrite positivity but may be secondary to gross hematuria.  Follow up urine cultures.

## 2024-10-25 NOTE — HOSPITAL COURSE
Patient is a 74 year old female admitted to the hospital for c/o syncopal episode during PT at NH.   Labs: WBC 7.33, RBC 2.72, hemoglobin 8.6, hematocrit 25.0, platelets 488, Potassium 2.5,   -UA- with cloudy brown urine positive for nitrite, trace leukocyte esterase, greater than 100 RBC, 9 WBC.   -IV abx: Rocephin   -CT head without contrast done with no acute abnormality.   -Hematology consulted due to patient being on eliquis for anticoagulation for DVT but will need needs ESWL for an impacted ureteral stone and removal of nephrostomy tube per urology. Patient is high risk to be off of anticoagulation for urology procedure but due to ureteral stone which is impacting that needs to be removed may be able to bridge patient with Lovenox during procedure. If urology would be uncomfortable with this plan would recommend filter placement prior to procedure per Dr. Lee.   -Urology consulted as patient was reporting pain at PCN site. Impacted ureteral stone may require treatment with ESWL once patient can safely be off of . Discussed if patient is not cleared to temporarily come off anticoagulation within 3 months the nephrostomy tube may need to be exchanged. Nephrostogram and stent order placed by urology. Patient transitioned to therapeutic 1mg/kg lovenox from eliquis for possible procedure   -Pt/Ot to eval and treat: recommend SNF   -EKG: Normal sinus rhythm   -Patient required placement of PICC line as phlebotomy and nursing staff were unable to obtain labs.     10/28/24  NAEON, patient resting in bed,  at bedside  Plans for IVC filter placement, currently on Lovenox 1 mg/kg BID  Urology with plans for ESWL pending    10/29/24  NAEON, plans for IVC filter tomorrow  On Lovenox, hold evening dose, NPO after midnight    10/30/24  NAEON, no new issues  IVC filter and right PCN tube change and antegrade ureteral stent    10/31/24  Patient awake, alert, answering questions appropriately   at bedside, on  phone  Updated plan of care  Plan for discharge to SNF tomorrow  F/U with Urology 11/18/24 11/1 - 11/3, 2024  ANGELAEON, no complaints  SNF insurance authorization pending  Restarted Eliquis  F/U with Urology in 1-2 weeks for further management    11/04/2024  Reports developing ankle pain over the weekend, cannot recall injury or trauma to region. PT/OT has been following, patient cooperated as much as she could tolerate with her current ankle pain. Will continue to monitor progress over the next 24 hours while SNF authorization is pending.     11/05/2024  Radiography of ankle shows no acute fracture. Topical therapies ordered for pain control. Counseled patient on doing her best with PT/OT as this would yield the fastest recovery of her ankle pain. Patient has consistently worked with PT/OT the last 2 days. Patient acknowledged understanding of this plan and agreed to continue to do so. For these reasons and patient's capabilities seen on recent PT evaluation, would think SNF is still needed for moderate intensity therapy but will continue to monitor progress with PT/OT.     11/06/2024  Dr. Gray completed P2P on 11/25/2024 as patient was medically stable for discharge but was initially denied for SNF.  Upon discussion with the medical director of his insurance company the patient was given authorization for skilled nursing facility and was transferred to [facility] on 11/06/2024 in stable condition.

## 2024-10-25 NOTE — ASSESSMENT & PLAN NOTE
Anemia is likely due to chronic blood loss and Iron deficiency. Most recent hemoglobin and hematocrit are listed below.  Recent Labs     10/24/24  1247   HGB 10.1*   HCT 30.4*       Plan  - Monitor serial CBC: Daily  - Transfuse PRBC if patient becomes hemodynamically unstable, symptomatic or H/H drops below 7/21.  - Patient has not received any PRBC transfusions to date

## 2024-10-25 NOTE — ASSESSMENT & PLAN NOTE
Patient's most recent potassium results are listed below.   Recent Labs     10/24/24  1658   K 2.9*     Plan  - Replete potassium per protocol  - Monitor potassium Daily  - Patient's hypokalemia is being treated, will recheck labs at midnight and again in AM  - Supplementing magnesium as well

## 2024-10-25 NOTE — PT/OT/SLP PROGRESS
"Occupational Therapy      Patient Name:  Cheryl Kirkland   MRN:  [id number]    OT eval initiated via chart review. Patient not seen today secondary to pt refusing participation in OT eval at this time. Pt appears agitated, stating "I am not ready to do anything. I'm not doing therapy. I am 74 years old, and I don't like cream of wheat."    Will follow-up as able.    10/25/2024  Mandy Wallace OT   0820  "

## 2024-10-25 NOTE — ASSESSMENT & PLAN NOTE
Syncopal episode while having physical therapy -- did not fall, no injuries  CT head did not show any acute abnormality  Troponin normal, EKG with no significant changes  Lab workup shows mild UTI - Rocephin given  Had recent Echo which was normal  Hydrating via feeding tube  Cardiac monitoring  Trending troponin level

## 2024-10-25 NOTE — ASSESSMENT & PLAN NOTE
Syncopal episode while having physical therapy -- did not fall, no injuries  CT head did not show any acute abnormality  Troponin normal, EKG with no significant changes  Lab workup shows mild UTI - Rocephin given  Had recent Echo which was normal  Hydrating via feeding tube  Cardiac monitoring  Trending troponin level- continued to be negative

## 2024-10-25 NOTE — ASSESSMENT & PLAN NOTE
Nutrition consulted. Most recent weight and BMI monitored- will continue tube feeding supplementation    Measurements:  Wt Readings from Last 1 Encounters:   10/24/24 46.3 kg (102 lb)   Body mass index is 16.97 kg/m².

## 2024-10-25 NOTE — ASSESSMENT & PLAN NOTE
Recent imaging with 8 mm obstructing/impacted stone on right side -- s/p cystoscopy and right nephrostomy tube placement

## 2024-10-25 NOTE — H&P
[facility][note 3] Medicine  History & Physical    Patient Name: Cheryl Kirkland  MRN: [id number]  Patient Class: IP- Inpatient  Admission Date: 10/24/2024  Attending Physician: No att. providers found   Primary Care Provider: Gagandeep Iglesias MD         Patient information was obtained from patient, past medical records, and ER records.     Subjective:     Principal Problem:Syncope    Chief Complaint:   Chief Complaint   Patient presents with    Loss of Consciousness     Per ems pt had a syncopal episode while doing PT this morning. Pt did not fall or injure herself. Pt normally has a GCS of 15 but is currently confused.         HPI:   Patient is a 74-year-old female with past medical history significant for hypertension, hyperlipidemia, DVT in right lower extremity, epilepsy, anemia, neuropathy, GERD, gastritis, polyps, gastric adenocarcinoma status post treatment with Keytruda (followed by Dr. Jerome and Dr. Ambrose) and total gastrectomy on 08/20/2024, jejunostomy tube on supplemental tube feedings, chronic diarrhea, ovarian cancer (s/p surgery/chemotherapy), osteoporosis and recent admission 10/9/24 through 10/21/2024 due to 8 mm right ureteral obstructing stone with right-sided hydronephrosis s/p cystoscopy and right nephrostomy tube placement, and acute cystitis with SHELLEY also found to have extensive DVT RLE treated with heparin drip transitioned to Eliquis, was discharged to SNF at [facility]. On 10/24, she was having physical therapy at SNF and had syncopal episode. There was no fall or injury, however she has been confused and does not remember what has been going on today.  initial blood pressure on arrival to /41, heart rate 79, temp 98.5°, respirations 18, 100% SpO2 on room air.  Lab workup demonstrate WBC 10.52 with left shift, RBC 3.20, hemoglobin 10.1, hematocrit 30.4, platelets 559, potassium 2.9, CO2 19, BUN 11, creatinine 0.7, glucose 115, calcium 7.2,  magnesium 1.5, alk phos 174, total protein 5.0, albumin 1.8, total bilirubin 0.3, AST 12, ALT 17, BNP 29, CPK 25, trop less than 0.006, UA- with cloudy brown urine positive for nitrite, trace leukocyte esterase, greater than 100 RBC, 9 WBC. EKG -- NSR HR 76, nonspecific ST and T wave abnormality, no STEMI. CXR demonstrates that lungs are clear. CT head without contrast done with no acute abnormality. Recent Echo done 10/10/24 with EF 65-70% and normal diastolic function. While in ED, she was given potassium 40 po and magnesium sulfate 2 g IV as well as IV Rocephin. Hospital Medicine was consulted for admission due to syncope with continued confusion, UTI, hypokalemia, and hypomagnesemia.     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 Repeat in 3 years.    COLONOSCOPY  06/17/2015    -Nodular mucosa at appendiceal orfice, sigmoid and desc diverticulosis  otherwise normal.    COLONOSCOPY  2020    >3 TAs    COLONOSCOPY  12/2023    CYSTOSCOPY W/ RETROGRADES Right 10/10/2024    Procedure: CYSTOSCOPY, WITH RETROGRADE PYELOGRAM;  Surgeon: Lata Kelly MD;  Location: [facility];  Service: Urology;  Laterality: Right;    DIAGNOSTIC LAPAROSCOPY N/A 3/14/2024    Procedure: LAPAROSCOPY, DIAGNOSTIC;  Surgeon: Chen Jerome MD;  Location: [facility];  Service: General;  Laterality: N/A;  1. Diagnostic laparoscopy   2. Extensive lysis of adhesions  3. Peritoneal biopsy   4. Peritoneal washings for cytology    DIAGNOSTIC LAPAROSCOPY N/A 8/20/2024    Procedure: LAPAROSCOPY, DIAGNOSTIC;  Surgeon: Chen Jerome MD;  Location: [facility] OR;  Service: General;  Laterality: N/A;    EGD - EXTERNAL RESULT  06/20/2012    Dr Boyle- Mild gastritis otherwise normal.    ENDOSCOPIC ULTRASOUND OF UPPER GASTROINTESTINAL TRACT N/A 7/26/2024    Procedure: ULTRASOUND, UPPER GI TRACT, ENDOSCOPIC;  Surgeon: Valdo Aguilera MD;  Location: [facility];  Service: Endoscopy;  Laterality: N/A;  7/22/24: instructions sent via portal-. Pt no longer takling eliquis-GD    ESOPHAGOGASTRODUODENOSCOPY N/A 02/08/2024    Procedure: EGD (ESOPHAGOGASTRODUODENOSCOPY);  Surgeon: Jayla Arteaga MD;  Location: [facility];  Service: Endoscopy;  Laterality: N/A;    ESOPHAGOGASTRODUODENOSCOPY N/A 8/20/2024    Procedure: EGD (ESOPHAGOGASTRODUODENOSCOPY);  Surgeon: Chen Jerome MD;  Location: [facility];  Service: General;  Laterality: N/A;    GASTRECTOMY N/A 8/20/2024    Procedure: GASTRECTOMY;  Surgeon: Chen Jerome MD;  Location: [facility] OR;  Service: General;  Laterality: N/A;    HYSTERECTOMY      LAPAROSCOPIC LYSIS OF ADHESIONS N/A 3/14/2024    Procedure: LYSIS, ADHESIONS, LAPAROSCOPIC;  Surgeon: Chen Jerome MD;  Location: [facility];  Service: General;  Laterality: N/A;    LYSIS OF ADHESIONS N/A 8/20/2024    Procedure: LYSIS, ADHESIONS;  Surgeon: Chen Jerome MD;  Location: [facility] OR;  Service:  General;  Laterality: N/A;    PLACEMENT OF JEJUNOSTOMY TUBE N/A 8/20/2024    Procedure: INSERTION, JEJUNOSTOMY TUBE;  Surgeon: Chen Jerome MD;  Location: [facility];  Service: General;  Laterality: N/A;    TOTAL ABDOMINAL HYSTERECTOMY W/ BILATERAL SALPINGOOPHORECTOMY  01/09/2023    radical resection with right salpingo-oophorectomy, left salpingo-oophorectomy, extensive enterolysis, extensive adhesiolysis, left pelvic lymph node biopsy, omental biopsy, small bowel resection with primary functional end-to-end anastomosis, placement of pelvic drain       Review of patient's allergies indicates:  No Known Allergies    No current facility-administered medications on file prior to encounter.     Current Outpatient Medications on File Prior to Encounter   Medication Sig    acetaminophen (TYLENOL) 500 MG tablet Take 2 tablets (1,000 mg total) by mouth every 8 (eight) hours. (Patient taking differently: Take 1,000 mg by mouth every 8 (eight) hours as needed for Pain.)    alendronate (FOSAMAX) 70 MG tablet Take 70 mg by mouth every 7 days. (Sundays)    apixaban (ELIQUIS) 5 mg Tab Take 2 tablets (10 mg total) by mouth 2 (two) times daily for 3 days THEN take 1 tablet by mouth twice a day    calcium phosphate trib/vit D3 (CALCIUM PHOSPHATE-VITAMIN D3 ORAL) Take 1 tablet by mouth 2 (two) times daily.    carBAMazepine (TEGRETOL) 200 mg tablet 1 tablet with breakfast  1 tablet with lunch   1.5 tablet at bedtime    ferrous sulfate 325 (65 FE) MG EC tablet Take 65 mg by mouth 2 (two) times daily with meals.    hydrocortisone 2.5 % cream Apply topically 2 (two) times daily.    hydrOXYzine HCL (ATARAX) 25 MG tablet Take 1 tablet (25 mg total) by mouth 3 (three) times daily as needed for Itching.    levETIRAcetam (KEPPRA) 500 MG Tab Take 1 tablet by mouth 2 (two) times daily.    zonisamide (ZONEGRAN) 100 MG Cap Take 200 mg by mouth 2 (two) times daily.     Family History       Problem Relation (Age of Onset)    Breast cancer  Half-sister (58), Other    Colon cancer Half-sister (83), Other    Lung cancer Half-sister    Lymphoma Other    Ovarian cancer Other    Pancreatic cancer Brother (76), Half-brother (74)    Prostate cancer Brother (67), Other, Other          Tobacco Use    Smoking status: Former     Types: Cigarettes    Smokeless tobacco: Never    Tobacco comments:     Quit 1989 smoked 10 years smoked 0.25 ppd    Substance and Sexual Activity    Alcohol use: Not Currently    Drug use: Never    Sexual activity: Not on file     Review of Systems   Reason unable to perform ROS: limited due to confusion.   Constitutional:  Positive for fatigue. Negative for chills, diaphoresis and fever.        Decreased appetite, decreased oral intake   HENT: Negative.     Eyes: Negative.  Negative for visual disturbance.   Respiratory: Negative.  Negative for cough, chest tightness, shortness of breath and wheezing.    Cardiovascular:  Positive for leg swelling. Negative for chest pain and palpitations.        Right leg swelling (recently discovery of extensive DVT)   Gastrointestinal:  Negative for abdominal pain, diarrhea, nausea and vomiting.   Endocrine: Negative.    Genitourinary: Negative.    Musculoskeletal: Negative.    Skin: Negative.    Allergic/Immunologic: Positive for immunocompromised state.   Neurological:  Positive for syncope and weakness.   Psychiatric/Behavioral:  Positive for confusion.    All other systems reviewed and are negative.    Objective:     Vital Signs (Most Recent):  Temp: 97.6 °F (36.4 °C) (10/24/24 2121)  Pulse: 90 (10/24/24 2124)  Resp: 18 (10/24/24 2121)  BP: (!) 118/57 (10/24/24 2121)  SpO2: 99 % (10/24/24 2121) Vital Signs (24h Range):  Temp:  [97.6 °F (36.4 °C)-98.5 °F (36.9 °C)] 97.6 °F (36.4 °C)  Pulse:  [75-90] 90  Resp:  [18] 18  SpO2:  [95 %-100 %] 99 %  BP: ()/(41-62) 118/57     Weight: 46.3 kg (102 lb)  Body mass index is 16.97 kg/m².     Physical Exam  Vitals reviewed.   Constitutional:        General: She is not in acute distress.     Appearance: She is ill-appearing. She is not toxic-appearing or diaphoretic.      Comments: Thin, frail appearing female, flat affect, confused, calm/cooperative   HENT:      Head: Normocephalic and atraumatic.      Nose: Nose normal.      Mouth/Throat:      Mouth: Mucous membranes are dry.      Pharynx: Oropharynx is clear.   Eyes:      Extraocular Movements: Extraocular movements intact.      Pupils: Pupils are equal, round, and reactive to light.   Cardiovascular:      Rate and Rhythm: Normal rate and regular rhythm.      Pulses: Normal pulses.      Heart sounds: Normal heart sounds.   Pulmonary:      Effort: Pulmonary effort is normal. No respiratory distress.      Breath sounds: Normal breath sounds. No stridor. No wheezing, rhonchi or rales.   Chest:      Chest wall: No tenderness.   Abdominal:      General: Bowel sounds are normal. There is no distension.      Palpations: Abdomen is soft.      Tenderness: There is abdominal tenderness. There is no right CVA tenderness, left CVA tenderness or guarding.      Comments: Left - J tube, clamped, dressing intact around tube  Some mild abdominal TTP-- generalized and around tube   Genitourinary:     Comments: deferred  Musculoskeletal:         General: Swelling and tenderness present.      Cervical back: Normal range of motion and neck supple.      Right lower leg: Edema present.      Left lower leg: No edema.      Comments: Chronic swelling/pain to RLE (recently diagnosed with extensive DVT)   Skin:     General: Skin is warm and dry.      Capillary Refill: Capillary refill takes less than 2 seconds.   Neurological:      Motor: Weakness present.      Comments: Oriented only to person and situation -- disoriented to time, place, does not remember any events that occurred today or how she ended up in hospital, overall generalized weakness   Psychiatric:      Comments: Flat affect, appears depressed, confused currently         "      CRANIAL NERVES     CN III, IV, VI   Pupils are equal, round, and reactive to light.       Significant Labs: All pertinent labs within the past 24 hours have been reviewed.    Blood Culture: No results for input(s): "LABBLOO" in the last 48 hours.  CBC:   Recent Labs   Lab 10/24/24  1247   WBC 10.52   HGB 10.1*   HCT 30.4*   *     CMP:   Recent Labs   Lab 10/24/24  1658      K 2.9*   *   CO2 19*   *   BUN 11   CREATININE 0.7   CALCIUM 7.2*   PROT 5.0*   ALBUMIN 1.8*   BILITOT 0.3   ALKPHOS 174*   AST 12   ALT 17   ANIONGAP 9     Lactic Acid: No results for input(s): "LACTATE" in the last 48 hours.  Lipase: No results for input(s): "LIPASE" in the last 48 hours.    Magnesium:   Recent Labs   Lab 10/24/24  1658   MG 1.5*     BNP 29  CPK 25    Troponin:   Recent Labs   Lab 10/24/24  1658   TROPONINI <0.006     Urine Culture ORDERED    Urine Studies:   Recent Labs   Lab 10/24/24  1251   COLORU Brown*   APPEARANCEUA Cloudy*   PHUR 6.0   SPECGRAV 1.025   PROTEINUA 2+*   GLUCUA Negative   KETONESU Negative   BILIRUBINUA 1+*   OCCULTUA 3+*   NITRITE Positive*   UROBILINOGEN Negative   LEUKOCYTESUR Trace*   RBCUA >100*   WBCUA 9*   BACTERIA Occasional   HYALINECASTS 0     EKG reviewed -- NSR, HR 76, nonspecific ST and T wave changes noted, no STEMI    Significant Imaging: I have reviewed all pertinent imaging results/findings within the past 24 hours.    CT head without contrast --no acute abnormality  CXR -- lungs appear clear, per my interpretation    Assessment/Plan:     * Syncope  Syncopal episode while having physical therapy -- did not fall, no injuries  CT head did not show any acute abnormality  Troponin normal, EKG with no significant changes  Lab workup shows mild UTI - Rocephin given  Had recent Echo which was normal  Hydrating via feeding tube  Cardiac monitoring  Trending troponin level      Hypokalemia  Patient's most recent potassium results are listed below.   Recent Labs     " 10/24/24  1658   K 2.9*     Plan  - Replete potassium per protocol  - Monitor potassium Daily  - Patient's hypokalemia is being treated, will recheck labs at midnight and again in AM  - Supplementing magnesium as well    Hypomagnesemia  Patient has Abnormal Magnesium: hypomagnesemia. Will continue to monitor electrolytes closely. Will replace the affected electrolytes and repeat labs to be done after interventions completed. The patient's magnesium results have been reviewed and are listed below.  Recent Labs   Lab 10/24/24  1658   MG 1.5*        Ureteral stone with hydronephrosis  Recent imaging with 8 mm obstructing/impacted stone on right side -- s/p cystoscopy and right nephrostomy tube placement      Severe protein-calorie malnutrition  Nutrition consulted. Most recent weight and BMI monitored- will continue tube feeding supplementation    Measurements:  Wt Readings from Last 1 Encounters:   10/24/24 46.3 kg (102 lb)   Body mass index is 16.97 kg/m².      Gastric adenocarcinoma  - Followed by Dr. Ambrose and Dr. Jerome  - S/p total gastrectomy on 8/20, with lysis of adhesions, lymphadenectomy, and jejunostomy tube placement, d/c on 9/10  - Pathologic stage from 8/20/2024: Stage III (ypT2, pN3a, cM0), completed 5 cycles Keytruda prior to surgery  - outpatient follow up with Dr. Jerome and Dr. Ambrose      DVT (deep venous thrombosis)  Recently diagnosed with extensive DVT RLE  Continue Eliquis      Jejunostomy tube present  Hydrating and tube feeding via tube      Hx of Epileptic seizures  Seizure precautions  Continue Keppra and carbamazepine      Chronic anemia  Anemia is likely due to chronic blood loss and Iron deficiency. Most recent hemoglobin and hematocrit are listed below.  Recent Labs     10/24/24  1247   HGB 10.1*   HCT 30.4*     Plan  - Monitor serial CBC: Daily  - Transfuse PRBC if patient becomes hemodynamically unstable, symptomatic or H/H drops below 7/21.  - Patient has not received any PRBC  transfusions to date      VTE Risk Mitigation (From admission, onward)           Ordered     Reason for No Pharmacological VTE Prophylaxis  Once        Question:  Reasons:  Answer:  Already adequately anticoagulated on oral Anticoagulants    10/24/24 2206     IP VTE HIGH RISK PATIENT  Once         10/24/24 2206     Place sequential compression device  Until discontinued         10/24/24 2206                   This patient's case has been reviewed by my supervising physician, Dr. Tasha Vides.  Supervising physician will provide additional orders and recommendations at his or her discretion.         Melba Doan NP  Department of Hospital Medicine  '[place] - Med Surg

## 2024-10-25 NOTE — PLAN OF CARE
Discussed poc with pt, pt verbalized understanding    Purposeful rounding every 2hours    VS wnl  Cardiac monitoring in use, pt is NSR, tele monitor #[id number]  Fall precautions in place, remains injury free  Pt denies c/o nausea  Pain under control with PRN meds    Tube feeding @40mL, Pt tolerating  2 loose Bms today, Provider notified   Wedge and heel boots in place  Accurate I&Os  Abx given as prescribed  Bed locked at lowest position  Call light within reach    Chart check complete  Will cont with POC

## 2024-10-25 NOTE — NURSING
Pt got midline placed and noticed it wasn't drawing back blood. Pt has not had AM labs drawn. Reached out to MD about PICC line placement and pt not having AM labs drawn. Please Advise.

## 2024-10-25 NOTE — ASSESSMENT & PLAN NOTE
Nutrition consulted. Most recent weight and BMI monitored- will continue tube feeding supplementation    Measurements:  Wt Readings from Last 1 Encounters:   10/25/24 49.7 kg (109 lb 9.1 oz)   Body mass index is 18.23 kg/m².

## 2024-10-25 NOTE — ASSESSMENT & PLAN NOTE
Malnutrition Type:  Context: chronic illness  Level: severe    Related to (etiology):   Physiological causes increasing nutrient needs d/t illness  Alteration in GI tract structure/function     Signs and Symptoms (as evidenced by):   BMI < 22 (older adult)  Underweight with loss of fat and muscle  Unable to eat sufficient energy/protein to maintain a healthy weight     Malnutrition Characteristic Summary:  Subcutaneous Fat (Malnutrition): severe depletion  Muscle Mass (Malnutrition): severe depletion    Interventions/Recommendations (treatment strategy):  1. Enteral nutrition management  2. Full liquid diet  3. Commercial food medical food supplement therapy  4. Collaboration by nutrition professional with other providers    Nutrition Diagnosis Status:   Continues

## 2024-10-25 NOTE — ASSESSMENT & PLAN NOTE
Patient has Abnormal Magnesium: hypomagnesemia. Will continue to monitor electrolytes closely. Will replace the affected electrolytes and repeat labs to be done after interventions completed. The patient's magnesium results have been reviewed and are listed below.  Recent Labs   Lab 10/24/24  1658   MG 1.5*

## 2024-10-25 NOTE — ASSESSMENT & PLAN NOTE
Patient's most recent potassium results are listed below.   Recent Labs     10/24/24  1658 10/24/24  2224   K 2.9* 3.2*       Plan  - Replete potassium per protocol  - Monitor potassium Daily  - Patient's hypokalemia is being treated, will recheck labs at midnight and again in AM  - Supplementing magnesium as well

## 2024-10-25 NOTE — SUBJECTIVE & OBJECTIVE
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 Repeat in 3 years.    COLONOSCOPY  06/17/2015    -Nodular mucosa at appendiceal orfice, sigmoid and desc diverticulosis otherwise normal.    COLONOSCOPY  2020    >3 TAs    COLONOSCOPY  12/2023    CYSTOSCOPY W/ RETROGRADES Right 10/10/2024    Procedure: CYSTOSCOPY, WITH RETROGRADE PYELOGRAM;  Surgeon: Lata Kelly MD;  Location: [facility] OR;  Service: Urology;  Laterality: Right;    DIAGNOSTIC LAPAROSCOPY N/A 3/14/2024    Procedure: LAPAROSCOPY, DIAGNOSTIC;  Surgeon: Chen Jerome MD;  Location: [facility];  Service: General;  Laterality: N/A;  1. Diagnostic laparoscopy   2. Extensive lysis of adhesions  3. Peritoneal biopsy   4. Peritoneal washings for cytology    DIAGNOSTIC LAPAROSCOPY N/A 8/20/2024    Procedure: LAPAROSCOPY, DIAGNOSTIC;  Surgeon: Chen Jerome MD;  Location: [facility] OR;  Service:  General;  Laterality: N/A;    EGD - EXTERNAL RESULT  06/20/2012    Dr Boyle- Mild gastritis otherwise normal.    ENDOSCOPIC ULTRASOUND OF UPPER GASTROINTESTINAL TRACT N/A 7/26/2024    Procedure: ULTRASOUND, UPPER GI TRACT, ENDOSCOPIC;  Surgeon: Valdo Aguilera MD;  Location: [facility] ENDO;  Service: Endoscopy;  Laterality: N/A;  7/22/24: instructions sent via portal-. Pt no longer takling eliquis-GD    ESOPHAGOGASTRODUODENOSCOPY N/A 02/08/2024    Procedure: EGD (ESOPHAGOGASTRODUODENOSCOPY);  Surgeon: Jayla Arteaga MD;  Location: [facility] ENDO;  Service: Endoscopy;  Laterality: N/A;    ESOPHAGOGASTRODUODENOSCOPY N/A 8/20/2024    Procedure: EGD (ESOPHAGOGASTRODUODENOSCOPY);  Surgeon: Chen Jerome MD;  Location: [facility] OR;  Service: General;  Laterality: N/A;    GASTRECTOMY N/A 8/20/2024    Procedure: GASTRECTOMY;  Surgeon: Chen Jerome MD;  Location: [facility] OR;  Service: General;  Laterality: N/A;    HYSTERECTOMY      LAPAROSCOPIC LYSIS OF ADHESIONS N/A 3/14/2024    Procedure: LYSIS, ADHESIONS, LAPAROSCOPIC;  Surgeon: Chen Jerome MD;  Location: [facility] OR;  Service: General;  Laterality: N/A;    LYSIS OF ADHESIONS N/A 8/20/2024    Procedure: LYSIS, ADHESIONS;  Surgeon: Chen Jerome MD;  Location: [facility] OR;  Service: General;  Laterality: N/A;    PLACEMENT OF JEJUNOSTOMY TUBE N/A 8/20/2024    Procedure: INSERTION, JEJUNOSTOMY TUBE;  Surgeon: Chen Jerome MD;  Location: [facility] OR;  Service: General;  Laterality: N/A;    TOTAL ABDOMINAL HYSTERECTOMY W/ BILATERAL SALPINGOOPHORECTOMY  01/09/2023    radical resection with right salpingo-oophorectomy, left salpingo-oophorectomy, extensive enterolysis, extensive adhesiolysis, left pelvic lymph node biopsy, omental biopsy, small bowel resection with primary functional end-to-end anastomosis, placement of pelvic drain       Review of patient's allergies indicates:  No Known Allergies    No current facility-administered medications on file prior to encounter.      Current Outpatient Medications on File Prior to Encounter   Medication Sig    acetaminophen (TYLENOL) 500 MG tablet Take 2 tablets (1,000 mg total) by mouth every 8 (eight) hours. (Patient taking differently: Take 1,000 mg by mouth every 8 (eight) hours as needed for Pain.)    alendronate (FOSAMAX) 70 MG tablet Take 70 mg by mouth every 7 days. (Sundays)    apixaban (ELIQUIS) 5 mg Tab Take 2 tablets (10 mg total) by mouth 2 (two) times daily for 3 days THEN take 1 tablet by mouth twice a day    calcium phosphate trib/vit D3 (CALCIUM PHOSPHATE-VITAMIN D3 ORAL) Take 1 tablet by mouth 2 (two) times daily.    carBAMazepine (TEGRETOL) 200 mg tablet 1 tablet with breakfast  1 tablet with lunch   1.5 tablet at bedtime    ferrous sulfate 325 (65 FE) MG EC tablet Take 65 mg by mouth 2 (two) times daily with meals.    hydrocortisone 2.5 % cream Apply topically 2 (two) times daily.    hydrOXYzine HCL (ATARAX) 25 MG tablet Take 1 tablet (25 mg total) by mouth 3 (three) times daily as needed for Itching.    levETIRAcetam (KEPPRA) 500 MG Tab Take 1 tablet by mouth 2 (two) times daily.    zonisamide (ZONEGRAN) 100 MG Cap Take 200 mg by mouth 2 (two) times daily.     Family History       Problem Relation (Age of Onset)    Breast cancer Half-sister (58), Other    Colon cancer Half-sister (83), Other    Lung cancer Half-sister    Lymphoma Other    Ovarian cancer Other    Pancreatic cancer Brother (76), Half-brother (74)    Prostate cancer Brother (67), Other, Other          Tobacco Use    Smoking status: Former     Types: Cigarettes    Smokeless tobacco: Never    Tobacco comments:     Quit 1989 smoked 10 years smoked 0.25 ppd    Substance and Sexual Activity    Alcohol use: Not Currently    Drug use: Never    Sexual activity: Not on file     Review of Systems   Reason unable to perform ROS: limited due to confusion.   Constitutional:  Positive for fatigue. Negative for chills, diaphoresis and fever.        Decreased appetite,  decreased oral intake   HENT: Negative.     Eyes: Negative.  Negative for visual disturbance.   Respiratory: Negative.  Negative for cough, chest tightness, shortness of breath and wheezing.    Cardiovascular:  Positive for leg swelling. Negative for chest pain and palpitations.        Right leg swelling (recently discovery of extensive DVT)   Gastrointestinal:  Negative for abdominal pain, diarrhea, nausea and vomiting.   Endocrine: Negative.    Genitourinary: Negative.    Musculoskeletal: Negative.    Skin: Negative.    Allergic/Immunologic: Positive for immunocompromised state.   Neurological:  Positive for syncope and weakness.   Psychiatric/Behavioral:  Positive for confusion.    All other systems reviewed and are negative.    Objective:     Vital Signs (Most Recent):  Temp: 97.6 °F (36.4 °C) (10/24/24 2121)  Pulse: 90 (10/24/24 2124)  Resp: 18 (10/24/24 2121)  BP: (!) 118/57 (10/24/24 2121)  SpO2: 99 % (10/24/24 2121) Vital Signs (24h Range):  Temp:  [97.6 °F (36.4 °C)-98.5 °F (36.9 °C)] 97.6 °F (36.4 °C)  Pulse:  [75-90] 90  Resp:  [18] 18  SpO2:  [95 %-100 %] 99 %  BP: ()/(41-62) 118/57     Weight: 46.3 kg (102 lb)  Body mass index is 16.97 kg/m².     Physical Exam  Vitals reviewed.   Constitutional:       General: She is not in acute distress.     Appearance: She is ill-appearing. She is not toxic-appearing or diaphoretic.      Comments: Thin, frail appearing female, flat affect, confused, calm/cooperative   HENT:      Head: Normocephalic and atraumatic.      Nose: Nose normal.      Mouth/Throat:      Mouth: Mucous membranes are dry.      Pharynx: Oropharynx is clear.   Eyes:      Extraocular Movements: Extraocular movements intact.      Pupils: Pupils are equal, round, and reactive to light.   Cardiovascular:      Rate and Rhythm: Normal rate and regular rhythm.      Pulses: Normal pulses.      Heart sounds: Normal heart sounds.   Pulmonary:      Effort: Pulmonary effort is normal. No respiratory  "distress.      Breath sounds: Normal breath sounds. No stridor. No wheezing, rhonchi or rales.   Chest:      Chest wall: No tenderness.   Abdominal:      General: Bowel sounds are normal. There is no distension.      Palpations: Abdomen is soft.      Tenderness: There is abdominal tenderness. There is no right CVA tenderness, left CVA tenderness or guarding.      Comments: Left - J tube, clamped, dressing intact around tube  Some mild abdominal TTP-- generalized and around tube   Genitourinary:     Comments: deferred  Musculoskeletal:         General: Swelling and tenderness present.      Cervical back: Normal range of motion and neck supple.      Right lower leg: Edema present.      Left lower leg: No edema.      Comments: Chronic swelling/pain to RLE (recently diagnosed with extensive DVT)   Skin:     General: Skin is warm and dry.      Capillary Refill: Capillary refill takes less than 2 seconds.   Neurological:      Motor: Weakness present.      Comments: Oriented only to person and situation -- disoriented to time, place, does not remember any events that occurred today or how she ended up in hospital, overall generalized weakness   Psychiatric:      Comments: Flat affect, appears depressed, confused currently              CRANIAL NERVES     CN III, IV, VI   Pupils are equal, round, and reactive to light.       Significant Labs: All pertinent labs within the past 24 hours have been reviewed.    Blood Culture: No results for input(s): "LABBLOO" in the last 48 hours.  CBC:   Recent Labs   Lab 10/24/24  1247   WBC 10.52   HGB 10.1*   HCT 30.4*   *     CMP:   Recent Labs   Lab 10/24/24  1658      K 2.9*   *   CO2 19*   *   BUN 11   CREATININE 0.7   CALCIUM 7.2*   PROT 5.0*   ALBUMIN 1.8*   BILITOT 0.3   ALKPHOS 174*   AST 12   ALT 17   ANIONGAP 9     Lactic Acid: No results for input(s): "LACTATE" in the last 48 hours.  Lipase: No results for input(s): "LIPASE" in the last 48 " hours.    Magnesium:   Recent Labs   Lab 10/24/24  1658   MG 1.5*     BNP 29  CPK 25    Troponin:   Recent Labs   Lab 10/24/24  1658   TROPONINI <0.006     Urine Culture ORDERED    Urine Studies:   Recent Labs   Lab 10/24/24  1251   COLORU Brown*   APPEARANCEUA Cloudy*   PHUR 6.0   SPECGRAV 1.025   PROTEINUA 2+*   GLUCUA Negative   KETONESU Negative   BILIRUBINUA 1+*   OCCULTUA 3+*   NITRITE Positive*   UROBILINOGEN Negative   LEUKOCYTESUR Trace*   RBCUA >100*   WBCUA 9*   BACTERIA Occasional   HYALINECASTS 0     EKG reviewed -- NSR, HR 76, nonspecific ST and T wave changes noted, no STEMI    Significant Imaging: I have reviewed all pertinent imaging results/findings within the past 24 hours.    CT head without contrast --no acute abnormality  CXR -- lungs appear clear, per my interpretation

## 2024-10-25 NOTE — ASSESSMENT & PLAN NOTE
Patient has Abnormal Magnesium: hypomagnesemia. Will continue to monitor electrolytes closely. Will replace the affected electrolytes and repeat labs to be done after interventions completed. The patient's magnesium results have been reviewed and are listed below.  Recent Labs   Lab 10/24/24  2224   MG 2.4

## 2024-10-25 NOTE — CONSULTS
[facility] - Med Surg  Adult Nutrition  Consult Note    SUMMARY     Recommendations    Recommendation/Intervention:   1. Recommend pt continues on continues Enteral nutrition, Peptamen AF via J tube at 40 mL/hr, per pt tolerance and/or per MD/NP   -Formula at goal rate provides: 1152 kcals/day (77% EEN), 73 g protein/day (100% EPN), 108 g CHO.day (58% CHO needs), 780 mL free formula water/day (52% fluid needs)   -120 mL q4h free water flushes (720 mL/day) = total from formula + FWF = 1500 mL water/day (100% fluid needs), per MD/NP   -Monitor Mg, K+, Na, Phos and Glu, correct as indicated   2. Recommend pt continues on Full liquid diet as pleasure feeds   3. Recommend Banatrol TID to assist with diarrhea or BID for loose stools if warranted   4. Weigh twice weekly    Goals:   1. Pt will continue to tolerate and intake >75% EEN and EPN prior to RD follow up   2. Pt's diarrhea/loose stools will improve prior to RD follow up  Nutrition Goal Status: new  Communication of RD Recs: other (comment) (POC, sticky note, secure chat)    Assessment and Plan    Endocrine  Severe protein-calorie malnutrition  Malnutrition Type:  Context: chronic illness  Level: severe    Related to (etiology):   Physiological causes increasing nutrient needs d/t illness  Alteration in GI tract structure/function     Signs and Symptoms (as evidenced by):   BMI < 22 (older adult)  Underweight with loss of fat and muscle  Unable to eat sufficient energy/protein to maintain a healthy weight     Malnutrition Characteristic Summary:  Subcutaneous Fat (Malnutrition): severe depletion  Muscle Mass (Malnutrition): severe depletion    Interventions/Recommendations (treatment strategy):  1. Enteral nutrition management  2. Full liquid diet  3. Commercial food medical food supplement therapy  4. Collaboration by nutrition professional with other providers    Nutrition Diagnosis Status:   New         Malnutrition Assessment (10/25/24):  Malnutrition Context:  chronic illness  Malnutrition Level: severe  Skin (Micronutrient): none (Chetan score = 16 (mild risk)       Subcutaneous Fat (Malnutrition): severe depletion  Muscle Mass (Malnutrition): severe depletion   Orbital Region (Subcutaneous Fat Loss): severe depletion (Dark circles; Hallow look; Depression; Loose skin)   Temple Region (Muscle Loss): severe depletion (Hollow, scooping depression)  Clavicle Bone Region (Muscle Loss): severe depletion (Protruding, prominent bone)  Clavicle and Acromion Bone Region (Muscle Loss): severe depletion (Shoulder to arm joint looks square, bones prominet, acromion protrution very prominent)                 Reason for Assessment    Reason For Assessment: consult (malnutrition)  Diagnosis:  (Syncope)  General Information Comments:   10/25/24: 74 y.o. Female admitted for Syncope. PMH: Chronic anemia, DVT, Gastric adencarcinoma, J tube, Severe protein-calorie malnutrition, Ureteral stone w/ hydronephrosis, Hypokalemia, Hypomagnesemia. Pt currently on a Full liquid diet + Vital AF 1.2 Guille @ 40 mL/hr continuous enetral nutrition via J tube. RD consulted for malnutrition, note RD followed pt during past and recent admissions. H&P noted that the pt presented to the ED d/t having physical therapy at SNF and had syncopal episode w/ confusion, note pt was recently admitted 10/9/24 through 10/21/2024 and d/c'd to SNF at [facility]. EMR noted pt had 2 loose BM's today (10/25), denied nausea, pt refused to participate in PT/OT, stated she does not like cream of wheat. Visited pt at bedside, pt resting in bed, visually confirmed Peptament AF TF running. Pt denied N/V and abd pain, stated unsure if having diarreha, reported was receiving Immodium and was heling but not sure if she stills is, informed pt RD noted pt does not like cream of wheat, pt expressed appreciation, spoke to , ensured pt is having her orders taken to encourage pt preferred food choices. Visual  "NFPE performed, noted severe malnutrition, note full NFPE performed at recent previous encounter and severe malnutrition noted. Note [facility] does not carry Vital AF 1.2 Guille TF formula at this time, RD modified TF orders. Reviewed chart: LBM 10/24; Skin: WDL; Chetan score: 16 (mild risk); Edema: none. Labs, meds, weight reviewed. Note calcium-vit D3. Weight charted 10/16/24 102 lbs, 10/25/24 109 lbs (BMI 18.23, protein-energy malnutrition grade 1), + 7 lb wt gain x 9 days. RD will continue to follow and monitor pt's nutritional status during admit.    Nutrition Discharge Planning: Enteral nutrition via J tube    Nutrition Risk Screen    Nutrition Risk Screen: tube feeding or parenteral nutrition    Nutrition Related Social Determinants of Health: SDOH: Adequate food in home environment    Nutrition/Diet History    Spiritual, Cultural Beliefs, Nondenominational Practices, Values that Affect Care: no  Food Allergies: NKFA  Factors Affecting Nutritional Intake: malabsorption  Nutrition Support Formula Prior to Admit: Other (see commments) (Vital AF 1.2 Guille)  Nutrition Support Rate Prior to Admit: 40 (ml)  Nutrtion Support Frequency Prior to Admit: continuous  Nutrition Support Provision Prior to Admit: J tube    Anthropometrics    Temp: 98.3 °F (36.8 °C)  Height Method: Stated  Height: 5' 5" (165.1 cm)  Height (inches): 65 in  Weight Method: Bed Scale  Weight: 49.7 kg (109 lb 9.1 oz)  Weight (lb): 109.57 lb  Ideal Body Weight (IBW), Female: 125 lb  % Ideal Body Weight, Female (lb): 87.66 %  % Ideal Body Weight Malnutrition: 80-90% - mild deficit  BMI (Calculated): 18.2  BMI Grade: 17 - 18.4 protein-energy malnutrition grade I     Wt Readings from Last 15 Encounters:   10/25/24 49.7 kg (109 lb 9.1 oz)   10/16/24 46.7 kg (102 lb 15.3 oz)   10/07/24 50.4 kg (111 lb)   09/24/24 53.9 kg (118 lb 15 oz)   09/24/24 53.9 kg (118 lb 15 oz)   09/17/24 50.8 kg (112 lb)   09/09/24 60.4 kg (133 lb 2.5 oz)   08/14/24 58.7 kg (129 lb 4.8 oz) " "  07/26/24 59 kg (130 lb)   07/23/24 60.5 kg (133 lb 6.1 oz)   07/19/24 59.5 kg (131 lb 2.8 oz)   07/09/24 58.9 kg (129 lb 13.6 oz)   06/28/24 58.9 kg (129 lb 13.6 oz)   06/18/24 58.9 kg (129 lb 13.6 oz)   05/28/24 59.7 kg (131 lb 9.8 oz)     Lab/Procedures/Meds    Pertinent Labs Reviewed: reviewed  Pertinent Labs Comments: K+ (L), Calcium (L), Total protein (L), Albumin (L), Cl (H), Glu (H), Alk phos (H)  Pertinent Medications Reviewed: reviewed  Pertinent Medications Comments: zonisamide, levetiracetam, ferrous sulfate, Pedialyte, Abx, carbamazepine, calcium-vit D3, apixaban, acetaminophen    BMP  Lab Results   Component Value Date     10/24/2024    K 3.2 (L) 10/24/2024     (H) 10/24/2024    CO2 19 (L) 10/24/2024    BUN 11 10/24/2024    CREATININE 0.7 10/24/2024    CALCIUM 7.8 (L) 10/24/2024    ANIONGAP 12 10/24/2024    EGFRNORACEVR >60 10/24/2024     Lab Results   Component Value Date    CALCIUM 7.8 (L) 10/24/2024    PHOS 5.0 (H) 10/09/2024     Lab Results   Component Value Date    ALBUMIN 1.8 (L) 10/24/2024     Lab Results   Component Value Date    ALT 17 10/24/2024    AST 12 10/24/2024    ALKPHOS 174 (H) 10/24/2024    BILITOT 0.3 10/24/2024     No results for input(s): "POCTGLUCOSE" in the last 24 hours.  No results found for: "LABA1C", "HGBA1C"    Lab Results   Component Value Date    WBC 10.52 10/24/2024    HGB 10.1 (L) 10/24/2024    HCT 30.4 (L) 10/24/2024    MCV 95 10/24/2024     (H) 10/24/2024       Scheduled Meds:   acetaminophen  1,000 mg Per J Tube Q8H    apixaban  5 mg Per J Tube BID    calcium-vitamin D3  1 tablet Per J Tube BID    carBAMazepine  200 mg Per J Tube TID    cefTRIAXone (Rocephin) IV (PEDS and ADULTS)  1 g Intravenous Q24H    electrolytes-dextrose  400 mL Per NG tube Q4H    ferrous sulfate  1 tablet Per J Tube BID    levETIRAcetam  500 mg Oral BID    zonisamide  200 mg Oral BID     Continuous Infusions:  PRN Meds:.  Current Facility-Administered Medications:     " dextrose 10%, 12.5 g, Intravenous, PRN    dextrose 10%, 25 g, Intravenous, PRN    glucagon (human recombinant), 1 mg, Intramuscular, PRN    glucose, 16 g, Oral, PRN    glucose, 24 g, Oral, PRN    influenza (adjuvanted), 0.5 mL, Intramuscular, vaccine x 1 dose    naloxone, 0.02 mg, Intravenous, PRN    ondansetron, 4 mg, Intravenous, Q6H PRN    sodium chloride 0.9%, 10 mL, Intravenous, Q8H PRN    Physical Findings/Assessment         Estimated/Assessed Needs    Weight Used For Calorie Calculations: 49.7 kg (109 lb 9.1 oz)  Energy Calorie Requirements (kcal): 6536-5240 kcals (30-35 kcals/kg ABW (Underwieight vs Cancer vs GI Disorder)  Energy Need Method: Kcal/kg  Protein Requirements: 59-75 g (1.2-1.5 g/kg ABW (Cancer, cachexia vs Underweight vs GI Disorder, older adult)  Weight Used For Protein Calculations: 49.7 kg (109 lb 9.1 oz)  Fluid Requirements (mL): 0523-4889 mL (1 mL/kcal)  Estimated Fluid Requirement Method: RDA Method  RDA Method (mL): 1491  CHO Requirement: 186-218 g (0877-9574 kcals/8)      Nutrition Prescription Ordered    Current Diet Order: Full liquid diet  Nutrition Order Comments: Vital AF 1.2 Guille is ordered but Peptamen AF is currently running  Current Nutrition Support Formula Ordered: Peptamen AF  Current Nutrition Support Rate Ordered: 40 (ml)  Current Nutrition Support Frequency Ordered: continuous    Evaluation of Received Nutrient/Fluid Intake  I/O: (Net since admit):  10/25/24: +971 mL    Enteral Calories (kcal): 1152  Enteral Protein (gm): 73  Enteral (Free Water) Fluid (mL): 780  % Kcal Needs: 77%  % Protein Needs: 100%    Energy Calories Required: meeting needs  Protein Required: meeting needs  Fluid Required: not meeting needs  Total Fluid Intake (mL): 800  Tolerance: tolerating  % Intake of Estimated Energy Needs: 75 - 100 %  % Meal Intake: 0 - 25 %    Nutrition Risk    Level of Risk/Frequency of Follow-up: high (F/u x 2 weekly)       Monitor and Evaluation    Food and Nutrient Intake:  energy intake, food and beverage intake, enteral nutrition intake  Food and Nutrient Adminstration: diet order, enteral and parenteral nutrition administration  Knowledge/Beliefs/Attitudes: food and nutrition knowledge/skill, beliefs and attitudes  Anthropometric Measurements: weight, weight change, body mass index  Biochemical Data, Medical Tests and Procedures: electrolyte and renal panel, gastrointestinal profile, glucose/endocrine profile  Nutrition-Focused Physical Findings: overall appearance       Nutrition Follow-Up    RD Follow-up?: Yes  Patty Gupta, TAWANA, RDN, LDN

## 2024-10-25 NOTE — PROGRESS NOTES
[facility]  Progress Note    Patient Name: Cheryl Kirkland  MRN: [id number]  Patient Class: IP- Inpatient   Admission Date: 10/24/2024  Length of Stay: 1 days  Attending Physician: Christy Ramos MD  Primary Care Provider: Gagandeep Iglesias MD        Subjective:     Principal Problem:Syncope        HPI:    Patient is a 74-year-old female with past medical history significant for hypertension, hyperlipidemia, DVT in right lower extremity, epilepsy, anemia, neuropathy, GERD, gastritis, polyps, gastric adenocarcinoma status post treatment with Keytruda (followed by Dr. Jerome and Dr. Ambrose) and total gastrectomy on 08/20/2024, jejunostomy tube on supplemental tube feedings, chronic diarrhea, ovarian cancer (s/p surgery/chemotherapy), osteoporosis and recent admission 10/9/24 through 10/21/2024 due to 8 mm right ureteral obstructing stone with right-sided hydronephrosis s/p cystoscopy and right nephrostomy tube placement, and acute cystitis with SHELLEY also found to have extensive DVT RLE treated with heparin drip transitioned to Eliquis, was discharged to SNF at [facility]. On 10/24, she was having physical therapy at SNF and had syncopal episode. There was no fall or injury, however she has been confused and does not remember what has been going on today.  initial blood pressure on arrival to /41, heart rate 79, temp 98.5°, respirations 18, 100% SpO2 on room air.  Lab workup demonstrate WBC 10.52 with left shift, RBC 3.20, hemoglobin 10.1, hematocrit 30.4, platelets 559, potassium 2.9, CO2 19, BUN 11, creatinine 0.7, glucose 115, calcium 7.2, magnesium 1.5, alk phos 174, total protein 5.0, albumin 1.8, total bilirubin 0.3, AST 12, ALT 17, BNP 29, CPK 25, trop less than 0.006, UA- with cloudy brown urine positive for nitrite, trace leukocyte esterase, greater than 100 RBC, 9 WBC. EKG -- NSR HR 76, nonspecific ST and T wave abnormality, no STEMI. CXR  demonstrates that lungs are clear. CT head without contrast done with no acute abnormality. Recent Echo done 10/10/24 with EF 65-70% and normal diastolic function. While in ED, she was given potassium 40 po and magnesium sulfate 2 g IV as well as IV Rocephin. Hospital Medicine was consulted for admission due to syncope with continued confusion, UTI, hypokalemia, and hypomagnesemia.     Overview/Hospital Course:  Patient is a 74 year old female admitted to the hospital for c/o syncopal episode during PT at NH.   Labs: WBC 10.52 with left shift, RBC 3.20, hemoglobin 10.1, hematocrit 30.4, platelets 559, potassium 2.9, CO2 19, BUN 11, creatinine 0.7, glucose 115, calcium 7.2, magnesium 1.5, alk phos 174, total protein 5.0, albumin 1.8, total bilirubin 0.3, AST 12, ALT 17, BNP 29, CPK 25, trop less than 0.006,   -UA- with cloudy brown urine positive for nitrite, trace leukocyte esterase, greater than 100 RBC, 9 WBC.   -IV abx: Rocephin   -CT head without contrast done with no acute abnormality.   -Hematology consulted due to patient being on eliquis for anticoagulation for DVT but will need needs ESWL for an impacted ureteral stone and removal of nephrostomy tube per urology. Patient deemdedhigh risk to be off of anticoagulation for urology procedure.   -Urology consulted as patient was reporting pain at PCN site. Impacted ureteral stone may require treatment with ESWL once patient can safely be off of anticoagulation for 5-7 days.( 3 days prior and 2 days post op). Discussed if patient is not cleared to temporarily come off anticoagulation within 3 months the nephrostomy tube may need to be exchanged. Schedule outpatient follow up with Dr. Kelly.   -Pt/Ot to eval and treat   -EKG: Normal sinus rhythm     Interval History: Patient reports generalized body fatigue, discomfort to the PCN tube site. Urology consulted to evaluate tube, appreciate recommendations. Patient reports atBLE abdominal pain, reports slight  improvement with tylenol. Patient denies any nausea, CP and SOB. Does endorse chronic diarrhea.     Review of Systems See interval history for ROS     Objective:     Vital Signs (Most Recent):  Temp: 98.3 °F (36.8 °C) (10/25/24 1619)  Pulse: 73 (10/25/24 1759)  Resp: 20 (10/25/24 1619)  BP: (!) 94/53 (10/25/24 1619)  SpO2: 95 % (10/25/24 1619) Vital Signs (24h Range):  Temp:  [97.6 °F (36.4 °C)-98.4 °F (36.9 °C)] 98.3 °F (36.8 °C)  Pulse:  [68-90] 73  Resp:  [17-20] 20  SpO2:  [95 %-100 %] 95 %  BP: ()/(53-62) 94/53     Weight: 49.7 kg (109 lb 9.1 oz)  Body mass index is 18.23 kg/m².    Intake/Output Summary (Last 24 hours) at 10/25/2024 1846  Last data filed at 10/25/2024 1815  Gross per 24 hour   Intake 2605 ml   Output 353 ml   Net 2252 ml         Physical Exam  Vitals and nursing note reviewed.   Constitutional:       General: She is not in acute distress.     Appearance: She is ill-appearing (chronically).   HENT:      Mouth/Throat:      Mouth: Mucous membranes are dry.      Pharynx: Oropharynx is clear.   Eyes:      Pupils: Pupils are equal, round, and reactive to light.   Cardiovascular:      Rate and Rhythm: Normal rate and regular rhythm.      Heart sounds: No murmur heard.  Pulmonary:      Effort: Pulmonary effort is normal.      Breath sounds: Normal breath sounds. No wheezing.   Abdominal:      General: Bowel sounds are normal. There is no distension.      Palpations: Abdomen is soft.      Tenderness: There is abdominal tenderness.      Comments: Left - J tube, clamped, dressing intact around tube  Some mild abdominal TTP-- generalized and around tube      Musculoskeletal:         General: Swelling and tenderness present. Normal range of motion.      Comments: Chronic swelling/pain to RLE (recently diagnosed with extensive DVT)    Skin:     General: Skin is warm and dry.   Neurological:      General: No focal deficit present.      Mental Status: She is alert and oriented to person, place, and time.              Significant Labs: All pertinent labs within the past 24 hours have been reviewed.  Recent Lab Results         10/25/24  0331   10/24/24  2224        Anion Gap   12       BUN   11       Calcium   7.8       Chloride   112       CO2   19       Creatinine   0.7       eGFR   >60       Glucose   107       Magnesium    2.4       Potassium   3.2       Sodium   143       Troponin I <0.006  Comment: The reference interval for Troponin I represents the 99th percentile   cutoff   for our facility and is consistent with 3rd generation assay   performance.     <0.006  Comment: The reference interval for Troponin I represents the 99th percentile   cutoff   for our facility and is consistent with 3rd generation assay   performance.                 Significant Imaging: I have reviewed all pertinent imaging results/findings within the past 24 hours.    Assessment/Plan:      * Syncope  Syncopal episode while having physical therapy -- did not fall, no injuries  CT head did not show any acute abnormality  Troponin normal, EKG with no significant changes  Lab workup shows mild UTI - Rocephin given  Had recent Echo which was normal  Hydrating via feeding tube  Cardiac monitoring  Trending troponin level- continued to be negative       Hypomagnesemia  Patient has Abnormal Magnesium: hypomagnesemia. Will continue to monitor electrolytes closely. Will replace the affected electrolytes and repeat labs to be done after interventions completed. The patient's magnesium results have been reviewed and are listed below.  Recent Labs   Lab 10/24/24  2224   MG 2.4          Hypokalemia  Patient's most recent potassium results are listed below.   Recent Labs     10/24/24  1658 10/24/24  2224   K 2.9* 3.2*       Plan  - Replete potassium per protocol  - Monitor potassium Daily  - Patient's hypokalemia is being treated, will recheck labs at midnight and again in AM  - Supplementing magnesium as well    Ureteral stone with  hydronephrosis  Recent imaging with 8 mm obstructing/impacted stone on right side -- s/p cystoscopy and right nephrostomy tube placement  Urology consulted , appreciate recommendations       Severe protein-calorie malnutrition  Nutrition consulted. Most recent weight and BMI monitored- will continue tube feeding supplementation    Measurements:  Wt Readings from Last 1 Encounters:   10/25/24 49.7 kg (109 lb 9.1 oz)   Body mass index is 18.23 kg/m².      Jejunostomy tube present  Hydrating and tube feeding via tube      Gastric adenocarcinoma  - Followed by Dr. Ambrose and Dr. Jerome  - S/p total gastrectomy on 8/20, with lysis of adhesions, lymphadenectomy, and jejunostomy tube placement, d/c on 9/10  - Pathologic stage from 8/20/2024: Stage III (ypT2, pN3a, cM0), completed 5 cycles Keytruda prior to surgery  - outpatient follow up with Dr. Jerome and Dr. Ambrose      DVT (deep venous thrombosis)  Recently diagnosed with extensive DVT RLE  Continue Eliquis      Hx of Epileptic seizures  Seizure precautions  Continue Keppra and carbamazepine      Chronic anemia  Anemia is likely due to chronic blood loss and Iron deficiency. Most recent hemoglobin and hematocrit are listed below.  Recent Labs     10/24/24  1247   HGB 10.1*   HCT 30.4*       Plan  - Monitor serial CBC: Daily  - Transfuse PRBC if patient becomes hemodynamically unstable, symptomatic or H/H drops below 7/21.  - Patient has not received any PRBC transfusions to date      VTE Risk Mitigation (From admission, onward)           Ordered     apixaban tablet 5 mg  2 times daily         10/24/24 2219     Reason for No Pharmacological VTE Prophylaxis  Once        Question:  Reasons:  Answer:  Already adequately anticoagulated on oral Anticoagulants    10/24/24 2206     IP VTE HIGH RISK PATIENT  Once         10/24/24 2206     Place sequential compression device  Until discontinued         10/24/24 2206                    Discharge Planning   SOFIYA:      Code  Status: Full Code   Is the patient medically ready for discharge?:     Reason for patient still in hospital (select all that apply): Patient trending condition, Laboratory test, and Consult recommendations  Discharge Plan A: Skilled Nursing Facility        The patient's case has been reviewed by my supervising physician.   Supervising physician will provide additional orders and recommendations at his/her discretion.  Please see supervising physicians documentation and/or orders for further details.             Loree Laura NP  Department of Hospital Medicine   [facility] - Med Surg

## 2024-10-25 NOTE — H&P (VIEW-ONLY)
Urology Consult    Consulting Physician: Rachel SOTO    Reason for consultation:  Right ureteral stone, nephrostomy tube    Chief Complaint:  Syncope    HPI:    Cheryl Kirkland is a 74 y.o. female who presented to the emergency after having a syncopal episode.  During her evaluation she was noted to have significant hematuria from a right nephrostomy tube.  She has a history of gastric adenocarcinoma status post gastrectomy and getting treatment with Keytruda.  She also has a history of ovarian cancer in the past.  During her last hospitalization she was diagnosed with a 8 mm right distal ureteral stone.  Attempts at placing retrograde stent was unsuccessful.  Patient had a right nephrostomy tube placed.  She was discharged with outpatient follow up.  Unfortunately patient developed lower extremity extensive DVTs and has been on anticoagulation.    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 Repeat in 3 years.    COLONOSCOPY  06/17/2015    -Nodular mucosa at appendiceal orfice, sigmoid and desc diverticulosis otherwise normal.    COLONOSCOPY  2020    >3 TAs    COLONOSCOPY  12/2023    CYSTOSCOPY W/ RETROGRADES Right 10/10/2024    Procedure: CYSTOSCOPY, WITH RETROGRADE PYELOGRAM;  Surgeon: Lata Kelly MD;  Location: [facility] OR;  Service: Urology;  Laterality: Right;    DIAGNOSTIC LAPAROSCOPY N/A 3/14/2024    Procedure: LAPAROSCOPY, DIAGNOSTIC;  Surgeon: Chen Jerome MD;  Location: [facility] OR;  Service: General;  Laterality: N/A;  1. Diagnostic laparoscopy   2. Extensive lysis of adhesions  3. Peritoneal biopsy   4. Peritoneal washings for cytology    DIAGNOSTIC LAPAROSCOPY N/A 8/20/2024    Procedure: LAPAROSCOPY, DIAGNOSTIC;  Surgeon: Chen Jerome MD;  Location: [facility] OR;  Service: General;  Laterality: N/A;    EGD - EXTERNAL RESULT  06/20/2012    Dr Boyle- Mild gastritis otherwise normal.    ENDOSCOPIC ULTRASOUND OF UPPER GASTROINTESTINAL TRACT N/A 7/26/2024    Procedure: ULTRASOUND, UPPER GI TRACT, ENDOSCOPIC;  Surgeon: Valdo Aguilera MD;  Location: [facility];  Service: Endoscopy;  Laterality: N/A;  7/22/24: instructions sent via portal-. Pt no longer takling eliquis-GD    ESOPHAGOGASTRODUODENOSCOPY N/A 02/08/2024    Procedure: EGD (ESOPHAGOGASTRODUODENOSCOPY);  Surgeon: Jayla Arteaga MD;  Location: [facility] ENDO;  Service: Endoscopy;  Laterality: N/A;    ESOPHAGOGASTRODUODENOSCOPY N/A 8/20/2024    Procedure: EGD (ESOPHAGOGASTRODUODENOSCOPY);  Surgeon: Chen Jerome MD;  Location: [facility] OR;  Service: General;  Laterality: N/A;    GASTRECTOMY N/A 8/20/2024    Procedure: GASTRECTOMY;  Surgeon: Chen Jerome MD;  Location: [facility] OR;  Service: General;  Laterality: N/A;    HYSTERECTOMY      LAPAROSCOPIC LYSIS OF ADHESIONS N/A 3/14/2024    Procedure: LYSIS, ADHESIONS, LAPAROSCOPIC;  Surgeon: Chen Jerome MD;   Location: [facility] OR;  Service: General;  Laterality: N/A;    LYSIS OF ADHESIONS N/A 8/20/2024    Procedure: LYSIS, ADHESIONS;  Surgeon: Chen Jerome MD;  Location: [facility] [place] OR;  Service: General;  Laterality: N/A;    PLACEMENT OF JEJUNOSTOMY TUBE N/A 8/20/2024    Procedure: INSERTION, JEJUNOSTOMY TUBE;  Surgeon: Chen Jerome MD;  Location: [facility] [place] OR;  Service: General;  Laterality: N/A;    TOTAL ABDOMINAL HYSTERECTOMY W/ BILATERAL SALPINGOOPHORECTOMY  01/09/2023    radical resection with right salpingo-oophorectomy, left salpingo-oophorectomy, extensive enterolysis, extensive adhesiolysis, left pelvic lymph node biopsy, omental biopsy, small bowel resection with primary functional end-to-end anastomosis, placement of pelvic drain        Social History     Socioeconomic History    Marital status:    Tobacco Use    Smoking status: Former     Types: Cigarettes    Smokeless tobacco: Never    Tobacco comments:     Quit 1989 smoked 10 years smoked 0.25 ppd    Substance and Sexual Activity    Alcohol use: Not Currently    Drug use: Never     Social Drivers of Health     Financial Resource Strain: Low Risk  (10/25/2024)    Overall Financial Resource Strain (CARDIA)     Difficulty of Paying Living Expenses: Not hard at all   Food Insecurity: No Food Insecurity (10/25/2024)    Hunger Vital Sign     Worried About Running Out of Food in the Last Year: Never true     Ran Out of Food in the Last Year: Never true   Transportation Needs: No Transportation Needs (10/25/2024)    TRANSPORTATION NEEDS     Transportation : No   Physical Activity: Inactive (2/23/2024)    Exercise Vital Sign     Days of Exercise per Week: 0 days     Minutes of Exercise per Session: 10 min   Stress: No Stress Concern Present (10/25/2024)    [employer] - Occupational Stress Questionnaire     Feeling of Stress : Not at all   Housing Stability: Low Risk  (10/25/2024)    Housing Stability Vital Sign     Unable to Pay for  Housing in the Last Year: No     Homeless in the Last Year: No        Family History   Problem Relation Name Age of Onset    Pancreatic cancer Brother Misael Mccray 76    Prostate cancer Brother Zachary 67    Pancreatic cancer Half-brother Gasper 74    Colon cancer Half-sister Etlon 83    Lung cancer Half-sister Elton         +smoking hx    Breast cancer Half-sister Vidhi 58    Lymphoma Other Cara Sun    Prostate cancer Other Fernando     Prostate cancer Other Gasper Calderon     Ovarian cancer Other Inerris     Breast cancer Other Aleida     Colon cancer Other Aleida         Review of patient's allergies indicates:  No Known Allergies    Medications:      No current facility-administered medications on file prior to encounter.     Current Outpatient Medications on File Prior to Encounter   Medication Sig Dispense Refill    acetaminophen (TYLENOL) 500 MG tablet Take 2 tablets (1,000 mg total) by mouth every 8 (eight) hours. (Patient taking differently: Take 1,000 mg by mouth every 8 (eight) hours as needed for Pain.)  0    alendronate (FOSAMAX) 70 MG tablet Take 70 mg by mouth every 7 days. (Sundays)      apixaban (ELIQUIS) 5 mg Tab Take 2 tablets (10 mg total) by mouth 2 (two) times daily for 3 days THEN take 1 tablet by mouth twice a day 372 tablet 0    calcium phosphate trib/vit D3 (CALCIUM PHOSPHATE-VITAMIN D3 ORAL) Take 1 tablet by mouth 2 (two) times daily.      carBAMazepine (TEGRETOL) 200 mg tablet 1 tablet with breakfast  1 tablet with lunch   1.5 tablet at bedtime      ferrous sulfate 325 (65 FE) MG EC tablet Take 65 mg by mouth 2 (two) times daily with meals.      hydrocortisone 2.5 % cream Apply topically 2 (two) times daily. 28 g 1    hydrOXYzine HCL (ATARAX) 25 MG tablet Take 1 tablet (25 mg total) by mouth 3 (three) times daily as needed for Itching. 90 tablet 1    levETIRAcetam (KEPPRA) 500 MG Tab Take 1 tablet by mouth 2 (two) times daily.      zonisamide (ZONEGRAN) 100 MG Cap Take 200 mg  "by mouth 2 (two) times daily.         VITALS (Last 24H):  Temp:  [97.6 °F (36.4 °C)-98.4 °F (36.9 °C)]   Pulse:  [68-90]   Resp:  [17-19]   BP: ()/(54-62)   SpO2:  [97 %-100 %]     INTAKE & OUTPUT (Last 24H):    Intake/Output Summary (Last 24 hours) at 10/25/2024 1428  Last data filed at 10/25/2024 1415  Gross per 24 hour   Intake 1371 ml   Output 353 ml   Net 1018 ml         PHYSICAL EXAM:    No acute distress alert and oriented   Respirations even unlabored   Abdomen is soft nontender   Right percutaneous nephrostomy tube in place with dressing intact.  Hematuric drainage from the tube.  Significant edema of the right lower extremity    Laboratory Data:  Reviewed and noted in plan where applicable. Please see chart for full laboratory data.    Recent Labs   Lab 10/24/24  1658 10/24/24  2224 10/25/24  0331   CPK 25  --   --    TROPONINI <0.006   < > <0.006    < > = values in this interval not displayed.    No results for input(s): "POCTGLUCOSE" in the last 24 hours.     Lab Results   Component Value Date    INR 1.2 10/09/2024    INR 1.0 02/07/2024       Lab Results   Component Value Date    WBC 10.52 10/24/2024    HGB 10.1 (L) 10/24/2024    HCT 30.4 (L) 10/24/2024    MCV 95 10/24/2024     (H) 10/24/2024       BMP  Recent Labs   Lab 10/24/24  2224         K 3.2*   *   CO2 19*   BUN 11   CREATININE 0.7   CALCIUM 7.8*   MG 2.4         Radiology:  Reviewed and noted in plan where applicable. Please see chart for full radiology data.  CT Head Without Contrast  Narrative: EXAMINATION:  CT HEAD WITHOUT CONTRAST    CLINICAL HISTORY:  Syncope, recurrent;    TECHNIQUE:  Low dose axial CT images obtained throughout the head without intravenous contrast. Sagittal and coronal reconstructions were performed.    COMPARISON:  None.    FINDINGS:  Intracranial compartment:    Ventricles and sulci are normal in size for age without evidence of hydrocephalus. No extra-axial blood or fluid " collections.    30No parenchymal mass, hemorrhage, edema or major vascular distribution infarct.    Skull/extracranial contents (limited evaluation): No fracture. Mastoid air cells and paranasal sinuses are essentially clear.  Impression: No acute abnormality.    Electronically signed by: Rama Paris  Date:    10/24/2024  Time:    19:48  X-Ray Chest AP Portable  Narrative: EXAMINATION:  XR CHEST AP PORTABLE    CLINICAL HISTORY:  syncope;    TECHNIQUE:  Single frontal view of the chest was performed.    COMPARISON:  Multiple    FINDINGS:  The lungs are clear, with normal appearance of pulmonary vasculature and no pleural effusion or pneumothorax.    The cardiac silhouette is normal in size. The hilar and mediastinal contours are unremarkable.    Bones are intact.  Impression: No acute abnormality.    Electronically signed by: Caden Valdez  Date:    10/24/2024  Time:    14:28       ASSESSMENT/PLAN:   Right ureteral calculus.  Impacted ureteral stone may require treatment with ESWL once patient can safely be off of anticoagulation for 5-7 days.( 3 days prior and 2 days post op).  Discussed if patient is not cleared to temporarily come off anticoagulation within 3 months the nephrostomy tube may need to be exchanged. Schedule outpatient follow up with Dr. Kelly.     Hematuria.  Suspect combination nephrostomy tube and anticoagulation causing hematuria.  The tube appears to be draining.  We will verify position with a KUB.    UTI?  Patient was being covered with antibiotic therapy.  Urinalysis did show nitrite positivity but may be secondary to gross hematuria.  Follow up urine cultures.

## 2024-10-25 NOTE — SUBJECTIVE & OBJECTIVE
Interval History: Patient reports generalized body fatigue, discomfort to the PCN tube site. Urology consulted to evaluate tube, appreciate recommendations. Patient reports atBLE abdominal pain, reports slight improvement with tylenol. Patient denies any nausea, CP and SOB. Does endorse chronic diarrhea.     Review of Systems See interval history for ROS     Objective:     Vital Signs (Most Recent):  Temp: 98.3 °F (36.8 °C) (10/25/24 1619)  Pulse: 73 (10/25/24 1759)  Resp: 20 (10/25/24 1619)  BP: (!) 94/53 (10/25/24 1619)  SpO2: 95 % (10/25/24 1619) Vital Signs (24h Range):  Temp:  [97.6 °F (36.4 °C)-98.4 °F (36.9 °C)] 98.3 °F (36.8 °C)  Pulse:  [68-90] 73  Resp:  [17-20] 20  SpO2:  [95 %-100 %] 95 %  BP: ()/(53-62) 94/53     Weight: 49.7 kg (109 lb 9.1 oz)  Body mass index is 18.23 kg/m².    Intake/Output Summary (Last 24 hours) at 10/25/2024 1846  Last data filed at 10/25/2024 1815  Gross per 24 hour   Intake 2605 ml   Output 353 ml   Net 2252 ml         Physical Exam  Vitals and nursing note reviewed.   Constitutional:       General: She is not in acute distress.     Appearance: She is ill-appearing (chronically).   HENT:      Mouth/Throat:      Mouth: Mucous membranes are dry.      Pharynx: Oropharynx is clear.   Eyes:      Pupils: Pupils are equal, round, and reactive to light.   Cardiovascular:      Rate and Rhythm: Normal rate and regular rhythm.      Heart sounds: No murmur heard.  Pulmonary:      Effort: Pulmonary effort is normal.      Breath sounds: Normal breath sounds. No wheezing.   Abdominal:      General: Bowel sounds are normal. There is no distension.      Palpations: Abdomen is soft.      Tenderness: There is abdominal tenderness.      Comments: Left - J tube, clamped, dressing intact around tube  Some mild abdominal TTP-- generalized and around tube      Musculoskeletal:         General: Swelling and tenderness present. Normal range of motion.      Comments: Chronic swelling/pain to RLE  (recently diagnosed with extensive DVT)    Skin:     General: Skin is warm and dry.   Neurological:      General: No focal deficit present.      Mental Status: She is alert and oriented to person, place, and time.             Significant Labs: All pertinent labs within the past 24 hours have been reviewed.  Recent Lab Results         10/25/24  0331   10/24/24  2224        Anion Gap   12       BUN   11       Calcium   7.8       Chloride   112       CO2   19       Creatinine   0.7       eGFR   >60       Glucose   107       Magnesium    2.4       Potassium   3.2       Sodium   143       Troponin I <0.006  Comment: The reference interval for Troponin I represents the 99th percentile   cutoff   for our facility and is consistent with 3rd generation assay   performance.     <0.006  Comment: The reference interval for Troponin I represents the 99th percentile   cutoff   for our facility and is consistent with 3rd generation assay   performance.                 Significant Imaging: I have reviewed all pertinent imaging results/findings within the past 24 hours.

## 2024-10-25 NOTE — PLAN OF CARE
Nutrition Recommendations/Interventions for malnutrition 10/25/24:   1. Recommend pt continues on continues Enteral nutrition, Peptamen AF via J tube at 40 mL/hr, per pt tolerance and/or per MD/NP    -120 mL q4h free water flushes (720 mL/day), per MD/NP   -Monitor Mg, K+, Na, Phos and Glu, correct as indicated   2. Recommend pt continues on Full liquid diet as pleasure feeds   3. Recommend Banatrol TID to assist with diarrhea or BID for loose stools if warranted   4. Weigh twice weekly  5. Collaboration by nutrition professional with other providers     Goals:   1. Pt will continue to tolerate and intake >75% EEN and EPN prior to RD follow up   2. Pt's diarrhea/loose stools will improve prior to RD follow up    TAWANA Quiñonez, RDN, LDN

## 2024-10-25 NOTE — PLAN OF CARE
[facility] - Med Surg  Initial Discharge Assessment       Primary Care Provider: Gagandeep Iglesias MD    Admission Diagnosis: Hypokalemia [E87.6]  Syncope [R55]  Urinary tract infection with hematuria, site unspecified [N39.0, R31.9]  Anemia, unspecified type [D64.9]    Admission Date: 10/24/2024  Expected Discharge Date: Per Attending     Transition of Care Barriers: None    Payor: [employer] MANAGED MEDICARE / Plan: [employer] MEDICARE HMO / Product Type: Capitation /     Extended Emergency Contact Information  Primary Emergency Contact: Ra Kirkland  Address: [address] [address]  Home Phone: [phone]  Mobile Phone: [phone]  Relation: Spouse  Secondary Emergency Contact: Elton Leary  Address: [address]  Home Phone: [phone]  Relation: Sister    Discharge Plan A: Skilled Nursing Facility  Discharge Plan B: Home Health      [id number] [place], [place] - [address] [address] [address]  Phone: [phone] Fax: [phone]    [facility] - [facility] [address] [facility]  [address] [facility] [address]  Phone: [phone] Fax: [phone]      Initial Assessment (most recent)       Adult Discharge Assessment - 10/25/24 1056          Discharge Assessment    Assessment Type Discharge Planning Assessment     Confirmed/corrected address, phone number and insurance Yes     Confirmed Demographics Correct on Facesheet     Source of Information patient     Communicated SOFIYA with patient/caregiver Date not available/Unable to determine     Reason For Admission syncope     People in Home spouse     [facility]ility [facility] From[facility]     Do you expect to return to your current living situation? Yes     Do you have help at home or someone to help you manage your care at home? Yes     Who are your caregiver(s)  and their phone number(s)? spouse     Prior to hospitilization cognitive status: Alert/Oriented     Current cognitive status: Alert/Oriented     Walking or Climbing Stairs Difficulty no     Dressing/Bathing Difficulty no     Home Layout Able to live on 1st floor     Equipment Currently Used at Home none     Readmission within 30 days? No     Patient currently being followed by outpatient case management? No     Do you currently have service(s) that help you manage your care at home? No     Do you take prescription medications? Yes     Do you have prescription coverage? Yes     Coverage [employer]     Do you have any problems affording any of your prescribed medications? No     Is the patient taking medications as prescribed? yes     Who is going to help you get home at discharge? [facility] transportation     How do you get to doctors appointments? family or friend will provide;car, drives self     Are you on dialysis? No     Do you take coumadin? No     Discharge Plan A Skilled Nursing Facility     Discharge Plan B Home Health     DME Needed Upon Discharge  none     Discharge Plan discussed with: Patient     Transition of Care Barriers None                      Anticipated DC dispo: [facility] vs    Prior Level of Function: Independent   People in home:  Spouse     Comments:  CM met with patient at bedside to introduce role and discuss discharge planning. Patient is from [facility]; unsure if wants to return upon d/c. CM discharge needs depends on hospital progress. CM will continue following to assist with other needs.

## 2024-10-25 NOTE — HPI
Patient is a 74-year-old female with past medical history significant for hypertension, hyperlipidemia, DVT in right lower extremity, epilepsy, anemia, neuropathy, GERD, gastritis, polyps, gastric adenocarcinoma status post treatment with Keytruda (followed by Dr. Jerome and Dr. Ambrose) and total gastrectomy on 08/20/2024, jejunostomy tube on supplemental tube feedings, chronic diarrhea, ovarian cancer (s/p surgery/chemotherapy), osteoporosis and recent admission 10/9/24 through 10/21/2024 due to 8 mm right ureteral obstructing stone with right-sided hydronephrosis s/p cystoscopy and right nephrostomy tube placement, and acute cystitis with SHELLEY also found to have extensive DVT RLE treated with heparin drip transitioned to Eliquis, was discharged to SNF at [facility]. On 10/24, she was having physical therapy at SNF and had syncopal episode. There was no fall or injury, however she has been confused and does not remember what has been going on today.  initial blood pressure on arrival to /41, heart rate 79, temp 98.5°, respirations 18, 100% SpO2 on room air.  Lab workup demonstrate WBC 10.52 with left shift, RBC 3.20, hemoglobin 10.1, hematocrit 30.4, platelets 559, potassium 2.9, CO2 19, BUN 11, creatinine 0.7, glucose 115, calcium 7.2, magnesium 1.5, alk phos 174, total protein 5.0, albumin 1.8, total bilirubin 0.3, AST 12, ALT 17, BNP 29, CPK 25, trop less than 0.006, UA- with cloudy brown urine positive for nitrite, trace leukocyte esterase, greater than 100 RBC, 9 WBC. EKG -- NSR HR 76, nonspecific ST and T wave abnormality, no STEMI. CXR demonstrates that lungs are clear. CT head without contrast done with no acute abnormality. Recent Echo done 10/10/24 with EF 65-70% and normal diastolic function. While in ED, she was given potassium 40 po and magnesium sulfate 2 g IV as well as IV Rocephin. Hospital Medicine was consulted for admission due to syncope with continued confusion, UTI,  hypokalemia, and hypomagnesemia.

## 2024-10-25 NOTE — ASSESSMENT & PLAN NOTE
Recent imaging with 8 mm obstructing/impacted stone on right side -- s/p cystoscopy and right nephrostomy tube placement  Urology consulted , appreciate recommendations

## 2024-10-25 NOTE — PROGRESS NOTES
"P.T. COMPLETED CHART REVIEW AND MET PT AND  IN ROOM. PT REPROTED, " NO ONE LISTENS TO ME. I AM NOT READY TO DO ANYTHING. I AM 74 Y.O. AND I DONT LIKE CREAM OF WHEAT AND IM NOT GOING TO LIKE IT." PT REFUSED ALL THERAPY TODAY. P.T. ASKED NURSE TO CONTACT [facility] TO ORDER PT SOME GRITS. PT LEFT WITH ALL NEEDS MET . P.T. TO ATTEMPT EVAL NEXT VISIT. THANK YOU Kimberli Andrews, PT    "

## 2024-10-26 LAB
ALBUMIN SERPL BCP-MCNC: 1.8 G/DL (ref 3.5–5.2)
ALP SERPL-CCNC: 188 U/L (ref 40–150)
ALT SERPL W/O P-5'-P-CCNC: 18 U/L (ref 10–44)
ANION GAP SERPL CALC-SCNC: 8 MMOL/L (ref 8–16)
AST SERPL-CCNC: 16 U/L (ref 10–40)
BACTERIA UR CULT: NO GROWTH
BASOPHILS # BLD AUTO: 0.03 K/UL (ref 0–0.2)
BASOPHILS NFR BLD: 0.3 % (ref 0–1.9)
BILIRUB SERPL-MCNC: 0.4 MG/DL (ref 0.1–1)
BUN SERPL-MCNC: 7 MG/DL (ref 8–23)
CALCIUM SERPL-MCNC: 7 MG/DL (ref 8.7–10.5)
CHLORIDE SERPL-SCNC: 112 MMOL/L (ref 95–110)
CO2 SERPL-SCNC: 16 MMOL/L (ref 23–29)
CREAT SERPL-MCNC: 0.6 MG/DL (ref 0.5–1.4)
DIFFERENTIAL METHOD BLD: ABNORMAL
EOSINOPHIL # BLD AUTO: 0 K/UL (ref 0–0.5)
EOSINOPHIL NFR BLD: 0.4 % (ref 0–8)
ERYTHROCYTE [DISTWIDTH] IN BLOOD BY AUTOMATED COUNT: 19.4 % (ref 11.5–14.5)
EST. GFR  (NO RACE VARIABLE): >60 ML/MIN/1.73 M^2
GLUCOSE SERPL-MCNC: 139 MG/DL (ref 70–110)
HCT VFR BLD AUTO: 26 % (ref 37–48.5)
HGB BLD-MCNC: 8.9 G/DL (ref 12–16)
IMM GRANULOCYTES # BLD AUTO: 0.19 K/UL (ref 0–0.04)
IMM GRANULOCYTES NFR BLD AUTO: 1.7 % (ref 0–0.5)
LYMPHOCYTES # BLD AUTO: 1.9 K/UL (ref 1–4.8)
LYMPHOCYTES NFR BLD: 17.6 % (ref 18–48)
MAGNESIUM SERPL-MCNC: 1.7 MG/DL (ref 1.6–2.6)
MCH RBC QN AUTO: 31.4 PG (ref 27–31)
MCHC RBC AUTO-ENTMCNC: 34.2 G/DL (ref 32–36)
MCV RBC AUTO: 92 FL (ref 82–98)
MONOCYTES # BLD AUTO: 1.1 K/UL (ref 0.3–1)
MONOCYTES NFR BLD: 10.2 % (ref 4–15)
NEUTROPHILS # BLD AUTO: 7.6 K/UL (ref 1.8–7.7)
NEUTROPHILS NFR BLD: 69.8 % (ref 38–73)
NRBC BLD-RTO: 0 /100 WBC
PHOSPHATE SERPL-MCNC: 1.5 MG/DL (ref 2.7–4.5)
PLATELET # BLD AUTO: 526 K/UL (ref 150–450)
PMV BLD AUTO: 8.3 FL (ref 9.2–12.9)
POTASSIUM SERPL-SCNC: 2.5 MMOL/L (ref 3.5–5.1)
PROT SERPL-MCNC: 4.9 G/DL (ref 6–8.4)
RBC # BLD AUTO: 2.83 M/UL (ref 4–5.4)
SODIUM SERPL-SCNC: 136 MMOL/L (ref 136–145)
TROPONIN I SERPL DL<=0.01 NG/ML-MCNC: <0.006 NG/ML (ref 0–0.03)
WBC # BLD AUTO: 10.94 K/UL (ref 3.9–12.7)

## 2024-10-26 PROCEDURE — 25000003 PHARM REV CODE 250: Performed by: NURSE PRACTITIONER

## 2024-10-26 PROCEDURE — 36569 INSJ PICC 5 YR+ W/O IMAGING: CPT

## 2024-10-26 PROCEDURE — C1751 CATH, INF, PER/CENT/MIDLINE: HCPCS

## 2024-10-26 PROCEDURE — 25000003 PHARM REV CODE 250

## 2024-10-26 PROCEDURE — 84484 ASSAY OF TROPONIN QUANT: CPT | Performed by: NURSE PRACTITIONER

## 2024-10-26 PROCEDURE — 63600175 PHARM REV CODE 636 W HCPCS: Performed by: NURSE PRACTITIONER

## 2024-10-26 PROCEDURE — 85025 COMPLETE CBC W/AUTO DIFF WBC: CPT | Performed by: NURSE PRACTITIONER

## 2024-10-26 PROCEDURE — 02HV33Z INSERTION OF INFUSION DEVICE INTO SUPERIOR VENA CAVA, PERCUTANEOUS APPROACH: ICD-10-PCS | Performed by: FAMILY MEDICINE

## 2024-10-26 PROCEDURE — 83735 ASSAY OF MAGNESIUM: CPT | Performed by: NURSE PRACTITIONER

## 2024-10-26 PROCEDURE — G0427 INPT/ED TELECONSULT70: HCPCS | Mod: 95,,, | Performed by: INTERNAL MEDICINE

## 2024-10-26 PROCEDURE — 63600175 PHARM REV CODE 636 W HCPCS: Performed by: INTERNAL MEDICINE

## 2024-10-26 PROCEDURE — 25000003 PHARM REV CODE 250: Performed by: INTERNAL MEDICINE

## 2024-10-26 PROCEDURE — 80053 COMPREHEN METABOLIC PANEL: CPT | Performed by: NURSE PRACTITIONER

## 2024-10-26 PROCEDURE — 84100 ASSAY OF PHOSPHORUS: CPT | Performed by: NURSE PRACTITIONER

## 2024-10-26 PROCEDURE — 21400001 HC TELEMETRY ROOM

## 2024-10-26 RX ORDER — MAGNESIUM SULFATE HEPTAHYDRATE 40 MG/ML
2 INJECTION, SOLUTION INTRAVENOUS ONCE
Status: COMPLETED | OUTPATIENT
Start: 2024-10-26 | End: 2024-10-26

## 2024-10-26 RX ORDER — POTASSIUM CHLORIDE 20 MEQ/1
40 TABLET, EXTENDED RELEASE ORAL 2 TIMES DAILY
Status: DISCONTINUED | OUTPATIENT
Start: 2024-10-26 | End: 2024-10-26

## 2024-10-26 RX ORDER — POTASSIUM CHLORIDE 7.45 MG/ML
10 INJECTION INTRAVENOUS
Status: DISCONTINUED | OUTPATIENT
Start: 2024-10-26 | End: 2024-10-26

## 2024-10-26 RX ORDER — MUPIROCIN 20 MG/G
OINTMENT TOPICAL 2 TIMES DAILY
Status: COMPLETED | OUTPATIENT
Start: 2024-10-26 | End: 2024-10-31

## 2024-10-26 RX ORDER — OXYCODONE HYDROCHLORIDE 5 MG/1
5 TABLET ORAL EVERY 6 HOURS PRN
Status: DISCONTINUED | OUTPATIENT
Start: 2024-10-26 | End: 2024-11-06 | Stop reason: HOSPADM

## 2024-10-26 RX ADMIN — Medication 400 ML: at 06:10

## 2024-10-26 RX ADMIN — ZONISAMIDE 200 MG: 100 CAPSULE ORAL at 09:10

## 2024-10-26 RX ADMIN — POTASSIUM PHOSPHATE, MONOBASIC AND POTASSIUM PHOSPHATE, DIBASIC 30 MMOL: 224; 236 INJECTION, SOLUTION, CONCENTRATE INTRAVENOUS at 06:10

## 2024-10-26 RX ADMIN — OXYCODONE HYDROCHLORIDE 5 MG: 5 TABLET ORAL at 11:10

## 2024-10-26 RX ADMIN — MUPIROCIN: 20 OINTMENT TOPICAL at 09:10

## 2024-10-26 RX ADMIN — Medication 400 ML: at 02:10

## 2024-10-26 RX ADMIN — Medication 400 ML: at 10:10

## 2024-10-26 RX ADMIN — CARBAMAZEPINE 200 MG: 200 TABLET ORAL at 09:10

## 2024-10-26 RX ADMIN — LEVETIRACETAM 500 MG: 500 TABLET, FILM COATED ORAL at 09:10

## 2024-10-26 RX ADMIN — Medication 1 TABLET: at 09:10

## 2024-10-26 RX ADMIN — ACETAMINOPHEN 1000 MG: 500 TABLET ORAL at 09:10

## 2024-10-26 RX ADMIN — POTASSIUM BICARBONATE 50 MEQ: 978 TABLET, EFFERVESCENT ORAL at 09:10

## 2024-10-26 RX ADMIN — ACETAMINOPHEN 1000 MG: 500 TABLET ORAL at 02:10

## 2024-10-26 RX ADMIN — APIXABAN 5 MG: 2.5 TABLET, FILM COATED ORAL at 08:10

## 2024-10-26 RX ADMIN — ONDANSETRON 4 MG: 2 INJECTION INTRAMUSCULAR; INTRAVENOUS at 04:10

## 2024-10-26 RX ADMIN — Medication 400 ML: at 11:10

## 2024-10-26 RX ADMIN — CEFTRIAXONE 1 G: 1 INJECTION, POWDER, FOR SOLUTION INTRAMUSCULAR; INTRAVENOUS at 01:10

## 2024-10-26 RX ADMIN — Medication 1 TABLET: at 08:10

## 2024-10-26 RX ADMIN — FERROUS SULFATE TAB 325 MG (65 MG ELEMENTAL FE) 1 EACH: 325 (65 FE) TAB at 08:10

## 2024-10-26 RX ADMIN — CARBAMAZEPINE 200 MG: 200 TABLET ORAL at 02:10

## 2024-10-26 RX ADMIN — APIXABAN 5 MG: 2.5 TABLET, FILM COATED ORAL at 09:10

## 2024-10-26 RX ADMIN — MAGNESIUM SULFATE HEPTAHYDRATE 2 G: 40 INJECTION, SOLUTION INTRAVENOUS at 06:10

## 2024-10-26 RX ADMIN — FERROUS SULFATE TAB 325 MG (65 MG ELEMENTAL FE) 1 EACH: 325 (65 FE) TAB at 09:10

## 2024-10-26 RX ADMIN — CARBAMAZEPINE 200 MG: 200 TABLET ORAL at 08:10

## 2024-10-26 NOTE — ASSESSMENT & PLAN NOTE
Anemia is likely due to chronic blood loss and Iron deficiency. Most recent hemoglobin and hematocrit are listed below.  Recent Labs     10/24/24  1247 10/26/24  1640   HGB 10.1* 8.9*   HCT 30.4* 26.0*       Plan  - Monitor serial CBC: Daily  - Transfuse PRBC if patient becomes hemodynamically unstable, symptomatic or H/H drops below 7/21.  - Patient has not received any PRBC transfusions to date

## 2024-10-26 NOTE — ASSESSMENT & PLAN NOTE
Patient's most recent potassium results are listed below.   Recent Labs     10/24/24  1658 10/24/24  2224 10/26/24  1640   K 2.9* 3.2* 2.5*       Plan  - Replete potassium per protocol  - Monitor potassium Daily  - Patient's hypokalemia is being treated, will recheck labs again in a.m.

## 2024-10-26 NOTE — PLAN OF CARE
A508/A508 GERARDO Sageaakash Kirkland is a 74 y.o.female admitted on 10/24/2024 for Syncope   Code Status: Full Code MRN: [id number]   Review of patient's allergies indicates:  No Known Allergies  Past Medical History:   Diagnosis Date    Anemia     Chronic deep vein thrombosis (DVT) of left lower extremity 10/21/2022    Deep vein thrombosis     Drug-induced polyneuropathy 02/28/2024    Noted by ROCK KAY NP  last documented on [id number]    DVT (deep venous thrombosis)     Epilepsy     Gastric adenocarcinoma 02/27/2024    GERD (gastroesophageal reflux disease)     Hyperlipidemia 01/10/2013    Formatting of this note might be different from the original.   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 Will continue plan of care.      Orientation: disoriented to, situation  [place] Coma Scale Score: 14     Lead Monitored: Lead II Rhythm: normal sinus rhythm    Cardiac/Telemetry Box Number: [id number]    Last Bowel Movement: 10/26/24  Diet Full Liquid  Voiding Characteristics: urostomy  [place] Score: 16  Fall Risk Score: 17  Accucheck []   Freq?      Lines/Drains/Airways       Peripherally Inserted Central Catheter Line  Duration             PICC Double Lumen 10/26/24 1221 right basilic <1 day              Drain  Duration                  Gastrostomy/Enterostomy 08/20/24 2009 Jejunostomy tube LUQ feeding 66 days         Nephrostomy 10/21/24 1030 Right 8.5 Fr. 5 days    Female External Urinary Catheter w/ Suction 10/24/24 2000 1 day              Peripheral Intravenous  Line  Duration                  Midline Catheter - Single Lumen 10/25/24 0902 Left basilic vein (medial side of arm) other (see comments) 1 day                       Problem: Adult Inpatient Plan of Care  Goal: Plan of Care Review  Outcome: Progressing  Goal: Patient-Specific Goal (Individualized)  Outcome: Progressing  Goal: Absence of Hospital-Acquired Illness or Injury  Outcome: Progressing  Goal: Optimal Comfort and Wellbeing  Outcome: Progressing  Goal: Readiness for Transition of Care  Outcome: Progressing     Problem: Wound  Goal: Optimal Coping  Outcome: Progressing  Goal: Optimal Functional Ability  Outcome: Progressing  Goal: Absence of Infection Signs and Symptoms  Outcome: Progressing  Goal: Improved Oral Intake  Outcome: Progressing  Goal: Optimal Pain Control and Function  Outcome: Progressing  Goal: Skin Health and Integrity  Outcome: Progressing  Goal: Optimal Wound Healing  Outcome: Progressing     Problem: Infection  Goal: Absence of Infection Signs and Symptoms  Outcome: Progressing     Problem: Fall Injury Risk  Goal: Absence of Fall and Fall-Related Injury  Outcome: Progressing     Problem: Skin Injury Risk Increased  Goal: Skin Health and Integrity  Outcome: Progressing

## 2024-10-26 NOTE — ASSESSMENT & PLAN NOTE
Patient has Abnormal Magnesium: hypomagnesemia. Will continue to monitor electrolytes closely. Will replace the affected electrolytes and repeat labs to be done after interventions completed. The patient's magnesium results have been reviewed and are listed below.  Recent Labs   Lab 10/26/24  1640   MG 1.7

## 2024-10-26 NOTE — SUBJECTIVE & OBJECTIVE
Interval History: Patient continues to report generalized body aches and abdominal pain. Reports improvement with PRN medication. Denies any nausea, vomiting, CP and SOB. Patient required PICC placement as phlebotomy and nursing staff were unable to obtain labs. Hypokalemia noted in labs will replete and continue to trend.      Review of Systems See interval history for ROS       Objective:     Vital Signs (Most Recent):  Temp: 98.6 °F (37 °C) (10/26/24 1627)  Pulse: 77 (10/26/24 1627)  Resp: 19 (10/26/24 1627)  BP: 132/61 (10/26/24 1627)  SpO2: (!) 92 % (10/26/24 1627) Vital Signs (24h Range):  Temp:  [98 °F (36.7 °C)-98.7 °F (37.1 °C)] 98.6 °F (37 °C)  Pulse:  [66-78] 77  Resp:  [17-20] 19  SpO2:  [92 %-100 %] 92 %  BP: (104-132)/(56-61) 132/61     Weight: 49.7 kg (109 lb 9.1 oz)  Body mass index is 18.23 kg/m².    Intake/Output Summary (Last 24 hours) at 10/26/2024 1740  Last data filed at 10/26/2024 1711  Gross per 24 hour   Intake 2451.6 ml   Output 45 ml   Net 2406.6 ml         Physical Exam  Vitals and nursing note reviewed.   Constitutional:       General: She is not in acute distress.     Appearance: She is ill-appearing (chronically).   HENT:      Mouth/Throat:      Mouth: Mucous membranes are dry.      Pharynx: Oropharynx is clear.   Eyes:      Pupils: Pupils are equal, round, and reactive to light.   Cardiovascular:      Rate and Rhythm: Normal rate and regular rhythm.      Heart sounds: No murmur heard.  Pulmonary:      Effort: Pulmonary effort is normal.      Breath sounds: Normal breath sounds. No wheezing.   Abdominal:      General: Bowel sounds are normal. There is no distension.      Palpations: Abdomen is soft.      Tenderness: There is abdominal tenderness.      Comments: Left - J tube, clamped, dressing intact around tube  Some mild abdominal TTP-- generalized and around tube      Musculoskeletal:         General: Swelling and tenderness present. Normal range of motion.      Comments: Chronic  swelling/pain to RLE (recently diagnosed with extensive DVT)    Skin:     General: Skin is warm and dry.   Neurological:      General: No focal deficit present.      Mental Status: She is alert and oriented to person, place, and time.      Significant Labs: All pertinent labs within the past 24 hours have been reviewed.  Recent Lab Results         10/26/24  1640        Albumin 1.8              ALT 18       Anion Gap 8       AST 16       Baso # 0.03       Basophil % 0.3       BILIRUBIN TOTAL 0.4  Comment: For infants and newborns, interpretation of results should be based  on gestational age, weight and in agreement with clinical  observations.    Premature Infant recommended reference ranges:  Up to 24 hours.............<8.0 mg/dL  Up to 48 hours............<12.0 mg/dL  3-5 days..................<15.0 mg/dL  6-29 days.................<15.0 mg/dL         BUN 7       Calcium 7.0       Chloride 112       CO2 16       Creatinine 0.6       Differential Method Automated       eGFR >60       Eos # 0.0       Eos % 0.4       Glucose 139       Gran # (ANC) 7.6       Gran % 69.8       Hematocrit 26.0       Hemoglobin 8.9       Immature Grans (Abs) 0.19  Comment: Mild elevation in immature granulocytes is non specific and   can be seen in a variety of conditions including stress response,   acute inflammation, trauma and pregnancy. Correlation with other   laboratory and clinical findings is essential.         Immature Granulocytes 1.7       Lymph # 1.9       Lymph % 17.6       Magnesium  1.7       MCH 31.4       MCHC 34.2       MCV 92       Mono # 1.1       Mono % 10.2       MPV 8.3       nRBC 0       Phosphorus Level 1.5       Platelet Count 526       Potassium 2.5  Comment: K  critical result(s) called and verbal readback obtained from SELENA MIMS by [facility] 10/26/2024 17:39         PROTEIN TOTAL 4.9       RBC 2.83       RDW 19.4       Sodium 136       Troponin I <0.006  Comment: The reference interval for Troponin  I represents the 99th percentile   cutoff   for our facility and is consistent with 3rd generation assay   performance.         WBC 10.94               Significant Imaging: I have reviewed all pertinent imaging results/findings within the past 24 hours.

## 2024-10-26 NOTE — PT/OT/SLP PROGRESS
Physical Therapy      Patient Name:  Cheryl Kirkland   MRN:  [id number]    Patient not seen today secondary to Bowel/bladder accident. PT arrived for evaluation. CNA reports that patient had soiled herself and brief and was needing to be cleaned up and changed. Requested follow up later. Will follow-up at next available time.

## 2024-10-26 NOTE — H&P (VIEW-ONLY)
[facility]  Hematology/Oncology  Consult Note    Patient Name: Cheryl Kirkland  MRN: [id number]  Admission Date: 10/24/2024  Hospital Length of Stay: 2 days  Code Status: Full Code   Attending Provider: Christy Ramos MD  Consulting Provider: Ofelia Lee MD  Primary Care Physician: Gagandeep Iglesias MD  Principal Problem:Syncope    Inpatient consult to Hematology/Oncology  Consult performed by: Ofelia Lee MD  Consult ordered by: Christy Ramos MD      The patient location is:  Hospital room 508  Visit type: Virtual visit with synchronous audio and video  Face-to-face or time spent with patient on the encounter:  Total time spent on and for  this encounter which includes non face-to-face time preparing to see patient, review of tests, obtaining and or reviewing separately obtained records documenting clinical information in the electronic or other health records, independently interpreting results which is not separately reported ,and communicating results to the patient/family/caregiver and in care coordination and treatment planning/communicating with pharmacy for prescriptions/addressing social needs/arranging follow-up and or referrals :    Each patient I provide medical services by telemedicine is:  (1) informed of the relationship between the physician and patient and the respective role of any other health care provider with respect to management of the patient; and (2) notified that he or she may decline to receive medical services by telemedicine and may withdraw from such care at any time.  This is a video visit therefore some elements of the physical exam such as vital signs, heart sounds are breath sounds are not included and may be included if found in recent clinic notes of other providers assessing same patient. Any symptoms or signs that were visualized were stated by the patient may be included in this note.   Subjective:     HPI:  74-year-old woman known to   Eloisa stage III T2 N3a M0 gastric adenocarcinoma  Patient has jejunostomy tube and is on supplemental tube feedings has chronic diarrhea patient had an obstructing ureteral stone on right side with right-sided hydronephrosis status post cystoscopy right nephrostomy tube placement and acute cystitis.  Recently found to have extensive right lower extremity DVT treated with heparin drip transition to [facility] she was discharge to SNF but had a syncopal episode was brought back to the hospital.   no fall or injury, however she has been confused and does not remember what has been going on today. initial blood pressure on arrival to /41, heart rate 79, temp 98.5°, respirations 18, 100% SpO2 on room air. Lab workup demonstrate WBC 10.52 with left shift, RBC 3.20, hemoglobin 10.1, hematocrit 30.4, platelets 559, potassium 2.9, CO2 19, BUN 11, creatinine 0.7, glucose 115, calcium 7.2, magnesium 1.5, alk phos 174, total protein 5.0, albumin 1.8, total bilirubin 0.3, AST 12, ALT 17, BNP 29, CPK 25, trop less than 0.006, UA- with cloudy brown urine positive for nitrite, trace leukocyte esterase, greater than 100 RBC, 9 WBC. EKG -- NSR HR 76, nonspecific ST and T wave abnormality, no STEMI. CXR demonstrates that lungs are clear. CT head without contrast done with no acute abnormality. Recent Echo done 10/10/24 with EF 65-70% and normal diastolic function. While in ED, she was given potassium 40 po and magnesium sulfate 2 g IV as well as IV Rocephin     Oncology history  Immunotherapy (Pembrolizumab 03/26/2024 - 07/23/2024)  s/p open total gastrectomy with D2 lymphadenectomy extensive lysis of adhesions on 08/20/2024.   Current Treatment:  Adjuvant Pembrolizumab   Oncology Treatment Plan:   OP pembrolizumab 200mg Q3W    Medications:  Continuous Infusions:  Scheduled Meds:   acetaminophen  1,000 mg Per J Tube Q8H    apixaban  5 mg Per J Tube BID    calcium-vitamin D3  1 tablet Per J Tube BID    carBAMazepine  200 mg Per J  Tube TID    cefTRIAXone (Rocephin) IV (PEDS and ADULTS)  1 g Intravenous Q24H    electrolytes-dextrose  400 mL Per NG tube Q4H    ferrous sulfate  1 tablet Per J Tube BID    levETIRAcetam  500 mg Oral BID    magnesium sulfate IVPB  2 g Intravenous Once    mupirocin   Nasal BID    potassium bicarbonate  50 mEq Per J Tube BID    potassium phosphate IVPB  30 mmol Intravenous Once    zonisamide  200 mg Oral BID     PRN Meds:  Current Facility-Administered Medications:     dextrose 10%, 12.5 g, Intravenous, PRN    dextrose 10%, 25 g, Intravenous, PRN    glucagon (human recombinant), 1 mg, Intramuscular, PRN    glucose, 16 g, Oral, PRN    glucose, 24 g, Oral, PRN    influenza (adjuvanted), 0.5 mL, Intramuscular, vaccine x 1 dose    naloxone, 0.02 mg, Intravenous, PRN    ondansetron, 4 mg, Intravenous, Q6H PRN    oxyCODONE, 5 mg, Oral, Q6H PRN    sodium chloride 0.9%, 10 mL, Intravenous, Q8H PRN     Review of patient's allergies indicates:  No Known Allergies     Past Medical History:   Diagnosis Date    Anemia     Chronic deep vein thrombosis (DVT) of left lower extremity 10/21/2022    Deep vein thrombosis     Drug-induced polyneuropathy 02/28/2024    Noted by ROCK KAY NP  last documented on [id number]    DVT (deep venous thrombosis)     Epilepsy     Gastric adenocarcinoma 02/27/2024    GERD (gastroesophageal reflux disease)     Hyperlipidemia 01/10/2013    Formatting of this note might be different from the original.   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 Repeat in 3 years.    COLONOSCOPY  06/17/2015    -Nodular mucosa at appendiceal orfice, sigmoid and desc diverticulosis otherwise normal.    COLONOSCOPY  2020    >3 TAs    COLONOSCOPY  12/2023    CYSTOSCOPY W/ RETROGRADES Right 10/10/2024    Procedure: CYSTOSCOPY, WITH RETROGRADE PYELOGRAM;  Surgeon: Lata Kelly MD;  Location: [facility] OR;  Service: Urology;  Laterality: Right;    DIAGNOSTIC LAPAROSCOPY N/A 3/14/2024    Procedure: LAPAROSCOPY, DIAGNOSTIC;  Surgeon: Chen Jerome MD;  Location: [facility] OR;  Service: General;  Laterality: N/A;  1. Diagnostic laparoscopy   2. Extensive lysis of adhesions  3. Peritoneal biopsy   4. Peritoneal washings for cytology    DIAGNOSTIC LAPAROSCOPY N/A 8/20/2024    Procedure: LAPAROSCOPY, DIAGNOSTIC;  Surgeon: Chen Jerome MD;  Location: [facility];  Service: General;  Laterality: N/A;    EGD - EXTERNAL RESULT  06/20/2012    Dr Boyle- Mild gastritis otherwise normal.    ENDOSCOPIC ULTRASOUND OF UPPER GASTROINTESTINAL TRACT N/A 7/26/2024    Procedure: ULTRASOUND, UPPER GI TRACT, ENDOSCOPIC;  Surgeon: Valdo Aguilera MD;  Location: [facility];  Service: Endoscopy;  Laterality: N/A;  7/22/24: instructions sent via portal-. Pt no longer takling eliquis-GD    ESOPHAGOGASTRODUODENOSCOPY N/A 02/08/2024    Procedure: EGD (ESOPHAGOGASTRODUODENOSCOPY);  Surgeon: Jayla Arteaga MD;  Location: [facility] ENDO;  Service: Endoscopy;  Laterality: N/A;    ESOPHAGOGASTRODUODENOSCOPY N/A 8/20/2024    Procedure: EGD (ESOPHAGOGASTRODUODENOSCOPY);  Surgeon: Chen Jerome MD;  Location: [facility] OR;  Service: General;  Laterality: N/A;    GASTRECTOMY N/A 8/20/2024    Procedure: GASTRECTOMY;  Surgeon: Chen Jerome MD;  Location: [facility] OR;  Service: General;  Laterality: N/A;    HYSTERECTOMY      LAPAROSCOPIC LYSIS OF ADHESIONS N/A  3/14/2024    Procedure: LYSIS, ADHESIONS, LAPAROSCOPIC;  Surgeon: Chen Jerome MD;  Location: [facility] OR;  Service: General;  Laterality: N/A;    LYSIS OF ADHESIONS N/A 8/20/2024    Procedure: LYSIS, ADHESIONS;  Surgeon: Chen Jerome MD;  Location: [facility] OR;  Service: General;  Laterality: N/A;    PLACEMENT OF JEJUNOSTOMY TUBE N/A 8/20/2024    Procedure: INSERTION, JEJUNOSTOMY TUBE;  Surgeon: Chen Jerome MD;  Location: [facility] OR;  Service: General;  Laterality: N/A;    TOTAL ABDOMINAL HYSTERECTOMY W/ BILATERAL SALPINGOOPHORECTOMY  01/09/2023    radical resection with right salpingo-oophorectomy, left salpingo-oophorectomy, extensive enterolysis, extensive adhesiolysis, left pelvic lymph node biopsy, omental biopsy, small bowel resection with primary functional end-to-end anastomosis, placement of pelvic drain     Family History       Problem Relation (Age of Onset)    Breast cancer Half-sister (58), Other    Colon cancer Half-sister (83), Other    Lung cancer Half-sister    Lymphoma Other    Ovarian cancer Other    Pancreatic cancer Brother (76), Half-brother (74)    Prostate cancer Brother (67), Other, Other          Tobacco Use    Smoking status: Former     Types: Cigarettes    Smokeless tobacco: Never    Tobacco comments:     Quit 1989 smoked 10 years smoked 0.25 ppd    Substance and Sexual Activity    Alcohol use: Not Currently    Drug use: Never    Sexual activity: Not on file       Review of Systems  Patient is rather tired and sleepy reports generalized body pain and abdominal pain   Denies nausea vomiting chest pain or shortness of breath   in room has had difficulty with phlebotomies not eating much  Objective:     Vital Signs (Most Recent):  Temp: 98.6 °F (37 °C) (10/26/24 1627)  Pulse: 77 (10/26/24 1627)  Resp: 19 (10/26/24 1627)  BP: 132/61 (10/26/24 1627)  SpO2: (!) 92 % (10/26/24 1627) Vital Signs (24h Range):  Temp:  [98 °F (36.7 °C)-98.7 °F (37.1 °C)] 98.6 °F (37 °C)  Pulse:  [66-78]  77  Resp:  [17-20] 19  SpO2:  [92 %-100 %] 92 %  BP: (104-132)/(56-61) 132/61     Weight: 49.7 kg (109 lb 9.1 oz)  Body mass index is 18.23 kg/m².  Body surface area is 1.51 meters squared.      Intake/Output Summary (Last 24 hours) at 10/26/2024 1811  Last data filed at 10/26/2024 1711  Gross per 24 hour   Intake 2046.6 ml   Output 45 ml   Net 2001.6 ml       Physical Exam  VITAL SIGNS:  as above   GENERAL:  Be can frail No anxiety, no agitation, and in no distress.  Patient is awake, alert, oriented and cooperative.  HEENT:  Showed no congestion. Trachea is central no obvious icterus or pallor noted no hoarseness. no obvious JVD   NECK:  Supple.  No JVD. No obvious cervical submental or supraclavicular adenopathy.  RS:the visualized portion of  Chest expands well. chest appears symmetric, no audible wheezes.  No dyspnea recognized  ABDOMEN:  abdomen appears undistended.  EXTREMITIES:  Without edema.  NEUROLOGICAL:  The patient is appropriate, higher functions are normal.  No  obvious neurological deficits.  normal judgement normal thought content  No confusion, no speech impediment. Cranial nerves are intact and show no deficit. No gross motor deficits noted   SKIN MUSCULOSKELETAL: no joint or skeletal deformity, no clubbing of nails.  No visible rash ecchymosis or petechiae   Significant Labs:   Lab Results   Component Value Date    WBC 10.94 10/26/2024    HGB 8.9 (L) 10/26/2024    HCT 26.0 (L) 10/26/2024    MCV 92 10/26/2024     (H) 10/26/2024      CMP  Sodium   Date Value Ref Range Status   10/26/2024 136 136 - 145 mmol/L Final     Potassium   Date Value Ref Range Status   10/26/2024 2.5 (LL) 3.5 - 5.1 mmol/L Final     Comment:     K  critical result(s) called and verbal readback obtained from SELENA MIMS by [facility] 10/26/2024 17:39       Chloride   Date Value Ref Range Status   10/26/2024 112 (H) 95 - 110 mmol/L Final     CO2   Date Value Ref Range Status   10/26/2024 16 (L) 23 - 29 mmol/L Final      Glucose   Date Value Ref Range Status   10/26/2024 139 (H) 70 - 110 mg/dL Final     BUN   Date Value Ref Range Status   10/26/2024 7 (L) 8 - 23 mg/dL Final     Creatinine   Date Value Ref Range Status   10/26/2024 0.6 0.5 - 1.4 mg/dL Final     Calcium   Date Value Ref Range Status   10/26/2024 7.0 (L) 8.7 - 10.5 mg/dL Final     Total Protein   Date Value Ref Range Status   10/26/2024 4.9 (L) 6.0 - 8.4 g/dL Final     Albumin   Date Value Ref Range Status   10/26/2024 1.8 (L) 3.5 - 5.2 g/dL Final     Total Bilirubin   Date Value Ref Range Status   10/26/2024 0.4 0.1 - 1.0 mg/dL Final     Comment:     For infants and newborns, interpretation of results should be based  on gestational age, weight and in agreement with clinical  observations.    Premature Infant recommended reference ranges:  Up to 24 hours.............<8.0 mg/dL  Up to 48 hours............<12.0 mg/dL  3-5 days..................<15.0 mg/dL  6-29 days.................<15.0 mg/dL       Alkaline Phosphatase   Date Value Ref Range Status   10/26/2024 188 (H) 40 - 150 U/L Final     AST   Date Value Ref Range Status   10/26/2024 16 10 - 40 U/L Final     ALT   Date Value Ref Range Status   10/26/2024 18 10 - 44 U/L Final     Anion Gap   Date Value Ref Range Status   10/26/2024 8 8 - 16 mmol/L Final     eGFR   Date Value Ref Range Status   10/26/2024 >60 >60 mL/min/1.73 m^2 Final        Diagnostic Results:  I have reviewed all pertinent imaging in the last 24 hours    Assessment/Plan:     Active Diagnoses:    Diagnosis Date Noted POA    PRINCIPAL PROBLEM:  Syncope [R55] 10/24/2024 Yes    Hypomagnesemia [E83.42] 10/24/2024 Yes    Hypokalemia [E87.6] 10/11/2024 Yes    Ureteral stone with hydronephrosis [N13.2] 10/10/2024 Yes    Severe protein-calorie malnutrition [E43] 09/04/2024 Yes    Jejunostomy tube present [Z93.4] 09/03/2024 Not Applicable    Gastric adenocarcinoma [C16.9] 02/27/2024 Yes    DVT (deep venous thrombosis) [I82.409] 02/07/2024 Yes     Chronic     Hx of Epileptic seizures [G40.909] 02/07/2024 Yes     Chronic    Chronic anemia [D64.9] 02/07/2024 Yes      Problems Resolved During this Admission:     Recent DVT currently on Eliquis patient may need ureteral stone which is impacting to be removed taking patient off of anticoagulant maybe risky unless we are able to bridge him with Lovenox during procedure  If urology would be uncomfortable with this plan would recommend filter placement prior to procedure    Failure to thrive; patient showing significant evidence of protein calorie malnutrition/discomfort and frailty    Gastric adenocarcinoma;  Patient currently on adjuvant pembrolizumab S/p total gastrectomy on 8/20, with lysis of adhesions, lymphadenectomy, and jejunostomy tube placement, d/c on 9/10  - Pathologic stage from 8/20/2024: Stage III (ypT2, pN3a, cM0), completed 5 cycles Keytruda prior to surgery          Ofelia Lee MD  Hematology/Oncology  [facility]

## 2024-10-26 NOTE — CONSULTS
[facility]  Hematology/Oncology  Consult Note    Patient Name: Cheryl Kirkland  MRN: [id number]  Admission Date: 10/24/2024  Hospital Length of Stay: 2 days  Code Status: Full Code   Attending Provider: Christy Ramos MD  Consulting Provider: Ofelia Lee MD  Primary Care Physician: Gagandeep Iglesias MD  Principal Problem:Syncope    Inpatient consult to Hematology/Oncology  Consult performed by: Ofelia Lee MD  Consult ordered by: Christy Ramos MD      The patient location is:  Hospital room 508  Visit type: Virtual visit with synchronous audio and video  Face-to-face or time spent with patient on the encounter:  Total time spent on and for  this encounter which includes non face-to-face time preparing to see patient, review of tests, obtaining and or reviewing separately obtained records documenting clinical information in the electronic or other health records, independently interpreting results which is not separately reported ,and communicating results to the patient/family/caregiver and in care coordination and treatment planning/communicating with pharmacy for prescriptions/addressing social needs/arranging follow-up and or referrals :    Each patient I provide medical services by telemedicine is:  (1) informed of the relationship between the physician and patient and the respective role of any other health care provider with respect to management of the patient; and (2) notified that he or she may decline to receive medical services by telemedicine and may withdraw from such care at any time.  This is a video visit therefore some elements of the physical exam such as vital signs, heart sounds are breath sounds are not included and may be included if found in recent clinic notes of other providers assessing same patient. Any symptoms or signs that were visualized were stated by the patient may be included in this note.   Subjective:     HPI:  74-year-old woman known to   Eloisa stage III T2 N3a M0 gastric adenocarcinoma  Patient has jejunostomy tube and is on supplemental tube feedings has chronic diarrhea patient had an obstructing ureteral stone on right side with right-sided hydronephrosis status post cystoscopy right nephrostomy tube placement and acute cystitis.  Recently found to have extensive right lower extremity DVT treated with heparin drip transition to [facility] she was discharge to SNF but had a syncopal episode was brought back to the hospital.   no fall or injury, however she has been confused and does not remember what has been going on today. initial blood pressure on arrival to /41, heart rate 79, temp 98.5°, respirations 18, 100% SpO2 on room air. Lab workup demonstrate WBC 10.52 with left shift, RBC 3.20, hemoglobin 10.1, hematocrit 30.4, platelets 559, potassium 2.9, CO2 19, BUN 11, creatinine 0.7, glucose 115, calcium 7.2, magnesium 1.5, alk phos 174, total protein 5.0, albumin 1.8, total bilirubin 0.3, AST 12, ALT 17, BNP 29, CPK 25, trop less than 0.006, UA- with cloudy brown urine positive for nitrite, trace leukocyte esterase, greater than 100 RBC, 9 WBC. EKG -- NSR HR 76, nonspecific ST and T wave abnormality, no STEMI. CXR demonstrates that lungs are clear. CT head without contrast done with no acute abnormality. Recent Echo done 10/10/24 with EF 65-70% and normal diastolic function. While in ED, she was given potassium 40 po and magnesium sulfate 2 g IV as well as IV Rocephin     Oncology history  Immunotherapy (Pembrolizumab 03/26/2024 - 07/23/2024)  s/p open total gastrectomy with D2 lymphadenectomy extensive lysis of adhesions on 08/20/2024.   Current Treatment:  Adjuvant Pembrolizumab   Oncology Treatment Plan:   OP pembrolizumab 200mg Q3W    Medications:  Continuous Infusions:  Scheduled Meds:   acetaminophen  1,000 mg Per J Tube Q8H    apixaban  5 mg Per J Tube BID    calcium-vitamin D3  1 tablet Per J Tube BID    carBAMazepine  200 mg Per J  Tube TID    cefTRIAXone (Rocephin) IV (PEDS and ADULTS)  1 g Intravenous Q24H    electrolytes-dextrose  400 mL Per NG tube Q4H    ferrous sulfate  1 tablet Per J Tube BID    levETIRAcetam  500 mg Oral BID    magnesium sulfate IVPB  2 g Intravenous Once    mupirocin   Nasal BID    potassium bicarbonate  50 mEq Per J Tube BID    potassium phosphate IVPB  30 mmol Intravenous Once    zonisamide  200 mg Oral BID     PRN Meds:  Current Facility-Administered Medications:     dextrose 10%, 12.5 g, Intravenous, PRN    dextrose 10%, 25 g, Intravenous, PRN    glucagon (human recombinant), 1 mg, Intramuscular, PRN    glucose, 16 g, Oral, PRN    glucose, 24 g, Oral, PRN    influenza (adjuvanted), 0.5 mL, Intramuscular, vaccine x 1 dose    naloxone, 0.02 mg, Intravenous, PRN    ondansetron, 4 mg, Intravenous, Q6H PRN    oxyCODONE, 5 mg, Oral, Q6H PRN    sodium chloride 0.9%, 10 mL, Intravenous, Q8H PRN     Review of patient's allergies indicates:  No Known Allergies     Past Medical History:   Diagnosis Date    Anemia     Chronic deep vein thrombosis (DVT) of left lower extremity 10/21/2022    Deep vein thrombosis     Drug-induced polyneuropathy 02/28/2024    Noted by ROCK KAY NP  last documented on [id number]    DVT (deep venous thrombosis)     Epilepsy     Gastric adenocarcinoma 02/27/2024    GERD (gastroesophageal reflux disease)     Hyperlipidemia 01/10/2013    Formatting of this note might be different from the original.   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 OR;  Service: General;  Laterality: N/A;    COLONOSCOPY  06/20/2012    Dr Boyle- Tubular adenoma polyps. Repeat in 3 years.    COLONOSCOPY  06/17/2015    -Nodular mucosa at appendiceal orfice, sigmoid and desc diverticulosis otherwise normal.    COLONOSCOPY  2020    >3 TAs    COLONOSCOPY  12/2023    CYSTOSCOPY W/ RETROGRADES Right 10/10/2024    Procedure: CYSTOSCOPY, WITH RETROGRADE PYELOGRAM;  Surgeon: Lata Kelly MD;  Location: [facility] OR;  Service: Urology;  Laterality: Right;    DIAGNOSTIC LAPAROSCOPY N/A 3/14/2024    Procedure: LAPAROSCOPY, DIAGNOSTIC;  Surgeon: Chen Jerome MD;  Location: [facility] OR;  Service: General;  Laterality: N/A;  1. Diagnostic laparoscopy   2. Extensive lysis of adhesions  3. Peritoneal biopsy   4. Peritoneal washings for cytology    DIAGNOSTIC LAPAROSCOPY N/A 8/20/2024    Procedure: LAPAROSCOPY, DIAGNOSTIC;  Surgeon: Chen Jerome MD;  Location: [facility];  Service: General;  Laterality: N/A;    EGD - EXTERNAL RESULT  06/20/2012    Dr Boyle- Mild gastritis otherwise normal.    ENDOSCOPIC ULTRASOUND OF UPPER GASTROINTESTINAL TRACT N/A 7/26/2024    Procedure: ULTRASOUND, UPPER GI TRACT, ENDOSCOPIC;  Surgeon: Valdo Aguilera MD;  Location: [facility];  Service: Endoscopy;  Laterality: N/A;  7/22/24: instructions sent via portal-. Pt no longer takling eliquis-GD    ESOPHAGOGASTRODUODENOSCOPY N/A 02/08/2024    Procedure: EGD (ESOPHAGOGASTRODUODENOSCOPY);  Surgeon: Jayla Arteaga MD;  Location: [facility] ENDO;  Service: Endoscopy;  Laterality: N/A;    ESOPHAGOGASTRODUODENOSCOPY N/A 8/20/2024    Procedure: EGD (ESOPHAGOGASTRODUODENOSCOPY);  Surgeon: Chen Jerome MD;  Location: [facility] OR;  Service: General;  Laterality: N/A;    GASTRECTOMY N/A 8/20/2024    Procedure: GASTRECTOMY;  Surgeon: Chen Jerome MD;  Location: [facility] OR;  Service: General;  Laterality: N/A;    HYSTERECTOMY      LAPAROSCOPIC LYSIS OF ADHESIONS N/A  3/14/2024    Procedure: LYSIS, ADHESIONS, LAPAROSCOPIC;  Surgeon: Chen Jerome MD;  Location: [facility] OR;  Service: General;  Laterality: N/A;    LYSIS OF ADHESIONS N/A 8/20/2024    Procedure: LYSIS, ADHESIONS;  Surgeon: Chen Jerome MD;  Location: [facility] OR;  Service: General;  Laterality: N/A;    PLACEMENT OF JEJUNOSTOMY TUBE N/A 8/20/2024    Procedure: INSERTION, JEJUNOSTOMY TUBE;  Surgeon: Chen Jerome MD;  Location: [facility] OR;  Service: General;  Laterality: N/A;    TOTAL ABDOMINAL HYSTERECTOMY W/ BILATERAL SALPINGOOPHORECTOMY  01/09/2023    radical resection with right salpingo-oophorectomy, left salpingo-oophorectomy, extensive enterolysis, extensive adhesiolysis, left pelvic lymph node biopsy, omental biopsy, small bowel resection with primary functional end-to-end anastomosis, placement of pelvic drain     Family History       Problem Relation (Age of Onset)    Breast cancer Half-sister (58), Other    Colon cancer Half-sister (83), Other    Lung cancer Half-sister    Lymphoma Other    Ovarian cancer Other    Pancreatic cancer Brother (76), Half-brother (74)    Prostate cancer Brother (67), Other, Other          Tobacco Use    Smoking status: Former     Types: Cigarettes    Smokeless tobacco: Never    Tobacco comments:     Quit 1989 smoked 10 years smoked 0.25 ppd    Substance and Sexual Activity    Alcohol use: Not Currently    Drug use: Never    Sexual activity: Not on file       Review of Systems  Patient is rather tired and sleepy reports generalized body pain and abdominal pain   Denies nausea vomiting chest pain or shortness of breath   in room has had difficulty with phlebotomies not eating much  Objective:     Vital Signs (Most Recent):  Temp: 98.6 °F (37 °C) (10/26/24 1627)  Pulse: 77 (10/26/24 1627)  Resp: 19 (10/26/24 1627)  BP: 132/61 (10/26/24 1627)  SpO2: (!) 92 % (10/26/24 1627) Vital Signs (24h Range):  Temp:  [98 °F (36.7 °C)-98.7 °F (37.1 °C)] 98.6 °F (37 °C)  Pulse:  [66-78]  77  Resp:  [17-20] 19  SpO2:  [92 %-100 %] 92 %  BP: (104-132)/(56-61) 132/61     Weight: 49.7 kg (109 lb 9.1 oz)  Body mass index is 18.23 kg/m².  Body surface area is 1.51 meters squared.      Intake/Output Summary (Last 24 hours) at 10/26/2024 1811  Last data filed at 10/26/2024 1711  Gross per 24 hour   Intake 2046.6 ml   Output 45 ml   Net 2001.6 ml       Physical Exam  VITAL SIGNS:  as above   GENERAL:  Be can frail No anxiety, no agitation, and in no distress.  Patient is awake, alert, oriented and cooperative.  HEENT:  Showed no congestion. Trachea is central no obvious icterus or pallor noted no hoarseness. no obvious JVD   NECK:  Supple.  No JVD. No obvious cervical submental or supraclavicular adenopathy.  RS:the visualized portion of  Chest expands well. chest appears symmetric, no audible wheezes.  No dyspnea recognized  ABDOMEN:  abdomen appears undistended.  EXTREMITIES:  Without edema.  NEUROLOGICAL:  The patient is appropriate, higher functions are normal.  No  obvious neurological deficits.  normal judgement normal thought content  No confusion, no speech impediment. Cranial nerves are intact and show no deficit. No gross motor deficits noted   SKIN MUSCULOSKELETAL: no joint or skeletal deformity, no clubbing of nails.  No visible rash ecchymosis or petechiae   Significant Labs:   Lab Results   Component Value Date    WBC 10.94 10/26/2024    HGB 8.9 (L) 10/26/2024    HCT 26.0 (L) 10/26/2024    MCV 92 10/26/2024     (H) 10/26/2024      CMP  Sodium   Date Value Ref Range Status   10/26/2024 136 136 - 145 mmol/L Final     Potassium   Date Value Ref Range Status   10/26/2024 2.5 (LL) 3.5 - 5.1 mmol/L Final     Comment:     K  critical result(s) called and verbal readback obtained from SELENA MIMS by [facility] 10/26/2024 17:39       Chloride   Date Value Ref Range Status   10/26/2024 112 (H) 95 - 110 mmol/L Final     CO2   Date Value Ref Range Status   10/26/2024 16 (L) 23 - 29 mmol/L Final      Glucose   Date Value Ref Range Status   10/26/2024 139 (H) 70 - 110 mg/dL Final     BUN   Date Value Ref Range Status   10/26/2024 7 (L) 8 - 23 mg/dL Final     Creatinine   Date Value Ref Range Status   10/26/2024 0.6 0.5 - 1.4 mg/dL Final     Calcium   Date Value Ref Range Status   10/26/2024 7.0 (L) 8.7 - 10.5 mg/dL Final     Total Protein   Date Value Ref Range Status   10/26/2024 4.9 (L) 6.0 - 8.4 g/dL Final     Albumin   Date Value Ref Range Status   10/26/2024 1.8 (L) 3.5 - 5.2 g/dL Final     Total Bilirubin   Date Value Ref Range Status   10/26/2024 0.4 0.1 - 1.0 mg/dL Final     Comment:     For infants and newborns, interpretation of results should be based  on gestational age, weight and in agreement with clinical  observations.    Premature Infant recommended reference ranges:  Up to 24 hours.............<8.0 mg/dL  Up to 48 hours............<12.0 mg/dL  3-5 days..................<15.0 mg/dL  6-29 days.................<15.0 mg/dL       Alkaline Phosphatase   Date Value Ref Range Status   10/26/2024 188 (H) 40 - 150 U/L Final     AST   Date Value Ref Range Status   10/26/2024 16 10 - 40 U/L Final     ALT   Date Value Ref Range Status   10/26/2024 18 10 - 44 U/L Final     Anion Gap   Date Value Ref Range Status   10/26/2024 8 8 - 16 mmol/L Final     eGFR   Date Value Ref Range Status   10/26/2024 >60 >60 mL/min/1.73 m^2 Final        Diagnostic Results:  I have reviewed all pertinent imaging in the last 24 hours    Assessment/Plan:     Active Diagnoses:    Diagnosis Date Noted POA    PRINCIPAL PROBLEM:  Syncope [R55] 10/24/2024 Yes    Hypomagnesemia [E83.42] 10/24/2024 Yes    Hypokalemia [E87.6] 10/11/2024 Yes    Ureteral stone with hydronephrosis [N13.2] 10/10/2024 Yes    Severe protein-calorie malnutrition [E43] 09/04/2024 Yes    Jejunostomy tube present [Z93.4] 09/03/2024 Not Applicable    Gastric adenocarcinoma [C16.9] 02/27/2024 Yes    DVT (deep venous thrombosis) [I82.409] 02/07/2024 Yes     Chronic     Hx of Epileptic seizures [G40.909] 02/07/2024 Yes     Chronic    Chronic anemia [D64.9] 02/07/2024 Yes      Problems Resolved During this Admission:     Recent DVT currently on Eliquis patient may need ureteral stone which is impacting to be removed taking patient off of anticoagulant maybe risky unless we are able to bridge him with Lovenox during procedure  If urology would be uncomfortable with this plan would recommend filter placement prior to procedure    Failure to thrive; patient showing significant evidence of protein calorie malnutrition/discomfort and frailty    Gastric adenocarcinoma;  Patient currently on adjuvant pembrolizumab S/p total gastrectomy on 8/20, with lysis of adhesions, lymphadenectomy, and jejunostomy tube placement, d/c on 9/10  - Pathologic stage from 8/20/2024: Stage III (ypT2, pN3a, cM0), completed 5 cycles Keytruda prior to surgery          Ofelia Lee MD  Hematology/Oncology  [facility] - [facility]

## 2024-10-26 NOTE — PROGRESS NOTES
[facility] Medicine  Progress Note    Patient Name: Cheryl Kirkland  MRN: [id number]  Patient Class: IP- Inpatient   Admission Date: 10/24/2024  Length of Stay: 2 days  Attending Physician: Christy Ramos MD  Primary Care Provider: Gagandeep Iglesias MD        Subjective:     Principal Problem:Syncope        HPI:    Patient is a 74-year-old female with past medical history significant for hypertension, hyperlipidemia, DVT in right lower extremity, epilepsy, anemia, neuropathy, GERD, gastritis, polyps, gastric adenocarcinoma status post treatment with Keytruda (followed by Dr. Jerome and Dr. Ambrose) and total gastrectomy on 08/20/2024, jejunostomy tube on supplemental tube feedings, chronic diarrhea, ovarian cancer (s/p surgery/chemotherapy), osteoporosis and recent admission 10/9/24 through 10/21/2024 due to 8 mm right ureteral obstructing stone with right-sided hydronephrosis s/p cystoscopy and right nephrostomy tube placement, and acute cystitis with SHELLEY also found to have extensive DVT RLE treated with heparin drip transitioned to Eliquis, was discharged to SNF at [facility]. On 10/24, she was having physical therapy at SNF and had syncopal episode. There was no fall or injury, however she has been confused and does not remember what has been going on today.  initial blood pressure on arrival to /41, heart rate 79, temp 98.5°, respirations 18, 100% SpO2 on room air.  Lab workup demonstrate WBC 10.52 with left shift, RBC 3.20, hemoglobin 10.1, hematocrit 30.4, platelets 559, potassium 2.9, CO2 19, BUN 11, creatinine 0.7, glucose 115, calcium 7.2, magnesium 1.5, alk phos 174, total protein 5.0, albumin 1.8, total bilirubin 0.3, AST 12, ALT 17, BNP 29, CPK 25, trop less than 0.006, UA- with cloudy brown urine positive for nitrite, trace leukocyte esterase, greater than 100 RBC, 9 WBC. EKG -- NSR HR 76, nonspecific ST and T wave abnormality, no STEMI. CXR  demonstrates that lungs are clear. CT head without contrast done with no acute abnormality. Recent Echo done 10/10/24 with EF 65-70% and normal diastolic function. While in ED, she was given potassium 40 po and magnesium sulfate 2 g IV as well as IV Rocephin. Hospital Medicine was consulted for admission due to syncope with continued confusion, UTI, hypokalemia, and hypomagnesemia.     Overview/Hospital Course:  Patient is a 74 year old female admitted to the hospital for c/o syncopal episode during PT at NH.   Labs: WBC 10.94, RBC 2.83, hemoglobin 8.9, hematocrit 26.0, platelets 526,   -UA- with cloudy brown urine positive for nitrite, trace leukocyte esterase, greater than 100 RBC, 9 WBC.   -IV abx: Rocephin   -CT head without contrast done with no acute abnormality.   -Hematology consulted due to patient being on eliquis for anticoagulation for DVT but will need needs ESWL for an impacted ureteral stone and removal of nephrostomy tube per urology. Patient deemdedhigh risk to be off of anticoagulation for urology procedure.   -Urology consulted as patient was reporting pain at PCN site. Impacted ureteral stone may require treatment with ESWL once patient can safely be off of anticoagulation for 5-7 days.( 3 days prior and 2 days post op). Discussed if patient is not cleared to temporarily come off anticoagulation within 3 months the nephrostomy tube may need to be exchanged. Schedule outpatient follow up with Dr. Kelly.   -Pt/Ot to eval and treat   -EKG: Normal sinus rhythm   -Patient required placement of PICC line as phlebotomy and nursing staff were unable to obtain labs.     Interval History: Patient continues to report generalized body aches and abdominal pain. Reports improvement with PRN medication. Denies any nausea, vomiting, CP and SOB. Patient required PICC placement as phlebotomy and nursing staff were unable to obtain labs. Hypokalemia noted in labs will replete and continue to trend.      Review  of Systems See interval history for ROS       Objective:     Vital Signs (Most Recent):  Temp: 98.6 °F (37 °C) (10/26/24 1627)  Pulse: 77 (10/26/24 1627)  Resp: 19 (10/26/24 1627)  BP: 132/61 (10/26/24 1627)  SpO2: (!) 92 % (10/26/24 1627) Vital Signs (24h Range):  Temp:  [98 °F (36.7 °C)-98.7 °F (37.1 °C)] 98.6 °F (37 °C)  Pulse:  [66-78] 77  Resp:  [17-20] 19  SpO2:  [92 %-100 %] 92 %  BP: (104-132)/(56-61) 132/61     Weight: 49.7 kg (109 lb 9.1 oz)  Body mass index is 18.23 kg/m².    Intake/Output Summary (Last 24 hours) at 10/26/2024 1740  Last data filed at 10/26/2024 1711  Gross per 24 hour   Intake 2451.6 ml   Output 45 ml   Net 2406.6 ml         Physical Exam  Vitals and nursing note reviewed.   Constitutional:       General: She is not in acute distress.     Appearance: She is ill-appearing (chronically).   HENT:      Mouth/Throat:      Mouth: Mucous membranes are dry.      Pharynx: Oropharynx is clear.   Eyes:      Pupils: Pupils are equal, round, and reactive to light.   Cardiovascular:      Rate and Rhythm: Normal rate and regular rhythm.      Heart sounds: No murmur heard.  Pulmonary:      Effort: Pulmonary effort is normal.      Breath sounds: Normal breath sounds. No wheezing.   Abdominal:      General: Bowel sounds are normal. There is no distension.      Palpations: Abdomen is soft.      Tenderness: There is abdominal tenderness.      Comments: Left - J tube, clamped, dressing intact around tube  Some mild abdominal TTP-- generalized and around tube      Musculoskeletal:         General: Swelling and tenderness present. Normal range of motion.      Comments: Chronic swelling/pain to RLE (recently diagnosed with extensive DVT)    Skin:     General: Skin is warm and dry.   Neurological:      General: No focal deficit present.      Mental Status: She is alert and oriented to person, place, and time.      Significant Labs: All pertinent labs within the past 24 hours have been reviewed.  Recent Lab  Results         10/26/24  1640        Albumin 1.8              ALT 18       Anion Gap 8       AST 16       Baso # 0.03       Basophil % 0.3       BILIRUBIN TOTAL 0.4  Comment: For infants and newborns, interpretation of results should be based  on gestational age, weight and in agreement with clinical  observations.    Premature Infant recommended reference ranges:  Up to 24 hours.............<8.0 mg/dL  Up to 48 hours............<12.0 mg/dL  3-5 days..................<15.0 mg/dL  6-29 days.................<15.0 mg/dL         BUN 7       Calcium 7.0       Chloride 112       CO2 16       Creatinine 0.6       Differential Method Automated       eGFR >60       Eos # 0.0       Eos % 0.4       Glucose 139       Gran # (ANC) 7.6       Gran % 69.8       Hematocrit 26.0       Hemoglobin 8.9       Immature Grans (Abs) 0.19  Comment: Mild elevation in immature granulocytes is non specific and   can be seen in a variety of conditions including stress response,   acute inflammation, trauma and pregnancy. Correlation with other   laboratory and clinical findings is essential.         Immature Granulocytes 1.7       Lymph # 1.9       Lymph % 17.6       Magnesium  1.7       MCH 31.4       MCHC 34.2       MCV 92       Mono # 1.1       Mono % 10.2       MPV 8.3       nRBC 0       Phosphorus Level 1.5       Platelet Count 526       Potassium 2.5  Comment: K  critical result(s) called and verbal readback obtained from SELENA MIMS by LMC5 10/26/2024 17:39         PROTEIN TOTAL 4.9       RBC 2.83       RDW 19.4       Sodium 136       Troponin I <0.006  Comment: The reference interval for Troponin I represents the 99th percentile   cutoff   for our facility and is consistent with 3rd generation assay   performance.         WBC 10.94               Significant Imaging: I have reviewed all pertinent imaging results/findings within the past 24 hours.    Assessment/Plan:      * Syncope  Syncopal episode while having physical  therapy -- did not fall, no injuries  CT head did not show any acute abnormality  Troponin normal, EKG with no significant changes  Lab workup shows mild UTI - Rocephin given  Had recent Echo which was normal  Hydrating via feeding tube  Cardiac monitoring  Trending troponin level- continued to be negative       Hypomagnesemia  Patient has Abnormal Magnesium: hypomagnesemia. Will continue to monitor electrolytes closely. Will replace the affected electrolytes and repeat labs to be done after interventions completed. The patient's magnesium results have been reviewed and are listed below.  Recent Labs   Lab 10/26/24  1640   MG 1.7          Hypokalemia  Patient's most recent potassium results are listed below.   Recent Labs     10/24/24  1658 10/24/24  2224 10/26/24  1640   K 2.9* 3.2* 2.5*       Plan  - Replete potassium per protocol  - Monitor potassium Daily  - Patient's hypokalemia is being treated, will recheck labs again in a.m.     Ureteral stone with hydronephrosis  Recent imaging with 8 mm obstructing/impacted stone on right side -- s/p cystoscopy and right nephrostomy tube placement  Urology consulted , appreciate recommendations       Severe protein-calorie malnutrition  Nutrition consulted. Most recent weight and BMI monitored- will continue tube feeding supplementation    Measurements:  Wt Readings from Last 1 Encounters:   10/25/24 49.7 kg (109 lb 9.1 oz)   Body mass index is 18.23 kg/m².      Jejunostomy tube present  Hydrating and tube feeding via tube      Gastric adenocarcinoma  - Followed by Dr. Ambrose and Dr. Jerome  - S/p total gastrectomy on 8/20, with lysis of adhesions, lymphadenectomy, and jejunostomy tube placement, d/c on 9/10  - Pathologic stage from 8/20/2024: Stage III (ypT2, pN3a, cM0), completed 5 cycles Keytruda prior to surgery  - outpatient follow up with Dr. Jerome and Dr. Ambrose      DVT (deep venous thrombosis)  Recently diagnosed with extensive DVT RLE  Continue Eliquis      Hx  of Epileptic seizures  Seizure precautions  Continue Keppra and carbamazepine      Chronic anemia  Anemia is likely due to chronic blood loss and Iron deficiency. Most recent hemoglobin and hematocrit are listed below.  Recent Labs     10/24/24  1247 10/26/24  1640   HGB 10.1* 8.9*   HCT 30.4* 26.0*       Plan  - Monitor serial CBC: Daily  - Transfuse PRBC if patient becomes hemodynamically unstable, symptomatic or H/H drops below 7/21.  - Patient has not received any PRBC transfusions to date      VTE Risk Mitigation (From admission, onward)           Ordered     apixaban tablet 5 mg  2 times daily         10/24/24 2219     Reason for No Pharmacological VTE Prophylaxis  Once        Question:  Reasons:  Answer:  Already adequately anticoagulated on oral Anticoagulants    10/24/24 2206     IP VTE HIGH RISK PATIENT  Once         10/24/24 2206     Place sequential compression device  Until discontinued         10/24/24 2206                    Discharge Planning   SOFIYA:      Code Status: Full Code   Is the patient medically ready for discharge?:     Reason for patient still in hospital (select all that apply): Patient trending condition, Laboratory test, and Treatment  Discharge Plan A: Skilled Nursing Facility          The patient's case has been reviewed by my supervising physician.   Supervising physician will provide additional orders and recommendations at his/her discretion.  Please see supervising physicians documentation and/or orders for further details.           Loree Laura NP  Department of Hospital Medicine   '[place] - Med Surg

## 2024-10-26 NOTE — PT/OT/SLP PROGRESS
Occupational Therapy      Patient Name:  Cheryl Kirkland   MRN:  [id number]    OT eval initiated via chart review and attempted. Patient not seen today secondary to Bowel/bladder accident. CNA reporting that pt was soiled and needed to be cleaned and changed at this time. Will follow-up as able.    10/26/2024  Mandy Wallace OT   [id number]

## 2024-10-26 NOTE — PROCEDURES
"Cheryl Kirkland is a 74 y.o. female patient.    Temp: 98.4 °F (36.9 °C) (10/26/24 1217)  Pulse: 72 (10/26/24 1217)  Resp: 19 (10/26/24 1217)  BP: (!) 104/56 (10/26/24 1217)  SpO2: 100 % (10/26/24 1217)  Weight: 49.7 kg (109 lb 9.1 oz) (10/25/24 1545)  Height: 5' 5" (165.1 cm) (10/25/24 1558)    PICC  Date/Time: 10/26/2024 12:21 PM  Performed by: Ras Domínguez RN  Consent Done: Yes  Time out: Immediately prior to procedure a time out was called to verify the correct patient, procedure, equipment, support staff and site/side marked as required  Indications: med administration and vascular access  Anesthesia: local infiltration  Local anesthetic: lidocaine 1% without epinephrine  Anesthetic Total (mL): 3  Preparation: skin prepped with ChloraPrep  Skin prep agent dried: skin prep agent completely dried prior to procedure  Sterile barriers: all five maximum sterile barriers used - cap, mask, sterile gown, sterile gloves, and large sterile sheet  Hand hygiene: hand hygiene performed prior to central venous catheter insertion  Location details: right basilic  Catheter type: double lumen  Catheter size: 4 Fr  Catheter Length: 23cm    Ultrasound guidance: yes  Vessel Caliber: medium and patent, compressibility normal  Vascular Doppler: not done  Needle advanced into vessel with real time Ultrasound guidance.  Guidewire confirmed in vessel.  Sterile sheath used.  Number of attempts: 1  Post-procedure: blood return through all ports, chlorhexidine patch and sterile dressing applied  Estimated blood loss (mL): 0  Specimens: No    Assessment: placement verified by x-ray  Complications: none          Name Ras Domínguez RN  10/26/2024    "

## 2024-10-27 LAB
ALBUMIN SERPL BCP-MCNC: 1.7 G/DL (ref 3.5–5.2)
ALP SERPL-CCNC: 176 U/L (ref 40–150)
ALT SERPL W/O P-5'-P-CCNC: 19 U/L (ref 10–44)
ANION GAP SERPL CALC-SCNC: 11 MMOL/L (ref 8–16)
AST SERPL-CCNC: 13 U/L (ref 10–40)
BASOPHILS # BLD AUTO: 0.03 K/UL (ref 0–0.2)
BASOPHILS NFR BLD: 0.4 % (ref 0–1.9)
BILIRUB SERPL-MCNC: 0.3 MG/DL (ref 0.1–1)
BUN SERPL-MCNC: 6 MG/DL (ref 8–23)
CALCIUM SERPL-MCNC: 7 MG/DL (ref 8.7–10.5)
CHLORIDE SERPL-SCNC: 111 MMOL/L (ref 95–110)
CO2 SERPL-SCNC: 16 MMOL/L (ref 23–29)
CREAT SERPL-MCNC: 0.6 MG/DL (ref 0.5–1.4)
DIFFERENTIAL METHOD BLD: ABNORMAL
EOSINOPHIL # BLD AUTO: 0 K/UL (ref 0–0.5)
EOSINOPHIL NFR BLD: 0.5 % (ref 0–8)
ERYTHROCYTE [DISTWIDTH] IN BLOOD BY AUTOMATED COUNT: 19.3 % (ref 11.5–14.5)
EST. GFR  (NO RACE VARIABLE): >60 ML/MIN/1.73 M^2
GLUCOSE SERPL-MCNC: 107 MG/DL (ref 70–110)
HCT VFR BLD AUTO: 25 % (ref 37–48.5)
HGB BLD-MCNC: 8.6 G/DL (ref 12–16)
IMM GRANULOCYTES # BLD AUTO: 0.11 K/UL (ref 0–0.04)
IMM GRANULOCYTES NFR BLD AUTO: 1.5 % (ref 0–0.5)
LYMPHOCYTES # BLD AUTO: 0.7 K/UL (ref 1–4.8)
LYMPHOCYTES NFR BLD: 10.1 % (ref 18–48)
MAGNESIUM SERPL-MCNC: 2.2 MG/DL (ref 1.6–2.6)
MCH RBC QN AUTO: 31.6 PG (ref 27–31)
MCHC RBC AUTO-ENTMCNC: 34.4 G/DL (ref 32–36)
MCV RBC AUTO: 92 FL (ref 82–98)
MONOCYTES # BLD AUTO: 0.9 K/UL (ref 0.3–1)
MONOCYTES NFR BLD: 11.7 % (ref 4–15)
NEUTROPHILS # BLD AUTO: 5.6 K/UL (ref 1.8–7.7)
NEUTROPHILS NFR BLD: 75.8 % (ref 38–73)
NRBC BLD-RTO: 0 /100 WBC
OHS QRS DURATION: 70 MS
OHS QTC CALCULATION: 434 MS
PHOSPHATE SERPL-MCNC: 2.4 MG/DL (ref 2.7–4.5)
PLATELET # BLD AUTO: 488 K/UL (ref 150–450)
PMV BLD AUTO: 8.6 FL (ref 9.2–12.9)
POTASSIUM SERPL-SCNC: 2.5 MMOL/L (ref 3.5–5.1)
PROT SERPL-MCNC: 4.7 G/DL (ref 6–8.4)
RBC # BLD AUTO: 2.72 M/UL (ref 4–5.4)
SODIUM SERPL-SCNC: 138 MMOL/L (ref 136–145)
WBC # BLD AUTO: 7.33 K/UL (ref 3.9–12.7)

## 2024-10-27 PROCEDURE — 97166 OT EVAL MOD COMPLEX 45 MIN: CPT

## 2024-10-27 PROCEDURE — 85025 COMPLETE CBC W/AUTO DIFF WBC: CPT | Performed by: NURSE PRACTITIONER

## 2024-10-27 PROCEDURE — 63600175 PHARM REV CODE 636 W HCPCS: Performed by: INTERNAL MEDICINE

## 2024-10-27 PROCEDURE — 84100 ASSAY OF PHOSPHORUS: CPT | Performed by: NURSE PRACTITIONER

## 2024-10-27 PROCEDURE — 25000003 PHARM REV CODE 250

## 2024-10-27 PROCEDURE — 25000003 PHARM REV CODE 250: Performed by: INTERNAL MEDICINE

## 2024-10-27 PROCEDURE — 97530 THERAPEUTIC ACTIVITIES: CPT

## 2024-10-27 PROCEDURE — 25000003 PHARM REV CODE 250: Performed by: NURSE PRACTITIONER

## 2024-10-27 PROCEDURE — 97163 PT EVAL HIGH COMPLEX 45 MIN: CPT

## 2024-10-27 PROCEDURE — 99233 SBSQ HOSP IP/OBS HIGH 50: CPT | Mod: ,,, | Performed by: INTERNAL MEDICINE

## 2024-10-27 PROCEDURE — 63600175 PHARM REV CODE 636 W HCPCS: Performed by: NURSE PRACTITIONER

## 2024-10-27 PROCEDURE — 99232 SBSQ HOSP IP/OBS MODERATE 35: CPT | Mod: ,,, | Performed by: UROLOGY

## 2024-10-27 PROCEDURE — 63600175 PHARM REV CODE 636 W HCPCS

## 2024-10-27 PROCEDURE — 21400001 HC TELEMETRY ROOM

## 2024-10-27 PROCEDURE — 80053 COMPREHEN METABOLIC PANEL: CPT | Performed by: NURSE PRACTITIONER

## 2024-10-27 PROCEDURE — 83735 ASSAY OF MAGNESIUM: CPT | Performed by: NURSE PRACTITIONER

## 2024-10-27 RX ORDER — POTASSIUM CHLORIDE 7.45 MG/ML
10 INJECTION INTRAVENOUS
Status: COMPLETED | OUTPATIENT
Start: 2024-10-27 | End: 2024-10-27

## 2024-10-27 RX ORDER — ENOXAPARIN SODIUM 100 MG/ML
1 INJECTION SUBCUTANEOUS EVERY 12 HOURS
Status: DISCONTINUED | OUTPATIENT
Start: 2024-10-27 | End: 2024-11-03 | Stop reason: ALTCHOICE

## 2024-10-27 RX ORDER — POTASSIUM CHLORIDE 7.45 MG/ML
10 INJECTION INTRAVENOUS
Status: DISCONTINUED | OUTPATIENT
Start: 2024-10-27 | End: 2024-10-27

## 2024-10-27 RX ORDER — LOPERAMIDE HYDROCHLORIDE 2 MG/1
2 CAPSULE ORAL 4 TIMES DAILY PRN
Status: DISCONTINUED | OUTPATIENT
Start: 2024-10-27 | End: 2024-11-06 | Stop reason: HOSPADM

## 2024-10-27 RX ADMIN — POTASSIUM CHLORIDE 10 MEQ: 7.46 INJECTION, SOLUTION INTRAVENOUS at 01:10

## 2024-10-27 RX ADMIN — LEVETIRACETAM 500 MG: 500 TABLET, FILM COATED ORAL at 10:10

## 2024-10-27 RX ADMIN — LOPERAMIDE HYDROCHLORIDE 2 MG: 2 CAPSULE ORAL at 01:10

## 2024-10-27 RX ADMIN — POTASSIUM BICARBONATE 50 MEQ: 978 TABLET, EFFERVESCENT ORAL at 10:10

## 2024-10-27 RX ADMIN — Medication 400 ML: at 10:10

## 2024-10-27 RX ADMIN — Medication 1 TABLET: at 10:10

## 2024-10-27 RX ADMIN — FERROUS SULFATE TAB 325 MG (65 MG ELEMENTAL FE) 1 EACH: 325 (65 FE) TAB at 09:10

## 2024-10-27 RX ADMIN — POTASSIUM CHLORIDE 10 MEQ: 7.46 INJECTION, SOLUTION INTRAVENOUS at 10:10

## 2024-10-27 RX ADMIN — ZONISAMIDE 200 MG: 100 CAPSULE ORAL at 10:10

## 2024-10-27 RX ADMIN — FERROUS SULFATE TAB 325 MG (65 MG ELEMENTAL FE) 1 EACH: 325 (65 FE) TAB at 10:10

## 2024-10-27 RX ADMIN — ONDANSETRON 4 MG: 2 INJECTION INTRAMUSCULAR; INTRAVENOUS at 10:10

## 2024-10-27 RX ADMIN — POTASSIUM CHLORIDE 10 MEQ: 7.46 INJECTION, SOLUTION INTRAVENOUS at 12:10

## 2024-10-27 RX ADMIN — CARBAMAZEPINE 200 MG: 200 TABLET ORAL at 10:10

## 2024-10-27 RX ADMIN — CARBAMAZEPINE 200 MG: 200 TABLET ORAL at 09:10

## 2024-10-27 RX ADMIN — LEVETIRACETAM 500 MG: 500 TABLET, FILM COATED ORAL at 09:10

## 2024-10-27 RX ADMIN — MUPIROCIN: 20 OINTMENT TOPICAL at 09:10

## 2024-10-27 RX ADMIN — Medication 400 ML: at 03:10

## 2024-10-27 RX ADMIN — ACETAMINOPHEN 1000 MG: 500 TABLET ORAL at 09:10

## 2024-10-27 RX ADMIN — MUPIROCIN: 20 OINTMENT TOPICAL at 10:10

## 2024-10-27 RX ADMIN — APIXABAN 5 MG: 2.5 TABLET, FILM COATED ORAL at 10:10

## 2024-10-27 RX ADMIN — ENOXAPARIN SODIUM 50 MG: 60 INJECTION SUBCUTANEOUS at 10:10

## 2024-10-27 RX ADMIN — Medication 1 TABLET: at 09:10

## 2024-10-27 RX ADMIN — CEFTRIAXONE 1 G: 1 INJECTION, POWDER, FOR SOLUTION INTRAMUSCULAR; INTRAVENOUS at 03:10

## 2024-10-27 RX ADMIN — Medication 400 ML: at 06:10

## 2024-10-27 RX ADMIN — ACETAMINOPHEN 1000 MG: 500 TABLET ORAL at 03:10

## 2024-10-27 RX ADMIN — Medication 400 ML: at 02:10

## 2024-10-27 RX ADMIN — ACETAMINOPHEN 1000 MG: 500 TABLET ORAL at 06:10

## 2024-10-27 RX ADMIN — POTASSIUM BICARBONATE 50 MEQ: 978 TABLET, EFFERVESCENT ORAL at 09:10

## 2024-10-27 RX ADMIN — ZONISAMIDE 200 MG: 100 CAPSULE ORAL at 09:10

## 2024-10-27 RX ADMIN — POTASSIUM CHLORIDE 10 MEQ: 7.46 INJECTION, SOLUTION INTRAVENOUS at 09:10

## 2024-10-27 RX ADMIN — CARBAMAZEPINE 200 MG: 200 TABLET ORAL at 03:10

## 2024-10-27 NOTE — PLAN OF CARE
Problem: Adult Inpatient Plan of Care  Goal: Plan of Care Review  Outcome: Progressing  Goal: Patient-Specific Goal (Individualized)  Outcome: Progressing  Goal: Absence of Hospital-Acquired Illness or Injury  Outcome: Progressing  Goal: Optimal Comfort and Wellbeing  Outcome: Progressing  Goal: Readiness for Transition of Care  Outcome: Progressing     Problem: Wound  Goal: Optimal Coping  Outcome: Progressing  Goal: Optimal Functional Ability  Outcome: Progressing  Goal: Absence of Infection Signs and Symptoms  Outcome: Progressing  Goal: Improved Oral Intake  Outcome: Progressing  Goal: Optimal Pain Control and Function  Outcome: Progressing  Goal: Skin Health and Integrity  Outcome: Progressing  Goal: Optimal Wound Healing  Outcome: Progressing     Problem: Infection  Goal: Absence of Infection Signs and Symptoms  Outcome: Progressing     Problem: Fall Injury Risk  Goal: Absence of Fall and Fall-Related Injury  Outcome: Progressing     Problem: Skin Injury Risk Increased  Goal: Skin Health and Integrity  Outcome: Progressing

## 2024-10-27 NOTE — SUBJECTIVE & OBJECTIVE
Interval History: NAEON. Patient continues to report generalized body pain and chronic diarrhea. PRN imodium ordered. Patient reports nausea but denies vomiting, states prn zofran helps. Denies any CP and SOB. Hypokalemia noted in a.m. labs will replete and continue to trend with a.m. labs. Patient transitioned to therapeutic lovenox 1mg/kg from eliquis for possible urology procedure.     Review of Systems See interval history for ROS     Objective:     Vital Signs (Most Recent):  Temp: 98.2 °F (36.8 °C) (10/27/24 1238)  Pulse: 72 (10/27/24 1238)  Resp: 19 (10/27/24 1238)  BP: 113/68 (10/27/24 1238)  SpO2: 100 % (10/27/24 1238) Vital Signs (24h Range):  Temp:  [98.2 °F (36.8 °C)-99 °F (37.2 °C)] 98.2 °F (36.8 °C)  Pulse:  [69-86] 72  Resp:  [16-19] 19  SpO2:  [92 %-100 %] 100 %  BP: (111-135)/(58-68) 113/68     Weight: 49.7 kg (109 lb 9.1 oz)  Body mass index is 18.23 kg/m².    Intake/Output Summary (Last 24 hours) at 10/27/2024 1403  Last data filed at 10/27/2024 1343  Gross per 24 hour   Intake 1257 ml   Output 11 ml   Net 1246 ml               Physical Exam  Vitals and nursing note reviewed.   Constitutional:       General: She is not in acute distress.     Appearance: She is ill-appearing (chronically).   HENT:      Mouth/Throat:      Mouth: Mucous membranes are dry.      Pharynx: Oropharynx is clear.   Eyes:      Pupils: Pupils are equal, round, and reactive to light.   Cardiovascular:      Rate and Rhythm: Normal rate and regular rhythm.      Heart sounds: No murmur heard.  Pulmonary:      Effort: Pulmonary effort is normal.      Breath sounds: Normal breath sounds. No wheezing.   Abdominal:      General: Bowel sounds are normal. There is no distension.      Palpations: Abdomen is soft.      Tenderness: There is abdominal tenderness (mild)     Comments: Left - J tube, with tube feedings infusing, dressing intact around tube  Mild abdominal TTP-- generalized and around tube      Musculoskeletal:          General: Swelling and tenderness present. Normal range of motion.      Comments: Chronic swelling/pain to RLE (recently diagnosed with extensive DVT)    Skin:     General: Skin is warm and dry.   Neurological:      General: No focal deficit present.      Mental Status: She is alert and oriented to person, place, and time.     Significant Labs: All pertinent labs within the past 24 hours have been reviewed.  Recent Lab Results         10/27/24  0626   10/26/24  1640        Albumin 1.7   1.8          188       ALT 19   18       Anion Gap 11   8       AST 13   16       Baso # 0.03   0.03       Basophil % 0.4   0.3       BILIRUBIN TOTAL 0.3  Comment: For infants and newborns, interpretation of results should be based  on gestational age, weight and in agreement with clinical  observations.    Premature Infant recommended reference ranges:  Up to 24 hours.............<8.0 mg/dL  Up to 48 hours............<12.0 mg/dL  3-5 days..................<15.0 mg/dL  6-29 days.................<15.0 mg/dL     0.4  Comment: For infants and newborns, interpretation of results should be based  on gestational age, weight and in agreement with clinical  observations.    Premature Infant recommended reference ranges:  Up to 24 hours.............<8.0 mg/dL  Up to 48 hours............<12.0 mg/dL  3-5 days..................<15.0 mg/dL  6-29 days.................<15.0 mg/dL         BUN 6   7       Calcium 7.0   7.0       Chloride 111   112       CO2 16   16       Creatinine 0.6   0.6       Differential Method Automated   Automated       eGFR >60   >60       Eos # 0.0   0.0       Eos % 0.5   0.4       Glucose 107   139       Gran # (ANC) 5.6   7.6       Gran % 75.8   69.8       Hematocrit 25.0   26.0       Hemoglobin 8.6   8.9       Immature Grans (Abs) 0.11  Comment: Mild elevation in immature granulocytes is non specific and   can be seen in a variety of conditions including stress response,   acute inflammation, trauma and pregnancy.  Correlation with other   laboratory and clinical findings is essential.     0.19  Comment: Mild elevation in immature granulocytes is non specific and   can be seen in a variety of conditions including stress response,   acute inflammation, trauma and pregnancy. Correlation with other   laboratory and clinical findings is essential.         Immature Granulocytes 1.5   1.7       Lymph # 0.7   1.9       Lymph % 10.1   17.6       Magnesium  2.2   1.7       MCH 31.6   31.4       MCHC 34.4   34.2       MCV 92   92       Mono # 0.9   1.1       Mono % 11.7   10.2       MPV 8.6   8.3       nRBC 0   0       Phosphorus Level 2.4   1.5       Platelet Count 488   526       Potassium 2.5  Comment: K critical result(s) called and verbal readback obtained from Michael Waldrop RN by [email or web address] 10/27/2024 07:29     2.5  Comment: K  critical result(s) called and verbal readback obtained from SELENA MIMS by LMC5 10/26/2024 17:39         PROTEIN TOTAL 4.7   4.9       RBC 2.72   2.83       RDW 19.3   19.4       Sodium 138   136       Troponin I   <0.006  Comment: The reference interval for Troponin I represents the 99th percentile   cutoff   for our facility and is consistent with 3rd generation assay   performance.         WBC 7.33   10.94               Significant Imaging: I have reviewed all pertinent imaging results/findings within the past 24 hours.

## 2024-10-27 NOTE — ASSESSMENT & PLAN NOTE
Patient's most recent potassium results are listed below.   Recent Labs     10/24/24  2224 10/26/24  1640 10/27/24  0626   K 3.2* 2.5* 2.5*       Plan  - Replete potassium per protocol  - Monitor potassium Daily  - Patient's hypokalemia is being treated, will recheck labs again in a.m.

## 2024-10-27 NOTE — PT/OT/SLP EVAL
Occupational Therapy Evaluation and Treatment    Name: Cheryl Kirkland  MRN: [id number]  Admitting Diagnosis: Syncope  Recent Surgery: * No surgery found *      Recommendations:     Discharge Recommendations: Moderate Intensity Therapy (pending progress)  Level of Assistance Recommended: 24 hours significant assistance  Discharge Equipment Recommendations:    Barriers to discharge:      Assessment:     Cheryl Kirkland is a 74 y.o. female with a medical diagnosis of Syncope. She presents with performance deficits affecting function including weakness, decreased upper extremity function, impaired endurance, impaired balance, decreased ROM, impaired self care skills, impaired functional mobility.     Rehab Prognosis: Poor; patient would benefit from acute OT services to address these deficits and reach maximum level of function.    Plan:     Patient to be seen 2 x/week to address the above listed problems via self-care/home management, therapeutic activities, therapeutic exercises  Plan of Care Expires: 11/10/24  Plan of Care Reviewed with:      Subjective     Chief Complaint: debility and generalized weakness  Patient Comments/Goals: get better on own time  Pain/Comfort:  Pain Rating 1: 0/10    Patients cultural, spiritual, Holiness conflicts given the current situation:      Social History:  Living Environment: Patient lives with their spouse in a single story home with number of outside stair(s): 0  Prior Level of Function: Prior to admission, patient was independent  Roles and Routines: Patient was not driving and not working prior to admission.  Equipment Used at Home:    DME owned (not currently used): none  Assistance Upon Discharge: unknown    Objective:     Communicated with nurse and epic chart review prior to session. Patient found sitting edge of bed with   upon OT entry to room.    General Precautions: Standard,     Orthopedic Precautions:     Braces: N/A    Respiratory Status: Room  air    Occupational Performance    Activities of Daily Living:  Upper Body Dressing: total assistance and refusal  Lower Body Dressing: total assistance   Cognitive/Visual Perceptual:  Cognitive/Psychosocial Skills:    -     Oriented to: Person, Place, Time, Situation  -     Follows Commands/attention: Follows one-step commands  -     Communication: clear/fluent  -     Memory: No Deficits noted  -     Safety awareness/insight to disability: impaired    Physical Exam:  Upper Extremity Range of Motion:     -       Right Upper Extremity: ~90 degrees shoulder flex  -       Left Upper Extremity: ~90 degrees shoulder flex  Upper Extremity Strength:    -       Right Upper Extremity: mmt: 2/5 grossly  -       Left Upper Extremity: mmt: 2/5 grossly   Strength:    -       Right Upper Extremity: mmt: 3/5 grossly  -       Left Upper Extremity: mmt: 3/5 grossly    AMPAC 6 Click ADL:  AMPAC Total Score: 16    Treatment & Education:  Therapist provided facilitation and instruction of proper body mechanics, energy conservation, and fall prevention strategies during tasks listed above  Patient educated on role of OT, POC, and goals for therapy  Patient educated on importance of OOB activities with staff member assistance and sitting OOB majority of the day      Patient left sitting edge of bed with all lines intact, call button in reach, RN notified, and RN and spouse present.    GOALS:   Multidisciplinary Problems       Occupational Therapy Goals          Problem: Occupational Therapy    Goal Priority Disciplines Outcome Interventions   Occupational Therapy Goal     OT, PT/OT     Description: O.T. goals met 11-10-24  Mod a with bsc transfer  Min a with ue dressing  Pt will tolerate 1 set x 10 reps b ue rom exercise                       History:     Past Medical History:   Diagnosis Date    Anemia     Chronic deep vein thrombosis (DVT) of left lower extremity 10/21/2022    Deep vein thrombosis     Drug-induced polyneuropathy  02/28/2024    Noted by ROCK KAY NP  last documented on [id number]    DVT (deep venous thrombosis)     Epilepsy     Gastric adenocarcinoma 02/27/2024    GERD (gastroesophageal reflux disease)     Hyperlipidemia 01/10/2013    Formatting of this note might be different from the original.   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 Repeat in 3 years.    COLONOSCOPY  06/17/2015    -Nodular mucosa at appendiceal orfice, sigmoid and desc diverticulosis otherwise normal.    COLONOSCOPY  2020    >3 TAs    COLONOSCOPY  12/2023    CYSTOSCOPY W/ RETROGRADES Right 10/10/2024    Procedure: CYSTOSCOPY, WITH RETROGRADE PYELOGRAM;  Surgeon: Lata Kelly MD;  Location: [facility] OR;  Service: Urology;  Laterality: Right;    DIAGNOSTIC LAPAROSCOPY N/A 3/14/2024    Procedure: LAPAROSCOPY, DIAGNOSTIC;  Surgeon: Chen Jerome MD;  Location: [facility] OR;  Service: General;  Laterality: N/A;  1. Diagnostic laparoscopy   2. Extensive lysis of adhesions  3. Peritoneal biopsy   4. Peritoneal washings for cytology    DIAGNOSTIC LAPAROSCOPY N/A 8/20/2024    Procedure: LAPAROSCOPY, DIAGNOSTIC;  Surgeon: Chen Jerome MD;  Location: [facility] OR;  Service: General;  Laterality: N/A;    EGD - EXTERNAL RESULT  06/20/2012    Dr Boyle- Mild gastritis otherwise normal.    ENDOSCOPIC ULTRASOUND OF UPPER GASTROINTESTINAL TRACT N/A  7/26/2024    Procedure: ULTRASOUND, UPPER GI TRACT, ENDOSCOPIC;  Surgeon: Valdo Aguilera MD;  Location: [facility] ENDO;  Service: Endoscopy;  Laterality: N/A;  7/22/24: instructions sent via portal-. Pt no longer takling eliquis-GD    ESOPHAGOGASTRODUODENOSCOPY N/A 02/08/2024    Procedure: EGD (ESOPHAGOGASTRODUODENOSCOPY);  Surgeon: Jayla Arteaga MD;  Location: [facility] ENDO;  Service: Endoscopy;  Laterality: N/A;    ESOPHAGOGASTRODUODENOSCOPY N/A 8/20/2024    Procedure: EGD (ESOPHAGOGASTRODUODENOSCOPY);  Surgeon: Chen Jerome MD;  Location: [facility] OR;  Service: General;  Laterality: N/A;    GASTRECTOMY N/A 8/20/2024    Procedure: GASTRECTOMY;  Surgeon: Chen Jerome MD;  Location: [facility] OR;  Service: General;  Laterality: N/A;    HYSTERECTOMY      LAPAROSCOPIC LYSIS OF ADHESIONS N/A 3/14/2024    Procedure: LYSIS, ADHESIONS, LAPAROSCOPIC;  Surgeon: Chen Jerome MD;  Location: [facility] OR;  Service: General;  Laterality: N/A;    LYSIS OF ADHESIONS N/A 8/20/2024    Procedure: LYSIS, ADHESIONS;  Surgeon: Chen Jerome MD;  Location: [facility] OR;  Service: General;  Laterality: N/A;    PLACEMENT OF JEJUNOSTOMY TUBE N/A 8/20/2024    Procedure: INSERTION, JEJUNOSTOMY TUBE;  Surgeon: Chen Jerome MD;  Location: [facility] OR;  Service: General;  Laterality: N/A;    TOTAL ABDOMINAL HYSTERECTOMY W/ BILATERAL SALPINGOOPHORECTOMY  01/09/2023    radical resection with right salpingo-oophorectomy, left salpingo-oophorectomy, extensive enterolysis, extensive adhesiolysis, left pelvic lymph node biopsy, omental biopsy, small bowel resection with primary functional end-to-end anastomosis, placement of pelvic drain       Time Tracking:     OT Date of Treatment: 10/27/24  OT Start Time: 0958  OT Stop Time: 1008  OT Total Time (min): 10 min    Billable Minutes: Evaluation 10 minutes    10/27/2024

## 2024-10-27 NOTE — PROGRESS NOTES
[facility]  Hematology/Oncology  Progress Note    Patient Name: Cheryl Kirkland  Admission Date: 10/24/2024  Hospital Length of Stay: 3 days  Code Status: Full Code     Subjective:     Interval History:  Resting comfortably in bed spouse in room  74-year-old woman known to Dr. Amin stage III T2 N3a M0 gastric adenocarcinoma  Patient has jejunostomy tube and is on supplemental tube feedings has chronic diarrhea patient had an obstructing ureteral stone on right side with right-sided hydronephrosis status post cystoscopy right nephrostomy tube placement and acute cystitis.  Recently found to have extensive right lower extremity DVT treated with heparin drip transition to [facility] she was discharge to SNF but had a syncopal episode was brought back to the hospital.   no fall or injury, however she has been confused and does not remember what has been going on today. initial blood pressure on arrival to /41, heart rate 79, temp 98.5°, respirations 18, 100% SpO2 on room air. Lab workup demonstrate WBC 10.52 with left shift, RBC 3.20, hemoglobin 10.1, hematocrit 30.4, platelets 559, potassium 2.9, CO2 19, BUN 11, creatinine 0.7, glucose 115, calcium 7.2, magnesium 1.5, alk phos 174, total protein 5.0, albumin 1.8, total bilirubin 0.3, AST 12, ALT 17, BNP 29, CPK 25, trop less than 0.006, UA- with cloudy brown urine positive for nitrite, trace leukocyte esterase, greater than 100 RBC, 9 WBC. EKG -- NSR HR 76, nonspecific ST and T wave abnormality, no STEMI. CXR demonstrates that lungs are clear. CT head without contrast done with no acute abnormality. Recent Echo done 10/10/24 with EF 65-70% and normal diastolic function. While in ED, she was given potassium 40 po and magnesium sulfate 2 g IV as well as IV Rocephin      Oncology history  Immunotherapy (Pembrolizumab 03/26/2024 - 07/23/2024)  s/p open total gastrectomy with D2 lymphadenectomy extensive lysis of adhesions on 08/20/2024.   Current  Treatment:  Adjuvant Pembrolizumab   Oncology Treatment Plan:   OP pembrolizumab 200mg Q3W      Medications:  Continuous Infusions:  Scheduled Meds:   acetaminophen  1,000 mg Per J Tube Q8H    calcium-vitamin D3  1 tablet Per J Tube BID    carBAMazepine  200 mg Per J Tube TID    cefTRIAXone (Rocephin) IV (PEDS and ADULTS)  1 g Intravenous Q24H    electrolytes-dextrose  400 mL Per NG tube Q4H    enoxparin  1 mg/kg Subcutaneous Q12H (treatment, non-standard time)    ferrous sulfate  1 tablet Per J Tube BID    levETIRAcetam  500 mg Oral BID    mupirocin   Nasal BID    potassium bicarbonate  50 mEq Per J Tube BID    zonisamide  200 mg Oral BID     PRN Meds:  Current Facility-Administered Medications:     dextrose 10%, 12.5 g, Intravenous, PRN    dextrose 10%, 25 g, Intravenous, PRN    glucagon (human recombinant), 1 mg, Intramuscular, PRN    glucose, 16 g, Oral, PRN    glucose, 24 g, Oral, PRN    influenza (adjuvanted), 0.5 mL, Intramuscular, vaccine x 1 dose    loperamide, 2 mg, Oral, QID PRN    naloxone, 0.02 mg, Intravenous, PRN    ondansetron, 4 mg, Intravenous, Q6H PRN    oxyCODONE, 5 mg, Oral, Q6H PRN    sodium chloride 0.9%, 10 mL, Intravenous, Q8H PRN     Review of Systems  She is more awake and alert today communicating better her complaints have not changed she still has a lot of pain the alea abdominal area.  Her appetite is decreased due to pain  She feels weak and frail  Objective:     Vital Signs (Most Recent):  Temp: 98.4 °F (36.9 °C) (10/27/24 1613)  Pulse: 87 (10/27/24 1613)  Resp: 20 (10/27/24 1613)  BP: (!) 101/59 (10/27/24 1613)  SpO2: 98 % (10/27/24 1613) Vital Signs (24h Range):  Temp:  [98.2 °F (36.8 °C)-99 °F (37.2 °C)] 98.4 °F (36.9 °C)  Pulse:  [69-90] 87  Resp:  [16-20] 20  SpO2:  [98 %-100 %] 98 %  BP: (101-135)/(58-68) 101/59     Weight: 49.7 kg (109 lb 9.1 oz)  Body mass index is 18.23 kg/m².  Body surface area is 1.51 meters squared.      Intake/Output Summary (Last 24 hours) at 10/27/2024  1842  Last data filed at 10/27/2024 1554  Gross per 24 hour   Intake 1257 ml   Output 58 ml   Net 1199 ml       Physical Exam  VITAL SIGNS:  as above   GENERAL:  Frail appearing  No anxiety, no agitation, and in no distress.  Patient is awake, alert, oriented and cooperative.  HEENT:  Showed no congestion. Trachea is central no obvious icterus or pallor noted no hoarseness. no obvious JVD   NECK:  Supple.  No JVD. No obvious cervical submental or supraclavicular adenopathy.  RS:the visualized portion of  Chest expands well. chest appears symmetric, no audible wheezes.  No dyspnea recognized  ABDOMEN:  abdomen appears undistended.  EXTREMITIES:  Without edema.  NEUROLOGICAL:  The patient is appropriate, higher functions are normal.  No  obvious neurological deficits.  normal judgement normal thought content  No confusion, no speech impediment. Cranial nerves are intact and show no deficit. No gross motor deficits noted   SKIN MUSCULOSKELETAL: no joint or skeletal deformity, no clubbing of nails.  No visible rash ecchymosis or petechiae   Significant Labs:   Lab Results   Component Value Date    WBC 7.33 10/27/2024    HGB 8.6 (L) 10/27/2024    HCT 25.0 (L) 10/27/2024    MCV 92 10/27/2024     (H) 10/27/2024      CMP  Sodium   Date Value Ref Range Status   10/27/2024 138 136 - 145 mmol/L Final     Potassium   Date Value Ref Range Status   10/27/2024 2.5 (LL) 3.5 - 5.1 mmol/L Final     Comment:     K critical result(s) called and verbal readback obtained from Michael Waldrop RN by [email or web address] 10/27/2024 07:29       Chloride   Date Value Ref Range Status   10/27/2024 111 (H) 95 - 110 mmol/L Final     CO2   Date Value Ref Range Status   10/27/2024 16 (L) 23 - 29 mmol/L Final     Glucose   Date Value Ref Range Status   10/27/2024 107 70 - 110 mg/dL Final     BUN   Date Value Ref Range Status   10/27/2024 6 (L) 8 - 23 mg/dL Final     Creatinine   Date Value Ref Range Status   10/27/2024 0.6 0.5 - 1.4 mg/dL Final     Calcium    Date Value Ref Range Status   10/27/2024 7.0 (L) 8.7 - 10.5 mg/dL Final     Total Protein   Date Value Ref Range Status   10/27/2024 4.7 (L) 6.0 - 8.4 g/dL Final     Albumin   Date Value Ref Range Status   10/27/2024 1.7 (L) 3.5 - 5.2 g/dL Final     Total Bilirubin   Date Value Ref Range Status   10/27/2024 0.3 0.1 - 1.0 mg/dL Final     Comment:     For infants and newborns, interpretation of results should be based  on gestational age, weight and in agreement with clinical  observations.    Premature Infant recommended reference ranges:  Up to 24 hours.............<8.0 mg/dL  Up to 48 hours............<12.0 mg/dL  3-5 days..................<15.0 mg/dL  6-29 days.................<15.0 mg/dL       Alkaline Phosphatase   Date Value Ref Range Status   10/27/2024 176 (H) 40 - 150 U/L Final     AST   Date Value Ref Range Status   10/27/2024 13 10 - 40 U/L Final     ALT   Date Value Ref Range Status   10/27/2024 19 10 - 44 U/L Final     Anion Gap   Date Value Ref Range Status   10/27/2024 11 8 - 16 mmol/L Final     eGFR   Date Value Ref Range Status   10/27/2024 >60 >60 mL/min/1.73 m^2 Final        Diagnostic Results:      Assessment/Plan:     Active Diagnoses:    Diagnosis Date Noted POA    PRINCIPAL PROBLEM:  Syncope [R55] 10/24/2024 Yes    Hypomagnesemia [E83.42] 10/24/2024 Yes    Hypokalemia [E87.6] 10/11/2024 Yes    Ureteral stone with hydronephrosis [N13.2] 10/10/2024 Yes    Severe protein-calorie malnutrition [E43] 09/04/2024 Yes    Jejunostomy tube present [Z93.4] 09/03/2024 Not Applicable    Gastric adenocarcinoma [C16.9] 02/27/2024 Yes    DVT (deep venous thrombosis) [I82.409] 02/07/2024 Yes     Chronic    Hx of Epileptic seizures [G40.909] 02/07/2024 Yes     Chronic    Chronic anemia [D64.9] 02/07/2024 Yes      Problems Resolved During this Admission:     Recent DVT currently on Eliquis patient may need ureteral stone which is impacting to be removed taking patient off of anticoagulant maybe risky unless we  are able to bridge him with Lovenox during procedure  If urology would be uncomfortable with this plan would recommend filter placement prior to procedure     Failure to thrive; patient showing significant evidence of protein calorie malnutrition/discomfort and frailty     Gastric adenocarcinoma;  Patient currently on adjuvant pembrolizumab S/p total gastrectomy on 8/20, with lysis of adhesions, lymphadenectomy, and jejunostomy tube placement, d/c on 9/10  - Pathologic stage from 8/20/2024: Stage III (ypT2, pN3a, cM0), completed 5 cycles Keytruda prior to surgery            Ofelia Lee MD  Hematology/Oncology  '[facility]

## 2024-10-27 NOTE — PROGRESS NOTES
[facility] Follow up 10/27/2024    Subjective:   No real complaints        Current Facility-Administered Medications   Medication Dose Route Frequency Provider Last Rate Last Admin    acetaminophen tablet 1,000 mg  1,000 mg Per J Tube Q8H Melba Doan NP   1,000 mg at 10/27/24 0625    apixaban tablet 5 mg  5 mg Per J Tube BID Melba Doan NP   5 mg at 10/27/24 1015    calcium-vitamin D3 500 mg-5 mcg (200 unit) per tablet 1 tablet  1 tablet Per J Tube BID Melba Doan NP   1 tablet at 10/27/24 1016    carBAMazepine tablet 200 mg  200 mg Per J Tube TID Melba Doan NP   200 mg at 10/27/24 1015    cefTRIAXone injection 1 g  1 g Intravenous Q24H Melba Doan NP   1 g at 10/26/24 1324    dextrose 10% bolus 125 mL 125 mL  12.5 g Intravenous PRN Melba Doan NP        dextrose 10% bolus 250 mL 250 mL  25 g Intravenous PRN Melba Doan NP        electrolytes-dextrose (Pedialyte) oral solution 400 mL  400 mL Per NG tube Q4H Melba Doan NP   400 mL at 10/27/24 1017    ferrous sulfate tablet 1 each  1 tablet Per J Tube BID Melba Doan NP   1 each at 10/27/24 1015    glucagon (human recombinant) injection 1 mg  1 mg Intramuscular PRN Melba Doan NP        glucose chewable tablet 16 g  16 g Oral PRN Melba Doan NP        glucose chewable tablet 24 g  24 g Oral PRN Melba Doan NP        influenza (adjuvanted) (Fluad) 45 mcg/0.5 mL IM vaccine (> or = 66 yo) 0.5 mL  0.5 mL Intramuscular vaccine x 1 dose Tasha Vides MD        levETIRAcetam tablet 500 mg  500 mg Oral BID Melba Doan NP   500 mg at 10/27/24 1016    mupirocin 2 % ointment   Nasal BID Christy Ramos MD   Given at 10/27/24 1016    naloxone 0.4 mg/mL injection 0.02 mg  0.02 mg Intravenous PRN Melba Doan NP        ondansetron injection 4 mg  4 mg Intravenous Q6H PRN Melba Doan NP   4 mg at 10/27/24 1029    oxyCODONE immediate release tablet 5 mg  5 mg Oral Q6H PRN Loree Laura, IESHA   5 mg  at 10/26/24 1123    potassium bicarbonate disintegrating tablet 50 mEq  50 mEq Per J Tube BID Christy Ramos MD   50 mEq at 10/27/24 1016    potassium chloride 10 mEq in 100 mL IVPB  10 mEq Intravenous Q1H Christy Ramos  mL/hr at 10/27/24 1038 10 mEq at 10/27/24 1038    sodium chloride 0.9% flush 10 mL  10 mL Intravenous Q8H PRN Melba Doan NP        zonisamide capsule 200 mg  200 mg Oral BID Melba Doan NP   200 mg at 10/27/24 1016       Objective:     Vitals:    10/27/24 0901   BP: 135/60   Pulse: 71   Resp: 18   Temp: 98.4 °F (36.9 °C)     I/O last 3 completed shifts:  In: 2496.6 [P.O.:1800; I.V.:46.6; NG/GT:650]  Out: 48 [Urine:44; Stool:4]  Temp (24hrs), Av.5 °F (36.9 °C), Min:98.2 °F (36.8 °C), Max:99 °F (37.2 °C)      Physical Exam:  Still with significant hematuria from tube and lower output  Flushed with some resistance      Labs    Recent Results (from the past 2 weeks)   Basic metabolic panel    Collection Time: 10/24/24 10:24 PM   Result Value Ref Range    Sodium 143 136 - 145 mmol/L    Potassium 3.2 (L) 3.5 - 5.1 mmol/L    Chloride 112 (H) 95 - 110 mmol/L    CO2 19 (L) 23 - 29 mmol/L    BUN 11 8 - 23 mg/dL    Creatinine 0.7 0.5 - 1.4 mg/dL    Calcium 7.8 (L) 8.7 - 10.5 mg/dL    Anion Gap 12 8 - 16 mmol/L   Basic metabolic panel    Collection Time: 10/20/24  1:09 PM   Result Value Ref Range    Sodium 136 136 - 145 mmol/L    Potassium 5.1 3.5 - 5.1 mmol/L    Chloride 107 95 - 110 mmol/L    CO2 19 (L) 23 - 29 mmol/L    BUN 7 (L) 8 - 23 mg/dL    Creatinine 0.6 0.5 - 1.4 mg/dL    Calcium 8.0 (L) 8.7 - 10.5 mg/dL    Anion Gap 10 8 - 16 mmol/L   Basic metabolic panel    Collection Time: 10/16/24  5:36 AM   Result Value Ref Range    Sodium 136 136 - 145 mmol/L    Potassium 4.0 3.5 - 5.1 mmol/L    Chloride 109 95 - 110 mmol/L    CO2 19 (L) 23 - 29 mmol/L    BUN 15 8 - 23 mg/dL    Creatinine 0.6 0.5 - 1.4 mg/dL    Calcium 7.3 (L) 8.7 - 10.5 mg/dL    Anion Gap 8 8 - 16 mmol/L      Recent Results (from the past 2 weeks)   CBC with Automated Differential    Collection Time: 10/27/24  6:26 AM   Result Value Ref Range    WBC 7.33 3.90 - 12.70 K/uL    Hemoglobin 8.6 (L) 12.0 - 16.0 g/dL    Hematocrit 25.0 (L) 37.0 - 48.5 %    Platelets 488 (H) 150 - 450 K/uL   CBC with Automated Differential    Collection Time: 10/26/24  4:40 PM   Result Value Ref Range    WBC 10.94 3.90 - 12.70 K/uL    Hemoglobin 8.9 (L) 12.0 - 16.0 g/dL    Hematocrit 26.0 (L) 37.0 - 48.5 %    Platelets 526 (H) 150 - 450 K/uL   CBC Auto Differential    Collection Time: 10/24/24 12:47 PM   Result Value Ref Range    WBC 10.52 3.90 - 12.70 K/uL    Hemoglobin 10.1 (L) 12.0 - 16.0 g/dL    Hematocrit 30.4 (L) 37.0 - 48.5 %    Platelets 559 (H) 150 - 450 K/uL     Microbiology Results (last 7 days)       Procedure Component Value Units Date/Time    Urine Culture High Risk [[id number]] Collected: 10/24/24 2259    Order Status: Completed Specimen: Urine Updated: 10/26/24 1219     Urine Culture, Routine No growth    Narrative:      Indicated criteria for high risk culture:->Other  Other (specify):->recent nephrostomy tube placement                Assessment  Right nephrostomy tube  Right impacted ureteral stone  Ext lower ext dvt    Plan  Heme/onc recommends IVC filter prior to intervention from stone  I also recommend another nephrostogram and evaluation of position of nephrostomy tube and another attempt at antegrade stent placement.  Consider ESWL vs ureteroscopy when above is complete.

## 2024-10-27 NOTE — PROGRESS NOTES
[facility] Medicine  Progress Note    Patient Name: Cheryl Kirkland  MRN: [id number]  Patient Class: IP- Inpatient   Admission Date: 10/24/2024  Length of Stay: 3 days  Attending Physician: Christy Ramos MD  Primary Care Provider: Gagandeep Iglesias MD        Subjective:     Principal Problem:Syncope        HPI:    Patient is a 74-year-old female with past medical history significant for hypertension, hyperlipidemia, DVT in right lower extremity, epilepsy, anemia, neuropathy, GERD, gastritis, polyps, gastric adenocarcinoma status post treatment with Keytruda (followed by Dr. Jerome and Dr. Ambrose) and total gastrectomy on 08/20/2024, jejunostomy tube on supplemental tube feedings, chronic diarrhea, ovarian cancer (s/p surgery/chemotherapy), osteoporosis and recent admission 10/9/24 through 10/21/2024 due to 8 mm right ureteral obstructing stone with right-sided hydronephrosis s/p cystoscopy and right nephrostomy tube placement, and acute cystitis with SHELLEY also found to have extensive DVT RLE treated with heparin drip transitioned to Eliquis, was discharged to SNF at [facility]. On 10/24, she was having physical therapy at SNF and had syncopal episode. There was no fall or injury, however she has been confused and does not remember what has been going on today.  initial blood pressure on arrival to /41, heart rate 79, temp 98.5°, respirations 18, 100% SpO2 on room air.  Lab workup demonstrate WBC 10.52 with left shift, RBC 3.20, hemoglobin 10.1, hematocrit 30.4, platelets 559, potassium 2.9, CO2 19, BUN 11, creatinine 0.7, glucose 115, calcium 7.2, magnesium 1.5, alk phos 174, total protein 5.0, albumin 1.8, total bilirubin 0.3, AST 12, ALT 17, BNP 29, CPK 25, trop less than 0.006, UA- with cloudy brown urine positive for nitrite, trace leukocyte esterase, greater than 100 RBC, 9 WBC. EKG -- NSR HR 76, nonspecific ST and T wave abnormality, no STEMI. CXR  demonstrates that lungs are clear. CT head without contrast done with no acute abnormality. Recent Echo done 10/10/24 with EF 65-70% and normal diastolic function. While in ED, she was given potassium 40 po and magnesium sulfate 2 g IV as well as IV Rocephin. Hospital Medicine was consulted for admission due to syncope with continued confusion, UTI, hypokalemia, and hypomagnesemia.     Overview/Hospital Course:  Patient is a 74 year old female admitted to the hospital for c/o syncopal episode during PT at NH.   Labs: WBC 7.33, RBC 2.72, hemoglobin 8.6, hematocrit 25.0, platelets 488, Potassium 2.5,   -UA- with cloudy brown urine positive for nitrite, trace leukocyte esterase, greater than 100 RBC, 9 WBC.   -IV abx: Rocephin   -CT head without contrast done with no acute abnormality.   -Hematology consulted due to patient being on eliquis for anticoagulation for DVT but will need needs ESWL for an impacted ureteral stone and removal of nephrostomy tube per urology. Patient is high risk to be off of anticoagulation for urology procedure but due to ureteral stone which is impacting that needs to be removed may be able to bridge patient with Lovenox during procedure. If urology would be uncomfortable with this plan would recommend filter placement prior to procedure per Dr. Lee.   -Urology consulted as patient was reporting pain at PCN site. Impacted ureteral stone may require treatment with ESWL once patient can safely be off of . Discussed if patient is not cleared to temporarily come off anticoagulation within 3 months the nephrostomy tube may need to be exchanged. Nephrostogram and stent order placed by urology. Patient transitioned to therapeutic 1mg/kg lovenox from eliquis for possible procedure   -Pt/Ot to eval and treat: recommend SNF   -EKG: Normal sinus rhythm   -Patient required placement of PICC line as phlebotomy and nursing staff were unable to obtain labs.         Interval History: NAEON. Patient  continues to report generalized body pain and chronic diarrhea. PRN imodium ordered. Patient reports nausea but denies vomiting, states prn zofran helps. Denies any CP and SOB. Hypokalemia noted in a.m. labs will replete and continue to trend with a.m. labs. Patient transitioned to therapeutic lovenox 1mg/kg from eliquis for possible urology procedure.     Review of Systems See interval history for ROS     Objective:     Vital Signs (Most Recent):  Temp: 98.2 °F (36.8 °C) (10/27/24 1238)  Pulse: 72 (10/27/24 1238)  Resp: 19 (10/27/24 1238)  BP: 113/68 (10/27/24 1238)  SpO2: 100 % (10/27/24 1238) Vital Signs (24h Range):  Temp:  [98.2 °F (36.8 °C)-99 °F (37.2 °C)] 98.2 °F (36.8 °C)  Pulse:  [69-86] 72  Resp:  [16-19] 19  SpO2:  [92 %-100 %] 100 %  BP: (111-135)/(58-68) 113/68     Weight: 49.7 kg (109 lb 9.1 oz)  Body mass index is 18.23 kg/m².    Intake/Output Summary (Last 24 hours) at 10/27/2024 1403  Last data filed at 10/27/2024 1343  Gross per 24 hour   Intake 1257 ml   Output 11 ml   Net 1246 ml               Physical Exam  Vitals and nursing note reviewed.   Constitutional:       General: She is not in acute distress.     Appearance: She is ill-appearing (chronically).   HENT:      Mouth/Throat:      Mouth: Mucous membranes are dry.      Pharynx: Oropharynx is clear.   Eyes:      Pupils: Pupils are equal, round, and reactive to light.   Cardiovascular:      Rate and Rhythm: Normal rate and regular rhythm.      Heart sounds: No murmur heard.  Pulmonary:      Effort: Pulmonary effort is normal.      Breath sounds: Normal breath sounds. No wheezing.   Abdominal:      General: Bowel sounds are normal. There is no distension.      Palpations: Abdomen is soft.      Tenderness: There is abdominal tenderness (mild)     Comments: Left - J tube, with tube feedings infusing, dressing intact around tube  Mild abdominal TTP-- generalized and around tube      Musculoskeletal:         General: Swelling and tenderness  present. Normal range of motion.      Comments: Chronic swelling/pain to RLE (recently diagnosed with extensive DVT)    Skin:     General: Skin is warm and dry.   Neurological:      General: No focal deficit present.      Mental Status: She is alert and oriented to person, place, and time.     Significant Labs: All pertinent labs within the past 24 hours have been reviewed.  Recent Lab Results         10/27/24  0626   10/26/24  1640        Albumin 1.7   1.8          188       ALT 19   18       Anion Gap 11   8       AST 13   16       Baso # 0.03   0.03       Basophil % 0.4   0.3       BILIRUBIN TOTAL 0.3  Comment: For infants and newborns, interpretation of results should be based  on gestational age, weight and in agreement with clinical  observations.    Premature Infant recommended reference ranges:  Up to 24 hours.............<8.0 mg/dL  Up to 48 hours............<12.0 mg/dL  3-5 days..................<15.0 mg/dL  6-29 days.................<15.0 mg/dL     0.4  Comment: For infants and newborns, interpretation of results should be based  on gestational age, weight and in agreement with clinical  observations.    Premature Infant recommended reference ranges:  Up to 24 hours.............<8.0 mg/dL  Up to 48 hours............<12.0 mg/dL  3-5 days..................<15.0 mg/dL  6-29 days.................<15.0 mg/dL         BUN 6   7       Calcium 7.0   7.0       Chloride 111   112       CO2 16   16       Creatinine 0.6   0.6       Differential Method Automated   Automated       eGFR >60   >60       Eos # 0.0   0.0       Eos % 0.5   0.4       Glucose 107   139       Gran # (ANC) 5.6   7.6       Gran % 75.8   69.8       Hematocrit 25.0   26.0       Hemoglobin 8.6   8.9       Immature Grans (Abs) 0.11  Comment: Mild elevation in immature granulocytes is non specific and   can be seen in a variety of conditions including stress response,   acute inflammation, trauma and pregnancy. Correlation with other    laboratory and clinical findings is essential.     0.19  Comment: Mild elevation in immature granulocytes is non specific and   can be seen in a variety of conditions including stress response,   acute inflammation, trauma and pregnancy. Correlation with other   laboratory and clinical findings is essential.         Immature Granulocytes 1.5   1.7       Lymph # 0.7   1.9       Lymph % 10.1   17.6       Magnesium  2.2   1.7       MCH 31.6   31.4       MCHC 34.4   34.2       MCV 92   92       Mono # 0.9   1.1       Mono % 11.7   10.2       MPV 8.6   8.3       nRBC 0   0       Phosphorus Level 2.4   1.5       Platelet Count 488   526       Potassium 2.5  Comment: K critical result(s) called and verbal readback obtained from Michael Waldrop RN by [email or web address] 10/27/2024 07:29     2.5  Comment: K  critical result(s) called and verbal readback obtained from SELENA MIMS by [email or web address] 10/26/2024 17:39         PROTEIN TOTAL 4.7   4.9       RBC 2.72   2.83       RDW 19.3   19.4       Sodium 138   136       Troponin I   <0.006  Comment: The reference interval for Troponin I represents the 99th percentile   cutoff   for our facility and is consistent with 3rd generation assay   performance.         WBC 7.33   10.94               Significant Imaging: I have reviewed all pertinent imaging results/findings within the past 24 hours.    Assessment/Plan:      * Syncope  Syncopal episode while having physical therapy -- did not fall, no injuries  CT head did not show any acute abnormality  Troponin normal, EKG with no significant changes  Lab workup shows mild UTI - Rocephin given  Had recent Echo which was normal  Hydrating via feeding tube  Cardiac monitoring  Trending troponin level- continued to be negative       Hypomagnesemia  Patient has Abnormal Magnesium: hypomagnesemia. Will continue to monitor electrolytes closely. Will replace the affected electrolytes and repeat labs to be done after interventions completed. The patient's  magnesium results have been reviewed and are listed below.  Recent Labs   Lab 10/27/24  0626   MG 2.2          Hypokalemia  Patient's most recent potassium results are listed below.   Recent Labs     10/24/24  2224 10/26/24  1640 10/27/24  0626   K 3.2* 2.5* 2.5*       Plan  - Replete potassium per protocol  - Monitor potassium Daily  - Patient's hypokalemia is being treated, will recheck labs again in a.m.     Ureteral stone with hydronephrosis  Recent imaging with 8 mm obstructing/impacted stone on right side -- s/p cystoscopy and right nephrostomy tube placement  Urology consulted , appreciate recommendations       Severe protein-calorie malnutrition  Nutrition consulted. Most recent weight and BMI monitored- will continue tube feeding supplementation    Measurements:  Wt Readings from Last 1 Encounters:   10/25/24 49.7 kg (109 lb 9.1 oz)   Body mass index is 18.23 kg/m².      Jejunostomy tube present  Hydrating and tube feeding via tube      Gastric adenocarcinoma  - Followed by Dr. Ambrose and Dr. Jerome  - S/p total gastrectomy on 8/20, with lysis of adhesions, lymphadenectomy, and jejunostomy tube placement, d/c on 9/10  - Pathologic stage from 8/20/2024: Stage III (ypT2, pN3a, cM0), completed 5 cycles Keytruda prior to surgery  - outpatient follow up with Dr. Jerome and Dr. Ambrose      DVT (deep venous thrombosis)  Recently diagnosed with extensive DVT RLE  Continue Eliquis      Hx of Epileptic seizures  Seizure precautions  Continue Keppra and carbamazepine      Chronic anemia  Anemia is likely due to chronic blood loss and Iron deficiency. Most recent hemoglobin and hematocrit are listed below.  Recent Labs     10/26/24  1640 10/27/24  0626   HGB 8.9* 8.6*   HCT 26.0* 25.0*       Plan  - Monitor serial CBC: Daily  - Transfuse PRBC if patient becomes hemodynamically unstable, symptomatic or H/H drops below 7/21.  - Patient has not received any PRBC transfusions to date      VTE Risk Mitigation (From  admission, onward)           Ordered     enoxaparin injection 50 mg  Every 12 hours         10/27/24 1121     Reason for No Pharmacological VTE Prophylaxis  Once        Question:  Reasons:  Answer:  Already adequately anticoagulated on oral Anticoagulants    10/24/24 2206     IP VTE HIGH RISK PATIENT  Once         10/24/24 2206     Place sequential compression device  Until discontinued         10/24/24 2206                    Discharge Planning   SOFIYA:      Code Status: Full Code   Is the patient medically ready for discharge?:     Reason for patient still in hospital (select all that apply): Patient trending condition, Laboratory test, Treatment, Imaging, and Consult recommendations  Discharge Plan A: Skilled Nursing Facility          The patient's case has been reviewed by my supervising physician.   Supervising physician will provide additional orders and recommendations at his/her discretion.  Please see supervising physicians documentation and/or orders for further details.           Loree Laura NP  Department of Hospital Medicine   [facility] - Med Surg

## 2024-10-27 NOTE — PT/OT/SLP EVAL
"Physical Therapy Evaluation    Patient Name:  Cheryl Kirkland   MRN:  [id number]    Recommendations:     Discharge Recommendations: Moderate Intensity Therapy (PENDING PROGRESS)   Discharge Equipment Recommendations: none   Barriers to discharge: Decreased caregiver support    Assessment:     Chreyl Kirkland is a 74 y.o. female admitted with a medical diagnosis of Syncope.  She presents with the following impairments/functional limitations: weakness, impaired endurance, impaired self care skills, impaired functional mobility, gait instability, pain, impaired balance, decreased safety awareness, decreased lower extremity function, decreased upper extremity function, decreased coordination, impaired cognition, decreased ROM .    Rehab Prognosis: Fair; patient would benefit from acute skilled PT services to address these deficits and reach maximum level of function.    Recent Surgery: * No surgery found *      Plan:     During this hospitalization, patient to be seen 3 x/week to address the identified rehab impairments via gait training, therapeutic activities, therapeutic exercises and progress toward the following goals:    Plan of Care Expires:  11/10/24    Subjective     Chief Complaint: " IM WET AND THEY WOULDN'T CHANGE ME OR HELP ME TO THE BATHROOM."   Patient/Family Comments/goals: UNKNOWN   Pain/Comfort:  Location 1:  (NO REPORT OF PAIN HOWEVER PT MOANING THROUGHOUT EVAL AND TX.)    Patients cultural, spiritual, Sikh conflicts given the current situation:      Living Environment:   PT LIVES AT HOME WITH  IN A 2 STORY HOME WITH BED AND BATHROOM DOWN STAIRS. PT WITH NO STEPS TO ENTER HOME   Prior to admission, patients level of function was IND >RICKY X 2 HOSPITAL ADMISSIONS AGO. PT WAS ADMITTED THIS TIME FROM A SNF AND NEEDED ASSIST WITH GROSS FUNC MOBILITY  .  Equipment used at home: walker, rolling.  DME owned (not currently used): rolling walker.  Upon discharge, patient will have " "assistance from  (LIMITED).    Objective:     Communicated with NURSE AND EPIC CHART REVIEW  prior to session.  Patient found supine with peripheral IV, G/J tube, nephrostomy, telemetry, PICC line, Other (comments), PureWick (MIDLINE)  upon PT entry to room.    General Precautions: Standard, fall  Orthopedic Precautions:N/A   Braces: N/A  Respiratory Status: Room air    Exams:  Cognitive Exam:  Patient is oriented to Person, Place, and Time  Postural Exam:  Patient presented with the following abnormalities:    -       Rounded shoulders  -       Forward head  RLE ROM: LIMITED  RLE Strength: UNABLE TO ASSESS D/T LACK OF CUE FOLLOWING  LLE ROM: LIMITED  LLE Strength: UNABLE TO ASSESS D/T LACK OF CUE FOLLOWING     Functional Mobility:  Bed Mobility:     Rolling Right: minimum assistance  Scooting: minimum assistance  Supine to Sit: minimum assistance  Sit to Supine: minimum assistance  Transfers:     Sit to Stand:  minimum assistance with rolling walker  Bed to Chair: minimum assistance with  rolling walker  using  Stand Pivot  Gait: PT TOOK 1 SIDE STEP TO HOB WITH RW AND MIN A.       AM-PAC 6 CLICK MOBILITY  Total Score:16       Treatment & Education:  PT MOANING CONSTANTLY AND SAYING" GOD HELP ME" CONSTANTLY THROUGHOUT EVAL AND TX. PT DELAYED WITH RESPONSE TO P.T. AND INC DELAY WITH MOBILITY   GT. BELT AND  SOCKS DONNED PRIOR TO OOB MOBILITY.  PT MET IN ROOM SOILED AND P.T. CALLED FOR PT TO BE CLEANED. PT SCOOT ON BED AND STOOD WITH RW AND MIN A. P.T. CHANGED SATURATED PAD UNDER PT AND PT TOOK 1 SIDE STEP TO RIGHT WITH MIN A. PT RETURNED SEATED AND SUP IN BED WITH MIN A. PT EDUCATED PT ON ROLE OF P.T. AND PT AGREED TO PARTICIPATE IN P.T.   PT EDUCATED ON "CALL DON'T FALL", ENCOURAGED TO CALL FOR ASSISTANCE WITH ALL NEEDS FOR OOB MOBILITY.      Patient left HOB elevated with call button in reach and PCT COMING IN TO ASSIST PT .    GOALS:   Multidisciplinary Problems       Physical Therapy Goals          " Problem: Physical Therapy    Goal Priority Disciplines Outcome Interventions   Physical Therapy Goal     PT, PT/OT     Description: Lt/10/24  1. PT WILL COMPLETE BED MOBILITY WITH SBA.  2. PT WILL STAND T/F TO CHAIR WITH RW AND SBA.  3. PT WILL GT TRAIN X 100' WITH RW AND MIN A TO PROGRESS GT.  4. PT WILL INC AMPAC SCORE BY 2 POINTS TO PROGRESS GROSS FUNC MOBILITY.                            History:     Past Medical History:   Diagnosis Date    Anemia     Chronic deep vein thrombosis (DVT) of left lower extremity 10/21/2022    Deep vein thrombosis     Drug-induced polyneuropathy 2024    Noted by ROCK KAY NP  last documented on 2023    DVT (deep venous thrombosis)     Epilepsy     Gastric adenocarcinoma 2024    GERD (gastroesophageal reflux disease)     Hyperlipidemia 01/10/2013    Formatting of this note might be different from the original.   ICD-10 Transition    Mixed epithelial carcinoma of right ovary 2023    OP (osteoporosis) 2024    Ovarian cancer     Primary hypertension 2022       Past Surgical History:   Procedure Laterality Date    ABDOMINAL WASHOUT N/A 3/14/2024    Procedure: LAVAGE, PERITONEAL, THERAPEUTIC;  Surgeon: Chen Jerome MD;  Location: [facility];  Service: General;  Laterality: N/A;    APPENDECTOMY      BIOPSY OF PERITONEUM N/A 3/14/2024    Procedure: BIOPSY, PERITONEUM;  Surgeon: Chen Jerome MD;  Location: [facility] OR;  Service: General;  Laterality: N/A;    COLONOSCOPY  2012    Dr Boyle- Tubular adenoma polyps. Repeat in 3 years.    COLONOSCOPY  2015    -Nodular mucosa at appendiceal orfice, sigmoid and desc diverticulosis otherwise normal.    COLONOSCOPY      >3 TAs    COLONOSCOPY  2023    CYSTOSCOPY W/ RETROGRADES Right 10/10/2024    Procedure: CYSTOSCOPY, WITH RETROGRADE PYELOGRAM;  Surgeon: Lata Kelly MD;  Location: [facility] OR;  Service: Urology;  Laterality: Right;    DIAGNOSTIC  LAPAROSCOPY N/A 3/14/2024    Procedure: LAPAROSCOPY, DIAGNOSTIC;  Surgeon: Chen Jerome MD;  Location: [facility];  Service: General;  Laterality: N/A;  1. Diagnostic laparoscopy   2. Extensive lysis of adhesions  3. Peritoneal biopsy   4. Peritoneal washings for cytology    DIAGNOSTIC LAPAROSCOPY N/A 8/20/2024    Procedure: LAPAROSCOPY, DIAGNOSTIC;  Surgeon: Chen Jerome MD;  Location: [facility];  Service: General;  Laterality: N/A;    EGD - EXTERNAL RESULT  06/20/2012    Dr Boyle- Mild gastritis otherwise normal.    ENDOSCOPIC ULTRASOUND OF UPPER GASTROINTESTINAL TRACT N/A 7/26/2024    Procedure: ULTRASOUND, UPPER GI TRACT, ENDOSCOPIC;  Surgeon: Valdo Aguilera MD;  Location: [facility];  Service: Endoscopy;  Laterality: N/A;  7/22/24: instructions sent via portal-. Pt no longer takling eliquis-GD    ESOPHAGOGASTRODUODENOSCOPY N/A 02/08/2024    Procedure: EGD (ESOPHAGOGASTRODUODENOSCOPY);  Surgeon: Jayla Arteaga MD;  Location: [facility];  Service: Endoscopy;  Laterality: N/A;    ESOPHAGOGASTRODUODENOSCOPY N/A 8/20/2024    Procedure: EGD (ESOPHAGOGASTRODUODENOSCOPY);  Surgeon: Chen Jerome MD;  Location: [facility] OR;  Service: General;  Laterality: N/A;    GASTRECTOMY N/A 8/20/2024    Procedure: GASTRECTOMY;  Surgeon: Chen Jerome MD;  Location: [facility];  Service: General;  Laterality: N/A;    HYSTERECTOMY      LAPAROSCOPIC LYSIS OF ADHESIONS N/A 3/14/2024    Procedure: LYSIS, ADHESIONS, LAPAROSCOPIC;  Surgeon: Chen Jerome MD;  Location: [facility] OR;  Service: General;  Laterality: N/A;    LYSIS OF ADHESIONS N/A 8/20/2024    Procedure: LYSIS, ADHESIONS;  Surgeon: Chen Jerome MD;  Location: [facility] OR;  Service: General;  Laterality: N/A;    PLACEMENT OF JEJUNOSTOMY TUBE N/A 8/20/2024    Procedure: INSERTION, JEJUNOSTOMY TUBE;  Surgeon: Chen Jerome MD;  Location: [facility] OR;  Service: General;  Laterality: N/A;    TOTAL ABDOMINAL HYSTERECTOMY W/ BILATERAL SALPINGOOPHORECTOMY  01/09/2023     radical resection with right salpingo-oophorectomy, left salpingo-oophorectomy, extensive enterolysis, extensive adhesiolysis, left pelvic lymph node biopsy, omental biopsy, small bowel resection with primary functional end-to-end anastomosis, placement of pelvic drain       Time Tracking:     PT Received On: 10/27/24  PT Start Time: 0715     PT Stop Time: 0740  PT Total Time (min): 25 min     Billable Minutes: Evaluation 15 and Therapeutic Activity 10      10/27/2024

## 2024-10-27 NOTE — ASSESSMENT & PLAN NOTE
Anemia is likely due to chronic blood loss and Iron deficiency. Most recent hemoglobin and hematocrit are listed below.  Recent Labs     10/26/24  1640 10/27/24  0626   HGB 8.9* 8.6*   HCT 26.0* 25.0*       Plan  - Monitor serial CBC: Daily  - Transfuse PRBC if patient becomes hemodynamically unstable, symptomatic or H/H drops below 7/21.  - Patient has not received any PRBC transfusions to date

## 2024-10-27 NOTE — ASSESSMENT & PLAN NOTE
Patient has Abnormal Magnesium: hypomagnesemia. Will continue to monitor electrolytes closely. Will replace the affected electrolytes and repeat labs to be done after interventions completed. The patient's magnesium results have been reviewed and are listed below.  Recent Labs   Lab 10/27/24  0626   MG 2.2

## 2024-10-27 NOTE — PLAN OF CARE
See eval for details. Pt displayed deficits with functional mobility/ transfers, deficits with adl's skills also decrease b ue strength/endurance. Recommendation: snf vs palliative care     - - -

## 2024-10-28 LAB
ALBUMIN SERPL BCP-MCNC: 1.7 G/DL (ref 3.5–5.2)
ALP SERPL-CCNC: 202 U/L (ref 40–150)
ALT SERPL W/O P-5'-P-CCNC: 19 U/L (ref 10–44)
ANION GAP SERPL CALC-SCNC: 10 MMOL/L (ref 8–16)
AST SERPL-CCNC: 11 U/L (ref 10–40)
BASOPHILS # BLD AUTO: 0.03 K/UL (ref 0–0.2)
BASOPHILS NFR BLD: 0.5 % (ref 0–1.9)
BILIRUB SERPL-MCNC: 0.2 MG/DL (ref 0.1–1)
BUN SERPL-MCNC: 6 MG/DL (ref 8–23)
CALCIUM SERPL-MCNC: 7.5 MG/DL (ref 8.7–10.5)
CHLORIDE SERPL-SCNC: 113 MMOL/L (ref 95–110)
CO2 SERPL-SCNC: 16 MMOL/L (ref 23–29)
CREAT SERPL-MCNC: 0.6 MG/DL (ref 0.5–1.4)
DIFFERENTIAL METHOD BLD: ABNORMAL
EOSINOPHIL # BLD AUTO: 0 K/UL (ref 0–0.5)
EOSINOPHIL NFR BLD: 0.7 % (ref 0–8)
ERYTHROCYTE [DISTWIDTH] IN BLOOD BY AUTOMATED COUNT: 20.1 % (ref 11.5–14.5)
EST. GFR  (NO RACE VARIABLE): >60 ML/MIN/1.73 M^2
GLUCOSE SERPL-MCNC: 138 MG/DL (ref 70–110)
HCT VFR BLD AUTO: 26.6 % (ref 37–48.5)
HGB BLD-MCNC: 9.1 G/DL (ref 12–16)
IMM GRANULOCYTES # BLD AUTO: 0.19 K/UL (ref 0–0.04)
IMM GRANULOCYTES NFR BLD AUTO: 3.4 % (ref 0–0.5)
LYMPHOCYTES # BLD AUTO: 1 K/UL (ref 1–4.8)
LYMPHOCYTES NFR BLD: 18.1 % (ref 18–48)
MAGNESIUM SERPL-MCNC: 1.8 MG/DL (ref 1.6–2.6)
MCH RBC QN AUTO: 31.5 PG (ref 27–31)
MCHC RBC AUTO-ENTMCNC: 34.2 G/DL (ref 32–36)
MCV RBC AUTO: 92 FL (ref 82–98)
MONOCYTES # BLD AUTO: 0.9 K/UL (ref 0.3–1)
MONOCYTES NFR BLD: 16.2 % (ref 4–15)
NEUTROPHILS # BLD AUTO: 3.4 K/UL (ref 1.8–7.7)
NEUTROPHILS NFR BLD: 61.1 % (ref 38–73)
NRBC BLD-RTO: 0 /100 WBC
PLATELET # BLD AUTO: 491 K/UL (ref 150–450)
PMV BLD AUTO: 8.5 FL (ref 9.2–12.9)
POTASSIUM SERPL-SCNC: 3 MMOL/L (ref 3.5–5.1)
PROT SERPL-MCNC: 5.2 G/DL (ref 6–8.4)
RBC # BLD AUTO: 2.89 M/UL (ref 4–5.4)
SODIUM SERPL-SCNC: 139 MMOL/L (ref 136–145)
WBC # BLD AUTO: 5.54 K/UL (ref 3.9–12.7)

## 2024-10-28 PROCEDURE — 85025 COMPLETE CBC W/AUTO DIFF WBC: CPT | Performed by: HOSPITALIST

## 2024-10-28 PROCEDURE — 80053 COMPREHEN METABOLIC PANEL: CPT | Performed by: HOSPITALIST

## 2024-10-28 PROCEDURE — 97530 THERAPEUTIC ACTIVITIES: CPT

## 2024-10-28 PROCEDURE — 25000003 PHARM REV CODE 250: Performed by: INTERNAL MEDICINE

## 2024-10-28 PROCEDURE — 21400001 HC TELEMETRY ROOM

## 2024-10-28 PROCEDURE — 63600175 PHARM REV CODE 636 W HCPCS: Performed by: NURSE PRACTITIONER

## 2024-10-28 PROCEDURE — 25000003 PHARM REV CODE 250: Performed by: NURSE PRACTITIONER

## 2024-10-28 PROCEDURE — G0408 INPT/TELE FOLLOW UP 35: HCPCS | Mod: 95,,, | Performed by: INTERNAL MEDICINE

## 2024-10-28 PROCEDURE — 25000003 PHARM REV CODE 250

## 2024-10-28 PROCEDURE — 83735 ASSAY OF MAGNESIUM: CPT | Performed by: HOSPITALIST

## 2024-10-28 PROCEDURE — 63600175 PHARM REV CODE 636 W HCPCS

## 2024-10-28 RX ADMIN — Medication 400 ML: at 10:10

## 2024-10-28 RX ADMIN — ONDANSETRON 4 MG: 2 INJECTION INTRAMUSCULAR; INTRAVENOUS at 09:10

## 2024-10-28 RX ADMIN — LEVETIRACETAM 500 MG: 500 TABLET, FILM COATED ORAL at 09:10

## 2024-10-28 RX ADMIN — LEVETIRACETAM 500 MG: 500 TABLET, FILM COATED ORAL at 10:10

## 2024-10-28 RX ADMIN — ACETAMINOPHEN 1000 MG: 500 TABLET ORAL at 03:10

## 2024-10-28 RX ADMIN — Medication 400 ML: at 06:10

## 2024-10-28 RX ADMIN — Medication 400 ML: at 03:10

## 2024-10-28 RX ADMIN — MUPIROCIN: 20 OINTMENT TOPICAL at 10:10

## 2024-10-28 RX ADMIN — POTASSIUM BICARBONATE 50 MEQ: 978 TABLET, EFFERVESCENT ORAL at 09:10

## 2024-10-28 RX ADMIN — LOPERAMIDE HYDROCHLORIDE 2 MG: 2 CAPSULE ORAL at 09:10

## 2024-10-28 RX ADMIN — Medication 1 TABLET: at 10:10

## 2024-10-28 RX ADMIN — ENOXAPARIN SODIUM 50 MG: 60 INJECTION SUBCUTANEOUS at 10:10

## 2024-10-28 RX ADMIN — POTASSIUM BICARBONATE 50 MEQ: 978 TABLET, EFFERVESCENT ORAL at 10:10

## 2024-10-28 RX ADMIN — LOPERAMIDE HYDROCHLORIDE 2 MG: 2 CAPSULE ORAL at 11:10

## 2024-10-28 RX ADMIN — Medication 1 TABLET: at 09:10

## 2024-10-28 RX ADMIN — CARBAMAZEPINE 200 MG: 200 TABLET ORAL at 09:10

## 2024-10-28 RX ADMIN — ACETAMINOPHEN 1000 MG: 500 TABLET ORAL at 09:10

## 2024-10-28 RX ADMIN — Medication 400 ML: at 02:10

## 2024-10-28 RX ADMIN — CEFTRIAXONE 1 G: 1 INJECTION, POWDER, FOR SOLUTION INTRAMUSCULAR; INTRAVENOUS at 03:10

## 2024-10-28 RX ADMIN — ZONISAMIDE 200 MG: 100 CAPSULE ORAL at 09:10

## 2024-10-28 RX ADMIN — CARBAMAZEPINE 200 MG: 200 TABLET ORAL at 03:10

## 2024-10-28 RX ADMIN — ENOXAPARIN SODIUM 50 MG: 60 INJECTION SUBCUTANEOUS at 09:10

## 2024-10-28 RX ADMIN — FERROUS SULFATE TAB 325 MG (65 MG ELEMENTAL FE) 1 EACH: 325 (65 FE) TAB at 10:10

## 2024-10-28 RX ADMIN — Medication 400 ML: at 11:10

## 2024-10-28 RX ADMIN — FERROUS SULFATE TAB 325 MG (65 MG ELEMENTAL FE) 1 EACH: 325 (65 FE) TAB at 09:10

## 2024-10-28 RX ADMIN — ZONISAMIDE 200 MG: 100 CAPSULE ORAL at 10:10

## 2024-10-28 RX ADMIN — MUPIROCIN: 20 OINTMENT TOPICAL at 09:10

## 2024-10-28 RX ADMIN — CARBAMAZEPINE 200 MG: 200 TABLET ORAL at 10:10

## 2024-10-28 NOTE — PLAN OF CARE
Discussed poc with pt, pt verbalized understanding    Cardiac monitoring in use  Fall precautions in place, remains injury free  nausea under control with PRN meds    Accurate I&Os. Tube feeding continued. Pleasure feeding as wanted  Abx given as prescribed  Bed locked at lowest position  Call light within reach    Chart check complete  Will cont with POC

## 2024-10-28 NOTE — PT/OT/SLP PROGRESS
"Physical Therapy  Treatment    Cheryl Kirkland   MRN: [id number]   Admitting Diagnosis: Syncope    PT Received On: 10/28/24  PT Start Time: 1040     PT Stop Time: 1050    PT Total Time (min): 10 min       Billable Minutes:  Therapeutic Activity 10    Treatment Type: Treatment  PT/PTA: PT     Number of PTA visits since last PT visit: 0       General Precautions: Standard, fall  Orthopedic Precautions: N/A  Braces: N/A  Respiratory Status: Room air         Subjective:  Communicated with NURSE AND EPIC CHART REVIEW  prior to session.  PT MET IN  WITH  PRESENT.          Objective:        Functional Mobility:  PT SUP IN BED MOANING. PT EYES OPENING AND CLOSING. PT REPORTED YES TO THERAPY HOWEVER WHEN ASKED TO COMPLETE TASK PT NOT RESPONDING WITH PHYSICAL MOVEMENT. P.T. ASSIST B LE TO SIDE OF BED HOWEVER PT NOT PARTICIPATING. PT  REPORTED, " MAYBE SHE CAN'T." P.T. DISCUSSED THE FACT THAT PT WAS ABLE TO STAND YESTERDAY FOR A SHORT TIME HOWEVER IF PT IS NOT ABLE TO ACTIVELY PARTICIPATE IN P.T. IT NOT STRENGTHENING HER FOR US TO "MOVE HER AROUND " PASSIVELY. PT  REPORTED UNDERSTANDING. P.T. ALSO DISCUSSED GOALS WITH  WITH PT PRESENT. P.T. DICUSSED WANTING TO PARTICIPATE IN P.T. WITH SNF VS. COMFORT CARE. PT  REPORTED THE ONLY GOAL HE HAD A THE MOMENT WAS FOR THE DOCTORS TO " BUST UP HER KIDNEY STONES." PT LEFT WITH PRESSURE RELIEF BOOTS AND ON ALL NEEDS MET.    Treatment and Education:  PT EDUCATED ON "CALL DON'T FALL", ENCOURAGED TO CALL FOR ASSISTANCE WITH ALL NEEDS FOR OOB MOBILITY.       AM-PAC 6 CLICK MOBILITY  How much help from another person does this patient currently need?   1 = Unable, Total/Dependent Assistance  2 = A lot, Maximum/Moderate Assistance  3 = A little, Minimum/Contact Guard/Supervision  4 = None, Modified [place]/Independent    Turning over in bed (including adjusting bedclothes, sheets and blankets)?: 1  Sitting down on and standing up from a chair " with arms (e.g., wheelchair, bedside commode, etc.): 1  Moving from lying on back to sitting on the side of the bed?: 1  Moving to and from a bed to a chair (including a wheelchair)?: 1  Need to walk in hospital room?: 1  Climbing 3-5 steps with a railing?: 1  Basic Mobility Total Score: 6    AM-PAC Raw Score CMS G-Code Modifier Level of Impairment Assistance   6 % Total / Unable   7 - 9 CM 80 - 100% Maximal Assist   10 - 14 CL 60 - 80% Moderate Assist   15 - 19 CK 40 - 60% Moderate Assist   20 - 22 CJ 20 - 40% Minimal Assist   23 CI 1-20% SBA / CGA   24 CH 0% Independent/ Mod I     Patient left HOB elevated with call button in reach.    Assessment:  PT UNABLE TO ACTIVELY PARTICIPATE IN P.T. TODAY.    Rehab identified problem list/impairments: weakness, impaired endurance, impaired self care skills, impaired functional mobility, gait instability, pain, impaired balance, decreased safety awareness, decreased lower extremity function, decreased upper extremity function, decreased coordination, impaired cognition, decreased ROM    Rehab potential is fair.    Activity tolerance: Poor    Discharge recommendations: Moderate Intensity Therapy (PENDING PROGRESS)      Barriers to discharge:      Equipment recommendations: none     GOALS:   Multidisciplinary Problems       Physical Therapy Goals          Problem: Physical Therapy    Goal Priority Disciplines Outcome Interventions   Physical Therapy Goal     PT, PT/OT Not Progressing    Description: Lt/10/24  1. PT WILL COMPLETE BED MOBILITY WITH SBA.  2. PT WILL STAND T/F TO CHAIR WITH RW AND SBA.  3. PT WILL GT TRAIN X 100' WITH RW AND MIN A TO PROGRESS GT.  4. PT WILL INC AMPAC SCORE BY 2 POINTS TO PROGRESS GROSS FUNC MOBILITY.                            PLAN:    Patient to be seen 3 x/week to address the above listed problems via gait training, therapeutic activities, therapeutic exercises  Plan of Care expires: 11/10/24  Plan of Care reviewed with: spouse          10/28/2024

## 2024-10-28 NOTE — PT/OT/SLP PROGRESS
Occupational Therapy  Treatment    Cheryl Kirkland   MRN: [id number]   Admitting Diagnosis: Syncope    OT Date of Treatment: 10/28/24   OT Start Time: 0945  OT Stop Time: 1000  OT Total Time (min): 15 min    Billable Minutes:  Therapeutic Activity 15 minutes               General Precautions: Standard,    Orthopedic Precautions: N/A  Braces: N/A  Respiratory Status: nasal canula         Subjective:  Communicated with nurse and epic chart review prior to session.    Pain/Comfort  Pain Rating 1: 0/10    Objective:  Patient found with: peripheral IV, G/J tube, nephrostomy, telemetry, PureWick, PICC line  Therapeutic Activities and Exercises:   Pt seen in room with pt supine with hob elevated. Pt seen moaning in bed upon  entering with eyes closed. Pt open eyes upon command but unable to maintained her eyes opened.  Pt reported yes to participation with , however refused to participate with therapy  /Pt educated importance of movement and participating with therapy session. Educated patient on importance of increased tolerance to upright position and direct impact on CV endurance and strength. Patient encouraged to sit up in chair/ EOB at  times through the day, including for all meals.. Encouraged patient to perform AROM TE to BUE throughout the day within all available planes of motion. Re enforced importance of utilizing call light to meet needs in room and not attempt to get up without staff assistance. Patient verbalized understanding and agreed to comply.       .  AM-PAC 6 CLICK ADL   How much help from another person does this patient currently need?   1 = Unable, Total/Dependent Assistance  2 = A lot, Maximum/Moderate Assistance  3 = A little, Minimum/Contact Guard/Supervision  4 = None, Modified [place]/Independent    Putting on and taking off regular lower body clothing? : 1  Bathing (including washing, rinsing, drying)?: 1  Toileting, which includes using toilet, bedpan, or urinal? :  "1  Putting on and taking off regular upper body clothing?: 1  Taking care of personal grooming such as brushing teeth?: 1 (peg feeding)  Eating meals?: 2  Daily Activity Total Score: 7     AM-PAC Raw Score CMS "G-Code Modifier Level of Impairment Assistance   6 % Total / Unable   7 - 8 CM 80 - 100% Maximal Assist   9-13 CL 60 - 80% Moderate Assist   14 - 19 CK 40 - 60% Moderate Assist   20 - 22 CJ 20 - 40% Minimal Assist   23 CI 1-20% SBA / CGA   24 CH 0% Independent/ Mod I       Patient left HOB elevated with all lines intact, call button in reach, bed alarm on, nurse notified, and spouse present    ASSESSMENT:  Cheryl Kirkland is a 74 y.o. female with a medical diagnosis of Syncope and presents with debility and generalized weakness    Rehab identified problem list/impairments:  weakness, gait instability, decreased upper extremity function, impaired endurance, impaired balance, impaired self care skills, impaired functional mobility, decreased safety awareness    Rehab potential is good.    Activity tolerance: Good    Discharge recommendations:  (snf vs palliative)   Barriers to discharge:      Equipment recommendations: none    GOALS:   Multidisciplinary Problems       Occupational Therapy Goals          Problem: Occupational Therapy    Goal Priority Disciplines Outcome Interventions   Occupational Therapy Goal     OT, PT/OT     Description: O.T. goals met 11-10-24  Mod a with bsc transfer  Min a with ue dressing  Pt will tolerate 1 set x 10 reps b ue rom exercise                       Plan:  Patient to be seen 2 x/week to address the above listed problems via self-care/home management, therapeutic activities, therapeutic exercises  Plan of Care expires: 11/10/24  Plan of Care reviewed with: patient         10/28/2024  "

## 2024-10-28 NOTE — CONSULTS
Chart reviewed by Dr. Aggarwal.       ASSESSMENT/PLAN:    Recent DVT     The order for a IVC filter has been placed and the procedure will be performed asap.          Thank you for the consult.

## 2024-10-29 LAB — AMMONIA PLAS-SCNC: 48 UMOL/L (ref 10–50)

## 2024-10-29 PROCEDURE — 21400001 HC TELEMETRY ROOM

## 2024-10-29 PROCEDURE — C1751 CATH, INF, PER/CENT/MIDLINE: HCPCS

## 2024-10-29 PROCEDURE — 97530 THERAPEUTIC ACTIVITIES: CPT

## 2024-10-29 PROCEDURE — 25000003 PHARM REV CODE 250: Performed by: NURSE PRACTITIONER

## 2024-10-29 PROCEDURE — 63600175 PHARM REV CODE 636 W HCPCS

## 2024-10-29 PROCEDURE — G0408 INPT/TELE FOLLOW UP 35: HCPCS | Mod: 95,,, | Performed by: INTERNAL MEDICINE

## 2024-10-29 PROCEDURE — 82140 ASSAY OF AMMONIA: CPT | Performed by: HOSPITALIST

## 2024-10-29 PROCEDURE — 25000003 PHARM REV CODE 250

## 2024-10-29 PROCEDURE — 25000003 PHARM REV CODE 250: Performed by: INTERNAL MEDICINE

## 2024-10-29 RX ADMIN — CARBAMAZEPINE 200 MG: 200 TABLET ORAL at 09:10

## 2024-10-29 RX ADMIN — Medication 1 TABLET: at 09:10

## 2024-10-29 RX ADMIN — Medication 400 ML: at 06:10

## 2024-10-29 RX ADMIN — LEVETIRACETAM 500 MG: 500 TABLET, FILM COATED ORAL at 09:10

## 2024-10-29 RX ADMIN — CARBAMAZEPINE 200 MG: 200 TABLET ORAL at 03:10

## 2024-10-29 RX ADMIN — CARBAMAZEPINE 200 MG: 200 TABLET ORAL at 08:10

## 2024-10-29 RX ADMIN — MUPIROCIN: 20 OINTMENT TOPICAL at 08:10

## 2024-10-29 RX ADMIN — Medication 400 ML: at 03:10

## 2024-10-29 RX ADMIN — LEVETIRACETAM 500 MG: 500 TABLET, FILM COATED ORAL at 08:10

## 2024-10-29 RX ADMIN — FERROUS SULFATE TAB 325 MG (65 MG ELEMENTAL FE) 1 EACH: 325 (65 FE) TAB at 08:10

## 2024-10-29 RX ADMIN — POTASSIUM BICARBONATE 50 MEQ: 978 TABLET, EFFERVESCENT ORAL at 09:10

## 2024-10-29 RX ADMIN — ENOXAPARIN SODIUM 50 MG: 60 INJECTION SUBCUTANEOUS at 04:10

## 2024-10-29 RX ADMIN — FERROUS SULFATE TAB 325 MG (65 MG ELEMENTAL FE) 1 EACH: 325 (65 FE) TAB at 09:10

## 2024-10-29 RX ADMIN — Medication 400 ML: at 12:10

## 2024-10-29 RX ADMIN — OXYCODONE HYDROCHLORIDE 5 MG: 5 TABLET ORAL at 03:10

## 2024-10-29 RX ADMIN — Medication 400 ML: at 09:10

## 2024-10-29 RX ADMIN — ACETAMINOPHEN 1000 MG: 500 TABLET ORAL at 06:10

## 2024-10-29 RX ADMIN — ACETAMINOPHEN 1000 MG: 500 TABLET ORAL at 09:10

## 2024-10-29 RX ADMIN — ZONISAMIDE 200 MG: 100 CAPSULE ORAL at 08:10

## 2024-10-29 RX ADMIN — Medication 1 TABLET: at 08:10

## 2024-10-29 RX ADMIN — POTASSIUM BICARBONATE 50 MEQ: 978 TABLET, EFFERVESCENT ORAL at 08:10

## 2024-10-29 RX ADMIN — MUPIROCIN: 20 OINTMENT TOPICAL at 09:10

## 2024-10-29 RX ADMIN — ZONISAMIDE 200 MG: 100 CAPSULE ORAL at 09:10

## 2024-10-29 NOTE — SUBJECTIVE & OBJECTIVE
Review of Systems   All other systems reviewed and are negative.    Objective:     Vital Signs (Most Recent):  Temp: 98 °F (36.7 °C) (10/28/24 1945)  Pulse: 82 (10/28/24 2128)  Resp: 18 (10/28/24 1945)  BP: (!) 103/57 (10/28/24 1945)  SpO2: 100 % (10/28/24 1945) Vital Signs (24h Range):  Temp:  [97.4 °F (36.3 °C)-98.4 °F (36.9 °C)] 98 °F (36.7 °C)  Pulse:  [73-93] 82  Resp:  [14-20] 18  SpO2:  [98 %-100 %] 100 %  BP: ()/(51-59) 103/57     Weight: 49.7 kg (109 lb 9.1 oz)  Body mass index is 18.23 kg/m².    Intake/Output Summary (Last 24 hours) at 10/28/2024 2245  Last data filed at 10/28/2024 2010  Gross per 24 hour   Intake 2916.81 ml   Output --   Net 2916.81 ml         Physical Exam  Vitals and nursing note reviewed.   Constitutional:       General: She is not in acute distress.     Appearance: Normal appearance. She is normal weight. She is ill-appearing.   Cardiovascular:      Rate and Rhythm: Normal rate and regular rhythm.      Heart sounds: No murmur heard.  Pulmonary:      Effort: Pulmonary effort is normal. No respiratory distress.      Breath sounds: No wheezing.   Neurological:      General: No focal deficit present.      Mental Status: She is alert and oriented to person, place, and time.   Psychiatric:         Mood and Affect: Mood normal.         Behavior: Behavior normal.             Significant Labs: All pertinent labs within the past 24 hours have been reviewed.  Recent Lab Results         10/28/24  1042        Albumin 1.7              ALT 19       Anion Gap 10       AST 11       Baso # 0.03       Basophil % 0.5       BILIRUBIN TOTAL 0.2  Comment: For infants and newborns, interpretation of results should be based  on gestational age, weight and in agreement with clinical  observations.    Premature Infant recommended reference ranges:  Up to 24 hours.............<8.0 mg/dL  Up to 48 hours............<12.0 mg/dL  3-5 days..................<15.0 mg/dL  6-29 days.................<15.0  mg/dL         BUN 6       Calcium 7.5       Chloride 113       CO2 16       Creatinine 0.6       Differential Method Automated       eGFR >60       Eos # 0.0       Eos % 0.7       Glucose 138       Gran # (ANC) 3.4       Gran % 61.1       Hematocrit 26.6       Hemoglobin 9.1       Immature Grans (Abs) 0.19  Comment: Mild elevation in immature granulocytes is non specific and   can be seen in a variety of conditions including stress response,   acute inflammation, trauma and pregnancy. Correlation with other   laboratory and clinical findings is essential.         Immature Granulocytes 3.4       Lymph # 1.0       Lymph % 18.1       Magnesium  1.8       MCH 31.5       MCHC 34.2       MCV 92       Mono # 0.9       Mono % 16.2       MPV 8.5       nRBC 0       Platelet Count 491       Potassium 3.0       PROTEIN TOTAL 5.2       RBC 2.89       RDW 20.1       Sodium 139       WBC 5.54               Significant Imaging: I have reviewed all pertinent imaging results/findings within the past 24 hours.    X-Ray Chest 1 View   Final Result      Right PICC line in good position.         Electronically signed by: Caden Valdez   Date:    10/26/2024   Time:    13:02      X-Ray Abdomen AP 1 View   Final Result      As above.         Electronically signed by: Caden Valdez   Date:    10/26/2024   Time:    10:30      CT Head Without Contrast   Final Result      No acute abnormality.         Electronically signed by: Rama Paris   Date:    10/24/2024   Time:    19:48      X-Ray Chest AP Portable   Final Result      No acute abnormality.         Electronically signed by: Caden Valdez   Date:    10/24/2024   Time:    14:28      Anesthesia US Guide Vascular Access    (Results Pending)   IR Antegrade Pyelogram (xpd)    (Results Pending)   IR IVC Filter Placement    (Results Pending)

## 2024-10-29 NOTE — PT/OT/SLP PROGRESS
Occupational Therapy  Treatment    Cheryl Kirkland   MRN: [id number]   Admitting Diagnosis: Syncope    OT Date of Treatment: 10/29/24   OT Start Time: 1655  OT Stop Time: 1714  OT Total Time (min): 19 min    Billable Minutes:  Therapeutic Activity 19 minutes    OT/PARAMJIT: OT          General Precautions: Standard, fall  Orthopedic Precautions: N/A  Braces: N/A  Respiratory Status: nasal cannula         Subjective:  Communicated with nurse martha and epic chart review prior to session.    Pain/Comfort  Pain Rating 1: 0/10    Objective:  Patient found with: peripheral IV, nephrostomy, G/J tube, PICC line     Activities of Daily Living:     Feeding peg feeding total a   Therapeutic Activities and Exercises:  Pt seen in room with HOB elevated. Pt with continuous moan.pt cooperative with therapy session initially however once attempt ed pt refused getting  to EOB by being combative and hitting therapist in attempt pull pt hospital gown in a downward motion. Educated patient on importance of increased tolerance to upright position and direct impact on CV endurance and strength. Patient encouraged to sit up in EOB, with staff.. Encouraged patient to perform AROM TE to BUE throughout the day within all available planes of motion. Re enforced importance of utilizing call light to meet needs in room and not attempt to get up without staff assistance.       AM-PAC 6 CLICK ADL   How much help from another person does this patient currently need?   1 = Unable, Total/Dependent Assistance  2 = A lot, Maximum/Moderate Assistance  3 = A little, Minimum/Contact Guard/Supervision  4 = None, Modified [place]/Independent    Putting on and taking off regular lower body clothing? : 1  Bathing (including washing, rinsing, drying)?: 1  Toileting, which includes using toilet, bedpan, or urinal? : 1  Putting on and taking off regular upper body clothing?: 1  Taking care of personal grooming such as brushing teeth?: 1  Eating meals?:  "2  Daily Activity Total Score: 7     AM-PAC Raw Score CMS "G-Code Modifier Level of Impairment Assistance   6 % Total / Unable   7 - 8 CM 80 - 100% Maximal Assist   9-13 CL 60 - 80% Moderate Assist   14 - 19 CK 40 - 60% Moderate Assist   20 - 22 CJ 20 - 40% Minimal Assist   23 CI 1-20% SBA / CGA   24 CH 0% Independent/ Mod I       Patient left HOB elevated with all lines intact, call button in reach, bed alarm on, and nurse arturo notified    ASSESSMENT:  Cheryl Kirkland is a 74 y.o. female with a medical diagnosis of Syncope and presents with debility and generalized weakness.    Rehab identified problem list/impairments:  weakness, gait instability, impaired endurance, impaired balance, impaired self care skills, impaired functional mobility    Rehab potential is . poor    Activity tolerance: Poor    Discharge recommendations:  (basic tbd depending on progress)   Barriers to discharge:      Equipment recommendations: to be determined by next level of care    GOALS:   Multidisciplinary Problems       Occupational Therapy Goals          Problem: Occupational Therapy    Goal Priority Disciplines Outcome Interventions   Occupational Therapy Goal     OT, PT/OT     Description: O.T. goals met 11-10-24  Mod a with bsc transfer  Min a with ue dressing  Pt will tolerate 1 set x 10 reps b ue rom exercise                       Plan:  Patient to be seen 2 x/week to address the above listed problems via self-care/home management, therapeutic activities, therapeutic exercises  Plan of Care expires: 11/10/24  Plan of Care reviewed with: patient, caregiver         10/29/2024  "

## 2024-10-29 NOTE — PROGRESS NOTES
[facility]  Hematology/Oncology  Progress Note    Patient Name: Cheryl Kirkland  Admission Date: 10/24/2024  Hospital Length of Stay: 4 days  Code Status: Full Code     Subjective:   The patient location is:  room 508  Visit type: Virtual visit with synchronous audio and video  Face-to-face or time spent with patient on the encounter:25 min  Total time spent on and for  this encounter which includes non face-to-face time preparing to see patient, review of tests, obtaining and or reviewing separately obtained records documenting clinical information in the electronic or other health records, independently interpreting results which is not separately reported ,and communicating results to the patient/family/caregiver and in care coordination and treatment planning/communicating with pharmacy for prescriptions/addressing social needs/arranging follow-up and or referrals :30min    Each patient I provide medical services by telemedicine is:  (1) informed of the relationship between the physician and patient and the respective role of any other health care provider with respect to management of the patient; and (2) notified that he or she may decline to receive medical services by telemedicine and may withdraw from such care at any time.  This is a video visit therefore some elements of the physical exam such as vital signs, heart sounds are breath sounds are not included and may be included if found in recent clinic notes of other providers assessing same patient. Any symptoms or signs that were visualized were stated by the patient may be included in this note.   Interval History:   Complains of both legs hurt    74-year-old woman known to Dr. Amin stage III T2 N3a M0 gastric adenocarcinoma  Patient has jejunostomy tube and is on supplemental tube feedings has chronic diarrhea patient had an obstructing ureteral stone on right side with right-sided hydronephrosis status post cystoscopy right  nephrostomy tube placement and acute cystitis.  Recently found to have extensive right lower extremity DVT treated with heparin drip transition to [facility] she was discharge to SNF but had a syncopal episode was brought back to the hospital.   no fall or injury, however she has been confused and does not remember what has been going on today. initial blood pressure on arrival to /41, heart rate 79, temp 98.5°, respirations 18, 100% SpO2 on room air. Lab workup demonstrate WBC 10.52 with left shift, RBC 3.20, hemoglobin 10.1, hematocrit 30.4, platelets 559, potassium 2.9, CO2 19, BUN 11, creatinine 0.7, glucose 115, calcium 7.2, magnesium 1.5, alk phos 174, total protein 5.0, albumin 1.8, total bilirubin 0.3, AST 12, ALT 17, BNP 29, CPK 25, trop less than 0.006, UA- with cloudy brown urine positive for nitrite, trace leukocyte esterase, greater than 100 RBC, 9 WBC. EKG -- NSR HR 76, nonspecific ST and T wave abnormality, no STEMI. CXR demonstrates that lungs are clear. CT head without contrast done with no acute abnormality. Recent Echo done 10/10/24 with EF 65-70% and normal diastolic function. While in ED, she was given potassium 40 po and magnesium sulfate 2 g IV as well as IV Rocephin      Oncology history  Immunotherapy (Pembrolizumab 03/26/2024 - 07/23/2024)  s/p open total gastrectomy with D2 lymphadenectomy extensive lysis of adhesions on 08/20/2024.   Current Treatment:  Adjuvant Pembrolizumab    Oncology Treatment Plan:   OP pembrolizumab 200mg Q3W    Medications:  Continuous Infusions:  Scheduled Meds:   acetaminophen  1,000 mg Per J Tube Q8H    calcium-vitamin D3  1 tablet Per J Tube BID    carBAMazepine  200 mg Per J Tube TID    cefTRIAXone (Rocephin) IV (PEDS and ADULTS)  1 g Intravenous Q24H    electrolytes-dextrose  400 mL Per NG tube Q4H    enoxparin  1 mg/kg Subcutaneous Q12H (treatment, non-standard time)    ferrous sulfate  1 tablet Per J Tube BID    levETIRAcetam  500 mg Oral BID     mupirocin   Nasal BID    potassium bicarbonate  50 mEq Per J Tube BID    zonisamide  200 mg Oral BID     PRN Meds:  Current Facility-Administered Medications:     dextrose 10%, 12.5 g, Intravenous, PRN    dextrose 10%, 25 g, Intravenous, PRN    glucagon (human recombinant), 1 mg, Intramuscular, PRN    glucose, 16 g, Oral, PRN    glucose, 24 g, Oral, PRN    influenza (adjuvanted), 0.5 mL, Intramuscular, vaccine x 1 dose    loperamide, 2 mg, Oral, QID PRN    naloxone, 0.02 mg, Intravenous, PRN    ondansetron, 4 mg, Intravenous, Q6H PRN    oxyCODONE, 5 mg, Oral, Q6H PRN    sodium chloride 0.9%, 10 mL, Intravenous, Q8H PRN     Review of Systems    She is more awake and alert today communicating better her complaints have not changed she still has a lot of pain the alea abdominal area.  Her appetite is decreased due to pain  She feels weak and frail  Objective:     Vital Signs (Most Recent):  Temp: 98 °F (36.7 °C) (10/28/24 1945)  Pulse: 93 (10/28/24 2009)  Resp: 18 (10/28/24 1945)  BP: (!) 103/57 (10/28/24 1945)  SpO2: 100 % (10/28/24 1945) Vital Signs (24h Range):  Temp:  [97.4 °F (36.3 °C)-98.4 °F (36.9 °C)] 98 °F (36.7 °C)  Pulse:  [73-93] 93  Resp:  [14-20] 18  SpO2:  [98 %-100 %] 100 %  BP: ()/(51-59) 103/57     Weight: 49.7 kg (109 lb 9.1 oz)  Body mass index is 18.23 kg/m².  Body surface area is 1.51 meters squared.      Intake/Output Summary (Last 24 hours) at 10/28/2024 2059  Last data filed at 10/28/2024 2010  Gross per 24 hour   Intake 2916.81 ml   Output --   Net 2916.81 ml       Physical Exam  VITAL SIGNS:  as above   GENERAL:  Frail appearing  No anxiety, no agitation, and in no distress.  Patient is awake, alert, oriented and cooperative.  HEENT:  Showed no congestion. Trachea is central no obvious icterus or pallor noted no hoarseness. no obvious JVD   NECK:  Supple.  No JVD. No obvious cervical submental or supraclavicular adenopathy.  RS:the visualized portion of  Chest expands well. chest  appears symmetric, no audible wheezes.  No dyspnea recognized  ABDOMEN:  abdomen appears undistended.  EXTREMITIES:  Without edema.  NEUROLOGICAL:  The patient is appropriate, higher functions are normal.  No  obvious neurological deficits.  normal judgement normal thought content  No confusion, no speech impediment. Cranial nerves are intact and show no deficit. No gross motor deficits noted   SKIN MUSCULOSKELETAL: no joint or skeletal deformity, no clubbing of nails.  No visible rash ecchymosis or petechiae   Significant Labs:   Lab Results   Component Value Date    WBC 5.54 10/28/2024    HGB 9.1 (L) 10/28/2024    HCT 26.6 (L) 10/28/2024    MCV 92 10/28/2024     (H) 10/28/2024      CMP  Sodium   Date Value Ref Range Status   10/28/2024 139 136 - 145 mmol/L Final     Potassium   Date Value Ref Range Status   10/28/2024 3.0 (L) 3.5 - 5.1 mmol/L Final     Chloride   Date Value Ref Range Status   10/28/2024 113 (H) 95 - 110 mmol/L Final     CO2   Date Value Ref Range Status   10/28/2024 16 (L) 23 - 29 mmol/L Final     Glucose   Date Value Ref Range Status   10/28/2024 138 (H) 70 - 110 mg/dL Final     BUN   Date Value Ref Range Status   10/28/2024 6 (L) 8 - 23 mg/dL Final     Creatinine   Date Value Ref Range Status   10/28/2024 0.6 0.5 - 1.4 mg/dL Final     Calcium   Date Value Ref Range Status   10/28/2024 7.5 (L) 8.7 - 10.5 mg/dL Final     Total Protein   Date Value Ref Range Status   10/28/2024 5.2 (L) 6.0 - 8.4 g/dL Final     Albumin   Date Value Ref Range Status   10/28/2024 1.7 (L) 3.5 - 5.2 g/dL Final     Total Bilirubin   Date Value Ref Range Status   10/28/2024 0.2 0.1 - 1.0 mg/dL Final     Comment:     For infants and newborns, interpretation of results should be based  on gestational age, weight and in agreement with clinical  observations.    Premature Infant recommended reference ranges:  Up to 24 hours.............<8.0 mg/dL  Up to 48 hours............<12.0 mg/dL  3-5 days..................<15.0  mg/dL  6-29 days.................<15.0 mg/dL       Alkaline Phosphatase   Date Value Ref Range Status   10/28/2024 202 (H) 40 - 150 U/L Final     AST   Date Value Ref Range Status   10/28/2024 11 10 - 40 U/L Final     ALT   Date Value Ref Range Status   10/28/2024 19 10 - 44 U/L Final     Anion Gap   Date Value Ref Range Status   10/28/2024 10 8 - 16 mmol/L Final     eGFR   Date Value Ref Range Status   10/28/2024 >60 >60 mL/min/1.73 m^2 Final            Assessment/Plan:     Active Diagnoses:    Diagnosis Date Noted POA    PRINCIPAL PROBLEM:  Syncope [R55] 10/24/2024 Yes    Hypomagnesemia [E83.42] 10/24/2024 Yes    Hypokalemia [E87.6] 10/11/2024 Yes    Ureteral stone with hydronephrosis [N13.2] 10/10/2024 Yes    Severe protein-calorie malnutrition [E43] 09/04/2024 Yes    Jejunostomy tube present [Z93.4] 09/03/2024 Not Applicable    Gastric adenocarcinoma [C16.9] 02/27/2024 Yes    DVT (deep venous thrombosis) [I82.409] 02/07/2024 Yes     Chronic    Hx of Epileptic seizures [G40.909] 02/07/2024 Yes     Chronic    Chronic anemia [D64.9] 02/07/2024 Yes      Problems Resolved During this Admission:     Recent DVT currently on Eliquis patient may need ureteral stone which is impacting to be removed taking patient off of anticoagulant maybe risky unless we are able to bridge him with Lovenox during procedure  If urology would be uncomfortable with this plan would recommend filter placement prior to procedure consult to IR has been placed     Failure to thrive; patient showing significant evidence of protein calorie malnutrition/discomfort and frailty     Gastric adenocarcinoma;  Patient currently on adjuvant pembrolizumab S/p total gastrectomy on 8/20, with lysis of adhesions, lymphadenectomy, and jejunostomy tube placement, d/c on 9/10  - Pathologic stage from 8/20/2024: Stage III (ypT2, pN3a, cM0), completed 5 cycles Keytruda prior to surgery              Ofelia Lee MD  Hematology/Oncology  [facility]          Ofelia Lee MD  Hematology/Oncology  [facility] - [facility]

## 2024-10-29 NOTE — NURSING
460ml of Peptamen claimed for whole shift, feeding pump cleared out and ready for night shift. New measurements to be documented by night shift

## 2024-10-29 NOTE — PLAN OF CARE
Problem: Adult Inpatient Plan of Care  Goal: Plan of Care Review  Outcome: Progressing  Goal: Patient-Specific Goal (Individualized)  Outcome: Progressing  Goal: Absence of Hospital-Acquired Illness or Injury  Outcome: Progressing  Goal: Optimal Comfort and Wellbeing  Outcome: Progressing  Goal: Readiness for Transition of Care  Outcome: Progressing     Problem: Wound  Goal: Optimal Coping  Outcome: Progressing  Goal: Optimal Functional Ability  Outcome: Progressing  Goal: Absence of Infection Signs and Symptoms  Outcome: Progressing  Goal: Improved Oral Intake  Outcome: Progressing  Goal: Optimal Pain Control and Function  Outcome: Progressing  Goal: Skin Health and Integrity  Outcome: Progressing  Goal: Optimal Wound Healing  Outcome: Progressing     Problem: Infection  Goal: Absence of Infection Signs and Symptoms  Outcome: Progressing     Problem: Fall Injury Risk  Goal: Absence of Fall and Fall-Related Injury  Outcome: Progressing     Problem: Skin Injury Risk Increased  Goal: Skin Health and Integrity  Outcome: Progressing   Discussed poc with pt, pt verbalized understanding    Purposeful rounding every 2hours    VS wnl  Cardiac monitoring in use, pt is NSR, tele monitor # [id number]  Fall precautions in place, remains injury free  Pt c/o of pain   Pain under control with PRN meds  Pt is oriented to person, place and time at this time   Tube feeding going   Meds and Pedialyte given as prescribed  Next dose of Lovenox (2100) to be held per Dr. Boyd, to be communicated with night shift nurse   Bed locked at lowest position  Call light within reach    Chart check complete  Will cont with POC

## 2024-10-29 NOTE — PROGRESS NOTES
PT MET IN  AFTER CHART REVIEW COMPLETED. PT SLEEPY AND MOANING. PT REFUSED TX STATING NO WHEN ASKED IF WANTING THERAPY, OOB TO SIT IN CHAIR AND OR DOING SOME TE IN BED. PT REFUSED ALL. P.T. TO ATTEMPT TX NEXT VISIT. Kimberli Andrews, PT

## 2024-10-29 NOTE — PROGRESS NOTES
[facility] Medicine  Progress Note    Patient Name: Cheryl Kirkland  MRN: [id number]  Patient Class: IP- Inpatient   Admission Date: 10/24/2024  Length of Stay: 4 days  Attending Physician: Zachery Boyd MD  Primary Care Provider: Gagandeep Iglesias MD        Subjective:     Principal Problem:Syncope        HPI:    Patient is a 74-year-old female with past medical history significant for hypertension, hyperlipidemia, DVT in right lower extremity, epilepsy, anemia, neuropathy, GERD, gastritis, polyps, gastric adenocarcinoma status post treatment with Keytruda (followed by Dr. Jerome and Dr. Ambrose) and total gastrectomy on 08/20/2024, jejunostomy tube on supplemental tube feedings, chronic diarrhea, ovarian cancer (s/p surgery/chemotherapy), osteoporosis and recent admission 10/9/24 through 10/21/2024 due to 8 mm right ureteral obstructing stone with right-sided hydronephrosis s/p cystoscopy and right nephrostomy tube placement, and acute cystitis with SHELLEY also found to have extensive DVT RLE treated with heparin drip transitioned to Eliquis, was discharged to SNF at [facility]. On 10/24, she was having physical therapy at SNF and had syncopal episode. There was no fall or injury, however she has been confused and does not remember what has been going on today.  initial blood pressure on arrival to /41, heart rate 79, temp 98.5°, respirations 18, 100% SpO2 on room air.  Lab workup demonstrate WBC 10.52 with left shift, RBC 3.20, hemoglobin 10.1, hematocrit 30.4, platelets 559, potassium 2.9, CO2 19, BUN 11, creatinine 0.7, glucose 115, calcium 7.2, magnesium 1.5, alk phos 174, total protein 5.0, albumin 1.8, total bilirubin 0.3, AST 12, ALT 17, BNP 29, CPK 25, trop less than 0.006, UA- with cloudy brown urine positive for nitrite, trace leukocyte esterase, greater than 100 RBC, 9 WBC. EKG -- NSR HR 76, nonspecific ST and T wave abnormality, no STEMI. CXR demonstrates  that lungs are clear. CT head without contrast done with no acute abnormality. Recent Echo done 10/10/24 with EF 65-70% and normal diastolic function. While in ED, she was given potassium 40 po and magnesium sulfate 2 g IV as well as IV Rocephin. Hospital Medicine was consulted for admission due to syncope with continued confusion, UTI, hypokalemia, and hypomagnesemia.     Overview/Hospital Course:  Patient is a 74 year old female admitted to the hospital for c/o syncopal episode during PT at NH.   Labs: WBC 7.33, RBC 2.72, hemoglobin 8.6, hematocrit 25.0, platelets 488, Potassium 2.5,   -UA- with cloudy brown urine positive for nitrite, trace leukocyte esterase, greater than 100 RBC, 9 WBC.   -IV abx: Rocephin   -CT head without contrast done with no acute abnormality.   -Hematology consulted due to patient being on eliquis for anticoagulation for DVT but will need needs ESWL for an impacted ureteral stone and removal of nephrostomy tube per urology. Patient is high risk to be off of anticoagulation for urology procedure but due to ureteral stone which is impacting that needs to be removed may be able to bridge patient with Lovenox during procedure. If urology would be uncomfortable with this plan would recommend filter placement prior to procedure per Dr. Lee.   -Urology consulted as patient was reporting pain at PCN site. Impacted ureteral stone may require treatment with ESWL once patient can safely be off of . Discussed if patient is not cleared to temporarily come off anticoagulation within 3 months the nephrostomy tube may need to be exchanged. Nephrostogram and stent order placed by urology. Patient transitioned to therapeutic 1mg/kg lovenox from eliquis for possible procedure   -Pt/Ot to eval and treat: recommend SNF   -EKG: Normal sinus rhythm   -Patient required placement of PICC line as phlebotomy and nursing staff were unable to obtain labs.     10/28/24  NAEON, patient resting in bed,  at  bedside  Plans for IVC filter placement, currently on Lovenox 1 mg/kg BID  Urology with plans for ESWL pending            Review of Systems   All other systems reviewed and are negative.    Objective:     Vital Signs (Most Recent):  Temp: 98 °F (36.7 °C) (10/28/24 1945)  Pulse: 82 (10/28/24 2128)  Resp: 18 (10/28/24 1945)  BP: (!) 103/57 (10/28/24 1945)  SpO2: 100 % (10/28/24 1945) Vital Signs (24h Range):  Temp:  [97.4 °F (36.3 °C)-98.4 °F (36.9 °C)] 98 °F (36.7 °C)  Pulse:  [73-93] 82  Resp:  [14-20] 18  SpO2:  [98 %-100 %] 100 %  BP: ()/(51-59) 103/57     Weight: 49.7 kg (109 lb 9.1 oz)  Body mass index is 18.23 kg/m².    Intake/Output Summary (Last 24 hours) at 10/28/2024 2245  Last data filed at 10/28/2024 2010  Gross per 24 hour   Intake 2916.81 ml   Output --   Net 2916.81 ml         Physical Exam  Vitals and nursing note reviewed.   Constitutional:       General: She is not in acute distress.     Appearance: Normal appearance. She is normal weight. She is ill-appearing.   Cardiovascular:      Rate and Rhythm: Normal rate and regular rhythm.      Heart sounds: No murmur heard.  Pulmonary:      Effort: Pulmonary effort is normal. No respiratory distress.      Breath sounds: No wheezing.   Neurological:      General: No focal deficit present.      Mental Status: She is alert and oriented to person, place, and time.   Psychiatric:         Mood and Affect: Mood normal.         Behavior: Behavior normal.             Significant Labs: All pertinent labs within the past 24 hours have been reviewed.  Recent Lab Results         10/28/24  1042        Albumin 1.7              ALT 19       Anion Gap 10       AST 11       Baso # 0.03       Basophil % 0.5       BILIRUBIN TOTAL 0.2  Comment: For infants and newborns, interpretation of results should be based  on gestational age, weight and in agreement with clinical  observations.    Premature Infant recommended reference ranges:  Up to 24  hours.............<8.0 mg/dL  Up to 48 hours............<12.0 mg/dL  3-5 days..................<15.0 mg/dL  6-29 days.................<15.0 mg/dL         BUN 6       Calcium 7.5       Chloride 113       CO2 16       Creatinine 0.6       Differential Method Automated       eGFR >60       Eos # 0.0       Eos % 0.7       Glucose 138       Gran # (ANC) 3.4       Gran % 61.1       Hematocrit 26.6       Hemoglobin 9.1       Immature Grans (Abs) 0.19  Comment: Mild elevation in immature granulocytes is non specific and   can be seen in a variety of conditions including stress response,   acute inflammation, trauma and pregnancy. Correlation with other   laboratory and clinical findings is essential.         Immature Granulocytes 3.4       Lymph # 1.0       Lymph % 18.1       Magnesium  1.8       MCH 31.5       MCHC 34.2       MCV 92       Mono # 0.9       Mono % 16.2       MPV 8.5       nRBC 0       Platelet Count 491       Potassium 3.0       PROTEIN TOTAL 5.2       RBC 2.89       RDW 20.1       Sodium 139       WBC 5.54               Significant Imaging: I have reviewed all pertinent imaging results/findings within the past 24 hours.    X-Ray Chest 1 View   Final Result      Right PICC line in good position.         Electronically signed by: Caden Valdez   Date:    10/26/2024   Time:    13:02      X-Ray Abdomen AP 1 View   Final Result      As above.         Electronically signed by: Caden Valdez   Date:    10/26/2024   Time:    10:30      CT Head Without Contrast   Final Result      No acute abnormality.         Electronically signed by: Rama Paris   Date:    10/24/2024   Time:    19:48      X-Ray Chest AP Portable   Final Result      No acute abnormality.         Electronically signed by: Caden Valdez   Date:    10/24/2024   Time:    14:28      Anesthesia US Guide Vascular Access    (Results Pending)   IR Antegrade Pyelogram (xpd)    (Results Pending)   IR IVC Filter Placement    (Results Pending)          Assessment/Plan:      * Syncope  Syncopal episode while having physical therapy -- did not fall, no injuries  CT head did not show any acute abnormality  Troponin normal, EKG with no significant changes  Lab workup shows mild UTI - Rocephin given  Had recent Echo which was normal  Hydrating via feeding tube  Cardiac monitoring  Trending troponin level- continued to be negative       Hypokalemia  Patient's most recent potassium results are listed below.   Recent Labs     10/24/24  2224 10/26/24  1640 10/27/24  0626   K 3.2* 2.5* 2.5*       Plan  - Replete potassium per protocol  - Monitor potassium Daily  - Patient's hypokalemia is being treated, will recheck labs again in a.m.     Ureteral stone with hydronephrosis  Recent imaging with 8 mm obstructing/impacted stone on right side -- s/p cystoscopy and right nephrostomy tube placement  Urology consulted , appreciate recommendations       DVT (deep venous thrombosis)  Recently diagnosed with extensive DVT RLE  Continue Eliquis      Jejunostomy tube present  Hydrating and tube feeding via tube      Gastric adenocarcinoma  - Followed by Dr. Ambrose and Dr. Jerome  - S/p total gastrectomy on 8/20, with lysis of adhesions, lymphadenectomy, and jejunostomy tube placement, d/c on 9/10  - Pathologic stage from 8/20/2024: Stage III (ypT2, pN3a, cM0), completed 5 cycles Keytruda prior to surgery  - outpatient follow up with Dr. Jerome and Dr. Ambrose      Hypomagnesemia  Patient has Abnormal Magnesium: hypomagnesemia. Will continue to monitor electrolytes closely. Will replace the affected electrolytes and repeat labs to be done after interventions completed. The patient's magnesium results have been reviewed and are listed below.  Recent Labs   Lab 10/27/24  0626   MG 2.2          Hx of Epileptic seizures  Seizure precautions  Continue Keppra and carbamazepine      Chronic anemia  Anemia is likely due to chronic blood loss and Iron deficiency. Most recent hemoglobin  and hematocrit are listed below.  Recent Labs     10/26/24  1640 10/27/24  0626   HGB 8.9* 8.6*   HCT 26.0* 25.0*       Plan  - Monitor serial CBC: Daily  - Transfuse PRBC if patient becomes hemodynamically unstable, symptomatic or H/H drops below 7/21.  - Patient has not received any PRBC transfusions to date    Severe protein-calorie malnutrition  Nutrition consulted. Most recent weight and BMI monitored- will continue tube feeding supplementation    Measurements:  Wt Readings from Last 1 Encounters:   10/25/24 49.7 kg (109 lb 9.1 oz)   Body mass index is 18.23 kg/m².        VTE Risk Mitigation (From admission, onward)           Ordered     enoxaparin injection 50 mg  Every 12 hours         10/27/24 1121     Reason for No Pharmacological VTE Prophylaxis  Once        Question:  Reasons:  Answer:  Already adequately anticoagulated on oral Anticoagulants    10/24/24 2206     IP VTE HIGH RISK PATIENT  Once         10/24/24 2206     Place sequential compression device  Until discontinued         10/24/24 2206                    Discharge Planning   SOFIYA:      Code Status: Full Code   Is the patient medically ready for discharge?:     Reason for patient still in hospital (select all that apply): Patient trending condition, Laboratory test, and Treatment  Discharge Plan A: Skilled Nursing Facility                  Zachery Boyd MD  Department of Hospital Medicine   [facility] - Med Surg

## 2024-10-29 NOTE — PLAN OF CARE
10/29/24 0952   Rounds   Attendance Provider;;Charge nurse;Physical therapist   Discharge Plan A Skilled Nursing Facility   Why the patient remains in the hospital Requires continued medical care   Transition of Care Barriers None       Assessment/Plan:      * Syncope  Syncopal episode while having physical therapy -- did not fall, no injuries  CT head did not show any acute abnormality  Troponin normal, EKG with no significant changes  Lab workup shows mild UTI - Rocephin given  Had recent Echo which was normal  Hydrating via feeding tube  Cardiac monitoring  Trending troponin level- continued to be negative         Hypokalemia  Patient's most recent potassium results are listed below.         Recent Labs     10/24/24  2224 10/26/24  1640 10/27/24  0626   K 3.2* 2.5* 2.5*         Plan  - Replete potassium per protocol  - Monitor potassium Daily  - Patient's hypokalemia is being treated, will recheck labs again in a.m.      Ureteral stone with hydronephrosis  Recent imaging with 8 mm obstructing/impacted stone on right side -- s/p cystoscopy and right nephrostomy tube placement  Urology consulted , appreciate recommendations         DVT (deep venous thrombosis)  Recently diagnosed with extensive DVT RLE  Continue Eliquis        Jejunostomy tube present  Hydrating and tube feeding via tube        Gastric adenocarcinoma  - Followed by Dr. Ambrose and Dr. Jerome  - S/p total gastrectomy on 8/20, with lysis of adhesions, lymphadenectomy, and jejunostomy tube placement, d/c on 9/10  - Pathologic stage from 8/20/2024: Stage III (ypT2, pN3a, cM0), completed 5 cycles Keytruda prior to surgery  - outpatient follow up with Dr. Jerome and Dr. Ambrose        Hypomagnesemia  Patient has Abnormal Magnesium: hypomagnesemia. Will continue to monitor electrolytes closely. Will replace the affected electrolytes and repeat labs to be done after interventions completed. The patient's magnesium results have been reviewed  and are listed below.      Recent Labs   Lab 10/27/24  0626   MG 2.2            Hx of Epileptic seizures  Seizure precautions  Continue Keppra and carbamazepine        Chronic anemia  Anemia is likely due to chronic blood loss and Iron deficiency. Most recent hemoglobin and hematocrit are listed below.       Recent Labs     10/26/24  1640 10/27/24  0626   HGB 8.9* 8.6*   HCT 26.0* 25.0*         Plan  - Monitor serial CBC: Daily  - Transfuse PRBC if patient becomes hemodynamically unstable, symptomatic or H/H drops below 7/21.  - Patient has not received any PRBC transfusions to date     Severe protein-calorie malnutrition  Nutrition consulted. Most recent weight and BMI monitored- will continue tube feeding supplementation     Measurements:      Wt Readings from Last 1 Encounters:   10/25/24 49.7 kg (109 lb 9.1 oz)   Body mass index is 18.23 kg/m².           VTE Risk Mitigation (From admission, onward)              Ordered       enoxaparin injection 50 mg  Every 12 hours         10/27/24 1121       Reason for No Pharmacological VTE Prophylaxis  Once        Question:  Reasons:  Answer:  Already adequately anticoagulated on oral Anticoagulants    10/24/24 2206       IP VTE HIGH RISK PATIENT  Once         10/24/24 2206       Place sequential compression device  Until discontinued         10/24/24 2206                          Discharge Planning   SOFIYA:      Code Status: Full Code   Is the patient medically ready for discharge?:     Reason for patient still in hospital (select all that apply): Patient trending condition, Laboratory test, and Treatment  Discharge Plan A: Skilled Nursing Facility

## 2024-10-30 PROCEDURE — 0T768DZ DILATION OF RIGHT URETER WITH INTRALUMINAL DEVICE, VIA NATURAL OR ARTIFICIAL OPENING ENDOSCOPIC: ICD-10-PCS | Performed by: RADIOLOGY

## 2024-10-30 PROCEDURE — 25000003 PHARM REV CODE 250: Performed by: INTERNAL MEDICINE

## 2024-10-30 PROCEDURE — 97110 THERAPEUTIC EXERCISES: CPT

## 2024-10-30 PROCEDURE — 63600175 PHARM REV CODE 636 W HCPCS

## 2024-10-30 PROCEDURE — 97535 SELF CARE MNGMENT TRAINING: CPT

## 2024-10-30 PROCEDURE — C1751 CATH, INF, PER/CENT/MIDLINE: HCPCS

## 2024-10-30 PROCEDURE — 97550 CAREGIVER TRAING 1ST 30 MIN: CPT

## 2024-10-30 PROCEDURE — 63600175 PHARM REV CODE 636 W HCPCS: Performed by: RADIOLOGY

## 2024-10-30 PROCEDURE — 97530 THERAPEUTIC ACTIVITIES: CPT

## 2024-10-30 PROCEDURE — 25000003 PHARM REV CODE 250: Performed by: NURSE PRACTITIONER

## 2024-10-30 PROCEDURE — 25000003 PHARM REV CODE 250: Performed by: HOSPITALIST

## 2024-10-30 PROCEDURE — 21400001 HC TELEMETRY ROOM

## 2024-10-30 PROCEDURE — 0TP5X0Z REMOVAL OF DRAINAGE DEVICE FROM KIDNEY, EXTERNAL APPROACH: ICD-10-PCS | Performed by: RADIOLOGY

## 2024-10-30 PROCEDURE — 0T9330Z DRAINAGE OF RIGHT KIDNEY PELVIS WITH DRAINAGE DEVICE, PERCUTANEOUS APPROACH: ICD-10-PCS | Performed by: RADIOLOGY

## 2024-10-30 PROCEDURE — 06H03DZ INSERTION OF INTRALUMINAL DEVICE INTO INFERIOR VENA CAVA, PERCUTANEOUS APPROACH: ICD-10-PCS | Performed by: RADIOLOGY

## 2024-10-30 PROCEDURE — 99024 POSTOP FOLLOW-UP VISIT: CPT | Mod: ,,, | Performed by: INTERNAL MEDICINE

## 2024-10-30 RX ORDER — HYDROMORPHONE HYDROCHLORIDE 1 MG/ML
INJECTION, SOLUTION INTRAMUSCULAR; INTRAVENOUS; SUBCUTANEOUS CODE/TRAUMA/SEDATION MEDICATION
Status: COMPLETED | OUTPATIENT
Start: 2024-10-30 | End: 2024-10-30

## 2024-10-30 RX ORDER — LEVETIRACETAM 100 MG/ML
500 SOLUTION ORAL 2 TIMES DAILY
Status: DISCONTINUED | OUTPATIENT
Start: 2024-10-30 | End: 2024-11-06 | Stop reason: HOSPADM

## 2024-10-30 RX ORDER — MIDAZOLAM HYDROCHLORIDE 1 MG/ML
INJECTION, SOLUTION INTRAMUSCULAR; INTRAVENOUS CODE/TRAUMA/SEDATION MEDICATION
Status: COMPLETED | OUTPATIENT
Start: 2024-10-30 | End: 2024-10-30

## 2024-10-30 RX ORDER — LIDOCAINE HYDROCHLORIDE 20 MG/ML
INJECTION, SOLUTION EPIDURAL; INFILTRATION; INTRACAUDAL; PERINEURAL CODE/TRAUMA/SEDATION MEDICATION
Status: COMPLETED | OUTPATIENT
Start: 2024-10-30 | End: 2024-10-30

## 2024-10-30 RX ORDER — FENTANYL CITRATE 50 UG/ML
INJECTION, SOLUTION INTRAMUSCULAR; INTRAVENOUS CODE/TRAUMA/SEDATION MEDICATION
Status: COMPLETED | OUTPATIENT
Start: 2024-10-30 | End: 2024-10-30

## 2024-10-30 RX ADMIN — ACETAMINOPHEN 1000 MG: 500 TABLET ORAL at 04:10

## 2024-10-30 RX ADMIN — FENTANYL CITRATE 50 MCG: 50 INJECTION, SOLUTION INTRAMUSCULAR; INTRAVENOUS at 11:10

## 2024-10-30 RX ADMIN — CARBAMAZEPINE 200 MG: 200 TABLET ORAL at 04:10

## 2024-10-30 RX ADMIN — Medication 400 ML: at 10:10

## 2024-10-30 RX ADMIN — FERROUS SULFATE TAB 325 MG (65 MG ELEMENTAL FE) 1 EACH: 325 (65 FE) TAB at 10:10

## 2024-10-30 RX ADMIN — Medication 1 TABLET: at 08:10

## 2024-10-30 RX ADMIN — LEVETIRACETAM 500 MG: 100 SOLUTION ORAL at 11:10

## 2024-10-30 RX ADMIN — POTASSIUM BICARBONATE 50 MEQ: 978 TABLET, EFFERVESCENT ORAL at 10:10

## 2024-10-30 RX ADMIN — MUPIROCIN: 20 OINTMENT TOPICAL at 08:10

## 2024-10-30 RX ADMIN — ZONISAMIDE 200 MG: 100 CAPSULE ORAL at 10:10

## 2024-10-30 RX ADMIN — FENTANYL CITRATE 50 MCG: 50 INJECTION, SOLUTION INTRAMUSCULAR; INTRAVENOUS at 12:10

## 2024-10-30 RX ADMIN — MIDAZOLAM 1 MG: 1 INJECTION INTRAMUSCULAR; INTRAVENOUS at 12:10

## 2024-10-30 RX ADMIN — MIDAZOLAM 1 MG: 1 INJECTION INTRAMUSCULAR; INTRAVENOUS at 11:10

## 2024-10-30 RX ADMIN — ENOXAPARIN SODIUM 50 MG: 60 INJECTION SUBCUTANEOUS at 10:10

## 2024-10-30 RX ADMIN — LEVETIRACETAM 500 MG: 500 TABLET, FILM COATED ORAL at 08:10

## 2024-10-30 RX ADMIN — Medication 400 ML: at 06:10

## 2024-10-30 RX ADMIN — Medication 400 ML: at 04:10

## 2024-10-30 RX ADMIN — LIDOCAINE HYDROCHLORIDE 5 ML: 20 INJECTION, SOLUTION EPIDURAL; INFILTRATION; INTRACAUDAL; PERINEURAL at 11:10

## 2024-10-30 RX ADMIN — ACETAMINOPHEN 1000 MG: 500 TABLET ORAL at 10:10

## 2024-10-30 RX ADMIN — HYDROMORPHONE HYDROCHLORIDE 1 MG: 1 INJECTION, SOLUTION INTRAMUSCULAR; INTRAVENOUS; SUBCUTANEOUS at 12:10

## 2024-10-30 RX ADMIN — Medication 1 TABLET: at 10:10

## 2024-10-30 RX ADMIN — MUPIROCIN: 20 OINTMENT TOPICAL at 10:10

## 2024-10-30 RX ADMIN — CARBAMAZEPINE 200 MG: 200 TABLET ORAL at 08:10

## 2024-10-30 RX ADMIN — CARBAMAZEPINE 200 MG: 200 TABLET ORAL at 10:10

## 2024-10-30 NOTE — PROGRESS NOTES
[facility] Medicine  Progress Note    Patient Name: Cheryl Kirkland  MRN: [id number]  Patient Class: IP- Inpatient   Admission Date: 10/24/2024  Length of Stay: 5 days  Attending Physician: Zachery Boyd MD  Primary Care Provider: Gagandeep Iglesias MD        Subjective:     Principal Problem:Syncope        HPI:    Patient is a 74-year-old female with past medical history significant for hypertension, hyperlipidemia, DVT in right lower extremity, epilepsy, anemia, neuropathy, GERD, gastritis, polyps, gastric adenocarcinoma status post treatment with Keytruda (followed by Dr. Jerome and Dr. Ambrose) and total gastrectomy on 08/20/2024, jejunostomy tube on supplemental tube feedings, chronic diarrhea, ovarian cancer (s/p surgery/chemotherapy), osteoporosis and recent admission 10/9/24 through 10/21/2024 due to 8 mm right ureteral obstructing stone with right-sided hydronephrosis s/p cystoscopy and right nephrostomy tube placement, and acute cystitis with SHELLEY also found to have extensive DVT RLE treated with heparin drip transitioned to Eliquis, was discharged to SNF at [facility]. On 10/24, she was having physical therapy at SNF and had syncopal episode. There was no fall or injury, however she has been confused and does not remember what has been going on today.  initial blood pressure on arrival to /41, heart rate 79, temp 98.5°, respirations 18, 100% SpO2 on room air.  Lab workup demonstrate WBC 10.52 with left shift, RBC 3.20, hemoglobin 10.1, hematocrit 30.4, platelets 559, potassium 2.9, CO2 19, BUN 11, creatinine 0.7, glucose 115, calcium 7.2, magnesium 1.5, alk phos 174, total protein 5.0, albumin 1.8, total bilirubin 0.3, AST 12, ALT 17, BNP 29, CPK 25, trop less than 0.006, UA- with cloudy brown urine positive for nitrite, trace leukocyte esterase, greater than 100 RBC, 9 WBC. EKG -- NSR HR 76, nonspecific ST and T wave abnormality, no STEMI. CXR demonstrates  that lungs are clear. CT head without contrast done with no acute abnormality. Recent Echo done 10/10/24 with EF 65-70% and normal diastolic function. While in ED, she was given potassium 40 po and magnesium sulfate 2 g IV as well as IV Rocephin. Hospital Medicine was consulted for admission due to syncope with continued confusion, UTI, hypokalemia, and hypomagnesemia.     Overview/Hospital Course:  Patient is a 74 year old female admitted to the hospital for c/o syncopal episode during PT at NH.   Labs: WBC 7.33, RBC 2.72, hemoglobin 8.6, hematocrit 25.0, platelets 488, Potassium 2.5,   -UA- with cloudy brown urine positive for nitrite, trace leukocyte esterase, greater than 100 RBC, 9 WBC.   -IV abx: Rocephin   -CT head without contrast done with no acute abnormality.   -Hematology consulted due to patient being on eliquis for anticoagulation for DVT but will need needs ESWL for an impacted ureteral stone and removal of nephrostomy tube per urology. Patient is high risk to be off of anticoagulation for urology procedure but due to ureteral stone which is impacting that needs to be removed may be able to bridge patient with Lovenox during procedure. If urology would be uncomfortable with this plan would recommend filter placement prior to procedure per Dr. Lee.   -Urology consulted as patient was reporting pain at PCN site. Impacted ureteral stone may require treatment with ESWL once patient can safely be off of . Discussed if patient is not cleared to temporarily come off anticoagulation within 3 months the nephrostomy tube may need to be exchanged. Nephrostogram and stent order placed by urology. Patient transitioned to therapeutic 1mg/kg lovenox from eliquis for possible procedure   -Pt/Ot to eval and treat: recommend SNF   -EKG: Normal sinus rhythm   -Patient required placement of PICC line as phlebotomy and nursing staff were unable to obtain labs.     10/28/24  NAEON, patient resting in bed,  at  bedside  Plans for IVC filter placement, currently on Lovenox 1 mg/kg BID  Urology with plans for ESWL pending    10/29/24  NAEON, plans for IVC filter tomorrow  On Lovenox, hold evening dose, NPO after midnight      Review of Systems   All other systems reviewed and are negative.    Objective:     Vital Signs (Most Recent):  Temp: 97.8 °F (36.6 °C) (10/29/24 1930)  Pulse: 80 (10/29/24 2000)  Resp: 16 (10/29/24 1622)  BP: 133/62 (10/29/24 1930)  SpO2: 95 % (10/29/24 1622) Vital Signs (24h Range):  Temp:  [97.8 °F (36.6 °C)-98.4 °F (36.9 °C)] 97.8 °F (36.6 °C)  Pulse:  [66-88] 80  Resp:  [16-18] 16  SpO2:  [94 %-100 %] 95 %  BP: ()/(54-67) 133/62     Weight: 52.6 kg (115 lb 15.4 oz)  Body mass index is 19.3 kg/m².    Intake/Output Summary (Last 24 hours) at 10/29/2024 2210  Last data filed at 10/29/2024 2144  Gross per 24 hour   Intake 3120 ml   Output 10 ml   Net 3110 ml         Physical Exam  Vitals and nursing note reviewed.   Constitutional:       General: She is not in acute distress.     Appearance: Normal appearance. She is normal weight. She is ill-appearing.   Cardiovascular:      Rate and Rhythm: Normal rate and regular rhythm.      Heart sounds: No murmur heard.  Pulmonary:      Effort: Pulmonary effort is normal. No respiratory distress.      Breath sounds: No wheezing.   Neurological:      General: No focal deficit present.      Mental Status: She is alert and oriented to person, place, and time.   Psychiatric:         Mood and Affect: Mood normal.         Behavior: Behavior normal.             Significant Labs: All pertinent labs within the past 24 hours have been reviewed.  Recent Lab Results         10/29/24  0006        Ammonia 48               Significant Imaging: I have reviewed all pertinent imaging results/findings within the past 24 hours.    X-Ray Chest 1 View   Final Result      Right PICC line in good position.         Electronically signed by: Caden Valdez   Date:    10/26/2024    Time:    13:02      X-Ray Abdomen AP 1 View   Final Result      As above.         Electronically signed by: Caden Valdez   Date:    10/26/2024   Time:    10:30      CT Head Without Contrast   Final Result      No acute abnormality.         Electronically signed by: Rama Paris   Date:    10/24/2024   Time:    19:48      X-Ray Chest AP Portable   Final Result      No acute abnormality.         Electronically signed by: Caden Valdez   Date:    10/24/2024   Time:    14:28      Anesthesia US Guide Vascular Access    (Results Pending)   IR Antegrade Pyelogram (xpd)    (Results Pending)   IR IVC Filter Placement    (Results Pending)         Assessment/Plan:      * Syncope  Syncopal episode while having physical therapy -- did not fall, no injuries  CT head did not show any acute abnormality  Troponin normal, EKG with no significant changes  Lab workup shows mild UTI - Rocephin given  Had recent Echo which was normal  Hydrating via feeding tube  Cardiac monitoring  Trending troponin level- continued to be negative       Hypokalemia  Patient's most recent potassium results are listed below.   Recent Labs     10/24/24  2224 10/26/24  1640 10/27/24  0626   K 3.2* 2.5* 2.5*       Plan  - Replete potassium per protocol  - Monitor potassium Daily  - Patient's hypokalemia is being treated, will recheck labs again in a.m.     Ureteral stone with hydronephrosis  Recent imaging with 8 mm obstructing/impacted stone on right side -- s/p cystoscopy and right nephrostomy tube placement  Urology consulted , appreciate recommendations       DVT (deep venous thrombosis)  Recently diagnosed with extensive DVT RLE  Continue Eliquis      Jejunostomy tube present  Hydrating and tube feeding via tube      Gastric adenocarcinoma  - Followed by Dr. Ambrose and Dr. Jerome  - S/p total gastrectomy on 8/20, with lysis of adhesions, lymphadenectomy, and jejunostomy tube placement, d/c on 9/10  - Pathologic stage from 8/20/2024: Stage III  (ypT2, pN3a, cM0), completed 5 cycles Keytruda prior to surgery  - outpatient follow up with Dr. Jerome and Dr. Ambrose      Hypomagnesemia  Patient has Abnormal Magnesium: hypomagnesemia. Will continue to monitor electrolytes closely. Will replace the affected electrolytes and repeat labs to be done after interventions completed. The patient's magnesium results have been reviewed and are listed below.  Recent Labs   Lab 10/27/24  0626   MG 2.2          Hx of Epileptic seizures  Seizure precautions  Continue Keppra and carbamazepine      Chronic anemia  Anemia is likely due to chronic blood loss and Iron deficiency. Most recent hemoglobin and hematocrit are listed below.  Recent Labs     10/26/24  1640 10/27/24  0626   HGB 8.9* 8.6*   HCT 26.0* 25.0*       Plan  - Monitor serial CBC: Daily  - Transfuse PRBC if patient becomes hemodynamically unstable, symptomatic or H/H drops below 7/21.  - Patient has not received any PRBC transfusions to date    Severe protein-calorie malnutrition  Nutrition consulted. Most recent weight and BMI monitored- will continue tube feeding supplementation    Measurements:  Wt Readings from Last 1 Encounters:   10/25/24 49.7 kg (109 lb 9.1 oz)   Body mass index is 18.23 kg/m².        VTE Risk Mitigation (From admission, onward)           Ordered     enoxaparin injection 50 mg  Every 12 hours         10/27/24 1121     Reason for No Pharmacological VTE Prophylaxis  Once        Question:  Reasons:  Answer:  Already adequately anticoagulated on oral Anticoagulants    10/24/24 2206     IP VTE HIGH RISK PATIENT  Once         10/24/24 2206     Place sequential compression device  Until discontinued         10/24/24 2206                    Discharge Planning   SOFIYA:      Code Status: Full Code   Is the patient medically ready for discharge?:     Reason for patient still in hospital (select all that apply): Patient trending condition, Laboratory test, Treatment, and Imaging  Discharge Plan A:  Skilled Nursing Facility                  Zachery Boyd MD  Department of Hospital Medicine   '[facility]

## 2024-10-30 NOTE — PT/OT/SLP PROGRESS
"Physical Therapy  Treatment    Cheryl Kirkland   MRN: [id number]   Admitting Diagnosis: Syncope    PT Received On: 10/30/24  PT Start Time: 0845     PT Stop Time: 0853    PT Total Time (min): 8 min       Billable Minutes:  Total Time 8    Treatment Type: Treatment  PT/PTA: PT     Number of PTA visits since last PT visit: 0       General Precautions: Standard, fall  Orthopedic Precautions: N/A  Braces: N/A  Respiratory Status: Room air         Subjective:  Communicated with FRANKO MORLEY AND [facility] CHART REVIEW  prior to session.   PT MET IN  SUP IN BED MOANING.     Pain/Comfort  Pain Rating 1: 0/10  Pain Rating Post-Intervention 1: 0/10    Objective:   Patient found with: peripheral IV, G/J tube, PICC line    Functional Mobility:  PT MOANING FOR ALL ANSWERS TO QUESTIONS P.T. ASKED. P.T. EDUCATED PT ON NEED TO ANSWER WITH YES OR NO TO BE CLEAR. PT REPORTED YES TO WANTING TO DO THERAPY. P.T. THEN INSTRUCTED PT TO SIT EOB. PT LAY WITH EYES CLOSED AND PT WITHDRAWN . P.T. ASKED PT AGAIN AND WENT TO ASSIST PT LE.PT BEGAN YELLING AND KICKING AT P.T. PT REPORTED, "I CAN'T DO THAT." P.T.REITERATED THAT THERAPY WAS HERE TO ASSIST/ HELP PT WITH MOBILITY HOWEVER PT CLOSED EYES AND REPORTED SHE WASN'T GOING TO GOING TO DO IT AND THAT SHE WASN'T READY. P.T. EDUCATED PT AND  ON IMPORTANCE OF PARTICIPATION TO PROGRESS FUNC MOBILITY AS WELL AS TO COMPLY WITH P.T. TX TO SHOW PROGRESS IN ORDER TO STAY ON [place] AND BE APPROVED FOR MORE THERAPY ONCE D/C. THEY WERE ALSO EDUCATED ON CONT DAYS OF REFUSAL OF THERAPY SERVICES. PT/ FAMILY EDUCATED ON DIFFERENCE OF A MAINTENANCE PROGRAM FOR COMFORT AND PT WANTS VS SKILLED P.T. WITH SET GOALS FOR PROGRESS.          AM-PAC 6 CLICK MOBILITY  How much help from another person does this patient currently need?   1 = Unable, Total/Dependent Assistance  2 = A lot, Maximum/Moderate Assistance  3 = A little, Minimum/Contact Guard/Supervision  4 = None, Modified " [place]/Independent    Turning over in bed (including adjusting bedclothes, sheets and blankets)?: 1  Sitting down on and standing up from a chair with arms (e.g., wheelchair, bedside commode, etc.): 1  Moving from lying on back to sitting on the side of the bed?: 1  Moving to and from a bed to a chair (including a wheelchair)?: 1  Need to walk in hospital room?: 1  Climbing 3-5 steps with a railing?: 1  Basic Mobility Total Score: 6    AM-PAC Raw Score CMS G-Code Modifier Level of Impairment Assistance   6 % Total / Unable   7 - 9 CM 80 - 100% Maximal Assist   10 - 14 CL 60 - 80% Moderate Assist   15 - 19 CK 40 - 60% Moderate Assist   20 - 22 CJ 20 - 40% Minimal Assist   23 CI 1-20% SBA / CGA   24 CH 0% Independent/ Mod I     Patient left supine with call button in reach.    Assessment:  PT WITHDRAWN FROM P.T. NOT WILLING TO PARTICIPATE.     Rehab identified problem list/impairments: weakness, impaired endurance, impaired self care skills, impaired functional mobility, gait instability, pain, impaired balance, decreased safety awareness, decreased lower extremity function, decreased upper extremity function, decreased coordination, impaired cognition, decreased ROM    Rehab potential is fair.    Activity tolerance: Poor    Discharge recommendations:  (BASIC NH ( PENDING PROGRESS AND PARTICIPATION ))      Barriers to discharge:      Equipment recommendations: none     GOALS:   Multidisciplinary Problems       Physical Therapy Goals          Problem: Physical Therapy    Goal Priority Disciplines Outcome Interventions   Physical Therapy Goal     PT, PT/OT Not Progressing    Description: Lt/10/24  1. PT WILL COMPLETE BED MOBILITY WITH SBA.  2. PT WILL STAND T/F TO CHAIR WITH RW AND SBA.  3. PT WILL GT TRAIN X 100' WITH RW AND MIN A TO PROGRESS GT.  4. PT WILL INC AMPAC SCORE BY 2 POINTS TO PROGRESS GROSS FUNC MOBILITY.                            PLAN:    Patient to be seen 3 x/week to address the above  listed problems via gait training, therapeutic activities, therapeutic exercises. ATTEMPT TX HOWEVER IF NONCOMPLIANT D/C P.T.   Plan of Care expires: 11/10/24  Plan of Care reviewed with: patient, spouse         10/30/2024

## 2024-10-30 NOTE — NURSING TRANSFER
Nursing Transfer Note      10/30/2024   1:39 PM    Nurse giving handoff: DAO  Nurse receiving handoff:Marina     Reason patient is being transferred: Post op    Transfer To: Med surg     Transfer via bed    Transported by: Nurses     Roca catheter has been discontinued prior to pt being brought back up to the floor    No signed and held orders at this time

## 2024-10-30 NOTE — PROCEDURES
Radiology Post-Procedure Note    Pre Op Diagnosis: right hydronephrosis    Post Op Diagnosis: right hydronephrosis    Procedure:right percutaneous nephrostomy tube change and antegrade ureteral stent    Procedure performed by: Harley Aggarwal MD    Written Informed Consent Obtained: Yes    Specimen Removed: NO    Estimated Blood Loss: less than 50     Findings: Local anesthesia and moderate sedation were used.      The indwelling nephrostomy tube was removed over a wire and a new 8.0 drainage catheter was placed under fluoroscopic guidance.  8F 22 cm ureteral stent placed in good position. The drain was secured in place and dressed.      There were no complications and the patient tolerated the procedure well.  Please see Imaging report for further details.      Harley Aggarwal MD  Staff Radiologist  Department of Radiology  Pager: [phone]

## 2024-10-30 NOTE — PT/OT/SLP PROGRESS
Occupational Therapy  Treatment    Cheryl Kirkland   MRN: [id number]   Admitting Diagnosis: Syncope    OT Date of Treatment: 10/30/24   OT Start Time: 0945  OT Stop Time: 1025  OT Total Time (min): 40 min    Billable Minutes:  Self Care/Home Management 15 minutes, Therapeutic Activity 15 minutes, and Therapeutic Exercise 10 minutes    OT/PARAMJIT: OT          General Precautions: Standard, fall  Orthopedic Precautions: N/A  Braces: N/A  Respiratory Status: Room air         Subjective:  Communicated with nurse and epic chart review prior to session.  Pain/Comfort  Pain Rating 1: 0/10    Objective:  Patient found with: peripheral IV, G/J tube, PICC line     Functional Mobility:  Bed Mobility:  Sba with rolling l<>r  Sba with sit<>stand transfers  Sba  seated scooting forward    Activities of Daily Living:     UE dressing min a with donning   LE  dressing   Max a with mukul./doff  brief   Sba with mukul/doff socks  Toileting max a   Balance:   Static Sit: fair+ sitting balance   Dynamic Sit: FAIR+: Maintains balance through MINIMAL excursions of active trunk motion  Therapeutic Activities and Exercises:  Educated patient on importance of increased tolerance to upright position and direct impact on CV endurance and strength. Patient encouraged to sit up in chair/ EOB, for a minimum of  30 minutes including for all meals..Pt educated on HEP. Performed 1 set x 15 reps B UE ROM exercise. Encouraged patient to perform AROM TE to BUE throughout the day within all available planes of motion. Re enforced importance of utilizing call light to meet needs in room and not attempt to get up without staff assistance. Patient verbalized understanding and agreed to comply.           AM-PAC 6 CLICK ADL   How much help from another person does this patient currently need?   1 = Unable, Total/Dependent Assistance  2 = A lot, Maximum/Moderate Assistance  3 = A little, Minimum/Contact Guard/Supervision  4 = None, Modified  "[place]/Independent    Putting on and taking off regular lower body clothing? : 1  Bathing (including washing, rinsing, drying)?: 1  Toileting, which includes using toilet, bedpan, or urinal? : 1  Putting on and taking off regular upper body clothing?: 1  Taking care of personal grooming such as brushing teeth?: 1  Eating meals?: 2  Daily Activity Total Score: 7     AM-PAC Raw Score CMS "G-Code Modifier Level of Impairment Assistance   6 % Total / Unable   7 - 8 CM 80 - 100% Maximal Assist   9-13 CL 60 - 80% Moderate Assist   14 - 19 CK 40 - 60% Moderate Assist   20 - 22 CJ 20 - 40% Minimal Assist   23 CI 1-20% SBA / CGA   24 CH 0% Independent/ Mod I       Patient left sitting edge of bed with all lines intact, call button in reach, bed alarm on, nurse notified, and spouse present    ASSESSMENT:  Cheryl Kirkland is a 74 y.o. female with a medical diagnosis of Syncope and presents with debility and generalized weakness.    Rehab identified problem list/impairments:  weakness, gait instability, decreased upper extremity function, impaired endurance, impaired balance, impaired self care skills, impaired functional mobility, pain, decreased safety awareness, decreased lower extremity function, decreased ROM    Rehab potential is fair.    Activity tolerance: Fair    Discharge recommendations: Moderate Intensity Therapy (snf ve palliative care depending on progress)   Barriers to discharge:      Equipment recommendations: to be determined by next level of care    GOALS:   Multidisciplinary Problems       Occupational Therapy Goals          Problem: Occupational Therapy    Goal Priority Disciplines Outcome Interventions   Occupational Therapy Goal     OT, PT/OT Progressing    Description: O.T. goals met 11-10-24  Mod a with bsc transfer  Min a with ue dressing  Pt will tolerate 1 set x 10 reps b ue rom exercise                       Plan:  Patient to be seen 2 x/week to address the above listed " problems via therapeutic activities, self-care/home management, therapeutic exercises  Plan of Care expires: 11/13/24  Plan of Care reviewed with: patient, spouse         10/30/2024

## 2024-10-30 NOTE — SEDATION DOCUMENTATION
Sheath from left groin removed and manual pressure held for 5 minutes. Hemostasis achieved. Dsg applied. Pt turned from supine position to prone position for ureteral stent placement.

## 2024-10-30 NOTE — SUBJECTIVE & OBJECTIVE
Review of Systems   All other systems reviewed and are negative.    Objective:     Vital Signs (Most Recent):  Temp: 97.8 °F (36.6 °C) (10/29/24 1930)  Pulse: 80 (10/29/24 2000)  Resp: 16 (10/29/24 1622)  BP: 133/62 (10/29/24 1930)  SpO2: 95 % (10/29/24 1622) Vital Signs (24h Range):  Temp:  [97.8 °F (36.6 °C)-98.4 °F (36.9 °C)] 97.8 °F (36.6 °C)  Pulse:  [66-88] 80  Resp:  [16-18] 16  SpO2:  [94 %-100 %] 95 %  BP: ()/(54-67) 133/62     Weight: 52.6 kg (115 lb 15.4 oz)  Body mass index is 19.3 kg/m².    Intake/Output Summary (Last 24 hours) at 10/29/2024 2210  Last data filed at 10/29/2024 2144  Gross per 24 hour   Intake 3120 ml   Output 10 ml   Net 3110 ml         Physical Exam  Vitals and nursing note reviewed.   Constitutional:       General: She is not in acute distress.     Appearance: Normal appearance. She is normal weight. She is ill-appearing.   Cardiovascular:      Rate and Rhythm: Normal rate and regular rhythm.      Heart sounds: No murmur heard.  Pulmonary:      Effort: Pulmonary effort is normal. No respiratory distress.      Breath sounds: No wheezing.   Neurological:      General: No focal deficit present.      Mental Status: She is alert and oriented to person, place, and time.   Psychiatric:         Mood and Affect: Mood normal.         Behavior: Behavior normal.             Significant Labs: All pertinent labs within the past 24 hours have been reviewed.  Recent Lab Results         10/29/24  0006        Ammonia 48               Significant Imaging: I have reviewed all pertinent imaging results/findings within the past 24 hours.    X-Ray Chest 1 View   Final Result      Right PICC line in good position.         Electronically signed by: Caden Valdez   Date:    10/26/2024   Time:    13:02      X-Ray Abdomen AP 1 View   Final Result      As above.         Electronically signed by: Caden Valdez   Date:    10/26/2024   Time:    10:30      CT Head Without Contrast   Final Result      No acute  abnormality.         Electronically signed by: Rama Paris   Date:    10/24/2024   Time:    19:48      X-Ray Chest AP Portable   Final Result      No acute abnormality.         Electronically signed by: Caden Valdez   Date:    10/24/2024   Time:    14:28      Anesthesia US Guide Vascular Access    (Results Pending)   IR Antegrade Pyelogram (xpd)    (Results Pending)   IR IVC Filter Placement    (Results Pending)

## 2024-10-30 NOTE — NURSING TRANSFER
Nursing Transfer Note      10/30/2024   11:25 AM    Nurse giving handoff:Marina  Nurse receiving handoff:Preop    Reason patient is being transferred: Surgery    Transfer To: Preop    Transfer via bed    Transfer with cardiac monitoring    Transported by Nurses

## 2024-10-30 NOTE — PROGRESS NOTES
[facility]  Hematology/Oncology  Progress Note    Patient Name: Cheryl Kirkland  Admission Date: 10/24/2024  Hospital Length of Stay: 5 days  Code Status: Full Code     Subjective:   The patient location is:  hospital  Visit type: Virtual visit with synchronous audio and video  Face-to-face or time spent with patient on the encounter:20 min  Total time spent on and for  this encounter which includes non face-to-face time preparing to see patient, review of tests, obtaining and or reviewing separately obtained records documenting clinical information in the electronic or other health records, independently interpreting results which is not separately reported ,and communicating results to the patient/family/caregiver and in care coordination and treatment planning/communicating with pharmacy for prescriptions/addressing social needs/arranging follow-up and or referrals :25 min    Each patient I provide medical services by telemedicine is:  (1) informed of the relationship between the physician and patient and the respective role of any other health care provider with respect to management of the patient; and (2) notified that he or she may decline to receive medical services by telemedicine and may withdraw from such care at any time.  This is a video visit therefore some elements of the physical exam such as vital signs, heart sounds are breath sounds are not included and may be included if found in recent clinic notes of other providers assessing same patient. Any symptoms or signs that were visualized were stated by the patient may be included in this note.   Interval History:   No family at bedside today patient is resting comfortably    74-year-old woman known to Dr. Amin stage III T2 N3a M0 gastric adenocarcinoma  Patient has jejunostomy tube and is on supplemental tube feedings has chronic diarrhea patient had an obstructing ureteral stone on right side with right-sided hydronephrosis status  post cystoscopy right nephrostomy tube placement and acute cystitis.  Recently found to have extensive right lower extremity DVT treated with heparin drip transition to [facility] she was discharge to SNF but had a syncopal episode was brought back to the hospital.   no fall or injury, however she has been confused and does not remember what has been going on today. initial blood pressure on arrival to /41, heart rate 79, temp 98.5°, respirations 18, 100% SpO2 on room air. Lab workup demonstrate WBC 10.52 with left shift, RBC 3.20, hemoglobin 10.1, hematocrit 30.4, platelets 559, potassium 2.9, CO2 19, BUN 11, creatinine 0.7, glucose 115, calcium 7.2, magnesium 1.5, alk phos 174, total protein 5.0, albumin 1.8, total bilirubin 0.3, AST 12, ALT 17, BNP 29, CPK 25, trop less than 0.006, UA- with cloudy brown urine positive for nitrite, trace leukocyte esterase, greater than 100 RBC, 9 WBC. EKG -- NSR HR 76, nonspecific ST and T wave abnormality, no STEMI. CXR demonstrates that lungs are clear. CT head without contrast done with no acute abnormality. Recent Echo done 10/10/24 with EF 65-70% and normal diastolic function. While in ED, she was given potassium 40 po and magnesium sulfate 2 g IV as well as IV Rocephin   Oncology Treatment Plan:   OP pembrolizumab 200mg Q3W    Medications:  Continuous Infusions:  Scheduled Meds:   acetaminophen  1,000 mg Per J Tube Q8H    calcium-vitamin D3  1 tablet Per J Tube BID    carBAMazepine  200 mg Per J Tube TID    electrolytes-dextrose  400 mL Per NG tube Q4H    enoxparin  1 mg/kg Subcutaneous Q12H (treatment, non-standard time)    ferrous sulfate  1 tablet Per J Tube BID    levETIRAcetam  500 mg Oral BID    mupirocin   Nasal BID    potassium bicarbonate  50 mEq Per J Tube BID    zonisamide  200 mg Oral BID     PRN Meds:  Current Facility-Administered Medications:     dextrose 10%, 12.5 g, Intravenous, PRN    dextrose 10%, 25 g, Intravenous, PRN    glucagon (human recombinant),  1 mg, Intramuscular, PRN    glucose, 16 g, Oral, PRN    glucose, 24 g, Oral, PRN    influenza (adjuvanted), 0.5 mL, Intramuscular, vaccine x 1 dose    loperamide, 2 mg, Oral, QID PRN    naloxone, 0.02 mg, Intravenous, PRN    ondansetron, 4 mg, Intravenous, Q6H PRN    oxyCODONE, 5 mg, Oral, Q6H PRN    sodium chloride 0.9%, 10 mL, Intravenous, Q8H PRN     Review of Systems  Pain is a main complaint  Objective:     Vital Signs (Most Recent):  Temp: 97.8 °F (36.6 °C) (10/29/24 1930)  Pulse: 80 (10/29/24 2000)  Resp: 16 (10/29/24 1622)  BP: 133/62 (10/29/24 1930)  SpO2: 95 % (10/29/24 1622) Vital Signs (24h Range):  Temp:  [97.8 °F (36.6 °C)-98.4 °F (36.9 °C)] 97.8 °F (36.6 °C)  Pulse:  [66-88] 80  Resp:  [16-18] 16  SpO2:  [94 %-100 %] 95 %  BP: ()/(54-67) 133/62     Weight: 52.6 kg (115 lb 15.4 oz)  Body mass index is 19.3 kg/m².  Body surface area is 1.55 meters squared.      Intake/Output Summary (Last 24 hours) at 10/29/2024 2101  Last data filed at 10/29/2024 1816  Gross per 24 hour   Intake 2920 ml   Output 10 ml   Net 2910 ml       Physical Exam  No new changes since yesterday  Significant Labs:   Lab Results   Component Value Date    WBC 5.54 10/28/2024    HGB 9.1 (L) 10/28/2024    HCT 26.6 (L) 10/28/2024    MCV 92 10/28/2024     (H) 10/28/2024      CMP  Sodium   Date Value Ref Range Status   10/28/2024 139 136 - 145 mmol/L Final     Potassium   Date Value Ref Range Status   10/28/2024 3.0 (L) 3.5 - 5.1 mmol/L Final     Chloride   Date Value Ref Range Status   10/28/2024 113 (H) 95 - 110 mmol/L Final     CO2   Date Value Ref Range Status   10/28/2024 16 (L) 23 - 29 mmol/L Final     Glucose   Date Value Ref Range Status   10/28/2024 138 (H) 70 - 110 mg/dL Final     BUN   Date Value Ref Range Status   10/28/2024 6 (L) 8 - 23 mg/dL Final     Creatinine   Date Value Ref Range Status   10/28/2024 0.6 0.5 - 1.4 mg/dL Final     Calcium   Date Value Ref Range Status   10/28/2024 7.5 (L) 8.7 - 10.5 mg/dL  Final     Total Protein   Date Value Ref Range Status   10/28/2024 5.2 (L) 6.0 - 8.4 g/dL Final     Albumin   Date Value Ref Range Status   10/28/2024 1.7 (L) 3.5 - 5.2 g/dL Final     Total Bilirubin   Date Value Ref Range Status   10/28/2024 0.2 0.1 - 1.0 mg/dL Final     Comment:     For infants and newborns, interpretation of results should be based  on gestational age, weight and in agreement with clinical  observations.    Premature Infant recommended reference ranges:  Up to 24 hours.............<8.0 mg/dL  Up to 48 hours............<12.0 mg/dL  3-5 days..................<15.0 mg/dL  6-29 days.................<15.0 mg/dL       Alkaline Phosphatase   Date Value Ref Range Status   10/28/2024 202 (H) 40 - 150 U/L Final     AST   Date Value Ref Range Status   10/28/2024 11 10 - 40 U/L Final     ALT   Date Value Ref Range Status   10/28/2024 19 10 - 44 U/L Final     Anion Gap   Date Value Ref Range Status   10/28/2024 10 8 - 16 mmol/L Final     eGFR   Date Value Ref Range Status   10/28/2024 >60 >60 mL/min/1.73 m^2 Final            Assessment/Plan:     Active Diagnoses:    Diagnosis Date Noted POA    PRINCIPAL PROBLEM:  Syncope [R55] 10/24/2024 Yes    Hypomagnesemia [E83.42] 10/24/2024 Yes    Hypokalemia [E87.6] 10/11/2024 Yes    Ureteral stone with hydronephrosis [N13.2] 10/10/2024 Yes    Severe protein-calorie malnutrition [E43] 09/04/2024 Yes    Jejunostomy tube present [Z93.4] 09/03/2024 Not Applicable    Gastric adenocarcinoma [C16.9] 02/27/2024 Yes    DVT (deep venous thrombosis) [I82.409] 02/07/2024 Yes     Chronic    Hx of Epileptic seizures [G40.909] 02/07/2024 Yes     Chronic    Chronic anemia [D64.9] 02/07/2024 Yes      Problems Resolved During this Admission:   Recent DVT currently on Eliquis patient may need ureteral stone which is impacting to be removed taking patient off of anticoagulant maybe risky unless we are able to bridge him with Lovenox during procedure  If urology would be uncomfortable with  this plan would recommend filter placement prior to procedure     Failure to thrive; patient showing significant evidence of protein calorie malnutrition/discomfort and frailty     Gastric adenocarcinoma;  Patient currently on adjuvant pembrolizumab S/p total gastrectomy on 8/20, with lysis of adhesions, lymphadenectomy, and jejunostomy tube placement, d/c on 9/10  - Pathologic stage from 8/20/2024: Stage III (ypT2, pN3a, cM0), completed 5 cycles Keytruda prior to surgery        Ofelia Lee MD  Hematology/Oncology  '[facility]

## 2024-10-30 NOTE — PLAN OF CARE
PT REFUSING OOB MOBILITY. PT YELLING AND KICKING @ P.T. PT NURSE PRESENT DURING P.T. TRIAL AND EDUCATION.

## 2024-10-30 NOTE — PLAN OF CARE
Problem: Adult Inpatient Plan of Care  Goal: Plan of Care Review  Outcome: Progressing  Goal: Patient-Specific Goal (Individualized)  Outcome: Progressing  Goal: Absence of Hospital-Acquired Illness or Injury  Outcome: Progressing  Goal: Optimal Comfort and Wellbeing  Outcome: Progressing  Goal: Readiness for Transition of Care  Outcome: Progressing     Problem: Wound  Goal: Optimal Coping  Outcome: Progressing  Goal: Optimal Functional Ability  Outcome: Progressing  Goal: Absence of Infection Signs and Symptoms  Outcome: Progressing  Goal: Improved Oral Intake  Outcome: Progressing  Goal: Optimal Pain Control and Function  Outcome: Progressing  Goal: Skin Health and Integrity  Outcome: Progressing  Goal: Optimal Wound Healing  Outcome: Progressing     Problem: Infection  Goal: Absence of Infection Signs and Symptoms  Outcome: Progressing     Problem: Fall Injury Risk  Goal: Absence of Fall and Fall-Related Injury  Outcome: Progressing     Problem: Skin Injury Risk Increased  Goal: Skin Health and Integrity  Outcome: Progressing   Discussed poc with pt, pt verbalized understanding    Purposeful rounding every 2hours    VS wnl  Cardiac monitoring in use, pt is NSR, tele monitor #[id number]   Fall precautions in place, remains injury free  Pt denies c/o pain and nausea  Tube feedings now resumed,as well as full liquid diet  Tube feedings to be measured and charted at the end of shift   Accurate I&Os- Nephrostomy tube and J tube in place   Meds given as prescribed  Bed locked at lowest position  Call light within reach    Chart check complete  Will cont with POC

## 2024-10-30 NOTE — PLAN OF CARE
Discussed poc with pt and her  at the bedside    Purposeful rounding     VS wnl    Cardiac monitoring in use tele monitor # [id number]    Fall precautions in place, remains injury free    Pain under control with PRN meds    Accurate I&Os    Bed locked at lowest position    Call light within reach    Chart check continued    Will cont with POC

## 2024-10-30 NOTE — INTERVAL H&P NOTE
The patient has been examined and the H&P has been reviewed:    I concur with the findings and no changes have occurred since H&P was written.        Active Hospital Problems    Diagnosis  POA    *Syncope [R55]  Yes    Hypomagnesemia [E83.42]  Yes    Hypokalemia [E87.6]  Yes    Ureteral stone with hydronephrosis [N13.2]  Yes    Severe protein-calorie malnutrition [E43]  Yes    Jejunostomy tube present [Z93.4]  Not Applicable    Gastric adenocarcinoma [C16.9]  Yes    DVT (deep venous thrombosis) [I82.409]  Yes     Chronic    Hx of Epileptic seizures [G40.909]  Yes     Chronic    Chronic anemia [D64.9]  Yes      Resolved Hospital Problems   No resolved problems to display.

## 2024-10-30 NOTE — PROCEDURES
Radiology Post-Procedure Note    Pre Op Diagnosis: Deep venous thrombosis    Post Op Diagnosis: : Deep venous thrombosis    Procedure: IVC filter placement    Procedure performed by: Harley Aggarwal MD    Written Informed Consent Obtained: Yes    Specimen Removed: NO    Estimated Blood Loss: Minimal    Findings: After written informed consent and sterile prep and drape, the right femoral vein was cannulated and a pigtail catheter was placed into the IVC.  Contrast injection under fluoroscopy revealed anatomy suitable for placement of IVC filter immediately inferior to the renal veins.  The filter was placed at this level under fluoroscopic surveillance and post placement venogram demonstrated adequate position and function.  Please see full procedural report in Imaging for further details.       Patient tolerated procedure well.    Harley Aggarwal MD  Staff Radiologist  Department of Radiology  Pager: [phone]

## 2024-10-31 LAB
ALBUMIN SERPL BCP-MCNC: 1.8 G/DL (ref 3.5–5.2)
ALP SERPL-CCNC: 229 U/L (ref 40–150)
ALT SERPL W/O P-5'-P-CCNC: 21 U/L (ref 10–44)
ANION GAP SERPL CALC-SCNC: 13 MMOL/L (ref 8–16)
AST SERPL-CCNC: 15 U/L (ref 10–40)
BASOPHILS # BLD AUTO: 0.04 K/UL (ref 0–0.2)
BASOPHILS NFR BLD: 0.8 % (ref 0–1.9)
BILIRUB SERPL-MCNC: 0.2 MG/DL (ref 0.1–1)
BUN SERPL-MCNC: 7 MG/DL (ref 8–23)
CALCIUM SERPL-MCNC: 8.1 MG/DL (ref 8.7–10.5)
CHLORIDE SERPL-SCNC: 108 MMOL/L (ref 95–110)
CO2 SERPL-SCNC: 19 MMOL/L (ref 23–29)
CREAT SERPL-MCNC: 0.6 MG/DL (ref 0.5–1.4)
DIFFERENTIAL METHOD BLD: ABNORMAL
EOSINOPHIL # BLD AUTO: 0.1 K/UL (ref 0–0.5)
EOSINOPHIL NFR BLD: 1.9 % (ref 0–8)
ERYTHROCYTE [DISTWIDTH] IN BLOOD BY AUTOMATED COUNT: 20.7 % (ref 11.5–14.5)
EST. GFR  (NO RACE VARIABLE): >60 ML/MIN/1.73 M^2
GLUCOSE SERPL-MCNC: 142 MG/DL (ref 70–110)
HCT VFR BLD AUTO: 28.2 % (ref 37–48.5)
HGB BLD-MCNC: 9.3 G/DL (ref 12–16)
IMM GRANULOCYTES # BLD AUTO: 0.11 K/UL (ref 0–0.04)
IMM GRANULOCYTES NFR BLD AUTO: 2.1 % (ref 0–0.5)
LYMPHOCYTES # BLD AUTO: 1.1 K/UL (ref 1–4.8)
LYMPHOCYTES NFR BLD: 20.6 % (ref 18–48)
MCH RBC QN AUTO: 31.4 PG (ref 27–31)
MCHC RBC AUTO-ENTMCNC: 33 G/DL (ref 32–36)
MCV RBC AUTO: 95 FL (ref 82–98)
MONOCYTES # BLD AUTO: 0.7 K/UL (ref 0.3–1)
MONOCYTES NFR BLD: 13.4 % (ref 4–15)
NEUTROPHILS # BLD AUTO: 3.3 K/UL (ref 1.8–7.7)
NEUTROPHILS NFR BLD: 61.2 % (ref 38–73)
NRBC BLD-RTO: 1 /100 WBC
PLATELET # BLD AUTO: 522 K/UL (ref 150–450)
PMV BLD AUTO: 8.7 FL (ref 9.2–12.9)
POTASSIUM SERPL-SCNC: 5.4 MMOL/L (ref 3.5–5.1)
PROT SERPL-MCNC: 5.3 G/DL (ref 6–8.4)
RBC # BLD AUTO: 2.96 M/UL (ref 4–5.4)
SODIUM SERPL-SCNC: 140 MMOL/L (ref 136–145)
WBC # BLD AUTO: 5.3 K/UL (ref 3.9–12.7)

## 2024-10-31 PROCEDURE — 97530 THERAPEUTIC ACTIVITIES: CPT

## 2024-10-31 PROCEDURE — 25000003 PHARM REV CODE 250: Performed by: INTERNAL MEDICINE

## 2024-10-31 PROCEDURE — 25000003 PHARM REV CODE 250: Performed by: NURSE PRACTITIONER

## 2024-10-31 PROCEDURE — 80053 COMPREHEN METABOLIC PANEL: CPT | Performed by: HOSPITALIST

## 2024-10-31 PROCEDURE — 21400001 HC TELEMETRY ROOM

## 2024-10-31 PROCEDURE — 99231 SBSQ HOSP IP/OBS SF/LOW 25: CPT | Mod: ,,, | Performed by: UROLOGY

## 2024-10-31 PROCEDURE — 63600175 PHARM REV CODE 636 W HCPCS

## 2024-10-31 PROCEDURE — 25000003 PHARM REV CODE 250

## 2024-10-31 PROCEDURE — 85025 COMPLETE CBC W/AUTO DIFF WBC: CPT | Performed by: HOSPITALIST

## 2024-10-31 PROCEDURE — 25000003 PHARM REV CODE 250: Performed by: HOSPITALIST

## 2024-10-31 RX ADMIN — POTASSIUM BICARBONATE 50 MEQ: 978 TABLET, EFFERVESCENT ORAL at 08:10

## 2024-10-31 RX ADMIN — LEVETIRACETAM 500 MG: 100 SOLUTION ORAL at 09:10

## 2024-10-31 RX ADMIN — LEVETIRACETAM 500 MG: 100 SOLUTION ORAL at 08:10

## 2024-10-31 RX ADMIN — MUPIROCIN: 20 OINTMENT TOPICAL at 09:10

## 2024-10-31 RX ADMIN — Medication 400 ML: at 06:10

## 2024-10-31 RX ADMIN — ENOXAPARIN SODIUM 50 MG: 60 INJECTION SUBCUTANEOUS at 09:10

## 2024-10-31 RX ADMIN — OXYCODONE HYDROCHLORIDE 5 MG: 5 TABLET ORAL at 12:10

## 2024-10-31 RX ADMIN — POTASSIUM BICARBONATE 50 MEQ: 978 TABLET, EFFERVESCENT ORAL at 09:10

## 2024-10-31 RX ADMIN — Medication 1 TABLET: at 08:10

## 2024-10-31 RX ADMIN — FERROUS SULFATE TAB 325 MG (65 MG ELEMENTAL FE) 1 EACH: 325 (65 FE) TAB at 08:10

## 2024-10-31 RX ADMIN — CARBAMAZEPINE 200 MG: 200 TABLET ORAL at 03:10

## 2024-10-31 RX ADMIN — CARBAMAZEPINE 200 MG: 200 TABLET ORAL at 08:10

## 2024-10-31 RX ADMIN — ACETAMINOPHEN 1000 MG: 500 TABLET ORAL at 03:10

## 2024-10-31 RX ADMIN — CARBAMAZEPINE 200 MG: 200 TABLET ORAL at 09:10

## 2024-10-31 RX ADMIN — Medication 1 TABLET: at 09:10

## 2024-10-31 RX ADMIN — ZONISAMIDE 200 MG: 100 CAPSULE ORAL at 09:10

## 2024-10-31 RX ADMIN — ACETAMINOPHEN 1000 MG: 500 TABLET ORAL at 10:10

## 2024-10-31 RX ADMIN — ENOXAPARIN SODIUM 50 MG: 60 INJECTION SUBCUTANEOUS at 08:10

## 2024-10-31 RX ADMIN — Medication 400 ML: at 02:10

## 2024-10-31 RX ADMIN — ZONISAMIDE 200 MG: 100 CAPSULE ORAL at 08:10

## 2024-10-31 RX ADMIN — FERROUS SULFATE TAB 325 MG (65 MG ELEMENTAL FE) 1 EACH: 325 (65 FE) TAB at 09:10

## 2024-10-31 RX ADMIN — Medication 400 ML: at 03:10

## 2024-10-31 RX ADMIN — Medication 400 ML: at 10:10

## 2024-10-31 RX ADMIN — ACETAMINOPHEN 1000 MG: 500 TABLET ORAL at 05:10

## 2024-10-31 NOTE — SUBJECTIVE & OBJECTIVE
Review of Systems   All other systems reviewed and are negative.    Objective:     Vital Signs (Most Recent):  Temp: 98.6 °F (37 °C) (10/31/24 0747)  Pulse: 85 (10/31/24 0747)  Resp: 20 (10/31/24 0747)  BP: (!) 109/58 (10/31/24 0747)  SpO2: 96 % (10/31/24 0747) Vital Signs (24h Range):  Temp:  [97.5 °F (36.4 °C)-98.6 °F (37 °C)] 98.6 °F (37 °C)  Pulse:  [70-85] 85  Resp:  [16-20] 20  SpO2:  [95 %-100 %] 96 %  BP: ()/(58-76) 109/58     Weight: 52.6 kg (115 lb 15.4 oz)  Body mass index is 19.3 kg/m².    Intake/Output Summary (Last 24 hours) at 10/31/2024 1112  Last data filed at 10/31/2024 1036  Gross per 24 hour   Intake 1382 ml   Output 271 ml   Net 1111 ml         Physical Exam  Vitals and nursing note reviewed.   Constitutional:       General: She is not in acute distress.     Appearance: She is normal weight.   Pulmonary:      Effort: Pulmonary effort is normal. No respiratory distress.   Abdominal:      Comments: G-tube   Musculoskeletal:      Comments: Right nephrostomy  RUE PICC line   Neurological:      Mental Status: She is alert.             Significant Labs: All pertinent labs within the past 24 hours have been reviewed.  Recent Lab Results         10/31/24  1036        Baso # 0.04       Basophil % 0.8       Differential Method Automated       Eos # 0.1       Eos % 1.9       Gran # (ANC) 3.3       Gran % 61.2       Hematocrit 28.2       Hemoglobin 9.3       Immature Grans (Abs) 0.11  Comment: Mild elevation in immature granulocytes is non specific and   can be seen in a variety of conditions including stress response,   acute inflammation, trauma and pregnancy. Correlation with other   laboratory and clinical findings is essential.         Immature Granulocytes 2.1       Lymph # 1.1       Lymph % 20.6       MCH 31.4       MCHC 33.0       MCV 95       Mono # 0.7       Mono % 13.4       MPV 8.7       nRBC 1       Platelet Count 522       RBC 2.96       RDW 20.7       WBC 5.30                Significant Imaging: I have reviewed all pertinent imaging results/findings within the past 24 hours.    IR Antegrade Pyelogram (xpd)   Final Result      IR IVC Filter Placement   Final Result      1. Successful placement of an Bard G2 retrievable infrarenal IVC filter.   Plan:      1. Retrieval intent (MIPS): The filter is potentially retrievable.  Please contact IR to schedule IVC filter removal when/if clinically indicated.         Electronically signed by: Harley Aggarwal MD   Date:    10/30/2024   Time:    15:22      X-Ray Chest 1 View   Final Result      Right PICC line in good position.         Electronically signed by: Caden Valdez   Date:    10/26/2024   Time:    13:02      X-Ray Abdomen AP 1 View   Final Result      As above.         Electronically signed by: Caden Valdez   Date:    10/26/2024   Time:    10:30      CT Head Without Contrast   Final Result      No acute abnormality.         Electronically signed by: Rama Paris   Date:    10/24/2024   Time:    19:48      X-Ray Chest AP Portable   Final Result      No acute abnormality.         Electronically signed by: Caden Valdez   Date:    10/24/2024   Time:    14:28      Anesthesia US Guide Vascular Access    (Results Pending)

## 2024-10-31 NOTE — PLAN OF CARE
Continue OT POC.  Pt refusing participation in therapy. Pt also resistive to assist from therapists.

## 2024-10-31 NOTE — PLAN OF CARE
10/31/24 1229   Rounds   Attendance Provider;;Charge nurse;Physical therapist   Discharge Plan A Skilled Nursing Facility   Why the patient remains in the hospital Requires continued medical care   Transition of Care Barriers None       Assessment/Plan:      * Syncope  Syncopal episode while having physical therapy -- did not fall, no injuries  CT head did not show any acute abnormality  Troponin normal, EKG with no significant changes  Lab workup shows mild UTI - Rocephin given  Had recent Echo which was normal  Hydrating via feeding tube  Cardiac monitoring  Trending troponin level- continued to be negative         Hypokalemia  Patient's most recent potassium results are listed below.         Recent Labs     10/24/24  2224 10/26/24  1640 10/27/24  0626   K 3.2* 2.5* 2.5*         Plan  - Replete potassium per protocol  - Monitor potassium Daily  - Patient's hypokalemia is being treated, will recheck labs again in a.m.      Ureteral stone with hydronephrosis  Recent imaging with 8 mm obstructing/impacted stone on right side -- s/p cystoscopy and right nephrostomy tube placement  Urology consulted , appreciate recommendations         DVT (deep venous thrombosis)  Recently diagnosed with extensive DVT RLE  Continue Eliquis        Jejunostomy tube present  Hydrating and tube feeding via tube        Gastric adenocarcinoma  - Followed by Dr. Ambrose and Dr. Jerome  - S/p total gastrectomy on 8/20, with lysis of adhesions, lymphadenectomy, and jejunostomy tube placement, d/c on 9/10  - Pathologic stage from 8/20/2024: Stage III (ypT2, pN3a, cM0), completed 5 cycles Keytruda prior to surgery  - outpatient follow up with Dr. Jerome and Dr. Ambrose        Hypomagnesemia  Patient has Abnormal Magnesium: hypomagnesemia. Will continue to monitor electrolytes closely. Will replace the affected electrolytes and repeat labs to be done after interventions completed. The patient's magnesium results have been reviewed  and are listed below.      Recent Labs   Lab 10/27/24  0626   MG 2.2            Hx of Epileptic seizures  Seizure precautions  Continue Keppra and carbamazepine        Chronic anemia  Anemia is likely due to chronic blood loss and Iron deficiency. Most recent hemoglobin and hematocrit are listed below.       Recent Labs     10/26/24  1640 10/27/24  0626   HGB 8.9* 8.6*   HCT 26.0* 25.0*         Plan  - Monitor serial CBC: Daily  - Transfuse PRBC if patient becomes hemodynamically unstable, symptomatic or H/H drops below 7/21.  - Patient has not received any PRBC transfusions to date     Severe protein-calorie malnutrition  Nutrition consulted. Most recent weight and BMI monitored- will continue tube feeding supplementation     Measurements:      Wt Readings from Last 1 Encounters:   10/25/24 49.7 kg (109 lb 9.1 oz)   Body mass index is 18.23 kg/m².           VTE Risk Mitigation (From admission, onward)              Ordered       enoxaparin injection 50 mg  Every 12 hours         10/27/24 1121       Reason for No Pharmacological VTE Prophylaxis  Once        Question:  Reasons:  Answer:  Already adequately anticoagulated on oral Anticoagulants    10/24/24 2206       IP VTE HIGH RISK PATIENT  Once         10/24/24 2206       Place sequential compression device  Until discontinued         10/24/24 2206                          Discharge Planning   SOFIYA:      Code Status: Full Code   Is the patient medically ready for discharge?:     Reason for patient still in hospital (select all that apply): Pending disposition  Discharge Plan A: Skilled Nursing Facility

## 2024-10-31 NOTE — PROGRESS NOTES
[facility] Medicine  Progress Note    Patient Name: Cheryl Kirkland  MRN: [id number]  Patient Class: IP- Inpatient   Admission Date: 10/24/2024  Length of Stay: 7 days  Attending Physician: Zachery Boyd MD  Primary Care Provider: Gagandeep Iglesias MD        Subjective:     Principal Problem:Syncope        HPI:    Patient is a 74-year-old female with past medical history significant for hypertension, hyperlipidemia, DVT in right lower extremity, epilepsy, anemia, neuropathy, GERD, gastritis, polyps, gastric adenocarcinoma status post treatment with Keytruda (followed by Dr. Jerome and Dr. Ambrose) and total gastrectomy on 08/20/2024, jejunostomy tube on supplemental tube feedings, chronic diarrhea, ovarian cancer (s/p surgery/chemotherapy), osteoporosis and recent admission 10/9/24 through 10/21/2024 due to 8 mm right ureteral obstructing stone with right-sided hydronephrosis s/p cystoscopy and right nephrostomy tube placement, and acute cystitis with SHELLEY also found to have extensive DVT RLE treated with heparin drip transitioned to Eliquis, was discharged to SNF at [facility]. On 10/24, she was having physical therapy at SNF and had syncopal episode. There was no fall or injury, however she has been confused and does not remember what has been going on today.  initial blood pressure on arrival to /41, heart rate 79, temp 98.5°, respirations 18, 100% SpO2 on room air.  Lab workup demonstrate WBC 10.52 with left shift, RBC 3.20, hemoglobin 10.1, hematocrit 30.4, platelets 559, potassium 2.9, CO2 19, BUN 11, creatinine 0.7, glucose 115, calcium 7.2, magnesium 1.5, alk phos 174, total protein 5.0, albumin 1.8, total bilirubin 0.3, AST 12, ALT 17, BNP 29, CPK 25, trop less than 0.006, UA- with cloudy brown urine positive for nitrite, trace leukocyte esterase, greater than 100 RBC, 9 WBC. EKG -- NSR HR 76, nonspecific ST and T wave abnormality, no STEMI. CXR demonstrates  that lungs are clear. CT head without contrast done with no acute abnormality. Recent Echo done 10/10/24 with EF 65-70% and normal diastolic function. While in ED, she was given potassium 40 po and magnesium sulfate 2 g IV as well as IV Rocephin. Hospital Medicine was consulted for admission due to syncope with continued confusion, UTI, hypokalemia, and hypomagnesemia.     Overview/Hospital Course:  Patient is a 74 year old female admitted to the hospital for c/o syncopal episode during PT at NH.   Labs: WBC 7.33, RBC 2.72, hemoglobin 8.6, hematocrit 25.0, platelets 488, Potassium 2.5,   -UA- with cloudy brown urine positive for nitrite, trace leukocyte esterase, greater than 100 RBC, 9 WBC.   -IV abx: Rocephin   -CT head without contrast done with no acute abnormality.   -Hematology consulted due to patient being on eliquis for anticoagulation for DVT but will need needs ESWL for an impacted ureteral stone and removal of nephrostomy tube per urology. Patient is high risk to be off of anticoagulation for urology procedure but due to ureteral stone which is impacting that needs to be removed may be able to bridge patient with Lovenox during procedure. If urology would be uncomfortable with this plan would recommend filter placement prior to procedure per Dr. Lee.   -Urology consulted as patient was reporting pain at PCN site. Impacted ureteral stone may require treatment with ESWL once patient can safely be off of . Discussed if patient is not cleared to temporarily come off anticoagulation within 3 months the nephrostomy tube may need to be exchanged. Nephrostogram and stent order placed by urology. Patient transitioned to therapeutic 1mg/kg lovenox from eliquis for possible procedure   -Pt/Ot to eval and treat: recommend SNF   -EKG: Normal sinus rhythm   -Patient required placement of PICC line as phlebotomy and nursing staff were unable to obtain labs.     10/28/24  NAEON, patient resting in bed,  at  bedside  Plans for IVC filter placement, currently on Lovenox 1 mg/kg BID  Urology with plans for ESWL pending    10/29/24  NAEON, plans for IVC filter tomorrow  On Lovenox, hold evening dose, NPO after midnight    10/30/24  NAEON, no new issues  IVC filter and right PCN tube change and antegrade ureteral stent    10/31/24  Patient awake, alert, answering questions appropriately   at bedside, on phone  Updated plan of care  Plan for discharge to SNF tomorrow  F/U with Urology 11/18/24      Review of Systems   All other systems reviewed and are negative.    Objective:     Vital Signs (Most Recent):  Temp: 98.6 °F (37 °C) (10/31/24 0747)  Pulse: 85 (10/31/24 0747)  Resp: 20 (10/31/24 0747)  BP: (!) 109/58 (10/31/24 0747)  SpO2: 96 % (10/31/24 0747) Vital Signs (24h Range):  Temp:  [97.5 °F (36.4 °C)-98.6 °F (37 °C)] 98.6 °F (37 °C)  Pulse:  [70-85] 85  Resp:  [16-20] 20  SpO2:  [95 %-100 %] 96 %  BP: ()/(58-76) 109/58     Weight: 52.6 kg (115 lb 15.4 oz)  Body mass index is 19.3 kg/m².    Intake/Output Summary (Last 24 hours) at 10/31/2024 1112  Last data filed at 10/31/2024 1036  Gross per 24 hour   Intake 1382 ml   Output 271 ml   Net 1111 ml         Physical Exam  Vitals and nursing note reviewed.   Constitutional:       General: She is not in acute distress.     Appearance: She is normal weight.   Pulmonary:      Effort: Pulmonary effort is normal. No respiratory distress.   Abdominal:      Comments: G-tube   Musculoskeletal:      Comments: Right nephrostomy  RUE PICC line   Neurological:      Mental Status: She is alert.             Significant Labs: All pertinent labs within the past 24 hours have been reviewed.  Recent Lab Results         10/31/24  1036        Baso # 0.04       Basophil % 0.8       Differential Method Automated       Eos # 0.1       Eos % 1.9       Gran # (ANC) 3.3       Gran % 61.2       Hematocrit 28.2       Hemoglobin 9.3       Immature Grans (Abs) 0.11  Comment: Mild elevation  in immature granulocytes is non specific and   can be seen in a variety of conditions including stress response,   acute inflammation, trauma and pregnancy. Correlation with other   laboratory and clinical findings is essential.         Immature Granulocytes 2.1       Lymph # 1.1       Lymph % 20.6       MCH 31.4       MCHC 33.0       MCV 95       Mono # 0.7       Mono % 13.4       MPV 8.7       nRBC 1       Platelet Count 522       RBC 2.96       RDW 20.7       WBC 5.30               Significant Imaging: I have reviewed all pertinent imaging results/findings within the past 24 hours.    IR Antegrade Pyelogram (xpd)   Final Result      IR IVC Filter Placement   Final Result      1. Successful placement of an Bard G2 retrievable infrarenal IVC filter.   Plan:      1. Retrieval intent (MIPS): The filter is potentially retrievable.  Please contact IR to schedule IVC filter removal when/if clinically indicated.         Electronically signed by: Harley Aggarwal MD   Date:    10/30/2024   Time:    15:22      X-Ray Chest 1 View   Final Result      Right PICC line in good position.         Electronically signed by: Caden Valdez   Date:    10/26/2024   Time:    13:02      X-Ray Abdomen AP 1 View   Final Result      As above.         Electronically signed by: Caden Valdez   Date:    10/26/2024   Time:    10:30      CT Head Without Contrast   Final Result      No acute abnormality.         Electronically signed by: Rama Paris   Date:    10/24/2024   Time:    19:48      X-Ray Chest AP Portable   Final Result      No acute abnormality.         Electronically signed by: Caden Valdez   Date:    10/24/2024   Time:    14:28      Anesthesia US Guide Vascular Access    (Results Pending)         Assessment/Plan:      * Syncope  Syncopal episode while having physical therapy -- did not fall, no injuries  CT head did not show any acute abnormality  Troponin normal, EKG with no significant changes  Lab workup shows mild UTI -  Rocephin given  Had recent Echo which was normal  Hydrating via feeding tube  Cardiac monitoring  Trending troponin level- continued to be negative       Hypokalemia  Patient's most recent potassium results are listed below.   Recent Labs     10/24/24  2224 10/26/24  1640 10/27/24  0626   K 3.2* 2.5* 2.5*       Plan  - Replete potassium per protocol  - Monitor potassium Daily  - Patient's hypokalemia is being treated, will recheck labs again in a.m.     Ureteral stone with hydronephrosis  Recent imaging with 8 mm obstructing/impacted stone on right side -- s/p cystoscopy and right nephrostomy tube placement  Urology consulted , appreciate recommendations       DVT (deep venous thrombosis)  Recently diagnosed with extensive DVT RLE  Continue Eliquis      Jejunostomy tube present  Hydrating and tube feeding via tube      Gastric adenocarcinoma  - Followed by Dr. Ambrose and Dr. Jerome  - S/p total gastrectomy on 8/20, with lysis of adhesions, lymphadenectomy, and jejunostomy tube placement, d/c on 9/10  - Pathologic stage from 8/20/2024: Stage III (ypT2, pN3a, cM0), completed 5 cycles Keytruda prior to surgery  - outpatient follow up with Dr. Jerome and Dr. Ambrose      Hypomagnesemia  Patient has Abnormal Magnesium: hypomagnesemia. Will continue to monitor electrolytes closely. Will replace the affected electrolytes and repeat labs to be done after interventions completed. The patient's magnesium results have been reviewed and are listed below.  Recent Labs   Lab 10/27/24  0626   MG 2.2          Hx of Epileptic seizures  Seizure precautions  Continue Keppra and carbamazepine      Chronic anemia  Anemia is likely due to chronic blood loss and Iron deficiency. Most recent hemoglobin and hematocrit are listed below.  Recent Labs     10/26/24  1640 10/27/24  0626   HGB 8.9* 8.6*   HCT 26.0* 25.0*       Plan  - Monitor serial CBC: Daily  - Transfuse PRBC if patient becomes hemodynamically unstable, symptomatic or H/H  drops below 7/21.  - Patient has not received any PRBC transfusions to date    Severe protein-calorie malnutrition  Nutrition consulted. Most recent weight and BMI monitored- will continue tube feeding supplementation    Measurements:  Wt Readings from Last 1 Encounters:   10/25/24 49.7 kg (109 lb 9.1 oz)   Body mass index is 18.23 kg/m².        VTE Risk Mitigation (From admission, onward)           Ordered     enoxaparin injection 50 mg  Every 12 hours         10/27/24 1121     Reason for No Pharmacological VTE Prophylaxis  Once        Question:  Reasons:  Answer:  Already adequately anticoagulated on oral Anticoagulants    10/24/24 2206     IP VTE HIGH RISK PATIENT  Once         10/24/24 2206     Place sequential compression device  Until discontinued         10/24/24 2206                    Discharge Planning   SOFIYA:      Code Status: Full Code   Is the patient medically ready for discharge?:     Reason for patient still in hospital (select all that apply): Pending disposition  Discharge Plan A: Skilled Nursing Facility                  Zachery Boyd MD  Department of Hospital Medicine   [facility] [facility]

## 2024-10-31 NOTE — PT/OT/SLP PROGRESS
"Occupational Therapy   Treatment    Name: Cheryl Kirkland  MRN: [id number]  Admitting Diagnosis:  Syncope       Recommendations:     Discharge Recommendations: Moderate Intensity Therapy  Discharge Equipment Recommendations:  to be determined by next level of care  Barriers to discharge:  Decreased caregiver support    Assessment:     Cheryl Kirkland is a 74 y.o. female with a medical diagnosis of Syncope.  She presents with the following performance deficits affecting function are weakness, impaired endurance, impaired self care skills, impaired functional mobility, gait instability, impaired balance, impaired cognition, decreased coordination, decreased upper extremity function, decreased lower extremity function, pain, decreased safety awareness, impaired cardiopulmonary response to activity.     Rehab Prognosis:  Fair; patient would benefit from acute skilled OT services to address these deficits and reach maximum level of function.       Plan:     Patient to be seen 2 x/week to address the above listed problems via self-care/home management, therapeutic activities, therapeutic exercises  Plan of Care Expires: 11/13/24  Plan of Care Reviewed with: patient, spouse    Subjective     Chief Complaint: Pt initially agreeable to OT services but not participating in therapy and resistive to assistance form therapists.  "I can help myself. I can do it." "I got it."  Patient/Family Comments/goals: "I want to use the bathroom."  Pain/Comfort:  Pain Rating 1: 8/10 (pt's spouse reporting for pt)  Location - Side 1: Bilateral  Location - Orientation 1: generalized  Location 1: abdomen  Pain Addressed 1: Distraction, Nurse notified  Pain Rating Post-Intervention 1: 8/10    Objective:     Communicated with: Nurse and epic chart review prior to session.  Patient found HOB elevated with G/J tube, PICC line, nephrostomy upon OT entry to room.    General Precautions: Standard, fall    Orthopedic " Precautions:N/A  Braces: N/A  Respiratory Status: Room air     Occupational Performance:     Bed Mobility/Transfers/Functional Mobility:    Pt refusing bed mobility/transfers at this time despite request to use BSC. Max encouragement and physical assist provided multiple times, but pt resistive. Pt with no attempts at initiating mobility despite stating she would do it.     [facility] 6 Click ADL: 9    Treatment & Education:  Pt appears lethargic. BSC set up for pt at bedside, but pt unwilling to engage in mobility to use the bathroom. Reviewed role of OT in acute setting and benefits of participation. Educated on importance of OOB activity and calling for A to transfer and meet needs. Encouraged completion of B UE AROM therex throughout the day to tolerance to increase functional strength and activity tolerance. Patient stated understanding and in agreement with POC.     Patient left HOB elevated with all lines intact, call button in reach, nurse notified, and spouse present    GOALS:   Multidisciplinary Problems       Occupational Therapy Goals          Problem: Occupational Therapy    Goal Priority Disciplines Outcome Interventions   Occupational Therapy Goal     OT, PT/OT Progressing    Description: O.T. goals met 11-10-24  Mod a with bsc transfer  Min a with ue dressing  Pt will tolerate 1 set x 10 reps b ue rom exercise                       Time Tracking:     OT Date of Treatment: 10/31/24  OT Start Time: 1050  OT Stop Time: 1100  OT Total Time (min): 10 min    Billable Minutes:Therapeutic Activity 10    OT/PARAMJIT: JESSY Wallace OT     10/31/2024

## 2024-10-31 NOTE — PROGRESS NOTES
[facility] Medicine  Progress Note    Patient Name: Cheryl Kirkland  MRN: [id number]  Patient Class: IP- Inpatient   Admission Date: 10/24/2024  Length of Stay: 7 days  Attending Physician: Zachery Boyd MD  Primary Care Provider: Gagandeep Iglesias MD        Subjective:     Principal Problem:Syncope        HPI:    Patient is a 74-year-old female with past medical history significant for hypertension, hyperlipidemia, DVT in right lower extremity, epilepsy, anemia, neuropathy, GERD, gastritis, polyps, gastric adenocarcinoma status post treatment with Keytruda (followed by Dr. Jerome and Dr. Ambrose) and total gastrectomy on 08/20/2024, jejunostomy tube on supplemental tube feedings, chronic diarrhea, ovarian cancer (s/p surgery/chemotherapy), osteoporosis and recent admission 10/9/24 through 10/21/2024 due to 8 mm right ureteral obstructing stone with right-sided hydronephrosis s/p cystoscopy and right nephrostomy tube placement, and acute cystitis with SHELLEY also found to have extensive DVT RLE treated with heparin drip transitioned to Eliquis, was discharged to SNF at [facility]. On 10/24, she was having physical therapy at SNF and had syncopal episode. There was no fall or injury, however she has been confused and does not remember what has been going on today.  initial blood pressure on arrival to /41, heart rate 79, temp 98.5°, respirations 18, 100% SpO2 on room air.  Lab workup demonstrate WBC 10.52 with left shift, RBC 3.20, hemoglobin 10.1, hematocrit 30.4, platelets 559, potassium 2.9, CO2 19, BUN 11, creatinine 0.7, glucose 115, calcium 7.2, magnesium 1.5, alk phos 174, total protein 5.0, albumin 1.8, total bilirubin 0.3, AST 12, ALT 17, BNP 29, CPK 25, trop less than 0.006, UA- with cloudy brown urine positive for nitrite, trace leukocyte esterase, greater than 100 RBC, 9 WBC. EKG -- NSR HR 76, nonspecific ST and T wave abnormality, no STEMI. CXR demonstrates  that lungs are clear. CT head without contrast done with no acute abnormality. Recent Echo done 10/10/24 with EF 65-70% and normal diastolic function. While in ED, she was given potassium 40 po and magnesium sulfate 2 g IV as well as IV Rocephin. Hospital Medicine was consulted for admission due to syncope with continued confusion, UTI, hypokalemia, and hypomagnesemia.     Overview/Hospital Course:  Patient is a 74 year old female admitted to the hospital for c/o syncopal episode during PT at NH.   Labs: WBC 7.33, RBC 2.72, hemoglobin 8.6, hematocrit 25.0, platelets 488, Potassium 2.5,   -UA- with cloudy brown urine positive for nitrite, trace leukocyte esterase, greater than 100 RBC, 9 WBC.   -IV abx: Rocephin   -CT head without contrast done with no acute abnormality.   -Hematology consulted due to patient being on eliquis for anticoagulation for DVT but will need needs ESWL for an impacted ureteral stone and removal of nephrostomy tube per urology. Patient is high risk to be off of anticoagulation for urology procedure but due to ureteral stone which is impacting that needs to be removed may be able to bridge patient with Lovenox during procedure. If urology would be uncomfortable with this plan would recommend filter placement prior to procedure per Dr. Lee.   -Urology consulted as patient was reporting pain at PCN site. Impacted ureteral stone may require treatment with ESWL once patient can safely be off of . Discussed if patient is not cleared to temporarily come off anticoagulation within 3 months the nephrostomy tube may need to be exchanged. Nephrostogram and stent order placed by urology. Patient transitioned to therapeutic 1mg/kg lovenox from eliquis for possible procedure   -Pt/Ot to eval and treat: recommend SNF   -EKG: Normal sinus rhythm   -Patient required placement of PICC line as phlebotomy and nursing staff were unable to obtain labs.     10/28/24  NAEON, patient resting in bed,  at  bedside  Plans for IVC filter placement, currently on Lovenox 1 mg/kg BID  Urology with plans for ESWL pending    10/29/24  NAEON, plans for IVC filter tomorrow  On Lovenox, hold evening dose, NPO after midnight    10/30/24  NAEON, no new issues  IVC filter and right PCN tube change and antegrade ureteral stent      Review of Systems   All other systems reviewed and are negative.    Objective:     Vital Signs (Most Recent):  Temp: 98.6 °F (37 °C) (10/31/24 0747)  Pulse: 85 (10/31/24 0747)  Resp: 20 (10/31/24 0747)  BP: (!) 109/58 (10/31/24 0747)  SpO2: 96 % (10/31/24 0747) Vital Signs (24h Range):  Temp:  [97.5 °F (36.4 °C)-98.6 °F (37 °C)] 98.6 °F (37 °C)  Pulse:  [70-85] 85  Resp:  [16-20] 20  SpO2:  [95 %-100 %] 96 %  BP: ()/(58-76) 109/58     Weight: 52.6 kg (115 lb 15.4 oz)  Body mass index is 19.3 kg/m².    Intake/Output Summary (Last 24 hours) at 10/31/2024 0947  Last data filed at 10/31/2024 0730  Gross per 24 hour   Intake 1102 ml   Output 270 ml   Net 832 ml         Physical Exam  Vitals and nursing note reviewed.   Constitutional:       General: She is not in acute distress.     Appearance: She is normal weight. She is ill-appearing.   Pulmonary:      Effort: Pulmonary effort is normal. No respiratory distress.   Abdominal:      Comments: G-tube   Musculoskeletal:      Comments: Right nephrostomy  RUE PICC line   Neurological:      Mental Status: She is alert.             Significant Labs: All pertinent labs within the past 24 hours have been reviewed.  Recent Lab Results       None            Significant Imaging: I have reviewed all pertinent imaging results/findings within the past 24 hours.    IR Antegrade Pyelogram (xpd)   Final Result      IR IVC Filter Placement   Final Result      1. Successful placement of an Bard G2 retrievable infrarenal IVC filter.   Plan:      1. Retrieval intent (MIPS): The filter is potentially retrievable.  Please contact IR to schedule IVC filter removal when/if  clinically indicated.         Electronically signed by: Harley Aggarwal MD   Date:    10/30/2024   Time:    15:22      X-Ray Chest 1 View   Final Result      Right PICC line in good position.         Electronically signed by: Caden Valdez   Date:    10/26/2024   Time:    13:02      X-Ray Abdomen AP 1 View   Final Result      As above.         Electronically signed by: Caden Valdez   Date:    10/26/2024   Time:    10:30      CT Head Without Contrast   Final Result      No acute abnormality.         Electronically signed by: Rama Paris   Date:    10/24/2024   Time:    19:48      X-Ray Chest AP Portable   Final Result      No acute abnormality.         Electronically signed by: Caden Valdez   Date:    10/24/2024   Time:    14:28      Anesthesia US Guide Vascular Access    (Results Pending)         Assessment/Plan:      * Syncope  Syncopal episode while having physical therapy -- did not fall, no injuries  CT head did not show any acute abnormality  Troponin normal, EKG with no significant changes  Lab workup shows mild UTI - Rocephin given  Had recent Echo which was normal  Hydrating via feeding tube  Cardiac monitoring  Trending troponin level- continued to be negative       Hypokalemia  Patient's most recent potassium results are listed below.   Recent Labs     10/24/24  2224 10/26/24  1640 10/27/24  0626   K 3.2* 2.5* 2.5*       Plan  - Replete potassium per protocol  - Monitor potassium Daily  - Patient's hypokalemia is being treated, will recheck labs again in a.m.     Ureteral stone with hydronephrosis  Recent imaging with 8 mm obstructing/impacted stone on right side -- s/p cystoscopy and right nephrostomy tube placement  Urology consulted , appreciate recommendations       DVT (deep venous thrombosis)  Recently diagnosed with extensive DVT RLE  Continue Eliquis      Jejunostomy tube present  Hydrating and tube feeding via tube      Gastric adenocarcinoma  - Followed by Dr. Ambrose and Dr. Jerome  -  S/p total gastrectomy on 8/20, with lysis of adhesions, lymphadenectomy, and jejunostomy tube placement, d/c on 9/10  - Pathologic stage from 8/20/2024: Stage III (ypT2, pN3a, cM0), completed 5 cycles Keytruda prior to surgery  - outpatient follow up with Dr. Jerome and Dr. Ambrose      Hypomagnesemia  Patient has Abnormal Magnesium: hypomagnesemia. Will continue to monitor electrolytes closely. Will replace the affected electrolytes and repeat labs to be done after interventions completed. The patient's magnesium results have been reviewed and are listed below.  Recent Labs   Lab 10/27/24  0626   MG 2.2          Hx of Epileptic seizures  Seizure precautions  Continue Keppra and carbamazepine      Chronic anemia  Anemia is likely due to chronic blood loss and Iron deficiency. Most recent hemoglobin and hematocrit are listed below.  Recent Labs     10/26/24  1640 10/27/24  0626   HGB 8.9* 8.6*   HCT 26.0* 25.0*       Plan  - Monitor serial CBC: Daily  - Transfuse PRBC if patient becomes hemodynamically unstable, symptomatic or H/H drops below 7/21.  - Patient has not received any PRBC transfusions to date    Severe protein-calorie malnutrition  Nutrition consulted. Most recent weight and BMI monitored- will continue tube feeding supplementation    Measurements:  Wt Readings from Last 1 Encounters:   10/25/24 49.7 kg (109 lb 9.1 oz)   Body mass index is 18.23 kg/m².        VTE Risk Mitigation (From admission, onward)           Ordered     enoxaparin injection 50 mg  Every 12 hours         10/27/24 1121     Reason for No Pharmacological VTE Prophylaxis  Once        Question:  Reasons:  Answer:  Already adequately anticoagulated on oral Anticoagulants    10/24/24 2206     IP VTE HIGH RISK PATIENT  Once         10/24/24 2206     Place sequential compression device  Until discontinued         10/24/24 2206                    Discharge Planning   SOFIYA:      Code Status: Full Code   Is the patient medically ready for  discharge?:     Reason for patient still in hospital (select all that apply): Patient trending condition, Laboratory test, Treatment, and Consult recommendations  Discharge Plan A: Skilled Nursing Facility                  Zachery Boyd MD  Department of Hospital Medicine   [facility]

## 2024-10-31 NOTE — PLAN OF CARE
Problem: Adult Inpatient Plan of Care  Goal: Plan of Care Review  Outcome: Progressing  Goal: Patient-Specific Goal (Individualized)  Outcome: Progressing  Goal: Absence of Hospital-Acquired Illness or Injury  Outcome: Progressing  Goal: Optimal Comfort and Wellbeing  Outcome: Progressing  Goal: Readiness for Transition of Care  Outcome: Progressing     Problem: Wound  Goal: Optimal Coping  Outcome: Progressing  Goal: Optimal Functional Ability  Outcome: Progressing  Goal: Absence of Infection Signs and Symptoms  Outcome: Progressing  Goal: Improved Oral Intake  Outcome: Progressing  Goal: Optimal Pain Control and Function  Outcome: Progressing  Goal: Skin Health and Integrity  Outcome: Progressing  Goal: Optimal Wound Healing  Outcome: Progressing     Problem: Infection  Goal: Absence of Infection Signs and Symptoms  Outcome: Progressing     Problem: Fall Injury Risk  Goal: Absence of Fall and Fall-Related Injury  Outcome: Progressing     Problem: Skin Injury Risk Increased  Goal: Skin Health and Integrity  Outcome: Progressing   Discussed poc with pt, pt verbalized understanding    Purposeful rounding every 2hours    VS wnl  Cardiac monitoring in use, pt is NSR,   Fall precautions in place, remains injury free  Pt c/o abdominal pain  Pain being treated with PRN meds  Accurate I&Os- j tube and nephrostomy / midline has been removed   Tube feedings going at 40 mL/hr   Meds given as prescribed  Bed locked at lowest position  Call light within reach    Chart check complete  Will cont with POC

## 2024-10-31 NOTE — NURSING
Tube feeding bag has been changed with PETER Tanner. Tube feedings still going at 40mL/hr, last output to be recorded in flowsheets before shift change     Pt had oral intake of food today,  of meal eaten per pts

## 2024-10-31 NOTE — PLAN OF CARE
Discussed poc with pt, pt verbalized understanding    Purposeful rounding every 2hours    VS wnl  Cardiac monitoring in use, pt is NSR, tele monitor #[id number]    Fall precautions in place, remains injury free  Pt c/o pain  Pain under control with scheduled meds    IVFs saline locked  Accurate I&Os    Bed locked at lowest position  Call light within reach    Chart check complete  Will cont with POC

## 2024-10-31 NOTE — PT/OT/SLP PROGRESS
"Physical Therapy  Treatment    Cheryl Kirkland   MRN: [id number]   Admitting Diagnosis: Syncope       PT Start Time: 1050     PT Stop Time: 1100    PT Total Time (min): 10 min       Billable Minutes:  Therapeutic Activity 10    Treatment Type: Treatment  PT/PTA: PT     Number of PTA visits since last PT visit: 0       General Precautions: Standard, fall  Orthopedic Precautions: N/A  Braces: N/A  Respiratory Status: Room air    Spiritual, Cultural Beliefs, Mandaen Practices, Values that Affect Care: no    Subjective:  Communicated with nursing (Marina) and performed chart review via epic system prior to session.  Pt initially agreeable to PT services and reported "I have to use the restroom" Pt agreeable to therapy assistance but would resist manual assist reported "I got it" and "I can do that myself"    Pain/Comfort  Pain Rating 1: 8/10 (when asked about her pain level, pt's spouse reported 8-9/10 pain in her stomach.)  Pain Addressed 1: Nurse notified  Pain Rating Post-Intervention 1: 8/10    Objective:   Patient found with: peripheral IV, telemetry, PEG Tube. Spouse at bedside    Functional Mobility:  Bed Mobility:    Unwilling at this time    Transfers:   Unwilling at this time    Gait:    Unwilling at this time    Treatment and Education:  Educated pt on benefits of consistent participation in PT services to meet functional goals. Pt agreeable to therapy assistance but would resist manual assist reported "I got it" and "I can do that myself" then return to laying with her eyes closed making no attempts at initiating functional mobility. Attempted x 4-5 more times and pt slightly resistant to activity. Pt ultimately refused EOB/OOB and assistance from therapy.  Reiterated purpose of therapy to pt and spouse. Advised them to discuss whether or not PT services is something they would like to continue. Educated pt on call don't fall policy and use of call button to alert nursing staff of needs (including " to assist in/out of bed). Pt expressed understanding.      AM-PAC 6 CLICK MOBILITY  How much help from another person does this patient currently need?   1 = Unable, Total/Dependent Assistance  2 = A lot, Maximum/Moderate Assistance  3 = A little, Minimum/Contact Guard/Supervision  4 = None, Modified Prim/Independent    Turning over in bed (including adjusting bedclothes, sheets and blankets)?: 1  Sitting down on and standing up from a chair with arms (e.g., wheelchair, bedside commode, etc.): 1  Moving from lying on back to sitting on the side of the bed?: 1  Moving to and from a bed to a chair (including a wheelchair)?: 1  Need to walk in hospital room?: 1  Climbing 3-5 steps with a railing?: 1  Basic Mobility Total Score: 6    AM-PAC Raw Score CMS G-Code Modifier Level of Impairment Assistance   6 % Total / Unable   7 - 9 CM 80 - 100% Maximal Assist   10 - 14 CL 60 - 80% Moderate Assist   15 - 19 CK 40 - 60% Moderate Assist   20 - 22 CJ 20 - 40% Minimal Assist   23 CI 1-20% SBA / CGA   24 CH 0% Independent/ Mod I     Patient left supine with all lines intact, call button in reach, nursing notified, and spouse present.    Assessment:  Cheryl Kirkland is a 74 y.o. female with a medical diagnosis of Syncope and presents with deficits in overall mobility. Pt demonstrated decreased willingness to participate in functional tasks at this time due to increased pain vs coping deficits vs confusion or a combination of factors. Pt may benefit from break from PT services to allow time for coping. Advised pt and spouse discuss this between themselves and with MD.     Rehab identified problem list/impairments: impaired endurance, weakness, impaired functional mobility, gait instability, impaired balance, decreased lower extremity function, decreased safety awareness, impaired cognition, pain    Rehab potential is fair.    Activity tolerance: Poor    Discharge recommendations: Moderate Intensity Therapy       Barriers to discharge:      Equipment recommendations: none     GOALS:   Multidisciplinary Problems       Physical Therapy Goals          Problem: Physical Therapy    Goal Priority Disciplines Outcome Interventions   Physical Therapy Goal     PT, PT/OT Progressing    Description: Lt/10/24  1. PT WILL COMPLETE BED MOBILITY WITH SBA.  2. PT WILL STAND T/F TO CHAIR WITH RW AND SBA.  3. PT WILL GT TRAIN X 100' WITH RW AND MIN A TO PROGRESS GT.  4. PT WILL INC AMPAC SCORE BY 2 POINTS TO PROGRESS GROSS FUNC MOBILITY.                            PLAN:    Patient to be seen 3 x/week to address the above listed problems via gait training, therapeutic activities, therapeutic exercises, neuromuscular re-education  Plan of Care expires: 11/10/24  Plan of Care reviewed with: patient, spouse         10/31/2024

## 2024-10-31 NOTE — PLAN OF CARE
Nutrition Plan of Care:    Recommendations     Recommendation/Intervention:   1. Recommend pt continues on continues Enteral nutrition, Peptamen AF via J tube at 40 mL/hr, per pt tolerance and/or per MD/NP   -Formula at goal rate provides: 1152 kcals/day (77% EEN), 73 g protein/day (100% EPN), 108 g CHO.day (58% CHO needs), 780 mL free formula water/day (52% fluid needs)   -120 mL q4h free water flushes (720 mL/day) = total from formula + FWF = 1500 mL water/day (100% fluid needs), per MD/NP   -Monitor Mg, K+, Na, Phos and Glu, correct as indicated   2. Recommend pt continues on Full liquid diet as pleasure feeds   3. Recommend Banatrol TID to assist with diarrhea or BID for loose stools if warranted   4. Weigh twice weekly     Goals:   1. Pt will continue to tolerate and intake >75% EEN and EPN prior to RD follow up   2. Pt's diarrhea/loose stools will improve prior to RD follow up  Nutrition Goal Status: progressing toward goal   Communication of RD Recs: other (comment) (POC, sticky note, secure chat)     Assessment and Plan     Endocrine  Severe protein-calorie malnutrition  Malnutrition Type:  Context: chronic illness  Level: severe     Related to (etiology):   Physiological causes increasing nutrient needs d/t illness  Alteration in GI tract structure/function      Signs and Symptoms (as evidenced by):   BMI < 22 (older adult)  Underweight with loss of fat and muscle  Unable to eat sufficient energy/protein to maintain a healthy weight      Malnutrition Characteristic Summary:  Subcutaneous Fat (Malnutrition): severe depletion  Muscle Mass (Malnutrition): severe depletion     Interventions/Recommendations (treatment strategy):  1. Enteral nutrition management  2. Full liquid diet  3. Commercial food medical food supplement therapy  4. Collaboration by nutrition professional with other providers     Nutrition Diagnosis Status:   Continues      Graciela Rios, MS, RDN, LDN

## 2024-10-31 NOTE — SUBJECTIVE & OBJECTIVE
Review of Systems   All other systems reviewed and are negative.    Objective:     Vital Signs (Most Recent):  Temp: 98.6 °F (37 °C) (10/31/24 0747)  Pulse: 85 (10/31/24 0747)  Resp: 20 (10/31/24 0747)  BP: (!) 109/58 (10/31/24 0747)  SpO2: 96 % (10/31/24 0747) Vital Signs (24h Range):  Temp:  [97.5 °F (36.4 °C)-98.6 °F (37 °C)] 98.6 °F (37 °C)  Pulse:  [70-85] 85  Resp:  [16-20] 20  SpO2:  [95 %-100 %] 96 %  BP: ()/(58-76) 109/58     Weight: 52.6 kg (115 lb 15.4 oz)  Body mass index is 19.3 kg/m².    Intake/Output Summary (Last 24 hours) at 10/31/2024 0947  Last data filed at 10/31/2024 0730  Gross per 24 hour   Intake 1102 ml   Output 270 ml   Net 832 ml         Physical Exam  Vitals and nursing note reviewed.   Constitutional:       General: She is not in acute distress.     Appearance: She is normal weight. She is ill-appearing.   Pulmonary:      Effort: Pulmonary effort is normal. No respiratory distress.   Abdominal:      Comments: G-tube   Musculoskeletal:      Comments: Right nephrostomy  RUE PICC line   Neurological:      Mental Status: She is alert.             Significant Labs: All pertinent labs within the past 24 hours have been reviewed.  Recent Lab Results       None            Significant Imaging: I have reviewed all pertinent imaging results/findings within the past 24 hours.    IR Antegrade Pyelogram (xpd)   Final Result      IR IVC Filter Placement   Final Result      1. Successful placement of an Bard G2 retrievable infrarenal IVC filter.   Plan:      1. Retrieval intent (MIPS): The filter is potentially retrievable.  Please contact IR to schedule IVC filter removal when/if clinically indicated.         Electronically signed by: Harley Aggarwal MD   Date:    10/30/2024   Time:    15:22      X-Ray Chest 1 View   Final Result      Right PICC line in good position.         Electronically signed by: Caden Valdez   Date:    10/26/2024   Time:    13:02      X-Ray Abdomen AP 1 View   Final Result       As above.         Electronically signed by: Caden Valdez   Date:    10/26/2024   Time:    10:30      CT Head Without Contrast   Final Result      No acute abnormality.         Electronically signed by: Rama Paris   Date:    10/24/2024   Time:    19:48      X-Ray Chest AP Portable   Final Result      No acute abnormality.         Electronically signed by: Caden Valdez   Date:    10/24/2024   Time:    14:28      Anesthesia US Guide Vascular Access    (Results Pending)

## 2024-10-31 NOTE — PROGRESS NOTES
[facility] Follow up 10/31/2024    Subjective:     10/31/24: s/p Antegrade R ureteral stent placement yesterday by IR with nephrostomy change and IVC filter. Pt repots abdominal pain, but may not be different than prior. No acute urologic issues overnight.   10/25/24: Cheryl Kirkland is a 74 y.o. female who presented to the emergency after having a syncopal episode.  During her evaluation she was noted to have significant hematuria from a right nephrostomy tube.  She has a history of gastric adenocarcinoma status post gastrectomy and getting treatment with Keytruda.  She also has a history of ovarian cancer in the past.  During her last hospitalization she was diagnosed with a 8 mm right distal ureteral stone.  Attempts at placing retrograde stent was unsuccessful.  Patient had a right nephrostomy tube placed.  She was discharged with outpatient follow up.  Unfortunately patient developed lower extremity extensive DVTs and has been on anticoagulation.    Current Facility-Administered Medications   Medication Dose Route Frequency Provider Last Rate Last Admin    acetaminophen tablet 1,000 mg  1,000 mg Per J Tube Q8H Melba Doan NP   1,000 mg at 10/31/24 0548    calcium-vitamin D3 500 mg-5 mcg (200 unit) per tablet 1 tablet  1 tablet Per J Tube BID Melba Doan NP   1 tablet at 10/31/24 0852    carBAMazepine tablet 200 mg  200 mg Per J Tube TID Melba Doan NP   200 mg at 10/31/24 0852    dextrose 10% bolus 125 mL 125 mL  12.5 g Intravenous PRN Melba Doan NP        dextrose 10% bolus 250 mL 250 mL  25 g Intravenous PRN Melba Doan NP        electrolytes-dextrose (Pedialyte) oral solution 400 mL  400 mL Per NG tube Q4H Melba Doan NP   400 mL at 10/31/24 1036    enoxaparin injection 50 mg  1 mg/kg Subcutaneous Q12H (treatment, non-standard time) Loree Laura NP   50 mg at 10/31/24 0852    ferrous sulfate tablet 1 each  1 tablet Per J Tube BID Melba Doan NP   1 each at  10/31/24 0852    glucagon (human recombinant) injection 1 mg  1 mg Intramuscular PRN Melba Doan, NP        glucose chewable tablet 16 g  16 g Oral PRN Melba Doan, NP        glucose chewable tablet 24 g  24 g Oral PRN Melba Doan, NP        influenza (adjuvanted) (Fluad) 45 mcg/0.5 mL IM vaccine (> or = 64 yo) 0.5 mL  0.5 mL Intramuscular vaccine x 1 dose Tasha Vides MD        levetiracetam 500 mg/5 mL (5 mL) liquid Soln 500 mg  500 mg Per J Tube BID Sunil Shell MD   500 mg at 10/31/24 0853    loperamide capsule 2 mg  2 mg Oral QID PRN Kusum Lauraytlin T., NP   2 mg at 10/28/24 2129    naloxone 0.4 mg/mL injection 0.02 mg  0.02 mg Intravenous PRN Melba Doan, NP        ondansetron injection 4 mg  4 mg Intravenous Q6H PRN Melba Doan, NP   4 mg at 10/28/24 2149    oxyCODONE immediate release tablet 5 mg  5 mg Oral Q6H PRN Valentín Loree T., NP   5 mg at 10/29/24 1548    potassium bicarbonate disintegrating tablet 50 mEq  50 mEq Per J Tube BID Christy Ramos MD   50 mEq at 10/31/24 0852    sodium chloride 0.9% flush 10 mL  10 mL Intravenous Q8H PRN Melba Doan, NP        zonisamide capsule 200 mg  200 mg Oral BID Melba Doan NP   200 mg at 10/31/24 0852       Objective:     Vitals:    10/31/24 0747   BP: (!) 109/58   Pulse: 85   Resp: 20   Temp: 98.6 °F (37 °C)     I/O last 3 completed shifts:  In: 1176 [P.O.:640; NG/GT:536]  Out: 220 [Urine:220]  Temp (24hrs), Av.1 °F (36.7 °C), Min:97.5 °F (36.4 °C), Max:98.6 °F (37 °C)      General: Well-developed, well-nourished in No acute distress  HEENT: Normocephalic, atraumatic  Respirations: Even and unlabored  : R PCN tube draining clear yellow urine  Extremities: Moves all equally, no clubbing, cyanosis or edema  Skin: warm and dry, no lesions or rash  Neuro: cooperative, Cranial nerves II-XII grossly intact  Psych: normal affect    Labs    Recent Results (from the past 2 weeks)   Basic metabolic panel    Collection  Time: 10/24/24 10:24 PM   Result Value Ref Range    Sodium 143 136 - 145 mmol/L    Potassium 3.2 (L) 3.5 - 5.1 mmol/L    Chloride 112 (H) 95 - 110 mmol/L    CO2 19 (L) 23 - 29 mmol/L    BUN 11 8 - 23 mg/dL    Creatinine 0.7 0.5 - 1.4 mg/dL    Calcium 7.8 (L) 8.7 - 10.5 mg/dL    Anion Gap 12 8 - 16 mmol/L   Basic metabolic panel    Collection Time: 10/20/24  1:09 PM   Result Value Ref Range    Sodium 136 136 - 145 mmol/L    Potassium 5.1 3.5 - 5.1 mmol/L    Chloride 107 95 - 110 mmol/L    CO2 19 (L) 23 - 29 mmol/L    BUN 7 (L) 8 - 23 mg/dL    Creatinine 0.6 0.5 - 1.4 mg/dL    Calcium 8.0 (L) 8.7 - 10.5 mg/dL    Anion Gap 10 8 - 16 mmol/L     Recent Results (from the past 2 weeks)   CBC auto differential    Collection Time: 10/28/24 10:42 AM   Result Value Ref Range    WBC 5.54 3.90 - 12.70 K/uL    Hemoglobin 9.1 (L) 12.0 - 16.0 g/dL    Hematocrit 26.6 (L) 37.0 - 48.5 %    Platelets 491 (H) 150 - 450 K/uL   CBC with Automated Differential    Collection Time: 10/27/24  6:26 AM   Result Value Ref Range    WBC 7.33 3.90 - 12.70 K/uL    Hemoglobin 8.6 (L) 12.0 - 16.0 g/dL    Hematocrit 25.0 (L) 37.0 - 48.5 %    Platelets 488 (H) 150 - 450 K/uL   CBC with Automated Differential    Collection Time: 10/26/24  4:40 PM   Result Value Ref Range    WBC 10.94 3.90 - 12.70 K/uL    Hemoglobin 8.9 (L) 12.0 - 16.0 g/dL    Hematocrit 26.0 (L) 37.0 - 48.5 %    Platelets 526 (H) 150 - 450 K/uL     Microbiology Results (last 7 days)       Procedure Component Value Units Date/Time    Urine Culture High Risk [[id number]] Collected: 10/24/24 0517    Order Status: Completed Specimen: Urine Updated: 10/26/24 1219     Urine Culture, Routine No growth    Narrative:      Indicated criteria for high risk culture:->Other  Other (specify):->recent nephrostomy tube placement            Assessment    R ureteral stone    Plan  Patient should follow up with me next week in the office. Will arrange follow up. Will plan definitive stone management in  about 2 weeks to allow for ureteral dilation from stenting. No further urologic management plans while hospitalized. Will sign off. Please call with any questions.

## 2024-10-31 NOTE — PLAN OF CARE
TX COMPLETED: provided education regarding benefits of PT services, pt reluctant to participate in therapy, resistant to assistance. Pt with hx of refusals and agitation toward therapy. Will continue attempts through Friday (11/1/24) and if refusals persist will discontinue PT services.

## 2024-10-31 NOTE — PROGRESS NOTES
[facility] S[facility]  Hematology/Oncology  Progress Note    Patient Name: Cheryl Kirkland  Admission Date: 10/24/2024  Hospital Length of Stay: 6 days  Code Status: Full Code     Subjective:     Interval History:  Patient had IVC filter placement today  74-year-old woman known to Dr. Amin stage III T2 N3a M0 gastric adenocarcinoma  Patient has jejunostomy tube and is on supplemental tube feedings has chronic diarrhea patient had an obstructing ureteral stone on right side with right-sided hydronephrosis status post cystoscopy right nephrostomy tube placement and acute cystitis.  Recently found to have extensive right lower extremity DVT treated with heparin drip transition to [facility] she was discharge to SNF but had a syncopal episode was brought back to the hospital.   no fall or injury, however she has been confused and does not remember what has been going on today. initial blood pressure on arrival to /41, heart rate 79, temp 98.5°, respirations 18, 100% SpO2 on room air. Lab workup demonstrate WBC 10.52 with left shift, RBC 3.20, hemoglobin 10.1, hematocrit 30.4, platelets 559, potassium 2.9, CO2 19, BUN 11, creatinine 0.7, glucose 115, calcium 7.2, magnesium 1.5, alk phos 174, total protein 5.0, albumin 1.8, total bilirubin 0.3, AST 12, ALT 17, BNP 29, CPK 25, trop less than 0.006, UA- with cloudy brown urine positive for nitrite, trace leukocyte esterase, greater than 100 RBC, 9 WBC. EKG -- NSR HR 76, nonspecific ST and T wave abnormality, no STEMI. CXR demonstrates that lungs are clear. CT head without contrast done with no acute abnormality. Recent Echo done 10/10/24 with EF 65-70% and normal diastolic function. While in ED, she was given potassium 40 po and magnesium sulfate 2 g IV as well as IV Rocephin   Oncology Treatment Plan:   OP pembrolizumab 200mg Q3W  Oncology Treatment Plan:   OP pembrolizumab 200mg Q3W    Medications:  Continuous Infusions:  Scheduled Meds:   acetaminophen  1,000  mg Per J Tube Q8H    calcium-vitamin D3  1 tablet Per J Tube BID    carBAMazepine  200 mg Per J Tube TID    electrolytes-dextrose  400 mL Per NG tube Q4H    enoxparin  1 mg/kg Subcutaneous Q12H (treatment, non-standard time)    ferrous sulfate  1 tablet Per J Tube BID    levETIRAcetam  500 mg Oral BID    mupirocin   Nasal BID    potassium bicarbonate  50 mEq Per J Tube BID    zonisamide  200 mg Oral BID     PRN Meds:  Current Facility-Administered Medications:     dextrose 10%, 12.5 g, Intravenous, PRN    dextrose 10%, 25 g, Intravenous, PRN    glucagon (human recombinant), 1 mg, Intramuscular, PRN    glucose, 16 g, Oral, PRN    glucose, 24 g, Oral, PRN    influenza (adjuvanted), 0.5 mL, Intramuscular, vaccine x 1 dose    loperamide, 2 mg, Oral, QID PRN    naloxone, 0.02 mg, Intravenous, PRN    ondansetron, 4 mg, Intravenous, Q6H PRN    oxyCODONE, 5 mg, Oral, Q6H PRN    sodium chloride 0.9%, 10 mL, Intravenous, Q8H PRN       Objective:     Vital Signs (Most Recent):  Temp: 98.6 °F (37 °C) (10/30/24 1920)  Pulse: 72 (10/30/24 2000)  Resp: 18 (10/30/24 1920)  BP: 105/61 (10/30/24 1920)  SpO2: 100 % (10/30/24 1920) Vital Signs (24h Range):  Temp:  [97.5 °F (36.4 °C)-98.6 °F (37 °C)] 98.6 °F (37 °C)  Pulse:  [65-90] 72  Resp:  [16-20] 18  SpO2:  [93 %-100 %] 100 %  BP: (105-121)/(55-76) 105/61     Weight: 52.6 kg (115 lb 15.4 oz)  Body mass index is 19.3 kg/m².  Body surface area is 1.55 meters squared.      Intake/Output Summary (Last 24 hours) at 10/30/2024 2049  Last data filed at 10/30/2024 1851  Gross per 24 hour   Intake 936 ml   Output 150 ml   Net 786 ml         Significant Labs:   Lab Results   Component Value Date    WBC 5.54 10/28/2024    HGB 9.1 (L) 10/28/2024    HCT 26.6 (L) 10/28/2024    MCV 92 10/28/2024     (H) 10/28/2024      CMP  Sodium   Date Value Ref Range Status   10/28/2024 139 136 - 145 mmol/L Final     Potassium   Date Value Ref Range Status   10/28/2024 3.0 (L) 3.5 - 5.1 mmol/L Final      Chloride   Date Value Ref Range Status   10/28/2024 113 (H) 95 - 110 mmol/L Final     CO2   Date Value Ref Range Status   10/28/2024 16 (L) 23 - 29 mmol/L Final     Glucose   Date Value Ref Range Status   10/28/2024 138 (H) 70 - 110 mg/dL Final     BUN   Date Value Ref Range Status   10/28/2024 6 (L) 8 - 23 mg/dL Final     Creatinine   Date Value Ref Range Status   10/28/2024 0.6 0.5 - 1.4 mg/dL Final     Calcium   Date Value Ref Range Status   10/28/2024 7.5 (L) 8.7 - 10.5 mg/dL Final     Total Protein   Date Value Ref Range Status   10/28/2024 5.2 (L) 6.0 - 8.4 g/dL Final     Albumin   Date Value Ref Range Status   10/28/2024 1.7 (L) 3.5 - 5.2 g/dL Final     Total Bilirubin   Date Value Ref Range Status   10/28/2024 0.2 0.1 - 1.0 mg/dL Final     Comment:     For infants and newborns, interpretation of results should be based  on gestational age, weight and in agreement with clinical  observations.    Premature Infant recommended reference ranges:  Up to 24 hours.............<8.0 mg/dL  Up to 48 hours............<12.0 mg/dL  3-5 days..................<15.0 mg/dL  6-29 days.................<15.0 mg/dL       Alkaline Phosphatase   Date Value Ref Range Status   10/28/2024 202 (H) 40 - 150 U/L Final     AST   Date Value Ref Range Status   10/28/2024 11 10 - 40 U/L Final     ALT   Date Value Ref Range Status   10/28/2024 19 10 - 44 U/L Final     Anion Gap   Date Value Ref Range Status   10/28/2024 10 8 - 16 mmol/L Final     eGFR   Date Value Ref Range Status   10/28/2024 >60 >60 mL/min/1.73 m^2 Final              Assessment/Plan:     Active Diagnoses:    Diagnosis Date Noted POA    PRINCIPAL PROBLEM:  Syncope [R55] 10/24/2024 Yes    Hypomagnesemia [E83.42] 10/24/2024 Yes    Hypokalemia [E87.6] 10/11/2024 Yes    Ureteral stone with hydronephrosis [N13.2] 10/10/2024 Yes    Severe protein-calorie malnutrition [E43] 09/04/2024 Yes    Jejunostomy tube present [Z93.4] 09/03/2024 Not Applicable    Gastric adenocarcinoma  [C16.9] 02/27/2024 Yes    DVT (deep venous thrombosis) [I82.409] 02/07/2024 Yes     Chronic    Hx of Epileptic seizures [G40.909] 02/07/2024 Yes     Chronic    Chronic anemia [D64.9] 02/07/2024 Yes      Problems Resolved During this Admission:     Recent DVT currently on Eliquis status post filter placement for ureteral stone procedure patient to continue follow-up with hematology oncology in outpatient setting after         Failure to thrive; patient showing significant evidence of protein calorie malnutrition/discomfort and frailty     Gastric adenocarcinoma;  Patient currently on adjuvant pembrolizumab S/p total gastrectomy on 8/20, with lysis of adhesions, lymphadenectomy, and jejunostomy tube placement, d/c on 9/10  - Pathologic stage from 8/20/2024: Stage III (ypT2, pN3a, cM0), completed 5 cycles Keytruda prior to surgery  At discharge patient to follow with primary oncologist  Heme Onc will sign off for now please re-consult belle Lee MD  Hematology/Oncology  [facility] - Med Surg

## 2024-10-31 NOTE — PROGRESS NOTES
[facility] - Med Surg  Adult Nutrition  Progress Note    SUMMARY     Recommendations    Recommendation/Intervention:   1. Recommend pt continues on continues Enteral nutrition, Peptamen AF via J tube at 40 mL/hr, per pt tolerance and/or per MD/NP   -Formula at goal rate provides: 1152 kcals/day (77% EEN), 73 g protein/day (100% EPN), 108 g CHO.day (58% CHO needs), 780 mL free formula water/day (52% fluid needs)   -120 mL q4h free water flushes (720 mL/day) = total from formula + FWF = 1500 mL water/day (100% fluid needs), per MD/NP   -Monitor Mg, K+, Na, Phos and Glu, correct as indicated   2. Recommend pt continues on Full liquid diet as pleasure feeds   3. Recommend Banatrol TID to assist with diarrhea or BID for loose stools if warranted   4. Weigh twice weekly    Goals:   1. Pt will continue to tolerate and intake >75% EEN and EPN prior to RD follow up   2. Pt's diarrhea/loose stools will improve prior to RD follow up  Nutrition Goal Status: progressing toward goal   Communication of RD Recs: other (comment) (POC, sticky note, secure chat)    Assessment and Plan    Endocrine  Severe protein-calorie malnutrition  Malnutrition Type:  Context: chronic illness  Level: severe    Related to (etiology):   Physiological causes increasing nutrient needs d/t illness  Alteration in GI tract structure/function     Signs and Symptoms (as evidenced by):   BMI < 22 (older adult)  Underweight with loss of fat and muscle  Unable to eat sufficient energy/protein to maintain a healthy weight     Malnutrition Characteristic Summary:  Subcutaneous Fat (Malnutrition): severe depletion  Muscle Mass (Malnutrition): severe depletion    Interventions/Recommendations (treatment strategy):  1. Enteral nutrition management  2. Full liquid diet  3. Commercial food medical food supplement therapy  4. Collaboration by nutrition professional with other providers    Nutrition Diagnosis Status:   Continues         Malnutrition Assessment  (10/25/24):  Malnutrition Context: chronic illness  Malnutrition Level: severe  Skin (Micronutrient): none (Chetan score = 16 (mild risk)       Subcutaneous Fat (Malnutrition): severe depletion  Muscle Mass (Malnutrition): severe depletion   Orbital Region (Subcutaneous Fat Loss): severe depletion (Dark circles; Hallow look; Depression; Loose skin)   Temple Region (Muscle Loss): severe depletion (Hollow, scooping depression)  Clavicle Bone Region (Muscle Loss): severe depletion (Protruding, prominent bone)  Clavicle and Acromion Bone Region (Muscle Loss): severe depletion (Shoulder to arm joint looks square, bones prominet, acromion protrution very prominent)                 Reason for Assessment    Reason For Assessment: consult (malnutrition)  Diagnosis:  (Syncope)  General Information Comments:   10/25/24: 74 y.o. Female admitted for Syncope. PMH: Chronic anemia, DVT, Gastric adencarcinoma, J tube, Severe protein-calorie malnutrition, Ureteral stone w/ hydronephrosis, Hypokalemia, Hypomagnesemia. Pt currently on a Full liquid diet + Vital AF 1.2 Guille @ 40 mL/hr continuous enetral nutrition via J tube. RD consulted for malnutrition, note RD followed pt during past and recent admissions. H&P noted that the pt presented to the ED d/t having physical therapy at SNF and had syncopal episode w/ confusion, note pt was recently admitted 10/9/24 through 10/21/2024 and d/c'd to SNF at [facility]. EMR noted pt had 2 loose BM's today (10/25), denied nausea, pt refused to participate in PT/OT, stated she does not like cream of wheat. Visited pt at bedside, pt resting in bed, visually confirmed Peptament AF TF running. Pt denied N/V and abd pain, stated unsure if having diarreha, reported was receiving Immodium and was heling but not sure if she stills is, informed pt RD noted pt does not like cream of wheat, pt expressed appreciation, spoke to , ensured pt is having her orders taken to encourage  "pt preferred food choices. Visual NFPE performed, noted severe malnutrition, note full NFPE performed at recent previous encounter and severe malnutrition noted. Note [facility] does not carry Vital AF 1.2 Guille TF formula at this time, RD modified TF orders. Reviewed chart: LBM 10/24; Skin: WDL; Chetan score: 16 (mild risk); Edema: none. Labs, meds, weight reviewed. Note calcium-vit D3. Weight charted 10/16/24 102 lbs, 10/25/24 109 lbs (BMI 18.23, protein-energy malnutrition grade 1), + 7 lb wt gain x 9 days. RD will continue to follow and monitor pt's nutritional status during admit.      10/31/2024: Patient continues with enteral support nutrition via j tube of Peptamen AF with rate of 40ml/hr.  Full liquids oral diet remains ordered at this time as well.  Preferences obtained and NFPE performed on 10/25/2024 RD visit.  Labs reviewed.  NKFA.  Patient is positive for malnutrition.  LBM: 10/31/2024.  RD to continue to monitor and make adjusted recommendations is needed.  Possibly discharge to SNF tomorrow.     Nutrition Discharge Planning: Enteral nutrition via J tube    Nutrition Risk Screen    Nutrition Risk Screen: tube feeding or parenteral nutrition    Nutrition Related Social Determinants of Health: SDOH: Adequate food in home environment    Nutrition/Diet History    Spiritual, Cultural Beliefs, Moravian Practices, Values that Affect Care: no  Food Allergies: NKFA  Factors Affecting Nutritional Intake: malabsorption  Nutrition Support Formula Prior to Admit: Other (see commments) (Vital AF 1.2 Guille)  Nutrition Support Rate Prior to Admit: 40 (ml)  Nutrtion Support Frequency Prior to Admit: continuous  Nutrition Support Provision Prior to Admit: J tube    Anthropometrics    Temp: 98.7 °F (37.1 °C)  Height Method: Stated  Height: 5' 5" (165.1 cm)  Height (inches): 65 in  Weight Method: Bed Scale  Weight: 52.6 kg (115 lb 15.4 oz)  Weight (lb): 115.96 lb  Ideal Body Weight (IBW), Female: 125 lb  % Ideal Body Weight, " "Female (lb): 87.66 %  % Ideal Body Weight Malnutrition: 80-90% - mild deficit  BMI (Calculated): 19.3  BMI Grade: 17 - 18.4 protein-energy malnutrition grade I     Wt Readings from Last 15 Encounters:   10/29/24 52.6 kg (115 lb 15.4 oz)   10/16/24 46.7 kg (102 lb 15.3 oz)   10/07/24 50.4 kg (111 lb)   09/24/24 53.9 kg (118 lb 15 oz)   09/24/24 53.9 kg (118 lb 15 oz)   09/17/24 50.8 kg (112 lb)   09/09/24 60.4 kg (133 lb 2.5 oz)   08/14/24 58.7 kg (129 lb 4.8 oz)   07/26/24 59 kg (130 lb)   07/23/24 60.5 kg (133 lb 6.1 oz)   07/19/24 59.5 kg (131 lb 2.8 oz)   07/09/24 58.9 kg (129 lb 13.6 oz)   06/28/24 58.9 kg (129 lb 13.6 oz)   06/18/24 58.9 kg (129 lb 13.6 oz)   05/28/24 59.7 kg (131 lb 9.8 oz)     Lab/Procedures/Meds    Pertinent Labs Reviewed: reviewed    Pertinent Medications Reviewed: reviewed      BMP  Lab Results   Component Value Date     10/31/2024    K 5.4 (H) 10/31/2024     10/31/2024    CO2 19 (L) 10/31/2024    BUN 7 (L) 10/31/2024    CREATININE 0.6 10/31/2024    CALCIUM 8.1 (L) 10/31/2024    ANIONGAP 13 10/31/2024    EGFRNORACEVR >60 10/31/2024     Lab Results   Component Value Date    CALCIUM 8.1 (L) 10/31/2024    PHOS 2.4 (L) 10/27/2024     Lab Results   Component Value Date    ALBUMIN 1.8 (L) 10/31/2024     Lab Results   Component Value Date    ALT 21 10/31/2024    AST 15 10/31/2024    ALKPHOS 229 (H) 10/31/2024    BILITOT 0.2 10/31/2024     No results for input(s): "POCTGLUCOSE" in the last 24 hours.  No results found for: "LABA1C", "HGBA1C"    Lab Results   Component Value Date    WBC 5.30 10/31/2024    HGB 9.3 (L) 10/31/2024    HCT 28.2 (L) 10/31/2024    MCV 95 10/31/2024     (H) 10/31/2024       Scheduled Meds:   acetaminophen  1,000 mg Per J Tube Q8H    calcium-vitamin D3  1 tablet Per J Tube BID    carBAMazepine  200 mg Per J Tube TID    electrolytes-dextrose  400 mL Per NG tube Q4H    enoxparin  1 mg/kg Subcutaneous Q12H (treatment, non-standard time)    ferrous sulfate  1 " tablet Per J Tube BID    levetiracetam  500 mg Per J Tube BID    potassium bicarbonate  50 mEq Per J Tube BID    zonisamide  200 mg Oral BID     Continuous Infusions:  PRN Meds:.  Current Facility-Administered Medications:     dextrose 10%, 12.5 g, Intravenous, PRN    dextrose 10%, 25 g, Intravenous, PRN    glucagon (human recombinant), 1 mg, Intramuscular, PRN    glucose, 16 g, Oral, PRN    glucose, 24 g, Oral, PRN    influenza (adjuvanted), 0.5 mL, Intramuscular, vaccine x 1 dose    loperamide, 2 mg, Oral, QID PRN    naloxone, 0.02 mg, Intravenous, PRN    ondansetron, 4 mg, Intravenous, Q6H PRN    oxyCODONE, 5 mg, Oral, Q6H PRN    sodium chloride 0.9%, 10 mL, Intravenous, Q8H PRN    Physical Findings/Assessment         Estimated/Assessed Needs    Weight Used For Calorie Calculations: 49.7 kg (109 lb 9.1 oz)  Energy Calorie Requirements (kcal): 7992-2173 kcals (30-35 kcals/kg ABW (Underwieight vs Cancer vs GI Disorder)  Energy Need Method: Kcal/kg  Protein Requirements: 59-75 g (1.2-1.5 g/kg ABW (Cancer, cachexia vs Underweight vs GI Disorder, older adult)  Weight Used For Protein Calculations: 49.7 kg (109 lb 9.1 oz)  Fluid Requirements (mL): 6590-3343 mL (1 mL/kcal)  Estimated Fluid Requirement Method: RDA Method  RDA Method (mL): 1491  CHO Requirement: 186-218 g (1603-9627 kcals/8)      Nutrition Prescription Ordered    Current Diet Order: Full liquid diet  Nutrition Order Comments: Peptamien AF  Current Nutrition Support Formula Ordered: Peptamen AF  Current Nutrition Support Rate Ordered: 40 (ml)  Current Nutrition Support Frequency Ordered: continuous    Evaluation of Received Nutrient/Fluid Intake  I/O: (Net since admit):  10/25/24: +971 mL  10/31/24: +13.3mL  Enteral Calories (kcal): 1152  Enteral Protein (gm): 73  Enteral (Free Water) Fluid (mL): 780  % Kcal Needs: 77%  % Protein Needs: 100%    Energy Calories Required: meeting needs  Protein Required: meeting needs  Fluid Required: not meeting  needs  Total Fluid Intake (mL): 800  Tolerance: tolerating  % Intake of Estimated Energy Needs: 75 - 100 %  % Meal Intake: 0 - 25 %    Nutrition Risk    Level of Risk/Frequency of Follow-up: high (F/u x 2 weekly)       Monitor and Evaluation    Food and Nutrient Intake: energy intake, food and beverage intake, enteral nutrition intake  Food and Nutrient Adminstration: diet order, enteral and parenteral nutrition administration  Knowledge/Beliefs/Attitudes: food and nutrition knowledge/skill, beliefs and attitudes  Anthropometric Measurements: weight, weight change, body mass index  Biochemical Data, Medical Tests and Procedures: electrolyte and renal panel, gastrointestinal profile, glucose/endocrine profile  Nutrition-Focused Physical Findings: overall appearance       Nutrition Follow-Up    RD Follow-up?: Yes  Graciela Rios, MS, RDN, LDN

## 2024-10-31 NOTE — PLAN OF CARE
Sw send SNF referral to Conchita with [facility]; abhishek requested SNF submit for auth.

## 2024-11-01 ENCOUNTER — TELEPHONE (OUTPATIENT)
Dept: UROLOGY | Facility: CLINIC | Age: 74
End: 2024-11-01
Payer: MEDICARE

## 2024-11-01 LAB
BASOPHILS # BLD AUTO: 0.05 K/UL (ref 0–0.2)
BASOPHILS NFR BLD: 0.7 % (ref 0–1.9)
DIFFERENTIAL METHOD BLD: ABNORMAL
EOSINOPHIL # BLD AUTO: 0.1 K/UL (ref 0–0.5)
EOSINOPHIL NFR BLD: 1.3 % (ref 0–8)
ERYTHROCYTE [DISTWIDTH] IN BLOOD BY AUTOMATED COUNT: 20.5 % (ref 11.5–14.5)
HCT VFR BLD AUTO: 26.5 % (ref 37–48.5)
HGB BLD-MCNC: 8.5 G/DL (ref 12–16)
IMM GRANULOCYTES # BLD AUTO: 0.13 K/UL (ref 0–0.04)
IMM GRANULOCYTES NFR BLD AUTO: 1.9 % (ref 0–0.5)
LYMPHOCYTES # BLD AUTO: 1 K/UL (ref 1–4.8)
LYMPHOCYTES NFR BLD: 15.2 % (ref 18–48)
MCH RBC QN AUTO: 30.6 PG (ref 27–31)
MCHC RBC AUTO-ENTMCNC: 32.1 G/DL (ref 32–36)
MCV RBC AUTO: 95 FL (ref 82–98)
MONOCYTES # BLD AUTO: 0.7 K/UL (ref 0.3–1)
MONOCYTES NFR BLD: 10 % (ref 4–15)
NEUTROPHILS # BLD AUTO: 4.8 K/UL (ref 1.8–7.7)
NEUTROPHILS NFR BLD: 70.9 % (ref 38–73)
NRBC BLD-RTO: 1 /100 WBC
PLATELET # BLD AUTO: 472 K/UL (ref 150–450)
PMV BLD AUTO: 8.5 FL (ref 9.2–12.9)
RBC # BLD AUTO: 2.78 M/UL (ref 4–5.4)
WBC # BLD AUTO: 6.82 K/UL (ref 3.9–12.7)

## 2024-11-01 PROCEDURE — 25000003 PHARM REV CODE 250: Performed by: HOSPITALIST

## 2024-11-01 PROCEDURE — 21400001 HC TELEMETRY ROOM

## 2024-11-01 PROCEDURE — 25000003 PHARM REV CODE 250: Performed by: INTERNAL MEDICINE

## 2024-11-01 PROCEDURE — 25000003 PHARM REV CODE 250: Performed by: NURSE PRACTITIONER

## 2024-11-01 PROCEDURE — 85025 COMPLETE CBC W/AUTO DIFF WBC: CPT | Performed by: HOSPITALIST

## 2024-11-01 PROCEDURE — C1751 CATH, INF, PER/CENT/MIDLINE: HCPCS

## 2024-11-01 PROCEDURE — 63600175 PHARM REV CODE 636 W HCPCS

## 2024-11-01 RX ADMIN — LEVETIRACETAM 500 MG: 100 SOLUTION ORAL at 09:11

## 2024-11-01 RX ADMIN — Medication 400 ML: at 09:11

## 2024-11-01 RX ADMIN — Medication 1 TABLET: at 09:11

## 2024-11-01 RX ADMIN — Medication 400 ML: at 11:11

## 2024-11-01 RX ADMIN — FERROUS SULFATE TAB 325 MG (65 MG ELEMENTAL FE) 1 EACH: 325 (65 FE) TAB at 09:11

## 2024-11-01 RX ADMIN — Medication 400 ML: at 02:11

## 2024-11-01 RX ADMIN — Medication 400 ML: at 06:11

## 2024-11-01 RX ADMIN — ACETAMINOPHEN 1000 MG: 500 TABLET ORAL at 09:11

## 2024-11-01 RX ADMIN — CARBAMAZEPINE 200 MG: 200 TABLET ORAL at 09:11

## 2024-11-01 RX ADMIN — ZONISAMIDE 200 MG: 100 CAPSULE ORAL at 09:11

## 2024-11-01 RX ADMIN — CARBAMAZEPINE 200 MG: 200 TABLET ORAL at 02:11

## 2024-11-01 RX ADMIN — ACETAMINOPHEN 1000 MG: 500 TABLET ORAL at 06:11

## 2024-11-01 RX ADMIN — ENOXAPARIN SODIUM 50 MG: 60 INJECTION SUBCUTANEOUS at 09:11

## 2024-11-01 RX ADMIN — POTASSIUM BICARBONATE 50 MEQ: 978 TABLET, EFFERVESCENT ORAL at 09:11

## 2024-11-01 RX ADMIN — ENOXAPARIN SODIUM 50 MG: 60 INJECTION SUBCUTANEOUS at 10:11

## 2024-11-01 RX ADMIN — ACETAMINOPHEN 1000 MG: 500 TABLET ORAL at 02:11

## 2024-11-01 NOTE — SUBJECTIVE & OBJECTIVE
Review of Systems   All other systems reviewed and are negative.    Objective:     Vital Signs (Most Recent):  Temp: 97.7 °F (36.5 °C) (11/01/24 1232)  Pulse: 77 (11/01/24 1232)  Resp: 20 (11/01/24 1232)  BP: 126/63 (11/01/24 1232)  SpO2: 100 % (11/01/24 1232) Vital Signs (24h Range):  Temp:  [97.7 °F (36.5 °C)-98.8 °F (37.1 °C)] 97.7 °F (36.5 °C)  Pulse:  [74-90] 77  Resp:  [16-20] 20  SpO2:  [97 %-100 %] 100 %  BP: (105-126)/(59-69) 126/63     Weight: 52.6 kg (115 lb 15.4 oz)  Body mass index is 19.3 kg/m².    Intake/Output Summary (Last 24 hours) at 11/1/2024 1524  Last data filed at 11/1/2024 1449  Gross per 24 hour   Intake 3271 ml   Output 654 ml   Net 2617 ml         Physical Exam  Vitals and nursing note reviewed.   Constitutional:       General: She is not in acute distress.     Appearance: She is normal weight.   Pulmonary:      Effort: Pulmonary effort is normal. No respiratory distress.   Abdominal:      Comments: G-tube   Musculoskeletal:      Comments: Right nephrostomy  RUE PICC line   Neurological:      Mental Status: She is alert.             Significant Labs: All pertinent labs within the past 24 hours have been reviewed.  Recent Lab Results         11/01/24  0631        Baso # 0.05       Basophil % 0.7       Differential Method Automated       Eos # 0.1       Eos % 1.3       Gran # (ANC) 4.8       Gran % 70.9       Hematocrit 26.5       Hemoglobin 8.5       Immature Grans (Abs) 0.13  Comment: Mild elevation in immature granulocytes is non specific and   can be seen in a variety of conditions including stress response,   acute inflammation, trauma and pregnancy. Correlation with other   laboratory and clinical findings is essential.         Immature Granulocytes 1.9       Lymph # 1.0       Lymph % 15.2       MCH 30.6       MCHC 32.1       MCV 95       Mono # 0.7       Mono % 10.0       MPV 8.5       nRBC 1       Platelet Count 472       RBC 2.78       RDW 20.5       WBC 6.82                Significant Imaging: I have reviewed all pertinent imaging results/findings within the past 24 hours.    IR Antegrade Pyelogram (xpd)   Final Result      IR IVC Filter Placement   Final Result      1. Successful placement of an Bard G2 retrievable infrarenal IVC filter.   Plan:      1. Retrieval intent (MIPS): The filter is potentially retrievable.  Please contact IR to schedule IVC filter removal when/if clinically indicated.         Electronically signed by: Harley Aggarwal MD   Date:    10/30/2024   Time:    15:22      X-Ray Chest 1 View   Final Result      Right PICC line in good position.         Electronically signed by: Caden Valdez   Date:    10/26/2024   Time:    13:02      X-Ray Abdomen AP 1 View   Final Result      As above.         Electronically signed by: Caden Valdez   Date:    10/26/2024   Time:    10:30      CT Head Without Contrast   Final Result      No acute abnormality.         Electronically signed by: Rama Paris   Date:    10/24/2024   Time:    19:48      X-Ray Chest AP Portable   Final Result      No acute abnormality.         Electronically signed by: Caden Valdez   Date:    10/24/2024   Time:    14:28      Anesthesia US Guide Vascular Access    (Results Pending)

## 2024-11-01 NOTE — DISCHARGE SUMMARY
[facility] Medicine  Discharge Summary      Patient Name: Cheryl Kirkland  MRN: [id number]  [facility]: [id number]  Patient Class: IP- Inpatient  Admission Date: 10/24/2024  Hospital Length of Stay: 8 days  Discharge Date and Time:  11/01/2024 12:23 PM  Attending Physician: Zachery Boyd MD   Discharging Provider: Zachery Boyd MD  Primary Care Provider: Gagandeep Iglesias MD    Primary Care Team: Networked reference to record PCT     HPI:     Patient is a 74-year-old female with past medical history significant for hypertension, hyperlipidemia, DVT in right lower extremity, epilepsy, anemia, neuropathy, GERD, gastritis, polyps, gastric adenocarcinoma status post treatment with Keytruda (followed by Dr. Jerome and Dr. Ambrose) and total gastrectomy on 08/20/2024, jejunostomy tube on supplemental tube feedings, chronic diarrhea, ovarian cancer (s/p surgery/chemotherapy), osteoporosis and recent admission 10/9/24 through 10/21/2024 due to 8 mm right ureteral obstructing stone with right-sided hydronephrosis s/p cystoscopy and right nephrostomy tube placement, and acute cystitis with SHELLEY also found to have extensive DVT RLE treated with heparin drip transitioned to Eliquis, was discharged to SNF at [facility]. On 10/24, she was having physical therapy at SNF and had syncopal episode. There was no fall or injury, however she has been confused and does not remember what has been going on today.  initial blood pressure on arrival to /41, heart rate 79, temp 98.5°, respirations 18, 100% SpO2 on room air.  Lab workup demonstrate WBC 10.52 with left shift, RBC 3.20, hemoglobin 10.1, hematocrit 30.4, platelets 559, potassium 2.9, CO2 19, BUN 11, creatinine 0.7, glucose 115, calcium 7.2, magnesium 1.5, alk phos 174, total protein 5.0, albumin 1.8, total bilirubin 0.3, AST 12, ALT 17, BNP 29, CPK 25, trop less than 0.006, UA- with cloudy brown urine positive for nitrite, trace leukocyte  esterase, greater than 100 RBC, 9 WBC. EKG -- NSR HR 76, nonspecific ST and T wave abnormality, no STEMI. CXR demonstrates that lungs are clear. CT head without contrast done with no acute abnormality. Recent Echo done 10/10/24 with EF 65-70% and normal diastolic function. While in ED, she was given potassium 40 po and magnesium sulfate 2 g IV as well as IV Rocephin. Hospital Medicine was consulted for admission due to syncope with continued confusion, UTI, hypokalemia, and hypomagnesemia.     * No surgery found *      Hospital Course:   Patient is a 74 year old female admitted to the hospital for c/o syncopal episode during PT at NH.   Labs: WBC 7.33, RBC 2.72, hemoglobin 8.6, hematocrit 25.0, platelets 488, Potassium 2.5,   -UA- with cloudy brown urine positive for nitrite, trace leukocyte esterase, greater than 100 RBC, 9 WBC.   -IV abx: Rocephin   -CT head without contrast done with no acute abnormality.   -Hematology consulted due to patient being on eliquis for anticoagulation for DVT but will need needs ESWL for an impacted ureteral stone and removal of nephrostomy tube per urology. Patient is high risk to be off of anticoagulation for urology procedure but due to ureteral stone which is impacting that needs to be removed may be able to bridge patient with Lovenox during procedure. If urology would be uncomfortable with this plan would recommend filter placement prior to procedure per Dr. Lee.   -Urology consulted as patient was reporting pain at PCN site. Impacted ureteral stone may require treatment with ESWL once patient can safely be off of . Discussed if patient is not cleared to temporarily come off anticoagulation within 3 months the nephrostomy tube may need to be exchanged. Nephrostogram and stent order placed by urology. Patient transitioned to therapeutic 1mg/kg lovenox from eliquis for possible procedure   -Pt/Ot to eval and treat: recommend SNF   -EKG: Normal sinus rhythm   -Patient required  placement of PICC line as phlebotomy and nursing staff were unable to obtain labs.     10/28/24  NAEON, patient resting in bed,  at bedside  Plans for IVC filter placement, currently on Lovenox 1 mg/kg BID  Urology with plans for ESWL pending    10/29/24  NAEON, plans for IVC filter tomorrow  On Lovenox, hold evening dose, NPO after midnight    10/30/24  NAEON, no new issues  IVC filter and right PCN tube change and antegrade ureteral stent    10/31/24  Patient awake, alert, answering questions appropriately   at bedside, on phone  Updated plan of care  Plan for discharge to SNF tomorrow  F/U with Urology 11/18/24 11/1/24  NAEON, no complaints  Plans for discharge today to SNF vs HH  F/U with Urology in 1-2 weeks for further management     Goals of Care Treatment Preferences:  Code Status: Full Code      SDOH Screening:  The patient was screened for utility difficulties, food insecurity, transport difficulties, housing insecurity, and interpersonal safety and there were no concerns identified this admission.     Consults:   Consults (From admission, onward)          Status Ordering Provider     Inpatient consult to Interventional Radiology  Once        Provider:  (Not yet assigned)    Completed SUAD CORONA     Inpatient consult to PICC team (NIAS)  Once        Provider:  (Not yet assigned)    Acknowledged TERRI RECINOS     Inpatient consult to Hematology/Oncology  Once        Provider:  Ofelia Lee MD    Completed SUAD CORONA     Inpatient consult to Midline team  Once        Provider:  (Not yet assigned)    Acknowledged NOEL CHASE     Inpatient consult to Registered Dietitian/Nutritionist  Once        Provider:  (Not yet assigned)    Completed JAROCHO BEDOYA     Case Management/  Once        Provider:  (Not yet assigned)    Completed JAROCHO BEDOYA     Inpatient consult to Urology  Once        Provider:  (Not yet assigned)    Completed JAROCHO BEDOYA             No new Assessment & Plan notes have been filed under this hospital service since the last note was generated.  Service: Hospital Medicine    Final Active Diagnoses:    Diagnosis Date Noted POA    PRINCIPAL PROBLEM:  Syncope [R55] 10/24/2024 Yes    Hypokalemia [E87.6] 10/11/2024 Yes    Ureteral stone with hydronephrosis [N13.2] 10/10/2024 Yes    DVT (deep venous thrombosis) [I82.409] 02/07/2024 Yes     Chronic    Jejunostomy tube present [Z93.4] 09/03/2024 Not Applicable    Gastric adenocarcinoma [C16.9] 02/27/2024 Yes    Hypomagnesemia [E83.42] 10/24/2024 Yes    Hx of Epileptic seizures [G40.909] 02/07/2024 Yes     Chronic    Chronic anemia [D64.9] 02/07/2024 Yes    Severe protein-calorie malnutrition [E43] 09/04/2024 Yes      Problems Resolved During this Admission:       Discharged Condition: stable    Disposition:     Follow Up:    Patient Instructions:   No discharge procedures on file.    Significant Diagnostic Studies: Labs: All labs within the past 24 hours have been reviewed    Pending Diagnostic Studies:       None           Medications:  Reconciled Home Medications:      Medication List        CONTINUE taking these medications      acetaminophen 500 MG tablet  Commonly known as: TYLENOL  Take 2 tablets (1,000 mg total) by mouth every 8 (eight) hours.     alendronate 70 MG tablet  Commonly known as: FOSAMAX  Take 70 mg by mouth every 7 days. (Sundays)     CALCIUM PHOSPHATE-VITAMIN D3 ORAL  Take 1 tablet by mouth 2 (two) times daily.     carBAMazepine 200 mg tablet  Commonly known as: TEGRETOL  1 tablet with breakfast  1 tablet with lunch   1.5 tablet at bedtime     ELIQUIS 5 mg Tab  Generic drug: apixaban  Take 2 tablets (10 mg total) by mouth 2 (two) times daily for 3 days THEN take 1 tablet by mouth twice a day     ferrous sulfate 325 (65 FE) MG EC tablet  Take 65 mg by mouth 2 (two) times daily with meals.     hydrocortisone 2.5 % cream  Apply topically 2 (two) times daily.     hydrOXYzine HCL  25 MG tablet  Commonly known as: ATARAX  Take 1 tablet (25 mg total) by mouth 3 (three) times daily as needed for Itching.     levETIRAcetam 500 MG Tab  Commonly known as: KEPPRA  Take 1 tablet by mouth 2 (two) times daily.     zonisamide 100 MG Cap  Commonly known as: ZONEGRAN  Take 200 mg by mouth 2 (two) times daily.              Indwelling Lines/Drains at time of discharge:   Lines/Drains/Airways       Peripherally Inserted Central Catheter Line  Duration             PICC Double Lumen 10/26/24 1221 right basilic 6 days              Drain  Duration                  Gastrostomy/Enterostomy 08/20/24 2009 Jejunostomy tube LUQ feeding 72 days         Nephrostomy 10/30/24 1222 Right 8 Fr. 2 days                    Time spent on the discharge of patient: 45 minutes         Zachery Boyd MD  Department of Hospital Medicine  [facility] - Med Surg

## 2024-11-01 NOTE — PROGRESS NOTES
[facility] Medicine  Progress Note    Patient Name: Cheryl Kirkland  MRN: [id number]  Patient Class: IP- Inpatient   Admission Date: 10/24/2024  Length of Stay: 8 days  Attending Physician: Zachery Boyd MD  Primary Care Provider: Gagandeep Iglesias MD        Subjective:     Principal Problem:Syncope        HPI:    Patient is a 74-year-old female with past medical history significant for hypertension, hyperlipidemia, DVT in right lower extremity, epilepsy, anemia, neuropathy, GERD, gastritis, polyps, gastric adenocarcinoma status post treatment with Keytruda (followed by Dr. Jerome and Dr. Ambrose) and total gastrectomy on 08/20/2024, jejunostomy tube on supplemental tube feedings, chronic diarrhea, ovarian cancer (s/p surgery/chemotherapy), osteoporosis and recent admission 10/9/24 through 10/21/2024 due to 8 mm right ureteral obstructing stone with right-sided hydronephrosis s/p cystoscopy and right nephrostomy tube placement, and acute cystitis with SHELLEY also found to have extensive DVT RLE treated with heparin drip transitioned to Eliquis, was discharged to SNF at [facility]. On 10/24, she was having physical therapy at SNF and had syncopal episode. There was no fall or injury, however she has been confused and does not remember what has been going on today.  initial blood pressure on arrival to /41, heart rate 79, temp 98.5°, respirations 18, 100% SpO2 on room air.  Lab workup demonstrate WBC 10.52 with left shift, RBC 3.20, hemoglobin 10.1, hematocrit 30.4, platelets 559, potassium 2.9, CO2 19, BUN 11, creatinine 0.7, glucose 115, calcium 7.2, magnesium 1.5, alk phos 174, total protein 5.0, albumin 1.8, total bilirubin 0.3, AST 12, ALT 17, BNP 29, CPK 25, trop less than 0.006, UA- with cloudy brown urine positive for nitrite, trace leukocyte esterase, greater than 100 RBC, 9 WBC. EKG -- NSR HR 76, nonspecific ST and T wave abnormality, no STEMI. CXR demonstrates  that lungs are clear. CT head without contrast done with no acute abnormality. Recent Echo done 10/10/24 with EF 65-70% and normal diastolic function. While in ED, she was given potassium 40 po and magnesium sulfate 2 g IV as well as IV Rocephin. Hospital Medicine was consulted for admission due to syncope with continued confusion, UTI, hypokalemia, and hypomagnesemia.     Overview/Hospital Course:  Patient is a 74 year old female admitted to the hospital for c/o syncopal episode during PT at NH.   Labs: WBC 7.33, RBC 2.72, hemoglobin 8.6, hematocrit 25.0, platelets 488, Potassium 2.5,   -UA- with cloudy brown urine positive for nitrite, trace leukocyte esterase, greater than 100 RBC, 9 WBC.   -IV abx: Rocephin   -CT head without contrast done with no acute abnormality.   -Hematology consulted due to patient being on eliquis for anticoagulation for DVT but will need needs ESWL for an impacted ureteral stone and removal of nephrostomy tube per urology. Patient is high risk to be off of anticoagulation for urology procedure but due to ureteral stone which is impacting that needs to be removed may be able to bridge patient with Lovenox during procedure. If urology would be uncomfortable with this plan would recommend filter placement prior to procedure per Dr. Lee.   -Urology consulted as patient was reporting pain at PCN site. Impacted ureteral stone may require treatment with ESWL once patient can safely be off of . Discussed if patient is not cleared to temporarily come off anticoagulation within 3 months the nephrostomy tube may need to be exchanged. Nephrostogram and stent order placed by urology. Patient transitioned to therapeutic 1mg/kg lovenox from eliquis for possible procedure   -Pt/Ot to eval and treat: recommend SNF   -EKG: Normal sinus rhythm   -Patient required placement of PICC line as phlebotomy and nursing staff were unable to obtain labs.     10/28/24  NAEON, patient resting in bed,  at  bedside  Plans for IVC filter placement, currently on Lovenox 1 mg/kg BID  Urology with plans for ESWL pending    10/29/24  NAEON, plans for IVC filter tomorrow  On Lovenox, hold evening dose, NPO after midnight    10/30/24  NAEON, no new issues  IVC filter and right PCN tube change and antegrade ureteral stent    10/31/24  Patient awake, alert, answering questions appropriately   at bedside, on phone  Updated plan of care  Plan for discharge to SNF tomorrow  F/U with Urology 11/18/24 11/1/24  NAEON, no complaints  SNF insurance authorization pending  F/U with Urology in 1-2 weeks for further management      Review of Systems   All other systems reviewed and are negative.    Objective:     Vital Signs (Most Recent):  Temp: 97.7 °F (36.5 °C) (11/01/24 1232)  Pulse: 77 (11/01/24 1232)  Resp: 20 (11/01/24 1232)  BP: 126/63 (11/01/24 1232)  SpO2: 100 % (11/01/24 1232) Vital Signs (24h Range):  Temp:  [97.7 °F (36.5 °C)-98.8 °F (37.1 °C)] 97.7 °F (36.5 °C)  Pulse:  [74-90] 77  Resp:  [16-20] 20  SpO2:  [97 %-100 %] 100 %  BP: (105-126)/(59-69) 126/63     Weight: 52.6 kg (115 lb 15.4 oz)  Body mass index is 19.3 kg/m².    Intake/Output Summary (Last 24 hours) at 11/1/2024 1524  Last data filed at 11/1/2024 1449  Gross per 24 hour   Intake 3271 ml   Output 654 ml   Net 2617 ml         Physical Exam  Vitals and nursing note reviewed.   Constitutional:       General: She is not in acute distress.     Appearance: She is normal weight.   Pulmonary:      Effort: Pulmonary effort is normal. No respiratory distress.   Abdominal:      Comments: G-tube   Musculoskeletal:      Comments: Right nephrostomy  RUE PICC line   Neurological:      Mental Status: She is alert.             Significant Labs: All pertinent labs within the past 24 hours have been reviewed.  Recent Lab Results         11/01/24  0631        Baso # 0.05       Basophil % 0.7       Differential Method Automated       Eos # 0.1       Eos % 1.3       Gran #  (ANC) 4.8       Gran % 70.9       Hematocrit 26.5       Hemoglobin 8.5       Immature Grans (Abs) 0.13  Comment: Mild elevation in immature granulocytes is non specific and   can be seen in a variety of conditions including stress response,   acute inflammation, trauma and pregnancy. Correlation with other   laboratory and clinical findings is essential.         Immature Granulocytes 1.9       Lymph # 1.0       Lymph % 15.2       MCH 30.6       MCHC 32.1       MCV 95       Mono # 0.7       Mono % 10.0       MPV 8.5       nRBC 1       Platelet Count 472       RBC 2.78       RDW 20.5       WBC 6.82               Significant Imaging: I have reviewed all pertinent imaging results/findings within the past 24 hours.    IR Antegrade Pyelogram (xpd)   Final Result      IR IVC Filter Placement   Final Result      1. Successful placement of an Bard G2 retrievable infrarenal IVC filter.   Plan:      1. Retrieval intent (MIPS): The filter is potentially retrievable.  Please contact IR to schedule IVC filter removal when/if clinically indicated.         Electronically signed by: Harley Aggarwal MD   Date:    10/30/2024   Time:    15:22      X-Ray Chest 1 View   Final Result      Right PICC line in good position.         Electronically signed by: Caden Valdez   Date:    10/26/2024   Time:    13:02      X-Ray Abdomen AP 1 View   Final Result      As above.         Electronically signed by: Caden Valdez   Date:    10/26/2024   Time:    10:30      CT Head Without Contrast   Final Result      No acute abnormality.         Electronically signed by: Rama Paris   Date:    10/24/2024   Time:    19:48      X-Ray Chest AP Portable   Final Result      No acute abnormality.         Electronically signed by: Caden Valdez   Date:    10/24/2024   Time:    14:28      Anesthesia US Guide Vascular Access    (Results Pending)         Assessment/Plan:      * Syncope  Syncopal episode while having physical therapy -- did not fall, no  injuries  CT head did not show any acute abnormality  Troponin normal, EKG with no significant changes  Lab workup shows mild UTI - Rocephin given  Had recent Echo which was normal  Hydrating via feeding tube  Cardiac monitoring  Trending troponin level- continued to be negative       Hypokalemia  Patient's most recent potassium results are listed below.   Recent Labs     10/24/24  2224 10/26/24  1640 10/27/24  0626   K 3.2* 2.5* 2.5*       Plan  - Replete potassium per protocol  - Monitor potassium Daily  - Patient's hypokalemia is being treated, will recheck labs again in a.m.     Ureteral stone with hydronephrosis  Recent imaging with 8 mm obstructing/impacted stone on right side -- s/p cystoscopy and right nephrostomy tube placement  Urology consulted , appreciate recommendations       DVT (deep venous thrombosis)  Recently diagnosed with extensive DVT RLE  Continue Eliquis      Jejunostomy tube present  Hydrating and tube feeding via tube      Gastric adenocarcinoma  - Followed by Dr. Ambrose and Dr. Jerome  - S/p total gastrectomy on 8/20, with lysis of adhesions, lymphadenectomy, and jejunostomy tube placement, d/c on 9/10  - Pathologic stage from 8/20/2024: Stage III (ypT2, pN3a, cM0), completed 5 cycles Keytruda prior to surgery  - outpatient follow up with Dr. Jerome and Dr. Ambrose      Hypomagnesemia  Patient has Abnormal Magnesium: hypomagnesemia. Will continue to monitor electrolytes closely. Will replace the affected electrolytes and repeat labs to be done after interventions completed. The patient's magnesium results have been reviewed and are listed below.  Recent Labs   Lab 10/27/24  0626   MG 2.2          Hx of Epileptic seizures  Seizure precautions  Continue Keppra and carbamazepine      Chronic anemia  Anemia is likely due to chronic blood loss and Iron deficiency. Most recent hemoglobin and hematocrit are listed below.  Recent Labs     10/26/24  1640 10/27/24  0626   HGB 8.9* 8.6*   HCT 26.0*  25.0*       Plan  - Monitor serial CBC: Daily  - Transfuse PRBC if patient becomes hemodynamically unstable, symptomatic or H/H drops below 7/21.  - Patient has not received any PRBC transfusions to date    Severe protein-calorie malnutrition  Nutrition consulted. Most recent weight and BMI monitored- will continue tube feeding supplementation    Measurements:  Wt Readings from Last 1 Encounters:   10/25/24 49.7 kg (109 lb 9.1 oz)   Body mass index is 18.23 kg/m².        VTE Risk Mitigation (From admission, onward)           Ordered     enoxaparin injection 50 mg  Every 12 hours         10/27/24 1121     Reason for No Pharmacological VTE Prophylaxis  Once        Question:  Reasons:  Answer:  Already adequately anticoagulated on oral Anticoagulants    10/24/24 2206     IP VTE HIGH RISK PATIENT  Once         10/24/24 2206     Place sequential compression device  Until discontinued         10/24/24 2206                    Discharge Planning   SOFIYA: 11/1/2024     Code Status: Full Code   Is the patient medically ready for discharge?:     Reason for patient still in hospital (select all that apply): Pending disposition  Discharge Plan A: Skilled Nursing Facility                  Zachery Boyd MD  Department of Hospital Medicine   [facility] - Med Surg

## 2024-11-02 PROCEDURE — 25000003 PHARM REV CODE 250: Performed by: INTERNAL MEDICINE

## 2024-11-02 PROCEDURE — 97162 PT EVAL MOD COMPLEX 30 MIN: CPT

## 2024-11-02 PROCEDURE — 25000003 PHARM REV CODE 250: Performed by: HOSPITALIST

## 2024-11-02 PROCEDURE — 63600175 PHARM REV CODE 636 W HCPCS

## 2024-11-02 PROCEDURE — 97530 THERAPEUTIC ACTIVITIES: CPT

## 2024-11-02 PROCEDURE — 21400001 HC TELEMETRY ROOM

## 2024-11-02 PROCEDURE — 25000003 PHARM REV CODE 250: Performed by: NURSE PRACTITIONER

## 2024-11-02 RX ORDER — ACETAMINOPHEN 325 MG/1
650 TABLET ORAL EVERY 4 HOURS PRN
Status: DISCONTINUED | OUTPATIENT
Start: 2024-11-02 | End: 2024-11-06 | Stop reason: HOSPADM

## 2024-11-02 RX ADMIN — Medication 400 ML: at 05:11

## 2024-11-02 RX ADMIN — FERROUS SULFATE TAB 325 MG (65 MG ELEMENTAL FE) 1 EACH: 325 (65 FE) TAB at 08:11

## 2024-11-02 RX ADMIN — CARBAMAZEPINE 200 MG: 200 TABLET ORAL at 04:11

## 2024-11-02 RX ADMIN — LEVETIRACETAM 500 MG: 100 SOLUTION ORAL at 10:11

## 2024-11-02 RX ADMIN — Medication 400 ML: at 04:11

## 2024-11-02 RX ADMIN — ACETAMINOPHEN 650 MG: 325 TABLET ORAL at 10:11

## 2024-11-02 RX ADMIN — ACETAMINOPHEN 650 MG: 325 TABLET ORAL at 11:11

## 2024-11-02 RX ADMIN — CARBAMAZEPINE 200 MG: 200 TABLET ORAL at 03:11

## 2024-11-02 RX ADMIN — LEVETIRACETAM 500 MG: 100 SOLUTION ORAL at 08:11

## 2024-11-02 RX ADMIN — Medication 1 TABLET: at 08:11

## 2024-11-02 RX ADMIN — ZONISAMIDE 200 MG: 100 CAPSULE ORAL at 08:11

## 2024-11-02 RX ADMIN — CARBAMAZEPINE 200 MG: 200 TABLET ORAL at 10:11

## 2024-11-02 RX ADMIN — Medication 400 ML: at 01:11

## 2024-11-02 RX ADMIN — CARBAMAZEPINE 200 MG: 200 TABLET ORAL at 08:11

## 2024-11-02 RX ADMIN — ENOXAPARIN SODIUM 50 MG: 60 INJECTION SUBCUTANEOUS at 08:11

## 2024-11-02 RX ADMIN — ENOXAPARIN SODIUM 50 MG: 60 INJECTION SUBCUTANEOUS at 10:11

## 2024-11-02 RX ADMIN — ZONISAMIDE 200 MG: 100 CAPSULE ORAL at 10:11

## 2024-11-02 RX ADMIN — FERROUS SULFATE TAB 325 MG (65 MG ELEMENTAL FE) 1 EACH: 325 (65 FE) TAB at 10:11

## 2024-11-02 RX ADMIN — ACETAMINOPHEN 650 MG: 325 TABLET ORAL at 04:11

## 2024-11-02 RX ADMIN — Medication 400 ML: at 10:11

## 2024-11-02 RX ADMIN — Medication 1 TABLET: at 10:11

## 2024-11-02 RX ADMIN — Medication 400 ML: at 11:11

## 2024-11-02 RX ADMIN — POTASSIUM BICARBONATE 50 MEQ: 978 TABLET, EFFERVESCENT ORAL at 08:11

## 2024-11-02 NOTE — PLAN OF CARE
Tolerated session fair. Rapid fatigue in standing. Recommending moderate intensity intervention at d/c.

## 2024-11-02 NOTE — SUBJECTIVE & OBJECTIVE
Review of Systems   All other systems reviewed and are negative.    Objective:     Vital Signs (Most Recent):  Temp: 98.5 °F (36.9 °C) (11/02/24 0901)  Pulse: 83 (11/02/24 0901)  Resp: 18 (11/02/24 0901)  BP: 124/67 (11/02/24 0901)  SpO2: 98 % (11/02/24 0901) Vital Signs (24h Range):  Temp:  [97.7 °F (36.5 °C)-99.2 °F (37.3 °C)] 98.5 °F (36.9 °C)  Pulse:  [73-85] 83  Resp:  [16-20] 18  SpO2:  [98 %-100 %] 98 %  BP: (110-126)/(55-68) 124/67     Weight: 52.6 kg (115 lb 15.4 oz)  Body mass index is 19.3 kg/m².    Intake/Output Summary (Last 24 hours) at 11/2/2024 1110  Last data filed at 11/2/2024 0901  Gross per 24 hour   Intake 3011 ml   Output 2207 ml   Net 804 ml         Physical Exam  Vitals and nursing note reviewed.   Constitutional:       General: She is not in acute distress.     Appearance: She is normal weight.   Pulmonary:      Effort: Pulmonary effort is normal. No respiratory distress.   Abdominal:      Comments: G-tube   Musculoskeletal:      Comments: Right nephrostomy  RUE PICC line   Neurological:      Mental Status: She is alert.             Significant Labs: All pertinent labs within the past 24 hours have been reviewed.  Recent Lab Results       None            Significant Imaging: I have reviewed all pertinent imaging results/findings within the past 24 hours.    IR Antegrade Pyelogram (xpd)   Final Result      IR IVC Filter Placement   Final Result      1. Successful placement of an Bard G2 retrievable infrarenal IVC filter.   Plan:      1. Retrieval intent (MIPS): The filter is potentially retrievable.  Please contact IR to schedule IVC filter removal when/if clinically indicated.         Electronically signed by: Harley Aggarwal MD   Date:    10/30/2024   Time:    15:22      X-Ray Chest 1 View   Final Result      Right PICC line in good position.         Electronically signed by: Caden Valdez   Date:    10/26/2024   Time:    13:02      X-Ray Abdomen AP 1 View   Final Result      As above.          Electronically signed by: Caden Valdez   Date:    10/26/2024   Time:    10:30      CT Head Without Contrast   Final Result      No acute abnormality.         Electronically signed by: Rama Paris   Date:    10/24/2024   Time:    19:48      X-Ray Chest AP Portable   Final Result      No acute abnormality.         Electronically signed by: Caden Valdez   Date:    10/24/2024   Time:    14:28      Anesthesia US Guide Vascular Access    (Results Pending)

## 2024-11-02 NOTE — PT/OT/SLP EVAL
Physical Therapy Evaluation    Patient Name:  Cheryl Kirkland   MRN:  [id number]    Recommendations:     Discharge Recommendations: Moderate Intensity Therapy   Discharge Equipment Recommendations: to be determined by next level of care   Barriers to discharge: None    Assessment:     Cheryl Kirkland is a 74 y.o. female admitted with a medical diagnosis of Syncope.  She presents with the following impairments/functional limitations: weakness, impaired endurance, impaired cognition, decreased coordination, impaired coordination, decreased lower extremity function, impaired functional mobility, gait instability, decreased safety awareness, impaired balance, impaired cardiopulmonary response to activity, pain. This is pt's second PT evaluation this stay d/t recent dc from PT services d/t no participation.     Rehab Prognosis: Poor; patient would benefit from acute skilled PT services to address these deficits and reach maximum level of function.    Recent Surgery: * No surgery found *      Plan:     During this hospitalization, patient to be seen 3 x/week to address the identified rehab impairments via gait training, therapeutic activities, therapeutic exercises, neuromuscular re-education and progress toward the following goals:    Plan of Care Expires:  11/16/24    Subjective     Chief Complaint: no complaints verbalized,but patient moans throughout session   Patient/Family Comments/goals: none stated   Pain/Comfort:  Pain Rating 1:  (did not rate pain, however, moans throughout session)  Pain Addressed 1: Reposition, Distraction    Patients cultural, spiritual, Shinto conflicts given the current situation: no    Living Environment:    PT LIVES AT HOME WITH  IN A 2 STORY HOME WITH BED AND BATHROOM DOWN STAIRS. PT WITH NO STEPS TO ENTER HOME   Prior to admission, patients level of function was IND >RICKY X 2 HOSPITAL ADMISSIONS AGO. PT WAS ADMITTED THIS TIME FROM A SNF AND NEEDED ASSIST WITH  "GROSS FUNC MOBILITY  .  Equipment used at home: walker, rolling.  DME owned (not currently used): rolling walker.  Upon discharge, patient will have assistance from  (LIMITED)  Objective:     Communicated with RN prior to session.  Patient found supine with PICC line, bed alarm, G/J tube, nephrostomy  upon PT entry to room.    General Precautions: Standard, fall  Orthopedic Precautions:N/A   Braces: N/A  Respiratory Status: Room air    Exams:  Cognitive Exam:  Patient is oriented to Person and Place  RLE ROM: WFL  RLE Strength: unable to follow commands for testing   LLE ROM: WFL  LLE Strength: unable to follow commands for testing     Functional Mobility:  Bed Mobility:     Rolling Left:  minimum assistance  Scooting: minimum assistance  Supine to Sit: minimum assistance  Transfers:     Sit to Stand:  minimum assistance and of 2 persons with rolling walker  Bed to Chair: minimum assistance and of 2 persons with  hand-held assist  using  Stand Pivot      AM-PAC 6 CLICK MOBILITY  Total Score:11       Treatment & Education:  Evaluation completed. Pt performed bed mobility with min A and much increased time for task performance. Pt performed STS with min A x 2, however, quickly sat down d/t reports of fatigue. Pt required increased time to recover and transferred with min A x 2 to bedside chair.     Pt educated on role of PT in acute care and POC. Educated on importance of OOB activities, activity pacing, and HEP (marching/hip flex, hip abd, heel slides/LAQ, quad sets, ankle pumps) in order to maintain/regain strength. Encouraged to sit up in chair for all meals. Educated on proper use of RW for safety and to reduce risk of falling. Educated on "call don't fall" policy and increased risk of falling due to weakness, instructed to utilize call bell for assistance with all transfers. Pt agreeable to all requests.      Patient left up in chair with all lines intact, call button in reach, chair alarm on, RN notified, " and  present.    GOALS:   Multidisciplinary Problems       Physical Therapy Goals          Problem: Physical Therapy    Goal Priority Disciplines Outcome Interventions   Physical Therapy Goal     PT, PT/OT Progressing    Description: Lt24  1. PT WILL COMPLETE BED MOBILITY WITH SBA.  2. PT WILL STAND T/F TO CHAIR WITH RW AND SBA.  3. PT WILL GT TRAIN X 100' WITH RW AND MIN A TO PROGRESS GT.  4. PT WILL INC AMPAC SCORE BY 2 POINTS TO PROGRESS GROSS FUNC MOBILITY.                            History:     Past Medical History:   Diagnosis Date    Anemia     Chronic deep vein thrombosis (DVT) of left lower extremity 10/21/2022    Deep vein thrombosis     Drug-induced polyneuropathy 2024    Noted by ROCK KAY NP  last documented on 2023    DVT (deep venous thrombosis)     Epilepsy     Gastric adenocarcinoma 2024    GERD (gastroesophageal reflux disease)     Hyperlipidemia 01/10/2013    Formatting of this note might be different from the original.   ICD-10 Transition    Mixed epithelial carcinoma of right ovary 2023    OP (osteoporosis) 2024    Ovarian cancer     Primary hypertension 2022       Past Surgical History:   Procedure Laterality Date    ABDOMINAL WASHOUT N/A 3/14/2024    Procedure: LAVAGE, PERITONEAL, THERAPEUTIC;  Surgeon: Chen Jerome MD;  Location: [facility] OR;  Service: General;  Laterality: N/A;    APPENDECTOMY      BIOPSY OF PERITONEUM N/A 3/14/2024    Procedure: BIOPSY, PERITONEUM;  Surgeon: Chen Jerome MD;  Location: [facility] OR;  Service: General;  Laterality: N/A;    COLONOSCOPY  2012    Dr Boyle- Tubular adenoma polyps. Repeat in 3 years.    COLONOSCOPY  2015    -Nodular mucosa at appendiceal orfice, sigmoid and desc diverticulosis otherwise normal.    COLONOSCOPY      >3 TAs    COLONOSCOPY  2023    CYSTOSCOPY W/ RETROGRADES Right 10/10/2024    Procedure: CYSTOSCOPY, WITH RETROGRADE PYELOGRAM;   Surgeon: Lata Kelly MD;  Location: [facility];  Service: Urology;  Laterality: Right;    DIAGNOSTIC LAPAROSCOPY N/A 3/14/2024    Procedure: LAPAROSCOPY, DIAGNOSTIC;  Surgeon: Chen Jerome MD;  Location: [facility] OR;  Service: General;  Laterality: N/A;  1. Diagnostic laparoscopy   2. Extensive lysis of adhesions  3. Peritoneal biopsy   4. Peritoneal washings for cytology    DIAGNOSTIC LAPAROSCOPY N/A 8/20/2024    Procedure: LAPAROSCOPY, DIAGNOSTIC;  Surgeon: Chen Jerome MD;  Location: [facility] OR;  Service: General;  Laterality: N/A;    EGD - EXTERNAL RESULT  06/20/2012    Dr Boyle- Mild gastritis otherwise normal.    ENDOSCOPIC ULTRASOUND OF UPPER GASTROINTESTINAL TRACT N/A 7/26/2024    Procedure: ULTRASOUND, UPPER GI TRACT, ENDOSCOPIC;  Surgeon: Valdo Aguilera MD;  Location: [facility] ENDO;  Service: Endoscopy;  Laterality: N/A;  7/22/24: instructions sent via portal-. Pt no longer takling eliquis-GD    ESOPHAGOGASTRODUODENOSCOPY N/A 02/08/2024    Procedure: EGD (ESOPHAGOGASTRODUODENOSCOPY);  Surgeon: Jayla Arteaga MD;  Location: [facility];  Service: Endoscopy;  Laterality: N/A;    ESOPHAGOGASTRODUODENOSCOPY N/A 8/20/2024    Procedure: EGD (ESOPHAGOGASTRODUODENOSCOPY);  Surgeon: Chen Jerome MD;  Location: [facility] OR;  Service: General;  Laterality: N/A;    GASTRECTOMY N/A 8/20/2024    Procedure: GASTRECTOMY;  Surgeon: Chen Jerome MD;  Location: [facility] OR;  Service: General;  Laterality: N/A;    HYSTERECTOMY      LAPAROSCOPIC LYSIS OF ADHESIONS N/A 3/14/2024    Procedure: LYSIS, ADHESIONS, LAPAROSCOPIC;  Surgeon: Chen Jerome MD;  Location: [facility] OR;  Service: General;  Laterality: N/A;    LYSIS OF ADHESIONS N/A 8/20/2024    Procedure: LYSIS, ADHESIONS;  Surgeon: Chen Jerome MD;  Location: [facility] OR;  Service: General;  Laterality: N/A;    PLACEMENT OF JEJUNOSTOMY TUBE N/A 8/20/2024    Procedure: INSERTION, JEJUNOSTOMY TUBE;  Surgeon: Chen Jerome MD;  Location: [facility] OR;   Service: General;  Laterality: N/A;    TOTAL ABDOMINAL HYSTERECTOMY W/ BILATERAL SALPINGOOPHORECTOMY  01/09/2023    radical resection with right salpingo-oophorectomy, left salpingo-oophorectomy, extensive enterolysis, extensive adhesiolysis, left pelvic lymph node biopsy, omental biopsy, small bowel resection with primary functional end-to-end anastomosis, placement of pelvic drain       Time Tracking:     PT Received On: 11/02/24  PT Start Time: 0920     PT Stop Time: 0945  PT Total Time (min): 25 min     Billable Minutes: Evaluation 15 and Therapeutic Activity 10      11/02/2024

## 2024-11-02 NOTE — PLAN OF CARE
Discussed poc with pt, pt verbalized understanding    Purposeful rounding every 2hours    VS wnl  Cardiac monitoring in use, pt is NSR, tele monitor #[id number]    Fall precautions in place, remains injury free  Patient c/o pain  Pain under control with scheduled meds    (continuous tube feeding) infusing  Accurate I&Os    Bed locked at lowest position  Call light within reach    Chart check complete  Will cont with POC

## 2024-11-02 NOTE — PT/OT/SLP PROGRESS
"Occupational Therapy   Treatment    Name: Cheryl Kirkland  MRN: [id number]  Admitting Diagnosis:  Syncope       Recommendations:     Discharge Recommendations: Moderate Intensity Therapy (*patient on therapy trial due to inconsistent participation)  Discharge Equipment Recommendations:  to be determined by next level of care  Barriers to discharge:  Decreased caregiver support    Assessment:     Cheryl Kirkland is a 74 y.o. female with a medical diagnosis of Syncope.  She presents with the following performance deficits affecting function are weakness, impaired endurance, impaired self care skills, impaired functional mobility, impaired balance, impaired cardiopulmonary response to activity, pain, decreased safety awareness, impaired cognition, decreased lower extremity function, decreased upper extremity function, edema.     Rehab Prognosis:  Poor and questionable ; patient would benefit from acute skilled OT services to address these deficits and reach maximum level of function.       Patient with flat affect throughout session. Grossly agreeable to session this date with minimal encouragement of therapist and spouse, but noted to have very poor tolerance for any activity. Patient also noted to have very inconsistent participation in previous therapy sessions. Will continue services at this time, but will discharge if progress towards functional goals is not observed.    Plan:     Patient to be seen 2 x/week to address the above listed problems via self-care/home management, therapeutic activities, therapeutic exercises  Plan of Care Expires: 11/13/24  Plan of Care Reviewed with: patient, spouse    Subjective     Chief Complaint: Reported "It hurts"  -in reference to R foot after transfer to chair  Patient/Family Comments/goals: none reported  Pain/Comfort:  Pain Rating 1:  (did not rate. generalized pain all over. reported pain in R foot with WB.)  Pain Addressed 1:  (activity pacing. applied ice " with education on it's safe use to R foot while in chair)    Objective:     Communicated with: Charge nurse, Dilma MALONE, prior to session.  Patient found supine with G/J tube, PICC line, nephrostomy, bed alarm upon OT entry to room.    General Precautions: Standard, fall    Orthopedic Precautions:N/A  Braces: N/A  Respiratory Status: Room air     Occupational Performance:     Bed Mobility:    Patient completed Supine to Sit with minimum assistance   Increased time and v/c for technique  Seated anterior scooting with CGA  Increased time and max cueing to remain actively engaged, prolonged self initiated rest breaks  Sat EOB x10 minutes prior to transfer to chair to increase upright activity tolerance and gross strength- SBA for static sitting balance- flexed posturing    Functional Mobility/Transfers:  Patient completed Sit <> Stand Transfer with minimum assistance and of 2 persons  with  rolling walker   Completed x2 trials from EOB - moderately elevated bed  Stood ~30 seconds on first trial with min A of 2 to maintain static standing balance then self initiated stand>sit   Patient completed Bed <> Chair Transfer using Step Transfer technique with moderate assistance and of 2 persons with rolling walker  Functional Mobility: unable to tolerate forward mobility at this time, will progress as able  Provided with max cueing for technique with transfers to increase safety and independence with completion    Activities of Daily Living:  Lower Body Dressing: total assistance doffing  socks and donning slippers as per patient request. Returned to  socks while seated in bedside chair due to ill fit of slippers  Noted to have mild edema to R foot    [facility] 6 Click ADL: 9    Treatment & Education:  Encouraged completion of B UE AROM therex throughout the day to increase functional strength and activity tolerance needed for ADL completion. Educated on benefits of OOB activity and importance of calling for A to transfer  back to bed. Patient stated understanding and in agreement with POC.    Patient left up in chair with all lines intact, call button in reach, chair alarm on, and spouse present    GOALS:   Multidisciplinary Problems       Occupational Therapy Goals          Problem: Occupational Therapy    Goal Priority Disciplines Outcome Interventions   Occupational Therapy Goal     OT, PT/OT Progressing    Description: O.T. goals met 11-10-24  Mod a with bsc transfer  Min a with ue dressing  Pt will tolerate 1 set x 10 reps b ue rom exercise                       Time Tracking:     OT Date of Treatment: 11/02/24  OT Start Time: 1005  OT Stop Time: 1030  OT Total Time (min): 25 min    Billable Minutes:Therapeutic Activity 25    Marilu Garcia OT  11/2/2024

## 2024-11-02 NOTE — PLAN OF CARE
Evaluation completed. Pt performed bed mobility with min A and much increased time for task performance. Pt performed STS with min A x 2, however, quickly sat down d/t reports of fatigue. Pt required increased time to recover and transferred with min A x 2 to bedside chair.

## 2024-11-03 LAB
ANION GAP SERPL CALC-SCNC: 12 MMOL/L (ref 8–16)
BASOPHILS # BLD AUTO: 0.07 K/UL (ref 0–0.2)
BASOPHILS NFR BLD: 1 % (ref 0–1.9)
BUN SERPL-MCNC: 8 MG/DL (ref 8–23)
CALCIUM SERPL-MCNC: 8.1 MG/DL (ref 8.7–10.5)
CHLORIDE SERPL-SCNC: 106 MMOL/L (ref 95–110)
CO2 SERPL-SCNC: 19 MMOL/L (ref 23–29)
CREAT SERPL-MCNC: 0.5 MG/DL (ref 0.5–1.4)
DIFFERENTIAL METHOD BLD: ABNORMAL
EOSINOPHIL # BLD AUTO: 0.1 K/UL (ref 0–0.5)
EOSINOPHIL NFR BLD: 1.3 % (ref 0–8)
ERYTHROCYTE [DISTWIDTH] IN BLOOD BY AUTOMATED COUNT: 20.9 % (ref 11.5–14.5)
EST. GFR  (NO RACE VARIABLE): >60 ML/MIN/1.73 M^2
GLUCOSE SERPL-MCNC: 102 MG/DL (ref 70–110)
HCT VFR BLD AUTO: 28.2 % (ref 37–48.5)
HGB BLD-MCNC: 9.1 G/DL (ref 12–16)
IMM GRANULOCYTES # BLD AUTO: 0.24 K/UL (ref 0–0.04)
IMM GRANULOCYTES NFR BLD AUTO: 3.5 % (ref 0–0.5)
LYMPHOCYTES # BLD AUTO: 1.3 K/UL (ref 1–4.8)
LYMPHOCYTES NFR BLD: 18.7 % (ref 18–48)
MCH RBC QN AUTO: 31 PG (ref 27–31)
MCHC RBC AUTO-ENTMCNC: 32.3 G/DL (ref 32–36)
MCV RBC AUTO: 96 FL (ref 82–98)
MONOCYTES # BLD AUTO: 0.7 K/UL (ref 0.3–1)
MONOCYTES NFR BLD: 10.7 % (ref 4–15)
NEUTROPHILS # BLD AUTO: 4.4 K/UL (ref 1.8–7.7)
NEUTROPHILS NFR BLD: 64.8 % (ref 38–73)
NRBC BLD-RTO: 1 /100 WBC
PLATELET # BLD AUTO: 545 K/UL (ref 150–450)
PMV BLD AUTO: 8.7 FL (ref 9.2–12.9)
POTASSIUM SERPL-SCNC: 4.3 MMOL/L (ref 3.5–5.1)
RBC # BLD AUTO: 2.94 M/UL (ref 4–5.4)
SODIUM SERPL-SCNC: 137 MMOL/L (ref 136–145)
WBC # BLD AUTO: 6.85 K/UL (ref 3.9–12.7)

## 2024-11-03 PROCEDURE — 25000003 PHARM REV CODE 250: Performed by: NURSE PRACTITIONER

## 2024-11-03 PROCEDURE — 63600175 PHARM REV CODE 636 W HCPCS

## 2024-11-03 PROCEDURE — 25000003 PHARM REV CODE 250: Performed by: HOSPITALIST

## 2024-11-03 PROCEDURE — 80048 BASIC METABOLIC PNL TOTAL CA: CPT | Performed by: HOSPITALIST

## 2024-11-03 PROCEDURE — 21400001 HC TELEMETRY ROOM

## 2024-11-03 PROCEDURE — 85025 COMPLETE CBC W/AUTO DIFF WBC: CPT | Performed by: HOSPITALIST

## 2024-11-03 RX ADMIN — CARBAMAZEPINE 200 MG: 200 TABLET ORAL at 08:11

## 2024-11-03 RX ADMIN — Medication 400 ML: at 05:11

## 2024-11-03 RX ADMIN — LEVETIRACETAM 500 MG: 100 SOLUTION ORAL at 09:11

## 2024-11-03 RX ADMIN — Medication 1 TABLET: at 09:11

## 2024-11-03 RX ADMIN — APIXABAN 5 MG: 2.5 TABLET, FILM COATED ORAL at 09:11

## 2024-11-03 RX ADMIN — ACETAMINOPHEN 650 MG: 325 TABLET ORAL at 09:11

## 2024-11-03 RX ADMIN — CARBAMAZEPINE 200 MG: 200 TABLET ORAL at 02:11

## 2024-11-03 RX ADMIN — FERROUS SULFATE TAB 325 MG (65 MG ELEMENTAL FE) 1 EACH: 325 (65 FE) TAB at 09:11

## 2024-11-03 RX ADMIN — ENOXAPARIN SODIUM 50 MG: 60 INJECTION SUBCUTANEOUS at 08:11

## 2024-11-03 RX ADMIN — Medication 400 ML: at 10:11

## 2024-11-03 RX ADMIN — CARBAMAZEPINE 200 MG: 200 TABLET ORAL at 09:11

## 2024-11-03 RX ADMIN — Medication 1 TABLET: at 08:11

## 2024-11-03 RX ADMIN — LEVETIRACETAM 500 MG: 100 SOLUTION ORAL at 08:11

## 2024-11-03 RX ADMIN — FERROUS SULFATE TAB 325 MG (65 MG ELEMENTAL FE) 1 EACH: 325 (65 FE) TAB at 08:11

## 2024-11-03 RX ADMIN — Medication 400 ML: at 02:11

## 2024-11-03 RX ADMIN — ACETAMINOPHEN 650 MG: 325 TABLET ORAL at 11:11

## 2024-11-03 RX ADMIN — ZONISAMIDE 200 MG: 100 CAPSULE ORAL at 09:11

## 2024-11-03 RX ADMIN — ZONISAMIDE 200 MG: 100 CAPSULE ORAL at 08:11

## 2024-11-03 NOTE — PLAN OF CARE
Discussed poc with pt, pt verbalized understanding    Purposeful rounding every 2hours    VS wnl  Fall precautions in place, remains injury free  Pain and nausea under control with PRN meds    Accurate I&Os  Abx given as prescribed  Bed locked at lowest position  Call light within reach    Chart check complete  Will cont with POC

## 2024-11-03 NOTE — PLAN OF CARE
Discussed poc with pt and her  at the bedside    Purposeful rounding     VS wnl  Cardiac monitoring in use      Fall precautions in place, remains injury free  Pt denies c/o anything  Pain and nausea under control with PRN meds      Accurate I&Os    Bed locked at lowest position  Call light within reach    Chart check continued  Will cont with POC

## 2024-11-03 NOTE — SUBJECTIVE & OBJECTIVE
Review of Systems   All other systems reviewed and are negative.    Objective:     Vital Signs (Most Recent):  Temp: 98 °F (36.7 °C) (11/03/24 1313)  Pulse: 83 (11/03/24 1313)  Resp: 16 (11/03/24 1313)  BP: (!) 142/69 (11/03/24 1313)  SpO2: 99 % (11/03/24 1313) Vital Signs (24h Range):  Temp:  [97.9 °F (36.6 °C)-98.5 °F (36.9 °C)] 98 °F (36.7 °C)  Pulse:  [76-90] 83  Resp:  [16-22] 16  SpO2:  [98 %-100 %] 99 %  BP: ()/(55-69) 142/69     Weight: 52.6 kg (115 lb 15.4 oz)  Body mass index is 19.3 kg/m².    Intake/Output Summary (Last 24 hours) at 11/3/2024 1454  Last data filed at 11/3/2024 1115  Gross per 24 hour   Intake 2417 ml   Output 1105 ml   Net 1312 ml         Physical Exam  Vitals and nursing note reviewed.   Constitutional:       General: She is not in acute distress.     Appearance: She is normal weight.   Pulmonary:      Effort: Pulmonary effort is normal. No respiratory distress.   Abdominal:      Comments: G-tube   Musculoskeletal:      Comments: Right nephrostomy  RUE PICC line   Neurological:      Mental Status: She is alert.             Significant Labs: All pertinent labs within the past 24 hours have been reviewed.  Recent Lab Results         11/03/24  0515        Anion Gap 12       Baso # 0.07       Basophil % 1.0       BUN 8       Calcium 8.1       Chloride 106       CO2 19       Creatinine 0.5       Differential Method Automated       eGFR >60       Eos # 0.1       Eos % 1.3       Glucose 102       Gran # (ANC) 4.4       Gran % 64.8       Hematocrit 28.2       Hemoglobin 9.1       Immature Grans (Abs) 0.24  Comment: Mild elevation in immature granulocytes is non specific and   can be seen in a variety of conditions including stress response,   acute inflammation, trauma and pregnancy. Correlation with other   laboratory and clinical findings is essential.         Immature Granulocytes 3.5       Lymph # 1.3       Lymph % 18.7       MCH 31.0       MCHC 32.3       MCV 96       Mono # 0.7        Mono % 10.7       MPV 8.7       nRBC 1       Platelet Count 545       Potassium 4.3       RBC 2.94       RDW 20.9       Sodium 137       WBC 6.85               Significant Imaging: I have reviewed all pertinent imaging results/findings within the past 24 hours.    IR Antegrade Pyelogram (xpd)   Final Result      IR IVC Filter Placement   Final Result      1. Successful placement of an Bard G2 retrievable infrarenal IVC filter.   Plan:      1. Retrieval intent (MIPS): The filter is potentially retrievable.  Please contact IR to schedule IVC filter removal when/if clinically indicated.         Electronically signed by: Harley Aggarwal MD   Date:    10/30/2024   Time:    15:22      X-Ray Chest 1 View   Final Result      Right PICC line in good position.         Electronically signed by: Caden Valdez   Date:    10/26/2024   Time:    13:02      X-Ray Abdomen AP 1 View   Final Result      As above.         Electronically signed by: Caden Valdez   Date:    10/26/2024   Time:    10:30      CT Head Without Contrast   Final Result      No acute abnormality.         Electronically signed by: Rama Paris   Date:    10/24/2024   Time:    19:48      X-Ray Chest AP Portable   Final Result      No acute abnormality.         Electronically signed by: Caden Valdez   Date:    10/24/2024   Time:    14:28      Anesthesia US Guide Vascular Access    (Results Pending)

## 2024-11-03 NOTE — PROGRESS NOTES
[facility] Medicine  Progress Note    Patient Name: Cheryl Kirkland  MRN: [id number]  Patient Class: IP- Inpatient   Admission Date: 10/24/2024  Length of Stay: 10 days  Attending Physician: Zachery Boyd MD  Primary Care Provider: Gagandeep Iglesias MD        Subjective:     Principal Problem:Syncope        HPI:    Patient is a 74-year-old female with past medical history significant for hypertension, hyperlipidemia, DVT in right lower extremity, epilepsy, anemia, neuropathy, GERD, gastritis, polyps, gastric adenocarcinoma status post treatment with Keytruda (followed by Dr. Jerome and Dr. Ambrose) and total gastrectomy on 08/20/2024, jejunostomy tube on supplemental tube feedings, chronic diarrhea, ovarian cancer (s/p surgery/chemotherapy), osteoporosis and recent admission 10/9/24 through 10/21/2024 due to 8 mm right ureteral obstructing stone with right-sided hydronephrosis s/p cystoscopy and right nephrostomy tube placement, and acute cystitis with SHELLEY also found to have extensive DVT RLE treated with heparin drip transitioned to Eliquis, was discharged to SNF at [facility]. On 10/24, she was having physical therapy at SNF and had syncopal episode. There was no fall or injury, however she has been confused and does not remember what has been going on today.  initial blood pressure on arrival to /41, heart rate 79, temp 98.5°, respirations 18, 100% SpO2 on room air.  Lab workup demonstrate WBC 10.52 with left shift, RBC 3.20, hemoglobin 10.1, hematocrit 30.4, platelets 559, potassium 2.9, CO2 19, BUN 11, creatinine 0.7, glucose 115, calcium 7.2, magnesium 1.5, alk phos 174, total protein 5.0, albumin 1.8, total bilirubin 0.3, AST 12, ALT 17, BNP 29, CPK 25, trop less than 0.006, UA- with cloudy brown urine positive for nitrite, trace leukocyte esterase, greater than 100 RBC, 9 WBC. EKG -- NSR HR 76, nonspecific ST and T wave abnormality, no STEMI. CXR demonstrates  that lungs are clear. CT head without contrast done with no acute abnormality. Recent Echo done 10/10/24 with EF 65-70% and normal diastolic function. While in ED, she was given potassium 40 po and magnesium sulfate 2 g IV as well as IV Rocephin. Hospital Medicine was consulted for admission due to syncope with continued confusion, UTI, hypokalemia, and hypomagnesemia.     Overview/Hospital Course:  Patient is a 74 year old female admitted to the hospital for c/o syncopal episode during PT at NH.   Labs: WBC 7.33, RBC 2.72, hemoglobin 8.6, hematocrit 25.0, platelets 488, Potassium 2.5,   -UA- with cloudy brown urine positive for nitrite, trace leukocyte esterase, greater than 100 RBC, 9 WBC.   -IV abx: Rocephin   -CT head without contrast done with no acute abnormality.   -Hematology consulted due to patient being on eliquis for anticoagulation for DVT but will need needs ESWL for an impacted ureteral stone and removal of nephrostomy tube per urology. Patient is high risk to be off of anticoagulation for urology procedure but due to ureteral stone which is impacting that needs to be removed may be able to bridge patient with Lovenox during procedure. If urology would be uncomfortable with this plan would recommend filter placement prior to procedure per Dr. Lee.   -Urology consulted as patient was reporting pain at PCN site. Impacted ureteral stone may require treatment with ESWL once patient can safely be off of . Discussed if patient is not cleared to temporarily come off anticoagulation within 3 months the nephrostomy tube may need to be exchanged. Nephrostogram and stent order placed by urology. Patient transitioned to therapeutic 1mg/kg lovenox from eliquis for possible procedure   -Pt/Ot to eval and treat: recommend SNF   -EKG: Normal sinus rhythm   -Patient required placement of PICC line as phlebotomy and nursing staff were unable to obtain labs.     10/28/24  NAEON, patient resting in bed,  at  bedside  Plans for IVC filter placement, currently on Lovenox 1 mg/kg BID  Urology with plans for ESWL pending    10/29/24  NAEON, plans for IVC filter tomorrow  On Lovenox, hold evening dose, NPO after midnight    10/30/24  NAEON, no new issues  IVC filter and right PCN tube change and antegrade ureteral stent    10/31/24  Patient awake, alert, answering questions appropriately   at bedside, on phone  Updated plan of care  Plan for discharge to SNF tomorrow  F/U with Urology 11/18/24 11/1 - 11/3, 2024  NAEON, no complaints  SNF insurance authorization pending  Restarted Eliquis  F/U with Urology in 1-2 weeks for further management      Review of Systems   All other systems reviewed and are negative.    Objective:     Vital Signs (Most Recent):  Temp: 98 °F (36.7 °C) (11/03/24 1313)  Pulse: 83 (11/03/24 1313)  Resp: 16 (11/03/24 1313)  BP: (!) 142/69 (11/03/24 1313)  SpO2: 99 % (11/03/24 1313) Vital Signs (24h Range):  Temp:  [97.9 °F (36.6 °C)-98.5 °F (36.9 °C)] 98 °F (36.7 °C)  Pulse:  [76-90] 83  Resp:  [16-22] 16  SpO2:  [98 %-100 %] 99 %  BP: ()/(55-69) 142/69     Weight: 52.6 kg (115 lb 15.4 oz)  Body mass index is 19.3 kg/m².    Intake/Output Summary (Last 24 hours) at 11/3/2024 1454  Last data filed at 11/3/2024 1115  Gross per 24 hour   Intake 2417 ml   Output 1105 ml   Net 1312 ml         Physical Exam  Vitals and nursing note reviewed.   Constitutional:       General: She is not in acute distress.     Appearance: She is normal weight.   Pulmonary:      Effort: Pulmonary effort is normal. No respiratory distress.   Abdominal:      Comments: G-tube   Musculoskeletal:      Comments: Right nephrostomy  RUE PICC line   Neurological:      Mental Status: She is alert.             Significant Labs: All pertinent labs within the past 24 hours have been reviewed.  Recent Lab Results         11/03/24  0515        Anion Gap 12       Baso # 0.07       Basophil % 1.0       BUN 8       Calcium 8.1        Chloride 106       CO2 19       Creatinine 0.5       Differential Method Automated       eGFR >60       Eos # 0.1       Eos % 1.3       Glucose 102       Gran # (ANC) 4.4       Gran % 64.8       Hematocrit 28.2       Hemoglobin 9.1       Immature Grans (Abs) 0.24  Comment: Mild elevation in immature granulocytes is non specific and   can be seen in a variety of conditions including stress response,   acute inflammation, trauma and pregnancy. Correlation with other   laboratory and clinical findings is essential.         Immature Granulocytes 3.5       Lymph # 1.3       Lymph % 18.7       MCH 31.0       MCHC 32.3       MCV 96       Mono # 0.7       Mono % 10.7       MPV 8.7       nRBC 1       Platelet Count 545       Potassium 4.3       RBC 2.94       RDW 20.9       Sodium 137       WBC 6.85               Significant Imaging: I have reviewed all pertinent imaging results/findings within the past 24 hours.    IR Antegrade Pyelogram (xpd)   Final Result      IR IVC Filter Placement   Final Result      1. Successful placement of an Bard G2 retrievable infrarenal IVC filter.   Plan:      1. Retrieval intent (MIPS): The filter is potentially retrievable.  Please contact IR to schedule IVC filter removal when/if clinically indicated.         Electronically signed by: Harley Aggarwal MD   Date:    10/30/2024   Time:    15:22      X-Ray Chest 1 View   Final Result      Right PICC line in good position.         Electronically signed by: Caden Valdez   Date:    10/26/2024   Time:    13:02      X-Ray Abdomen AP 1 View   Final Result      As above.         Electronically signed by: Caden Valdez   Date:    10/26/2024   Time:    10:30      CT Head Without Contrast   Final Result      No acute abnormality.         Electronically signed by: Rama Paris   Date:    10/24/2024   Time:    19:48      X-Ray Chest AP Portable   Final Result      No acute abnormality.         Electronically signed by: Caden Valdez    Date:    10/24/2024   Time:    14:28      Anesthesia US Guide Vascular Access    (Results Pending)         Assessment/Plan:      * Syncope  Syncopal episode while having physical therapy -- did not fall, no injuries  CT head did not show any acute abnormality  Troponin normal, EKG with no significant changes  Lab workup shows mild UTI - Rocephin given  Had recent Echo which was normal  Hydrating via feeding tube  Cardiac monitoring  Trending troponin level- continued to be negative       Hypokalemia  Patient's most recent potassium results are listed below.   Recent Labs     10/24/24  2224 10/26/24  1640 10/27/24  0626   K 3.2* 2.5* 2.5*       Plan  - Replete potassium per protocol  - Monitor potassium Daily  - Patient's hypokalemia is being treated, will recheck labs again in a.m.     Ureteral stone with hydronephrosis  Recent imaging with 8 mm obstructing/impacted stone on right side -- s/p cystoscopy and right nephrostomy tube placement  Urology consulted , appreciate recommendations       DVT (deep venous thrombosis)  Recently diagnosed with extensive DVT RLE  Continue Eliquis      Jejunostomy tube present  Hydrating and tube feeding via tube      Gastric adenocarcinoma  - Followed by Dr. Ambrose and Dr. Jerome  - S/p total gastrectomy on 8/20, with lysis of adhesions, lymphadenectomy, and jejunostomy tube placement, d/c on 9/10  - Pathologic stage from 8/20/2024: Stage III (ypT2, pN3a, cM0), completed 5 cycles Keytruda prior to surgery  - outpatient follow up with Dr. Jerome and Dr. Ambrose      Hypomagnesemia  Patient has Abnormal Magnesium: hypomagnesemia. Will continue to monitor electrolytes closely. Will replace the affected electrolytes and repeat labs to be done after interventions completed. The patient's magnesium results have been reviewed and are listed below.  Recent Labs   Lab 10/27/24  0626   MG 2.2          Hx of Epileptic seizures  Seizure precautions  Continue Keppra and  carbamazepine      Chronic anemia  Anemia is likely due to chronic blood loss and Iron deficiency. Most recent hemoglobin and hematocrit are listed below.  Recent Labs     10/26/24  1640 10/27/24  0626   HGB 8.9* 8.6*   HCT 26.0* 25.0*       Plan  - Monitor serial CBC: Daily  - Transfuse PRBC if patient becomes hemodynamically unstable, symptomatic or H/H drops below 7/21.  - Patient has not received any PRBC transfusions to date    Severe protein-calorie malnutrition  Nutrition consulted. Most recent weight and BMI monitored- will continue tube feeding supplementation    Measurements:  Wt Readings from Last 1 Encounters:   10/25/24 49.7 kg (109 lb 9.1 oz)   Body mass index is 18.23 kg/m².        VTE Risk Mitigation (From admission, onward)           Ordered     apixaban tablet 5 mg  2 times daily         11/03/24 0839     Reason for No Pharmacological VTE Prophylaxis  Once        Question:  Reasons:  Answer:  Already adequately anticoagulated on oral Anticoagulants    10/24/24 2206     IP VTE HIGH RISK PATIENT  Once         10/24/24 2206     Place sequential compression device  Until discontinued         10/24/24 2206                    Discharge Planning   SOFIYA: 11/1/2024     Code Status: Full Code   Is the patient medically ready for discharge?:     Reason for patient still in hospital (select all that apply): Pending disposition  Discharge Plan A: Skilled Nursing Facility                  Zachery Boyd MD  Department of Hospital Medicine   [place] [facility]

## 2024-11-04 PROCEDURE — 25000003 PHARM REV CODE 250: Performed by: HOSPITALIST

## 2024-11-04 PROCEDURE — 25000003 PHARM REV CODE 250: Performed by: NURSE PRACTITIONER

## 2024-11-04 PROCEDURE — 97110 THERAPEUTIC EXERCISES: CPT

## 2024-11-04 PROCEDURE — 21400001 HC TELEMETRY ROOM

## 2024-11-04 PROCEDURE — 97530 THERAPEUTIC ACTIVITIES: CPT

## 2024-11-04 RX ADMIN — CARBAMAZEPINE 200 MG: 200 TABLET ORAL at 04:11

## 2024-11-04 RX ADMIN — APIXABAN 5 MG: 2.5 TABLET, FILM COATED ORAL at 09:11

## 2024-11-04 RX ADMIN — ZONISAMIDE 200 MG: 100 CAPSULE ORAL at 09:11

## 2024-11-04 RX ADMIN — LEVETIRACETAM 500 MG: 100 SOLUTION ORAL at 09:11

## 2024-11-04 RX ADMIN — Medication 400 ML: at 04:11

## 2024-11-04 RX ADMIN — Medication 400 ML: at 12:11

## 2024-11-04 RX ADMIN — Medication 400 ML: at 11:11

## 2024-11-04 RX ADMIN — Medication 400 ML: at 02:11

## 2024-11-04 RX ADMIN — FERROUS SULFATE TAB 325 MG (65 MG ELEMENTAL FE) 1 EACH: 325 (65 FE) TAB at 09:11

## 2024-11-04 RX ADMIN — Medication 1 TABLET: at 09:11

## 2024-11-04 RX ADMIN — CARBAMAZEPINE 200 MG: 200 TABLET ORAL at 09:11

## 2024-11-04 RX ADMIN — Medication 400 ML: at 09:11

## 2024-11-04 RX ADMIN — Medication 400 ML: at 06:11

## 2024-11-04 RX ADMIN — CARBAMAZEPINE 200 MG: 200 TABLET ORAL at 11:11

## 2024-11-04 NOTE — PLAN OF CARE
Adali spoke with Conchita at [facility]; auth still pending.

## 2024-11-04 NOTE — PT/OT/SLP PROGRESS
"Physical Therapy Treatment    Patient Name:  Cheryl Kirkland   MRN:  [id number]    Recommendations:     Discharge Recommendations: Moderate Intensity Therapy  Discharge Equipment Recommendations: to be determined by next level of care  Barriers to discharge: Decreased caregiver support    Assessment:     Cheryl Kirkland is a 74 y.o. female admitted with a medical diagnosis of Syncope.  She presents with the following impairments/functional limitations: weakness, impaired endurance, impaired functional mobility, gait instability, impaired balance, decreased safety awareness, pain, decreased lower extremity function, impaired cardiopulmonary response to activity, decreased ROM, impaired coordination.    Rehab Prognosis: Poor; patient would benefit from acute skilled PT services to address these deficits and reach maximum level of function.    Recent Surgery: * No surgery found *      Plan:     During this hospitalization, patient to be seen 3 x/week to address the identified rehab impairments via gait training, therapeutic activities, therapeutic exercises, neuromuscular re-education and progress toward the following goals:    Plan of Care Expires:  11/16/24    Subjective     Chief Complaint: Pt reported "You don't know how much I want to walk, I just can't."  Patient/Family Comments/goals: none stated  Pain/Comfort:  Pain Rating 1: 10/10  Location - Side 1: Right  Location - Orientation 1: generalized  Location 1: ankle  Pain Addressed 1: Reposition, Distraction, Nurse notified (heat pack applied by nurse)  Pain Rating Post-Intervention 1: 8/10      Objective:     Communicated with nurse Gray and epic chart review prior to session.  Patient found up in chair with PICC line, G/J tube, nephrostomy upon PT entry to room.     General Precautions: Standard, fall  Orthopedic Precautions: N/A  Braces: N/A  Respiratory Status: Room air     Functional Mobility:  Gait belt applied - Yes  Bed Mobility  Seated " "in chair at start of session and returned to chair  Transfers  Sit to Stand: minimum assistance with rolling walker  Tolerated standing x8min  Gait  Patient completed pre-gait marching x5 each LE with rolling walker and minimum assistance; decreased L foot clearance. Pt self limited R LE WB. Attempted forward steps, pt unable to advance L LE. All lines remained intact throughout ambulation trial. No c/o dizziness or SOB.   Balance  Sitting: stand by assistance  Standing: minimum assistance    Therapeutic Exercise  Patient performed 1 set(s) of 15 repetitions of the following seated exercises: ankle pumps, long arc quads, and marches for bilateral LE.   Patient required skilled PT for instruction of exercises and appropriate cues to perform exercises safely and appropriately.      AM-PAC 6 CLICK MOBILITY  Turning over in bed (including adjusting bedclothes, sheets and blankets)?: 3 (scored from previous session, NT this date)  Sitting down on and standing up from a chair with arms (e.g., wheelchair, bedside commode, etc.): 3  Moving from lying on back to sitting on the side of the bed?: 2 (scored from previous session, NT this date)  Moving to and from a bed to a chair (including a wheelchair)?: 2 (scored from previous session, NT this date)  Need to walk in hospital room?: 1  Climbing 3-5 steps with a railing?: 1  Basic Mobility Total Score: 12       Treatment & Education:  Reviewed role of PT in acute care and POC. Pt tolerated interventions fair. Reviewed importance of OOB activities, activity pacing, and HEP (marching/hip flex, hip abd, heel slides/LAQ, quad sets, ankle pumps) in order to maintain/regain strength. Reviewed proper use of RW for safety and to reduce risk of falling. Reviewed "call don't fall" policy and increased risk of falling due to weakness, instructed to utilize call bell for assistance with all transfers. Pt agreeable to all requests.    Patient left up in chair with all lines intact, call " button in reach, nurse notified, and family present..    GOALS:   Multidisciplinary Problems       Physical Therapy Goals          Problem: Physical Therapy    Goal Priority Disciplines Outcome Interventions   Physical Therapy Goal     PT, PT/OT Progressing    Description: Lt24  1. PT WILL COMPLETE BED MOBILITY WITH SBA.  2. PT WILL STAND T/F TO CHAIR WITH RW AND SBA.  3. PT WILL GT TRAIN X 100' WITH RW AND MIN A TO PROGRESS GT.  4. PT WILL INC AMPAC SCORE BY 2 POINTS TO PROGRESS GROSS FUNC MOBILITY.                            Time Tracking:     PT Received On: 24  PT Start Time: 1015     PT Stop Time: 1040  PT Total Time (min): 25 min     Billable Minutes: Therapeutic Activity 10min and Therapeutic Exercise 15min    Treatment Type: Treatment  PT/PTA: PT     Number of PTA visits since last PT visit: 0     2024

## 2024-11-04 NOTE — PLAN OF CARE
11/04/24 1341   Rounds   Attendance Provider;;Charge nurse;Physical therapist   Discharge Plan A Skilled Nursing Facility   Why the patient remains in the hospital Insurance issues;Placement issues   Transition of Care Barriers None     Awaiting insurance authorization for [facility].

## 2024-11-04 NOTE — PLAN OF CARE
Pt tolerated interventions fair due to pain. Required MIN A for STS, unable to progress gait due to pain. Recommending moderate intensity therapy upon d/c.

## 2024-11-04 NOTE — PROGRESS NOTES
[facility] Medicine  Progress Note    Patient Name: Cheryl Kirkland  MRN: [id number]  Patient Class: IP- Inpatient   Admission Date: 10/24/2024  Length of Stay: 11 days  Attending Physician: Jayce Gray MD  Primary Care Provider: Gagandeep Iglesias MD        Subjective:     Principal Problem:Syncope        HPI:    Patient is a 74-year-old female with past medical history significant for hypertension, hyperlipidemia, DVT in right lower extremity, epilepsy, anemia, neuropathy, GERD, gastritis, polyps, gastric adenocarcinoma status post treatment with Keytruda (followed by Dr. Jerome and Dr. Ambrose) and total gastrectomy on 08/20/2024, jejunostomy tube on supplemental tube feedings, chronic diarrhea, ovarian cancer (s/p surgery/chemotherapy), osteoporosis and recent admission 10/9/24 through 10/21/2024 due to 8 mm right ureteral obstructing stone with right-sided hydronephrosis s/p cystoscopy and right nephrostomy tube placement, and acute cystitis with SHELLEY also found to have extensive DVT RLE treated with heparin drip transitioned to Eliquis, was discharged to SNF at [facility]. On 10/24, she was having physical therapy at SNF and had syncopal episode. There was no fall or injury, however she has been confused and does not remember what has been going on today.  initial blood pressure on arrival to /41, heart rate 79, temp 98.5°, respirations 18, 100% SpO2 on room air.  Lab workup demonstrate WBC 10.52 with left shift, RBC 3.20, hemoglobin 10.1, hematocrit 30.4, platelets 559, potassium 2.9, CO2 19, BUN 11, creatinine 0.7, glucose 115, calcium 7.2, magnesium 1.5, alk phos 174, total protein 5.0, albumin 1.8, total bilirubin 0.3, AST 12, ALT 17, BNP 29, CPK 25, trop less than 0.006, UA- with cloudy brown urine positive for nitrite, trace leukocyte esterase, greater than 100 RBC, 9 WBC. EKG -- NSR HR 76, nonspecific ST and T wave abnormality, no STEMI. CXR  demonstrates that lungs are clear. CT head without contrast done with no acute abnormality. Recent Echo done 10/10/24 with EF 65-70% and normal diastolic function. While in ED, she was given potassium 40 po and magnesium sulfate 2 g IV as well as IV Rocephin. Hospital Medicine was consulted for admission due to syncope with continued confusion, UTI, hypokalemia, and hypomagnesemia.     Overview/Hospital Course:  Patient is a 74 year old female admitted to the hospital for c/o syncopal episode during PT at NH.   Labs: WBC 7.33, RBC 2.72, hemoglobin 8.6, hematocrit 25.0, platelets 488, Potassium 2.5,   -UA- with cloudy brown urine positive for nitrite, trace leukocyte esterase, greater than 100 RBC, 9 WBC.   -IV abx: Rocephin   -CT head without contrast done with no acute abnormality.   -Hematology consulted due to patient being on eliquis for anticoagulation for DVT but will need needs ESWL for an impacted ureteral stone and removal of nephrostomy tube per urology. Patient is high risk to be off of anticoagulation for urology procedure but due to ureteral stone which is impacting that needs to be removed may be able to bridge patient with Lovenox during procedure. If urology would be uncomfortable with this plan would recommend filter placement prior to procedure per Dr. Lee.   -Urology consulted as patient was reporting pain at PCN site. Impacted ureteral stone may require treatment with ESWL once patient can safely be off of . Discussed if patient is not cleared to temporarily come off anticoagulation within 3 months the nephrostomy tube may need to be exchanged. Nephrostogram and stent order placed by urology. Patient transitioned to therapeutic 1mg/kg lovenox from eliquis for possible procedure   -Pt/Ot to eval and treat: recommend SNF   -EKG: Normal sinus rhythm   -Patient required placement of PICC line as phlebotomy and nursing staff were unable to obtain labs.     10/28/24  NAEON, patient resting in bed,  " at bedside  Plans for IVC filter placement, currently on Lovenox 1 mg/kg BID  Urology with plans for ESWL pending    10/29/24  NAEON, plans for IVC filter tomorrow  On Lovenox, hold evening dose, NPO after midnight    10/30/24  NAEON, no new issues  IVC filter and right PCN tube change and antegrade ureteral stent    10/31/24  Patient awake, alert, answering questions appropriately   at bedside, on phone  Updated plan of care  Plan for discharge to SNF tomorrow  F/U with Urology 11/18/24 11/1 - 11/3, 2024  NAEON, no complaints  SNF insurance authorization pending  Restarted Eliquis  F/U with Urology in 1-2 weeks for further management    11/04/2024  Reports developing ankle pain over the weekend, cannot recall injury or trauma to region. PT/OT has been following, patient cooperated as much as she could tolerate with her current ankle pain. Will continue to monitor progress over the next 24 hours while SNF authorization is pending.       Interval history:  See hospital course    Objective:   /68 (BP Location: Left leg, Patient Position: Lying)   Pulse 85   Temp 98.7 °F (37.1 °C) (Oral)   Resp 18   Ht 5' 5" (1.651 m)   Wt 52.6 kg (115 lb 15.4 oz)   SpO2 99%   BMI 19.30 kg/m²     Intake/Output Summary (Last 24 hours) at 11/4/2024 1739  Last data filed at 11/4/2024 1620  Gross per 24 hour   Intake 2440 ml   Output 900 ml   Net 1540 ml       PHYSICAL EXAM  Vitals reviewed  GEN: No acute distress, pleasant, body habitus underweight  Pulmonary:      Effort: Pulmonary effort is normal. No respiratory distress.   Abdominal:      Comments: G-tube   Musculoskeletal:      Comments: Right nephrostomy  RUE PICC line       Right ankle: mild swelling in the medial malleolus area, tenderness to manipulation  Neurological:      Mental Status: She is alert.     LABS  All labs from the past 24 hours were reviewed.     BMP:   Recent Labs   Lab 11/03/24  0515         K 4.3      CO2 19* " "  BUN 8   CREATININE 0.5   CALCIUM 8.1*     CBC:   Recent Labs   Lab 11/03/24  0515   WBC 6.85   HGB 9.1*   HCT 28.2*   *     CMP:   Recent Labs   Lab 11/03/24  0515      K 4.3      CO2 19*      BUN 8   CREATININE 0.5   CALCIUM 8.1*   ANIONGAP 12     Cardiac Markers: No results for input(s): "CKMB", "MYOGLOBIN", "BNP", "TROPISTAT" in the last 48 hours.  Coagulation: No results for input(s): "PT", "INR", "APTT" in the last 48 hours.  Lactic Acid: No results for input(s): "LACTATE" in the last 48 hours.  Magnesium: No results for input(s): "MG" in the last 48 hours.  Troponin: No results for input(s): "TROPONINI", "TROPONINIHS" in the last 48 hours.  TSH:   Recent Labs   Lab 10/10/24  0731   TSH 0.739     Urine Studies:   No results for input(s): "COLORU", "APPEARANCEUA", "PHUR", "SPECGRAV", "PROTEINUA", "GLUCUA", "KETONESU", "BILIRUBINUA", "OCCULTUA", "NITRITE", "UROBILINOGEN", "LEUKOCYTESUR", "RBCUA", "WBCUA", "BACTERIA", "SQUAMEPITHEL", "HYALINECASTS" in the last 48 hours.    Invalid input(s): "WRIGHTSUR"    IMAGING  All imaging from the past 24 hours were reviewed.     Imaging Results              CT Head Without Contrast (Final result)  Result time 10/24/24 19:48:48      Final result by Rama Paris MD (10/24/24 19:48:48)                   Impression:      No acute abnormality.      Electronically signed by: Rama Paris  Date:    10/24/2024  Time:    19:48               Narrative:    EXAMINATION:  CT HEAD WITHOUT CONTRAST    CLINICAL HISTORY:  Syncope, recurrent;    TECHNIQUE:  Low dose axial CT images obtained throughout the head without intravenous contrast. Sagittal and coronal reconstructions were performed.    COMPARISON:  None.    FINDINGS:  Intracranial compartment:    Ventricles and sulci are normal in size for age without evidence of hydrocephalus. No extra-axial blood or fluid collections.    30No parenchymal mass, hemorrhage, edema or major vascular distribution " infarct.    Skull/extracranial contents (limited evaluation): No fracture. Mastoid air cells and paranasal sinuses are essentially clear.                                       X-Ray Chest AP Portable (Final result)  Result time 10/24/24 14:28:23      Final result by Caden Valdez MD (10/24/24 14:28:23)                   Impression:      No acute abnormality.      Electronically signed by: Caden Valdez  Date:    10/24/2024  Time:    14:28               Narrative:    EXAMINATION:  XR CHEST AP PORTABLE    CLINICAL HISTORY:  syncope;    TECHNIQUE:  Single frontal view of the chest was performed.    COMPARISON:  Multiple    FINDINGS:  The lungs are clear, with normal appearance of pulmonary vasculature and no pleural effusion or pneumothorax.    The cardiac silhouette is normal in size. The hilar and mediastinal contours are unremarkable.    Bones are intact.                                        Assessment/Plan:      * Syncope  Syncopal episode while having physical therapy -- did not fall, no injuries  CT head did not show any acute abnormality  Troponin normal, EKG with no significant changes  Lab workup shows mild UTI - Rocephin given  Had recent Echo which was normal  Hydrating via feeding tube  Cardiac monitoring  Trending troponin level- continued to be negative       Age-related physical debility  Patient with Acute on chronic debility due to bed confinement status and age-related physical debility. The patient's latest AMPAC (Activity Measure for Post Acute Care) Score is listed below.    AM-PAC Score - How much help does the patient need for each activity listed  Basic Mobility Total Score: 12  Turning over in bed (including adjusting bedclothes, sheets and blankets)?: A little (scored from previous session, NT this date)  Sitting down on and standing up from a chair with arms (e.g., wheelchair, bedside commode, etc.): A little  Moving from lying on back to sitting on the side of the bed?: A lot (scored  from previous session, NT this date)  Moving to and from a bed to a chair (including a wheelchair)?: A lot (scored from previous session, NT this date)  Need to walk in hospital room?: Unable  Climbing 3-5 steps with a railing?: Unable    Plan  - Progressive mobility protocol initated  - PT/OT consulted  - Fall precautions in place  - SNF placement pending          Hypomagnesemia  Patient has Abnormal Magnesium: hypomagnesemia. Will continue to monitor electrolytes closely. Will replace the affected electrolytes and repeat labs to be done after interventions completed. The patient's magnesium results have been reviewed and are listed below.  Recent Labs   Lab 10/27/24  0626   MG 2.2          Hypokalemia  Patient's most recent potassium results are listed below.   Recent Labs     10/24/24  2224 10/26/24  1640 10/27/24  0626   K 3.2* 2.5* 2.5*       Plan  - Replete potassium per protocol  - Monitor potassium Daily  - Patient's hypokalemia is being treated, will recheck labs again in a.m.     Ureteral stone with hydronephrosis  Recent imaging with 8 mm obstructing/impacted stone on right side -- s/p cystoscopy and right nephrostomy tube placement  Urology consulted , appreciate recommendations       Severe protein-calorie malnutrition  Nutrition consulted. Most recent weight and BMI monitored- will continue tube feeding supplementation    Measurements:  Wt Readings from Last 1 Encounters:   10/25/24 49.7 kg (109 lb 9.1 oz)   Body mass index is 18.23 kg/m².      Jejunostomy tube present  Hydrating and tube feeding via tube      Gastric adenocarcinoma  - Followed by Dr. Ambrose and Dr. Jerome  - S/p total gastrectomy on 8/20, with lysis of adhesions, lymphadenectomy, and jejunostomy tube placement, d/c on 9/10  - Pathologic stage from 8/20/2024: Stage III (ypT2, pN3a, cM0), completed 5 cycles Keytruda prior to surgery  - outpatient follow up with Dr. Jerome and Dr. Ambrose      DVT (deep venous thrombosis)  Recently  diagnosed with extensive DVT RLE  Continue Eliquis      Hx of Epileptic seizures  Seizure precautions  Continue Keppra and carbamazepine      Chronic anemia  Anemia is likely due to chronic blood loss and Iron deficiency. Most recent hemoglobin and hematocrit are listed below.  Recent Labs     10/26/24  1640 10/27/24  0626   HGB 8.9* 8.6*   HCT 26.0* 25.0*       Plan  - Monitor serial CBC: Daily  - Transfuse PRBC if patient becomes hemodynamically unstable, symptomatic or H/H drops below 7/21.  - Patient has not received any PRBC transfusions to date      VTE Risk Mitigation (From admission, onward)           Ordered     apixaban tablet 5 mg  2 times daily         11/03/24 0839     Reason for No Pharmacological VTE Prophylaxis  Once        Question:  Reasons:  Answer:  Already adequately anticoagulated on oral Anticoagulants    10/24/24 2206     IP VTE HIGH RISK PATIENT  Once         10/24/24 2206     Place sequential compression device  Until discontinued         10/24/24 2206                    Discharge Planning   SOFIYA: 11/1/2024     Code Status: Full Code   Is the patient medically ready for discharge?:     Reason for patient still in hospital (select all that apply): Pending disposition  Discharge Plan A: Skilled Nursing Facility              SNF, progress with PT/OT    Jayce Gray MD  Department of Hospital Medicine   [facility] - Med Surg

## 2024-11-04 NOTE — PLAN OF CARE
Discussed poc with pt, pt verbalized understanding    Purposeful rounding     VS wnl     Fall precautions in place, remains injury free    Pain  under control with PRN meds    Accurate I&Os    Bed locked at lowest position    Call light within reach    Chart check continued    Will cont with POC

## 2024-11-05 PROBLEM — R54 AGE-RELATED PHYSICAL DEBILITY: Chronic | Status: ACTIVE | Noted: 2024-11-05

## 2024-11-05 PROCEDURE — 25000003 PHARM REV CODE 250: Performed by: HOSPITALIST

## 2024-11-05 PROCEDURE — 97530 THERAPEUTIC ACTIVITIES: CPT | Mod: CQ

## 2024-11-05 PROCEDURE — 25000003 PHARM REV CODE 250

## 2024-11-05 PROCEDURE — 97116 GAIT TRAINING THERAPY: CPT | Mod: CQ

## 2024-11-05 PROCEDURE — 21400001 HC TELEMETRY ROOM

## 2024-11-05 PROCEDURE — 25000003 PHARM REV CODE 250: Performed by: NURSE PRACTITIONER

## 2024-11-05 RX ADMIN — Medication 400 ML: at 06:11

## 2024-11-05 RX ADMIN — LEVETIRACETAM 500 MG: 100 SOLUTION ORAL at 09:11

## 2024-11-05 RX ADMIN — ZONISAMIDE 200 MG: 100 CAPSULE ORAL at 09:11

## 2024-11-05 RX ADMIN — ACETAMINOPHEN 650 MG: 325 TABLET ORAL at 11:11

## 2024-11-05 RX ADMIN — Medication 400 ML: at 03:11

## 2024-11-05 RX ADMIN — CARBAMAZEPINE 200 MG: 200 TABLET ORAL at 09:11

## 2024-11-05 RX ADMIN — Medication 1 TABLET: at 09:11

## 2024-11-05 RX ADMIN — APIXABAN 5 MG: 2.5 TABLET, FILM COATED ORAL at 09:11

## 2024-11-05 RX ADMIN — FERROUS SULFATE TAB 325 MG (65 MG ELEMENTAL FE) 1 EACH: 325 (65 FE) TAB at 09:11

## 2024-11-05 RX ADMIN — OXYCODONE HYDROCHLORIDE 5 MG: 5 TABLET ORAL at 09:11

## 2024-11-05 RX ADMIN — CARBAMAZEPINE 200 MG: 200 TABLET ORAL at 02:11

## 2024-11-05 RX ADMIN — Medication 400 ML: at 02:11

## 2024-11-05 RX ADMIN — Medication 400 ML: at 09:11

## 2024-11-05 RX ADMIN — Medication 400 ML: at 10:11

## 2024-11-05 NOTE — PLAN OF CARE
Discussed poc with pt, pt verbalized understanding    Purposeful rounding every 2hours    VS wnl  Fall precautions in place, remains injury free  Pain and nausea under control with PRN meds    Accurate I&Os  Abx given as prescribed  Bed locked at lowest position  Call light within reach    Chart check complete  Will cont with POC      No - the patient is unable to be screened due to medical condition

## 2024-11-05 NOTE — ASSESSMENT & PLAN NOTE
Patient with Acute on chronic debility due to bed confinement status and age-related physical debility. The patient's latest AMPAC (Activity Measure for Post Acute Care) Score is listed below.    AM-PAC Score - How much help does the patient need for each activity listed  Basic Mobility Total Score: 12  Turning over in bed (including adjusting bedclothes, sheets and blankets)?: A little (scored from previous session, NT this date)  Sitting down on and standing up from a chair with arms (e.g., wheelchair, bedside commode, etc.): A little  Moving from lying on back to sitting on the side of the bed?: A lot (scored from previous session, NT this date)  Moving to and from a bed to a chair (including a wheelchair)?: A lot (scored from previous session, NT this date)  Need to walk in hospital room?: Unable  Climbing 3-5 steps with a railing?: Unable    Plan  - Progressive mobility protocol initated  - PT/OT consulted  - Fall precautions in place  - SNF placement pending

## 2024-11-05 NOTE — PROGRESS NOTES
[facility] Medicine  Progress Note    Patient Name: Cheryl Kirkland  MRN: [id number]  Patient Class: IP- Inpatient   Admission Date: 10/24/2024  Length of Stay: 12 days  Attending Physician: Jayce Gray MD  Primary Care Provider: Gagandeep Iglesias MD        Subjective:     Principal Problem:Syncope        HPI:    Patient is a 74-year-old female with past medical history significant for hypertension, hyperlipidemia, DVT in right lower extremity, epilepsy, anemia, neuropathy, GERD, gastritis, polyps, gastric adenocarcinoma status post treatment with Keytruda (followed by Dr. Jerome and Dr. Ambrose) and total gastrectomy on 08/20/2024, jejunostomy tube on supplemental tube feedings, chronic diarrhea, ovarian cancer (s/p surgery/chemotherapy), osteoporosis and recent admission 10/9/24 through 10/21/2024 due to 8 mm right ureteral obstructing stone with right-sided hydronephrosis s/p cystoscopy and right nephrostomy tube placement, and acute cystitis with SHELLEY also found to have extensive DVT RLE treated with heparin drip transitioned to Eliquis, was discharged to SNF at [facility]. On 10/24, she was having physical therapy at SNF and had syncopal episode. There was no fall or injury, however she has been confused and does not remember what has been going on today.  initial blood pressure on arrival to /41, heart rate 79, temp 98.5°, respirations 18, 100% SpO2 on room air.  Lab workup demonstrate WBC 10.52 with left shift, RBC 3.20, hemoglobin 10.1, hematocrit 30.4, platelets 559, potassium 2.9, CO2 19, BUN 11, creatinine 0.7, glucose 115, calcium 7.2, magnesium 1.5, alk phos 174, total protein 5.0, albumin 1.8, total bilirubin 0.3, AST 12, ALT 17, BNP 29, CPK 25, trop less than 0.006, UA- with cloudy brown urine positive for nitrite, trace leukocyte esterase, greater than 100 RBC, 9 WBC. EKG -- NSR HR 76, nonspecific ST and T wave abnormality, no STEMI. CXR  demonstrates that lungs are clear. CT head without contrast done with no acute abnormality. Recent Echo done 10/10/24 with EF 65-70% and normal diastolic function. While in ED, she was given potassium 40 po and magnesium sulfate 2 g IV as well as IV Rocephin. Hospital Medicine was consulted for admission due to syncope with continued confusion, UTI, hypokalemia, and hypomagnesemia.     Overview/Hospital Course:  Patient is a 74 year old female admitted to the hospital for c/o syncopal episode during PT at NH.   Labs: WBC 7.33, RBC 2.72, hemoglobin 8.6, hematocrit 25.0, platelets 488, Potassium 2.5,   -UA- with cloudy brown urine positive for nitrite, trace leukocyte esterase, greater than 100 RBC, 9 WBC.   -IV abx: Rocephin   -CT head without contrast done with no acute abnormality.   -Hematology consulted due to patient being on eliquis for anticoagulation for DVT but will need needs ESWL for an impacted ureteral stone and removal of nephrostomy tube per urology. Patient is high risk to be off of anticoagulation for urology procedure but due to ureteral stone which is impacting that needs to be removed may be able to bridge patient with Lovenox during procedure. If urology would be uncomfortable with this plan would recommend filter placement prior to procedure per Dr. Lee.   -Urology consulted as patient was reporting pain at PCN site. Impacted ureteral stone may require treatment with ESWL once patient can safely be off of . Discussed if patient is not cleared to temporarily come off anticoagulation within 3 months the nephrostomy tube may need to be exchanged. Nephrostogram and stent order placed by urology. Patient transitioned to therapeutic 1mg/kg lovenox from eliquis for possible procedure   -Pt/Ot to eval and treat: recommend SNF   -EKG: Normal sinus rhythm   -Patient required placement of PICC line as phlebotomy and nursing staff were unable to obtain labs.     10/28/24  NAEON, patient resting in bed,  " at bedside  Plans for IVC filter placement, currently on Lovenox 1 mg/kg BID  Urology with plans for ESWL pending    10/29/24  NAEON, plans for IVC filter tomorrow  On Lovenox, hold evening dose, NPO after midnight    10/30/24  NAEON, no new issues  IVC filter and right PCN tube change and antegrade ureteral stent    10/31/24  Patient awake, alert, answering questions appropriately   at bedside, on phone  Updated plan of care  Plan for discharge to SNF tomorrow  F/U with Urology 11/18/24 11/1 - 11/3, 2024  NAEON, no complaints  SNF insurance authorization pending  Restarted Eliquis  F/U with Urology in 1-2 weeks for further management    11/04/2024  Reports developing ankle pain over the weekend, cannot recall injury or trauma to region. PT/OT has been following, patient cooperated as much as she could tolerate with her current ankle pain. Will continue to monitor progress over the next 24 hours while SNF authorization is pending.     11/05/2024  Radiography of ankle shows no acute fracture. Topical therapies ordered for pain control. Counseled patient on doing her best with PT/OT as this would yield the fastest recovery of her ankle pain. Patient has consistently worked with PT/OT the last 2 days. Patient acknowledged understanding of this plan and agreed to continue to do so. For these reasons and patient's capabilities seen on recent PT evaluation, would think SNF is still needed for moderate intensity therapy but will continue to monitor progress with PT/OT.     UPDATE: P2P performed. [employer] approved SNF. Plans to discharge to facility in the AM.     Interval history:  See hospital course    Objective:   /66 (BP Location: Left leg, Patient Position: Lying)   Pulse 80   Temp 98.2 °F (36.8 °C) (Oral)   Resp 18   Ht 5' 5" (1.651 m)   Wt 52.6 kg (115 lb 15.4 oz)   SpO2 99%   BMI 19.30 kg/m²     Intake/Output Summary (Last 24 hours) at 11/5/2024 3015  Last data filed at 11/5/2024 " 1534  Gross per 24 hour   Intake 3117 ml   Output 526 ml   Net 2591 ml       PHYSICAL EXAM  Vitals reviewed  GEN: No acute distress, pleasant, body habitus underweight  Pulmonary:      Effort: Pulmonary effort is normal. No respiratory distress.   Abdominal:      Comments: G-tube   Musculoskeletal:      Comments: Right nephrostomy  RUE PICC line       Right ankle: mild swelling in the medial malleolus area, tenderness to manipulation  Neurological:      Mental Status: She is alert.     LABS  All labs from the past 24 hours were reviewed.       TSH:   Recent Labs   Lab 10/10/24  0731   TSH 0.739       IMAGING  All imaging from the past 24 hours were reviewed.     Imaging Results              CT Head Without Contrast (Final result)  Result time 10/24/24 19:48:48      Final result by Rama Paris MD (10/24/24 19:48:48)                   Impression:      No acute abnormality.      Electronically signed by: Rama Paris  Date:    10/24/2024  Time:    19:48               Narrative:    EXAMINATION:  CT HEAD WITHOUT CONTRAST    CLINICAL HISTORY:  Syncope, recurrent;    TECHNIQUE:  Low dose axial CT images obtained throughout the head without intravenous contrast. Sagittal and coronal reconstructions were performed.    COMPARISON:  None.    FINDINGS:  Intracranial compartment:    Ventricles and sulci are normal in size for age without evidence of hydrocephalus. No extra-axial blood or fluid collections.    30No parenchymal mass, hemorrhage, edema or major vascular distribution infarct.    Skull/extracranial contents (limited evaluation): No fracture. Mastoid air cells and paranasal sinuses are essentially clear.                                       X-Ray Chest AP Portable (Final result)  Result time 10/24/24 14:28:23      Final result by Caden Valdez MD (10/24/24 14:28:23)                   Impression:      No acute abnormality.      Electronically signed by: Caden  Courtney  Date:    10/24/2024  Time:    14:28               Narrative:    EXAMINATION:  XR CHEST AP PORTABLE    CLINICAL HISTORY:  syncope;    TECHNIQUE:  Single frontal view of the chest was performed.    COMPARISON:  Multiple    FINDINGS:  The lungs are clear, with normal appearance of pulmonary vasculature and no pleural effusion or pneumothorax.    The cardiac silhouette is normal in size. The hilar and mediastinal contours are unremarkable.    Bones are intact.                                        Assessment/Plan:      * Syncope  Syncopal episode while having physical therapy -- did not fall, no injuries  CT head did not show any acute abnormality  Troponin normal, EKG with no significant changes  Lab workup shows mild UTI - Rocephin given  Had recent Echo which was normal  Hydrating via feeding tube  Cardiac monitoring  Trending troponin level- continued to be negative       Age-related physical debility  Patient with Acute on chronic debility due to bed confinement status and age-related physical debility. The patient's latest AMPAC (Activity Measure for Post Acute Care) Score is listed below.    AM-PAC Score - How much help does the patient need for each activity listed  Basic Mobility Total Score: 12  Turning over in bed (including adjusting bedclothes, sheets and blankets)?: A little (scored from previous session, NT this date)  Sitting down on and standing up from a chair with arms (e.g., wheelchair, bedside commode, etc.): A little  Moving from lying on back to sitting on the side of the bed?: A lot (scored from previous session, NT this date)  Moving to and from a bed to a chair (including a wheelchair)?: A lot (scored from previous session, NT this date)  Need to walk in hospital room?: Unable  Climbing 3-5 steps with a railing?: Unable    Plan  - Progressive mobility protocol initated  - PT/OT consulted  - Fall precautions in place  - SNF placement pending          Hypomagnesemia  Patient has Abnormal  Magnesium: hypomagnesemia. Will continue to monitor electrolytes closely. Will replace the affected electrolytes and repeat labs to be done after interventions completed. The patient's magnesium results have been reviewed and are listed below.  Recent Labs   Lab 10/27/24  0626   MG 2.2          Hypokalemia  Patient's most recent potassium results are listed below.   Recent Labs     10/24/24  2224 10/26/24  1640 10/27/24  0626   K 3.2* 2.5* 2.5*       Plan  - Replete potassium per protocol  - Monitor potassium Daily  - Patient's hypokalemia is being treated, will recheck labs again in a.m.     Ureteral stone with hydronephrosis  Recent imaging with 8 mm obstructing/impacted stone on right side -- s/p cystoscopy and right nephrostomy tube placement  Urology consulted , appreciate recommendations       Severe protein-calorie malnutrition  Nutrition consulted. Most recent weight and BMI monitored- will continue tube feeding supplementation    Measurements:  Wt Readings from Last 1 Encounters:   10/25/24 49.7 kg (109 lb 9.1 oz)   Body mass index is 18.23 kg/m².      Jejunostomy tube present  Hydrating and tube feeding via tube      Gastric adenocarcinoma  - Followed by Dr. Ambrose and Dr. Jerome  - S/p total gastrectomy on 8/20, with lysis of adhesions, lymphadenectomy, and jejunostomy tube placement, d/c on 9/10  - Pathologic stage from 8/20/2024: Stage III (ypT2, pN3a, cM0), completed 5 cycles Keytruda prior to surgery  - outpatient follow up with Dr. Jerome and Dr. Ambrose      DVT (deep venous thrombosis)  Recently diagnosed with extensive DVT RLE  Continue Eliquis      Hx of Epileptic seizures  Seizure precautions  Continue Keppra and carbamazepine      Chronic anemia  Anemia is likely due to chronic blood loss and Iron deficiency. Most recent hemoglobin and hematocrit are listed below.  Recent Labs     10/26/24  1640 10/27/24  0626   HGB 8.9* 8.6*   HCT 26.0* 25.0*       Plan  - Monitor serial CBC: Daily  -  Transfuse PRBC if patient becomes hemodynamically unstable, symptomatic or H/H drops below 7/21.  - Patient has not received any PRBC transfusions to date      VTE Risk Mitigation (From admission, onward)           Ordered     apixaban tablet 5 mg  2 times daily         11/03/24 0839     Reason for No Pharmacological VTE Prophylaxis  Once        Question:  Reasons:  Answer:  Already adequately anticoagulated on oral Anticoagulants    10/24/24 2206     IP VTE HIGH RISK PATIENT  Once         10/24/24 2206     Place sequential compression device  Until discontinued         10/24/24 2206                    Discharge Planning   SOFIYA: 11/1/2024     Code Status: Full Code   Is the patient medically ready for discharge?:     Reason for patient still in hospital (select all that apply): Pending disposition  Discharge Plan A: Skilled Nursing Facility              SNF, progress with PT/OT    Jayce Gray MD  Department of Hospital Medicine   [facility] - Med Surg

## 2024-11-05 NOTE — PT/OT/SLP PROGRESS
"Physical Therapy  Treatment    Cheryl Kirkland   MRN: [id number]   Admitting Diagnosis: Syncope    PT Received On: 11/05/24  PT Start Time: 0950     PT Stop Time: 1015    PT Total Time (min): 25 min       Billable Minutes:  Gait Training 10 and Therapeutic Activity 15    Treatment Type: Treatment  PT/PTA: PTA     Number of PTA visits since last PT visit: 1       General Precautions: Standard, fall  Orthopedic Precautions: N/A  Braces: N/A  Respiratory Status: Room air    Spiritual, Cultural Beliefs, Mormonism Practices, Values that Affect Care: no    Subjective:  Communicated with charge nurse, Flores, and completed Epic chart review prior to session.  Patient agreed to PT session.   "I can't go." (Repeatedly during gait)  "I want to walk." (Before, during and after gait)    Pain/Comfort  Pain Rating 1: 10/10  Location - Side 1: Right  Location 1: foot  Pain Addressed 1: Other (see comments) (ACTIVITY PACING)    Objective:   Patient found with: PICC line, G/J tube, nephrostomy, pressure relief boots    Supine > sit EOB: CGA    Forward scoot towards EOB: CGA    STS from EOB > RW: CGA (VC for hand placement)    10ft w/ RW Min A   (discontinuous and antalgic gait pattern; insisted she could not go further but would continue to move forward despite therapist offer to return to sitting)    Stand pivot T/F to EOB w/ RW: CGA (VC for safety and sequencing of task)    STS from EOB No AD: CGA    Stand pivot T/F to chair from EOB No AD: Min A (used arm of chair and bed rail for balance)    Completed x10 reps AROM TE to BLE: LAQ, Hip Flex, AP   Intermittent cues given as needed to maintain correct form during repetitions    Educated patient on importance of increased tolerance to upright position and direct impact on CV endurance and strength. Patient encouraged to sit up in chair/ EOB, for a minimum of 2 consecutive hours, 3x per day. Encouraged patient to perform AROM TE to BLE throughout the day within all available " planes of motion. Re enforced importance of utilizing call light to meet needs in room and not attempt to get up without staff assistance. Patient verbalized understanding and agreed to comply.       AM-PAC 6 CLICK MOBILITY  How much help from another person does this patient currently need?   1 = Unable, Total/Dependent Assistance  2 = A lot, Maximum/Moderate Assistance  3 = A little, Minimum/Contact Guard/Supervision  4 = None, Modified [place]/Independent    Turning over in bed (including adjusting bedclothes, sheets and blankets)?: 3  Sitting down on and standing up from a chair with arms (e.g., wheelchair, bedside commode, etc.): 3  Moving from lying on back to sitting on the side of the bed?: 3  Moving to and from a bed to a chair (including a wheelchair)?: 3  Need to walk in hospital room?: 3  Climbing 3-5 steps with a railing?: 1 (NT)  Basic Mobility Total Score: 16    AM-PAC Raw Score CMS G-Code Modifier Level of Impairment Assistance   6 % Total / Unable   7 - 9 CM 80 - 100% Maximal Assist   10 - 14 CL 60 - 80% Moderate Assist   15 - 19 CK 40 - 60% Moderate Assist   20 - 22 CJ 20 - 40% Minimal Assist   23 CI 1-20% SBA / CGA   24 CH 0% Independent/ Mod I     Patient left up in chair with call button in reach and  present.    Assessment:  Cheryl Kirkland is a 74 y.o. female with a medical diagnosis of Syncope and presents with overall decline in functional mobility. Patient would continue to benefit from skilled PT to address functional limitations listed below in order to return to PLOF/decrease caregiver burden. Patient with some improvement in willingness to participate this session but continues to use self limiting language.     Rehab identified problem list/impairments: weakness, impaired endurance, impaired self care skills, impaired functional mobility, gait instability, impaired balance, pain, decreased safety awareness, decreased lower extremity function, decreased  upper extremity function, decreased coordination, decreased ROM    Rehab potential is poor.    Activity tolerance: Fair    Discharge recommendations: Moderate Intensity Therapy      Barriers to discharge:      Equipment recommendations: to be determined by next level of care     GOALS:   Multidisciplinary Problems       Physical Therapy Goals          Problem: Physical Therapy    Goal Priority Disciplines Outcome Interventions   Physical Therapy Goal     PT, PT/OT Progressing    Description: Lt24  1. PT WILL COMPLETE BED MOBILITY WITH SBA.  2. PT WILL STAND T/F TO CHAIR WITH RW AND SBA.  3. PT WILL GT TRAIN X 100' WITH RW AND MIN A TO PROGRESS GT.  4. PT WILL INC AMPAC SCORE BY 2 POINTS TO PROGRESS GROSS FUNC MOBILITY.                            PLAN:    Patient to be seen 3 x/week to address the above listed problems via gait training, therapeutic activities, therapeutic exercises  Plan of Care expires: 24  Plan of Care reviewed with: patient         2024

## 2024-11-05 NOTE — PLAN OF CARE
Adali notified by Attending MD, Dr. Gray, that P2P was overturned for SNF. Plan for pt to d/c tomorrow morning pending official [employer] SNF approval letter.     Adali notified Conchita with [facility].

## 2024-11-05 NOTE — PT/OT/SLP PROGRESS
Physical Therapy      Patient Name:  Cheryl Kirkland   MRN:  [id number]    07:50 a.m.  Patient not seen today secondary to Bowel/bladder accident. Assisted patient with notifying nursing staff. Will follow-up at next available opportunity.

## 2024-11-05 NOTE — PLAN OF CARE
Adali notified by Conchita with [facility] that auth approval went to P2P.     Sw called [employer] P[employer]P line at [phone] and left a voicemail including pt's name, , ID number, MD's name, best time for P2P and contact information.     Adali notified Attending MD, Dr. Gray, of P2P being scheduled; no time available.     Pending P2P decision from [employer].     0950 Adali received a call From Arelis with [employer] P2P line; P2P scheduled for 3pm today. Attending MD, Dr. Gray notified.

## 2024-11-06 VITALS
WEIGHT: 115.94 LBS | OXYGEN SATURATION: 98 % | TEMPERATURE: 98 F | DIASTOLIC BLOOD PRESSURE: 59 MMHG | HEART RATE: 81 BPM | BODY MASS INDEX: 19.32 KG/M2 | RESPIRATION RATE: 18 BRPM | HEIGHT: 65 IN | SYSTOLIC BLOOD PRESSURE: 120 MMHG

## 2024-11-06 PROCEDURE — 25000003 PHARM REV CODE 250: Performed by: NURSE PRACTITIONER

## 2024-11-06 PROCEDURE — 25000003 PHARM REV CODE 250: Performed by: HOSPITALIST

## 2024-11-06 RX ADMIN — Medication 400 ML: at 06:11

## 2024-11-06 RX ADMIN — FERROUS SULFATE TAB 325 MG (65 MG ELEMENTAL FE) 1 EACH: 325 (65 FE) TAB at 08:11

## 2024-11-06 RX ADMIN — ZONISAMIDE 200 MG: 100 CAPSULE ORAL at 08:11

## 2024-11-06 RX ADMIN — APIXABAN 5 MG: 2.5 TABLET, FILM COATED ORAL at 08:11

## 2024-11-06 RX ADMIN — Medication 400 ML: at 02:11

## 2024-11-06 RX ADMIN — LEVETIRACETAM 500 MG: 100 SOLUTION ORAL at 08:11

## 2024-11-06 RX ADMIN — CARBAMAZEPINE 200 MG: 200 TABLET ORAL at 08:11

## 2024-11-06 RX ADMIN — Medication 1 TABLET: at 08:11

## 2024-11-06 NOTE — PLAN OF CARE
Pt ready for discharge. No s/s of distress noted. Pt free from injury. PICC discontinued. Belongings with pt. [facility].

## 2024-11-06 NOTE — PLAN OF CARE
[facility] - Med [facility]  Discharge Final Note    Primary Care Provider: Gagandeep Iglesias MD    Expected Discharge Date: 11/6/2024    Final Discharge Note (most recent)       Final Note - 11/06/24 0920          Final Note    Assessment Type Final Discharge Note     Anticipated Discharge Disposition Skilled Nursing Facility     Hospital Resources/Appts/Education Provided Post-Acute resouces added to AVS        Post-Acute Status    Post-Acute Authorization Placement     Post-Acute Placement Status Set-up Complete/Auth obtained     Discharge Delays None known at this time                    Follow-up providers       Gagandeep Iglesias MD   Specialty: Internal Medicine   Relationship: PCP - General    [facility]  [address] [place] [id number]   Phone: [phone]       Next Steps: Follow up in 3 day(s)    Lata Kelly MD   Specialty: Urology    [address]  [place] [address]   Phone: [phone]       Next Steps: Follow up in 1 week(s)    Instructions: follow up for ureteral stone    Claudia Ambrose MD   Specialty: Internal Medicine, Hematology and Oncology   Relationship: Consulting Physician    [address]  [place] [address]   Phone: [phone]       Next Steps: Follow up in 1 week(s)    Chen Jerome MD   Specialty: Surgical Oncology    [address] [facility] [address]   Phone: [phone]       Next Steps: Follow up in 1 week(s)              After-discharge care                Destination       *[facility] [place]   Service: Skilled Nursing    [address] [facility] [address]   Phone: [phone]                              met with pt and spouse at bedside this morning to update them on P2P approval for [facility]. Pt and spouse are agreeable with d/c plans for today to SNF.     D/c orders/AVS sent to SNF via [employer].      Patient has no d/c needs at this time. Sw to follow up, as needed, for d/c planning purposes.

## 2024-11-06 NOTE — PROGRESS NOTES
Tube feeds:    Vital AF @40 cc/hr (goal rate)    Flush feeding tube with 30-50 mL water: 1) Before and after feedings. 2) After residual check. 3) After bag change. 4) After medication administration. 5) If tube becomes clogged, check for impaction in stub nose adapter and clean or replace. (use 30 mL syringe to irrigate Gastric or Jejunal tube with water).     Elevate HOB to 30 - 45 degrees during feeding unless otherwise stated

## 2024-11-07 NOTE — ASSESSMENT & PLAN NOTE
Nutrition consulted. Most recent weight and BMI monitored- will continue tube feeding supplementation    Measurements:  Wt Readings from Last 1 Encounters:   10/29/24 52.6 kg (115 lb 15.4 oz)   Body mass index is 19.3 kg/m².

## 2024-11-07 NOTE — ASSESSMENT & PLAN NOTE
"Patient has Abnormal Magnesium: hypomagnesemia. Will continue to monitor electrolytes closely. Will replace the affected electrolytes and repeat labs to be done after interventions completed. The patient's magnesium results have been reviewed and are listed below.  No results for input(s): "MG" in the last 24 hours.     -resolved  "

## 2024-11-07 NOTE — ASSESSMENT & PLAN NOTE
"Patient's most recent potassium results are listed below.   No results for input(s): "K" in the last 72 hours.    Plan  - Replete potassium per protocol  - Monitor potassium Daily    "

## 2024-11-07 NOTE — ASSESSMENT & PLAN NOTE
Recent imaging with 8 mm obstructing/impacted stone on right side -- s/p cystoscopy and right nephrostomy tube placement  Urology consulted , appreciate recommendations   Follow up in 2-3 weeks with Urology

## 2024-11-07 NOTE — DISCHARGE SUMMARY
[facility]  Discharge Summary      Patient Name: Cheryl Kirkland  MRN: [id number]  [facility]: [id number]  Patient Class: IP- Inpatient  Admission Date: 10/24/2024  Hospital Length of Stay: 13 days  Discharge Date and Time: 11/6/2024 11:54 AM  Attending Physician: No att. providers found   Discharging Provider: Silvino Aguilera MD  Primary Care Provider: Gagandeep Iglesias MD    Primary Care Team: Networked reference to record PCT     HPI:     Patient is a 74-year-old female with past medical history significant for hypertension, hyperlipidemia, DVT in right lower extremity, epilepsy, anemia, neuropathy, GERD, gastritis, polyps, gastric adenocarcinoma status post treatment with Keytruda (followed by Dr. Jerome and Dr. Ambrose) and total gastrectomy on 08/20/2024, jejunostomy tube on supplemental tube feedings, chronic diarrhea, ovarian cancer (s/p surgery/chemotherapy), osteoporosis and recent admission 10/9/24 through 10/21/2024 due to 8 mm right ureteral obstructing stone with right-sided hydronephrosis s/p cystoscopy and right nephrostomy tube placement, and acute cystitis with SHELLEY also found to have extensive DVT RLE treated with heparin drip transitioned to Eliquis, was discharged to SNF at [facility]. On 10/24, she was having physical therapy at SNF and had syncopal episode. There was no fall or injury, however she has been confused and does not remember what has been going on today.  initial blood pressure on arrival to /41, heart rate 79, temp 98.5°, respirations 18, 100% SpO2 on room air.  Lab workup demonstrate WBC 10.52 with left shift, RBC 3.20, hemoglobin 10.1, hematocrit 30.4, platelets 559, potassium 2.9, CO2 19, BUN 11, creatinine 0.7, glucose 115, calcium 7.2, magnesium 1.5, alk phos 174, total protein 5.0, albumin 1.8, total bilirubin 0.3, AST 12, ALT 17, BNP 29, CPK 25, trop less than 0.006, UA- with cloudy brown urine positive for nitrite, trace  leukocyte esterase, greater than 100 RBC, 9 WBC. EKG -- NSR HR 76, nonspecific ST and T wave abnormality, no STEMI. CXR demonstrates that lungs are clear. CT head without contrast done with no acute abnormality. Recent Echo done 10/10/24 with EF 65-70% and normal diastolic function. While in ED, she was given potassium 40 po and magnesium sulfate 2 g IV as well as IV Rocephin. Hospital Medicine was consulted for admission due to syncope with continued confusion, UTI, hypokalemia, and hypomagnesemia.     * No surgery found *      Hospital Course:   Patient is a 74 year old female admitted to the hospital for c/o syncopal episode during PT at NH.   Labs: WBC 7.33, RBC 2.72, hemoglobin 8.6, hematocrit 25.0, platelets 488, Potassium 2.5,   -UA- with cloudy brown urine positive for nitrite, trace leukocyte esterase, greater than 100 RBC, 9 WBC.   -IV abx: Rocephin   -CT head without contrast done with no acute abnormality.   -Hematology consulted due to patient being on eliquis for anticoagulation for DVT but will need needs ESWL for an impacted ureteral stone and removal of nephrostomy tube per urology. Patient is high risk to be off of anticoagulation for urology procedure but due to ureteral stone which is impacting that needs to be removed may be able to bridge patient with Lovenox during procedure. If urology would be uncomfortable with this plan would recommend filter placement prior to procedure per Dr. Lee.   -Urology consulted as patient was reporting pain at PCN site. Impacted ureteral stone may require treatment with ESWL once patient can safely be off of . Discussed if patient is not cleared to temporarily come off anticoagulation within 3 months the nephrostomy tube may need to be exchanged. Nephrostogram and stent order placed by urology. Patient transitioned to therapeutic 1mg/kg lovenox from eliquis for possible procedure   -Pt/Ot to eval and treat: recommend SNF   -EKG: Normal sinus rhythm   -Patient  required placement of PICC line as phlebotomy and nursing staff were unable to obtain labs.     10/28/24  NAEON, patient resting in bed,  at bedside  Plans for IVC filter placement, currently on Lovenox 1 mg/kg BID  Urology with plans for ESWL pending    10/29/24  NAEON, plans for IVC filter tomorrow  On Lovenox, hold evening dose, NPO after midnight    10/30/24  NAEON, no new issues  IVC filter and right PCN tube change and antegrade ureteral stent    10/31/24  Patient awake, alert, answering questions appropriately   at bedside, on phone  Updated plan of care  Plan for discharge to SNF tomorrow  F/U with Urology 11/18/24 11/1 - 11/3, 2024  NAEON, no complaints  SNF insurance authorization pending  Restarted Eliquis  F/U with Urology in 1-2 weeks for further management    11/04/2024  Reports developing ankle pain over the weekend, cannot recall injury or trauma to region. PT/OT has been following, patient cooperated as much as she could tolerate with her current ankle pain. Will continue to monitor progress over the next 24 hours while SNF authorization is pending.     11/05/2024  Radiography of ankle shows no acute fracture. Topical therapies ordered for pain control. Counseled patient on doing her best with PT/OT as this would yield the fastest recovery of her ankle pain. Patient has consistently worked with PT/OT the last 2 days. Patient acknowledged understanding of this plan and agreed to continue to do so. For these reasons and patient's capabilities seen on recent PT evaluation, would think SNF is still needed for moderate intensity therapy but will continue to monitor progress with PT/OT.     11/06/2024  Dr. Gray completed P2P on 11/25/2024 as patient was medically stable for discharge but was initially denied for SNF.  Upon discussion with the medical director of his insurance company the patient was given authorization for skilled nursing facility and was transferred to [facility] [place] on  "11/06/2024 in stable condition.      Goals of Care Treatment Preferences:  Code Status: Full Code      SDOH Screening:  The patient was screened for utility difficulties, food insecurity, transport difficulties, housing insecurity, and interpersonal safety and there were no concerns identified this admission.     Consults:   Consults (From admission, onward)          Status Ordering Provider     Inpatient consult to Interventional Radiology  Once        Provider:  (Not yet assigned)    Completed SUAD CORONA     Inpatient consult to Hematology/Oncology  Once        Provider:  Ofelia Lee MD    Completed SUAD CORONA     Inpatient consult to Registered Dietitian/Nutritionist  Once        Provider:  (Not yet assigned)    Completed JAROCHO BEDOYA     Case Management/  Once        Provider:  (Not yet assigned)    Completed JAROCHO BEDOYA     Inpatient consult to Urology  Once        Provider:  (Not yet assigned)    Completed JAROCHO BEDOYA            Renal/  Hypomagnesemia  Patient has Abnormal Magnesium: hypomagnesemia. Will continue to monitor electrolytes closely. Will replace the affected electrolytes and repeat labs to be done after interventions completed. The patient's magnesium results have been reviewed and are listed below.  No results for input(s): "MG" in the last 24 hours.     -resolved    Hypokalemia  Patient's most recent potassium results are listed below.   No results for input(s): "K" in the last 72 hours.    Plan  - Replete potassium per protocol  - Monitor potassium Daily      Ureteral stone with hydronephrosis  Recent imaging with 8 mm obstructing/impacted stone on right side -- s/p cystoscopy and right nephrostomy tube placement  Urology consulted , appreciate recommendations   Follow up in 2-3 weeks with Urology      Endocrine  Severe protein-calorie malnutrition  Nutrition consulted. Most recent weight and BMI monitored- will continue tube feeding " supplementation    Measurements:  Wt Readings from Last 1 Encounters:   10/29/24 52.6 kg (115 lb 15.4 oz)   Body mass index is 19.3 kg/m².      GI  Jejunostomy tube present  Hydrating and tube feeding via tube      Other  Age-related physical debility  Patient with Acute on chronic debility due to bed confinement status and age-related physical debility. The patient's latest AMPAC (Activity Measure for Post Acute Care) Score is listed below.    AM-PAC Score - How much help does the patient need for each activity listed  Basic Mobility Total Score: 12  Turning over in bed (including adjusting bedclothes, sheets and blankets)?: A little (scored from previous session, NT this date)  Sitting down on and standing up from a chair with arms (e.g., wheelchair, bedside commode, etc.): A little  Moving from lying on back to sitting on the side of the bed?: A lot (scored from previous session, NT this date)  Moving to and from a bed to a chair (including a wheelchair)?: A lot (scored from previous session, NT this date)  Need to walk in hospital room?: Unable  Climbing 3-5 steps with a railing?: Unable    Plan  - Progressive mobility protocol initated  - PT/OT consulted  - Fall precautions in place  - SNF placement pending          DVT (deep venous thrombosis)  Recently diagnosed with extensive DVT RLE  Continue Eliquis        Final Active Diagnoses:    Diagnosis Date Noted POA    PRINCIPAL PROBLEM:  Syncope [R55] 10/24/2024 Yes    Age-related physical debility [R54] 11/05/2024 Yes     Chronic    Hypomagnesemia [E83.42] 10/24/2024 Yes    Hypokalemia [E87.6] 10/11/2024 Yes    Ureteral stone with hydronephrosis [N13.2] 10/10/2024 Yes    Severe protein-calorie malnutrition [E43] 09/04/2024 Yes    Jejunostomy tube present [Z93.4] 09/03/2024 Not Applicable    Gastric adenocarcinoma [C16.9] 02/27/2024 Yes    DVT (deep venous thrombosis) [I82.409] 02/07/2024 Yes     Chronic    Hx of Epileptic seizures [G40.909] 02/07/2024 Yes      "Chronic    Chronic anemia [D64.9] 02/07/2024 Yes      Problems Resolved During this Admission:       Discharged Condition: stable    Disposition: Home or Self Care    Follow Up:   Contact information for follow-up providers       Gagandeep Iglesias MD Follow up in 3 day(s).    Specialty: Internal Medicine  Contact information:  [address] [facility] [address]  [phone]               Lata Kelly MD Follow up in 1 week(s).    Specialty: Urology  Why: follow up for ureteral stone  Contact information:  [address]  [place] [address]  [phone]               Claudia Ambrose MD Follow up in 1 week(s).    Specialties: Internal Medicine, Hematology and Oncology  Contact information:  [address]  [place] [address]  [phone]               Chen Jerome MD Follow up in 1 week(s).    Specialty: Surgical Oncology  Contact information:  [address] [facility] [address]  [phone]                       Contact information for after-discharge care       Destination       [facility] .    Service: Skilled Nursing  Contact information:  [address] [facility] [address] [phone]                                 Patient Instructions:      Diet full liquid     HOME HEALTH ORDERS   Order Comments: [facility] TF [facility]     Order Specific Question Answer Comments   What [facility] Health [facility] is the patient [facility]      Activity as tolerated       Significant Diagnostic Studies: Labs: BMP: No results for input(s): "GLU", "NA", "K", "CL", "CO2", "BUN", "CREATININE", "CALCIUM", "MG" in the last 48 hours. and CBC No results for input(s): "WBC", "HGB", "HCT", "PLT" in the last 48 hours.  Radiology:   X-Ray Ankle 2 View Right   Final Result      Mild soft tissue swelling overlying the medial malleolus.  No acute bony injury.       "   Electronically signed by: Jorge Luis Rodríguez   Date:    11/04/2024   Time:    12:42      IR Antegrade Pyelogram (xpd)   Final Result      IR IVC Filter Placement   Final Result      1. Successful placement of an Bard G2 retrievable infrarenal IVC filter.   Plan:      1. Retrieval intent (MIPS): The filter is potentially retrievable.  Please contact IR to schedule IVC filter removal when/if clinically indicated.         Electronically signed by: Harley Aggarwal MD   Date:    10/30/2024   Time:    15:22      X-Ray Chest 1 View   Final Result      Right PICC line in good position.         Electronically signed by: Caden Valdez   Date:    10/26/2024   Time:    13:02      X-Ray Abdomen AP 1 View   Final Result      As above.         Electronically signed by: Caden Valdez   Date:    10/26/2024   Time:    10:30      CT Head Without Contrast   Final Result      No acute abnormality.         Electronically signed by: Rama Paris   Date:    10/24/2024   Time:    19:48      X-Ray Chest AP Portable   Final Result      No acute abnormality.         Electronically signed by: Caden Valdez   Date:    10/24/2024   Time:    14:28      Anesthesia US Guide Vascular Access    (Results Pending)        Pending Diagnostic Studies:       None           Medications:  Reconciled Home Medications:      Medication List        CONTINUE taking these medications      acetaminophen 500 MG tablet  Commonly known as: TYLENOL  Take 2 tablets (1,000 mg total) by mouth every 8 (eight) hours.     alendronate 70 MG tablet  Commonly known as: FOSAMAX  Take 70 mg by mouth every 7 days. (Sundays)     CALCIUM PHOSPHATE-VITAMIN D3 ORAL  Take 1 tablet by mouth 2 (two) times daily.     carBAMazepine 200 mg tablet  Commonly known as: TEGRETOL  1 tablet with breakfast  1 tablet with lunch   1.5 tablet at bedtime     ELIQUIS 5 mg Tab  Generic drug: apixaban  Take 2 tablets (10 mg total) by mouth 2 (two) times daily for 3 days THEN take 1 tablet by mouth  twice a day     ferrous sulfate 325 (65 FE) MG EC tablet  Take 65 mg by mouth 2 (two) times daily with meals.     hydrocortisone 2.5 % cream  Apply topically 2 (two) times daily.     hydrOXYzine HCL 25 MG tablet  Commonly known as: ATARAX  Take 1 tablet (25 mg total) by mouth 3 (three) times daily as needed for Itching.     levETIRAcetam 500 MG Tab  Commonly known as: KEPPRA  Take 1 tablet by mouth 2 (two) times daily.     zonisamide 100 MG Cap  Commonly known as: ZONEGRAN  Take 200 mg by mouth 2 (two) times daily.              Indwelling Lines/Drains at time of discharge:   Lines/Drains/Airways       Drain  Duration                  Gastrostomy/Enterostomy 08/20/24 2009 Jejunostomy tube LUQ feeding 78 days         Nephrostomy 10/30/24 1222 Right 8 Fr. 8 days                    Time spent on the discharge of patient: 35 minutes         Silvino Aguilera MD  Department of Hospital Medicine  [facility] Med S[facility]

## 2024-11-08 ENCOUNTER — DOCUMENT SCAN (OUTPATIENT)
Dept: HOME HEALTH SERVICES | Facility: HOSPITAL | Age: 74
End: 2024-11-08
Payer: MEDICARE

## 2024-11-15 ENCOUNTER — TELEPHONE (OUTPATIENT)
Dept: UROLOGY | Facility: CLINIC | Age: 74
End: 2024-11-15
Payer: MEDICARE

## 2024-11-15 NOTE — TELEPHONE ENCOUNTER
----- Message from Med Assistant Pelaez sent at 11/15/2024  2:13 PM CST -----  Contact: Cheryl  Can you contact patient reference to rescheduling Post Op appointment  ----- Message -----  From: Alyssa Peters  Sent: 11/15/2024   2:05 PM CST  To: Robin LEON Staff    .Type:  Patient Returning Call    Who Called:Cheryl  Who Left Message for Patient:nurse  Does the patient know what this is regarding?:no  Would the patient rather a call back or a response via [facility]? Call back  Best Call Back Number:. [phone]   Additional Information: na        Thanks  SHERWIN

## 2024-11-18 ENCOUNTER — OFFICE VISIT (OUTPATIENT)
Dept: UROLOGY | Facility: CLINIC | Age: 74
End: 2024-11-18
Payer: MEDICARE

## 2024-11-18 VITALS
SYSTOLIC BLOOD PRESSURE: 101 MMHG | WEIGHT: 114.63 LBS | HEART RATE: 98 BPM | HEIGHT: 65 IN | DIASTOLIC BLOOD PRESSURE: 74 MMHG | BODY MASS INDEX: 19.1 KG/M2

## 2024-11-18 DIAGNOSIS — N20.1 URETERAL STONE: Primary | ICD-10-CM

## 2024-11-18 DIAGNOSIS — I82.502 CHRONIC DEEP VEIN THROMBOSIS (DVT) OF LEFT LOWER EXTREMITY, UNSPECIFIED VEIN: ICD-10-CM

## 2024-11-18 DIAGNOSIS — C16.9 GASTRIC ADENOCARCINOMA: ICD-10-CM

## 2024-11-18 PROCEDURE — 3288F FALL RISK ASSESSMENT DOCD: CPT | Mod: CPTII,S$GLB,, | Performed by: UROLOGY

## 2024-11-18 PROCEDURE — 99999 PR PBB SHADOW E&M-EST. PATIENT-LVL IV: CPT | Mod: PBBFAC,,, | Performed by: UROLOGY

## 2024-11-18 PROCEDURE — 3074F SYST BP LT 130 MM HG: CPT | Mod: CPTII,S$GLB,, | Performed by: UROLOGY

## 2024-11-18 PROCEDURE — 1159F MED LIST DOCD IN RCRD: CPT | Mod: CPTII,S$GLB,, | Performed by: UROLOGY

## 2024-11-18 PROCEDURE — 3008F BODY MASS INDEX DOCD: CPT | Mod: CPTII,S$GLB,, | Performed by: UROLOGY

## 2024-11-18 PROCEDURE — 1101F PT FALLS ASSESS-DOCD LE1/YR: CPT | Mod: CPTII,S$GLB,, | Performed by: UROLOGY

## 2024-11-18 PROCEDURE — 3078F DIAST BP <80 MM HG: CPT | Mod: CPTII,S$GLB,, | Performed by: UROLOGY

## 2024-11-18 PROCEDURE — 87086 URINE CULTURE/COLONY COUNT: CPT | Performed by: UROLOGY

## 2024-11-18 PROCEDURE — 99214 OFFICE O/P EST MOD 30 MIN: CPT | Mod: S$GLB,,, | Performed by: UROLOGY

## 2024-11-18 PROCEDURE — 1125F AMNT PAIN NOTED PAIN PRSNT: CPT | Mod: CPTII,S$GLB,, | Performed by: UROLOGY

## 2024-11-18 PROCEDURE — 1111F DSCHRG MED/CURRENT MED MERGE: CPT | Mod: CPTII,S$GLB,, | Performed by: UROLOGY

## 2024-11-18 RX ORDER — CIPROFLOXACIN 2 MG/ML
400 INJECTION, SOLUTION INTRAVENOUS
OUTPATIENT
Start: 2024-11-18

## 2024-11-18 NOTE — PROGRESS NOTES
Chief Complaint   Patient presents with    Follow-up         History of Present Illness:   Cheryl Kirkland is a 74 y.o. female here for evaluation of Follow-up  .   11/18/24-73yo female presents for hospital follow up. She currently has a R nephrostomy tube and stent in place for an 8mm mid-ureteral stone. She complains of right sided abdominal and flank pain. She has some stent dysuria. No recent fever or chills.       Review of Systems   Constitutional:  Negative for chills and fever.   Respiratory:  Negative for shortness of breath.    Cardiovascular:  Negative for chest pain.   Gastrointestinal:  Positive for abdominal pain.   Neurological:  Positive for weakness.   All other systems reviewed and are negative.        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 Repeat in 3 years.    COLONOSCOPY  06/17/2015    -Nodular mucosa at appendiceal  orfice, sigmoid and desc diverticulosis otherwise normal.    COLONOSCOPY  2020    >3 TAs    COLONOSCOPY  12/2023    CYSTOSCOPY W/ RETROGRADES Right 10/10/2024    Procedure: CYSTOSCOPY, WITH RETROGRADE PYELOGRAM;  Surgeon: Lata Kelly MD;  Location: [facility];  Service: Urology;  Laterality: Right;    DIAGNOSTIC LAPAROSCOPY N/A 3/14/2024    Procedure: LAPAROSCOPY, DIAGNOSTIC;  Surgeon: Chen Jerome MD;  Location: [facility] OR;  Service: General;  Laterality: N/A;  1. Diagnostic laparoscopy   2. Extensive lysis of adhesions  3. Peritoneal biopsy   4. Peritoneal washings for cytology    DIAGNOSTIC LAPAROSCOPY N/A 8/20/2024    Procedure: LAPAROSCOPY, DIAGNOSTIC;  Surgeon: Chen Jerome MD;  Location: [facility];  Service: General;  Laterality: N/A;    EGD - EXTERNAL RESULT  06/20/2012    Dr Boyle- Mild gastritis otherwise normal.    ENDOSCOPIC ULTRASOUND OF UPPER GASTROINTESTINAL TRACT N/A 7/26/2024    Procedure: ULTRASOUND, UPPER GI TRACT, ENDOSCOPIC;  Surgeon: Valdo Aguilera MD;  Location: [facility];  Service: Endoscopy;  Laterality: N/A;  7/22/24: instructions sent via portal-. Pt no longer takling eliquis-GD    ESOPHAGOGASTRODUODENOSCOPY N/A 02/08/2024    Procedure: EGD (ESOPHAGOGASTRODUODENOSCOPY);  Surgeon: Jayla Arteaga MD;  Location: [facility];  Service: Endoscopy;  Laterality: N/A;    ESOPHAGOGASTRODUODENOSCOPY N/A 8/20/2024    Procedure: EGD (ESOPHAGOGASTRODUODENOSCOPY);  Surgeon: Chen Jerome MD;  Location: [facility] OR;  Service: General;  Laterality: N/A;    GASTRECTOMY N/A 8/20/2024    Procedure: GASTRECTOMY;  Surgeon: Chen Jerome MD;  Location: [facility] OR;  Service: General;  Laterality: N/A;    HYSTERECTOMY      LAPAROSCOPIC LYSIS OF ADHESIONS N/A 3/14/2024    Procedure: LYSIS, ADHESIONS, LAPAROSCOPIC;  Surgeon: Chen Jerome MD;  Location: [facility] OR;  Service: General;  Laterality: N/A;    LYSIS OF ADHESIONS N/A 8/20/2024    Procedure: LYSIS, ADHESIONS;  Surgeon: Justus  MD Chen;  Location: [facility] OR;  Service: General;  Laterality: N/A;    PLACEMENT OF JEJUNOSTOMY TUBE N/A 8/20/2024    Procedure: INSERTION, JEJUNOSTOMY TUBE;  Surgeon: Chen Jerome MD;  Location: [facility] OR;  Service: General;  Laterality: N/A;    TOTAL ABDOMINAL HYSTERECTOMY W/ BILATERAL SALPINGOOPHORECTOMY  01/09/2023    radical resection with right salpingo-oophorectomy, left salpingo-oophorectomy, extensive enterolysis, extensive adhesiolysis, left pelvic lymph node biopsy, omental biopsy, small bowel resection with primary functional end-to-end anastomosis, placement of pelvic drain       Family History   Problem Relation Name Age of Onset    Pancreatic cancer Brother Misael Mccray 76    Prostate cancer Brother Zachary 67    Pancreatic cancer Half-brother Gasper 74    Colon cancer Half-sister Elton 83    Lung cancer Half-sister Elton         +smoking hx    Breast cancer Half-sister Vidhi 58    Lymphoma Other [facility]    Prostate cancer Other Fernando     Prostate cancer Other Gasper Calderon     Ovarian cancer Other [facility]     Breast cancer Other Aleida     Colon cancer Other [facility]        Social History     Tobacco Use    Smoking status: Former     Types: Cigarettes    Smokeless tobacco: Never    Tobacco comments:     Quit 1989 smoked 10 years smoked 0.25 ppd    Substance Use Topics    Alcohol use: Not Currently    Drug use: Never       Current Outpatient Medications   Medication Sig Dispense Refill    acetaminophen (TYLENOL) 500 MG tablet Take 2 tablets (1,000 mg total) by mouth every 8 (eight) hours. (Patient taking differently: Take 1,000 mg by mouth every 8 (eight) hours as needed for Pain.)  0    alendronate (FOSAMAX) 70 MG tablet Take 70 mg by mouth every 7 days. (Sundays)      calcium phosphate trib/vit D3 (CALCIUM PHOSPHATE-VITAMIN D3 ORAL) Take 1 tablet by mouth 2 (two) times daily.      carBAMazepine (TEGRETOL) 200 mg tablet 1 tablet with breakfast  1 tablet with lunch   1.5 tablet  at bedtime      ferrous sulfate 325 (65 FE) MG EC tablet Take 65 mg by mouth 2 (two) times daily with meals.      hydrocortisone 2.5 % cream Apply topically 2 (two) times daily. 28 g 1    hydrOXYzine HCL (ATARAX) 25 MG tablet Take 1 tablet (25 mg total) by mouth 3 (three) times daily as needed for Itching. 90 tablet 1    levETIRAcetam (KEPPRA) 500 MG Tab Take 1 tablet by mouth 2 (two) times daily.      zonisamide (ZONEGRAN) 100 MG Cap Take 200 mg by mouth 2 (two) times daily.       No current facility-administered medications for this visit.       Review of patient's allergies indicates:  No Known Allergies    Physical Exam  Vitals:    11/18/24 1554   BP: 101/74   Pulse: 98     General: Well-developed, well-nourished, in no acute distress  HEENT: Normocephalic, atraumatic, extraocular movements intact  Neck: Supple, no supraclavicular or cervical lymphadenopathy, trachea midline  Respirations: even and unlabored  Extremities: moves all equally, no clubbing, cyanosis or edema  Skin: Warm and dry. No lesions  Psych: normal affect  Neuro: Alert and oriented x 3. Cranial nerves II-XII intact      Urinalysis  pH, UA   Date Value Ref Range Status   10/24/2024 6.0 5.0 - 8.0 Final     Protein, UA   Date Value Ref Range Status   10/24/2024 2+ (A) Negative Final     Comment:     Recommend a 24 hour urine protein or a urine   protein/creatinine ratio if globulin induced proteinuria is  clinically suspected.       Glucose, UA   Date Value Ref Range Status   10/24/2024 Negative Negative Final     Occult Blood UA   Date Value Ref Range Status   10/24/2024 3+ (A) Negative Final     Nitrite, UA   Date Value Ref Range Status   10/24/2024 Positive (A) Negative Final     Leukocytes, UA   Date Value Ref Range Status   10/24/2024 Trace (A) Negative Final     Lab Results   Component Value Date    WBC 6.85 11/03/2024    HGB 9.1 (L) 11/03/2024    HCT 28.2 (L) 11/03/2024    MCV 96 11/03/2024     (H) 11/03/2024       BMP  Lab Results    Component Value Date     11/03/2024    K 4.3 11/03/2024     11/03/2024    CO2 19 (L) 11/03/2024    BUN 8 11/03/2024    CREATININE 0.5 11/03/2024    CALCIUM 8.1 (L) 11/03/2024    ANIONGAP 12 11/03/2024    EGFRNORACEVR >60 11/03/2024       Assessment:  1. Ureteral stone  Place in Outpatient    Case Request Operating Room: Ureteroscopic stone extraction with laser lithotripsy and stent    Diet NPO    Place sequential compression device    Urine culture      2. Chronic deep vein thrombosis (DVT) of left lower extremity, unspecified vein        3. Gastric adenocarcinoma              Plan:   Ureteral stone  -     Place in Outpatient; Standing  -     Case Request Operating Room: Ureteroscopic stone extraction with laser lithotripsy and stent  -     Diet NPO; Standing  -     Place sequential compression device; Standing  -     Urine culture    Chronic deep vein thrombosis (DVT) of left lower extremity, unspecified vein    Gastric adenocarcinoma    Other orders  -     IP VTE HIGH RISK PATIENT; Standing  -     ciprofloxacin (CIPRO)400mg/200ml D5W IVPB 400 mg      I had a detailed discussion with the pt and her  today regarding management options. Pt is chronically anticoagulated due to DVT, making ureteroscopic stone extraction the most reasonable option. We discussed that she will likely have a stent post-procedure, though I do hope to remove her nephrostomy tube following the procedure. Will schedule 11/27/24. Risks/benefits discussed and consent signed.

## 2024-11-18 NOTE — H&P (VIEW-ONLY)
Chief Complaint   Patient presents with    Follow-up         History of Present Illness:   Cheryl Kirkland is a 74 y.o. female here for evaluation of Follow-up  .   11/18/24-73yo female presents for hospital follow up. She currently has a R nephrostomy tube and stent in place for an 8mm mid-ureteral stone. She complains of right sided abdominal and flank pain. She has some stent dysuria. No recent fever or chills.       Review of Systems   Constitutional:  Negative for chills and fever.   Respiratory:  Negative for shortness of breath.    Cardiovascular:  Negative for chest pain.   Gastrointestinal:  Positive for abdominal pain.   Neurological:  Positive for weakness.   All other systems reviewed and are negative.        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    OP (osteoporosis) 02/28/2024    Ovarian cancer     Primary hypertension 12/01/2022       Past Surgical History:   Procedure Laterality Date    ABDOMINAL WASHOUT N/A 3/14/2024    Procedure: LAVAGE, PERITONEAL, THERAPEUTIC;  Surgeon: Chen Jerome MD;  Location: [facility] OR;  Service: General;  Laterality: N/A;    APPENDECTOMY  2015    BIOPSY OF PERITONEUM N/A 3/14/2024    Procedure: BIOPSY, PERITONEUM;  Surgeon: Chen Jerome MD;  Location: [facility] OR;  Service: General;  Laterality: N/A;    COLONOSCOPY  06/20/2012    Dr Boyle- Tubular adenoma polyps. Repeat in 3 years.    COLONOSCOPY  06/17/2015    -Nodular mucosa at appendiceal  orfice, sigmoid and desc diverticulosis otherwise normal.    COLONOSCOPY  2020    >3 TAs    COLONOSCOPY  12/2023    CYSTOSCOPY W/ RETROGRADES Right 10/10/2024    Procedure: CYSTOSCOPY, WITH RETROGRADE PYELOGRAM;  Surgeon: Lata Kelly MD;  Location: [facility];  Service: Urology;  Laterality: Right;    DIAGNOSTIC LAPAROSCOPY N/A 3/14/2024    Procedure: LAPAROSCOPY, DIAGNOSTIC;  Surgeon: Chen Jerome MD;  Location: [facility] OR;  Service: General;  Laterality: N/A;  1. Diagnostic laparoscopy   2. Extensive lysis of adhesions  3. Peritoneal biopsy   4. Peritoneal washings for cytology    DIAGNOSTIC LAPAROSCOPY N/A 8/20/2024    Procedure: LAPAROSCOPY, DIAGNOSTIC;  Surgeon: Chen Jerome MD;  Location: [facility];  Service: General;  Laterality: N/A;    EGD - EXTERNAL RESULT  06/20/2012    Dr Boyle- Mild gastritis otherwise normal.    ENDOSCOPIC ULTRASOUND OF UPPER GASTROINTESTINAL TRACT N/A 7/26/2024    Procedure: ULTRASOUND, UPPER GI TRACT, ENDOSCOPIC;  Surgeon: Valdo Aguilera MD;  Location: [facility];  Service: Endoscopy;  Laterality: N/A;  7/22/24: instructions sent via portal-. Pt no longer takling eliquis-GD    ESOPHAGOGASTRODUODENOSCOPY N/A 02/08/2024    Procedure: EGD (ESOPHAGOGASTRODUODENOSCOPY);  Surgeon: Jayla Arteaga MD;  Location: [facility];  Service: Endoscopy;  Laterality: N/A;    ESOPHAGOGASTRODUODENOSCOPY N/A 8/20/2024    Procedure: EGD (ESOPHAGOGASTRODUODENOSCOPY);  Surgeon: Chen Jerome MD;  Location: [facility] OR;  Service: General;  Laterality: N/A;    GASTRECTOMY N/A 8/20/2024    Procedure: GASTRECTOMY;  Surgeon: Chen Jerome MD;  Location: [facility] OR;  Service: General;  Laterality: N/A;    HYSTERECTOMY      LAPAROSCOPIC LYSIS OF ADHESIONS N/A 3/14/2024    Procedure: LYSIS, ADHESIONS, LAPAROSCOPIC;  Surgeon: Chen Jerome MD;  Location: [facility] OR;  Service: General;  Laterality: N/A;    LYSIS OF ADHESIONS N/A 8/20/2024    Procedure: LYSIS, ADHESIONS;  Surgeon: Justus  MD Chen;  Location: [facility] OR;  Service: General;  Laterality: N/A;    PLACEMENT OF JEJUNOSTOMY TUBE N/A 8/20/2024    Procedure: INSERTION, JEJUNOSTOMY TUBE;  Surgeon: Chen Jerome MD;  Location: [facility] OR;  Service: General;  Laterality: N/A;    TOTAL ABDOMINAL HYSTERECTOMY W/ BILATERAL SALPINGOOPHORECTOMY  01/09/2023    radical resection with right salpingo-oophorectomy, left salpingo-oophorectomy, extensive enterolysis, extensive adhesiolysis, left pelvic lymph node biopsy, omental biopsy, small bowel resection with primary functional end-to-end anastomosis, placement of pelvic drain       Family History   Problem Relation Name Age of Onset    Pancreatic cancer Brother Misael Mccray 76    Prostate cancer Brother Zachary 67    Pancreatic cancer Half-brother Gasper 74    Colon cancer Half-sister Elton 83    Lung cancer Half-sister Elton         +smoking hx    Breast cancer Half-sister Vidhi 58    Lymphoma Other [facility]    Prostate cancer Other [facility]     Prostate cancer Other Gasper Calderon     Ovarian cancer Other [facility]     Breast cancer Other [place]     Colon cancer Other [facility]        Social History     Tobacco Use    Smoking status: Former     Types: Cigarettes    Smokeless tobacco: Never    Tobacco comments:     Quit 1989 smoked 10 years smoked 0.25 ppd    Substance Use Topics    Alcohol use: Not Currently    Drug use: Never       Current Outpatient Medications   Medication Sig Dispense Refill    acetaminophen (TYLENOL) 500 MG tablet Take 2 tablets (1,000 mg total) by mouth every 8 (eight) hours. (Patient taking differently: Take 1,000 mg by mouth every 8 (eight) hours as needed for Pain.)  0    alendronate (FOSAMAX) 70 MG tablet Take 70 mg by mouth every 7 days. (Sundays)      calcium phosphate trib/vit D3 (CALCIUM PHOSPHATE-VITAMIN D3 ORAL) Take 1 tablet by mouth 2 (two) times daily.      carBAMazepine (TEGRETOL) 200 mg tablet 1 tablet with breakfast  1 tablet with lunch   1.5 tablet  at bedtime      ferrous sulfate 325 (65 FE) MG EC tablet Take 65 mg by mouth 2 (two) times daily with meals.      hydrocortisone 2.5 % cream Apply topically 2 (two) times daily. 28 g 1    hydrOXYzine HCL (ATARAX) 25 MG tablet Take 1 tablet (25 mg total) by mouth 3 (three) times daily as needed for Itching. 90 tablet 1    levETIRAcetam (KEPPRA) 500 MG Tab Take 1 tablet by mouth 2 (two) times daily.      zonisamide (ZONEGRAN) 100 MG Cap Take 200 mg by mouth 2 (two) times daily.       No current facility-administered medications for this visit.       Review of patient's allergies indicates:  No Known Allergies    Physical Exam  Vitals:    11/18/24 1554   BP: 101/74   Pulse: 98     General: Well-developed, well-nourished, in no acute distress  HEENT: Normocephalic, atraumatic, extraocular movements intact  Neck: Supple, no supraclavicular or cervical lymphadenopathy, trachea midline  Respirations: even and unlabored  Extremities: moves all equally, no clubbing, cyanosis or edema  Skin: Warm and dry. No lesions  Psych: normal affect  Neuro: Alert and oriented x 3. Cranial nerves II-XII intact      Urinalysis  pH, UA   Date Value Ref Range Status   10/24/2024 6.0 5.0 - 8.0 Final     Protein, UA   Date Value Ref Range Status   10/24/2024 2+ (A) Negative Final     Comment:     Recommend a 24 hour urine protein or a urine   protein/creatinine ratio if globulin induced proteinuria is  clinically suspected.       Glucose, UA   Date Value Ref Range Status   10/24/2024 Negative Negative Final     Occult Blood UA   Date Value Ref Range Status   10/24/2024 3+ (A) Negative Final     Nitrite, UA   Date Value Ref Range Status   10/24/2024 Positive (A) Negative Final     Leukocytes, UA   Date Value Ref Range Status   10/24/2024 Trace (A) Negative Final     Lab Results   Component Value Date    WBC 6.85 11/03/2024    HGB 9.1 (L) 11/03/2024    HCT 28.2 (L) 11/03/2024    MCV 96 11/03/2024     (H) 11/03/2024       BMP  Lab Results    Component Value Date     11/03/2024    K 4.3 11/03/2024     11/03/2024    CO2 19 (L) 11/03/2024    BUN 8 11/03/2024    CREATININE 0.5 11/03/2024    CALCIUM 8.1 (L) 11/03/2024    ANIONGAP 12 11/03/2024    EGFRNORACEVR >60 11/03/2024       Assessment:  1. Ureteral stone  Place in Outpatient    Case Request Operating Room: Ureteroscopic stone extraction with laser lithotripsy and stent    Diet NPO    Place sequential compression device    Urine culture      2. Chronic deep vein thrombosis (DVT) of left lower extremity, unspecified vein        3. Gastric adenocarcinoma              Plan:   Ureteral stone  -     Place in Outpatient; Standing  -     Case Request Operating Room: Ureteroscopic stone extraction with laser lithotripsy and stent  -     Diet NPO; Standing  -     Place sequential compression device; Standing  -     Urine culture    Chronic deep vein thrombosis (DVT) of left lower extremity, unspecified vein    Gastric adenocarcinoma    Other orders  -     IP VTE HIGH RISK PATIENT; Standing  -     ciprofloxacin (CIPRO)400mg/200ml D5W IVPB 400 mg      I had a detailed discussion with the pt and her  today regarding management options. Pt is chronically anticoagulated due to DVT, making ureteroscopic stone extraction the most reasonable option. We discussed that she will likely have a stent post-procedure, though I do hope to remove her nephrostomy tube following the procedure. Will schedule 11/27/24. Risks/benefits discussed and consent signed.

## 2024-11-19 ENCOUNTER — PATIENT MESSAGE (OUTPATIENT)
Dept: PREADMISSION TESTING | Facility: HOSPITAL | Age: 74
End: 2024-11-19
Payer: MEDICARE

## 2024-11-19 DIAGNOSIS — Z01.818 PRE-OP EXAM: Primary | ICD-10-CM

## 2024-11-20 LAB
BACTERIA UR CULT: NORMAL
BACTERIA UR CULT: NORMAL

## 2024-11-21 ENCOUNTER — TELEPHONE (OUTPATIENT)
Dept: UROLOGY | Facility: CLINIC | Age: 74
End: 2024-11-21
Payer: MEDICARE

## 2024-11-21 NOTE — TELEPHONE ENCOUNTER
Return call placed to Ancelmo, she stated that patient cannot make it to the pre op appt because she is still under skill care and it is not approved and would need to be rescheduled and she was notified that the pre op was scheduled by pre-admission and she would need to contact them to reschedule if needed.     ----- Message from [employer] sent at 11/21/2024  9:21 AM CST -----  Contact: Ancelmo/ Edita Wilson  .Type:  Needs Medical Advice    Who Called:  Ancelmo     Would the patient rather a call back or a response via [facility]? Call back   Best Call Back Number:  [phone]    Additional Information:  Ancelmo is calling in regards to getting a call back from the nurse to discuss concerns pertaining to the pre op appt       Thanks

## 2024-11-25 ENCOUNTER — PATIENT MESSAGE (OUTPATIENT)
Dept: PREADMISSION TESTING | Facility: HOSPITAL | Age: 74
End: 2024-11-25
Payer: MEDICARE

## 2024-11-25 ENCOUNTER — OFFICE VISIT (OUTPATIENT)
Dept: INTERNAL MEDICINE | Facility: CLINIC | Age: 74
End: 2024-11-25
Payer: MEDICARE

## 2024-11-25 VITALS
DIASTOLIC BLOOD PRESSURE: 80 MMHG | OXYGEN SATURATION: 100 % | SYSTOLIC BLOOD PRESSURE: 116 MMHG | TEMPERATURE: 97 F | HEART RATE: 122 BPM | RESPIRATION RATE: 16 BRPM

## 2024-11-25 DIAGNOSIS — Z01.818 PRE-OP EXAM: ICD-10-CM

## 2024-11-25 DIAGNOSIS — I10 PRIMARY HYPERTENSION: ICD-10-CM

## 2024-11-25 DIAGNOSIS — D64.9 CHRONIC ANEMIA: ICD-10-CM

## 2024-11-25 DIAGNOSIS — C16.9 GASTRIC ADENOCARCINOMA: ICD-10-CM

## 2024-11-25 DIAGNOSIS — N13.2 URETERAL STONE WITH HYDRONEPHROSIS: Primary | ICD-10-CM

## 2024-11-25 DIAGNOSIS — E78.5 HYPERLIPIDEMIA, UNSPECIFIED HYPERLIPIDEMIA TYPE: ICD-10-CM

## 2024-11-25 DIAGNOSIS — R54 AGE-RELATED PHYSICAL DEBILITY: Chronic | ICD-10-CM

## 2024-11-25 DIAGNOSIS — I50.9 HEART FAILURE, UNSPECIFIED HF CHRONICITY, UNSPECIFIED HEART FAILURE TYPE: ICD-10-CM

## 2024-11-25 DIAGNOSIS — E43 SEVERE PROTEIN-CALORIE MALNUTRITION: ICD-10-CM

## 2024-11-25 DIAGNOSIS — Z93.4 JEJUNOSTOMY TUBE PRESENT: ICD-10-CM

## 2024-11-25 DIAGNOSIS — C56.1: ICD-10-CM

## 2024-11-25 DIAGNOSIS — G40.909 NONINTRACTABLE EPILEPSY WITHOUT STATUS EPILEPTICUS, UNSPECIFIED EPILEPSY TYPE: Chronic | ICD-10-CM

## 2024-11-25 PROCEDURE — 99999 PR PBB SHADOW E&M-EST. PATIENT-LVL III: CPT | Mod: PBBFAC,,,

## 2024-11-25 NOTE — ASSESSMENT & PLAN NOTE
s/p cystoscopy and right nephrostomy tube placement 10/10/24  Patient plans for ureteroscopic stone retrieval with laser litho and stent placement  Known risk factors for perioperative complications:  debility      Difficulty with intubation is not anticipated.    Cardiac Risk Estimation:  Per Revised Cardiac Risk Index patient is a Class I  risk with a 3.9% risk of a major cardiac event.      1.) Preoperative workup as follows: ECG, hemoglobin, hematocrit, electrolytes, creatinine, glucose, liver function studies.  2.) Change in medication regimen before surgery:  none .  3.) Prophylaxis for cardiac events with perioperative beta-blockers: not indicated.  4.) Invasive hemodynamic monitoring perioperatively: not indicated.  5.) Deep vein thrombosis prophylaxis postoperatively: intermittent pneumatic compression boots, regimen to be chosen by surgical team, and resume eliquis next day after surgery  .  6.) Surveillance for postoperative MI with ECG immediately postoperatively and on postoperati ve days 1 and 2 AND troponin levels 24 hours postoperatively and on day 4 or hospital discharge (whichever comes first): not indicated.  7.) Current medications which may produce withdrawal symptoms if withheld perioperatively: none  8.) Other measures:  Patient had extensive DVT to right lower extremity diagnosed in October.  Eliquis was held by [facility] 5 days prior to planned procedure.  Her last dose of Eliquis was November 21st.  Discussed patient has interruption an oral anticoagulation with heme Onc, Dr. Oakley, she was okay with holding since the patient has already stopped anticoagulation and has the IVC filter in place.  Patient needs to resume Eliquis the day after the procedure.  Discussed the importance of resuming Eliquis with patient and spouse. Patient is an elevated risk for surgery due to her generalized debility.  Patient tachycardic in clinic today in upper 110s.  Upon reviewing heart  rate at recent nursing home visits.  Heart rate in the 90s.  Patient is in acute pain and has not had anything to eat or drink since this morning.  I encouraged hydration, and we will have my nurse call her in the morning to reassess vitals

## 2024-11-25 NOTE — PRE-PROCEDURE INSTRUCTIONS
Pre op instructions reviewed with patient face to face during Clinic Visit with Provider, verbalized understanding.    To confirm, Surgery is scheduled on 11/27/2024. We will call you after 2pm the day before Surgery with your arrival time.    *Please report to the [facility] (1st Floor) located off of [facility] (2nd Entrance/Building on the left, in front of the flag pole).  Address: [address] [place] [address] [place]. [address]      IMPORTANT INSTRUCTIONS!    Do not eat after 12 midnight, Do not smoke or use chewing tobacco after 12 midnight!  OK to brush teeth, but no gum, candy, or mints!      Patients should stop full meals at midnight, but they can consume clear liquids up to 2 hours prior to scheduled arrival time.  Clear liquids include Gatorade, water, soda, black coffee or tea (no milk or creamer), and clear juices.  Clear liquids do NOT include anything with pulp or food particles (Chicken broth, ice cream, yogurt, Jello, etc.)      *Take only these medications with clear liquids the morning of surgery*     Protonix, zonisamide, keprra, carbamazepine      Diabetic/Prediabetic Patients: If you take diabetic or weight loss medication, Do NOT take morning of surgery unless instructed by Doctor. Metformin to be stopped 24 hrs prior to surgery. Ozempic/ Mounjaro/ Wegovy/ Trulicity/ Semaglutide or any weight loss injections to be stopped 7 days prior to surgery. DO NOT take long-acting insulin the evening before surgery. Blood sugars will be checked in pre-op by Nurse.    !!!STOP ALL Aspirins, NSAIDS, WEIGHT LOSS INJECTIONS/PILLS, Herbal supplements, & Vitamins 7 DAYS BEFORE SURGERY!!!    ____  Avoid Alcoholic beverages 3 days prior to surgery, as it can thin the blood.  ____  NO Acrylic/fake nails or nail polish worn day of surgery (specifically hand/arm & foot surgeries).  ____  NO powder, lotions, deodorants, oils or cream on body.  ____  Remove all jewelry, piercings, & foreign objects prior  to arrival and leave at home.  ____  Remove Dentures, Hearing Aids & Contact Lens prior to surgery.  ____  Bring photo ID and insurance information to hospital (Leave Valuables at Home).  ____  If going home the same day, arrange for a ride home. You will not be able to drive for 24 hrs if Anesthesia was used.   ____  Females (ages 11-60): may need to give a urine sample the morning of surgery; please see Pre op Nurse prior to using the restroom.  ____  Males: Stop ED medications (Viagra, Cialis) 24 hrs prior to surgery.  ____  Wear clean, loose fitting clothing to allow for dressings/ bandages.      Bathing Instructions:    -Shower with anti-bacterial Soap (Hibiclens or Dial) the night before surgery & the morning of surgery!   -Do not use Hibiclens soap on your face or genitals.   -Apply clean clothes after shower.  -Do not shave your face or body 2 days prior to surgery unless instructed otherwise by your Surgeon.  -Do not shave pubic hair 7 days prior to surgery (gyn pt's).    [facility]Ride Policy:  Only 2 adults allowed in pre op/recovery area during your procedure. You MUST HAVE A RIDE HOME from a responsible adult that you know and trust. Medical Transport, Uber or Lyft can ONLY be used if patient has a responsible adult to accompany them during ride home.       *Signs and symptoms of Infection Before or After Surgery:               !!!If you experience any fever, chills, nausea/ vomiting, foul odor/ excessive drainage from surgical site, flu-like symptoms, new wounds or cuts, PLEASE CALL THE SURGEON OFFICE at [phone] or SEND MESSAGE THROUGH [employer] PORTAL!!!       *If you are running late day of surgery, please call the Surgery Dept @ [phone].    *Billing question, please call:  [phone] or [phone]       Thank you,  -[facility] Pre [facility].  [phone] or [phone]  M-F 7:30 am-4:00 pm (Closed Major Holidays)

## 2024-11-25 NOTE — ASSESSMENT & PLAN NOTE
- dx 2/8/2024   S/p total gastrectomy on 8/20, with lysis of adhesions, lymphadenectomy, and jejunostomy tube placement, d/c on 9/10  - Pathologic stage from 8/20/2024: Stage III (ypT2, pN3a, cM0), completed 5 cycles Keytruda prior to surgery  - outpatient follow up with Dr. Jerome and Dr. Ambrose

## 2024-11-25 NOTE — PROGRESS NOTES
Preoperative History and Physical                                                                                                  Chief Complaint: Preoperative evaluation     History of Present Illness:      Cheryl Kirkland is a 74 y.o. female who presents to the office today for a preoperative consultation at the request of Dr. Kelly  who plans on performing ureteroscopic stone extraction and stent insertion   on November 27.   Patient is a 74-year-old female with  hypertension, hyperlipidemia, DVT in right lower extremity, epilepsy, anemia, neuropathy, GERD, gastritis, polyps, gastric adenocarcinoma status post treatment with Keytruda (followed by Dr. Jerome and Dr. Ambrose) and total gastrectomy on 08/20/2024, jejunostomy tube on supplemental tube feedings, chronic diarrhea, ovarian cancer (s/p surgery/chemotherapy), osteoporosis and recent admission 10/9/24 through 10/21/2024 due to 8 mm right ureteral obstructing stone with right-sided hydronephrosis s/p cystoscopy and right nephrostomy tube placement, and acute cystitis with SHELLEY also found to have extensive DVT RLE treated with heparin drip transitioned to Eliquis, was discharged to SNF at [facility]. On 10/24, she was having physical therapy at SNF and had syncopal episode which prompted a hospitalization     Functional Status:      The patient is not able to climb a flight of stairs. The patient is able to ambulate  with assistance , does not use cane ,  The patient's functional status is affected by the surgical problem. The patient's functional status is not affected by shortness of breath, chest pain, dyspnea on exertion and fatigue.  Pt very debilitated, in pain today. Can dress self, can walk around nursing home, does so daily 15-20 min no CP no Sob       Patient Anesthesia History:      History of Malignant Hyperthermia: no  History of Pseudocholinesterase Deficiency: no  History PONV: no  History of difficult intubation:  no  History of delayed emergence: no    Family Anesthesia History:      History of Malignant Hyperthermia: no  History of Pseudocholinesterase Deficiency: no     Past Medical History:      Past Medical History:   Diagnosis Date    Anemia     Chronic deep vein thrombosis (DVT) of left lower extremity 10/21/2022    Deep vein thrombosis     Drug-induced polyneuropathy 02/28/2024    Noted by ROCK KAY NP  last documented on [id number]    DVT (deep venous thrombosis)     Epilepsy     Gastric adenocarcinoma 02/27/2024    GERD (gastroesophageal reflux disease)     Hyperlipidemia 01/10/2013    Formatting of this note might be different from the original.   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    OP (osteoporosis) 02/28/2024    Ovarian cancer     Primary hypertension 12/01/2022        Past Surgical History:      Past Surgical History:   Procedure Laterality Date    ABDOMINAL WASHOUT N/A 3/14/2024    Procedure: LAVAGE, PERITONEAL, THERAPEUTIC;  Surgeon: Chen Jerome MD;  Location: [facility];  Service: General;  Laterality: N/A;    APPENDECTOMY  2015    BIOPSY OF PERITONEUM N/A 3/14/2024    Procedure: BIOPSY, PERITONEUM;  Surgeon: Chen Jerome MD;  Location: [facility] OR;  Service: General;  Laterality: N/A;    COLONOSCOPY  06/20/2012    Dr Boyle- Tubular adenoma polyps. Repeat in 3 years.    COLONOSCOPY  06/17/2015    -Nodular mucosa at appendiceal orfice, sigmoid and desc diverticulosis otherwise normal.    COLONOSCOPY  2020    >3 TAs    COLONOSCOPY  12/2023    CYSTOSCOPY W/ RETROGRADES Right 10/10/2024    Procedure: CYSTOSCOPY, WITH RETROGRADE PYELOGRAM;  Surgeon: Lata Kelly MD;  Location: [facility] OR;  Service: Urology;  Laterality: Right;    DIAGNOSTIC LAPAROSCOPY N/A 3/14/2024    Procedure: LAPAROSCOPY, DIAGNOSTIC;  Surgeon: Chen Jerome MD;  Location: [facility] OR;  Service: General;  Laterality: N/A;  1. Diagnostic laparoscopy   2. Extensive lysis of  adhesions  3. Peritoneal biopsy   4. Peritoneal washings for cytology    DIAGNOSTIC LAPAROSCOPY N/A 8/20/2024    Procedure: LAPAROSCOPY, DIAGNOSTIC;  Surgeon: Chen Jerome MD;  Location: [facility] OR;  Service: General;  Laterality: N/A;    EGD - EXTERNAL RESULT  06/20/2012    Dr Boyle- Mild gastritis otherwise normal.    ENDOSCOPIC ULTRASOUND OF UPPER GASTROINTESTINAL TRACT N/A 7/26/2024    Procedure: ULTRASOUND, UPPER GI TRACT, ENDOSCOPIC;  Surgeon: Valdo Aguilera MD;  Location: [facility];  Service: Endoscopy;  Laterality: N/A;  7/22/24: instructions sent via portal-. Pt no longer takling eliquis-GD    ESOPHAGOGASTRODUODENOSCOPY N/A 02/08/2024    Procedure: EGD (ESOPHAGOGASTRODUODENOSCOPY);  Surgeon: Jayla Arteaga MD;  Location: [facility];  Service: Endoscopy;  Laterality: N/A;    ESOPHAGOGASTRODUODENOSCOPY N/A 8/20/2024    Procedure: EGD (ESOPHAGOGASTRODUODENOSCOPY);  Surgeon: Chen Jerome MD;  Location: [facility] OR;  Service: General;  Laterality: N/A;    GASTRECTOMY N/A 8/20/2024    Procedure: GASTRECTOMY;  Surgeon: Chen Jerome MD;  Location: [facility] OR;  Service: General;  Laterality: N/A;    HYSTERECTOMY      LAPAROSCOPIC LYSIS OF ADHESIONS N/A 3/14/2024    Procedure: LYSIS, ADHESIONS, LAPAROSCOPIC;  Surgeon: Chen Jerome MD;  Location: [facility] OR;  Service: General;  Laterality: N/A;    LYSIS OF ADHESIONS N/A 8/20/2024    Procedure: LYSIS, ADHESIONS;  Surgeon: Chen Jerome MD;  Location: [facility] OR;  Service: General;  Laterality: N/A;    PLACEMENT OF JEJUNOSTOMY TUBE N/A 8/20/2024    Procedure: INSERTION, JEJUNOSTOMY TUBE;  Surgeon: Chen Jerome MD;  Location: [facility] OR;  Service: General;  Laterality: N/A;    TOTAL ABDOMINAL HYSTERECTOMY W/ BILATERAL SALPINGOOPHORECTOMY  01/09/2023    radical resection with right salpingo-oophorectomy, left salpingo-oophorectomy, extensive enterolysis, extensive adhesiolysis, left pelvic lymph node biopsy, omental biopsy, small bowel resection with  primary functional end-to-end anastomosis, placement of pelvic drain        Social History:      Social History     Socioeconomic History    Marital status:    Tobacco Use    Smoking status: Former     Types: Cigarettes    Smokeless tobacco: Never    Tobacco comments:     Quit 1989 smoked 10 years smoked 0.25 ppd    Substance and Sexual Activity    Alcohol use: Not Currently    Drug use: Never     Social Drivers of Health     Financial Resource Strain: Low Risk  (10/25/2024)    Overall Financial Resource Strain (CARDIA)     Difficulty of Paying Living Expenses: Not hard at all   Food Insecurity: No Food Insecurity (10/25/2024)    Hunger Vital Sign     Worried About Running Out of Food in the Last Year: Never true     Ran Out of Food in the Last Year: Never true   Transportation Needs: No Transportation Needs (10/25/2024)    TRANSPORTATION NEEDS     Transportation : No   Physical Activity: Inactive (2/23/2024)    Exercise Vital Sign     Days of Exercise per Week: 0 days     Minutes of Exercise per Session: 10 min   Stress: No Stress Concern Present (10/25/2024)    [employer] [place] [employer] - Occupational Stress Questionnaire     Feeling of Stress : Not at all   Housing Stability: Low Risk  (10/25/2024)    Housing Stability Vital Sign     Unable to Pay for Housing in the Last Year: No     Homeless in the Last Year: No        Family History:      Family History   Problem Relation Name Age of Onset    Pancreatic cancer Brother Misael Mccray 76    Prostate cancer Brother Zachary 67    Pancreatic cancer Half-brother Gasper 74    Colon cancer Half-sister Elton 83    Lung cancer Half-sister Elton         +smoking hx    Breast cancer Half-sister Vidhi 58    Lymphoma Other Cara Sun    Prostate cancer Other Fernando     Prostate cancer Other Gasper Jr     Ovarian cancer Other Inerris     Breast cancer Other Aleida     Colon cancer Other Aleida        Allergies:      Review of patient's  allergies indicates:  No Known Allergies    Medications:      Current Outpatient Medications   Medication Sig    acetaminophen (TYLENOL) 500 MG tablet Take 2 tablets (1,000 mg total) by mouth every 8 (eight) hours.    alendronate (FOSAMAX) 70 MG tablet Take 70 mg by mouth every 7 days. (Sundays)    apixaban (ELIQUIS ORAL) Take by mouth.    calcium phosphate trib/vit D3 (CALCIUM PHOSPHATE-VITAMIN D3 ORAL) Take 1 tablet by mouth 2 (two) times daily.    carBAMazepine (TEGRETOL) 200 mg tablet 1 tablet with breakfast  1 tablet with lunch   1.5 tablet at bedtime    ferrous sulfate 325 (65 FE) MG EC tablet Take 65 mg by mouth 2 (two) times daily with meals.    hydrocortisone 2.5 % cream Apply topically 2 (two) times daily.    levETIRAcetam (KEPPRA) 500 MG Tab Take 1 tablet by mouth 2 (two) times daily.    zonisamide (ZONEGRAN) 100 MG Cap Take 200 mg by mouth 2 (two) times daily.    hydrOXYzine HCL (ATARAX) 25 MG tablet Take 1 tablet (25 mg total) by mouth 3 (three) times daily as needed for Itching. (Patient not taking: Reported on 11/25/2024)     No current facility-administered medications for this visit.       Vitals:      Vitals:    11/25/24 1444   BP: 116/80   Pulse: (!) 122   Resp: 16   Temp: 97.4 °F (36.3 °C)       Review of Systems:        Review of Systems   Constitutional:  Negative for chills and fever.   HENT:  Negative for congestion, ear pain and nosebleeds.    Eyes:  Negative for blurred vision and double vision.   Respiratory:  Negative for cough, shortness of breath and wheezing.    Cardiovascular:  Negative for chest pain and leg swelling.   Gastrointestinal:  Positive for abdominal pain. Negative for nausea and vomiting.   Genitourinary:  Negative for dysuria, frequency and urgency.   Musculoskeletal:  Positive for back pain. Negative for joint pain and myalgias.   Skin:  Negative for itching and rash.   Neurological:  Positive for weakness. Negative for dizziness and headaches.   Endo/Heme/Allergies:   Negative for environmental allergies.         Physical Exam:      Constitutional: Appears thin, ill, in pain   Patient is oriented to person, place, and time. Confused with more complex questions regarding health hx.   Head: Normocephalic and atraumatic. Mucous membranes moist.  Neck: Neck supple   Cardiovascular:  Tachycardic and regular rhythm.  S1 S2 appreciated by ascultation.  Pulmonary/Chest: Effort normal and clear to auscultation bilaterally. No respiratory distress.   Abdomen: Soft. Non-tender and non-distended. Bowel sounds are normal.  Urostomy tube with drain in place  Neurological: Patient is alert and oriented to person, place and time. Moves all extremities.  Skin: Warm and dry. No lesions.  Extremities: No clubbing, cyanosis or edema.    Laboratory data:      Reviewed and noted in plan where applicable. Please see chart for full laboratory data.    @[id number](cpk,cpkmb,troponini,mb)@ @IMRRGCQYX74(poctglucose)@     Lab Results   Component Value Date    INR 1.2 10/09/2024    INR 1.0 02/07/2024       Lab Results   Component Value Date    WBC 6.85 11/03/2024    HGB 9.1 (L) 11/03/2024    HCT 28.2 (L) 11/03/2024    MCV 96 11/03/2024     (H) 11/03/2024       @JBDBIYXTA79(GLU,NA,K,Cl,CO2,BUN,Creatinine,Calcium,MG)@    Predictors of intubation difficulty:       Morbid obesity? no   Anatomically abnormal facies? no   Prominent incisors? no   Receding mandible? no   Short, thick neck? no   Neck range of motion: normal   Dentition:  intact   Based on the Modified Mallampati, patient is a mallampati score: II (hard and soft palate, upper portion of tonsils anduvula visible)    Cardiographics:      ECG:   Results for orders placed or performed during the hospital encounter of 10/24/24   EKG 12-lead    Collection Time: 10/24/24 12:46 PM   Result Value Ref Range    QRS Duration 70 ms    OHS QTC Calculation 434 ms    Narrative    Test Reason : R55,    Vent. Rate : 076 BPM     Atrial Rate : 076 BPM     P-R  Int : 134 ms          QRS Dur : 070 ms      QT Int : 386 ms       P-R-T Axes : 057 021 135 degrees     QTc Int : 434 ms    Normal sinus rhythm  Nonspecific ST and T wave abnormality  Abnormal ECG  When compared with ECG of 09-OCT-2024 11:54,  Premature atrial complexes are no longer Present  QT has lengthened  Confirmed by WILLY CRAVEN MD ([id number]) on 10/27/2024 1:41:01 PM    Referred By: [email or web address]   SELF           Confirmed By:WILLY CRAVEN MD       Echocardiogram:  10/9/24    Left Ventricle: The left ventricle is normal in size. Normal wall thickness. There is concentric remodeling. Normal wall motion. There is normal systolic function with a visually estimated ejection fraction of 65 - 70%. Ejection fraction is approximately 65%. There is normal diastolic function.    Right Ventricle: Normal right ventricular cavity size. Wall thickness is normal. Systolic function is normal.    Pulmonary Artery: The estimated pulmonary artery systolic pressure is 15 mmHg.    IVC/SVC: Normal venous pressure at 3 mmHg.  Imaging:      Chest x-ray:  10/26/24    INDINGS:  Right PICC line tip over the cavoatrial junction in good position.     The lungs are clear, with normal appearance of pulmonary vasculature and no pleural effusion or pneumothorax.     The cardiac silhouette is normal in size. The hilar and mediastinal contours are unremarkable.     Bones are intact.     Impression:     Right PICC line in good position.  Assessment and Plan:      Ureteral stone with hydronephrosis  s/p cystoscopy and right nephrostomy tube placement 10/10/24  Patient plans for ureteroscopic stone retrieval with laser litho and stent placement  Known risk factors for perioperative complications:  debility      Difficulty with intubation is not anticipated.    Cardiac Risk Estimation:  Per Revised Cardiac Risk Index patient is a Class I  risk with a 3.9% risk of a major cardiac event.      1.) Preoperative workup as follows: ECG, hemoglobin, hematocrit,  electrolytes, creatinine, glucose, liver function studies.  2.) Change in medication regimen before surgery:  none .  3.) Prophylaxis for cardiac events with perioperative beta-blockers: not indicated.  4.) Invasive hemodynamic monitoring perioperatively: not indicated.  5.) Deep vein thrombosis prophylaxis postoperatively: intermittent pneumatic compression boots, regimen to be chosen by surgical team, and resume eliquis next day after surgery  .  6.) Surveillance for postoperative MI with ECG immediately postoperatively and on postoperati ve days 1 and 2 AND troponin levels 24 hours postoperatively and on day 4 or hospital discharge (whichever comes first): not indicated.  7.) Current medications which may produce withdrawal symptoms if withheld perioperatively: none  8.) Other measures:  Patient had extensive DVT to right lower extremity diagnosed in October.  Eliquis was held by group home, [facility] 5 days prior to planned procedure.  Her last dose of Eliquis was November 21st.  Discussed patient has interruption an oral anticoagulation with heme Onc, Dr. Oakley, she was okay with holding since the patient has already stopped anticoagulation and has the IVC filter in place.  Patient needs to resume Eliquis the day after the procedure.  Discussed the importance of resuming Eliquis with patient and spouse. Patient is an elevated risk for surgery due to her generalized debility.  Patient tachycardic in clinic today in upper 110s.  Upon reviewing heart rate at recent nursing home visits.  Heart rate in the 90s.  Patient is in acute pain and has not had anything to eat or drink since this morning.  I encouraged hydration, and we will have my nurse call her in the morning to reassess vitals    Primary hypertension  Hypertensive while undergoing chemotherapy  Blood pressure today controlled    Hx of Epileptic seizures  - continue home keppra,carbamazepine  , and zonisamide  take carbamazepine and zonisamide AM  of surgery   - keep follow up with neuro   Last seizure 2009       HLD (hyperlipidemia)  No statin currently   Pt had low risk stress in 2022 with Cards at outside facility ( per cardiology note , unable to view result in care everywhere )    Gastric adenocarcinoma  - dx 2/8/2024   S/p total gastrectomy on 8/20, with lysis of adhesions, lymphadenectomy, and jejunostomy tube placement, d/c on 9/10  - Pathologic stage from 8/20/2024: Stage III (ypT2, pN3a, cM0), completed 5 cycles Keytruda prior to surgery  - outpatient follow up with Dr. Jerome and Dr. Ambrose    Mixed epithelial carcinoma of right ovary  - pt s/p Removal of pelvic mass (right salpingo-oophorectomy), left salpingo-oophorectomy, extensive enterolysis, extensive adhesiolysis, left pelvic lymph node biopsy, omental biopsy, small bowel resection with primary functional end-to-end anastomosis, placement of pelvic drain January 2023  - s/p chemo April 2023- July 2023    Heart failure, unspecified    No current issues, patient with good EF per 2022 echocardiogram    DVT (deep venous thrombosis)  Expensive R LE DVT started on eliquis in October   Pt's eliquis was stopped per nursing home, last dose was 11/21  IVC filter placed 10/30/24  See above for extensive narrative    Chronic anemia  Continue home Fe    Severe protein-calorie malnutrition  Pt getting J tube feed and eating orally as well     Age-related physical debility  Patient week, transfers from wheelchair to chair with assistance of   Patient reports that she can and was ambulating around the nursing home for 10-15 minutes a day without chest pain or pressure or shortness of breath.    Jejunostomy tube present  Patient actively using J-tube and also tolerating oral intake          Electronically signed   ELDER Bryan PA-C

## 2024-11-25 NOTE — DISCHARGE INSTRUCTIONS
Pre op instructions reviewed with patient face to face during Clinic Visit with Provider, verbalized understanding.    To confirm, Surgery is scheduled on 11/27/2024. We will call you after 2pm the day before Surgery with your arrival time.    *Please report to the [facility] Lobby (1st Floor) located off of [facility] (2nd Entrance/Building on the left, in front of the flag pole).  Address: [address] [place] [address] [place] [address]      IMPORTANT INSTRUCTIONS!    Do not eat after 12 midnight, Do not smoke or use chewing tobacco after 12 midnight!  OK to brush teeth, but no gum, candy, or mints!      Patients should stop full meals at midnight, but they can consume clear liquids up to 2 hours prior to scheduled arrival time.  Clear liquids include Gatorade, water, soda, black coffee or tea (no milk or creamer), and clear juices.  Clear liquids do NOT include anything with pulp or food particles (Chicken broth, ice cream, yogurt, Jello, etc.)      *Take only these medications with clear liquids the morning of surgery*     Protonix, zonisamide, keprra, carbamazepine      Diabetic/Prediabetic Patients: If you take diabetic or weight loss medication, Do NOT take morning of surgery unless instructed by Doctor. Metformin to be stopped 24 hrs prior to surgery. Ozempic/ Mounjaro/ Wegovy/ Trulicity/ Semaglutide or any weight loss injections to be stopped 7 days prior to surgery. DO NOT take long-acting insulin the evening before surgery. Blood sugars will be checked in pre-op by Nurse.    !!!STOP ALL Aspirins, NSAIDS, WEIGHT LOSS INJECTIONS/PILLS, Herbal supplements, & Vitamins 7 DAYS BEFORE SURGERY!!!    ____  Avoid Alcoholic beverages 3 days prior to surgery, as it can thin the blood.  ____  NO Acrylic/fake nails or nail polish worn day of surgery (specifically hand/arm & foot surgeries).  ____  NO powder, lotions, deodorants, oils or cream on body.  ____  Remove all jewelry, piercings, & foreign objects prior  to arrival and leave at home.  ____  Remove Dentures, Hearing Aids & Contact Lens prior to surgery.  ____  Bring photo ID and insurance information to hospital (Leave Valuables at Home).  ____  If going home the same day, arrange for a ride home. You will not be able to drive for 24 hrs if Anesthesia was used.   ____  Females (ages 11-60): may need to give a urine sample the morning of surgery; please see Pre op Nurse prior to using the restroom.  ____  Males: Stop ED medications (Viagra, Cialis) 24 hrs prior to surgery.  ____  Wear clean, loose fitting clothing to allow for dressings/ bandages.      Bathing Instructions:    -Shower with anti-bacterial Soap (Hibiclens or Dial) the night before surgery & the morning of surgery!   -Do not use Hibiclens soap on your face or genitals.   -Apply clean clothes after shower.  -Do not shave your face or body 2 days prior to surgery unless instructed otherwise by your Surgeon.  -Do not shave pubic hair 7 days prior to surgery (gyn pt's).    [facility] Visitor/Ride Policy:  Only 2 adults allowed in pre op/recovery area during your procedure. You MUST HAVE A RIDE HOME from a responsible adult that you know and trust. Medical Transport, Uber or Lyft can ONLY be used if patient has a responsible adult to accompany them during ride home.       *Signs and symptoms of Infection Before or After Surgery:               !!!If you experience any fever, chills, nausea/ vomiting, foul odor/ excessive drainage from surgical site, flu-like symptoms, new wounds or cuts, PLEASE CALL THE SURGEON OFFICE at [phone] or SEND MESSAGE THROUGH [facility] PORTAL!!!       *If you are running late day of surgery, please call the Surgery Dept @ [phone].    *Billing question, please call:  [phone] or [phone]       Thank you,  -[facility] Pre Admit [facility].  [phone] or [phone]  M-F 7:30 am-4:00 pm (Closed Major Holidays)

## 2024-11-25 NOTE — ASSESSMENT & PLAN NOTE
Expensive R LE DVT started on eliquis in October   Pt's eliquis was stopped per nursing home, last dose was 11/21  IVC filter placed 10/30/24  See above for extensive narrative

## 2024-11-25 NOTE — PRE-PROCEDURE INSTRUCTIONS
To confirm, your doctor has instructed you: Surgery is scheduled for 11/27/2024.   Pre admit office will call the afternoon prior to surgery between 1PM and 3PM with arrival time.    [facility],  The address is [address] [address]. [place] [address].      IMPORTANT INSTRUCTIONS!    Do not eat after 12 midnight, Do not smoke or use chewing tobacco after 12 midnight!  OK to brush teeth, but no gum, candy, or mints!      Patients should stop full meals at midnight, but they can consume clear liquids up to 2 hours prior to scheduled arrival time.  Clear liquids include Gatorade, water, soda, black coffee or tea (no milk or creamer), and clear juices.  Clear liquids do NOT include anything with pulp or food particles (Chicken broth, ice cream, yogurt, Jello, etc.)      *Take only these medications with clear liquids the morning of surgery*     Protonix, zonisamide, keprra, carbamazepine        ____ Stop taking all vitamins, herbal supplements, Aspirin, & NSAIDS (Ibuprofen, Advil, Aleve) 7 days prior to surgery, as these can thin your blood.    ____ Weight loss medication, such as Adipex and Phentermine, must be stopped 14 days prior to surgery, no exceptions!    *Diabetic Patients: If you take diabetic or weight loss medication, do NOT take morning of surgery unless instructed by Doctor. Metformin to be stopped 24 hrs prior to surgery. DO NOT take short-acting insulin the day of surgery. Only take HALF of your regular dose of long-acting insulin the night before surgery, unless instructed otherwise. Blood sugars will be checked in pre-op.   ~Ozempic/Mounjaro/Wegovy/Trulicity/Semaglutide injections must be stopped 7 days prior to surgery.     Please notify MD office if you develop an active infection, are prescribed antibiotics by someone other than the surgeon doing your surgery, or visit urgent care/ER.      Bathing Instructions:   The night before surgery and the morning prior to  coming to the hospital:    - Shower & rinse your body as usual with anti-bacterial Soap (Dial or Lever 2000)   -Hibiclens (if indicated) use AFTER anti-bacterial soap; 1 packet PM/1 packet in AM on surgical site only   -Do not use hibiclens on your head, face, or genitals.    -Do not wash with anti-bacterial soap after you use the hibiclens.    -Do not shave surgical site 5-7 days prior to surgery.    -Pubic hair 7 days prior to surgery (OBGYN/Urology only).       Additional Instructions:   __ No makeup, powder, lotions, creams, or body spray to skin   __ No deodorant if you are having: breast procedure, PORT insertion, or shoulder surgery!   __ No nail polish or artificial nails due to risk of infection.             **SURGERY MAY BE CANCELLED AT SURGEON'S DISCRETION IF ARTIFICIAL/NAIL POLISH IS PRESENT!!!**  __ Please remove all piercings and leave all jewelry at home.    **SURGERY WILL BE CANCELLED IF PIERCINGS ARE PRESENT!!!**    __ Dentures, Hearing Aids and Contact Lens need to be removed prior to the start of surgery.    __ Avoid Alcoholic beverages 3 days prior to surgery, as it can thin the blood, unless told otherwise by pre-admit department.  __ Females: may need to give a urine sample the morning of surgery;   **Please ask  for a specimen cup if you need to use the restroom prior to being called into pre-op.**  __ Males: Stop ED medications (Viagra, Cialis) 24 hrs prior to surgery.  __ You must have transportation, and they MUST stay the entire time.   __  Bring photo ID and insurance information to hospital    What to Wear:  Clean, loose-fitting clothing. Please allow for dressings/bandages/surgical equipment.     [facility] Visitor[facility]Ride [facility]:    Only 1 adult allowed (18 or older) to accompany you and MUST STAY through the entire admission length.      -Must have a ride home from a responsible adult that you know and trust.     -Medical Transport, Uber or Lyft can only be used if patient has a  responsible adult to   accompany them during ride home.      ~Your ride MUST STAY the entire time until you are discharged~   Please notify the pre-admit department prior to surgery if you use medication transportation so we can verify your arrival/pickup time!   -The patient is responsible for setting up their own transportation!    Discharge Prescriptions:  Your discharge prescriptions will be sent next door to [facility], unless otherwise discussed with your surgeon. Your  will be responsible for calling the pharmacy ([phone]) to begin the prescription filling process. They will receive a text message with instructions once you are in recovery. Please make sure we have your insurance information and you bring a payment method (cash or card) if needed for prescriptions. If you have  insurance, please let the pre-op nurse know, as the pharmacy does not take this insurance.     *If you are running late or have questions the morning of surgery, please call the Pre-OP Department @ [phone].       *Please Call [facility] Pre-Admit Department for surgery instruction questions:   [phone]     Payment Questions:                Billing Question Numbers:         [phone]

## 2024-11-26 ENCOUNTER — ANESTHESIA EVENT (OUTPATIENT)
Dept: SURGERY | Facility: HOSPITAL | Age: 74
End: 2024-11-26
Payer: MEDICARE

## 2024-11-26 ENCOUNTER — TELEPHONE (OUTPATIENT)
Dept: PREADMISSION TESTING | Facility: HOSPITAL | Age: 74
End: 2024-11-26
Payer: MEDICARE

## 2024-11-26 ENCOUNTER — PATIENT MESSAGE (OUTPATIENT)
Dept: PREADMISSION TESTING | Facility: HOSPITAL | Age: 74
End: 2024-11-26
Payer: MEDICARE

## 2024-11-26 RX ORDER — CIPROFLOXACIN 500 MG/1
500 TABLET ORAL 2 TIMES DAILY
Qty: 14 TABLET | Refills: 0 | Status: SHIPPED | OUTPATIENT
Start: 2024-11-26 | End: 2024-12-03

## 2024-11-26 NOTE — PRE-PROCEDURE INSTRUCTIONS
Called and spoke with pt about the following:     Please arrive to [facility] at 0530 am on 11/27/2024 for your scheduled procedure.  Address: [address] [place] [address] [place] [address] (2nd Building on left, 1st Floor Lobby)    !!!NO FOOD after midnight! You may have clear liquids up to 3 hrs before your arrival to the Hospital!!!  Clear liquids include Gatorade, water, soda, black coffee or tea (no milk or creamer), and clear juices.  Clear liquids do NOT include anything with pulp or food particles (Chicken broth, ice cream, yogurt, Jello, etc.)    Thank you,  -[facility] Pre [facility].  Mon-Fri 7:30 am - 4 pm [phone]

## 2024-11-26 NOTE — ANESTHESIA PREPROCEDURE EVALUATION
11/26/2024  Cheryl Kirkland is a 74 y.o., female.  Patient Active Problem List   Diagnosis    Chronic anemia    Hx of Epileptic seizures    DVT (deep venous thrombosis)    Mixed epithelial carcinoma of right ovary    Gastric adenocarcinoma    Chronic deep vein thrombosis (DVT) of left lower extremity    Family history of breast cancer    Fibrocystic breast changes, bilateral    Heart failure, unspecified    HLD (hyperlipidemia)    OP (osteoporosis)    Primary hypertension    Pre-op exam    Anemia    Fibroma of tongue    Drug-induced skin rash    Pruritus    Jejunostomy tube present    Severe protein-calorie malnutrition    Ureteral stone with hydronephrosis    Hypokalemia    Syncope    Hypomagnesemia    Age-related physical debility     Past Surgical History:   Procedure Laterality Date    ABDOMINAL WASHOUT N/A 3/14/2024    Procedure: LAVAGE, PERITONEAL, THERAPEUTIC;  Surgeon: Chen Jerome MD;  Location: [facility];  Service: General;  Laterality: N/A;    APPENDECTOMY  2015    BIOPSY OF PERITONEUM N/A 3/14/2024    Procedure: BIOPSY, PERITONEUM;  Surgeon: Chen Jerome MD;  Location: [facility];  Service: General;  Laterality: N/A;    COLONOSCOPY  06/20/2012    Dr Boyle- Tubular adenoma polyps. Repeat in 3 years.    COLONOSCOPY  06/17/2015    -Nodular mucosa at appendiceal orfice, sigmoid and desc diverticulosis otherwise normal.    COLONOSCOPY  2020    >3 TAs    COLONOSCOPY  12/2023    CYSTOSCOPY W/ RETROGRADES Right 10/10/2024    Procedure: CYSTOSCOPY, WITH RETROGRADE PYELOGRAM;  Surgeon: Lata Kelly MD;  Location: [facility] OR;  Service: Urology;  Laterality: Right;    DIAGNOSTIC LAPAROSCOPY N/A 3/14/2024    Procedure: LAPAROSCOPY, DIAGNOSTIC;  Surgeon: Chen Jerome MD;  Location: [facility];  Service: General;  Laterality: N/A;  1. Diagnostic laparoscopy   2. Extensive  lysis of adhesions  3. Peritoneal biopsy   4. Peritoneal washings for cytology    DIAGNOSTIC LAPAROSCOPY N/A 8/20/2024    Procedure: LAPAROSCOPY, DIAGNOSTIC;  Surgeon: Chen Jerome MD;  Location: [facility];  Service: General;  Laterality: N/A;    EGD - EXTERNAL RESULT  06/20/2012    Dr Boyle- Mild gastritis otherwise normal.    ENDOSCOPIC ULTRASOUND OF UPPER GASTROINTESTINAL TRACT N/A 7/26/2024    Procedure: ULTRASOUND, UPPER GI TRACT, ENDOSCOPIC;  Surgeon: Valdo Aguilera MD;  Location: [facility];  Service: Endoscopy;  Laterality: N/A;  7/22/24: instructions sent via portal-. Pt no longer takling eliquis-GD    ESOPHAGOGASTRODUODENOSCOPY N/A 02/08/2024    Procedure: EGD (ESOPHAGOGASTRODUODENOSCOPY);  Surgeon: Jayla Arteaga MD;  Location: [facility];  Service: Endoscopy;  Laterality: N/A;    ESOPHAGOGASTRODUODENOSCOPY N/A 8/20/2024    Procedure: EGD (ESOPHAGOGASTRODUODENOSCOPY);  Surgeon: Chen Jerome MD;  Location: [facility];  Service: General;  Laterality: N/A;    GASTRECTOMY N/A 8/20/2024    Procedure: GASTRECTOMY;  Surgeon: Chen Jerome MD;  Location: [facility];  Service: General;  Laterality: N/A;    HYSTERECTOMY      LAPAROSCOPIC LYSIS OF ADHESIONS N/A 3/14/2024    Procedure: LYSIS, ADHESIONS, LAPAROSCOPIC;  Surgeon: Chen Jerome MD;  Location: [facility] OR;  Service: General;  Laterality: N/A;    LYSIS OF ADHESIONS N/A 8/20/2024    Procedure: LYSIS, ADHESIONS;  Surgeon: Chen Jerome MD;  Location: [facility];  Service: General;  Laterality: N/A;    PLACEMENT OF JEJUNOSTOMY TUBE N/A 8/20/2024    Procedure: INSERTION, JEJUNOSTOMY TUBE;  Surgeon: Chen Jerome MD;  Location: [facility] OR;  Service: General;  Laterality: N/A;    TOTAL ABDOMINAL HYSTERECTOMY W/ BILATERAL SALPINGOOPHORECTOMY  01/09/2023    radical resection with right salpingo-oophorectomy, left salpingo-oophorectomy, extensive enterolysis, extensive adhesiolysis, left pelvic lymph node biopsy, omental biopsy, small bowel  resection with primary functional end-to-end anastomosis, placement of pelvic drain         Pre-op Assessment    I have reviewed the Patient Summary Reports.     I have reviewed the Nursing Notes. I have reviewed the NPO Status.   I have reviewed the Medications.     Review of Systems  Anesthesia Hx:  No problems with previous Anesthesia                Social:  Former Smoker       Hematology/Oncology:    Oncology Normal    -- Anemia:                                  Cardiovascular:     Hypertension           hyperlipidemia   ECG has been reviewed.                            Pulmonary:  Pulmonary Normal                       Renal/:   renal calculi               Hepatic/GI:     GERD   Hx of Gastric adenocarcinoma             Neurological:       Seizures          Peripheral Neuropathy                          Endocrine:  Endocrine Normal            Dermatological:  Skin Normal    Psych:  Psychiatric Normal                    Physical Exam  General: Well nourished, Cooperative, Alert and Oriented    Airway:  Mallampati: II / II  Mouth Opening: Normal  TM Distance: Normal  Tongue: Normal  Neck ROM: Normal ROM    Dental:  Intact        Anesthesia Plan  Type of Anesthesia, risks & benefits discussed:    Anesthesia Type: Gen ETT  Intra-op Monitoring Plan: Standard ASA Monitors  Post Op Pain Control Plan: multimodal analgesia  Induction:  IV  Airway Plan: Direct  Informed Consent: Informed consent signed with the Patient and all parties understand the risks and agree with anesthesia plan.  All questions answered.   ASA Score: 3  Day of Surgery Review of History & Physical: H&P Update referred to the surgeon/provider.I have interviewed and examined the patient. I have reviewed the patient's H&P dated: There are no significant changes. H&P completed by Anesthesiologist.    Ready For Surgery From Anesthesia Perspective.     .      Chemistry        Component Value Date/Time     11/03/2024 0515    K 4.3 11/03/2024 0515      11/03/2024 0515    CO2 19 (L) 11/03/2024 0515    BUN 8 11/03/2024 0515    CREATININE 0.5 11/03/2024 0515     11/03/2024 0515        Component Value Date/Time    CALCIUM 8.1 (L) 11/03/2024 0515    ALKPHOS 229 (H) 10/31/2024 1036    AST 15 10/31/2024 1036    ALT 21 10/31/2024 1036    BILITOT 0.2 10/31/2024 1036        Lab Results   Component Value Date    WBC 6.85 11/03/2024    HGB 9.1 (L) 11/03/2024    HCT 28.2 (L) 11/03/2024    MCV 96 11/03/2024     (H) 11/03/2024       Normal sinus rhythm   Nonspecific ST and T wave abnormality   Abnormal ECG   When compared with ECG of 09-OCT-2024 11:54,   Premature atrial complexes are no longer Present   QT has lengthened   Confirmed by WILLY CRAVEN MD ([id number]) on 10/27/2024 1:41:01 PM     Echo 10/10/24:    Left Ventricle: The left ventricle is normal in size. Normal wall thickness. There is concentric remodeling. Normal wall motion. There is normal systolic function with a visually estimated ejection fraction of 65 - 70%. Ejection fraction is approximately 65%. There is normal diastolic function.    Right Ventricle: Normal right ventricular cavity size. Wall thickness is normal. Systolic function is normal.    Pulmonary Artery: The estimated pulmonary artery systolic pressure is 15 mmHg.    IVC/SVC: Normal venous pressure at 3 mmHg.

## 2024-11-26 NOTE — TELEPHONE ENCOUNTER
Called and lvm x2 to follow up on patient's vitals. Gave call back number . Notified CESAR Cline    Spoke with patient: Vitals signs are as follows. /78, Heart rate 105, O2 sat 98, Temperature 97.6 forehead. Notified CESAR Cline. Patient states that she feels much better than yesterday. Denies SOB. Pain has decreased per patient.     Re-instructed patient on [facility] Informed pt that [facility] AMEZCUA will call her today after 1 PM to give arrival time. Pt keyur

## 2024-11-27 ENCOUNTER — HOSPITAL ENCOUNTER (OUTPATIENT)
Facility: HOSPITAL | Age: 74
Discharge: HOME | End: 2024-11-27
Attending: UROLOGY | Admitting: UROLOGY
Payer: MEDICARE

## 2024-11-27 ENCOUNTER — ANESTHESIA (OUTPATIENT)
Dept: SURGERY | Facility: HOSPITAL | Age: 74
End: 2024-11-27
Payer: MEDICARE

## 2024-11-27 VITALS
HEIGHT: 65 IN | SYSTOLIC BLOOD PRESSURE: 109 MMHG | OXYGEN SATURATION: 99 % | DIASTOLIC BLOOD PRESSURE: 70 MMHG | HEART RATE: 75 BPM | BODY MASS INDEX: 19.08 KG/M2 | RESPIRATION RATE: 16 BRPM | TEMPERATURE: 97 F

## 2024-11-27 DIAGNOSIS — N20.1 URETERAL STONE: Primary | ICD-10-CM

## 2024-11-27 LAB — SPECIMEN SOURCE: NORMAL

## 2024-11-27 PROCEDURE — 36000707: Performed by: UROLOGY

## 2024-11-27 PROCEDURE — C1758 CATHETER, URETERAL: HCPCS | Performed by: UROLOGY

## 2024-11-27 PROCEDURE — C1769 GUIDE WIRE: HCPCS | Performed by: UROLOGY

## 2024-11-27 PROCEDURE — C1747 OPTIME ENDOSCOPE, SINGLE, URINARY TRACT: HCPCS | Performed by: UROLOGY

## 2024-11-27 PROCEDURE — 71000033 HC RECOVERY, INTIAL HOUR: Performed by: UROLOGY

## 2024-11-27 PROCEDURE — 27200651 HC AIRWAY, LMA: Performed by: ANESTHESIOLOGY

## 2024-11-27 PROCEDURE — 25500020 PHARM REV CODE 255: Performed by: UROLOGY

## 2024-11-27 PROCEDURE — 25000003 PHARM REV CODE 250: Performed by: NURSE ANESTHETIST, CERTIFIED REGISTERED

## 2024-11-27 PROCEDURE — 37000008 HC ANESTHESIA 1ST 15 MINUTES: Performed by: UROLOGY

## 2024-11-27 PROCEDURE — 71000015 HC POSTOP RECOV 1ST HR: Performed by: UROLOGY

## 2024-11-27 PROCEDURE — 88300 SURGICAL PATH GROSS: CPT | Performed by: STUDENT IN AN ORGANIZED HEALTH CARE EDUCATION/TRAINING PROGRAM

## 2024-11-27 PROCEDURE — 63600175 PHARM REV CODE 636 W HCPCS: Performed by: NURSE ANESTHETIST, CERTIFIED REGISTERED

## 2024-11-27 PROCEDURE — 27201423 OPTIME MED/SURG SUP & DEVICES STERILE SUPPLY: Performed by: UROLOGY

## 2024-11-27 PROCEDURE — 37000009 HC ANESTHESIA EA ADD 15 MINS: Performed by: UROLOGY

## 2024-11-27 PROCEDURE — 36000706: Performed by: UROLOGY

## 2024-11-27 PROCEDURE — C2617 STENT, NON-COR, TEM W/O DEL: HCPCS | Performed by: UROLOGY

## 2024-11-27 PROCEDURE — 82365 CALCULUS SPECTROSCOPY: CPT | Performed by: UROLOGY

## 2024-11-27 PROCEDURE — 63600175 PHARM REV CODE 636 W HCPCS: Performed by: UROLOGY

## 2024-11-27 PROCEDURE — 74420 UROGRAPHY RTRGR +-KUB: CPT | Mod: 26,,, | Performed by: UROLOGY

## 2024-11-27 PROCEDURE — 52356 CYSTO/URETERO W/LITHOTRIPSY: CPT | Mod: RT,,, | Performed by: UROLOGY

## 2024-11-27 DEVICE — STENT SET URETERAL 6FR 22CM: Type: IMPLANTABLE DEVICE | Site: URETER | Status: FUNCTIONAL

## 2024-11-27 RX ORDER — MEPERIDINE HYDROCHLORIDE 25 MG/ML
12.5 INJECTION INTRAMUSCULAR; INTRAVENOUS; SUBCUTANEOUS ONCE AS NEEDED
Status: DISCONTINUED | OUTPATIENT
Start: 2024-11-27 | End: 2024-11-27 | Stop reason: HOSPADM

## 2024-11-27 RX ORDER — PHENYLEPHRINE HYDROCHLORIDE 10 MG/ML
INJECTION INTRAVENOUS
Status: DISCONTINUED | OUTPATIENT
Start: 2024-11-27 | End: 2024-11-27

## 2024-11-27 RX ORDER — FENTANYL CITRATE 50 UG/ML
INJECTION, SOLUTION INTRAMUSCULAR; INTRAVENOUS
Status: DISCONTINUED | OUTPATIENT
Start: 2024-11-27 | End: 2024-11-27

## 2024-11-27 RX ORDER — HYDROCODONE BITARTRATE AND ACETAMINOPHEN 5; 325 MG/1; MG/1
1 TABLET ORAL EVERY 6 HOURS PRN
Qty: 20 TABLET | Refills: 0 | Status: SHIPPED | OUTPATIENT
Start: 2024-11-27

## 2024-11-27 RX ORDER — ONDANSETRON HYDROCHLORIDE 2 MG/ML
4 INJECTION, SOLUTION INTRAVENOUS DAILY PRN
Status: DISCONTINUED | OUTPATIENT
Start: 2024-11-27 | End: 2024-11-27 | Stop reason: HOSPADM

## 2024-11-27 RX ORDER — HYDROMORPHONE HYDROCHLORIDE 1 MG/ML
0.2 INJECTION, SOLUTION INTRAMUSCULAR; INTRAVENOUS; SUBCUTANEOUS EVERY 5 MIN PRN
Status: DISCONTINUED | OUTPATIENT
Start: 2024-11-27 | End: 2024-11-27 | Stop reason: HOSPADM

## 2024-11-27 RX ORDER — LIDOCAINE HYDROCHLORIDE 20 MG/ML
INJECTION INTRAVENOUS
Status: DISCONTINUED | OUTPATIENT
Start: 2024-11-27 | End: 2024-11-27

## 2024-11-27 RX ORDER — FENTANYL CITRATE 50 UG/ML
25 INJECTION, SOLUTION INTRAMUSCULAR; INTRAVENOUS EVERY 5 MIN PRN
Status: DISCONTINUED | OUTPATIENT
Start: 2024-11-27 | End: 2024-11-27 | Stop reason: HOSPADM

## 2024-11-27 RX ORDER — ONDANSETRON HYDROCHLORIDE 2 MG/ML
4 INJECTION, SOLUTION INTRAVENOUS ONCE AS NEEDED
Status: DISCONTINUED | OUTPATIENT
Start: 2024-11-27 | End: 2024-11-27 | Stop reason: HOSPADM

## 2024-11-27 RX ORDER — PROPOFOL 10 MG/ML
VIAL (ML) INTRAVENOUS
Status: DISCONTINUED | OUTPATIENT
Start: 2024-11-27 | End: 2024-11-27

## 2024-11-27 RX ORDER — ONDANSETRON 4 MG/1
4 TABLET, ORALLY DISINTEGRATING ORAL EVERY 8 HOURS PRN
Qty: 20 TABLET | Refills: 0 | Status: SHIPPED | OUTPATIENT
Start: 2024-11-27

## 2024-11-27 RX ORDER — ONDANSETRON HYDROCHLORIDE 2 MG/ML
INJECTION, SOLUTION INTRAVENOUS
Status: DISCONTINUED | OUTPATIENT
Start: 2024-11-27 | End: 2024-11-27

## 2024-11-27 RX ORDER — CEFAZOLIN SODIUM 1 G/3ML
INJECTION, POWDER, FOR SOLUTION INTRAMUSCULAR; INTRAVENOUS
Status: DISCONTINUED | OUTPATIENT
Start: 2024-11-27 | End: 2024-11-27

## 2024-11-27 RX ORDER — HYOSCYAMINE SULFATE 0.12 MG/1
0.25 TABLET SUBLINGUAL EVERY 4 HOURS PRN
Qty: 30 TABLET | Refills: 0 | Status: SHIPPED | OUTPATIENT
Start: 2024-11-27

## 2024-11-27 RX ORDER — OXYCODONE AND ACETAMINOPHEN 5; 325 MG/1; MG/1
1 TABLET ORAL
Status: DISCONTINUED | OUTPATIENT
Start: 2024-11-27 | End: 2024-11-27 | Stop reason: HOSPADM

## 2024-11-27 RX ORDER — CIPROFLOXACIN 2 MG/ML
400 INJECTION, SOLUTION INTRAVENOUS
Status: COMPLETED | OUTPATIENT
Start: 2024-11-27 | End: 2024-11-27

## 2024-11-27 RX ADMIN — ONDANSETRON 4 MG: 2 INJECTION INTRAMUSCULAR; INTRAVENOUS at 08:11

## 2024-11-27 RX ADMIN — PHENYLEPHRINE HYDROCHLORIDE 150 MCG: 10 INJECTION INTRAVENOUS at 07:11

## 2024-11-27 RX ADMIN — PROPOFOL 60 MG: 10 INJECTION, EMULSION INTRAVENOUS at 07:11

## 2024-11-27 RX ADMIN — LIDOCAINE HYDROCHLORIDE 80 MG: 20 INJECTION INTRAVENOUS at 07:11

## 2024-11-27 RX ADMIN — SODIUM CHLORIDE: 9 INJECTION, SOLUTION INTRAVENOUS at 07:11

## 2024-11-27 RX ADMIN — CIPROFLOXACIN 400 MG: 2 INJECTION, SOLUTION INTRAVENOUS at 07:11

## 2024-11-27 RX ADMIN — PHENYLEPHRINE HYDROCHLORIDE 100 MCG: 10 INJECTION INTRAVENOUS at 07:11

## 2024-11-27 RX ADMIN — FENTANYL CITRATE 100 MCG: 50 INJECTION, SOLUTION INTRAMUSCULAR; INTRAVENOUS at 07:11

## 2024-11-27 RX ADMIN — CEFAZOLIN 2 G: 330 INJECTION, POWDER, FOR SOLUTION INTRAMUSCULAR; INTRAVENOUS at 07:11

## 2024-11-27 NOTE — DISCHARGE SUMMARY
[facility] - Surgery (Hospital)  Discharge Note  Short Stay    Procedure(s) (LRB):  CYSTOURETEROSCOPY, WITH HOLMIUM LASER LITHOTRIPSY OF URETERAL CALCULUS AND STENT INSERTION (Right)  REMOVAL, CALCULUS, URETER (Right)  REMOVAL-STENT (Right)  PYELOGRAM, RETROGRADE (Right)  REMOVAL, NEPHROSTOMY TUBE, WITH IMAGING GUIDANCE (Right)      OUTCOME: Patient tolerated treatment/procedure well without complication and is now ready for discharge.    DISPOSITION: Home or Self Care    FINAL DIAGNOSIS:  Ureteral stone    FOLLOWUP: In clinic    DISCHARGE INSTRUCTIONS:    Discharge Procedure Orders   Diet Adult Regular     Notify your health care provider if you experience any of the following:  temperature >100.4     Notify your health care provider if you experience any of the following:  persistent nausea and vomiting or diarrhea     Notify your health care provider if you experience any of the following:  severe uncontrolled pain     Remove dressing in 48 hours     Activity as tolerated        TIME SPENT ON DISCHARGE: 10 minutes

## 2024-11-27 NOTE — PLAN OF CARE
Pt arrived a little behind schedule due to pt stating she felt a little lightheaded at home but denies falling. VSS. Dr. ePña made aware of pt's complaint and today's vitals. No new order obtained. @0700-Dr. Kelly to bedside to assess pt prior to surgery. Dr. Kelly made aware of pt's symptoms this am. Dr. Kelly concerned with pt's heart rate of 118 and symptoms this am. Dr. Peña back to bedside consulting with Dr. Kelly, mutually decided that pt should have a repeat 12 lead EKG and new vitals obtained. Heart rate now 98, and EKG unchanged from prev reading. Pt will now proceed with today's surgery. All staff made aware.

## 2024-11-27 NOTE — INTERVAL H&P NOTE
The patient has been examined and the H&P has been reviewed:    I concur with the findings and changes have been noted since the H&P was written: Cipro taken last night, culture negative. Pt had an episode of almost falling this am, unclear why. States that she feels fine now. Repeated EKG-- normal. Discussed potential for infectious complications. Pt expressed understanding and would like to proceed.     Surgery risks, benefits and alternative options discussed and understood by patient/family.          There are no hospital problems to display for this patient.

## 2024-11-27 NOTE — OP NOTE
Date of Procedure: 11/27/2024    PREOPERATIVE DIAGNOSIS:  right ureteral stone.                                                                                                           POSTOPERATIVE DIAGNOSIS:  right ureteral stone.                               PROCEDURES:                                                                  1.  Right ureteroscopy with laser lithotripsy and stone basket extraction.                          2.  Cystoscopy with exchange of right double-J ureteral stent.              3.  Right retrograde pyelogram.     4.  Removal of R nephrostomy                                               5.  Fluoro less than 1 hour.                                                 SURGEON:  Lata Kelly M.D.                                          Assistants: None    Specimen: Right ureteral stones for analysis    Prosthetics, Devices, Grafts: None    ANESTHESIA:  General                                            FINDINGS:  The patient had an approximately 8mm stone in the distal ureter. Stent was placed at an earlier procedure by IR. Ureteroscopy was possible after placement of an appropriate guidewire. The stone was broken into small pieces. A stone basket was used, and several small fragments were extracted. A 6-Mohawk x 22 cm double-J ureteral stent was placed. Flouroscopy was used throughout the case for wire and scope guidance, and if applicable to check final stent placement. The Right nephrostomy tube was removed at the conclusion of the procedure under fluoroscopic guidance.                                    BLOOD LOSS:  3cc.                                                           BLOOD GIVEN:  None.                                                                                                             DRAINS:  6-Mohawk x 22 cm right double-J ureteral stent.                      PROCEDURE IN DETAIL:  After proper consents were obtained, the patient was brought to the Operating  Room where she was prepped and draped in normal sterile fashion and provided general endotracheal anesthesia in dorsal lithotomy position.  A 22-Maori cystoscope was assembled and introduced per urethra and the bladder was rinsed and drained. The stent was then grasped with foreign body graspers and pulled through the urethral meatus. The stent was cannulated with a wire, which was advanced into the kidney under fluoroscopic guidance. The stent was backloaded. The dual lumen ureteral catheter was utilized and retrograde pyelogram was shot, noting that the ureter was tortuous in the prior region of stone, but I couldn't definitely see the stone. A second wire was advanced into the kidney. The dual lumen was backloaded. I then advanced the flexible ureteroscope over the wire to the proximal ureter. This was advanced into the kidney and the nephrostomy tube was visualized. The stone was not present in the prior location, so I continued to back the scope down the ureter. The stone was encountered and it was noted to be embedded with in the wall of the distal ureter.  The laser fiber was brought in and the stone was broken into a large number of small pieces.  Several of these were basket extracted. I continued to laser the stone fragments in the distal ureter until the fragments all appeared to be 2mm or less. The ureteroscope was backed down the ureter, noting no remaining ureteral stones. The ureteroscope was then set aside and the dual lumen was utilized to shoot a gentle retrograde pyelogram, noting no definite contrast extravasation.  A double-J ureteral stent was advanced coaxially over the wire and up to the renal pelvis, and there was an excellent curl on both ends when the wire was withdrawn, using fluoroscopic guidance.       The bladder was then rinsed and drained and stone fragments collected for pathologic examination.  After the bladder was drained, cystoscope was withdrawn and set aside.  I then removed  the patient's right nephrostomy tube under fluoroscopic guidance.    The pt tolerated all of this well, and there were no complications. The patient was awakened and transferred to Recovery in stable condition.

## 2024-11-27 NOTE — ANESTHESIA PROCEDURE NOTES
Intubation    Date/Time: 11/27/2024 7:28 AM    Performed by: Cal Gustafson CRNA  Authorized by: Fernanda Peña MD    Intubation:     Induction:  Intravenous    Intubated:  Postinduction    Mask Ventilation:  Not attempted    Attempts:  1    Attempted By:  CRNA    Difficult Airway Encountered?: No      Complications:  None    Airway Device:  Supraglottic airway/LMA    Airway Device Size:  3.0    Style/Cuff Inflation:  Cuffed (inflated to minimal occlusive pressure)    Secured at:  The lips    Placement Verified By:  Capnometry    Complicating Factors:  None    Findings Post-Intubation:  BS equal bilateral and atraumatic/condition of teeth unchanged

## 2024-11-28 ENCOUNTER — HOSPITAL ENCOUNTER (OUTPATIENT)
Facility: HOSPITAL | Age: 74
Discharge: HOME | End: 2024-11-29
Attending: EMERGENCY MEDICINE | Admitting: HOSPITALIST
Payer: MEDICARE

## 2024-11-28 DIAGNOSIS — R07.9 CHEST PAIN: ICD-10-CM

## 2024-11-28 DIAGNOSIS — K94.23 MIGRATION OF PERCUTANEOUS ENDOSCOPIC GASTROSTOMY (PEG) TUBE: Primary | ICD-10-CM

## 2024-11-28 PROBLEM — T85.528A DISLODGED GASTROSTOMY TUBE: Status: ACTIVE | Noted: 2024-11-28

## 2024-11-28 PROBLEM — T85.528A DISLODGED JEJUNOSTOMY TUBE: Status: ACTIVE | Noted: 2024-11-28

## 2024-11-28 LAB
ANION GAP SERPL CALC-SCNC: 15 MMOL/L (ref 8–16)
APTT PPP: 35.6 SEC (ref 21–32)
BASOPHILS # BLD AUTO: 0.04 K/UL (ref 0–0.2)
BASOPHILS NFR BLD: 0.5 % (ref 0–1.9)
BUN SERPL-MCNC: 10 MG/DL (ref 8–23)
CALCIUM SERPL-MCNC: 8.8 MG/DL (ref 8.7–10.5)
CHLORIDE SERPL-SCNC: 110 MMOL/L (ref 95–110)
CO2 SERPL-SCNC: 12 MMOL/L (ref 23–29)
CREAT SERPL-MCNC: 0.7 MG/DL (ref 0.5–1.4)
DIFFERENTIAL METHOD BLD: ABNORMAL
EOSINOPHIL # BLD AUTO: 0.1 K/UL (ref 0–0.5)
EOSINOPHIL NFR BLD: 1.2 % (ref 0–8)
ERYTHROCYTE [DISTWIDTH] IN BLOOD BY AUTOMATED COUNT: 18.2 % (ref 11.5–14.5)
EST. GFR  (NO RACE VARIABLE): >60 ML/MIN/1.73 M^2
GLUCOSE SERPL-MCNC: 95 MG/DL (ref 70–110)
HCT VFR BLD AUTO: 36.8 % (ref 37–48.5)
HGB BLD-MCNC: 11.8 G/DL (ref 12–16)
IMM GRANULOCYTES # BLD AUTO: 0.16 K/UL (ref 0–0.04)
IMM GRANULOCYTES NFR BLD AUTO: 2.1 % (ref 0–0.5)
INR PPP: 1.2 (ref 0.8–1.2)
LYMPHOCYTES # BLD AUTO: 1 K/UL (ref 1–4.8)
LYMPHOCYTES NFR BLD: 13.4 % (ref 18–48)
MCH RBC QN AUTO: 32.2 PG (ref 27–31)
MCHC RBC AUTO-ENTMCNC: 32.1 G/DL (ref 32–36)
MCV RBC AUTO: 100 FL (ref 82–98)
MONOCYTES # BLD AUTO: 0.8 K/UL (ref 0.3–1)
MONOCYTES NFR BLD: 11 % (ref 4–15)
NEUTROPHILS # BLD AUTO: 5.5 K/UL (ref 1.8–7.7)
NEUTROPHILS NFR BLD: 71.8 % (ref 38–73)
NRBC BLD-RTO: 0 /100 WBC
PLATELET # BLD AUTO: 513 K/UL (ref 150–450)
PMV BLD AUTO: 8.1 FL (ref 9.2–12.9)
POTASSIUM SERPL-SCNC: 3.8 MMOL/L (ref 3.5–5.1)
PROTHROMBIN TIME: 13.4 SEC (ref 9–12.5)
RBC # BLD AUTO: 3.67 M/UL (ref 4–5.4)
SODIUM SERPL-SCNC: 137 MMOL/L (ref 136–145)
WBC # BLD AUTO: 7.61 K/UL (ref 3.9–12.7)

## 2024-11-28 PROCEDURE — 85025 COMPLETE CBC W/AUTO DIFF WBC: CPT | Performed by: EMERGENCY MEDICINE

## 2024-11-28 PROCEDURE — 43762 RPLC GTUBE NO REVJ TRC: CPT | Mod: 52

## 2024-11-28 PROCEDURE — 85730 THROMBOPLASTIN TIME PARTIAL: CPT | Performed by: EMERGENCY MEDICINE

## 2024-11-28 PROCEDURE — G0378 HOSPITAL OBSERVATION PER HR: HCPCS

## 2024-11-28 PROCEDURE — 85610 PROTHROMBIN TIME: CPT | Performed by: EMERGENCY MEDICINE

## 2024-11-28 PROCEDURE — 99285 EMERGENCY DEPT VISIT HI MDM: CPT | Mod: 25

## 2024-11-28 PROCEDURE — 80048 BASIC METABOLIC PNL TOTAL CA: CPT | Performed by: EMERGENCY MEDICINE

## 2024-11-28 RX ORDER — NALOXONE HCL 0.4 MG/ML
0.02 VIAL (ML) INJECTION
Status: DISCONTINUED | OUTPATIENT
Start: 2024-11-28 | End: 2024-11-29 | Stop reason: HOSPADM

## 2024-11-28 RX ORDER — IPRATROPIUM BROMIDE AND ALBUTEROL SULFATE 2.5; .5 MG/3ML; MG/3ML
3 SOLUTION RESPIRATORY (INHALATION) EVERY 6 HOURS PRN
Status: DISCONTINUED | OUTPATIENT
Start: 2024-11-28 | End: 2024-11-29 | Stop reason: HOSPADM

## 2024-11-28 RX ORDER — IBUPROFEN 200 MG
24 TABLET ORAL
Status: DISCONTINUED | OUTPATIENT
Start: 2024-11-28 | End: 2024-11-29 | Stop reason: HOSPADM

## 2024-11-28 RX ORDER — SODIUM CHLORIDE 0.9 % (FLUSH) 0.9 %
10 SYRINGE (ML) INJECTION EVERY 12 HOURS PRN
Status: DISCONTINUED | OUTPATIENT
Start: 2024-11-28 | End: 2024-11-29 | Stop reason: HOSPADM

## 2024-11-28 RX ORDER — GLUCAGON 1 MG
1 KIT INJECTION
Status: DISCONTINUED | OUTPATIENT
Start: 2024-11-28 | End: 2024-11-29 | Stop reason: HOSPADM

## 2024-11-28 RX ORDER — ONDANSETRON HYDROCHLORIDE 2 MG/ML
4 INJECTION, SOLUTION INTRAVENOUS EVERY 8 HOURS PRN
Status: DISCONTINUED | OUTPATIENT
Start: 2024-11-28 | End: 2024-11-29 | Stop reason: HOSPADM

## 2024-11-28 RX ORDER — ACETAMINOPHEN 650 MG/1
650 SUPPOSITORY RECTAL EVERY 6 HOURS PRN
Status: DISCONTINUED | OUTPATIENT
Start: 2024-11-28 | End: 2024-11-29 | Stop reason: HOSPADM

## 2024-11-28 RX ORDER — IBUPROFEN 200 MG
16 TABLET ORAL
Status: DISCONTINUED | OUTPATIENT
Start: 2024-11-28 | End: 2024-11-29 | Stop reason: HOSPADM

## 2024-11-29 VITALS
BODY MASS INDEX: 17.72 KG/M2 | DIASTOLIC BLOOD PRESSURE: 65 MMHG | RESPIRATION RATE: 15 BRPM | OXYGEN SATURATION: 99 % | TEMPERATURE: 98 F | WEIGHT: 106.5 LBS | HEART RATE: 97 BPM | SYSTOLIC BLOOD PRESSURE: 101 MMHG

## 2024-11-29 LAB
FINAL PATHOLOGIC DIAGNOSIS: NORMAL
GROSS: NORMAL
Lab: NORMAL

## 2024-11-29 PROCEDURE — G0378 HOSPITAL OBSERVATION PER HR: HCPCS

## 2024-11-29 NOTE — SUBJECTIVE & OBJECTIVE
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    OP (osteoporosis) 02/28/2024    Ovarian cancer     Primary hypertension 12/01/2022       Past Surgical History:   Procedure Laterality Date    ABDOMINAL WASHOUT N/A 3/14/2024    Procedure: LAVAGE, PERITONEAL, THERAPEUTIC;  Surgeon: Chen Jerome MD;  Location: [facility] OR;  Service: General;  Laterality: N/A;    APPENDECTOMY  2015    BIOPSY OF PERITONEUM N/A 3/14/2024    Procedure: BIOPSY, PERITONEUM;  Surgeon: Chen Jerome MD;  Location: [facility] OR;  Service: General;  Laterality: N/A;    COLONOSCOPY  06/20/2012    Dr Boyle- Tubular adenoma polyps. Repeat in 3 years.    COLONOSCOPY  06/17/2015    -Nodular mucosa at appendiceal orfice, sigmoid and desc diverticulosis otherwise normal.    COLONOSCOPY  2020    >3 TAs    COLONOSCOPY  12/2023    CYSTOSCOPY W/ RETROGRADES Right 10/10/2024    Procedure: CYSTOSCOPY, WITH RETROGRADE PYELOGRAM;  Surgeon: Lata Kelly MD;  Location: [facility] OR;  Service: Urology;  Laterality: Right;    DIAGNOSTIC LAPAROSCOPY N/A 3/14/2024    Procedure: LAPAROSCOPY, DIAGNOSTIC;  Surgeon: Chen Jerome MD;  Location: [facility];  Service: General;  Laterality: N/A;  1. Diagnostic laparoscopy   2. Extensive lysis of adhesions  3. Peritoneal biopsy   4. Peritoneal washings for cytology    DIAGNOSTIC LAPAROSCOPY N/A 8/20/2024    Procedure: LAPAROSCOPY, DIAGNOSTIC;  Surgeon: Chen Jerome MD;  Location: [facility] OR;  Service:  General;  Laterality: N/A;    EGD - EXTERNAL RESULT  06/20/2012    Dr Boyle- Mild gastritis otherwise normal.    ENDOSCOPIC ULTRASOUND OF UPPER GASTROINTESTINAL TRACT N/A 7/26/2024    Procedure: ULTRASOUND, UPPER GI TRACT, ENDOSCOPIC;  Surgeon: Valdo Aguilera MD;  Location: [facility] ENDO;  Service: Endoscopy;  Laterality: N/A;  7/22/24: instructions sent via portal-. Pt no longer takling eliquis-GD    ESOPHAGOGASTRODUODENOSCOPY N/A 02/08/2024    Procedure: EGD (ESOPHAGOGASTRODUODENOSCOPY);  Surgeon: Jayla Arteaga MD;  Location: [facility] ENDO;  Service: Endoscopy;  Laterality: N/A;    ESOPHAGOGASTRODUODENOSCOPY N/A 8/20/2024    Procedure: EGD (ESOPHAGOGASTRODUODENOSCOPY);  Surgeon: Chen Jerome MD;  Location: [facility] OR;  Service: General;  Laterality: N/A;    GASTRECTOMY N/A 8/20/2024    Procedure: GASTRECTOMY;  Surgeon: Chen Jerome MD;  Location: [facility] OR;  Service: General;  Laterality: N/A;    HYSTERECTOMY      LAPAROSCOPIC LYSIS OF ADHESIONS N/A 3/14/2024    Procedure: LYSIS, ADHESIONS, LAPAROSCOPIC;  Surgeon: Chen Jerome MD;  Location: [facility] OR;  Service: General;  Laterality: N/A;    LYSIS OF ADHESIONS N/A 8/20/2024    Procedure: LYSIS, ADHESIONS;  Surgeon: Chen Jerome MD;  Location: [facility] OR;  Service: General;  Laterality: N/A;    PLACEMENT OF JEJUNOSTOMY TUBE N/A 8/20/2024    Procedure: INSERTION, JEJUNOSTOMY TUBE;  Surgeon: Chen Jerome MD;  Location: [facility] OR;  Service: General;  Laterality: N/A;    TOTAL ABDOMINAL HYSTERECTOMY W/ BILATERAL SALPINGOOPHORECTOMY  01/09/2023    radical resection with right salpingo-oophorectomy, left salpingo-oophorectomy, extensive enterolysis, extensive adhesiolysis, left pelvic lymph node biopsy, omental biopsy, small bowel resection with primary functional end-to-end anastomosis, placement of pelvic drain       Review of patient's allergies indicates:  No Known Allergies    No current facility-administered medications on file prior to encounter.      Current Outpatient Medications on File Prior to Encounter   Medication Sig    acetaminophen (TYLENOL) 500 MG tablet Take 2 tablets (1,000 mg total) by mouth every 8 (eight) hours.    alendronate (FOSAMAX) 70 MG tablet Take 70 mg by mouth every 7 days. (Sundays)    apixaban (ELIQUIS ORAL) Take by mouth.    calcium phosphate trib/vit D3 (CALCIUM PHOSPHATE-VITAMIN D3 ORAL) Take 1 tablet by mouth 2 (two) times daily.    carBAMazepine (TEGRETOL) 200 mg tablet 1 tablet with breakfast  1 tablet with lunch   1.5 tablet at bedtime    ciprofloxacin HCl (CIPRO) 500 MG tablet Take 1 tablet (500 mg total) by mouth 2 (two) times daily. for 7 days    ferrous sulfate 325 (65 FE) MG EC tablet Take 65 mg by mouth 2 (two) times daily with meals.    HYDROcodone-acetaminophen (NORCO) 5-325 mg per tablet Take 1 tablet by mouth every 6 (six) hours as needed for Pain.    hydrocortisone 2.5 % cream Apply topically 2 (two) times daily.    hydrOXYzine HCL (ATARAX) 25 MG tablet Take 1 tablet (25 mg total) by mouth 3 (three) times daily as needed for Itching. (Patient not taking: Reported on 11/25/2024)    hyoscyamine 0.125 mg Subl Place 2 tablets (0.25 mg total) under the tongue every 4 (four) hours as needed (bladder spasms).    levETIRAcetam (KEPPRA) 500 MG Tab Take 1 tablet by mouth 2 (two) times daily.    ondansetron (ZOFRAN-ODT) 4 MG TbDL dissolve 1 tablet (4 mg total) by mouth every 8 (eight) hours as needed (nausea).    zonisamide (ZONEGRAN) 100 MG Cap Take 200 mg by mouth 2 (two) times daily.     Family History       Problem Relation (Age of Onset)    Breast cancer Half-sister (58), Other    Colon cancer Half-sister (83), Other    Lung cancer Half-sister    Lymphoma Other    Ovarian cancer Other    Pancreatic cancer Brother (76), Half-brother (74)    Prostate cancer Brother (67), Other, Other          Tobacco Use    Smoking status: Former     Types: Cigarettes    Smokeless tobacco: Never    Tobacco comments:     Quit 1989 smoked 10  years smoked 0.25 ppd    Substance and Sexual Activity    Alcohol use: Not Currently    Drug use: Never    Sexual activity: Not on file     Review of Systems   All other systems reviewed and are negative.    Objective:     Vital Signs (Most Recent):  Temp: 97.6 °F (36.4 °C) (11/28/24 2054)  Pulse: (!) 113 (11/28/24 2054)  Resp: 20 (11/28/24 2054)  BP: 105/77 (11/28/24 2054)  SpO2: 100 % (11/28/24 2054) Vital Signs (24h Range):  Temp:  [97.6 °F (36.4 °C)] 97.6 °F (36.4 °C)  Pulse:  [113] 113  Resp:  [20] 20  SpO2:  [100 %] 100 %  BP: (105)/(77) 105/77     Weight: 45.1 kg (99 lb 6.4 oz)  Body mass index is 16.54 kg/m².     Physical Exam  Vitals reviewed.   Constitutional:       General: She is not in acute distress.     Appearance: Normal appearance. She is normal weight. She is not ill-appearing, toxic-appearing or diaphoretic.   HENT:      Head: Normocephalic and atraumatic.      Right Ear: External ear normal.      Left Ear: External ear normal.      Nose: Nose normal. No congestion or rhinorrhea.      Mouth/Throat:      Mouth: Mucous membranes are moist.      Pharynx: Oropharynx is clear. No oropharyngeal exudate or posterior oropharyngeal erythema.   Eyes:      General: No scleral icterus.     Extraocular Movements: Extraocular movements intact.      Conjunctiva/sclera: Conjunctivae normal.      Pupils: Pupils are equal, round, and reactive to light.   Neck:      Vascular: No carotid bruit.   Cardiovascular:      Rate and Rhythm: Regular rhythm. Tachycardia present.      Pulses: Normal pulses.      Heart sounds: Normal heart sounds. No murmur heard.     No friction rub. No gallop.   Pulmonary:      Effort: Pulmonary effort is normal. No respiratory distress.      Breath sounds: Normal breath sounds. No stridor. No wheezing, rhonchi or rales.   Chest:      Chest wall: No tenderness.   Abdominal:      General: Abdomen is flat. Bowel sounds are normal. There is no distension.      Palpations: Abdomen is soft. There  is no mass.      Tenderness: There is no abdominal tenderness. There is no right CVA tenderness, left CVA tenderness, guarding or rebound.      Hernia: No hernia is present.      Comments: J-tube dislodgement noted.  Patient without TTP overlying site is negative for bleeding, drainage, signs of infection noted.   Musculoskeletal:         General: No swelling, tenderness, deformity or signs of injury. Normal range of motion.      Cervical back: Normal range of motion and neck supple. No rigidity or tenderness.      Right lower leg: No edema.      Left lower leg: No edema.   Lymphadenopathy:      Cervical: No cervical adenopathy.   Skin:     General: Skin is warm and dry.      Capillary Refill: Capillary refill takes less than 2 seconds.      Coloration: Skin is not jaundiced or pale.      Findings: No bruising, erythema, lesion or rash.   Neurological:      General: No focal deficit present.      Mental Status: She is alert and oriented to person, place, and time. Mental status is at baseline.      Cranial Nerves: No cranial nerve deficit.      Sensory: No sensory deficit.      Motor: No weakness.      Coordination: Coordination normal.   Psychiatric:         Mood and Affect: Mood normal.         Behavior: Behavior normal.         Thought Content: Thought content normal.         Judgment: Judgment normal.              CRANIAL NERVES     CN III, IV, VI   Pupils are equal, round, and reactive to light.       Significant Labs: All pertinent labs within the past 24 hours have been reviewed.    Significant Imaging: I have reviewed all pertinent imaging results/findings within the past 24 hours.    LABS:  No results found for this or any previous visit (from the past 24 hours).    RADIOLOGY  X-Ray Ankle 2 View Right    Result Date: 11/4/2024  EXAMINATION: XR ANKLE 2 VIEW RIGHT CLINICAL HISTORY: Ankle pain; TECHNIQUE: AP and lateral images of the right ankle were performed. COMPARISON: None FINDINGS: No acute fracture or  dislocation.  Bone mineralization is normal.  No aggressive lytic or blastic lesion.  No osseous erosion or aggressive periosteal reaction.  Joint spaces are preserved with normal alignment.  Mild soft tissue swelling overlying the medial malleolus.     Mild soft tissue swelling overlying the medial malleolus.  No acute bony injury. Electronically signed by: Jorge Luis Rodríguez Date:    11/04/2024 Time:    12:42    IR IVC Filter Placement    Result Date: 10/30/2024  EXAMINATION: Inferior vena cava (IVC) filter insertion Procedural Personnel Attending physician(s): Harley Aggarwal MD Resident physician(s): None Pre-procedure diagnosis: DVT with need for cessation of anticoagulation. Post-procedure diagnosis: Same Complications: No immediate complications CLINICAL HISTORY: Indication: Venous thromboembolism and contraindication to anticoagulation TECHNIQUE: - Real-time ultrasound-guided access of the right internal jugular vein - Fluoroscopic-guided placement of a retrievable infrarenal IVC filter COMPARISON: 10/17/2024 FINDINGS: Pre-procedure Consent: Informed consent for the procedure was obtained and time-out was performed prior to the procedure. Preparation: The site was prepared and draped using maximal sterile barrier technique including cutaneous antisepsis. Anesthesia/sedation Level of anesthesia/sedation: Moderate sedation (conscious sedation) Anesthesia/sedation administered by: Nurse or other independent trained observer Total intra-service sedation time (minutes): 20 Access Local anesthesia was administered. The vessel was sonographically evaluated and determined to be patent. Real-time ultrasound was used to visualize needle entry into the vessel and a permanent image was stored to the patient's electronic medical record. Vein accessed: Left common femoral vein Access technique: Micropuncture set with 21-gauge needle Venography Venography of the inferior vena cava (IVC) was performed to evaluate IVC size,  anatomy and patency. Catheter tip position for venography: Left common iliac vein Venography findings: Patent IVC with normal diameter and adequate distance from the renal veins cranially in the iliac veins caudally for safe IVC filter placement. Filter placement The filter delivery sheath was advanced and the filter was deployed under fluoroscopic guidance. Filter placed: Bard Unique Device Identifier (ASIF): Not available Post-placement imaging Post-placement imaging technique: IVC venography Filter position: Infrarenal IVC Closure The sheath was removed and hemostasis was achieved with manual compression. A sterile dressing was applied. Contrast Contrast agent: Omnipaque 300 Contrast volume (mL): 40 Radiation Dose Fluoroscopy time (minutes): 1.5 Dose area product (cGy-cm2): 478 Additional Details Additional description of procedure: None Equipment details: None Specimens removed: None Estimated blood loss (mL): None Standardized report: [id number] Attestation Signer name: Harley Aggarwal MD I attest that I was present for the entire procedure. I reviewed the stored images and agree with the report as written.     1. Successful placement of an Bard G2 retrievable infrarenal IVC filter. Plan: 1. Retrieval intent (MIPS): The filter is potentially retrievable.  Please contact IR to schedule IVC filter removal when/if clinically indicated. Electronically signed by: Harley Aggarwal MD Date:    10/30/2024 Time:    15:22    IR Antegrade Pyelogram (xpd)    Result Date: 10/30/2024  See Physician's Notes for results. IMPRESSION: See Physician's Notes for results This procedure was auto-finalized  See Physicians Notes for your results.      EKG    MICROBIOLOGY    MDM

## 2024-11-29 NOTE — PLAN OF CARE
[facility] - Med Surg  Discharge Final Note    Primary Care Provider: Gagandeep Iglesias MD    Expected Discharge Date: 11/29/2024    Final Discharge Note (most recent)       Final Note - 11/29/24 1041          Final Note    Assessment Type Final Discharge Note     Anticipated Discharge Disposition Home-Health Care Svc        Post-Acute Status    Post-Acute Authorization Home Health     Home Health Status Set-up Complete/Auth obtained                   Pt is going home with [facility].

## 2024-11-29 NOTE — ED PROVIDER NOTES
SCRIBE #1 NOTE: I, Manas Galvez, am scribing for, and in the presence of, Richardson Velasquez Jr., MD. I have scribed the entire note.       History     Chief Complaint   Patient presents with    feeding tube problem     J-tube came out. Currently being treated for stomach CA.     Review of patient's allergies indicates:  No Known Allergies      History of Present Illness     HPI    11/28/2024, 9:05 PM  History obtained from the patient      History of Present Illness: Cheryl Kirkland is a 74 y.o. female patient with a PMHx of gastric adenocarcinoma who presents to the Emergency Department for evaluation of dislodged PEG tube which occurred 2 hours ago. Patient denies any pain, N/V/D, urinary symptoms, fever, chills, and all other sxs at this time. No further complaints or concerns at this time.  Patient was has a history of stomach cancer with gastrectomy.  Tube has been in since August      Arrival mode: Personal vehicle    PCP: Gagandeep Iglesias MD        Past Medical History:  Past Medical History:   Diagnosis Date    Anemia     Chronic deep vein thrombosis (DVT) of left lower extremity 10/21/2022    Deep vein thrombosis     Drug-induced polyneuropathy 02/28/2024    Noted by ROCK KAY NP  last documented on [id number]    DVT (deep venous thrombosis)     Epilepsy     Gastric adenocarcinoma 02/27/2024    GERD (gastroesophageal reflux disease)     Hyperlipidemia 01/10/2013    Formatting of this note might be different from the original.   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    OP (osteoporosis) 02/28/2024    Ovarian cancer     Primary hypertension 12/01/2022       Past Surgical History:  Past Surgical History:   Procedure Laterality Date    ABDOMINAL WASHOUT N/A 3/14/2024    Procedure: LAVAGE, PERITONEAL, THERAPEUTIC;  Surgeon: Chen Jerome MD;  Location: [facility];  Service: General;  Laterality: N/A;    APPENDECTOMY  2015    BIOPSY OF PERITONEUM N/A 3/14/2024     Procedure: BIOPSY, PERITONEUM;  Surgeon: Chen Jerome MD;  Location: [facility] OR;  Service: General;  Laterality: N/A;    COLONOSCOPY  06/20/2012    Dr Boyle- Tubular adenoma polyps. Repeat in 3 years.    COLONOSCOPY  06/17/2015    -Nodular mucosa at appendiceal orfice, sigmoid and desc diverticulosis otherwise normal.    COLONOSCOPY  2020    >3 TAs    COLONOSCOPY  12/2023    CYSTOSCOPY W/ RETROGRADES Right 10/10/2024    Procedure: CYSTOSCOPY, WITH RETROGRADE PYELOGRAM;  Surgeon: Lata Kelly MD;  Location: [facility] OR;  Service: Urology;  Laterality: Right;    DIAGNOSTIC LAPAROSCOPY N/A 3/14/2024    Procedure: LAPAROSCOPY, DIAGNOSTIC;  Surgeon: Chen Jerome MD;  Location: [facility] OR;  Service: General;  Laterality: N/A;  1. Diagnostic laparoscopy   2. Extensive lysis of adhesions  3. Peritoneal biopsy   4. Peritoneal washings for cytology    DIAGNOSTIC LAPAROSCOPY N/A 8/20/2024    Procedure: LAPAROSCOPY, DIAGNOSTIC;  Surgeon: Chen Jerome MD;  Location: [facility] OR;  Service: General;  Laterality: N/A;    EGD - EXTERNAL RESULT  06/20/2012    Dr Boyle- Mild gastritis otherwise normal.    ENDOSCOPIC ULTRASOUND OF UPPER GASTROINTESTINAL TRACT N/A 7/26/2024    Procedure: ULTRASOUND, UPPER GI TRACT, ENDOSCOPIC;  Surgeon: Valdo Aguilera MD;  Location: [facility];  Service: Endoscopy;  Laterality: N/A;  7/22/24: instructions sent via portal-. Pt no longer takling eliquis-GD    ESOPHAGOGASTRODUODENOSCOPY N/A 02/08/2024    Procedure: EGD (ESOPHAGOGASTRODUODENOSCOPY);  Surgeon: Jayla Arteaga MD;  Location: [facility] ENDO;  Service: Endoscopy;  Laterality: N/A;    ESOPHAGOGASTRODUODENOSCOPY N/A 8/20/2024    Procedure: EGD (ESOPHAGOGASTRODUODENOSCOPY);  Surgeon: Chen Jerome MD;  Location: [facility] OR;  Service: General;  Laterality: N/A;    GASTRECTOMY N/A 8/20/2024    Procedure: GASTRECTOMY;  Surgeon: Chen Jerome MD;  Location: [facility] OR;  Service: General;  Laterality: N/A;     HYSTERECTOMY      LAPAROSCOPIC LYSIS OF ADHESIONS N/A 3/14/2024    Procedure: LYSIS, ADHESIONS, LAPAROSCOPIC;  Surgeon: Chen Jerome MD;  Location: [facility] OR;  Service: General;  Laterality: N/A;    LYSIS OF ADHESIONS N/A 8/20/2024    Procedure: LYSIS, ADHESIONS;  Surgeon: Chen Jerome MD;  Location: [facility] OR;  Service: General;  Laterality: N/A;    PLACEMENT OF JEJUNOSTOMY TUBE N/A 8/20/2024    Procedure: INSERTION, JEJUNOSTOMY TUBE;  Surgeon: Chen Jerome MD;  Location: [facility] OR;  Service: General;  Laterality: N/A;    TOTAL ABDOMINAL HYSTERECTOMY W/ BILATERAL SALPINGOOPHORECTOMY  01/09/2023    radical resection with right salpingo-oophorectomy, left salpingo-oophorectomy, extensive enterolysis, extensive adhesiolysis, left pelvic lymph node biopsy, omental biopsy, small bowel resection with primary functional end-to-end anastomosis, placement of pelvic drain         Family History:  Family History   Problem Relation Name Age of Onset    Pancreatic cancer Brother Misael Mccray 76    Prostate cancer Brother Zachary 67    Pancreatic cancer Half-brother Gasper 74    Colon cancer Half-sister Elton 83    Lung cancer Half-sister Elton         +smoking hx    Breast cancer Half-sister Vidhi 58    Lymphoma Other Cara Sun    Prostate cancer Other Fernando     Prostate cancer Other Gasper Calderon     Ovarian cancer Other Inerris     Breast cancer Other Aleida     Colon cancer Other Aleida        Social History:  Social History     Tobacco Use    Smoking status: Former     Types: Cigarettes    Smokeless tobacco: Never    Tobacco comments:     Quit 1989 smoked 10 years smoked 0.25 ppd    Substance and Sexual Activity    Alcohol use: Not Currently    Drug use: Never    Sexual activity: Not on file        Review of Systems     Review of Systems   Constitutional:  Negative for chills and fever.   HENT:  Negative for sore throat.    Respiratory:  Negative for shortness of breath.    Cardiovascular:  Negative  for chest pain.   Gastrointestinal:  Negative for abdominal pain, diarrhea, nausea and vomiting.        (+) Dislodged PEG tube   Genitourinary:  Negative for dysuria and hematuria.   Musculoskeletal:  Negative for back pain.   Skin:  Negative for rash.   Neurological:  Negative for weakness.   Hematological:  Does not bruise/bleed easily.   All other systems reviewed and are negative.       Physical Exam     Initial Vitals   BP Pulse Resp Temp SpO2   11/28/24 2054 11/28/24 2054 11/28/24 2052 11/28/24 2054 11/28/24 2054   105/77 (!) 113 20 97.6 °F (36.4 °C) 100 %      MAP       --                 Physical Exam  Nursing Notes and Vital Signs Reviewed.  Constitutional: Patient is in no acute distress. Well-developed .  Mild cachexia  Head: Atraumatic. Normocephalic.  Eyes:  EOM intact.  No scleral icterus.  ENT: Mucous membranes are moist.  Nares clear   Neck:  Full ROM. No JVD.  Cardiovascular: Regular rate. Regular rhythm No murmurs, rubs, or gallops. Distal pulses are 2+ and symmetric  Pulmonary/Chest: No respiratory distress. Clear to auscultation bilaterally. No wheezing or rales.  Equal chest wall rise bilaterally  Abdominal: Soft and non-distended.  There is no tenderness.  No rebound, guarding, or rigidity. Good bowel sounds.  Peg tube site in the left upper quadrant.  This appears to be closed.  He was not allow passage of a another PEG tube or a small Roca.  It was appears to be completely closed at this time  Musculoskeletal: Moves all extremities. No obvious deformities.  5 x 5 strength in all extremities   Skin: Warm and dry.  Neurological:  Alert, awake, and appropriate.  Normal speech.  No acute focal neurological deficits are appreciated.  Two through 12 intact bilaterally.  Psychiatric: Normal affect. Good eye contact. Appropriate in content.       ED Course   Feeding Tube    Date/Time: 11/28/2024 9:12 PM  Location procedure was performed: [facility] EMERGENCY DEPARTMENT    Performed by: Richardson Velasquez  MD Shazia  Authorized by: Richardson Velasquez Jr., MD  Consent: Verbal consent obtained.  Risks and benefits: risks, benefits and alternatives were discussed  Consent given by: patient  Patient understanding: patient states understanding of the procedure being performed  Patient identity confirmed: verbally with patient and hospital-assigned identification number  Indications: tube dislodged    Sedation:  Patient sedated: no    Local anesthesia used: no    Anesthesia:  Local anesthesia used: no  Tube type: jejunostomy  Patient position: supine  Tube size: 16 Fr  Endoscope used: no  Complications: Yes  Comments: Replacement attempt with either Roca catheter and PEG tube was unsuccessful. Hole was not patent.         ED Vital Signs:  Vitals:    11/28/24 2052 11/28/24 2054   BP:  105/77   Pulse:  (!) 113   Resp: 20 20   Temp:  97.6 °F (36.4 °C)   TempSrc:  Oral   SpO2:  100%   Weight:  45.1 kg (99 lb 6.4 oz)       Abnormal Lab Results:  Labs Reviewed   APTT   CBC W/ AUTO DIFFERENTIAL   BASIC METABOLIC PANEL   PROTIME-INR        All Lab Results:  None    Imaging Results:  Imaging Results    None         The Emergency Provider reviewed the vital signs and test results, which are outlined above.     ED Discussion          Medical Decision Making  74-year-old black female history of gastrointestinal cancer with PEG tube placed in August at allegedly fell out an hour and a half ago.  The hole was not patent we can not replace with either a Roca or PEG tube.  Consulted HM to be admitted for replacement of her PEG.     Amount and/or Complexity of Data Reviewed  Labs: ordered.  Discussion of management or test interpretation with external provider(s):   9:17 PM: Discussed case with Sunil Shell MD ([facility]). Dr. Shell agrees with current care and management of pt and accepts admission.   Admitting Service: Hospital Medicine  Admitting Physician: Dr. Shell  Admit to: obs med surg    9:17 PM: Re-evaluated pt. I  have discussed test results, shared treatment plan, and the need for admission with patient and family at bedside. Pt and family express understanding at this time and agree with all information. All questions answered. Pt and family have no further questions or concerns at this time. Pt is ready for admit.    Risk  Decision regarding hospitalization.  Minor surgery with identified risk factors.                 ED Medication(s):  Medications   sodium chloride 0.9% flush 10 mL (has no administration in time range)   albuterol-ipratropium 2.5 mg-0.5 mg/3 mL nebulizer solution 3 mL (has no administration in time range)   ondansetron injection 4 mg (has no administration in time range)   acetaminophen suppository 650 mg (has no administration in time range)   naloxone 0.4 mg/mL injection 0.02 mg (has no administration in time range)   glucose chewable tablet 16 g (has no administration in time range)   glucose chewable tablet 24 g (has no administration in time range)   glucagon (human recombinant) injection 1 mg (has no administration in time range)   dextrose 10% bolus 125 mL 125 mL (has no administration in time range)   dextrose 10% bolus 250 mL 250 mL (has no administration in time range)       New Prescriptions    No medications on file               Scribe Attestation:   Scribe #1: I performed the above scribed service and the documentation accurately describes the services I performed. I attest to the accuracy of the note.     Attending:   Physician Attestation Statement for Scribe #1: I, Richardson Velasquez Jr., MD, personally performed the services described in this documentation, as scribed by Manas Galvez, in my presence, and it is both accurate and complete.           Clinical Impression       ICD-10-CM ICD-9-CM   1. Migration of percutaneous endoscopic gastrostomy (PEG) tube  K94.23 536.42   2. Chest pain  R07.9 786.50       Disposition:   Disposition: Admitted  Condition: Fair       Richardson Velasquez Jr.,  MD  11/28/24 0012

## 2024-11-29 NOTE — NURSING
Pt remains free of falls/injury. Safety precautions maintained.  IV removed per orders. Pt able to tolerate tube feeding from newly placed J tube.  J tube site clean, dry, and intact.  VSS. No S/S of distress noted at this time. Pt education completed with pt and  at bedside.

## 2024-11-29 NOTE — DISCHARGE SUMMARY
[facility] Medicine  Discharge Summary      Patient Name: Cheryl Kirkland  MRN: [id number]  BEKAH: [id number]  Patient Class: OP- Observation  Admission Date: 11/28/2024  Hospital Length of Stay: 0 days  Discharge Date and Time: No discharge date for patient encounter.  Attending Physician: Dwight Ruiz,*   Discharging Provider: Tricia Gallagher NP  Primary Care Provider: Gagnadeep Iglesias MD    Primary Care Team: Networked reference to record PCT     HPI:   Cheryl Kirkland is a 74 y.o. 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 who presented to the ED for further evaluation of dislodged PEG/J-tube which occurred 2 hours prior to arrival.  Patient denies endorsing any pain in his without any concerns or complaints at time of bedside assessment.  Prior to the incident, patient reported being in her usual state of health and is patient awaiting reinsertion of her feeding tube.  Initial workup in the ED revealed patient remains afebrile without leukocytosis, hemodynamically stable, and labs near baseline.  Patient admitted to Hospital Medicine under observation for continued medical management while awaiting reinsertion of feeding tube in the morning.      PCP: Gagandeep Iglesias      * No surgery found *      Hospital Course:   Cheryl Bingham is a 74 year old female who presented to ED shortly after J-tube was accidentally dislodged. She denied other symptoms and in usual state of health. IR was consulted and J tube replaced without  difficulty. TF resumed, and patient tolerated well. Patient was asked to follow up with PCP in one week. Patient seen and examined and deemed suitable for discharge.      Goals of Care Treatment Preferences:  Code Status: Full Code    Social Drivers of Health with Concerns     Utilities: At Risk (11/29/2024)        Consults:   Consults (From admission, onward)          Status Ordering Provider     Inpatient consult to Interventional Radiology  Once        Provider:  Magdaleno Valdez MD    Completed JANIA FARIA            No new Assessment & Plan notes have been filed under this hospital service since the last note was generated.  Service: Hospital Medicine    Final Active Diagnoses:    Diagnosis Date Noted POA    PRINCIPAL PROBLEM:  Dislodged jejunostomy tube [T85.528A] 11/28/2024 Not Applicable      Problems Resolved During this Admission:       Discharged Condition: stable    Disposition: Home or Self Care    Follow Up:   Follow-up Information       Gagandeep Iglesias MD Follow up in 1 week(s).    Specialty: Internal Medicine  Contact information:  [address] [place] [id number] [phone]                           Patient Instructions:   No discharge procedures on file.    Significant Diagnostic Studies: Labs: CMP   Recent Labs   Lab 11/28/24  2136      K 3.8      CO2 12*   GLU 95   BUN 10   CREATININE 0.7   CALCIUM 8.8   ANIONGAP 15    and CBC   Recent Labs   Lab 11/28/24 2136   WBC 7.61   HGB 11.8*   HCT 36.8*   *       Pending Diagnostic Studies:       None           Medications:  Reconciled Home Medications:      Medication List        CONTINUE taking these medications      acetaminophen 500 MG tablet  Commonly known as: TYLENOL  Take 2 tablets (1,000 mg total) by mouth every 8 (eight) hours.     alendronate 70 MG tablet  Commonly known as: FOSAMAX  Take 70 mg by mouth every 7 days. (Sundays)     CALCIUM PHOSPHATE-VITAMIN D3 ORAL  Take 1 tablet by mouth 2 (two) times  daily.     carBAMazepine 200 mg tablet  Commonly known as: TEGRETOL  1 tablet with breakfast  1 tablet with lunch   1.5 tablet at bedtime     ciprofloxacin HCl 500 MG tablet  Commonly known as: CIPRO  Take 1 tablet (500 mg total) by mouth 2 (two) times daily. for 7 days     ELIQUIS ORAL  Take by mouth.     ferrous sulfate 325 (65 FE) MG EC tablet  Take 65 mg by mouth 2 (two) times daily with meals.     HYDROcodone-acetaminophen 5-325 mg per tablet  Commonly known as: NORCO  Take 1 tablet by mouth every 6 (six) hours as needed for Pain.     hydrocortisone 2.5 % cream  Apply topically 2 (two) times daily.     hyoscyamine 0.125 mg Subl  Place 2 tablets (0.25 mg total) under the tongue every 4 (four) hours as needed (bladder spasms).     levETIRAcetam 500 MG Tab  Commonly known as: KEPPRA  Take 1 tablet by mouth 2 (two) times daily.     ondansetron 4 MG Tbdl  Commonly known as: ZOFRAN-ODT  dissolve 1 tablet (4 mg total) by mouth every 8 (eight) hours as needed (nausea).     zonisamide 100 MG Cap  Commonly known as: ZONEGRAN  Take 200 mg by mouth 2 (two) times daily.            STOP taking these medications      hydrOXYzine HCL 25 MG tablet  Commonly known as: ATARAX              Indwelling Lines/Drains at time of discharge:   Lines/Drains/Airways       Drain  Duration                  Gastrostomy/Enterostomy 11/29/24 1100 Jejunostomy tube LUQ <1 day                    Time spent on the discharge of patient: >35 minutes         Tricia Gallagher NP  Department of Hospital Medicine  '[facility]

## 2024-11-29 NOTE — H&P
[facility]  [facility]  History & Physical    Patient Name: Cheryl Kirkland  MRN: [id number]  Patient Class: OP- Observation  Admission Date: 11/28/2024  Attending Physician: Dwight Ruiz,*   Primary Care Provider: Gagandeep Iglesias MD         Patient information was obtained from patient, past medical records, and ER records.     Subjective:     Principal Problem:Dislodged jejunostomy tube    Chief Complaint:   Chief Complaint   Patient presents with    feeding tube problem     J-tube came out. Currently being treated for stomach CA.        HPI: Cheryl Kirkland is a 74 y.o. 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 who presented to the ED for further evaluation of dislodged PEG/J-tube which occurred 2 hours prior to arrival.  Patient denies endorsing any pain in his without any concerns or complaints at time of bedside assessment.  Prior to the incident, patient reported being in her usual state of health and is patient awaiting reinsertion of her feeding tube.  Initial workup in the ED revealed patient remains afebrile without leukocytosis, hemodynamically stable, and labs near baseline.  Patient admitted to Hospital Medicine under observation for continued medical management while awaiting reinsertion of feeding tube in the morning.      PCP: Gagandeep Iglesias      Past Medical History:   Diagnosis Date    Anemia     Chronic deep vein thrombosis (DVT) of left lower extremity 10/21/2022    Deep vein thrombosis     Drug-induced  polyneuropathy 02/28/2024    Noted by ROCK KAY NP  last documented on [id number]    DVT (deep venous thrombosis)     Epilepsy     Gastric adenocarcinoma 02/27/2024    GERD (gastroesophageal reflux disease)     Hyperlipidemia 01/10/2013    Formatting of this note might be different from the original.   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    OP (osteoporosis) 02/28/2024    Ovarian cancer     Primary hypertension 12/01/2022       Past Surgical History:   Procedure Laterality Date    ABDOMINAL WASHOUT N/A 3/14/2024    Procedure: LAVAGE, PERITONEAL, THERAPEUTIC;  Surgeon: Chen Jerome MD;  Location: [facility] OR;  Service: General;  Laterality: N/A;    APPENDECTOMY  2015    BIOPSY OF PERITONEUM N/A 3/14/2024    Procedure: BIOPSY, PERITONEUM;  Surgeon: Chen Jerome MD;  Location: [facility] OR;  Service: General;  Laterality: N/A;    COLONOSCOPY  06/20/2012    Dr Boyle- Tubular adenoma polyps. Repeat in 3 years.    COLONOSCOPY  06/17/2015    -Nodular mucosa at appendiceal orfice, sigmoid and desc diverticulosis otherwise normal.    COLONOSCOPY  2020    >3 TAs    COLONOSCOPY  12/2023    CYSTOSCOPY W/ RETROGRADES Right 10/10/2024    Procedure: CYSTOSCOPY, WITH RETROGRADE PYELOGRAM;  Surgeon: Lata Kelly MD;  Location: [facility] OR;  Service: Urology;  Laterality: Right;    DIAGNOSTIC LAPAROSCOPY N/A 3/14/2024    Procedure: LAPAROSCOPY, DIAGNOSTIC;  Surgeon: Chen Jerome MD;  Location: [facility] OR;  Service: General;  Laterality: N/A;  1. Diagnostic laparoscopy   2. Extensive lysis of adhesions  3. Peritoneal biopsy   4. Peritoneal washings for cytology    DIAGNOSTIC LAPAROSCOPY N/A 8/20/2024    Procedure: LAPAROSCOPY, DIAGNOSTIC;  Surgeon: Chen Jerome MD;  Location: [facility] OR;  Service: General;  Laterality: N/A;    EGD - EXTERNAL RESULT  06/20/2012    Dr Boyle- Mild gastritis otherwise normal.    ENDOSCOPIC ULTRASOUND OF UPPER GASTROINTESTINAL  TRACT N/A 7/26/2024    Procedure: ULTRASOUND, UPPER GI TRACT, ENDOSCOPIC;  Surgeon: Valdo Aguilera MD;  Location: [facility];  Service: Endoscopy;  Laterality: N/A;  7/22/24: instructions sent via portal-. Pt no longer takling eliquis-GD    ESOPHAGOGASTRODUODENOSCOPY N/A 02/08/2024    Procedure: EGD (ESOPHAGOGASTRODUODENOSCOPY);  Surgeon: Jayla Arteaga MD;  Location: [facility] ENDO;  Service: Endoscopy;  Laterality: N/A;    ESOPHAGOGASTRODUODENOSCOPY N/A 8/20/2024    Procedure: EGD (ESOPHAGOGASTRODUODENOSCOPY);  Surgeon: Chen Jerome MD;  Location: [facility] OR;  Service: General;  Laterality: N/A;    GASTRECTOMY N/A 8/20/2024    Procedure: GASTRECTOMY;  Surgeon: Chen Jerome MD;  Location: [facility] OR;  Service: General;  Laterality: N/A;    HYSTERECTOMY      LAPAROSCOPIC LYSIS OF ADHESIONS N/A 3/14/2024    Procedure: LYSIS, ADHESIONS, LAPAROSCOPIC;  Surgeon: Chen Jerome MD;  Location: [facility] OR;  Service: General;  Laterality: N/A;    LYSIS OF ADHESIONS N/A 8/20/2024    Procedure: LYSIS, ADHESIONS;  Surgeon: Chen Jerome MD;  Location: [facility] OR;  Service: General;  Laterality: N/A;    PLACEMENT OF JEJUNOSTOMY TUBE N/A 8/20/2024    Procedure: INSERTION, JEJUNOSTOMY TUBE;  Surgeon: Chen Jerome MD;  Location: [facility] OR;  Service: General;  Laterality: N/A;    TOTAL ABDOMINAL HYSTERECTOMY W/ BILATERAL SALPINGOOPHORECTOMY  01/09/2023    radical resection with right salpingo-oophorectomy, left salpingo-oophorectomy, extensive enterolysis, extensive adhesiolysis, left pelvic lymph node biopsy, omental biopsy, small bowel resection with primary functional end-to-end anastomosis, placement of pelvic drain       Review of patient's allergies indicates:  No Known Allergies    No current facility-administered medications on file prior to encounter.     Current Outpatient Medications on File Prior to Encounter   Medication Sig    acetaminophen (TYLENOL) 500 MG tablet Take 2 tablets (1,000 mg total) by mouth  every 8 (eight) hours.    alendronate (FOSAMAX) 70 MG tablet Take 70 mg by mouth every 7 days. (Sundays)    apixaban (ELIQUIS ORAL) Take by mouth.    calcium phosphate trib/vit D3 (CALCIUM PHOSPHATE-VITAMIN D3 ORAL) Take 1 tablet by mouth 2 (two) times daily.    carBAMazepine (TEGRETOL) 200 mg tablet 1 tablet with breakfast  1 tablet with lunch   1.5 tablet at bedtime    ciprofloxacin HCl (CIPRO) 500 MG tablet Take 1 tablet (500 mg total) by mouth 2 (two) times daily. for 7 days    ferrous sulfate 325 (65 FE) MG EC tablet Take 65 mg by mouth 2 (two) times daily with meals.    HYDROcodone-acetaminophen (NORCO) 5-325 mg per tablet Take 1 tablet by mouth every 6 (six) hours as needed for Pain.    hydrocortisone 2.5 % cream Apply topically 2 (two) times daily.    hydrOXYzine HCL (ATARAX) 25 MG tablet Take 1 tablet (25 mg total) by mouth 3 (three) times daily as needed for Itching. (Patient not taking: Reported on 11/25/2024)    hyoscyamine 0.125 mg Subl Place 2 tablets (0.25 mg total) under the tongue every 4 (four) hours as needed (bladder spasms).    levETIRAcetam (KEPPRA) 500 MG Tab Take 1 tablet by mouth 2 (two) times daily.    ondansetron (ZOFRAN-ODT) 4 MG TbDL dissolve 1 tablet (4 mg total) by mouth every 8 (eight) hours as needed (nausea).    zonisamide (ZONEGRAN) 100 MG Cap Take 200 mg by mouth 2 (two) times daily.     Family History       Problem Relation (Age of Onset)    Breast cancer Half-sister (58), Other    Colon cancer Half-sister (83), Other    Lung cancer Half-sister    Lymphoma Other    Ovarian cancer Other    Pancreatic cancer Brother (76), Half-brother (74)    Prostate cancer Brother (67), Other, Other          Tobacco Use    Smoking status: Former     Types: Cigarettes    Smokeless tobacco: Never    Tobacco comments:     Quit 1989 smoked 10 years smoked 0.25 ppd    Substance and Sexual Activity    Alcohol use: Not Currently    Drug use: Never    Sexual activity: Not on file     Review of Systems    All other systems reviewed and are negative.    Objective:     Vital Signs (Most Recent):  Temp: 97.6 °F (36.4 °C) (11/28/24 2054)  Pulse: (!) 113 (11/28/24 2054)  Resp: 20 (11/28/24 2054)  BP: 105/77 (11/28/24 2054)  SpO2: 100 % (11/28/24 2054) Vital Signs (24h Range):  Temp:  [97.6 °F (36.4 °C)] 97.6 °F (36.4 °C)  Pulse:  [113] 113  Resp:  [20] 20  SpO2:  [100 %] 100 %  BP: (105)/(77) 105/77     Weight: 45.1 kg (99 lb 6.4 oz)  Body mass index is 16.54 kg/m².     Physical Exam  Vitals reviewed.   Constitutional:       General: She is not in acute distress.     Appearance: Normal appearance. She is normal weight. She is not ill-appearing, toxic-appearing or diaphoretic.   HENT:      Head: Normocephalic and atraumatic.      Right Ear: External ear normal.      Left Ear: External ear normal.      Nose: Nose normal. No congestion or rhinorrhea.      Mouth/Throat:      Mouth: Mucous membranes are moist.      Pharynx: Oropharynx is clear. No oropharyngeal exudate or posterior oropharyngeal erythema.   Eyes:      General: No scleral icterus.     Extraocular Movements: Extraocular movements intact.      Conjunctiva/sclera: Conjunctivae normal.      Pupils: Pupils are equal, round, and reactive to light.   Neck:      Vascular: No carotid bruit.   Cardiovascular:      Rate and Rhythm: Regular rhythm. Tachycardia present.      Pulses: Normal pulses.      Heart sounds: Normal heart sounds. No murmur heard.     No friction rub. No gallop.   Pulmonary:      Effort: Pulmonary effort is normal. No respiratory distress.      Breath sounds: Normal breath sounds. No stridor. No wheezing, rhonchi or rales.   Chest:      Chest wall: No tenderness.   Abdominal:      General: Abdomen is flat. Bowel sounds are normal. There is no distension.      Palpations: Abdomen is soft. There is no mass.      Tenderness: There is no abdominal tenderness. There is no right CVA tenderness, left CVA tenderness, guarding or rebound.      Hernia: No  hernia is present.      Comments: J-tube dislodgement noted.  Patient without TTP overlying site is negative for bleeding, drainage, signs of infection noted.   Musculoskeletal:         General: No swelling, tenderness, deformity or signs of injury. Normal range of motion.      Cervical back: Normal range of motion and neck supple. No rigidity or tenderness.      Right lower leg: No edema.      Left lower leg: No edema.   Lymphadenopathy:      Cervical: No cervical adenopathy.   Skin:     General: Skin is warm and dry.      Capillary Refill: Capillary refill takes less than 2 seconds.      Coloration: Skin is not jaundiced or pale.      Findings: No bruising, erythema, lesion or rash.   Neurological:      General: No focal deficit present.      Mental Status: She is alert and oriented to person, place, and time. Mental status is at baseline.      Cranial Nerves: No cranial nerve deficit.      Sensory: No sensory deficit.      Motor: No weakness.      Coordination: Coordination normal.   Psychiatric:         Mood and Affect: Mood normal.         Behavior: Behavior normal.         Thought Content: Thought content normal.         Judgment: Judgment normal.              CRANIAL NERVES     CN III, IV, VI   Pupils are equal, round, and reactive to light.       Significant Labs: All pertinent labs within the past 24 hours have been reviewed.    Significant Imaging: I have reviewed all pertinent imaging results/findings within the past 24 hours.    LABS:  No results found for this or any previous visit (from the past 24 hours).    RADIOLOGY  X-Ray Ankle 2 View Right    Result Date: 11/4/2024  EXAMINATION: XR ANKLE 2 VIEW RIGHT CLINICAL HISTORY: Ankle pain; TECHNIQUE: AP and lateral images of the right ankle were performed. COMPARISON: None FINDINGS: No acute fracture or dislocation.  Bone mineralization is normal.  No aggressive lytic or blastic lesion.  No osseous erosion or aggressive periosteal reaction.  Joint spaces  are preserved with normal alignment.  Mild soft tissue swelling overlying the medial malleolus.     Mild soft tissue swelling overlying the medial malleolus.  No acute bony injury. Electronically signed by: Jorge Luis Rodríguez Date:    11/04/2024 Time:    12:42    IR IVC Filter Placement    Result Date: 10/30/2024  EXAMINATION: Inferior vena cava (IVC) filter insertion Procedural Personnel Attending physician(s): Harley Aggarwal MD Resident physician(s): None Pre-procedure diagnosis: DVT with need for cessation of anticoagulation. Post-procedure diagnosis: Same Complications: No immediate complications CLINICAL HISTORY: Indication: Venous thromboembolism and contraindication to anticoagulation TECHNIQUE: - Real-time ultrasound-guided access of the right internal jugular vein - Fluoroscopic-guided placement of a retrievable infrarenal IVC filter COMPARISON: 10/17/2024 FINDINGS: Pre-procedure Consent: Informed consent for the procedure was obtained and time-out was performed prior to the procedure. Preparation: The site was prepared and draped using maximal sterile barrier technique including cutaneous antisepsis. Anesthesia/sedation Level of anesthesia/sedation: Moderate sedation (conscious sedation) Anesthesia/sedation administered by: Nurse or other independent trained observer Total intra-service sedation time (minutes): 20 Access Local anesthesia was administered. The vessel was sonographically evaluated and determined to be patent. Real-time ultrasound was used to visualize needle entry into the vessel and a permanent image was stored to the patient's electronic medical record. Vein accessed: Left common femoral vein Access technique: Micropuncture set with 21-gauge needle Venography Venography of the inferior vena cava (IVC) was performed to evaluate IVC size, anatomy and patency. Catheter tip position for venography: Left common iliac vein Venography findings: Patent IVC with normal diameter and adequate distance  from the renal veins cranially in the iliac veins caudally for safe IVC filter placement. Filter placement The filter delivery sheath was advanced and the filter was deployed under fluoroscopic guidance. Filter placed: Bard Unique Device Identifier (ASIF): Not available Post-placement imaging Post-placement imaging technique: IVC venography Filter position: Infrarenal IVC Closure The sheath was removed and hemostasis was achieved with manual compression. A sterile dressing was applied. Contrast Contrast agent: Omnipaque 300 Contrast volume (mL): 40 Radiation Dose Fluoroscopy time (minutes): 1.5 Dose area product (cGy-cm2): 478 Additional Details Additional description of procedure: None Equipment details: None Specimens removed: None Estimated blood loss (mL): None Standardized report: [email or web address] Attestation Signer name: Harley Aggarwal MD I attest that I was present for the entire procedure. I reviewed the stored images and agree with the report as written.     1. Successful placement of an Bard G2 retrievable infrarenal IVC filter. Plan: 1. Retrieval intent (MIPS): The filter is potentially retrievable.  Please contact IR to schedule IVC filter removal when/if clinically indicated. Electronically signed by: Harley Aggarwal MD Date:    10/30/2024 Time:    15:22    IR Antegrade Pyelogram (xpd)    Result Date: 10/30/2024  See Physician's Notes for results. IMPRESSION: See Physician's Notes for results This procedure was auto-finalized  See Physicians Notes for your results.      EKG    MICROBIOLOGY    MDM    Assessment/Plan:     * Dislodged jejunostomy tube  Patient presented following accidental dislodgement of J-tube 2 hours prior to arrival.  Patient denies endorsing any pain and reports being in her baseline state of health.  She remains afebrile without leukocytosis and is hemodynamically stable.  Plan:  -NPO  -IVFs prn  -Antiemetics prn  -Tylenol as needed for fever   -f/u IT for J-tube  reinsertion        VTE Risk Mitigation (From admission, onward)           Ordered     Reason for No Pharmacological VTE Prophylaxis  Once        Question:  Reasons:  Answer:  Physician Provided (leave comment)  Comment:  pending procedure    11/28/24 2125     IP VTE HIGH RISK PATIENT  Once         11/28/24 2125     Place sequential compression device  Until discontinued         11/28/24 2125                  //Core Measures   -DVT proph: SCDs, withholding anticoagulation pending surgical intervention   -Code status: Full    -Surrogate: none provided       Components of this note were documented using a voice recognition system and are subject to errors not corrected at the time the document was proof read. Please contact the author for any clarifications.       On 11/28/2024, patient should be placed in hospital observation services under my care.       Sunil Shell MD  Department of Hospital Medicine  [facility] - Emergency Dept.

## 2024-11-29 NOTE — OP NOTE
Pre Op Diagnosis: J tube displaced     Post Op Diagnosis: same     Procedure:  J tube replacement     Procedure performed by: José Miguel SOTO, Dmitri BRITT     Written Informed Consent Obtained: Yes     Specimen Removed:  yes     Estimated Blood Loss:  minimal     Findings: Local anesthesia     Sedation:  no     The patient tolerated the procedure well and there were no complications.      Disposition:  F/U in clinic or with ordering physician    Discharge instructions:  Light activity for 24 hours.  Remove band aid in 24 hours.  No baths (showers are appropriate).      Sterile technique was performed in the upper abdomen, lidocaine was used as a local anesthetic.  J tube replaced.  Pt tolerated the procedure well without immediate complications.  Please see radiologist report for details. F/u with PCP and/or ordering physician.

## 2024-11-29 NOTE — PLAN OF CARE
[facility] - Med Surg  Initial Discharge Assessment       Primary Care Provider: Gagandeep Iglesias MD    Admission Diagnosis: Chest pain [R07.9]  Migration of percutaneous endoscopic gastrostomy (PEG) tube [K94.23]    Admission Date: 11/28/2024  Expected Discharge Date: 11/29/2024         Payor: HU[employer] MANAGED MEDICARE / Plan: HU[employer] MEDICARE HMO / Product Type: Capitation /     Extended Emergency Contact Information  Primary Emergency Contact: IsaelRa  Address: [address] [place], [place] [id number] [place] [address]  Home Phone: [phone]  Mobile Phone: [phone]  Relation: Spouse  Secondary Emergency Contact: Elton Leary  Address: [address] [place], [place] [id number] [place] [address]  Home Phone: [phone]  Relation: Sister    Discharge Plan A: Home Health         [id number] [place], [place] - [address] [facility]  [address] [facility] [address]  Phone: [phone] Fax: [phone]    [facility] - [facility] [address] [facility]  [address] [facility] [address]  Phone: [phone] Fax: [phone]    [facility]  [address] [place] [address]  Phone: [phone] Fax: [phone]      Initial Assessment (most recent)       Adult Discharge Assessment - 11/29/24 0900          Discharge Assessment    Assessment Type Discharge Planning Assessment     Confirmed/corrected address, phone number and insurance Yes     Confirmed Demographics Correct on Facesheet     Source of Information patient;health record     Communicated SOFIYA with patient/caregiver Date not available/Unable to determine     Reason For Admission DISLODGED JEJUMOSTOMY TUBE     People in Home spouse     Do you expect to return to your current living situation? Yes     Do you have help at home or someone to help you manage your care at home? Yes     Who are your caregiver(s) and their  phone number(s)?      Prior to hospitilization cognitive status: Alert/Oriented     Current cognitive status: Alert/Oriented     Walking or Climbing Stairs Difficulty no     Dressing/Bathing Difficulty no     Equipment Currently Used at Home none     Readmission within 30 days? Yes     Patient currently being followed by outpatient case management? No     Do you currently have service(s) that help you manage your care at home? No     Do you take prescription medications? Yes     Do you have prescription coverage? Yes     Coverage human[employer]     Do you have any problems affording any of your prescribed medications? No     Is the patient taking medications as prescribed? yes     Who is going to help you get home at discharge? spouse     How do you get to doctors appointments? family or friend will provide     Are you on dialysis? No     Do you take coumadin? No     Discharge Plan A Home Health                        Pt discharged from [facility] on 11/25.

## 2024-11-29 NOTE — CONSULTS
Chart reviewed by Dr. Valdez.       ASSESSMENT/PLAN:    J tube displaced    The order for a J tube replacement has been placed and the procedure will be performed asap.          Thank you for the consult.

## 2024-11-29 NOTE — PLAN OF CARE
Discussed poc with pt, pt verbalized understanding    Purposeful rounding every 2hours    VS wnl    Fall precautions in place, remains injury free  Pt denies c/o pain      IVFs saline locked  Accurate I&Os    Bed locked at lowest position  Call light within reach    Chart check complete  Will cont with POC

## 2024-11-29 NOTE — ED NOTES
Pt is AAOx4 GCS 15 VSS. Pt presents due to peg tube dislodged. c/o left lower abd pain and right flank pain. Pt report kidney stone removal surgery x 1 day. Pt  at bedside

## 2024-11-29 NOTE — ASSESSMENT & PLAN NOTE
Patient presented following accidental dislodgement of J-tube 2 hours prior to arrival.  Patient denies endorsing any pain and reports being in her baseline state of health.  She remains afebrile without leukocytosis and is hemodynamically stable.  Plan:  -NPO  -IVFs prn  -Antiemetics prn  -Tylenol as needed for fever   -f/u IT for J-tube reinsertion

## 2024-11-29 NOTE — HPI
Cheryl Kirkland is a 74 y.o. 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 who presented to the ED for further evaluation of dislodged PEG/J-tube which occurred 2 hours prior to arrival.  Patient denies endorsing any pain in his without any concerns or complaints at time of bedside assessment.  Prior to the incident, patient reported being in her usual state of health and is patient awaiting reinsertion of her feeding tube.  Initial workup in the ED revealed patient remains afebrile without leukocytosis, hemodynamically stable, and labs near baseline.  Patient admitted to Hospital Medicine under observation for continued medical management while awaiting reinsertion of feeding tube in the morning.      PCP: Gagandeep Iglesias

## 2024-11-29 NOTE — HOSPITAL COURSE
Cheryl Bingham is a 74 year old female who presented to ED shortly after J-tube was accidentally dislodged. She denied other symptoms and in usual state of health. IR was consulted and J tube replaced without difficulty. TF resumed, and patient tolerated well. Patient was asked to follow up with PCP in one week. Patient seen and examined and deemed suitable for discharge.

## 2024-12-02 ENCOUNTER — TELEPHONE (OUTPATIENT)
Dept: UROLOGY | Facility: CLINIC | Age: 74
End: 2024-12-02
Payer: MEDICARE

## 2024-12-02 ENCOUNTER — TELEPHONE (OUTPATIENT)
Dept: HEMATOLOGY/ONCOLOGY | Facility: CLINIC | Age: 74
End: 2024-12-02
Payer: MEDICARE

## 2024-12-02 NOTE — TELEPHONE ENCOUNTER
.Outgoing call placed to patient, patient verified name and date of birth, patient notified of below per MD, she verbalized understanding and appt scheduled as requested.        ----- Message from Lata Kelly MD sent at 11/27/2024  9:30 AM CST -----  Please schedule pt for a 2 week post-op cysto/stent pull

## 2024-12-02 NOTE — TELEPHONE ENCOUNTER
Contacted pt now that she is out of LT[facility] to schedule a RTC with Dr Ambrose. Offered pt appt 12/5 at 0730 am at  - pt v/u.

## 2024-12-05 ENCOUNTER — DOCUMENTATION ONLY (OUTPATIENT)
Dept: HEMATOLOGY/ONCOLOGY | Facility: CLINIC | Age: 74
End: 2024-12-05
Payer: MEDICARE

## 2024-12-05 ENCOUNTER — TELEPHONE (OUTPATIENT)
Dept: HEMATOLOGY/ONCOLOGY | Facility: CLINIC | Age: 74
End: 2024-12-05

## 2024-12-05 ENCOUNTER — OFFICE VISIT (OUTPATIENT)
Dept: HEMATOLOGY/ONCOLOGY | Facility: CLINIC | Age: 74
End: 2024-12-05
Payer: MEDICARE

## 2024-12-05 VITALS
WEIGHT: 104.25 LBS | DIASTOLIC BLOOD PRESSURE: 75 MMHG | HEIGHT: 65 IN | OXYGEN SATURATION: 98 % | SYSTOLIC BLOOD PRESSURE: 109 MMHG | HEART RATE: 112 BPM | BODY MASS INDEX: 17.37 KG/M2 | TEMPERATURE: 98 F | RESPIRATION RATE: 18 BRPM

## 2024-12-05 DIAGNOSIS — R68.89 OTHER GENERAL SYMPTOMS AND SIGNS: ICD-10-CM

## 2024-12-05 DIAGNOSIS — E43 SEVERE PROTEIN-CALORIE MALNUTRITION: ICD-10-CM

## 2024-12-05 DIAGNOSIS — R10.9 ABDOMINAL PAIN, UNSPECIFIED ABDOMINAL LOCATION: ICD-10-CM

## 2024-12-05 DIAGNOSIS — D09.8 CARCINOMA IN SITU OF OTHER SPECIFIED SITES: ICD-10-CM

## 2024-12-05 DIAGNOSIS — C16.9 GASTRIC ADENOCARCINOMA: Primary | ICD-10-CM

## 2024-12-05 DIAGNOSIS — Z93.4 JEJUNOSTOMY TUBE PRESENT: ICD-10-CM

## 2024-12-05 PROCEDURE — 1111F DSCHRG MED/CURRENT MED MERGE: CPT | Mod: CPTII,S$GLB,, | Performed by: INTERNAL MEDICINE

## 2024-12-05 PROCEDURE — 1159F MED LIST DOCD IN RCRD: CPT | Mod: CPTII,S$GLB,, | Performed by: INTERNAL MEDICINE

## 2024-12-05 PROCEDURE — 3008F BODY MASS INDEX DOCD: CPT | Mod: CPTII,S$GLB,, | Performed by: INTERNAL MEDICINE

## 2024-12-05 PROCEDURE — 99215 OFFICE O/P EST HI 40 MIN: CPT | Mod: S$GLB,,, | Performed by: INTERNAL MEDICINE

## 2024-12-05 PROCEDURE — 3074F SYST BP LT 130 MM HG: CPT | Mod: CPTII,S$GLB,, | Performed by: INTERNAL MEDICINE

## 2024-12-05 PROCEDURE — 1125F AMNT PAIN NOTED PAIN PRSNT: CPT | Mod: CPTII,S$GLB,, | Performed by: INTERNAL MEDICINE

## 2024-12-05 PROCEDURE — 99999 PR PBB SHADOW E&M-EST. PATIENT-LVL IV: CPT | Mod: PBBFAC,,, | Performed by: INTERNAL MEDICINE

## 2024-12-05 PROCEDURE — 3078F DIAST BP <80 MM HG: CPT | Mod: CPTII,S$GLB,, | Performed by: INTERNAL MEDICINE

## 2024-12-05 PROCEDURE — 1101F PT FALLS ASSESS-DOCD LE1/YR: CPT | Mod: CPTII,S$GLB,, | Performed by: INTERNAL MEDICINE

## 2024-12-05 PROCEDURE — 3288F FALL RISK ASSESSMENT DOCD: CPT | Mod: CPTII,S$GLB,, | Performed by: INTERNAL MEDICINE

## 2024-12-05 RX ORDER — HYDROCODONE BITARTRATE AND ACETAMINOPHEN 5; 325 MG/1; MG/1
1 TABLET ORAL EVERY 12 HOURS PRN
Qty: 45 TABLET | Refills: 0 | Status: SHIPPED | OUTPATIENT
Start: 2024-12-05

## 2024-12-05 NOTE — TELEPHONE ENCOUNTER
Pt was contacted and informed to take some imodium over the counter per Dr. Murry, and pt verbalized understanding.

## 2024-12-05 NOTE — PROGRESS NOTES
Patient ID: Cheryl Kirkland   Chief Complaint: Follow-up (/)  MRN:  [id number]     Oncologic Diagnosis:  Stage III  (ypT2, pN3a, cM0) Gastric Adenocarcinoma  Previous Treatment:    NA Immunotherapy (Pembrolizumab 03/26/2024 - 07/23/2024)  s/p open total gastrectomy with D2 lymphadenectomy extensive lysis of adhesions on 08/20/2024 - Dr. Jerome    Current Treatment:  Adjuvant Pembrolizumab     Subjective   The patient presents for follow up and is accompanied by her .    Unfortunately, after gastrectomy Mrs. Zachery Kirkland has had difficulty maintaining nutrition and required J-tube placement.  She required inpatient stay in a nursing facility.  She has since then been discharged in his at home.  She and her  had multiple questions about nutrition which were answered today.  I counseled that she needs to use her J-tube as prescribed as well as if attempting to eat by mouth to eat whatever she can.  There are no strict restrictions at this time.  To date, she has only received one adjuvant cycle of 200 mg Keytruda.  We will plan to resume adjuvant systemic therapy in a couple of weeks if she continues to do well at home with her nutrition and strength.    She also reports that she has Diarreha 3 times per day.  She has some abdominal pain more around the feeding tube.  We discussed intensifying her pain regimen.  Overall she has no acute complaints today.  She is in agreement with the plan as documented below.    Review of Systems   Constitutional:  Negative for activity change, appetite change, chills, diaphoresis, fatigue, fever and unexpected weight change.   HENT:  Negative for nosebleeds.    Respiratory:  Negative for shortness of breath.    Cardiovascular:  Negative for chest pain.   Gastrointestinal:  Positive for abdominal pain and diarrhea. Negative for abdominal distention, anal bleeding, blood in stool, constipation, nausea and vomiting.   Genitourinary:  Negative for difficulty  urinating and hematuria.   Musculoskeletal:  Negative for arthralgias, back pain and myalgias.   Skin:  Negative for rash.   Neurological:  Negative for dizziness, weakness, light-headedness and headaches.   Hematological:  Does not bruise/bleed easily.   Psychiatric/Behavioral:  The patient is not nervous/anxious.      History     Oncology History   Mixed epithelial carcinoma of right ovary   1/2/2023 Initial Diagnosis    Mixed epithelial carcinoma of left ovary     1/9/2023 Surgery    Laparotomy for radical resection of gynecologic cancer. Removal of pelvic mass (right salpingo-oophorectomy), left salpingo-oophorectomy, extensive enterolysis, extensive adhesiolysis, left pelvic lymph node biopsy, omental biopsy, small bowel resection with primary functional end-to-end anastomosis, placement of pelvic drain. Path: Right ovary, tumor site, 11 cm, mixed epithelial carcinoma (50% mucinous, 50% endometrioid), G2 moderately differentiated, ovarian surface involvement-indeterminate-specimen disrupted. 1 left pelvic lymph node negative for neoplasia. FIGO stage IC2       1/9/2023 Cancer Staged    Staging form: Ovary, Fallopian Tube, and Primary Peritoneal Carcinoma, AJCC 8th Edition  - Clinical stage from 1/9/2023: FIGO Stage IC2, calculated as Stage IC (cT1c2, cN0, cM0)     4/5/2023 - 7/19/2023 Chemotherapy    4/5/2023: Cycle 1- paclitaxel 135 mg/m2 and carboplatin AUC 5 as patient is frail and elderly.  4/26/2023: Cycle 2 paclitaxel 140 mg per metered squared and carboplatin AUC 5  5/17/2023: Cycle 3 increased paclitaxel to 175 mg per metered squared as been tolerating well  6/7/2023: Cycle 4  6/28/2023: Cycle 5  7/19/2023: Cycle 6       Chemotherapy    Treatment Summary   Plan Name: OP pembrolizumab 200mg Q3W  Treatment Goal: Curative  Status: Active  Start Date: 3/15/2024 (Planned)  End Date: 2/27/2026 (Planned)  Provider: Claudia Ambrose MD  Chemotherapy: [No matching medication found in this treatment plan]      Gastric adenocarcinoma   2/8/2024 Initial Diagnosis    Poorly-differentiated adenocarcinoma with intestinal phenotype - [id number], MMR deficient- loss MLH1 and PMS2     2/8/2024 Cancer Staged    Staging form: Stomach, AJCC 8th Edition  - Clinical stage from 2/8/2024: Stage IIA (cT2, cN1, cM0)     3/4/2024 Tumor Conference    Date Presented to Tumor Board: 03/04/24  [facility] UGI MULTIDISCIPLINARY TUMOR BOARD  PATIENT REVIEW FORM   ____________________________________________________________    CLINIC #: [id number]  DATE: 3/4/2024    DIAGNOSIS: gastric GARRY    PRESENTER: Justus    PATIENT SUMMARY:   This 72 y/o female was dx with ovarian CA 1/2023, underwent surgery and completed 6 cycles of adj chemotherapy 7/2023. She presented last month with hematemesis. EGD revealed large ulcerated gastric mass, which was biopsied. EGD pathology reviewed - poorly differentiated adenocarcinoma, with intestinal expression, MSI high, HER 2 negative. Staging imaging found irregular wall thickening in mid gastric body, enlarged gastrohepatic lymph node, no evidence of distant metastatic disease.   Reviewed PET - uptake in gastric wall and gastrohepatic lymph node with hypermetabolic activity.   Scheduled to see Dr. Amin in  later this week.   + family hx of cancer, pending genetics referral    BOARD RECOMMENDATIONS:   Proceed with systemic chemotherapy, consider neoadj immunotherapy  Proceed with diag lap  Await genetics testing, pathology will send to [facility]    CONSULT NEEDED:     [] Surgery    [] Hem/Onc    [] Rad/Onc    [] Dietary                 [] Social Service    [x] Genetics       [] AES  [] Radiology     Clinical Stage: Tumor   Node(s)  1  Metastasis      GROUP STAGE:  [] O    [] 1A    [] IB    [] IIA    [] IIB     [] IIIA     [] IIIB     [] IIIC    []IV  [] Local recurrence     [] Regional recurrence     [] Distant recurrence   Metastatic site(s): none         [x] Edna'l Treatment Guidelines reviewed and  care planned is consistent with guidelines.         (i.e., NCCN, NCI, PD, ACO, AUA, etc.)    PRESENTATION AT CANCER CONFERENCE:         [x] Prospective    [] Retrospective     [] Follow-Up         3/14/2024 Surgery    Procedure:  LAPAROSCOPY, DIAGNOSTIC (N/A)  LYSIS, ADHESIONS, LAPAROSCOPIC (N/A)  LAVAGE, PERITONEAL, THERAPEUTIC (N/A)      Surgeon(s) and Role: Chen Jerome MD - Primary    Pre-Operative Diagnosis: Gastric adenocarcinoma [C16.9]     Post-Operative Diagnosis: Same     Pre-Operative Variables:  Stage:  III (T4a N1 M0)   Adjacent organ involvement: None  Chemotherapy within 90 Days: No  Radiation Therapy within 90 Days: No      Procedure:  Diagnostic laparoscopy   Extensive lysis of adhesions  Peritoneal biopsy   Peritoneal washings for cytology     3/26/2024 -  Chemotherapy    Treatment Summary   Plan Name: OP pembrolizumab 200mg Q3W  Treatment Goal: Curative  Status: Active  Start Date: 3/26/2024  End Date: 5/6/2026 (Planned)  Provider: Claudia Ambrose MD  Chemotherapy: [No matching medication found in this treatment plan]      Chemotherapy    Treatment Summary   Plan Name: OP pembrolizumab 200mg Q3W  Treatment Goal: Curative  Status: Active  Start Date: 3/15/2024 (Planned)  End Date: 2/27/2026 (Planned)  Provider: Claudia Ambrose MD  Chemotherapy: [No matching medication found in this treatment plan]     4/8/2024 Tumor Conference       [facility] UGI MULTIDISCIPLINARY TUMOR BOARD  PATIENT REVIEW FORM   ____________________________________________________________    CLINIC #: [id number]  DATE: 4/8/2024    DIAGNOSIS: Gastric GARRY    PRESENTER: Justus    PATIENT SUMMARY:   This 72 y/o female was dx with ovarian CA 1/2023, underwent surgery and completed 6 cycles of adj chemotherapy 7/2023. She developed hematemesis in Feb 2024, then underwent EGD with bx of a large ulcerated gastric mass. Pathology revealed poorly differentiated adenocarcinoma, with intestinal expression, MSI high, HER  2 negative. She was presented to this [facility]GI [facility] last month. Since then, she underwent diag lap and today's presentation is for pathology review. Negative for malignancy, reactive atypia     BOARD RECOMMENDATIONS:   Proceed with immunotherapy, then restage with imaging  Potential candidate for subtotal distal gastrectomy    CONSULT NEEDED:     [] Surgery    [] Hem/Onc    [] Rad/Onc    [] Dietary                 [] Social Service    [] Psychology       [] AES  [] Radiology     Clinical Stage: Tumor   Node(s) 1 Metastasis 0      GROUP STAGE:  [] O    [] 1A    [] IB    [] IIA    [] IIB     [] IIIA     [] IIIB     [] IIIC    []IV  [] Local recurrence     [] Regional recurrence     [] Distant recurrence   Metastatic site(s): none         [x] Edna'l Treatment Guidelines reviewed and care planned is consistent with guidelines.         (i.e., NCCN, NCI, PD, ACO, AUA, etc.)    PRESENTATION AT CANCER CONFERENCE:         [x] Prospective    [] Retrospective     [] Follow-Up           7/1/2024 Tumor Conference     [facility] UGI MULTIDISCIPLINARY TUMOR BOARD  PATIENT REVIEW FORM   ____________________________________________________________    CLINIC #: [id number]   DATE: 7/1/2024    DIAGNOSIS: gastric GARRY    PRESENTER: Justus    PATIENT SUMMARY:   This 74 y/o female was dx with ovarian CA 1/2023, underwent surgery and completed 6 cycles of adj chemotherapy in 7/2023. She developed hematemesis in Feb 2024 and underwent EGD with bx of a large ulcerated gastric mass. Pathology revealed poorly differentiated adenocarcinoma, with intestinal expression, MSI high, HER 2 negative. She underwent diag lap and pathology was negative for malignancy, reactive atypia. She was presented to this [facility] in 3/2024 and again in 4/2024. Since then, she received immunotherapy Keytruda 200 mg every 3 weeks, total of 4 cycles thus far.   Reviewed re-staging CT scan - perigastric lymph nodes larger in size  She remains asymptomatic.     BOARD  RECOMMENDATIONS:   Obtain repeat PET  Consider ct DNA    CONSULT NEEDED:     [] Surgery    [] Hem/Onc    [] Rad/Onc    [] Dietary                 [] Genetics    [] Psychology       [] AES  [] Interventional Radiology     Clinical Stage: Tumor 2 Node(s) 1 Metastasis 0      GROUP STAGE:  [] O    [] 1A    [] IB    [x] IIA    [] IIB     [] IIIA     [] IIIB     [] IIIC    []IV  [] Local recurrence     [] Regional recurrence     [] Distant recurrence   Metastatic site(s): none         [x] Edna'l Treatment Guidelines reviewed and care planned is consistent with guidelines.         (i.e., NCCN, NCI, PD, ACO, AUA, etc.)    PRESENTATION AT CANCER CONFERENCE:         [x] Prospective    [] Retrospective     [] Follow-Up         8/20/2024 Surgery    Open total gastrectomy with D2 WHITNEY and extensive lysis of adhesions   (Path: pT2 pN3a (8/35 LN positive), G3 poorly differentiated adenocarcinoma, margins negative     8/20/2024 Cancer Staged    Staging form: Stomach, AJCC 8th Edition  - Pathologic stage from 8/20/2024: Stage III (ypT2, pN3a, cM0)           Previous HPI  Cheryl Garcia is a 73 y.o. female with history of DVT not currently on AC, previously on Xarelto, FIGO Stage IC (cT1c2, cN0, cM0) Mixed epithelial carcinoma of right ovary s/p surgery 01/09/023 and 6C Carbo/Paclitaxel completed 07/19/2023, Osteoporosis on Fosamax, HTN and Hx of Epilepsy on Keppra, Tegretol, Zonegran (last seizure in 2009) who presents to clinic to establish care on referral for gastric cancer.    Patient reports that she Initially presented to [facility] 02/06/24 with report of hematemeiss; she was transferred to [facility] for higher level of care.  On admission to [facility], EGD  was performed and showed a large ulcerated gastric mass.  Pathology results it as poorly differentiated adenocarcinoma.  She states that her weight is currently stable.  The last time she lost any weight was 2 her ovarian cancer diagnosis and  treatment at which time she lost 5 lb but gained it back.  On hospitalization here at [facility], during GI workup she lost those 5 lbs a cane because she was NPO.    The patient has already established care with surgical oncology, Dr. Jerome.  She was presented in the upper GI tumor board with consensus for neoadjuvant immunotherapy given MSI high status of her tumor.  She is here for medical oncology recommendations.    I reviewed the recommendations of the tumor board consensus for neoadjuvant immunotherapy and I reviewed her pathology and biomarkers in detail.  She is expressed understanding in his in agreement with the plan.  She also understands that she still needs to undergo diagnostic laparoscopy for completed staging.    Otherwise, she is a former light smoker; quit 30-40 years ago. No alcohol use in 30-40 years. No illicit drug use.  She has an extensive family history of malignancy in his already been referred to our genetic counselor.      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    OP (osteoporosis) 02/28/2024    Ovarian cancer     Primary hypertension 12/01/2022       Past Surgical History:   Procedure Laterality Date    ABDOMINAL WASHOUT N/A 3/14/2024    Procedure: LAVAGE, PERITONEAL, THERAPEUTIC;  Surgeon: Chen Jerome MD;  Location[facility];  Service: General;  Laterality: N/A;    APPENDECTOMY  2015    BIOPSY OF PERITONEUM N/A 3/14/2024    Procedure: BIOPSY, PERITONEUM;  Surgeon: Chen Jerome  MD;  Location: [facility];  Service: General;  Laterality: N/A;    COLONOSCOPY  06/20/2012    Dr Boyle- Tubular adenoma polyps. Repeat in 3 years.    COLONOSCOPY  06/17/2015    -Nodular mucosa at appendiceal orfice, sigmoid and desc diverticulosis otherwise normal.    COLONOSCOPY  2020    >3 TAs    COLONOSCOPY  12/2023    CYSTOSCOPY W/ RETROGRADES Right 10/10/2024    Procedure: CYSTOSCOPY, WITH RETROGRADE PYELOGRAM;  Surgeon: Lata Kelly MD;  Location: [facility];  Service: Urology;  Laterality: Right;    CYSTOURETEROSCOPY, WITH HOLMIUM LASER LITHOTRIPSY OF URETERAL CALCULUS AND STENT INSERTION Right 11/27/2024    Procedure: CYSTOURETEROSCOPY, WITH HOLMIUM LASER LITHOTRIPSY OF URETERAL CALCULUS AND STENT INSERTION;  Surgeon: Lata Kelly MD;  Location: [facility];  Service: Urology;  Laterality: Right;    DIAGNOSTIC LAPAROSCOPY N/A 3/14/2024    Procedure: LAPAROSCOPY, DIAGNOSTIC;  Surgeon: Chen Jerome MD;  Location: [facility];  Service: General;  Laterality: N/A;  1. Diagnostic laparoscopy   2. Extensive lysis of adhesions  3. Peritoneal biopsy   4. Peritoneal washings for cytology    DIAGNOSTIC LAPAROSCOPY N/A 8/20/2024    Procedure: LAPAROSCOPY, DIAGNOSTIC;  Surgeon: Chen Jerome MD;  Location: [facility];  Service: General;  Laterality: N/A;    EGD - EXTERNAL RESULT  06/20/2012    Dr Boyle- Mild gastritis otherwise normal.    ENDOSCOPIC ULTRASOUND OF UPPER GASTROINTESTINAL TRACT N/A 7/26/2024    Procedure: ULTRASOUND, UPPER GI TRACT, ENDOSCOPIC;  Surgeon: Valdo Aguilera MD;  Location: [facility];  Service: Endoscopy;  Laterality: N/A;  7/22/24: instructions sent via portal-. Pt no longer takling eliquis-GD    ESOPHAGOGASTRODUODENOSCOPY N/A 02/08/2024    Procedure: EGD (ESOPHAGOGASTRODUODENOSCOPY);  Surgeon: Jayla Arteaga MD;  Location: [facility];  Service: Endoscopy;  Laterality: N/A;    ESOPHAGOGASTRODUODENOSCOPY N/A 8/20/2024    Procedure: EGD  (ESOPHAGOGASTRODUODENOSCOPY);  Surgeon: Chen Jerome MD;  Location: [facility];  Service: General;  Laterality: N/A;    GASTRECTOMY N/A 8/20/2024    Procedure: GASTRECTOMY;  Surgeon: Chen Jerome MD;  Location: [facility];  Service: General;  Laterality: N/A;    HYSTERECTOMY      LAPAROSCOPIC LYSIS OF ADHESIONS N/A 3/14/2024    Procedure: LYSIS, ADHESIONS, LAPAROSCOPIC;  Surgeon: Chen Jerome MD;  Location: [facility] OR;  Service: General;  Laterality: N/A;    LYSIS OF ADHESIONS N/A 8/20/2024    Procedure: LYSIS, ADHESIONS;  Surgeon: Chen Jerome MD;  Location: [facility] OR;  Service: General;  Laterality: N/A;    PLACEMENT OF JEJUNOSTOMY TUBE N/A 8/20/2024    Procedure: INSERTION, JEJUNOSTOMY TUBE;  Surgeon: Chen Jerome MD;  Location: [facility] OR;  Service: General;  Laterality: N/A;    REMOVAL OF URETERAL CALCULUS Right 11/27/2024    Procedure: REMOVAL, CALCULUS, URETER;  Surgeon: Lata Kelly MD;  Location: [facility];  Service: Urology;  Laterality: Right;  basket extraction    REMOVAL, NEPHROSTOMY TUBE, WITH IMAGING GUIDANCE Right 11/27/2024    Procedure: REMOVAL, NEPHROSTOMY TUBE, WITH IMAGING GUIDANCE;  Surgeon: Lata Kelly MD;  Location: [facility] OR;  Service: Urology;  Laterality: Right;    REMOVAL-STENT Right 11/27/2024    Procedure: REMOVAL-STENT;  Surgeon: Lata Kelly MD;  Location: [facility] OR;  Service: Urology;  Laterality: Right;    RETROGRADE PYELOGRAPHY Right 11/27/2024    Procedure: PYELOGRAM, RETROGRADE;  Surgeon: Lata Kelly MD;  Location: [facility] OR;  Service: Urology;  Laterality: Right;    TOTAL ABDOMINAL HYSTERECTOMY W/ BILATERAL SALPINGOOPHORECTOMY  01/09/2023    radical resection with right salpingo-oophorectomy, left salpingo-oophorectomy, extensive enterolysis, extensive adhesiolysis, left pelvic lymph node biopsy, omental biopsy, small bowel resection with primary functional end-to-end anastomosis, placement of pelvic drain       Family History   Problem Relation  Name Age of Onset    Pancreatic cancer Brother Misael Mccray 76    Prostate cancer Brother Zachary 67    Pancreatic cancer Half-brother Gasper 74    Colon cancer Half-sister Elton 83    Lung cancer Half-sister Elton         +smoking hx    Breast cancer Half-sister Vidhi 58    Lymphoma Other Cara Sun    Prostate cancer Other Fernando     Prostate cancer Other Gasper Calderon     Ovarian cancer Other Inerris     Breast cancer Other Aleida     Colon cancer Other Aleida        Review of patient's allergies indicates:  No Known Allergies    Social History     Tobacco Use    Smoking status: Former     Types: Cigarettes    Smokeless tobacco: Never    Tobacco comments:     Quit 1989 smoked 10 years smoked 0.25 ppd    Substance Use Topics    Alcohol use: Not Currently    Drug use: Never     Physical Exam     ECOG SCORE           Vitals:    12/05/24 0748   BP: 109/75   Pulse: (!) 112   Resp: 18   Temp: 97.5 °F (36.4 °C)           Physical Exam  Constitutional:       General: She is not in acute distress.     Appearance: She is normal weight. She is ill-appearing.   HENT:      Head: Normocephalic and atraumatic.   Eyes:      Extraocular Movements: Extraocular movements intact.      Conjunctiva/sclera: Conjunctivae normal.   Cardiovascular:      Rate and Rhythm: Normal rate.   Pulmonary:      Effort: Pulmonary effort is normal.   Neurological:      General: No focal deficit present.      Mental Status: She is alert and oriented to person, place, and time. Mental status is at baseline.   Psychiatric:         Mood and Affect: Mood normal.         Behavior: Behavior normal.         Thought Content: Thought content normal.         Judgment: Judgment normal.         Labs   Labs:  No visits with results within 2 Day(s) from this visit.   Latest known visit with results is:   Admission on 11/28/2024, Discharged on 11/29/2024   Component Date Value Ref Range Status    aPTT 11/28/2024 35.6 (H)  21.0 - 32.0 sec Final     Comment: Refer to local heparin nomogram for intensity/dose specific   therapeutic   range.      WBC 11/28/2024 7.61  3.90 - 12.70 K/uL Final    RBC 11/28/2024 3.67 (L)  4.00 - 5.40 M/uL Final    Hemoglobin 11/28/2024 11.8 (L)  12.0 - 16.0 g/dL Final    Hematocrit 11/28/2024 36.8 (L)  37.0 - 48.5 % Final    MCV 11/28/2024 100 (H)  82 - 98 fL Final    MCH 11/28/2024 32.2 (H)  27.0 - 31.0 pg Final    MCHC 11/28/2024 32.1  32.0 - 36.0 g/dL Final    RDW 11/28/2024 18.2 (H)  11.5 - 14.5 % Final    Platelets 11/28/2024 513 (H)  150 - 450 K/uL Final    MPV 11/28/2024 8.1 (L)  9.2 - 12.9 fL Final    Immature Granulocytes 11/28/2024 2.1 (H)  0.0 - 0.5 % Final    Gran # (ANC) 11/28/2024 5.5  1.8 - 7.7 K/uL Final    Immature Grans (Abs) 11/28/2024 0.16 (H)  0.00 - 0.04 K/uL Final    Comment: Mild elevation in immature granulocytes is non specific and   can be seen in a variety of conditions including stress response,   acute inflammation, trauma and pregnancy. Correlation with other   laboratory and clinical findings is essential.      Lymph # 11/28/2024 1.0  1.0 - 4.8 K/uL Final    Mono # 11/28/2024 0.8  0.3 - 1.0 K/uL Final    Eos # 11/28/2024 0.1  0.0 - 0.5 K/uL Final    Baso # 11/28/2024 0.04  0.00 - 0.20 K/uL Final    nRBC 11/28/2024 0  0 /100 WBC Final    Gran % 11/28/2024 71.8  38.0 - 73.0 % Final    Lymph % 11/28/2024 13.4 (L)  18.0 - 48.0 % Final    Mono % 11/28/2024 11.0  4.0 - 15.0 % Final    Eosinophil % 11/28/2024 1.2  0.0 - 8.0 % Final    Basophil % 11/28/2024 0.5  0.0 - 1.9 % Final    Differential Method 11/28/2024 Automated   Final    Sodium 11/28/2024 137  136 - 145 mmol/L Final    Potassium 11/28/2024 3.8  3.5 - 5.1 mmol/L Final    Chloride 11/28/2024 110  95 - 110 mmol/L Final    CO2 11/28/2024 12 (L)  23 - 29 mmol/L Final    Glucose 11/28/2024 95  70 - 110 mg/dL Final    BUN 11/28/2024 10  8 - 23 mg/dL Final    Creatinine 11/28/2024 0.7  0.5 - 1.4 mg/dL Final    Calcium 11/28/2024 8.8  8.7 - 10.5 mg/dL  Final    Anion Gap 11/28/2024 15  8 - 16 mmol/L Final    eGFR 11/28/2024 >60  >60 mL/min/1.73 m^2 Final    Prothrombin Time 11/28/2024 13.4 (H)  9.0 - 12.5 sec Final    INR 11/28/2024 1.2  0.8 - 1.2 Final    Comment: Coumadin Therapy:  2.0 - 3.0 for INR for all indicators except mechanical heart valves  and antiphospholipid syndromes which should use 2.5 - 3.5.          Pathology   Contains abnormal data Specimen to Pathology, Surgery Gastrointestinal tract - 02/28/2024      Component 3 wk ago   Final Pathologic Diagnosis  Abnormal   STOMACH, 'GASTRIC MASS', BIOPSY:  - 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 Scattered p53 positivity (wild-type pattern of expression) and p16 positivity are also noted. Additional studies, including CK7, PAX8, ER, HER2, TTF-1, GATA3, WT-1,  synaptophysin, and chromogranin, are either negative or negative in the cells of interest. This patient's prior history of ovarian carcinoma resected at [facility] in [place], [place] is noted and the outside pathology report (V-) is reviewed.   While both tumors do have similar immunohistochemical staining patterns, they are not identical, with notable reported differences being CK7 and PAX8 positivity in the ovarian tumor. The presence of a large, fungating, and ulcerated mass in the stomach  is suspicious for a gastric primary; however, it is unclear if the masses represent two separate primaries or one p rimary and one metastatic focus. Abnormal mismatch repair studies do suggest increased risk for development of multiple tumor types (see  below). Requesting outside slides for review to compare morphology may be helpful.    DNA mismatch repair IHC studies are ABNORMAL and demonstrate LOSS OF MLH1 and PMS2 expression within tumor cells. MSH2 and MSH6 exhibit retained expression. These results are consistent  with an MMR-deficient tumor and indicate microsatellite instability.    Tissue will be sent for molecular profiling by next-generation sequencing ([facility]). Results of this study will be issued directly to the electronic medical record.                        Tumor NGS Tissue - 02/29/2024  MSI: MSI-H         TMB: 42.6 m/MB         Low Coverage Regions: KDM5D, PMS2         Fusion Addendum Issue Type: NEGATIVE  Negative - This addendum is being issued to report that no gene fusions or altered splicing variants from RNA sequencing analysis were found.               Cytology-FNA Non-Radiology Clinician Performed w/o on site - 07/26/2024        Component 2 mo ago   Final Pathologic Diagnosis      Abnormal   Lymph node, perigastric, EUS guided fine-needle aspiration/biopsy:  - Adenocarcinoma involving lymphoid tissue consistent with patient's previous gastric mass biopsy, see comment.    COMMENT:  The biopsy shows a malignant epithelial proliferation involving lymphoid tissue consistent with tumor involving lymph node.  Immunostains show tumor cells to be positive for cytokeratin, CDX2, and patchy CK20.  Tumor cells are negative for PAX8   and CK7.  Patient had a previous stomach mass biopsy ([facility] that is pulled and reviewed.  The current biopsy shows similar morphology and immunoprofile as the stomach mass biopsy.  MMR and NGS performed on previous biopsy.  Claudin 18.2 pending,  results will be issued in an addendum.      HER2 by immunohistochemistry:  Negative (1+)  VC      Comment: Interp By Tiny Ma MD, Signed on 08/06/2024 at 13:07   Supplemental Diagnosis      Abnormal   CLDN18, SemiQuant IHC, Manual    Interpretation  FNA lymph node, specimen for CLD18 immunohistochemistry studies (anti-[id number] (CLDN18) clone [id number]?[id number], [employer], [place], [place]; using a proprietary detection system) ([id number]?1A.u, KEF24?184?1A.u)    Claudin 18.2: Negative; 40% of tumor cells with 2+ and/or  3+ membrane staining.    Interpretation: The Claudin 18.2 antibody (clone 43?[id number]) was performed per clinical validation and relevant - provided instructions.    This result should be interpreted in the appropriate clinical context.    Fixation: This test has been validated for non-decalcified paraffin embedded tissue specimens fixed in 10% neutral buffered formalin. Recommended fixation time is between 6?48 hours. This assay has not been validated on tissues  subjected to the decalcification process and /or use of alternative fixatives.      Report electronically signed by  Maribell Glover M.D.      Report attached.    Performing  location:  [place] [address]    &quot;Disclaimer:  This case diagnosis was rendered completely by the outside consultation pathologist and the case is electronically signed by an [facility] pathologist listed below solely to release the report into the medical record.&quot;  VC      Disclaimer      Abnormal   HER-2/candida IHC (4B5) clone, DAB detection method) is done on 10% buffered formalin-fixed (for 6-72 hrs), paraffin embedded tissue sections. The scoring is completed on a 4-tiered scoring system of membrane staining using the 2014 ASCO/CAP scoring  guidelines. It has been cleared by the U.S. FDA for use as an IVD test. This assay has not been validated on decalcified tissues. Results should be interpreted with caution given the likelihood of false negativity on decalcified specimens.    HER-2/candida IHC (4B5) clone, DAB detection method) is done on 10% buffered formalin-fixed (for 6-72 hrs), paraffin embedded tissue sections. The scoring is completed on a 4-tiered scoring system of membrane staining using the 2014 ASCO/CAP scoring  guidelines. It has been cleared by the U.S. FDA for use as an IVD test. This assay has not been validated on decalcified tissues. Results should be interpreted with  caution given the likelihood of false negativity on decalcified specimens.    VC      Resulting Agency [facility]            Specimen to Pathology, Surgery General Surgery - 08/20/2024      Component 1 mo ago   Final Pathologic Diagnosis 1. Small intestine, peritoneal scarring, excision:  - Benign fibroadipose tissue  - Negative for malignancy    2. Spleen, lesion, excision:- Benign splenic tissue  - Negative for malignancy    3. Small intestine, resection:- Benign small intestinal tissue  - Negative for malignancy    4. Lymph node, hepatic artery, excision:- Two lymph nodes, negative for metastatic carcinoma (0/2)    5. Stomach, total gastrectomy:  - Adenocarcinoma, poorly cohesive, with features of undifferentiated carcinoma  - Tumor involves subserosal tissue  - Margins negative for carcinoma  - Pathologic stage y pT2 pN3a  - Eight of 35 lymph nodes are POSITIVE for metastatic carcinoma (8/35)  - Evidence of tumor regression is seen in a lymph node  - Incidental benign leiomyoma identified in perigastric tissue  - Background chronic gastritis  - Helicobacter pylori-type microorganisms are identified by immunohistochemistry  - See synoptic report below    6. Lymph nodes, D2 and periportal lymph node, lymphadenectomy:  - Eight lymph nodes, all negative for metastatic carcinoma (0/8)     7. Lymph node, aortocaval, excision:  - One lymph node, negative for metastatic carcinoma (0/1)    8. Anastomosis, anastomotic donuts:  - Benign esophageal and small intestinal tissue  - Negative for malignancy    _____________________________________________________________________  CASE SUMMARY: (STOMACH)  Standard(s): AJCC-UICC 8    SPECIMEN    Procedure: Total gastrectomy    TUMOR    Tumor Site: Body: Lesser curvature    Histologic Type: Adenocarcinoma    Adenocarcinoma Classification (based on WHO): Poorly cohesive carcinoma, Undifferentiated (anaplastic) carcinoma    Histologic Type Comment: Tumor is poorly cohesive, but in some  areas a better differentiated component is identified. The tumor diffusely expresses CDX2.  Histologic Grade: G3, poorly differentiated, undifferentiated  Tumor Size: Greatest dimension in Centimeters (cm): 6.6 cm    Additional Dimension in Centimeters (cm): 4.2 x 1.4 cm  Tumor Extent: Invades muscularis propria  Treatment Effect: Present, with residual cancer showing evident tumor regression, but more than single cells or rare small groups of cancer cells (partial response, score 2)  Lymphatic and / or Vascular Invasion: Present    MARGINS    Margin Status for Invasive Carcinoma: All margins negative for invasive carcinoma    Closest Margin(s) to Invasive Carcinoma: Omental (radial): lesser curvature    Distance from Invasive Carcinoma to Closest Margin: Exact distance in cm: 3 cm  Margin Status for Dysplasia: All margins negative for dysplasia    REGIONAL LYMPH NODES    Regional Lymph Node Status: Regional lymph nodes present    Tumor present in regional lymph node(s)      Number of Lymph Nodes with Tumor: Exact number: 8    Number of Lymph Nodes Examined: 45    DISTANT METASTASIS    Distant Site(s) Involved, if applicable: Not applicable    pTNM CLASSIFICATION (AJCC 8TH Edition)  Reporting of pT, pN, and (when applicable) pM categories is based on information available to the pathologist at the time the report is issued. As per the AJCC (Chapter 1, 8th Ed.) it is the managing physician's responsibility to establish the final  pathologic stage based upon all pertinent information, including but potentially not limited to this pathology report.    Modified Classification: y (post-neoadjuvant therapy)  pT Category: pT2: invades the muscularis propria  pN Category: pN3a: Metastasis in seven to 15 regional lymph nodes  pM Category: Not applicable - pM cannot be determined from the submitted specimen(s)    ADDITIONAL FINDINGS    Additional Findings: Chronic gastritis, Helicobacter pylori present    SPECIAL  STUDIES    HER2 and mismatch repair testing have been performed on the previous biopsy cases [facility]- and [facility]; see those cases for a full report. HER2 was negative and mismatch repair testing revealed loss of nuclear expression of MLH1 and PMS2. Claudin  18.2 was negative.   Comment: Interp By Jorge Luis Morales D.O., Signed on 08/23/2024 at 17:04   Frozen Section Diagnosis 1.Negative for malignacy  2.Negative for malignacy  Frozen diagnosis made by Dr. Goode for part 1 and 2  5. Proximal and distal margins negative for malignancy.  Frozen diagnosis made by Dr. Elena for part 5.   Microscopic Exam Immunohistochemical stains for PAX8, cytokeratin 7, cytokeratin 20, CDX2, CD45, synaptophysin and chromogranin are performed on block 5-F with adequate and reactive controls and the following results:  CDX2 is positive.  Cytokeratin 20 is patchy, but focally positive in tumor cells.  Cytokeratin 7, CD45, synaptophysin, chromogranin, and PAX8 are negative.    EBV in-situ hybridization, performed on block 5-F, is negative. RNA positive and GUILLAUME negative controls stain appropriately.    An immunohistochemical stain for H. pylori, performed on block 5-K with adequate and reactive controls, is positive.   Gross 1: Surgery ID:  [id number]    Pathology ID:  [id number]  1. Received fresh and subsequently placed in formalin labeled &quot;small bowel peritoneal scarring&quot; is a 0.7 x 0.5 x 0.3 cm tan tissue fragment.  The specimen is submitted entirely for frozen section with the frozen section remnant submitted in  cassette 1A.    PTF--1-A-FS    Grossed by: Rose Mary Melara MS, PA([facility])     2: Surgery ID:  [id number]    Pathology ID:  [id number]  2. Received fresh and subsequently placed in formalin labeled &quot;splenic flexure lesion&quot; is a 0.6 x 0.4 x 0.3 cm tan-red tissue fragments.  The specimen is submitted entirely for frozen section with the frozen section remnant submitted in  cassette  2A.  BZE--2-A-FS    Grossed by: Rose Mary Melara MS, PA([facility])     3: Surgery ID:  [id number]   Pathology ID:  [id number] 3.  Small bowel Received in formalin labeled &quot;small bowel&quot; is a 4.4 cm in length by 1.9 cm in diameter segments of small bowel with 2 stapled ends.  The serosa is pink-red with areas of adhesions.  Opening the specimen reveals pink-red, well folded   mucosa.  Representative sections are submitted as follows:  JYA--3-A: Tissue from staple line margin  BJU--3-B: Tissue from opposite staple line margin  VER--3-C: Representative sections of specimen      Grossed by: Rose Mary Melara MS, PA([facility])     4: Surgery ID:  [id number]   Pathology ID:  [id number]  4. Received in formalin labeled &quot;hepatic artery lymph node&quot; is a 0.8 x 0.5 x 0.4 cm tan-pink lymph node.  The lymph node is bisected and submitted entirely in cassette 4A.    WVT--4-A    Grossed by: Rose Mary Melara MS, PA([facility])     5: Surgery ID:  [id number]   Pathology ID:  [id number]  5. Received fresh and subsequently placed in formalin labeled &quot;total gastrectomy distal and proximal margin&quot; is a 23 x 7.6 x 4 cm (lesser curvature is 10 cm and greater curvature is 23 cm) gastrectomy specimen.  The serosa is pink-red.  Opening   the specimen reveals a 6.6 x 4.2 cm firm, tan-pink, nodular, slightly raised mass.  The mass invades the gastric wall and involves the serosa.  The mass does not grossly invade into the perigastric fat measuring 3 cm from the lesser curvature fat margin   and 7 cm from the greater curvature fat margin.  The mass measures 5.2 cm from the distal duodenal staple line margin and 4 cm from the proximal staple line margin.  The remaining mucosa is tan-pink and moderately well folded.  Multiple lymph nodes are  identified.  The serosa is inked blue.  The proximal staple line margin is submitted for frozen section with the frozen section remnant submitted in cassette  AAC--5-A-FS.  The distal duodenal staple line margin is submitted for frozen section with   the frozen section remnant submitted in cassette KSQ--5-B-FS.  Representative sections are submitted as follows:  YOY--5-C:  Lesser curvature fat margin nearest mass  GGB--5-D: Greater curvature fat margin nearest mass  ORL--5-E: Mass to serosa  HUQ--5-F: Mass to lesser curvature fat  ZRJ--5-G: Mass to adjacent distal mucosa  JZE--5-H: Mass to adjacent proximal mucosa  DAW--5-I: Representative section of mass  NIY--5-J: Representative section of mass  BZU--5-K: Representative sections of uninvolved mucosa  NTN--5-L: 7 lesser curvature lymph nodes  IBT--5-M: 5 lesser curvature lymph nodes  BTQ--5-N: 5 lesser curvature lymph nodes  KJA--5-O: 5 lymph nodes  RJA--5-P: 2 lymph nodes  GLG--5-Q - VXW--5-R - NFB--5-S: 1 lymph node, serially sectioned and submitted entirely  ADZ--5-T - FVB--5-X: 1 lymph node, serially sectioned and submitted entirely    Grossed by: Rose Mary Melara MS, PA([facility])     6: Surgery ID:  [id number]   Pathology ID:  [id number]  6. Received in formalin labeled &quot;D2 lymphadenopathy with alea portal lymph node&quot; is a 4 x 4 x 1 cm aggregate of tan-pink to tan-yellow, nodular tissue fragments.  7 candidate lymph nodes ranging from 0.3-1.8 cm in greatest dimension are  identified.  The lymph nodes are submitted entirely as follows:  RWA--6-A: 4 lymph nodes  IRQ--6-B: 2 lymph nodes  XHR--6-C: 1 lymph node, bisected    Grossed by: Rose Mary Melara MS, PA([facility])     7: Surgery ID:  [id number]   Pathology ID:  [id number]  7. Received in formalin labeled &quot;Aortic cable&quot; is a 1 x 0.5 x 0.4 cm purple-gray, rubbery portion of tissue.  The specimen is submitted entirely in cassette 7A.    XCC--7-A    Grossed by: Rose Mary Melara MS, PA([facility])     8:  Surgery ID:  [id number]   Pathology ID:  [id number]  8. Received in formalin labeled &quot;anastomotic donuts&quot; are 2 circular, pink-red mucosal surfaced portions of tissue measuring 1.7 cm in diameter by 1 cm in length and 1.5 cm in diameter by 1.1 cm in length.  Representative sections are submitted  in cassette 8A.    IEG--8-A    Grossed by: Rose Mary Melara MS, PA([facility])   Frozen Section Footnote Frozen section performed at [facility], [address] [place] [address] [place], [address]   Disclaimer Unless the case is a 'gross only' or additional testing only, the final diagnosis for each specimen is based on a microscopic examination of appropriate tissue sections.  This test was developed and its performance characteristics determined by [facility], Department of Pathology and Laboratory Medicine. It has not been cleared or approved by the US Food and Drug Administration. The FDA has determined that  such clearance or approval is not necessary. This test is used for clinical purposes. It should not be regarded as investigational or for research. This laboratory is certified under the Clinical Laboratory Improvement Admendments (CLIA) as qualified to  perform such high complexity clinical laboratory testing   [facility]ing [facility]          Imaging   NM PET CT FDG SKULL BASE TO MID THIGH - 02/28/2024  CLINICAL HISTORY:  Gastric cancer.  Malignant neoplasm of stomach, unspecified.  Initial staging.  History of left ovarian epithelial carcinoma.     TECHNIQUE:  11.45 mCi of F18-FDG was administered intravenously in the left forearm.  After an approximately 60 min distribution time, PET/CT images were acquired from the skull base to the mid thigh. Transmission images were acquired to correct for attenuation using a whole body low-dose CT scan without contrast with the arms positioned above the head. Glycemia at the time of injection was 113 mg/dL.     COMPARISON:  CT abdomen pelvis,  02/08/2024 and chest CT, 02/27/2024     FINDINGS:  Quality of the study: Adequate.     SUV max of the liver parenchyma is 2.8     Neck: No abnormal FDG avidity.  No lymphadenopathy or mass.     Chest: No abnormal FDG avidity.  No pleural effusion or lymphadenopathy or pulmonary nodule.     Abdomen/pelvis: Marked thickening of the wall of the stomach involving the fundus and proximal body with marked FDG avidity with SUV max 18.  The gastrohepatic ligament lymph node measuring 19 x 13 mm shows moderate FDG avidity with SUV max 4.6.     No ascites.  No other focus of abnormal FDG avidity in the abdomen or pelvis.     Skeletal structures: No abnormal FDG avidity.  No focal lytic or sclerotic lesion.     Physiologic uptake of the tracer is present within the brain, salivary glands, myocardium, GI and  tracts.     Incidental CT findings: N/A     Impression:  1. The biopsy proving gastric cancer is markedly FDG avid with SUV max 18.  2. Moderately FDG avid gastrohepatic ligament lymph node consistent with local metastatic disease.  3. No evidence for distant metastatic disease.  All CT scans at this facility are performed  using dose modulation techniques as appropriate to performed exam including the following:  automated exposure control; adjustment of mA and/or kV according to the patients size (this includes techniques or standardized protocols for targeted exams where dose is matched to indication/reason for exam: i.e. extremities or head);  iterative reconstruction technique.    Assessment and Plan   Stage III  (ypT2, pN3a, cM0) Gastric Adenocarcinoma  EGD 02/08/2024: Gastric Mass  Path: Poorly-differentiated adenocarcinoma with intestinal phenotype -  HER2-, MMR - Deficient (MLH1 and PMS2 loss)  [facility] NGS:   MSI-H, TMB 42.6  Potentially actionable mutation in CHRIS  Multiple clinical trials available  PET-CT 02/28/24 showed no evidence of distant metastatic disease  Presented in Tb 03/04/24 with consensus for  "Proceed with systemic chemotherapy, consider neoadj immunotherapy, Proceed with diag lap  Diagnostic Laparoscopy 03/14/2024 with Dr. Jerome negative for malignant cells  Per NCCN guidelines, NA immunotherapy is useful in MSI-H/dMMR tumors) with options being Nivolumab and ipilimumab followed by nivolumab or Pembrolizumab.  Given patient's comorbid conditions and potentially increased toxicity with CTLA4 inhibitor and PD-1 inhibitor, decision made to proceed with Pembrolizumab. Started 03/14/24.  Patient to completed 12 weeks of NA immunotherapy 06/18/2024  CT CAP 06/24/25 showed progression of LAD but no other signs of disease  Plan  s/p open total gastrectomy with D2 lymphadenectomy extensive lysis of adhesions on 08/20/2024  Eight of 35 lymph nodes are POSITIVE for metastatic carcinoma   Adjuvant therapy delayed due to malnutrition and FTT with requirement of J tube placement  Patient currently home and using J tube as prescribed  Will obtain repeat imaging given duration between last cylce of adjuvant immunotherayp and now; plan to resume adjuvant immunotherapy within the next few weeks        Severe Protein Calorie Malnutrition  Diarrhea  Counseled extensively to use feeding tube as prescribed and not restrict her diet  Counseled to use Imodium for diarrhea  Pain regimen intensified to  Norco 5-325 mg BID with eventual plans to wean as she heals     checked: 12/5/24  " checked" means that the [place] Board of Pharmacy controlled substance prescription fill site was checked for this patient as part of this visit.  The filled prescriptions are compared with the charted prescriptions and the restriction of pill amounts per month, numbers of prescribers and number of pharmacies used.  This complex task take 5 minutes and it is required for patients being managed with opioids.          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 review imaging results   Follow up with JENNIE    Infusion scheduling note    Injection scheduling note    Labs CMP, CBC, phosphorus, magnesium and TSH   Scheduling:  Preferred lab:  Lab interval:     Imaging CT chest abdomen pelvis      Pharmacy appointment    Other referrals                  > 45 minutes was spent in consultation with the patient, chart review (including labs, imaging, and pathology).      The patient was seen, interviewed and examined. Pertinent lab and radiologic studies were reviewed. Pt instructed to call should they develop concerning signs/symptoms or have further questions.        Portions of the record may have been created with voice recognition software. Occasional wrong-word or sound-a-like substitutions may have occurred due to the inherent limitations of voice recognition software. Read the chart carefully and recognize, using context, where substitutions have occurred.      Claudia Amin MD    Hematology/Oncology

## 2024-12-05 NOTE — PROGRESS NOTES
SWER received message to assist patient with a case of Boost. Nurse navigator provided patient a case of Vanilla Boost. SWER will remain available.

## 2024-12-08 PROBLEM — R10.9 ABDOMINAL PAIN: Status: ACTIVE | Noted: 2024-12-08

## 2024-12-08 NOTE — ANESTHESIA POSTPROCEDURE EVALUATION
Anesthesia Post Evaluation    Patient: Cheryl Kirkland    Procedure(s) Performed: Procedure(s) (LRB):  CYSTOURETEROSCOPY, WITH HOLMIUM LASER LITHOTRIPSY OF URETERAL CALCULUS AND STENT INSERTION (Right)  REMOVAL, CALCULUS, URETER (Right)  REMOVAL-STENT (Right)  PYELOGRAM, RETROGRADE (Right)  REMOVAL, NEPHROSTOMY TUBE, WITH IMAGING GUIDANCE (Right)    Final Anesthesia Type: general      Patient location during evaluation: PACU  Patient participation: Yes- Able to Participate  Level of consciousness: awake and alert, oriented and awake  Post-procedure vital signs: reviewed and stable  Pain management: adequate  Airway patency: patent    PONV status at discharge: No PONV  Anesthetic complications: no      Cardiovascular status: blood pressure returned to baseline  Respiratory status: unassisted  Hydration status: euvolemic  Follow-up not needed.              Vitals Value Taken Time   /70 11/27/24 0945   Temp 36.2 °C (97.2 °F) 11/27/24 0910   Pulse 75 11/27/24 0945   Resp 16 11/27/24 0910   SpO2 99 % 11/27/24 0945         Event Time   Out of Recovery 09:55:36         Pain/Marli Score: No data recorded

## 2024-12-16 ENCOUNTER — PROCEDURE VISIT (OUTPATIENT)
Dept: UROLOGY | Facility: CLINIC | Age: 74
End: 2024-12-16
Payer: MEDICARE

## 2024-12-16 ENCOUNTER — TELEPHONE (OUTPATIENT)
Dept: SURGICAL ONCOLOGY | Facility: CLINIC | Age: 74
End: 2024-12-16
Payer: MEDICARE

## 2024-12-16 VITALS — BODY MASS INDEX: 17.48 KG/M2 | HEIGHT: 65 IN | WEIGHT: 104.94 LBS

## 2024-12-16 DIAGNOSIS — N20.1 URETERAL STONE: Primary | ICD-10-CM

## 2024-12-16 LAB
BILIRUB UR QL STRIP: NEGATIVE
GLUCOSE UR QL STRIP: NEGATIVE
KETONES UR QL STRIP: NEGATIVE
LEUKOCYTE ESTERASE UR QL STRIP: POSITIVE
PH, POC UA: 7
POC BLOOD, URINE: POSITIVE
POC NITRATES, URINE: NEGATIVE
PROT UR QL STRIP: POSITIVE
SP GR UR STRIP: 1.02 (ref 1–1.03)
UROBILINOGEN UR STRIP-ACNC: 0.2 (ref 0.1–1.1)

## 2024-12-16 RX ORDER — LIDOCAINE HYDROCHLORIDE 20 MG/ML
JELLY TOPICAL
Status: COMPLETED | OUTPATIENT
Start: 2024-12-16 | End: 2024-12-16

## 2024-12-16 RX ORDER — CIPROFLOXACIN 500 MG/1
500 TABLET ORAL
Status: COMPLETED | OUTPATIENT
Start: 2024-12-16 | End: 2024-12-16

## 2024-12-16 RX ADMIN — LIDOCAINE HYDROCHLORIDE 11 ML: 20 JELLY TOPICAL at 09:12

## 2024-12-16 RX ADMIN — CIPROFLOXACIN 500 MG: 500 TABLET ORAL at 09:12

## 2024-12-16 NOTE — TELEPHONE ENCOUNTER
Spoke with Sophie- Message sent to Dr. Jerome. MD signed ordered. Sophie notified.     ----- Message from Marisabel sent at 12/16/2024  1:20 PM CST -----  Contact: GLENROY METZGER [[id number]]  ..Type:  Patient Requesting Call    Who Called:Sophie (rep)  Does the patient know what this is regarding?:dietician put in a order for increase of formula   Would the patient rather a call back or a response via [facility]? call  Best Call Back Number:[phone] directline   Additional Information:

## 2024-12-16 NOTE — PROCEDURES
Chief Complaint   Patient presents with    Procedure    Post-op Evaluation     Post Op Cysto w/ Stent Pull         History of Present Illness:   Cheryl Kirkland is a 74 y.o. female here for evaluation of Procedure and Post-op Evaluation (Post Op Cysto w/ Stent Pull)    12/16/24-here for stent pull.   11/18/24-73yo female presents for hospital follow up. She currently has a R nephrostomy tube and stent in place for an 8mm mid-ureteral stone. She complains of right sided abdominal and flank pain. She has some stent dysuria. No recent fever or chills.       Review of Systems   Constitutional:  Negative for chills and fever.   Respiratory:  Negative for shortness of breath.    Cardiovascular:  Negative for chest pain.   Gastrointestinal:  Positive for abdominal pain.   Neurological:  Positive for weakness.   All other systems reviewed and are negative.        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       Past Surgical History:   Procedure Laterality Date    ABDOMINAL WASHOUT N/A 3/14/2024    Procedure: LAVAGE, PERITONEAL, THERAPEUTIC;  Surgeon: Chen Jerome MD;  Location: [facility] OR;  Service: General;  Laterality: N/A;    APPENDECTOMY  2015    BIOPSY OF PERITONEUM N/A 3/14/2024    Procedure: BIOPSY, PERITONEUM;  Surgeon: Chen Jerome MD;  Location: [facility] OR;  Service:  General;  Laterality: N/A;    COLONOSCOPY  06/20/2012    Dr Boyle- Tubular adenoma polyps. Repeat in 3 years.    COLONOSCOPY  06/17/2015    -Nodular mucosa at appendiceal orfice, sigmoid and desc diverticulosis otherwise normal.    COLONOSCOPY  2020    >3 TAs    COLONOSCOPY  12/2023    CYSTOSCOPY W/ RETROGRADES Right 10/10/2024    Procedure: CYSTOSCOPY, WITH RETROGRADE PYELOGRAM;  Surgeon: Lata Kelly MD;  Location: [facility];  Service: Urology;  Laterality: Right;    CYSTOURETEROSCOPY, WITH HOLMIUM LASER LITHOTRIPSY OF URETERAL CALCULUS AND STENT INSERTION Right 11/27/2024    Procedure: CYSTOURETEROSCOPY, WITH HOLMIUM LASER LITHOTRIPSY OF URETERAL CALCULUS AND STENT INSERTION;  Surgeon: Lata Kelly MD;  Location: [facility];  Service: Urology;  Laterality: Right;    DIAGNOSTIC LAPAROSCOPY N/A 3/14/2024    Procedure: LAPAROSCOPY, DIAGNOSTIC;  Surgeon: Chen Jerome MD;  Location: [facility];  Service: General;  Laterality: N/A;  1. Diagnostic laparoscopy   2. Extensive lysis of adhesions  3. Peritoneal biopsy   4. Peritoneal washings for cytology    DIAGNOSTIC LAPAROSCOPY N/A 8/20/2024    Procedure: LAPAROSCOPY, DIAGNOSTIC;  Surgeon: Chen Jerome MD;  Location: [facility];  Service: General;  Laterality: N/A;    EGD - EXTERNAL RESULT  06/20/2012    Dr Boyle- Mild gastritis otherwise normal.    ENDOSCOPIC ULTRASOUND OF UPPER GASTROINTESTINAL TRACT N/A 7/26/2024    Procedure: ULTRASOUND, UPPER GI TRACT, ENDOSCOPIC;  Surgeon: Valdo Aguilera MD;  Location: [facility];  Service: Endoscopy;  Laterality: N/A;  7/22/24: instructions sent via portal-. Pt no longer takling eliquis-GD    ESOPHAGOGASTRODUODENOSCOPY N/A 02/08/2024    Procedure: EGD (ESOPHAGOGASTRODUODENOSCOPY);  Surgeon: Jayla Arteaga MD;  Location: [facility];  Service: Endoscopy;  Laterality: N/A;    ESOPHAGOGASTRODUODENOSCOPY N/A 8/20/2024    Procedure: EGD (ESOPHAGOGASTRODUODENOSCOPY);  Surgeon: Chen Jerome  MD;  Location: [facility] OR;  Service: General;  Laterality: N/A;    GASTRECTOMY N/A 8/20/2024    Procedure: GASTRECTOMY;  Surgeon: Chen Jerome MD;  Location: [facility] OR;  Service: General;  Laterality: N/A;    HYSTERECTOMY      LAPAROSCOPIC LYSIS OF ADHESIONS N/A 3/14/2024    Procedure: LYSIS, ADHESIONS, LAPAROSCOPIC;  Surgeon: Chen Jerome MD;  Location: [facility] OR;  Service: General;  Laterality: N/A;    LYSIS OF ADHESIONS N/A 8/20/2024    Procedure: LYSIS, ADHESIONS;  Surgeon: Chen Jerome MD;  Location: [facility] OR;  Service: General;  Laterality: N/A;    PLACEMENT OF JEJUNOSTOMY TUBE N/A 8/20/2024    Procedure: INSERTION, JEJUNOSTOMY TUBE;  Surgeon: Chen Jerome MD;  Location: [facility] OR;  Service: General;  Laterality: N/A;    REMOVAL OF URETERAL CALCULUS Right 11/27/2024    Procedure: REMOVAL, CALCULUS, URETER;  Surgeon: Lata Kelly MD;  Location: [facility] OR;  Service: Urology;  Laterality: Right;  basket extraction    REMOVAL, NEPHROSTOMY TUBE, WITH IMAGING GUIDANCE Right 11/27/2024    Procedure: REMOVAL, NEPHROSTOMY TUBE, WITH IMAGING GUIDANCE;  Surgeon: Lata Kelly MD;  Location: [facility] OR;  Service: Urology;  Laterality: Right;    REMOVAL-STENT Right 11/27/2024    Procedure: REMOVAL-STENT;  Surgeon: Lata Kelly MD;  Location: [facility] OR;  Service: Urology;  Laterality: Right;    RETROGRADE PYELOGRAPHY Right 11/27/2024    Procedure: PYELOGRAM, RETROGRADE;  Surgeon: Lata Kelly MD;  Location: [facility] OR;  Service: Urology;  Laterality: Right;    TOTAL ABDOMINAL HYSTERECTOMY W/ BILATERAL SALPINGOOPHORECTOMY  01/09/2023    radical resection with right salpingo-oophorectomy, left salpingo-oophorectomy, extensive enterolysis, extensive adhesiolysis, left pelvic lymph node biopsy, omental biopsy, small bowel resection with primary functional end-to-end anastomosis, placement of pelvic drain       Family History   Problem Relation Name Age of Onset    Pancreatic cancer Brother Misael Mccray  76    Prostate cancer Brother Zachary 67    Pancreatic cancer Half-brother Gasper 74    Colon cancer Half-sister Elton 83    Lung cancer Half-sister Elton         +smoking hx    Breast cancer Half-sister Vidhi 58    Lymphoma Other Cara Sun    Prostate cancer Other Fernando     Prostate cancer Other Gasper Calderon     Ovarian cancer Other Zitaris     Breast cancer Other Aleida     Colon cancer Other Aleida        Social History     Tobacco Use    Smoking status: Former     Types: Cigarettes    Smokeless tobacco: Never    Tobacco comments:     Quit 1989 smoked 10 years smoked 0.25 ppd    Substance Use Topics    Alcohol use: Not Currently    Drug use: Never       Current Outpatient Medications   Medication Sig Dispense Refill    acetaminophen (TYLENOL) 500 MG tablet Take 2 tablets (1,000 mg total) by mouth every 8 (eight) hours.  0    alendronate (FOSAMAX) 70 MG tablet Take 70 mg by mouth every 7 days. (Sundays)      apixaban (ELIQUIS ORAL) Take by mouth.      calcium phosphate trib/vit D3 (CALCIUM PHOSPHATE-VITAMIN D3 ORAL) Take 1 tablet by mouth 2 (two) times daily.      carBAMazepine (TEGRETOL) 200 mg tablet 1 tablet with breakfast  1 tablet with lunch   1.5 tablet at bedtime      ferrous sulfate 325 (65 FE) MG EC tablet Take 65 mg by mouth 2 (two) times daily with meals.      HYDROcodone-acetaminophen (NORCO) 5-325 mg per tablet Take 1 tablet by mouth every 12 (twelve) hours as needed for Pain. 45 tablet 0    hydrocortisone 2.5 % cream Apply topically 2 (two) times daily. 28 g 1    hyoscyamine 0.125 mg Subl Place 2 tablets (0.25 mg total) under the tongue every 4 (four) hours as needed (bladder spasms). 30 tablet 0    levETIRAcetam (KEPPRA) 500 MG Tab Take 1 tablet by mouth 2 (two) times daily.      ondansetron (ZOFRAN-ODT) 4 MG TbDL dissolve 1 tablet (4 mg total) by mouth every 8 (eight) hours as needed (nausea). 20 tablet 0    zonisamide (ZONEGRAN) 100 MG Cap Take 200 mg by mouth 2 (two) times  daily.       No current facility-administered medications for this visit.       Review of patient's allergies indicates:  No Known Allergies    Physical Exam  There were no vitals filed for this visit.    General: Well-developed, well-nourished, in no acute distress  HEENT: Normocephalic, atraumatic, extraocular movements intact  Neck: Supple, no supraclavicular or cervical lymphadenopathy, trachea midline  Respirations: even and unlabored  Extremities: moves all equally, no clubbing, cyanosis or edema  Skin: Warm and dry. No lesions  Psych: normal affect  Neuro: Alert and oriented x 3. Cranial nerves II-XII intact      Urinalysis  pH, UA   Date Value Ref Range Status   12/16/2024 7.0  Final   10/24/2024 6.0 5.0 - 8.0 Final     Protein, UA   Date Value Ref Range Status   10/24/2024 2+ (A) Negative Final     Comment:     Recommend a 24 hour urine protein or a urine   protein/creatinine ratio if globulin induced proteinuria is  clinically suspected.       Glucose, UA   Date Value Ref Range Status   10/24/2024 Negative Negative Final     Occult Blood UA   Date Value Ref Range Status   10/24/2024 3+ (A) Negative Final     Nitrite, UA   Date Value Ref Range Status   10/24/2024 Positive (A) Negative Final     Leukocytes, UA   Date Value Ref Range Status   10/24/2024 Trace (A) Negative Final     Lab Results   Component Value Date    WBC 7.61 11/28/2024    HGB 11.8 (L) 11/28/2024    HCT 36.8 (L) 11/28/2024     (H) 11/28/2024     (H) 11/28/2024       BMP  Lab Results   Component Value Date     11/28/2024    K 3.8 11/28/2024     11/28/2024    CO2 12 (L) 11/28/2024    BUN 10 11/28/2024    CREATININE 0.7 11/28/2024    CALCIUM 8.8 11/28/2024    ANIONGAP 15 11/28/2024    EGFRNORACEVR >60 11/28/2024     Procedure: Cystoscopy, ureteral stent removal    Procedure in detail:   Informed consent was obtained. The patient's genitalia was prepped and draped in the usual sterile fashion. Lidocaine jelly was  administered per urethra. I utilized the flexible cystoscope and advanced it into the urethral meatus. Bladder access was obtained. The stent was identified and grasped with foreign body graspers. It was removed from the patient's body, along with the scope. The patient tolerated the procedure well. Estimated blood loss was 0cc.       Assessment:  1. Ureteral stone  POCT Urinalysis, Dipstick, Automated, W/O Scope    US Retroperitoneal Complete    US Retroperitoneal Complete            Plan:   Ureteral stone  -     POCT Urinalysis, Dipstick, Automated, W/O Scope  -     US Retroperitoneal Complete; Future; Expected date: 01/16/2025  -     US Retroperitoneal Complete; Future; Expected date: 06/16/2025    Other orders  -     ciprofloxacin HCl tablet 500 mg  -     LIDOcaine HCl 2% urojet      Follow up in about 6 months (around 6/16/2025).

## 2024-12-16 NOTE — PROGRESS NOTES
..Per Dr. Kelly oral ciprofloxacin 500 MG was given to pt. Pt instructed to remain in clinic for 15 minutes to monitor for signs & symptoms of adverse reaction. Pt verbalized understanding.      Loraine Milanes RN

## 2024-12-20 ENCOUNTER — DOCUMENTATION ONLY (OUTPATIENT)
Dept: HEMATOLOGY/ONCOLOGY | Facility: CLINIC | Age: 74
End: 2024-12-20
Payer: MEDICARE

## 2024-12-20 NOTE — PROGRESS NOTES
Pt's spouse stopped by the cancer center for Boost. SWER provided spouse a case of Vanilla Boost. SWER will remain available.

## 2025-01-02 ENCOUNTER — PATIENT MESSAGE (OUTPATIENT)
Dept: HEMATOLOGY/ONCOLOGY | Facility: CLINIC | Age: 75
End: 2025-01-02
Payer: MEDICARE

## 2025-01-02 ENCOUNTER — LAB VISIT (OUTPATIENT)
Dept: LAB | Facility: HOSPITAL | Age: 75
End: 2025-01-02
Attending: INTERNAL MEDICINE
Payer: MEDICARE

## 2025-01-02 DIAGNOSIS — C16.9 GASTRIC ADENOCARCINOMA: ICD-10-CM

## 2025-01-02 LAB
ALBUMIN SERPL BCP-MCNC: 3.2 G/DL (ref 3.5–5.2)
ALP SERPL-CCNC: 117 U/L (ref 40–150)
ALT SERPL W/O P-5'-P-CCNC: 21 U/L (ref 10–44)
ANION GAP SERPL CALC-SCNC: 9 MMOL/L (ref 8–16)
AST SERPL-CCNC: 27 U/L (ref 10–40)
BILIRUB SERPL-MCNC: 0.2 MG/DL (ref 0.1–1)
BUN SERPL-MCNC: 12 MG/DL (ref 8–23)
CALCIUM SERPL-MCNC: 8.6 MG/DL (ref 8.7–10.5)
CHLORIDE SERPL-SCNC: 109 MMOL/L (ref 95–110)
CO2 SERPL-SCNC: 19 MMOL/L (ref 23–29)
CREAT SERPL-MCNC: 0.6 MG/DL (ref 0.5–1.4)
EST. GFR  (NO RACE VARIABLE): >60 ML/MIN/1.73 M^2
GLUCOSE SERPL-MCNC: 96 MG/DL (ref 70–110)
POTASSIUM SERPL-SCNC: 4.7 MMOL/L (ref 3.5–5.1)
PROT SERPL-MCNC: 6.8 G/DL (ref 6–8.4)
SODIUM SERPL-SCNC: 137 MMOL/L (ref 136–145)

## 2025-01-02 PROCEDURE — 80053 COMPREHEN METABOLIC PANEL: CPT | Performed by: INTERNAL MEDICINE

## 2025-01-02 PROCEDURE — 36415 COLL VENOUS BLD VENIPUNCTURE: CPT | Mod: PO | Performed by: INTERNAL MEDICINE

## 2025-01-02 NOTE — ASSESSMENT & PLAN NOTE
-Previously on Xarelto, and evaluation by Oncology noted concerns DVT likely secondary to being provoked  from ovarian cancer. Held after concerns for GI bleed early 2024      Scds and lovenox for prophy.  
"See "Gastric adenocarcinoma "   Pt has poor appetite -will need to use tube feeding via J tube until tolerating enough food and liquids po to sustain her nutrition       "
"See "Gastric adenocarcinoma "   Pt has poor appetite -will need to use tube feeding via J tube until tolerating enough nutrition po to sustain herself      "
"See "Gastric adenocarcinoma " A&P      "
-- appreciate medicine assistance with management   
-Initially presented to primary care provider with lower extremity edema   -Workup revealed dvt 2/2 cancer  -Previously on Xarelto, and evaluation by Oncology noted concerns DVT likely secondary to being provoked  from ovarian cancer. Held after concerns for GI bleed early 2024    PLAN:  On loveonx prophy and scds  Ambulate   
-Initially presented to primary care provider with lower extremity edema   -Workup revealed dvt 2/2 cancer  -Previously on Xarelto, and evaluation by Oncology noted concerns DVT likely secondary to being provoked from ovarian cancer. Held after concerns for GI bleed early 2024    PLAN:  On Lovenox ppx and scds  Ambulate   
-Unclear etiology of the diarrhea that was initially noted 08/31/24.     -Differential diagnoses include, but not limited to:  Infection versus intolerance to tube feeds  -C diff negative, stool WBCs negative      -Follow up stool studies  
-Unclear etiology of the diarrhea that was initially noted 08/31/24.     -Differential diagnoses include, but not limited to:  Infection versus intolerance to tube feeds  -C diff negative, stool WBCs negative      -Follow up stool studies  No further episodes of diarrhea  
-Unclear etiology of the diarrhea that was initially noted 08/31/24.     -Differential diagnoses include, but not limited to:  Infection versus intolerance to tube feeds  -C diff negative, stool WBCs negative      -Follow up stool studies  No further episodes of diarrhea  
Chronic, controlled. Latest blood pressure and vitals reviewed-     Temp:  [97.3 °F (36.3 °C)-99.6 °F (37.6 °C)]   Pulse:  [86-96]   Resp:  [14-18]   BP: (128-144)/(70-85)   SpO2:  [95 %-100 %] .   Home meds for hypertension were reviewed and noted below.       While in the hospital, will manage blood pressure as follows; Adjust home antihypertensive regimen as follows- monitor, avoid aggressive management of blood pressure in setting of recent surgery     Will utilize p.r.n. blood pressure medication only if patient's blood pressure greater than 160/100 and she develops symptoms such as worsening chest pain or shortness of breath.  
Chronic, controlled. Latest blood pressure and vitals reviewed-     Temp:  [97.6 °F (36.4 °C)-98.5 °F (36.9 °C)]   Pulse:  [77-98]   Resp:  [9-34]   BP: (111-160)/(59-80)   SpO2:  [96 %-100 %] .   Home meds for hypertension were reviewed and noted below.       While in the hospital, will manage blood pressure as follows; Adjust home antihypertensive regimen as follows- monitor, no home medication(s) for hypertension     Will utilize p.r.n. blood pressure medication only if patient's blood pressure greater than 180/110 and she develops symptoms such as worsening chest pain or shortness of breath.  
Chronic, controlled. Latest blood pressure and vitals reviewed-     Temp:  [97.6 °F (36.4 °C)-99.5 °F (37.5 °C)]   Pulse:  [83-97]   Resp:  [16-26]   BP: (148-172)/(74-93)   SpO2:  [95 %-100 %] .   Home meds for hypertension were reviewed and noted below.       While in the hospital, will manage blood pressure as follows; Adjust home antihypertensive regimen as follows- monitor, avoid aggressive management of blood pressure in setting of recent surgery     Will utilize p.r.n. blood pressure medication only if patient's blood pressure greater than 160/100 and she develops symptoms such as worsening chest pain or shortness of breath.  
Chronic, controlled. Latest blood pressure and vitals reviewed-     Temp:  [97.7 °F (36.5 °C)-98.4 °F (36.9 °C)]   Pulse:  [86-98]   Resp:  [17-18]   BP: (116-168)/(74-86)   SpO2:  [96 %-99 %] .   Home meds for hypertension were reviewed and noted below.       While in the hospital, will manage blood pressure as follows; Adjust home antihypertensive regimen as follows- monitor, avoid aggressive management of blood pressure in setting of recent surgery     Will utilize p.r.n. blood pressure medication only if patient's blood pressure greater than 160/100 and she develops symptoms such as worsening chest pain or shortness of breath.  
Chronic, controlled. Latest blood pressure and vitals reviewed-     Temp:  [97.7 °F (36.5 °C)-98.6 °F (37 °C)]   Pulse:  [71-99]   Resp:  [12-29]   BP: (108-160)/(56-83)   SpO2:  [97 %-100 %] .   Home meds for hypertension were reviewed and noted below.       While in the hospital, will manage blood pressure as follows; Adjust home antihypertensive regimen as follows- monitor, avoid aggressive management of blood pressure in setting of recent surgery     Will utilize p.r.n. blood pressure medication only if patient's blood pressure greater than 160/100 and she develops symptoms such as worsening chest pain or shortness of breath.  
Chronic, controlled. Latest blood pressure and vitals reviewed-     Temp:  [97.9 °F (36.6 °C)-98.5 °F (36.9 °C)]   Pulse:  [83-99]   Resp:  [16-43]   BP: (126-174)/()   SpO2:  [99 %-100 %] .   Home meds for hypertension were reviewed and noted below.       While in the hospital, will manage blood pressure as follows; Adjust home antihypertensive regimen as follows- monitor, no home medication(s) for hypertension     Will utilize p.r.n. blood pressure medication only if patient's blood pressure greater than 160/100 and she develops symptoms such as worsening chest pain or shortness of breath.  
Chronic, controlled. Latest blood pressure and vitals reviewed-     Temp:  [98 °F (36.7 °C)-99.4 °F (37.4 °C)]   Pulse:  [86-96]   Resp:  [16-18]   BP: (127-175)/(70-92)   SpO2:  [96 %-97 %] .   Home meds for hypertension were reviewed and noted below.       While in the hospital, will manage blood pressure as follows; Adjust home antihypertensive regimen as follows- monitor, avoid aggressive management of blood pressure in setting of recent surgery     Will utilize p.r.n. blood pressure medication only if patient's blood pressure greater than 160/100 and she develops symptoms such as worsening chest pain or shortness of breath.  
Chronic, controlled. Latest blood pressure and vitals reviewed-     Temp:  [98.1 °F (36.7 °C)-98.9 °F (37.2 °C)]   Pulse:  [81-96]   Resp:  [12-39]   BP: (109-190)/(53-88)   SpO2:  [97 %-100 %] .   Home meds for hypertension were reviewed and noted below.       While in the hospital, will manage blood pressure as follows; Adjust home antihypertensive regimen as follows- monitor, no home medication(s) for hypertension     Will utilize p.r.n. blood pressure medication only if patient's blood pressure greater than 160/100 and she develops symptoms such as worsening chest pain or shortness of breath.  
Chronic, controlled. Latest blood pressure and vitals reviewed-     Temp:  [98.4 °F (36.9 °C)-99.4 °F (37.4 °C)]   Pulse:  [76-92]   Resp:  [16-20]   BP: (112-131)/(59-69)   SpO2:  [94 %-98 %] .   Home meds for hypertension were reviewed and noted below.       While in the hospital, will manage blood pressure as follows; Adjust home antihypertensive regimen as follows- monitor, avoid aggressive management of blood pressure in setting of recent surgery     Will utilize p.r.n. blood pressure medication only if patient's blood pressure greater than 160/100 and she develops symptoms such as worsening chest pain or shortness of breath.  
Initially presented to primary care provider with lower extremity edema   Workup revealed dvt 2/2 cancer  On loveonx prophy    
Initially presented to primary care provider with lower extremity edema   Workup revealed dvt 2/2 cancer  On loveonx prophy and scds  Ambulate   
Initially presented to primary care provider with lower extremity edema   Workup revealed dvt 2/2 cancer  On loveonx prophy and scds  Ambulate     Cont same  
Malnutrition Type:  Context: chronic illness  Level: moderate    Related to (etiology):   Physiological causes increasing nutrient needs d/t illness  Alteration in GI tract    Signs and Symptoms (as evidenced by):   BMI < 23 (older adult)  Underweight with loss of fat and muscle  Unable or unwilling to eat sufficient energy/protein to maintain a healthy weight  Food avoidance and/or lack of interest in food    Malnutrition Characteristic Summary:  Subcutaneous Fat (Malnutrition): moderate depletion  Muscle Mass (Malnutrition): mild depletion    Interventions/Recommendations (treatment strategy):  1. Enteral Nutrition Management  2. Full liquid diet (pleasure feeds)  3. Feeding Assistance Management  4. Collaboration by nutrition professional with other providers    Nutrition Diagnosis Status:   Continues  
NPO post gastrectomy  ICU hemodynamic monitoring  Prn hydralazine for SBP > 180 with adequate pain control  
NPO post gastrectomy  ICU hemodynamic monitoring  Prn hydralazine for SBP > 180 with adequate pain control  
Now post op gastrectomy with J tube in place  PCA analgesia ordered  Plan for liquid meds only via j tube starting tomorrow  Maintain NJ tube to LIWS with plan for removal in few days after leak test   
Now post op gastrectomy with J tube in place  PCA analgesia ordered  Plan for liquid meds only via j tube starting tomorrow  Maintain NJ tube to LIWS with plan for removal in few days after leak test     8/21 Up in a chair  PT/OT  IS  May DC NG tomorrow  Start J tube medications.   
Now post op gastrectomy with J tube in place  PCA analgesia ordered  Plan for liquid meds only via j tube starting tomorrow  Maintain NJ tube to LIWS with plan for removal in few days after leak test     8/21 Up in a chair  PT/OT  IS  May DC NG tomorrow  Start J tube medications.     8/22 More awake and oriented. No SOB. Tolerating TF  
Nutrition consulted. Most recent weight and BMI monitored-     Measurements:  Wt Readings from Last 1 Encounters:   08/22/24 61.3 kg (135 lb 2.3 oz)   Body mass index is 22.49 kg/m².    Patient has been screened and assessed by RD.    Malnutrition Type:  Context: chronic illness  Level: moderate    Malnutrition Characteristic Summary:  Subcutaneous Fat (Malnutrition): moderate depletion  Muscle Mass (Malnutrition): mild depletion    Interventions/Recommendations (treatment strategy):  1. Initate pt onto continuous Enteral nutrition, recommend Impact Peptide 1.5 via J tube, goal rate 50 mL/hr, starting at 10 mL/hr then progress to goal within 24 hrs if pt is tolerating or per MD/NP -Formula at goal rate provides: 1800 kcals/day (102% EEN), 113 g protein/day (100% EPN), 924 mL free formula water/day (52% fluid needs) -140 mL q4h free water flushes (840 mL/day) = total from formula + FWF = 1764 mL water/day (100% fluid needs), per MD/NP -Check Mg, K+, Na, Phos and Glu before and during initiation, correct as indicated 2. Weigh twice weekly    
On keppra, zonisamide, carbamazepine at home  NPO immediately post gastrectomy  Will give IV keppra  Resume liquid AEDs via J tube tomorrow  Seizure monitoring and precautions  
On keppra, zonisamide, carbamazepine at home  NPO immediately post gastrectomy  Will give IV keppra  Resume liquid AEDs via J tube tomorrow  Seizure monitoring and precautions    8/21 Change all antiepileptic medications to per J tube  
On keppra, zonisamide, carbamazepine at home  NPO immediately post gastrectomy  Will give IV keppra  Resume liquid AEDs via J tube tomorrow  Seizure monitoring and precautions    8/21 Change all antiepileptic medications to per J tube  8/22 Tolerating medication and tube feedings.  
POD #1- s/p exlap, extensive SHAYNA, small bowel resection, total gastrectomy with stapled esophagojejunal anastomosis and D2 lymphadenectomy, and Jtube placement    Overall doing as well as expected at the moment.  Pain control an issue currently. Awaiting return of bowel function.      -- strict NPO , NJT to LIWS  -- ok for liquid medications via J tube  -- continue Ofirmev and dilaudid PCA.  Will start liquid tylenol via Jtube when ofirmev stops  -- starting trickle TF via J tube today @10cc/hr without advancing.  Nutrition consulted  -- CM consult for discharge / home health planning   -- marie to stay today, plan to d/c it tomorrow.  Strict I/Os today  -- OOB To chair and walk today, OT/PT consulted  -- encourage IS  Dispo:  continued ICU management - monitoring for leak    Lovenox and SCDs for DVT ppx, no indication for GI ppx       
POD #11- s/p exlap, extensive SHAYNA, small bowel resection, total gastrectomy with stapled esophagojejunal anastomosis and D2 lymphadenectomy, and Jtube placement    Tolerating tube feeds at 50cc/hr.  Scan and labs all looking ok with no evidence of leak.  I suspect she is not tolerating this formula well.      -- continue Reglan  -- advanced to full liquid diet which will be continued as she was not eating much  -- tube feeds will be continued  Will discuss with dietician about changing tube feed formula, and starting TPN since she has been without nutrition for 9 days now.    -- continue hycet via J tube for pain   -- ok for liquid medications via J tube  -- Strict I/Os  -- Senna liquid via Jtube BID   -- CM consult for discharge / home health planning   -- OOB To chair and walk today x5, OT/PT consulted  -- encourage IS  Dispo: continued med surg with tele- tentative plan once a full liquid diet is tolerated and the erythema around the jejunostomy tube has improved    Lovenox and SCDs for DVT ppx, no indication for GI ppx     Slightly improved erythema around the jejunostomy tube continue antibiotics.  Monitor.  If not significantly improved may need exploration around the site.      Going back into abdomen at this time would be extremely risky    
POD #12- s/p exlap, extensive SHAYNA, small bowel resection, total gastrectomy with stapled esophagojejunal anastomosis and D2 lymphadenectomy, and Jtube placement    Tolerating tube feeds at 50cc/hr.  Scan and labs all looking ok with no evidence of leak.  I suspect she is not tolerating this formula well.      -- continue Reglan  -- advanced to full liquid diet which will be continued as she was not eating much  -- tube feeds will be continued  reports that she was now having normal bowel movements, tolerating tube feeds   -- continue hycet via J tube for pain   -- ok for liquid medications via J tube  -- Strict I/Os  -- Senna liquid via Jtube BID   -- CM consult for discharge / home health planning   -- OOB To chair and walk today x5, OT/PT consulted  -- encourage IS  Dispo: continued med surg with tele- tentative plan once a full liquid diet is tolerated and the erythema around the jejunostomy tube has improved    Lovenox and SCDs for DVT ppx, no indication for GI ppx     Slightly improved erythema around the jejunostomy tube has improved.  Will continue.  Monitor.  If improvement continues tomorrow convert to oral or liquid antibiotics     If the area worsens she may require drainage at the site    Going back into abdomen at this time would be extremely risky    
POD #13- s/p exlap, extensive SHAYNA, small bowel resection, total gastrectomy with stapled esophagojejunal anastomosis and D2 lymphadenectomy, and Jtube placement    Overall she was feeling better.  There was some thick drainage from her wound in her tube feeds were held.  A CT scan is being obtained to further evaluate this.    -- continue Reglan  -- a NPO except for mesh  -- tube feeds are currently being held  -- continue hycet via J tube for pain   -- ok for liquid medications via J tube if needed  -- Strict I/Os  -- Senna liquid via Jtube BID   -- CM consult for discharge / home health planning   -- OOB To chair and walk today x5, OT/PT consulted  -- encourage IS  Dispo: continued med surg with tele- tentative plan once a full liquid diet is tolerated and the erythema around the jejunostomy tube has improved    Lovenox and SCDs for DVT ppx, no indication for GI ppx     Slightly improved erythema around the jejunostomy tube has improved.  Will continue.  Monitor.  If improvement continues tomorrow convert to oral or liquid antibiotics     Further recommendations pending CT scan    Going back into abdomen at this time would be extremely risky    
POD #14- s/p exlap, extensive SHAYNA, small bowel resection, total gastrectomy with stapled esophagojejunal anastomosis and D2 lymphadenectomy, and Jtube placement    Overall she was feeling better.  There was some thick drainage from her wound in her tube feeds were held.  A CT scan is being obtained to further evaluate this.    -- continue Reglan  -- a clear liquid diet  -- tube feeds were restarted at 30 cc an hour and we advanced tomorrow-- continue hycet via J tube for pain   -- ok for liquid medications via J tube if needed  -- Strict I/Os  -- Senna liquid via Jtube BID   -- CM consult for discharge / home health planning   -- OOB To chair and walk today x5, OT/PT consulted  -- encourage IS  Dispo: continued med surg with tele- tentative plan once a full liquid diet is tolerated and the erythema around the jejunostomy tube has improved    Lovenox and SCDs for DVT ppx, no indication for GI ppx     Slightly improved erythema around the jejunostomy tube has improved.  Will continue.  Monitor. convert to oral antibiotics Further recommendations pending CT scan    Increase tube feeds over the next 24 hours.      If the patient improves over the next 48 hours and tolerates feeds she can be discharged home Friday    
POD #14- s/p exlap, extensive SHAYNA, small bowel resection, total gastrectomy with stapled esophagojejunal anastomosis and D2 lymphadenectomy, and Jtube placement    Overall she was feeling better.  There was some thick drainage from her wound in her tube feeds were held.  CT scan showed no evidence of a leak  -- continue Reglan  -- a clear liquid diet  -- tube feeds were restarted at 30 cc an hour and we advanced tomorrow-- continue hycet via J tube for pain   -- ok for liquid medications via J tube if needed  -- Strict I/Os  -- Senna liquid via Jtube BID   -- CM consult for discharge / home health planning   -- OOB To chair and walk today x5, OT/PT consulted  -- encourage IS  Dispo: continued med surg with tele- tentative plan once a full liquid diet is tolerated and the erythema around the jejunostomy tube has improved    Lovenox and SCDs for DVT ppx, no indication for GI ppx     Erythema around jejunostomy.  Will continue.  Monitor. convert to oral antibiotics Further recommendations   Increase tube feeds over the next 24 hours.      If the patient improves over the next 48 hours and tolerates feeds she can be discharged home Friday    Will need local wound care.    
POD #15- s/p exlap, extensive SHAYNA, small bowel resection, total gastrectomy with stapled esophagojejunal anastomosis and D2 lymphadenectomy, and Jtube placement    Overall she was feeling better.  There was some thick drainage from her wound in her tube feeds were held.  CT scan showed no evidence of a leak  -- continue Reglan  -- a full liquid diet  -- tube feeds were restarted at 5th cc an hour and we advanced tomorrow-- continue hycet via J tube for pain   -- ok for liquid medications via J tube if needed  -- Strict I/Os  -- Senna liquid via Jtube BID   -- CM consult for discharge / home health planning   -- OOB To chair and walk today x5, OT/PT consulted  -- encourage IS  Dispo: continued med surg with tele- tentative plan once a full liquid diet is tolerated and the erythema around the jejunostomy tube has improved    Lovenox and SCDs for DVT ppx, no indication for GI ppx     Erythema around jejunostomy has nearly resolved. . convert to oral antibiotics for another 5 days.        If the patient improves over the next 48 hours and tolerates feeds she can be discharged home Friday    Will need local wound care.    Have ask case management to look into whether the patient is eligible for skilled nursing versus home health with home physical therapy.  The patient was expectations as well as the family's expectations and the service level provided by home health in skilled nursing need to be further discuss.      This was discussed with Hospital Medicine    
POD #16- s/p exlap, extensive SHAYNA, small bowel resection, total gastrectomy with stapled esophagojejunal anastomosis and D2 lymphadenectomy, and Jtube placement    Overall she was feeling better.  There was some thick drainage from her wound in her tube feeds were held.  CT scan showed no evidence of a leak  -- continue Reglan  -- a full liquid diet but not eating very much.  -- tube feeds were restarted at 50 cc an hour and w are being tolerate-- continue hycet via J tube for pain   -- ok for liquid medications via J tube if needed  -- Strict I/Os  -- Senna liquid via Jtube BID   -- CM consult for discharge / home health planning   -- OOB To chair and walk today x5, OT/PT consulted  -- encourage IS  Dispo: continued med surg with tele- tentative plan once a full liquid diet is tolerated and the erythema around the jejunostomy tube has improved    Lovenox and SCDs for DVT ppx, no indication for GI ppx     Erythema around jejunostomy has nearly resolved. . convert to oral antibiotics for another 5 days.        I patient and her  are oscillating between home health and skilled nursing.  The only skilled nursing unit that accepted her [facility] but not yet insurance approved.  They may be of the impression that home health will be there continuously.  They are not skilled and jejunostomy tube feed management      Will need local wound care, For the upper portion of the wound      We will continue to work on disposition however not much will happening over the weekend.  Hopefully she will be approved for an accepted at a skilled nursing unit closer to home with the main issue being tube feed skilled.      This was discussed with Hospital Medicine    Staples and drains were removed    
POD #17- s/p exlap, extensive SHAYNA, small bowel resection, total gastrectomy with stapled esophagojejunal anastomosis and D2 lymphadenectomy, and Jtube placement    -- continue Reglan  -- a full liquid diet but not eating very much.  -- tube feeds were restarted at 50 cc an hour and w are being tolerate-- continue hycet via J tube for pain   -- ok for liquid medications via J tube if needed  -- Strict I/Os  -- Senna liquid via Jtube BID   -- CM consult for discharge / home health planning   -- OOB To chair and walk today x5, OT/PT consulted  -- encourage IS  -- 5d oral abx  -- lovenox ppx  Dispo: pt agreeable to discharge to SNF. Referrals sent.     
POD #2- s/p exlap, extensive SHAYNA, small bowel resection, total gastrectomy with stapled esophagojejunal anastomosis and D2 lymphadenectomy, and Jtube placement    Doing well.  Pain better controlled.  Tolerating trickle tube feeds.  Thinks she passed a little flatus yesterday.   Only pulling 1000 on IS.  Drain amylases mildly elevated yesterday- will repeat tomorrow.  Plan for UGI tomorrow for leak test before pulling NJ tube.       -- strict NPO , NJT to LIWS  -- ok for liquid medications via J tube  -- continue tylenol via J tube and dilaudid PCA.    -- continue trickle TF via J tube today @10cc/hr without advancing.  Nutrition consulted  -- d/c marie today.  Strict I/Os  -- repeat drain amylases and serum amylase tomorrow  -- CM consult for discharge / home health planning   -- OOB To chair and walk today x5, OT/PT consulted  -- encourage IS  Dispo: transfer to floor with tele (5th floor only)    Lovenox and SCDs for DVT ppx, no indication for GI ppx       
POD #3- s/p exlap, extensive SHAYNA, small bowel resection, total gastrectomy with stapled esophagojejunal anastomosis and D2 lymphadenectomy, and Jtube placement    Doing well.  Tolerating trickle tube feeds, doesn't feel bloated.  Awaiting return of bowel function - has not passed any additional flatus or had any bowel movements.  Pulling 1500 on IS today.  Hgb drifted down today- has had some bleeding from NJ tube- suspect some component of fluid mobilization vs some oozing from irritation from the tube.  Will continue to monitor.     -- strict NPO , NJT to LIWS  -- UGI swallow today to r/o leak- if negative will remove NJ tube   -- ok for liquid medications via J tube  -- continue tylenol via J tube and dilaudid PCA.    -- continue trickle TF via J tube today @10cc/hr without advancing.  Nutrition consulted  -- Strict I/Os  -- Senna liquid via Jtube BID   -- repeat drain amylases and serum amylase today  -- CM consult for discharge / home health planning   -- OOB To chair and walk today x5, OT/PT consulted  -- encourage IS  Dispo: transfer to floor with tele (5th floor only)    Lovenox and SCDs for DVT ppx, no indication for GI ppx       
POD #4- s/p exlap, extensive SHAYNA, small bowel resection, total gastrectomy with stapled esophagojejunal anastomosis and D2 lymphadenectomy, and Jtube placement    Doing ok.  Some distention on exam today and feeling less well today - likely from all the activity yesterday.  Hgb stable and APOLINAR drains serosanguinous.  Drain amylases yesterday normal, will not repeat again.      -- continue NPO for now, awaiting return of bowel function   -- KUB   -- suppository today   -- ok for liquid medications via J tube  -- continue tylenol via J tube and dilaudid PCA.    -- continue trickle TF via J tube today @10cc/hr without advancing.  Nutrition consulted  -- Strict I/Os  -- Senna liquid via Jtube BID   -- CM consult for discharge / home health planning   -- OOB To chair and walk today x5, OT/PT consulted  -- encourage IS  Dispo: transfer to floor with tele (5th floor only)    Lovenox and SCDs for DVT ppx, no indication for GI ppx       
POD #5- s/p exlap, extensive SHAYNA, small bowel resection, total gastrectomy with stapled esophagojejunal anastomosis and D2 lymphadenectomy, and Jtube placement    Doing well.  Distention improved.  Having bowel function.  Feeling better today.  APOLINAR drains still serosang.  CT scan yesterday without concerning features    -- start CLD today  -- slowly advance Jtube feeds by 10cc/q 8hrs to goal of 50cc/hr.  If distention or nausea tube feeds to be stopped  -- stopping PCA, starting Hycet via J tube today   -- ok for liquid medications via J tube  -- Strict I/Os  -- Senna liquid via Jtube BID   -- CM consult for discharge / home health planning   -- OOB To chair and walk today x5, OT/PT consulted  -- encourage IS  Dispo: transfer to floor with tele (5th floor only)    Lovenox and SCDs for DVT ppx, no indication for GI ppx       
POD #6- s/p exlap, extensive SHAYNA, small bowel resection, total gastrectomy with stapled esophagojejunal anastomosis and D2 lymphadenectomy, and Jtube placement    Doing ok- feeling a little more distended today and had some nausea this AM.  TF stopped until she starts feeling better.      -- CLD as tolerated  -- resume TF when less distended,  will resume at 10cc/hr  -- continue hycet via J tube for pain   -- ok for liquid medications via J tube  -- Strict I/Os  -- Senna liquid via Jtube BID   -- CM consult for discharge / home health planning   -- OOB To chair and walk today x5, OT/PT consulted  -- encourage IS  Dispo: continued med surg with tele    Lovenox and SCDs for DVT ppx, no indication for GI ppx       
POD #7- s/p exlap, extensive SHAYNA, small bowel resection, total gastrectomy with stapled esophagojejunal anastomosis and D2 lymphadenectomy, and Jtube placement    Still looking unwell and puny.  No change in vitals or leukocytosis to suggest leak and APOLINAR drains serous appearing.  Some air in tubing however and pt clinically is not improving.      -- NPO   -- CT Abdomen/ pelvis to r/o leak  -- holding TF for now.  If CT negative then will start some reglan to help with gastroparesis  -- continue hycet via J tube for pain   -- ok for liquid medications via J tube  -- Strict I/Os  -- Senna liquid via Jtube BID   -- CM consult for discharge / home health planning   -- OOB To chair and walk today x5, OT/PT consulted  -- encourage IS  Dispo: continued med surg with tele    Lovenox and SCDs for DVT ppx, no indication for GI ppx       
POD #8- s/p exlap, extensive SHAYNA, small bowel resection, total gastrectomy with stapled esophagojejunal anastomosis and D2 lymphadenectomy, and Jtube placement    Doing much better clinically.  Suspect some delayed/ slowed intestinal function.     -- continue Reglan  -- continue CLD  -- Restarting Jtube feeds at 10cc/hr and advance as tolerated 10ml/hr q8hrs to goal of 50cc/hr  -- continue hycet via J tube for pain   -- ok for liquid medications via J tube  -- Strict I/Os  -- Senna liquid via Jtube BID   -- CM consult for discharge / home health planning   -- OOB To chair and walk today x5, OT/PT consulted  -- encourage IS  Dispo: continued med surg with tele- tentative plan for d/c home Friday     Lovenox and SCDs for DVT ppx, no indication for GI ppx       
POD #9- s/p exlap, extensive SHAYNA, small bowel resection, total gastrectomy with stapled esophagojejunal anastomosis and D2 lymphadenectomy, and Jtube placement    Distention with tube feeds at 30cc/hr.  Scan and labs all looking ok with no evidence of leak.  I suspect she is not tolerating this formula well.      -- continue Reglan  -- continue CLD  -- holding tube feeds for now.  Will discuss with dietician about changing tube feed formula, and starting TPN since she has been without nutrition for 9 days now.    -- continue hycet via J tube for pain   -- ok for liquid medications via J tube  -- Strict I/Os  -- Senna liquid via Jtube BID   -- CM consult for discharge / home health planning   -- OOB To chair and walk today x5, OT/PT consulted  -- encourage IS  Dispo: continued med surg with tele- tentative plan for d/c home Friday     Lovenox and SCDs for DVT ppx, no indication for GI ppx       
POD #910- s/p exlap, extensive SHAYNA, small bowel resection, total gastrectomy with stapled esophagojejunal anastomosis and D2 lymphadenectomy, and Jtube placement    Distention with tube feeds at 30cc/hr.  Scan and labs all looking ok with no evidence of leak.  I suspect she is not tolerating this formula well.      -- continue Reglan  -- advanced to full liquid diet  -- tube feeds restarted.  Will discuss with dietician about changing tube feed formula, and starting TPN since she has been without nutrition for 9 days now.    -- continue hycet via J tube for pain   -- ok for liquid medications via J tube  -- Strict I/Os  -- Senna liquid via Jtube BID   -- CM consult for discharge / home health planning   -- OOB To chair and walk today x5, OT/PT consulted  -- encourage IS  Dispo: continued med surg with tele- tentative plan once a full liquid diet is tolerated and the erythema around the jejunostomy tube has improved    Lovenox and SCDs for DVT ppx, no indication for GI ppx     With the erythema around the jejunostomy tube we will start antibiotics.  If the patient does not show improvement with the erythema and tenderness she may require drainage of the area      
Resume home meds   Carbamezapine in therapeutic range  Zonisamide within normal limits   Levetiracetam within normal limits    stable  
Resume home meds per j tube    
Resume home meds per j tube  Antiepileptic levels pending  Adjust accordingly  Consider neuro consult if levels are suboptimal  
Resume home meds per j tube  Antiepileptic levels pending  Clinically more lethargic than postop   Sleep disturbance?  Prolactin pending  
Resume home meds per j tube  Carbamezapine in therapeutic range  Prolactin within normal limits   Other antiepileptic levels pending  
Resume home meds per j tube  Carbamezapine in therapeutic range  Prolactin within normal limits   Other antiepileptic levels pending    stable  
Resume home meds per j tube  Carbamezapine in therapeutic range  Zonisamide within normal limits   Levetiracetam within normal limits    stable  
Scds and lovenox for prophy    
Status post total gastrectomy with J tube placement  Repeat CT abd showed no abscess, leak -mild ileus.    Pt passing flatus and BMs  General surgery following-diet advanced  Transition to po abx  PT/OT  
Status post total gastrectomy with J tube placement  Repeat CT abd showed no abscess, leak -mild ileus.    Pt passing flatus and BMs  General surgery following-diet advanced  Transition to po abx  PT/OT  SNF placement pending   
Status post total gastrectomy with j tube placement  Per primary    
Status post total gastrectomy with j tube placement  Per primary    General surgery following  
Status post total gastrectomy with j tube placement  Per primary    POD 9- hopefully d/c soon  
Status post total gastrectomy with j tube placement  Per primary  Repeat CT abd showed mild ileus.    General surgery following-diet advanced  Transition to po abx  
Unclear etiology of the diarrhea that was initially noted 08/31/24.     Differential diagnoses include, but not limited to:  Infection versus intolerance to tube feeds    -Follow up stool studies  
s/p exlap, extensive SHAYNA, small bowel resection, total gastrectomy with stapled esophagojejunal anastomosis and D2 lymphadenectomy, and Jtube placement    -- continue Reglan  -- continue FLD  -- tolerating TF at 50cc/h  -- continue hycet via J tube for pain   -- ok for liquid medications via J tube if needed  -- Strict I/Os  -- Senna liquid via Jtube BID   -- CM consult for discharge, now planning on home with family member.  Nursing to start Jtube education with pt and sister (Erica)  -- OOB, OT/PT consulted  -- encourage IS  -- lovenox ppx  Dispo: plan for d/c home with sister Erica tomorrow     
s/p exlap, extensive SHAYNA, small bowel resection, total gastrectomy with stapled esophagojejunal anastomosis and D2 lymphadenectomy, and Jtube placement    -- continue Reglan  -- continue FLD  -- tolerating TF at 50cc/h  -- continue hycet via J tube for pain   -- ok for liquid medications via J tube if needed  -- Strict I/Os  -- Senna liquid via Jtube BID   -- CM consult for discharge, pending acceptance at [facility]  -- OOB, OT/PT consulted  -- encourage IS  -- lovenox ppx  Dispo: pt agreeable to discharge to SNF. Referrals sent.     
3

## 2025-01-03 ENCOUNTER — HOSPITAL ENCOUNTER (OUTPATIENT)
Dept: RADIOLOGY | Facility: HOSPITAL | Age: 75
Discharge: HOME | End: 2025-01-03
Attending: INTERNAL MEDICINE
Payer: MEDICARE

## 2025-01-03 DIAGNOSIS — C16.9 GASTRIC ADENOCARCINOMA: Primary | ICD-10-CM

## 2025-01-03 DIAGNOSIS — R59.1 LYMPHADENOPATHY: ICD-10-CM

## 2025-01-03 DIAGNOSIS — C16.9 GASTRIC ADENOCARCINOMA: ICD-10-CM

## 2025-01-03 DIAGNOSIS — R68.89 OTHER GENERAL SYMPTOMS AND SIGNS: ICD-10-CM

## 2025-01-03 PROCEDURE — 74177 CT ABD & PELVIS W/CONTRAST: CPT | Mod: 26,,, | Performed by: RADIOLOGY

## 2025-01-03 PROCEDURE — 74177 CT ABD & PELVIS W/CONTRAST: CPT | Mod: TC,PN

## 2025-01-03 PROCEDURE — A9698 NON-RAD CONTRAST MATERIALNOC: HCPCS | Mod: PN | Performed by: INTERNAL MEDICINE

## 2025-01-03 PROCEDURE — 25500020 PHARM REV CODE 255: Mod: PN | Performed by: INTERNAL MEDICINE

## 2025-01-03 PROCEDURE — 71260 CT THORAX DX C+: CPT | Mod: 26,,, | Performed by: RADIOLOGY

## 2025-01-03 RX ADMIN — IOHEXOL 75 ML: 350 INJECTION, SOLUTION INTRAVENOUS at 10:01

## 2025-01-03 RX ADMIN — IOHEXOL 1000 ML: 12 SOLUTION ORAL at 10:01

## 2025-01-15 ENCOUNTER — OFFICE VISIT (OUTPATIENT)
Dept: HEMATOLOGY/ONCOLOGY | Facility: CLINIC | Age: 75
End: 2025-01-15
Payer: MEDICARE

## 2025-01-15 VITALS
BODY MASS INDEX: 18.18 KG/M2 | HEART RATE: 88 BPM | WEIGHT: 109.13 LBS | SYSTOLIC BLOOD PRESSURE: 117 MMHG | TEMPERATURE: 97 F | OXYGEN SATURATION: 99 % | DIASTOLIC BLOOD PRESSURE: 70 MMHG | HEIGHT: 65 IN

## 2025-01-15 DIAGNOSIS — R19.7 DIARRHEA, UNSPECIFIED TYPE: ICD-10-CM

## 2025-01-15 DIAGNOSIS — C16.9 GASTRIC ADENOCARCINOMA: Primary | ICD-10-CM

## 2025-01-15 PROCEDURE — 1159F MED LIST DOCD IN RCRD: CPT | Mod: CPTII,S$GLB,, | Performed by: INTERNAL MEDICINE

## 2025-01-15 PROCEDURE — 3074F SYST BP LT 130 MM HG: CPT | Mod: CPTII,S$GLB,, | Performed by: INTERNAL MEDICINE

## 2025-01-15 PROCEDURE — 3008F BODY MASS INDEX DOCD: CPT | Mod: CPTII,S$GLB,, | Performed by: INTERNAL MEDICINE

## 2025-01-15 PROCEDURE — 1101F PT FALLS ASSESS-DOCD LE1/YR: CPT | Mod: CPTII,S$GLB,, | Performed by: INTERNAL MEDICINE

## 2025-01-15 PROCEDURE — 99999 PR PBB SHADOW E&M-EST. PATIENT-LVL III: CPT | Mod: PBBFAC,,, | Performed by: INTERNAL MEDICINE

## 2025-01-15 PROCEDURE — 3078F DIAST BP <80 MM HG: CPT | Mod: CPTII,S$GLB,, | Performed by: INTERNAL MEDICINE

## 2025-01-15 PROCEDURE — 99215 OFFICE O/P EST HI 40 MIN: CPT | Mod: S$GLB,,, | Performed by: INTERNAL MEDICINE

## 2025-01-15 PROCEDURE — 1126F AMNT PAIN NOTED NONE PRSNT: CPT | Mod: CPTII,S$GLB,, | Performed by: INTERNAL MEDICINE

## 2025-01-15 PROCEDURE — 3288F FALL RISK ASSESSMENT DOCD: CPT | Mod: CPTII,S$GLB,, | Performed by: INTERNAL MEDICINE

## 2025-01-15 NOTE — PROGRESS NOTES
Patient ID: Cheryl Kirkland   Chief Complaint: Follow-up  MRN:  [id number]     Oncologic Diagnosis:  Stage III  (ypT2, pN3a, cM0) Gastric Adenocarcinoma  Previous Treatment:    NA Immunotherapy (Pembrolizumab 03/26/2024 - 07/23/2024)  s/p open total gastrectomy with D2 lymphadenectomy extensive lysis of adhesions on 08/20/2024 - Dr. Jerome    Current Treatment:  Adjuvant Pembrolizumab     Subjective   The patient presents for follow up and is accompanied by her .    Mrs. Bingham is walking without assistance today.  She has gained a few pounds and states she feels much better.  She is still having up to 5 diarrheal stools per day.  She continues with PT.  I reviewed her recent imaging with her and need for PET-CT to further evaluate concerning LAD.    She has no acute complaints today and in agreement with the plan as documented below.     Review of Systems   Constitutional:  Negative for activity change, appetite change, chills, diaphoresis, fatigue, fever and unexpected weight change.   HENT:  Negative for nosebleeds.    Respiratory:  Negative for shortness of breath.    Cardiovascular:  Negative for chest pain.   Gastrointestinal:  Positive for abdominal pain and diarrhea. Negative for abdominal distention, anal bleeding, blood in stool, constipation, nausea and vomiting.   Genitourinary:  Negative for difficulty urinating and hematuria.   Musculoskeletal:  Negative for arthralgias, back pain and myalgias.   Skin:  Negative for rash.   Neurological:  Negative for dizziness, weakness, light-headedness and headaches.   Hematological:  Does not bruise/bleed easily.   Psychiatric/Behavioral:  The patient is not nervous/anxious.      History     Oncology History   Mixed epithelial carcinoma of right ovary   1/2/2023 Initial Diagnosis    Mixed epithelial carcinoma of left ovary     1/9/2023 Surgery    Laparotomy for radical resection of gynecologic cancer. Removal of pelvic mass (right  salpingo-oophorectomy), left salpingo-oophorectomy, extensive enterolysis, extensive adhesiolysis, left pelvic lymph node biopsy, omental biopsy, small bowel resection with primary functional end-to-end anastomosis, placement of pelvic drain. Path: Right ovary, tumor site, 11 cm, mixed epithelial carcinoma (50% mucinous, 50% endometrioid), G2 moderately differentiated, ovarian surface involvement-indeterminate-specimen disrupted. 1 left pelvic lymph node negative for neoplasia. FIGO stage IC2       1/9/2023 Cancer Staged    Staging form: Ovary, Fallopian Tube, and Primary Peritoneal Carcinoma, AJCC 8th Edition  - Clinical stage from 1/9/2023: FIGO Stage IC2, calculated as Stage IC (cT1c2, cN0, cM0)     4/5/2023 - 7/19/2023 Chemotherapy    4/5/2023: Cycle 1- paclitaxel 135 mg/m2 and carboplatin AUC 5 as patient is frail and elderly.  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 - [id number], MMR deficient- loss MLH1 and PMS2     2/8/2024 Cancer Staged    Staging form: Stomach, AJCC 8th Edition  - Clinical stage from 2/8/2024: Stage IIA (cT2, cN1, cM0)     3/4/2024 Tumor Conference    Date Presented to Tumor Board: 03/04/24  [facility] UGI MULTIDISCIPLINARY TUMOR BOARD  PATIENT REVIEW FORM   ____________________________________________________________    CLINIC #: [id number]  DATE: 3/4/2024    DIAGNOSIS: gastric  GARRY    PRESENTER: Justus    PATIENT SUMMARY:   This 72 y/o female was dx with ovarian CA 1/2023, underwent surgery and completed 6 cycles of adj chemotherapy 7/2023. She presented last month with hematemesis. EGD revealed large ulcerated gastric mass, which was biopsied. EGD pathology reviewed - poorly differentiated adenocarcinoma, with intestinal expression, MSI high, HER 2 negative. Staging imaging found irregular wall thickening in mid gastric body, enlarged gastrohepatic lymph node, no evidence of distant metastatic disease.   Reviewed PET - uptake in gastric wall and gastrohepatic lymph node with hypermetabolic activity.   Scheduled to see Dr. Amin in [place] later this week.   + family hx of cancer, pending genetics referral    BOARD RECOMMENDATIONS:   Proceed with systemic chemotherapy, consider neoadj immunotherapy  Proceed with diag lap  Await genetics testing, pathology will send to [facility]    CONSULT NEEDED:     [] Surgery    [] Hem/Onc    [] Rad/Onc    [] Dietary                 [] Social Service    [x] Genetics       [] AES  [] Radiology     Clinical Stage: Tumor   Node(s)  1  Metastasis      GROUP STAGE:  [] O    [] 1A    [] IB    [] IIA    [] IIB     [] IIIA     [] IIIB     [] IIIC    []IV  [] Local recurrence     [] Regional recurrence     [] Distant recurrence   Metastatic site(s): none         [x] Edna'l Treatment Guidelines reviewed and care planned is consistent with guidelines.         (i.e., NCCN, NCI, PD, ACO, AUA, etc.)    PRESENTATION AT CANCER CONFERENCE:         [x] Prospective    [] Retrospective     [] Follow-Up         3/14/2024 Surgery    Procedure:  LAPAROSCOPY, DIAGNOSTIC (N/A)  LYSIS, ADHESIONS, LAPAROSCOPIC (N/A)  LAVAGE, PERITONEAL, THERAPEUTIC (N/A)      Surgeon(s) and Role: Chen Jerome MD - Primary    Pre-Operative Diagnosis: Gastric adenocarcinoma [C16.9]     Post-Operative Diagnosis: Same     Pre-Operative Variables:  Stage:  III (T4a N1 M0)   Adjacent organ involvement:  None  Chemotherapy within 90 Days: No  Radiation Therapy within 90 Days: No      Procedure:  Diagnostic laparoscopy   Extensive lysis of adhesions  Peritoneal biopsy   Peritoneal washings for cytology     3/26/2024 -  Chemotherapy    Treatment Summary   Plan Name: OP pembrolizumab 200mg Q3W  Treatment Goal: Curative  Status: Active  Start Date: 3/26/2024  End Date: 5/6/2026 (Planned)  Provider: Claudia Ambrose MD  Chemotherapy: [No matching medication found in this treatment plan]      Chemotherapy    Treatment Summary   Plan Name: OP pembrolizumab 200mg Q3W  Treatment Goal: Curative  Status: Active  Start Date: 3/15/2024 (Planned)  End Date: 2/27/2026 (Planned)  Provider: Claudia Ambrose MD  Chemotherapy: [No matching medication found in this treatment plan]     4/8/2024 Tumor Conference       [facility] UGI MULTIDISCIPLINARY TUMOR BOARD  PATIENT REVIEW FORM   ____________________________________________________________    CLINIC #: [id number]  DATE: 4/8/2024    DIAGNOSIS: Gastric GARRY    PRESENTER: Justus    PATIENT SUMMARY:   This 72 y/o female was dx with ovarian CA 1/2023, underwent surgery and completed 6 cycles of adj chemotherapy 7/2023. She developed hematemesis in Feb 2024, then underwent EGD with bx of a large ulcerated gastric mass. Pathology revealed poorly differentiated adenocarcinoma, with intestinal expression, MSI high, HER 2 negative. She was presented to this [facility] last month. Since then, she underwent diag lap and today's presentation is for pathology review. Negative for malignancy, reactive atypia     BOARD RECOMMENDATIONS:   Proceed with immunotherapy, then restage with imaging  Potential candidate for subtotal distal gastrectomy    CONSULT NEEDED:     [] Surgery    [] Hem/Onc    [] Rad/Onc    [] Dietary                 [] Social Service    [] Psychology       [] AES  [] Radiology     Clinical Stage: Tumor   Node(s) 1 Metastasis 0      GROUP STAGE:  [] O    [] 1A    [] IB     [] IIA    [] IIB     [] IIIA     [] IIIB     [] IIIC    []IV  [] Local recurrence     [] Regional recurrence     [] Distant recurrence   Metastatic site(s): none         [x] Edna'l Treatment Guidelines reviewed and care planned is consistent with guidelines.         (i.e., NCCN, NCI, PD, ACO, AUA, etc.)    PRESENTATION AT CANCER CONFERENCE:         [x] Prospective    [] Retrospective     [] Follow-Up           7/1/2024 Tumor Conference     [facility] UGI MULTIDISCIPLINARY TUMOR BOARD  PATIENT REVIEW FORM   ____________________________________________________________    CLINIC #: [id number]   DATE: 7/1/2024    DIAGNOSIS: gastric GARRY    PRESENTER: Justus    PATIENT SUMMARY:   This 72 y/o female was dx with ovarian CA 1/2023, underwent surgery and completed 6 cycles of adj chemotherapy in 7/2023. She developed hematemesis in Feb 2024 and underwent EGD with bx of a large ulcerated gastric mass. Pathology revealed poorly differentiated adenocarcinoma, with intestinal expression, MSI high, HER 2 negative. She underwent diag lap and pathology was negative for malignancy, reactive atypia. She was presented to this [facility] in 3/2024 and again in 4/2024. Since then, she received immunotherapy Keytruda 200 mg every 3 weeks, total of 4 cycles thus far.   Reviewed re-staging CT scan - perigastric lymph nodes larger in size  She remains asymptomatic.     BOARD RECOMMENDATIONS:   Obtain repeat PET  Consider ct DNA    CONSULT NEEDED:     [] Surgery    [] Hem/Onc    [] Rad/Onc    [] Dietary                 [] Genetics    [] Psychology       [] AES  [] Interventional Radiology     Clinical Stage: Tumor 2 Node(s) 1 Metastasis 0      GROUP STAGE:  [] O    [] 1A    [] IB    [x] IIA    [] IIB     [] IIIA     [] IIIB     [] IIIC    []IV  [] Local recurrence     [] Regional recurrence     [] Distant recurrence   Metastatic site(s): none         [x] Edna'l Treatment Guidelines reviewed and care planned is consistent with  guidelines.         (i.e., NCCN, NCI, PD, ACO, AUA, etc.)    PRESENTATION AT CANCER CONFERENCE:         [x] Prospective    [] Retrospective     [] Follow-Up         8/20/2024 Surgery    Open total gastrectomy with D2 WHITNEY and extensive lysis of adhesions   (Path: pT2 pN3a (8/35 LN positive), G3 poorly differentiated adenocarcinoma, margins negative     8/20/2024 Cancer Staged    Staging form: Stomach, AJCC 8th Edition  - Pathologic stage from 8/20/2024: Stage III (ypT2, pN3a, cM0)           Previous HPI  Cheryl Garcia is a 73 y.o. female with history of DVT not currently on AC, previously on Xarelto, FIGO Stage IC (cT1c2, cN0, cM0) Mixed epithelial carcinoma of right ovary s/p surgery 01/09/023 and 6C Carbo/Paclitaxel completed 07/19/2023, Osteoporosis on Fosamax, HTN and Hx of Epilepsy on Keppra, Tegretol, Zonegran (last seizure in 2009) who presents to clinic to establish care on referral for gastric cancer.    Patient reports that she Initially presented to [facility] 02/06/24 with report of hematemeiss; she was transferred to [facility] for higher level of care.  On admission to [facility], EGD  was performed and showed a large ulcerated gastric mass.  Pathology results it as poorly differentiated adenocarcinoma.  She states that her weight is currently stable.  The last time she lost any weight was 2 her ovarian cancer diagnosis and treatment at which time she lost 5 lb but gained it back.  On hospitalization here at [facility], during GI workup she lost those 5 lbs a cane because she was NPO.    The patient has already established care with surgical oncology, Dr. Jerome.  She was presented in the upper GI tumor board with consensus for neoadjuvant immunotherapy given MSI high status of her tumor.  She is here for medical oncology recommendations.    I reviewed the recommendations of the tumor board consensus for neoadjuvant immunotherapy and I reviewed her pathology and biomarkers in  detail.  She is expressed understanding in his in agreement with the plan.  She also understands that she still needs to undergo diagnostic laparoscopy for completed staging.    Otherwise, she is a former light smoker; quit 30-40 years ago. No alcohol use in 30-40 years. No illicit drug use.  She has an extensive family history of malignancy in his already been referred to our genetic counselor.      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       Past Surgical History:   Procedure Laterality Date    ABDOMINAL WASHOUT N/A 3/14/2024    Procedure: LAVAGE, PERITONEAL, THERAPEUTIC;  Surgeon: Chen Jerome MD;  Location: [facility] OR;  Service: General;  Laterality: N/A;    APPENDECTOMY  2015    BIOPSY OF PERITONEUM N/A 3/14/2024    Procedure: BIOPSY, PERITONEUM;  Surgeon: Chen Jerome MD;  Location: [facility] OR;  Service: General;  Laterality: N/A;    COLONOSCOPY  06/20/2012    Dr Boyle- Tubular adenoma polyps. Repeat in 3 years.    COLONOSCOPY  06/17/2015    -Nodular mucosa at appendiceal orfice, sigmoid and desc diverticulosis otherwise normal.    COLONOSCOPY  2020    >3 TAs    COLONOSCOPY  12/2023    CYSTOSCOPY W/ RETROGRADES Right 10/10/2024    Procedure: CYSTOSCOPY, WITH RETROGRADE PYELOGRAM;  Surgeon: Lata Kelly MD;  Location: [facility] OR;  Service: Urology;   Laterality: Right;    CYSTOURETEROSCOPY, WITH HOLMIUM LASER LITHOTRIPSY OF URETERAL CALCULUS AND STENT INSERTION Right 11/27/2024    Procedure: CYSTOURETEROSCOPY, WITH HOLMIUM LASER LITHOTRIPSY OF URETERAL CALCULUS AND STENT INSERTION;  Surgeon: Lata Kelly MD;  Location: [facility];  Service: Urology;  Laterality: Right;    DIAGNOSTIC LAPAROSCOPY N/A 3/14/2024    Procedure: LAPAROSCOPY, DIAGNOSTIC;  Surgeon: Chen Jerome MD;  Location: [facility] OR;  Service: General;  Laterality: N/A;  1. Diagnostic laparoscopy   2. Extensive lysis of adhesions  3. Peritoneal biopsy   4. Peritoneal washings for cytology    DIAGNOSTIC LAPAROSCOPY N/A 8/20/2024    Procedure: LAPAROSCOPY, DIAGNOSTIC;  Surgeon: Chen Jerome MD;  Location: [facility] OR;  Service: General;  Laterality: N/A;    EGD - EXTERNAL RESULT  06/20/2012    Dr Boyle- Mild gastritis otherwise normal.    ENDOSCOPIC ULTRASOUND OF UPPER GASTROINTESTINAL TRACT N/A 7/26/2024    Procedure: ULTRASOUND, UPPER GI TRACT, ENDOSCOPIC;  Surgeon: Valdo Aguilera MD;  Location: [facility];  Service: Endoscopy;  Laterality: N/A;  7/22/24: instructions sent via portal-. Pt no longer takling eliquis-GD    ESOPHAGOGASTRODUODENOSCOPY N/A 02/08/2024    Procedure: EGD (ESOPHAGOGASTRODUODENOSCOPY);  Surgeon: Jayla Arteaga MD;  Location: [facility];  Service: Endoscopy;  Laterality: N/A;    ESOPHAGOGASTRODUODENOSCOPY N/A 8/20/2024    Procedure: EGD (ESOPHAGOGASTRODUODENOSCOPY);  Surgeon: Chen Jerome MD;  Location: [facility] OR;  Service: General;  Laterality: N/A;    GASTRECTOMY N/A 8/20/2024    Procedure: GASTRECTOMY;  Surgeon: Chen Jerome MD;  Location: [facility] OR;  Service: General;  Laterality: N/A;    HYSTERECTOMY      LAPAROSCOPIC LYSIS OF ADHESIONS N/A 3/14/2024    Procedure: LYSIS, ADHESIONS, LAPAROSCOPIC;  Surgeon: Chen Jerome MD;  Location: [facility] OR;  Service: General;  Laterality: N/A;    LYSIS OF ADHESIONS N/A 8/20/2024    Procedure: LYSIS, ADHESIONS;   Surgeon: Chen Jerome MD;  [facility];  Service: General;  Laterality: N/A;    PLACEMENT OF JEJUNOSTOMY TUBE N/A 8/20/2024    Procedure: INSERTION, JEJUNOSTOMY TUBE;  Surgeon: Chen Jerome MD;  Location: [facility] OR;  Service: General;  Laterality: N/A;    REMOVAL OF URETERAL CALCULUS Right 11/27/2024    Procedure: REMOVAL, CALCULUS, URETER;  Surgeon: Lata Kelly MD;  Location: [facility] OR;  Service: Urology;  Laterality: Right;  basket extraction    REMOVAL, NEPHROSTOMY TUBE, WITH IMAGING GUIDANCE Right 11/27/2024    Procedure: REMOVAL, NEPHROSTOMY TUBE, WITH IMAGING GUIDANCE;  Surgeon: Lata Kelly MD;  Location: [facility] OR;  Service: Urology;  Laterality: Right;    REMOVAL-STENT Right 11/27/2024    Procedure: REMOVAL-STENT;  Surgeon: Lata Kelly MD;  Location: [facility] OR;  Service: Urology;  Laterality: Right;    RETROGRADE PYELOGRAPHY Right 11/27/2024    Procedure: PYELOGRAM, RETROGRADE;  Surgeon: Lata eKlly MD;  Location: [facility] OR;  Service: Urology;  Laterality: Right;    TOTAL ABDOMINAL HYSTERECTOMY W/ BILATERAL SALPINGOOPHORECTOMY  01/09/2023    radical resection with right salpingo-oophorectomy, left salpingo-oophorectomy, extensive enterolysis, extensive adhesiolysis, left pelvic lymph node biopsy, omental biopsy, small bowel resection with primary functional end-to-end anastomosis, placement of pelvic drain       Family History   Problem Relation Name Age of Onset    Pancreatic cancer Brother Misael Mccray 76    Prostate cancer Brother Zachary 67    Pancreatic cancer Half-brother Gasper 74    Colon cancer Half-sister Elton 83    Lung cancer Half-sister Elton         +smoking hx    Breast cancer Half-sister Vidhi 58    Lymphoma Other Cara Sun    Prostate cancer Other Fernando     Prostate cancer Other Gasper Jr     Ovarian cancer Other Inerris     Breast cancer Other Aleida     Colon cancer Other Aleida        Review of patient's allergies indicates:  No  Known Allergies    Social History     Tobacco Use    Smoking status: Former     Types: Cigarettes    Smokeless tobacco: Never    Tobacco comments:     Quit 1989 smoked 10 years smoked 0.25 ppd    Substance Use Topics    Alcohol use: Not Currently    Drug use: Never     Physical Exam     ECOG SCORE    0 - Fully active-able to carry on all pre-disease performance without restriction       Vitals:    01/15/25 0955   BP: 117/70   Pulse: 88   Temp: 97.1 °F (36.2 °C)       Physical Exam  Constitutional:       General: She is not in acute distress.     Appearance: She is normal weight. She is ill-appearing.   HENT:      Head: Normocephalic and atraumatic.   Eyes:      Extraocular Movements: Extraocular movements intact.      Conjunctiva/sclera: Conjunctivae normal.   Cardiovascular:      Rate and Rhythm: Normal rate.   Pulmonary:      Effort: Pulmonary effort is normal.   Neurological:      General: No focal deficit present.      Mental Status: She is alert and oriented to person, place, and time. Mental status is at baseline.   Psychiatric:         Mood and Affect: Mood normal.         Behavior: Behavior normal.         Thought Content: Thought content normal.         Judgment: Judgment normal.         Labs   Labs:  No visits with results within 2 Day(s) from this visit.   Latest known visit with results is:   Lab Visit on 01/02/2025   Component Date Value Ref Range Status    Sodium 01/02/2025 137  136 - 145 mmol/L Final    Potassium 01/02/2025 4.7  3.5 - 5.1 mmol/L Final    Chloride 01/02/2025 109  95 - 110 mmol/L Final    CO2 01/02/2025 19 (L)  23 - 29 mmol/L Final    Glucose 01/02/2025 96  70 - 110 mg/dL Final    BUN 01/02/2025 12  8 - 23 mg/dL Final    Creatinine 01/02/2025 0.6  0.5 - 1.4 mg/dL Final    Calcium 01/02/2025 8.6 (L)  8.7 - 10.5 mg/dL Final    Total Protein 01/02/2025 6.8  6.0 - 8.4 g/dL Final    Albumin 01/02/2025 3.2 (L)  3.5 - 5.2 g/dL Final    Total Bilirubin 01/02/2025 0.2  0.1 - 1.0 mg/dL Final     Comment: For infants and newborns, interpretation of results should be based  on gestational age, weight and in agreement with clinical  observations.    Premature Infant recommended reference ranges:  Up to 24 hours.............<8.0 mg/dL  Up to 48 hours............<12.0 mg/dL  3-5 days..................<15.0 mg/dL  6-29 days.................<15.0 mg/dL      Alkaline Phosphatase 01/02/2025 117  40 - 150 U/L Final    AST 01/02/2025 27  10 - 40 U/L Final    ALT 01/02/2025 21  10 - 44 U/L Final    eGFR 01/02/2025 >60  >60 mL/min/1.73 m^2 Final    Anion Gap 01/02/2025 9  8 - 16 mmol/L Final        Pathology   Contains abnormal data Specimen to Pathology, Surgery Gastrointestinal tract - 02/28/2024      Component 3 wk ago   Final Pathologic Diagnosis  Abnormal   STOMACH, 'GASTRIC MASS', BIOPSY:  - 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 Scattered p53 positivity (wild-type pattern of expression) and p16 positivity are also noted. Additional studies, including CK7, PAX8, ER, HER2, TTF-1, GATA3, WT-1,  synaptophysin, and chromogranin, are either negative or negative in the cells of interest. This patient's prior history of ovarian carcinoma resected at [facility] in [place], [place] is noted and the outside pathology report (S-) is reviewed.   While both tumors do have similar immunohistochemical staining patterns, they are not identical, with notable reported differences being CK7 and PAX8 positivity in the ovarian tumor. The presence of a large, fungating, and ulcerated mass in the stomach  is suspicious for a gastric primary; however, it is unclear if the masses represent two separate primaries or one p rimary and one metastatic focus. Abnormal mismatch repair studies do suggest increased risk for development of multiple tumor types (see  below). Requesting outside slides for review  to compare morphology may be helpful.    DNA mismatch repair IHC studies are ABNORMAL and demonstrate LOSS OF MLH1 and PMS2 expression within tumor cells. MSH2 and MSH6 exhibit retained expression. 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    Tissue will be sent for molecular profiling by next-generation sequencing ([facility]). Results of this study will be issued directly to the electronic medical record.                        Tumor NGS Tissue - 02/29/2024  MSI: MSI-H         TMB: 42.6 m/MB         Low Coverage Regions: KDM5D, PMS2         Fusion Addendum Issue Type: NEGATIVE  Negative - This addendum is being issued to report that no gene fusions or altered splicing variants from RNA sequencing analysis were found.               Cytology-FNA Non-Radiology Clinician Performed w/o on site - 07/26/2024        Component 2 mo ago   Final Pathologic Diagnosis      Abnormal   Lymph node, perigastric, EUS guided fine-needle aspiration/biopsy:  - Adenocarcinoma involving lymphoid tissue consistent with patient's previous gastric mass biopsy, see comment.    COMMENT:  The biopsy shows a malignant epithelial proliferation involving lymphoid tissue consistent with tumor involving lymph node.  Immunostains show tumor cells to be positive for cytokeratin, CDX2, and patchy CK20.  Tumor cells are negative for PAX8   and CK7.  Patient had a previous stomach mass biopsy (BRS-) that is pulled and reviewed.  The current biopsy shows similar morphology and immunoprofile as the stomach mass biopsy.  MMR and NGS performed on previous biopsy.  Claudin 18.2 pending,  results will be issued in an addendum.      HER2 by immunohistochemistry:  Negative (1+)  VC      Comment: Interp By Tiny Ma MD, Signed on 08/06/2024 at 13:07   Supplemental Diagnosis      Abnormal   CLDN18, SemiQuant IHC, Manual    Interpretation  FNA lymph node, specimen for CLD18 immunohistochemistry  studies (anti-Eqnaayy24 (CLDN18) clone [id number], [employer], [place], [place]; using a proprietary detection system) ([id number], [id number])    Claudin 18.2: Negative; 40% of tumor cells with 2+ and/or 3+ membrane staining.    Interpretation: The Claudin 18.2 antibody (clone [id number]) was performed per clinical validation and relevant - provided instructions.    This result should be interpreted in the appropriate clinical context.    Fixation: This test has been validated for non-decalcified paraffin embedded tissue specimens fixed in 10% neutral buffered formalin. Recommended fixation time is between 6?48 hours. This assay has not been validated on tissues  subjected to the decalcification process and /or use of alternative fixatives.      Report electronically signed by  Maribell Glover M.D.      Report attached.    Performing  location:  [place] [address]    &quot;Disclaimer:  This case diagnosis was rendered completely by the outside consultation pathologist and the case is electronically signed by an [facility] pathologist listed below solely to release the report into the medical record.&quot;  VC      Disclaimer      Abnormal   HER-2/candida IHC (4B5) clone, DAB detection method) is done on 10% buffered formalin-fixed (for 6-72 hrs), paraffin embedded tissue sections. The scoring is completed on a 4-tiered scoring system of membrane staining using the 2014 ASCO/CAP scoring  guidelines. It has been cleared by the U.S. FDA for use as an IVD test. This assay has not been validated on decalcified tissues. Results should be interpreted with caution given the likelihood of false negativity on decalcified specimens.    HER-2/candida IHC (4B5) clone, DAB detection method) is done on 10% buffered formalin-fixed (for 6-72 hrs), paraffin embedded tissue sections. The scoring is completed on a 4-tiered scoring  system of membrane staining using the 2014 ASCO/CAP scoring  guidelines. It has been cleared by the U.S[place] FDA for use as an IVD test. This assay has not been validated on decalcified tissues. Results should be interpreted with caution given the likelihood of false negativity on decalcified specimens.    VC      Resulting Agency [facility]            Specimen to Pathology, Surgery General Surgery - 08/20/2024      Component 1 mo ago   Final Pathologic Diagnosis 1. Small intestine, peritoneal scarring, excision:  - Benign fibroadipose tissue  - Negative for malignancy    2. Spleen, lesion, excision:- Benign splenic tissue  - Negative for malignancy    3. Small intestine, resection:- Benign small intestinal tissue  - Negative for malignancy    4. Lymph node, hepatic artery, excision:- Two lymph nodes, negative for metastatic carcinoma (0/2)    5. Stomach, total gastrectomy:  - Adenocarcinoma, poorly cohesive, with features of undifferentiated carcinoma  - Tumor involves subserosal tissue  - Margins negative for carcinoma  - Pathologic stage y pT2 pN3a  - Eight of 35 lymph nodes are POSITIVE for metastatic carcinoma (8/35)  - Evidence of tumor regression is seen in a lymph node  - Incidental benign leiomyoma identified in perigastric tissue  - Background chronic gastritis  - Helicobacter pylori-type microorganisms are identified by immunohistochemistry  - See synoptic report below    6. Lymph nodes, D2 and periportal lymph node, lymphadenectomy:  - Eight lymph nodes, all negative for metastatic carcinoma (0/8)     7. Lymph node, aortocaval, excision:  - One lymph node, negative for metastatic carcinoma (0/1)    8. Anastomosis, anastomotic donuts:  - Benign esophageal and small intestinal tissue  - Negative for malignancy    _____________________________________________________________________  CASE SUMMARY: (STOMACH)  Standard(s): AJCC-UICC 8    SPECIMEN    Procedure: Total gastrectomy    TUMOR    Tumor Site: Body: Lesser  curvature    Histologic Type: Adenocarcinoma    Adenocarcinoma Classification (based on WHO): Poorly cohesive carcinoma, Undifferentiated (anaplastic) carcinoma    Histologic Type Comment: Tumor is poorly cohesive, but in some areas a better differentiated component is identified. The tumor diffusely expresses CDX2.  Histologic Grade: G3, poorly differentiated, undifferentiated  Tumor Size: Greatest dimension in Centimeters (cm): 6.6 cm    Additional Dimension in Centimeters (cm): 4.2 x 1.4 cm  Tumor Extent: Invades muscularis propria  Treatment Effect: Present, with residual cancer showing evident tumor regression, but more than single cells or rare small groups of cancer cells (partial response, score 2)  Lymphatic and / or Vascular Invasion: Present    MARGINS    Margin Status for Invasive Carcinoma: All margins negative for invasive carcinoma    Closest Margin(s) to Invasive Carcinoma: Omental (radial): lesser curvature    Distance from Invasive Carcinoma to Closest Margin: Exact distance in cm: 3 cm  Margin Status for Dysplasia: All margins negative for dysplasia    REGIONAL LYMPH NODES    Regional Lymph Node Status: Regional lymph nodes present    Tumor present in regional lymph node(s)      Number of Lymph Nodes with Tumor: Exact number: 8    Number of Lymph Nodes Examined: 45    DISTANT METASTASIS    Distant Site(s) Involved, if applicable: Not applicable    pTNM CLASSIFICATION (AJCC 8TH Edition)  Reporting of pT, pN, and (when applicable) pM categories is based on information available to the pathologist at the time the report is issued. As per the AJCC (Chapter 1, 8th Ed.) it is the managing physician's responsibility to establish the final  pathologic stage based upon all pertinent information, including but potentially not limited to this pathology report.    Modified Classification: y (post-neoadjuvant therapy)  pT Category: pT2: invades the muscularis propria  pN Category: pN3a: Metastasis in seven to  15 regional lymph nodes  pM Category: Not applicable - pM cannot be determined from the submitted specimen(s)    ADDITIONAL FINDINGS    Additional Findings: Chronic gastritis, Helicobacter pylori present    SPECIAL STUDIES    HER2 and mismatch repair testing have been performed on the previous biopsy cases [facility]- and [facility]-; see those cases for a full report. HER2 was negative and mismatch repair testing revealed loss of nuclear expression of MLH1 and PMS2. Claudin  18.2 was negative.   Comment: Interp By Jorge Luis Morales D.O., Signed on 08/23/2024 at 17:04   Frozen Section Diagnosis 1.Negative for malignacy  2.Negative for malignacy  Frozen diagnosis made by Dr. Goode for part 1 and 2  5. Proximal and distal margins negative for malignancy.  Frozen diagnosis made by Dr. Elena for part 5.   Microscopic Exam Immunohistochemical stains for PAX8, cytokeratin 7, cytokeratin 20, CDX2, CD45, synaptophysin and chromogranin are performed on block 5-F with adequate and reactive controls and the following results:  CDX2 is positive.  Cytokeratin 20 is patchy, but focally positive in tumor cells.  Cytokeratin 7, CD45, synaptophysin, chromogranin, and PAX8 are negative.    EBV in-situ hybridization, performed on block 5-F, is negative. RNA positive and GUILLAUME negative controls stain appropriately.    An immunohistochemical stain for H. pylori, performed on block 5-K with adequate and reactive controls, is positive.   Gross 1: Surgery ID:  [id number]    Pathology ID:  [id number]  1. Received fresh and subsequently placed in formalin labeled &quot;small bowel peritoneal scarring&quot; is a 0.7 x 0.5 x 0.3 cm tan tissue fragment.  The specimen is submitted entirely for frozen section with the frozen section remnant submitted in  cassette 1A.    WSL--1-A-FS    Grossed by: Rose Mary Melara MS, PA([facility])     2: Surgery ID:  [id number]    Pathology ID:  [id number]  2. Received fresh and subsequently placed in formalin labeled  &quot;splenic flexure lesion&quot; is a 0.6 x 0.4 x 0.3 cm tan-red tissue fragments.  The specimen is submitted entirely for frozen section with the frozen section remnant submitted in  cassette 2A.  JXY--2-A-FS    Grossed by: Rose Mary Melara MS, PA([facility])     3: Surgery ID:  [id number]   Pathology ID:  [id number] 3.  Small bowel Received in formalin labeled &quot;small bowel&quot; is a 4.4 cm in length by 1.9 cm in diameter segments of small bowel with 2 stapled ends.  The serosa is pink-red with areas of adhesions.  Opening the specimen reveals pink-red, well folded   mucosa.  Representative sections are submitted as follows:  DEM--3-A: Tissue from staple line margin  YDC--3-B: Tissue from opposite staple line margin  QBS--3-C: Representative sections of specimen      Grossed by: Rose Mary Melara MS, PA([facility])     4: Surgery ID:  [id number]   Pathology ID:  [id number]  4. Received in formalin labeled &quot;hepatic artery lymph node&quot; is a 0.8 x 0.5 x 0.4 cm tan-pink lymph node.  The lymph node is bisected and submitted entirely in cassette 4A.    UVB--4-A    Grossed by: Rose Mary Melara MS, PA([facility])     5: Surgery ID:  [id number]   Pathology ID:  [id number]  5. Received fresh and subsequently placed in formalin labeled &quot;total gastrectomy distal and proximal margin&quot; is a 23 x 7.6 x 4 cm (lesser curvature is 10 cm and greater curvature is 23 cm) gastrectomy specimen.  The serosa is pink-red.  Opening   the specimen reveals a 6.6 x 4.2 cm firm, tan-pink, nodular, slightly raised mass.  The mass invades the gastric wall and involves the serosa.  The mass does not grossly invade into the perigastric fat measuring 3 cm from the lesser curvature fat margin   and 7 cm from the greater curvature fat margin.  The mass measures 5.2 cm from the distal duodenal staple line margin and 4 cm from the proximal staple line margin.  The remaining mucosa is tan-pink and moderately well folded.   Multiple lymph nodes are  identified.  The serosa is inked blue.  The proximal staple line margin is submitted for frozen section with the frozen section remnant submitted in cassette ACT--5-A-FS.  The distal duodenal staple line margin is submitted for frozen section with   the frozen section remnant submitted in cassette PAS--5-B-FS.  Representative sections are submitted as follows:  ZOZ--5-C:  Lesser curvature fat margin nearest mass  HYE--5-D: Greater curvature fat margin nearest mass  TSZ--5-E: Mass to serosa  FAM--5-F: Mass to lesser curvature fat  HQS--5-G: Mass to adjacent distal mucosa  API--5-H: Mass to adjacent proximal mucosa  IVR--5-I: Representative section of mass  DQC--5-J: Representative section of mass  YMK--5-K: Representative sections of uninvolved mucosa  VDA--5-L: 7 lesser curvature lymph nodes  NNR--5-M: 5 lesser curvature lymph nodes  ODB--5-N: 5 lesser curvature lymph nodes  XLJ--5-O: 5 lymph nodes  FCT--5-P: 2 lymph nodes  EZK--5-Q - NQL--5-R - QDJ--5-S: 1 lymph node, serially sectioned and submitted entirely  HJA--5-T - SDQ--5-X: 1 lymph node, serially sectioned and submitted entirely    Grossed by: Rose Mary Melara MS, PA([facility])     6: Surgery ID:  [id number]   Pathology ID:  [id number]  6. Received in formalin labeled &quot;D2 lymphadenopathy with alea portal lymph node&quot; is a 4 x 4 x 1 cm aggregate of tan-pink to tan-yellow, nodular tissue fragments.  7 candidate lymph nodes ranging from 0.3-1.8 cm in greatest dimension are  identified.  The lymph nodes are submitted entirely as follows:  SWS--6-A: 4 lymph nodes  PVO--6-B: 2 lymph nodes  PRY--6-C: 1 lymph node, bisected    Grossed by: Rose Mary Melara MS, PA([facility])     7: Surgery ID:  [id number]   Pathology ID:  [id number]  7. Received in formalin labeled &quot;Aortic cable&quot;  is a 1 x 0.5 x 0.4 cm purple-gray, rubbery portion of tissue.  The specimen is submitted entirely in cassette 7A.    LUL--7-A    Grossed by: Rose Mary Melara MS, PA([facility])     8: Surgery ID:  [id number]   Pathology ID:  [id number]  8. Received in formalin labeled &quot;anastomotic donuts&quot; are 2 circular, pink-red mucosal surfaced portions of tissue measuring 1.7 cm in diameter by 1 cm in length and 1.5 cm in diameter by 1.1 cm in length.  Representative sections are submitted  in cassette 8A.    IVJ--8-A    Grossed by: Rose Mary Melara MS, PA([facility])   Frozen Section Footnote Frozen section performed at [address] [place], [address]   Disclaimer Unless the case is a 'gross only' or additional testing only, the final diagnosis for each specimen is based on a microscopic examination of appropriate tissue sections.  This test was developed and its performance characteristics determined by [facility], Department of Pathology and Laboratory Medicine. It has not been cleared or approved by the US Food and Drug Administration. The FDA has determined that  such clearance or approval is not necessary. This test is used for clinical purposes. It should not be regarded as investigational or for research. This laboratory is certified under the Clinical Laboratory Improvement Admendments (CLIA) as qualified to  perform such high complexity clinical laboratory testing   [facility]          Imaging   NM PET CT FDG SKULL BASE TO MID THIGH - 02/28/2024  CLINICAL HISTORY:  Gastric cancer.  Malignant neoplasm of stomach, unspecified.  Initial staging.  History of left ovarian epithelial carcinoma.     TECHNIQUE:  11.45 mCi of F18-FDG was administered intravenously in the left forearm.  After an approximately 60 min distribution time, PET/CT images were acquired from the skull base to the mid thigh. Transmission images were acquired to correct for attenuation  using a whole body low-dose CT scan without contrast with the arms positioned above the head. Glycemia at the time of injection was 113 mg/dL.     COMPARISON:  CT abdomen pelvis, 02/08/2024 and chest CT, 02/27/2024     FINDINGS:  Quality of the study: Adequate.     SUV max of the liver parenchyma is 2.8     Neck: No abnormal FDG avidity.  No lymphadenopathy or mass.     Chest: No abnormal FDG avidity.  No pleural effusion or lymphadenopathy or pulmonary nodule.     Abdomen/pelvis: Marked thickening of the wall of the stomach involving the fundus and proximal body with marked FDG avidity with SUV max 18.  The gastrohepatic ligament lymph node measuring 19 x 13 mm shows moderate FDG avidity with SUV max 4.6.     No ascites.  No other focus of abnormal FDG avidity in the abdomen or pelvis.     Skeletal structures: No abnormal FDG avidity.  No focal lytic or sclerotic lesion.     Physiologic uptake of the tracer is present within the brain, salivary glands, myocardium, GI and  tracts.     Incidental CT findings: N/A     Impression:  1. The biopsy proving gastric cancer is markedly FDG avid with SUV max 18.  2. Moderately FDG avid gastrohepatic ligament lymph node consistent with local metastatic disease.  3. No evidence for distant metastatic disease.  All CT scans at this facility are performed  using dose modulation techniques as appropriate to performed exam including the following:  automated exposure control; adjustment of mA and/or kV according to the patients size (this includes techniques or standardized protocols for targeted exams where dose is matched to indication/reason for exam: i.e. extremities or head);  iterative reconstruction technique.    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 Jerome negative for malignant cells  Per NCCN guidelines, NA immunotherapy is useful in MSI-H/dMMR tumors) with options being Nivolumab and ipilimumab followed by nivolumab or Pembrolizumab.  Given patient's comorbid conditions and potentially increased toxicity with CTLA4 inhibitor and PD-1 inhibitor, decision made to proceed with Pembrolizumab. Started 03/14/24.  Patient to completed 12 weeks of NA immunotherapy 06/18/2024  CT CAP 06/24/25 showed progression of LAD but no other signs of disease  Plan  s/p open total gastrectomy with D2 lymphadenectomy extensive lysis of adhesions on 08/20/2024  Eight of 35 lymph nodes are POSITIVE for metastatic carcinoma   Adjuvant therapy delayed due to malnutrition and FTT with requirement of J tube placement  Patient currently home and using J tube as prescribed  CT CAP 01/03/2025 showed a few enlarged left mid to upper abdomen lymph nodes (at least a couple enlarging from the recent exams) concerning for potential metastatic nodes.    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 VIrtually - review PET-CT   Follow up with JENNIE    Infusion scheduling note    Injection scheduling note    Labs    Imaging PET scan   STAT   Pharmacy appointment    Other referrals                  > 45 minutes was spent in consultation with the patient, chart review (including labs, imaging, and pathology).      The patient was seen, interviewed and examined. Pertinent lab and radiologic studies were reviewed. Pt instructed to call should they develop concerning signs/symptoms or have further questions.        Portions of the record may have been created with voice recognition software. Occasional wrong-word or sound-a-like substitutions may have occurred due to the inherent limitations of voice recognition software. Read the chart carefully and recognize, using context, where substitutions have occurred.      Claudia Amin MD    Hematology/Oncology

## 2025-01-16 ENCOUNTER — HOSPITAL ENCOUNTER (OUTPATIENT)
Dept: RADIOLOGY | Facility: HOSPITAL | Age: 75
Discharge: HOME | End: 2025-01-16
Attending: UROLOGY
Payer: MEDICARE

## 2025-01-16 DIAGNOSIS — N20.1 URETERAL STONE: ICD-10-CM

## 2025-01-16 PROCEDURE — 76770 US EXAM ABDO BACK WALL COMP: CPT | Mod: 26,,, | Performed by: RADIOLOGY

## 2025-01-16 PROCEDURE — 76770 US EXAM ABDO BACK WALL COMP: CPT | Mod: TC,PO

## 2025-01-30 ENCOUNTER — HOSPITAL ENCOUNTER (OUTPATIENT)
Dept: RADIOLOGY | Facility: HOSPITAL | Age: 75
Discharge: HOME | End: 2025-01-30
Attending: INTERNAL MEDICINE
Payer: MEDICARE

## 2025-01-30 DIAGNOSIS — C16.9 GASTRIC ADENOCARCINOMA: ICD-10-CM

## 2025-01-30 DIAGNOSIS — R59.1 LYMPHADENOPATHY: ICD-10-CM

## 2025-01-30 PROCEDURE — A9552 F18 FDG: HCPCS | Performed by: INTERNAL MEDICINE

## 2025-01-30 PROCEDURE — 78815 PET IMAGE W/CT SKULL-THIGH: CPT | Mod: TC

## 2025-01-30 PROCEDURE — 78815 PET IMAGE W/CT SKULL-THIGH: CPT | Mod: 26,PS,, | Performed by: RADIOLOGY

## 2025-01-30 RX ORDER — FLUDEOXYGLUCOSE F18 500 MCI/ML
11.48 INJECTION INTRAVENOUS
Status: COMPLETED | OUTPATIENT
Start: 2025-01-30 | End: 2025-01-30

## 2025-01-30 RX ADMIN — FLUDEOXYGLUCOSE F-18 11.48 MILLICURIE: 500 INJECTION INTRAVENOUS at 08:01

## 2025-02-03 ENCOUNTER — OFFICE VISIT (OUTPATIENT)
Dept: HEMATOLOGY/ONCOLOGY | Facility: CLINIC | Age: 75
End: 2025-02-03
Payer: MEDICARE

## 2025-02-03 DIAGNOSIS — C16.9 GASTRIC ADENOCARCINOMA: Primary | ICD-10-CM

## 2025-02-03 DIAGNOSIS — C56.1: ICD-10-CM

## 2025-02-03 NOTE — PROGRESS NOTES
Patient ID: Cheryl Kirkland   Chief Complaint: Follow-up  MRN:  [id number]     Oncologic Diagnosis:  Stage III  (ypT2, pN3a, cM0) Gastric Adenocarcinoma  Previous Treatment:    NA Immunotherapy (Pembrolizumab 03/26/2024 - 07/23/2024)  s/p open total gastrectomy with D2 lymphadenectomy extensive lysis of adhesions on 08/20/2024 - Dr. Jerome    Current Treatment:  Adjuvant Pembrolizumab     The patient location is: [place], [place]  The chief complaint leading to consultation is: Follow up imaging results    Visit type: audiovisual    Face to Face time with patient: 15  30 minutes of total time spent on the encounter, which includes face to face time and non-face to face time preparing to see the patient (eg, review of tests), Obtaining and/or reviewing separately obtained history, Documenting clinical information in the electronic or other health record, Independently interpreting results (not separately reported) and communicating results to the patient/family/caregiver, or Care coordination (not separately reported).         Each patient to whom he or she provides medical services by telemedicine is:  (1) informed of the relationship between the physician and patient and the respective role of any other health care provider with respect to management of the patient; and (2) notified that he or she may decline to receive medical services by telemedicine and may withdraw from such care at any time.    Notes:     Subjective   Mrs. Garcia is doing well and has no acute complaints.      I reviewed with her her imaging which shows likely metastatic LAD.  I reviewed the typical treatment strategies but that I will re-discuss her case in our Multidisciplinary TB.  She is in agreement.    Review of Systems   Constitutional:  Negative for activity change, appetite change, chills, diaphoresis, fatigue, fever and unexpected weight change.   HENT:  Negative for nosebleeds.    Respiratory:  Negative for  shortness of breath.    Cardiovascular:  Negative for chest pain.   Gastrointestinal:  Positive for abdominal pain and diarrhea. Negative for abdominal distention, anal bleeding, blood in stool, constipation, nausea and vomiting.   Genitourinary:  Negative for difficulty urinating and hematuria.   Musculoskeletal:  Negative for arthralgias, back pain and myalgias.   Skin:  Negative for rash.   Neurological:  Negative for dizziness, weakness, light-headedness and headaches.   Hematological:  Does not bruise/bleed easily.   Psychiatric/Behavioral:  The patient is not nervous/anxious.      History     Oncology History   Mixed epithelial carcinoma of right ovary   1/2/2023 Initial Diagnosis    Mixed epithelial carcinoma of left ovary     1/9/2023 Surgery    Laparotomy for radical resection of gynecologic cancer. Removal of pelvic mass (right salpingo-oophorectomy), left salpingo-oophorectomy, extensive enterolysis, extensive adhesiolysis, left pelvic lymph node biopsy, omental biopsy, small bowel resection with primary functional end-to-end anastomosis, placement of pelvic drain. Path: Right ovary, tumor site, 11 cm, mixed epithelial carcinoma (50% mucinous, 50% endometrioid), G2 moderately differentiated, ovarian surface involvement-indeterminate-specimen disrupted. 1 left pelvic lymph node negative for neoplasia. FIGO stage IC2       1/9/2023 Cancer Staged    Staging form: Ovary, Fallopian Tube, and Primary Peritoneal Carcinoma, AJCC 8th Edition  - Clinical stage from 1/9/2023: FIGO Stage IC2, calculated as Stage IC (cT1c2, cN0, cM0)     4/5/2023 - 7/19/2023 Chemotherapy    4/5/2023: Cycle 1- paclitaxel 135 mg/m2 and carboplatin AUC 5 as patient is frail and elderly.  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 - [id number], MMR deficient- loss MLH1 and PMS2     2/8/2024 Cancer Staged    Staging form: Stomach, AJCC 8th Edition  - Clinical stage from 2/8/2024: Stage IIA (cT2, cN1, cM0)     3/4/2024 Tumor Conference    Date Presented to Tumor Board: 03/04/24  [facility] UGI MULTIDISCIPLINARY TUMOR BOARD  PATIENT REVIEW FORM   ____________________________________________________________    CLINIC #: [id number]  DATE: 3/4/2024    DIAGNOSIS: gastric GARRY    PRESENTER: Justus    PATIENT SUMMARY:   This 74 y/o female was dx with ovarian CA 1/2023, underwent surgery and completed 6 cycles of adj chemotherapy 7/2023. She presented last month with hematemesis. EGD revealed large ulcerated gastric mass, which was biopsied. EGD pathology reviewed - poorly differentiated adenocarcinoma, with intestinal expression, MSI high, HER 2 negative. Staging imaging found irregular wall thickening in mid gastric body, enlarged gastrohepatic lymph node, no evidence of distant metastatic disease.   Reviewed PET - uptake in gastric wall and gastrohepatic lymph node with hypermetabolic activity.   Scheduled to see Dr. Ambrose in [place] later this week.   + family hx of cancer, pending genetics referral    BOARD RECOMMENDATIONS:   Proceed with systemic chemotherapy, consider neoadj immunotherapy  Proceed with diag lap  Await genetics testing, pathology will send to [facility]    CONSULT NEEDED:     [] Surgery    [] Hem/Onc    [] Rad/Onc    [] Dietary                 [] Social Service    [x] Genetics       [] AES  [] Radiology     Clinical Stage: Tumor   Node(s)  1   Metastasis      GROUP STAGE:  [] O    [] 1A    [] IB    [] IIA    [] IIB     [] IIIA     [] IIIB     [] IIIC    []IV  [] Local recurrence     [] Regional recurrence     [] Distant recurrence   Metastatic site(s): none         [x] Edna'l Treatment Guidelines reviewed and care planned is consistent with guidelines.         (i.e., NCCN, NCI, PD, ACO, AUA, etc.)    PRESENTATION AT CANCER CONFERENCE:         [x] Prospective    [] Retrospective     [] Follow-Up         3/14/2024 Surgery    Procedure:  LAPAROSCOPY, DIAGNOSTIC (N/A)  LYSIS, ADHESIONS, LAPAROSCOPIC (N/A)  LAVAGE, PERITONEAL, THERAPEUTIC (N/A)      Surgeon(s) and Role: Chen Jerome MD - Primary    Pre-Operative Diagnosis: Gastric adenocarcinoma [C16.9]     Post-Operative Diagnosis: Same     Pre-Operative Variables:  Stage:  III (T4a N1 M0)   Adjacent organ involvement: None  Chemotherapy within 90 Days: No  Radiation Therapy within 90 Days: No      Procedure:  Diagnostic laparoscopy   Extensive lysis of adhesions  Peritoneal biopsy   Peritoneal washings for cytology     3/26/2024 -  Chemotherapy    Treatment Summary   Plan Name: OP pembrolizumab 200mg Q3W  Treatment Goal: Curative  Status: Active  Start Date: 3/26/2024  End Date: 8/24/2026 (Planned)  Provider: Claudia Ambrose MD  Chemotherapy: [No matching medication found in this treatment plan]      Chemotherapy    Treatment Summary   Plan Name: OP pembrolizumab 200mg Q3W  Treatment Goal: Curative  Status: Active  Start Date: 3/15/2024 (Planned)  End Date: 2/27/2026 (Planned)  Provider: Claudia Ambrose MD  Chemotherapy: [No matching medication found in this treatment plan]     4/8/2024 Tumor Conference       [facility] UGI MULTIDISCIPLINARY TUMOR BOARD  PATIENT REVIEW FORM   ____________________________________________________________    CLINIC #: [id number]  DATE: 4/8/2024    DIAGNOSIS: Gastric GARRY    PRESENTER: Justus    PATIENT SUMMARY:   This 72 y/o female was dx with ovarian CA  yes 1/2023, underwent surgery and completed 6 cycles of adj chemotherapy 7/2023. She developed hematemesis in Feb 2024, then underwent EGD with bx of a large ulcerated gastric mass. Pathology revealed poorly differentiated adenocarcinoma, with intestinal expression, MSI high, HER 2 negative. She was presented to this [facility] last month. Since then, she underwent diag lap and today's presentation is for pathology review. Negative for malignancy, reactive atypia     BOARD RECOMMENDATIONS:   Proceed with immunotherapy, then restage with imaging  Potential candidate for subtotal distal gastrectomy    CONSULT NEEDED:     [] Surgery    [] Hem/Onc    [] Rad/Onc    [] Dietary                 [] Social Service    [] Psychology       [] AES  [] Radiology     Clinical Stage: Tumor   Node(s) 1 Metastasis 0      GROUP STAGE:  [] O    [] 1A    [] IB    [] IIA    [] IIB     [] IIIA     [] IIIB     [] IIIC    []IV  [] Local recurrence     [] Regional recurrence     [] Distant recurrence   Metastatic site(s): none         [x] Edna'l Treatment Guidelines reviewed and care planned is consistent with guidelines.         (i.e., NCCN, NCI, PD, ACO, AUA, etc.)    PRESENTATION AT CANCER CONFERENCE:         [x] Prospective    [] Retrospective     [] Follow-Up           7/1/2024 Tumor Conference     [facility] UGI MULTIDISCIPLINARY TUMOR BOARD  PATIENT REVIEW FORM   ____________________________________________________________    CLINIC #: [id number]   DATE: 7/1/2024    DIAGNOSIS: gastric GARRY    PRESENTER: Justus    PATIENT SUMMARY:   This 72 y/o female was dx with ovarian CA 1/2023, underwent surgery and completed 6 cycles of adj chemotherapy in 7/2023. She developed hematemesis in Feb 2024 and underwent EGD with bx of a large ulcerated gastric mass. Pathology revealed poorly differentiated adenocarcinoma, with intestinal expression, MSI high, HER 2 negative. She underwent diag lap and pathology was negative for malignancy, reactive  atypia. She was presented to this [facility] in 3/2024 and again in 4/2024. Since then, she received immunotherapy Keytruda 200 mg every 3 weeks, total of 4 cycles thus far.   Reviewed re-staging CT scan - perigastric lymph nodes larger in size  She remains asymptomatic.     BOARD RECOMMENDATIONS:   Obtain repeat PET  Consider ct DNA    CONSULT NEEDED:     [] Surgery    [] Hem/Onc    [] Rad/Onc    [] Dietary                 [] Genetics    [] Psychology       [] AES  [] Interventional Radiology     Clinical Stage: Tumor 2 Node(s) 1 Metastasis 0      GROUP STAGE:  [] O    [] 1A    [] IB    [x] IIA    [] IIB     [] IIIA     [] IIIB     [] IIIC    []IV  [] Local recurrence     [] Regional recurrence     [] Distant recurrence   Metastatic site(s): none         [x] Edna'l Treatment Guidelines reviewed and care planned is consistent with guidelines.         (i.e., NCCN, NCI, PD, ACO, AUA, etc.)    PRESENTATION AT CANCER CONFERENCE:         [x] Prospective    [] Retrospective     [] Follow-Up         8/20/2024 Surgery    Open total gastrectomy with D2 WHITNEY and extensive lysis of adhesions   (Path: pT2 pN3a (8/35 LN positive), G3 poorly differentiated adenocarcinoma, margins negative     8/20/2024 Cancer Staged    Staging form: Stomach, AJCC 8th Edition  - Pathologic stage from 8/20/2024: Stage III (ypT2, pN3a, cM0)           Previous HPI  Cheryl Garcia is a 73 y.o. female with history of DVT not currently on AC, previously on Xarelto, FIGO Stage IC (cT1c2, cN0, cM0) Mixed epithelial carcinoma of right ovary s/p surgery 01/09/023 and 6C Carbo/Paclitaxel completed 07/19/2023, Osteoporosis on Fosamax, HTN and Hx of Epilepsy on Keppra, Tegretol, Zonegran (last seizure in 2009) who presents to clinic to establish care on referral for gastric cancer.    Patient reports that she Initially presented to [facility] 02/06/24 with report of hematemeiss; she was transferred to [facility] for higher level of  care.  On admission to [facility], EGD  was performed and showed a large ulcerated gastric mass.  Pathology results it as poorly differentiated adenocarcinoma.  She states that her weight is currently stable.  The last time she lost any weight was 2 her ovarian cancer diagnosis and treatment at which time she lost 5 lb but gained it back.  On hospitalization here at [facility], during GI workup she lost those 5 lbs a cane because she was NPO.    The patient has already established care with surgical oncology, Dr. Jerome.  She was presented in the upper GI tumor board with consensus for neoadjuvant immunotherapy given MSI high status of her tumor.  She is here for medical oncology recommendations.    I reviewed the recommendations of the tumor board consensus for neoadjuvant immunotherapy and I reviewed her pathology and biomarkers in detail.  She is expressed understanding in his in agreement with the plan.  She also understands that she still needs to undergo diagnostic laparoscopy for completed staging.    Otherwise, she is a former light smoker; quit 30-40 years ago. No alcohol use in 30-40 years. No illicit drug use.  She has an extensive family history of malignancy in his already been referred to our genetic counselor.      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       Past Surgical  History:   Procedure Laterality Date    ABDOMINAL WASHOUT N/A 3/14/2024    Procedure: LAVAGE, PERITONEAL, THERAPEUTIC;  Surgeon: Chen Jerome MD;  Location: [facility] OR;  Service: General;  Laterality: N/A;    APPENDECTOMY  2015    BIOPSY OF PERITONEUM N/A 3/14/2024    Procedure: BIOPSY, PERITONEUM;  Surgeon: Chen Jerome MD;  Location: [facility] OR;  Service: General;  Laterality: N/A;    COLONOSCOPY  06/20/2012    Dr Boyle- Tubular adenoma polyps. Repeat in 3 years.    COLONOSCOPY  06/17/2015    -Nodular mucosa at appendiceal orfice, sigmoid and desc diverticulosis otherwise normal.    COLONOSCOPY  2020    >3 TAs    COLONOSCOPY  12/2023    CYSTOSCOPY W/ RETROGRADES Right 10/10/2024    Procedure: CYSTOSCOPY, WITH RETROGRADE PYELOGRAM;  Surgeon: Lata Kelly MD;  Location: [facility];  Service: Urology;  Laterality: Right;    CYSTOURETEROSCOPY, WITH HOLMIUM LASER LITHOTRIPSY OF URETERAL CALCULUS AND STENT INSERTION Right 11/27/2024    Procedure: CYSTOURETEROSCOPY, WITH HOLMIUM LASER LITHOTRIPSY OF URETERAL CALCULUS AND STENT INSERTION;  Surgeon: Lata Kelly MD;  Location: [facility];  Service: Urology;  Laterality: Right;    DIAGNOSTIC LAPAROSCOPY N/A 3/14/2024    Procedure: LAPAROSCOPY, DIAGNOSTIC;  Surgeon: Chen Jreome MD;  Location: [facility];  Service: General;  Laterality: N/A;  1. Diagnostic laparoscopy   2. Extensive lysis of adhesions  3. Peritoneal biopsy   4. Peritoneal washings for cytology    DIAGNOSTIC LAPAROSCOPY N/A 8/20/2024    Procedure: LAPAROSCOPY, DIAGNOSTIC;  Surgeon: Chen Jerome MD;  Location: [facility];  Service: General;  Laterality: N/A;    EGD - EXTERNAL RESULT  06/20/2012    Dr Boyle- Mild gastritis otherwise normal.    ENDOSCOPIC ULTRASOUND OF UPPER GASTROINTESTINAL TRACT N/A 7/26/2024    Procedure: ULTRASOUND, UPPER GI TRACT, ENDOSCOPIC;  Surgeon: Valdo Aguilera MD;  Location: [facility];  Service: Endoscopy;  Laterality: N/A;  7/22/24:  instructions sent via portal-. Pt no longer takling eliquis-GD    ESOPHAGOGASTRODUODENOSCOPY N/A 02/08/2024    Procedure: EGD (ESOPHAGOGASTRODUODENOSCOPY);  Surgeon: Jayla Arteaga MD;  Location: [facility] ENDO;  Service: Endoscopy;  Laterality: N/A;    ESOPHAGOGASTRODUODENOSCOPY N/A 8/20/2024    Procedure: EGD (ESOPHAGOGASTRODUODENOSCOPY);  Surgeon: Chen Jerome MD;  Location: [facility] OR;  Service: General;  Laterality: N/A;    GASTRECTOMY N/A 8/20/2024    Procedure: GASTRECTOMY;  Surgeon: Chen Jerome MD;  Location: [facility] OR;  Service: General;  Laterality: N/A;    HYSTERECTOMY      LAPAROSCOPIC LYSIS OF ADHESIONS N/A 3/14/2024    Procedure: LYSIS, ADHESIONS, LAPAROSCOPIC;  Surgeon: Chen Jerome MD;  Location: [facility] OR;  Service: General;  Laterality: N/A;    LYSIS OF ADHESIONS N/A 8/20/2024    Procedure: LYSIS, ADHESIONS;  Surgeon: Chen Jerome MD;  Location: [facility] OR;  Service: General;  Laterality: N/A;    PLACEMENT OF JEJUNOSTOMY TUBE N/A 8/20/2024    Procedure: INSERTION, JEJUNOSTOMY TUBE;  Surgeon: Chen Jerome MD;  Location: [facility] OR;  Service: General;  Laterality: N/A;    REMOVAL OF URETERAL CALCULUS Right 11/27/2024    Procedure: REMOVAL, CALCULUS, URETER;  Surgeon: Lata Kelly MD;  Location: [facility] OR;  Service: Urology;  Laterality: Right;  basket extraction    REMOVAL, NEPHROSTOMY TUBE, WITH IMAGING GUIDANCE Right 11/27/2024    Procedure: REMOVAL, NEPHROSTOMY TUBE, WITH IMAGING GUIDANCE;  Surgeon: Lata Kelly MD;  Location: [facility] OR;  Service: Urology;  Laterality: Right;    REMOVAL-STENT Right 11/27/2024    Procedure: REMOVAL-STENT;  Surgeon: Lata Kelly MD;  Location: [facility] OR;  Service: Urology;  Laterality: Right;    RETROGRADE PYELOGRAPHY Right 11/27/2024    Procedure: PYELOGRAM, RETROGRADE;  Surgeon: Lata Kelly MD;  Location: [facility] OR;  Service: Urology;  Laterality: Right;    TOTAL ABDOMINAL HYSTERECTOMY W/ BILATERAL SALPINGOOPHORECTOMY  01/09/2023     radical resection with right salpingo-oophorectomy, left salpingo-oophorectomy, extensive enterolysis, extensive adhesiolysis, left pelvic lymph node biopsy, omental biopsy, small bowel resection with primary functional end-to-end anastomosis, placement of pelvic drain       Family History   Problem Relation Name Age of Onset    Pancreatic cancer Brother Misael Mccray 76    Prostate cancer Brother Zachary 67    Pancreatic cancer Half-brother Gasper 74    Colon cancer Half-sister Elton 83    Lung cancer Half-sister Elton         +smoking hx    Breast cancer Half-sister Vidhi 58    Lymphoma Other Cara Sun    Prostate cancer Other Fernando     Prostate cancer Other Gasper Calderon     Ovarian cancer Other Inerris     Breast cancer Other Aleida     Colon cancer Other Aleida        Review of patient's allergies indicates:  No Known Allergies    Social History     Tobacco Use    Smoking status: Former     Types: Cigarettes    Smokeless tobacco: Never    Tobacco comments:     Quit 1989 smoked 10 years smoked 0.25 ppd    Substance Use Topics    Alcohol use: Not Currently    Drug use: Never     Physical Exam     ECOG SCORE           There were no vitals filed for this visit.      Physical Exam  Constitutional:       General: She is not in acute distress.     Appearance: She is normal weight. She is ill-appearing.   HENT:      Head: Normocephalic and atraumatic.   Eyes:      Extraocular Movements: Extraocular movements intact.      Conjunctiva/sclera: Conjunctivae normal.   Cardiovascular:      Rate and Rhythm: Normal rate.   Pulmonary:      Effort: Pulmonary effort is normal.   Neurological:      General: No focal deficit present.      Mental Status: She is alert and oriented to person, place, and time. Mental status is at baseline.   Psychiatric:         Mood and Affect: Mood normal.         Behavior: Behavior normal.         Thought Content: Thought content normal.         Judgment: Judgment normal.          Labs   Labs:  No visits with results within 2 Day(s) from this visit.   Latest known visit with results is:   Lab Visit on 01/02/2025   Component Date Value Ref Range Status    Sodium 01/02/2025 137  136 - 145 mmol/L Final    Potassium 01/02/2025 4.7  3.5 - 5.1 mmol/L Final    Chloride 01/02/2025 109  95 - 110 mmol/L Final    CO2 01/02/2025 19 (L)  23 - 29 mmol/L Final    Glucose 01/02/2025 96  70 - 110 mg/dL Final    BUN 01/02/2025 12  8 - 23 mg/dL Final    Creatinine 01/02/2025 0.6  0.5 - 1.4 mg/dL Final    Calcium 01/02/2025 8.6 (L)  8.7 - 10.5 mg/dL Final    Total Protein 01/02/2025 6.8  6.0 - 8.4 g/dL Final    Albumin 01/02/2025 3.2 (L)  3.5 - 5.2 g/dL Final    Total Bilirubin 01/02/2025 0.2  0.1 - 1.0 mg/dL Final    Comment: For infants and newborns, interpretation of results should be based  on gestational age, weight and in agreement with clinical  observations.    Premature Infant recommended reference ranges:  Up to 24 hours.............<8.0 mg/dL  Up to 48 hours............<12.0 mg/dL  3-5 days..................<15.0 mg/dL  6-29 days.................<15.0 mg/dL      Alkaline Phosphatase 01/02/2025 117  40 - 150 U/L Final    AST 01/02/2025 27  10 - 40 U/L Final    ALT 01/02/2025 21  10 - 44 U/L Final    eGFR 01/02/2025 >60  >60 mL/min/1.73 m^2 Final    Anion Gap 01/02/2025 9  8 - 16 mmol/L Final        Pathology   Contains abnormal data Specimen to Pathology, Surgery Gastrointestinal tract - 02/28/2024      Component 3 wk ago   Final Pathologic Diagnosis  Abnormal   STOMACH, 'GASTRIC MASS', BIOPSY:  - 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 Scattered p53 positivity (wild-type pattern of expression) and p16 positivity are also noted. Additional studies, including CK7, PAX8, ER, HER2, TTF-1, GATA3, WT-1,  synaptophysin, and chromogranin, are either negative or negative in  the cells of interest. This patient's prior history of ovarian carcinoma resected at [facility] in [place], [place] is noted and the outside pathology report (U-) is reviewed.   While both tumors do have similar immunohistochemical staining patterns, they are not identical, with notable reported differences being CK7 and PAX8 positivity in the ovarian tumor. The presence of a large, fungating, and ulcerated mass in the stomach  is suspicious for a gastric primary; however, it is unclear if the masses represent two separate primaries or one p rimary and one metastatic focus. Abnormal mismatch repair studies do suggest increased risk for development of multiple tumor types (see  below). Requesting outside slides for review to compare morphology may be helpful.    DNA mismatch repair IHC studies are ABNORMAL and demonstrate LOSS OF MLH1 and PMS2 expression within tumor cells. MSH2 and MSH6 exhibit retained expression. 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    Tissue will be sent for molecular profiling by next-generation sequencing ([facility]). Results of this study will be issued directly to the electronic medical record.                        Tumor NGS Tissue - 02/29/2024  MSI: MSI-H         TMB: 42.6 m/MB         Low Coverage Regions: KDM5D, PMS2         Fusion Addendum Issue Type: NEGATIVE  Negative - This addendum is being issued to report that no gene fusions or altered splicing variants from RNA sequencing analysis were found.               Cytology-[facility] Non-Radiology Clinician Performed w/o on site - 07/26/2024        Component 2 mo ago   Final Pathologic Diagnosis      Abnormal   Lymph node, perigastric, EUS guided fine-needle aspiration/biopsy:  - Adenocarcinoma involving lymphoid tissue consistent with patient's previous gastric mass biopsy, see comment.    COMMENT:  The biopsy shows a malignant epithelial proliferation involving lymphoid  tissue consistent with tumor involving lymph node.  Immunostains show tumor cells to be positive for cytokeratin, CDX2, and patchy CK20.  Tumor cells are negative for PAX8   and CK7.  Patient had a previous stomach mass biopsy (B[facility]-) that is pulled and reviewed.  The current biopsy shows similar morphology and immunoprofile as the stomach mass biopsy.  MMR and NGS performed on previous biopsy.  Claudin 18.2 pending,  results will be issued in an addendum.      HER2 by immunohistochemistry:  Negative (1+)  VC      Comment: Interp By Tiny Ma MD, Signed on 08/06/2024 at 13:07   Supplemental Diagnosis      Abnormal   CLDN18, SemiQuant IHC, Manual    Interpretation  FNA lymph node, specimen for CLD18 immunohistochemistry studies (anti-Imesufv62 (CLDN18) clone [id number], [employer], [place], [place]; using a proprietary detection system) ([id number].u, [id number]?1A.u)    Claudin 18.2: Negative; 40% of tumor cells with 2+ and/or 3+ membrane staining.    Interpretation: The Claudin 18.2 antibody (clone [id number]?[id number]) was performed per clinical validation and relevant - provided instructions.    This result should be interpreted in the appropriate clinical context.    Fixation: This test has been validated for non-decalcified paraffin embedded tissue specimens fixed in 10% neutral buffered formalin. Recommended fixation time is between 6?48 hours. This assay has not been validated on tissues  subjected to the decalcification process and /or use of alternative fixatives.      Report electronically signed by  Maribell Glover M.D.      Report attached.    Performing  location:  [place] [address]    &quot;Disclaimer:  This case diagnosis was rendered completely by the outside consultation pathologist and the case is electronically signed by an [facility] pathologist listed below solely to release the report  into the medical record.&quot;  VC      Disclaimer      Abnormal   HER-2/candida IHC (4B5) clone, DAB detection method) is done on 10% buffered formalin-fixed (for 6-72 hrs), paraffin embedded tissue sections. The scoring is completed on a 4-tiered scoring system of membrane staining using the 2014 ASCO/CAP scoring  guidelines. It has been cleared by the U.S. FDA for use as an IVD test. This assay has not been validated on decalcified tissues. Results should be interpreted with caution given the likelihood of false negativity on decalcified specimens.    HER-2/candida IHC (4B5) clone, DAB detection method) is done on 10% buffered formalin-fixed (for 6-72 hrs), paraffin embedded tissue sections. The scoring is completed on a 4-tiered scoring system of membrane staining using the 2014 ASCO/CAP scoring  guidelines. It has been cleared by the U.S. FDA for use as an IVD test. This assay has not been validated on decalcified tissues. Results should be interpreted with caution given the likelihood of false negativity on decalcified specimens.    VC      Resulting Agency [facility]            Specimen to Pathology, Surgery General Surgery - 08/20/2024      Component 1 mo ago   Final Pathologic Diagnosis 1. Small intestine, peritoneal scarring, excision:  - Benign fibroadipose tissue  - Negative for malignancy    2. Spleen, lesion, excision:- Benign splenic tissue  - Negative for malignancy    3. Small intestine, resection:- Benign small intestinal tissue  - Negative for malignancy    4. Lymph node, hepatic artery, excision:- Two lymph nodes, negative for metastatic carcinoma (0/2)    5. Stomach, total gastrectomy:  - Adenocarcinoma, poorly cohesive, with features of undifferentiated carcinoma  - Tumor involves subserosal tissue  - Margins negative for carcinoma  - Pathologic stage y pT2 pN3a  - Eight of 35 lymph nodes are POSITIVE for metastatic carcinoma (8/35)  - Evidence of tumor regression is seen in a lymph node  - Incidental benign  leiomyoma identified in perigastric tissue  - Background chronic gastritis  - Helicobacter pylori-type microorganisms are identified by immunohistochemistry  - See synoptic report below    6. Lymph nodes, D2 and periportal lymph node, lymphadenectomy:  - Eight lymph nodes, all negative for metastatic carcinoma (0/8)     7. Lymph node, aortocaval, excision:  - One lymph node, negative for metastatic carcinoma (0/1)    8. Anastomosis, anastomotic donuts:  - Benign esophageal and small intestinal tissue  - Negative for malignancy    _____________________________________________________________________  CASE SUMMARY: (STOMACH)  Standard(s): AJCC-UICC 8    SPECIMEN    Procedure: Total gastrectomy    TUMOR    Tumor Site: Body: Lesser curvature    Histologic Type: Adenocarcinoma    Adenocarcinoma Classification (based on WHO): Poorly cohesive carcinoma, Undifferentiated (anaplastic) carcinoma    Histologic Type Comment: Tumor is poorly cohesive, but in some areas a better differentiated component is identified. The tumor diffusely expresses CDX2.  Histologic Grade: G3, poorly differentiated, undifferentiated  Tumor Size: Greatest dimension in Centimeters (cm): 6.6 cm    Additional Dimension in Centimeters (cm): 4.2 x 1.4 cm  Tumor Extent: Invades muscularis propria  Treatment Effect: Present, with residual cancer showing evident tumor regression, but more than single cells or rare small groups of cancer cells (partial response, score 2)  Lymphatic and / or Vascular Invasion: Present    MARGINS    Margin Status for Invasive Carcinoma: All margins negative for invasive carcinoma    Closest Margin(s) to Invasive Carcinoma: Omental (radial): lesser curvature    Distance from Invasive Carcinoma to Closest Margin: Exact distance in cm: 3 cm  Margin Status for Dysplasia: All margins negative for dysplasia    REGIONAL LYMPH NODES    Regional Lymph Node Status: Regional lymph nodes present    Tumor present in regional lymph  node(s)      Number of Lymph Nodes with Tumor: Exact number: 8    Number of Lymph Nodes Examined: 45    DISTANT METASTASIS    Distant Site(s) Involved, if applicable: Not applicable    pTNM CLASSIFICATION (AJCC 8TH Edition)  Reporting of pT, pN, and (when applicable) pM categories is based on information available to the pathologist at the time the report is issued. As per the AJCC (Chapter 1, 8th Ed.) it is the managing physician's responsibility to establish the final  pathologic stage based upon all pertinent information, including but potentially not limited to this pathology report.    Modified Classification: y (post-neoadjuvant therapy)  pT Category: pT2: invades the muscularis propria  pN Category: pN3a: Metastasis in seven to 15 regional lymph nodes  pM Category: Not applicable - pM cannot be determined from the submitted specimen(s)    ADDITIONAL FINDINGS    Additional Findings: Chronic gastritis, Helicobacter pylori present    SPECIAL STUDIES    HER2 and mismatch repair testing have been performed on the previous biopsy cases [facility]- and [facility]-; see those cases for a full report. HER2 was negative and mismatch repair testing revealed loss of nuclear expression of MLH1 and PMS2. Claudin  18.2 was negative.   Comment: Interp By Jorge Luis Morales D.O., Signed on 08/23/2024 at 17:04   Frozen Section Diagnosis 1.Negative for malignacy  2.Negative for malignacy  Frozen diagnosis made by Dr. Goode for part 1 and 2  5. Proximal and distal margins negative for malignancy.  Frozen diagnosis made by Dr. Elena for part 5.   Microscopic Exam Immunohistochemical stains for PAX8, cytokeratin 7, cytokeratin 20, CDX2, CD45, synaptophysin and chromogranin are performed on block 5-F with adequate and reactive controls and the following results:  CDX2 is positive.  Cytokeratin 20 is patchy, but focally positive in tumor cells.  Cytokeratin 7, CD45, synaptophysin, chromogranin, and PAX8 are negative.    EBV in-situ  hybridization, performed on block 5-F, is negative. RNA positive and GUILLAUME negative controls stain appropriately.    An immunohistochemical stain for H. pylori, performed on block 5-K with adequate and reactive controls, is positive.   Gross 1: Surgery ID:  [id number]    Pathology ID:  [id number]  1. Received fresh and subsequently placed in formalin labeled &quot;small bowel peritoneal scarring&quot; is a 0.7 x 0.5 x 0.3 cm tan tissue fragment.  The specimen is submitted entirely for frozen section with the frozen section remnant submitted in  cassette 1A.    JIA--1-A-FS    Grossed by: Rose Mary Melara MS, PA(ASC)     2: Surgery ID:  [id number]    Pathology ID:  [id number]  2. Received fresh and subsequently placed in formalin labeled &quot;splenic flexure lesion&quot; is a 0.6 x 0.4 x 0.3 cm tan-red tissue fragments.  The specimen is submitted entirely for frozen section with the frozen section remnant submitted in  cassette 2A.  MNR--2-A-FS    Grossed by: Rose Mary Melara MS, PA([facility])     3: Surgery ID:  [id number]   Pathology ID:  [id number] 3.  Small bowel Received in formalin labeled &quot;small bowel&quot; is a 4.4 cm in length by 1.9 cm in diameter segments of small bowel with 2 stapled ends.  The serosa is pink-red with areas of adhesions.  Opening the specimen reveals pink-red, well folded   mucosa.  Representative sections are submitted as follows:  QHR--3-A: Tissue from staple line margin  ZHS--3-B: Tissue from opposite staple line margin  AQV--3-C: Representative sections of specimen      Grossed by: Rose Mary Melara MS, PA(ASCP)     4: Surgery ID:  [id number]   Pathology ID:  [id number]  4. Received in formalin labeled &quot;hepatic artery lymph node&quot; is a 0.8 x 0.5 x 0.4 cm tan-pink lymph node.  The lymph node is bisected and submitted entirely in cassette 4A.    XYB--4-A    Grossed by: Rose Mary Melara MS, PA(ASCP)     5: Surgery ID:  [id number]   Pathology ID:  [id number]  5.  Received fresh and subsequently placed in formalin labeled &quot;total gastrectomy distal and proximal margin&quot; is a 23 x 7.6 x 4 cm (lesser curvature is 10 cm and greater curvature is 23 cm) gastrectomy specimen.  The serosa is pink-red.  Opening   the specimen reveals a 6.6 x 4.2 cm firm, tan-pink, nodular, slightly raised mass.  The mass invades the gastric wall and involves the serosa.  The mass does not grossly invade into the perigastric fat measuring 3 cm from the lesser curvature fat margin   and 7 cm from the greater curvature fat margin.  The mass measures 5.2 cm from the distal duodenal staple line margin and 4 cm from the proximal staple line margin.  The remaining mucosa is tan-pink and moderately well folded.  Multiple lymph nodes are  identified.  The serosa is inked blue.  The proximal staple line margin is submitted for frozen section with the frozen section remnant submitted in cassette AAG--5-A-FS.  The distal duodenal staple line margin is submitted for frozen section with   the frozen section remnant submitted in cassette UJL--5-B-FS.  Representative sections are submitted as follows:  OFL--5-C:  Lesser curvature fat margin nearest mass  JYK--5-D: Greater curvature fat margin nearest mass  GTI--5-E: Mass to serosa  CGH--5-F: Mass to lesser curvature fat  SVE--5-G: Mass to adjacent distal mucosa  EFC--5-H: Mass to adjacent proximal mucosa  BBR--5-I: Representative section of mass  MCZ--5-J: Representative section of mass  OQP--5-K: Representative sections of uninvolved mucosa  SGQ--5-L: 7 lesser curvature lymph nodes  TDM--5-M: 5 lesser curvature lymph nodes  SCU--5-N: 5 lesser curvature lymph nodes  UWT--5-O: 5 lymph nodes  UJF--5-P: 2 lymph nodes  HZW--5-Q - DEY--5-R - GBF--5-S: 1 lymph node, serially sectioned and submitted entirely  DBJ--5-T -  VSC--5-X: 1 lymph node, serially sectioned and submitted entirely    Grossed by: Rose Mary Melara MS, PA([facility])     6: Surgery ID:  [id number]   Pathology ID:  [id number]  6. Received in formalin labeled &quot;D2 lymphadenopathy with alea portal lymph node&quot; is a 4 x 4 x 1 cm aggregate of tan-pink to tan-yellow, nodular tissue fragments.  7 candidate lymph nodes ranging from 0.3-1.8 cm in greatest dimension are  identified.  The lymph nodes are submitted entirely as follows:  LSU--6-A: 4 lymph nodes  VDT--6-B: 2 lymph nodes  JKR--6-C: 1 lymph node, bisected    Grossed by: Rose Mary Melara MS, PA([facility])     7: Surgery ID:  [id number]   Pathology ID:  [id number]  7. Received in formalin labeled &quot;Aortic cable&quot; is a 1 x 0.5 x 0.4 cm purple-gray, rubbery portion of tissue.  The specimen is submitted entirely in cassette 7A.    XZX--7-A    Grossed by: Rose Mary Melara MS, PA([facility])     8: Surgery ID:  [id number]   Pathology ID:  [id number]  8. Received in formalin labeled &quot;anastomotic donuts&quot; are 2 circular, pink-red mucosal surfaced portions of tissue measuring 1.7 cm in diameter by 1 cm in length and 1.5 cm in diameter by 1.1 cm in length.  Representative sections are submitted  in cassette 8A.    RLM--8-A    Grossed by: Rose Mary Melara MS, PA([facility])   Frozen Section Footnote Frozen section performed at [facility], [address] [place] [address] [place], [place], [address]   Disclaimer Unless the case is a 'gross only' or additional testing only, the final diagnosis for each specimen is based on a microscopic examination of appropriate tissue sections.  This test was developed and its performance characteristics determined by [facility], Department of Pathology and Laboratory Medicine. It has not been cleared or approved by the US Food and Drug Administration. The FDA has determined that  such clearance or approval is not necessary. This test is used for  clinical purposes. It should not be regarded as investigational or for research. This laboratory is certified under the Clinical Laboratory Improvement Admendments (CLIA) as qualified to  perform such high complexity clinical laboratory testing   [facility]          Imaging   NM PET CT FDG SKULL BASE TO MID THIGH - 02/28/2024  CLINICAL HISTORY:  Gastric cancer.  Malignant neoplasm of stomach, unspecified.  Initial staging.  History of left ovarian epithelial carcinoma.     TECHNIQUE:  11.45 mCi of F18-FDG was administered intravenously in the left forearm.  After an approximately 60 min distribution time, PET/CT images were acquired from the skull base to the mid thigh. Transmission images were acquired to correct for attenuation using a whole body low-dose CT scan without contrast with the arms positioned above the head. Glycemia at the time of injection was 113 mg/dL.     COMPARISON:  CT abdomen pelvis, 02/08/2024 and chest CT, 02/27/2024     FINDINGS:  Quality of the study: Adequate.     SUV max of the liver parenchyma is 2.8     Neck: No abnormal FDG avidity.  No lymphadenopathy or mass.     Chest: No abnormal FDG avidity.  No pleural effusion or lymphadenopathy or pulmonary nodule.     Abdomen/pelvis: Marked thickening of the wall of the stomach involving the fundus and proximal body with marked FDG avidity with SUV max 18.  The gastrohepatic ligament lymph node measuring 19 x 13 mm shows moderate FDG avidity with SUV max 4.6.     No ascites.  No other focus of abnormal FDG avidity in the abdomen or pelvis.     Skeletal structures: No abnormal FDG avidity.  No focal lytic or sclerotic lesion.     Physiologic uptake of the tracer is present within the brain, salivary glands, myocardium, GI and  tracts.     Incidental CT findings: N/A     Impression:  1. The biopsy proving gastric cancer is markedly FDG avid with SUV max 18.  2. Moderately FDG avid gastrohepatic ligament lymph node consistent with  local metastatic disease.  3. No evidence for distant metastatic disease.  All CT scans at this facility are performed  using dose modulation techniques as appropriate to performed exam including the following:  automated exposure control; adjustment of mA and/or kV according to the patients size (this includes techniques or standardized protocols for targeted exams where dose is matched to indication/reason for exam: i.e. extremities or head);  iterative reconstruction technique.    Assessment and Plan   Stage III  (ypT2, pN3a, cM0) Gastric Adenocarcinoma  EGD 02/08/2024: Gastric Mass  Path: Poorly-differentiated adenocarcinoma with intestinal phenotype -  HER2-, MMR - Deficient (MLH1 and PMS2 loss)  [facility] NGS:   MSI-H, TMB 42.6  Potentially actionable mutation in CHRIS  Multiple clinical trials available  PET-CT 02/28/24 showed no evidence of distant metastatic disease  Presented in Tb 03/04/24 with consensus for Proceed with systemic chemotherapy, consider neoadj immunotherapy, Proceed with diag lap  Diagnostic Laparoscopy 03/14/2024 with Dr. Jerome negative for malignant cells  Per NCCN guidelines, NA immunotherapy is useful in MSI-H/dMMR tumors) with options being Nivolumab and ipilimumab followed by nivolumab or Pembrolizumab.  Given patient's comorbid conditions and potentially increased toxicity with CTLA4 inhibitor and PD-1 inhibitor, decision made to proceed with Pembrolizumab. Started 03/14/24.  Patient to completed 12 weeks of NA immunotherapy 06/18/2024  CT CAP 06/24/25 showed progression of LAD but no other signs of disease  s/p open total gastrectomy with D2 lymphadenectomy extensive lysis of adhesions on 08/20/2024  Eight of 35 lymph nodes are POSITIVE for metastatic carcinoma   Adjuvant therapy delayed due to malnutrition and FTT   CT CAP 01/03/2025 showed a few enlarged left mid to upper abdomen lymph nodes (at least a couple enlarging from the recent exams) concerning for potential metastatic  nodes.    Plan  PET-CT 01/30/2025 showed hypermetabolic left mesenteric lymphadenopathy consistent with metastasis; discussed with IR and unfortunately no target for biopsy  Will present in TB regarding further management with IO vs  vs Chemorads        Severe Protein Calorie Malnutrition  Diarrhea  Counseled extensively to use feeding tube as prescribed and not restrict her diet  Counseled to use Imodium for diarrhea      Immunotherapy induced pruritus  Immunotherapy induced rash < Grade 1  Topical hydrocortisone and Atarax prescribed with relief        Chronic Medical Conditions  Hx DVT previously on Xareltao  Hx of FIGO Stage IC (cT1c2, cN0, 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 Review treatment plan   Follow up with JENNIE    Infusion scheduling note    Injection scheduling note    Labs    Imaging    Pharmacy appointment    Other referrals                    The patient was seen, interviewed and examined. Pertinent lab and radiologic studies were reviewed. Pt instructed to call should they develop concerning signs/symptoms or have further questions.        Portions of the record may have been created with voice recognition software. Occasional wrong-word or sound-a-like substitutions may have occurred due to the inherent limitations of voice recognition software. Read the chart carefully and recognize, using context, where substitutions have occurred.      Claudia Amin MD    Hematology/Oncology

## 2025-02-21 ENCOUNTER — TUMOR BOARD CONFERENCE (OUTPATIENT)
Dept: HEMATOLOGY/ONCOLOGY | Facility: CLINIC | Age: 75
End: 2025-02-21
Payer: MEDICARE

## 2025-02-21 NOTE — PROGRESS NOTES
Tumor Board Documentation      Cheryl Kirkland was presented by Claudia Ambrose MD at our Tumor Board on 2/21/2025, which included representatives from Medical Oncology, Hematology, Radiation Oncology, Surgical Oncology, Navigation, Genetics, Urology, Radiology, Pulmonology, Gastrointestinal.    Cheryl currently presents as a current patient with Gastric cancer, with history of the following treatments: Surgical Intervention(s), Neoadjuvant Chemotherapy.    Additionally, we reviewed previous medical and familial history, history of present illness, and recent lab results along with all available histopathologic and imaging studies. The tumor board considered available treatment options and made the following recommendations:  Chemotherapy    recommends RTC to see Dr Ambrose to resume systemic therapy        The following procedures/referrals were also placed: No orders of the defined types were placed in this encounter.      Clinical Trial Status: Not discussed     National site-specific guidelines were discussed with respect to the case.    Tumor board is a meeting of clinicians from various specialty areas who evaluate and discuss patients for whom a multidisciplinary approach is being considered. Final determinations in the plan of care are those of the provider(s). The responsibility for follow up of recommendations given during tumor board is that of the provider.     Claudia Ambrose MD

## 2025-02-21 NOTE — PROGRESS NOTES
[facility] UGI MULTIDISCIPLINARY TUMOR BOARD  PATIENT REVIEW FORM   ____________________________________________________    CLINIC #: [id number]  DATE: 2/24/2025    DIAGNOSIS: gastric GARRY    PRESENTER: Eloisa    PATIENT SUMMARY:   This 73 y/o female was dx with ovarian CA 1/2023, underwent surgery and completed 6 cycles of adj chemotherapy in 7/2023. She developed hematemesis in Feb 2024 and underwent EGD with bx of a large ulcerated gastric mass. Pathology revealed poorly differentiated adenocarcinoma, with intestinal expression, MSI high, HER 2 negative. She underwent diag lap and pathology was negative for malignancy, reactive atypia. She received immunotherapy Keytruda 200 mg every 3 weeks, total of 4 cycles then re-staging CT scan found perigastric lymph nodes larger in size.   She was last presented to this [facility] in 7/2024. She underwent total gastrectomy with D2 lymphadenectomy per Dr. Jerome in 8/2024. Pathology reviewed - ypT2 N3a M0,  6.6cm G3 poorly differentiated, margins neg, 8 out of 45 lymph nodes positive.   She did not start adj immunotherapy given malnutrition and FTT. ECOG remains 2-3.   Reviewed CT and PET scans - low FDG tracer uptake to mesenteric lymphadenopathy, increase in size and number of nodes.    BOARD RECOMMENDATIONS:   Obtain ctDNA as baseline  Discuss surveillance in 3 months with imaging vs. Discussion to resume immunotherapy    CONSULT NEEDED:     [] Surgery    [] Hem/Onc    [] Rad/Onc    [] Dietary                 [] Genetics    [] Psychology       [] AES  [] Interventional Radiology     8/2024: Pathologic stage: Tumor 2 Node(s) 3a Metastasis 0      GROUP STAGE:  [] O    [] 1  [] II     [x] III      []IV  [] Local recurrence     [] Regional recurrence     [] Distant recurrence          [x] Edna'l Treatment Guidelines reviewed and care planned is consistent with guidelines.         (i.e., NCCN, NCI, PD, ACO, AUA, etc.)    PRESENTATION AT CANCER CONFERENCE:         []  Prospective    [x] Retrospective     [] Follow-Up

## 2025-02-24 ENCOUNTER — OFFICE VISIT (OUTPATIENT)
Dept: HEMATOLOGY/ONCOLOGY | Facility: CLINIC | Age: 75
End: 2025-02-24
Payer: MEDICARE

## 2025-02-24 ENCOUNTER — TUMOR BOARD CONFERENCE (OUTPATIENT)
Dept: SURGERY | Facility: CLINIC | Age: 75
End: 2025-02-24
Payer: MEDICARE

## 2025-02-24 DIAGNOSIS — C16.9 GASTRIC ADENOCARCINOMA: Primary | ICD-10-CM

## 2025-02-24 NOTE — PROGRESS NOTES
Patient ID: Cheryl Kirkland   Reason for Visit: Follow-up  MRN:  [id number]     Oncologic Diagnosis:  Stage III  (ypT2, pN3a, cM0) Gastric Adenocarcinoma  Previous Treatment:    NA Immunotherapy (Pembrolizumab 03/26/2024 - 07/23/2024)  s/p open total gastrectomy with D2 lymphadenectomy extensive lysis of adhesions on 08/20/2024 - Dr. Jerome    Current Treatment:  Observation    The patient location is: [place], [place]  The chief complaint leading to consultation is: Follow up imaging results    Visit type: Audio Only    Face to Face time with patient: 15  30 minutes of total time spent on the encounter, which includes face to face time and non-face to face time preparing to see the patient (eg, review of tests), Obtaining and/or reviewing separately obtained history, Documenting clinical information in the electronic or other health record, Independently interpreting results (not separately reported) and communicating results to the patient/family/caregiver, or Care coordination (not separately reported).       Each patient to whom he or she provides medical services by telemedicine is:  (1) informed of the relationship between the physician and patient and the respective role of any other health care provider with respect to management of the patient; and (2) notified that he or she may decline to receive medical services by telemedicine and may withdraw from such care at any time.    Notes:     Subjective   Mrs. Garcia is doing well and has no acute complaints.      I reviewed the TB consensus with her and she is in agreement.  Otherwise, she reports that she has gained 14 lbs.  She is eating better and continues to work with PT. She is out of the wheel chair and independent at home.      Review of Systems   Constitutional:  Negative for activity change, appetite change, chills, diaphoresis, fatigue, fever and unexpected weight change.   HENT:  Negative for nosebleeds.    Respiratory:   Negative for shortness of breath.    Cardiovascular:  Negative for chest pain.   Gastrointestinal:  Positive for abdominal pain and diarrhea. Negative for abdominal distention, anal bleeding, blood in stool, constipation, nausea and vomiting.   Genitourinary:  Negative for difficulty urinating and hematuria.   Musculoskeletal:  Negative for arthralgias, back pain and myalgias.   Skin:  Negative for rash.   Neurological:  Negative for dizziness, weakness, light-headedness and headaches.   Hematological:  Does not bruise/bleed easily.   Psychiatric/Behavioral:  The patient is not nervous/anxious.      History     Oncology History   Mixed epithelial carcinoma of right ovary   1/2/2023 Initial Diagnosis    Mixed epithelial carcinoma of left ovary     1/9/2023 Surgery    Laparotomy for radical resection of gynecologic cancer. Removal of pelvic mass (right salpingo-oophorectomy), left salpingo-oophorectomy, extensive enterolysis, extensive adhesiolysis, left pelvic lymph node biopsy, omental biopsy, small bowel resection with primary functional end-to-end anastomosis, placement of pelvic drain. Path: Right ovary, tumor site, 11 cm, mixed epithelial carcinoma (50% mucinous, 50% endometrioid), G2 moderately differentiated, ovarian surface involvement-indeterminate-specimen disrupted. 1 left pelvic lymph node negative for neoplasia. FIGO stage IC2       1/9/2023 Cancer Staged    Staging form: Ovary, Fallopian Tube, and Primary Peritoneal Carcinoma, AJCC 8th Edition  - Clinical stage from 1/9/2023: FIGO Stage IC2, calculated as Stage IC (cT1c2, cN0, cM0)     4/5/2023 - 7/19/2023 Chemotherapy    4/5/2023: Cycle 1- paclitaxel 135 mg/m2 and carboplatin AUC 5 as patient is frail and elderly.  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 - [id number], MMR deficient- loss MLH1 and PMS2     2/8/2024 Cancer Staged    Staging form: Stomach, AJCC 8th Edition  - Clinical stage from 2/8/2024: Stage IIA (cT2, cN1, cM0)     3/4/2024 Tumor Conference    Date Presented to Tumor Board: 03/04/24  [facility] UGI MULTIDISCIPLINARY TUMOR BOARD  PATIENT REVIEW FORM   ____________________________________________________________    CLINIC #: [id number]  DATE: 3/4/2024    DIAGNOSIS: gastric GARRY    PRESENTER: Justus    PATIENT SUMMARY:   This 74 y/o female was dx with ovarian CA 1/2023, underwent surgery and completed 6 cycles of adj chemotherapy 7/2023. She presented last month with hematemesis. EGD revealed large ulcerated gastric mass, which was biopsied. EGD pathology reviewed - poorly differentiated adenocarcinoma, with intestinal expression, MSI high, HER 2 negative. Staging imaging found irregular wall thickening in mid gastric body, enlarged gastrohepatic lymph node, no evidence of distant metastatic disease.   Reviewed PET - uptake in gastric wall and gastrohepatic lymph node with hypermetabolic activity.   Scheduled to see Dr. Ambrose in [place] later this week.   + family hx of cancer, pending genetics referral    BOARD RECOMMENDATIONS:   Proceed with systemic chemotherapy, consider neoadj immunotherapy  Proceed with diag lap  Await genetics testing, pathology will send to [facility]    CONSULT NEEDED:     [] Surgery    [] Hem/Onc    [] Rad/Onc    [] Dietary                 [] Social Service    [x] Genetics       [] AES  [] Radiology     Clinical Stage: Tumor    Node(s)  1  Metastasis      GROUP STAGE:  [] O    [] 1A    [] IB    [] IIA    [] IIB     [] IIIA     [] IIIB     [] IIIC    []IV  [] Local recurrence     [] Regional recurrence     [] Distant recurrence   Metastatic site(s): none         [x] Edna'l Treatment Guidelines reviewed and care planned is consistent with guidelines.         (i.e., NCCN, NCI, PD, ACO, AUA, etc.)    PRESENTATION AT CANCER CONFERENCE:         [x] Prospective    [] Retrospective     [] Follow-Up         3/14/2024 Surgery    Procedure:  LAPAROSCOPY, DIAGNOSTIC (N/A)  LYSIS, ADHESIONS, LAPAROSCOPIC (N/A)  LAVAGE, PERITONEAL, THERAPEUTIC (N/A)      Surgeon(s) and Role: Chen Jerome MD - Primary    Pre-Operative Diagnosis: Gastric adenocarcinoma [C16.9]     Post-Operative Diagnosis: Same     Pre-Operative Variables:  Stage:  III (T4a N1 M0)   Adjacent organ involvement: None  Chemotherapy within 90 Days: No  Radiation Therapy within 90 Days: No      Procedure:  Diagnostic laparoscopy   Extensive lysis of adhesions  Peritoneal biopsy   Peritoneal washings for cytology     3/26/2024 - 9/24/2024 Chemotherapy    Treatment Summary   Plan Name: OP pembrolizumab 200mg Q3W  Treatment Goal: Curative  Status: Inactive  Start Date: 3/26/2024  End Date: 9/24/2024  Provider: Claudia Ambrose MD  Chemotherapy: [No matching medication found in this treatment plan]      Chemotherapy    Treatment Summary   Plan Name: OP pembrolizumab 200mg Q3W  Treatment Goal: Curative  Status: Active  Start Date: 3/15/2024 (Planned)  End Date: 2/27/2026 (Planned)  Provider: Claudia Ambrose MD  Chemotherapy: [No matching medication found in this treatment plan]     4/8/2024 Tumor Conference       [facility] UGI MULTIDISCIPLINARY TUMOR BOARD  PATIENT REVIEW FORM   ____________________________________________________________    CLINIC #: [id number]  DATE: 4/8/2024    DIAGNOSIS: Gastric GARRY    PRESENTER: Justus    PATIENT SUMMARY:   This 72 y/o female was dx with  ovarian CA 1/2023, underwent surgery and completed 6 cycles of adj chemotherapy 7/2023. She developed hematemesis in Feb 2024, then underwent EGD with bx of a large ulcerated gastric mass. Pathology revealed poorly differentiated adenocarcinoma, with intestinal expression, MSI high, HER 2 negative. She was presented to this [facility] last month. Since then, she underwent diag lap and today's presentation is for pathology review. Negative for malignancy, reactive atypia     BOARD RECOMMENDATIONS:   Proceed with immunotherapy, then restage with imaging  Potential candidate for subtotal distal gastrectomy    CONSULT NEEDED:     [] Surgery    [] Hem/Onc    [] Rad/Onc    [] Dietary                 [] Social Service    [] Psychology       [] AES  [] Radiology     Clinical Stage: Tumor   Node(s) 1 Metastasis 0      GROUP STAGE:  [] O    [] 1A    [] IB    [] IIA    [] IIB     [] IIIA     [] IIIB     [] IIIC    []IV  [] Local recurrence     [] Regional recurrence     [] Distant recurrence   Metastatic site(s): none         [x] Edna'l Treatment Guidelines reviewed and care planned is consistent with guidelines.         (i.e., NCCN, NCI, PD, ACO, AUA, etc.)    PRESENTATION AT CANCER CONFERENCE:         [x] Prospective    [] Retrospective     [] Follow-Up           7/1/2024 Tumor Conference     [facility] UGI MULTIDISCIPLINARY TUMOR BOARD  PATIENT REVIEW FORM   ____________________________________________________________    CLINIC #: [id number]   DATE: 7/1/2024    DIAGNOSIS: gastric GARRY    PRESENTER: Justus    PATIENT SUMMARY:   This 74 y/o female was dx with ovarian CA 1/2023, underwent surgery and completed 6 cycles of adj chemotherapy in 7/2023. She developed hematemesis in Feb 2024 and underwent EGD with bx of a large ulcerated gastric mass. Pathology revealed poorly differentiated adenocarcinoma, with intestinal expression, MSI high, HER 2 negative. She underwent diag lap and pathology was negative for malignancy,  reactive atypia. She was presented to this [facility] in 3/2024 and again in 4/2024. Since then, she received immunotherapy Keytruda 200 mg every 3 weeks, total of 4 cycles thus far.   Reviewed re-staging CT scan - perigastric lymph nodes larger in size  She remains asymptomatic.     BOARD RECOMMENDATIONS:   Obtain repeat PET  Consider ct DNA    CONSULT NEEDED:     [] Surgery    [] Hem/Onc    [] Rad/Onc    [] Dietary                 [] Genetics    [] Psychology       [] AES  [] Interventional Radiology     Clinical Stage: Tumor 2 Node(s) 1 Metastasis 0      GROUP STAGE:  [] O    [] 1A    [] IB    [x] IIA    [] IIB     [] IIIA     [] IIIB     [] IIIC    []IV  [] Local recurrence     [] Regional recurrence     [] Distant recurrence   Metastatic site(s): none         [x] Edna'l Treatment Guidelines reviewed and care planned is consistent with guidelines.         (i.e., NCCN, NCI, PD, ACO, AUA, etc.)    PRESENTATION AT CANCER CONFERENCE:         [x] Prospective    [] Retrospective     [] Follow-Up         8/20/2024 Surgery    Open total gastrectomy with D2 WHITNEY and extensive lysis of adhesions   (Path: pT2 pN3a (8/35 LN positive), G3 poorly differentiated adenocarcinoma, margins negative     8/20/2024 Cancer Staged    Staging form: Stomach, AJCC 8th Edition  - Pathologic stage from 8/20/2024: Stage III (ypT2, pN3a, cM0)     2/24/2025 - 2/24/2025 Chemotherapy    Treatment Summary   Plan Name: OP GI [id number] (oxaliplatin leucovorin fluorouracil)  Q2W with pembrolizumab Q6W followed by maintenance pembrolizumab Q6W  Treatment Goal: Palliative  Status: Inactive  Start Date:   End Date:   Provider: Claudia Ambrose MD  Chemotherapy: fluorouraciL injection 605 mg, 400 mg/m2 = 605 mg, Intravenous, Clinic/HOD 1 time, 0 of 9 cycles  oxaliplatin (ELOXATIN) 85 mg/m2 = 128 mg in D5W 525.6 mL chemo infusion, 85 mg/m2 = 128 mg, Intravenous, Clinic/HOD 1 time, 0 of 9 cycles  fluorouracil (Adrucil) 2,400 mg/m2 = 3,625 mg in 0.9% NaCl 100  mL chemo infusion, 2,400 mg/m2 = 3,625 mg, Intravenous, Over 46 hours, 0 of 9 cycles           Previous HPI  Cheryl Garcia is a 73 y.o. female with history of DVT not currently on AC, previously on Xarelto, FIGO Stage IC (cT1c2, cN0, cM0) Mixed epithelial carcinoma of right ovary s/p surgery 01/09/023 and 6C Carbo/Paclitaxel completed 07/19/2023, Osteoporosis on Fosamax, HTN and Hx of Epilepsy on Keppra, Tegretol, Zonegran (last seizure in 2009) who presents to clinic to establish care on referral for gastric cancer.    Patient reports that she Initially presented to [facility] 02/06/24 with report of hematemeiss; she was transferred to [facility] for higher level of care.  On admission to [facility], EGD  was performed and showed a large ulcerated gastric mass.  Pathology results it as poorly differentiated adenocarcinoma.  She states that her weight is currently stable.  The last time she lost any weight was 2 her ovarian cancer diagnosis and treatment at which time she lost 5 lb but gained it back.  On hospitalization here at [facility], during GI workup she lost those 5 lbs a cane because she was NPO.    The patient has already established care with surgical oncology, Dr. Jerome.  She was presented in the upper GI tumor board with consensus for neoadjuvant immunotherapy given MSI high status of her tumor.  She is here for medical oncology recommendations.    I reviewed the recommendations of the tumor board consensus for neoadjuvant immunotherapy and I reviewed her pathology and biomarkers in detail.  She is expressed understanding in his in agreement with the plan.  She also understands that she still needs to undergo diagnostic laparoscopy for completed staging.    Otherwise, she is a former light smoker; quit 30-40 years ago. No alcohol use in 30-40 years. No illicit drug use.  She has an extensive family history of malignancy in his already been referred to our genetic  counselor.      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       Past Surgical History:   Procedure Laterality Date    ABDOMINAL WASHOUT N/A 3/14/2024    Procedure: LAVAGE, PERITONEAL, THERAPEUTIC;  Surgeon: Chen Jerome MD;  Location: [facility] OR;  Service: General;  Laterality: N/A;    APPENDECTOMY  2015    BIOPSY OF PERITONEUM N/A 3/14/2024    Procedure: BIOPSY, PERITONEUM;  Surgeon: Chen Jerome MD;  Location: [facility] OR;  Service: General;  Laterality: N/A;    COLONOSCOPY  06/20/2012    Dr Boyle- Tubular adenoma polyps. Repeat in 3 years.    COLONOSCOPY  06/17/2015    -Nodular mucosa at appendiceal orfice, sigmoid and desc diverticulosis otherwise normal.    COLONOSCOPY  2020    >3 TAs    COLONOSCOPY  12/2023    CYSTOSCOPY W/ RETROGRADES Right 10/10/2024    Procedure: CYSTOSCOPY, WITH RETROGRADE PYELOGRAM;  Surgeon: Lata Kelly MD;  Location: [facility] OR;  Service: Urology;  Laterality: Right;    CYSTOURETEROSCOPY, WITH HOLMIUM LASER LITHOTRIPSY OF URETERAL CALCULUS AND STENT INSERTION Right 11/27/2024    Procedure: CYSTOURETEROSCOPY, WITH HOLMIUM LASER LITHOTRIPSY OF URETERAL CALCULUS AND STENT INSERTION;  Surgeon: Lata Kelly MD;  Location: [facility] OR;  Service: Urology;  Laterality: Right;    DIAGNOSTIC LAPAROSCOPY N/A 3/14/2024    Procedure: LAPAROSCOPY,  DIAGNOSTIC;  Surgeon: Chen Jerome MD;  Location: [facility] OR;  Service: General;  Laterality: N/A;  1. Diagnostic laparoscopy   2. Extensive lysis of adhesions  3. Peritoneal biopsy   4. Peritoneal washings for cytology    DIAGNOSTIC LAPAROSCOPY N/A 8/20/2024    Procedure: LAPAROSCOPY, DIAGNOSTIC;  Surgeon: Chen Jerome MD;  Location: [facility] OR;  Service: General;  Laterality: N/A;    EGD - EXTERNAL RESULT  06/20/2012    Dr Boyle- Mild gastritis otherwise normal.    ENDOSCOPIC ULTRASOUND OF UPPER GASTROINTESTINAL TRACT N/A 7/26/2024    Procedure: ULTRASOUND, UPPER GI TRACT, ENDOSCOPIC;  Surgeon: Valdo Aguilera MD;  Location: [facility] ENDO;  Service: Endoscopy;  Laterality: N/A;  7/22/24: instructions sent via portal-. Pt no longer takling eliquis-GD    ESOPHAGOGASTRODUODENOSCOPY N/A 02/08/2024    Procedure: EGD (ESOPHAGOGASTRODUODENOSCOPY);  Surgeon: Jayla Arteaga MD;  Location: [facility] ENDO;  Service: Endoscopy;  Laterality: N/A;    ESOPHAGOGASTRODUODENOSCOPY N/A 8/20/2024    Procedure: EGD (ESOPHAGOGASTRODUODENOSCOPY);  Surgeon: Chen Jerome MD;  Location: [facility] OR;  Service: General;  Laterality: N/A;    GASTRECTOMY N/A 8/20/2024    Procedure: GASTRECTOMY;  Surgeon: Chen Jerome MD;  Location: [facility] OR;  Service: General;  Laterality: N/A;    HYSTERECTOMY      LAPAROSCOPIC LYSIS OF ADHESIONS N/A 3/14/2024    Procedure: LYSIS, ADHESIONS, LAPAROSCOPIC;  Surgeon: Chen Jerome MD;  Location: [facility] OR;  Service: General;  Laterality: N/A;    LYSIS OF ADHESIONS N/A 8/20/2024    Procedure: LYSIS, ADHESIONS;  Surgeon: Chen Jerome MD;  Location: [facility] OR;  Service: General;  Laterality: N/A;    PLACEMENT OF JEJUNOSTOMY TUBE N/A 8/20/2024    Procedure: INSERTION, JEJUNOSTOMY TUBE;  Surgeon: Chen Jerome MD;  Location: [facility] OR;  Service: General;  Laterality: N/A;    REMOVAL OF URETERAL CALCULUS Right 11/27/2024    Procedure: REMOVAL, CALCULUS, URETER;  Surgeon: Lata Kelly MD;   Location: [facility] OR;  Service: Urology;  Laterality: Right;  basket extraction    REMOVAL, NEPHROSTOMY TUBE, WITH IMAGING GUIDANCE Right 11/27/2024    Procedure: REMOVAL, NEPHROSTOMY TUBE, WITH IMAGING GUIDANCE;  Surgeon: Lata Kelly MD;  Location: [facility] OR;  Service: Urology;  Laterality: Right;    REMOVAL-STENT Right 11/27/2024    Procedure: REMOVAL-STENT;  Surgeon: Lata Kelly MD;  Location: [facility] OR;  Service: Urology;  Laterality: Right;    RETROGRADE PYELOGRAPHY Right 11/27/2024    Procedure: PYELOGRAM, RETROGRADE;  Surgeon: Lata Kelly MD;  Location: [facility] OR;  Service: Urology;  Laterality: Right;    TOTAL ABDOMINAL HYSTERECTOMY W/ BILATERAL SALPINGOOPHORECTOMY  01/09/2023    radical resection with right salpingo-oophorectomy, left salpingo-oophorectomy, extensive enterolysis, extensive adhesiolysis, left pelvic lymph node biopsy, omental biopsy, small bowel resection with primary functional end-to-end anastomosis, placement of pelvic drain       Family History   Problem Relation Name Age of Onset    Pancreatic cancer Brother Misael Mccray 76    Prostate cancer Brother Zachary 67    Pancreatic cancer Half-brother Gasper 74    Colon cancer Half-sister Elton 83    Lung cancer Half-sister Elton         +smoking hx    Breast cancer Half-sister Vidhi 58    Lymphoma Other Cara         Corinne    Prostate cancer Other Fernando     Prostate cancer Other Gasper Calderon     Ovarian cancer Other Inerris     Breast cancer Other Aleida     Colon cancer Other Aleida        Review of patient's allergies indicates:  No Known Allergies    Social History     Tobacco Use    Smoking status: Former     Types: Cigarettes    Smokeless tobacco: Never    Tobacco comments:     Quit 1989 smoked 10 years smoked 0.25 ppd    Substance Use Topics    Alcohol use: Not Currently    Drug use: Never       Labs   Labs:  No visits with results within 2 Day(s) from this visit.   Latest known visit with results is:   Lab Visit  on 01/02/2025   Component Date Value Ref Range Status    Sodium 01/02/2025 137  136 - 145 mmol/L Final    Potassium 01/02/2025 4.7  3.5 - 5.1 mmol/L Final    Chloride 01/02/2025 109  95 - 110 mmol/L Final    CO2 01/02/2025 19 (L)  23 - 29 mmol/L Final    Glucose 01/02/2025 96  70 - 110 mg/dL Final    BUN 01/02/2025 12  8 - 23 mg/dL Final    Creatinine 01/02/2025 0.6  0.5 - 1.4 mg/dL Final    Calcium 01/02/2025 8.6 (L)  8.7 - 10.5 mg/dL Final    Total Protein 01/02/2025 6.8  6.0 - 8.4 g/dL Final    Albumin 01/02/2025 3.2 (L)  3.5 - 5.2 g/dL Final    Total Bilirubin 01/02/2025 0.2  0.1 - 1.0 mg/dL Final    Comment: For infants and newborns, interpretation of results should be based  on gestational age, weight and in agreement with clinical  observations.    Premature Infant recommended reference ranges:  Up to 24 hours.............<8.0 mg/dL  Up to 48 hours............<12.0 mg/dL  3-5 days..................<15.0 mg/dL  6-29 days.................<15.0 mg/dL      Alkaline Phosphatase 01/02/2025 117  40 - 150 U/L Final    AST 01/02/2025 27  10 - 40 U/L Final    ALT 01/02/2025 21  10 - 44 U/L Final    eGFR 01/02/2025 >60  >60 mL/min/1.73 m^2 Final    Anion Gap 01/02/2025 9  8 - 16 mmol/L Final        Pathology   Contains abnormal data Specimen to Pathology, Surgery Gastrointestinal tract - 02/28/2024      Component 3 wk ago   Final Pathologic Diagnosis  Abnormal   STOMACH, 'GASTRIC MASS', BIOPSY:  - 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 Scattered p53 positivity (wild-type pattern of expression) and p16 positivity are also noted. Additional studies, including CK7, PAX8, ER, HER2, TTF-1, GATA3, WT-1,  synaptophysin, and chromogranin, are either negative or negative in the cells of interest. This patient's prior history of ovarian carcinoma resected at [facility] in [place], [place] is noted  and the outside pathology report (S-) is reviewed.   While both tumors do have similar immunohistochemical staining patterns, they are not identical, with notable reported differences being CK7 and PAX8 positivity in the ovarian tumor. The presence of a large, fungating, and ulcerated mass in the stomach  is suspicious for a gastric primary; however, it is unclear if the masses represent two separate primaries or one p rimary and one metastatic focus. Abnormal mismatch repair studies do suggest increased risk for development of multiple tumor types (see  below). Requesting outside slides for review to compare morphology may be helpful.    DNA mismatch repair IHC studies are ABNORMAL and demonstrate LOSS OF MLH1 and PMS2 expression within tumor cells. MSH2 and MSH6 exhibit retained expression. 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    Tissue will be sent for molecular profiling by next-generation sequencing ([facility]). Results of this study will be issued directly to the electronic medical record.                        Tumor NGS Tissue - 02/29/2024  MSI: MSI-H         TMB: 42.6 m/MB         Low Coverage Regions: KDM5D, PMS2         Fusion Addendum Issue Type: NEGATIVE  Negative - This addendum is being issued to report that no gene fusions or altered splicing variants from RNA sequencing analysis were found.               Cytology-[facility] Non-Radiology Clinician Performed w/o on site - 07/26/2024        Component 2 mo ago   Final Pathologic Diagnosis      Abnormal   Lymph node, perigastric, EUS guided fine-needle aspiration/biopsy:  - Adenocarcinoma involving lymphoid tissue consistent with patient's previous gastric mass biopsy, see comment.    COMMENT:  The biopsy shows a malignant epithelial proliferation involving lymphoid tissue consistent with tumor involving lymph node.  Immunostains show tumor cells to be positive for cytokeratin, CDX2, and patchy  CK20.  Tumor cells are negative for PAX8   and CK7.  Patient had a previous stomach mass biopsy (UOZ-) that is pulled and reviewed.  The current biopsy shows similar morphology and immunoprofile as the stomach mass biopsy.  MMR and NGS performed on previous biopsy.  Claudin 18.2 pending,  results will be issued in an addendum.      HER2 by immunohistochemistry:  Negative (1+)  VC      Comment: Interp By Tiny Ma MD, Signed on 08/06/2024 at 13:07   Supplemental Diagnosis      Abnormal   CLDN18, SemiQuant IHC, Manual    Interpretation  FNA lymph node, specimen for CLD18 immunohistochemistry studies (anti-[id number] (CLDN18) clone [id number], [employer], [place], [place]; using a proprietary detection system) ([id number]?1A.u, KEF24?184?1A.u)    Claudin 18.2: Negative; 40% of tumor cells with 2+ and/or 3+ membrane staining.    Interpretation: The Claudin 18.2 antibody (clone [id number]?[id number]) was performed per clinical validation and relevant - provided instructions.    This result should be interpreted in the appropriate clinical context.    Fixation: This test has been validated for non-decalcified paraffin embedded tissue specimens fixed in 10% neutral buffered formalin. Recommended fixation time is between 6?48 hours. This assay has not been validated on tissues  subjected to the decalcification process and /or use of alternative fixatives.      Report electronically signed by  Maribell Glover M.D.      Report attached.    Performing  location:  [place] [address]    &quot;Disclaimer:  This case diagnosis was rendered completely by the outside consultation pathologist and the case is electronically signed by an [facility] pathologist listed below solely to release the report into the medical record.&quot;  VC      Disclaimer      Abnormal   HER-2/candida IHC (4B5) clone, DAB detection method) is done on 10%  buffered formalin-fixed (for 6-72 hrs), paraffin embedded tissue sections. The scoring is completed on a 4-tiered scoring system of membrane staining using the 2014 ASCO/CAP scoring  guidelines. It has been cleared by the U.S. FDA for use as an IVD test. This assay has not been validated on decalcified tissues. Results should be interpreted with caution given the likelihood of false negativity on decalcified specimens.    HER-2/candida IHC (4B5) clone, DAB detection method) is done on 10% buffered formalin-fixed (for 6-72 hrs), paraffin embedded tissue sections. The scoring is completed on a 4-tiered scoring system of membrane staining using the 2014 ASCO/CAP scoring  guidelines. It has been cleared by the U.S. FDA for use as an IVD test. This assay has not been validated on decalcified tissues. Results should be interpreted with caution given the likelihood of false negativity on decalcified specimens.    VC      Resulting Agency [facility]            Specimen to Pathology, Surgery General Surgery - 08/20/2024      Component 1 mo ago   Final Pathologic Diagnosis 1. Small intestine, peritoneal scarring, excision:  - Benign fibroadipose tissue  - Negative for malignancy    2. Spleen, lesion, excision:- Benign splenic tissue  - Negative for malignancy    3. Small intestine, resection:- Benign small intestinal tissue  - Negative for malignancy    4. Lymph node, hepatic artery, excision:- Two lymph nodes, negative for metastatic carcinoma (0/2)    5. Stomach, total gastrectomy:  - Adenocarcinoma, poorly cohesive, with features of undifferentiated carcinoma  - Tumor involves subserosal tissue  - Margins negative for carcinoma  - Pathologic stage y pT2 pN3a  - Eight of 35 lymph nodes are POSITIVE for metastatic carcinoma (8/35)  - Evidence of tumor regression is seen in a lymph node  - Incidental benign leiomyoma identified in perigastric tissue  - Background chronic gastritis  - Helicobacter pylori-type microorganisms are  identified by immunohistochemistry  - See synoptic report below    6. Lymph nodes, D2 and periportal lymph node, lymphadenectomy:  - Eight lymph nodes, all negative for metastatic carcinoma (0/8)     7. Lymph node, aortocaval, excision:  - One lymph node, negative for metastatic carcinoma (0/1)    8. Anastomosis, anastomotic donuts:  - Benign esophageal and small intestinal tissue  - Negative for malignancy    _____________________________________________________________________  CASE SUMMARY: (STOMACH)  Standard(s): AJCC-UICC 8    SPECIMEN    Procedure: Total gastrectomy    TUMOR    Tumor Site: Body: Lesser curvature    Histologic Type: Adenocarcinoma    Adenocarcinoma Classification (based on WHO): Poorly cohesive carcinoma, Undifferentiated (anaplastic) carcinoma    Histologic Type Comment: Tumor is poorly cohesive, but in some areas a better differentiated component is identified. The tumor diffusely expresses CDX2.  Histologic Grade: G3, poorly differentiated, undifferentiated  Tumor Size: Greatest dimension in Centimeters (cm): 6.6 cm    Additional Dimension in Centimeters (cm): 4.2 x 1.4 cm  Tumor Extent: Invades muscularis propria  Treatment Effect: Present, with residual cancer showing evident tumor regression, but more than single cells or rare small groups of cancer cells (partial response, score 2)  Lymphatic and / or Vascular Invasion: Present    MARGINS    Margin Status for Invasive Carcinoma: All margins negative for invasive carcinoma    Closest Margin(s) to Invasive Carcinoma: Omental (radial): lesser curvature    Distance from Invasive Carcinoma to Closest Margin: Exact distance in cm: 3 cm  Margin Status for Dysplasia: All margins negative for dysplasia    REGIONAL LYMPH NODES    Regional Lymph Node Status: Regional lymph nodes present    Tumor present in regional lymph node(s)      Number of Lymph Nodes with Tumor: Exact number: 8    Number of Lymph Nodes Examined: 45    DISTANT  METASTASIS    Distant Site(s) Involved, if applicable: Not applicable    pTNM CLASSIFICATION (AJCC 8TH Edition)  Reporting of pT, pN, and (when applicable) pM categories is based on information available to the pathologist at the time the report is issued. As per the AJCC (Chapter 1, 8th Ed.) it is the managing physician's responsibility to establish the final  pathologic stage based upon all pertinent information, including but potentially not limited to this pathology report.    Modified Classification: y (post-neoadjuvant therapy)  pT Category: pT2: invades the muscularis propria  pN Category: pN3a: Metastasis in seven to 15 regional lymph nodes  pM Category: Not applicable - pM cannot be determined from the submitted specimen(s)    ADDITIONAL FINDINGS    Additional Findings: Chronic gastritis, Helicobacter pylori present    SPECIAL STUDIES    HER2 and mismatch repair testing have been performed on the previous biopsy cases [facility]- and [facility]-; see those cases for a full report. HER2 was negative and mismatch repair testing revealed loss of nuclear expression of MLH1 and PMS2. Claudin  18.2 was negative.   Comment: Interp By Jorge Luis Morales D.O., Signed on 08/23/2024 at 17:04   Frozen Section Diagnosis 1.Negative for malignacy  2.Negative for malignacy  Frozen diagnosis made by Dr. Goode for part 1 and 2  5. Proximal and distal margins negative for malignancy.  Frozen diagnosis made by Dr. Elena for part 5.   Microscopic Exam Immunohistochemical stains for PAX8, cytokeratin 7, cytokeratin 20, CDX2, CD45, synaptophysin and chromogranin are performed on block 5-F with adequate and reactive controls and the following results:  CDX2 is positive.  Cytokeratin 20 is patchy, but focally positive in tumor cells.  Cytokeratin 7, CD45, synaptophysin, chromogranin, and PAX8 are negative.    EBV in-situ hybridization, performed on block 5-F, is negative. RNA positive and GUILLAUME negative controls stain  appropriately.    An immunohistochemical stain for H. pylori, performed on block 5-K with adequate and reactive controls, is positive.   Gross 1: Surgery ID:  [id number]    Pathology ID:  [id number]  1. Received fresh and subsequently placed in formalin labeled &quot;small bowel peritoneal scarring&quot; is a 0.7 x 0.5 x 0.3 cm tan tissue fragment.  The specimen is submitted entirely for frozen section with the frozen section remnant submitted in  cassette 1A.    RNL--1-A-FS    Grossed by: Rose Mary Melara MS, PA([facility])     2: Surgery ID:  [id number]    Pathology ID:  [id number]  2. Received fresh and subsequently placed in formalin labeled &quot;splenic flexure lesion&quot; is a 0.6 x 0.4 x 0.3 cm tan-red tissue fragments.  The specimen is submitted entirely for frozen section with the frozen section remnant submitted in  cassette 2A.  SET--2-A-FS    Grossed by: Rose Mray Melara MS, PA([facility])     3: Surgery ID:  [id number]   Pathology ID:  [id number] 3.  Small bowel Received in formalin labeled &quot;small bowel&quot; is a 4.4 cm in length by 1.9 cm in diameter segments of small bowel with 2 stapled ends.  The serosa is pink-red with areas of adhesions.  Opening the specimen reveals pink-red, well folded   mucosa.  Representative sections are submitted as follows:  WPM--3-A: Tissue from staple line margin  RKZ--3-B: Tissue from opposite staple line margin  KPD--3-C: Representative sections of specimen      Grossed by: Rose Mary Melara MS, PA(AS[facility])     4: Surgery ID:  [id number]   Pathology ID:  [id number]  4. Received in formalin labeled &quot;hepatic artery lymph node&quot; is a 0.8 x 0.5 x 0.4 cm tan-pink lymph node.  The lymph node is bisected and submitted entirely in cassette 4A.    LUS--4-A    Grossed by: Rose Mary Melara MS, PA([facility])     5: Surgery ID:  [id number]   Pathology ID:  [id number]  5. Received fresh and subsequently placed in formalin labeled &quot;total gastrectomy distal and  proximal margin&quot; is a 23 x 7.6 x 4 cm (lesser curvature is 10 cm and greater curvature is 23 cm) gastrectomy specimen.  The serosa is pink-red.  Opening   the specimen reveals a 6.6 x 4.2 cm firm, tan-pink, nodular, slightly raised mass.  The mass invades the gastric wall and involves the serosa.  The mass does not grossly invade into the perigastric fat measuring 3 cm from the lesser curvature fat margin   and 7 cm from the greater curvature fat margin.  The mass measures 5.2 cm from the distal duodenal staple line margin and 4 cm from the proximal staple line margin.  The remaining mucosa is tan-pink and moderately well folded.  Multiple lymph nodes are  identified.  The serosa is inked blue.  The proximal staple line margin is submitted for frozen section with the frozen section remnant submitted in cassette AJU--5-A-FS.  The distal duodenal staple line margin is submitted for frozen section with   the frozen section remnant submitted in cassette LMU--5-B-FS.  Representative sections are submitted as follows:  IWM--5-C:  Lesser curvature fat margin nearest mass  ZET--5-D: Greater curvature fat margin nearest mass  HNT--5-E: Mass to serosa  PIL--5-F: Mass to lesser curvature fat  YEH--5-G: Mass to adjacent distal mucosa  GTK--5-H: Mass to adjacent proximal mucosa  URQ--5-I: Representative section of mass  TAH--5-J: Representative section of mass  RYO--5-K: Representative sections of uninvolved mucosa  SSX--5-L: 7 lesser curvature lymph nodes  BCD--5-M: 5 lesser curvature lymph nodes  DNE--5-N: 5 lesser curvature lymph nodes  QPK--5-O: 5 lymph nodes  WXG--5-P: 2 lymph nodes  TSM--5-Q - BBJ--5-R - AFB--5-S: 1 lymph node, serially sectioned and submitted entirely  OIQ--5-T - TXB--5-X: 1 lymph node, serially sectioned and submitted entirely    Grossed by: Rose Mary  MS Melara PA([facility])     6: Surgery ID:  [id number]   Pathology ID:  [id number]  6. Received in formalin labeled &quot;D2 lymphadenopathy with alea portal lymph node&quot; is a 4 x 4 x 1 cm aggregate of tan-pink to tan-yellow, nodular tissue fragments.  7 candidate lymph nodes ranging from 0.3-1.8 cm in greatest dimension are  identified.  The lymph nodes are submitted entirely as follows:  TKH--6-A: 4 lymph nodes  TBY--6-B: 2 lymph nodes  XSK--6-C: 1 lymph node, bisected    Grossed by: Rose Mary Melara MS, PA([facility])     7: Surgery ID:  [id number]   Pathology ID:  [id number]  7. Received in formalin labeled &quot;Aortic cable&quot; is a 1 x 0.5 x 0.4 cm purple-gray, rubbery portion of tissue.  The specimen is submitted entirely in cassette 7A.    CMI--7-A    Grossed by: Rose Mary Melara MS, PA([facility])     8: Surgery ID:  [id number]   Pathology ID:  [id number]  8. Received in formalin labeled &quot;anastomotic donuts&quot; are 2 circular, pink-red mucosal surfaced portions of tissue measuring 1.7 cm in diameter by 1 cm in length and 1.5 cm in diameter by 1.1 cm in length.  Representative sections are submitted  in cassette 8A.    RSF--8-A    Grossed by: Rose Mary Melara MS, PA([facility])   Frozen Section Footnote Frozen section performed at [facility], [id number] [place] [address] [place], [place], [address]   Disclaimer Unless the case is a 'gross only' or additional testing only, the final diagnosis for each specimen is based on a microscopic examination of appropriate tissue sections.  This test was developed and its performance characteristics determined by [facility], Department of Pathology and Laboratory Medicine. It has not been cleared or approved by the US Food and Drug Administration. The FDA has determined that  such clearance or approval is not necessary. This test is used for clinical purposes. It should not be regarded as investigational or for research. This laboratory is  certified under the Clinical Laboratory Improvement Admendments (CLIA) as qualified to  perform such high complexity clinical laboratory testing   [facility]          Imaging   NM PET CT FDG SKULL BASE TO MID THIGH - 02/28/2024  CLINICAL HISTORY:  Gastric cancer.  Malignant neoplasm of stomach, unspecified.  Initial staging.  History of left ovarian epithelial carcinoma.     TECHNIQUE:  11.45 mCi of F18-FDG was administered intravenously in the left forearm.  After an approximately 60 min distribution time, PET/CT images were acquired from the skull base to the mid thigh. Transmission images were acquired to correct for attenuation using a whole body low-dose CT scan without contrast with the arms positioned above the head. Glycemia at the time of injection was 113 mg/dL.     COMPARISON:  CT abdomen pelvis, 02/08/2024 and chest CT, 02/27/2024     FINDINGS:  Quality of the study: Adequate.     SUV max of the liver parenchyma is 2.8     Neck: No abnormal FDG avidity.  No lymphadenopathy or mass.     Chest: No abnormal FDG avidity.  No pleural effusion or lymphadenopathy or pulmonary nodule.     Abdomen/pelvis: Marked thickening of the wall of the stomach involving the fundus and proximal body with marked FDG avidity with SUV max 18.  The gastrohepatic ligament lymph node measuring 19 x 13 mm shows moderate FDG avidity with SUV max 4.6.     No ascites.  No other focus of abnormal FDG avidity in the abdomen or pelvis.     Skeletal structures: No abnormal FDG avidity.  No focal lytic or sclerotic lesion.     Physiologic uptake of the tracer is present within the brain, salivary glands, myocardium, GI and  tracts.     Incidental CT findings: N/A     Impression:  1. The biopsy proving gastric cancer is markedly FDG avid with SUV max 18.  2. Moderately FDG avid gastrohepatic ligament lymph node consistent with local metastatic disease.  3. No evidence for distant metastatic disease.  All CT scans at this  facility are performed  using dose modulation techniques as appropriate to performed exam including the following:  automated exposure control; adjustment of mA and/or kV according to the patients size (this includes techniques or standardized protocols for targeted exams where dose is matched to indication/reason for exam: i.e. extremities or head);  iterative reconstruction technique.    Assessment and Plan   Stage III  (ypT2, pN3a, cM0) Gastric Adenocarcinoma  EGD 02/08/2024: Gastric Mass  Path: Poorly-differentiated adenocarcinoma with intestinal phenotype -  HER2-, MMR - Deficient (MLH1 and PMS2 loss)  [facility] NGS:   MSI-H, TMB 42.6  Potentially actionable mutation in CHRIS  Multiple clinical trials available  PET-CT 02/28/24 showed no evidence of distant metastatic disease  Presented in Tb 03/04/24 with consensus for Proceed with systemic chemotherapy, consider neoadj immunotherapy, Proceed with diag lap  Diagnostic Laparoscopy 03/14/2024 with Dr. Jerome negative for malignant cells  Per NCCN guidelines, NA immunotherapy is useful in MSI-H/dMMR tumors) with options being Nivolumab and ipilimumab followed by nivolumab or Pembrolizumab.  Given patient's comorbid conditions and potentially increased toxicity with CTLA4 inhibitor and PD-1 inhibitor, decision made to proceed with Pembrolizumab. Started 03/14/24.  Patient to completed 12 weeks of NA immunotherapy 06/18/2024  CT CAP 06/24/25 showed progression of LAD but no other signs of disease  s/p open total gastrectomy with D2 lymphadenectomy extensive lysis of adhesions on 08/20/2024  Eight of 35 lymph nodes are POSITIVE for metastatic carcinoma   Adjuvant therapy delayed due to malnutrition and FTT   CT CAP 01/03/2025 showed a few enlarged left mid to upper abdomen lymph nodes (at least a couple enlarging from the recent exams) concerning for potential metastatic nodes.    Plan  PET-CT 01/30/2025 showed hypermetabolic left mesenteric lymphadenopathy consistent  with metastasis; discussed with IR and no target for biopsy  UGI TB Presentation 02/24/2025: consensus for obtaining ctDNA and monitoring off of therapy  with monitoring imaging and ctDNA and resumption of immunotherapy if progression   Discussed above with patient and she is in agreement        Severe Protein Calorie Malnutrition  Diarrhea  Counseled extensively to use feeding tube as prescribed and not restrict her diet  Counseled to use Imodium for diarrhea      Immunotherapy induced pruritus  Immunotherapy induced rash < Grade 1  Topical hydrocortisone and Atarax prescribed with relief        Chronic Medical Conditions  Hx DVT previously on Xareltao  Hx of FIGO Stage IC (cT1c2, cN0, 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 review imaging   Follow up with JENNIE    Infusion scheduling note    Injection scheduling note    Labs CBC, CMP, phosphorus and magnesium   Scheduling:  Preferred lab:  Lab interval:     Imaging PET scan      Pharmacy appointment    Other referrals                    The patient was seen, interviewed and examined. Pertinent lab and radiologic studies were reviewed. Pt instructed to call should they develop concerning signs/symptoms or have further questions.        Portions of the record may have been created with voice recognition software. Occasional wrong-word or sound-a-like substitutions may have occurred due to the inherent limitations of voice recognition software. Read the chart carefully and recognize, using context, where substitutions have occurred.      Claudia Amin MD    Hematology/Oncology

## 2025-02-25 ENCOUNTER — EXTERNAL HOME HEALTH (OUTPATIENT)
Dept: HOME HEALTH SERVICES | Facility: HOSPITAL | Age: 75
End: 2025-02-25
Payer: MEDICARE

## 2025-03-24 ENCOUNTER — PATIENT MESSAGE (OUTPATIENT)
Dept: HEMATOLOGY/ONCOLOGY | Facility: CLINIC | Age: 75
End: 2025-03-24
Payer: MEDICARE

## 2025-04-08 ENCOUNTER — PATIENT MESSAGE (OUTPATIENT)
Dept: ADMINISTRATIVE | Facility: OTHER | Age: 75
End: 2025-04-08
Payer: MEDICARE

## 2025-04-08 ENCOUNTER — PATIENT MESSAGE (OUTPATIENT)
Dept: HEMATOLOGY/ONCOLOGY | Facility: CLINIC | Age: 75
End: 2025-04-08
Payer: MEDICARE

## 2025-04-09 ENCOUNTER — TELEPHONE (OUTPATIENT)
Dept: NUTRITION | Facility: CLINIC | Age: 75
End: 2025-04-09
Payer: MEDICARE

## 2025-04-09 ENCOUNTER — PATIENT MESSAGE (OUTPATIENT)
Dept: ADMINISTRATIVE | Facility: OTHER | Age: 75
End: 2025-04-09
Payer: MEDICARE

## 2025-04-09 NOTE — TELEPHONE ENCOUNTER
Spoke with Ms. Luna about her [employer] pump screen. Discussed how it looks fine and that the pump is still working to give the enteral feeds at 40 ml/hr. Will remain available to her as needed for nutrition concerns.  Signature: Sonali Dhaliwal, MPH, RD, LDN

## 2025-04-09 NOTE — TELEPHONE ENCOUNTER
Left voicemail with RD callback number, to see if I could answer questions she has about her enteral pump.  Signature: Sonali Dhaliwal, MPH, RD, LDN

## 2025-04-10 ENCOUNTER — PATIENT MESSAGE (OUTPATIENT)
Dept: ADMINISTRATIVE | Facility: OTHER | Age: 75
End: 2025-04-10
Payer: MEDICARE

## 2025-04-14 ENCOUNTER — PATIENT MESSAGE (OUTPATIENT)
Dept: ADMINISTRATIVE | Facility: OTHER | Age: 75
End: 2025-04-14
Payer: MEDICARE

## 2025-04-15 ENCOUNTER — PATIENT MESSAGE (OUTPATIENT)
Dept: ADMINISTRATIVE | Facility: OTHER | Age: 75
End: 2025-04-15
Payer: MEDICARE

## 2025-04-17 ENCOUNTER — PATIENT MESSAGE (OUTPATIENT)
Dept: ADMINISTRATIVE | Facility: OTHER | Age: 75
End: 2025-04-17
Payer: MEDICARE

## 2025-04-18 ENCOUNTER — PATIENT MESSAGE (OUTPATIENT)
Dept: ADMINISTRATIVE | Facility: OTHER | Age: 75
End: 2025-04-18
Payer: MEDICARE

## 2025-04-19 ENCOUNTER — PATIENT MESSAGE (OUTPATIENT)
Dept: ADMINISTRATIVE | Facility: OTHER | Age: 75
End: 2025-04-19
Payer: MEDICARE

## 2025-04-22 ENCOUNTER — HOSPITAL ENCOUNTER (OUTPATIENT)
Dept: RADIOLOGY | Facility: HOSPITAL | Age: 75
Discharge: HOME | End: 2025-04-22
Attending: INTERNAL MEDICINE
Payer: MEDICARE

## 2025-04-22 ENCOUNTER — RESULTS FOLLOW-UP (OUTPATIENT)
Dept: HEMATOLOGY/ONCOLOGY | Facility: CLINIC | Age: 75
End: 2025-04-22

## 2025-04-22 DIAGNOSIS — C16.9 GASTRIC ADENOCARCINOMA: ICD-10-CM

## 2025-04-22 PROCEDURE — 78815 PET IMAGE W/CT SKULL-THIGH: CPT | Mod: 26,PS,, | Performed by: RADIOLOGY

## 2025-04-22 PROCEDURE — A9552 F18 FDG: HCPCS | Performed by: INTERNAL MEDICINE

## 2025-04-22 PROCEDURE — 78815 PET IMAGE W/CT SKULL-THIGH: CPT | Mod: TC

## 2025-04-22 RX ORDER — FLUDEOXYGLUCOSE F18 500 MCI/ML
10.75 INJECTION INTRAVENOUS
Status: COMPLETED | OUTPATIENT
Start: 2025-04-22 | End: 2025-04-22

## 2025-04-22 RX ADMIN — FLUDEOXYGLUCOSE F-18 10.75 MILLICURIE: 500 INJECTION INTRAVENOUS at 08:04

## 2025-04-23 ENCOUNTER — PATIENT MESSAGE (OUTPATIENT)
Dept: ADMINISTRATIVE | Facility: OTHER | Age: 75
End: 2025-04-23
Payer: MEDICARE

## 2025-04-24 ENCOUNTER — PATIENT MESSAGE (OUTPATIENT)
Dept: ADMINISTRATIVE | Facility: OTHER | Age: 75
End: 2025-04-24
Payer: MEDICARE

## 2025-04-25 ENCOUNTER — PATIENT MESSAGE (OUTPATIENT)
Dept: ADMINISTRATIVE | Facility: OTHER | Age: 75
End: 2025-04-25
Payer: MEDICARE

## 2025-04-26 ENCOUNTER — PATIENT MESSAGE (OUTPATIENT)
Dept: ADMINISTRATIVE | Facility: OTHER | Age: 75
End: 2025-04-26
Payer: MEDICARE

## 2025-04-27 ENCOUNTER — PATIENT MESSAGE (OUTPATIENT)
Dept: ADMINISTRATIVE | Facility: OTHER | Age: 75
End: 2025-04-27
Payer: MEDICARE

## 2025-04-28 ENCOUNTER — LAB VISIT (OUTPATIENT)
Dept: LAB | Facility: HOSPITAL | Age: 75
End: 2025-04-28
Attending: INTERNAL MEDICINE
Payer: MEDICARE

## 2025-04-28 DIAGNOSIS — C16.9 GASTRIC ADENOCARCINOMA: ICD-10-CM

## 2025-04-28 DIAGNOSIS — C56.1: ICD-10-CM

## 2025-04-28 LAB
ABSOLUTE EOSINOPHIL (OHS): 0.06 K/UL
ABSOLUTE MONOCYTE (OHS): 0.3 K/UL (ref 0.3–1)
ABSOLUTE NEUTROPHIL COUNT (OHS): 1.72 K/UL (ref 1.8–7.7)
ALBUMIN SERPL BCP-MCNC: 4.2 G/DL (ref 3.5–5.2)
ALP SERPL-CCNC: 88 UNIT/L (ref 40–150)
ALT SERPL W/O P-5'-P-CCNC: 46 UNIT/L (ref 10–44)
ANION GAP (OHS): 8 MMOL/L (ref 8–16)
AST SERPL-CCNC: 33 UNIT/L (ref 11–45)
BASOPHILS # BLD AUTO: 0.03 K/UL
BASOPHILS NFR BLD AUTO: 0.9 %
BILIRUB SERPL-MCNC: 0.2 MG/DL (ref 0.1–1)
BUN SERPL-MCNC: 16 MG/DL (ref 8–23)
CALCIUM SERPL-MCNC: 8.8 MG/DL (ref 8.7–10.5)
CHLORIDE SERPL-SCNC: 105 MMOL/L (ref 95–110)
CO2 SERPL-SCNC: 20 MMOL/L (ref 23–29)
CREAT SERPL-MCNC: 0.7 MG/DL (ref 0.5–1.4)
ERYTHROCYTE [DISTWIDTH] IN BLOOD BY AUTOMATED COUNT: 12.8 % (ref 11.5–14.5)
GFR SERPLBLD CREATININE-BSD FMLA CKD-EPI: >60 ML/MIN/1.73/M2
GLUCOSE SERPL-MCNC: 96 MG/DL (ref 70–110)
HCT VFR BLD AUTO: 42.2 % (ref 37–48.5)
HGB BLD-MCNC: 13.7 GM/DL (ref 12–16)
IMM GRANULOCYTES # BLD AUTO: 0.01 K/UL (ref 0–0.04)
IMM GRANULOCYTES NFR BLD AUTO: 0.3 % (ref 0–0.5)
LYMPHOCYTES # BLD AUTO: 1.09 K/UL (ref 1–4.8)
MAGNESIUM SERPL-MCNC: 1.9 MG/DL (ref 1.6–2.6)
MCH RBC QN AUTO: 33.3 PG (ref 27–31)
MCHC RBC AUTO-ENTMCNC: 32.5 G/DL (ref 32–36)
MCV RBC AUTO: 102 FL (ref 82–98)
NUCLEATED RBC (/100WBC) (OHS): 0 /100 WBC
PHOSPHATE SERPL-MCNC: 3.5 MG/DL (ref 2.7–4.5)
PLATELET # BLD AUTO: 268 K/UL (ref 150–450)
PMV BLD AUTO: 8.3 FL (ref 9.2–12.9)
POTASSIUM SERPL-SCNC: 4.1 MMOL/L (ref 3.5–5.1)
PROT SERPL-MCNC: 7.5 GM/DL (ref 6–8.4)
RBC # BLD AUTO: 4.12 M/UL (ref 4–5.4)
RELATIVE EOSINOPHIL (OHS): 1.9 %
RELATIVE LYMPHOCYTE (OHS): 34 % (ref 18–48)
RELATIVE MONOCYTE (OHS): 9.3 % (ref 4–15)
RELATIVE NEUTROPHIL (OHS): 53.6 % (ref 38–73)
SODIUM SERPL-SCNC: 133 MMOL/L (ref 136–145)
WBC # BLD AUTO: 3.21 K/UL (ref 3.9–12.7)

## 2025-04-28 PROCEDURE — 83735 ASSAY OF MAGNESIUM: CPT

## 2025-04-28 PROCEDURE — 84155 ASSAY OF PROTEIN SERUM: CPT

## 2025-04-28 PROCEDURE — 84100 ASSAY OF PHOSPHORUS: CPT

## 2025-04-28 PROCEDURE — 85025 COMPLETE CBC W/AUTO DIFF WBC: CPT

## 2025-04-28 PROCEDURE — 36415 COLL VENOUS BLD VENIPUNCTURE: CPT

## 2025-04-29 ENCOUNTER — PATIENT MESSAGE (OUTPATIENT)
Dept: ADMINISTRATIVE | Facility: OTHER | Age: 75
End: 2025-04-29
Payer: MEDICARE

## 2025-04-30 ENCOUNTER — PATIENT MESSAGE (OUTPATIENT)
Dept: ADMINISTRATIVE | Facility: OTHER | Age: 75
End: 2025-04-30
Payer: MEDICARE

## 2025-05-01 ENCOUNTER — OFFICE VISIT (OUTPATIENT)
Dept: HEMATOLOGY/ONCOLOGY | Facility: CLINIC | Age: 75
End: 2025-05-01
Payer: MEDICARE

## 2025-05-01 VITALS
HEART RATE: 60 BPM | WEIGHT: 119.06 LBS | SYSTOLIC BLOOD PRESSURE: 123 MMHG | TEMPERATURE: 98 F | OXYGEN SATURATION: 100 % | HEIGHT: 65 IN | RESPIRATION RATE: 18 BRPM | BODY MASS INDEX: 19.83 KG/M2 | DIASTOLIC BLOOD PRESSURE: 76 MMHG

## 2025-05-01 DIAGNOSIS — C16.9 GASTRIC ADENOCARCINOMA: Primary | ICD-10-CM

## 2025-05-01 DIAGNOSIS — E43 UNSPECIFIED SEVERE PROTEIN-CALORIE MALNUTRITION: ICD-10-CM

## 2025-05-01 DIAGNOSIS — E64.0 SEQUELAE OF PROTEIN-CALORIE MALNUTRITION: ICD-10-CM

## 2025-05-01 DIAGNOSIS — D75.89 MACROCYTOSIS: ICD-10-CM

## 2025-05-01 PROCEDURE — 99999 PR PBB SHADOW E&M-EST. PATIENT-LVL IV: CPT | Mod: PBBFAC,,, | Performed by: INTERNAL MEDICINE

## 2025-05-01 NOTE — PROGRESS NOTES
Patient ID: Cheryl Kirkland   Reason for Visit: Follow-up (Imaging results)  MRN:  [id number]     Oncologic Diagnosis:  Stage III  (ypT2, pN3a, cM0) Gastric Adenocarcinoma  Previous Treatment:    NA Immunotherapy (Pembrolizumab 03/26/2024 - 07/23/2024)  s/p open total gastrectomy with D2 lymphadenectomy extensive lysis of adhesions on 08/20/2024 - Dr. Jerome    Current Treatment:  Observation    Subjective   Mrs. Garcia is doing well and has no acute complaints.      She continues to gain weight and feels well overall. Her only complaint is that early this week, she started having discomfort around her J tube and is concerned something has loosened because she is able to push it in and out.  We will contact our surgical colleagues to discuss removing the Jtube as she reports eating normally now.    Otherwise, I reviewed her imaging.     Review of Systems   Constitutional:  Negative for activity change, appetite change, chills, diaphoresis, fatigue, fever and unexpected weight change.   Respiratory:  Negative for shortness of breath.    Cardiovascular:  Negative for chest pain.   Gastrointestinal:  Negative for abdominal distention, abdominal pain, anal bleeding, blood in stool, constipation, diarrhea, nausea and vomiting.   Genitourinary:  Negative for difficulty urinating and hematuria.   Musculoskeletal:  Negative for arthralgias, back pain and myalgias.   Skin:  Negative for rash.   Neurological:  Negative for dizziness, weakness, light-headedness and headaches.   Hematological:  Does not bruise/bleed easily.   Psychiatric/Behavioral:  The patient is not nervous/anxious.      History     Oncology History   Mixed epithelial carcinoma of right ovary   1/2/2023 Initial Diagnosis    Mixed epithelial carcinoma of left ovary     1/9/2023 Surgery    Laparotomy for radical resection of gynecologic cancer. Removal of pelvic mass (right salpingo-oophorectomy), left salpingo-oophorectomy, extensive  enterolysis, extensive adhesiolysis, left pelvic lymph node biopsy, omental biopsy, small bowel resection with primary functional end-to-end anastomosis, placement of pelvic drain. Path: Right ovary, tumor site, 11 cm, mixed epithelial carcinoma (50% mucinous, 50% endometrioid), G2 moderately differentiated, ovarian surface involvement-indeterminate-specimen disrupted. 1 left pelvic lymph node negative for neoplasia. FIGO stage IC2       1/9/2023 Cancer Staged    Staging form: Ovary, Fallopian Tube, and Primary Peritoneal Carcinoma, AJCC 8th Edition  - Clinical stage from 1/9/2023: FIGO Stage IC2, calculated as Stage IC (cT1c2, cN0, cM0)     4/5/2023 - 7/19/2023 Chemotherapy    4/5/2023: Cycle 1- paclitaxel 135 mg/m2 and carboplatin AUC 5 as patient is frail and elderly.  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 - [id number], MMR deficient- loss MLH1 and PMS2     2/8/2024 Cancer Staged    Staging form: Stomach, AJCC 8th Edition  - Clinical stage from 2/8/2024: Stage IIA (cT2, cN1, cM0)     3/4/2024 Tumor Conference    Date Presented to Tumor Board: 03/04/24  [facility] UGI MULTIDISCIPLINARY TUMOR BOARD  PATIENT REVIEW FORM   ____________________________________________________________    CLINIC #: [id number]  DATE: 3/4/2024    DIAGNOSIS: gastric GARRY    PRESENTER: Justus    PATIENT SUMMARY:   This 72 y/o female was  dx with ovarian CA 1/2023, underwent surgery and completed 6 cycles of adj chemotherapy 7/2023. She presented last month with hematemesis. EGD revealed large ulcerated gastric mass, which was biopsied. EGD pathology reviewed - poorly differentiated adenocarcinoma, with intestinal expression, MSI high, HER 2 negative. Staging imaging found irregular wall thickening in mid gastric body, enlarged gastrohepatic lymph node, no evidence of distant metastatic disease.   Reviewed PET - uptake in gastric wall and gastrohepatic lymph node with hypermetabolic activity.   Scheduled to see Dr. Amin in [place] later this week.   + family hx of cancer, pending genetics referral    BOARD RECOMMENDATIONS:   Proceed with systemic chemotherapy, consider neoadj immunotherapy  Proceed with diag lap  Await genetics testing, pathology will send to [facility]    CONSULT NEEDED:     [] Surgery    [] Hem/Onc    [] Rad/Onc    [] Dietary                 [] Social Service    [x] Genetics       [] AES  [] Radiology     Clinical Stage: Tumor   Node(s)  1  Metastasis      GROUP STAGE:  [] O    [] 1A    [] IB    [] IIA    [] IIB     [] IIIA     [] IIIB     [] IIIC    []IV  [] Local recurrence     [] Regional recurrence     [] Distant recurrence   Metastatic site(s): none         [x] Edna'l Treatment Guidelines reviewed and care planned is consistent with guidelines.         (i.e., NCCN, NCI, PD, ACO, AUA, etc.)    PRESENTATION AT CANCER CONFERENCE:         [x] Prospective    [] Retrospective     [] Follow-Up         3/14/2024 Surgery    Procedure:  LAPAROSCOPY, DIAGNOSTIC (N/A)  LYSIS, ADHESIONS, LAPAROSCOPIC (N/A)  LAVAGE, PERITONEAL, THERAPEUTIC (N/A)      Surgeon(s) and Role: Chen Jerome MD - Primary    Pre-Operative Diagnosis: Gastric adenocarcinoma [C16.9]     Post-Operative Diagnosis: Same     Pre-Operative Variables:  Stage:  III (T4a N1 M0)   Adjacent organ involvement: None  Chemotherapy within 90 Days: No  Radiation Therapy within 90 Days:  No      Procedure:  Diagnostic laparoscopy   Extensive lysis of adhesions  Peritoneal biopsy   Peritoneal washings for cytology     3/26/2024 - 9/24/2024 Chemotherapy    Treatment Summary   Plan Name: OP pembrolizumab 200mg Q3W  Treatment Goal: Curative  Status: Inactive  Start Date: 3/26/2024  End Date: 9/24/2024  Provider: Claudia Ambrose MD  Chemotherapy: [No matching medication found in this treatment plan]      Chemotherapy    Treatment Summary   Plan Name: OP pembrolizumab 200mg Q3W  Treatment Goal: Curative  Status: Active  Start Date: 3/15/2024 (Planned)  End Date: 2/27/2026 (Planned)  Provider: Claudia Ambrose MD  Chemotherapy: [No matching medication found in this treatment plan]     4/8/2024 Tumor Conference       [facility] UGI MULTIDISCIPLINARY TUMOR BOARD  PATIENT REVIEW FORM   ____________________________________________________________    CLINIC #: [id number]  DATE: 4/8/2024    DIAGNOSIS: Gastric GARRY    PRESENTER: Justus    PATIENT SUMMARY:   This 74 y/o female was dx with ovarian CA 1/2023, underwent surgery and completed 6 cycles of adj chemotherapy 7/2023. She developed hematemesis in Feb 2024, then underwent EGD with bx of a large ulcerated gastric mass. Pathology revealed poorly differentiated adenocarcinoma, with intestinal expression, MSI high, HER 2 negative. She was presented to this [facility] last month. Since then, she underwent diag lap and today's presentation is for pathology review. Negative for malignancy, reactive atypia     BOARD RECOMMENDATIONS:   Proceed with immunotherapy, then restage with imaging  Potential candidate for subtotal distal gastrectomy    CONSULT NEEDED:     [] Surgery    [] Hem/Onc    [] Rad/Onc    [] Dietary                 [] Social Service    [] Psychology       [] AES  [] Radiology     Clinical Stage: Tumor   Node(s) 1 Metastasis 0      GROUP STAGE:  [] O    [] 1A    [] IB    [] IIA    [] IIB     [] IIIA     [] IIIB     [] IIIC    []IV  [] Local  recurrence     [] Regional recurrence     [] Distant recurrence   Metastatic site(s): none         [x] Edna'l Treatment Guidelines reviewed and care planned is consistent with guidelines.         (i.e., NCCN, NCI, PD, ACO, AUA, etc.)    PRESENTATION AT CANCER CONFERENCE:         [x] Prospective    [] Retrospective     [] Follow-Up           7/1/2024 Tumor Conference     [facility] UGI MULTIDISCIPLINARY TUMOR BOARD  PATIENT REVIEW FORM   ____________________________________________________________    CLINIC #: [id number]   DATE: 7/1/2024    DIAGNOSIS: gastric GARRY    PRESENTER: Justus    PATIENT SUMMARY:   This 74 y/o female was dx with ovarian CA 1/2023, underwent surgery and completed 6 cycles of adj chemotherapy in 7/2023. She developed hematemesis in Feb 2024 and underwent EGD with bx of a large ulcerated gastric mass. Pathology revealed poorly differentiated adenocarcinoma, with intestinal expression, MSI high, HER 2 negative. She underwent diag lap and pathology was negative for malignancy, reactive atypia. She was presented to this [facility] in 3/2024 and again in 4/2024. Since then, she received immunotherapy Keytruda 200 mg every 3 weeks, total of 4 cycles thus far.   Reviewed re-staging CT scan - perigastric lymph nodes larger in size  She remains asymptomatic.     BOARD RECOMMENDATIONS:   Obtain repeat PET  Consider ct DNA    CONSULT NEEDED:     [] Surgery    [] Hem/Onc    [] Rad/Onc    [] Dietary                 [] Genetics    [] Psychology       [] AES  [] Interventional Radiology     Clinical Stage: Tumor 2 Node(s) 1 Metastasis 0      GROUP STAGE:  [] O    [] 1A    [] IB    [x] IIA    [] IIB     [] IIIA     [] IIIB     [] IIIC    []IV  [] Local recurrence     [] Regional recurrence     [] Distant recurrence   Metastatic site(s): none         [x] Edna'l Treatment Guidelines reviewed and care planned is consistent with guidelines.         (i.e., NCCN, NCI, PD, ACO, AUA, etc.)    PRESENTATION AT  CANCER CONFERENCE:         [x] Prospective    [] Retrospective     [] Follow-Up         8/20/2024 Surgery    Open total gastrectomy with D2 WHITNEY and extensive lysis of adhesions   (Path: pT2 pN3a (8/35 LN positive), G3 poorly differentiated adenocarcinoma, margins negative     8/20/2024 Cancer Staged    Staging form: Stomach, AJCC 8th Edition  - Pathologic stage from 8/20/2024: Stage III (ypT2, pN3a, cM0)     2/24/2025 - 2/24/2025 Chemotherapy    Treatment Summary   Plan Name: OP GI [id number] (oxaliplatin leucovorin fluorouracil)  Q2W with pembrolizumab Q6W followed by maintenance pembrolizumab Q6W  Treatment Goal: Palliative  Status: Inactive  Start Date:   End Date:   Provider: Claudia Ambrose MD  Chemotherapy: fluorouraciL injection 605 mg, 400 mg/m2 = 605 mg, Intravenous, Clinic/HOD 1 time, 0 of 9 cycles  oxaliplatin (ELOXATIN) 85 mg/m2 = 128 mg in D5W 525.6 mL chemo infusion, 85 mg/m2 = 128 mg, Intravenous, Clinic/HOD 1 time, 0 of 9 cycles  fluorouracil (Adrucil) 2,400 mg/m2 = 3,625 mg in 0.9% NaCl 100 mL chemo infusion, 2,400 mg/m2 = 3,625 mg, Intravenous, Over 46 hours, 0 of 9 cycles           Previous HPI  Cheryl Garcia is a 73 y.o. female with history of DVT not currently on AC, previously on Xarelto, FIGO Stage IC (cT1c2, cN0, cM0) Mixed epithelial carcinoma of right ovary s/p surgery 01/09/023 and 6C Carbo/Paclitaxel completed 07/19/2023, Osteoporosis on Fosamax, HTN and Hx of Epilepsy on Keppra, Tegretol, Zonegran (last seizure in 2009) who presents to clinic to establish care on referral for gastric cancer.    Patient reports that she Initially presented to [facility]/06/24 with report of hematemeiss; she was transferred to [facility] for higher level of care.  On admission to [facility], EGD  was performed and showed a large ulcerated gastric mass.  Pathology results it as poorly differentiated adenocarcinoma.  She states that her weight is currently stable.  The last  time she lost any weight was 2 her ovarian cancer diagnosis and treatment at which time she lost 5 lb but gained it back.  On hospitalization here at [facility], during GI workup she lost those 5 lbs a cane because she was NPO.    The patient has already established care with surgical oncology, Dr. Jerome.  She was presented in the upper GI tumor board with consensus for neoadjuvant immunotherapy given MSI high status of her tumor.  She is here for medical oncology recommendations.    I reviewed the recommendations of the tumor board consensus for neoadjuvant immunotherapy and I reviewed her pathology and biomarkers in detail.  She is expressed understanding in his in agreement with the plan.  She also understands that she still needs to undergo diagnostic laparoscopy for completed staging.    Otherwise, she is a former light smoker; quit 30-40 years ago. No alcohol use in 30-40 years. No illicit drug use.  She has an extensive family history of malignancy in his already been referred to our genetic counselor.      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       Past Surgical History:   Procedure Laterality Date    ABDOMINAL WASHOUT N/A 3/14/2024    Procedure: LAVAGE, PERITONEAL, THERAPEUTIC;  Surgeon: Chen Jerome MD;  Location: [facility];  Service: General;  Laterality: N/A;     APPENDECTOMY  2015    BIOPSY OF PERITONEUM N/A 3/14/2024    Procedure: BIOPSY, PERITONEUM;  Surgeon: Chen Jerome MD;  Location: [facility] OR;  Service: General;  Laterality: N/A;    COLONOSCOPY  06/20/2012    Dr Boyle- Tubular adenoma polyps. Repeat in 3 years.    COLONOSCOPY  06/17/2015    -Nodular mucosa at appendiceal orfice, sigmoid and desc diverticulosis otherwise normal.    COLONOSCOPY  2020    >3 TAs    COLONOSCOPY  12/2023    CYSTOSCOPY W/ RETROGRADES Right 10/10/2024    Procedure: CYSTOSCOPY, WITH RETROGRADE PYELOGRAM;  Surgeon: Lata Kelly MD;  Location: [facility] OR;  Service: Urology;  Laterality: Right;    CYSTOURETEROSCOPY, WITH HOLMIUM LASER LITHOTRIPSY OF URETERAL CALCULUS AND STENT INSERTION Right 11/27/2024    Procedure: CYSTOURETEROSCOPY, WITH HOLMIUM LASER LITHOTRIPSY OF URETERAL CALCULUS AND STENT INSERTION;  Surgeon: Lata Kelly MD;  Location: [facility] OR;  Service: Urology;  Laterality: Right;    DIAGNOSTIC LAPAROSCOPY N/A 3/14/2024    Procedure: LAPAROSCOPY, DIAGNOSTIC;  Surgeon: Chen Jerome MD;  Location: [facility] OR;  Service: General;  Laterality: N/A;  1. Diagnostic laparoscopy   2. Extensive lysis of adhesions  3. Peritoneal biopsy   4. Peritoneal washings for cytology    DIAGNOSTIC LAPAROSCOPY N/A 8/20/2024    Procedure: LAPAROSCOPY, DIAGNOSTIC;  Surgeon: Chen Jerome MD;  Location: [facility] OR;  Service: General;  Laterality: N/A;    EGD - EXTERNAL RESULT  06/20/2012    Dr Boyle- Mild gastritis otherwise normal.    ENDOSCOPIC ULTRASOUND OF UPPER GASTROINTESTINAL TRACT N/A 7/26/2024    Procedure: ULTRASOUND, UPPER GI TRACT, ENDOSCOPIC;  Surgeon: Valdo Aguilera MD;  Location: [facility];  Service: Endoscopy;  Laterality: N/A;  7/22/24: instructions sent via portal-. Pt no longer takling eliquis-GD    ESOPHAGOGASTRODUODENOSCOPY N/A 02/08/2024    Procedure: EGD (ESOPHAGOGASTRODUODENOSCOPY);  Surgeon: Jayla Arteaga MD;  Location: [facility];   Service: Endoscopy;  Laterality: N/A;    ESOPHAGOGASTRODUODENOSCOPY N/A 8/20/2024    Procedure: EGD (ESOPHAGOGASTRODUODENOSCOPY);  Surgeon: Chen Jerome MD;  Location: [facility] OR;  Service: General;  Laterality: N/A;    GASTRECTOMY N/A 8/20/2024    Procedure: GASTRECTOMY;  Surgeon: Chen Jerome MD;  Location: [facility] OR;  Service: General;  Laterality: N/A;    HYSTERECTOMY      LAPAROSCOPIC LYSIS OF ADHESIONS N/A 3/14/2024    Procedure: LYSIS, ADHESIONS, LAPAROSCOPIC;  Surgeon: Chen Jerome MD;  Location: [facility] OR;  Service: General;  Laterality: N/A;    LYSIS OF ADHESIONS N/A 8/20/2024    Procedure: LYSIS, ADHESIONS;  Surgeon: Chen Jerome MD;  Location: [facility] OR;  Service: General;  Laterality: N/A;    PLACEMENT OF JEJUNOSTOMY TUBE N/A 8/20/2024    Procedure: INSERTION, JEJUNOSTOMY TUBE;  Surgeon: Chen Jerome MD;  Location: [facility] OR;  Service: General;  Laterality: N/A;    REMOVAL OF URETERAL CALCULUS Right 11/27/2024    Procedure: REMOVAL, CALCULUS, URETER;  Surgeon: Lata Kelly MD;  Location: [facility] OR;  Service: Urology;  Laterality: Right;  basket extraction    REMOVAL, NEPHROSTOMY TUBE, WITH IMAGING GUIDANCE Right 11/27/2024    Procedure: REMOVAL, NEPHROSTOMY TUBE, WITH IMAGING GUIDANCE;  Surgeon: Lata Kelly MD;  Location: [facility] OR;  Service: Urology;  Laterality: Right;    REMOVAL-STENT Right 11/27/2024    Procedure: REMOVAL-STENT;  Surgeon: Lata Kelly MD;  Location: [facility] OR;  Service: Urology;  Laterality: Right;    RETROGRADE PYELOGRAPHY Right 11/27/2024    Procedure: PYELOGRAM, RETROGRADE;  Surgeon: Lata Kelly MD;  Location: [facility] OR;  Service: Urology;  Laterality: Right;    TOTAL ABDOMINAL HYSTERECTOMY W/ BILATERAL SALPINGOOPHORECTOMY  01/09/2023    radical resection with right salpingo-oophorectomy, left salpingo-oophorectomy, extensive enterolysis, extensive adhesiolysis, left pelvic lymph node biopsy, omental biopsy, small bowel resection with primary  functional end-to-end anastomosis, placement of pelvic drain       Family History   Problem Relation Name Age of Onset    Pancreatic cancer Brother Misael Calderon. 76    Prostate cancer Brother Zachary 67    Pancreatic cancer Half-brother Gasper 74    Colon cancer Half-sister Elton 83    Lung cancer Half-sister Elton         +smoking hx    Breast cancer Half-sister Vidhi 58    Lymphoma Other Cara Sun    Prostate cancer Other Fernando     Prostate cancer Other Gasper Calderon     Ovarian cancer Other Inerris     Breast cancer Other Aleida     Colon cancer Other Aleida        Review of patient's allergies indicates:  No Known Allergies    Social History     Tobacco Use    Smoking status: Former     Types: Cigarettes    Smokeless tobacco: Never    Tobacco comments:     Quit 1989 smoked 10 years smoked 0.25 ppd    Substance Use Topics    Alcohol use: Not Currently    Drug use: Never       Physical Exam     ECOG SCORE    0 - Fully active-able to carry on all pre-disease performance without restriction       Vitals:    05/01/25 0821   BP: 123/76   Pulse: 60   Resp: 18   Temp: 97.9 °F (36.6 °C)     Physical Exam  Constitutional:       General: She is not in acute distress.     Appearance: Normal appearance. She is normal weight. She is not ill-appearing, toxic-appearing or diaphoretic.   HENT:      Head: Normocephalic and atraumatic.   Cardiovascular:      Rate and Rhythm: Normal rate.   Pulmonary:      Effort: Pulmonary effort is normal. No respiratory distress.   Abdominal:      General: Bowel sounds are normal. There is no distension.      Palpations: Abdomen is soft.      Tenderness: There is abdominal tenderness (around jtube site; no erythema). There is no guarding.   Skin:     General: Skin is warm.   Neurological:      General: No focal deficit present.      Mental Status: She is alert and oriented to person, place, and time. Mental status is at baseline.   Psychiatric:         Mood and Affect: Mood  normal.         Behavior: Behavior normal.         Thought Content: Thought content normal.       Labs   Labs:  No visits with results within 2 Day(s) from this visit.   Latest known visit with results is:   Lab Visit on 04/28/2025   Component Date Value Ref Range Status    Sodium 04/28/2025 133 (L)  136 - 145 mmol/L Final    Potassium 04/28/2025 4.1  3.5 - 5.1 mmol/L Final    Chloride 04/28/2025 105  95 - 110 mmol/L Final    CO2 04/28/2025 20 (L)  23 - 29 mmol/L Final    Glucose 04/28/2025 96  70 - 110 mg/dL Final    BUN 04/28/2025 16  8 - 23 mg/dL Final    Creatinine 04/28/2025 0.7  0.5 - 1.4 mg/dL Final    Calcium 04/28/2025 8.8  8.7 - 10.5 mg/dL Final    Protein Total 04/28/2025 7.5  6.0 - 8.4 gm/dL Final    Albumin 04/28/2025 4.2  3.5 - 5.2 g/dL Final    Bilirubin Total 04/28/2025 0.2  0.1 - 1.0 mg/dL Final    For infants and newborns, interpretation of results should be based   on gestational age, weight and in agreement with clinical   observations.    Premature Infant recommended reference ranges:   0-24 hours:  <8.0 mg/dL   24-48 hours: <12.0 mg/dL   3-5 days:    <15.0 mg/dL   6-29 days:   <15.0 mg/dL    ALP 04/28/2025 88  40 - 150 unit/L Final    AST 04/28/2025 33  11 - 45 unit/L Final    ALT 04/28/2025 46 (H)  10 - 44 unit/L Final    Anion Gap 04/28/2025 8  8 - 16 mmol/L Final    eGFR 04/28/2025 >60  >60 mL/min/1.73/m2 Final    Estimated GFR calculated using the CKD-EPI creatinine (2021) equation.    Phosphorus Level 04/28/2025 3.5  2.7 - 4.5 mg/dL Final    Magnesium  04/28/2025 1.9  1.6 - 2.6 mg/dL Final    WBC 04/28/2025 3.21 (L)  3.90 - 12.70 K/uL Final    RBC 04/28/2025 4.12  4.00 - 5.40 M/uL Final    HGB 04/28/2025 13.7  12.0 - 16.0 gm/dL Final    HCT 04/28/2025 42.2  37.0 - 48.5 % Final    MCV 04/28/2025 102 (H)  82 - 98 fL Final    MCH 04/28/2025 33.3 (H)  27.0 - 31.0 pg Final    MCHC 04/28/2025 32.5  32.0 - 36.0 g/dL Final    RDW 04/28/2025 12.8  11.5 - 14.5 % Final    Platelet Count 04/28/2025  268  150 - 450 K/uL Final    MPV 04/28/2025 8.3 (L)  9.2 - 12.9 fL Final    Nucleated RBC 04/28/2025 0  <=0 /100 WBC Final    Neut % 04/28/2025 53.6  38 - 73 % Final    Lymph % 04/28/2025 34.0  18 - 48 % Final    Mono % 04/28/2025 9.3  4 - 15 % Final    Eos % 04/28/2025 1.9  <=8 % Final    Basophil % 04/28/2025 0.9  <=1.9 % Final    Imm Grans % 04/28/2025 0.3  0.0 - 0.5 % Final    Neut # 04/28/2025 1.72 (L)  1.8 - 7.7 K/uL Final    Lymph # 04/28/2025 1.09  1 - 4.8 K/uL Final    Mono # 04/28/2025 0.30  0.3 - 1 K/uL Final    Eos # 04/28/2025 0.06  <=0.5 K/uL Final    Baso # 04/28/2025 0.03  <=0.2 K/uL Final    Imm Grans # 04/28/2025 0.01  0.00 - 0.04 K/uL Final    Mild elevation in immature granulocytes is non specific and can be seen in a variety of conditions including stress response, acute inflammation, trauma and pregnancy. Correlation with other laboratory and clinical findings is essential.        Pathology   Contains abnormal data Specimen to Pathology, Surgery Gastrointestinal tract - 02/28/2024      Component 3 wk ago   Final Pathologic Diagnosis  Abnormal   STOMACH, 'GASTRIC MASS', BIOPSY:  - 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 Scattered p53 positivity (wild-type pattern of expression) and p16 positivity are also noted. Additional studies, including CK7, PAX8, ER, HER2, TTF-1, GATA3, WT-1,  synaptophysin, and chromogranin, are either negative or negative in the cells of interest. This patient's prior history of ovarian carcinoma resected at [facility] in [place], [place] is noted and the outside pathology report (F-) is reviewed.   While both tumors do have similar immunohistochemical staining patterns, they are not identical, with notable reported differences being CK7 and PAX8 positivity in the ovarian tumor. The presence of a large, fungating, and ulcerated mass  in the stomach  is suspicious for a gastric primary; however, it is unclear if the masses represent two separate primaries or one p rimary and one metastatic focus. Abnormal mismatch repair studies do suggest increased risk for development of multiple tumor types (see  below). Requesting outside slides for review to compare morphology may be helpful.    DNA mismatch repair IHC studies are ABNORMAL and demonstrate LOSS OF MLH1 and PMS2 expression within tumor cells. MSH2 and MSH6 exhibit retained expression. 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    Tissue will be sent for molecular profiling by next-generation sequencing ([facility]). Results of this study will be issued directly to the electronic medical record.                        Tumor NGS Tissue - 02/29/2024  MSI: MSI-H         TMB: 42.6 m/MB         Low Coverage Regions: KDM5D, PMS2         Fusion Addendum Issue Type: NEGATIVE  Negative - This addendum is being issued to report that no gene fusions or altered splicing variants from RNA sequencing analysis were found.               Cytology-FNA Non-Radiology Clinician Performed w/o on site - 07/26/2024        Component 2 mo ago   Final Pathologic Diagnosis      Abnormal   Lymph node, perigastric, EUS guided fine-needle aspiration/biopsy:  - Adenocarcinoma involving lymphoid tissue consistent with patient's previous gastric mass biopsy, see comment.    COMMENT:  The biopsy shows a malignant epithelial proliferation involving lymphoid tissue consistent with tumor involving lymph node.  Immunostains show tumor cells to be positive for cytokeratin, CDX2, and patchy CK20.  Tumor cells are negative for PAX8   and CK7.  Patient had a previous stomach mass biopsy ([facility] that is pulled and reviewed.  The current biopsy shows similar morphology and immunoprofile as the stomach mass biopsy.  MMR and NGS performed on previous biopsy.  Claudin 18.2  pending,  results will be issued in an addendum.      HER2 by immunohistochemistry:  Negative (1+)  VC      Comment: Interp By Tiny Ma MD, Signed on 08/06/2024 at 13:07   Supplemental Diagnosis      Abnormal   CLDN18, SemiQuant IHC, Manual    Interpretation  FNA lymph node, specimen for CLD18 immunohistochemistry studies (anti-[id number] (CLDN18) clone [id number], [employer], [place], [place]; using a proprietary detection system) (KEF24?184?1A.u, KEF24?184?1A.u)    Claudin 18.2: Negative; 40% of tumor cells with 2+ and/or 3+ membrane staining.    Interpretation: The Claudin 18.2 antibody (clone 43?[id number]) was performed per clinical validation and relevant - provided instructions.    This result should be interpreted in the appropriate clinical context.    Fixation: This test has been validated for non-decalcified paraffin embedded tissue specimens fixed in 10% neutral buffered formalin. Recommended fixation time is between 6?48 hours. This assay has not been validated on tissues  subjected to the decalcification process and /or use of alternative fixatives.      Report electronically signed by  Maribell Glover M.D.      Report attached.    Performing  location:  [place] [address]    &quot;Disclaimer:  This case diagnosis was rendered completely by the outside consultation pathologist and the case is electronically signed by an [facility] pathologist listed below solely to release the report into the medical record.&quot;  VC      Disclaimer      Abnormal   HER-2/candida IHC (4B5) clone, DAB detection method) is done on 10% buffered formalin-fixed (for 6-72 hrs), paraffin embedded tissue sections. The scoring is completed on a 4-tiered scoring system of membrane staining using the 2014 ASCO/CAP scoring  guidelines. It has been cleared by the U.S. FDA for use as an IVD test. This assay has not been  validated on decalcified tissues. Results should be interpreted with caution given the likelihood of false negativity on decalcified specimens.    HER-2/candida IHC (4B5) clone, DAB detection method) is done on 10% buffered formalin-fixed (for 6-72 hrs), paraffin embedded tissue sections. The scoring is completed on a 4-tiered scoring system of membrane staining using the 2014 ASCO/CAP scoring  guidelines. It has been cleared by the U.S. FDA for use as an IVD test. This assay has not been validated on decalcified tissues. Results should be interpreted with caution given the likelihood of false negativity on decalcified specimens.    VC      Resulting Agency [facility]            Specimen to Pathology, Surgery General Surgery - 08/20/2024      Component 1 mo ago   Final Pathologic Diagnosis 1. Small intestine, peritoneal scarring, excision:  - Benign fibroadipose tissue  - Negative for malignancy    2. Spleen, lesion, excision:- Benign splenic tissue  - Negative for malignancy    3. Small intestine, resection:- Benign small intestinal tissue  - Negative for malignancy    4. Lymph node, hepatic artery, excision:- Two lymph nodes, negative for metastatic carcinoma (0/2)    5. Stomach, total gastrectomy:  - Adenocarcinoma, poorly cohesive, with features of undifferentiated carcinoma  - Tumor involves subserosal tissue  - Margins negative for carcinoma  - Pathologic stage y pT2 pN3a  - Eight of 35 lymph nodes are POSITIVE for metastatic carcinoma (8/35)  - Evidence of tumor regression is seen in a lymph node  - Incidental benign leiomyoma identified in perigastric tissue  - Background chronic gastritis  - Helicobacter pylori-type microorganisms are identified by immunohistochemistry  - See synoptic report below    6. Lymph nodes, D2 and periportal lymph node, lymphadenectomy:  - Eight lymph nodes, all negative for metastatic carcinoma (0/8)     7. Lymph node, aortocaval, excision:  - One lymph node, negative for metastatic  carcinoma (0/1)    8. Anastomosis, anastomotic donuts:  - Benign esophageal and small intestinal tissue  - Negative for malignancy    _____________________________________________________________________  CASE SUMMARY: (STOMACH)  Standard(s): AJCC-UICC 8    SPECIMEN    Procedure: Total gastrectomy    TUMOR    Tumor Site: Body: Lesser curvature    Histologic Type: Adenocarcinoma    Adenocarcinoma Classification (based on WHO): Poorly cohesive carcinoma, Undifferentiated (anaplastic) carcinoma    Histologic Type Comment: Tumor is poorly cohesive, but in some areas a better differentiated component is identified. The tumor diffusely expresses CDX2.  Histologic Grade: G3, poorly differentiated, undifferentiated  Tumor Size: Greatest dimension in Centimeters (cm): 6.6 cm    Additional Dimension in Centimeters (cm): 4.2 x 1.4 cm  Tumor Extent: Invades muscularis propria  Treatment Effect: Present, with residual cancer showing evident tumor regression, but more than single cells or rare small groups of cancer cells (partial response, score 2)  Lymphatic and / or Vascular Invasion: Present    MARGINS    Margin Status for Invasive Carcinoma: All margins negative for invasive carcinoma    Closest Margin(s) to Invasive Carcinoma: Omental (radial): lesser curvature    Distance from Invasive Carcinoma to Closest Margin: Exact distance in cm: 3 cm  Margin Status for Dysplasia: All margins negative for dysplasia    REGIONAL LYMPH NODES    Regional Lymph Node Status: Regional lymph nodes present    Tumor present in regional lymph node(s)      Number of Lymph Nodes with Tumor: Exact number: 8    Number of Lymph Nodes Examined: 45    DISTANT METASTASIS    Distant Site(s) Involved, if applicable: Not applicable    pTNM CLASSIFICATION (AJCC 8TH Edition)  Reporting of pT, pN, and (when applicable) pM categories is based on information available to the pathologist at the time the report is issued. As per the AJCC (Chapter 1, 8th Ed.) it  is the managing physician's responsibility to establish the final  pathologic stage based upon all pertinent information, including but potentially not limited to this pathology report.    Modified Classification: y (post-neoadjuvant therapy)  pT Category: pT2: invades the muscularis propria  pN Category: pN3a: Metastasis in seven to 15 regional lymph nodes  pM Category: Not applicable - pM cannot be determined from the submitted specimen(s)    ADDITIONAL FINDINGS    Additional Findings: Chronic gastritis, Helicobacter pylori present    SPECIAL STUDIES    HER2 and mismatch repair testing have been performed on the previous biopsy cases [facility]- and [facility]-; see those cases for a full report. HER2 was negative and mismatch repair testing revealed loss of nuclear expression of MLH1 and PMS2. Claudin  18.2 was negative.   Comment: Interp By Jorge Luis Morales D.O., Signed on 08/23/2024 at 17:04   Frozen Section Diagnosis 1.Negative for malignacy  2.Negative for malignacy  Frozen diagnosis made by Dr. Goode for part 1 and 2  5. Proximal and distal margins negative for malignancy.  Frozen diagnosis made by Dr. Elena for part 5.   Microscopic Exam Immunohistochemical stains for PAX8, cytokeratin 7, cytokeratin 20, CDX2, CD45, synaptophysin and chromogranin are performed on block 5-F with adequate and reactive controls and the following results:  CDX2 is positive.  Cytokeratin 20 is patchy, but focally positive in tumor cells.  Cytokeratin 7, CD45, synaptophysin, chromogranin, and PAX8 are negative.    EBV in-situ hybridization, performed on block 5-F, is negative. RNA positive and GUILLAUME negative controls stain appropriately.    An immunohistochemical stain for H. pylori, performed on block 5-K with adequate and reactive controls, is positive.   Gross 1: Surgery ID:  [id number]    Pathology ID:  [id number]  1. Received fresh and subsequently placed in formalin labeled &quot;small bowel peritoneal scarring&quot; is a 0.7 x  0.5 x 0.3 cm tan tissue fragment.  The specimen is submitted entirely for frozen section with the frozen section remnant submitted in  cassette 1A.    CWN--1-A-FS    Grossed by: Rose Mary Melara MS, PA([facility])     2: Surgery ID:  [id number]    Pathology ID:  [id number]  2. Received fresh and subsequently placed in formalin labeled &quot;splenic flexure lesion&quot; is a 0.6 x 0.4 x 0.3 cm tan-red tissue fragments.  The specimen is submitted entirely for frozen section with the frozen section remnant submitted in  cassette 2A.  PFB--2-A-FS    Grossed by: Rose Mary Melara MS, PA([facility])     3: Surgery ID:  [id number]   Pathology ID:  [id number] 3.  Small bowel Received in formalin labeled &quot;small bowel&quot; is a 4.4 cm in length by 1.9 cm in diameter segments of small bowel with 2 stapled ends.  The serosa is pink-red with areas of adhesions.  Opening the specimen reveals pink-red, well folded   mucosa.  Representative sections are submitted as follows:  CVQ--3-A: Tissue from staple line margin  GXV--3-B: Tissue from opposite staple line margin  ETD--3-C: Representative sections of specimen      Grossed by: Rose Mary Melara MS, PA([facility])     4: Surgery ID:  [id number]   Pathology ID:  [id number]  4. Received in formalin labeled &quot;hepatic artery lymph node&quot; is a 0.8 x 0.5 x 0.4 cm tan-pink lymph node.  The lymph node is bisected and submitted entirely in cassette 4A.    KVE--4-A    Grossed by: Rose Mary Melara MS, PA([facility])     5: Surgery ID:  [id number]   Pathology ID:  [id number]  5. Received fresh and subsequently placed in formalin labeled &quot;total gastrectomy distal and proximal margin&quot; is a 23 x 7.6 x 4 cm (lesser curvature is 10 cm and greater curvature is 23 cm) gastrectomy specimen.  The serosa is pink-red.  Opening   the specimen reveals a 6.6 x 4.2 cm firm, tan-pink, nodular, slightly raised mass.  The mass invades the gastric wall and involves the serosa.  The mass  does not grossly invade into the perigastric fat measuring 3 cm from the lesser curvature fat margin   and 7 cm from the greater curvature fat margin.  The mass measures 5.2 cm from the distal duodenal staple line margin and 4 cm from the proximal staple line margin.  The remaining mucosa is tan-pink and moderately well folded.  Multiple lymph nodes are  identified.  The serosa is inked blue.  The proximal staple line margin is submitted for frozen section with the frozen section remnant submitted in cassette ZJN--5-A-FS.  The distal duodenal staple line margin is submitted for frozen section with   the frozen section remnant submitted in cassette SWA--5-B-FS.  Representative sections are submitted as follows:  WKH--5-C:  Lesser curvature fat margin nearest mass  ZYF--5-D: Greater curvature fat margin nearest mass  NWT--5-E: Mass to serosa  TNI--5-F: Mass to lesser curvature fat  VLS--5-G: Mass to adjacent distal mucosa  MRT--5-H: Mass to adjacent proximal mucosa  URN--5-I: Representative section of mass  PFA--5-J: Representative section of mass  HZB--5-K: Representative sections of uninvolved mucosa  FZU--5-L: 7 lesser curvature lymph nodes  IAU--5-M: 5 lesser curvature lymph nodes  QXY--5-N: 5 lesser curvature lymph nodes  WCJ--5-O: 5 lymph nodes  BAQ--5-P: 2 lymph nodes  CIZ--5-Q - DZU--5-R - NRI--5-S: 1 lymph node, serially sectioned and submitted entirely  KZA--5-T - HSD--5-X: 1 lymph node, serially sectioned and submitted entirely    Grossed by: Rose Mary Melara MS, PA([facility])     6: Surgery ID:  [id number]   Pathology ID:  [id number]  6. Received in formalin labeled &quot;D2 lymphadenopathy with alea portal lymph node&quot; is a 4 x 4 x 1 cm aggregate of tan-pink to tan-yellow, nodular tissue fragments.  7 candidate lymph nodes ranging from 0.3-1.8 cm in greatest  dimension are  identified.  The lymph nodes are submitted entirely as follows:  EIC--6-A: 4 lymph nodes  PVC--6-B: 2 lymph nodes  RRO--6-C: 1 lymph node, bisected    Grossed by: Rose Mary Melara MS, PA([facility])     7: Surgery ID:  [id number]   Pathology ID:  [id number]  7. Received in formalin labeled &quot;Aortic cable&quot; is a 1 x 0.5 x 0.4 cm purple-gray, rubbery portion of tissue.  The specimen is submitted entirely in cassette 7A.    KBX--7-A    Grossed by: Rose Mary Melara MS, PA([facility])     8: Surgery ID:  [id number]   Pathology ID:  [id number]  8. Received in formalin labeled &quot;anastomotic donuts&quot; are 2 circular, pink-red mucosal surfaced portions of tissue measuring 1.7 cm in diameter by 1 cm in length and 1.5 cm in diameter by 1.1 cm in length.  Representative sections are submitted  in cassette 8A.    AUI--8-A    Grossed by: Rose Mary Melara MS, PA([facility])   Frozen Section Footnote Frozen section performed at [address], [address]   Disclaimer Unless the case is a 'gross only' or additional testing only, the final diagnosis for each specimen is based on a microscopic examination of appropriate tissue sections.  This test was developed and its performance characteristics determined by [facility], Department of Pathology and Laboratory Medicine. It has not been cleared or approved by the US Food and Drug Administration. The FDA has determined that  such clearance or approval is not necessary. This test is used for clinical purposes. It should not be regarded as investigational or for research. This laboratory is certified under the Clinical Laboratory Improvement Admendments (CLIA) as qualified to  perform such high complexity clinical laboratory testing   Resulting Agency [facility]          Imaging   NM PET CT FDG SKULL BASE TO MID THIGH - 04/22/2025  Impression:  1. Response to treatment with interval decrease in size  and FDG avidity of the 2 left mesenteric lymph nodes as detailed above.  2. No new lesion since the prior exam.      NM PET CT FDG SKULL BASE TO MID THIGH - 02/28/2024  CLINICAL HISTORY:  Gastric cancer.  Malignant neoplasm of stomach, unspecified.  Initial staging.  History of left ovarian epithelial carcinoma.     TECHNIQUE:  11.45 mCi of F18-FDG was administered intravenously in the left forearm.  After an approximately 60 min distribution time, PET/CT images were acquired from the skull base to the mid thigh. Transmission images were acquired to correct for attenuation using a whole body low-dose CT scan without contrast with the arms positioned above the head. Glycemia at the time of injection was 113 mg/dL.     COMPARISON:  CT abdomen pelvis, 02/08/2024 and chest CT, 02/27/2024     FINDINGS:  Quality of the study: Adequate.     SUV max of the liver parenchyma is 2.8     Neck: No abnormal FDG avidity.  No lymphadenopathy or mass.     Chest: No abnormal FDG avidity.  No pleural effusion or lymphadenopathy or pulmonary nodule.     Abdomen/pelvis: Marked thickening of the wall of the stomach involving the fundus and proximal body with marked FDG avidity with SUV max 18.  The gastrohepatic ligament lymph node measuring 19 x 13 mm shows moderate FDG avidity with SUV max 4.6.     No ascites.  No other focus of abnormal FDG avidity in the abdomen or pelvis.     Skeletal structures: No abnormal FDG avidity.  No focal lytic or sclerotic lesion.     Physiologic uptake of the tracer is present within the brain, salivary glands, myocardium, GI and  tracts.     Incidental CT findings: N/A     Impression:  1. The biopsy proving gastric cancer is markedly FDG avid with SUV max 18.  2. Moderately FDG avid gastrohepatic ligament lymph node consistent with local metastatic disease.  3. No evidence for distant metastatic disease.  All CT scans at this facility are performed  using dose modulation techniques as appropriate to  performed exam including the following:  automated exposure control; adjustment of mA and/or kV according to the patients size (this includes techniques or standardized protocols for targeted exams where dose is matched to indication/reason for exam: i.e. extremities or head);  iterative reconstruction technique.    Assessment and Plan   Stage III  (ypT2, pN3a, cM0) Gastric Adenocarcinoma  EGD 02/08/2024: Gastric Mass  Path: Poorly-differentiated adenocarcinoma with intestinal phenotype -  HER2-, MMR - Deficient (MLH1 and PMS2 loss)  [facility] NGS:   MSI-H, TMB 42.6  Potentially actionable mutation in CHRIS  Multiple clinical trials available  PET-CT 02/28/24 showed no evidence of distant metastatic disease  Presented in Tb 03/04/24 with consensus for Proceed with systemic chemotherapy, consider neoadj immunotherapy, Proceed with diag lap  Diagnostic Laparoscopy 03/14/2024 with Dr. Jerome negative for malignant cells  Per NCCN guidelines, NA immunotherapy is useful in MSI-H/dMMR tumors) with options being Nivolumab and ipilimumab followed by nivolumab or Pembrolizumab.  Given patient's comorbid conditions and potentially increased toxicity with CTLA4 inhibitor and PD-1 inhibitor, decision made to proceed with Pembrolizumab. Started 03/14/24.  Patient to completed 12 weeks of NA immunotherapy 06/18/2024  CT CAP 06/24/25 showed progression of LAD but no other signs of disease  s/p open total gastrectomy with D2 lymphadenectomy extensive lysis of adhesions on 08/20/2024  Eight of 35 lymph nodes are POSITIVE for metastatic carcinoma   Adjuvant therapy delayed due to malnutrition and FTT   CT CAP 01/03/2025 showed a few enlarged left mid to upper abdomen lymph nodes (at least a couple enlarging from the recent exams) concerning for potential metastatic nodes.    Plan  PET-CT 01/30/2025 showed hypermetabolic left mesenteric lymphadenopathy consistent with metastasis; discussed with IR and no target for biopsy  UGI TB  Presentation 02/24/2025: consensus for obtaining ctDNA and monitoring off of therapy  with monitoring imaging and ctDNA and resumption of immunotherapy if progression   PET-CT 04/22/25 showed response to treatment with interval decrease in size and FDG avidity of the 2 left mesenteric lymph nodes; no new lesions  Discussed above with patient and she is in agreement      Severe Protein Calorie Malnutrition, Resolved  She is having discomfort around the feeding tube  We discussed removal of feeding tube and she will follow up with surgery to further discuss      Immunotherapy induced pruritus, Resolved  Immunotherapy induced rash < Grade 1, Resolved  Topical hydrocortisone and Atarax prescribed with relief        Chronic Medical Conditions  Hx DVT previously on Xareltao  Hx of FIGO Stage IC (cT1c2, cN0, 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 review imaging   Follow up with JENNIE    Infusion scheduling note    Injection scheduling note    Labs Vitamin B12, CBC, iron and TIBC, ferritin, folate and other   Scheduling:  Preferred lab:  Lab interval:  other: CTDNA   Imaging PET scan      Pharmacy appointment    Other referrals                    The patient was seen, interviewed and examined. Pertinent lab and radiologic studies were reviewed. Pt instructed to call should they develop concerning signs/symptoms or have further questions.        Portions of the record may have been created with voice recognition software. Occasional wrong-word or sound-a-like substitutions may have occurred due to the inherent limitations of voice recognition software. Read the chart carefully and recognize, using context,  where substitutions have occurred.      Claudia Amin MD    Hematology/Oncology

## 2025-05-02 ENCOUNTER — PATIENT MESSAGE (OUTPATIENT)
Dept: ADMINISTRATIVE | Facility: OTHER | Age: 75
End: 2025-05-02
Payer: MEDICARE

## 2025-05-07 ENCOUNTER — PATIENT MESSAGE (OUTPATIENT)
Dept: ADMINISTRATIVE | Facility: OTHER | Age: 75
End: 2025-05-07
Payer: MEDICARE

## 2025-05-08 ENCOUNTER — PATIENT MESSAGE (OUTPATIENT)
Dept: ADMINISTRATIVE | Facility: OTHER | Age: 75
End: 2025-05-08
Payer: MEDICARE

## 2025-05-11 ENCOUNTER — HOSPITAL ENCOUNTER (OUTPATIENT)
Facility: HOSPITAL | Age: 75
Discharge: HOME | End: 2025-05-12
Attending: EMERGENCY MEDICINE
Payer: MEDICARE

## 2025-05-11 DIAGNOSIS — T85.528A JEJUNOSTOMY TUBE FELL OUT: ICD-10-CM

## 2025-05-11 DIAGNOSIS — C16.9 GASTRIC ADENOCARCINOMA: Primary | ICD-10-CM

## 2025-05-11 PROCEDURE — 63600175 PHARM REV CODE 636 W HCPCS: Performed by: EMERGENCY MEDICINE

## 2025-05-11 RX ORDER — TALC
6 POWDER (GRAM) TOPICAL NIGHTLY PRN
Status: DISCONTINUED | OUTPATIENT
Start: 2025-05-12 | End: 2025-05-12 | Stop reason: HOSPADM

## 2025-05-11 RX ORDER — SODIUM CHLORIDE 0.9 % (FLUSH) 0.9 %
10 SYRINGE (ML) INJECTION
Status: DISCONTINUED | OUTPATIENT
Start: 2025-05-12 | End: 2025-05-12 | Stop reason: HOSPADM

## 2025-05-11 RX ORDER — SODIUM CHLORIDE 9 MG/ML
INJECTION, SOLUTION INTRAVENOUS CONTINUOUS
Status: DISCONTINUED | OUTPATIENT
Start: 2025-05-12 | End: 2025-05-12 | Stop reason: HOSPADM

## 2025-05-11 RX ORDER — LIDOCAINE HYDROCHLORIDE 10 MG/ML
10 INJECTION, SOLUTION EPIDURAL; INFILTRATION; INTRACAUDAL; PERINEURAL
Status: COMPLETED | OUTPATIENT
Start: 2025-05-11 | End: 2025-05-11

## 2025-05-11 RX ADMIN — LIDOCAINE HYDROCHLORIDE 100 MG: 10 SOLUTION INTRAVENOUS at 11:05

## 2025-05-12 VITALS
BODY MASS INDEX: 19.83 KG/M2 | RESPIRATION RATE: 18 BRPM | HEIGHT: 65 IN | DIASTOLIC BLOOD PRESSURE: 65 MMHG | TEMPERATURE: 99 F | WEIGHT: 119 LBS | HEART RATE: 73 BPM | OXYGEN SATURATION: 96 % | SYSTOLIC BLOOD PRESSURE: 142 MMHG

## 2025-05-12 PROCEDURE — G0378 HOSPITAL OBSERVATION PER HR: HCPCS

## 2025-05-12 PROCEDURE — 25000003 PHARM REV CODE 250: Performed by: EMERGENCY MEDICINE

## 2025-05-12 PROCEDURE — 99223 1ST HOSP IP/OBS HIGH 75: CPT | Mod: ,,, | Performed by: SURGERY

## 2025-05-12 RX ADMIN — SODIUM CHLORIDE: 9 INJECTION, SOLUTION INTRAVENOUS at 01:05

## 2025-05-12 NOTE — DISCHARGE INSTRUCTIONS
Apply a small amount of antibiotic ointment and a Band-Aid to the jejunostomy tube site twice a day.      You should follow up with Dr. Jerome in 1-2 weeks to check the jejunostomy tube site

## 2025-05-12 NOTE — PHARMACY MED REC
"Admission Medication History     The home medication history was taken by Trev Juarez.    You may go to "Admission" then "Reconcile Home Medications" tabs to review and/or act upon these items.     The home medication list has been updated by the Pharmacy department.   Please read ALL comments highlighted in yellow.   Please address this information as you see fit.    Feel free to contact us if you have any questions or require assistance.      Medications listed below were obtained from: Analytic software- [employer], Medical records, and Patient's pharmacy  (Not in a hospital admission)        Trev Juarez  [id number]      Current Outpatient Medications on File Prior to Encounter   Medication Sig Dispense Refill Last Dose/Taking    alendronate (FOSAMAX) 70 MG tablet Take 70 mg by mouth every 7 days. (Sundays)   5/11/2025    calcium phosphate trib/vit D3 (CALCIUM PHOSPHATE-VITAMIN D3 ORAL) Take 1 tablet by mouth 2 (two) times daily.   5/11/2025    carBAMazepine (TEGRETOL) 200 mg tablet 1 tablet with breakfast  1 tablet with lunch   1.5 tablet at bedtime   5/11/2025    levETIRAcetam (KEPPRA) 500 MG Tab Take 1 tablet by mouth 2 (two) times daily.   5/11/2025    zonisamide (ZONEGRAN) 100 MG Cap Take 200 mg by mouth 2 (two) times daily.   5/11/2025    acetaminophen (TYLENOL) 500 MG tablet Take 2 tablets (1,000 mg total) by mouth every 8 (eight) hours.  0 More than a month    apixaban (ELIQUIS ORAL) Take by mouth. (Patient not taking: Reported on 5/12/2025)   Not Taking    ferrous sulfate 325 (65 FE) MG EC tablet Take 65 mg by mouth 2 (two) times daily with meals.       hydrocortisone 2.5 % cream Apply topically 2 (two) times daily. 28 g 1        .          "

## 2025-05-12 NOTE — CONSULTS
Chart reviewed by Dr. Aggarwal.       ASSESSMENT/PLAN:    J tube dislodged    Informed by surgery that patient is eating and a J tube is no longer needed.      Please re consult if further intervention by radiology is requested.         Thank you for the consult.

## 2025-05-12 NOTE — ED NOTES
Assumed care of pt at this time. Pt resting in ER stretcher, aaox4, rr e/u, NAD noted. Bed low and locked, call light in reach, side rails up x2. Fall precautions in place. Vss noted.  at the bedside.  Pt c/o feeling cold; multiple blankets in place covering the patient.  Pt c/o headache and sore throat and has requested medication but states she was told she could not have medication; importance of NPO status explained.  IV fluids infusing.  Pt verbalized understanding of status and POC; denies further needs, will continue to monitor.

## 2025-05-12 NOTE — PLAN OF CARE
[facility] - Emergency Dept.  Discharge Assessment    Primary Care Provider: Gagandeep Iglesias MD     Discharge Assessment (most recent)       BRIEF DISCHARGE ASSESSMENT - 05/12/25 8780          Discharge Planning    Assessment Type Discharge Planning Brief Assessment     Resource/Environmental Concerns none     Support Systems Spouse/significant other;Family members     Equipment Currently Used at Home none     Current Living Arrangements home     Patient/Family Anticipates Transition to home with family     Patient/Family Anticipated Services at Transition none     DME Needed Upon Discharge  none     Discharge Plan A Home with family

## 2025-05-12 NOTE — CONSULTS
[facility] - Emergency Dept.  General Surgery  Consult Note    Patient Name: Cheryl Kirkland  MRN: [id number]  Code Status: Full Code  Admission Date: 5/11/2025  Hospital Length of Stay: 0 days  Attending Physician: Rafiq White MD  Primary Care Provider: Gagandeep Iglesias MD    Patient information was obtained from patient, spouse/SO, ER records, and primary surgeon.     Consults  Subjective:     Principal Problem: <principal problem not specified>    History of Present Illness: 74-year-old female who has a ileostomy tube in place.  The ileostomy tube fell out and she presented to emergency room.  She is tolerating a diet.  Her albumin is 4.2.      Discussed with her surgeon, Dr. Jerome.  The jejunostomy tube is no longer needed and can be left out    No current facility-administered medications on file prior to encounter.     Current Outpatient Medications on File Prior to Encounter   Medication Sig    alendronate (FOSAMAX) 70 MG tablet Take 70 mg by mouth every 7 days. (Sundays)    calcium phosphate trib/vit D3 (CALCIUM PHOSPHATE-VITAMIN D3 ORAL) Take 1 tablet by mouth 2 (two) times daily.    carBAMazepine (TEGRETOL) 200 mg tablet 1 tablet with breakfast  1 tablet with lunch   1.5 tablet at bedtime    levETIRAcetam (KEPPRA) 500 MG Tab Take 1 tablet by mouth 2 (two) times daily.    zonisamide (ZONEGRAN) 100 MG Cap Take 200 mg by mouth 2 (two) times daily.    acetaminophen (TYLENOL) 500 MG tablet Take 2 tablets (1,000 mg total) by mouth every 8 (eight) hours.    apixaban (ELIQUIS ORAL) Take by mouth.    ferrous sulfate 325 (65 FE) MG EC tablet Take 65 mg by mouth 2 (two) times daily with meals.    HYDROcodone-acetaminophen (NORCO) 5-325 mg per tablet Take 1 tablet by mouth every 12 (twelve) hours as needed for Pain.    hydrocortisone 2.5 % cream Apply topically 2 (two) times daily.    hyoscyamine 0.125 mg Subl Place 2 tablets (0.25 mg total) under the tongue every 4 (four) hours as needed (bladder  spasms).    ondansetron (ZOFRAN-ODT) 4 MG TbDL dissolve 1 tablet (4 mg total) by mouth every 8 (eight) hours as needed (nausea).       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     Past Surgical History:   Procedure Laterality Date    ABDOMINAL WASHOUT N/A 3/14/2024    Procedure: LAVAGE, PERITONEAL, THERAPEUTIC;  Surgeon: Chen Jerome MD;  Location: [facility] OR;  Service: General;  Laterality: N/A;    APPENDECTOMY  2015    BIOPSY OF PERITONEUM N/A 3/14/2024    Procedure: BIOPSY, PERITONEUM;  Surgeon: Chen Jerome MD;  Location: [facility] OR;  Service: General;  Laterality: N/A;    COLONOSCOPY  06/20/2012    Dr Boyle- Tubular adenoma polyps. Repeat in 3 years.    COLONOSCOPY  06/17/2015    -Nodular mucosa at appendiceal orfice, sigmoid and desc diverticulosis otherwise normal.    COLONOSCOPY  2020    >3 TAs    COLONOSCOPY  12/2023    CYSTOSCOPY W/ RETROGRADES Right 10/10/2024    Procedure: CYSTOSCOPY, WITH RETROGRADE PYELOGRAM;  Surgeon: Lata Kelly MD;  Location: [facility] OR;  Service: Urology;  Laterality: Right;    CYSTOURETEROSCOPY, WITH HOLMIUM LASER LITHOTRIPSY OF URETERAL CALCULUS AND STENT INSERTION Right 11/27/2024    Procedure: CYSTOURETEROSCOPY, WITH HOLMIUM LASER LITHOTRIPSY OF URETERAL CALCULUS  AND STENT INSERTION;  Surgeon: Lata Kelly MD;  Location: [facility];  Service: Urology;  Laterality: Right;    DIAGNOSTIC LAPAROSCOPY N/A 3/14/2024    Procedure: LAPAROSCOPY, DIAGNOSTIC;  Surgeon: Chen Jerome MD;  Location: [facility];  Service: General;  Laterality: N/A;  1. Diagnostic laparoscopy   2. Extensive lysis of adhesions  3. Peritoneal biopsy   4. Peritoneal washings for cytology    DIAGNOSTIC LAPAROSCOPY N/A 8/20/2024    Procedure: LAPAROSCOPY, DIAGNOSTIC;  Surgeon: Chen Jerome MD;  Location: [facility] OR;  Service: General;  Laterality: N/A;    EGD - EXTERNAL RESULT  06/20/2012    Dr Boyle- Mild gastritis otherwise normal.    ENDOSCOPIC ULTRASOUND OF UPPER GASTROINTESTINAL TRACT N/A 7/26/2024    Procedure: ULTRASOUND, UPPER GI TRACT, ENDOSCOPIC;  Surgeon: Valdo Aguilera MD;  Location: [facility];  Service: Endoscopy;  Laterality: N/A;  7/22/24: instructions sent via portal-. Pt no longer takling eliquis-GD    ESOPHAGOGASTRODUODENOSCOPY N/A 02/08/2024    Procedure: EGD (ESOPHAGOGASTRODUODENOSCOPY);  Surgeon: Jayla Arteaga MD;  Location: [facility];  Service: Endoscopy;  Laterality: N/A;    ESOPHAGOGASTRODUODENOSCOPY N/A 8/20/2024    Procedure: EGD (ESOPHAGOGASTRODUODENOSCOPY);  Surgeon: Chen Jerome MD;  Location: [facility] OR;  Service: General;  Laterality: N/A;    GASTRECTOMY N/A 8/20/2024    Procedure: GASTRECTOMY;  Surgeon: Chen Jerome MD;  Location: [facility] OR;  Service: General;  Laterality: N/A;    HYSTERECTOMY      LAPAROSCOPIC LYSIS OF ADHESIONS N/A 3/14/2024    Procedure: LYSIS, ADHESIONS, LAPAROSCOPIC;  Surgeon: Chen Jerome MD;  Location: [facility] OR;  Service: General;  Laterality: N/A;    LYSIS OF ADHESIONS N/A 8/20/2024    Procedure: LYSIS, ADHESIONS;  Surgeon: Chen Jerome MD;  Location: [facility] OR;  Service: General;  Laterality: N/A;    PLACEMENT OF JEJUNOSTOMY TUBE N/A 8/20/2024    Procedure: INSERTION, JEJUNOSTOMY TUBE;  Surgeon: Chen Jerome MD;   Location: [facility] OR;  Service: General;  Laterality: N/A;    REMOVAL OF URETERAL CALCULUS Right 11/27/2024    Procedure: REMOVAL, CALCULUS, URETER;  Surgeon: Lata Kelly MD;  Location: [facility] OR;  Service: Urology;  Laterality: Right;  basket extraction    REMOVAL, NEPHROSTOMY TUBE, WITH IMAGING GUIDANCE Right 11/27/2024    Procedure: REMOVAL, NEPHROSTOMY TUBE, WITH IMAGING GUIDANCE;  Surgeon: Lata Kelly MD;  Location: [facility] OR;  Service: Urology;  Laterality: Right;    REMOVAL-STENT Right 11/27/2024    Procedure: REMOVAL-STENT;  Surgeon: Lata Kelly MD;  Location: [facility] OR;  Service: Urology;  Laterality: Right;    RETROGRADE PYELOGRAPHY Right 11/27/2024    Procedure: PYELOGRAM, RETROGRADE;  Surgeon: Lata Kelly MD;  Location: [facility] OR;  Service: Urology;  Laterality: Right;    TOTAL ABDOMINAL HYSTERECTOMY W/ BILATERAL SALPINGOOPHORECTOMY  01/09/2023    radical resection with right salpingo-oophorectomy, left salpingo-oophorectomy, extensive enterolysis, extensive adhesiolysis, left pelvic lymph node biopsy, omental biopsy, small bowel resection with primary functional end-to-end anastomosis, placement of pelvic drain     Family History       Problem Relation (Age of Onset)    Breast cancer Half-sister (58), Other    Colon cancer Half-sister (83), Other    Lung cancer Half-sister    Lymphoma Other    Ovarian cancer Other    Pancreatic cancer Brother (76), Half-brother (74)    Prostate cancer Brother (67), Other, Other          Tobacco Use    Smoking status: Former     Types: Cigarettes    Smokeless tobacco: Never    Tobacco comments:     Quit 1989 smoked 10 years smoked 0.25 ppd    Substance and Sexual Activity    Alcohol use: Not Currently    Drug use: Never    Sexual activity: Not on file     Review of Systems   Constitutional:  Negative for appetite change, chills, fatigue, fever and unexpected weight change.   HENT:  Negative for hearing loss and rhinorrhea.    Eyes:  Negative for  visual disturbance.   Respiratory:  Negative for apnea, cough, shortness of breath and wheezing.    Cardiovascular:  Negative for chest pain and palpitations.   Gastrointestinal:  Negative for abdominal distention, abdominal pain, blood in stool, constipation, diarrhea, nausea and vomiting.        Jejunostomy tube fell out   Genitourinary:  Negative for dysuria, frequency and urgency.   Musculoskeletal:  Negative for arthralgias and neck pain.   Skin:  Negative for rash.   Neurological:  Negative for seizures, weakness, numbness and headaches.   Hematological:  Negative for adenopathy. Does not bruise/bleed easily.   Psychiatric/Behavioral:  Negative for hallucinations. The patient is not nervous/anxious.      Objective:     Vital Signs (Most Recent):  Temp: 98.5 °F (36.9 °C) (05/11/25 2241)  Pulse: 68 (05/12/25 0647)  Resp: 18 (05/12/25 0600)  BP: 127/62 (05/12/25 0647)  SpO2: 97 % (05/12/25 0647) Vital Signs (24h Range):  Temp:  [98.5 °F (36.9 °C)] 98.5 °F (36.9 °C)  Pulse:  [63-73] 68  Resp:  [13-18] 18  SpO2:  [96 %-100 %] 97 %  BP: (110-173)/(59-84) 127/62     Weight: 54 kg (119 lb)  Body mass index is 19.8 kg/m².     Physical Exam  Constitutional:       Appearance: She is well-developed.   HENT:      Head: Normocephalic.   Eyes:      Pupils: Pupils are equal, round, and reactive to light.   Neck:      Thyroid: No thyromegaly.      Vascular: No JVD.      Trachea: No tracheal deviation.   Cardiovascular:      Rate and Rhythm: Normal rate and regular rhythm.      Heart sounds: Normal heart sounds.   Pulmonary:      Breath sounds: Normal breath sounds. No wheezing.   Abdominal:      General: Abdomen is flat. Bowel sounds are normal. There is no distension.      Palpations: Abdomen is soft. Abdomen is not rigid. There is no mass.      Tenderness: There is no abdominal tenderness. There is no guarding or rebound.      Comments: Jejunostomy site has some mild angulation tissue   Musculoskeletal:         General:  "Normal range of motion.   Lymphadenopathy:      Cervical: No cervical adenopathy.   Skin:     General: Skin is warm and dry.      Findings: No erythema or rash.   Neurological:      Mental Status: She is alert and oriented to person, place, and time.   Psychiatric:         Mood and Affect: Mood normal.         Behavior: Behavior normal.         Thought Content: Thought content normal.         Judgment: Judgment normal.        Labs, None    I have reviewed all pertinent lab results within the past 24 hours.  CBC: No results for input(s): "WBC", "RBC", "HGB", "HCT", "PLT", "MCV", "MCH", "MCHC" in the last 168 hours.  BMP: No results for input(s): "GLU", "NA", "K", "CL", "CO2", "BUN", "CREATININE", "CALCIUM", "MG" in the last 168 hours.    Significant Diagnostics:  I have reviewed all pertinent imaging results/findings within the past 24 hours.  No new    Assessment/Plan:     Jejunostomy tube fell out  Jejunostomy tube is no longer needed.  Slight granulation tissue.      Discharge home.      Follow up with Dr. Jerome and surgical oncology in 1-2 weeks      VTE Risk Mitigation (From admission, onward)           Ordered     IP VTE HIGH RISK PATIENT  Once         05/11/25 2335     Place sequential compression device  Until discontinued         05/11/25 6364                    Thank you for your consult. The patient can be discharged home    Rafiq White MD  General Surgery  [facility] - Emergency Dept.  "

## 2025-05-12 NOTE — PLAN OF CARE
[facility] - Emergency Dept.  Discharge Final Note    Primary Care Provider: Gagandeep Iglesias MD    Expected Discharge Date: 5/12/2025    Final Discharge Note (most recent)       Final Note - 05/12/25 0939          Final Note    Assessment Type Final Discharge Note     Anticipated Discharge Disposition Home or Self Care     Hospital Resources/Appts/Education Provided --   Pt doesn't have an [facility] pcp                             Contact Info       Chen Jerome MD   Specialty: Surgical Oncology    [address]  [facility] [address]   Phone: [phone]       Next Steps: Follow up    Juan Daniel Jerome MD   Specialty: Gynecology    [facility]  [address] [address]   Phone: [phone]       Next Steps: Follow up

## 2025-05-12 NOTE — DISCHARGE SUMMARY
[facility] - Emergency Dept.  General Surgery  Discharge Summary      Patient Name: Cheryl Kirkland  MRN: [id number]  Admission Date: 5/11/2025  Hospital Length of Stay: 0 days  Discharge Date and Time: 05/12/2025 7:53 AM  Attending Physician: Rafiq White MD   Discharging Provider: Rafiq White MD  Primary Care Provider: Gagandeep Iglesias MD    HPI:   74-year-old female who has a ileostomy tube in place.  The ileostomy tube fell out and she presented to emergency room.  She is tolerating a diet.  Her albumin is 4.2.      Discussed with her surgeon, Dr. Jerome.  The jejunostomy tube is no longer needed and can be left out    * No surgery found *      Indwelling Lines/Drains at time of discharge:   Lines/Drains/Airways       Drain  Duration                  Gastrostomy/Enterostomy 11/29/24 1100 Jejunostomy tube  days                  Hospital Course: The patient was found to have a dislodged jejunostomy tube.  The emergency room physician could not replace the tube.  Interventional Radiology was consulted.      In discussion with her surgeon this tube is no longer needed and can be left out    Goals of Care Treatment Preferences:  Code Status: Full Code      Consults:   Consults (From admission, onward)          Status Ordering Provider     Inpatient consult to Interventional Radiology  Once        Provider:  (Not yet assigned)    PASTORA Chan            Significant Diagnostic Studies: Labs: None    Pending Diagnostic Studies:       Procedure Component Value Units Date/Time    HCV Virus Hold Specimen [[id number]]     Order Status: Sent Lab Status: No result     Specimen: Blood     HIV 1/2 Ag/Ab (4th Gen) [[id number]]     Order Status: Sent Lab Status: No result     Specimen: Blood     Hepatitis C Antibody [[id number]]     Order Status: Sent Lab Status: No result     Specimen: Blood           Final Active Diagnoses:    Diagnosis Date Noted POA    PRINCIPAL PROBLEM:  Jejunostomy tube  fell out [T85.528A] 11/28/2024 Not Applicable      Problems Resolved During this Admission:      Discharged Condition: stable    Disposition: Home or Self Care    Follow Up:   Follow-up Information       Chen Jerome MD Follow up.    Specialty: Surgical Oncology  Contact information:  [address] [address] [facility] [address] [facility] [place] [address]  [phone]               Juan Daniel Jerome MD .    Specialty: Gynecology  Contact information:  [address]  [facility] [address] [phone]                           Patient Instructions:      IR J-Tube Replacement   Standing Status: Future Standing Exp. Date: 05/12/26     Order Specific Question Answer Comments   Has the patient had a prior contrast or iodine reaction? No    Is the patient currently on any blood thinners like Aspirin, Coumadin, or Plavix? No    Is the patient on any Metformin drug, such as Glucophage or Glucovance? No    May the Radiologist modify the order per protocol to meet the clinical needs of the patient? Yes    Release to patient Immediate      Diet Adult Regular     Notify your health care provider if you experience any of the following:   Order Comments: Persistent drainage from your jejunostomy tube site     Change dressing (specify)   Order Comments: Dressing change:  Antibiotic ointment and a Band-Aid to jejunostomy tube site twice a day     Activity as tolerated     Medications:  Reconciled Home Medications:      Medication List        CONTINUE taking these medications      acetaminophen 500 MG tablet  Commonly known as: TYLENOL  Take 2 tablets (1,000 mg total) by mouth every 8 (eight) hours.     alendronate 70 MG tablet  Commonly known as: FOSAMAX  Take 70 mg by mouth every 7 days. (Sundays)     CALCIUM PHOSPHATE-VITAMIN D3 ORAL  Take 1 tablet by mouth 2 (two) times daily.     carBAMazepine 200 mg tablet  Commonly known as: TEGRETOL  1 tablet with breakfast  1 tablet with lunch   1.5 tablet at bedtime     ELIQUIS ORAL  Take by  mouth.     ferrous sulfate 325 (65 FE) MG EC tablet  Take 65 mg by mouth 2 (two) times daily with meals.     HYDROcodone-acetaminophen 5-325 mg per tablet  Commonly known as: NORCO  Take 1 tablet by mouth every 12 (twelve) hours as needed for Pain.     hydrocortisone 2.5 % cream  Apply topically 2 (two) times daily.     hyoscyamine 0.125 mg Subl  Place 2 tablets (0.25 mg total) under the tongue every 4 (four) hours as needed (bladder spasms).     levETIRAcetam 500 MG Tab  Commonly known as: KEPPRA  Take 1 tablet by mouth 2 (two) times daily.     ondansetron 4 MG Tbdl  Commonly known as: ZOFRAN-ODT  dissolve 1 tablet (4 mg total) by mouth every 8 (eight) hours as needed (nausea).     zonisamide 100 MG Cap  Commonly known as: ZONEGRAN  Take 200 mg by mouth 2 (two) times daily.            Time spent on the discharge of patient: 12 minutes    Rafiq White MD  General Surgery  [facility] - [facility] Dept[facility]

## 2025-05-12 NOTE — HPI
74-year-old female who has a ileostomy tube in place.  The ileostomy tube fell out and she presented to emergency room.  She is tolerating a diet.  Her albumin is 4.2.      Discussed with her surgeon, Dr. Jerome.  The jejunostomy tube is no longer needed and can be left out

## 2025-05-12 NOTE — HOSPITAL COURSE
The patient was found to have a dislodged jejunostomy tube.  The emergency room physician could not replace the tube.  Interventional Radiology was consulted.      In discussion with her surgeon this tube is no longer needed and can be left out

## 2025-05-12 NOTE — ED PROVIDER NOTES
SCRIBE #1 NOTE: I, Luis Antonio Shaw, am scribing for, and in the presence of, Venu Atkins MD. I have scribed the entire note.       History     Chief Complaint   Patient presents with    Abdominal Pain     Abd pain/N/V. Has a j-peg that was accidentally pulled out a little while ago.      Review of patient's allergies indicates:  No Known Allergies      History of Present Illness     HPI    5/11/2025, 10:55 PM  History obtained from the patient and medical records      History of Present Illness: Cheryl Kirkland is a 74 y.o. female patient with a PMHx of DVT, HLD, GERD, anemia, epilepsy, and HTN who presents to the Emergency Department for evaluation of PEG tube issue which began a few hours ago. Pt states her tube (size 14)  fell out when she was in the shower. Symptoms are constant and moderate in severity. No mitigating or exacerbating factors reported. Associated sxs include abdominal pain. Patient denies any fever, n/v or chills. No prior Tx specified.  No further complaints or concerns at this time.       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       Past Surgical History:  Past Surgical History:   Procedure Laterality Date    ABDOMINAL WASHOUT N/A 3/14/2024    Procedure:  LAVAGE, PERITONEAL, THERAPEUTIC;  Surgeon: Chen Jerome MD;  Location: [facility] OR;  Service: General;  Laterality: N/A;    APPENDECTOMY  2015    BIOPSY OF PERITONEUM N/A 3/14/2024    Procedure: BIOPSY, PERITONEUM;  Surgeon: Chen Jerome MD;  Location: [facility] OR;  Service: General;  Laterality: N/A;    COLONOSCOPY  06/20/2012    Dr Boyle- Tubular adenoma polyps. Repeat in 3 years.    COLONOSCOPY  06/17/2015    -Nodular mucosa at appendiceal orfice, sigmoid and desc diverticulosis otherwise normal.    COLONOSCOPY  2020    >3 TAs    COLONOSCOPY  12/2023    CYSTOSCOPY W/ RETROGRADES Right 10/10/2024    Procedure: CYSTOSCOPY, WITH RETROGRADE PYELOGRAM;  Surgeon: Lata Kelly MD;  Location: [facility];  Service: Urology;  Laterality: Right;    CYSTOURETEROSCOPY, WITH HOLMIUM LASER LITHOTRIPSY OF URETERAL CALCULUS AND STENT INSERTION Right 11/27/2024    Procedure: CYSTOURETEROSCOPY, WITH HOLMIUM LASER LITHOTRIPSY OF URETERAL CALCULUS AND STENT INSERTION;  Surgeon: Lata Kelly MD;  Location: [facility];  Service: Urology;  Laterality: Right;    DIAGNOSTIC LAPAROSCOPY N/A 3/14/2024    Procedure: LAPAROSCOPY, DIAGNOSTIC;  Surgeon: Chen Jerome MD;  Location: [facility];  Service: General;  Laterality: N/A;  1. Diagnostic laparoscopy   2. Extensive lysis of adhesions  3. Peritoneal biopsy   4. Peritoneal washings for cytology    DIAGNOSTIC LAPAROSCOPY N/A 8/20/2024    Procedure: LAPAROSCOPY, DIAGNOSTIC;  Surgeon: Chen Jerome MD;  Location: [facility] OR;  Service: General;  Laterality: N/A;    EGD - EXTERNAL RESULT  06/20/2012    Dr Boyle- Mild gastritis otherwise normal.    ENDOSCOPIC ULTRASOUND OF UPPER GASTROINTESTINAL TRACT N/A 7/26/2024    Procedure: ULTRASOUND, UPPER GI TRACT, ENDOSCOPIC;  Surgeon: Valdo Aguilera MD;  Location: [facility];  Service: Endoscopy;  Laterality: N/A;  7/22/24: instructions sent via portal-. Pt no longer takling eliquis-GD    ESOPHAGOGASTRODUODENOSCOPY N/A  02/08/2024    Procedure: EGD (ESOPHAGOGASTRODUODENOSCOPY);  Surgeon: Jayla Arteaga MD;  Location: [facility] ENDO;  Service: Endoscopy;  Laterality: N/A;    ESOPHAGOGASTRODUODENOSCOPY N/A 8/20/2024    Procedure: EGD (ESOPHAGOGASTRODUODENOSCOPY);  Surgeon: Chen Jerome MD;  Location: [facility] OR;  Service: General;  Laterality: N/A;    GASTRECTOMY N/A 8/20/2024    Procedure: GASTRECTOMY;  Surgeon: Chen Jerome MD;  Location: [facility] OR;  Service: General;  Laterality: N/A;    HYSTERECTOMY      LAPAROSCOPIC LYSIS OF ADHESIONS N/A 3/14/2024    Procedure: LYSIS, ADHESIONS, LAPAROSCOPIC;  Surgeon: Chen Jerome MD;  Location: [facility] OR;  Service: General;  Laterality: N/A;    LYSIS OF ADHESIONS N/A 8/20/2024    Procedure: LYSIS, ADHESIONS;  Surgeon: Chen Jerome MD;  Location: [facility] OR;  Service: General;  Laterality: N/A;    PLACEMENT OF JEJUNOSTOMY TUBE N/A 8/20/2024    Procedure: INSERTION, JEJUNOSTOMY TUBE;  Surgeon: Chen Jerome MD;  Location: [facility] OR;  Service: General;  Laterality: N/A;    REMOVAL OF URETERAL CALCULUS Right 11/27/2024    Procedure: REMOVAL, CALCULUS, URETER;  Surgeon: Lata Kelly MD;  Location: [facility] OR;  Service: Urology;  Laterality: Right;  basket extraction    REMOVAL, NEPHROSTOMY TUBE, WITH IMAGING GUIDANCE Right 11/27/2024    Procedure: REMOVAL, NEPHROSTOMY TUBE, WITH IMAGING GUIDANCE;  Surgeon: Lata Kelly MD;  Location: [facility] OR;  Service: Urology;  Laterality: Right;    REMOVAL-STENT Right 11/27/2024    Procedure: REMOVAL-STENT;  Surgeon: Lata Kelly MD;  Location: [facility] OR;  Service: Urology;  Laterality: Right;    RETROGRADE PYELOGRAPHY Right 11/27/2024    Procedure: PYELOGRAM, RETROGRADE;  Surgeon: Lata Kelly MD;  Location: [facility] OR;  Service: Urology;  Laterality: Right;    TOTAL ABDOMINAL HYSTERECTOMY W/ BILATERAL SALPINGOOPHORECTOMY  01/09/2023    radical resection with right salpingo-oophorectomy, left salpingo-oophorectomy, extensive  enterolysis, extensive adhesiolysis, left pelvic lymph node biopsy, omental biopsy, small bowel resection with primary functional end-to-end anastomosis, placement of pelvic drain         Family History:  Family History   Problem Relation Name Age of Onset    Pancreatic cancer Brother Misael Mccray 76    Prostate cancer Brother Zachary 67    Pancreatic cancer Half-brother Gasper 74    Colon cancer Half-sister Elton 83    Lung cancer Half-sister Elton         +smoking hx    Breast cancer Half-sister Vidhi 58    Lymphoma Other Cara Sun    Prostate cancer Other Fernando     Prostate cancer Other Gasper Calderon     Ovarian cancer Other [employer]ark     Breast cancer Other Aledia     Colon cancer Other Aleida        Social History:  Social History     Tobacco Use    Smoking status: Former     Types: Cigarettes    Smokeless tobacco: Never    Tobacco comments:     Quit 1989 smoked 10 years smoked 0.25 ppd    Substance and Sexual Activity    Alcohol use: Not Currently    Drug use: Never    Sexual activity: Not on file        Review of Systems     Review of Systems   Constitutional:  Negative for chills and fever.   HENT:  Negative for sore throat.    Respiratory:  Negative for shortness of breath.    Cardiovascular:  Negative for chest pain.   Gastrointestinal:  Positive for abdominal pain. Negative for nausea and vomiting.   Genitourinary:  Negative for dysuria.   Musculoskeletal:  Negative for back pain.   Skin:  Negative for rash.   Neurological:  Negative for weakness.   Hematological:  Does not bruise/bleed easily.   All other systems reviewed and are negative.       Physical Exam     Initial Vitals [05/11/25 2241]   BP Pulse Resp Temp SpO2   (!) 166/78 73 13 98.5 °F (36.9 °C) 96 %      MAP       --          Physical Exam  Nursing Notes and Vital Signs Reviewed.  Constitutional: Patient is in no acute distress. Well-developed and well-nourished.  Head: Atraumatic. Normocephalic.  Eyes: PERRL. EOM intact.  "Conjunctivae are not pale. No scleral icterus.  ENT: Mucous membranes are moist. Oropharynx is clear and symmetric.    Neck: Supple. Full ROM. No lymphadenopathy.  Cardiovascular: Regular rate. Regular rhythm. No murmurs, rubs, or gallops. Distal pulses are 2+ and symmetric.  Pulmonary/Chest: No respiratory distress. Clear to auscultation bilaterally. No wheezing or rales.  Abdominal: Jejunostomy site noted to left upper abdomen, J tube has been dislodged ans is at bedside in a plastic bag. Soft and non-distended.  There is no tenderness.  No rebound, guarding, or rigidity. Good bowel sounds.  Genitourinary: No CVA tenderness.  Musculoskeletal: Moves all extremities. No obvious deformities. No edema. No calf tenderness.  Skin: Warm and dry.  Neurological:  Alert, awake, and appropriate.  Normal speech.  No acute focal neurological deficits are appreciated.  Psychiatric: Normal affect. Good eye contact. Appropriate in content.     ED Course   Procedures  ED Vital Signs:  Vitals:    05/11/25 2241 05/11/25 2300 05/11/25 2303 05/12/25 0000   BP: (!) 166/78 (!) 173/82  (!) 144/81   Pulse: 73 66 64 70   Resp: 13 18  18   Temp: 98.5 °F (36.9 °C)      TempSrc: Oral      SpO2: 96% 99%  97%   Weight: 54 kg (119 lb)      Height: 5' 5" (1.651 m)       05/12/25 0115   BP: (!) 157/84   Pulse: 69   Resp: 18   Temp:    TempSrc:    SpO2: 100%   Weight:    Height:        Abnormal Lab Results:  Labs Reviewed   HEPATITIS C ANTIBODY   HEP C VIRUS HOLD SPECIMEN   HIV 1 / 2 ANTIBODY        All Lab Results:  Results for orders placed or performed in visit on 04/28/25   Comprehensive Metabolic Panel    Collection Time: 04/28/25 11:49 AM   Result Value Ref Range    Sodium 133 (L) 136 - 145 mmol/L    Potassium 4.1 3.5 - 5.1 mmol/L    Chloride 105 95 - 110 mmol/L    CO2 20 (L) 23 - 29 mmol/L    Glucose 96 70 - 110 mg/dL    BUN 16 8 - 23 mg/dL    Creatinine 0.7 0.5 - 1.4 mg/dL    Calcium 8.8 8.7 - 10.5 mg/dL    Protein Total 7.5 6.0 - 8.4 gm/dL "    Albumin 4.2 3.5 - 5.2 g/dL    Bilirubin Total 0.2 0.1 - 1.0 mg/dL    ALP 88 40 - 150 unit/L    AST 33 11 - 45 unit/L    ALT 46 (H) 10 - 44 unit/L    Anion Gap 8 8 - 16 mmol/L    eGFR >60 >60 mL/min/1.73/m2   Phosphorus    Collection Time: 04/28/25 11:49 AM   Result Value Ref Range    Phosphorus Level 3.5 2.7 - 4.5 mg/dL   Magnesium    Collection Time: 04/28/25 11:49 AM   Result Value Ref Range    Magnesium  1.9 1.6 - 2.6 mg/dL   CBC with Differential    Collection Time: 04/28/25 11:49 AM   Result Value Ref Range    WBC 3.21 (L) 3.90 - 12.70 K/uL    RBC 4.12 4.00 - 5.40 M/uL    HGB 13.7 12.0 - 16.0 gm/dL    HCT 42.2 37.0 - 48.5 %     (H) 82 - 98 fL    MCH 33.3 (H) 27.0 - 31.0 pg    MCHC 32.5 32.0 - 36.0 g/dL    RDW 12.8 11.5 - 14.5 %    Platelet Count 268 150 - 450 K/uL    MPV 8.3 (L) 9.2 - 12.9 fL    Nucleated RBC 0 <=0 /100 WBC    Neut % 53.6 38 - 73 %    Lymph % 34.0 18 - 48 %    Mono % 9.3 4 - 15 %    Eos % 1.9 <=8 %    Basophil % 0.9 <=1.9 %    Imm Grans % 0.3 0.0 - 0.5 %    Neut # 1.72 (L) 1.8 - 7.7 K/uL    Lymph # 1.09 1 - 4.8 K/uL    Mono # 0.30 0.3 - 1 K/uL    Eos # 0.06 <=0.5 K/uL    Baso # 0.03 <=0.2 K/uL    Imm Grans # 0.01 0.00 - 0.04 K/uL       Imaging Results:  Imaging Results    None                   The Emergency Provider reviewed the vital signs and test results, which are outlined above.     ED Discussion     11:31 PM: Discussed case with Dr. Christopher MD (General Surgery). Dr. White agrees with current care and management of pt and accepts admission. Dr. White recommends keeping pt NPO and consulting IR about placing tube this AM.  Admitting Service: General Surgery  Admitting Physician: Dr. White  Admit to: Obs/Tele    11:31 PM: Re-evaluated pt.  I have discussed test results, shared treatment plan, and the need for admission with patient/family/caretaker at bedside. Pt and/or family/caretaker express understanding at this time and agree with all information. All questions  answered. Pt/caretaker/family member(s) have no further questions or concerns at this time. Pt is ready for admit.             ED Course as of 05/12/25 0179   Sun May 11, 2025   9411 Attempted to place gastric tube back in place, and met large amount of resistance. Tried a 16 Fr, and then the patient's old 14 Fr. Also used a temperature probe, and was unable to place tube back. It appears to have closed.  [BA]      ED Course User Index  [BA] Venu Atkins MD     Medical Decision Making  Amount and/or Complexity of Data Reviewed  Labs: ordered. Decision-making details documented in ED Course.  Discussion of management or test interpretation with external provider(s): 11:29 PM: 75 y/o F with PMH of gastric cancer, had J tube normally here due to her J tube becoming dislodged about 2 hours ago. She has a 14 Fr tube in place, we tried to replace with 16 Fr (smallest we have), but it was too large. I tried to replace hers, but meeting more resistance than I am comfortable with. Also, I tried guiding a probe into place and also am meeting too much resistance. I fear it may have closed up. It looks like she is followed by Dr. Jerome. I see IR has had to replace her tubes in the past. Can we bring her in for J tube replacement    Risk  OTC drugs.  Prescription drug management.  Risk Details: Differential diagnosis: Gastroenteritis, Bowel obstruction, Colitis, Diverticulitis, Cholecystitis, Appendicitis, Perforated bowel, Herniation, Infectious etiology, UTI, Pyelonephritis, Biliary obstruction, Kidney stone                  ED Medication(s):  Medications   sodium chloride 0.9% flush 10 mL (has no administration in time range)   melatonin tablet 6 mg (has no administration in time range)   0.9% NaCl infusion ( Intravenous New Bag 5/12/25 0103)   LIDOcaine (PF) 10 mg/ml (1%) injection 100 mg (100 mg Infiltration Given 5/11/25 5807)       New Prescriptions    No medications on file               Scribe Attestation:   Scribe  #1: I performed the above scribed service and the documentation accurately describes the services I performed. I attest to the accuracy of the note.     Attending:   Physician Attestation Statement for Scribe #1: I, Venu Atkins MD, personally performed the services described in this documentation, as scribed by Luis Antonio Shaw, in my presence, and it is both accurate and complete.           Clinical Impression       ICD-10-CM ICD-9-CM   1. Jejunostomy tube fell out  T85.528A V55.4       Disposition:   Disposition: Placed in Observation  Condition: Stable        Venu Atkins MD  05/12/25 0429

## 2025-05-12 NOTE — ASSESSMENT & PLAN NOTE
Jejunostomy tube is no longer needed.  Slight granulation tissue.      Discharge home.      Follow up with Dr. Jerome and surgical oncology in 1-2 weeks

## 2025-05-12 NOTE — SUBJECTIVE & OBJECTIVE
No current facility-administered medications on file prior to encounter.     Current Outpatient Medications on File Prior to Encounter   Medication Sig    alendronate (FOSAMAX) 70 MG tablet Take 70 mg by mouth every 7 days. (Sundays)    calcium phosphate trib/vit D3 (CALCIUM PHOSPHATE-VITAMIN D3 ORAL) Take 1 tablet by mouth 2 (two) times daily.    carBAMazepine (TEGRETOL) 200 mg tablet 1 tablet with breakfast  1 tablet with lunch   1.5 tablet at bedtime    levETIRAcetam (KEPPRA) 500 MG Tab Take 1 tablet by mouth 2 (two) times daily.    zonisamide (ZONEGRAN) 100 MG Cap Take 200 mg by mouth 2 (two) times daily.    acetaminophen (TYLENOL) 500 MG tablet Take 2 tablets (1,000 mg total) by mouth every 8 (eight) hours.    apixaban (ELIQUIS ORAL) Take by mouth.    ferrous sulfate 325 (65 FE) MG EC tablet Take 65 mg by mouth 2 (two) times daily with meals.    HYDROcodone-acetaminophen (NORCO) 5-325 mg per tablet Take 1 tablet by mouth every 12 (twelve) hours as needed for Pain.    hydrocortisone 2.5 % cream Apply topically 2 (two) times daily.    hyoscyamine 0.125 mg Subl Place 2 tablets (0.25 mg total) under the tongue every 4 (four) hours as needed (bladder spasms).    ondansetron (ZOFRAN-ODT) 4 MG TbDL dissolve 1 tablet (4 mg total) by mouth every 8 (eight) hours as needed (nausea).       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     Past Surgical History:   Procedure Laterality Date    ABDOMINAL WASHOUT N/A 3/14/2024    Procedure: LAVAGE, PERITONEAL, THERAPEUTIC;  Surgeon: Chen Jerome MD;  Location: [facility] OR;  Service: General;  Laterality: N/A;    APPENDECTOMY  2015    BIOPSY OF PERITONEUM N/A 3/14/2024    Procedure: BIOPSY, PERITONEUM;  Surgeon: Chen Jerome MD;  Location: [facility] OR;  Service: General;  Laterality: N/A;    COLONOSCOPY  06/20/2012    Dr Boyle- Tubular adenoma polyps. Repeat in 3 years.    COLONOSCOPY  06/17/2015    -Nodular mucosa at appendiceal orfice, sigmoid and desc diverticulosis otherwise normal.    COLONOSCOPY  2020    >3 TAs    COLONOSCOPY  12/2023    CYSTOSCOPY W/ RETROGRADES Right 10/10/2024    Procedure: CYSTOSCOPY, WITH RETROGRADE PYELOGRAM;  Surgeon: Lata Kelly MD;  Location: [facility] OR;  Service: Urology;  Laterality: Right;    CYSTOURETEROSCOPY, WITH HOLMIUM LASER LITHOTRIPSY OF URETERAL CALCULUS AND STENT INSERTION Right 11/27/2024    Procedure: CYSTOURETEROSCOPY, WITH HOLMIUM LASER LITHOTRIPSY OF URETERAL CALCULUS AND STENT INSERTION;  Surgeon: Lata Kelly MD;  Location: [facility] OR;  Service: Urology;  Laterality: Right;    DIAGNOSTIC LAPAROSCOPY N/A 3/14/2024    Procedure: LAPAROSCOPY, DIAGNOSTIC;  Surgeon: Chen Jerome MD;  Location: [facility] OR;  Service: General;  Laterality: N/A;  1. Diagnostic laparoscopy   2. Extensive lysis of adhesions  3. Peritoneal biopsy   4. Peritoneal washings for cytology    DIAGNOSTIC LAPAROSCOPY N/A 8/20/2024    Procedure: LAPAROSCOPY, DIAGNOSTIC;  Surgeon: Chen Jerome MD;  Location: [facility] OR;  Service: General;  Laterality: N/A;    EGD - EXTERNAL RESULT  06/20/2012    Dr Boyle- Mild gastritis otherwise normal.    ENDOSCOPIC ULTRASOUND OF UPPER GASTROINTESTINAL TRACT N/A 7/26/2024    Procedure: ULTRASOUND, UPPER GI TRACT,  ENDOSCOPIC;  Surgeon: Valdo Aguilera MD;  Location: [facility];  Service: Endoscopy;  Laterality: N/A;  7/22/24: instructions sent via portal-. Pt no longer takling eliquis-GD    ESOPHAGOGASTRODUODENOSCOPY N/A 02/08/2024    Procedure: EGD (ESOPHAGOGASTRODUODENOSCOPY);  Surgeon: Jayla Arteaga MD;  Location: [facility] ENDO;  Service: Endoscopy;  Laterality: N/A;    ESOPHAGOGASTRODUODENOSCOPY N/A 8/20/2024    Procedure: EGD (ESOPHAGOGASTRODUODENOSCOPY);  Surgeon: Chen Jerome MD;  Location: [facility] OR;  Service: General;  Laterality: N/A;    GASTRECTOMY N/A 8/20/2024    Procedure: GASTRECTOMY;  Surgeon: Chen Jerome MD;  Location: [facility] OR;  Service: General;  Laterality: N/A;    HYSTERECTOMY      LAPAROSCOPIC LYSIS OF ADHESIONS N/A 3/14/2024    Procedure: LYSIS, ADHESIONS, LAPAROSCOPIC;  Surgeon: Chen Jerome MD;  Location: [facility] OR;  Service: General;  Laterality: N/A;    LYSIS OF ADHESIONS N/A 8/20/2024    Procedure: LYSIS, ADHESIONS;  Surgeon: Chen Jerome MD;  Location: [facility] OR;  Service: General;  Laterality: N/A;    PLACEMENT OF JEJUNOSTOMY TUBE N/A 8/20/2024    Procedure: INSERTION, JEJUNOSTOMY TUBE;  Surgeon: Chen Jerome MD;  Location: [facility] OR;  Service: General;  Laterality: N/A;    REMOVAL OF URETERAL CALCULUS Right 11/27/2024    Procedure: REMOVAL, CALCULUS, URETER;  Surgeon: Lata Kelly MD;  Location: [facility] OR;  Service: Urology;  Laterality: Right;  basket extraction    REMOVAL, NEPHROSTOMY TUBE, WITH IMAGING GUIDANCE Right 11/27/2024    Procedure: REMOVAL, NEPHROSTOMY TUBE, WITH IMAGING GUIDANCE;  Surgeon: Lata Kelly MD;  Location: [facility] OR;  Service: Urology;  Laterality: Right;    REMOVAL-STENT Right 11/27/2024    Procedure: REMOVAL-STENT;  Surgeon: Lata Kelly MD;  Location: [facility] OR;  Service: Urology;  Laterality: Right;    RETROGRADE PYELOGRAPHY Right 11/27/2024    Procedure: PYELOGRAM, RETROGRADE;  Surgeon: Lata Kelly MD;  Location: [facility] OR;   Service: Urology;  Laterality: Right;    TOTAL ABDOMINAL HYSTERECTOMY W/ BILATERAL SALPINGOOPHORECTOMY  01/09/2023    radical resection with right salpingo-oophorectomy, left salpingo-oophorectomy, extensive enterolysis, extensive adhesiolysis, left pelvic lymph node biopsy, omental biopsy, small bowel resection with primary functional end-to-end anastomosis, placement of pelvic drain     Family History       Problem Relation (Age of Onset)    Breast cancer Half-sister (58), Other    Colon cancer Half-sister (83), Other    Lung cancer Half-sister    Lymphoma Other    Ovarian cancer Other    Pancreatic cancer Brother (76), Half-brother (74)    Prostate cancer Brother (67), Other, Other          Tobacco Use    Smoking status: Former     Types: Cigarettes    Smokeless tobacco: Never    Tobacco comments:     Quit 1989 smoked 10 years smoked 0.25 ppd    Substance and Sexual Activity    Alcohol use: Not Currently    Drug use: Never    Sexual activity: Not on file     Review of Systems   Constitutional:  Negative for appetite change, chills, fatigue, fever and unexpected weight change.   HENT:  Negative for hearing loss and rhinorrhea.    Eyes:  Negative for visual disturbance.   Respiratory:  Negative for apnea, cough, shortness of breath and wheezing.    Cardiovascular:  Negative for chest pain and palpitations.   Gastrointestinal:  Negative for abdominal distention, abdominal pain, blood in stool, constipation, diarrhea, nausea and vomiting.        Jejunostomy tube fell out   Genitourinary:  Negative for dysuria, frequency and urgency.   Musculoskeletal:  Negative for arthralgias and neck pain.   Skin:  Negative for rash.   Neurological:  Negative for seizures, weakness, numbness and headaches.   Hematological:  Negative for adenopathy. Does not bruise/bleed easily.   Psychiatric/Behavioral:  Negative for hallucinations. The patient is not nervous/anxious.      Objective:     Vital Signs (Most Recent):  Temp: 98.5 °F  "(36.9 °C) (05/11/25 2241)  Pulse: 68 (05/12/25 0647)  Resp: 18 (05/12/25 0600)  BP: 127/62 (05/12/25 0647)  SpO2: 97 % (05/12/25 0647) Vital Signs (24h Range):  Temp:  [98.5 °F (36.9 °C)] 98.5 °F (36.9 °C)  Pulse:  [63-73] 68  Resp:  [13-18] 18  SpO2:  [96 %-100 %] 97 %  BP: (110-173)/(59-84) 127/62     Weight: 54 kg (119 lb)  Body mass index is 19.8 kg/m².     Physical Exam  Constitutional:       Appearance: She is well-developed.   HENT:      Head: Normocephalic.   Eyes:      Pupils: Pupils are equal, round, and reactive to light.   Neck:      Thyroid: No thyromegaly.      Vascular: No JVD.      Trachea: No tracheal deviation.   Cardiovascular:      Rate and Rhythm: Normal rate and regular rhythm.      Heart sounds: Normal heart sounds.   Pulmonary:      Breath sounds: Normal breath sounds. No wheezing.   Abdominal:      General: Abdomen is flat. Bowel sounds are normal. There is no distension.      Palpations: Abdomen is soft. Abdomen is not rigid. There is no mass.      Tenderness: There is no abdominal tenderness. There is no guarding or rebound.      Comments: Jejunostomy site has some mild angulation tissue   Musculoskeletal:         General: Normal range of motion.   Lymphadenopathy:      Cervical: No cervical adenopathy.   Skin:     General: Skin is warm and dry.      Findings: No erythema or rash.   Neurological:      Mental Status: She is alert and oriented to person, place, and time.   Psychiatric:         Mood and Affect: Mood normal.         Behavior: Behavior normal.         Thought Content: Thought content normal.         Judgment: Judgment normal.        Labs, None    I have reviewed all pertinent lab results within the past 24 hours.  CBC: No results for input(s): "WBC", "RBC", "HGB", "HCT", "PLT", "MCV", "MCH", "MCHC" in the last 168 hours.  BMP: No results for input(s): "GLU", "NA", "K", "CL", "CO2", "BUN", "CREATININE", "CALCIUM", "MG" in the last 168 hours.    Significant Diagnostics:  I have " reviewed all pertinent imaging results/findings within the past 24 hours.  No new

## 2025-05-13 ENCOUNTER — PATIENT MESSAGE (OUTPATIENT)
Dept: ADMINISTRATIVE | Facility: OTHER | Age: 75
End: 2025-05-13
Payer: MEDICARE

## 2025-05-18 ENCOUNTER — PATIENT MESSAGE (OUTPATIENT)
Dept: ADMINISTRATIVE | Facility: OTHER | Age: 75
End: 2025-05-18
Payer: MEDICARE

## 2025-05-19 ENCOUNTER — PATIENT MESSAGE (OUTPATIENT)
Dept: ADMINISTRATIVE | Facility: OTHER | Age: 75
End: 2025-05-19
Payer: MEDICARE

## 2025-05-20 ENCOUNTER — PATIENT MESSAGE (OUTPATIENT)
Dept: ADMINISTRATIVE | Facility: OTHER | Age: 75
End: 2025-05-20
Payer: MEDICARE

## 2025-05-21 ENCOUNTER — PATIENT MESSAGE (OUTPATIENT)
Dept: ADMINISTRATIVE | Facility: OTHER | Age: 75
End: 2025-05-21
Payer: MEDICARE

## 2025-05-22 ENCOUNTER — PATIENT MESSAGE (OUTPATIENT)
Dept: ADMINISTRATIVE | Facility: OTHER | Age: 75
End: 2025-05-22
Payer: MEDICARE

## 2025-05-23 ENCOUNTER — PATIENT MESSAGE (OUTPATIENT)
Dept: ADMINISTRATIVE | Facility: OTHER | Age: 75
End: 2025-05-23
Payer: MEDICARE

## 2025-05-28 ENCOUNTER — PATIENT MESSAGE (OUTPATIENT)
Dept: ADMINISTRATIVE | Facility: OTHER | Age: 75
End: 2025-05-28
Payer: MEDICARE

## 2025-06-02 ENCOUNTER — PATIENT MESSAGE (OUTPATIENT)
Dept: ADMINISTRATIVE | Facility: OTHER | Age: 75
End: 2025-06-02
Payer: MEDICARE

## 2025-06-03 ENCOUNTER — PATIENT MESSAGE (OUTPATIENT)
Dept: ADMINISTRATIVE | Facility: OTHER | Age: 75
End: 2025-06-03
Payer: MEDICARE

## 2025-06-05 ENCOUNTER — PATIENT MESSAGE (OUTPATIENT)
Dept: ADMINISTRATIVE | Facility: OTHER | Age: 75
End: 2025-06-05
Payer: MEDICARE

## 2025-06-16 ENCOUNTER — HOSPITAL ENCOUNTER (OUTPATIENT)
Dept: RADIOLOGY | Facility: HOSPITAL | Age: 75
Discharge: HOME | End: 2025-06-16
Attending: UROLOGY
Payer: MEDICARE

## 2025-06-16 DIAGNOSIS — N20.1 URETERAL STONE: ICD-10-CM

## 2025-06-16 PROCEDURE — 76770 US EXAM ABDO BACK WALL COMP: CPT | Mod: TC,PO

## 2025-06-16 PROCEDURE — 76770 US EXAM ABDO BACK WALL COMP: CPT | Mod: 26,,, | Performed by: RADIOLOGY

## 2025-06-23 ENCOUNTER — OFFICE VISIT (OUTPATIENT)
Dept: UROLOGY | Facility: CLINIC | Age: 75
End: 2025-06-23
Payer: MEDICARE

## 2025-06-23 VITALS
SYSTOLIC BLOOD PRESSURE: 149 MMHG | BODY MASS INDEX: 19.83 KG/M2 | HEART RATE: 67 BPM | WEIGHT: 119.06 LBS | DIASTOLIC BLOOD PRESSURE: 81 MMHG | HEIGHT: 65 IN

## 2025-06-23 DIAGNOSIS — N20.0 RENAL STONE: Primary | ICD-10-CM

## 2025-06-23 PROCEDURE — 99213 OFFICE O/P EST LOW 20 MIN: CPT | Mod: S$GLB,,, | Performed by: UROLOGY

## 2025-06-23 PROCEDURE — 3008F BODY MASS INDEX DOCD: CPT | Mod: CPTII,S$GLB,, | Performed by: UROLOGY

## 2025-06-23 PROCEDURE — 3079F DIAST BP 80-89 MM HG: CPT | Mod: CPTII,S$GLB,, | Performed by: UROLOGY

## 2025-06-23 PROCEDURE — 3288F FALL RISK ASSESSMENT DOCD: CPT | Mod: CPTII,S$GLB,, | Performed by: UROLOGY

## 2025-06-23 PROCEDURE — 3077F SYST BP >= 140 MM HG: CPT | Mod: CPTII,S$GLB,, | Performed by: UROLOGY

## 2025-06-23 PROCEDURE — 1159F MED LIST DOCD IN RCRD: CPT | Mod: CPTII,S$GLB,, | Performed by: UROLOGY

## 2025-06-23 PROCEDURE — 1101F PT FALLS ASSESS-DOCD LE1/YR: CPT | Mod: CPTII,S$GLB,, | Performed by: UROLOGY

## 2025-06-23 PROCEDURE — 1126F AMNT PAIN NOTED NONE PRSNT: CPT | Mod: CPTII,S$GLB,, | Performed by: UROLOGY

## 2025-06-23 PROCEDURE — 99999 PR PBB SHADOW E&M-EST. PATIENT-LVL III: CPT | Mod: PBBFAC,,, | Performed by: UROLOGY

## 2025-06-23 NOTE — PROGRESS NOTES
Chief Complaint   Patient presents with    Follow-up         History of Present Illness:   Cheryl Kirkland is a 74 y.o. female here for evaluation of Follow-up    6/23/25-No flank pain. No abdominal pain. No gross hematuria or dysuria. Doing much better and now can get around and eat. Feeding tube is out.   12/16/24-here for stent pull.   11/18/24-73yo female presents for hospital follow up. She currently has a R nephrostomy tube and stent in place for an 8mm mid-ureteral stone. She complains of right sided abdominal and flank pain. She has some stent dysuria. No recent fever or chills.       Review of Systems   Constitutional:  Negative for chills and fever.   Respiratory:  Negative for shortness of breath.    Cardiovascular:  Negative for chest pain.   All other systems reviewed and are negative.        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       Past Surgical History:   Procedure Laterality Date    ABDOMINAL WASHOUT N/A 3/14/2024    Procedure: LAVAGE, PERITONEAL, THERAPEUTIC;  Surgeon: Chen Jerome MD;  Location: [facility] OR;  Service: General;  Laterality: N/A;    APPENDECTOMY  2015    BIOPSY OF PERITONEUM N/A 3/14/2024    Procedure: BIOPSY, PERITONEUM;  Surgeon: Chen Jerome MD;  Location: [facility] OR;  Service: General;  Laterality: N/A;    COLONOSCOPY  06/20/2012     Dr Boyle- Tubular adenoma polyps. Repeat in 3 years.    COLONOSCOPY  06/17/2015    -Nodular mucosa at appendiceal orfice, sigmoid and desc diverticulosis otherwise normal.    COLONOSCOPY  2020    >3 TAs    COLONOSCOPY  12/2023    CYSTOSCOPY W/ RETROGRADES Right 10/10/2024    Procedure: CYSTOSCOPY, WITH RETROGRADE PYELOGRAM;  Surgeon: Lata Kelly MD;  Location: [facility];  Service: Urology;  Laterality: Right;    CYSTOURETEROSCOPY, WITH HOLMIUM LASER LITHOTRIPSY OF URETERAL CALCULUS AND STENT INSERTION Right 11/27/2024    Procedure: CYSTOURETEROSCOPY, WITH HOLMIUM LASER LITHOTRIPSY OF URETERAL CALCULUS AND STENT INSERTION;  Surgeon: Lata Kelly MD;  Location: [facility];  Service: Urology;  Laterality: Right;    DIAGNOSTIC LAPAROSCOPY N/A 3/14/2024    Procedure: LAPAROSCOPY, DIAGNOSTIC;  Surgeon: Chen Jerome MD;  Location: [facility];  Service: General;  Laterality: N/A;  1. Diagnostic laparoscopy   2. Extensive lysis of adhesions  3. Peritoneal biopsy   4. Peritoneal washings for cytology    DIAGNOSTIC LAPAROSCOPY N/A 8/20/2024    Procedure: LAPAROSCOPY, DIAGNOSTIC;  Surgeon: Chen Jerome MD;  Location: [facility];  Service: General;  Laterality: N/A;    EGD - EXTERNAL RESULT  06/20/2012    Dr Boyle- Mild gastritis otherwise normal.    ENDOSCOPIC ULTRASOUND OF UPPER GASTROINTESTINAL TRACT N/A 7/26/2024    Procedure: ULTRASOUND, UPPER GI TRACT, ENDOSCOPIC;  Surgeon: Valdo Aguilera MD;  Location: [facility];  Service: Endoscopy;  Laterality: N/A;  7/22/24: instructions sent via portal-. Pt no longer aichaling eliquis-GD    ESOPHAGOGASTRODUODENOSCOPY N/A 02/08/2024    Procedure: EGD (ESOPHAGOGASTRODUODENOSCOPY);  Surgeon: Jayla Arteaga MD;  Location: [facility];  Service: Endoscopy;  Laterality: N/A;    ESOPHAGOGASTRODUODENOSCOPY N/A 8/20/2024    Procedure: EGD (ESOPHAGOGASTRODUODENOSCOPY);  Surgeon: Chen Jerome MD;  Location: [facility];  Service: General;  Laterality:  N/A;    GASTRECTOMY N/A 8/20/2024    Procedure: GASTRECTOMY;  Surgeon: Chen Jerome MD;  Location: [facility] OR;  Service: General;  Laterality: N/A;    HYSTERECTOMY      LAPAROSCOPIC LYSIS OF ADHESIONS N/A 3/14/2024    Procedure: LYSIS, ADHESIONS, LAPAROSCOPIC;  Surgeon: Chen Jerome MD;  Location: [facility] OR;  Service: General;  Laterality: N/A;    LYSIS OF ADHESIONS N/A 8/20/2024    Procedure: LYSIS, ADHESIONS;  Surgeon: Chen Jerome MD;  Location: [facility] OR;  Service: General;  Laterality: N/A;    PLACEMENT OF JEJUNOSTOMY TUBE N/A 8/20/2024    Procedure: INSERTION, JEJUNOSTOMY TUBE;  Surgeon: Chen Jerome MD;  Location: [facility] OR;  Service: General;  Laterality: N/A;    REMOVAL OF URETERAL CALCULUS Right 11/27/2024    Procedure: REMOVAL, CALCULUS, URETER;  Surgeon: Lata Kelly MD;  Location: [facility] OR;  Service: Urology;  Laterality: Right;  basket extraction    REMOVAL, NEPHROSTOMY TUBE, WITH IMAGING GUIDANCE Right 11/27/2024    Procedure: REMOVAL, NEPHROSTOMY TUBE, WITH IMAGING GUIDANCE;  Surgeon: Lata Kelly MD;  Location: [facility] OR;  Service: Urology;  Laterality: Right;    REMOVAL-STENT Right 11/27/2024    Procedure: REMOVAL-STENT;  Surgeon: Lata Kelly MD;  Location: [facility] OR;  Service: Urology;  Laterality: Right;    RETROGRADE PYELOGRAPHY Right 11/27/2024    Procedure: PYELOGRAM, RETROGRADE;  Surgeon: Lata Kelly MD;  Location: [facility] OR;  Service: Urology;  Laterality: Right;    TOTAL ABDOMINAL HYSTERECTOMY W/ BILATERAL SALPINGOOPHORECTOMY  01/09/2023    radical resection with right salpingo-oophorectomy, left salpingo-oophorectomy, extensive enterolysis, extensive adhesiolysis, left pelvic lymph node biopsy, omental biopsy, small bowel resection with primary functional end-to-end anastomosis, placement of pelvic drain       Family History   Problem Relation Name Age of Onset    Pancreatic cancer Brother Misael Calderon. 76    Prostate cancer Brother Zachary 67    Pancreatic  cancer Half-brother Gasper 74    Colon cancer Half-sister Elton 83    Lung cancer Half-sister Elton         +smoking hx    Breast cancer Half-sister Vidhi 58    Lymphoma Other Cara Sun    Prostate cancer Other Fernando     Prostate cancer Other Gasper Calderon     Ovarian cancer Other Adilene     Breast cancer Other Aleida     Colon cancer Other Aleida        Social History     Tobacco Use    Smoking status: Former     Types: Cigarettes    Smokeless tobacco: Never    Tobacco comments:     Quit 1989 smoked 10 years smoked 0.25 ppd    Substance Use Topics    Alcohol use: Not Currently    Drug use: Never       Current Outpatient Medications   Medication Sig Dispense Refill    acetaminophen (TYLENOL) 500 MG tablet Take 2 tablets (1,000 mg total) by mouth every 8 (eight) hours.  0    alendronate (FOSAMAX) 70 MG tablet Take 70 mg by mouth every 7 days. (Sundays)      calcium phosphate trib/vit D3 (CALCIUM PHOSPHATE-VITAMIN D3 ORAL) Take 1 tablet by mouth 2 (two) times daily.      carBAMazepine (TEGRETOL) 200 mg tablet 1 tablet with breakfast  1 tablet with lunch   1.5 tablet at bedtime      ferrous sulfate 325 (65 FE) MG EC tablet Take 65 mg by mouth 2 (two) times daily with meals.      levETIRAcetam (KEPPRA) 500 MG Tab Take 1 tablet by mouth 2 (two) times daily.      zonisamide (ZONEGRAN) 100 MG Cap Take 200 mg by mouth 2 (two) times daily.      apixaban (ELIQUIS ORAL) Take by mouth. (Patient not taking: Reported on 5/12/2025)      hydrocortisone 2.5 % cream Apply topically 2 (two) times daily. 28 g 1     No current facility-administered medications for this visit.       Review of patient's allergies indicates:  No Known Allergies    Physical Exam  Vitals:    06/23/25 0936   BP: (!) 149/81   Pulse: 67       General: Well-developed, well-nourished, in no acute distress  HEENT: Normocephalic, atraumatic, extraocular movements intact  Neck: Supple, no supraclavicular or cervical lymphadenopathy, trachea  midline  Respirations: even and unlabored  Extremities: moves all equally, no clubbing, cyanosis or edema  Skin: Warm and dry. No lesions  Psych: normal affect  Neuro: Alert and oriented x 3. Cranial nerves II-XII intact    Stone analysis 11/27/25: 70% CaPhos, 30% CaOx    Lab Results   Component Value Date    WBC 3.21 (L) 04/28/2025    HGB 13.7 04/28/2025    HCT 42.2 04/28/2025     (H) 04/28/2025     04/28/2025       BMP  Lab Results   Component Value Date     (L) 04/28/2025    K 4.1 04/28/2025     04/28/2025    CO2 20 (L) 04/28/2025    BUN 16 04/28/2025    CREATININE 0.7 04/28/2025    CALCIUM 8.8 04/28/2025    ANIONGAP 8 04/28/2025    EGFRNORACEVR >60 04/28/2025     Renal US 6/16/25 personally reviewed and agree with official read:   US Retroperitoneal Complete  Narrative: EXAMINATION:  US RETROPERITONEAL COMPLETE    CLINICAL HISTORY:  Calculus of ureter    TECHNIQUE:  Routine imaging of the kidneys and bladder performed.    COMPARISON:  01/16/2025    FINDINGS:  Right kidney: The right kidney measures 9.0 cm. Echotexture normal.  No mass. No nephrolithiasis.  No hydronephrosis.    Left kidney: The left kidney measures 9.0 cm. Echotexture normal.  No mass. Nonobstructive calculi present.  No hydronephrosis.    The bladder is unremarkable for distension.  Impression: Nonobstructive left nephrolithiasis    Electronically signed by: Morgan Pimentel MD  Date:    06/16/2025  Time:    09:49        Assessment:  1. Renal stone  US Retroperitoneal Complete              Plan:   Renal stone  -     US Retroperitoneal Complete; Future; Expected date: 12/23/2025    Discussed elective treatment of the stone vs observation. Pt elects for observation. Discussed need for fluid augmentation, which may be difficult due to h/o gastric resection. She expressed understanding.     Follow up in about 6 months (around 12/23/2025) for ultrasound before visit.

## 2025-07-28 ENCOUNTER — PATIENT MESSAGE (OUTPATIENT)
Dept: ADMINISTRATIVE | Facility: OTHER | Age: 75
End: 2025-07-28
Payer: MEDICARE

## 2025-08-01 ENCOUNTER — HOSPITAL ENCOUNTER (OUTPATIENT)
Dept: RADIOLOGY | Facility: HOSPITAL | Age: 75
Discharge: HOME | End: 2025-08-01
Attending: INTERNAL MEDICINE
Payer: MEDICARE

## 2025-08-01 DIAGNOSIS — C16.9 GASTRIC ADENOCARCINOMA: ICD-10-CM

## 2025-08-01 PROCEDURE — 78815 PET IMAGE W/CT SKULL-THIGH: CPT | Mod: TC

## 2025-08-01 PROCEDURE — A9552 F18 FDG: HCPCS | Performed by: INTERNAL MEDICINE

## 2025-08-01 PROCEDURE — 78815 PET IMAGE W/CT SKULL-THIGH: CPT | Mod: 26,PS,, | Performed by: RADIOLOGY

## 2025-08-01 RX ORDER — FLUDEOXYGLUCOSE F18 500 MCI/ML
12.19 INJECTION INTRAVENOUS
Status: COMPLETED | OUTPATIENT
Start: 2025-08-01 | End: 2025-08-01

## 2025-08-01 RX ADMIN — FLUDEOXYGLUCOSE F-18 12.19 MILLICURIE: 500 INJECTION INTRAVENOUS at 08:08

## 2025-08-04 ENCOUNTER — OFFICE VISIT (OUTPATIENT)
Dept: HEMATOLOGY/ONCOLOGY | Facility: CLINIC | Age: 75
End: 2025-08-04
Payer: MEDICARE

## 2025-08-04 VITALS
HEIGHT: 65 IN | OXYGEN SATURATION: 99 % | SYSTOLIC BLOOD PRESSURE: 126 MMHG | TEMPERATURE: 98 F | BODY MASS INDEX: 17.63 KG/M2 | DIASTOLIC BLOOD PRESSURE: 77 MMHG | WEIGHT: 105.81 LBS | HEART RATE: 78 BPM | RESPIRATION RATE: 20 BRPM

## 2025-08-04 DIAGNOSIS — C16.9 GASTRIC ADENOCARCINOMA: Primary | ICD-10-CM

## 2025-08-04 DIAGNOSIS — I10 PRIMARY HYPERTENSION: ICD-10-CM

## 2025-08-04 DIAGNOSIS — M81.0 OSTEOPOROSIS, UNSPECIFIED OSTEOPOROSIS TYPE, UNSPECIFIED PATHOLOGICAL FRACTURE PRESENCE: ICD-10-CM

## 2025-08-04 DIAGNOSIS — R93.3 ABNORMAL FINDINGS ON DIAGNOSTIC IMAGING OF OTHER PARTS OF DIGESTIVE TRACT: ICD-10-CM

## 2025-08-04 PROCEDURE — 3288F FALL RISK ASSESSMENT DOCD: CPT | Mod: CPTII,S$GLB,, | Performed by: INTERNAL MEDICINE

## 2025-08-04 PROCEDURE — 3074F SYST BP LT 130 MM HG: CPT | Mod: CPTII,S$GLB,, | Performed by: INTERNAL MEDICINE

## 2025-08-04 PROCEDURE — 99214 OFFICE O/P EST MOD 30 MIN: CPT | Mod: S$GLB,,, | Performed by: INTERNAL MEDICINE

## 2025-08-04 PROCEDURE — 1101F PT FALLS ASSESS-DOCD LE1/YR: CPT | Mod: CPTII,S$GLB,, | Performed by: INTERNAL MEDICINE

## 2025-08-04 PROCEDURE — G2211 COMPLEX E/M VISIT ADD ON: HCPCS | Mod: S$GLB,,, | Performed by: INTERNAL MEDICINE

## 2025-08-04 PROCEDURE — 3078F DIAST BP <80 MM HG: CPT | Mod: CPTII,S$GLB,, | Performed by: INTERNAL MEDICINE

## 2025-08-04 PROCEDURE — 1126F AMNT PAIN NOTED NONE PRSNT: CPT | Mod: CPTII,S$GLB,, | Performed by: INTERNAL MEDICINE

## 2025-08-04 PROCEDURE — 99999 PR PBB SHADOW E&M-EST. PATIENT-LVL III: CPT | Mod: PBBFAC,,, | Performed by: INTERNAL MEDICINE

## 2025-08-04 PROCEDURE — 1159F MED LIST DOCD IN RCRD: CPT | Mod: CPTII,S$GLB,, | Performed by: INTERNAL MEDICINE

## 2025-08-04 NOTE — PROGRESS NOTES
Patient ID: Cheryl Kirkland   Reason for Visit: Follow-up  MRN:  [id number]     Oncologic Diagnosis:  Stage III  (ypT2, pN3a, cM0) Gastric Adenocarcinoma  Previous Treatment:    NA Immunotherapy (Pembrolizumab 03/26/2024 - 07/23/2024)  s/p open total gastrectomy with D2 lymphadenectomy extensive lysis of adhesions on 08/20/2024 - Dr. Jerome    Current Treatment:  Observation    Subjective   Mrs. Garcia is doing well and has no acute complaints.      She has lost some weight and we had a detailed discussion on weight management and healthy eating.  She states that she feels great and back to her baseline.  She has never been a big eater so the weight loss does not concern her and she states that food does not taste good.  Her energy level is good.    I reviewed her imaging with her in detail which show ALEXANDRE.      Otherwise, she has no acute complains.    Review of Systems   Constitutional:  Negative for activity change, appetite change, chills, diaphoresis, fatigue, fever and unexpected weight change.   Respiratory:  Negative for shortness of breath.    Cardiovascular:  Negative for chest pain.   Gastrointestinal:  Negative for abdominal distention, abdominal pain, anal bleeding, blood in stool, constipation, diarrhea, nausea and vomiting.   Genitourinary:  Negative for difficulty urinating and hematuria.   Musculoskeletal:  Negative for arthralgias, back pain and myalgias.   Skin:  Negative for rash.   Neurological:  Negative for dizziness, weakness, light-headedness and headaches.   Hematological:  Does not bruise/bleed easily.   Psychiatric/Behavioral:  The patient is not nervous/anxious.      History     Oncology History   Mixed epithelial carcinoma of right ovary   1/2/2023 Initial Diagnosis    Mixed epithelial carcinoma of left ovary     1/9/2023 Surgery    Laparotomy for radical resection of gynecologic cancer. Removal of pelvic mass (right salpingo-oophorectomy), left  salpingo-oophorectomy, extensive enterolysis, extensive adhesiolysis, left pelvic lymph node biopsy, omental biopsy, small bowel resection with primary functional end-to-end anastomosis, placement of pelvic drain. Path: Right ovary, tumor site, 11 cm, mixed epithelial carcinoma (50% mucinous, 50% endometrioid), G2 moderately differentiated, ovarian surface involvement-indeterminate-specimen disrupted. 1 left pelvic lymph node negative for neoplasia. FIGO stage IC2       1/9/2023 Cancer Staged    Staging form: Ovary, Fallopian Tube, and Primary Peritoneal Carcinoma, AJCC 8th Edition  - Clinical stage from 1/9/2023: FIGO Stage IC2, calculated as Stage IC (cT1c2, cN0, cM0)     4/5/2023 - 7/19/2023 Chemotherapy    4/5/2023: Cycle 1- paclitaxel 135 mg/m2 and carboplatin AUC 5 as patient is frail and elderly.  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 - Ggn1Aoduledu, MMR deficient- loss MLH1 and PMS2     2/8/2024 Cancer Staged    Staging form: Stomach, AJCC 8th Edition  - Clinical stage from 2/8/2024: Stage IIA (cT2, cN1, cM0)     3/4/2024 Tumor Conference    Date Presented to Tumor Board: 03/04/24  [facility] UGI MULTIDISCIPLINARY TUMOR BOARD  PATIENT REVIEW FORM   ____________________________________________________________    CLINIC #: [id number]  DATE: 3/4/2024    DIAGNOSIS: gastric GARRY    PRESENTER: Perone    PATIENT  SUMMARY:   This 74 y/o female was dx with ovarian CA 1/2023, underwent surgery and completed 6 cycles of adj chemotherapy 7/2023. She presented last month with hematemesis. EGD revealed large ulcerated gastric mass, which was biopsied. EGD pathology reviewed - poorly differentiated adenocarcinoma, with intestinal expression, MSI high, HER 2 negative. Staging imaging found irregular wall thickening in mid gastric body, enlarged gastrohepatic lymph node, no evidence of distant metastatic disease.   Reviewed PET - uptake in gastric wall and gastrohepatic lymph node with hypermetabolic activity.   Scheduled to see Dr. Amin in [place] later this week.   + family hx of cancer, pending genetics referral    BOARD RECOMMENDATIONS:   Proceed with systemic chemotherapy, consider neoadj immunotherapy  Proceed with diag lap  Await genetics testing, pathology will send to [facility]    CONSULT NEEDED:     [] Surgery    [] Hem/Onc    [] Rad/Onc    [] Dietary                 [] Social Service    [x] Genetics       [] AES  [] Radiology     Clinical Stage: Tumor   Node(s)  1  Metastasis      GROUP STAGE:  [] O    [] 1A    [] IB    [] IIA    [] IIB     [] IIIA     [] IIIB     [] IIIC    []IV  [] Local recurrence     [] Regional recurrence     [] Distant recurrence   Metastatic site(s): none         [x] Edna'l Treatment Guidelines reviewed and care planned is consistent with guidelines.         (i.e., NCCN, NCI, PD, ACO, AUA, etc.)    PRESENTATION AT CANCER CONFERENCE:         [x] Prospective    [] Retrospective     [] Follow-Up         3/14/2024 Surgery    Procedure:  LAPAROSCOPY, DIAGNOSTIC (N/A)  LYSIS, ADHESIONS, LAPAROSCOPIC (N/A)  LAVAGE, PERITONEAL, THERAPEUTIC (N/A)      Surgeon(s) and Role: Chen Jerome MD - Primary    Pre-Operative Diagnosis: Gastric adenocarcinoma [C16.9]     Post-Operative Diagnosis: Same     Pre-Operative Variables:  Stage:  III (T4a N1 M0)   Adjacent organ involvement: None  Chemotherapy within 90 Days:  No  Radiation Therapy within 90 Days: No      Procedure:  Diagnostic laparoscopy   Extensive lysis of adhesions  Peritoneal biopsy   Peritoneal washings for cytology     3/26/2024 - 9/24/2024 Chemotherapy    Treatment Summary   Plan Name: OP pembrolizumab 200mg Q3W  Treatment Goal: Curative  Status: Inactive  Start Date: 3/26/2024  End Date: 9/24/2024  Provider: Claudia Ambrose MD  Chemotherapy: [No matching medication found in this treatment plan]      Chemotherapy    Treatment Summary   Plan Name: OP pembrolizumab 200mg Q3W  Treatment Goal: Curative  Status: Active  Start Date: 3/15/2024 (Planned)  End Date: 2/27/2026 (Planned)  Provider: Claudia Ambrose MD  Chemotherapy: [No matching medication found in this treatment plan]     4/8/2024 Tumor Conference       [facility] UGI MULTIDISCIPLINARY TUMOR BOARD  PATIENT REVIEW FORM   ____________________________________________________________    CLINIC #: [id number]  DATE: 4/8/2024    DIAGNOSIS: Gastric GARRY    PRESENTER: Justus    PATIENT SUMMARY:   This 72 y/o female was dx with ovarian CA 1/2023, underwent surgery and completed 6 cycles of adj chemotherapy 7/2023. She developed hematemesis in Feb 2024, then underwent EGD with bx of a large ulcerated gastric mass. Pathology revealed poorly differentiated adenocarcinoma, with intestinal expression, MSI high, HER 2 negative. She was presented to this [facility] last month. Since then, she underwent diag lap and today's presentation is for pathology review. Negative for malignancy, reactive atypia     BOARD RECOMMENDATIONS:   Proceed with immunotherapy, then restage with imaging  Potential candidate for subtotal distal gastrectomy    CONSULT NEEDED:     [] Surgery    [] Hem/Onc    [] Rad/Onc    [] Dietary                 [] Social Service    [] Psychology       [] AES  [] Radiology     Clinical Stage: Tumor   Node(s) 1 Metastasis 0      GROUP STAGE:  [] O    [] 1A    [] IB    [] IIA    [] IIB     [] IIIA      [] IIIB     [] IIIC    []IV  [] Local recurrence     [] Regional recurrence     [] Distant recurrence   Metastatic site(s): none         [x] Edna'l Treatment Guidelines reviewed and care planned is consistent with guidelines.         (i.e., NCCN, NCI, PD, ACO, AUA, etc.)    PRESENTATION AT CANCER CONFERENCE:         [x] Prospective    [] Retrospective     [] Follow-Up           7/1/2024 Tumor Conference     [facility] UGI MULTIDISCIPLINARY TUMOR BOARD  PATIENT REVIEW FORM   ____________________________________________________________    CLINIC #: [id number]   DATE: 7/1/2024    DIAGNOSIS: gastric GARRY    PRESENTER: Justus    PATIENT SUMMARY:   This 72 y/o female was dx with ovarian CA 1/2023, underwent surgery and completed 6 cycles of adj chemotherapy in 7/2023. She developed hematemesis in Feb 2024 and underwent EGD with bx of a large ulcerated gastric mass. Pathology revealed poorly differentiated adenocarcinoma, with intestinal expression, MSI high, HER 2 negative. She underwent diag lap and pathology was negative for malignancy, reactive atypia. She was presented to this I [facility] in 3/2024 and again in 4/2024. Since then, she received immunotherapy Keytruda 200 mg every 3 weeks, total of 4 cycles thus far.   Reviewed re-staging CT scan - perigastric lymph nodes larger in size  She remains asymptomatic.     BOARD RECOMMENDATIONS:   Obtain repeat PET  Consider ct DNA    CONSULT NEEDED:     [] Surgery    [] Hem/Onc    [] Rad/Onc    [] Dietary                 [] Genetics    [] Psychology       [] AES  [] Interventional Radiology     Clinical Stage: Tumor 2 Node(s) 1 Metastasis 0      GROUP STAGE:  [] O    [] 1A    [] IB    [x] IIA    [] IIB     [] IIIA     [] IIIB     [] IIIC    []IV  [] Local recurrence     [] Regional recurrence     [] Distant recurrence   Metastatic site(s): none         [x] Edna'l Treatment Guidelines reviewed and care planned is consistent with guidelines.         (i.e., NCCN, NCI, PD,  ACO, AUA, etc.)    PRESENTATION AT CANCER CONFERENCE:         [x] Prospective    [] Retrospective     [] Follow-Up         8/20/2024 Surgery    Open total gastrectomy with D2 WHITNEY and extensive lysis of adhesions   (Path: pT2 pN3a (8/35 LN positive), G3 poorly differentiated adenocarcinoma, margins negative     8/20/2024 Cancer Staged    Staging form: Stomach, AJCC 8th Edition  - Pathologic stage from 8/20/2024: Stage III (ypT2, pN3a, cM0)     2/24/2025 - 2/24/2025 Chemotherapy    Treatment Summary   Plan Name: OP GI [id number] (oxaliplatin leucovorin fluorouracil)  Q2W with pembrolizumab Q6W followed by maintenance pembrolizumab Q6W  Treatment Goal: Palliative  Status: Inactive  Start Date:   End Date:   Provider: Claudia Ambrose MD  Chemotherapy: fluorouraciL injection 605 mg, 400 mg/m2 = 605 mg, Intravenous, Clinic/HOD 1 time, 0 of 9 cycles  oxaliplatin (ELOXATIN) 85 mg/m2 = 128 mg in D5W 525.6 mL chemo infusion, 85 mg/m2 = 128 mg, Intravenous, Clinic/HOD 1 time, 0 of 9 cycles  fluorouracil (Adrucil) 2,400 mg/m2 = 3,625 mg in 0.9% NaCl 100 mL chemo infusion, 2,400 mg/m2 = 3,625 mg, Intravenous, Over 46 hours, 0 of 9 cycles           Previous HPI  Cheryl Garcia is a 73 y.o. female with history of DVT not currently on AC, previously on Xarelto, FIGO Stage IC (cT1c2, cN0, cM0) Mixed epithelial carcinoma of right ovary s/p surgery 01/09/023 and 6C Carbo/Paclitaxel completed 07/19/2023, Osteoporosis on Fosamax, HTN and Hx of Epilepsy on Keppra, Tegretol, Zonegran (last seizure in 2009) who presents to clinic to establish care on referral for gastric cancer.    Patient reports that she Initially presented to [facility] 02/06/24 with report of hematemeiss; she was transferred to [facility] for higher level of care.  On admission to [facility], EGD  was performed and showed a large ulcerated gastric mass.  Pathology results it as poorly differentiated adenocarcinoma.  She states that her  weight is currently stable.  The last time she lost any weight was 2 her ovarian cancer diagnosis and treatment at which time she lost 5 lb but gained it back.  On hospitalization here at [facility], during GI workup she lost those 5 lbs a cane because she was NPO.    The patient has already established care with surgical oncology, Dr. Jerome.  She was presented in the upper GI tumor board with consensus for neoadjuvant immunotherapy given MSI high status of her tumor.  She is here for medical oncology recommendations.    I reviewed the recommendations of the tumor board consensus for neoadjuvant immunotherapy and I reviewed her pathology and biomarkers in detail.  She is expressed understanding in his in agreement with the plan.  She also understands that she still needs to undergo diagnostic laparoscopy for completed staging.    Otherwise, she is a former light smoker; quit 30-40 years ago. No alcohol use in 30-40 years. No illicit drug use.  She has an extensive family history of malignancy in his already been referred to our genetic counselor.      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       Past Surgical History:   Procedure Laterality Date    ABDOMINAL WASHOUT N/A 3/14/2024    Procedure: LAVAGE, PERITONEAL, THERAPEUTIC;  Surgeon: Chen Jerome MD;  Location: [facility];  Service:  General;  Laterality: N/A;    APPENDECTOMY  2015    BIOPSY OF PERITONEUM N/A 3/14/2024    Procedure: BIOPSY, PERITONEUM;  Surgeon: Chen Jerome MD;  Location: [facility] OR;  Service: General;  Laterality: N/A;    COLONOSCOPY  06/20/2012    Dr Boyle- Tubular adenoma polyps. Repeat in 3 years.    COLONOSCOPY  06/17/2015    -Nodular mucosa at appendiceal orfice, sigmoid and desc diverticulosis otherwise normal.    COLONOSCOPY  2020    >3 TAs    COLONOSCOPY  12/2023    CYSTOSCOPY W/ RETROGRADES Right 10/10/2024    Procedure: CYSTOSCOPY, WITH RETROGRADE PYELOGRAM;  Surgeon: Lata Kelly MD;  Location: [facility] OR;  Service: Urology;  Laterality: Right;    CYSTOURETEROSCOPY, WITH HOLMIUM LASER LITHOTRIPSY OF URETERAL CALCULUS AND STENT INSERTION Right 11/27/2024    Procedure: CYSTOURETEROSCOPY, WITH HOLMIUM LASER LITHOTRIPSY OF URETERAL CALCULUS AND STENT INSERTION;  Surgeon: Lata Kelly MD;  Location: [facility] OR;  Service: Urology;  Laterality: Right;    DIAGNOSTIC LAPAROSCOPY N/A 3/14/2024    Procedure: LAPAROSCOPY, DIAGNOSTIC;  Surgeon: Chen Jerome MD;  Location: [facility] OR;  Service: General;  Laterality: N/A;  1. Diagnostic laparoscopy   2. Extensive lysis of adhesions  3. Peritoneal biopsy   4. Peritoneal washings for cytology    DIAGNOSTIC LAPAROSCOPY N/A 8/20/2024    Procedure: LAPAROSCOPY, DIAGNOSTIC;  Surgeon: Chen Jerome MD;  Location: [facility] OR;  Service: General;  Laterality: N/A;    EGD - EXTERNAL RESULT  06/20/2012    Dr Boyle- Mild gastritis otherwise normal.    ENDOSCOPIC ULTRASOUND OF UPPER GASTROINTESTINAL TRACT N/A 7/26/2024    Procedure: ULTRASOUND, UPPER GI TRACT, ENDOSCOPIC;  Surgeon: Valdo Aguilera MD;  Location: [facility];  Service: Endoscopy;  Laterality: N/A;  7/22/24: instructions sent via portal-. Pt no longer aichaling eliquis-GD    ESOPHAGOGASTRODUODENOSCOPY N/A 02/08/2024    Procedure: EGD (ESOPHAGOGASTRODUODENOSCOPY);  Surgeon: Jayla Arteaga MD;   Location: [facility] ENDO;  Service: Endoscopy;  Laterality: N/A;    ESOPHAGOGASTRODUODENOSCOPY N/A 8/20/2024    Procedure: EGD (ESOPHAGOGASTRODUODENOSCOPY);  Surgeon: Chen Jerome MD;  Location: [facility] OR;  Service: General;  Laterality: N/A;    GASTRECTOMY N/A 8/20/2024    Procedure: GASTRECTOMY;  Surgeon: Chen Jerome MD;  Location: [facility] OR;  Service: General;  Laterality: N/A;    HYSTERECTOMY      LAPAROSCOPIC LYSIS OF ADHESIONS N/A 3/14/2024    Procedure: LYSIS, ADHESIONS, LAPAROSCOPIC;  Surgeon: Chen Jerome MD;  Location: [facility] OR;  Service: General;  Laterality: N/A;    LYSIS OF ADHESIONS N/A 8/20/2024    Procedure: LYSIS, ADHESIONS;  Surgeon: Chen Jerome MD;  Location: [facility] OR;  Service: General;  Laterality: N/A;    PLACEMENT OF JEJUNOSTOMY TUBE N/A 8/20/2024    Procedure: INSERTION, JEJUNOSTOMY TUBE;  Surgeon: Chen Jerome MD;  Location: [facility] OR;  Service: General;  Laterality: N/A;    REMOVAL OF URETERAL CALCULUS Right 11/27/2024    Procedure: REMOVAL, CALCULUS, URETER;  Surgeon: Lata Kelly MD;  Location: [facility] OR;  Service: Urology;  Laterality: Right;  basket extraction    REMOVAL, NEPHROSTOMY TUBE, WITH IMAGING GUIDANCE Right 11/27/2024    Procedure: REMOVAL, NEPHROSTOMY TUBE, WITH IMAGING GUIDANCE;  Surgeon: Lata Kelly MD;  Location: [facility] OR;  Service: Urology;  Laterality: Right;    REMOVAL-STENT Right 11/27/2024    Procedure: REMOVAL-STENT;  Surgeon: Lata Kelly MD;  Location: [facility] OR;  Service: Urology;  Laterality: Right;    RETROGRADE PYELOGRAPHY Right 11/27/2024    Procedure: PYELOGRAM, RETROGRADE;  Surgeon: Lata Kelly MD;  Location: [facility] OR;  Service: Urology;  Laterality: Right;    TOTAL ABDOMINAL HYSTERECTOMY W/ BILATERAL SALPINGOOPHORECTOMY  01/09/2023    radical resection with right salpingo-oophorectomy, left salpingo-oophorectomy, extensive enterolysis, extensive adhesiolysis, left pelvic lymph node biopsy, omental biopsy, small bowel  resection with primary functional end-to-end anastomosis, placement of pelvic drain       Family History   Problem Relation Name Age of Onset    Pancreatic cancer Brother Misael Mccray 76    Prostate cancer Brother Zachary 67    Pancreatic cancer Half-brother Gasper 74    Colon cancer Half-sister Elton 83    Lung cancer Half-sister Elton         +smoking hx    Breast cancer Half-sister Vidhi 58    Lymphoma Other Cara Sun    Prostate cancer Other Fernando     Prostate cancer Other Gasper Calderon     Ovarian cancer Other Inerris     Breast cancer Other Aleida     Colon cancer Other Aleida        Review of patient's allergies indicates:  No Known Allergies    Social History     Tobacco Use    Smoking status: Former     Types: Cigarettes    Smokeless tobacco: Never    Tobacco comments:     Quit 1989 smoked 10 years smoked 0.25 ppd    Substance Use Topics    Alcohol use: Not Currently    Drug use: Never       Physical Exam     ECOG SCORE           Vitals:    08/04/25 0905   BP: 126/77   Pulse: 78   Resp: 20   Temp: 97.8 °F (36.6 °C)     Physical Exam  Constitutional:       General: She is not in acute distress.     Appearance: Normal appearance. She is normal weight. She is not ill-appearing, toxic-appearing or diaphoretic.   HENT:      Head: Normocephalic and atraumatic.   Cardiovascular:      Rate and Rhythm: Normal rate.   Pulmonary:      Effort: Pulmonary effort is normal. No respiratory distress.   Abdominal:      General: Bowel sounds are normal. There is no distension.      Palpations: Abdomen is soft.      Tenderness: There is abdominal tenderness (around jtube site; no erythema). There is no guarding.   Skin:     General: Skin is warm.   Neurological:      General: No focal deficit present.      Mental Status: She is alert and oriented to person, place, and time. Mental status is at baseline.   Psychiatric:         Mood and Affect: Mood normal.         Behavior: Behavior normal.         Thought  Content: Thought content normal.       Labs   Labs:  No visits with results within 2 Day(s) from this visit.   Latest known visit with results is:   Lab Visit on 08/01/2025   Component Date Value Ref Range Status    TSH 08/01/2025 2.088  0.400 - 4.000 uIU/mL Final    Vitamin B12 08/01/2025 415  210 - 950 pg/mL Final    Folate Level 08/01/2025 15.3  4.0 - 24.0 ng/mL Final    Iron Level 08/01/2025 113  30 - 160 ug/dL Final    Transferrin 08/01/2025 164 (L)  200 - 375 mg/dL Final    Iron Binding Capacity Total 08/01/2025 243 (L)  250 - 450 ug/dL Final    Iron Saturation 08/01/2025 47  20 - 50 % Final    Ferritin 08/01/2025 190.4  20.0 - 300.0 ng/mL Final    WBC 08/01/2025 4.09  3.90 - 12.70 K/uL Final    RBC 08/01/2025 4.12  4.00 - 5.40 M/uL Final    HGB 08/01/2025 13.7  12.0 - 16.0 gm/dL Final    HCT 08/01/2025 42.2  37.0 - 48.5 % Final    MCV 08/01/2025 102 (H)  82 - 98 fL Final    MCH 08/01/2025 33.3 (H)  27.0 - 31.0 pg Final    MCHC 08/01/2025 32.5  32.0 - 36.0 g/dL Final    RDW 08/01/2025 14.1  11.5 - 14.5 % Final    Platelet Count 08/01/2025 221  150 - 450 K/uL Final    MPV 08/01/2025 9.0 (L)  9.2 - 12.9 fL Final    Nucleated RBC 08/01/2025 0  <=0 /100 WBC Final    Neut % 08/01/2025 53.1  38 - 73 % Final    Lymph % 08/01/2025 35.7  18 - 48 % Final    Mono % 08/01/2025 8.3  4 - 15 % Final    Eos % 08/01/2025 1.7  <=8 % Final    Basophil % 08/01/2025 0.7  <=1.9 % Final    Imm Grans % 08/01/2025 0.5  0.0 - 0.5 % Final    Neut # 08/01/2025 2.17  1.8 - 7.7 K/uL Final    Lymph # 08/01/2025 1.46  1 - 4.8 K/uL Final    Mono # 08/01/2025 0.34  0.3 - 1 K/uL Final    Eos # 08/01/2025 0.07  <=0.5 K/uL Final    Baso # 08/01/2025 0.03  <=0.2 K/uL Final    Imm Grans # 08/01/2025 0.02  0.00 - 0.04 K/uL Final    Mild elevation in immature granulocytes is non specific and can be seen in a variety of conditions including stress response, acute inflammation, trauma and pregnancy. Correlation with other laboratory and clinical  findings is essential.        Pathology   Contains abnormal data Specimen to Pathology, Surgery Gastrointestinal tract - 02/28/2024      Component 3 wk ago   Final Pathologic Diagnosis  Abnormal   STOMACH, 'GASTRIC MASS', BIOPSY:  - 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 Scattered p53 positivity (wild-type pattern of expression) and p16 positivity are also noted. Additional studies, including CK7, PAX8, ER, HER2, TTF-1, GATA3, WT-1,  synaptophysin, and chromogranin, are either negative or negative in the cells of interest. This patient's prior history of ovarian carcinoma resected at [facility] in [place], [place] is noted and the outside pathology report (F-) is reviewed.   While both tumors do have similar immunohistochemical staining patterns, they are not identical, with notable reported differences being CK7 and PAX8 positivity in the ovarian tumor. The presence of a large, fungating, and ulcerated mass in the stomach  is suspicious for a gastric primary; however, it is unclear if the masses represent two separate primaries or one p rimary and one metastatic focus. Abnormal mismatch repair studies do suggest increased risk for development of multiple tumor types (see  below). Requesting outside slides for review to compare morphology may be helpful.    DNA mismatch repair IHC studies are ABNORMAL and demonstrate LOSS OF MLH1 and PMS2 expression within tumor cells. MSH2 and MSH6 exhibit retained expression. 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    Tissue will be sent for molecular profiling by next-generation sequencing ([facility]). Results of this study will be issued directly to the electronic medical record.                        Tumor NGS Tissue - 02/29/2024  MSI: MSI-H         TMB: 42.6 m/MB         Low Coverage  Regions: KDM5D, PMS2         Fusion Addendum Issue Type: NEGATIVE  Negative - This addendum is being issued to report that no gene fusions or altered splicing variants from RNA sequencing analysis were found.               Cytology-[facility] Non-Radiology Clinician Performed w/o on site - 07/26/2024        Component 2 mo ago   Final Pathologic Diagnosis      Abnormal   Lymph node, perigastric, EUS guided fine-needle aspiration/biopsy:  - Adenocarcinoma involving lymphoid tissue consistent with patient's previous gastric mass biopsy, see comment.    COMMENT:  The biopsy shows a malignant epithelial proliferation involving lymphoid tissue consistent with tumor involving lymph node.  Immunostains show tumor cells to be positive for cytokeratin, CDX2, and patchy CK20.  Tumor cells are negative for PAX8   and CK7.  Patient had a previous stomach mass biopsy ([facility] that is pulled and reviewed.  The current biopsy shows similar morphology and immunoprofile as the stomach mass biopsy.  MMR and NGS performed on previous biopsy.  Claudin 18.2 pending,  results will be issued in an addendum.      HER2 by immunohistochemistry:  Negative (1+)  VC      Comment: Interp By Tiny Ma MD, Signed on 08/06/2024 at 13:07   Supplemental Diagnosis      Abnormal   CLDN18, SemiQuant IHC, Manual    Interpretation  FNA lymph node, specimen for CLD18 immunohistochemistry studies (anti-Sylnanc30 (CLDN18) clone [id number], [employer], [place], [place]; using a proprietary detection system) ([id number]?1A.u, KEF24?184?1A.u)    Claudin 18.2: Negative; 40% of tumor cells with 2+ and/or 3+ membrane staining.    Interpretation: The Claudin 18.2 antibody (clone 43?[id number]) was performed per clinical validation and relevant - provided instructions.    This result should be interpreted in the appropriate clinical context.    Fixation: This test has been validated for non-decalcified paraffin embedded tissue specimens fixed  in 10% neutral buffered formalin. Recommended fixation time is between 6?48 hours. This assay has not been validated on tissues  subjected to the decalcification process and /or use of alternative fixatives.      Report electronically signed by  Maribell Glover M.D.      Report attached.    Performing  location:  [address] [address]    &quot;Disclaimer:  This case diagnosis was rendered completely by the outside consultation pathologist and the case is electronically signed by an [facility] pathologist listed below solely to release the report into the medical record.&quot;  VC      Disclaimer      Abnormal   HER-2/candida IHC (4B5) clone, DAB detection method) is done on 10% buffered formalin-fixed (for 6-72 hrs), paraffin embedded tissue sections. The scoring is completed on a 4-tiered scoring system of membrane staining using the 2014 ASCO/CAP scoring  guidelines. It has been cleared by the U.S. FDA for use as an IVD test. This assay has not been validated on decalcified tissues. Results should be interpreted with caution given the likelihood of false negativity on decalcified specimens.    HER-2/candida IHC (4B5) clone, DAB detection method) is done on 10% buffered formalin-fixed (for 6-72 hrs), paraffin embedded tissue sections. The scoring is completed on a 4-tiered scoring system of membrane staining using the 2014 ASCO/CAP scoring  guidelines. It has been cleared by the U.S. FDA for use as an IVD test. This assay has not been validated on decalcified tissues. Results should be interpreted with caution given the likelihood of false negativity on decalcified specimens.    VC      Resulting Agency [facility]            Specimen to Pathology, Surgery General Surgery - 08/20/2024      Component 1 mo ago   Final Pathologic Diagnosis 1. Small intestine, peritoneal scarring, excision:  - Benign fibroadipose tissue  - Negative for malignancy    2.  Spleen, lesion, excision:- Benign splenic tissue  - Negative for malignancy    3. Small intestine, resection:- Benign small intestinal tissue  - Negative for malignancy    4. Lymph node, hepatic artery, excision:- Two lymph nodes, negative for metastatic carcinoma (0/2)    5. Stomach, total gastrectomy:  - Adenocarcinoma, poorly cohesive, with features of undifferentiated carcinoma  - Tumor involves subserosal tissue  - Margins negative for carcinoma  - Pathologic stage y pT2 pN3a  - Eight of 35 lymph nodes are POSITIVE for metastatic carcinoma (8/35)  - Evidence of tumor regression is seen in a lymph node  - Incidental benign leiomyoma identified in perigastric tissue  - Background chronic gastritis  - Helicobacter pylori-type microorganisms are identified by immunohistochemistry  - See synoptic report below    6. Lymph nodes, D2 and periportal lymph node, lymphadenectomy:  - Eight lymph nodes, all negative for metastatic carcinoma (0/8)     7. Lymph node, aortocaval, excision:  - One lymph node, negative for metastatic carcinoma (0/1)    8. Anastomosis, anastomotic donuts:  - Benign esophageal and small intestinal tissue  - Negative for malignancy    _____________________________________________________________________  CASE SUMMARY: (STOMACH)  Standard(s): AJCC-UICC 8    SPECIMEN    Procedure: Total gastrectomy    TUMOR    Tumor Site: Body: Lesser curvature    Histologic Type: Adenocarcinoma    Adenocarcinoma Classification (based on WHO): Poorly cohesive carcinoma, Undifferentiated (anaplastic) carcinoma    Histologic Type Comment: Tumor is poorly cohesive, but in some areas a better differentiated component is identified. The tumor diffusely expresses CDX2.  Histologic Grade: G3, poorly differentiated, undifferentiated  Tumor Size: Greatest dimension in Centimeters (cm): 6.6 cm    Additional Dimension in Centimeters (cm): 4.2 x 1.4 cm  Tumor Extent: Invades muscularis propria  Treatment Effect: Present, with  residual cancer showing evident tumor regression, but more than single cells or rare small groups of cancer cells (partial response, score 2)  Lymphatic and / or Vascular Invasion: Present    MARGINS    Margin Status for Invasive Carcinoma: All margins negative for invasive carcinoma    Closest Margin(s) to Invasive Carcinoma: Omental (radial): lesser curvature    Distance from Invasive Carcinoma to Closest Margin: Exact distance in cm: 3 cm  Margin Status for Dysplasia: All margins negative for dysplasia    REGIONAL LYMPH NODES    Regional Lymph Node Status: Regional lymph nodes present    Tumor present in regional lymph node(s)      Number of Lymph Nodes with Tumor: Exact number: 8    Number of Lymph Nodes Examined: 45    DISTANT METASTASIS    Distant Site(s) Involved, if applicable: Not applicable    pTNM CLASSIFICATION (AJCC 8TH Edition)  Reporting of pT, pN, and (when applicable) pM categories is based on information available to the pathologist at the time the report is issued. As per the AJCC (Chapter 1, 8th Ed.) it is the managing physician's responsibility to establish the final  pathologic stage based upon all pertinent information, including but potentially not limited to this pathology report.    Modified Classification: y (post-neoadjuvant therapy)  pT Category: pT2: invades the muscularis propria  pN Category: pN3a: Metastasis in seven to 15 regional lymph nodes  pM Category: Not applicable - pM cannot be determined from the submitted specimen(s)    ADDITIONAL FINDINGS    Additional Findings: Chronic gastritis, Helicobacter pylori present    SPECIAL STUDIES    HER2 and mismatch repair testing have been performed on the previous biopsy cases [facility]- and [facility]-; see those cases for a full report. HER2 was negative and mismatch repair testing revealed loss of nuclear expression of MLH1 and PMS2. Claudin  18.2 was negative.   Comment: Interp By Jorge Luis Morales D.O., Signed on 08/23/2024 at  17:04   Frozen Section Diagnosis 1.Negative for malignacy  2.Negative for malignacy  Frozen diagnosis made by Dr. Goode for part 1 and 2  5. Proximal and distal margins negative for malignancy.  Frozen diagnosis made by Dr. Elena for part 5.   Microscopic Exam Immunohistochemical stains for PAX8, cytokeratin 7, cytokeratin 20, CDX2, CD45, synaptophysin and chromogranin are performed on block 5-F with adequate and reactive controls and the following results:  CDX2 is positive.  Cytokeratin 20 is patchy, but focally positive in tumor cells.  Cytokeratin 7, CD45, synaptophysin, chromogranin, and PAX8 are negative.    EBV in-situ hybridization, performed on block 5-F, is negative. RNA positive and GUILLAUME negative controls stain appropriately.    An immunohistochemical stain for H. pylori, performed on block 5-K with adequate and reactive controls, is positive.   Gross 1: Surgery ID:  [id number]    Pathology ID:  [id number]  1. Received fresh and subsequently placed in formalin labeled &quot;small bowel peritoneal scarring&quot; is a 0.7 x 0.5 x 0.3 cm tan tissue fragment.  The specimen is submitted entirely for frozen section with the frozen section remnant submitted in  cassette 1A.    QTO--1-A-FS    Grossed by: Rose Mary Melara MS, PA([facility])     2: Surgery ID:  [id number]    Pathology ID:  [id number]  2. Received fresh and subsequently placed in formalin labeled &quot;splenic flexure lesion&quot; is a 0.6 x 0.4 x 0.3 cm tan-red tissue fragments.  The specimen is submitted entirely for frozen section with the frozen section remnant submitted in  cassette 2A.  TXC--2-A-FS    Grossed by: Rose Mary Melara MS, PA([facility])     3: Surgery ID:  [id number]   Pathology ID:  [id number] 3.  Small bowel Received in formalin labeled &quot;small bowel&quot; is a 4.4 cm in length by 1.9 cm in diameter segments of small bowel with 2 stapled ends.  The serosa is pink-red with areas of adhesions.  Opening the specimen reveals pink-red, well  folded   mucosa.  Representative sections are submitted as follows:  TFT--3-A: Tissue from staple line margin  QOY--3-B: Tissue from opposite staple line margin  CIV--3-C: Representative sections of specimen      Grossed by: Rose Mary Melara MS, PA([facility])     4: Surgery ID:  [id number]   Pathology ID:  [id number]  4. Received in formalin labeled &quot;hepatic artery lymph node&quot; is a 0.8 x 0.5 x 0.4 cm tan-pink lymph node.  The lymph node is bisected and submitted entirely in cassette 4A.    ECO--4-A    Grossed by: Rose Mary Melara MS, PA([facility])     5: Surgery ID:  [id number]   Pathology ID:  [id number]  5. Received fresh and subsequently placed in formalin labeled &quot;total gastrectomy distal and proximal margin&quot; is a 23 x 7.6 x 4 cm (lesser curvature is 10 cm and greater curvature is 23 cm) gastrectomy specimen.  The serosa is pink-red.  Opening   the specimen reveals a 6.6 x 4.2 cm firm, tan-pink, nodular, slightly raised mass.  The mass invades the gastric wall and involves the serosa.  The mass does not grossly invade into the perigastric fat measuring 3 cm from the lesser curvature fat margin   and 7 cm from the greater curvature fat margin.  The mass measures 5.2 cm from the distal duodenal staple line margin and 4 cm from the proximal staple line margin.  The remaining mucosa is tan-pink and moderately well folded.  Multiple lymph nodes are  identified.  The serosa is inked blue.  The proximal staple line margin is submitted for frozen section with the frozen section remnant submitted in cassette BBW--5-A-FS.  The distal duodenal staple line margin is submitted for frozen section with   the frozen section remnant submitted in cassette PJW--5-B-FS.  Representative sections are submitted as follows:  CHS--5-C:  Lesser curvature fat margin nearest mass  KMQ--5-D: Greater curvature fat margin nearest mass  STY--5-E: Mass to serosa  FLI--5-F:  Mass to lesser curvature fat  GQM--5-G: Mass to adjacent distal mucosa  GFK--5-H: Mass to adjacent proximal mucosa  VEV--5-I: Representative section of mass  PUT--5-J: Representative section of mass  URZ--5-K: Representative sections of uninvolved mucosa  ZCH--5-L: 7 lesser curvature lymph nodes  BTJ--5-M: 5 lesser curvature lymph nodes  SHA--5-N: 5 lesser curvature lymph nodes  ONC--5-O: 5 lymph nodes  YMS--5-P: 2 lymph nodes  MIA--5-Q - XLM--5-R - AWW--5-S: 1 lymph node, serially sectioned and submitted entirely  QOR--5-T - SZE--5-X: 1 lymph node, serially sectioned and submitted entirely    Grossed by: Rose Mary Melara MS, PA([facility])     6: Surgery ID:  [id number]   Pathology ID:  [id number]  6. Received in formalin labeled &quot;D2 lymphadenopathy with alea portal lymph node&quot; is a 4 x 4 x 1 cm aggregate of tan-pink to tan-yellow, nodular tissue fragments.  7 candidate lymph nodes ranging from 0.3-1.8 cm in greatest dimension are  identified.  The lymph nodes are submitted entirely as follows:  MXW--6-A: 4 lymph nodes  IZQ--6-B: 2 lymph nodes  JPO--6-C: 1 lymph node, bisected    Grossed by: Rose Mary Melara MS PA([facility])     7: Surgery ID:  [id number]   Pathology ID:  [id number]  7. Received in formalin labeled &quot;Aortic cable&quot; is a 1 x 0.5 x 0.4 cm purple-gray, rubbery portion of tissue.  The specimen is submitted entirely in cassette 7A.    PYW--7-A    Grossed by: Rose Mary Melara MS, PA([facility])     8: Surgery ID:  [id number]   Pathology ID:  [id number]  8. Received in formalin labeled &quot;anastomotic donuts&quot; are 2 circular, pink-red mucosal surfaced portions of tissue measuring 1.7 cm in diameter by 1 cm in length and 1.5 cm in diameter by 1.1 cm in length.  Representative sections are submitted  in cassette 8A.    TUH--8-A    Grossed by: Rose Mary Melara MS, PA([facility])    Frozen Section Footnote Frozen section performed at [facility], [address] [place] [address] [place], [place], [address]   Disclaimer Unless the case is a 'gross only' or additional testing only, the final diagnosis for each specimen is based on a microscopic examination of appropriate tissue sections.  This test was developed and its performance characteristics determined by [facility], Department of Pathology and Laboratory Medicine. It has not been cleared or approved by the US Food and Drug Administration. The FDA has determined that  such clearance or approval is not necessary. This test is used for clinical purposes. It should not be regarded as investigational or for research. This laboratory is certified under the Clinical Laboratory Improvement Admendments (CLIA) as qualified to  perform such high complexity clinical laboratory testing   Resulting Agency [facility]          Imaging   NM PET CT FDG SKULL BASE TO MID THIGH - 04/22/2025  Impression:  1. Response to treatment with interval decrease in size and FDG avidity of the 2 left mesenteric lymph nodes as detailed above.  2. No new lesion since the prior exam.      NM PET CT FDG SKULL BASE TO MID THIGH - 02/28/2024  CLINICAL HISTORY:  Gastric cancer.  Malignant neoplasm of stomach, unspecified.  Initial staging.  History of left ovarian epithelial carcinoma.     TECHNIQUE:  11.45 mCi of F18-FDG was administered intravenously in the left forearm.  After an approximately 60 min distribution time, PET/CT images were acquired from the skull base to the mid thigh. Transmission images were acquired to correct for attenuation using a whole body low-dose CT scan without contrast with the arms positioned above the head. Glycemia at the time of injection was 113 mg/dL.     COMPARISON:  CT abdomen pelvis, 02/08/2024 and chest CT, 02/27/2024     FINDINGS:  Quality of the study: Adequate.     SUV max of the liver parenchyma is 2.8     Neck: No abnormal  FDG avidity.  No lymphadenopathy or mass.     Chest: No abnormal FDG avidity.  No pleural effusion or lymphadenopathy or pulmonary nodule.     Abdomen/pelvis: Marked thickening of the wall of the stomach involving the fundus and proximal body with marked FDG avidity with SUV max 18.  The gastrohepatic ligament lymph node measuring 19 x 13 mm shows moderate FDG avidity with SUV max 4.6.     No ascites.  No other focus of abnormal FDG avidity in the abdomen or pelvis.     Skeletal structures: No abnormal FDG avidity.  No focal lytic or sclerotic lesion.     Physiologic uptake of the tracer is present within the brain, salivary glands, myocardium, GI and  tracts.     Incidental CT findings: N/A     Impression:  1. The biopsy proving gastric cancer is markedly FDG avid with SUV max 18.  2. Moderately FDG avid gastrohepatic ligament lymph node consistent with local metastatic disease.  3. No evidence for distant metastatic disease.  All CT scans at this facility are performed  using dose modulation techniques as appropriate to performed exam including the following:  automated exposure control; adjustment of mA and/or kV according to the patients size (this includes techniques or standardized protocols for targeted exams where dose is matched to indication/reason for exam: i.e. extremities or head);  iterative reconstruction technique.    Assessment and Plan   Stage III  (ypT2, pN3a, cM0) Gastric Adenocarcinoma  EGD 02/08/2024: Gastric Mass  Path: Poorly-differentiated adenocarcinoma with intestinal phenotype -  HER2-, MMR - Deficient (MLH1 and PMS2 loss)  [facility] NGS:   MSI-H, TMB 42.6  Potentially actionable mutation in CHRIS  Multiple clinical trials available  PET-CT 02/28/24 showed no evidence of distant metastatic disease  Presented in Tb 03/04/24 with consensus for Proceed with systemic chemotherapy, consider neoadj immunotherapy, Proceed with diag lap  Diagnostic Laparoscopy 03/14/2024 with Dr. Jerome negative for  malignant cells  Per NCCN guidelines, NA immunotherapy is useful in MSI-H/dMMR tumors) with options being Nivolumab and ipilimumab followed by nivolumab or Pembrolizumab.  Given patient's comorbid conditions and potentially increased toxicity with CTLA4 inhibitor and PD-1 inhibitor, decision made to proceed with Pembrolizumab. Started 03/14/24.  Patient to completed 12 weeks of NA immunotherapy 06/18/2024  CT CAP 06/24/25 showed progression of LAD but no other signs of disease  s/p open total gastrectomy with D2 lymphadenectomy extensive lysis of adhesions on 08/20/2024  Eight of 35 lymph nodes are POSITIVE for metastatic carcinoma   Adjuvant therapy delayed due to malnutrition and FTT   CT CAP 01/03/2025 showed a few enlarged left mid to upper abdomen lymph nodes (at least a couple enlarging from the recent exams) concerning for potential metastatic nodes.    Plan  PET-CT 01/30/2025 showed hypermetabolic left mesenteric lymphadenopathy consistent with metastasis; discussed with IR and no target for biopsy  UGI TB Presentation 02/24/2025: consensus for obtaining ctDNA and monitoring off of therapy  with monitoring imaging and ctDNA and resumption of immunotherapy if progression   PET-CT 04/22/25 showed response to treatment with interval decrease in size and FDG avidity of the 2 left mesenteric lymph nodes; no new lesions  Restaging PET-CT 08/01/25 showed no evidence of the LAD noted on previous scan and no findings to suggest recurrent or metastatic disease  Labs reviewed and will continue with surveillance - Signatera drawn in clinic today        Severe Protein Calorie Malnutrition, Resolved  She is having discomfort around the feeding tube  We discussed removal of feeding tube and she will follow up with surgery to further discuss        Immunotherapy induced pruritus, Resolved  Immunotherapy induced rash < Grade 1, Resolved  Topical hydrocortisone and Atarax prescribed with relief        Chronic Medical  Conditions  Hx DVT previously on Xareltao  Hx of FIGO Stage IC (cT1c2, cN0, 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   Follow up with JENNIE    Infusion scheduling note    Injection scheduling note    Labs Other   Scheduling:  Preferred lab:  Lab interval:  other: SIgnatera   Imaging PET scan      Pharmacy appointment    Other referrals                  The patient was seen, interviewed and examined. Pertinent lab and radiologic studies were reviewed. Pt instructed to call should they develop concerning signs/symptoms or have further questions.        Portions of the record may have been created with voice recognition software. Occasional wrong-word or sound-a-like substitutions may have occurred due to the inherent limitations of voice recognition software. Read the chart carefully and recognize, using context, where substitutions have occurred.      Claudia Amin MD    Hematology/Oncology

## 2025-08-11 PROBLEM — R10.9 ABDOMINAL PAIN: Status: RESOLVED | Noted: 2024-12-08 | Resolved: 2025-08-11

## (undated) DEVICE — SUT VICRYL+ 27 UR-6 VIOL

## (undated) DEVICE — SCISSOR 5MMX35CM DIRECT DRIVE

## (undated) DEVICE — SEAL SCOPE WARMER 20/BX

## (undated) DEVICE — DISSECTOR LIGASURE EXACT 21CM

## (undated) DEVICE — DRAPE T CYSTOSCOPY STERILE

## (undated) DEVICE — DRAIN PENROSE STD 18X1/2IN

## (undated) DEVICE — GLOVE SIGNATURE ESSNTL LTX 6.5

## (undated) DEVICE — SOL 0.9% NACL IRRI.IN STERIL

## (undated) DEVICE — DRAPE CORETEMP FLD WRM 56X62IN

## (undated) DEVICE — CONTAINER SPECIMEN OR STER 4OZ

## (undated) DEVICE — STAPLER CIRCULAR XL SEAL 25MM

## (undated) DEVICE — APPLICATOR CHLORAPREP ORN 26ML

## (undated) DEVICE — CORD LAP 10 DISP

## (undated) DEVICE — RELOAD PROXIMATE CUT BLUE 75MM

## (undated) DEVICE — SUT PROLENE RB-1 4-0

## (undated) DEVICE — UROVIEW 2600/2800

## (undated) DEVICE — CATH CHOLANGIO L-L 4FR 40CM

## (undated) DEVICE — CATH URETERAL DUAL LUMEN 10FR

## (undated) DEVICE — SOL NORMAL USPCA 0.9%

## (undated) DEVICE — STAPLER ECHELON FLEX GST 45MM

## (undated) DEVICE — DRAPE INCISE IOBAN 2 23X33IN

## (undated) DEVICE — ELECTRODE REM PLYHSV RETURN 9

## (undated) DEVICE — BOWL STERILE LARGE 32OZ

## (undated) DEVICE — STAPLER SKIN PROXIMATE WIDE

## (undated) DEVICE — POUCH INSTRUMENT 2 POCKET

## (undated) DEVICE — GLOVE SENSICARE PI MICRO 6.5

## (undated) DEVICE — FIBER QUANTA OPT SDT 272UM

## (undated) DEVICE — EVACUATOR WOUND BULB 100CC

## (undated) DEVICE — SUT VICRYL PLUS 3-0 SH 18IN

## (undated) DEVICE — CATH POLLACK OPEN-END FLEXI-TI

## (undated) DEVICE — DRAPE LAPSCP CHOLE 122X102X78

## (undated) DEVICE — NDL ECLIPSE SAF REG 25GX1.5IN

## (undated) DEVICE — APPLIER CLIP LIAGCLIP 9.375IN

## (undated) DEVICE — SUT VICRYL 3-0 27 SH

## (undated) DEVICE — TOWEL OR DISP STRL BLUE 4/PK

## (undated) DEVICE — GLOVE SENSICARE PI MICRO 7

## (undated) DEVICE — SET IRRIGATION WARMING NORMAL

## (undated) DEVICE — TIP GRASPER FENESTRATED DISP

## (undated) DEVICE — GOWN POLY REINF BRTH SLV XL

## (undated) DEVICE — ADHESIVE MASTISOL VIAL 48/BX

## (undated) DEVICE — SUT SILK 3-0 STRANDS 30IN

## (undated) DEVICE — COVER LIGHT HANDLE 80/CA

## (undated) DEVICE — SYR 50ML CATH TIP

## (undated) DEVICE — TRAY CATH 1-LYR URIMTR 16FR

## (undated) DEVICE — URETEROSCOPE LITHOVUE REVERSE

## (undated) DEVICE — TRAY SKIN SCRUB WET 4 COMPART

## (undated) DEVICE — NDL ASPIR/INJ 5 MM

## (undated) DEVICE — CANISTER SUCTION JUMBO 12L

## (undated) DEVICE — GUIDE WIRE MOTION .035 X 150CM

## (undated) DEVICE — BITE BLOCK ADULT JUMBO ENDO W/

## (undated) DEVICE — TUBING MEDI-VAC 20FT .25IN

## (undated) DEVICE — SOL NACL IRR 1000ML BTL

## (undated) DEVICE — GLOVE SIGNATURE ESSNTL LTX 6

## (undated) DEVICE — MANIFOLD 4 PORT

## (undated) DEVICE — SYR ONLY LUER LOCK 20CC

## (undated) DEVICE — CATH IV CATHLON W/HUB14GAX

## (undated) DEVICE — STAPLER ECHELON FLEX 35MM 32CM

## (undated) DEVICE — SOL NACL IRR 3000ML

## (undated) DEVICE — SPONGE COTTON TRAY 4X4IN

## (undated) DEVICE — SYR 30CC LUER LOCK

## (undated) DEVICE — DRESSING TRANS 4X4 TEGADERM

## (undated) DEVICE — SYR LUER LOCK STERILE 10ML

## (undated) DEVICE — KIT ANTIFOG W/SPONG & FLUID

## (undated) DEVICE — NDL BLUNT W/O FILTER 18GX1.5IN

## (undated) DEVICE — TUBE FEEDING JEJUNAL 14FR

## (undated) DEVICE — SUT MONOCYRL 4-0 PS2 UND

## (undated) DEVICE — CANNULA ENDOPATH XCEL 5X100MM

## (undated) DEVICE — PACK BASIC SETUP SC BR

## (undated) DEVICE — SUT 1 48IN PDS II VIO MONO

## (undated) DEVICE — SPONGE LAP 18X18 PREWASHED

## (undated) DEVICE — ENDOAPTH VAS RELOAD WHITE 2.5

## (undated) DEVICE — KIT ENDOKIT COMPLIANCE CUSTOM

## (undated) DEVICE — SOL IRRI STRL WATER 1000ML

## (undated) DEVICE — SUT PROLENE 2-0 SH 36IN BLU

## (undated) DEVICE — IRRIGATION SET Y-TYPE TUR/BLAD

## (undated) DEVICE — DRAPE THREE-QTR REINF 53X77IN

## (undated) DEVICE — NDL PNEUMO INSUFFLATI 120MM

## (undated) DEVICE — WIRE GUID STAND STR .038X150CM

## (undated) DEVICE — CUTTER PROXIMATE BLUE 75MM

## (undated) DEVICE — TROCAR ENDOPATH XCEL 5X100MM

## (undated) DEVICE — KITTNER ENDOSCOPIC SINGLE TIP

## (undated) DEVICE — SEALER LIGASURE MARYLAND 37CM

## (undated) DEVICE — SUT SILK 3-0 SH 18IN BLACK

## (undated) DEVICE — SUT MONOCRYL 4.0 PS2 CP496G

## (undated) DEVICE — HANDLE PISTOL GRIP HAND CNTRL

## (undated) DEVICE — GOWN POLY REINF X-LONG XL

## (undated) DEVICE — SUT SILK 2-0 STRANDS 30IN

## (undated) DEVICE — LOOP VESSEL BLUE MINI 2/CARD

## (undated) DEVICE — Device

## (undated) DEVICE — PACK SPY-PHI DRUG DRAPE

## (undated) DEVICE — ENDO GRASPER ETHICON 5DSG

## (undated) DEVICE — HEMOSTAT SURGICEL 4X8IN

## (undated) DEVICE — JELLY SURGILUBE LUBE PKT 3GM

## (undated) DEVICE — EXTRACTOR TIPLESS 2.4FRX1115CM

## (undated) DEVICE — SUT PDSII 3-0 SH 27IN CLEAR

## (undated) DEVICE — ELECTRODE BLADE INSULATED 1 IN

## (undated) DEVICE — PAD CURAD NONADH 3X4IN

## (undated) DEVICE — DRESSING CHG FOAM 4MM 1IN

## (undated) DEVICE — PACK ECLIPSE UNIVERSAL STERILE

## (undated) DEVICE — RELOAD ECHELON ENDOPATH 45MM

## (undated) DEVICE — SYR LUER-LOCK STERILE 3ML

## (undated) DEVICE — SUT ETHILON 2-0 BLK PS-2